# Patient Record
Sex: FEMALE | Race: WHITE | NOT HISPANIC OR LATINO | Employment: OTHER | URBAN - METROPOLITAN AREA
[De-identification: names, ages, dates, MRNs, and addresses within clinical notes are randomized per-mention and may not be internally consistent; named-entity substitution may affect disease eponyms.]

---

## 2017-01-03 ENCOUNTER — OFFICE VISIT (OUTPATIENT)
Dept: FAMILY MEDICINE | Facility: CLINIC | Age: 82
End: 2017-01-03
Payer: MEDICARE

## 2017-01-03 VITALS
DIASTOLIC BLOOD PRESSURE: 86 MMHG | BODY MASS INDEX: 25.4 KG/M2 | HEIGHT: 62 IN | HEART RATE: 72 BPM | WEIGHT: 138 LBS | OXYGEN SATURATION: 95 % | SYSTOLIC BLOOD PRESSURE: 140 MMHG | RESPIRATION RATE: 20 BRPM

## 2017-01-03 DIAGNOSIS — F32.4 MAJOR DEPRESSIVE DISORDER, SINGLE EPISODE, IN PARTIAL REMISSION: ICD-10-CM

## 2017-01-03 DIAGNOSIS — R79.89 ABNORMAL TSH: Primary | ICD-10-CM

## 2017-01-03 DIAGNOSIS — E78.5 DYSLIPIDEMIA: ICD-10-CM

## 2017-01-03 DIAGNOSIS — Z99.81 OXYGEN DEPENDENT: ICD-10-CM

## 2017-01-03 DIAGNOSIS — H02.59 EXCESSIVE INVOLUNTARY BLINKING: ICD-10-CM

## 2017-01-03 DIAGNOSIS — J41.0 SIMPLE CHRONIC BRONCHITIS: ICD-10-CM

## 2017-01-03 DIAGNOSIS — R94.6 ABNORMAL RESULTS OF THYROID FUNCTION STUDIES: ICD-10-CM

## 2017-01-03 DIAGNOSIS — J30.89 SEASONAL ALLERGIC RHINITIS DUE TO OTHER ALLERGIC TRIGGER: ICD-10-CM

## 2017-01-03 DIAGNOSIS — G25.0 ESSENTIAL TREMOR: ICD-10-CM

## 2017-01-03 LAB
ALBUMIN SERPL BCP-MCNC: 3.7 G/DL
ALP SERPL-CCNC: 63 U/L
ALT SERPL W/O P-5'-P-CCNC: 18 U/L
ANION GAP SERPL CALC-SCNC: 8 MMOL/L
AST SERPL-CCNC: 16 U/L
BILIRUB SERPL-MCNC: 0.5 MG/DL
BUN SERPL-MCNC: 12 MG/DL
CALCIUM SERPL-MCNC: 8.7 MG/DL
CHLORIDE SERPL-SCNC: 100 MMOL/L
CO2 SERPL-SCNC: 30 MMOL/L
CREAT SERPL-MCNC: 0.8 MG/DL
EST. GFR  (AFRICAN AMERICAN): >60 ML/MIN/1.73 M^2
EST. GFR  (NON AFRICAN AMERICAN): >60 ML/MIN/1.73 M^2
GLUCOSE SERPL-MCNC: 94 MG/DL
POTASSIUM SERPL-SCNC: 3.5 MMOL/L
PROT SERPL-MCNC: 6.2 G/DL
SODIUM SERPL-SCNC: 138 MMOL/L
T4 FREE SERPL-MCNC: 0.94 NG/DL
TSH SERPL DL<=0.005 MIU/L-ACNC: 0.33 UIU/ML

## 2017-01-03 PROCEDURE — G0008 ADMIN INFLUENZA VIRUS VAC: HCPCS | Mod: S$GLB,,, | Performed by: FAMILY MEDICINE

## 2017-01-03 PROCEDURE — 84439 ASSAY OF FREE THYROXINE: CPT

## 2017-01-03 PROCEDURE — 3079F DIAST BP 80-89 MM HG: CPT | Mod: S$GLB,,, | Performed by: FAMILY MEDICINE

## 2017-01-03 PROCEDURE — 99214 OFFICE O/P EST MOD 30 MIN: CPT | Mod: 25,S$GLB,, | Performed by: FAMILY MEDICINE

## 2017-01-03 PROCEDURE — 80053 COMPREHEN METABOLIC PANEL: CPT

## 2017-01-03 PROCEDURE — 99999 PR PBB SHADOW E&M-EST. PATIENT-LVL II: CPT | Mod: PBBFAC,,, | Performed by: FAMILY MEDICINE

## 2017-01-03 PROCEDURE — 1159F MED LIST DOCD IN RCRD: CPT | Mod: S$GLB,,, | Performed by: FAMILY MEDICINE

## 2017-01-03 PROCEDURE — 3077F SYST BP >= 140 MM HG: CPT | Mod: S$GLB,,, | Performed by: FAMILY MEDICINE

## 2017-01-03 PROCEDURE — 90662 IIV NO PRSV INCREASED AG IM: CPT | Mod: S$GLB,,, | Performed by: FAMILY MEDICINE

## 2017-01-03 PROCEDURE — 99499 UNLISTED E&M SERVICE: CPT | Mod: S$GLB,,, | Performed by: FAMILY MEDICINE

## 2017-01-03 PROCEDURE — 84443 ASSAY THYROID STIM HORMONE: CPT

## 2017-01-03 PROCEDURE — 1157F ADVNC CARE PLAN IN RCRD: CPT | Mod: S$GLB,,, | Performed by: FAMILY MEDICINE

## 2017-01-03 PROCEDURE — 1160F RVW MEDS BY RX/DR IN RCRD: CPT | Mod: S$GLB,,, | Performed by: FAMILY MEDICINE

## 2017-01-03 RX ORDER — MONTELUKAST SODIUM 10 MG/1
10 TABLET ORAL NIGHTLY
Qty: 90 TABLET | Refills: 5 | Status: SHIPPED | OUTPATIENT
Start: 2017-01-03 | End: 2017-02-02

## 2017-01-03 RX ORDER — PRAVASTATIN SODIUM 40 MG/1
40 TABLET ORAL DAILY
Qty: 90 TABLET | Refills: 1 | Status: SHIPPED | OUTPATIENT
Start: 2017-01-03 | End: 2017-05-25 | Stop reason: SDUPTHER

## 2017-01-03 RX ORDER — LOSARTAN POTASSIUM 50 MG/1
50 TABLET ORAL DAILY
Qty: 90 TABLET | Refills: 3 | Status: SHIPPED | OUTPATIENT
Start: 2017-01-03 | End: 2017-03-23 | Stop reason: SDUPTHER

## 2017-01-03 RX ORDER — PRIMIDONE 50 MG/1
50 TABLET ORAL DAILY
Qty: 90 TABLET | Refills: 1 | Status: ON HOLD | OUTPATIENT
Start: 2017-01-03 | End: 2017-05-17

## 2017-01-03 NOTE — MR AVS SNAPSHOT
[address] [address]  Phone: [phone]  Fax: [phone]                  Chantell Herrera   1/3/2017 10:45 AM   Office Visit    Description:  Female : 1935   Provider:  Anson Leiva MD   Department:  [facility]           Reason for Visit     Follow-up           Diagnoses this Visit        Comments    Abnormal TSH    -  Primary     Dyslipidemia         Essential tremor         Excessive involuntary blinking         Abnormal results of thyroid function studies         Major depressive disorder, single episode, in partial remission         Simple chronic bronchitis         Oxygen dependent         Seasonal allergic rhinitis due to other allergic trigger                To Do List           Future Appointments        Provider Department Dept Phone    1/3/2017 10:45 AM Anson Leiva MD [facility] [phone]    7/3/2017 9:30 AM Anson Leiva MD [facility] [phone]      Goals (5 Years of Data)     None      Follow-Up and Disposition     Return in about 6 months (around 7/3/2017).    Follow-up and Disposition History       These Medications        Disp Refills Start End    pravastatin (PRAVACHOL) 40 MG tablet 90 tablet 1 1/3/2017     Take 1 tablet (40 mg total) by mouth once daily. - Oral    Ph[facility]acy: [facility] [address] [facility] Ph #: [phone]       primidone (MYSOLINE) 50 MG Tab 90 tablet 1 1/3/2017 1/3/2018    Take 1 tablet (50 mg total) by mouth once daily. - Oral    Pharmacy: [facility] [address] [facility] Ph #: [phone]       losartan (COZAAR) 50 MG tablet 90 tablet 3 1/3/2017 1/3/2018    Take 1 tablet (50 mg total) by mouth once daily. - Oral    Pharmacy: [facility] [address] [facility] Ph #: [phone]       montelukast (SINGULAIR) 10 mg tablet 90 tablet  5 1/3/2017 2/2/2017    Take 1 tablet (10 mg total) by mouth every evening. - Oral    Pharmacy: [facility] - [place], [place] - [address] Ph #: [phone]         [facility]     [facility] On Call Nurse Care Line - 24/7 Assistance  Registered nurses in the [facility] On Call Center provide clinical advisement, health education, appointment booking, and other advisory services.  Call for this free service at [phone].             Medications           Message regarding Medications     Verify the changes and/or additions to your medication regime listed below are the same as discussed with your clinician today.  If any of these changes or additions are incorrect, please notify your healthcare provider.        START taking these NEW medications        Refills    losartan (COZAAR) 50 MG tablet 3    Sig: Take 1 tablet (50 mg total) by mouth once daily.    Class: Normal    Route: Oral    montelukast (SINGULAIR) 10 mg tablet 5    Sig: Take 1 tablet (10 mg total) by mouth every evening.    Class: Normal    Route: Oral           Verify that the below list of medications is an accurate representation of the medications you are currently taking.  If none reported, the list may be blank. If incorrect, please contact your healthcare provider. Carry this list with you in case of emergency.           Current Medications     albuterol (PROVENTIL) 2.5 mg /3 mL (0.083 %) nebulizer solution Take 3 mLs (2.5 mg total) by nebulization every 6 (six) hours as needed for Wheezing.    artificial tears,hypromellose, 0.3 % Drop continuous prn.     aspirin 81 MG Chew Take 81 mg by mouth once daily.    budesonide-formoterol 160-4.5 mcg (SYMBICORT) 160-4.5 mcg/actuation HFAA Inhale 2 puffs into the lungs every 12 (twelve) hours.    cholecalciferol, vitamin D3, (VITAMIN D3) 2,000 unit Tab Take 2,000 Units by mouth once daily.    citalopram (CELEXA) 40 MG tablet TAKE 1 TABLET ONE TIME DAILY    FOLIC  "ACID/MULTIVIT-MIN/LUTEIN (CENTRUM SILVER ORAL) Take 1 tablet by mouth once daily.     ipratropium (ATROVENT) 0.02 % nebulizer solution Take 500 mcg by nebulization 4 (four) times daily.     pravastatin (PRAVACHOL) 40 MG tablet Take 1 tablet (40 mg total) by mouth once daily.    predniSONE (DELTASONE) 10 MG tablet Take 10 mg by mouth once daily.     primidone (MYSOLINE) 50 MG Tab Take 1 tablet (50 mg total) by mouth once daily.    theophylline (THEODUR) 300 mg 24 hr capsule Take 150 mg by mouth once daily.     VITAMIN C 500 MG tablet Take 1 tablet by mouth once daily.    losartan (COZAAR) 50 MG tablet Take 1 tablet (50 mg total) by mouth once daily.    montelukast (SINGULAIR) 10 mg tablet Take 1 tablet (10 mg total) by mouth every evening.           Clinical Reference Information           Vital Signs - Last Recorded  Most recent update: 1/3/2017 10:00 AM by Alona Mohamud LPN    BP Pulse Resp Ht Wt SpO2    (!) 140/86 (BP Location: Left arm, Patient Position: Sitting, BP Method: Manual) 72 20 5' 2" (1.575 m) 62.6 kg (138 lb 0.1 oz) 95%    BMI                25.24 kg/m2          Blood Pressure          Most Recent Value    BP  (!)  140/86      Allergies as of 1/3/2017     No Known Allergies      Immunizations Administered on Date of Encounter - 1/3/2017     None      Orders Placed During Today's Visit     Future Labs/Procedures Expected by Expires    Comprehensive metabolic panel  1/3/2017 1/3/2018    TSH  1/3/2017 1/3/2018      "

## 2017-01-03 NOTE — PROGRESS NOTES
Subjective:       Patient ID: Chantell Herrera is a 81 y.o. female.    Chief Complaint: Follow-up (3 month checkup)    Pt is a 81 y.o. female who presents for evaluation and management of   Encounter Diagnoses   Name Primary?    Dyslipidemia     Essential tremor     Excessive involuntary blinking     Abnormal TSH Yes    Abnormal results of thyroid function studies      Major depressive disorder, single episode, in partial remission     Simple chronic bronchitis    .  Doing well on current meds. Denies any side effects. Prevention is up to date.  Review of Systems   Constitutional: Negative for chills and fever.   Respiratory: Negative for cough, shortness of breath and wheezing.    Cardiovascular: Negative for chest pain.   Musculoskeletal: Positive for arthralgias.   Psychiatric/Behavioral: Negative for agitation, dysphoric mood and suicidal ideas.       Objective:      Physical Exam   Constitutional: She is oriented to person, place, and time. She appears well-developed and well-nourished.   HENT:   Head: Normocephalic and atraumatic.   Right Ear: External ear normal.   Left Ear: External ear normal.   Nose: Nose normal.   Mouth/Throat: Oropharynx is clear and moist.   Eyes: EOM are normal. Pupils are equal, round, and reactive to light.   Neck: Normal range of motion. Neck supple. No JVD present. No tracheal deviation present. No thyromegaly present.   Cardiovascular: Normal rate, normal heart sounds and intact distal pulses.    No murmur heard.  Pulmonary/Chest: Effort normal and breath sounds normal. No respiratory distress. She has no wheezes. She has no rales. She exhibits no tenderness.   Abdominal: Soft. Bowel sounds are normal. She exhibits no distension and no mass. There is no tenderness. There is no rebound and no guarding.   Musculoskeletal: Normal range of motion. She exhibits no edema or tenderness.   Lymphadenopathy:     She has no cervical adenopathy.   Neurological: She is alert and  oriented to person, place, and time. She has normal reflexes. She displays normal reflexes. No cranial nerve deficit. She exhibits normal muscle tone. Coordination normal.   Skin: Skin is warm and dry. No rash noted. No erythema. No pallor.   Psychiatric: She has a normal mood and affect. Her behavior is normal. Judgment and thought content normal.       Assessment:       1. Abnormal TSH    2. Dyslipidemia    3. Essential tremor    4. Excessive involuntary blinking    5. Abnormal results of thyroid function studies     6. Major depressive disorder, single episode, in partial remission    7. Simple chronic bronchitis    8. Oxygen dependent    9. Seasonal allergic rhinitis due to other allergic trigger        Plan:   Chantell was seen today for follow-up.    Diagnoses and all orders for this visit:    Abnormal TSH  -     TSH; Future    Dyslipidemia  -     pravastatin (PRAVACHOL) 40 MG tablet; Take 1 tablet (40 mg total) by mouth once daily.  -     Comprehensive metabolic panel; Future    Essential tremor  -     primidone (MYSOLINE) 50 MG Tab; Take 1 tablet (50 mg total) by mouth once daily.  -     losartan (COZAAR) 50 MG tablet; Take 1 tablet (50 mg total) by mouth once daily.    Excessive involuntary blinking  -     primidone (MYSOLINE) 50 MG Tab; Take 1 tablet (50 mg total) by mouth once daily.    Abnormal results of thyroid function studies   -     TSH; Future    Major depressive disorder, single episode, in partial remission    Simple chronic bronchitis    Oxygen dependent    Seasonal allergic rhinitis due to other allergic trigger  -     montelukast (SINGULAIR) 10 mg tablet; Take 1 tablet (10 mg total) by mouth every evening.    adding losartan    RTC 6 months      No Follow-up on file.

## 2017-01-11 ENCOUNTER — TELEPHONE (OUTPATIENT)
Dept: FAMILY MEDICINE | Facility: CLINIC | Age: 82
End: 2017-01-11

## 2017-01-11 DIAGNOSIS — K08.9 TOOTH DISORDER: Primary | ICD-10-CM

## 2017-01-11 NOTE — TELEPHONE ENCOUNTER
----- Message from Karon Ledesma sent at 1/10/2017 12:28 PM CST -----  Contact: self  Chantell Herrera  MRN: [id number]  : 1935  PCP: Anson Leiva  Home Phone      [phone]  Work Phone      Not on file.  Angella Joy          [phone]      MESSAGE:    Would like a referral sent to [facility] in [place] stating its medically necessary     Phone:  [phone]

## 2017-03-23 DIAGNOSIS — G25.0 ESSENTIAL TREMOR: ICD-10-CM

## 2017-03-23 RX ORDER — LOSARTAN POTASSIUM 50 MG/1
50 TABLET ORAL DAILY
Qty: 90 TABLET | Refills: 3 | Status: SHIPPED | OUTPATIENT
Start: 2017-03-23 | End: 2017-09-27 | Stop reason: SDUPTHER

## 2017-03-23 NOTE — TELEPHONE ENCOUNTER
----- Message from David Kirkpatrick sent at 3/23/2017 11:22 AM CDT -----  Contact: Patient  Chantell Herrera  MRN: [id number]  : 1935  PCP: Anson Leiva  Home Phone      [phone]  Work Phone      Not on file.  Mobile          [phone]      MESSAGE: requesting to speak with nurse-- states refill requests are not being sent back to [facility] -- requesting Losartan & Montelukast    Call [phone]    PCP: Abbie

## 2017-05-17 ENCOUNTER — HOSPITAL ENCOUNTER (INPATIENT)
Facility: HOSPITAL | Age: 82
LOS: 3 days | Discharge: HOME | DRG: 191 | End: 2017-05-20
Attending: SURGERY | Admitting: FAMILY MEDICINE
Payer: MEDICARE

## 2017-05-17 DIAGNOSIS — J20.9 COPD WITH ACUTE BRONCHITIS: Primary | ICD-10-CM

## 2017-05-17 DIAGNOSIS — R06.02 SOB (SHORTNESS OF BREATH): ICD-10-CM

## 2017-05-17 DIAGNOSIS — J44.0 COPD WITH ACUTE BRONCHITIS: Primary | ICD-10-CM

## 2017-05-17 PROBLEM — R09.02 HYPOXIA: Status: ACTIVE | Noted: 2017-05-17

## 2017-05-17 PROBLEM — G25.81 RLS (RESTLESS LEGS SYNDROME): Status: ACTIVE | Noted: 2017-05-17

## 2017-05-17 LAB
ALBUMIN SERPL BCP-MCNC: 3.8 G/DL
ALP SERPL-CCNC: 54 U/L
ALT SERPL W/O P-5'-P-CCNC: 11 U/L
ANION GAP SERPL CALC-SCNC: 11 MMOL/L
APTT BLDCRRT: 21.6 SEC
AST SERPL-CCNC: 13 U/L
BASOPHILS # BLD AUTO: 0.03 K/UL
BASOPHILS NFR BLD: 0.5 %
BILIRUB SERPL-MCNC: 0.5 MG/DL
BNP SERPL-MCNC: 102 PG/ML
BUN SERPL-MCNC: 14 MG/DL
CALCIUM SERPL-MCNC: 9.1 MG/DL
CHLORIDE SERPL-SCNC: 99 MMOL/L
CK MB SERPL-MCNC: 1.4 NG/ML
CK MB SERPL-RTO: 5.2 %
CK SERPL-CCNC: 27 U/L
CK SERPL-CCNC: 27 U/L
CO2 SERPL-SCNC: 29 MMOL/L
CREAT SERPL-MCNC: 0.9 MG/DL
D DIMER PPP IA.FEU-MCNC: 0.34 MG/L FEU
DIFFERENTIAL METHOD: ABNORMAL
EOSINOPHIL # BLD AUTO: 0.3 K/UL
EOSINOPHIL NFR BLD: 4.8 %
ERYTHROCYTE [DISTWIDTH] IN BLOOD BY AUTOMATED COUNT: 13.2 %
EST. GFR  (AFRICAN AMERICAN): >60 ML/MIN/1.73 M^2
EST. GFR  (NON AFRICAN AMERICAN): >60 ML/MIN/1.73 M^2
GLUCOSE SERPL-MCNC: 125 MG/DL
HCT VFR BLD AUTO: 36.1 %
HGB BLD-MCNC: 12.1 G/DL
INR PPP: 1.1
LACTATE SERPL-SCNC: 2.6 MMOL/L
LYMPHOCYTES # BLD AUTO: 0.6 K/UL
LYMPHOCYTES NFR BLD: 8.9 %
MCH RBC QN AUTO: 34.3 PG
MCHC RBC AUTO-ENTMCNC: 33.5 %
MCV RBC AUTO: 102 FL
MONOCYTES # BLD AUTO: 0.8 K/UL
MONOCYTES NFR BLD: 12.7 %
NEUTROPHILS # BLD AUTO: 4.9 K/UL
NEUTROPHILS NFR BLD: 73.1 %
PLATELET # BLD AUTO: 204 K/UL
PMV BLD AUTO: 11.3 FL
POTASSIUM SERPL-SCNC: 4.1 MMOL/L
PROT SERPL-MCNC: 6.2 G/DL
PROTHROMBIN TIME: 10.7 SEC
RBC # BLD AUTO: 3.53 M/UL
SODIUM SERPL-SCNC: 139 MMOL/L
TROPONIN I SERPL DL<=0.01 NG/ML-MCNC: 0.01 NG/ML
TROPONIN I SERPL DL<=0.01 NG/ML-MCNC: 0.01 NG/ML
TROPONIN I SERPL DL<=0.01 NG/ML-MCNC: <0.006 NG/ML
WBC # BLD AUTO: 6.63 K/UL

## 2017-05-17 PROCEDURE — 96365 THER/PROPH/DIAG IV INF INIT: CPT

## 2017-05-17 PROCEDURE — 99900035 HC TECH TIME PER 15 MIN (STAT)

## 2017-05-17 PROCEDURE — 85379 FIBRIN DEGRADATION QUANT: CPT

## 2017-05-17 PROCEDURE — 85610 PROTHROMBIN TIME: CPT

## 2017-05-17 PROCEDURE — 27000339 *HC DAILY SUPPLY KIT

## 2017-05-17 PROCEDURE — 36415 COLL VENOUS BLD VENIPUNCTURE: CPT

## 2017-05-17 PROCEDURE — 25000242 PHARM REV CODE 250 ALT 637 W/ HCPCS: Performed by: SURGERY

## 2017-05-17 PROCEDURE — 25000003 PHARM REV CODE 250: Performed by: FAMILY MEDICINE

## 2017-05-17 PROCEDURE — 83605 ASSAY OF LACTIC ACID: CPT

## 2017-05-17 PROCEDURE — 84484 ASSAY OF TROPONIN QUANT: CPT | Mod: 91

## 2017-05-17 PROCEDURE — 99285 EMERGENCY DEPT VISIT HI MDM: CPT | Mod: 25

## 2017-05-17 PROCEDURE — 96375 TX/PRO/DX INJ NEW DRUG ADDON: CPT

## 2017-05-17 PROCEDURE — 63600175 PHARM REV CODE 636 W HCPCS: Performed by: SURGERY

## 2017-05-17 PROCEDURE — 87040 BLOOD CULTURE FOR BACTERIA: CPT

## 2017-05-17 PROCEDURE — 99223 1ST HOSP IP/OBS HIGH 75: CPT | Mod: ,,, | Performed by: FAMILY MEDICINE

## 2017-05-17 PROCEDURE — 80053 COMPREHEN METABOLIC PANEL: CPT

## 2017-05-17 PROCEDURE — 84484 ASSAY OF TROPONIN QUANT: CPT

## 2017-05-17 PROCEDURE — 94640 AIRWAY INHALATION TREATMENT: CPT

## 2017-05-17 PROCEDURE — 93005 ELECTROCARDIOGRAM TRACING: CPT

## 2017-05-17 PROCEDURE — 27000494 *HC OXYGEN SET UP (STAH ONLY)

## 2017-05-17 PROCEDURE — 99900031 HC PATIENT EDUCATION (STAT)

## 2017-05-17 PROCEDURE — 82553 CREATINE MB FRACTION: CPT

## 2017-05-17 PROCEDURE — 25000003 PHARM REV CODE 250: Performed by: SURGERY

## 2017-05-17 PROCEDURE — 83880 ASSAY OF NATRIURETIC PEPTIDE: CPT

## 2017-05-17 PROCEDURE — 99900029 HC O2 SETUP (STAT)

## 2017-05-17 PROCEDURE — 11000001 HC ACUTE MED/SURG PRIVATE ROOM

## 2017-05-17 PROCEDURE — 85730 THROMBOPLASTIN TIME PARTIAL: CPT

## 2017-05-17 PROCEDURE — 94761 N-INVAS EAR/PLS OXIMETRY MLT: CPT

## 2017-05-17 PROCEDURE — 63600175 PHARM REV CODE 636 W HCPCS: Performed by: FAMILY MEDICINE

## 2017-05-17 PROCEDURE — 96376 TX/PRO/DX INJ SAME DRUG ADON: CPT

## 2017-05-17 PROCEDURE — 27000221 HC OXYGEN, UP TO 24 HOURS

## 2017-05-17 PROCEDURE — 85025 COMPLETE CBC W/AUTO DIFF WBC: CPT

## 2017-05-17 PROCEDURE — 93010 ELECTROCARDIOGRAM REPORT: CPT | Mod: ,,, | Performed by: INTERNAL MEDICINE

## 2017-05-17 RX ORDER — LEVOFLOXACIN 5 MG/ML
500 INJECTION, SOLUTION INTRAVENOUS
Status: DISCONTINUED | OUTPATIENT
Start: 2017-05-17 | End: 2017-05-17

## 2017-05-17 RX ORDER — ACETAMINOPHEN 325 MG/1
650 TABLET ORAL EVERY 8 HOURS PRN
Status: DISCONTINUED | OUTPATIENT
Start: 2017-05-17 | End: 2017-05-20 | Stop reason: HOSPADM

## 2017-05-17 RX ORDER — ONDANSETRON 2 MG/ML
4 INJECTION INTRAMUSCULAR; INTRAVENOUS EVERY 8 HOURS PRN
Status: DISCONTINUED | OUTPATIENT
Start: 2017-05-17 | End: 2017-05-20 | Stop reason: HOSPADM

## 2017-05-17 RX ORDER — HYDROCODONE BITARTRATE AND ACETAMINOPHEN 5; 325 MG/1; MG/1
1 TABLET ORAL EVERY 4 HOURS PRN
Status: DISCONTINUED | OUTPATIENT
Start: 2017-05-17 | End: 2017-05-20 | Stop reason: HOSPADM

## 2017-05-17 RX ORDER — ROPINIROLE 0.5 MG/1
0.5 TABLET, FILM COATED ORAL NIGHTLY
Status: DISCONTINUED | OUTPATIENT
Start: 2017-05-17 | End: 2017-05-20 | Stop reason: HOSPADM

## 2017-05-17 RX ORDER — METHYLPREDNISOLONE SOD SUCC 125 MG
80 VIAL (EA) INJECTION EVERY 8 HOURS
Status: DISCONTINUED | OUTPATIENT
Start: 2017-05-17 | End: 2017-05-19

## 2017-05-17 RX ORDER — NAPROXEN SODIUM 220 MG/1
81 TABLET, FILM COATED ORAL NIGHTLY
Status: DISCONTINUED | OUTPATIENT
Start: 2017-05-17 | End: 2017-05-20 | Stop reason: HOSPADM

## 2017-05-17 RX ORDER — CITALOPRAM 10 MG/1
20 TABLET ORAL NIGHTLY
Status: DISCONTINUED | OUTPATIENT
Start: 2017-05-17 | End: 2017-05-20 | Stop reason: HOSPADM

## 2017-05-17 RX ORDER — LOSARTAN POTASSIUM 50 MG/1
50 TABLET ORAL NIGHTLY
Status: DISCONTINUED | OUTPATIENT
Start: 2017-05-17 | End: 2017-05-20 | Stop reason: HOSPADM

## 2017-05-17 RX ORDER — METHYLPREDNISOLONE SOD SUCC 125 MG
125 VIAL (EA) INJECTION
Status: DISCONTINUED | OUTPATIENT
Start: 2017-05-17 | End: 2017-05-17

## 2017-05-17 RX ORDER — CITALOPRAM 10 MG/1
20 TABLET ORAL DAILY
Status: DISCONTINUED | OUTPATIENT
Start: 2017-05-17 | End: 2017-05-17

## 2017-05-17 RX ORDER — LEVALBUTEROL 1.25 MG/.5ML
1.25 SOLUTION, CONCENTRATE RESPIRATORY (INHALATION) EVERY 6 HOURS PRN
Status: DISCONTINUED | OUTPATIENT
Start: 2017-05-17 | End: 2017-05-18

## 2017-05-17 RX ORDER — PANTOPRAZOLE SODIUM 40 MG/1
40 TABLET, DELAYED RELEASE ORAL NIGHTLY
Status: DISCONTINUED | OUTPATIENT
Start: 2017-05-17 | End: 2017-05-20 | Stop reason: HOSPADM

## 2017-05-17 RX ORDER — FLUTICASONE FUROATE AND VILANTEROL 200; 25 UG/1; UG/1
1 POWDER RESPIRATORY (INHALATION) DAILY
Status: DISCONTINUED | OUTPATIENT
Start: 2017-05-18 | End: 2017-05-20 | Stop reason: HOSPADM

## 2017-05-17 RX ORDER — PANTOPRAZOLE SODIUM 40 MG/1
40 TABLET, DELAYED RELEASE ORAL DAILY
Status: DISCONTINUED | OUTPATIENT
Start: 2017-05-17 | End: 2017-05-17

## 2017-05-17 RX ORDER — LOSARTAN POTASSIUM 50 MG/1
50 TABLET ORAL DAILY
Status: DISCONTINUED | OUTPATIENT
Start: 2017-05-17 | End: 2017-05-17

## 2017-05-17 RX ORDER — TRAZODONE HYDROCHLORIDE 50 MG/1
50 TABLET ORAL NIGHTLY
Status: DISCONTINUED | OUTPATIENT
Start: 2017-05-17 | End: 2017-05-20 | Stop reason: HOSPADM

## 2017-05-17 RX ORDER — NAPROXEN SODIUM 220 MG/1
81 TABLET, FILM COATED ORAL DAILY
Status: DISCONTINUED | OUTPATIENT
Start: 2017-05-17 | End: 2017-05-17

## 2017-05-17 RX ORDER — IPRATROPIUM BROMIDE AND ALBUTEROL SULFATE 2.5; .5 MG/3ML; MG/3ML
3 SOLUTION RESPIRATORY (INHALATION)
Status: COMPLETED | OUTPATIENT
Start: 2017-05-17 | End: 2017-05-17

## 2017-05-17 RX ORDER — METHYLPREDNISOLONE SOD SUCC 125 MG
80 VIAL (EA) INJECTION EVERY 8 HOURS
Status: DISCONTINUED | OUTPATIENT
Start: 2017-05-17 | End: 2017-05-17

## 2017-05-17 RX ADMIN — ASPIRIN 81 MG 81 MG: 81 TABLET ORAL at 09:05

## 2017-05-17 RX ADMIN — LOSARTAN POTASSIUM 50 MG: 50 TABLET, FILM COATED ORAL at 09:05

## 2017-05-17 RX ADMIN — TRAZODONE HYDROCHLORIDE 50 MG: 50 TABLET ORAL at 09:05

## 2017-05-17 RX ADMIN — LEVALBUTEROL 1.25 MG: 1.25 SOLUTION, CONCENTRATE RESPIRATORY (INHALATION) at 07:05

## 2017-05-17 RX ADMIN — ROPINIROLE HYDROCHLORIDE 0.5 MG: 0.5 TABLET, FILM COATED ORAL at 09:05

## 2017-05-17 RX ADMIN — METHYLPREDNISOLONE SODIUM SUCCINATE 125 MG: 125 INJECTION, POWDER, FOR SOLUTION INTRAMUSCULAR; INTRAVENOUS at 11:05

## 2017-05-17 RX ADMIN — LEVOFLOXACIN 500 MG: 5 INJECTION, SOLUTION INTRAVENOUS at 01:05

## 2017-05-17 RX ADMIN — CITALOPRAM HYDROBROMIDE 20 MG: 10 TABLET ORAL at 09:05

## 2017-05-17 RX ADMIN — PANTOPRAZOLE SODIUM 40 MG: 40 TABLET, DELAYED RELEASE ORAL at 09:05

## 2017-05-17 RX ADMIN — IPRATROPIUM BROMIDE AND ALBUTEROL SULFATE 3 ML: .5; 3 SOLUTION RESPIRATORY (INHALATION) at 11:05

## 2017-05-17 RX ADMIN — METHYLPREDNISOLONE SODIUM SUCCINATE 80 MG: 125 INJECTION, POWDER, FOR SOLUTION INTRAMUSCULAR; INTRAVENOUS at 07:05

## 2017-05-17 NOTE — ED PROVIDER NOTES
[facility]                                        Chief Complaint  81 y.o. female with Shortness of Breath    History of Present Illness  Chantell Herrera presents to the emergency room with wheezing and shortness of breath  Patient has a known history of COPD, has been on by mouth antibiotics for several weeks  Patient states the wheezing and shortness of breath is not any better with continued cough  Patient on arrival here has active wheezing with an oxygen saturation of 89% on triage today  Patient is on 2 L of oxygen at home but states that it is not helping for the last week or so  Patient appears to be in a COPD/emphysema exacerbation, denies any chest pain now    The history is provided by the patient    Past Medical History   -- Arthritis    -- Asthma    -- Chronic bronchitis    -- COPD (chronic obstructive pulmonary disease)    -- SANTIAGO (dyspnea on exertion)    -- Emphysema of lung    -- Fatigue    -- Hyperlipidemia    -- Hypertension    -- Trouble in sleeping       Surgical HX: Appendectomy, high, JOHNNY, tonsillectomy  No Known Allergies     Review of Systems and Physical Exam     Review of Systems  -- Constitution - no fever, denies fatigue, no weakness, no chills  -- Eyes - no tearing or redness, no visual disturbance  -- Ear, Nose - no tinnitus or earache, no nasal congestion or discharge  -- Mouth,Throat - no sore throat, no toothache, normal voice, normal swallowing  -- Respiratory - cough and congestion, shortness of breath, wheezing  -- Cardiovascular - denies chest pain, no palpitations, denies claudication  -- Gastrointestinal - denies abdominal pain, nausea, vomiting, or diarrhea  -- Musculoskeletal - denies back pain, negative for myalgias and arthralgias   -- Neurological - no headache, denies weakness or seizure; no LOC  -- Skin - denies pallor, rash, or changes in skin. no hives or welts noted    Vital Signs  -- Her blood pressure is 123/67 and her pulse is 87.   -- Her  respiration is 20 and oxygen saturation is 92%    Physical Exam  -- Nursing note and vitals reviewed  -- Head: Atraumatic. Normocephalic. No obvious abnormality  -- Eyes: Pupils are equal and reactive to light. Normal conjunctiva and lids  -- Cardiac: Normal rate, regular rhythm and normal heart sounds  -- Pulmonary: Crackles in bilateral fields with mild wheezing noted  -- Abdominal: Soft, no tenderness. Normal bowel sounds. Normal liver edge  -- Musculoskeletal: Normal range of motion, no effusions. Joints stable   -- Neurological: No focal deficits. Showed good interaction with staff  -- Vascular: Posterior tibial, dorsalis pedis and radial pulses 2+ bilaterally      Emergency Room Course     Treatment and Evaluation  -- The EKG findings today were without concerning findings from baseline   -- Chest x-ray with no acute infiltrate  -- The electrolytes drawn in the ER today were within normal limits   -- The CBC drawn in the ER today was within normal limits   -- Blood cultures have also been drawn, results are pending   -- The d-dimer drawn in the ER today were within normal limits.   -- The troponin drawn in the ER today was within normal limits   -- The PT, PTT, and INR were within normal limits.      Abnormal lab values  -- Lactate (Lactic Acid) 2.6 (*)   --  (*)     Medications Given  -- levoFLOXacin 500 mg/100 mL IVPB 500 mg (500 mg Intravenous New Bag 5/17/17 1318)   -- albuterol-ipratropium 2.5mg-0.5mg/3mL nebulizer solution 3 mL   -- methylPREDNISolone sod suc(PF) 125 mg/2 mL injection 125 mg      Diagnosis  -- The primary encounter diagnosis was COPD with acute bronchitis.   -- A diagnosis of SOB (shortness of breath) was also pertinent to this visit.    Disposition and Plan  -- Disposition: observation  -- Condition: stable  -- Telemetry monitoring  -- Morning labs  -- SCD hoses  -- Home medications  -- Nausea medication when necessary  -- Pain medication when necessary  -- Hep-Lock IV  --  Routine monitoring  -- Bed rest until otherwise stated  -- Low salt diet  -- Protonix for GERD prophylaxis  -- EKG in the morning  -- Aspirin daily  -- Serial cardiac enzymes  -- Breathing treatments  -- IV antibiotics  -- IV Steroids    This note is dictated on Dragon Natural Speaking word recognition program.  There are word recognition mistakes that are occasionally missed on review.           Festus Maurer MD  05/17/17 3782

## 2017-05-17 NOTE — PLAN OF CARE
05/17/17 1548   Medicare Message   Important Message from Medicare regarding Discharge Appeal Rights Given to patient/caregiver;Explained to patient/caregiver;Signed/date by patient/caregiver   Date IMM was signed 05/17/17   Time IMM was signed 1238   HOPKINS Message   Medicare Outpatient and Observation Notification regarding financial responsibility Given to patient/caregiver;Explained to patient/caregiver;Signed/date by patient/caregiver   Date HOPKINS was signed 05/17/17   Time HOPKINS was signed 4373

## 2017-05-17 NOTE — PLAN OF CARE
Problem: Patient Care Overview  Goal: Plan of Care Review  Outcome: Ongoing (interventions implemented as appropriate)  Pt admitted COPD with acute bronchitis. Coarse breath sounds with expiratory wheezing. On IV levaquin, solumedrol 80mg every 8 hours and prn nebs. Afebrile. WBC negative. Oxygen sats 92 on 3L per nasal cannula. Pt on O2 at home and taking oral abx and steroid but SOB worsening and unable to maintain adequate sats. Sinus rhythm on telemetry. CXR negative for infiltrate and effusion. Call bed within reach. Bed in low, locked position. Plan of care reviewed with pt and family; state agreement.

## 2017-05-17 NOTE — ED NOTES
Report called to STACIA Voss on Med/Surg.  Pt resting quietly in bed, in NAD, resp e/u on 2L O2 per [place].

## 2017-05-17 NOTE — ED TRIAGE NOTES
"SOB for "awhile".  Pt currently ambulatory to exam room on 2L per [place] home O2.  Per family when off O2, sats below 72.  On Amoxicillin for 4 days.  "

## 2017-05-17 NOTE — IP AVS SNAPSHOT
[address] [address]  Phone: [phone]           Patient Discharge Instructions   Our goal is to set you up for success. This packet includes information on your condition, medications, and your home care.  It will help you care for yourself to prevent having to return to the hospital.     Please ask your nurse if you have any questions.      There are many details to remember when preparing to leave the hospital. Here is what you will need to do:    1. Take your medicine. If you are prescribed medications, review your Medication List on the following pages. You may have new medications to  at the pharmacy and others that you'll need to stop taking. Review the instructions for how and when to take your medications. Talk with your doctor or nurses if you are unsure of what to do.     2. Go to your follow-up appointments. Specific follow-up information is listed in the following pages. Your may be contacted by a nurse or clinical provider about future appointments. Be sure we have all of the phone numbers to reach you. Please contact your provider's office if you are unable to make an appointment.     3. Watch for warning signs. Your doctor or nurse will give you detailed warning signs to watch for and when to call for assistance. These instructions may also include educational information about your condition. If you experience any of warning signs to your health, call your doctor.               ** Verify the list of medication(s) below is accurate and up to date. Carry this with you in case of emergency. If your medications have changed, please notify your healthcare provider.             Medication List      START taking these medications        Additional Info                      montelukast 10 mg tablet   Commonly known as:  SINGULAIR   Quantity:  30 tablet   Refills:  0   Dose:  10 mg    Instructions:  Take 1 tablet (10 mg total) by mouth every evening.      Begin Date    AM    Noon    PM    Bedtime       ropinirole 0.5 MG tablet   Commonly known as:  REQUIP   Quantity:  30 tablet   Refills:  0   Dose:  0.5 mg    Last time this was given:  0.5 mg on 5/19/2017  9:02 PM   Instructions:  Take 1 tablet (0.5 mg total) by mouth every evening.     Begin Date    AM    Noon    PM    Bedtime       trazodone 50 MG tablet   Commonly known as:  DESYREL   Quantity:  30 tablet   Refills:  0   Dose:  50 mg    Last time this was given:  50 mg on 5/19/2017  9:02 PM   Instructions:  Take 1 tablet (50 mg total) by mouth every evening.     Begin Date    AM    Noon    PM    Bedtime         CHANGE how you take these medications        Additional Info                      predniSONE 10 MG tablet   Commonly known as:  DELTASONE   Quantity:  23 tablet   Refills:  0   What changed:    - how much to take  - how to take this  - when to take this  - additional instructions    Instructions:  Take 20 mg BID x 3d then 20mg daily x 3d then 10mg daily x 3days then 5mg daily x 4 days     Begin Date    AM    Noon    PM    Bedtime         CONTINUE taking these medications        Additional Info                      albuterol 2.5 mg /3 mL (0.083 %) nebulizer solution   Commonly known as:  PROVENTIL   Quantity:  300 mL   Refills:  3   Dose:  2.5 mg    Instructions:  Take 3 mLs (2.5 mg total) by nebulization every 6 (six) hours as needed for Wheezing.     Begin Date    AM    Noon    PM    Bedtime       artificial tears(hypromellose) 0.3 % Drop   Refills:  0    Instructions:  continuous prn.     Begin Date    AM    Noon    PM    Bedtime       aspirin 81 MG Chew   Refills:  0   Dose:  81 mg    Last time this was given:  81 mg on 5/19/2017  9:02 PM   Instructions:  Take 81 mg by mouth once daily.     Begin Date    AM    Noon    PM    Bedtime       budesonide-formoterol 160-4.5 mcg 160-4.5 mcg/actuation Hfaa   Commonly known as:  SYMBICORT   Refills:  0   Dose:  2 puff    Instructions:  Inhale 2 puffs into the  lungs every 12 (twelve) hours.     Begin Date    AM    Noon    PM    Bedtime       CENTRUM SILVER ORAL   Refills:  0   Dose:  1 tablet    Instructions:  Take 1 tablet by mouth once daily.     Begin Date    AM    Noon    PM    Bedtime       citalopram 40 MG tablet   Commonly known as:  CELEXA   Quantity:  90 tablet   Refills:  1    Last time this was given:  20 mg on 5/19/2017  9:02 PM   Instructions:  TAKE 1 TABLET ONE TIME DAILY     Begin Date    AM    Noon    PM    Bedtime       ipratropium 0.02 % nebulizer solution   Commonly known as:  ATROVENT   Refills:  0   Dose:  500 mcg    Instructions:  Take 500 mcg by nebulization 4 (four) times daily.     Begin Date    AM    Noon    PM    Bedtime       losartan 50 MG tablet   Commonly known as:  COZAAR   Quantity:  90 tablet   Refills:  3   Dose:  50 mg    Last time this was given:  50 mg on 5/19/2017  9:02 PM   Instructions:  Take 1 tablet (50 mg total) by mouth once daily.     Begin Date    AM    Noon    PM    Bedtime       pravastatin 40 MG tablet   Commonly known as:  PRAVACHOL   Quantity:  90 tablet   Refills:  1   Dose:  40 mg    Instructions:  Take 1 tablet (40 mg total) by mouth once daily.     Begin Date    AM    Noon    PM    Bedtime       theophylline 300 mg 24 hr capsule   Commonly known as:  THEODUR   Refills:  0   Dose:  150 mg    Instructions:  Take 150 mg by mouth once daily.     Begin Date    AM    Noon    PM    Bedtime       VITAMIN C 500 MG tablet   Refills:  0   Dose:  1 tablet   Generic drug:  ascorbic acid (vitamin C)    Instructions:  Take 1 tablet by mouth once daily.     Begin Date    AM    Noon    PM    Bedtime       VITAMIN D3 2,000 unit Tab   Refills:  0   Dose:  2000 Units   Generic drug:  cholecalciferol (vitamin D3)    Instructions:  Take 2,000 Units by mouth once daily.     Begin Date    AM    Noon    PM    Bedtime            Where to Get Your Medications      These medications were sent to [facility] - [place], [place]  - [address]  [address], [facility] [address]     Phone:  [phone]     montelukast 10 mg tablet    predniSONE 10 MG tablet    ropinirole 0.5 MG tablet    trazodone 50 MG tablet                  Please bring to all follow up appointments:    1. A copy of your discharge instructions.  2. All medicines you are currently taking in their original bottles.  3. Identification and insurance card.    Please arrive 15 minutes ahead of scheduled appointment time.    Please call 24 hours in advance if you must reschedule your appointment and/or time.        Your Scheduled Appointments     May 24, 2017  3:15 PM CDT   Hospital Follow Up with Anson Leiva MD   [facility] ([facility])    [address] [place] [address]   [phone]            Jul 03, 2017  9:30 AM CDT   Established Patient Visit with MD Jorje Pearson [facility] ([facility])    [address] [place] [id number]   [phone]              Follow-up Information     Follow up with Anson Leiva MD In 4 days.    Specialty:  Family Medicine    Why:  Outpatient Services    Contact information:    [facility] [address] [facility] [place] [address]  [phone]            Discharge Instructions         Discharge Instructions: COPD  You have been diagnosed with chronic obstructive pulmonary disease (COPD). This is a name given to a group of diseases that limit the flow of air in and out of your lungs. This makes it harder to breathe. With COPD, you are also more likely to get lung infections. COPD includes chronic bronchitis and emphysema. COPD is most often caused by heavy, long-term cigarette smoking.  Home care  Quit smoking  · If you smoke, quit. It is the best thing you can do for your COPD and your overall health.  · Join a stop-smoking program. There are even telephone, text message, and Internet programs to help you quit.  · Ask your healthcare provider about medicines or other  methods to help you quit.  · Ask family members to quit smoking as well.  · Don't allow people to smoke in your home, in your car, or when they are around you.  Protect yourself from infection  · Wash your hands often. Do your best to keep your hands away from your face. Most germs are spread from your hands to your mouth.  · Get a flu shot every year. Also ask your provider about pneumonia vaccines.  · Avoid crowds. It's especially important to do this in the winter when more people have colds and flu.  · To stay healthy, get enough sleep, exercise regularly, and eat a balanced diet. You should:  ¨ Get about 8 hours of sleep every night.  ¨ Try to exercise for at least 30 minutes on most days.  ¨ Have healthy foods including fruits and vegetables, 100% whole grains, lean meats and fish, and low-fat dairy products. Try to stay away from foods high in fats and sugar.  Take your medicines  Take your medicines exactly as directed. Don't skip doses.  Manage your stress  Stress can make COPD worse. Use this stress management technique:  · Find a quiet place and sit or lie in a comfortable position.  · Close your eyes and perform breathing exercises for several minutes. Ask your provider about the best way to breathe.  Pulmonary rehabilitation  · Pulmonary rehab can help you feel better. These programs include exercise, breathing techniques, information about COPD, counseling, and help for smokers.  · Ask your provider or your local hospital about programs in your area.  When to call your healthcare provider  Call your provider immediately if you have any of the following:  · Shortness of breath, wheezing, or coughing  · Increased mucus  · Yellow, green, bloody, or smelly mucus  · Fever or chills  · Tightness in your chest that does not go away with rest or medicine  · An irregular heartbeat or a feeling that your heart is beating very fast  · Swollen ankles   Date Last Reviewed: 5/1/2016  © 2368-2750 [employer]  "[employer]. [address] [place] [address]. All rights reserved. This information is not intended as a substitute for professional medical care. Always follow your healthcare professional's instructions.            Primary Diagnosis     Your primary diagnosis was:  Acute Bronchitis With Chronic Obstructive Pulmonary Disease (Copd)      Admission Information     Date & Time Provider Department CSN    5/17/2017 11:14 AM Anson Leiva MD [facility] [id number]      Care Providers     Provider Role Specialty Primary office phone    Anson Leiva MD Attending Provider Family Medicine [phone]    Emmanuel Hickman MD Consulting Physician  Pulmonary Disease [phone]      Important Medicare Message          Most Recent Value    Important Message from Medicare Regarding Discharge Appeal Rights  Given to patient/caregiver, Explained to patient/caregiver yes 05/19/2017 1447      Your Vitals Were     BP Pulse Temp Resp Height Weight    155/89 (BP Location: Left arm, Patient Position: Sitting, BP Method: Automatic) 92 97 °F (36.1 °C) (Oral) 20 5' 2" (1.575 m) 63 kg (138 lb 14.2 oz)    SpO2 BMI             98% 25.4 kg/m2         Recent Lab Values     No lab values to display.      Pending Labs     Order Current Status    Blood culture Preliminary result    Blood culture Preliminary result      Allergies as of 5/20/2017     No Known Allergies      [facility]     [facility] Nurse Care Line - 24/7 Assistance  Unless otherwise directed by your provider, please contact [facility], our nurse care line that is available for 24/7 assistance.     Registered nurses in the [facility] provide clinical advisement, health education, appointment booking, and other advisory services.  Call for this free service at [phone].        Advance Directives     An advance directive is a document which, in the event you are no longer able to make decisions for yourself, tells " your healthcare team what kind of treatment you do or do not want to receive, or who you would like to make those decisions for you.  If you do not currently have an advance directive, [facility] encourages you to create one.  For more information call:  [phone] ([phone]), [phone] ([phone]),  or log on to [email or web address].        Smoking Cessation     If you would like to quit smoking:   You may be eligible for free services if you are a [place] resident and started smoking cigarettes before September 1, 1988.  Call the Smoking Cessation Trust (SCT) toll free at (143) 046-5383 or (030) 184-0040.   Call 5-104-QUIT-NOW if you do not meet the above criteria.   Contact us via email: [email or web address]   View our website for more information: [email or web address]        Language Assistance Services     ATTENTION: Language assistance services are available, free of charge. Please call [phone].      ATENCIÓN: Si habla español, tiene a cruz disposición servicios gratuitos de asistencia lingüística. Llame al [phone].     CHÚ Ý: N?u b?n nói Ti?ng Vi?t, có các d?ch v? h? tr? ngôn ng? mi?n phí dành cho b?n. G?i s? [phone].        Chronic Kindey Disease Education             [facility] Sign-Up     Activating your [facility] account is as easy as 1-2-3!     1) Visit [email or web address], select Sign Up Now, enter this activation code and your date of birth, then select Next.  Activation code not generated  Current Patient Portal Status: Account disabled      2) Create a username and password to use when you visit [facility] in the future and select a security question in case you lose your password and select Next.    3) Enter your e-mail address and click Sign Up!    Additional Information  If you have questions, please e-mail [email or web address] or call [phone] to talk to our [facility] staff. Remember, [facility] is NOT to be used for urgent needs. For medical  emergencies, dial 911.          [facility] complies with applicable Federal civil rights laws and does not discriminate on the basis of race, color, national origin, age, disability, or sex.

## 2017-05-17 NOTE — PLAN OF CARE
05/17/17 1551   Discharge Assessment   Assessment Type Discharge Planning Assessment   Confirmed/corrected address and phone number on facesheet? Yes   Assessment information obtained from? Patient   Expected Length of Stay (days) 2   Communicated expected length of stay with patient/caregiver yes   Type of Healthcare Directive Received (Declined)   Prior to hospitilization cognitive status: Alert/Oriented   Prior to hospitalization functional status: Assistive Equipment   Current cognitive status: Alert/Oriented   Current Functional Status: Assistive Equipment   Arrived From home or self-care   Lives With spouse   Able to Return to Prior Arrangements yes   Is patient able to care for self after discharge? Yes   How many people do you have in your home that can help with your care after discharge? 1   Who are your caregiver(s) and their phone number(s)? Spouse, Nikki Herrera [phone]   Patient's perception of discharge disposition home or selfcare   Readmission Within The Last 30 Days no previous admission in last 30 days   Patient currently being followed by outpatient case management? No   Patient currently receives home health services? No   Does the patient currently use HME? Yes   Name and contact number for E provider: [facility] [phone], fax [phone]   Patient currently receives private duty nursing? No   Patient currently receives any other outside agency services? No   Equipment Currently Used at Home oxygen;other (see comments)  (Nebulizer)   Do you have any problems affording any of your prescribed medications? No   Is the patient taking medications as prescribed? yes   Do you have any financial concerns preventing you from receiving the healthcare you need? No   Does the patient have transportation to healthcare appointments? Yes   Transportation Available family or friend will provide;car   On Dialysis? No   Does the patient receive services at the Coumadin Clinic? No   Are  there any open cases? No   Discharge Plan A Home with family   Discharge Plan B Home with family   Patient/Family In Agreement With Plan yes   Patient is a 81 year old female coming to the hospital from home where she lives with her spouse, Jordan.  She is alert and engages in conversation.  Patient states she is independent and takes herself to her appointments.  She was informed of the observation status and given a signed copy of the MOON form.  She reports good family support.  No questions or concerns for .  No anticipated discharge needs.

## 2017-05-17 NOTE — PLAN OF CARE
Problem: Patient Care Overview  Goal: Plan of Care Review  Outcome: Ongoing (interventions implemented as appropriate)  Pt doing well on 2L/min O2 by nasal cannula with SpO2 94%. MA tx scheduled PRN, pt aware. BBS diminished. No distress noted at this time.

## 2017-05-18 PROBLEM — J96.11 CHRONIC RESPIRATORY FAILURE WITH HYPOXIA: Status: ACTIVE | Noted: 2017-05-17

## 2017-05-18 LAB
ALBUMIN SERPL BCP-MCNC: 3.7 G/DL
ALP SERPL-CCNC: 51 U/L
ALT SERPL W/O P-5'-P-CCNC: 13 U/L
ANION GAP SERPL CALC-SCNC: 12 MMOL/L
AST SERPL-CCNC: 12 U/L
BASOPHILS # BLD AUTO: 0.01 K/UL
BASOPHILS NFR BLD: 0.1 %
BILIRUB SERPL-MCNC: 0.4 MG/DL
BUN SERPL-MCNC: 16 MG/DL
CALCIUM SERPL-MCNC: 8.9 MG/DL
CHLORIDE SERPL-SCNC: 97 MMOL/L
CO2 SERPL-SCNC: 27 MMOL/L
CREAT SERPL-MCNC: 0.9 MG/DL
DIFFERENTIAL METHOD: ABNORMAL
EOSINOPHIL # BLD AUTO: 0 K/UL
EOSINOPHIL NFR BLD: 0 %
ERYTHROCYTE [DISTWIDTH] IN BLOOD BY AUTOMATED COUNT: 12.7 %
EST. GFR  (AFRICAN AMERICAN): >60 ML/MIN/1.73 M^2
EST. GFR  (NON AFRICAN AMERICAN): >60 ML/MIN/1.73 M^2
GLUCOSE SERPL-MCNC: 142 MG/DL
HCT VFR BLD AUTO: 34.8 %
HGB BLD-MCNC: 11.7 G/DL
LYMPHOCYTES # BLD AUTO: 0.3 K/UL
LYMPHOCYTES NFR BLD: 3.2 %
MCH RBC QN AUTO: 33.6 PG
MCHC RBC AUTO-ENTMCNC: 33.6 %
MCV RBC AUTO: 100 FL
MONOCYTES # BLD AUTO: 0.7 K/UL
MONOCYTES NFR BLD: 6.9 %
NEUTROPHILS # BLD AUTO: 8.9 K/UL
NEUTROPHILS NFR BLD: 89.8 %
PLATELET # BLD AUTO: 227 K/UL
PMV BLD AUTO: 11.4 FL
POTASSIUM SERPL-SCNC: 4.1 MMOL/L
PROT SERPL-MCNC: 6.1 G/DL
RBC # BLD AUTO: 3.48 M/UL
SODIUM SERPL-SCNC: 136 MMOL/L
TROPONIN I SERPL DL<=0.01 NG/ML-MCNC: 0.01 NG/ML
WBC # BLD AUTO: 9.88 K/UL

## 2017-05-18 PROCEDURE — 94668 MNPJ CHEST WALL SBSQ: CPT

## 2017-05-18 PROCEDURE — 36415 COLL VENOUS BLD VENIPUNCTURE: CPT

## 2017-05-18 PROCEDURE — 63600175 PHARM REV CODE 636 W HCPCS: Performed by: FAMILY MEDICINE

## 2017-05-18 PROCEDURE — 99900035 HC TECH TIME PER 15 MIN (STAT)

## 2017-05-18 PROCEDURE — 25000242 PHARM REV CODE 250 ALT 637 W/ HCPCS: Performed by: NURSE PRACTITIONER

## 2017-05-18 PROCEDURE — 94667 MNPJ CHEST WALL 1ST: CPT

## 2017-05-18 PROCEDURE — 25000003 PHARM REV CODE 250: Performed by: FAMILY MEDICINE

## 2017-05-18 PROCEDURE — 25000003 PHARM REV CODE 250: Performed by: INTERNAL MEDICINE

## 2017-05-18 PROCEDURE — 94660 CPAP INITIATION&MGMT: CPT

## 2017-05-18 PROCEDURE — 84484 ASSAY OF TROPONIN QUANT: CPT

## 2017-05-18 PROCEDURE — 80053 COMPREHEN METABOLIC PANEL: CPT

## 2017-05-18 PROCEDURE — 25000003 PHARM REV CODE 250: Performed by: SURGERY

## 2017-05-18 PROCEDURE — 99900031 HC PATIENT EDUCATION (STAT)

## 2017-05-18 PROCEDURE — 94640 AIRWAY INHALATION TREATMENT: CPT

## 2017-05-18 PROCEDURE — 25000242 PHARM REV CODE 250 ALT 637 W/ HCPCS: Performed by: FAMILY MEDICINE

## 2017-05-18 PROCEDURE — 27000339 *HC DAILY SUPPLY KIT

## 2017-05-18 PROCEDURE — 63600175 PHARM REV CODE 636 W HCPCS: Performed by: INTERNAL MEDICINE

## 2017-05-18 PROCEDURE — 99900029 HC O2 SETUP (STAT)

## 2017-05-18 PROCEDURE — 27000190 HC CPAP FULL FACE MASK W/VALVE

## 2017-05-18 PROCEDURE — 85025 COMPLETE CBC W/AUTO DIFF WBC: CPT

## 2017-05-18 PROCEDURE — 94761 N-INVAS EAR/PLS OXIMETRY MLT: CPT

## 2017-05-18 PROCEDURE — 82785 ASSAY OF IGE: CPT

## 2017-05-18 PROCEDURE — 99232 SBSQ HOSP IP/OBS MODERATE 35: CPT | Mod: ,,, | Performed by: FAMILY MEDICINE

## 2017-05-18 PROCEDURE — 11000001 HC ACUTE MED/SURG PRIVATE ROOM

## 2017-05-18 RX ORDER — IPRATROPIUM BROMIDE AND ALBUTEROL SULFATE 2.5; .5 MG/3ML; MG/3ML
3 SOLUTION RESPIRATORY (INHALATION)
Status: DISCONTINUED | OUTPATIENT
Start: 2017-05-18 | End: 2017-05-20 | Stop reason: HOSPADM

## 2017-05-18 RX ORDER — GUAIFENESIN 600 MG/1
600 TABLET, EXTENDED RELEASE ORAL 2 TIMES DAILY
Status: DISCONTINUED | OUTPATIENT
Start: 2017-05-18 | End: 2017-05-18

## 2017-05-18 RX ADMIN — LEVALBUTEROL 1.25 MG: 1.25 SOLUTION, CONCENTRATE RESPIRATORY (INHALATION) at 07:05

## 2017-05-18 RX ADMIN — METHYLPREDNISOLONE SODIUM SUCCINATE 80 MG: 125 INJECTION, POWDER, FOR SOLUTION INTRAMUSCULAR; INTRAVENOUS at 09:05

## 2017-05-18 RX ADMIN — FLUTICASONE FUROATE AND VILANTEROL TRIFENATATE 1 PUFF: 200; 25 POWDER RESPIRATORY (INHALATION) at 07:05

## 2017-05-18 RX ADMIN — IPRATROPIUM BROMIDE AND ALBUTEROL SULFATE 3 ML: .5; 3 SOLUTION RESPIRATORY (INHALATION) at 01:05

## 2017-05-18 RX ADMIN — LOSARTAN POTASSIUM 50 MG: 50 TABLET, FILM COATED ORAL at 09:05

## 2017-05-18 RX ADMIN — CITALOPRAM HYDROBROMIDE 20 MG: 10 TABLET ORAL at 09:05

## 2017-05-18 RX ADMIN — ACETAMINOPHEN 650 MG: 325 TABLET, FILM COATED ORAL at 08:05

## 2017-05-18 RX ADMIN — ASPIRIN 81 MG 81 MG: 81 TABLET ORAL at 09:05

## 2017-05-18 RX ADMIN — PANTOPRAZOLE SODIUM 40 MG: 40 TABLET, DELAYED RELEASE ORAL at 09:05

## 2017-05-18 RX ADMIN — METHYLPREDNISOLONE SODIUM SUCCINATE 80 MG: 125 INJECTION, POWDER, FOR SOLUTION INTRAMUSCULAR; INTRAVENOUS at 01:05

## 2017-05-18 RX ADMIN — AZITHROMYCIN MONOHYDRATE 250 MG: 500 INJECTION, POWDER, LYOPHILIZED, FOR SOLUTION INTRAVENOUS at 11:05

## 2017-05-18 RX ADMIN — IPRATROPIUM BROMIDE AND ALBUTEROL SULFATE 3 ML: .5; 3 SOLUTION RESPIRATORY (INHALATION) at 07:05

## 2017-05-18 RX ADMIN — ROPINIROLE HYDROCHLORIDE 0.5 MG: 0.5 TABLET, FILM COATED ORAL at 09:05

## 2017-05-18 RX ADMIN — TRAZODONE HYDROCHLORIDE 50 MG: 50 TABLET ORAL at 09:05

## 2017-05-18 RX ADMIN — METHYLPREDNISOLONE SODIUM SUCCINATE 80 MG: 125 INJECTION, POWDER, FOR SOLUTION INTRAMUSCULAR; INTRAVENOUS at 05:05

## 2017-05-18 NOTE — PROGRESS NOTES
[facility] Hospital Medicine  Progress Note    Patient Name: Chantell Herrera  MRN: [id number]  Patient Class: IP- Inpatient   Admission Date: 5/17/2017  Length of Stay: 1 days  Attending Physician: Anson Leiva MD  Primary Care Provider: Anson Leiva MD        Subjective:     Principal Problem:COPD with acute bronchitis    HPI:  81 year old female COPD-er well known presents to ED with SOB and wheezing. She has been seeing Dr. Hickman for her COPD in the past andrecently went to him a week ago for symptoms that have been going on for 2 weeks from today. He prescribed steroids and zmax last Thursday. She is not feeling any better. She is very stoic. She does not complain much and tries to power through most of her COPD exacerbations. The last 4 days at home she has been SOB at rest even with her O2 (usually 2 liters at home). Feeling better with IV solumedrol and several nebs in ED, but still SOB and wheezing.          Hospital Course:  She was placed on floor for COPD exacerbation that failed outpt treatment with po steroids and zithromax. She was placed on medrol 80mg IV q 8hr. No abx due to no fever, no elevated WBC and CXR without consolidation.     Interval note: doing better from admission but reports that she is not at her baseline. Still has a very junky cough, brining up thick white mucus  Review of Systems   Constitutional: Positive for fatigue. Negative for chills and fever.   HENT: Positive for postnasal drip, rhinorrhea and sinus pressure. Negative for congestion, ear pain, sore throat and trouble swallowing.    Eyes: Positive for itching. Negative for redness.   Respiratory: Positive for cough, shortness of breath and wheezing.    Cardiovascular: Negative for chest pain and palpitations.   Gastrointestinal: Negative for abdominal pain, diarrhea, nausea and vomiting.   Genitourinary: Negative for dysuria and frequency.   Musculoskeletal: Positive for back pain.   Skin:  Negative for rash.   Neurological: Negative for weakness and headaches.     Objective:     Vital Signs (Most Recent):  Temp: 96.9 °F (36.1 °C) (05/18/17 0358)  Pulse: 98 (05/18/17 0714)  Resp: (!) 22 (05/18/17 0714)  BP: (!) 140/74 (05/18/17 0358)  SpO2: (!) 90 % (05/18/17 0714) Vital Signs (24h Range):  Temp:  [96.9 °F (36.1 °C)-98.5 °F (36.9 °C)] 96.9 °F (36.1 °C)  Pulse:  [] 98  Resp:  [18-22] 22  SpO2:  [90 %-95 %] 90 %  BP: (123-140)/(67-95) 140/74     Weight: 63 kg (138 lb 14.2 oz)  Body mass index is 25.4 kg/(m^2).    Physical Exam   Constitutional: She is oriented to person, place, and time. She appears well-developed and well-nourished. No distress.   On O2 comfortable    HENT:   Head: Normocephalic and atraumatic.   Mouth/Throat: Oropharynx is clear and moist.   Eyes: EOM are normal. Pupils are equal, round, and reactive to light.   Neck: Normal range of motion. Neck supple.   Cardiovascular: Normal rate and intact distal pulses.    Murmur heard.  Pulmonary/Chest: Effort normal. No respiratory distress. She has wheezes (insp wheeze on right > left). She has no rales. She exhibits no tenderness.   Increased AP diameter of chest, diffuse wheezing and very poor tidal volume    Abdominal: Soft. Bowel sounds are normal. She exhibits no distension and no mass. There is no tenderness. There is no rebound and no guarding.   Musculoskeletal: Normal range of motion. She exhibits no edema or tenderness.   Neurological: She is alert and oriented to person, place, and time.   Skin: Skin is warm and dry. No rash noted. No erythema. No pallor.   Ecchymosis UE and LE   Psychiatric: She has a normal mood and affect. Her behavior is normal. Judgment and thought content normal.        Significant Labs:   CBC:     Recent Labs  Lab 05/17/17  1137 05/18/17  0521   WBC 6.63 9.88   HGB 12.1 11.7*   HCT 36.1* 34.8*    227     CMP:     Recent Labs  Lab 05/17/17  1137      K 4.1   CL 99   CO2 29   *   BUN  14   CREATININE 0.9   CALCIUM 9.1   PROT 6.2   ALBUMIN 3.8   BILITOT 0.5   ALKPHOS 54*   AST 13   ALT 11   ANIONGAP 11   EGFRNONAA >60     Lactic acid 2.6 but no other criteria med for sepsis, not repeated    Lab Results   Component Value Date    DDIMER 0.34 05/17/2017     Lab Results   Component Value Date    INR 1.1 05/17/2017    INR 1.0 03/08/2016     BNP    Recent Labs  Lab 05/17/17  1137   *       Recent Labs  Lab 05/17/17  1137  05/17/17  2335   CPK 27  27  --   --    CPKMB 1.4  --   --    TROPONINI 0.012  < > 0.010   MB 5.2*  --   --    < > = values in this interval not displayed.    Blood cultures x 2 NGTD        Significant Imaging:     CXR The lungs are clear.  The heart is normal in size.  Calcified atheromatous disease affects the aorta. No mediastinal, hilar or pleural abnormalities are detected.  The skeletal structures are intact.    EKG Normal sinus rhythm  Left axis deviation  LVH with repolarization abnormality  Anteroseptal infarct ,age undetermined  Abnormal ECG  When compared with ECG of 08-MAR-2016 17:59,  Voltage criteria for left ventricular hypertrophy Now present  Confirmed by Demetrius Quach MD ([id number]) on 5/18/2017 5:06:18 AM    Assessment/Plan:      * COPD with acute bronchitis  This appears to be noninfectious in nature. She has an exacerbation of her AR symptoms just prior to COPD sx's worsening. I believe this is due to allergies. She feels worse outside. Cont advair and start scheduled nebs as she does duoneb 4 times a day at home.   Admit for IV steroids and nebs, continue O2 - has increase need today (2L at home and 3L here tks >90%) wanted to add mucinex as well to help break up the thick mucus but she reports that she had no help with this at home. She also report that that makes her feel groggy. Will try CPT  Based on her SEVERE COPD and the history of her previous admissions, I anticipate her being here for several days to a week.   Consult Dr Hickman    HTN (hypertension)  Bp  well controlled  Continue ARB      Cervical radiculopathy  Her IV steriods should make this feel pretty good  Has hydrocodone prn       Anxiety state  Continue SSRI       Insomnia  Continue trazodone but watch for worsening with steroids       Oxygen dependent  continue at 2liters/min (per home routine) adjust if needed    Chronic respiratory failure with hypoxia  O2 NC      RLS (restless legs syndrome)  Will try some requip. Titrate up if needed       VTE Risk Mitigation         Ordered     Place sequential compression device  Until discontinued      05/17/17 2008     Medium Risk of VTE  Once      05/17/17 5588          Kari Gaspar MD  Department of Hospital Medicine   [facility]

## 2017-05-18 NOTE — H&P
[facility] Medicine  History & Physical    Patient Name: Chantell Herrera  MRN: [id number]  Admission Date: 5/17/2017  Attending Physician: Anson Leiva MD   Primary Care Provider: Anson Leiva MD         Patient information was obtained from patient and ER records.     Subjective:     Principal Problem:COPD with acute bronchitis    Chief Complaint:   Chief Complaint   Patient presents with    Shortness of Breath        HPI: 81 year old female COPD-er well know to me presents to ED with SOB and wheezing. She has been seeing Dr. Hickman for her COPD in the past and recently went to him a week ago for symptoms that have been going on for 2 weeks from today. He prescribed steroids and zmax. PShe is not feeling any better. She is very stoic. She does not complain much and tries to power through most of her COPD exacerbations without bothering me. The last 4 days at home she has been SOB at rest even with her O2(usually 2 liters at home). Feeling better with IV solumedrol and several nebs in ED, but still SOB and wheezing         Past Medical History:   Diagnosis Date    Arthritis     Asthma     Chronic bronchitis     COPD (chronic obstructive pulmonary disease)     SANTIAGO (dyspnea on exertion)     Emphysema of lung     Fatigue     Hyperlipidemia     Hypertension     Trouble in sleeping        Past Surgical History:   Procedure Laterality Date    APPENDECTOMY      EYE SURGERY      HYSTERECTOMY      TONSILLECTOMY         Review of patient's allergies indicates:  No Known Allergies    No current facility-administered medications on file prior to encounter.      Current Outpatient Prescriptions on File Prior to Encounter   Medication Sig    albuterol (PROVENTIL) 2.5 mg /3 mL (0.083 %) nebulizer solution Take 3 mLs (2.5 mg total) by nebulization every 6 (six) hours as needed for Wheezing.    artificial tears,hypromellose, 0.3 % Drop continuous prn.     aspirin 81 MG Chew  Take 81 mg by mouth once daily.    budesonide-formoterol 160-4.5 mcg (SYMBICORT) 160-4.5 mcg/actuation HFAA Inhale 2 puffs into the lungs every 12 (twelve) hours.    cholecalciferol, vitamin D3, (VITAMIN D3) 2,000 unit Tab Take 2,000 Units by mouth once daily.    citalopram (CELEXA) 40 MG tablet TAKE 1 TABLET ONE TIME DAILY    FOLIC ACID/MULTIVIT-MIN/LUTEIN (CENTRUM SILVER ORAL) Take 1 tablet by mouth once daily.     ipratropium (ATROVENT) 0.02 % nebulizer solution Take 500 mcg by nebulization 4 (four) times daily.     losartan (COZAAR) 50 MG tablet Take 1 tablet (50 mg total) by mouth once daily.    pravastatin (PRAVACHOL) 40 MG tablet Take 1 tablet (40 mg total) by mouth once daily.    predniSONE (DELTASONE) 10 MG tablet Take 10 mg by mouth once daily.     theophylline (THEODUR) 300 mg 24 hr capsule Take 150 mg by mouth once daily.     VITAMIN C 500 MG tablet Take 1 tablet by mouth once daily.    [DISCONTINUED] primidone (MYSOLINE) 50 MG Tab Take 1 tablet (50 mg total) by mouth once daily.     Family History     Problem Relation (Age of Onset)    COPD Brother, Daughter    Cancer Sister    Diabetes Father    Heart disease Mother    Hypertension Mother, Father, Brother    Kidney disease Son    No Known Problems Daughter        Social History Main Topics    Smoking status: Former Smoker     Quit date: 8/3/2000    Smokeless tobacco: Never Used    Alcohol use No    Drug use: No    Sexual activity: No     Review of Systems   Constitutional: Negative.  Negative for activity change, appetite change, chills, diaphoresis, fatigue, fever and unexpected weight change.   HENT: Positive for postnasal drip, rhinorrhea and sinus pressure. Negative for congestion, dental problem, drooling, ear discharge, ear pain, facial swelling, hearing loss, mouth sores, nosebleeds, sneezing, sore throat, tinnitus, trouble swallowing and voice change.    Eyes: Positive for itching. Negative for photophobia, pain, discharge,  redness and visual disturbance.   Respiratory: Positive for cough, shortness of breath and wheezing. Negative for apnea, choking and chest tightness.         Cough productive of white clear sputum    Cardiovascular: Negative.  Negative for chest pain, palpitations and leg swelling.   Gastrointestinal: Negative.  Negative for abdominal distention, abdominal pain, anal bleeding, blood in stool, constipation, diarrhea, nausea, rectal pain and vomiting.   Genitourinary: Negative.  Negative for difficulty urinating, dyspareunia, dysuria, enuresis, flank pain, frequency, hematuria, menstrual problem, pelvic pain, urgency, vaginal bleeding, vaginal discharge and vaginal pain.        Overactive bladder, urge incontinence at times. Has refused meds    Musculoskeletal: Positive for back pain and neck pain. Negative for arthralgias, gait problem, joint swelling, myalgias and neck stiffness.   Skin: Negative.  Negative for color change, pallor, rash and wound.   Neurological: Negative.  Negative for dizziness, tremors, seizures, syncope, facial asymmetry, speech difficulty, weakness, light-headedness, numbness and headaches.        RLS   Hematological: Negative for adenopathy. Does not bruise/bleed easily.   Psychiatric/Behavioral: Positive for sleep disturbance. Negative for agitation, behavioral problems, confusion, decreased concentration, dysphoric mood, hallucinations, self-injury and suicidal ideas. The patient is not nervous/anxious and is not hyperactive.      Objective:     Vital Signs (Most Recent):  Temp: 98.2 °F (36.8 °C) (05/17/17 1500)  Pulse: 86 (05/17/17 1941)  Resp: 18 (05/17/17 1941)  BP: 134/67 (05/17/17 1500)  SpO2: 95 % (05/17/17 1941) Vital Signs (24h Range):  Temp:  [97.5 °F (36.4 °C)-98.5 °F (36.9 °C)] 98.2 °F (36.8 °C)  Pulse:  [] 86  Resp:  [18-22] 18  SpO2:  [92 %-95 %] 95 %  BP: (123-134)/(67-95) 134/67     Weight: 63 kg (138 lb 14.2 oz)  Body mass index is 25.4 kg/(m^2).    Physical Exam    Constitutional: She is oriented to person, place, and time. She appears well-developed and well-nourished. No distress.   On O2 comfortable    HENT:   Head: Normocephalic and atraumatic.   Right Ear: External ear normal.   Left Ear: External ear normal.   Nose: Nose normal.   Mouth/Throat: Oropharynx is clear and moist.   Eyes: EOM are normal. Pupils are equal, round, and reactive to light.   Neck: Normal range of motion. Neck supple. No JVD present. No tracheal deviation present. No thyromegaly present.   Cardiovascular: Normal rate, normal heart sounds and intact distal pulses.    No murmur heard.  Pulmonary/Chest: Effort normal. No respiratory distress. She has wheezes. She has no rales. She exhibits no tenderness.   Increased AP diameter of chest, diffuse wheezing and very poor tidal volume    Abdominal: Soft. Bowel sounds are normal. She exhibits no distension and no mass. There is no tenderness. There is no rebound and no guarding.   Musculoskeletal: Normal range of motion. She exhibits no edema or tenderness.   Lymphadenopathy:     She has no cervical adenopathy.   Neurological: She is alert and oriented to person, place, and time. She has normal reflexes. She displays normal reflexes. No cranial nerve deficit. She exhibits normal muscle tone. Coordination normal.   Skin: Skin is warm and dry. No rash noted. No erythema. No pallor.   Ecchymosis UE and LE   Psychiatric: She has a normal mood and affect. Her behavior is normal. Judgment and thought content normal.        Significant Labs:   CBC:   Recent Labs  Lab 05/17/17  1137   WBC 6.63   HGB 12.1   HCT 36.1*        CMP:   Recent Labs  Lab 05/17/17  1137      K 4.1   CL 99   CO2 29   *   BUN 14   CREATININE 0.9   CALCIUM 9.1   PROT 6.2   ALBUMIN 3.8   BILITOT 0.5   ALKPHOS 54*   AST 13   ALT 11   ANIONGAP 11   EGFRNONAA >60       Significant Imaging: I have reviewed and interpreted all pertinent imaging results/findings within the past  24 hours.    Assessment/Plan:     * COPD with acute bronchitis  This appears to be noninfectious in nature. She has an exacerbation of her AR symptoms just prior to COPD sx's worsening. I believe this is due to allergies. She feels worse outside.   Admit for IV steroids and nebs, continue O2   Based on her SEVERE COPD and the history of her previous admissions(which I participated in), I anticipate her being here for several days to a week.       HTN (hypertension)  Continue ARB      Cervical radiculopathy  Her IV steriods should make this feel pretty good  Has hydrocodone prn       Anxiety state  Continue SSRI       Insomnia  Continue trazodone but watch for worsening with steroids       Oxygen dependent  continue at 2liters/min      Hypoxia  O2 NC      RLS (restless legs syndrome)  Will try some requip. Titrate up if needed       VTE Risk Mitigation         Ordered     Medium Risk of VTE  Once      05/17/17 4825        Anson Leiva MD  Department of Hospital Medicine   [facility]

## 2017-05-18 NOTE — ASSESSMENT & PLAN NOTE
This appears to be noninfectious in nature. She has an exacerbation of her AR symptoms just prior to COPD sx's worsening. I believe this is due to allergies. She feels worse outside. Cont advair and start scheduled nebs as she does duoneb 4 times a day at home.   Admit for IV steroids and nebs, continue O2 - has increase need today (2L at home and 3L here tks >90%) wanted to add mucinex as well to help break up the thick mucus but she reports that she had no help with this at home. She also report that that makes her feel groggy. Will try CPT  Based on her SEVERE COPD and the history of her previous admissions, I anticipate her being here for several days to a week.   Consult Dr Hickman

## 2017-05-18 NOTE — PLAN OF CARE
Problem: Patient Care Overview  Goal: Plan of Care Review  Outcome: Ongoing (interventions implemented as appropriate)  Quiet shift. O2 per N/C maintained and tolerated well. Up to bathroom and tolerating well. Saline lock intact. Plan of care discussed during shift and verbalizes understanding. Request SCDs off at night. Explained the importance of wearing it at night and still declines. MORRO Mcfarlane RN

## 2017-05-18 NOTE — PLAN OF CARE
Problem: Patient Care Overview  Goal: Plan of Care Review  Outcome: Ongoing (interventions implemented as appropriate)  Pt admitted with COPD with acute bronchitis. Dr. Hickman consulted today. Pt placed on zithromax 250mg daily; remains on solumedrol 80mg every 8 hours. Oxygen sats low to mid 90's on 2-3L per nasal cannula. Bipap and CPT ordered today. Coarse breath sounds with expiratory wheezing. Occasional non productive cough. Bed in low, locked position. Call bell within reach. Plan of care reviewed with pt and , state agreement.

## 2017-05-18 NOTE — PLAN OF CARE
Discussed plan of care with Dr bryson. Patient will be placed on bipap today to maintain O2 sats above 90%. Patient is O2 use at home.

## 2017-05-18 NOTE — PROGRESS NOTES
Pt placed on BiPAP 12/5 at 25% (orders to keep SpO2 >90%) with FFM. Alarms set and fx. Pt educated on BiPAP, all questions answered by RRT. Call bell within reach. No distress noted at this time.

## 2017-05-18 NOTE — SUBJECTIVE & OBJECTIVE
Past Medical History:   Diagnosis Date    Arthritis     Asthma     Chronic bronchitis     COPD (chronic obstructive pulmonary disease)     SANTIAGO (dyspnea on exertion)     Emphysema of lung     Fatigue     Hyperlipidemia     Hypertension     Trouble in sleeping        Past Surgical History:   Procedure Laterality Date    APPENDECTOMY      EYE SURGERY      HYSTERECTOMY      TONSILLECTOMY         Review of patient's allergies indicates:  No Known Allergies    No current facility-administered medications on file prior to encounter.      Current Outpatient Prescriptions on File Prior to Encounter   Medication Sig    albuterol (PROVENTIL) 2.5 mg /3 mL (0.083 %) nebulizer solution Take 3 mLs (2.5 mg total) by nebulization every 6 (six) hours as needed for Wheezing.    artificial tears,hypromellose, 0.3 % Drop continuous prn.     aspirin 81 MG Chew Take 81 mg by mouth once daily.    budesonide-formoterol 160-4.5 mcg (SYMBICORT) 160-4.5 mcg/actuation HFAA Inhale 2 puffs into the lungs every 12 (twelve) hours.    cholecalciferol, vitamin D3, (VITAMIN D3) 2,000 unit Tab Take 2,000 Units by mouth once daily.    citalopram (CELEXA) 40 MG tablet TAKE 1 TABLET ONE TIME DAILY    FOLIC ACID/MULTIVIT-MIN/LUTEIN (CENTRUM SILVER ORAL) Take 1 tablet by mouth once daily.     ipratropium (ATROVENT) 0.02 % nebulizer solution Take 500 mcg by nebulization 4 (four) times daily.     losartan (COZAAR) 50 MG tablet Take 1 tablet (50 mg total) by mouth once daily.    pravastatin (PRAVACHOL) 40 MG tablet Take 1 tablet (40 mg total) by mouth once daily.    predniSONE (DELTASONE) 10 MG tablet Take 10 mg by mouth once daily.     theophylline (THEODUR) 300 mg 24 hr capsule Take 150 mg by mouth once daily.     VITAMIN C 500 MG tablet Take 1 tablet by mouth once daily.    [DISCONTINUED] primidone (MYSOLINE) 50 MG Tab Take 1 tablet (50 mg total) by mouth once daily.     Family History     Problem Relation (Age of Onset)    COPD  Brother, Daughter    Cancer Sister    Diabetes Father    Heart disease Mother    Hypertension Mother, Father, Brother    Kidney disease Son    No Known Problems Daughter        Social History Main Topics    Smoking status: Former Smoker     Quit date: 8/3/2000    Smokeless tobacco: Never Used    Alcohol use No    Drug use: No    Sexual activity: No     Review of Systems   Constitutional: Negative.  Negative for activity change, appetite change, chills, diaphoresis, fatigue, fever and unexpected weight change.   HENT: Positive for postnasal drip, rhinorrhea and sinus pressure. Negative for congestion, dental problem, drooling, ear discharge, ear pain, facial swelling, hearing loss, mouth sores, nosebleeds, sneezing, sore throat, tinnitus, trouble swallowing and voice change.    Eyes: Positive for itching. Negative for photophobia, pain, discharge, redness and visual disturbance.   Respiratory: Positive for cough, shortness of breath and wheezing. Negative for apnea, choking and chest tightness.         Cough productive of white clear sputum    Cardiovascular: Negative.  Negative for chest pain, palpitations and leg swelling.   Gastrointestinal: Negative.  Negative for abdominal distention, abdominal pain, anal bleeding, blood in stool, constipation, diarrhea, nausea, rectal pain and vomiting.   Genitourinary: Negative.  Negative for difficulty urinating, dyspareunia, dysuria, enuresis, flank pain, frequency, hematuria, menstrual problem, pelvic pain, urgency, vaginal bleeding, vaginal discharge and vaginal pain.        Overactive bladder, urge incontinence at times. Has refused meds    Musculoskeletal: Positive for back pain and neck pain. Negative for arthralgias, gait problem, joint swelling, myalgias and neck stiffness.   Skin: Negative.  Negative for color change, pallor, rash and wound.   Neurological: Negative.  Negative for dizziness, tremors, seizures, syncope, facial asymmetry, speech difficulty,  weakness, light-headedness, numbness and headaches.        RLS   Hematological: Negative for adenopathy. Does not bruise/bleed easily.   Psychiatric/Behavioral: Positive for sleep disturbance. Negative for agitation, behavioral problems, confusion, decreased concentration, dysphoric mood, hallucinations, self-injury and suicidal ideas. The patient is not nervous/anxious and is not hyperactive.      Objective:     Vital Signs (Most Recent):  Temp: 98.2 °F (36.8 °C) (05/17/17 1500)  Pulse: 86 (05/17/17 1941)  Resp: 18 (05/17/17 1941)  BP: 134/67 (05/17/17 1500)  SpO2: 95 % (05/17/17 1941) Vital Signs (24h Range):  Temp:  [97.5 °F (36.4 °C)-98.5 °F (36.9 °C)] 98.2 °F (36.8 °C)  Pulse:  [] 86  Resp:  [18-22] 18  SpO2:  [92 %-95 %] 95 %  BP: (123-134)/(67-95) 134/67     Weight: 63 kg (138 lb 14.2 oz)  Body mass index is 25.4 kg/(m^2).    Physical Exam   Constitutional: She is oriented to person, place, and time. She appears well-developed and well-nourished. No distress.   On O2 comfortable    HENT:   Head: Normocephalic and atraumatic.   Right Ear: External ear normal.   Left Ear: External ear normal.   Nose: Nose normal.   Mouth/Throat: Oropharynx is clear and moist.   Eyes: EOM are normal. Pupils are equal, round, and reactive to light.   Neck: Normal range of motion. Neck supple. No JVD present. No tracheal deviation present. No thyromegaly present.   Cardiovascular: Normal rate, normal heart sounds and intact distal pulses.    No murmur heard.  Pulmonary/Chest: Effort normal. No respiratory distress. She has wheezes. She has no rales. She exhibits no tenderness.   Increased AP diameter of chest, diffuse wheezing and very poor tidal volume    Abdominal: Soft. Bowel sounds are normal. She exhibits no distension and no mass. There is no tenderness. There is no rebound and no guarding.   Musculoskeletal: Normal range of motion. She exhibits no edema or tenderness.   Lymphadenopathy:     She has no cervical  adenopathy.   Neurological: She is alert and oriented to person, place, and time. She has normal reflexes. She displays normal reflexes. No cranial nerve deficit. She exhibits normal muscle tone. Coordination normal.   Skin: Skin is warm and dry. No rash noted. No erythema. No pallor.   Ecchymosis UE and LE   Psychiatric: She has a normal mood and affect. Her behavior is normal. Judgment and thought content normal.        Significant Labs:   CBC:   Recent Labs  Lab 05/17/17  1137   WBC 6.63   HGB 12.1   HCT 36.1*        CMP:   Recent Labs  Lab 05/17/17  1137      K 4.1   CL 99   CO2 29   *   BUN 14   CREATININE 0.9   CALCIUM 9.1   PROT 6.2   ALBUMIN 3.8   BILITOT 0.5   ALKPHOS 54*   AST 13   ALT 11   ANIONGAP 11   EGFRNONAA >60       Significant Imaging: I have reviewed and interpreted all pertinent imaging results/findings within the past 24 hours.

## 2017-05-18 NOTE — CONSULTS
Chantell Herrera is a 81 y.o. year old female that's presents with a chief complaint of SOB and Wheezing for 20 days.Pt with a long history of CoPD  Pt with history of severe hypersensitivity pneumonitis coupled with copd and questionable smoking she has chronic bouts of worsening clinical pneumonitities  . Consulted to evaluate Respiratory status.    Past Medical History:   Diagnosis Date    Arthritis     Asthma     Chronic bronchitis     COPD (chronic obstructive pulmonary disease)     SANTIAGO (dyspnea on exertion)     Emphysema of lung     Fatigue     Hyperlipidemia     Hypertension     Trouble in sleeping         Past Surgical History:   Procedure Laterality Date    APPENDECTOMY      EYE SURGERY      HYSTERECTOMY      TONSILLECTOMY         Prior to Admission medications    Medication Sig Start Date End Date Taking? Authorizing Provider   albuterol (PROVENTIL) 2.5 mg /3 mL (0.083 %) nebulizer solution Take 3 mLs (2.5 mg total) by nebulization every 6 (six) hours as needed for Wheezing. 6/27/16  Yes Anson Leiva MD   artificial tears,hypromellose, 0.3 % Drop continuous prn.  8/2/16  Yes Historical Provider, MD   aspirin 81 MG Chew Take 81 mg by mouth once daily.   Yes Historical Provider, MD   budesonide-formoterol 160-4.5 mcg (SYMBICORT) 160-4.5 mcg/actuation HFAA Inhale 2 puffs into the lungs every 12 (twelve) hours.   Yes Historical Provider, MD   cholecalciferol, vitamin D3, (VITAMIN D3) 2,000 unit Tab Take 2,000 Units by mouth once daily.   Yes Historical Provider, MD   citalopram (CELEXA) 40 MG tablet TAKE 1 TABLET ONE TIME DAILY 10/13/16  Yes Anson Leiva MD   FOLIC ACID/MULTIVIT-MIN/LUTEIN (CENTRUM SILVER ORAL) Take 1 tablet by mouth once daily.    Yes Historical Provider, MD   ipratropium (ATROVENT) 0.02 % nebulizer solution Take 500 mcg by nebulization 4 (four) times daily.  3/18/16  Yes Historical Provider, MD   losartan (COZAAR) 50 MG tablet Take 1 tablet (50 mg total) by mouth  once daily. 3/23/17 3/23/18 Yes Anson Leiva MD   pravastatin (PRAVACHOL) 40 MG tablet Take 1 tablet (40 mg total) by mouth once daily. 1/3/17  Yes Anson Leiva MD   predniSONE (DELTASONE) 10 MG tablet Take 10 mg by mouth once daily.    Yes Historical Provider, MD   theophylline (THEODUR) 300 mg 24 hr capsule Take 150 mg by mouth once daily.    Yes Historical Provider, MD   VITAMIN C 500 MG tablet Take 1 tablet by mouth once daily. 11/2/16  Yes Historical Provider, MD       Social History     Social History    Marital status:      Spouse name: N/A    Number of children: N/A    Years of education: N/A     Occupational History    Not on file.     Social History Main Topics    Smoking status: Former Smoker     Quit date: 8/3/2000    Smokeless tobacco: Never Used    Alcohol use No    Drug use: No    Sexual activity: No     Other Topics Concern    Not on file     Social History Narrative       Family History   Problem Relation Age of Onset    Heart disease Mother     Hypertension Mother     Hypertension Father     Diabetes Father     Cancer Sister     COPD Brother     Hypertension Brother     No Known Problems Daughter     Kidney disease Son     COPD Daughter        Review of patient's allergies indicates:  No Known Allergies Allergies have been reviewed.     ROS:  negative N/V, negative Fever, negative Syncope, negative Unilateral weakness, positive Chest pain, negative Rash, negative Sore throat, negative   Lower extremity swelling, negativeAbdominal pain, negativeDysuria, negativePolyuria, negativeEasy Bruising,  negativeWeight Loss, negativeNasal Drainage,otherwise ROS Negative    PE:   Vitals:    05/18/17 0714 05/18/17 0728 05/18/17 0743 05/18/17 0925   BP:       BP Location:       Patient Position:       BP Method:       Pulse: 98  84 94   Resp: (!) 22  20 19   Temp:       TempSrc:       SpO2: (!) 90% (!) 93% (!) 94% 96%   Weight:       Height:           Alert and orientated  X 3   HEENT: Head: Normocephalic no trauma                Ears : Normal Pinna No Drainage no Battles sign                Eyes: Vision Unchanged, No conjunctivitis,No drainage                Neck: Supple, No JVD,No Abnormal Carotid Pulsations                Throat: No Erythema, No pus,No Swelling,Mallampati score=    Chest: Wheezing bilaterally with very poor air entry as well as rhonchi   Cardiac: RRR S1+ S2 with a -S3: +M = 2/6, No R/H/G  Abdomen: Bowel Sounds are Normal.Soft Abdomen. No organomegaly of Liver,Spleen,or Kidneys   CNS: Non focal and intact. Cranial nerves 2, 346,8,9,10 and 12 are normal.Norrmal gait.Normal posture.  Extremities: No Clubbing,No Cyanosis with oxygen,Positive mild edema of lower extremities Bilateral  Skin: No Rash, No Ulcerative sores,and No cellulitis of the IV site.    Lab Results   Component Value Date    WBC 9.88 05/18/2017    HGB 11.7 (L) 05/18/2017    HCT 34.8 (L) 05/18/2017     05/18/2017    CHOL 170 02/17/2016    TRIG 85 02/17/2016    HDL 76 (H) 02/17/2016    ALT 13 05/18/2017    AST 12 05/18/2017     05/18/2017    K 4.1 05/18/2017    CL 97 05/18/2017    CREATININE 0.9 05/18/2017    BUN 16 05/18/2017    CO2 27 05/18/2017    TSH 0.332 (L) 01/03/2017    INR 1.1 05/17/2017       Assessment:  1. Respiratory Bronchiolitis   2. Acute Asthmatic bronchitis   3. COPD Exacerbation   4. Acute respiratory distress     ICD-10-CM ICD-9-CM   1. COPD with acute bronchitis J44.0 491.22   2. SOB (shortness of breath) R06.02 786.05         Plan:  1. Sputum for bacteria and fungus and atypical mycobacterium   2. Beta agonist  3. antibiotics have given two rounds as an outpatient   4. Follow cxr

## 2017-05-18 NOTE — NURSING
Report received and assessment completed. Stable and afebrile. BBS = with coarse breath sounds with slight expiratory wheezing. HOB elevated and O2 per N/C at 2l/min with sats above 94%. SCDs initiated and iInstructed to call for assistance to disconnect when up to bathroom. Call bell in reach and nonskid socks in use. Plan of care discussed and verbalizes understanding. MORRO Mcfarlane RN

## 2017-05-18 NOTE — SUBJECTIVE & OBJECTIVE
Interval note: doing better from admission but reports that she is not at her baseline. Still has a very junky cough, brining up thick white mucus  Review of Systems   Constitutional: Positive for fatigue. Negative for chills and fever.   HENT: Positive for postnasal drip, rhinorrhea and sinus pressure. Negative for congestion, ear pain, sore throat and trouble swallowing.    Eyes: Positive for itching. Negative for redness.   Respiratory: Positive for cough, shortness of breath and wheezing.    Cardiovascular: Negative for chest pain and palpitations.   Gastrointestinal: Negative for abdominal pain, diarrhea, nausea and vomiting.   Genitourinary: Negative for dysuria and frequency.   Musculoskeletal: Positive for back pain.   Skin: Negative for rash.   Neurological: Negative for weakness and headaches.     Objective:     Vital Signs (Most Recent):  Temp: 96.9 °F (36.1 °C) (05/18/17 0358)  Pulse: 98 (05/18/17 0714)  Resp: (!) 22 (05/18/17 0714)  BP: (!) 140/74 (05/18/17 0358)  SpO2: (!) 90 % (05/18/17 0714) Vital Signs (24h Range):  Temp:  [96.9 °F (36.1 °C)-98.5 °F (36.9 °C)] 96.9 °F (36.1 °C)  Pulse:  [] 98  Resp:  [18-22] 22  SpO2:  [90 %-95 %] 90 %  BP: (123-140)/(67-95) 140/74     Weight: 63 kg (138 lb 14.2 oz)  Body mass index is 25.4 kg/(m^2).    Physical Exam   Constitutional: She is oriented to person, place, and time. She appears well-developed and well-nourished. No distress.   On O2 comfortable    HENT:   Head: Normocephalic and atraumatic.   Mouth/Throat: Oropharynx is clear and moist.   Eyes: EOM are normal. Pupils are equal, round, and reactive to light.   Neck: Normal range of motion. Neck supple.   Cardiovascular: Normal rate and intact distal pulses.    Murmur heard.  Pulmonary/Chest: Effort normal. No respiratory distress. She has wheezes (insp wheeze on right > left). She has no rales. She exhibits no tenderness.   Increased AP diameter of chest, diffuse wheezing and very poor tidal volume     Abdominal: Soft. Bowel sounds are normal. She exhibits no distension and no mass. There is no tenderness. There is no rebound and no guarding.   Musculoskeletal: Normal range of motion. She exhibits no edema or tenderness.   Neurological: She is alert and oriented to person, place, and time.   Skin: Skin is warm and dry. No rash noted. No erythema. No pallor.   Ecchymosis UE and LE   Psychiatric: She has a normal mood and affect. Her behavior is normal. Judgment and thought content normal.        Significant Labs:   CBC:     Recent Labs  Lab 05/17/17  1137 05/18/17  0521   WBC 6.63 9.88   HGB 12.1 11.7*   HCT 36.1* 34.8*    227     CMP:     Recent Labs  Lab 05/17/17  1137      K 4.1   CL 99   CO2 29   *   BUN 14   CREATININE 0.9   CALCIUM 9.1   PROT 6.2   ALBUMIN 3.8   BILITOT 0.5   ALKPHOS 54*   AST 13   ALT 11   ANIONGAP 11   EGFRNONAA >60     Lactic acid 2.6 but no other criteria med for sepsis, not repeated    Lab Results   Component Value Date    DDIMER 0.34 05/17/2017     Lab Results   Component Value Date    INR 1.1 05/17/2017    INR 1.0 03/08/2016     BNP    Recent Labs  Lab 05/17/17  1137   *       Recent Labs  Lab 05/17/17  1137  05/17/17  2335   CPK 27  27  --   --    CPKMB 1.4  --   --    TROPONINI 0.012  < > 0.010   MB 5.2*  --   --    < > = values in this interval not displayed.    Blood cultures x 2 NGTD        Significant Imaging:     CXR The lungs are clear.  The heart is normal in size.  Calcified atheromatous disease affects the aorta. No mediastinal, hilar or pleural abnormalities are detected.  The skeletal structures are intact.    EKG Normal sinus rhythm  Left axis deviation  LVH with repolarization abnormality  Anteroseptal infarct ,age undetermined  Abnormal ECG  When compared with ECG of 08-MAR-2016 17:59,  Voltage criteria for left ventricular hypertrophy Now present  Confirmed by Demetrius Quach MD ([id number]) on 5/18/2017 5:06:18 AM

## 2017-05-19 LAB — IGE SERPL-ACNC: <35 IU/ML

## 2017-05-19 PROCEDURE — 25000003 PHARM REV CODE 250: Performed by: FAMILY MEDICINE

## 2017-05-19 PROCEDURE — 25000242 PHARM REV CODE 250 ALT 637 W/ HCPCS: Performed by: NURSE PRACTITIONER

## 2017-05-19 PROCEDURE — 94761 N-INVAS EAR/PLS OXIMETRY MLT: CPT

## 2017-05-19 PROCEDURE — 27000339 *HC DAILY SUPPLY KIT

## 2017-05-19 PROCEDURE — 63600175 PHARM REV CODE 636 W HCPCS: Performed by: FAMILY MEDICINE

## 2017-05-19 PROCEDURE — 27000221 HC OXYGEN, UP TO 24 HOURS

## 2017-05-19 PROCEDURE — 94668 MNPJ CHEST WALL SBSQ: CPT

## 2017-05-19 PROCEDURE — 63600175 PHARM REV CODE 636 W HCPCS: Performed by: INTERNAL MEDICINE

## 2017-05-19 PROCEDURE — 11000001 HC ACUTE MED/SURG PRIVATE ROOM

## 2017-05-19 PROCEDURE — 94640 AIRWAY INHALATION TREATMENT: CPT

## 2017-05-19 PROCEDURE — 99232 SBSQ HOSP IP/OBS MODERATE 35: CPT | Mod: ,,, | Performed by: FAMILY MEDICINE

## 2017-05-19 PROCEDURE — 25000003 PHARM REV CODE 250: Performed by: SURGERY

## 2017-05-19 PROCEDURE — 25000003 PHARM REV CODE 250: Performed by: INTERNAL MEDICINE

## 2017-05-19 PROCEDURE — 27200120 HC KIT IV START (RUSH ONLY)

## 2017-05-19 PROCEDURE — 63600175 PHARM REV CODE 636 W HCPCS: Performed by: NURSE PRACTITIONER

## 2017-05-19 PROCEDURE — 99900035 HC TECH TIME PER 15 MIN (STAT)

## 2017-05-19 RX ORDER — METHYLPREDNISOLONE SOD SUCC 125 MG
60 VIAL (EA) INJECTION EVERY 12 HOURS
Status: DISCONTINUED | OUTPATIENT
Start: 2017-05-19 | End: 2017-05-20 | Stop reason: HOSPADM

## 2017-05-19 RX ADMIN — FLUTICASONE FUROATE AND VILANTEROL TRIFENATATE 1 PUFF: 200; 25 POWDER RESPIRATORY (INHALATION) at 07:05

## 2017-05-19 RX ADMIN — METHYLPREDNISOLONE SODIUM SUCCINATE 80 MG: 125 INJECTION, POWDER, FOR SOLUTION INTRAMUSCULAR; INTRAVENOUS at 06:05

## 2017-05-19 RX ADMIN — IPRATROPIUM BROMIDE AND ALBUTEROL SULFATE 3 ML: .5; 3 SOLUTION RESPIRATORY (INHALATION) at 01:05

## 2017-05-19 RX ADMIN — CITALOPRAM HYDROBROMIDE 20 MG: 10 TABLET ORAL at 09:05

## 2017-05-19 RX ADMIN — PANTOPRAZOLE SODIUM 40 MG: 40 TABLET, DELAYED RELEASE ORAL at 09:05

## 2017-05-19 RX ADMIN — ACETAMINOPHEN 650 MG: 325 TABLET, FILM COATED ORAL at 09:05

## 2017-05-19 RX ADMIN — ASPIRIN 81 MG 81 MG: 81 TABLET ORAL at 09:05

## 2017-05-19 RX ADMIN — IPRATROPIUM BROMIDE AND ALBUTEROL SULFATE 3 ML: .5; 3 SOLUTION RESPIRATORY (INHALATION) at 07:05

## 2017-05-19 RX ADMIN — METHYLPREDNISOLONE SODIUM SUCCINATE 60 MG: 125 INJECTION, POWDER, FOR SOLUTION INTRAMUSCULAR; INTRAVENOUS at 08:05

## 2017-05-19 RX ADMIN — LOSARTAN POTASSIUM 50 MG: 50 TABLET, FILM COATED ORAL at 09:05

## 2017-05-19 RX ADMIN — TRAZODONE HYDROCHLORIDE 50 MG: 50 TABLET ORAL at 09:05

## 2017-05-19 RX ADMIN — ROPINIROLE HYDROCHLORIDE 0.5 MG: 0.5 TABLET, FILM COATED ORAL at 09:05

## 2017-05-19 RX ADMIN — AZITHROMYCIN MONOHYDRATE 250 MG: 500 INJECTION, POWDER, LYOPHILIZED, FOR SOLUTION INTRAVENOUS at 10:05

## 2017-05-19 NOTE — PLAN OF CARE
Problem: Patient Care Overview  Goal: Plan of Care Review  Outcome: Ongoing (interventions implemented as appropriate)  Quiet shift. Tolerated BIPAP for short period of time and used O2 rest of time. HOB elevated with productive white cough that is decreasing. Coarse breath sounds with intermittent slight expiratory wheeze. Up to bathroom and tolerating well. Refuses SCDs during night. Call bell in reach and use. Plan of care discussed and verbalizes understanding. MORRO Mcfarlane RN

## 2017-05-19 NOTE — NURSING
Notified Maria Ines Green NP of patient's complaint. Informed her of orthostatic vitals taken.  Will continue to monitor patient.

## 2017-05-19 NOTE — PLAN OF CARE
Problem: Patient Care Overview  Goal: Plan of Care Review  Outcome: Ongoing (interventions implemented as appropriate)  Pt states she is doing much better today. She feels the percussion therapy is greatly benefiting her. IV zithromax continued. Solumedrol decreased to 60mg Q 12 hr. Remains on O2 @ 3L/NC.

## 2017-05-19 NOTE — NURSING
C/O painful IV site to left wrist. Refuses to have site changed. Educated on the consequences of leaving an IV in and use of IV and verbalizes understanding and still insist that we use the IV. MORRO Mcfarlane RN

## 2017-05-19 NOTE — PLAN OF CARE
05/19/17 1447   Discharge Assessment   Assessment Type Discharge Planning Reassessment   Spoke with patient and she states she should be going home tomorrow.  She states she does not need home health.  She has oxygen and nebulizer at home and family support.

## 2017-05-19 NOTE — PROGRESS NOTES
"Chantell Herrera is a 81 y.o. female patient.    Active Hospital Problems    Diagnosis  POA    *COPD with acute bronchitis [J44.0]  Yes    Chronic respiratory failure with hypoxia [J96.11]  Yes    RLS (restless legs syndrome) [G25.81]  Yes    Oxygen dependent [Z99.81]  Not Applicable    Anxiety state [F41.1]  Yes    Cervical radiculopathy [M54.12]  Yes    Insomnia [G47.00]  Yes    HTN (hypertension) [I10]  Yes      Resolved Hospital Problems    Diagnosis Date Resolved POA   No resolved problems to display.     Temp: 97.1 °F (36.2 °C) (05/19/17 0359)  Pulse: 79 (05/19/17 0734)  Resp: 16 (05/19/17 0734)  BP: 132/70 (05/19/17 0359)  SpO2: 95 % (05/19/17 0734)  Weight: 63 kg (138 lb 14.2 oz) (05/17/17 1338)  Height: 5' 2" (157.5 cm) (05/17/17 1338)    Subjective:  Symptoms:  Worsening.  She reports shortness of breath, cough, chest pain, weakness and anxiety.    Diet:  Adequate intake.    Activity level: Impaired due to weakness.    Pain:  She complains of pain that is mild.      Objective:  General Appearance:  Ill-appearing.    Vital signs: (most recent): Blood pressure 132/70, pulse 79, temperature 97.1 °F (36.2 °C), temperature source Oral, resp. rate 16, height 5' 2" (1.575 m), weight 63 kg (138 lb 14.2 oz), SpO2 95 %, not currently breastfeeding.  Vital signs are normal.    Output: Producing urine and producing stool.    Lungs:  Tachypnea.  Respiratory distress: mild to moderate.  No stridor.  There are wheezes, rhonchi and decreased breath sounds.    Heart: Tachycardia.  S1 normal and S2 normal.  Positive for murmur.  No gallop or friction rub.   Chest: No chest wall tenderness.  Symmetric chest wall expansion.   Extremities: Normal range of motion.  There is dependent edema.    Neurological: Patient is alert and oriented to person, place and time.  Normal strength.    Skin:  Warm.  No rash, ecchymosis or cyanosis.   Abdomen: Abdomen is soft.  There are no signs of ascites.  Bowel sounds are normal.   "   Pupils:  Pupils are equal, round, and reactive to light.    Pulses: Distal pulses are intact.      Assessment:  (Asthmatic Bronchitis   COPD exacerbation   Chronic Hypoxic Respiratory Failure).     Plan:   Continue Support.       Emmanuel Hickman MD  5/19/2017

## 2017-05-19 NOTE — NURSING
Report received and assessment completed. Breathing easier with coarse breath sounds and intermittent slight wheeze. HOB elevated and tolerating O2 per N/C Up to bathroom and tolerating well. Stable and afebrile. Plan of care discussed and verbalizes understanding. Intermittent white productive cough that has been deceasing since admit. Call bell memo lacy, bed in low position and locked. MORRO Presley RN

## 2017-05-19 NOTE — PLAN OF CARE
Discussed plan of care with Dr Gaspar and patient. All in agreement with discharge once weaned off steroids.

## 2017-05-19 NOTE — PROGRESS NOTES
Staff Handoff  Bedside report per STACIA Logan. Pt awake and alert, completely oriented. Maintained on NC @ 3L, denies resp. Distress or discomfort. SR up X2, call bell in reach.      Resident Handoff

## 2017-05-19 NOTE — SUBJECTIVE & OBJECTIVE
Interval note: reports feeling much better today. Tolerated bipap yesterday during the day, but reports cannot sleep with it at night. Less congestion.     Review of Systems   Constitutional: Positive for fatigue. Negative for chills and fever.   HENT: Positive for postnasal drip, rhinorrhea and sinus pressure. Negative for congestion, ear pain, sore throat and trouble swallowing.    Eyes: Negative for redness and itching.   Respiratory: Positive for cough and shortness of breath. Negative for wheezing.    Cardiovascular: Negative for chest pain and palpitations.   Gastrointestinal: Negative for abdominal pain, diarrhea, nausea and vomiting.   Genitourinary: Negative for dysuria and frequency.   Musculoskeletal: Positive for back pain.   Skin: Negative for rash.   Neurological: Negative for weakness and headaches.     Objective:     Vital Signs (Most Recent):  Temp: 97.1 °F (36.2 °C) (05/19/17 0359)  Pulse: 79 (05/19/17 0734)  Resp: 16 (05/19/17 0734)  BP: 132/70 (05/19/17 0359)  SpO2: 95 % (05/19/17 0734) Vital Signs (24h Range):  Temp:  [96.2 °F (35.7 °C)-97.5 °F (36.4 °C)] 97.1 °F (36.2 °C)  Pulse:  [76-95] 79  Resp:  [16-20] 16  SpO2:  [93 %-96 %] 95 %  BP: (122-139)/(60-80) 132/70     Weight: 63 kg (138 lb 14.2 oz)  Body mass index is 25.4 kg/(m^2).    Physical Exam   Constitutional: She is oriented to person, place, and time. She appears well-developed and well-nourished. No distress.   On O2 comfortable    HENT:   Head: Normocephalic and atraumatic.   Mouth/Throat: Oropharynx is clear and moist.   Eyes: EOM are normal. Pupils are equal, round, and reactive to light.   Neck: Normal range of motion. Neck supple.   Cardiovascular: Normal rate and intact distal pulses.    Murmur heard.  Pulmonary/Chest: Effort normal. No respiratory distress. She has no wheezes (insp wheeze on right > left). She has rales. She exhibits no tenderness.   Increased AP diameter of chest, diffuse wheezing and very poor tidal volume     Abdominal: Soft. Bowel sounds are normal. She exhibits no distension and no mass. There is no tenderness. There is no rebound and no guarding.   Musculoskeletal: Normal range of motion. She exhibits no edema or tenderness.   Neurological: She is alert and oriented to person, place, and time.   Skin: Skin is warm and dry. No rash noted. No erythema. No pallor.   Ecchymosis UE and LE   Psychiatric: She has a normal mood and affect. Her behavior is normal. Judgment and thought content normal.        Significant Labs:   CBC:     Recent Labs  Lab 05/17/17 1137 05/18/17  0521   WBC 6.63 9.88   HGB 12.1 11.7*   HCT 36.1* 34.8*    227     CMP:     Recent Labs  Lab 05/17/17 1137 05/18/17  0521    136   K 4.1 4.1   CL 99 97   CO2 29 27   * 142*   BUN 14 16   CREATININE 0.9 0.9   CALCIUM 9.1 8.9   PROT 6.2 6.1   ALBUMIN 3.8 3.7   BILITOT 0.5 0.4   ALKPHOS 54* 51*   AST 13 12   ALT 11 13   ANIONGAP 11 12   EGFRNONAA >60 >60     Lactic acid 2.6 but no other criteria med for sepsis, not repeated    Lab Results   Component Value Date    DDIMER 0.34 05/17/2017     Lab Results   Component Value Date    INR 1.1 05/17/2017    INR 1.0 03/08/2016     BNP    Recent Labs  Lab 05/17/17  1137   *       Recent Labs  Lab 05/17/17 1137  05/18/17  0521   CPK 27  27  --   --    CPKMB 1.4  --   --    TROPONINI 0.012  < > 0.008   MB 5.2*  --   --    < > = values in this interval not displayed.    Blood cultures x 2 NGTD    Significant Imaging:     CXR The lungs are clear.  The heart is normal in size.  Calcified atheromatous disease affects the aorta. No mediastinal, hilar or pleural abnormalities are detected.  The skeletal structures are intact.    EKG Normal sinus rhythm  Left axis deviation  LVH with repolarization abnormality  Anteroseptal infarct ,age undetermined  Abnormal ECG  When compared with ECG of 08-MAR-2016 17:59,  Voltage criteria for left ventricular hypertrophy Now present  Confirmed by Philomena VILLAFANA,  Demetrius ([id number]) on 5/18/2017 5:06:18 AM

## 2017-05-19 NOTE — PROGRESS NOTES
[facility] Hospital Medicine  Progress Note    Patient Name: Chantell Herrera  MRN: [id number]  Patient Class: IP- Inpatient   Admission Date: 5/17/2017  Length of Stay: 2 days  Attending Physician: Anson Leiva MD  Primary Care Provider: Anson Leiva MD        Subjective:     Principal Problem:COPD with acute bronchitis    HPI:  81 year old female COPD-er well known presents to ED with SOB and wheezing. She has been seeing Dr. Hickman for her COPD in the past andrecently went to him a week ago for symptoms that have been going on for 2 weeks from today. He prescribed steroids and zmax last Thursday. She is not feeling any better. She is very stoic. She does not complain much and tries to power through most of her COPD exacerbations. The last 4 days at home she has been SOB at rest even with her O2 (usually 2 liters at home). Feeling better with IV solumedrol and several nebs in ED, but still SOB and wheezing.          Hospital Course:  She was placed on floor for COPD exacerbation that failed outpt treatment with po steroids and zithromax. She was placed on medrol 80mg IV q 8hr. No abx due to no fever, no elevated WBC and CXR without consolidation.     Dr. Hickman came yesterday, started on zithromax yesterday as well as bipap. Reports feeling much better this am. Less cough and congestion.       Interval note: reports feeling much better today. Tolerated bipap yesterday during the day, but reports cannot sleep with it at night. Less congestion.     Review of Systems   Constitutional: Positive for fatigue. Negative for chills and fever.   HENT: Positive for postnasal drip, rhinorrhea and sinus pressure. Negative for congestion, ear pain, sore throat and trouble swallowing.    Eyes: Negative for redness and itching.   Respiratory: Positive for cough and shortness of breath. Negative for wheezing.    Cardiovascular: Negative for chest pain and palpitations.   Gastrointestinal: Negative for  abdominal pain, diarrhea, nausea and vomiting.   Genitourinary: Negative for dysuria and frequency.   Musculoskeletal: Positive for back pain.   Skin: Negative for rash.   Neurological: Negative for weakness and headaches.     Objective:     Vital Signs (Most Recent):  Temp: 97.1 °F (36.2 °C) (05/19/17 0359)  Pulse: 79 (05/19/17 0734)  Resp: 16 (05/19/17 0734)  BP: 132/70 (05/19/17 0359)  SpO2: 95 % (05/19/17 0734) Vital Signs (24h Range):  Temp:  [96.2 °F (35.7 °C)-97.5 °F (36.4 °C)] 97.1 °F (36.2 °C)  Pulse:  [76-95] 79  Resp:  [16-20] 16  SpO2:  [93 %-96 %] 95 %  BP: (122-139)/(60-80) 132/70     Weight: 63 kg (138 lb 14.2 oz)  Body mass index is 25.4 kg/(m^2).    Physical Exam   Constitutional: She is oriented to person, place, and time. She appears well-developed and well-nourished. No distress.   On O2 comfortable    HENT:   Head: Normocephalic and atraumatic.   Mouth/Throat: Oropharynx is clear and moist.   Eyes: EOM are normal. Pupils are equal, round, and reactive to light.   Neck: Normal range of motion. Neck supple.   Cardiovascular: Normal rate and intact distal pulses.    Murmur heard.  Pulmonary/Chest: Effort normal. No respiratory distress. She has no wheezes (insp wheeze on right > left). She has rales. She exhibits no tenderness.   Increased AP diameter of chest, diffuse wheezing and very poor tidal volume    Abdominal: Soft. Bowel sounds are normal. She exhibits no distension and no mass. There is no tenderness. There is no rebound and no guarding.   Musculoskeletal: Normal range of motion. She exhibits no edema or tenderness.   Neurological: She is alert and oriented to person, place, and time.   Skin: Skin is warm and dry. No rash noted. No erythema. No pallor.   Ecchymosis UE and LE   Psychiatric: She has a normal mood and affect. Her behavior is normal. Judgment and thought content normal.        Significant Labs:   CBC:     Recent Labs  Lab 05/17/17  1137 05/18/17  0521   WBC 6.63 9.88   HGB  12.1 11.7*   HCT 36.1* 34.8*    227     CMP:     Recent Labs  Lab 05/17/17  1137 05/18/17  0521    136   K 4.1 4.1   CL 99 97   CO2 29 27   * 142*   BUN 14 16   CREATININE 0.9 0.9   CALCIUM 9.1 8.9   PROT 6.2 6.1   ALBUMIN 3.8 3.7   BILITOT 0.5 0.4   ALKPHOS 54* 51*   AST 13 12   ALT 11 13   ANIONGAP 11 12   EGFRNONAA >60 >60     Lactic acid 2.6 but no other criteria med for sepsis, not repeated    Lab Results   Component Value Date    DDIMER 0.34 05/17/2017     Lab Results   Component Value Date    INR 1.1 05/17/2017    INR 1.0 03/08/2016     BNP    Recent Labs  Lab 05/17/17  1137   *       Recent Labs  Lab 05/17/17  1137  05/18/17  0521   CPK 27  27  --   --    CPKMB 1.4  --   --    TROPONINI 0.012  < > 0.008   MB 5.2*  --   --    < > = values in this interval not displayed.    Blood cultures x 2 NGTD    Significant Imaging:     CXR The lungs are clear.  The heart is normal in size.  Calcified atheromatous disease affects the aorta. No mediastinal, hilar or pleural abnormalities are detected.  The skeletal structures are intact.    EKG Normal sinus rhythm  Left axis deviation  LVH with repolarization abnormality  Anteroseptal infarct ,age undetermined  Abnormal ECG  When compared with ECG of 08-MAR-2016 17:59,  Voltage criteria for left ventricular hypertrophy Now present  Confirmed by Philomena VILLAFANA, Demetrius ([id number]) on 5/18/2017 5:06:18 AM    Assessment/Plan:      * COPD with acute bronchitis  This appears to be noninfectious in nature. She has an exacerbation of her AR symptoms just prior to COPD sx's worsening. I believe this is due to allergies. She feels worse outside. Cont advair and start scheduled nebs as she does duoneb 4 times a day at home.   Admit for IV steroids and nebs, continue O2 - has increase need today (2L at home and 3L here tks >90%) wanted to add mucinex as well to help break up the thick mucus but she reports that she had no help with this at home. She also report that that  makes her feel groggy. Will try CPT  Based on her SEVERE COPD and the history of her previous admissions, I anticipate her being here for several days  Consult Dr Hickman    Doing much better, wean steroids. Should be able to go home tomorrow.     HTN (hypertension)  Bp well controlled  Continue ARB      Cervical radiculopathy  Has hydrocodone prn       Anxiety state  Continue SSRI       Insomnia  Continue trazodone but watch for worsening with steroids       Oxygen dependent  continue at 2liters/min (per home routine) adjust if needed-still requiring 3 L at this point    Chronic respiratory failure with hypoxia  O2 NC      RLS (restless legs syndrome)  Will try some requip. Titrate up if needed       VTE Risk Mitigation         Ordered     Place sequential compression device  Until discontinued      05/17/17 2008     Medium Risk of VTE  Once      05/17/17 2588          Kari Gaspar MD  Department of Hospital Medicine   [facility]

## 2017-05-19 NOTE — NURSING
Patient c/o feeling slightly dizzy when moving upward from sitting position in  bed. Patient was not attempting to get out of bed. Orthostatics taken. See V/S flowsheet.

## 2017-05-19 NOTE — ASSESSMENT & PLAN NOTE
This appears to be noninfectious in nature. She has an exacerbation of her AR symptoms just prior to COPD sx's worsening. I believe this is due to allergies. She feels worse outside. Cont advair and start scheduled nebs as she does duoneb 4 times a day at home.   Admit for IV steroids and nebs, continue O2 - has increase need today (2L at home and 3L here tks >90%) wanted to add mucinex as well to help break up the thick mucus but she reports that she had no help with this at home. She also report that that makes her feel groggy. Will try CPT  Based on her SEVERE COPD and the history of her previous admissions, I anticipate her being here for several days  Consult Dr Hickman    Doing much better, wean steroids. Should be able to go home tomorrow.

## 2017-05-20 VITALS
RESPIRATION RATE: 20 BRPM | OXYGEN SATURATION: 98 % | BODY MASS INDEX: 25.55 KG/M2 | HEART RATE: 92 BPM | SYSTOLIC BLOOD PRESSURE: 155 MMHG | TEMPERATURE: 97 F | WEIGHT: 138.88 LBS | DIASTOLIC BLOOD PRESSURE: 89 MMHG | HEIGHT: 62 IN

## 2017-05-20 PROCEDURE — 63600175 PHARM REV CODE 636 W HCPCS: Performed by: INTERNAL MEDICINE

## 2017-05-20 PROCEDURE — 25000003 PHARM REV CODE 250: Performed by: INTERNAL MEDICINE

## 2017-05-20 PROCEDURE — 99238 HOSP IP/OBS DSCHRG MGMT 30/<: CPT | Mod: ,,, | Performed by: FAMILY MEDICINE

## 2017-05-20 PROCEDURE — 94640 AIRWAY INHALATION TREATMENT: CPT

## 2017-05-20 PROCEDURE — 27000221 HC OXYGEN, UP TO 24 HOURS

## 2017-05-20 PROCEDURE — 94668 MNPJ CHEST WALL SBSQ: CPT

## 2017-05-20 PROCEDURE — 63600175 PHARM REV CODE 636 W HCPCS: Performed by: NURSE PRACTITIONER

## 2017-05-20 PROCEDURE — 94760 N-INVAS EAR/PLS OXIMETRY 1: CPT

## 2017-05-20 PROCEDURE — 25000242 PHARM REV CODE 250 ALT 637 W/ HCPCS: Performed by: NURSE PRACTITIONER

## 2017-05-20 RX ORDER — ROPINIROLE 0.5 MG/1
0.5 TABLET, FILM COATED ORAL NIGHTLY
Qty: 30 TABLET | Refills: 0 | Status: SHIPPED | OUTPATIENT
Start: 2017-05-20 | End: 2017-06-15 | Stop reason: SDUPTHER

## 2017-05-20 RX ORDER — MONTELUKAST SODIUM 10 MG/1
10 TABLET ORAL NIGHTLY
Qty: 30 TABLET | Refills: 0 | Status: SHIPPED | OUTPATIENT
Start: 2017-05-20 | End: 2017-06-19

## 2017-05-20 RX ORDER — TRAZODONE HYDROCHLORIDE 50 MG/1
50 TABLET ORAL NIGHTLY
Qty: 30 TABLET | Refills: 0 | Status: SHIPPED | OUTPATIENT
Start: 2017-05-20 | End: 2017-05-25 | Stop reason: SDUPTHER

## 2017-05-20 RX ORDER — PREDNISONE 10 MG/1
TABLET ORAL
Qty: 23 TABLET | Refills: 0 | Status: ON HOLD | OUTPATIENT
Start: 2017-05-20 | End: 2018-11-18 | Stop reason: SDUPTHER

## 2017-05-20 RX ADMIN — IPRATROPIUM BROMIDE AND ALBUTEROL SULFATE 3 ML: .5; 3 SOLUTION RESPIRATORY (INHALATION) at 07:05

## 2017-05-20 RX ADMIN — FLUTICASONE FUROATE AND VILANTEROL TRIFENATATE 1 PUFF: 200; 25 POWDER RESPIRATORY (INHALATION) at 07:05

## 2017-05-20 RX ADMIN — METHYLPREDNISOLONE SODIUM SUCCINATE 60 MG: 125 INJECTION, POWDER, FOR SOLUTION INTRAMUSCULAR; INTRAVENOUS at 08:05

## 2017-05-20 RX ADMIN — AZITHROMYCIN MONOHYDRATE 250 MG: 500 INJECTION, POWDER, LYOPHILIZED, FOR SOLUTION INTRAVENOUS at 08:05

## 2017-05-20 NOTE — NURSING
Bedside report received. Pt stable, no complaints at this time. Pt anxiously awaits for discharge this AM.

## 2017-05-20 NOTE — PLAN OF CARE
Problem: Patient Care Overview  Goal: Plan of Care Review  Outcome: Ongoing (interventions implemented as appropriate)  Vitals stable, afebrile, no signs of distress noted. Pt has diminished lung sounds today, but is clear. Pt to be discharged today. Pt received last dose of antibiotic this AM and is ready for discharge. IV D/clarence. Discharge instructions given, and pt aware of follow up appt. Pt agrees with plan of care, no questions at this time.

## 2017-05-20 NOTE — NURSING
"Discussed discharging patient after receiving morning steroids and IV Azithromycin . Dr. Gaspar states" okay to give IV antibiotic now or when you get it".   "

## 2017-05-20 NOTE — DISCHARGE SUMMARY
[facility] Medicine  Discharge Summary      Patient Name: Chantell Herrera  MRN: [id number]  Admission Date: 5/17/2017  Hospital Length of Stay: 3 days  Discharge Date and Time:  05/20/2017 6:50 AM  Attending Physician: Anson Leiva MD   Discharging Provider: Kari Gaspar MD  Primary Care Provider: Anson Leiva MD      HPI:   81 year old female COPD-er well known presents to ED with SOB and wheezing. She has been seeing Dr. Hickman for her COPD in the past andrecently went to him a week ago for symptoms that have been going on for 2 weeks from today. He prescribed steroids and zmax last Thursday. She is not feeling any better. She is very stoic. She does not complain much and tries to power through most of her COPD exacerbations. The last 4 days at home she has been SOB at rest even with her O2 (usually 2 liters at home). Feeling better with IV solumedrol and several nebs in ED, but still SOB and wheezing.          * No surgery found *      Indwelling Lines/Drains at time of discharge:   Lines/Drains/Airways          No matching active lines, drains, or airways        Hospital Course:   She was placed on floor for COPD exacerbation that failed outpt treatment with po steroids and zithromax. She was placed on medrol 80mg IV q 8hr. No abx due to no fever, no elevated WBC and CXR without consolidation.     Dr. Hickman came yesterday, started on zithromax yesterday as well as bipap. Reports feeling much better this am. Less cough and congestion.     Patient doing REMARCABLY better. She slept well. She is satting well. Cough is much improved.       Consults:   Consults         Status Ordering Provider     Inpatient consult to Pulmonology  Once     Provider:  Emmanuel Hickman MD    Completed ZEYAD GUEVARA          Significant Diagnostic Studies: Labs: BMP: No results for input(s): GLU, NA, K, CL, CO2, BUN, CREATININE, CALCIUM, MG in the last 48 hours. and CBC No results for input(s):  WBC, HGB, HCT, PLT in the last 48 hours.    Pending Diagnostic Studies:     None        Final Active Diagnoses:    Diagnosis Date Noted POA    PRINCIPAL PROBLEM:  COPD with acute bronchitis [J44.0] 05/17/2017 Yes    Chronic respiratory failure with hypoxia [J96.11] 05/17/2017 Yes    RLS (restless legs syndrome) [G25.81] 05/17/2017 Yes    Oxygen dependent [Z99.81] 01/03/2017 Not Applicable    Anxiety state [F41.1] 08/10/2015 Yes    Cervical radiculopathy [M54.12] 08/10/2015 Yes    Insomnia [G47.00] 08/10/2015 Yes    HTN (hypertension) [I10] 06/19/2014 Yes      Problems Resolved During this Admission:    Diagnosis Date Noted Date Resolved POA      * COPD with acute bronchitis  This appears to be noninfectious in nature. She has an exacerbation of her AR symptoms just prior to COPD sx's worsening. I believe this is due to allergies. She feels worse outside. Cont advair and start scheduled nebs as she does duoneb 4 times a day at home.   Admit for IV steroids and nebs, continue O2 - has increase need today (2L at home and 3L here tks >90%) wanted to add mucinex as well to help break up the thick mucus but she reports that she had no help with this at home. She also report that that makes her feel groggy. Will try CPT  Based on her SEVERE COPD and the history of her previous admissions, I anticipate her being here for several days  Consult Dr Hickman - he has ordered an eosinophil level.    Doing much better, wean steroids.     HTN (hypertension)  Bp well controlled  Continue ARB      Cervical radiculopathy  Has hydrocodone prn       Anxiety state  Continue SSRI   Trazodone started in hosptial.  continue      Insomnia  Continue trazodone but watch for worsening with steroids       Oxygen dependent  continue at 2liters/min (per home routine) adjust if needed- back to home O2 requirement    Chronic respiratory failure with hypoxia  O2 NC      RLS (restless legs syndrome)  Will try some requip. Titrate up if needed          Discharged Condition: good    Disposition: Home or Self Care    Follow Up:  Follow-up Information     Follow up with Anson Leiva MD In 4 days.    Specialty:  Family Medicine    Why:  Outpatient Services    Contact information:    [address] [place] [id number] [phone]          Patient Instructions:   No discharge procedures on file.  Medications:  Reconciled Home Medications:   Current Discharge Medication List      START taking these medications    Details   ropinirole (REQUIP) 0.5 MG tablet Take 1 tablet (0.5 mg total) by mouth every evening.  Qty: 30 tablet, Refills: 0      trazodone (DESYREL) 50 MG tablet Take 1 tablet (50 mg total) by mouth every evening.  Qty: 30 tablet, Refills: 0         CONTINUE these medications which have CHANGED    Details   predniSONE (DELTASONE) 10 MG tablet Take 20 mg BID x 3d then 20mg daily x 3d then 10mg daily x 3days then 5mg daily x 4 days  Qty: 23 tablet, Refills: 0         CONTINUE these medications which have NOT CHANGED    Details   albuterol (PROVENTIL) 2.5 mg /3 mL (0.083 %) nebulizer solution Take 3 mLs (2.5 mg total) by nebulization every 6 (six) hours as needed for Wheezing.  Qty: 300 mL, Refills: 3    Associated Diagnoses: Simple chronic bronchitis      artificial tears,hypromellose, 0.3 % Drop continuous prn.       aspirin 81 MG Chew Take 81 mg by mouth once daily.      budesonide-formoterol 160-4.5 mcg (SYMBICORT) 160-4.5 mcg/actuation HFAA Inhale 2 puffs into the lungs every 12 (twelve) hours.      cholecalciferol, vitamin D3, (VITAMIN D3) 2,000 unit Tab Take 2,000 Units by mouth once daily.      citalopram (CELEXA) 40 MG tablet TAKE 1 TABLET ONE TIME DAILY  Qty: 90 tablet, Refills: 1      FOLIC ACID/MULTIVIT-MIN/LUTEIN (CENTRUM SILVER ORAL) Take 1 tablet by mouth once daily.       ipratropium (ATROVENT) 0.02 % nebulizer solution Take 500 mcg by nebulization 4 (four) times daily.       losartan (COZAAR) 50 MG tablet Take 1 tablet (50 mg  total) by mouth once daily.  Qty: 90 tablet, Refills: 3    Associated Diagnoses: Essential tremor      pravastatin (PRAVACHOL) 40 MG tablet Take 1 tablet (40 mg total) by mouth once daily.  Qty: 90 tablet, Refills: 1    Associated Diagnoses: Dyslipidemia      theophylline (THEODUR) 300 mg 24 hr capsule Take 150 mg by mouth once daily.       VITAMIN C 500 MG tablet Take 1 tablet by mouth once daily.           Time spent on the discharge of patient: 35 minutes    Kari Gaspar MD  Department of Hospital Medicine  [facility]

## 2017-05-20 NOTE — SUBJECTIVE & OBJECTIVE
Interval note: reports feeling much better today. Much less cough and congestion    Review of Systems   Constitutional: Positive for fatigue. Negative for chills and fever.   HENT: Negative for congestion, ear pain, postnasal drip, rhinorrhea, sinus pressure, sore throat and trouble swallowing.    Eyes: Negative for redness and itching.   Respiratory: Negative for cough, shortness of breath and wheezing.    Cardiovascular: Negative for chest pain and palpitations.   Gastrointestinal: Negative for abdominal pain, diarrhea, nausea and vomiting.   Genitourinary: Negative for dysuria and frequency.   Musculoskeletal: Positive for back pain.   Skin: Negative for rash.   Neurological: Negative for weakness and headaches.     Objective:     Vital Signs (Most Recent):  Temp: 96.7 °F (35.9 °C) (05/20/17 0358)  Pulse: 80 (05/20/17 0358)  Resp: 18 (05/20/17 0358)  BP: (!) 140/77 (05/20/17 0358)  SpO2: 98 % (05/20/17 0358) Vital Signs (24h Range):  Temp:  [96.7 °F (35.9 °C)-97.8 °F (36.6 °C)] 96.7 °F (35.9 °C)  Pulse:  [79-95] 80  Resp:  [16-20] 18  SpO2:  [94 %-98 %] 98 %  BP: (116-152)/(58-77) 140/77     Weight: 63 kg (138 lb 14.2 oz)  Body mass index is 25.4 kg/(m^2).    Physical Exam   Constitutional: She is oriented to person, place, and time. She appears well-developed and well-nourished. No distress.   On O2 comfortable    HENT:   Head: Normocephalic and atraumatic.   Mouth/Throat: Oropharynx is clear and moist.   Eyes: EOM are normal. Pupils are equal, round, and reactive to light.   Neck: Normal range of motion. Neck supple.   Cardiovascular: Normal rate and intact distal pulses.    Murmur heard.  Pulmonary/Chest: Effort normal. No respiratory distress. She has no wheezes (insp wheeze on right > left). She has no rales. She exhibits no tenderness.   Increased AP diameter of chest, diffuse wheezing and very poor tidal volume    Abdominal: Soft. Bowel sounds are normal. She exhibits no distension and no mass. There is no  tenderness. There is no rebound and no guarding.   Musculoskeletal: Normal range of motion. She exhibits no edema or tenderness.   Neurological: She is alert and oriented to person, place, and time.   Skin: Skin is warm and dry. No rash noted. No erythema. No pallor.   Ecchymosis UE and LE   Psychiatric: She has a normal mood and affect. Her behavior is normal. Judgment and thought content normal.        Significant Labs:   CBC:   No results for input(s): WBC, HGB, HCT, PLT in the last 48 hours.  CMP:   No results for input(s): NA, K, CL, CO2, GLU, BUN, CREATININE, CALCIUM, PROT, ALBUMIN, BILITOT, ALKPHOS, AST, ALT, ANIONGAP, EGFRNONAA in the last 48 hours.    Invalid input(s): [place]  Lactic acid 2.6 but no other criteria med for sepsis, not repeated    Lab Results   Component Value Date    DDIMER 0.34 05/17/2017     Lab Results   Component Value Date    INR 1.1 05/17/2017    INR 1.0 03/08/2016     BNP    Recent Labs  Lab 05/17/17  1137   *       Recent Labs  Lab 05/17/17  1137  05/18/17  0521   CPK 27  27  --   --    CPKMB 1.4  --   --    TROPONINI 0.012  < > 0.008   MB 5.2*  --   --    < > = values in this interval not displayed.    Blood cultures x 2 NGTD    Significant Imaging:     CXR The lungs are clear.  The heart is normal in size.  Calcified atheromatous disease affects the aorta. No mediastinal, hilar or pleural abnormalities are detected.  The skeletal structures are intact.    EKG Normal sinus rhythm  Left axis deviation  LVH with repolarization abnormality  Anteroseptal infarct ,age undetermined  Abnormal ECG  When compared with ECG of 08-MAR-2016 17:59,  Voltage criteria for left ventricular hypertrophy Now present  Confirmed by Demetrius Quach MD ([id number]) on 5/18/2017 5:06:18 AM

## 2017-05-20 NOTE — DISCHARGE INSTRUCTIONS
Discharge Instructions: COPD  You have been diagnosed with chronic obstructive pulmonary disease (COPD). This is a name given to a group of diseases that limit the flow of air in and out of your lungs. This makes it harder to breathe. With COPD, you are also more likely to get lung infections. COPD includes chronic bronchitis and emphysema. COPD is most often caused by heavy, long-term cigarette smoking.  Home care  Quit smoking  · If you smoke, quit. It is the best thing you can do for your COPD and your overall health.  · Join a stop-smoking program. There are even telephone, text message, and Internet programs to help you quit.  · Ask your healthcare provider about medicines or other methods to help you quit.  · Ask family members to quit smoking as well.  · Don't allow people to smoke in your home, in your car, or when they are around you.  Protect yourself from infection  · Wash your hands often. Do your best to keep your hands away from your face. Most germs are spread from your hands to your mouth.  · Get a flu shot every year. Also ask your provider about pneumonia vaccines.  · Avoid crowds. It's especially important to do this in the winter when more people have colds and flu.  · To stay healthy, get enough sleep, exercise regularly, and eat a balanced diet. You should:  ¨ Get about 8 hours of sleep every night.  ¨ Try to exercise for at least 30 minutes on most days.  ¨ Have healthy foods including fruits and vegetables, 100% whole grains, lean meats and fish, and low-fat dairy products. Try to stay away from foods high in fats and sugar.  Take your medicines  Take your medicines exactly as directed. Don't skip doses.  Manage your stress  Stress can make COPD worse. Use this stress management technique:  · Find a quiet place and sit or lie in a comfortable position.  · Close your eyes and perform breathing exercises for several minutes. Ask your provider about the best way to breathe.  Pulmonary  rehabilitation  · Pulmonary rehab can help you feel better. These programs include exercise, breathing techniques, information about COPD, counseling, and help for smokers.  · Ask your provider or your local hospital about programs in your area.  When to call your healthcare provider  Call your provider immediately if you have any of the following:  · Shortness of breath, wheezing, or coughing  · Increased mucus  · Yellow, green, bloody, or smelly mucus  · Fever or chills  · Tightness in your chest that does not go away with rest or medicine  · An irregular heartbeat or a feeling that your heart is beating very fast  · Swollen ankles   Date Last Reviewed: 5/1/2016  © 0664-9242 [employer]. [address] [place] [address], [place] [address]. All rights reserved. This information is not intended as a substitute for professional medical care. Always follow your healthcare professional's instructions.

## 2017-05-20 NOTE — PROGRESS NOTES
[facility] Medicine  Progress Note    Patient Name: Chantell Herrera  MRN: [id number]  Patient Class: IP- Inpatient   Admission Date: 5/17/2017  Length of Stay: 3 days  Attending Physician: Anson Leiva MD  Primary Care Provider: Anson Leiva MD        Subjective:     Principal Problem:COPD with acute bronchitis    HPI:  81 year old female COPD-er well known presents to ED with SOB and wheezing. She has been seeing Dr. Hickman for her COPD in the past andrecently went to him a week ago for symptoms that have been going on for 2 weeks from today. He prescribed steroids and zmax last Thursday. She is not feeling any better. She is very stoic. She does not complain much and tries to power through most of her COPD exacerbations. The last 4 days at home she has been SOB at rest even with her O2 (usually 2 liters at home). Feeling better with IV solumedrol and several nebs in ED, but still SOB and wheezing.          Hospital Course:  She was placed on floor for COPD exacerbation that failed outpt treatment with po steroids and zithromax. She was placed on medrol 80mg IV q 8hr. No abx due to no fever, no elevated WBC and CXR without consolidation.     Dr. Hickman came yesterday, started on zithromax yesterday as well as bipap. Reports feeling much better this am. Less cough and congestion.     Patient doing REMARCABLY better. She slept well. She is satting well. Cough is much improved.      Interval note: reports feeling much better today. Much less cough and congestion    Review of Systems   Constitutional: Positive for fatigue. Negative for chills and fever.   HENT: Negative for congestion, ear pain, postnasal drip, rhinorrhea, sinus pressure, sore throat and trouble swallowing.    Eyes: Negative for redness and itching.   Respiratory: Negative for cough, shortness of breath and wheezing.    Cardiovascular: Negative for chest pain and palpitations.   Gastrointestinal: Negative for  abdominal pain, diarrhea, nausea and vomiting.   Genitourinary: Negative for dysuria and frequency.   Musculoskeletal: Positive for back pain.   Skin: Negative for rash.   Neurological: Negative for weakness and headaches.     Objective:     Vital Signs (Most Recent):  Temp: 96.7 °F (35.9 °C) (05/20/17 0358)  Pulse: 80 (05/20/17 0358)  Resp: 18 (05/20/17 0358)  BP: (!) 140/77 (05/20/17 0358)  SpO2: 98 % (05/20/17 0358) Vital Signs (24h Range):  Temp:  [96.7 °F (35.9 °C)-97.8 °F (36.6 °C)] 96.7 °F (35.9 °C)  Pulse:  [79-95] 80  Resp:  [16-20] 18  SpO2:  [94 %-98 %] 98 %  BP: (116-152)/(58-77) 140/77     Weight: 63 kg (138 lb 14.2 oz)  Body mass index is 25.4 kg/(m^2).    Physical Exam   Constitutional: She is oriented to person, place, and time. She appears well-developed and well-nourished. No distress.   On O2 comfortable    HENT:   Head: Normocephalic and atraumatic.   Mouth/Throat: Oropharynx is clear and moist.   Eyes: EOM are normal. Pupils are equal, round, and reactive to light.   Neck: Normal range of motion. Neck supple.   Cardiovascular: Normal rate and intact distal pulses.    Murmur heard.  Pulmonary/Chest: Effort normal. No respiratory distress. She has no wheezes (insp wheeze on right > left). She has no rales. She exhibits no tenderness.   Increased AP diameter of chest, diffuse wheezing and very poor tidal volume    Abdominal: Soft. Bowel sounds are normal. She exhibits no distension and no mass. There is no tenderness. There is no rebound and no guarding.   Musculoskeletal: Normal range of motion. She exhibits no edema or tenderness.   Neurological: She is alert and oriented to person, place, and time.   Skin: Skin is warm and dry. No rash noted. No erythema. No pallor.   Ecchymosis UE and LE   Psychiatric: She has a normal mood and affect. Her behavior is normal. Judgment and thought content normal.        Significant Labs:   CBC:   No results for input(s): WBC, HGB, HCT, PLT in the last 48  hours.  CMP:   No results for input(s): NA, K, CL, CO2, GLU, BUN, CREATININE, CALCIUM, PROT, ALBUMIN, BILITOT, ALKPHOS, AST, ALT, ANIONGAP, EGFRNONAA in the last 48 hours.    Invalid input(s): [place]  Lactic acid 2.6 but no other criteria med for sepsis, not repeated    Lab Results   Component Value Date    DDIMER 0.34 05/17/2017     Lab Results   Component Value Date    INR 1.1 05/17/2017    INR 1.0 03/08/2016     BNP    Recent Labs  Lab 05/17/17  1137   *       Recent Labs  Lab 05/17/17  1137  05/18/17  0521   CPK 27  27  --   --    CPKMB 1.4  --   --    TROPONINI 0.012  < > 0.008   MB 5.2*  --   --    < > = values in this interval not displayed.    Blood cultures x 2 NGTD    Significant Imaging:     CXR The lungs are clear.  The heart is normal in size.  Calcified atheromatous disease affects the aorta. No mediastinal, hilar or pleural abnormalities are detected.  The skeletal structures are intact.    EKG Normal sinus rhythm  Left axis deviation  LVH with repolarization abnormality  Anteroseptal infarct ,age undetermined  Abnormal ECG  When compared with ECG of 08-MAR-2016 17:59,  Voltage criteria for left ventricular hypertrophy Now present  Confirmed by Demetrius uQach MD ([id number]) on 5/18/2017 5:06:18 AM    Assessment/Plan:      * COPD with acute bronchitis  This appears to be noninfectious in nature. She has an exacerbation of her AR symptoms just prior to COPD sx's worsening. I believe this is due to allergies. She feels worse outside. Cont advair and start scheduled nebs as she does duoneb 4 times a day at home.   Admit for IV steroids and nebs, continue O2 - has increase need today (2L at home and 3L here tks >90%) wanted to add mucinex as well to help break up the thick mucus but she reports that she had no help with this at home. She also report that that makes her feel groggy. Will try CPT  Based on her SEVERE COPD and the history of her previous admissions, I anticipate her being here for several  days  Consult Dr Hickman - he has ordered an eosinophil level.    Doing much better, wean steroids.     HTN (hypertension)  Bp well controlled  Continue ARB      Cervical radiculopathy  Has hydrocodone prn       Anxiety state  Continue SSRI       Insomnia  Continue trazodone but watch for worsening with steroids       Oxygen dependent  continue at 2liters/min (per home routine) adjust if needed- back to home O2 requirement    Chronic respiratory failure with hypoxia  O2 NC      RLS (restless legs syndrome)  Will try some requip. Titrate up if needed       VTE Risk Mitigation         Ordered     Place sequential compression device  Until discontinued      05/17/17 2008     Medium Risk of VTE  Once      05/17/17 1338          Kari Gaspar MD  Department of Hospital Medicine   [facility]

## 2017-05-20 NOTE — ASSESSMENT & PLAN NOTE
This appears to be noninfectious in nature. She has an exacerbation of her AR symptoms just prior to COPD sx's worsening. I believe this is due to allergies. She feels worse outside. Cont advair and start scheduled nebs as she does duoneb 4 times a day at home.   Admit for IV steroids and nebs, continue O2 - has increase need today (2L at home and 3L here tks >90%) wanted to add mucinex as well to help break up the thick mucus but she reports that she had no help with this at home. She also report that that makes her feel groggy. Will try CPT  Based on her SEVERE COPD and the history of her previous admissions, I anticipate her being here for several days  Consult Dr Hickman - he has ordered an eosinophil level.    Doing much better, wean steroids.

## 2017-05-22 LAB
BACTERIA BLD CULT: NORMAL
BACTERIA BLD CULT: NORMAL

## 2017-05-23 ENCOUNTER — PATIENT OUTREACH (OUTPATIENT)
Dept: ADMINISTRATIVE | Facility: CLINIC | Age: 82
End: 2017-05-23
Payer: MEDICARE

## 2017-05-23 NOTE — PATIENT INSTRUCTIONS
Discharge Instructions: COPD  You have been diagnosed with chronic obstructive pulmonary disease (COPD). This is a name given to a group of diseases that limit the flow of air in and out of your lungs. This makes it harder to breathe. With COPD, you are also more likely to get lung infections. COPD includes chronic bronchitis and emphysema. COPD is most often caused by heavy, long-term cigarette smoking.  Home care  Quit smoking  · If you smoke, quit. It is the best thing you can do for your COPD and your overall health.  · Join a stop-smoking program. There are even telephone, text message, and Internet programs to help you quit.  · Ask your healthcare provider about medicines or other methods to help you quit.  · Ask family members to quit smoking as well.  · Don't allow people to smoke in your home, in your car, or when they are around you.  Protect yourself from infection  · Wash your hands often. Do your best to keep your hands away from your face. Most germs are spread from your hands to your mouth.  · Get a flu shot every year. Also ask your provider about pneumonia vaccines.  · Avoid crowds. It's especially important to do this in the winter when more people have colds and flu.  · To stay healthy, get enough sleep, exercise regularly, and eat a balanced diet. You should:  ? Get about 8 hours of sleep every night.  ? Try to exercise for at least 30 minutes on most days.  ? Have healthy foods including fruits and vegetables, 100% whole grains, lean meats and fish, and low-fat dairy products. Try to stay away from foods high in fats and sugar.  Take your medicines  Take your medicines exactly as directed. Don't skip doses.  Manage your stress  Stress can make COPD worse. Use this stress management technique:  · Find a quiet place and sit or lie in a comfortable position.  · Close your eyes and perform breathing exercises for several minutes. Ask your provider about the best way to breathe.  Pulmonary  rehabilitation  · Pulmonary rehab can help you feel better. These programs include exercise, breathing techniques, information about COPD, counseling, and help for smokers.  · Ask your provider or your local hospital about programs in your area.  When to call your healthcare provider  Call your provider immediately if you have any of the following:  · Shortness of breath, wheezing, or coughing  · Increased mucus  · Yellow, green, bloody, or smelly mucus  · Fever or chills  · Tightness in your chest that does not go away with rest or medicine  · An irregular heartbeat or a feeling that your heart is beating very fast  · Swollen ankles   Date Last Reviewed: 5/1/2016  © 8478-5924 [employer]. [address] [place] [address], [place] [address]. All rights reserved. This information is not intended as a substitute for professional medical care. Always follow your healthcare professional's instructions.

## 2017-05-23 NOTE — PROGRESS NOTES
C3 nurse attempted to contact patient. Message given to spouse.  C3 nurse attempted to contact Chantell Herrera for a TCC post hospital discharge follow up call. No answer at phone number listed and no voicemail available. The patient has a HOSFU appointment with Markus Leiva on 05/24/17 @ 1515 hrs. Message sent to Physician staff.

## 2017-05-24 ENCOUNTER — OFFICE VISIT (OUTPATIENT)
Dept: FAMILY MEDICINE | Facility: CLINIC | Age: 82
End: 2017-05-24
Payer: MEDICARE

## 2017-05-24 VITALS
DIASTOLIC BLOOD PRESSURE: 66 MMHG | RESPIRATION RATE: 18 BRPM | WEIGHT: 134.25 LBS | SYSTOLIC BLOOD PRESSURE: 116 MMHG | HEART RATE: 76 BPM | BODY MASS INDEX: 24.7 KG/M2 | HEIGHT: 62 IN

## 2017-05-24 DIAGNOSIS — J44.1 COPD EXACERBATION: ICD-10-CM

## 2017-05-24 DIAGNOSIS — R09.02 HYPOXIA: ICD-10-CM

## 2017-05-24 DIAGNOSIS — J30.1 SEASONAL ALLERGIC RHINITIS DUE TO POLLEN: ICD-10-CM

## 2017-05-24 DIAGNOSIS — Z09 HOSPITAL DISCHARGE FOLLOW-UP: Primary | ICD-10-CM

## 2017-05-24 PROCEDURE — 3078F DIAST BP <80 MM HG: CPT | Mod: S$GLB,,, | Performed by: FAMILY MEDICINE

## 2017-05-24 PROCEDURE — 1160F RVW MEDS BY RX/DR IN RCRD: CPT | Mod: S$GLB,,, | Performed by: FAMILY MEDICINE

## 2017-05-24 PROCEDURE — 3074F SYST BP LT 130 MM HG: CPT | Mod: S$GLB,,, | Performed by: FAMILY MEDICINE

## 2017-05-24 PROCEDURE — 1126F AMNT PAIN NOTED NONE PRSNT: CPT | Mod: S$GLB,,, | Performed by: FAMILY MEDICINE

## 2017-05-24 PROCEDURE — 1159F MED LIST DOCD IN RCRD: CPT | Mod: S$GLB,,, | Performed by: FAMILY MEDICINE

## 2017-05-24 PROCEDURE — 99499 UNLISTED E&M SERVICE: CPT | Mod: S$GLB,,, | Performed by: FAMILY MEDICINE

## 2017-05-24 PROCEDURE — 99999 PR PBB SHADOW E&M-EST. PATIENT-LVL II: CPT | Mod: PBBFAC,,, | Performed by: FAMILY MEDICINE

## 2017-05-24 PROCEDURE — 99214 OFFICE O/P EST MOD 30 MIN: CPT | Mod: S$GLB,,, | Performed by: FAMILY MEDICINE

## 2017-05-24 RX ORDER — FLUTICASONE PROPIONATE 50 MCG
2 SPRAY, SUSPENSION (ML) NASAL DAILY
Qty: 1 BOTTLE | Refills: 5 | Status: SHIPPED | OUTPATIENT
Start: 2017-05-24 | End: 2017-06-23

## 2017-05-24 NOTE — LETTER
May 24, 2017      Festus Maurer MD  [id number] [id number]  Phone: [phone]  Fax: [phone]          Patient: Chantell Herrera   MR Number: [id number]   YOB: 1935   Date of Visit: 5/24/2017       Dear Dr. Festus Maurer:    Thank you for referring Chantell Herrera to me for evaluation. Attached you will find relevant portions of my assessment and plan of care.    If you have questions, please do not hesitate to call me. I look forward to following Chantell Herrera along with you.    Sincerely,    Anson Leiva MD    Enclosure  CC:  No Recipients    If you would like to receive this communication electronically, please contact [email or web address] or [phone] to request more information on [employer] Link access.    For providers and/or their staff who would like to refer a patient to [facility], please contact us through our one-stop-shop provider referral line, [facility], at [phone].    If you feel you have received this communication in error or would no longer like to receive these types of communications, please e-mail [email or web address]

## 2017-05-24 NOTE — PROGRESS NOTES
Subjective:       Patient ID: Chantell Herrera is a 81 y.o. female.    Chief Complaint: Follow-up (hospital F/U- COPD with bronchitis)    Pt is a 81 y.o. female who presents for evaluation and management of   Encounter Diagnoses   Name Primary?    Hospital discharge follow-up Yes    COPD exacerbation     Hypoxia     Seasonal allergic rhinitis due to pollen    .no fever or chills.  On prednisone taper   Still with cough, productive   Some wheezing   SOB improved. Only using oxygen at night   Doing well on current meds. Denies any side effects. Prevention is up to date.  Review of Systems   Constitutional: Negative for chills and fever.   Respiratory: Positive for cough and shortness of breath.         SOB improved. Can now walk to mailbox and back before having to stop for rest        Objective:      Physical Exam   Constitutional: She is oriented to person, place, and time. She appears well-developed and well-nourished.   HENT:   Head: Normocephalic and atraumatic.   Right Ear: External ear normal.   Left Ear: External ear normal.   Nose: Nose normal.   Mouth/Throat: Oropharynx is clear and moist.   Eyes: EOM are normal. Pupils are equal, round, and reactive to light.   Neck: Normal range of motion. Neck supple. No JVD present. No tracheal deviation present. No thyromegaly present.   Cardiovascular: Normal rate, normal heart sounds and intact distal pulses.    No murmur heard.  Pulmonary/Chest: Effort normal and breath sounds normal. No respiratory distress. She has no wheezes. She has no rales. She exhibits no tenderness.   Abdominal: Soft. Bowel sounds are normal. She exhibits no distension and no mass. There is no tenderness. There is no rebound and no guarding.   Musculoskeletal: Normal range of motion. She exhibits no edema or tenderness.   Lymphadenopathy:     She has no cervical adenopathy.   Neurological: She is alert and oriented to person, place, and time. She has normal reflexes. She displays normal  reflexes. No cranial nerve deficit. She exhibits normal muscle tone. Coordination normal.   Skin: Skin is warm and dry. No rash noted. No erythema. No pallor.   Psychiatric: She has a normal mood and affect. Her behavior is normal. Judgment and thought content normal.       Assessment:       1. Hospital discharge follow-up    2. COPD exacerbation    3. Hypoxia    4. Seasonal allergic rhinitis due to pollen        Plan:   Chantell was seen today for follow-up.    Diagnoses and all orders for this visit:    Hospital discharge follow-up    COPD exacerbation    Hypoxia    Seasonal allergic rhinitis due to pollen    Other orders  -     fluticasone (FLONASE) 50 mcg/actuation nasal spray; 2 sprays by Each Nare route once daily.    continue Atrovent, Symbicort, PO2.  Add flonase, continue singulair 0  Follow with Pulm    RTC if condition acutely worsens or any other concerns, otherwise RTC as scheduled         No Follow-up on file.

## 2017-05-25 DIAGNOSIS — E78.5 DYSLIPIDEMIA: ICD-10-CM

## 2017-05-25 RX ORDER — PRAVASTATIN SODIUM 40 MG/1
TABLET ORAL
Qty: 90 TABLET | Refills: 1 | Status: SHIPPED | OUTPATIENT
Start: 2017-05-25 | End: 2017-10-06 | Stop reason: SDUPTHER

## 2017-05-25 RX ORDER — CITALOPRAM 40 MG/1
TABLET, FILM COATED ORAL
Qty: 90 TABLET | Refills: 1 | Status: SHIPPED | OUTPATIENT
Start: 2017-05-25 | End: 2017-10-06 | Stop reason: SDUPTHER

## 2017-05-25 RX ORDER — TRAZODONE HYDROCHLORIDE 50 MG/1
TABLET ORAL
Qty: 90 TABLET | Refills: 1 | Status: SHIPPED | OUTPATIENT
Start: 2017-05-25 | End: 2018-03-23 | Stop reason: SDUPTHER

## 2017-06-15 RX ORDER — ROPINIROLE 0.5 MG/1
TABLET, FILM COATED ORAL
Qty: 30 TABLET | Refills: 0 | Status: SHIPPED | OUTPATIENT
Start: 2017-06-15 | End: 2017-07-07 | Stop reason: SDUPTHER

## 2017-06-20 ENCOUNTER — PATIENT OUTREACH (OUTPATIENT)
Dept: ADMINISTRATIVE | Facility: HOSPITAL | Age: 82
End: 2017-06-20

## 2017-06-20 NOTE — LETTER
June 20, 2017    Chantell Herrera  [address] [facility] [address]             [facility]  [address]  [facility] [address]  Phone: [phone] Dear Ms. Herrera:    [facility] is committed to your overall health.  To help you get the most out of each of your visits, we will review your information to make sure you are up to date on all of your recommended tests and/or procedures.      Dr. Leiva has found that you may be due for a fasting cholesterol check, a tetanus vaccine, a pneumonia vaccine, and a shingles vaccine.     If you have had any of the above done at another facility, please bring the records or information with you so that your record at [facility] will be complete.  If you would like to schedule any of these, please contact me.    If you are currently taking medication, please bring it with you to your appointment for review.    Also, if you have any type of Advanced Directives, please bring them with you to your office visit so we may scan them into your chart.        If you have any questions or concerns, please don't hesitate to call.    Sincerely,        Nina Walker LPN Clinical Care Coordinator  [facility]  [phone]

## 2017-07-05 ENCOUNTER — OFFICE VISIT (OUTPATIENT)
Dept: FAMILY MEDICINE | Facility: CLINIC | Age: 82
End: 2017-07-05
Payer: MEDICARE

## 2017-07-05 VITALS
WEIGHT: 143.5 LBS | RESPIRATION RATE: 18 BRPM | HEART RATE: 90 BPM | SYSTOLIC BLOOD PRESSURE: 106 MMHG | BODY MASS INDEX: 26.41 KG/M2 | HEIGHT: 62 IN | DIASTOLIC BLOOD PRESSURE: 56 MMHG | OXYGEN SATURATION: 90 %

## 2017-07-05 DIAGNOSIS — G47.00 INSOMNIA, UNSPECIFIED TYPE: ICD-10-CM

## 2017-07-05 DIAGNOSIS — E78.5 DYSLIPIDEMIA: ICD-10-CM

## 2017-07-05 DIAGNOSIS — Z99.81 OXYGEN DEPENDENT: ICD-10-CM

## 2017-07-05 DIAGNOSIS — J43.1 PANLOBULAR EMPHYSEMA: ICD-10-CM

## 2017-07-05 DIAGNOSIS — G25.0 ESSENTIAL TREMOR: ICD-10-CM

## 2017-07-05 DIAGNOSIS — Z23 NEED FOR SHINGLES VACCINE: Primary | ICD-10-CM

## 2017-07-05 DIAGNOSIS — G25.81 RLS (RESTLESS LEGS SYNDROME): ICD-10-CM

## 2017-07-05 PROCEDURE — 99999 PR PBB SHADOW E&M-EST. PATIENT-LVL III: CPT | Mod: PBBFAC,,, | Performed by: FAMILY MEDICINE

## 2017-07-05 PROCEDURE — 1126F AMNT PAIN NOTED NONE PRSNT: CPT | Mod: S$GLB,,, | Performed by: FAMILY MEDICINE

## 2017-07-05 PROCEDURE — 99214 OFFICE O/P EST MOD 30 MIN: CPT | Mod: S$GLB,,, | Performed by: FAMILY MEDICINE

## 2017-07-05 PROCEDURE — 1159F MED LIST DOCD IN RCRD: CPT | Mod: S$GLB,,, | Performed by: FAMILY MEDICINE

## 2017-07-05 PROCEDURE — 99499 UNLISTED E&M SERVICE: CPT | Mod: S$GLB,,, | Performed by: FAMILY MEDICINE

## 2017-07-05 RX ORDER — THEOPHYLLINE 300 MG/1
TABLET, EXTENDED RELEASE ORAL
COMMUNITY
Start: 2017-06-23 | End: 2018-12-05

## 2017-07-05 NOTE — PROGRESS NOTES
Subjective:       Patient ID: Chantell Herrera is a 81 y.o. female.    Chief Complaint: Follow-up    Pt is a 81 y.o. female who presents for evaluation and management of   Encounter Diagnoses   Name Primary?    Need for shingles vaccine Yes    Panlobular emphysema     Oxygen dependent     Insomnia, unspecified type     Dyslipidemia     Essential tremor     RLS (restless legs syndrome)    .  Doing well on current meds. Denies any side effects. Prevention is up to date.  Review of Systems   Constitutional: Negative for chills and fever.   Respiratory: Positive for cough and shortness of breath.         SOB improved. Can now walk to mailbox and back before having to stop for rest    Cardiovascular: Negative for chest pain and palpitations.   Gastrointestinal: Negative for abdominal pain, blood in stool, constipation and nausea.   Genitourinary: Negative for difficulty urinating.   Skin:        Bruising    Psychiatric/Behavioral: Negative for dysphoric mood, sleep disturbance and suicidal ideas. The patient is not nervous/anxious.        Objective:      Physical Exam   Constitutional: She is oriented to person, place, and time. She appears well-developed and well-nourished.   HENT:   Head: Normocephalic and atraumatic.   Right Ear: External ear normal.   Left Ear: External ear normal.   Nose: Nose normal.   Mouth/Throat: Oropharynx is clear and moist.   O2 NC    Eyes: EOM are normal. Pupils are equal, round, and reactive to light.   Neck: Normal range of motion. Neck supple. No JVD present. No tracheal deviation present. No thyromegaly present.   Cardiovascular: Normal rate, normal heart sounds and intact distal pulses.    No murmur heard.  Pulmonary/Chest: Effort normal and breath sounds normal. No respiratory distress. She has no wheezes. She has no rales. She exhibits no tenderness.   Abdominal: Soft. Bowel sounds are normal. She exhibits no distension and no mass. There is no tenderness. There is no rebound  and no guarding.   Musculoskeletal: Normal range of motion. She exhibits no edema or tenderness.   Lymphadenopathy:     She has no cervical adenopathy.   Neurological: She is alert and oriented to person, place, and time. She has normal reflexes. She displays normal reflexes. No cranial nerve deficit. She exhibits normal muscle tone. Coordination normal.   Skin: Skin is warm and dry. No rash noted. No erythema. No pallor.   Psychiatric: She has a normal mood and affect. Her behavior is normal. Judgment and thought content normal.       Assessment:       1. Need for shingles vaccine    2. Panlobular emphysema    3. Oxygen dependent    4. Insomnia, unspecified type    5. Dyslipidemia    6. Essential tremor    7. RLS (restless legs syndrome)        Plan:   Chantell was seen today for follow-up.    Diagnoses and all orders for this visit:    Need for shingles vaccine  -     zoster vaccine live, PF, (ZOSTAVAX, PF,) 19,400 unit/0.65 mL injection; Inject 19,400 Units into the skin once.    Panlobular emphysema    Oxygen dependent    Insomnia, unspecified type    Dyslipidemia  -     Comprehensive metabolic panel; Future  -     Lipid panel; Future  -     TSH; Future    Essential tremor    RLS (restless legs syndrome)    continue O2. Follow with pulm   See orders, no changes     RTC 6 months   No Follow-up on file.

## 2017-07-06 ENCOUNTER — CLINICAL SUPPORT (OUTPATIENT)
Dept: FAMILY MEDICINE | Facility: CLINIC | Age: 82
End: 2017-07-06
Payer: MEDICARE

## 2017-07-06 DIAGNOSIS — E78.5 DYSLIPIDEMIA: ICD-10-CM

## 2017-07-06 LAB
ALBUMIN SERPL BCP-MCNC: 3.7 G/DL
ALP SERPL-CCNC: 52 U/L
ALT SERPL W/O P-5'-P-CCNC: 9 U/L
ANION GAP SERPL CALC-SCNC: 6 MMOL/L
AST SERPL-CCNC: 13 U/L
BILIRUB SERPL-MCNC: 0.7 MG/DL
BUN SERPL-MCNC: 16 MG/DL
CALCIUM SERPL-MCNC: 9 MG/DL
CHLORIDE SERPL-SCNC: 100 MMOL/L
CHOLEST/HDLC SERPL: 2.2 {RATIO}
CO2 SERPL-SCNC: 33 MMOL/L
CREAT SERPL-MCNC: 0.9 MG/DL
EST. GFR  (AFRICAN AMERICAN): >60 ML/MIN/1.73 M^2
EST. GFR  (NON AFRICAN AMERICAN): >60 ML/MIN/1.73 M^2
GLUCOSE SERPL-MCNC: 88 MG/DL
HDL/CHOLESTEROL RATIO: 45.2 %
HDLC SERPL-MCNC: 166 MG/DL
HDLC SERPL-MCNC: 75 MG/DL
LDLC SERPL CALC-MCNC: 79.4 MG/DL
NONHDLC SERPL-MCNC: 91 MG/DL
POTASSIUM SERPL-SCNC: 4.2 MMOL/L
PROT SERPL-MCNC: 6.2 G/DL
SODIUM SERPL-SCNC: 139 MMOL/L
TRIGL SERPL-MCNC: 58 MG/DL
TSH SERPL DL<=0.005 MIU/L-ACNC: 0.89 UIU/ML

## 2017-07-06 PROCEDURE — 80061 LIPID PANEL: CPT

## 2017-07-06 PROCEDURE — 80053 COMPREHEN METABOLIC PANEL: CPT

## 2017-07-06 PROCEDURE — 36415 COLL VENOUS BLD VENIPUNCTURE: CPT | Mod: S$GLB,,, | Performed by: FAMILY MEDICINE

## 2017-07-06 PROCEDURE — 84443 ASSAY THYROID STIM HORMONE: CPT

## 2017-07-07 RX ORDER — ROPINIROLE 0.5 MG/1
TABLET, FILM COATED ORAL
Qty: 30 TABLET | Refills: 0 | Status: SHIPPED | OUTPATIENT
Start: 2017-07-07 | End: 2017-08-23 | Stop reason: SDUPTHER

## 2017-07-18 ENCOUNTER — TELEPHONE (OUTPATIENT)
Dept: FAMILY MEDICINE | Facility: CLINIC | Age: 82
End: 2017-07-18

## 2017-07-18 NOTE — TELEPHONE ENCOUNTER
FYI:    Spoke to patient, states she was going to get Zoster Vaccine but the pharmacy would not give since she takes Prednisone daily.

## 2017-08-07 ENCOUNTER — TELEPHONE (OUTPATIENT)
Dept: FAMILY MEDICINE | Facility: CLINIC | Age: 82
End: 2017-08-07

## 2017-08-07 NOTE — TELEPHONE ENCOUNTER
----- Message from David Kirkpatrick sent at 2017 12:36 PM CDT -----  Contact: Patient  Chantell Herrera  MRN: [id number]  : 1935  PCP: Anson Leiva  Home Phone      [phone]  Work Phone      Not on file.  Mobile          [phone]      MESSAGE: received a call from [facility] stating they had Rx from Dr Leiva for Meticozone -- she refused it, not knowing what it was for -- please advise    Call [phone]    PCP: Abbie

## 2017-08-07 NOTE — TELEPHONE ENCOUNTER
Spoke to pt and she stated she received a call from [facility] pharmacy informing her they have a prescription for meticozone ready for her.  Pt stated she refused the medication, she does not know why she was prescribed this medication.    Pt would like to know if you would like her to start taking this medication? Please advise,thank you

## 2017-08-23 RX ORDER — ROPINIROLE 0.5 MG/1
0.5 TABLET, FILM COATED ORAL NIGHTLY
Qty: 90 TABLET | Refills: 1 | Status: SHIPPED | OUTPATIENT
Start: 2017-08-23 | End: 2018-01-13 | Stop reason: SDUPTHER

## 2017-08-23 NOTE — TELEPHONE ENCOUNTER
LOV: 7/08/2017    Patient requesting 90 day refill for Ropinirole (requip) 0.5mg takes one tablet every evening. States it was originally ordered during inpatient hospital stay. States she has been on medication since 5/2017. Advise    Pharmacy: [facility] mail order pharmacy.    Call patient if there are any problems: [phone]

## 2017-09-27 DIAGNOSIS — G25.0 ESSENTIAL TREMOR: ICD-10-CM

## 2017-09-28 RX ORDER — LOSARTAN POTASSIUM 50 MG/1
TABLET ORAL
Qty: 90 TABLET | Refills: 3 | Status: SHIPPED | OUTPATIENT
Start: 2017-09-28 | End: 2018-11-05 | Stop reason: SDUPTHER

## 2017-10-06 DIAGNOSIS — E78.5 DYSLIPIDEMIA: ICD-10-CM

## 2017-10-06 RX ORDER — PRAVASTATIN SODIUM 40 MG/1
TABLET ORAL
Qty: 90 TABLET | Refills: 1 | Status: SHIPPED | OUTPATIENT
Start: 2017-10-06 | End: 2018-02-27 | Stop reason: SDUPTHER

## 2017-10-06 RX ORDER — CITALOPRAM 40 MG/1
TABLET, FILM COATED ORAL
Qty: 90 TABLET | Refills: 1 | Status: SHIPPED | OUTPATIENT
Start: 2017-10-06 | End: 2018-02-27 | Stop reason: SDUPTHER

## 2017-10-18 ENCOUNTER — OFFICE VISIT (OUTPATIENT)
Dept: INTERNAL MEDICINE | Facility: CLINIC | Age: 82
End: 2017-10-18
Payer: MEDICARE

## 2017-10-18 VITALS
WEIGHT: 140.63 LBS | BODY MASS INDEX: 25.88 KG/M2 | HEIGHT: 62 IN | DIASTOLIC BLOOD PRESSURE: 64 MMHG | OXYGEN SATURATION: 90 % | RESPIRATION RATE: 22 BRPM | SYSTOLIC BLOOD PRESSURE: 134 MMHG | HEART RATE: 88 BPM

## 2017-10-18 DIAGNOSIS — I10 ESSENTIAL HYPERTENSION: ICD-10-CM

## 2017-10-18 DIAGNOSIS — Z00.00 ENCOUNTER FOR PREVENTIVE HEALTH EXAMINATION: Primary | ICD-10-CM

## 2017-10-18 DIAGNOSIS — Z99.81 OXYGEN DEPENDENT: ICD-10-CM

## 2017-10-18 DIAGNOSIS — I77.1 TORTUOUS AORTA: ICD-10-CM

## 2017-10-18 DIAGNOSIS — J41.0 SIMPLE CHRONIC BRONCHITIS: ICD-10-CM

## 2017-10-18 DIAGNOSIS — N18.30 CKD (CHRONIC KIDNEY DISEASE), STAGE III: ICD-10-CM

## 2017-10-18 DIAGNOSIS — E78.5 DYSLIPIDEMIA: ICD-10-CM

## 2017-10-18 DIAGNOSIS — E78.5 HYPERLIPIDEMIA, UNSPECIFIED HYPERLIPIDEMIA TYPE: ICD-10-CM

## 2017-10-18 DIAGNOSIS — G47.00 INSOMNIA, UNSPECIFIED TYPE: ICD-10-CM

## 2017-10-18 DIAGNOSIS — J96.11 CHRONIC RESPIRATORY FAILURE WITH HYPOXIA: ICD-10-CM

## 2017-10-18 PROCEDURE — 1126F AMNT PAIN NOTED NONE PRSNT: CPT | Mod: S$GLB,,, | Performed by: NURSE PRACTITIONER

## 2017-10-18 PROCEDURE — 94016 REVIEW PATIENT SPIROMETRY: CPT | Mod: S$GLB,,, | Performed by: NURSE PRACTITIONER

## 2017-10-18 PROCEDURE — G0439 PPPS, SUBSEQ VISIT: HCPCS | Mod: S$GLB,,, | Performed by: NURSE PRACTITIONER

## 2017-10-18 PROCEDURE — G0008 ADMIN INFLUENZA VIRUS VAC: HCPCS | Mod: S$GLB,,, | Performed by: NURSE PRACTITIONER

## 2017-10-18 PROCEDURE — 3075F SYST BP GE 130 - 139MM HG: CPT | Mod: S$GLB,,, | Performed by: NURSE PRACTITIONER

## 2017-10-18 PROCEDURE — 1160F RVW MEDS BY RX/DR IN RCRD: CPT | Mod: S$GLB,,, | Performed by: NURSE PRACTITIONER

## 2017-10-18 PROCEDURE — 4037F INFLUENZA IMM ORDER/ADMIN: CPT | Mod: S$GLB,,, | Performed by: NURSE PRACTITIONER

## 2017-10-18 PROCEDURE — 99999 PR PBB SHADOW E&M-EST. PATIENT-LVL IV: CPT | Mod: PBBFAC,,, | Performed by: NURSE PRACTITIONER

## 2017-10-18 PROCEDURE — 99499 UNLISTED E&M SERVICE: CPT | Mod: S$GLB,,, | Performed by: NURSE PRACTITIONER

## 2017-10-18 PROCEDURE — 1170F FXNL STATUS ASSESSED: CPT | Mod: S$GLB,,, | Performed by: NURSE PRACTITIONER

## 2017-10-18 PROCEDURE — 3078F DIAST BP <80 MM HG: CPT | Mod: S$GLB,,, | Performed by: NURSE PRACTITIONER

## 2017-10-18 PROCEDURE — 1159F MED LIST DOCD IN RCRD: CPT | Mod: S$GLB,,, | Performed by: NURSE PRACTITIONER

## 2017-10-18 PROCEDURE — 90662 IIV NO PRSV INCREASED AG IM: CPT | Mod: S$GLB,,, | Performed by: NURSE PRACTITIONER

## 2017-10-18 RX ORDER — MONTELUKAST SODIUM 10 MG/1
10 TABLET ORAL NIGHTLY
Status: ON HOLD | COMMUNITY
End: 2019-01-22

## 2017-10-18 NOTE — PROGRESS NOTES
"Subjective:       Patient ID: Chantell Herrera is a 81 y.o. female.    Chantell was seen today in a face to face encounter for a comprehensive Health Risk Assessment. This visit included a review of her total medical record available at the time of this visit.        Past Medical, Family, and Surgical History:      This information was reviewed and reconciled today during this encounter. Medications were reviewed and reconciled today. The active problem list has been reviewed/updated and reconciled today. These active problems are listed in the diagnosis list below.    **See completed HRA forms/Questionnaires/Flowsheets for ROS information.    Vitals:    10/18/17 1059   BP: 134/64   BP Location: Left arm   Patient Position: Sitting   Pulse: 88   Resp: (!) 22   SpO2: (!) 90%   Weight: 63.8 kg (140 lb 10.5 oz)   Height: 5' 1.61" (1.565 m)     Physical Exam   Constitutional: She is oriented to person, place, and time. Vital signs are normal. She appears well-developed and well-nourished. She is active and cooperative. No distress. Nasal cannula in place.   HENT:   Head: Normocephalic and atraumatic.   Right Ear: External ear normal.   Left Ear: External ear normal.   Nose: Nose normal.   Mouth/Throat: Oropharynx is clear and moist and mucous membranes are normal. Normal dentition.   Eyes: Conjunctivae, EOM and lids are normal. Pupils are equal, round, and reactive to light.   Neck: Trachea normal. Neck supple.   Cardiovascular: Normal rate, regular rhythm and normal heart sounds.    Pulses:       Dorsalis pedis pulses are 2+ on the right side, and 2+ on the left side.        Posterior tibial pulses are 2+ on the right side, and 2+ on the left side.   Pulmonary/Chest: Breath sounds normal. Accessory muscle usage (nonlabored) present. No respiratory distress. She has no wheezes. She has no rales.   Abdominal: Soft. Bowel sounds are normal. She exhibits no distension. There is no tenderness.   Musculoskeletal: She exhibits " no edema or tenderness.   Neurological: She is alert and oriented to person, place, and time. She has normal strength. No cranial nerve deficit. She exhibits normal muscle tone. Gait normal.   Skin: Skin is warm, dry and intact. No rash noted.   Psychiatric: She has a normal mood and affect. Her speech is normal and behavior is normal. Judgment and thought content normal. Cognition and memory are normal.   Nursing note and vitals reviewed.        Diagnoses (identified today) and assoicated plan for each diagnosis:           1. Encounter for preventive health examination    2. Simple chronic bronchitis    3. Chronic respiratory failure with hypoxia    4. Oxygen dependent    5. Essential hypertension    6. Hyperlipidemia, unspecified hyperlipidemia type    7. Tortuous aorta    8. Dyslipidemia    9. CKD (chronic kidney disease), stage III    10. Insomnia, unspecified type        1. Essential hypertension  Stable, followed by PCP    2. Other emphysema  Stable, followed by Pulmonology  - Pre/Post - Spirometry with/without bronchodilator; Future    3. COPD bronchitis  Stable, followed by Pulmonolgy  - Pre/Post - Spirometry with/without bronchodilator; Future    4. Cervical radiculopathy  Stable, followed by Neurology,     5. Acute renal insufficiency  Stable, followed by PCP    6. Hyperlipidemia  Stable followed by PCP    7. Anxiety state  Stable, followed by PCP    8. Other insomnia  Stable, on medications, followed by PCP    9. Major depressive disorder, single episode, in partial remission  Stable on medications, followed by PCP    10. Hyponatremia  Stable, followed by PCP    11. CKD (chronic kidney disease), stage III  Stable, followed by PCP    12.Tortuous Aorta  Noted on imaging 3/8/2016 Xray    13. Encounter for imaging to assess osteopenia  - DXA Bone Density Spine And Hip; Future    Pt declined Vaccines (TDAP and Zoster), wishes to discuss with PCP  Pt states she had Pneumonia vaccine at last  Hospital Admission, declined today.    Return in about 1 year (around 10/18/2018).      Anju Davis NP

## 2017-10-18 NOTE — PATIENT INSTRUCTIONS
Counseling and Referral of Other Preventative  (Italic type indicates deductible and co-insurance are waived)    Patient Name: Chantell Herrera  Today's Date: 10/18/2017      SERVICE LIMITATIONS RECOMMENDATION    Vaccines    · Pneumococcal (once after 65)    · Influenza (annually)    · Hepatitis B (if medium/high risk)    · Prevnar 13      Hepatitis B medium/high risk factors:       - End-stage renal disease       - Hemophiliacs who received Factor VII or         IX concentrates       - Clients of institutions for the mentally             retarded       - Persons who live in the same house as          a HepB carrier       - Homosexual men       - Illicit injectable drug abusers     Pneumococcal: Done, no repeat necessary     Influenza: Done, repeat in one year     Hepatitis B: Done, no repeat necessary     Prevnar 13: Done, no repeat necessary    Mammogram (biennial age 50-74)  Annually (age 40 or over)  Recommended to patient, declined    Pap (up to age 70 and after 70 if unknown history or abnormal study last 10 years)    Recommended to patient, declined     The USPSTF recommends against screening for cervical cancer in women older than age 65 years who have had adequate prior screening and are not otherwise at high risk for cervical cancer.      Colorectal cancer screening (to age 75)    · Fecal occult blood test (annual)  · Flexible sigmoidoscopy (5y)  · Screening colonoscopy (10y)  · Barium enema   Recommended to patient, declined    Diabetes self-management training (no USPSTF recommendations)  Requires referral by treating physician for patient with diabetes or renal disease. 10 hours of initial DSMT sessions of no less than 30 minutes each in a continuous 12-month period. 2 hours of follow-up DSMT in subsequent years.  N/A    Bone mass measurements (age 65 & older, biennial)  Requires diagnosis related to osteoporosis or estrogen deficiency. Biennial benefit unless patient has history of long-term  glucocorticoid  Last done 2016, recommend to repeat every 3  years    Glaucoma screening (no USPSTF recommendation)  Diabetes mellitus, family history   , age 50 or over    American, age 65 or over  Recommend follow up with eye care professional regularly    Medical nutrition therapy for diabetes or renal disease (no recommended schedule)  Requires referral by treating physician for patient with diabetes or renal disease or kidney transplant within the past 3 years.  Can be provided in same year as diabetes self-management training (DSMT), and CMS recommends medical nutrition therapy take place after DSMT. Up to 3 hours for initial year and 2 hours in subsequent years.  N/A    Cardiovascular screening blood tests (every 5 years)  · Fasting lipid panel  Order as a panel if possible  Done this year, repeat every year    Diabetes screening tests (at least every 3 years, Medicare covers annually or at 6-month intervals for prediabetic patients)  · Fasting blood sugar (FBS) or glucose tolerance test (GTT)  Patient must be diagnosed with one of the following:       - Hypertension       - Dyslipidemia       - Obesity (BMI 30kg/m2)       - Previous elevated impaired FBS or GTT       ... or any two of the following:       - Overweight (BMI 25 but <30)       - Family history of diabetes       - Age 65 or older       - History of gestational diabetes or birth of baby weighing more than 9 pounds  N/A    HIV screening (annually for increased risk patients)  · HIV-1 and HIV-2 by EIA, or DOYLE, rapid antibody test or oral mucosa transudate  Patients must be at increased risk for HIV infection per USPSTF guidelines or pregnant. Tests covered annually for patient at increased risk or as requested by the patient. Pregnant patients may receive up to 3 tests during pregnancy.  Risks discussed, screening is not recommended    Smoking cessation counseling (up to 8 sessions per year)  Patients must be asymptomatic  of tobacco-related conditions to receive as a preventative service.  Non-smoker    Subsequent annual wellness visit  At least 12 months since last AWV  Return in one year     The following information is provided to all patients.  This information is to help you find resources for any of the problems found today that may be affecting your health:                Living healthy guide: www.[facility].[place].[facility]      Understanding Diabetes: www.diabetes.org      Eating healthy: www.cdc.gov/healthyweight      CDC home safety checklist: www.cdc.gov/steadi/patient.html      Agency on Aging: www.goea.[place].[facility]      Alcoholics anonymous (AA): www.aa.org      Physical Activity: www.maru.nih.gov/nk1xkjp      Tobacco use: www.quitwithusla.org

## 2017-11-30 ENCOUNTER — HOSPITAL ENCOUNTER (EMERGENCY)
Facility: HOSPITAL | Age: 82
Discharge: HOME | End: 2017-11-30
Attending: SURGERY
Payer: MEDICARE

## 2017-11-30 VITALS
SYSTOLIC BLOOD PRESSURE: 144 MMHG | HEART RATE: 78 BPM | RESPIRATION RATE: 18 BRPM | DIASTOLIC BLOOD PRESSURE: 80 MMHG | TEMPERATURE: 98 F | WEIGHT: 140 LBS | OXYGEN SATURATION: 93 % | BODY MASS INDEX: 25.93 KG/M2

## 2017-11-30 DIAGNOSIS — J44.0 COPD WITH ACUTE BRONCHITIS: Primary | ICD-10-CM

## 2017-11-30 DIAGNOSIS — R06.02 SOB (SHORTNESS OF BREATH): ICD-10-CM

## 2017-11-30 DIAGNOSIS — J20.9 COPD WITH ACUTE BRONCHITIS: Primary | ICD-10-CM

## 2017-11-30 LAB
ALBUMIN SERPL BCP-MCNC: 4 G/DL
ALP SERPL-CCNC: 56 U/L
ALT SERPL W/O P-5'-P-CCNC: 11 U/L
ANION GAP SERPL CALC-SCNC: 8 MMOL/L
APTT BLDCRRT: 25 SEC
AST SERPL-CCNC: 12 U/L
BASOPHILS # BLD AUTO: 0.04 K/UL
BASOPHILS NFR BLD: 0.4 %
BILIRUB SERPL-MCNC: 0.7 MG/DL
BNP SERPL-MCNC: 103 PG/ML
BUN SERPL-MCNC: 14 MG/DL
CALCIUM SERPL-MCNC: 9 MG/DL
CHLORIDE SERPL-SCNC: 97 MMOL/L
CK MB SERPL-MCNC: 1.9 NG/ML
CK MB SERPL-RTO: 5.4 %
CK SERPL-CCNC: 35 U/L
CK SERPL-CCNC: 35 U/L
CO2 SERPL-SCNC: 31 MMOL/L
CREAT SERPL-MCNC: 1 MG/DL
D DIMER PPP IA.FEU-MCNC: 0.36 MG/L FEU
DIFFERENTIAL METHOD: ABNORMAL
EOSINOPHIL # BLD AUTO: 0.3 K/UL
EOSINOPHIL NFR BLD: 2.8 %
ERYTHROCYTE [DISTWIDTH] IN BLOOD BY AUTOMATED COUNT: 12.9 %
EST. GFR  (AFRICAN AMERICAN): >60 ML/MIN/1.73 M^2
EST. GFR  (NON AFRICAN AMERICAN): 53 ML/MIN/1.73 M^2
GLUCOSE SERPL-MCNC: 172 MG/DL
HCT VFR BLD AUTO: 36.5 %
HGB BLD-MCNC: 12.3 G/DL
INR PPP: 1.1
LYMPHOCYTES # BLD AUTO: 1.1 K/UL
LYMPHOCYTES NFR BLD: 10.2 %
MCH RBC QN AUTO: 35 PG
MCHC RBC AUTO-ENTMCNC: 33.7 G/DL
MCV RBC AUTO: 104 FL
MONOCYTES # BLD AUTO: 1.3 K/UL
MONOCYTES NFR BLD: 12.2 %
NEUTROPHILS # BLD AUTO: 7.9 K/UL
NEUTROPHILS NFR BLD: 74.4 %
PLATELET # BLD AUTO: 222 K/UL
PMV BLD AUTO: 11.5 FL
POTASSIUM SERPL-SCNC: 4 MMOL/L
PROT SERPL-MCNC: 6.4 G/DL
PROTHROMBIN TIME: 11.3 SEC
RBC # BLD AUTO: 3.51 M/UL
SODIUM SERPL-SCNC: 136 MMOL/L
TROPONIN I SERPL DL<=0.01 NG/ML-MCNC: 0.02 NG/ML
WBC # BLD AUTO: 10.64 K/UL

## 2017-11-30 PROCEDURE — 99285 EMERGENCY DEPT VISIT HI MDM: CPT | Mod: 25

## 2017-11-30 PROCEDURE — 25000003 PHARM REV CODE 250: Performed by: SURGERY

## 2017-11-30 PROCEDURE — 94640 AIRWAY INHALATION TREATMENT: CPT

## 2017-11-30 PROCEDURE — 82553 CREATINE MB FRACTION: CPT

## 2017-11-30 PROCEDURE — 93005 ELECTROCARDIOGRAM TRACING: CPT

## 2017-11-30 PROCEDURE — 63600175 PHARM REV CODE 636 W HCPCS: Performed by: SURGERY

## 2017-11-30 PROCEDURE — 80053 COMPREHEN METABOLIC PANEL: CPT

## 2017-11-30 PROCEDURE — 36415 COLL VENOUS BLD VENIPUNCTURE: CPT

## 2017-11-30 PROCEDURE — 96374 THER/PROPH/DIAG INJ IV PUSH: CPT

## 2017-11-30 PROCEDURE — 25000242 PHARM REV CODE 250 ALT 637 W/ HCPCS: Performed by: SURGERY

## 2017-11-30 PROCEDURE — 85610 PROTHROMBIN TIME: CPT

## 2017-11-30 PROCEDURE — 84484 ASSAY OF TROPONIN QUANT: CPT

## 2017-11-30 PROCEDURE — 85379 FIBRIN DEGRADATION QUANT: CPT

## 2017-11-30 PROCEDURE — 96376 TX/PRO/DX INJ SAME DRUG ADON: CPT

## 2017-11-30 PROCEDURE — 27000221 HC OXYGEN, UP TO 24 HOURS

## 2017-11-30 PROCEDURE — 85730 THROMBOPLASTIN TIME PARTIAL: CPT

## 2017-11-30 PROCEDURE — 83880 ASSAY OF NATRIURETIC PEPTIDE: CPT

## 2017-11-30 PROCEDURE — 93010 ELECTROCARDIOGRAM REPORT: CPT | Mod: ,,, | Performed by: INTERNAL MEDICINE

## 2017-11-30 PROCEDURE — 85025 COMPLETE CBC W/AUTO DIFF WBC: CPT

## 2017-11-30 RX ORDER — IPRATROPIUM BROMIDE AND ALBUTEROL SULFATE 2.5; .5 MG/3ML; MG/3ML
3 SOLUTION RESPIRATORY (INHALATION)
Status: COMPLETED | OUTPATIENT
Start: 2017-11-30 | End: 2017-11-30

## 2017-11-30 RX ORDER — METHYLPREDNISOLONE SOD SUCC 125 MG
125 VIAL (EA) INJECTION
Status: COMPLETED | OUTPATIENT
Start: 2017-11-30 | End: 2017-11-30

## 2017-11-30 RX ORDER — LEVOFLOXACIN 500 MG/1
500 TABLET, FILM COATED ORAL DAILY
Qty: 7 TABLET | Refills: 0 | Status: SHIPPED | OUTPATIENT
Start: 2017-11-30 | End: 2017-12-07

## 2017-11-30 RX ORDER — NAPROXEN SODIUM 220 MG/1
81 TABLET, FILM COATED ORAL
Status: COMPLETED | OUTPATIENT
Start: 2017-11-30 | End: 2017-11-30

## 2017-11-30 RX ORDER — PROMETHAZINE HYDROCHLORIDE AND DEXTROMETHORPHAN HYDROBROMIDE 6.25; 15 MG/5ML; MG/5ML
5 SYRUP ORAL EVERY 6 HOURS PRN
Qty: 118 ML | Refills: 0 | Status: SHIPPED | OUTPATIENT
Start: 2017-11-30 | End: 2017-12-10

## 2017-11-30 RX ADMIN — METHYLPREDNISOLONE SODIUM SUCCINATE 125 MG: 125 INJECTION, POWDER, FOR SOLUTION INTRAMUSCULAR; INTRAVENOUS at 11:11

## 2017-11-30 RX ADMIN — LIDOCAINE HYDROCHLORIDE 50 ML: 20 SOLUTION ORAL; TOPICAL at 12:11

## 2017-11-30 RX ADMIN — ASPIRIN 81 MG 81 MG: 81 TABLET ORAL at 11:11

## 2017-11-30 RX ADMIN — METHYLPREDNISOLONE SODIUM SUCCINATE 125 MG: 125 INJECTION, POWDER, FOR SOLUTION INTRAMUSCULAR; INTRAVENOUS at 01:11

## 2017-11-30 RX ADMIN — IPRATROPIUM BROMIDE AND ALBUTEROL SULFATE 3 ML: .5; 3 SOLUTION RESPIRATORY (INHALATION) at 11:11

## 2017-11-30 NOTE — ED PROVIDER NOTES
[facility]                                     November 30, 2017                   Chief Complaint  82 y.o. female with COPD    History of Present Illness  Chantell Herrera presents to the emergency room with shortness of breath today  Patient has a long history of COPD and is on home oxygen on a daily basis now  Patient states she typically runs 92% on oxygen, on daily oral steroids at home  Patient felt more short of breath this morning, occasional cough symptoms PTA  Patient on exam has rhonchi with active wheezing, better with steroids in the ER  No signs of distress on presentation, symptoms dissipated after IV steroids here    The history is provided by the patient     Past Medical History   -- Arthritis     -- Asthma     -- Chronic bronchitis     -- COPD (chronic obstructive pulmonary disease)     -- SANTIAGO (dyspnea on exertion)     -- Emphysema of lung     -- Fatigue     -- Hyperlipidemia     -- Hypertension     -- Trouble in sleeping        Surgical HX: Appendectomy, high, JOHNNY, tonsillectomy  No Known Allergies     Review of Systems and Physical Exam     Review of Systems  -- Constitution - no fever, denies fatigue, no weakness, no chills  -- Eyes - no tearing or redness, no visual disturbance  -- Ear, Nose - no tinnitus or earache, no nasal congestion or discharge  -- Mouth,Throat - no sore throat, no toothache, normal voice, normal swallowing  -- Respiratory - cough and congestion, no shortness of breath, no SANTIAGO  -- Cardiovascular - denies chest pain, no palpitations, denies claudication  -- Gastrointestinal - denies abdominal pain, nausea, vomiting, or diarrhea  -- Musculoskeletal - denies back pain, negative for myalgias and arthralgias   -- Neurological - no headache, denies weakness or seizure; no LOC  -- Skin - denies pallor, rash, or changes in skin. no hives or welts noted    Vital Signs  -- Her oral temperature is 98 °F (36.7 °C).   -- Her blood pressure is 144/80 and her pulse  is 78.   -- Her respiration is 18 and oxygen saturation is 93%     Physical Exam  -- Nursing note and vitals reviewed  -- Head: Atraumatic. Normocephalic. No obvious abnormality  -- Eyes: Pupils are equal and reactive to light. Normal conjunctiva and lids  -- Nose: Nose normal in appearance, nares grossly normal. No discharge  -- Throat: Mucous membranes moist, pharynx normal, normal tonsils. No lesions   -- Ears: External ears and TM normal bilaterally. Normal hearing and no drainage  -- Neck: Normal range of motion. Neck supple. No masses, trachea midline  -- Cardiac: Normal rate, regular rhythm and normal heart sounds  -- Pulmonary: faint rhonchi at the bilateral bases with active wheezing   -- Abdominal: Soft, no tenderness. Normal bowel sounds. Normal liver edge  -- Musculoskeletal: Normal range of motion, no effusions. Joints stable   -- Neurological: No focal deficits. Showed good interaction with staff  -- Vascular: Posterior tibial, dorsalis pedis and radial pulses 2+ bilaterally      Emergency Room Course     Labs  --    -- K 4.0   -- CL 97   -- CO2 31 (H)   -- BUN 14   -- CREATININE 1.0   --  (H)   -- ALKPHOS 56   -- AST 12   -- ALT 11   -- BILITOT 0.7   -- ALBUMIN 4.0   -- PROT 6.4   -- WBC 10.64   -- HGB 12.3   -- HCT 36.5 (L)   --    -- CPK 35   -- CPK 35   -- CPKMB 1.9   -- TROPONINI 0.020   -- INR 1.1   --  (H)   -- DDIMER 0.36     EKG  -- The EKG findings today were without concerning findings from baseline\    Radiology  -- Chest x-ray showed no infiltrate and showed no acute pathology    Medications Given  -- aspirin chewable tablet 81 mg (81 mg Oral Given 11/30/17 1132)   -- albuterol-ipratropium 2.5mg-0.5mg/3mL nebulizer solution 3 mL (3 mLs Nebulization Given 11/30/17 1121)   -- methylPREDNISolone sodium succinate injection 125 mg (125 mg Intravenous Given 11/30/17 1132)   -- GI cocktail (mylanta 30 mL, lidocaine 2 % viscous 10 mL, dicyclomine 10 mL) 50 mL (50 mLs  Oral Given 11/30/17 1252)   -- methylPREDNISolone sodium succinate injection 125 mg (125 mg Intravenous Given 11/30/17 1340)     Diagnosis  -- The primary encounter diagnosis was COPD with acute bronchitis.   -- A diagnosis of SOB (shortness of breath) was also pertinent to this visit.    Disposition and Plan  -- Disposition: home  -- Condition: stable  -- Follow-up: Patient to follow up with Anson Leiva MD in 1-2 days.  -- I advised the patient that we have found no life threatening condition today  -- At this time, I believe the patient is clinically stable for discharge.   -- The patient acknowledges that close follow up with a MD is required   -- Patient agrees to comply with all instruction and direction given in the ER    This note is dictated on Dragon Natural Speaking word recognition program.  There are word recognition mistakes that are occasionally missed on review.           Festus Maurer MD  11/30/17 9764

## 2017-12-04 ENCOUNTER — OFFICE VISIT (OUTPATIENT)
Dept: FAMILY MEDICINE | Facility: CLINIC | Age: 82
End: 2017-12-04
Payer: MEDICARE

## 2017-12-04 VITALS
HEART RATE: 88 BPM | SYSTOLIC BLOOD PRESSURE: 150 MMHG | BODY MASS INDEX: 26.65 KG/M2 | HEIGHT: 61 IN | WEIGHT: 141.13 LBS | DIASTOLIC BLOOD PRESSURE: 90 MMHG

## 2017-12-04 DIAGNOSIS — G25.81 RLS (RESTLESS LEGS SYNDROME): ICD-10-CM

## 2017-12-04 DIAGNOSIS — Z99.81 OXYGEN DEPENDENT: ICD-10-CM

## 2017-12-04 DIAGNOSIS — E55.9 VITAMIN D DEFICIENCY DISEASE: ICD-10-CM

## 2017-12-04 DIAGNOSIS — E78.5 HYPERLIPIDEMIA, UNSPECIFIED HYPERLIPIDEMIA TYPE: ICD-10-CM

## 2017-12-04 DIAGNOSIS — J96.11 CHRONIC RESPIRATORY FAILURE WITH HYPOXIA: ICD-10-CM

## 2017-12-04 DIAGNOSIS — G25.0 ESSENTIAL TREMOR: ICD-10-CM

## 2017-12-04 DIAGNOSIS — J44.1 COPD EXACERBATION: Primary | ICD-10-CM

## 2017-12-04 DIAGNOSIS — N18.30 CKD (CHRONIC KIDNEY DISEASE), STAGE III: ICD-10-CM

## 2017-12-04 DIAGNOSIS — R79.9 ABNORMAL FINDING OF BLOOD CHEMISTRY: ICD-10-CM

## 2017-12-04 DIAGNOSIS — Z79.52 LONG TERM (CURRENT) USE OF SYSTEMIC STEROIDS: ICD-10-CM

## 2017-12-04 DIAGNOSIS — I10 ESSENTIAL HYPERTENSION: ICD-10-CM

## 2017-12-04 LAB
25(OH)D3+25(OH)D2 SERPL-MCNC: 31 NG/ML
ESTIMATED AVG GLUCOSE: 128 MG/DL
HBA1C MFR BLD HPLC: 6.1 %

## 2017-12-04 PROCEDURE — 36415 COLL VENOUS BLD VENIPUNCTURE: CPT | Mod: S$GLB,,, | Performed by: FAMILY MEDICINE

## 2017-12-04 PROCEDURE — 99214 OFFICE O/P EST MOD 30 MIN: CPT | Mod: S$GLB,,, | Performed by: FAMILY MEDICINE

## 2017-12-04 PROCEDURE — 99999 PR PBB SHADOW E&M-EST. PATIENT-LVL II: CPT | Mod: PBBFAC,,, | Performed by: FAMILY MEDICINE

## 2017-12-04 PROCEDURE — 82306 VITAMIN D 25 HYDROXY: CPT

## 2017-12-04 PROCEDURE — 83036 HEMOGLOBIN GLYCOSYLATED A1C: CPT

## 2017-12-04 PROCEDURE — 99499 UNLISTED E&M SERVICE: CPT | Mod: S$GLB,,, | Performed by: FAMILY MEDICINE

## 2017-12-04 NOTE — PROGRESS NOTES
Subjective:       Patient ID: Chantell Herrera is a 82 y.o. female.    Chief Complaint: Follow-up (ER visit 11/30/17)    Pt is a 82 y.o. female who presents for evaluation and management of   Encounter Diagnoses   Name Primary?    COPD exacerbation Yes    Essential hypertension     Hyperlipidemia, unspecified hyperlipidemia type     CKD (chronic kidney disease), stage III     Essential tremor     RLS (restless legs syndrome)     Chronic respiratory failure with hypoxia     Oxygen dependent     Vitamin D deficiency disease     Long term (current) use of systemic steroids     Abnormal finding of blood chemistry     .seen in ED on 30th  rx prednisone 40mg daily(up form her usual 20mg) and levaquin     Doing well on current meds. Denies any side effects. Prevention is up to date.  Review of Systems   Constitutional: Negative for chills and fever.   Respiratory: Positive for cough and shortness of breath.         First day she is feeling better from ED visit.   SOB improved. Can now walk to mailbox and back before having to stop for rest    Cardiovascular: Negative for chest pain and palpitations.   Gastrointestinal: Negative for abdominal pain, blood in stool, constipation and nausea.   Genitourinary: Negative for difficulty urinating.   Skin:        Bruising    Psychiatric/Behavioral: Negative for dysphoric mood, sleep disturbance and suicidal ideas. The patient is not nervous/anxious.        Objective:      Physical Exam   Constitutional: She is oriented to person, place, and time. She appears well-developed and well-nourished.   HENT:   Head: Normocephalic and atraumatic.   Right Ear: External ear normal.   Left Ear: External ear normal.   Nose: Nose normal.   Mouth/Throat: Oropharynx is clear and moist.   Eyes: EOM are normal. Pupils are equal, round, and reactive to light.   Neck: Normal range of motion. Neck supple. No JVD present. No tracheal deviation present. No thyromegaly present.   Cardiovascular:  Normal rate, normal heart sounds and intact distal pulses.    No murmur heard.  Pulmonary/Chest: Effort normal and breath sounds normal. No respiratory distress. She has no wheezes. She has no rales. She exhibits no tenderness.   Abdominal: Soft. Bowel sounds are normal. She exhibits no distension and no mass. There is no tenderness. There is no rebound and no guarding.   Musculoskeletal: Normal range of motion. She exhibits no edema or tenderness.   Lymphadenopathy:     She has no cervical adenopathy.   Neurological: She is alert and oriented to person, place, and time. She has normal reflexes. She displays normal reflexes. No cranial nerve deficit. She exhibits normal muscle tone. Coordination normal.   Skin: Skin is warm and dry. No rash noted. No erythema. No pallor.   Psychiatric: She has a normal mood and affect. Her behavior is normal. Judgment and thought content normal.       Assessment:       1. COPD exacerbation    2. Essential hypertension    3. Hyperlipidemia, unspecified hyperlipidemia type    4. CKD (chronic kidney disease), stage III    5. Essential tremor    6. RLS (restless legs syndrome)    7. Chronic respiratory failure with hypoxia    8. Oxygen dependent    9. Vitamin D deficiency disease    10. Long term (current) use of systemic steroids    11. Abnormal finding of blood chemistry         Plan:   Chantell was seen today for follow-up.    Diagnoses and all orders for this visit:    COPD exacerbation    Essential hypertension    Hyperlipidemia, unspecified hyperlipidemia type    CKD (chronic kidney disease), stage III    Essential tremor    RLS (restless legs syndrome)    Chronic respiratory failure with hypoxia    Oxygen dependent    Vitamin D deficiency disease  -     Vitamin D; Future    Long term (current) use of systemic steroids  -     Hemoglobin A1c; Future    Abnormal finding of blood chemistry   -     Hemoglobin A1c; Future    decrease prednisone to 30mg daily for a week then  Resume 20mg  daily   Continue same otherwise, see orders   RTC 6 months     No Follow-up on file.

## 2017-12-26 ENCOUNTER — HOSPITAL ENCOUNTER (EMERGENCY)
Facility: HOSPITAL | Age: 82
Discharge: HOME | End: 2017-12-26
Attending: INTERNAL MEDICINE
Payer: MEDICARE

## 2017-12-26 VITALS
SYSTOLIC BLOOD PRESSURE: 166 MMHG | RESPIRATION RATE: 14 BRPM | DIASTOLIC BLOOD PRESSURE: 83 MMHG | OXYGEN SATURATION: 93 % | HEART RATE: 101 BPM | TEMPERATURE: 97 F

## 2017-12-26 DIAGNOSIS — R06.02 SHORTNESS OF BREATH: Primary | ICD-10-CM

## 2017-12-26 DIAGNOSIS — J44.1 COPD EXACERBATION: ICD-10-CM

## 2017-12-26 LAB
ALBUMIN SERPL BCP-MCNC: 3.9 G/DL
ALP SERPL-CCNC: 56 U/L
ALT SERPL W/O P-5'-P-CCNC: 17 U/L
ANION GAP SERPL CALC-SCNC: 12 MMOL/L
AST SERPL-CCNC: 15 U/L
BASOPHILS # BLD AUTO: 0.02 K/UL
BASOPHILS NFR BLD: 0.2 %
BILIRUB SERPL-MCNC: 0.3 MG/DL
BUN SERPL-MCNC: 21 MG/DL
CALCIUM SERPL-MCNC: 8.7 MG/DL
CHLORIDE SERPL-SCNC: 99 MMOL/L
CO2 SERPL-SCNC: 28 MMOL/L
CREAT SERPL-MCNC: 0.9 MG/DL
DIFFERENTIAL METHOD: ABNORMAL
EOSINOPHIL # BLD AUTO: 0.2 K/UL
EOSINOPHIL NFR BLD: 1.9 %
ERYTHROCYTE [DISTWIDTH] IN BLOOD BY AUTOMATED COUNT: 13.7 %
EST. GFR  (AFRICAN AMERICAN): >60 ML/MIN/1.73 M^2
EST. GFR  (NON AFRICAN AMERICAN): >60 ML/MIN/1.73 M^2
GLUCOSE SERPL-MCNC: 126 MG/DL
HCT VFR BLD AUTO: 35.9 %
HGB BLD-MCNC: 12 G/DL
LYMPHOCYTES # BLD AUTO: 1 K/UL
LYMPHOCYTES NFR BLD: 8.8 %
MCH RBC QN AUTO: 35.3 PG
MCHC RBC AUTO-ENTMCNC: 33.4 G/DL
MCV RBC AUTO: 106 FL
MONOCYTES # BLD AUTO: 1.7 K/UL
MONOCYTES NFR BLD: 15.6 %
NEUTROPHILS # BLD AUTO: 8.2 K/UL
NEUTROPHILS NFR BLD: 73.5 %
PLATELET # BLD AUTO: 190 K/UL
PMV BLD AUTO: 11.8 FL
POTASSIUM SERPL-SCNC: 3.8 MMOL/L
PROT SERPL-MCNC: 6.3 G/DL
RBC # BLD AUTO: 3.4 M/UL
SODIUM SERPL-SCNC: 139 MMOL/L
TROPONIN I SERPL DL<=0.01 NG/ML-MCNC: <0.006 NG/ML
WBC # BLD AUTO: 11.14 K/UL

## 2017-12-26 PROCEDURE — 25000003 PHARM REV CODE 250: Performed by: INTERNAL MEDICINE

## 2017-12-26 PROCEDURE — 93005 ELECTROCARDIOGRAM TRACING: CPT

## 2017-12-26 PROCEDURE — 96375 TX/PRO/DX INJ NEW DRUG ADDON: CPT

## 2017-12-26 PROCEDURE — 27000221 HC OXYGEN, UP TO 24 HOURS

## 2017-12-26 PROCEDURE — 36415 COLL VENOUS BLD VENIPUNCTURE: CPT

## 2017-12-26 PROCEDURE — 99284 EMERGENCY DEPT VISIT MOD MDM: CPT | Mod: 25

## 2017-12-26 PROCEDURE — 85025 COMPLETE CBC W/AUTO DIFF WBC: CPT

## 2017-12-26 PROCEDURE — 94640 AIRWAY INHALATION TREATMENT: CPT

## 2017-12-26 PROCEDURE — 25000242 PHARM REV CODE 250 ALT 637 W/ HCPCS

## 2017-12-26 PROCEDURE — 93010 ELECTROCARDIOGRAM REPORT: CPT | Mod: ,,, | Performed by: INTERNAL MEDICINE

## 2017-12-26 PROCEDURE — 80053 COMPREHEN METABOLIC PANEL: CPT

## 2017-12-26 PROCEDURE — 63600175 PHARM REV CODE 636 W HCPCS: Performed by: INTERNAL MEDICINE

## 2017-12-26 PROCEDURE — 84484 ASSAY OF TROPONIN QUANT: CPT

## 2017-12-26 PROCEDURE — 96365 THER/PROPH/DIAG IV INF INIT: CPT

## 2017-12-26 RX ORDER — IPRATROPIUM BROMIDE AND ALBUTEROL SULFATE 2.5; .5 MG/3ML; MG/3ML
SOLUTION RESPIRATORY (INHALATION)
Status: COMPLETED
Start: 2017-12-26 | End: 2017-12-26

## 2017-12-26 RX ORDER — DOXYCYCLINE 100 MG/1
100 CAPSULE ORAL 2 TIMES DAILY
Qty: 20 CAPSULE | Refills: 0 | Status: SHIPPED | OUTPATIENT
Start: 2017-12-26 | End: 2018-01-05

## 2017-12-26 RX ORDER — CEFTRIAXONE 500 MG/1
500 INJECTION, POWDER, FOR SOLUTION INTRAMUSCULAR; INTRAVENOUS
Status: DISCONTINUED | OUTPATIENT
Start: 2017-12-26 | End: 2017-12-26

## 2017-12-26 RX ORDER — CODEINE PHOSPHATE AND GUAIFENESIN 10; 100 MG/5ML; MG/5ML
5 SOLUTION ORAL EVERY 6 HOURS PRN
Qty: 100 ML | Refills: 0 | Status: SHIPPED | OUTPATIENT
Start: 2017-12-26 | End: 2018-05-28 | Stop reason: SDUPTHER

## 2017-12-26 RX ORDER — IPRATROPIUM BROMIDE AND ALBUTEROL SULFATE 2.5; .5 MG/3ML; MG/3ML
3 SOLUTION RESPIRATORY (INHALATION) ONCE
Status: COMPLETED | OUTPATIENT
Start: 2017-12-26 | End: 2017-12-26

## 2017-12-26 RX ORDER — METHYLPREDNISOLONE SOD SUCC 125 MG
125 VIAL (EA) INJECTION
Status: COMPLETED | OUTPATIENT
Start: 2017-12-26 | End: 2017-12-26

## 2017-12-26 RX ADMIN — CEFTRIAXONE SODIUM 1 G: 1 INJECTION, POWDER, FOR SOLUTION INTRAMUSCULAR; INTRAVENOUS at 03:12

## 2017-12-26 RX ADMIN — METHYLPREDNISOLONE SODIUM SUCCINATE 125 MG: 125 INJECTION, POWDER, FOR SOLUTION INTRAMUSCULAR; INTRAVENOUS at 02:12

## 2017-12-26 RX ADMIN — IPRATROPIUM BROMIDE AND ALBUTEROL SULFATE 3 ML: .5; 3 SOLUTION RESPIRATORY (INHALATION) at 01:12

## 2017-12-26 RX ADMIN — IPRATROPIUM BROMIDE AND ALBUTEROL SULFATE 3 ML: 2.5; .5 SOLUTION RESPIRATORY (INHALATION) at 01:12

## 2017-12-26 NOTE — ED NOTES
Patient placed on continuous cardiac monitor, automatic blood pressure cuff and continuous pulse oximeter. #2

## 2017-12-26 NOTE — ED PROVIDER NOTES
Encounter Date: 12/26/2017       History     Chief Complaint   Patient presents with    Shortness of Breath     The history is provided by the patient.   Cough   This is a recurrent problem. The current episode started several days ago. The problem occurs constantly. The cough is productive of sputum. There has been no fever. Associated symptoms include shortness of breath and wheezing.     Review of patient's allergies indicates:  No Known Allergies  Past Medical History:   Diagnosis Date    Anxiety     Arthritis     Asthma     Chronic bronchitis     COPD (chronic obstructive pulmonary disease)     Depression     SANTIAGO (dyspnea on exertion)     Emphysema of lung     Fatigue     Hyperlipidemia     Hypertension     Trouble in sleeping      Past Surgical History:   Procedure Laterality Date    APPENDECTOMY      EYE SURGERY      HYSTERECTOMY      TONSILLECTOMY       Family History   Problem Relation Age of Onset    Heart disease Mother     Hypertension Mother     Hypertension Father     Diabetes Father     Cancer Sister     COPD Brother     Hypertension Brother     No Known Problems Daughter     Kidney disease Son     COPD Daughter      Social History   Substance Use Topics    Smoking status: Former Smoker     Quit date: 8/3/2000    Smokeless tobacco: Never Used    Alcohol use No     Review of Systems   Respiratory: Positive for cough, shortness of breath and wheezing.    All other systems reviewed and are negative.      Physical Exam     Initial Vitals   BP Pulse Resp Temp SpO2   -- 12/26/17 0123 12/26/17 0123 12/26/17 0113 12/26/17 0123    (!) 130 (!) 25 96.6 °F (35.9 °C) (!) 93 %      MAP       --                Physical Exam    Nursing note and vitals reviewed.  Constitutional: She appears well-developed and well-nourished.   HENT:   Head: Normocephalic.   Nose: Nose normal.   Mouth/Throat: Oropharynx is clear and moist.   Eyes: Conjunctivae and EOM are normal. Pupils are equal, round,  and reactive to light.   Neck: Normal range of motion. Neck supple.   Cardiovascular: Normal rate, regular rhythm, normal heart sounds and intact distal pulses. Exam reveals no gallop and no friction rub.    No murmur heard.  Pulmonary/Chest: She has wheezes. She has no rhonchi. She has no rales. She exhibits no tenderness.   Abdominal: Soft. Bowel sounds are normal. She exhibits no distension.   Musculoskeletal: Normal range of motion.   Neurological: She is alert and oriented to person, place, and time.   Skin: Skin is warm and dry. Capillary refill takes less than 2 seconds.         ED Course   Procedures  Labs Reviewed   CBC W/ AUTO DIFFERENTIAL - Abnormal; Notable for the following:        Result Value    RBC 3.40 (*)     Hematocrit 35.9 (*)      (*)     MCH 35.3 (*)     Gran # 8.2 (*)     Mono # 1.7 (*)     Gran% 73.5 (*)     Lymph% 8.8 (*)     Mono% 15.6 (*)     All other components within normal limits   COMPREHENSIVE METABOLIC PANEL - Abnormal; Notable for the following:     Glucose 126 (*)     All other components within normal limits   TROPONIN I          X-Rays:   Independently Interpreted Readings:   Other Readings:  CXR- degenerative changes of the aorta and spine; increased interstitial markings noted.    Medical Decision Making:   Initial Assessment:   83 yo with wheezing and dyspnea  Differential Diagnosis:   Bronchitis  Pneumonia  Viral URI  Clinical Tests:   Lab Tests: Ordered and Reviewed  Radiological Study: Ordered and Reviewed  Medical Tests: Ordered and Reviewed  ED Management:  The patient was given steroids, nebulizer, and Rocephin in the ED. She improved greatly and was discharged in stable condition.                   ED Course      Clinical Impression:   The primary encounter diagnosis was Shortness of breath. A diagnosis of COPD exacerbation was also pertinent to this visit.    Disposition:   Disposition: Discharged  Condition: Stable                        Twila Melton  MD  01/06/18 0228

## 2018-01-14 RX ORDER — ROPINIROLE 0.5 MG/1
0.5 TABLET, FILM COATED ORAL NIGHTLY
Qty: 90 TABLET | Refills: 1 | Status: SHIPPED | OUTPATIENT
Start: 2018-01-14 | End: 2018-03-23 | Stop reason: SDUPTHER

## 2018-02-27 DIAGNOSIS — E78.5 DYSLIPIDEMIA: ICD-10-CM

## 2018-02-28 RX ORDER — PRAVASTATIN SODIUM 40 MG/1
TABLET ORAL
Qty: 90 TABLET | Refills: 1 | Status: SHIPPED | OUTPATIENT
Start: 2018-02-28 | End: 2018-10-15 | Stop reason: SDUPTHER

## 2018-02-28 RX ORDER — CITALOPRAM 40 MG/1
TABLET, FILM COATED ORAL
Qty: 90 TABLET | Refills: 1 | Status: SHIPPED | OUTPATIENT
Start: 2018-02-28 | End: 2019-03-26 | Stop reason: SDUPTHER

## 2018-03-23 ENCOUNTER — TELEPHONE (OUTPATIENT)
Dept: FAMILY MEDICINE | Facility: CLINIC | Age: 83
End: 2018-03-23

## 2018-03-23 RX ORDER — ROPINIROLE 1 MG/1
1 TABLET, FILM COATED ORAL NIGHTLY
Qty: 30 TABLET | Refills: 5 | Status: SHIPPED | OUTPATIENT
Start: 2018-03-23 | End: 2018-08-13 | Stop reason: SDUPTHER

## 2018-03-23 RX ORDER — TRAZODONE HYDROCHLORIDE 50 MG/1
TABLET ORAL
Qty: 90 TABLET | Refills: 1 | Status: CANCELLED | OUTPATIENT
Start: 2018-03-23

## 2018-03-23 RX ORDER — TRAZODONE HYDROCHLORIDE 50 MG/1
50 TABLET ORAL NIGHTLY
Qty: 90 TABLET | Refills: 1 | Status: SHIPPED | OUTPATIENT
Start: 2018-03-23 | End: 2018-09-27 | Stop reason: SDUPTHER

## 2018-03-23 NOTE — TELEPHONE ENCOUNTER
----- Message from Kari Manuel MA sent at 3/23/2018  2:17 PM CDT -----  Contact: Self  Chantell Herrera  MRN: [id number]  : 1935  PCP: Anson Leiva  Home Phone      [phone]  Work Phone      Not on file.  Mobile          [phone]      MESSAGE: Needs refill on Trazodone 50mg and she wants her Ropinirole increased to 1mg. The 0.5mg is no longer working.  Pharmacy: [facility]

## 2018-04-17 ENCOUNTER — HOSPITAL ENCOUNTER (EMERGENCY)
Facility: HOSPITAL | Age: 83
Discharge: HOME | End: 2018-04-17
Attending: EMERGENCY MEDICINE
Payer: MEDICARE

## 2018-04-17 VITALS
RESPIRATION RATE: 21 BRPM | TEMPERATURE: 97 F | OXYGEN SATURATION: 97 % | SYSTOLIC BLOOD PRESSURE: 155 MMHG | BODY MASS INDEX: 26.45 KG/M2 | WEIGHT: 140 LBS | HEART RATE: 86 BPM | DIASTOLIC BLOOD PRESSURE: 87 MMHG

## 2018-04-17 DIAGNOSIS — J44.1 ASTHMA EXACERBATION IN COPD: Primary | ICD-10-CM

## 2018-04-17 DIAGNOSIS — J45.901 ASTHMA EXACERBATION IN COPD: Primary | ICD-10-CM

## 2018-04-17 PROCEDURE — 96374 THER/PROPH/DIAG INJ IV PUSH: CPT

## 2018-04-17 PROCEDURE — 25000242 PHARM REV CODE 250 ALT 637 W/ HCPCS: Performed by: EMERGENCY MEDICINE

## 2018-04-17 PROCEDURE — 27000221 HC OXYGEN, UP TO 24 HOURS

## 2018-04-17 PROCEDURE — 25000003 PHARM REV CODE 250: Performed by: EMERGENCY MEDICINE

## 2018-04-17 PROCEDURE — 94760 N-INVAS EAR/PLS OXIMETRY 1: CPT

## 2018-04-17 PROCEDURE — 94640 AIRWAY INHALATION TREATMENT: CPT

## 2018-04-17 PROCEDURE — 63600175 PHARM REV CODE 636 W HCPCS: Performed by: EMERGENCY MEDICINE

## 2018-04-17 PROCEDURE — 94644 CONT INHLJ TX 1ST HOUR: CPT

## 2018-04-17 PROCEDURE — 99284 EMERGENCY DEPT VISIT MOD MDM: CPT | Mod: 25

## 2018-04-17 PROCEDURE — 96361 HYDRATE IV INFUSION ADD-ON: CPT

## 2018-04-17 RX ORDER — METHYLPREDNISOLONE SOD SUCC 125 MG
125 VIAL (EA) INJECTION
Status: COMPLETED | OUTPATIENT
Start: 2018-04-17 | End: 2018-04-17

## 2018-04-17 RX ORDER — ALBUTEROL SULFATE 2.5 MG/.5ML
10 SOLUTION RESPIRATORY (INHALATION)
Status: COMPLETED | OUTPATIENT
Start: 2018-04-17 | End: 2018-04-17

## 2018-04-17 RX ORDER — IPRATROPIUM BROMIDE 0.5 MG/2.5ML
0.5 SOLUTION RESPIRATORY (INHALATION)
Status: DISCONTINUED | OUTPATIENT
Start: 2018-04-17 | End: 2018-04-17 | Stop reason: CLARIF

## 2018-04-17 RX ORDER — IPRATROPIUM BROMIDE 0.5 MG/2.5ML
0.5 SOLUTION RESPIRATORY (INHALATION) ONCE
Status: DISCONTINUED | OUTPATIENT
Start: 2018-04-17 | End: 2018-04-17 | Stop reason: CLARIF

## 2018-04-17 RX ADMIN — SODIUM CHLORIDE 1000 ML: 0.9 INJECTION, SOLUTION INTRAVENOUS at 06:04

## 2018-04-17 RX ADMIN — ALBUTEROL SULFATE 10 MG: 2.5 SOLUTION RESPIRATORY (INHALATION) at 06:04

## 2018-04-17 RX ADMIN — METHYLPREDNISOLONE SODIUM SUCCINATE 125 MG: 125 INJECTION, POWDER, FOR SOLUTION INTRAMUSCULAR; INTRAVENOUS at 06:04

## 2018-04-17 NOTE — ED NOTES
Received verbal report from STACIA Berger.  Pt in ED room ED 02/ED 02A lying in stretcher, HOB 30 degrees. Stretcher is in low, locked position, side rails up x2.  Pt previously connected to continuous cardiac monitor, continuous pulse ox, and automatic BP cuff; alarms set and turned on for monitor, pulse ox and IV pump. The patient is awake, alert and cooperative with a calm affect, patient is aware of environment. Airway is open and patent, respirations are spontaneous, normal respiratory effort and rate noted, skin warm and dry, full ROM in all extremities, appearance: NAD noted, resting comfortably.Call bell within reach of pt, pt instructed on use, pt verbalizes understanding of call bell use. Hourly rounding explained and white board updated.  Plan of care: family to bedside, observe and reassure, position of comfort, respirations even and unlabored, patient offers no complaints at this time, awaiting additional orders, will continue to monitor

## 2018-04-17 NOTE — ED TRIAGE NOTES
82 y.o. female presents to ER   Chief Complaint   Patient presents with    Shortness of Breath   History of COPD with increasing SOB over the last few weeks.  No acute distress noted.

## 2018-04-18 NOTE — DISCHARGE INSTRUCTIONS
Continue medications diet and activity as before.  If symptoms recur and are worse return immediately.  Otherwise follow-up with your doctor in 2 days

## 2018-04-18 NOTE — ED PROVIDER NOTES
Encounter Date: 4/17/2018       History     Chief Complaint   Patient presents with    Shortness of Breath     This 82-year-old white female with a history of asthma/COPD was complaining of increasing shortness of breath over the last several days.  She denies any chest pain.  She is on 24 hours home O2.          Review of patient's allergies indicates:  No Known Allergies  Past Medical History:   Diagnosis Date    Anxiety     Arthritis     Asthma     Chronic bronchitis     COPD (chronic obstructive pulmonary disease)     Depression     SANTIAGO (dyspnea on exertion)     Emphysema of lung     Fatigue     Hyperlipidemia     Hypertension     Trouble in sleeping      Past Surgical History:   Procedure Laterality Date    APPENDECTOMY      EYE SURGERY      HYSTERECTOMY      TONSILLECTOMY       Family History   Problem Relation Age of Onset    Heart disease Mother     Hypertension Mother     Hypertension Father     Diabetes Father     Cancer Sister     COPD Brother     Hypertension Brother     No Known Problems Daughter     Kidney disease Son     COPD Daughter      Social History   Substance Use Topics    Smoking status: Former Smoker     Quit date: 8/3/2000    Smokeless tobacco: Never Used    Alcohol use No     Review of Systems   Respiratory: Positive for shortness of breath and wheezing.    All other systems reviewed and are negative.      Physical Exam     Initial Vitals [04/17/18 1741]   BP Pulse Resp Temp SpO2   138/87 85 (!) 24 97.1 °F (36.2 °C) (!) 90 %      MAP       104         Physical Exam    Nursing note and vitals reviewed.  Constitutional: She appears well-developed and well-nourished.   HENT:   Mouth/Throat: Oropharynx is clear and moist.   Eyes: Conjunctivae are normal. Pupils are equal, round, and reactive to light.   Neck: Normal range of motion. Neck supple.   Cardiovascular: Normal heart sounds and intact distal pulses.   Pulmonary/Chest: She is in respiratory distress. She  has wheezes.   Abdominal: Soft. Bowel sounds are normal.   Musculoskeletal: Normal range of motion.   Neurological: She is alert and oriented to person, place, and time.   Skin: Skin is warm and dry.   Psychiatric: Her behavior is normal.         ED Course   Procedures  Labs Reviewed - No data to display                               Clinical Impression:   The encounter diagnosis was Asthma exacerbation in COPD.    Disposition:   Disposition: Discharged  Condition: Stable                        Noe Snowden MD  04/17/18 2017

## 2018-04-23 ENCOUNTER — OUTPATIENT CASE MANAGEMENT (OUTPATIENT)
Dept: ADMINISTRATIVE | Facility: OTHER | Age: 83
End: 2018-04-23

## 2018-04-23 NOTE — PROGRESS NOTES
Please note the following patient has been assigned to Cathryn Aquino RN in Outpatient Case Management for screening for COPD Disease Management.    Referral Diagnosis: COPD    Please contact [facility] at Ext. [phone] with any questions.    Thank you,    Yaneth Hamlin, [facility]  Outpatient Complex Care Mgmt  Ext [phone]

## 2018-04-26 ENCOUNTER — OUTPATIENT CASE MANAGEMENT (OUTPATIENT)
Dept: ADMINISTRATIVE | Facility: OTHER | Age: 83
End: 2018-04-26

## 2018-04-30 ENCOUNTER — OUTPATIENT CASE MANAGEMENT (OUTPATIENT)
Dept: ADMINISTRATIVE | Facility: OTHER | Age: 83
End: 2018-04-30

## 2018-04-30 NOTE — LETTER
April 30, 2018    Chantell Javier  [address] [place] [address]             [facility] [address] [place] [address] Dear Geoffrey Herrera,    Mrs. Herrera you were referred to [facility]. I have made several attempts to contact you by telephone without success. I will make another attempt before closing your case.      If you have any questions or concerns please call me at [phone].      Sincerely,        Cathryn Aquino, RN

## 2018-04-30 NOTE — PROGRESS NOTES
Summary: Call placed to [phone] with message left containing contact information.  Intervention: Will send a failure to contact letter.

## 2018-05-04 ENCOUNTER — OUTPATIENT CASE MANAGEMENT (OUTPATIENT)
Dept: ADMINISTRATIVE | Facility: OTHER | Age: 83
End: 2018-05-04

## 2018-05-04 NOTE — PATIENT INSTRUCTIONS
What is COPD?  COPD stands for chronic obstructive pulmonary disease. It means the airways in your lungs are blocked (obstructed). Because of this, it is hard to breathe. You may have trouble with daily activities because of shortness of breath. Over time the shortness of breath usually worsens making it more and more difficult to take care of yourself and take part in activities. Chronic bronchitis and emphysema are two common types of COPD.  What happens in chronic bronchitis?    The cells in the airways make more mucus than normal. The mucus builds up, narrowing the airways. This means less air travels into and out of the lungs. The lining of the airways may also become inflamed (swollen) and causes the airways to narrow even more.        What happens in emphysema?    The small airways are damaged and lose their stretchiness. The airways collapse when you exhale, causing air to get trapped in the air sacs. This means that less oxygen enters the blood vessels and less oxygen is delivered to all of the cells of your body. This makes it hard to breathe.     Damage to cilia    Cilia are small hairs that line and protect the airways. Smoking damages the cilia. Damaged cilia cant sweep mucus and particles away. Some of the cilia are destroyed. This damage worsens COPD.  How did I get COPD?  Most people get COPD from smoking. Cigarette smoke damages lungs, which can develop into COPD over many years.  How COPD affects you  COPD makes you work harder to breathe. Air may get trapped in the lungs, which prevents your lungs from filling completely with fresh oxygen-filled air when you inhale (breathe in). It's harder to take deep breaths especially when you are active and start breathing faster. Over time, your lungs may become enlarged filling the lung with air that does not transfer oxygen into the blood. These problems cause you to have shortness of breath (also called dyspnea). Wheezing (hoarse, whistling breathing),  chronic cough, and fatigue (feeling tired and worn out) are also common.   Date Last Reviewed: 5/1/2016  © 0158-1252 "[employer]. [address] [place] [address], [place] [address]. All rights reserved. This information is not intended as a substitute for professional medical care. Always follow your healthcare professional's instructions.        Chronic Lung Disease: Coping Tips for Caregivers    When someone you love has chronic lung disease, such as COPD, it can change both of your lives. As a caregiver, you may have to support your loved one in new ways. This may be true whether you are caring for your spouse, your partner, a family member, or a friend. Learning some ways to cope can help you and your loved one.  Coping with your emotions  Its normal to feel a range of emotions. You may feel sad, or afraid. At times, you may be angry, or frustrated. Its important to accept these feelings. They are normal. If you find yourself feeling stressed, seek help by reaching out to family members or friends. Also, speak with a healthcare provider. He or she can refer you to a counselor and other support services.  Taking care of yourself  Take time to manage your health and to refresh your mind and spirit. This gives you the energy to do your daily routine. Here are some basics that are important to your health:  · Get enough sleep. Aim for 8 hours a day. Keep naps short so you can sleep at night. Limit alcohol and caffeine. These can affect how well you sleep.  · Eat right. What you eat affects how you feel. Dont skip meals. Eat balanced meals with whole grains, fruits and vegetables, and low-fat meat and dairy products.  · Exercise. Try for at least 30 minutes of physical activity a day. Breaking up your activity into three 10-minute sessions can make it easier.  Making time for you  Your needs are also important. Give yourself permission to maintain a life of your own. Take breaks from caregiving to relax and  have fun. Here are some suggestions:  · Share a meal with a friend.  · Think about ways to relax. Take a walk, try yoga, deep breathing, or meditation.  · Create a space in your home where you can be alone when needed.  · Try to get away, even if it's just for a short time. Go to a friend's house, or take a drive or a long walk.  Accepting help  You may need help with caregiving. Knowing you can count on others can be a relief. Keep these tips in mind:  · Make a list of things other people can help with. Ask family and friends to handle tasks such as running errands, giving rides, or preparing meals.  · Make use of adult  and respite services, and home health aide programs. These provide temporary care for your loved one, when needed. Check the Internet or the phone book for organizations.  Support groups  Look for resources in your area:  · There may be a support group for people with chronic lung disease and their caregivers. This can help you feel that youre not alone. Youll learn coping strategies and get advice from others going through the same things you are.  · Congregational and harriet-based organizations may be a source of strength and support for you also. Reach out to a leader in your harriet community to discuss your spiritual needs.  · The American Lung Association has an online support community. Their website is www.lung.org. There is a link from the Lung Disease page.  Date Last Reviewed: 5/1/2016  © 2147-2549 [employer]. [address] [place] [address], [place] [address]. All rights reserved. This information is not intended as a substitute for professional medical care. Always follow your healthcare professional's instructions.        Chronic Lung Disease: Preventing Lung Infections  Chronic lung diseases include chronic obstructive pulmonary disease (COPD), which includes chronic bronchitis and emphysema. Other chronic lung diseases include pulmonary fibrosis, sarcoidosis, and other  conditions. When you have chronic lung diseases, it's very important to protect yourself from respiratory infections, like colds, the flu, and lung infections. Infections may cause your lung condition to worsen. Although you can't completely avoid them, there are things you can do to lessen the chance of infections.    Take precautions  Taking the following precautions can help you avoid illness:  · Remember to keep your hands away from your nose and mouth. Germs on your hands get into your respiratory system this way.  · Wash your hands often. When you wash them:  ¨ Use soap and warm water.  ¨ Rub your hands together well for at least 20 seconds.  ¨ Make sure to rinse them well.  ¨ Dry your hands on clean towels or air-dry them.  · Use hand  containing alcohol, if you are unable to wash your hands. Use the  after touching doorknobs, handles, and supermarket carts, for example, since lots of people touch them. Then wash your hands as soon as you can.  · To help prevent the flu, get a flu vaccination every year. This may be given at your healthcare provider's office, a drugstore, or pharmacy, or at work. Get your flu shot as soon as the vaccines are available in your area. This is usually around September each year.  · To help prevent pneumococcal pneumonia, get pneumonia vaccinations. Talk with your healthcare provider about which pneumococcal vaccinations you need.  · Try to stay away from people with respiratory infections, such as colds or the flu. Stay away from crowded places, like shopping centers or movie theatres during cold and flu season.  · If you smoke, think about quitting. In addition to causing or worsening many lung conditions, the lung damage from smoking increases your risk of infections. Stay away from others who smoke, too. This is also harmful and increases your chance of infections.  Date Last Reviewed: 4/14/2016  © 6480-5092 [employer]. [address] [facility],  [place] [address]. All rights reserved. This information is not intended as a substitute for professional medical care. Always follow your healthcare professional's instructions.        COPD Flare    You have had a flare-up of your COPD.  COPD, or chronic obstructive pulmonary disease, is a common lung disease. It causes your airways to become irritated and narrower. This makes it harder for you to breathe. Emphysema and chronic bronchitis are both types of COPD. This is a chronic condition, which means you always have it. Sometimes it gets worse. When this happens, it is called a flare-up.  Symptoms of COPD  People with COPD may have symptoms most of the time. In a flare-up, your symptoms get worse. These symptoms may mean you are having a flare-up:  · Shortness of breath, shallow or rapid breathing, or wheezing that gets worse  · Lung infection  · Cough that gets worse  · More mucus, thicker mucus or mucus of a different color  · Tiredness, decreased energy, or trouble doing your usual activities  · Fever  · Chest tightness  · Your symptoms dont get better even when you use your usual medicines, inhalers, and nebulizer  · Trouble talking  · You feel confused  Causes of flare-ups  Unfortunately, a flare-up can happen even though you did everything right, and you followed your doctors instructions. Some causes of flare-ups are:  · Smoking or secondhand smoke  · Colds, the flu, or respiratory infections  · Air pollution  · Sudden change in the weather  · Dust, irritating chemicals, or strong fumes  · Not taking your medicines as prescribed  Home care  Here are some things you can do at home to treat a flare-up:  · Try not to panic. This makes it harder to breathe, and keeps you from doing the right things.  · Dont smoke or be around others who are smoking.  · Try to drink more fluids than usual during a flare-up, unless your doctor has told you not to because of heart and kidney problems. More fluids can help  loosen the mucus.  · Use your inhalers and nebulizer, if you have one, as you have been told to.  · If you were given antibiotics, take them until they are used up or your doctor tells you to stop. Its important to finish the antibiotics, even though you feel better. This will make sure the infection has cleared.  · If you were given prednisone or another steroid, finish it even if you feel better.  Preventing a flare-up  Even though flare-ups happen, the best way to treat one is to prevent it before it starts. Here are some pointers:  · Dont smoke or be around others who are smoking.  · Take your medicines as you have been told.  · Talk with your doctor about getting a flu shot every year. Also find out if you need a pneumonia shot.  · If there is a weather advisory warning to stay indoors, try to stay inside when possible.  · Try to eat healthy and get plenty of sleep.  · Try to avoid things that usually set you off, like dust, chemical fumes, hairsprays, or strong perfumes.  Follow-up care  Follow up with your healthcare provider, or as advised.  If a culture was done, you will be told if your treatment needs to be changed. You can call as directed for the results.  If X-rays were done, you will be notified of any new findings that may affect your care.  Call 911  Call 911 if any of these occur:  · You have trouble breathing  · You feel confused or its difficult to wake you up  · You faint or lose consciousness  · You have a rapid heart rate  · You have new pain in your chest, arm, shoulder, neck or upper back  When to seek medical advice  Call your healthcare provider right away if any of these occur:  · Wheezing or shortness of breath gets worse  · You need to use your inhalers more often than usual without relief  · Fever of 100.4°F (38ºC) or higher, or as directed by your healthcare provider  · Coughing up lots of dark-colored or bloody mucus (sputum)  · Chest pain with each breath  · You do not start to  get better within 24 hours  · Swelling of your ankles gets worse  · Dizziness or weakness  Date Last Reviewed: 9/1/2016  © 8169-6947 [employer]. [address] [place] [address], [place] [address]. All rights reserved. This information is not intended as a substitute for professional medical care. Always follow your healthcare professional's instructions.        Medicines for Chronic Obstructive Pulmonary Disease  Some medicines for COPD help control or prevent symptoms. They are called maintenance medicines. Take these medicines every day or as instructed by your healthcare provider. Some are rescue medicines. Take these only when you have symptoms like increased shortness of breath or chest tightness. Take this sheet with you to your next office visit and ask your healthcare provider to help you complete it.  Bronchodilators  What they do: Relax the muscles around airways. This allows you to breathe more easily.     Short-acting beta-2 agonists. These start working shortly after you use them. They are rescue medicines.  My medicines: __________________________________________  When to take: __________________________________________     Long-acting beta-2 agonists. These work more slowly than the fast-acting type. But the effects last longer. They are maintenance medicines.  My medicines: __________________________________________  When to take: __________________________________________     Anticholinergics. Rescue: These may be used along with a short-acting beta-2 agonist to help keep airways open.  My medicines: __________________________________________  When to take: __________________________________________     Anticholinergics. Maintenance: There are long-acting anticholinergenics.  My medicines: __________________________________________  When to take: __________________________________________     Methylxanthines. These are maintenance medicines and have long-lasting effects. They may be useful if  symptoms happen during sleep.  My medicines: __________________________________________  When to take: __________________________________________     Corticosteroids  What they do: Reduce inflammation, swelling, and mucus production. This allows you to breathe more easily.     Inhaled corticosteroids. These medicines are taken with an inhaler or nebulizer. They are maintenance medicines.  My medicines: __________________________________________  When to take: __________________________________________     Oral corticosteroids. These medicines are taken by mouth. They may be used when symptoms worsen.  My medicines: __________________________________________  When to take: __________________________________________     Phosphodiesterase type 4 (PDE4) inhibitors  What they do: Reduce the risk of flare-ups in patients with severe COPD.  My medicines: __________________________________________  When to take: __________________________________________     Combination medicines  What they do: Combine the effects of different types of medicines. For example, a combination medicine may relax the muscles around the airways and lessen the swelling or inflammation in the airway.  My medicines: __________________________________________  When to take: __________________________________________     Other medicines  Other medicines for COPD.  My medicine: __________________________________________  What it does: __________________________________________  When to take: __________________________________________  Herbal products and supplements  There are products for COPD that are available without a prescription. For example, herbs, extracts, or supplements. Be sure to talk with your healthcare provider before taking any of these products. They can interact with medicines youre taking.   Date Last Reviewed: 5/1/2016  © 8506-0136 [employer]. [address] [place] [address], [place] [address]. All rights reserved. This  information is not intended as a substitute for professional medical care. Always follow your healthcare professional's instructions.

## 2018-05-04 NOTE — PROGRESS NOTES
"Summary: Pt gives permission to speak with her  Mr. Ortega. Pt reports that she takes her medications as prescribed. Pt reports that she is on continuous oxygen therapy. Pt reports that she uses portable tanks when out of the home.Pt reports that she lives in the home with her . Pt reports that she has had no falls within the year. Pt reports that she ambulates independently. Pt reports that has a ramp to lead into the home. Pt reports that she requires no/minimal assistance with ADL's and iADL's.  Pt reports that she has a good appetite. Pt reports that she has no complaints of pain. Pt reports that she has realized that "the wind is not her friend". Pt reports that she has a hard time breathing when she goes into the wind. Pt reports that she drives and is able to go to [facility] independently. Med rec completed with pt. Pt reports that she is no longer taking the symbicort. Pt reports that she is taking 20 mg of prednisone and Theodur 300 mg 1/2 tab daily. Pt also reports that the requip was increased from 1 mg to 2 mg. Pt reports that she is not having any financial difficulties at this time. Pt agrees to services per [facility].  "

## 2018-05-04 NOTE — LETTER
May 4, 2018    Chantell Herrera  [address] [facility] [place] [address]             [facility] [address] [place] [address] Dear  Chantell Javier,    Mrs. Herrera thank you for talking with me on Friday May 4, 2018. I have enrolled you in [facility] for disease management. I have enclosed information on COPD for you to read. Hopefully this information with help you to manage you health care.        If you have any questions or concerns, please don't hesitate to call.    Sincerely,      Cathryn Aquino, RN

## 2018-05-18 ENCOUNTER — OUTPATIENT CASE MANAGEMENT (OUTPATIENT)
Dept: ADMINISTRATIVE | Facility: OTHER | Age: 83
End: 2018-05-18

## 2018-05-18 NOTE — PROGRESS NOTES
Summary: Pt reports that she is doing well. Pt reports that she noticed that she is got short winded after walking a short distance this morning.  Pt reports that when she gets SOB the usually stop to rest and take slow deep breaths when possible.Pt reports that she is not able to work outside in the garden. Pt reports that outside work causes a flare up. Pt reports that she tries not to stay in the house and isolate.Pt reports that she received the information mailed by the cm.  Interventions: Reviewed s/s of COPD  Plan: Review strategies to prevent complications of COPD

## 2018-05-28 ENCOUNTER — OFFICE VISIT (OUTPATIENT)
Dept: FAMILY MEDICINE | Facility: CLINIC | Age: 83
End: 2018-05-28
Payer: MEDICARE

## 2018-05-28 ENCOUNTER — HOSPITAL ENCOUNTER (EMERGENCY)
Facility: HOSPITAL | Age: 83
Discharge: HOME | End: 2018-05-28
Attending: SURGERY
Payer: MEDICARE

## 2018-05-28 VITALS
HEART RATE: 90 BPM | SYSTOLIC BLOOD PRESSURE: 160 MMHG | TEMPERATURE: 98 F | OXYGEN SATURATION: 98 % | DIASTOLIC BLOOD PRESSURE: 80 MMHG | RESPIRATION RATE: 18 BRPM

## 2018-05-28 VITALS
SYSTOLIC BLOOD PRESSURE: 136 MMHG | DIASTOLIC BLOOD PRESSURE: 86 MMHG | HEIGHT: 61 IN | RESPIRATION RATE: 20 BRPM | HEART RATE: 101 BPM | OXYGEN SATURATION: 87 % | WEIGHT: 149 LBS | BODY MASS INDEX: 28.13 KG/M2

## 2018-05-28 DIAGNOSIS — Z99.81 OXYGEN DEPENDENT: ICD-10-CM

## 2018-05-28 DIAGNOSIS — J96.11 CHRONIC RESPIRATORY FAILURE WITH HYPOXIA: Primary | ICD-10-CM

## 2018-05-28 DIAGNOSIS — E78.5 HYPERLIPIDEMIA, UNSPECIFIED HYPERLIPIDEMIA TYPE: ICD-10-CM

## 2018-05-28 DIAGNOSIS — M25.552 LEFT HIP PAIN: ICD-10-CM

## 2018-05-28 DIAGNOSIS — N18.30 CKD (CHRONIC KIDNEY DISEASE), STAGE III: ICD-10-CM

## 2018-05-28 DIAGNOSIS — R05.9 COUGH: ICD-10-CM

## 2018-05-28 DIAGNOSIS — R73.9 HYPERGLYCEMIA: ICD-10-CM

## 2018-05-28 DIAGNOSIS — J41.0 SIMPLE CHRONIC BRONCHITIS: ICD-10-CM

## 2018-05-28 DIAGNOSIS — F32.4 MAJOR DEPRESSIVE DISORDER, SINGLE EPISODE, IN PARTIAL REMISSION: ICD-10-CM

## 2018-05-28 DIAGNOSIS — G25.81 RLS (RESTLESS LEGS SYNDROME): ICD-10-CM

## 2018-05-28 DIAGNOSIS — Z79.52 CURRENT CHRONIC USE OF SYSTEMIC STEROIDS: ICD-10-CM
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
## 2018-05-28 DIAGNOSIS — E78.5 DYSLIPIDEMIA: ICD-10-CM

## 2018-05-28 LAB
ALBUMIN SERPL BCP-MCNC: 3.6 G/DL
ALP SERPL-CCNC: 55 U/L
ALT SERPL W/O P-5'-P-CCNC: 15 U/L
ANION GAP SERPL CALC-SCNC: 8 MMOL/L
ANISOCYTOSIS BLD QL SMEAR: SLIGHT
AST SERPL-CCNC: 13 U/L
BASOPHILS # BLD AUTO: ABNORMAL K/UL
BASOPHILS NFR BLD: 0 %
BILIRUB SERPL-MCNC: 0.5 MG/DL
BUN SERPL-MCNC: 19 MG/DL
CALCIUM SERPL-MCNC: 9.1 MG/DL
CHLORIDE SERPL-SCNC: 97 MMOL/L
CHOLEST SERPL-MCNC: 147 MG/DL
CHOLEST/HDLC SERPL: 2 {RATIO}
CO2 SERPL-SCNC: 31 MMOL/L
CREAT SERPL-MCNC: 0.9 MG/DL
DIFFERENTIAL METHOD: ABNORMAL
EOSINOPHIL # BLD AUTO: ABNORMAL K/UL
EOSINOPHIL NFR BLD: 1 %
ERYTHROCYTE [DISTWIDTH] IN BLOOD BY AUTOMATED COUNT: 14.1 %
EST. GFR  (AFRICAN AMERICAN): >60 ML/MIN/1.73 M^2
EST. GFR  (NON AFRICAN AMERICAN): >60 ML/MIN/1.73 M^2
ESTIMATED AVG GLUCOSE: 143 MG/DL
GLUCOSE SERPL-MCNC: 211 MG/DL
HBA1C MFR BLD HPLC: 6.6 %
HCT VFR BLD AUTO: 33.3 %
HDLC SERPL-MCNC: 74 MG/DL
HDLC SERPL: 50.3 %
HGB BLD-MCNC: 10.8 G/DL
LDLC SERPL CALC-MCNC: 58 MG/DL
LYMPHOCYTES # BLD AUTO: ABNORMAL K/UL
LYMPHOCYTES NFR BLD: 6 %
MCH RBC QN AUTO: 35.5 PG
MCHC RBC AUTO-ENTMCNC: 32.4 G/DL
MCV RBC AUTO: 110 FL
MONOCYTES # BLD AUTO: ABNORMAL K/UL
MONOCYTES NFR BLD: 11 %
NEUTROPHILS NFR BLD: 75 %
NEUTS BAND NFR BLD MANUAL: 7 %
NONHDLC SERPL-MCNC: 73 MG/DL
PLATELET # BLD AUTO: 212 K/UL
PLATELET BLD QL SMEAR: ABNORMAL
PMV BLD AUTO: 12.1 FL
POTASSIUM SERPL-SCNC: 3.9 MMOL/L
PROT SERPL-MCNC: 5.9 G/DL
RBC # BLD AUTO: 3.04 M/UL
SODIUM SERPL-SCNC: 136 MMOL/L
TRIGL SERPL-MCNC: 75 MG/DL
TSH SERPL DL<=0.005 MIU/L-ACNC: 0.42 UIU/ML
WBC # BLD AUTO: 10.72 K/UL

## 2018-05-28 PROCEDURE — 80053 COMPREHEN METABOLIC PANEL: CPT

## 2018-05-28 PROCEDURE — 25000003 PHARM REV CODE 250: Performed by: SURGERY

## 2018-05-28 PROCEDURE — 83036 HEMOGLOBIN GLYCOSYLATED A1C: CPT

## 2018-05-28 PROCEDURE — 99999 PR PBB SHADOW E&M-EST. PATIENT-LVL III: CPT | Mod: PBBFAC,,, | Performed by: FAMILY MEDICINE

## 2018-05-28 PROCEDURE — 99284 EMERGENCY DEPT VISIT MOD MDM: CPT | Mod: 25

## 2018-05-28 PROCEDURE — 99499 UNLISTED E&M SERVICE: CPT | Mod: S$GLB,,, | Performed by: FAMILY MEDICINE

## 2018-05-28 PROCEDURE — 3075F SYST BP GE 130 - 139MM HG: CPT | Mod: CPTII,S$GLB,, | Performed by: FAMILY MEDICINE

## 2018-05-28 PROCEDURE — 99214 OFFICE O/P EST MOD 30 MIN: CPT | Mod: S$GLB,,, | Performed by: FAMILY MEDICINE

## 2018-05-28 PROCEDURE — 3079F DIAST BP 80-89 MM HG: CPT | Mod: CPTII,S$GLB,, | Performed by: FAMILY MEDICINE

## 2018-05-28 PROCEDURE — 85027 COMPLETE CBC AUTOMATED: CPT

## 2018-05-28 PROCEDURE — 85007 BL SMEAR W/DIFF WBC COUNT: CPT

## 2018-05-28 PROCEDURE — 84443 ASSAY THYROID STIM HORMONE: CPT

## 2018-05-28 PROCEDURE — 36415 COLL VENOUS BLD VENIPUNCTURE: CPT | Mod: S$GLB,,, | Performed by: FAMILY MEDICINE

## 2018-05-28 PROCEDURE — 80061 LIPID PANEL: CPT

## 2018-05-28 RX ORDER — MUPIROCIN 20 MG/G
1 OINTMENT TOPICAL
Status: COMPLETED | OUTPATIENT
Start: 2018-05-28 | End: 2018-05-28

## 2018-05-28 RX ORDER — TRAMADOL HYDROCHLORIDE 50 MG/1
50 TABLET ORAL EVERY 6 HOURS PRN
Qty: 20 TABLET | Refills: 0 | Status: SHIPPED | OUTPATIENT
Start: 2018-05-28 | End: 2018-06-06

## 2018-05-28 RX ORDER — CODEINE PHOSPHATE AND GUAIFENESIN 10; 100 MG/5ML; MG/5ML
5 SOLUTION ORAL EVERY 6 HOURS PRN
Qty: 100 ML | Refills: 0 | Status: SHIPPED | OUTPATIENT
Start: 2018-05-28 | End: 2019-01-14

## 2018-05-28 RX ORDER — IBUPROFEN 800 MG/1
800 TABLET ORAL
Status: COMPLETED | OUTPATIENT
Start: 2018-05-28 | End: 2018-05-28

## 2018-05-28 RX ORDER — CEPHALEXIN 500 MG/1
500 CAPSULE ORAL EVERY 6 HOURS
Qty: 28 CAPSULE | Refills: 0 | Status: SHIPPED | OUTPATIENT
Start: 2018-05-28 | End: 2018-06-04

## 2018-05-28 RX ORDER — ACETAMINOPHEN 500 MG
1000 TABLET ORAL
Status: COMPLETED | OUTPATIENT
Start: 2018-05-28 | End: 2018-05-28

## 2018-05-28 RX ORDER — MUPIROCIN 20 MG/G
OINTMENT TOPICAL 3 TIMES DAILY
Qty: 15 G | Refills: 0 | Status: SHIPPED | OUTPATIENT
Start: 2018-05-28 | End: 2018-06-06 | Stop reason: SDUPTHER

## 2018-05-28 RX ADMIN — ACETAMINOPHEN 1000 MG: 500 TABLET ORAL at 05:05

## 2018-05-28 RX ADMIN — IBUPROFEN 800 MG: 800 TABLET ORAL at 05:05

## 2018-05-28 RX ADMIN — MUPIROCIN 22 G: 20 OINTMENT TOPICAL at 04:05

## 2018-05-28 NOTE — PROGRESS NOTES
Subjective:       Patient ID: Chantell Herrera is a 82 y.o. female.    Chief Complaint: Follow-up (6 month follow up)    Pt is a 82 y.o. female who presents for evaluation and management of   Encounter Diagnoses   Name Primary?    Chronic respiratory failure with hypoxia Yes    CKD (chronic kidney disease), stage III     Simple chronic bronchitis     Dyslipidemia     Hyperlipidemia, unspecified hyperlipidemia type     Oxygen dependent     Major depressive disorder, single episode, in partial remission     RLS (restless legs syndrome)     Current chronic use of systemic steroids     Hyperglycemia     Cough    .  Doing well on current meds. Denies any side effects. Prevention is up to date.    Review of Systems   Constitutional: Negative for chills and fever.   Respiratory: Positive for cough and shortness of breath.         SOB a little worse. Can now walk to mailbox and only senior living back before having to stop for rest    Cardiovascular: Negative for chest pain and palpitations.   Gastrointestinal: Negative for abdominal pain, blood in stool, constipation and nausea.   Genitourinary: Negative for difficulty urinating.   Skin:        Bruising    Psychiatric/Behavioral: Negative for dysphoric mood, sleep disturbance and suicidal ideas. The patient is not nervous/anxious.        Objective:      Physical Exam   Constitutional: She is oriented to person, place, and time. She appears well-developed and well-nourished.   HENT:   Head: Normocephalic and atraumatic.   Right Ear: External ear normal.   Left Ear: External ear normal.   Nose: Nose normal.   Mouth/Throat: Oropharynx is clear and moist.   O2 NC    Eyes: EOM are normal. Pupils are equal, round, and reactive to light.   Neck: Normal range of motion. Neck supple. No JVD present. No tracheal deviation present. No thyromegaly present.   Cardiovascular: Normal rate, normal heart sounds and intact distal pulses.    No murmur heard.  Pulmonary/Chest: Effort  normal and breath sounds normal. No respiratory distress. She has no wheezes. She has no rales. She exhibits no tenderness.   Decreased tidal volume      Abdominal: Soft. Bowel sounds are normal. She exhibits no distension and no mass. There is no tenderness. There is no rebound and no guarding.   Musculoskeletal: Normal range of motion. She exhibits no edema or tenderness.   Lymphadenopathy:     She has no cervical adenopathy.   Neurological: She is alert and oriented to person, place, and time. She has normal reflexes. She displays normal reflexes. No cranial nerve deficit. She exhibits normal muscle tone. Coordination normal.   Skin: Skin is warm and dry. No rash noted. No erythema. No pallor.   Psychiatric: She has a normal mood and affect. Her behavior is normal. Judgment and thought content normal.       Assessment:       1. Chronic respiratory failure with hypoxia    2. CKD (chronic kidney disease), stage III    3. Simple chronic bronchitis    4. Dyslipidemia    5. Hyperlipidemia, unspecified hyperlipidemia type    6. Oxygen dependent    7. Major depressive disorder, single episode, in partial remission    8. RLS (restless legs syndrome)    9. Current chronic use of systemic steroids    10. Hyperglycemia    11. Cough        Plan:   Chantell was seen today for follow-up.    Diagnoses and all orders for this visit:    Chronic respiratory failure with hypoxia    CKD (chronic kidney disease), stage III  -     CBC auto differential; Future  -     Comprehensive metabolic panel; Future    Simple chronic bronchitis    Dyslipidemia  -     Comprehensive metabolic panel; Future  -     Lipid panel; Future    Hyperlipidemia, unspecified hyperlipidemia type  -     TSH; Future    Oxygen dependent    Major depressive disorder, single episode, in partial remission  -     TSH; Future    RLS (restless legs syndrome)    Current chronic use of systemic steroids  -     CBC auto differential; Future  -     Comprehensive metabolic  panel; Future  -     TSH; Future    Hyperglycemia  -     Hemoglobin A1c; Future    Cough  -     guaifenesin-codeine 100-10 mg/5 ml (TUSSI-ORGANIDIN NR)  mg/5 mL syrup; Take 5 mLs by mouth every 6 (six) hours as needed for Cough.      No Follow-up on file.

## 2018-05-28 NOTE — ED PROVIDER NOTES
[facility]                                                 Chief Complaint  82 y.o. female with Fall and Groin Pain    History of Present Illness  Chantell Herrera presents to the emergency room with left hip pain this evening  Patient had a slip and fall earlier today, landing on left hip and elbow area at home  Patient denies any head trauma loss of consciousness, minor skin tears noted  Patient is alert and appropriate, able to ambulate without difficulty in the ER today  Pt is on home oxygen feels no shortness of breath, left hip pain is main concern    The history is provided by the patient   device was not used during this ER visit    Past Medical History   -- Anxiety    -- Arthritis    -- Asthma    -- Chronic bronchitis    -- COPD (chronic obstructive pulmonary disease)    -- Depression    -- SANTIAGO (dyspnea on exertion)    -- Emphysema of lung    -- Fatigue    -- Hyperlipidemia    -- Hypertension    -- Trouble in sleeping      Surgical HX: Appendectomy, high, JOHNNY, tonsillectomy  No Known Allergies     Review of Systems and Physical Exam      Review of Systems - provided by the patient  -- Constitution - no fever, denies fatigue, no weakness, no chills  -- Eyes - no tearing or redness, no visual disturbance  -- Ear, Nose - no tinnitus or earache, no nasal congestion or discharge  -- Mouth,Throat - no sore throat, no toothache, normal voice, normal swallowing  -- Respiratory - denies cough and congestion, no shortness of breath, no SANTIAGO  -- Cardiovascular - denies chest pain, no palpitations, denies claudication  -- Gastrointestinal - denies abdominal pain, nausea, vomiting, or diarrhea  -- Musculoskeletal - left hip and elbow pain post fall today  -- Neurological - no headache, denies weakness or seizure; no LOC  -- Skin - denies pallor, rash, or changes in skin. no hives or welts noted    BP (!) 176/84  Pulse 96   Temp 97.1 °F (36.2 °C) (Oral)   Resp (!) 22   SpO2 98%       Physical Exam  -- Nursing note and vitals reviewed  -- Constitutional: Appears well-developed and well-nourished  -- Head: Atraumatic. Normocephalic. No obvious abnormality  -- Eyes: Pupils are equal and reactive to light. Normal conjunctiva and lids  -- Cardiac: Normal rate, regular rhythm and normal heart sounds  -- Pulmonary: Normal respiratory effort, breath sounds clear to auscultation  -- Abdominal: Soft, no tenderness. Normal bowel sounds. Normal liver edge  -- Musculoskeletal: Minimal left hip pain on range of motion, no deformity   -- Neurological: No focal deficits. Showed good interaction with staff  -- Vascular: Posterior tibial, dorsalis pedis and radial pulses 2+ bilaterally    -- Lymphatics: No cervical or peripheral lymphadenopathy. No edema noted  -- Skin: Abrasions to the left forearm and left tibia/fibular area    Emergency Room Course      Treatment and Evaluation  -- Left elbow and bilateral hip x-rays are negative for fracture  --The affected area was cleaned and bactroban applied in the ER today   --The area was bandaged prior to discharge     -- PO 1 g Tylenol given in today in the ER  --  mg Motrin given in today in the ER    Diagnosis  -- Diagnoses of Fall and Left hip pain were pertinent to this visit.    Disposition and Plan  -- Disposition: home  -- Condition: stable  -- Follow-up: Patient to follow up with Anson Leiva MD in 1-2 days.  -- I advised the patient that we have found no life threatening condition today  -- At this time, I believe the patient is clinically stable for discharge.   -- The patient acknowledges that close follow up with a MD is required   -- Patient agrees to comply with all instruction and direction given in the ER    This note is dictated on Dragon Natural Speaking word recognition program.  There are word recognition mistakes that are occasionally missed on review.            Festus Maurer MD  05/28/18 1864

## 2018-05-28 NOTE — ED TRIAGE NOTES
Patient presents to the ER after falling at home.  Patient reports falling on her right side.  Patient has skin tear to left arm and left leg.

## 2018-05-28 NOTE — ED NOTES
Patient discharged home after verbalizing understanding of instructions given.  Patient will follow up with PCP this week.  Patient has no questions or concerns at this time.

## 2018-05-28 NOTE — ED NOTES
Discharged to home/self care.    - Condition at discharge: Good  - Mode of Discharge: Ambulatory with assist  - The patient left the ED accompanied by a family member.  - The discharge instructions were discussed with the patient.  - They state an understanding of the discharge instructions.  - Walked pt to the discharge station.

## 2018-05-29 ENCOUNTER — TELEPHONE (OUTPATIENT)
Dept: FAMILY MEDICINE | Facility: CLINIC | Age: 83
End: 2018-05-29

## 2018-05-29 ENCOUNTER — PES CALL (OUTPATIENT)
Dept: ADMINISTRATIVE | Facility: CLINIC | Age: 83
End: 2018-05-29

## 2018-05-29 ENCOUNTER — TELEPHONE (OUTPATIENT)
Dept: EMERGENCY MEDICINE | Facility: HOSPITAL | Age: 83
End: 2018-05-29

## 2018-05-29 DIAGNOSIS — S52.126A CLOSED NONDISPLACED FRACTURE OF HEAD OF RADIUS, UNSPECIFIED LATERALITY, INITIAL ENCOUNTER: Primary | ICD-10-CM
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
## 2018-05-29 NOTE — PROVIDER PROGRESS NOTES - EMERGENCY DEPT.
Encounter Date: 5/28/2018    ED Physician Progress Notes           Patient came to the ER this morning  Sling and swathe applied for the further occult elbow fracture  I will put in an ambulatory referral for orthopedics for the patient

## 2018-05-29 NOTE — TELEPHONE ENCOUNTER
----- Message from David Kirkpatrick sent at 2018  8:31 AM CDT -----  Contact: Patient  Chantell Herrera  MRN: [id number]  : 1935  PCP: Anson Leiva  Home Phone      [phone]  Work Phone      Not on file.  Mobile          [phone]      MESSAGE: was seen yesterday by Dr Leiva -- would like to speak with nurse    Call [phone]    PCP: Abbie

## 2018-05-29 NOTE — TELEPHONE ENCOUNTER
Left message for patient to call to give recommendations. Will fax referral to home health and Dr. Nikolas Mckenzie office.

## 2018-05-29 NOTE — TELEPHONE ENCOUNTER
FYI:    Patient seen yesterday and fell at home after appointment. Was seen in ER at approximately 3:30pm yesterday. States she does not have any fractures. Has left elbow pain and left leg pain(between knee and ankle). States she is having trouble walking. States the ER physician wanted patient to notify Dr. Leiva.

## 2018-05-29 NOTE — TELEPHONE ENCOUNTER
I reviewed her xrays  She has a small radial head fracture. She needs to see ortho   A referral is in  Also I am ordering home health for [physical therapy. Its [facility]. If she wants a different company, change it for her.  Please set up for her thanks

## 2018-05-29 NOTE — PROVIDER PROGRESS NOTES - EMERGENCY DEPT.
Encounter Date: 5/28/2018    ED Physician Progress Notes           Patient call this morning regarding the possibility of radial head fracture  Patient is still having pain in the left elbow, minor swelling this morning  Patient will come back to the emergency room today for immobilization  We will place a sling and swathe, patient getting dressed as we speak  She will come back to the emergency room this morning      Festus Maurer M.D. 9:23 AM 5/29/2018

## 2018-05-31 ENCOUNTER — OFFICE VISIT (OUTPATIENT)
Dept: FAMILY MEDICINE | Facility: CLINIC | Age: 83
End: 2018-05-31
Payer: MEDICARE

## 2018-05-31 ENCOUNTER — APPOINTMENT (OUTPATIENT)
Dept: RADIOLOGY | Facility: CLINIC | Age: 83
End: 2018-05-31
Attending: FAMILY MEDICINE
Payer: MEDICARE

## 2018-05-31 ENCOUNTER — TELEPHONE (OUTPATIENT)
Dept: FAMILY MEDICINE | Facility: CLINIC | Age: 83
End: 2018-05-31

## 2018-05-31 VITALS
SYSTOLIC BLOOD PRESSURE: 122 MMHG | HEART RATE: 112 BPM | RESPIRATION RATE: 20 BRPM | OXYGEN SATURATION: 88 % | HEIGHT: 61 IN | TEMPERATURE: 100 F | DIASTOLIC BLOOD PRESSURE: 60 MMHG
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
## 2018-05-31 DIAGNOSIS — R53.81 DEBILITY: ICD-10-CM

## 2018-05-31 DIAGNOSIS — M25.432 WRIST SWELLING, LEFT: ICD-10-CM
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
## 2018-05-31 DIAGNOSIS — R09.02 HYPOXIA: Primary | ICD-10-CM

## 2018-05-31 PROCEDURE — 3074F SYST BP LT 130 MM HG: CPT | Mod: CPTII,S$GLB,, | Performed by: FAMILY MEDICINE

## 2018-05-31 PROCEDURE — 99999 PR PBB SHADOW E&M-EST. PATIENT-LVL III: CPT | Mod: PBBFAC,,, | Performed by: FAMILY MEDICINE

## 2018-05-31 PROCEDURE — 73110 X-RAY EXAM OF WRIST: CPT | Mod: 26,LT,, | Performed by: RADIOLOGY

## 2018-05-31 PROCEDURE — 3078F DIAST BP <80 MM HG: CPT | Mod: CPTII,S$GLB,, | Performed by: FAMILY MEDICINE

## 2018-05-31 PROCEDURE — 99214 OFFICE O/P EST MOD 30 MIN: CPT | Mod: S$GLB,,, | Performed by: FAMILY MEDICINE

## 2018-05-31 PROCEDURE — 73110 X-RAY EXAM OF WRIST: CPT | Mod: TC,PO,LT

## 2018-05-31 RX ORDER — TIZANIDINE 4 MG/1
4 TABLET ORAL EVERY 6 HOURS PRN
Qty: 60 TABLET | Refills: 1 | Status: SHIPPED | OUTPATIENT
Start: 2018-05-31 | End: 2018-06-06

## 2018-05-31 RX ORDER — HYDROCODONE BITARTRATE AND ACETAMINOPHEN 5; 325 MG/1; MG/1
1 TABLET ORAL EVERY 6 HOURS PRN
Qty: 30 TABLET | Refills: 0 | Status: SHIPPED | OUTPATIENT
Start: 2018-05-31 | End: 2018-06-21

## 2018-05-31 NOTE — PROGRESS NOTES
Subjective:       Patient ID: Chantell Herrera is a 82 y.o. female.    Chief Complaint: Arm Injury (left arm/elbow); Fall (5/28/18); and Hip Pain (left hip pain)    Pt is a 82 y.o. female who presents for evaluation and management of   Encounter Diagnoses   Name Primary?    Fall, initial encounter Yes    Wrist swelling, left    .  Doing well on current meds. Denies any side effects. Prevention is up to date.    Review of Systems   Constitutional: Positive for fever.        Low grade temp 100.0   Respiratory: Positive for cough.         Usual chronic cough    Skin: Positive for color change and wound.       Objective:      Physical Exam   Constitutional: She is oriented to person, place, and time. She appears well-developed and well-nourished.   HENT:   Head: Normocephalic and atraumatic.   Right Ear: External ear normal.   Left Ear: External ear normal.   Nose: Nose normal.   Mouth/Throat: Oropharynx is clear and moist.   O2 NC    Eyes: EOM are normal. Pupils are equal, round, and reactive to light.   Neck: Normal range of motion. Neck supple. No JVD present. No tracheal deviation present. No thyromegaly present.   Cardiovascular: Normal rate, normal heart sounds and intact distal pulses.    No murmur heard.  Pulmonary/Chest: Effort normal and breath sounds normal. No respiratory distress. She has no wheezes. She has no rales. She exhibits no tenderness.   Decreased tidal volume      Abdominal: Soft. Bowel sounds are normal. She exhibits no distension and no mass. There is no tenderness. There is no rebound and no guarding.   Musculoskeletal: Normal range of motion. She exhibits no edema or tenderness.   Swelling to left wrist    Lymphadenopathy:     She has no cervical adenopathy.   Neurological: She is alert and oriented to person, place, and time. She has normal reflexes. She displays normal reflexes. No cranial nerve deficit. She exhibits normal muscle tone. Coordination normal.   Skin: Skin is warm and dry.  No rash noted. No erythema. No pallor.   Left elbow with abrasion and ecchymosis up to mid arm     LLE with large skin tear over ant mid tibia. No erythema   No discharge        Psychiatric: She has a normal mood and affect. Her behavior is normal. Judgment and thought content normal.       Assessment:       1. Fall, initial encounter    2. Wrist swelling, left        Plan:   Chantell was seen today for arm injury, fall and hip pain.    Diagnoses and all orders for this visit:    Fall, initial encounter  -     X-Ray Wrist Complete Left; Future  -     HYDROcodone-acetaminophen (NORCO) 5-325 mg per tablet; Take 1 tablet by mouth every 6 (six) hours as needed for Pain.  -     tiZANidine (ZANAFLEX) 4 MG tablet; Take 1 tablet (4 mg total) by mouth every 6 (six) hours as needed.    Wrist swelling, left  -     X-Ray Wrist Complete Left; Future    home health ordered for wound care and PT   Ortho referral for radial head fracture   She has no indication for abx today  I suspect her low grade temp is due to her hematoma of left elbow. There is no evidence of cellulitis   Finish abx as prescribed form ED, but no additional abx needed   conitnue current wound care, wounds look great     No Follow-up on file.

## 2018-05-31 NOTE — TELEPHONE ENCOUNTER
----- Message from David Kirkpatrick sent at 2018  2:49 PM CDT -----  Contact: Ashanti @ [facility]  Chantell Herrera  MRN: [id number]  : 1935  PCP: Anson Leiva  Home Phone      [phone]  Work Phone      Not on file.  Mobile          [phone]      MESSAGE: Ashanti is @ patient's home now -- would like to speak with Dr Leiva' nurse -- Re: patient's symptoms     Call Ashanti @ [phone]    PCP: Abbie

## 2018-05-31 NOTE — TELEPHONE ENCOUNTER
"Spoke to Ashanti with [facility] home [facility]. Patient with temp 100. Noted knot to left wrist, red, warm to touch. Pain scale 8.Also noted wheezing to lower lobes. Productive cough, "creamy" today, was clear yesterday. Pulse ox: 94%.     Also left elbow, had recent laceration. Noted redness around area also. Cleaning with normal saline and applying betadine and bactroban daily.     Notified Dr. Leiva, recommend office visit.     Notified Ashanti with [facility], states she will discuss with patient and call back to schedule appointment for today with Dr. Leiva or tomorrow with a different provider.  "

## 2018-06-02 ENCOUNTER — HOSPITAL ENCOUNTER (EMERGENCY)
Facility: HOSPITAL | Age: 83
Discharge: HOME | End: 2018-06-02
Attending: EMERGENCY MEDICINE
Payer: MEDICARE

## 2018-06-02 VITALS
WEIGHT: 149 LBS | OXYGEN SATURATION: 90 % | RESPIRATION RATE: 20 BRPM | TEMPERATURE: 100 F | DIASTOLIC BLOOD PRESSURE: 82 MMHG | HEIGHT: 62 IN | BODY MASS INDEX: 27.42 KG/M2 | SYSTOLIC BLOOD PRESSURE: 187 MMHG | HEART RATE: 98 BPM

## 2018-06-02 DIAGNOSIS — R11.0 NAUSEA: Primary | ICD-10-CM

## 2018-06-02 DIAGNOSIS — E87.8 ELECTROLYTE IMBALANCE: ICD-10-CM

## 2018-06-02 LAB
ALBUMIN SERPL BCP-MCNC: 3.2 G/DL
ALP SERPL-CCNC: 80 U/L
ALT SERPL W/O P-5'-P-CCNC: 15 U/L
ANION GAP SERPL CALC-SCNC: 9 MMOL/L
APTT BLDCRRT: 25.8 SEC
AST SERPL-CCNC: 18 U/L
BASOPHILS # BLD AUTO: 0.01 K/UL
BASOPHILS NFR BLD: 0.1 %
BILIRUB SERPL-MCNC: 0.9 MG/DL
BUN SERPL-MCNC: 12 MG/DL
CALCIUM SERPL-MCNC: 8.8 MG/DL
CHLORIDE SERPL-SCNC: 87 MMOL/L
CO2 SERPL-SCNC: 31 MMOL/L
CREAT SERPL-MCNC: 0.7 MG/DL
DIFFERENTIAL METHOD: ABNORMAL
EOSINOPHIL # BLD AUTO: 0.2 K/UL
EOSINOPHIL NFR BLD: 1.9 %
ERYTHROCYTE [DISTWIDTH] IN BLOOD BY AUTOMATED COUNT: 13.1 %
EST. GFR  (AFRICAN AMERICAN): >60 ML/MIN/1.73 M^2
EST. GFR  (NON AFRICAN AMERICAN): >60 ML/MIN/1.73 M^2
GLUCOSE SERPL-MCNC: 144 MG/DL
HCT VFR BLD AUTO: 28.5 %
HGB BLD-MCNC: 9.9 G/DL
INR PPP: 1.1
LYMPHOCYTES # BLD AUTO: 0.6 K/UL
LYMPHOCYTES NFR BLD: 7.3 %
MCH RBC QN AUTO: 35.7 PG
MCHC RBC AUTO-ENTMCNC: 34.7 G/DL
MCV RBC AUTO: 103 FL
MONOCYTES # BLD AUTO: 1.5 K/UL
MONOCYTES NFR BLD: 19.2 %
NEUTROPHILS # BLD AUTO: 5.5 K/UL
NEUTROPHILS NFR BLD: 71.5 %
PLATELET # BLD AUTO: 170 K/UL
PMV BLD AUTO: 11.4 FL
POTASSIUM SERPL-SCNC: 4.3 MMOL/L
PROT SERPL-MCNC: 5.9 G/DL
PROTHROMBIN TIME: 11.6 SEC
RBC # BLD AUTO: 2.77 M/UL
SODIUM SERPL-SCNC: 127 MMOL/L
WBC # BLD AUTO: 7.76 K/UL

## 2018-06-02 PROCEDURE — 93010 ELECTROCARDIOGRAM REPORT: CPT | Mod: ,,, | Performed by: INTERNAL MEDICINE

## 2018-06-02 PROCEDURE — 96374 THER/PROPH/DIAG INJ IV PUSH: CPT

## 2018-06-02 PROCEDURE — 27000221 HC OXYGEN, UP TO 24 HOURS

## 2018-06-02 PROCEDURE — 25000003 PHARM REV CODE 250: Performed by: EMERGENCY MEDICINE

## 2018-06-02 PROCEDURE — 85730 THROMBOPLASTIN TIME PARTIAL: CPT

## 2018-06-02 PROCEDURE — 93005 ELECTROCARDIOGRAM TRACING: CPT

## 2018-06-02 PROCEDURE — 85025 COMPLETE CBC W/AUTO DIFF WBC: CPT

## 2018-06-02 PROCEDURE — 63600175 PHARM REV CODE 636 W HCPCS: Performed by: EMERGENCY MEDICINE

## 2018-06-02 PROCEDURE — 99284 EMERGENCY DEPT VISIT MOD MDM: CPT | Mod: 25

## 2018-06-02 PROCEDURE — 96361 HYDRATE IV INFUSION ADD-ON: CPT

## 2018-06-02 PROCEDURE — 80053 COMPREHEN METABOLIC PANEL: CPT

## 2018-06-02 PROCEDURE — 85610 PROTHROMBIN TIME: CPT

## 2018-06-02 PROCEDURE — 36415 COLL VENOUS BLD VENIPUNCTURE: CPT

## 2018-06-02 RX ORDER — ONDANSETRON 4 MG/1
4 TABLET, FILM COATED ORAL EVERY 6 HOURS
Qty: 12 TABLET | Refills: 0 | Status: SHIPPED | OUTPATIENT
Start: 2018-06-02 | End: 2019-01-14

## 2018-06-02 RX ORDER — ONDANSETRON 2 MG/ML
4 INJECTION INTRAMUSCULAR; INTRAVENOUS
Status: COMPLETED | OUTPATIENT
Start: 2018-06-02 | End: 2018-06-02

## 2018-06-02 RX ADMIN — SODIUM CHLORIDE 1000 ML: 0.9 INJECTION, SOLUTION INTRAVENOUS at 02:06

## 2018-06-02 RX ADMIN — SODIUM CHLORIDE 1000 ML: 0.9 INJECTION, SOLUTION INTRAVENOUS at 03:06

## 2018-06-02 RX ADMIN — ONDANSETRON 4 MG: 2 SOLUTION INTRAMUSCULAR; INTRAVENOUS at 02:06

## 2018-06-02 NOTE — ED NOTES
Discharged to home/self care.    - Condition at discharge: Good  - Mode of Discharge: wheelchair  - The patient left the ED accompanied by a family member.  - The discharge instructions were discussed with the patient.  - They state an understanding of the discharge instructions.  - Walked pt to the discharge station.

## 2018-06-02 NOTE — ED PROVIDER NOTES
"[facility]                                                  Chief Complaint  82 y.o. female with nausea and loss of appetite    History of Present Illness  Chantell Herrera presents to the emergency room with complaint of nausea.  Patient had a fall last Saturday and broke her elbow and lacerated her lower leg.  She has been on pain medicine and then.  She reports she has not been eating and days.  She has not taken narcotic pain medicine in the past.      Past Medical History:   Diagnosis Date    Anxiety     Arthritis     Asthma     Chronic bronchitis     COPD (chronic obstructive pulmonary disease)     Depression     SANTIAGO (dyspnea on exertion)     Emphysema of lung     Fatigue     Hyperlipidemia     Hypertension     Trouble in sleeping      Past Surgical History:   Procedure Laterality Date    APPENDECTOMY      EYE SURGERY      HYSTERECTOMY      TONSILLECTOMY        Review of patient's allergies indicates:  No Known Allergies     Review of Systems and Physical Exam     Review of Systems  -- Constitution - no fever, denies fatigue, no weakness, no chills  -- Eyes - no tearing or redness, no visual disturbance  -- Ear, Nose - no tinnitus or earache, no nasal congestion or discharge  -- Mouth,Throat - no sore throat, no toothache, normal voice, normal swallowing  -- Respiratory - denies cough and congestion, no shortness of breath, no wheezing  -- Cardiovascular - denies chest pain, no palpitations, denies claudication  -- Gastrointestinal - denies abdominal pain, vomiting, diarrhea.  Endorses nausea,   -- Genitourinary - no dysuria, no denies flank pain, no hematuria or frequency   -- Musculoskeletal - denies back pain, negative for myalgias and arthralgias   -- Neurological - no headache, denies weakness or seizure; no LOC  -- Skin - denies pallor, rash, or changes in skin. no hives or welts noted    Vital Signs   height is 5' 2" (1.575 m) and weight is 67.6 kg (149 lb). Her oral " temperature is 97.9 °F (36.6 °C). Her blood pressure is 192/84 (abnormal) and her pulse is 98. Her respiration is 23 (abnormal) and oxygen saturation is 90% (abnormal).      Physical Exam  -- Nursing note and vitals reviewed  -- Constitutional: Appears well-developed and well-nourished  -- Head: Atraumatic. Normocephalic. No obvious abnormality  -- Eyes: Pupils are equal and reactive to light. Normal conjunctiva and lids  -- Nose: Nose normal in appearance, nares grossly normal. No discharge  -- Throat: Mucous membranes dry, pharynx normal, normal tonsils. No lesions   -- Ears: External ears and TM normal bilaterally. Normal hearing and no drainage  -- Neck: Normal range of motion. Neck supple. No masses, trachea midline  -- Cardiac: Normal rate, regular rhythm and normal heart sounds  -- Pulmonary: Normal respiratory effort, breath sounds clear to auscultation  -- Abdominal: Soft but distended, no tenderness. Normal bowel sounds. Normal liver edge  -- Musculoskeletal: Normal range of motion, no effusions. Joints stable   -- Neurological: Cranial nerves II through XII grossly intact No focal deficits. Showed good interaction with staff  -- Vascular: Posterior tibial, dorsalis pedis and radial pulses 2+ bilaterally    -- Lymphatics: No cervical or peripheral lymphadenopathy. No edema noted  -- Skin: Warm and dry. No evidence of rash or cellulitis  -- Psychiatric: Normal mood and affect. Bedside behavior is appropriate    Emergency Room Course     Treatment and Evaluation  1.  Physical exam unremarkable except for presence of shoulder sling on the left and bandage to left shin, appears mildly dehydrated  2.  PTT INR within normal limits  3.  CBC/CMP shows mild hypokalemia and hypochloremia  4.  Normal saline 2 L with 4 mg Zofran  5.  EKG normal sinus rhythm no evidence of ischemia or arrhythmia  6.  Patient feels better at 4:15 PM will DC home with follow-up PCP    Abnormal lab values  Labs Reviewed   APTT    PROTIME-INR   CBC W/ AUTO DIFFERENTIAL   COMPREHENSIVE METABOLIC PANEL       Medications Given  Medications   sodium chloride 0.9% bolus 1,000 mL (1,000 mLs Intravenous New Bag 6/2/18 1450)   ondansetron injection 4 mg (4 mg Intravenous Given 6/2/18 1450)           Diagnosis  -- Nausea  --Electrolyte imbalance    Disposition and Plan  -- Disposition: home  -- Condition: stable  -- Follow-up: Patient to follow up with Anson eLiva MD in 1-2 days.  -- I advised the patient that we have found no life threatening condition today  -- At this time, I believe the patient is clinically stable for discharge.   -- The patient acknowledges that close follow up with a MD is required   -- Patient agrees to comply with all instruction and direction given in the ER  -- Patient counseled on strict return precautions as discussed       Lovely Onofre MD  06/02/18 9822

## 2018-06-02 NOTE — ED TRIAGE NOTES
Patient presents to the ER with c/o nausea and appears SOB.  Patient's family reports that patient has not been taking her pain meds for the past 3 days.

## 2018-06-04 ENCOUNTER — TELEPHONE (OUTPATIENT)
Dept: FAMILY MEDICINE | Facility: CLINIC | Age: 83
End: 2018-06-04

## 2018-06-04 NOTE — TELEPHONE ENCOUNTER
----- Message from Kari Manuel MA sent at 2018  9:28 AM CDT -----  Contact: Self  Chantell Herrera  MRN: [id number]  : 1935  PCP: Anson Leiva  Home Phone      [phone]  Work Phone      Not on file.  Mobile          [phone]      MESSAGE: Had to go back to ER due to another fall and wants to speak to nurse  Phone: [phone]

## 2018-06-04 NOTE — TELEPHONE ENCOUNTER
Spoke with patient and she states that she went to the ER for dehydration. She just wanted to let him know that they gave her a couple of bags of fluid.

## 2018-06-05 ENCOUNTER — TELEPHONE (OUTPATIENT)
Dept: FAMILY MEDICINE | Facility: CLINIC | Age: 83
End: 2018-06-05

## 2018-06-05 NOTE — TELEPHONE ENCOUNTER
Patients  called, states patient had Tizanidine 4mg ordered. States causing severe dry mouth, dry lips, even difficulty swallowing.     Would like to have muscle relaxer changed today since it has helped but unable to deal with dryness from medication. Advise      Pharmacy: [facility] in [place]    Phone: [phone]

## 2018-06-06 ENCOUNTER — APPOINTMENT (OUTPATIENT)
Dept: RADIOLOGY | Facility: CLINIC | Age: 83
End: 2018-06-06
Attending: FAMILY MEDICINE
Payer: MEDICARE

## 2018-06-06 ENCOUNTER — OFFICE VISIT (OUTPATIENT)
Dept: FAMILY MEDICINE | Facility: CLINIC | Age: 83
End: 2018-06-06
Payer: MEDICARE

## 2018-06-06 ENCOUNTER — OUTPATIENT CASE MANAGEMENT (OUTPATIENT)
Dept: ADMINISTRATIVE | Facility: OTHER | Age: 83
End: 2018-06-06

## 2018-06-06 VITALS
WEIGHT: 149 LBS | RESPIRATION RATE: 20 BRPM | HEART RATE: 88 BPM | BODY MASS INDEX: 28.13 KG/M2 | HEIGHT: 61 IN | DIASTOLIC BLOOD PRESSURE: 72 MMHG | OXYGEN SATURATION: 91 % | SYSTOLIC BLOOD PRESSURE: 150 MMHG

## 2018-06-06 DIAGNOSIS — R09.89 RALES: ICD-10-CM

## 2018-06-06 DIAGNOSIS — R53.81 DEBILITY: ICD-10-CM

## 2018-06-06 DIAGNOSIS — J96.11 CHRONIC RESPIRATORY FAILURE WITH HYPOXIA: ICD-10-CM

## 2018-06-06 DIAGNOSIS — S41.102D WOUND OF LEFT UPPER EXTREMITY, SUBSEQUENT ENCOUNTER: ICD-10-CM

## 2018-06-06 DIAGNOSIS — M62.838 MUSCLE SPASM: Primary | ICD-10-CM

## 2018-06-06 PROCEDURE — 3078F DIAST BP <80 MM HG: CPT | Mod: CPTII,S$GLB,, | Performed by: FAMILY MEDICINE

## 2018-06-06 PROCEDURE — 71046 X-RAY EXAM CHEST 2 VIEWS: CPT | Mod: TC,PO

## 2018-06-06 PROCEDURE — 71046 X-RAY EXAM CHEST 2 VIEWS: CPT | Mod: 26,,, | Performed by: RADIOLOGY

## 2018-06-06 PROCEDURE — 99214 OFFICE O/P EST MOD 30 MIN: CPT | Mod: S$GLB,,, | Performed by: FAMILY MEDICINE

## 2018-06-06 PROCEDURE — 99499 UNLISTED E&M SERVICE: CPT | Mod: S$PBB,,, | Performed by: FAMILY MEDICINE

## 2018-06-06 PROCEDURE — 99999 PR PBB SHADOW E&M-EST. PATIENT-LVL III: CPT | Mod: PBBFAC,,, | Performed by: FAMILY MEDICINE

## 2018-06-06 PROCEDURE — 3077F SYST BP >= 140 MM HG: CPT | Mod: CPTII,S$GLB,, | Performed by: FAMILY MEDICINE

## 2018-06-06 RX ORDER — BACLOFEN 10 MG/1
10 TABLET ORAL 3 TIMES DAILY
Qty: 90 TABLET | Refills: 1 | Status: SHIPPED | OUTPATIENT
Start: 2018-06-06 | End: 2020-01-01

## 2018-06-06 RX ORDER — MUPIROCIN 20 MG/G
OINTMENT TOPICAL 2 TIMES DAILY
Qty: 1 TUBE | Refills: 1 | Status: SHIPPED | OUTPATIENT
Start: 2018-06-06 | End: 2018-06-13

## 2018-06-06 RX ORDER — MUPIROCIN 20 MG/G
OINTMENT TOPICAL
Qty: 1 TUBE | Refills: 0 | Status: SHIPPED | OUTPATIENT
Start: 2018-06-06 | End: 2018-12-05

## 2018-06-06 NOTE — PROGRESS NOTES
Subjective:       Patient ID: Chantell Herrera is a 82 y.o. female.    Chief Complaint: Consult (discuss meds) and Wheezing (SOB with cough)    Pt is a 82 y.o. female who presents for evaluation and management of   Encounter Diagnoses   Name Primary?    Muscle spasm Yes    Wound of left upper extremity, subsequent encounter     Chronic respiratory failure with hypoxia    .HH nurse worried about her breathing.  Less mobile since fall obviously and worried about pneumonia   No fever   zanaflex is drying her up too much     Doing well on current meds. Denies any side effects. Prevention is up to date.    Review of Systems   Constitutional: Negative for fever.   Respiratory: Positive for cough and shortness of breath.    Skin: Positive for wound. Negative for color change.       Objective:      Physical Exam   Constitutional: She is oriented to person, place, and time. She appears well-developed and well-nourished.   HENT:   Head: Normocephalic and atraumatic.   Right Ear: External ear normal.   Left Ear: External ear normal.   Nose: Nose normal.   Mouth/Throat: Oropharynx is clear and moist.   O2 NC    Eyes: EOM are normal. Pupils are equal, round, and reactive to light.   Neck: Normal range of motion. Neck supple. No JVD present. No tracheal deviation present. No thyromegaly present.   Cardiovascular: Normal rate, normal heart sounds and intact distal pulses.    No murmur heard.  Pulmonary/Chest: Effort normal and breath sounds normal. No respiratory distress. She has no wheezes. She has no rales. She exhibits no tenderness.   Decreased tidal volume   Rhonchi right base      Abdominal: Soft. Bowel sounds are normal. She exhibits no distension and no mass. There is no tenderness. There is no rebound and no guarding.   Musculoskeletal: Normal range of motion. She exhibits no edema or tenderness.   Swelling to left wrist    Lymphadenopathy:     She has no cervical adenopathy.   Neurological: She is alert and oriented  to person, place, and time. She has normal reflexes. She displays normal reflexes. No cranial nerve deficit. She exhibits normal muscle tone. Coordination normal.   Skin: Skin is warm and dry. No rash noted. No erythema. No pallor.   Left elbow with abrasion and ecchymosis up to mid arm     LLE with large skin tear over ant mid tibia. No erythema   No discharge        Psychiatric: She has a normal mood and affect. Her behavior is normal. Judgment and thought content normal.       Assessment:       1. Muscle spasm    2. Wound of left upper extremity, subsequent encounter    3. Chronic respiratory failure with hypoxia        Plan:   Chantell was seen today for consult and wheezing.    Diagnoses and all orders for this visit:    Muscle spasm  -     baclofen (LIORESAL) 10 MG tablet; Take 1 tablet (10 mg total) by mouth 3 (three) times daily.    Wound of left upper extremity, subsequent encounter  -     mupirocin (BACTROBAN) 2 % ointment; Apply topically 2 (two) times daily.    Chronic respiratory failure with hypoxia  -     X-Ray Chest PA And Lateral; Future  -     Incentive spirometry; Future    Rales  -     X-Ray Chest PA And Lateral; Future    Debility  -     Incentive spirometry; Future      Problem List Items Addressed This Visit     Chronic respiratory failure with hypoxia      Other Visit Diagnoses     Muscle spasm    -  Primary    Wound of left upper extremity, subsequent encounter            No Follow-up on file.

## 2018-06-06 NOTE — PROGRESS NOTES
Summary: Pt reports that she did not have a fall. Pt reports that the home health nurse visited this morning. Pt reports that she is doing well with her breathing at this time. Pt reports that she had no onset of pain or discomfort at this time.    Interventions: Reviewed COPD flare up  Plan: Review with pt s/s of COPD

## 2018-06-21 ENCOUNTER — OFFICE VISIT (OUTPATIENT)
Dept: FAMILY MEDICINE | Facility: CLINIC | Age: 83
End: 2018-06-21
Payer: MEDICARE

## 2018-06-21 ENCOUNTER — PES CALL (OUTPATIENT)
Dept: ADMINISTRATIVE | Facility: CLINIC | Age: 83
End: 2018-06-21

## 2018-06-21 VITALS
HEIGHT: 62 IN | DIASTOLIC BLOOD PRESSURE: 54 MMHG | HEART RATE: 72 BPM | WEIGHT: 146.19 LBS | SYSTOLIC BLOOD PRESSURE: 130 MMHG | BODY MASS INDEX: 26.9 KG/M2 | RESPIRATION RATE: 16 BRPM

## 2018-06-21 DIAGNOSIS — Z51.89 VISIT FOR WOUND CHECK: Primary | ICD-10-CM

## 2018-06-21 PROCEDURE — 99999 PR PBB SHADOW E&M-EST. PATIENT-LVL III: CPT | Mod: PBBFAC,,, | Performed by: FAMILY MEDICINE

## 2018-06-21 PROCEDURE — 99212 OFFICE O/P EST SF 10 MIN: CPT | Mod: S$GLB,,, | Performed by: FAMILY MEDICINE

## 2018-06-21 PROCEDURE — 3075F SYST BP GE 130 - 139MM HG: CPT | Mod: CPTII,S$GLB,, | Performed by: FAMILY MEDICINE

## 2018-06-21 PROCEDURE — 3078F DIAST BP <80 MM HG: CPT | Mod: CPTII,S$GLB,, | Performed by: FAMILY MEDICINE

## 2018-06-21 RX ORDER — LACTULOSE 10 G/15ML
SOLUTION ORAL; RECTAL
COMMUNITY
Start: 2018-06-08 | End: 2018-12-05

## 2018-06-21 NOTE — PROGRESS NOTES
Subjective:       Patient ID: Chantell Herrera is a 82 y.o. female.    Chief Complaint: Fall and SPot on L leg    Pt is a 82 y.o. female who presents for evaluation and management of   Encounter Diagnosis   Name Primary?    Visit for wound check Yes   .HH concerned about wound LLE, has some swelling   No fever or redness     Doing well on current meds. Denies any side effects. Prevention is up to date.    Review of Systems   Constitutional: Negative for fever.   Skin: Positive for wound. Negative for color change.       Objective:      Physical Exam   Constitutional: She is oriented to person, place, and time. She appears well-developed and well-nourished.   HENT:   Head: Normocephalic and atraumatic.   Right Ear: External ear normal.   Left Ear: External ear normal.   Nose: Nose normal.   Eyes: EOM are normal. Pupils are equal, round, and reactive to light.   Neck: Normal range of motion. Neck supple. No JVD present. No tracheal deviation present. No thyromegaly present.   Cardiovascular: Normal rate.    Pulmonary/Chest: Effort normal. No respiratory distress.   Abdominal: Soft.   Musculoskeletal: Normal range of motion. She exhibits no edema or tenderness.   Lymphadenopathy:     She has no cervical adenopathy.   Neurological: She is alert and oriented to person, place, and time.   Skin: Skin is warm and dry. No rash noted. No erythema. No pallor.   LLE with granulating wound, some fibrinous exudate   Anterior swelling adjacent t owound. No erythema. Feels like a hematoma    Psychiatric: She has a normal mood and affect. Her behavior is normal. Judgment and thought content normal.       Assessment:       1. Visit for wound check        Plan:   Chantell was seen today for fall and spot on l leg.    Diagnoses and all orders for this visit:    Visit for wound check      Problem List Items Addressed This Visit     None      Visit Diagnoses     Visit for wound check    -  Primary          Continue compression dressings    Need to more aggressively scrub wound bed   No concern for abscess       No Follow-up on file.

## 2018-06-28 ENCOUNTER — OUTPATIENT CASE MANAGEMENT (OUTPATIENT)
Dept: ADMINISTRATIVE | Facility: OTHER | Age: 83
End: 2018-06-28

## 2018-06-28 NOTE — PROGRESS NOTES
Summary: Pt reports that she is doing fine. Reports that she has no decline with her breathing. Reports that the nurse was concerned that she had pneumonia. Reports that she as managed not to get pneumonia. Pt reports that the wound to her leg is looking good and healing well. Reports that she does not have any pain to the (L) elbow due to the fractures. Pt reports that she is not taking any pain medications at this time.    Interventions: Reviewed with patient the risk factors of COPD and s/s pf pneumonia  Plan: Review with pt the strategies for breathing techniques and management of fatique.

## 2018-07-10 ENCOUNTER — OUTPATIENT CASE MANAGEMENT (OUTPATIENT)
Dept: ADMINISTRATIVE | Facility: OTHER | Age: 83
End: 2018-07-10

## 2018-07-10 NOTE — PROGRESS NOTES
Summary: Pt reports that she is doing very well. Pt reports that she went to the chiropractor on yesterday. Pt reports that she has a little back ache at this time. Pt reports that her elbow is doing well. Reports that she has a f/u with the MD in 6 wks. Reports that she has a good appetite. Also reports that she has had no recent falls. Informed of pending case closure in 2 weeks. No complaints of COPD flare-up at this time.  Interventions:  Reviewed breathing techniques  Plan: close case on next encounter in 2 weeks

## 2018-07-11 ENCOUNTER — TELEPHONE (OUTPATIENT)
Dept: ADMINISTRATIVE | Facility: CLINIC | Age: 83
End: 2018-07-11

## 2018-08-01 ENCOUNTER — OUTPATIENT CASE MANAGEMENT (OUTPATIENT)
Dept: ADMINISTRATIVE | Facility: OTHER | Age: 83
End: 2018-08-01

## 2018-08-01 NOTE — PROGRESS NOTES
Summary: Pt reports that she is doing well. Pt reports that she is having no problems with her elbow at this time. Pt reports that she is currently wearing a back brace. Pt reports that she took a mucinex this morning which is causing her to cough. Pt reports that she is coughing up a clear secretion. Pt reports that she has no new concerns or complaints at this time.   Intervention: Reviewed with pt breathing techniques and ways to manage her energy to decrease fatique.  Plan: Close case at this time.

## 2018-08-14 RX ORDER — ROPINIROLE 1 MG/1
TABLET, FILM COATED ORAL
Qty: 90 TABLET | Refills: 5 | Status: SHIPPED | OUTPATIENT
Start: 2018-08-14 | End: 2019-03-26 | Stop reason: SDUPTHER

## 2018-09-26 DIAGNOSIS — M85.80 OSTEOPENIA, UNSPECIFIED LOCATION: Primary | ICD-10-CM

## 2018-09-26 DIAGNOSIS — M89.9 BONE DISORDER: ICD-10-CM

## 2018-09-26 NOTE — TELEPHONE ENCOUNTER
----- Message from David Kirkpatrick sent at 2018 11:21 AM CDT -----  Contact: Jennifer @ [facility]  Chantell Herrera  MRN: [id number]  : 1935  PCP: Anson Leiva  Home Phone      [phone]  Work Phone      Not on file.  Mobile          [phone]      MESSAGE: faxed over paperwork requesting patient have Dexa Scan on 18 -- wanted status of request -- please advise    Call Jennifer @ [phone] ref# [id number]

## 2018-09-27 ENCOUNTER — TELEPHONE (OUTPATIENT)
Dept: FAMILY MEDICINE | Facility: CLINIC | Age: 83
End: 2018-09-27

## 2018-09-27 RX ORDER — TRAZODONE HYDROCHLORIDE 50 MG/1
50 TABLET ORAL NIGHTLY
Qty: 90 TABLET | Refills: 1 | Status: SHIPPED | OUTPATIENT
Start: 2018-09-27 | End: 2019-04-15 | Stop reason: SDUPTHER

## 2018-09-27 NOTE — TELEPHONE ENCOUNTER
----- Message from David Kirkpatrick sent at 2018  8:16 AM CDT -----  Contact: Patient  Chantell Herrera  MRN: [id number]  : 1935  PCP: Anson Leiva  Home Phone      [phone]  Work Phone      Not on file.  Mobile          [phone]      MESSAGE: requesting refill on Trazadone (out of medication) -- send to [facility]    Call [phone]    PCP: Abbie

## 2018-09-28 ENCOUNTER — HOSPITAL ENCOUNTER (OUTPATIENT)
Dept: RADIOLOGY | Facility: HOSPITAL | Age: 83
Discharge: HOME | End: 2018-09-28
Attending: FAMILY MEDICINE
Payer: MEDICARE

## 2018-09-28 DIAGNOSIS — M89.9 BONE DISORDER: ICD-10-CM

## 2018-09-28 DIAGNOSIS — M85.80 OSTEOPENIA, UNSPECIFIED LOCATION: ICD-10-CM

## 2018-09-28 PROCEDURE — 77080 DXA BONE DENSITY AXIAL: CPT | Mod: TC

## 2018-09-28 PROCEDURE — 77080 DXA BONE DENSITY AXIAL: CPT | Mod: 26,,, | Performed by: RADIOLOGY

## 2018-09-30 NOTE — PROGRESS NOTES
Osteopenia on DXA, but no osteoporosis yet. I want her to continue VIt D 2000IUs daily and try to get calcium naturally from her diet(no supplement). We will repeat DXA in 2 years

## 2018-10-15 DIAGNOSIS — E78.5 DYSLIPIDEMIA: ICD-10-CM

## 2018-10-15 RX ORDER — PRAVASTATIN SODIUM 40 MG/1
TABLET ORAL
Qty: 90 TABLET | Refills: 1 | Status: SHIPPED | OUTPATIENT
Start: 2018-10-15 | End: 2020-01-01

## 2018-11-05 DIAGNOSIS — G25.0 ESSENTIAL TREMOR: ICD-10-CM

## 2018-11-06 RX ORDER — LOSARTAN POTASSIUM 50 MG/1
TABLET ORAL
Qty: 90 TABLET | Refills: 3 | Status: ON HOLD | OUTPATIENT
Start: 2018-11-06 | End: 2018-11-18 | Stop reason: SDUPTHER

## 2018-11-11 ENCOUNTER — HOSPITAL ENCOUNTER (INPATIENT)
Facility: HOSPITAL | Age: 83
LOS: 7 days | Discharge: HOME | DRG: 191 | End: 2018-11-18
Attending: SURGERY | Admitting: FAMILY MEDICINE
Payer: MEDICARE

## 2018-11-11 DIAGNOSIS — I25.10 CARDIOVASCULAR DISEASE: ICD-10-CM

## 2018-11-11 DIAGNOSIS — J96.11 CHRONIC RESPIRATORY FAILURE WITH HYPOXIA: ICD-10-CM

## 2018-11-11 DIAGNOSIS — I49.9 ARRHYTHMIA: ICD-10-CM

## 2018-11-11 DIAGNOSIS — I47.10 SVT (SUPRAVENTRICULAR TACHYCARDIA): ICD-10-CM

## 2018-11-11 DIAGNOSIS — J43.1 PANLOBULAR EMPHYSEMA: ICD-10-CM

## 2018-11-11 DIAGNOSIS — R06.02 SOB (SHORTNESS OF BREATH): ICD-10-CM

## 2018-11-11 DIAGNOSIS — G25.0 ESSENTIAL TREMOR: ICD-10-CM

## 2018-11-11 DIAGNOSIS — J44.1 COPD EXACERBATION: Primary | ICD-10-CM

## 2018-11-11 LAB
ALBUMIN SERPL BCP-MCNC: 3.9 G/DL
ALLENS TEST: ABNORMAL
ALP SERPL-CCNC: 93 U/L
ALT SERPL W/O P-5'-P-CCNC: 18 U/L
ANION GAP SERPL CALC-SCNC: 11 MMOL/L
APTT BLDCRRT: 22.8 SEC
AST SERPL-CCNC: 14 U/L
BASOPHILS # BLD AUTO: 0.02 K/UL
BASOPHILS NFR BLD: 0.1 %
BILIRUB SERPL-MCNC: 0.6 MG/DL
BNP SERPL-MCNC: 129 PG/ML
BUN SERPL-MCNC: 18 MG/DL
CALCIUM SERPL-MCNC: 9.4 MG/DL
CHLORIDE SERPL-SCNC: 94 MMOL/L
CK MB SERPL-MCNC: 1.2 NG/ML
CK MB SERPL-RTO: 6.3 %
CK SERPL-CCNC: 19 U/L
CK SERPL-CCNC: 19 U/L
CO2 SERPL-SCNC: 31 MMOL/L
CREAT SERPL-MCNC: 0.9 MG/DL
D DIMER PPP IA.FEU-MCNC: 0.93 MG/L FEU
DELSYS: ABNORMAL
DIFFERENTIAL METHOD: ABNORMAL
EOSINOPHIL # BLD AUTO: 0.1 K/UL
EOSINOPHIL NFR BLD: 0.3 %
ERYTHROCYTE [DISTWIDTH] IN BLOOD BY AUTOMATED COUNT: 14.3 %
EST. GFR  (AFRICAN AMERICAN): >60 ML/MIN/1.73 M^2
EST. GFR  (NON AFRICAN AMERICAN): >60 ML/MIN/1.73 M^2
GLUCOSE SERPL-MCNC: 101 MG/DL
HCO3 UR-SCNC: 34.3 MMOL/L (ref 22–26)
HCT VFR BLD AUTO: 33.7 %
HGB BLD-MCNC: 11 G/DL
INFLUENZA A, MOLECULAR: NEGATIVE
INFLUENZA B, MOLECULAR: NEGATIVE
INR PPP: 1.1
LACTATE SERPL-SCNC: 2 MMOL/L
LYMPHOCYTES # BLD AUTO: 0.8 K/UL
LYMPHOCYTES NFR BLD: 4.9 %
MCH RBC QN AUTO: 35.3 PG
MCHC RBC AUTO-ENTMCNC: 32.6 G/DL
MCV RBC AUTO: 108 FL
MONOCYTES # BLD AUTO: 1.9 K/UL
MONOCYTES NFR BLD: 11.1 %
NEUTROPHILS # BLD AUTO: 14.4 K/UL
NEUTROPHILS NFR BLD: 83.6 %
PCO2 BLDA: 46 MMHG (ref 35–45)
PH SMN: 7.48 [PH] (ref 7.35–7.45)
PLATELET # BLD AUTO: 260 K/UL
PMV BLD AUTO: 11.3 FL
PO2 BLDA: 76 MMHG (ref 75–100)
POC BE: 9.7 MMOL/L (ref -2–2)
POC COHB: 1.7 % (ref 0–3)
POC METHB: 0.6 % (ref 0–1.5)
POC O2HB ARTERIAL: 94.5 % (ref 94–100)
POC SATURATED O2: 96.8 % (ref 90–100)
POC TCO2: 35.7 MMOL/L
POC THB: 9 G/DL (ref 12–18)
POTASSIUM SERPL-SCNC: 4.5 MMOL/L
PROT SERPL-MCNC: 6.7 G/DL
PROTHROMBIN TIME: 11.4 SEC
RBC # BLD AUTO: 3.12 M/UL
SITE: ABNORMAL
SODIUM SERPL-SCNC: 136 MMOL/L
SPECIMEN SOURCE: NORMAL
TROPONIN I SERPL DL<=0.01 NG/ML-MCNC: 0.02 NG/ML
WBC # BLD AUTO: 17.22 K/UL

## 2018-11-11 PROCEDURE — 25000003 PHARM REV CODE 250: Mod: HCWC | Performed by: SURGERY

## 2018-11-11 PROCEDURE — 85379 FIBRIN DEGRADATION QUANT: CPT | Mod: HCWC

## 2018-11-11 PROCEDURE — 93010 ELECTROCARDIOGRAM REPORT: CPT | Mod: HCWC,,, | Performed by: INTERNAL MEDICINE

## 2018-11-11 PROCEDURE — 85610 PROTHROMBIN TIME: CPT | Mod: HCWC

## 2018-11-11 PROCEDURE — 94640 AIRWAY INHALATION TREATMENT: CPT | Mod: HCWC

## 2018-11-11 PROCEDURE — 96365 THER/PROPH/DIAG IV INF INIT: CPT | Mod: HCWC

## 2018-11-11 PROCEDURE — 96375 TX/PRO/DX INJ NEW DRUG ADDON: CPT | Mod: HCWC

## 2018-11-11 PROCEDURE — 27000221 HC OXYGEN, UP TO 24 HOURS: Mod: HCWC

## 2018-11-11 PROCEDURE — 80053 COMPREHEN METABOLIC PANEL: CPT | Mod: HCWC

## 2018-11-11 PROCEDURE — 87040 BLOOD CULTURE FOR BACTERIA: CPT | Mod: HCWC

## 2018-11-11 PROCEDURE — 83880 ASSAY OF NATRIURETIC PEPTIDE: CPT | Mod: HCWC

## 2018-11-11 PROCEDURE — 96372 THER/PROPH/DIAG INJ SC/IM: CPT | Mod: 59,HCWC

## 2018-11-11 PROCEDURE — 63600175 PHARM REV CODE 636 W HCPCS: Mod: HCWC | Performed by: SURGERY

## 2018-11-11 PROCEDURE — 36415 COLL VENOUS BLD VENIPUNCTURE: CPT | Mod: HCWC

## 2018-11-11 PROCEDURE — 83605 ASSAY OF LACTIC ACID: CPT | Mod: HCWC

## 2018-11-11 PROCEDURE — 84484 ASSAY OF TROPONIN QUANT: CPT | Mod: HCWC

## 2018-11-11 PROCEDURE — 82550 ASSAY OF CK (CPK): CPT | Mod: HCWC

## 2018-11-11 PROCEDURE — 82553 CREATINE MB FRACTION: CPT | Mod: HCWC

## 2018-11-11 PROCEDURE — 11000001 HC ACUTE MED/SURG PRIVATE ROOM: Mod: HCWC

## 2018-11-11 PROCEDURE — 85730 THROMBOPLASTIN TIME PARTIAL: CPT | Mod: HCWC

## 2018-11-11 PROCEDURE — 82803 BLOOD GASES ANY COMBINATION: CPT | Mod: HCWC | Performed by: SURGERY

## 2018-11-11 PROCEDURE — 36600 WITHDRAWAL OF ARTERIAL BLOOD: CPT | Mod: HCWC

## 2018-11-11 PROCEDURE — 87502 INFLUENZA DNA AMP PROBE: CPT | Mod: HCWC

## 2018-11-11 PROCEDURE — 93005 ELECTROCARDIOGRAM TRACING: CPT | Mod: HCWC

## 2018-11-11 PROCEDURE — 99285 EMERGENCY DEPT VISIT HI MDM: CPT | Mod: 25,HCWC

## 2018-11-11 PROCEDURE — 94760 N-INVAS EAR/PLS OXIMETRY 1: CPT | Mod: HCWC

## 2018-11-11 PROCEDURE — 85025 COMPLETE CBC W/AUTO DIFF WBC: CPT | Mod: HCWC

## 2018-11-11 PROCEDURE — 25000242 PHARM REV CODE 250 ALT 637 W/ HCPCS: Mod: HCWC | Performed by: SURGERY

## 2018-11-11 RX ORDER — LEVALBUTEROL 1.25 MG/.5ML
1.25 SOLUTION, CONCENTRATE RESPIRATORY (INHALATION)
Status: COMPLETED | OUTPATIENT
Start: 2018-11-11 | End: 2018-11-11

## 2018-11-11 RX ORDER — ACETAMINOPHEN 325 MG/1
650 TABLET ORAL EVERY 8 HOURS PRN
Status: DISCONTINUED | OUTPATIENT
Start: 2018-11-11 | End: 2018-11-18 | Stop reason: HOSPADM

## 2018-11-11 RX ORDER — ENOXAPARIN SODIUM 100 MG/ML
1 INJECTION SUBCUTANEOUS
Status: COMPLETED | OUTPATIENT
Start: 2018-11-11 | End: 2018-11-11

## 2018-11-11 RX ORDER — LEVALBUTEROL 1.25 MG/.5ML
1.25 SOLUTION, CONCENTRATE RESPIRATORY (INHALATION) EVERY 6 HOURS PRN
Status: DISCONTINUED | OUTPATIENT
Start: 2018-11-11 | End: 2018-11-12

## 2018-11-11 RX ORDER — NAPROXEN SODIUM 220 MG/1
81 TABLET, FILM COATED ORAL
Status: COMPLETED | OUTPATIENT
Start: 2018-11-11 | End: 2018-11-11

## 2018-11-11 RX ORDER — ONDANSETRON 2 MG/ML
4 INJECTION INTRAMUSCULAR; INTRAVENOUS EVERY 8 HOURS PRN
Status: DISCONTINUED | OUTPATIENT
Start: 2018-11-11 | End: 2018-11-18 | Stop reason: HOSPADM

## 2018-11-11 RX ORDER — PANTOPRAZOLE SODIUM 40 MG/1
40 TABLET, DELAYED RELEASE ORAL DAILY
Status: DISCONTINUED | OUTPATIENT
Start: 2018-11-12 | End: 2018-11-18 | Stop reason: HOSPADM

## 2018-11-11 RX ORDER — METHYLPREDNISOLONE SOD SUCC 125 MG
125 VIAL (EA) INJECTION EVERY 8 HOURS
Status: DISCONTINUED | OUTPATIENT
Start: 2018-11-11 | End: 2018-11-13

## 2018-11-11 RX ADMIN — CEFTRIAXONE 1 G: 1 INJECTION, SOLUTION INTRAVENOUS at 09:11

## 2018-11-11 RX ADMIN — ENOXAPARIN SODIUM 60 MG: 100 INJECTION SUBCUTANEOUS at 09:11

## 2018-11-11 RX ADMIN — AZITHROMYCIN MONOHYDRATE 500 MG: 500 INJECTION, POWDER, LYOPHILIZED, FOR SOLUTION INTRAVENOUS at 10:11

## 2018-11-11 RX ADMIN — METHYLPREDNISOLONE SODIUM SUCCINATE 125 MG: 125 INJECTION, POWDER, FOR SOLUTION INTRAMUSCULAR; INTRAVENOUS at 09:11

## 2018-11-11 RX ADMIN — ASPIRIN 81 MG 81 MG: 81 TABLET ORAL at 08:11

## 2018-11-11 RX ADMIN — LEVALBUTEROL 1.25 MG: 1.25 SOLUTION, CONCENTRATE RESPIRATORY (INHALATION) at 08:11

## 2018-11-12 PROBLEM — R79.89 ELEVATED D-DIMER: Status: ACTIVE | Noted: 2018-11-12

## 2018-11-12 LAB
ALBUMIN SERPL BCP-MCNC: 3.4 G/DL
ALP SERPL-CCNC: 89 U/L
ALT SERPL W/O P-5'-P-CCNC: 16 U/L
ANION GAP SERPL CALC-SCNC: 8 MMOL/L
AST SERPL-CCNC: 11 U/L
BASOPHILS # BLD AUTO: 0.01 K/UL
BASOPHILS NFR BLD: 0.1 %
BILIRUB SERPL-MCNC: 0.7 MG/DL
BUN SERPL-MCNC: 17 MG/DL
CALCIUM SERPL-MCNC: 8.8 MG/DL
CHLORIDE SERPL-SCNC: 95 MMOL/L
CO2 SERPL-SCNC: 29 MMOL/L
CREAT SERPL-MCNC: 0.8 MG/DL
DIFFERENTIAL METHOD: ABNORMAL
EOSINOPHIL # BLD AUTO: 0 K/UL
EOSINOPHIL NFR BLD: 0 %
ERYTHROCYTE [DISTWIDTH] IN BLOOD BY AUTOMATED COUNT: 14.1 %
EST. GFR  (AFRICAN AMERICAN): >60 ML/MIN/1.73 M^2
EST. GFR  (NON AFRICAN AMERICAN): >60 ML/MIN/1.73 M^2
GLUCOSE SERPL-MCNC: 188 MG/DL
HCT VFR BLD AUTO: 30.3 %
HGB BLD-MCNC: 9.9 G/DL
LACTATE SERPL-SCNC: 0.7 MMOL/L
LACTATE SERPL-SCNC: 0.9 MMOL/L
LYMPHOCYTES # BLD AUTO: 0.2 K/UL
LYMPHOCYTES NFR BLD: 1.6 %
MCH RBC QN AUTO: 35.6 PG
MCHC RBC AUTO-ENTMCNC: 32.7 G/DL
MCV RBC AUTO: 109 FL
MONOCYTES # BLD AUTO: 0.7 K/UL
MONOCYTES NFR BLD: 5.2 %
NEUTROPHILS # BLD AUTO: 12.9 K/UL
NEUTROPHILS NFR BLD: 93.1 %
PLATELET # BLD AUTO: 220 K/UL
PMV BLD AUTO: 11.8 FL
POTASSIUM SERPL-SCNC: 4.7 MMOL/L
PROT SERPL-MCNC: 5.9 G/DL
RBC # BLD AUTO: 2.78 M/UL
SODIUM SERPL-SCNC: 132 MMOL/L
TROPONIN I SERPL DL<=0.01 NG/ML-MCNC: 0.01 NG/ML
TROPONIN I SERPL DL<=0.01 NG/ML-MCNC: 0.02 NG/ML
WBC # BLD AUTO: 13.81 K/UL

## 2018-11-12 PROCEDURE — 83605 ASSAY OF LACTIC ACID: CPT | Mod: 91,HCWC

## 2018-11-12 PROCEDURE — 83605 ASSAY OF LACTIC ACID: CPT | Mod: HCWC

## 2018-11-12 PROCEDURE — 84484 ASSAY OF TROPONIN QUANT: CPT | Mod: HCWC

## 2018-11-12 PROCEDURE — 85025 COMPLETE CBC W/AUTO DIFF WBC: CPT | Mod: HCWC

## 2018-11-12 PROCEDURE — 25000003 PHARM REV CODE 250: Mod: HCWC | Performed by: INTERNAL MEDICINE

## 2018-11-12 PROCEDURE — 25000242 PHARM REV CODE 250 ALT 637 W/ HCPCS: Mod: HCWC | Performed by: SURGERY

## 2018-11-12 PROCEDURE — 25000242 PHARM REV CODE 250 ALT 637 W/ HCPCS: Mod: HCWC | Performed by: INTERNAL MEDICINE

## 2018-11-12 PROCEDURE — 25000003 PHARM REV CODE 250: Mod: HCWC | Performed by: SURGERY

## 2018-11-12 PROCEDURE — 80053 COMPREHEN METABOLIC PANEL: CPT | Mod: HCWC

## 2018-11-12 PROCEDURE — 63600175 PHARM REV CODE 636 W HCPCS: Mod: HCWC | Performed by: SURGERY

## 2018-11-12 PROCEDURE — 94640 AIRWAY INHALATION TREATMENT: CPT | Mod: HCWC

## 2018-11-12 PROCEDURE — 93005 ELECTROCARDIOGRAM TRACING: CPT | Mod: HCWC

## 2018-11-12 PROCEDURE — 11000001 HC ACUTE MED/SURG PRIVATE ROOM: Mod: HCWC

## 2018-11-12 PROCEDURE — 27000221 HC OXYGEN, UP TO 24 HOURS: Mod: HCWC

## 2018-11-12 PROCEDURE — 93010 ELECTROCARDIOGRAM REPORT: CPT | Mod: HCWC,,, | Performed by: INTERNAL MEDICINE

## 2018-11-12 PROCEDURE — 94761 N-INVAS EAR/PLS OXIMETRY MLT: CPT | Mod: HCWC

## 2018-11-12 PROCEDURE — 99222 1ST HOSP IP/OBS MODERATE 55: CPT | Mod: HCWC,AI,, | Performed by: INTERNAL MEDICINE

## 2018-11-12 PROCEDURE — 36415 COLL VENOUS BLD VENIPUNCTURE: CPT | Mod: HCWC

## 2018-11-12 RX ORDER — TRAZODONE HYDROCHLORIDE 50 MG/1
50 TABLET ORAL NIGHTLY
Status: DISCONTINUED | OUTPATIENT
Start: 2018-11-12 | End: 2018-11-18 | Stop reason: HOSPADM

## 2018-11-12 RX ORDER — CITALOPRAM 10 MG/1
40 TABLET ORAL DAILY
Status: DISCONTINUED | OUTPATIENT
Start: 2018-11-12 | End: 2018-11-18 | Stop reason: HOSPADM

## 2018-11-12 RX ORDER — LOSARTAN POTASSIUM 50 MG/1
50 TABLET ORAL DAILY
Status: DISCONTINUED | OUTPATIENT
Start: 2018-11-12 | End: 2018-11-15

## 2018-11-12 RX ORDER — PRAVASTATIN SODIUM 40 MG/1
40 TABLET ORAL DAILY
Status: DISCONTINUED | OUTPATIENT
Start: 2018-11-12 | End: 2018-11-18 | Stop reason: HOSPADM

## 2018-11-12 RX ORDER — LEVALBUTEROL 1.25 MG/.5ML
1.25 SOLUTION, CONCENTRATE RESPIRATORY (INHALATION) EVERY 8 HOURS
Status: DISCONTINUED | OUTPATIENT
Start: 2018-11-12 | End: 2018-11-15

## 2018-11-12 RX ORDER — ROPINIROLE 0.5 MG/1
1 TABLET, FILM COATED ORAL NIGHTLY
Status: DISCONTINUED | OUTPATIENT
Start: 2018-11-12 | End: 2018-11-18 | Stop reason: HOSPADM

## 2018-11-12 RX ADMIN — LEVALBUTEROL 1.25 MG: 1.25 SOLUTION, CONCENTRATE RESPIRATORY (INHALATION) at 04:11

## 2018-11-12 RX ADMIN — LOSARTAN POTASSIUM 50 MG: 50 TABLET, FILM COATED ORAL at 11:11

## 2018-11-12 RX ADMIN — CEFTRIAXONE 1 G: 1 INJECTION, SOLUTION INTRAVENOUS at 09:11

## 2018-11-12 RX ADMIN — LEVALBUTEROL 1.25 MG: 1.25 SOLUTION, CONCENTRATE RESPIRATORY (INHALATION) at 07:11

## 2018-11-12 RX ADMIN — METHYLPREDNISOLONE SODIUM SUCCINATE 125 MG: 125 INJECTION, POWDER, FOR SOLUTION INTRAMUSCULAR; INTRAVENOUS at 09:11

## 2018-11-12 RX ADMIN — PANTOPRAZOLE SODIUM 40 MG: 40 TABLET, DELAYED RELEASE ORAL at 08:11

## 2018-11-12 RX ADMIN — METHYLPREDNISOLONE SODIUM SUCCINATE 125 MG: 125 INJECTION, POWDER, FOR SOLUTION INTRAMUSCULAR; INTRAVENOUS at 02:11

## 2018-11-12 RX ADMIN — TRAZODONE HYDROCHLORIDE 50 MG: 50 TABLET ORAL at 08:11

## 2018-11-12 RX ADMIN — AZITHROMYCIN MONOHYDRATE 500 MG: 500 INJECTION, POWDER, LYOPHILIZED, FOR SOLUTION INTRAVENOUS at 08:11

## 2018-11-12 RX ADMIN — PRAVASTATIN SODIUM 40 MG: 40 TABLET ORAL at 11:11

## 2018-11-12 RX ADMIN — CITALOPRAM HYDROBROMIDE 40 MG: 10 TABLET ORAL at 11:11

## 2018-11-12 RX ADMIN — ROPINIROLE HYDROCHLORIDE 1 MG: 0.5 TABLET, FILM COATED ORAL at 08:11

## 2018-11-12 RX ADMIN — METHYLPREDNISOLONE SODIUM SUCCINATE 125 MG: 125 INJECTION, POWDER, FOR SOLUTION INTRAMUSCULAR; INTRAVENOUS at 06:11

## 2018-11-12 NOTE — PLAN OF CARE
11/12/18 1229   Discharge Reassessment   Assessment Type Discharge Planning Reassessment       Patient admitted for COPD. She lives at home and will discharge home once medically cleared. She does have family that checks on her daily, and they are in the room to confirm that. She denies any needs or concerns at this time for discharge. She was educated on the signs and symptoms to look for when at home that could catch an exacerbation early and prevent an admit. She states her understanding and agreement. CM will continue to follow patient throughout the transitions of care, and assist with any discharge needs.

## 2018-11-12 NOTE — HPI
83 year old female with history of HTN, dyslipidemia, anxiety, asthma, chronic bronchitis, emphysema, COPD, depression, arthritis, presents to the emergency room with shortness of breath last night. Patient has had cough wheezing and shortness of breath last 2 days, worse tonight. Patient on exam has rhonchi in all fields with no active wheezing, on home oxygen  Patient had to turn upper home oxygen 4 liters today, 90% on ER triage this evening. Patient has crackles in all fields, states the dry hacking cough with no sputum PTA. Patient also has left-sided chest wall pain with any cough, no cardiac history noted.

## 2018-11-12 NOTE — ED PROVIDER NOTES
[facility]                                                 Chief Complaint  83 y.o. female with Shortness of Breath    History of Present Illness  Chantell Herrera presents to the emergency room with shortness of breath tonight  Patient has had cough wheezing and shortness of breath last 2 days, worse tonight  Patient on exam has rhonchi in all fields with no active wheezing, on home oxygen  Patient had to turn upper home oxygen 4 liters today, 90% on ER triage this evening  Patient has crackles in all fields, states the dry hacking cough with no sputum PTA  Patient also has left-sided chest wall pain with any cough, no cardiac history noted    The history is provided by the patient   device was not used during this ER visit    Past Medical History   -- Anxiety     -- Arthritis     -- Asthma     -- Chronic bronchitis     -- COPD (chronic obstructive pulmonary disease)     -- Depression     -- SANTIAGO (dyspnea on exertion)     -- Emphysema of lung     -- Fatigue     -- Hyperlipidemia     -- Hypertension     -- Trouble in sleeping        Surgical HX: Appendectomy, high, JOHNNY, tonsillectomy  No Known Allergies     Review of Systems and Physical Exam      Review of Systems  -- Constitution - no fever, denies fatigue, no weakness, no chills  -- Eyes - no tearing or redness, no visual disturbance  -- Ear, Nose - no tinnitus or earache, no nasal congestion or discharge  -- Mouth,Throat - no sore throat, no toothache, normal voice, normal swallowing  -- Respiratory - cough and congestion, no shortness of breath, no SANTIAGO  -- Cardiovascular - chest pain, no palpitations, denies claudication  -- Gastrointestinal - denies abdominal pain, nausea, vomiting, or diarrhea  -- Genitourinary - no dysuria, no hematuria, no flank pain, no bladder pain  -- Musculoskeletal - denies back pain, negative for myalgias and arthralgias   -- Neurological - no headache, denies weakness or seizure; no LOC  --  Skin - denies pallor, rash, or changes in skin. no hives or welts noted    Vital Signs  Her temperature is 98.5 °F (36.9 °C).   Her blood pressure is 135/65 and her pulse is 94.   Her respiration is 25 and oxygen saturation is 92%.     Physical Exam  -- Nursing note and vitals reviewed  -- Constitutional: Appears well-developed and well-nourished  -- Head: Atraumatic. Normocephalic. No obvious abnormality  -- Eyes: Pupils are equal and reactive to light. Normal conjunctiva and lids  -- Nose: Nose normal in appearance, nares grossly normal. No discharge  -- Throat: Mucous membranes moist, pharynx normal, normal tonsils. No lesions   -- Ears: External ears and TM normal bilaterally. Normal hearing and no drainage  -- Neck: Normal range of motion. Neck supple. No masses, trachea midline  -- Cardiac: Normal rate, regular rhythm and normal heart sounds  -- Pulmonary: faint rhonchi at the bilateral bases with no active wheezing   -- Abdominal: Soft, no tenderness. Normal bowel sounds. Normal liver edge  -- Musculoskeletal: Normal range of motion, no effusions. Joints stable   -- Neurological: No focal deficits. Showed good interaction with staff  -- Skin: Warm and dry. No evidence of rash or cellulitis    Emergency Room Course      Lab Results     K 4.5   CL 94 (L)   CO2 31 (H)   BUN 18   CREATININE 0.9      ALKPHOS 93   AST 14   ALT 18   BILITOT 0.6   ALBUMIN 3.9   PROT 6.7   WBC 17.22 (H)   HGB 11.0 (L)   HCT 33.7 (L)      CPK 19 (L)   CPK 19 (L)   CPKMB 1.2   TROPONINI 0.019   INR 1.1    (H)   DDIMER 0.93 (H)   LACTATE 2.0   TSH 0.421     EKG  -- The EKG findings today were without concerning findings from baseline    Radiology  -- Chest x-ray showed no infiltrate and showed no acute pathology    Medications Given  methylPREDNISolone sodium succinate injection 125 mg (not administered)   azithromycin 500 mg in dextrose 5 % 250 mL IVPB (ready to mix system) (not administered)   cefTRIAXone  (ROCEPHIN) 1 g in dextrose 5 % 50 mL IVPB (not administered)   enoxaparin injection 60 mg (not administered)   aspirin chewable tablet 81 mg (81 mg Oral Given 11/11/18 2013)   levalbuterol nebulizer solution 1.25 mg (1.25 mg Nebulization Given 11/11/18 2007)     ED Management  -- 9:33 PM:  This patient [place] [address] with hypoxia and wheezing  -- COPD exacerbation wth bronchitis on ER evaluation tonight, no fever  -- this patient is not septic, this patient has COPD exacerbation with a cough  -- patient given appropriate IV antibiotics in the ER, does not need any fluids  -- patient will be seen by Cardiology for chest wall pain with coughing  -- serial cardiac enzymes performed in the ER as a precaution  -- prophylactic Lovenox given the ER tonight, ultrasound of the legs in the morning  -- patient also be seen by pulmonology in the morning on consult    Diagnosis  -- The primary encounter diagnosis was COPD exacerbation.   -- A diagnosis of SOB (shortness of breath) was also pertinent to this visit.    Disposition and Plan  -- Disposition: observation   -- Condition: stable    This note is dictated on Dragon Natural Speaking word recognition program.  There are word recognition mistakes that are occasionally missed on review.          Festus Maurer MD  11/11/18 7636

## 2018-11-12 NOTE — ASSESSMENT & PLAN NOTE
Cont home losartan, BP elevated this AM but her home meds weren't reordered. Should see it coming down now

## 2018-11-12 NOTE — ASSESSMENT & PLAN NOTE
Due to COPD  She is on 3L NC at rest and 4L NC with exertion at home--using home requirements here right now and maintains sats.

## 2018-11-12 NOTE — ASSESSMENT & PLAN NOTE
Likely viral but was started on azithro and rocephin on admit. I see no consolidation on CXR but pulmonology would like to continue per their note. WBC was 17 K but also on chronic steroids so unsure. No fevers.  She is on prednisone 20mg daily as outpatient--per patient this is a chronic medication and not for exacerbation and she was told she can not stop steroids. She was started on solumedrol 125mg Q8H here. Per Dr. Hickman's note he wants to continue current management so I will not change today  I scheduled her nebs: xopenex  Dr. Hickman recently started theophylline as outpatient: 150mg Qday. I have not restarted here. Unsure if this is necessary in light of not using inhalers as prescribed as outpatient.    She was prescribed symbicort but SHE HAS NOT BEEN USING. I suspect this is why she has been so difficult to control as outpatient. She is not on any maintenance therapy. Discussed needs to be on maintenance inhaler as outpatient in addition to PRN nebs.   She is not having increased O2 requirements but still symptomatic with exertion

## 2018-11-12 NOTE — PLAN OF CARE
11/12/18 1258   Discharge Assessment   Assessment Type Discharge Planning Assessment   Confirmed/corrected address and phone number on facesheet? Yes   Assessment information obtained from? Patient;Caregiver;Medical Record   Expected Length of Stay (days) 3   Communicated expected length of stay with patient/caregiver yes   Prior to hospitilization cognitive status: Alert/Oriented   Prior to hospitalization functional status: Independent   Current cognitive status: Alert/Oriented   Current Functional Status: Independent   Lives With spouse   Able to Return to Prior Arrangements yes   Is patient able to care for self after discharge? Yes   Who are your caregiver(s) and their phone number(s)? Jordan palacios - spouse - [phone]   Patient's perception of discharge disposition home or selfcare   Readmission Within The Last 30 Days no previous admission in last 30 days   Patient currently being followed by outpatient case management? No   Patient currently receives any other outside agency services? No   Equipment Currently Used at Home oxygen;nebulizer   Do you have any problems affording any of your prescribed medications? No   Is the patient taking medications as prescribed? yes   Does the patient have transportation home? Yes   Transportation Available family or friend will provide   Does the patient receive services at the [facility]? No   Discharge Plan A Home   Discharge Plan B Home   Patient/Family In Agreement With Plan yes       Pt is a 83 year old female admitted for SOB.  Spouse and children in room and all questions answered.  No Social Work needs noted at this time. Will continue to follow and offer support as needed. Discharge Planning brochure given to the patient.    Kd Booth LMSW

## 2018-11-12 NOTE — DISCHARGE INSTRUCTIONS
COPD Flare    You have had a flare-up of your COPD.  COPD, or chronic obstructive pulmonary disease, is a common lung disease. It causes your airways to become irritated and narrower. This makes it harder for you to breathe. Emphysema and chronic bronchitis are both types of COPD. This is a chronic condition, which means you always have it. Sometimes it gets worse. When this happens, it is called a flare-up.  Symptoms of COPD  People with COPD may have symptoms most of the time. In a flare-up, your symptoms get worse. These symptoms may mean you are having a flare-up:  · Shortness of breath, shallow or rapid breathing, or wheezing that gets worse  · Lung infection  · Cough that gets worse  · More mucus, thicker mucus or mucus of a different color  · Tiredness, decreased energy, or trouble doing your usual activities  · Fever  · Chest tightness  · Your symptoms dont get better even when you use your usual medicines, inhalers, and nebulizer  · Trouble talking  · You feel confused  Causes of flare-ups  Unfortunately, a flare-up can happen even though you did everything right, and you followed your doctors instructions. Some causes of flare-ups are:  · Smoking or secondhand smoke  · Colds, the flu, or respiratory infections  · Air pollution  · Sudden change in the weather  · Dust, irritating chemicals, or strong fumes  · Not taking your medicines as prescribed  Home care  Here are some things you can do at home to treat a flare-up:  · Try not to panic. This makes it harder to breathe, and keeps you from doing the right things.  · Dont smoke or be around others who are smoking.  · Try to drink more fluids than usual during a flare-up, unless your doctor has told you not to because of heart and kidney problems. More fluids can help loosen the mucus.  · Use your inhalers and nebulizer, if you have one, as you have been told to.  · If you were given antibiotics, take them until they are used up or your doctor tells you  to stop. Its important to finish the antibiotics, even though you feel better. This will make sure the infection has cleared.  · If you were given prednisone or another steroid, finish it even if you feel better.  Preventing a flare-up  Even though flare-ups happen, the best way to treat one is to prevent it before it starts. Here are some pointers:  · Dont smoke or be around others who are smoking.  · Take your medicines as you have been told.  · Talk with your doctor about getting a flu shot every year. Also find out if you need a pneumonia shot.  · If there is a weather advisory warning to stay indoors, try to stay inside when possible.  · Try to eat healthy and get plenty of sleep.  · Try to avoid things that usually set you off, like dust, chemical fumes, hairsprays, or strong perfumes.  Follow-up care  Follow up with your healthcare provider, or as advised.  If a culture was done, you will be told if your treatment needs to be changed. You can call as directed for the results.  If X-rays were done, you will be notified of any new findings that may affect your care.  Call 911  Call 911 if any of these occur:  · You have trouble breathing  · You feel confused or its difficult to wake you up  · You faint or lose consciousness  · You have a rapid heart rate  · You have new pain in your chest, arm, shoulder, neck or upper back  When to seek medical advice  Call your healthcare provider right away if any of these occur:  · Wheezing or shortness of breath gets worse  · You need to use your inhalers more often than usual without relief  · Fever of 100.4°F (38ºC) or higher, or as directed by your healthcare provider  · Coughing up lots of dark-colored or bloody mucus (sputum)  · Chest pain with each breath  · You do not start to get better within 24 hours  · Swelling of your ankles gets worse  · Dizziness or weakness  Date Last Reviewed: 9/1/2016  © 0105-9178 [employer]. [address] [facility],  [place] [address]. All rights reserved. This information is not intended as a substitute for professional medical care. Always follow your healthcare professional's instructions.

## 2018-11-12 NOTE — HPI
Patient presented to ER with SOB. Sx started 2 weeks ago and progressively getting worse. She was having substernal chest pains yesterday which prompted her to come to the er. She was having to hunch over to walk because she felt so SOB. She has COPD and wears 3L NC at home at rest, 4L with exertion. She does report worsening wheezing and cough. Cough is productive--now of yellow sputum. No fevers. Denies LE edema. Denies sore throat, otalgia. + rhinorrhea. No sick contacts. She does report feeling better this AM.

## 2018-11-12 NOTE — PROGRESS NOTES
Nursing Notes  Bedside handoff completed with STACIA Lyles. Patient supine with HOB up. No resp distress. Call bell in reach. Denies needs.       Huddle Comments

## 2018-11-12 NOTE — CONSULTS
[facility]  Cardiology  Consult Note    Patient Name: Chantell Herrera  MRN: [id number]  Admission Date: 11/11/2018  Hospital Length of Stay: 1 days  Code Status: Full Code   Attending Provider: Noe Fofana MD   Consulting Provider: Kahlil Falcon NP  Primary Care Physician: Anson Leiva MD  Principal Problem:<principal problem not specified>    Patient information was obtained from patient, past medical records and ER records.     Consults  Subjective:     Chief Complaint:  CP, SOB     HPI:   83 year old female with history of HTN, dyslipidemia, anxiety, asthma, chronic bronchitis, emphysema, COPD, depression, arthritis, presents to the emergency room with shortness of breath last night. Patient has had cough wheezing and shortness of breath last 2 days, worse tonight. Patient on exam has rhonchi in all fields with no active wheezing, on home oxygen  Patient had to turn upper home oxygen 4 liters today, 90% on ER triage this evening. Patient has crackles in all fields, states the dry hacking cough with no sputum PTA. Patient also has left-sided chest wall pain with any cough, no cardiac history noted.           Past Medical History:   Diagnosis Date    Anxiety     Arthritis     Asthma     Chronic bronchitis     COPD (chronic obstructive pulmonary disease)     Depression     SANTIAGO (dyspnea on exertion)     Emphysema of lung     Fatigue     Hyperlipidemia     Hypertension     Trouble in sleeping        Past Surgical History:   Procedure Laterality Date    APPENDECTOMY      EYE SURGERY      HYSTERECTOMY      TONSILLECTOMY         Review of patient's allergies indicates:  No Known Allergies    No current facility-administered medications on file prior to encounter.      Current Outpatient Medications on File Prior to Encounter   Medication Sig    albuterol (PROVENTIL) 2.5 mg /3 mL (0.083 %) nebulizer solution Take 3 mLs (2.5 mg total) by nebulization every 6 (six) hours as  needed for Wheezing.    artificial tears,hypromellose, 0.3 % Drop continuous prn.     aspirin 81 MG Chew Take 81 mg by mouth once daily.    baclofen (LIORESAL) 10 MG tablet Take 1 tablet (10 mg total) by mouth 3 (three) times daily.    budesonide-formoterol 160-4.5 mcg (SYMBICORT) 160-4.5 mcg/actuation HFAA Inhale 2 puffs into the lungs every 12 (twelve) hours.    cholecalciferol, vitamin D3, (VITAMIN D3) 2,000 unit Tab Take 2,000 Units by mouth once daily.    citalopram (CELEXA) 40 MG tablet TAKE 1 TABLET EVERY DAY    guaifenesin-codeine 100-10 mg/5 ml (TUSSI-ORGANIDIN NR)  mg/5 mL syrup Take 5 mLs by mouth every 6 (six) hours as needed for Cough.    ipratropium (ATROVENT) 0.02 % nebulizer solution Take 500 mcg by nebulization 4 (four) times daily.     lactulose (CHRONULAC) 10 gram/15 mL solution     losartan (COZAAR) 50 MG tablet TAKE 1 TABLET EVERY DAY    montelukast (SINGULAIR) 10 mg tablet Take 10 mg by mouth every evening.    mupirocin (BACTROBAN) 2 % ointment APPLY  TOPICALLY THREE TIMES DAILY    ondansetron (ZOFRAN) 4 MG tablet Take 1 tablet (4 mg total) by mouth every 6 (six) hours.    pravastatin (PRAVACHOL) 40 MG tablet TAKE 1 TABLET EVERY DAY    predniSONE (DELTASONE) 10 MG tablet Take 20 mg BID x 3d then 20mg daily x 3d then 10mg daily x 3days then 5mg daily x 4 days (Patient taking differently: Take 40 mg by mouth once daily. Take 20 mg BID x 3d then 20mg daily x 3d then 10mg daily x 3days then 5mg daily x 4 days)    rOPINIRole (REQUIP) 1 MG tablet TAKE 1 TABLET EVERY EVENING.    theophylline (THEODUR) 300 MG 12 hr tablet     traZODone (DESYREL) 50 MG tablet Take 1 tablet (50 mg total) by mouth every evening.    walker Misc Evangelista walker     Family History     Problem Relation (Age of Onset)    COPD Brother, Daughter    Cancer Sister    Diabetes Father    Heart disease Mother    Hypertension Mother, Father, Brother    Kidney disease Son    No Known Problems Daughter         Tobacco Use    Smoking status: Former Smoker     Last attempt to quit: 8/3/2000     Years since quittin.2    Smokeless tobacco: Never Used   Substance and Sexual Activity    Alcohol use: No    Drug use: No    Sexual activity: No     Review of Systems   Constitution: Negative.   HENT: Positive for congestion. Negative for ear discharge, ear pain, hearing loss, hoarse voice, nosebleeds, odynophagia, sore throat, stridor and tinnitus.    Eyes: Negative.    Cardiovascular: Positive for chest pain and dyspnea on exertion. Negative for claudication, cyanosis, irregular heartbeat, leg swelling, near-syncope, orthopnea, palpitations, paroxysmal nocturnal dyspnea and syncope.   Respiratory: Positive for cough and shortness of breath. Negative for hemoptysis, sleep disturbances due to breathing, snoring, sputum production and wheezing.    Endocrine: Negative.  Negative for cold intolerance, heat intolerance, polydipsia, polyphagia and polyuria.   Hematologic/Lymphatic: Negative.  Negative for adenopathy and bleeding problem. Does not bruise/bleed easily.   Skin: Negative.    Musculoskeletal: Negative.    Gastrointestinal: Negative.    Genitourinary: Negative.    Neurological: Negative.    Psychiatric/Behavioral: Negative.    Allergic/Immunologic: Negative.      Objective:     Vital Signs (Most Recent):  Temp: 97.5 °F (36.4 °C) (18 0713)  Pulse: 89 (18 1027)  Resp: 17 (18)  BP: (!) 173/77 (18 1027)  SpO2: 98 % (18) Vital Signs (24h Range):  Temp:  [97.5 °F (36.4 °C)-98.5 °F (36.9 °C)] 97.5 °F (36.4 °C)  Pulse:  [] 89  Resp:  [16-25] 17  SpO2:  [91 %-98 %] 98 %  BP: (135-174)/(64-87) 173/77     Weight: 66.5 kg (146 lb 9.7 oz)  Body mass index is 26.81 kg/m².    SpO2: 98 %  O2 Device (Oxygen Therapy): nasal cannula      Intake/Output Summary (Last 24 hours) at 2018 1056  Last data filed at 2018 0619  Gross per 24 hour   Intake 600 ml   Output --   Net 600 ml        Lines/Drains/Airways     Peripheral Intravenous Line                 Peripheral IV - Single Lumen 11/11/18 1957 Right Antecubital less than 1 day                Physical Exam   Constitutional: She is oriented to person, place, and time. She appears well-developed and well-nourished. No distress.   HENT:   Head: Atraumatic.   Neck: Normal range of motion. Neck supple. No JVD present. No tracheal deviation present. No thyromegaly present.   Pulmonary/Chest: Effort normal. No stridor.   Rhonchi and diminished breath sounds bilatrally   Abdominal: Soft. Bowel sounds are normal. She exhibits no distension and no mass. There is no tenderness. There is no rebound and no guarding.   Musculoskeletal: Normal range of motion. She exhibits no edema, tenderness or deformity.   Lymphadenopathy:     She has no cervical adenopathy.   Neurological: She is alert and oriented to person, place, and time.   Skin: She is not diaphoretic.   Psychiatric: She has a normal mood and affect. Judgment and thought content normal.       Significant Labs:   ABG:   Recent Labs   Lab 11/11/18 2003   PH 7.48*   PCO2 46*   HCO3 34.30*   POCSATURATED 96.8   BE 9.70*   , Blood Culture:   Recent Labs   Lab 11/11/18 1959   LABBLOO No Growth to date   , BMP:   Recent Labs   Lab 11/11/18 1958 11/12/18  0400    188*    132*   K 4.5 4.7   CL 94* 95   CO2 31* 29   BUN 18 17   CREATININE 0.9 0.8   CALCIUM 9.4 8.8   , CMP   Recent Labs   Lab 11/11/18 1958 11/12/18  0400    132*   K 4.5 4.7   CL 94* 95   CO2 31* 29    188*   BUN 18 17   CREATININE 0.9 0.8   CALCIUM 9.4 8.8   PROT 6.7 5.9*   ALBUMIN 3.9 3.4*   BILITOT 0.6 0.7   ALKPHOS 93 89   AST 14 11   ALT 18 16   ANIONGAP 11 8   ESTGFRAFRICA >60 >60   EGFRNONAA >60 >60   , CBC   Recent Labs   Lab 11/11/18 1958 11/12/18  0400   WBC 17.22* 13.81*   HGB 11.0* 9.9*   HCT 33.7* 30.3*    220   , INR   Recent Labs   Lab 11/11/18 1958   INR 1.1   , Lipid Panel No results  for input(s): CHOL, HDL, LDLCALC, TRIG, CHOLHDL in the last 48 hours.,   Pathology Results  (Last 10 years)    None      , Troponin   Recent Labs   Lab 11/11/18 1958 11/12/18  0157 11/12/18  0753   TROPONINI 0.019 0.017 0.014   , All pertinent lab results from the last 24 hours have been reviewed. and   Recent Lab Results       11/12/18  0753   11/12/18  0400   11/12/18  0157   11/12/18  0022   11/11/18 2003        Influenza A, Molecular               Influenza B, Molecular               POC tHb         9     POC O2Hb Arterial         94.5     POC COHb         1.7     POC MetHb         0.6     Albumin   3.4           Alkaline Phosphatase   89           Allens Test         Pass     ALT   16           Anion Gap   8           aPTT               AST   11           Baso #   0.01           Basophil%   0.1           Total Bilirubin   0.7  Comment:  For infants and newborns, interpretation of results should be based  on gestational age, weight and in agreement with clinical  observations.  Premature Infant recommended reference ranges:  Up to 24 hours.............<8.0 mg/dL  Up to 48 hours............<12.0 mg/dL  3-5 days..................<15.0 mg/dL  6-29 days.................<15.0 mg/dL             Blood Culture, Routine               BNP               Site         Right Radial     BUN, Bld   17           Calcium   8.8           Chloride   95           CO2   29           CPK               CPK MB               Creatinine   0.8           D-Dimer               DelSys         Nasal Cannula     Differential Method   Automated           eGFR if    >60           eGFR if non    >60  Comment:  Calculation used to obtain the estimated glomerular filtration  rate (eGFR) is the CKD-EPI equation.              Eos #   0.0           Eosinophil%   0.0           Flu A & B Source               Glucose   188           Gran # (ANC)   12.9           Gran%   93.1           Hematocrit   30.3            Hemoglobin   9.9           Coumadin Monitoring INR               Lactate, Elia   0.9  Comment:  Falsely low lactic acid results can be found in samples   containing >=13.0 mg/dL total bilirubin and/or >=3.5 mg/dL   direct bilirubin.     0.7  Comment:  Falsely low lactic acid results can be found in samples   containing >=13.0 mg/dL total bilirubin and/or >=3.5 mg/dL   direct bilirubin.         Lymph #   0.2           Lymph%   1.6           MB%               MCH   35.6           MCHC   32.7           MCV   109           Mono #   0.7           Mono%   5.2           MPV   11.8           Platelets   220           POC BE         9.70     POC HCO3         34.30     POC PCO2         46     POC PH         7.48  Comment:  Nasal Cannula at 3lpm      POC PO2         76     POC SATURATED O2         96.8     POC TCO2         35.7     Potassium   4.7           Total Protein   5.9           Protime               RBC   2.78           RDW   14.1           Sodium   132           Troponin I 0.014  Comment:  The reference interval for Troponin I represents the 99th percentile   cutoff   for our facility and is consistent with 3rd generation assay   performance.     0.017  Comment:  The reference interval for Troponin I represents the 99th percentile   cutoff   for our facility and is consistent with 3rd generation assay   performance.           WBC   13.81                            11/11/18 1959 11/11/18 1958 11/11/18 1957        Influenza A, Molecular     Negative     Influenza B, Molecular     Negative     POC tHb           POC O2Hb Arterial           POC COHb           POC MetHb           Albumin   3.9       Alkaline Phosphatase   93       Allens Test           ALT   18       Anion Gap   11       aPTT   22.8  Comment:  aPTT therapeutic range = 39-69 seconds       AST   14       Baso #   0.02       Basophil%   0.1       Total Bilirubin   0.6  Comment:  For infants and newborns, interpretation of results should be based  on  gestational age, weight and in agreement with clinical  observations.  Premature Infant recommended reference ranges:  Up to 24 hours.............<8.0 mg/dL  Up to 48 hours............<12.0 mg/dL  3-5 days..................<15.0 mg/dL  6-29 days.................<15.0 mg/dL         Blood Culture, Routine No Growth to date[P]         BNP   129  Comment:  Values of less than 100 pg/ml are consistent with non-CHF populations.       Site           BUN, Bld   18       Calcium   9.4       Chloride   94       CO2   31       CPK   19          19       CPK MB   1.2       Creatinine   0.9       D-Dimer   0.93  Comment:  The quantitative D-dimer assay should be used as an aid in   the diagnosis of deep vein thrombosis and pulmonary embolism  in patients with the appropriate presentation and clinical  history. The upper limit of the reference interval and the clinical   cut off   point are identical. Causes of a positive (>0.50 mg/L FEU) D-Dimer   test  include, but are not limited to: DVT, PE, DIC, thrombolytic   therapy, anticoagulant therapy, recent surgery, trauma, or   pregnancy, disseminated malignancy, aortic aneurysm, cirrhosis,  and severe infection. False negative results may occur in   patients with distal DVT.         DelSys           Differential Method   Automated       eGFR if    >60       eGFR if non    >60  Comment:  Calculation used to obtain the estimated glomerular filtration  rate (eGFR) is the CKD-EPI equation.          Eos #   0.1       Eosinophil%   0.3       Flu A & B Source     Nasal swab     Glucose   101       Gran # (ANC)   14.4       Gran%   83.6       Hematocrit   33.7       Hemoglobin   11.0       Coumadin Monitoring INR   1.1  Comment:  Coumadin Therapy:  2.0 - 3.0 for INR for all indicators except mechanical heart valves  and antiphospholipid syndromes which should use 2.5 - 3.5.         Lactate, Elia 2.0  Comment:  Falsely low lactic acid results can be found in  "samples   containing >=13.0 mg/dL total bilirubin and/or >=3.5 mg/dL   direct bilirubin.           Lymph #   0.8       Lymph%   4.9       MB%   6.3  Comment:  To be positive, the MB% must be greater than 5% AND the CK-MB  greater than 6.5 ng/mL. Values not in the reference interval,   but not qualifying as positive, should be considered "trace".         MCH   35.3       MCHC   32.6       MCV   108       Mono #   1.9       Mono%   11.1       MPV   11.3       Platelets   260       POC BE           POC HCO3           POC PCO2           POC PH           POC PO2           POC SATURATED O2           POC TCO2           Potassium   4.5       Total Protein   6.7       Protime   11.4       RBC   3.12       RDW   14.3       Sodium   136       Troponin I   0.019  Comment:  The reference interval for Troponin I represents the 99th percentile   cutoff   for our facility and is consistent with 3rd generation assay   performance.         WBC   17.22             Significant Imaging: CT scan: CT ABDOMEN PELVIS WITH CONTRAST: No results found for this visit on 11/11/18. and CT ABDOMEN PELVIS WITHOUT CONTRAST: No results found for this visit on 11/11/18., Echocardiogram: 2D echo with color flow doppler: No results found for this or any previous visit. and X-Ray: CXR: X-Ray Chest 1 View (CXR):   Results for orders placed or performed during the hospital encounter of 11/11/18   X-Ray Chest 1 View    Narrative    EXAMINATION:  XR CHEST 1 VIEW    CLINICAL HISTORY:  SOB;    TECHNIQUE:  Single frontal view of the chest was performed.    COMPARISON:  06/06/2018    FINDINGS:  The lungs are hyperexpanded and show mild interstitial prominence suggesting COPD change.  There is bibasilar subsegmental atelectasis.  No consolidation or pleural effusions.  The heart is normal in size.  Calcified atheromatous disease affects the aorta.  Age-appropriate degenerative changes affect the skeleton.      Impression    As above.      Electronically signed " by: Eileen Velasquez MD  Date:    11/12/2018  Time:    08:52    and X-Ray Chest PA and Lateral (CXR): No results found for this visit on 11/11/18. and KUB: X-Ray Abdomen AP 1 View (KUB): No results found for this visit on 11/11/18.    Assessment and Plan:     No notes have been filed under this hospital service.  Service: Cardiology      VTE Risk Mitigation (From admission, onward)        Ordered     IP VTE HIGH RISK PATIENT  Once      11/11/18 2235     Place sequential compression device  Until discontinued      11/11/18 2235        Current Facility-Administered Medications   Medication    acetaminophen tablet 650 mg    azithromycin 500 mg in dextrose 5 % 250 mL IVPB (ready to mix system)    cefTRIAXone (ROCEPHIN) 1 g in dextrose 5 % 50 mL IVPB    citalopram tablet 40 mg    levalbuterol nebulizer solution 1.25 mg    losartan tablet 50 mg    methylPREDNISolone sodium succinate injection 125 mg    ondansetron injection 4 mg    pantoprazole EC tablet 40 mg    pravastatin tablet 40 mg    promethazine (PHENERGAN) 12.5 mg in dextrose 5 % 50 mL IVPB    rOPINIRole tablet 1 mg    traZODone tablet 50 mg       Echo: LVEF 60%, LV diastolic function abnormal stage 1 impaired relaxation, mild TR.    MPI 9/2011: normal perfusion study    DX: Chest pain chronic  COPD on home O2 for yrs; ex smoker/Asthma/Emphysema  HTN  Dyslipidemia  Arthritis  Depression/anxiety    Plan: 81 mg ecasa, continue PPI, losartan  May DC home if stable; will schedule repeat stress test  Thank you for your consult. I will follow-up with patient. Please contact us if you have any additional questions.      Transcribed by   Kahlil Falcon NP for Dr CANDIDA Metcalf  Cardiology   [facility]  I attest that I have personally seen and examined this patient. I have reviewed and discussed the management in detail as outlined above.

## 2018-11-12 NOTE — ED NOTES
Pt admitted to room 310 . Pt transferred via wheel chair by STACIA Lyles & evan sena, on O2 in no distress. VSS. Bedside report given to STACAI Lyles.

## 2018-11-12 NOTE — H&P
[facility] Medicine  History & Physical    Patient Name: Chantell Herrera  MRN: [id number]  Admission Date: 11/11/2018  Attending Physician: Noe Fofana MD   Primary Care Provider: Anson Leiva MD         Patient information was obtained from patient and ER records.     Subjective:     Principal Problem:COPD exacerbation    Chief Complaint:   Chief Complaint   Patient presents with    Shortness of Breath        HPI: Patient presented to ER with SOB. Sx started 2 weeks ago and progressively getting worse. She was having substernal chest pains yesterday which prompted her to come to the er. She was having to hunch over to walk because she felt so SOB. She has COPD and wears 3L NC at home at rest, 4L with exertion. She does report worsening wheezing and cough. Cough is productive--now of yellow sputum. No fevers. Denies LE edema. Denies sore throat, otalgia. + rhinorrhea. No sick contacts. She does report feeling better this AM.     Past Medical History:   Diagnosis Date    Anxiety     Arthritis     Asthma     Chronic bronchitis     COPD (chronic obstructive pulmonary disease)     Depression     SANTIAGO (dyspnea on exertion)     Emphysema of lung     Fatigue     Hyperlipidemia     Hypertension     Trouble in sleeping        Past Surgical History:   Procedure Laterality Date    APPENDECTOMY      EYE SURGERY      HYSTERECTOMY      TONSILLECTOMY         Review of patient's allergies indicates:  No Known Allergies    No current facility-administered medications on file prior to encounter.      Current Outpatient Medications on File Prior to Encounter   Medication Sig    albuterol (PROVENTIL) 2.5 mg /3 mL (0.083 %) nebulizer solution Take 3 mLs (2.5 mg total) by nebulization every 6 (six) hours as needed for Wheezing.    artificial tears,hypromellose, 0.3 % Drop continuous prn.     aspirin 81 MG Chew Take 81 mg by mouth once daily.    baclofen (LIORESAL) 10 MG  tablet Take 1 tablet (10 mg total) by mouth 3 (three) times daily.    budesonide-formoterol 160-4.5 mcg (SYMBICORT) 160-4.5 mcg/actuation HFAA Inhale 2 puffs into the lungs every 12 (twelve) hours.    cholecalciferol, vitamin D3, (VITAMIN D3) 2,000 unit Tab Take 2,000 Units by mouth once daily.    citalopram (CELEXA) 40 MG tablet TAKE 1 TABLET EVERY DAY    guaifenesin-codeine 100-10 mg/5 ml (TUSSI-ORGANIDIN NR)  mg/5 mL syrup Take 5 mLs by mouth every 6 (six) hours as needed for Cough.    ipratropium (ATROVENT) 0.02 % nebulizer solution Take 500 mcg by nebulization 4 (four) times daily.     lactulose (CHRONULAC) 10 gram/15 mL solution     losartan (COZAAR) 50 MG tablet TAKE 1 TABLET EVERY DAY    montelukast (SINGULAIR) 10 mg tablet Take 10 mg by mouth every evening.    mupirocin (BACTROBAN) 2 % ointment APPLY  TOPICALLY THREE TIMES DAILY    ondansetron (ZOFRAN) 4 MG tablet Take 1 tablet (4 mg total) by mouth every 6 (six) hours.    pravastatin (PRAVACHOL) 40 MG tablet TAKE 1 TABLET EVERY DAY    predniSONE (DELTASONE) 10 MG tablet Take 20 mg BID x 3d then 20mg daily x 3d then 10mg daily x 3days then 5mg daily x 4 days (Patient taking differently: Take 40 mg by mouth once daily. Take 20 mg BID x 3d then 20mg daily x 3d then 10mg daily x 3days then 5mg daily x 4 days)    rOPINIRole (REQUIP) 1 MG tablet TAKE 1 TABLET EVERY EVENING.    theophylline (THEODUR) 300 MG 12 hr tablet     traZODone (DESYREL) 50 MG tablet Take 1 tablet (50 mg total) by mouth every evening.    walker Misc Evangelista walker     Family History     Problem Relation (Age of Onset)    COPD Brother, Daughter    Cancer Sister    Diabetes Father    Heart disease Mother    Hypertension Mother, Father, Brother    Kidney disease Son    No Known Problems Daughter        Tobacco Use    Smoking status: Former Smoker     Last attempt to quit: 8/3/2000     Years since quittin.2    Smokeless tobacco: Never Used   Substance and Sexual  Activity    Alcohol use: No    Drug use: No    Sexual activity: No     Review of Systems   Constitutional: Negative for activity change, fatigue, fever and unexpected weight change.   HENT: Negative for congestion, ear pain, hearing loss, rhinorrhea and sore throat.    Eyes: Negative for redness and visual disturbance.   Respiratory: Positive for cough, chest tightness, shortness of breath and wheezing.    Cardiovascular: Positive for chest pain. Negative for palpitations and leg swelling.   Gastrointestinal: Negative for abdominal pain, constipation, diarrhea, nausea and vomiting.   Genitourinary: Negative for dysuria, frequency and urgency.   Musculoskeletal: Negative for back pain, joint swelling and neck pain.   Skin: Negative for color change, rash and wound.   Neurological: Negative for dizziness, tremors, weakness, light-headedness and headaches.     Objective:     Vital Signs (Most Recent):  Temp: 98.6 °F (37 °C) (11/12/18 1201)  Pulse: 95 (11/12/18 1201)  Resp: 18 (11/12/18 1201)  BP: (!) 177/84 (11/12/18 1201)  SpO2: 97 % (11/12/18 1201) Vital Signs (24h Range):  Temp:  [97.5 °F (36.4 °C)-98.6 °F (37 °C)] 98.6 °F (37 °C)  Pulse:  [] 95  Resp:  [16-25] 18  SpO2:  [91 %-98 %] 97 %  BP: (135-177)/(64-87) 177/84     Weight: 66.5 kg (146 lb 9.7 oz)  Body mass index is 26.81 kg/m².    Physical Exam   Constitutional: She is oriented to person, place, and time. She appears well-developed and well-nourished. No distress.   HENT:   Head: Normocephalic and atraumatic.   Right Ear: External ear normal.   Left Ear: External ear normal.   Eyes: Conjunctivae and EOM are normal. Pupils are equal, round, and reactive to light.   Neck: Neck supple. No tracheal deviation present.   Cardiovascular: Normal rate and regular rhythm.   No murmur heard.  Pulmonary/Chest: Effort normal. No respiratory distress. She has wheezes. She has no rales.   Decreased b/l bases   Abdominal: Soft. Bowel sounds are normal. She exhibits  no distension. There is no tenderness.   Neurological: She is alert and oriented to person, place, and time. No cranial nerve deficit.   Skin: Skin is warm and dry.   Psychiatric: She has a normal mood and affect. Her behavior is normal.   Vitals reviewed.        CRANIAL NERVES     CN III, IV, VI   Pupils are equal, round, and reactive to light.  Extraocular motions are normal.        Significant Labs:   CBC:   Recent Labs   Lab 11/11/18 1958 11/12/18  0400   WBC 17.22* 13.81*   HGB 11.0* 9.9*   HCT 33.7* 30.3*    220     CMP:   Recent Labs   Lab 11/11/18 1958 11/12/18  0400    132*   K 4.5 4.7   CL 94* 95   CO2 31* 29    188*   BUN 18 17   CREATININE 0.9 0.8   CALCIUM 9.4 8.8   PROT 6.7 5.9*   ALBUMIN 3.9 3.4*   BILITOT 0.6 0.7   ALKPHOS 93 89   AST 14 11   ALT 18 16   ANIONGAP 11 8   EGFRNONAA >60 >60     Cardiac Markers:   Recent Labs   Lab 11/11/18 1958   *     Troponin:   Recent Labs   Lab 11/11/18 1958 11/12/18  0157 11/12/18  0753   TROPONINI 0.019 0.017 0.014     All pertinent labs within the past 24 hours have been reviewed.    Significant Imaging: I have reviewed all pertinent imaging results/findings within the past 24 hours.    Assessment/Plan:     * COPD exacerbation    Likely viral but was started on azithro and rocephin on admit. I see no consolidation on CXR but pulmonology would like to continue per their note. WBC was 17 K but also on chronic steroids so unsure. No fevers.  She is on prednisone 20mg daily as outpatient--per patient this is a chronic medication and not for exacerbation and she was told she can not stop steroids. She was started on solumedrol 125mg Q8H here. Per Dr. Hickman's note he wants to continue current management so I will not change today  I scheduled her nebs: xopenex  Dr. Hickman recently started theophylline as outpatient: 150mg Qday. I have not restarted here. Unsure if this is necessary in light of not using inhalers as prescribed as  outpatient.    She was prescribed symbicort but SHE HAS NOT BEEN USING. I suspect this is why she has been so difficult to control as outpatient. She is not on any maintenance therapy. Discussed needs to be on maintenance inhaler as outpatient in addition to PRN nebs.   She is not having increased O2 requirements but still symptomatic with exertion       Elevated d-dimer    LE US ordered  Lower suspicion for PE       RLS (restless legs syndrome)    Cont home requip       Chronic respiratory failure with hypoxia    Due to COPD  She is on 3L NC at rest and 4L NC with exertion at home--using home requirements here right now and maintains sats.        Oxygen dependent    Reports using 3L NC at home and 4L with exertion  curretnly on 3L NC at rest       Dyslipidemia           Major depressive disorder with single episode, in full remission    Cont home citalopram       Insomnia    Cont home trazodone       Hyperlipidemia    Cont home pravastatin       HTN (hypertension)    Cont home losartan, BP elevated this AM but her home meds weren't reordered. Should see it coming down now         VTE Risk Mitigation (From admission, onward)        Ordered     IP VTE HIGH RISK PATIENT  Once      11/11/18 2235     Place sequential compression device  Until discontinued      11/11/18 2235             Mirela Mcintyre MD  Department of Hospital Medicine   [facility]

## 2018-11-12 NOTE — PROGRESS NOTES
Staff Handoff  Patient up to room 310 via wheelchair. Bedside report per STACIA Moss. No distress noted. 2 L nasal cannula. Tele normal sinus. SCDs refused. Patient awake, alert, oriented. Call bell in reach. Encouraged to call for assistance.     Resident Handoff

## 2018-11-12 NOTE — ED NOTES
The patient is awake, alert and cooperative with a calm affect, patient is aware of environment, family member at bedside. Airway is open and patent, respirations are spontaneous, normal respiratory effort and rate noted, skin warm and dry, full ROM in all extremities, appearance: no apparent distress noted, resting comfortably.  VSS, no change from previous assessment.  Bed in low, locked position, SR x2, HOB 30 degrees.  Pt able to change position independently. Will continue to monitor.

## 2018-11-12 NOTE — PLAN OF CARE
Problem: Patient Care Overview  Goal: Plan of Care Review  Outcome: Ongoing (interventions implemented as appropriate)  Patient afebrile. Neb tx. Monitoring B/P. IV antibiotics. Telemetry. IV steroids. Safety and comfort maintained. CIS consult. Patient scheduled for cardiac perfusion  test on Thursday, November 15, 2018 at 2:00 PM. No caffeine for 24 hours. Do not eat 3 hours prior to test. Agrees with plan of care.

## 2018-11-12 NOTE — ASSESSMENT & PLAN NOTE
Have treated pt as an outpatient 3 times with oral antibiotics over the last 2 months would improve only to worsen

## 2018-11-12 NOTE — SUBJECTIVE & OBJECTIVE
Past Medical History:   Diagnosis Date    Anxiety     Arthritis     Asthma     Chronic bronchitis     COPD (chronic obstructive pulmonary disease)     Depression     SANTIAGO (dyspnea on exertion)     Emphysema of lung     Fatigue     Hyperlipidemia     Hypertension     Trouble in sleeping        Past Surgical History:   Procedure Laterality Date    APPENDECTOMY      EYE SURGERY      HYSTERECTOMY      TONSILLECTOMY         Review of patient's allergies indicates:  No Known Allergies    No current facility-administered medications on file prior to encounter.      Current Outpatient Medications on File Prior to Encounter   Medication Sig    albuterol (PROVENTIL) 2.5 mg /3 mL (0.083 %) nebulizer solution Take 3 mLs (2.5 mg total) by nebulization every 6 (six) hours as needed for Wheezing.    artificial tears,hypromellose, 0.3 % Drop continuous prn.     aspirin 81 MG Chew Take 81 mg by mouth once daily.    baclofen (LIORESAL) 10 MG tablet Take 1 tablet (10 mg total) by mouth 3 (three) times daily.    budesonide-formoterol 160-4.5 mcg (SYMBICORT) 160-4.5 mcg/actuation HFAA Inhale 2 puffs into the lungs every 12 (twelve) hours.    cholecalciferol, vitamin D3, (VITAMIN D3) 2,000 unit Tab Take 2,000 Units by mouth once daily.    citalopram (CELEXA) 40 MG tablet TAKE 1 TABLET EVERY DAY    guaifenesin-codeine 100-10 mg/5 ml (TUSSI-ORGANIDIN NR)  mg/5 mL syrup Take 5 mLs by mouth every 6 (six) hours as needed for Cough.    ipratropium (ATROVENT) 0.02 % nebulizer solution Take 500 mcg by nebulization 4 (four) times daily.     lactulose (CHRONULAC) 10 gram/15 mL solution     losartan (COZAAR) 50 MG tablet TAKE 1 TABLET EVERY DAY    montelukast (SINGULAIR) 10 mg tablet Take 10 mg by mouth every evening.    mupirocin (BACTROBAN) 2 % ointment APPLY  TOPICALLY THREE TIMES DAILY    ondansetron (ZOFRAN) 4 MG tablet Take 1 tablet (4 mg total) by mouth every 6 (six) hours.    pravastatin (PRAVACHOL) 40 MG  tablet TAKE 1 TABLET EVERY DAY    predniSONE (DELTASONE) 10 MG tablet Take 20 mg BID x 3d then 20mg daily x 3d then 10mg daily x 3days then 5mg daily x 4 days (Patient taking differently: Take 40 mg by mouth once daily. Take 20 mg BID x 3d then 20mg daily x 3d then 10mg daily x 3days then 5mg daily x 4 days)    rOPINIRole (REQUIP) 1 MG tablet TAKE 1 TABLET EVERY EVENING.    theophylline (THEODUR) 300 MG 12 hr tablet     traZODone (DESYREL) 50 MG tablet Take 1 tablet (50 mg total) by mouth every evening.    walker Misc Evangelista walker     Family History     Problem Relation (Age of Onset)    COPD Brother, Daughter    Cancer Sister    Diabetes Father    Heart disease Mother    Hypertension Mother, Father, Brother    Kidney disease Son    No Known Problems Daughter        Tobacco Use    Smoking status: Former Smoker     Last attempt to quit: 8/3/2000     Years since quittin.2    Smokeless tobacco: Never Used   Substance and Sexual Activity    Alcohol use: No    Drug use: No    Sexual activity: No     Review of Systems   Constitutional: Negative for activity change, fatigue, fever and unexpected weight change.   HENT: Negative for congestion, ear pain, hearing loss, rhinorrhea and sore throat.    Eyes: Negative for redness and visual disturbance.   Respiratory: Positive for cough, chest tightness, shortness of breath and wheezing.    Cardiovascular: Positive for chest pain. Negative for palpitations and leg swelling.   Gastrointestinal: Negative for abdominal pain, constipation, diarrhea, nausea and vomiting.   Genitourinary: Negative for dysuria, frequency and urgency.   Musculoskeletal: Negative for back pain, joint swelling and neck pain.   Skin: Negative for color change, rash and wound.   Neurological: Negative for dizziness, tremors, weakness, light-headedness and headaches.     Objective:     Vital Signs (Most Recent):  Temp: 98.6 °F (37 °C) (18 1201)  Pulse: 95 (18 1201)  Resp: 18  (11/12/18 1201)  BP: (!) 177/84 (11/12/18 1201)  SpO2: 97 % (11/12/18 1201) Vital Signs (24h Range):  Temp:  [97.5 °F (36.4 °C)-98.6 °F (37 °C)] 98.6 °F (37 °C)  Pulse:  [] 95  Resp:  [16-25] 18  SpO2:  [91 %-98 %] 97 %  BP: (135-177)/(64-87) 177/84     Weight: 66.5 kg (146 lb 9.7 oz)  Body mass index is 26.81 kg/m².    Physical Exam   Constitutional: She is oriented to person, place, and time. She appears well-developed and well-nourished. No distress.   HENT:   Head: Normocephalic and atraumatic.   Right Ear: External ear normal.   Left Ear: External ear normal.   Eyes: Conjunctivae and EOM are normal. Pupils are equal, round, and reactive to light.   Neck: Neck supple. No tracheal deviation present.   Cardiovascular: Normal rate and regular rhythm.   No murmur heard.  Pulmonary/Chest: Effort normal. No respiratory distress. She has wheezes. She has no rales.   Decreased b/l bases   Abdominal: Soft. Bowel sounds are normal. She exhibits no distension. There is no tenderness.   Neurological: She is alert and oriented to person, place, and time. No cranial nerve deficit.   Skin: Skin is warm and dry.   Psychiatric: She has a normal mood and affect. Her behavior is normal.   Vitals reviewed.        CRANIAL NERVES     CN III, IV, VI   Pupils are equal, round, and reactive to light.  Extraocular motions are normal.        Significant Labs:   CBC:   Recent Labs   Lab 11/11/18 1958 11/12/18  0400   WBC 17.22* 13.81*   HGB 11.0* 9.9*   HCT 33.7* 30.3*    220     CMP:   Recent Labs   Lab 11/11/18 1958 11/12/18  0400    132*   K 4.5 4.7   CL 94* 95   CO2 31* 29    188*   BUN 18 17   CREATININE 0.9 0.8   CALCIUM 9.4 8.8   PROT 6.7 5.9*   ALBUMIN 3.9 3.4*   BILITOT 0.6 0.7   ALKPHOS 93 89   AST 14 11   ALT 18 16   ANIONGAP 11 8   EGFRNONAA >60 >60     Cardiac Markers:   Recent Labs   Lab 11/11/18 1958   *     Troponin:   Recent Labs   Lab 11/11/18 1958 11/12/18  0157 11/12/18  0753    TROPONINI 0.019 0.017 0.014     All pertinent labs within the past 24 hours have been reviewed.    Significant Imaging: I have reviewed all pertinent imaging results/findings within the past 24 hours.

## 2018-11-12 NOTE — SUBJECTIVE & OBJECTIVE
Past Medical History:   Diagnosis Date    Anxiety     Arthritis     Asthma     Chronic bronchitis     COPD (chronic obstructive pulmonary disease)     Depression     SANTIAGO (dyspnea on exertion)     Emphysema of lung     Fatigue     Hyperlipidemia     Hypertension     Trouble in sleeping        Past Surgical History:   Procedure Laterality Date    APPENDECTOMY      EYE SURGERY      HYSTERECTOMY      TONSILLECTOMY         Review of patient's allergies indicates:  No Known Allergies    No current facility-administered medications on file prior to encounter.      Current Outpatient Medications on File Prior to Encounter   Medication Sig    albuterol (PROVENTIL) 2.5 mg /3 mL (0.083 %) nebulizer solution Take 3 mLs (2.5 mg total) by nebulization every 6 (six) hours as needed for Wheezing.    artificial tears,hypromellose, 0.3 % Drop continuous prn.     aspirin 81 MG Chew Take 81 mg by mouth once daily.    baclofen (LIORESAL) 10 MG tablet Take 1 tablet (10 mg total) by mouth 3 (three) times daily.    budesonide-formoterol 160-4.5 mcg (SYMBICORT) 160-4.5 mcg/actuation HFAA Inhale 2 puffs into the lungs every 12 (twelve) hours.    cholecalciferol, vitamin D3, (VITAMIN D3) 2,000 unit Tab Take 2,000 Units by mouth once daily.    citalopram (CELEXA) 40 MG tablet TAKE 1 TABLET EVERY DAY    guaifenesin-codeine 100-10 mg/5 ml (TUSSI-ORGANIDIN NR)  mg/5 mL syrup Take 5 mLs by mouth every 6 (six) hours as needed for Cough.    ipratropium (ATROVENT) 0.02 % nebulizer solution Take 500 mcg by nebulization 4 (four) times daily.     lactulose (CHRONULAC) 10 gram/15 mL solution     losartan (COZAAR) 50 MG tablet TAKE 1 TABLET EVERY DAY    montelukast (SINGULAIR) 10 mg tablet Take 10 mg by mouth every evening.    mupirocin (BACTROBAN) 2 % ointment APPLY  TOPICALLY THREE TIMES DAILY    ondansetron (ZOFRAN) 4 MG tablet Take 1 tablet (4 mg total) by mouth every 6 (six) hours.    pravastatin (PRAVACHOL) 40 MG  tablet TAKE 1 TABLET EVERY DAY    predniSONE (DELTASONE) 10 MG tablet Take 20 mg BID x 3d then 20mg daily x 3d then 10mg daily x 3days then 5mg daily x 4 days (Patient taking differently: Take 40 mg by mouth once daily. Take 20 mg BID x 3d then 20mg daily x 3d then 10mg daily x 3days then 5mg daily x 4 days)    rOPINIRole (REQUIP) 1 MG tablet TAKE 1 TABLET EVERY EVENING.    theophylline (THEODUR) 300 MG 12 hr tablet     traZODone (DESYREL) 50 MG tablet Take 1 tablet (50 mg total) by mouth every evening.    walker Misc Evangelista walker     Family History     Problem Relation (Age of Onset)    COPD Brother, Daughter    Cancer Sister    Diabetes Father    Heart disease Mother    Hypertension Mother, Father, Brother    Kidney disease Son    No Known Problems Daughter        Tobacco Use    Smoking status: Former Smoker     Last attempt to quit: 8/3/2000     Years since quittin.2    Smokeless tobacco: Never Used   Substance and Sexual Activity    Alcohol use: No    Drug use: No    Sexual activity: No     Review of Systems   Constitution: Negative.   HENT: Positive for congestion. Negative for ear discharge, ear pain, hearing loss, hoarse voice, nosebleeds, odynophagia, sore throat, stridor and tinnitus.    Eyes: Negative.    Cardiovascular: Positive for chest pain and dyspnea on exertion. Negative for claudication, cyanosis, irregular heartbeat, leg swelling, near-syncope, orthopnea, palpitations, paroxysmal nocturnal dyspnea and syncope.   Respiratory: Positive for cough and shortness of breath. Negative for hemoptysis, sleep disturbances due to breathing, snoring, sputum production and wheezing.    Endocrine: Negative.  Negative for cold intolerance, heat intolerance, polydipsia, polyphagia and polyuria.   Hematologic/Lymphatic: Negative.  Negative for adenopathy and bleeding problem. Does not bruise/bleed easily.   Skin: Negative.    Musculoskeletal: Negative.    Gastrointestinal: Negative.    Genitourinary:  Negative.    Neurological: Negative.    Psychiatric/Behavioral: Negative.    Allergic/Immunologic: Negative.      Objective:     Vital Signs (Most Recent):  Temp: 97.5 °F (36.4 °C) (11/12/18 0713)  Pulse: 89 (11/12/18 1027)  Resp: 17 (11/12/18 0727)  BP: (!) 173/77 (11/12/18 1027)  SpO2: 98 % (11/12/18 0727) Vital Signs (24h Range):  Temp:  [97.5 °F (36.4 °C)-98.5 °F (36.9 °C)] 97.5 °F (36.4 °C)  Pulse:  [] 89  Resp:  [16-25] 17  SpO2:  [91 %-98 %] 98 %  BP: (135-174)/(64-87) 173/77     Weight: 66.5 kg (146 lb 9.7 oz)  Body mass index is 26.81 kg/m².    SpO2: 98 %  O2 Device (Oxygen Therapy): nasal cannula      Intake/Output Summary (Last 24 hours) at 11/12/2018 1056  Last data filed at 11/12/2018 0619  Gross per 24 hour   Intake 600 ml   Output --   Net 600 ml       Lines/Drains/Airways     Peripheral Intravenous Line                 Peripheral IV - Single Lumen 11/11/18 1957 Right Antecubital less than 1 day                Physical Exam   Constitutional: She is oriented to person, place, and time. She appears well-developed and well-nourished. No distress.   HENT:   Head: Atraumatic.   Neck: Normal range of motion. Neck supple. No JVD present. No tracheal deviation present. No thyromegaly present.   Pulmonary/Chest: Effort normal. No stridor.   Rhonchi and diminished breath sounds bilatrally   Abdominal: Soft. Bowel sounds are normal. She exhibits no distension and no mass. There is no tenderness. There is no rebound and no guarding.   Musculoskeletal: Normal range of motion. She exhibits no edema, tenderness or deformity.   Lymphadenopathy:     She has no cervical adenopathy.   Neurological: She is alert and oriented to person, place, and time.   Skin: She is not diaphoretic.   Psychiatric: She has a normal mood and affect. Judgment and thought content normal.       Significant Labs:   ABG:   Recent Labs   Lab 11/11/18 2003   PH 7.48*   PCO2 46*   HCO3 34.30*   POCSATURATED 96.8   BE 9.70*   , Blood  Culture:   Recent Labs   Lab 11/11/18 1959   LABBLOO No Growth to date   , BMP:   Recent Labs   Lab 11/11/18 1958 11/12/18  0400    188*    132*   K 4.5 4.7   CL 94* 95   CO2 31* 29   BUN 18 17   CREATININE 0.9 0.8   CALCIUM 9.4 8.8   , CMP   Recent Labs   Lab 11/11/18 1958 11/12/18  0400    132*   K 4.5 4.7   CL 94* 95   CO2 31* 29    188*   BUN 18 17   CREATININE 0.9 0.8   CALCIUM 9.4 8.8   PROT 6.7 5.9*   ALBUMIN 3.9 3.4*   BILITOT 0.6 0.7   ALKPHOS 93 89   AST 14 11   ALT 18 16   ANIONGAP 11 8   ESTGFRAFRICA >60 >60   EGFRNONAA >60 >60   , CBC   Recent Labs   Lab 11/11/18 1958 11/12/18  0400   WBC 17.22* 13.81*   HGB 11.0* 9.9*   HCT 33.7* 30.3*    220   , INR   Recent Labs   Lab 11/11/18 1958   INR 1.1   , Lipid Panel No results for input(s): CHOL, HDL, LDLCALC, TRIG, CHOLHDL in the last 48 hours.,   Pathology Results  (Last 10 years)    None      , Troponin   Recent Labs   Lab 11/11/18 1958 11/12/18 0157 11/12/18  0753   TROPONINI 0.019 0.017 0.014   , All pertinent lab results from the last 24 hours have been reviewed. and   Recent Lab Results       11/12/18  0753   11/12/18  0400   11/12/18  0157   11/12/18  0022   11/11/18 2003        Influenza A, Molecular               Influenza B, Molecular               POC tHb         9     POC O2Hb Arterial         94.5     POC COHb         1.7     POC MetHb         0.6     Albumin   3.4           Alkaline Phosphatase   89           Allens Test         Pass     ALT   16           Anion Gap   8           aPTT               AST   11           Baso #   0.01           Basophil%   0.1           Total Bilirubin   0.7  Comment:  For infants and newborns, interpretation of results should be based  on gestational age, weight and in agreement with clinical  observations.  Premature Infant recommended reference ranges:  Up to 24 hours.............<8.0 mg/dL  Up to 48 hours............<12.0 mg/dL  3-5 days..................<15.0  mg/dL  6-29 days.................<15.0 mg/dL             Blood Culture, Routine               BNP               Site         Right Radial     BUN, Bld   17           Calcium   8.8           Chloride   95           CO2   29           CPK               CPK MB               Creatinine   0.8           D-Dimer               DelSys         Nasal Cannula     Differential Method   Automated           eGFR if    >60           eGFR if non    >60  Comment:  Calculation used to obtain the estimated glomerular filtration  rate (eGFR) is the CKD-EPI equation.              Eos #   0.0           Eosinophil%   0.0           Flu A & B Source               Glucose   188           Gran # (ANC)   12.9           Gran%   93.1           Hematocrit   30.3           Hemoglobin   9.9           Coumadin Monitoring INR               Lactate, Elia   0.9  Comment:  Falsely low lactic acid results can be found in samples   containing >=13.0 mg/dL total bilirubin and/or >=3.5 mg/dL   direct bilirubin.     0.7  Comment:  Falsely low lactic acid results can be found in samples   containing >=13.0 mg/dL total bilirubin and/or >=3.5 mg/dL   direct bilirubin.         Lymph #   0.2           Lymph%   1.6           MB%               MCH   35.6           MCHC   32.7           MCV   109           Mono #   0.7           Mono%   5.2           MPV   11.8           Platelets   220           POC BE         9.70     POC HCO3         34.30     POC PCO2         46     POC PH         7.48  Comment:  Nasal Cannula at 3lpm      POC PO2         76     POC SATURATED O2         96.8     POC TCO2         35.7     Potassium   4.7           Total Protein   5.9           Protime               RBC   2.78           RDW   14.1           Sodium   132           Troponin I 0.014  Comment:  The reference interval for Troponin I represents the 99th percentile   cutoff   for our facility and is consistent with 3rd generation assay   performance.      0.017  Comment:  The reference interval for Troponin I represents the 99th percentile   cutoff   for our facility and is consistent with 3rd generation assay   performance.           WBC   13.81                            11/11/18 1959 11/11/18 1958 11/11/18 1957        Influenza A, Molecular     Negative     Influenza B, Molecular     Negative     POC tHb           POC O2Hb Arterial           POC COHb           POC MetHb           Albumin   3.9       Alkaline Phosphatase   93       Allens Test           ALT   18       Anion Gap   11       aPTT   22.8  Comment:  aPTT therapeutic range = 39-69 seconds       AST   14       Baso #   0.02       Basophil%   0.1       Total Bilirubin   0.6  Comment:  For infants and newborns, interpretation of results should be based  on gestational age, weight and in agreement with clinical  observations.  Premature Infant recommended reference ranges:  Up to 24 hours.............<8.0 mg/dL  Up to 48 hours............<12.0 mg/dL  3-5 days..................<15.0 mg/dL  6-29 days.................<15.0 mg/dL         Blood Culture, Routine No Growth to date[P]         BNP   129  Comment:  Values of less than 100 pg/ml are consistent with non-CHF populations.       Site           BUN, Bld   18       Calcium   9.4       Chloride   94       CO2   31       CPK   19          19       CPK MB   1.2       Creatinine   0.9       D-Dimer   0.93  Comment:  The quantitative D-dimer assay should be used as an aid in   the diagnosis of deep vein thrombosis and pulmonary embolism  in patients with the appropriate presentation and clinical  history. The upper limit of the reference interval and the clinical   cut off   point are identical. Causes of a positive (>0.50 mg/L FEU) D-Dimer   test  include, but are not limited to: DVT, PE, DIC, thrombolytic   therapy, anticoagulant therapy, recent surgery, trauma, or   pregnancy, disseminated malignancy, aortic aneurysm, cirrhosis,  and severe infection.  "False negative results may occur in   patients with distal DVT.         Tiffany           Differential Method   Automated       eGFR if    >60       eGFR if non    >60  Comment:  Calculation used to obtain the estimated glomerular filtration  rate (eGFR) is the CKD-EPI equation.          Eos #   0.1       Eosinophil%   0.3       Flu A & B Source     Nasal swab     Glucose   101       Gran # (ANC)   14.4       Gran%   83.6       Hematocrit   33.7       Hemoglobin   11.0       Coumadin Monitoring INR   1.1  Comment:  Coumadin Therapy:  2.0 - 3.0 for INR for all indicators except mechanical heart valves  and antiphospholipid syndromes which should use 2.5 - 3.5.         Lactate, Elia 2.0  Comment:  Falsely low lactic acid results can be found in samples   containing >=13.0 mg/dL total bilirubin and/or >=3.5 mg/dL   direct bilirubin.           Lymph #   0.8       Lymph%   4.9       MB%   6.3  Comment:  To be positive, the MB% must be greater than 5% AND the CK-MB  greater than 6.5 ng/mL. Values not in the reference interval,   but not qualifying as positive, should be considered "trace".         MCH   35.3       MCHC   32.6       MCV   108       Mono #   1.9       Mono%   11.1       MPV   11.3       Platelets   260       POC BE           POC HCO3           POC PCO2           POC PH           POC PO2           POC SATURATED O2           POC TCO2           Potassium   4.5       Total Protein   6.7       Protime   11.4       RBC   3.12       RDW   14.3       Sodium   136       Troponin I   0.019  Comment:  The reference interval for Troponin I represents the 99th percentile   cutoff   for our facility and is consistent with 3rd generation assay   performance.         WBC   17.22             Significant Imaging: CT scan: CT ABDOMEN PELVIS WITH CONTRAST: No results found for this visit on 11/11/18. and CT ABDOMEN PELVIS WITHOUT CONTRAST: No results found for this visit on 11/11/18., " Echocardiogram: 2D echo with color flow doppler: No results found for this or any previous visit. and X-Ray: CXR: X-Ray Chest 1 View (CXR):   Results for orders placed or performed during the hospital encounter of 11/11/18   X-Ray Chest 1 View    Narrative    EXAMINATION:  XR CHEST 1 VIEW    CLINICAL HISTORY:  SOB;    TECHNIQUE:  Single frontal view of the chest was performed.    COMPARISON:  06/06/2018    FINDINGS:  The lungs are hyperexpanded and show mild interstitial prominence suggesting COPD change.  There is bibasilar subsegmental atelectasis.  No consolidation or pleural effusions.  The heart is normal in size.  Calcified atheromatous disease affects the aorta.  Age-appropriate degenerative changes affect the skeleton.      Impression    As above.      Electronically signed by: Eileen Velasquez MD  Date:    11/12/2018  Time:    08:52    and X-Ray Chest PA and Lateral (CXR): No results found for this visit on 11/11/18. and KUB: X-Ray Abdomen AP 1 View (KUB): No results found for this visit on 11/11/18.

## 2018-11-12 NOTE — PLAN OF CARE
Problem: Patient Care Overview  Goal: Plan of Care Review  Outcome: Ongoing (interventions implemented as appropriate)  Patient rested well throughout shift. Tele normal sinus. 3 L nasal cannula. Glasses on. Neuro intact. IV antibiotics continued. Patient refused SCDs. Patient complains of pain when breathing. Free from falls or injury. Plan of care reviewed with patient.

## 2018-11-12 NOTE — ED TRIAGE NOTES
"83 y.o. female presents to ER   Chief Complaint   Patient presents with    Shortness of Breath    Pt c/o SOB that started "3 days ago". Pt reports hx of COPD & on 3 liters of oxygen at home. No acute distress noted.    "

## 2018-11-12 NOTE — PLAN OF CARE
Problem: Patient Care Overview  Goal: Plan of Care Review  Outcome: Ongoing (interventions implemented as appropriate)  Educate patient/family/cargiver about administration of aerosolized medications via the inhalation route to aid in bronchial hygiene & deliver medications.

## 2018-11-12 NOTE — CONSULTS
[facility]  Pulmonology  Consult Note    Patient Name: Chantell Herrera  MRN: [id number]  Admission Date: 11/11/2018  Hospital Length of Stay: 1 days  Code Status: Full Code  Attending Physician: Noe Fofana MD  Primary Care Provider: Anson Leiva MD   Principal Problem: <principal problem not specified>    Consults  Subjective:     HPI:  Chantell Herrera is a 83 y.o. year old female that's presents with a chief complaint of SOB and Wheezing for Several  Days.Patient  . Consulted to evaluate Respiratory status.    Past Medical History:   Diagnosis Date    Anxiety     Arthritis     Asthma     Chronic bronchitis     COPD (chronic obstructive pulmonary disease)     Depression     SANTIAGO (dyspnea on exertion)     Emphysema of lung     Fatigue     Hyperlipidemia     Hypertension     Trouble in sleeping         Past Surgical History:   Procedure Laterality Date    APPENDECTOMY      EYE SURGERY      HYSTERECTOMY      TONSILLECTOMY         Prior to Admission medications    Medication Sig Start Date End Date Taking? Authorizing Provider   albuterol (PROVENTIL) 2.5 mg /3 mL (0.083 %) nebulizer solution Take 3 mLs (2.5 mg total) by nebulization every 6 (six) hours as needed for Wheezing. 6/27/16   Anson Leiva MD   artificial tears,hypromellose, 0.3 % Drop continuous prn.  8/2/16   Historical Provider, MD   aspirin 81 MG Chew Take 81 mg by mouth once daily.    Historical Provider, MD   baclofen (LIORESAL) 10 MG tablet Take 1 tablet (10 mg total) by mouth 3 (three) times daily. 6/6/18 6/6/19  Anson Leiva MD   budesonide-formoterol 160-4.5 mcg (SYMBICORT) 160-4.5 mcg/actuation HFAA Inhale 2 puffs into the lungs every 12 (twelve) hours.    Historical Provider, MD   cholecalciferol, vitamin D3, (VITAMIN D3) 2,000 unit Tab Take 2,000 Units by mouth once daily.    Historical Provider, MD   citalopram (CELEXA) 40 MG tablet TAKE 1 TABLET EVERY DAY 2/28/18   Anson RIOS  MD Abbie   guaifenesin-codeine 100-10 mg/5 ml (TUSSI-ORGANIDIN NR)  mg/5 mL syrup Take 5 mLs by mouth every 6 (six) hours as needed for Cough. 5/28/18   Anson Leiva MD   ipratropium (ATROVENT) 0.02 % nebulizer solution Take 500 mcg by nebulization 4 (four) times daily.  3/18/16   Historical Provider, MD   lactulose (CHRONULAC) 10 gram/15 mL solution  6/8/18   Historical Provider, MD   losartan (COZAAR) 50 MG tablet TAKE 1 TABLET EVERY DAY 11/6/18   Anson Leiva MD   montelukast (SINGULAIR) 10 mg tablet Take 10 mg by mouth every evening.    Historical Provider, MD   mupirocin (BACTROBAN) 2 % ointment APPLY  TOPICALLY THREE TIMES DAILY 6/6/18   Festus Maurer MD   ondansetron (ZOFRAN) 4 MG tablet Take 1 tablet (4 mg total) by mouth every 6 (six) hours. 6/2/18   Lovely Onofre MD   pravastatin (PRAVACHOL) 40 MG tablet TAKE 1 TABLET EVERY DAY 10/15/18   Anson Leiva MD   predniSONE (DELTASONE) 10 MG tablet Take 20 mg BID x 3d then 20mg daily x 3d then 10mg daily x 3days then 5mg daily x 4 days  Patient taking differently: Take 40 mg by mouth once daily. Take 20 mg BID x 3d then 20mg daily x 3d then 10mg daily x 3days then 5mg daily x 4 days 5/20/17   Kari Gaspar MD   rOPINIRole (REQUIP) 1 MG tablet TAKE 1 TABLET EVERY EVENING. 8/14/18   Anson Leiva MD   theophylline (THEODUR) 300 MG 12 hr tablet  6/23/17   Historical Provider, MD   traZODone (DESYREL) 50 MG tablet Take 1 tablet (50 mg total) by mouth every evening. 9/27/18   Anson Leiva MD   walker Misc Evangelista walker 5/31/18   Anson Leiva MD       Social History     Socioeconomic History    Marital status:      Spouse name: Not on file    Number of children: Not on file    Years of education: Not on file    Highest education level: Not on file   Social Needs    Financial resource strain: Not on file    Food insecurity - worry: Not on file    Food insecurity - inability: Not on file     Transportation needs - medical: Not on file    Transportation needs - non-medical: Not on file   Occupational History    Not on file   Tobacco Use    Smoking status: Former Smoker     Last attempt to quit: 8/3/2000     Years since quittin.2    Smokeless tobacco: Never Used   Substance and Sexual Activity    Alcohol use: No    Drug use: No    Sexual activity: No   Other Topics Concern    Not on file   Social History Narrative    Not on file       Family History   Problem Relation Age of Onset    Heart disease Mother     Hypertension Mother     Hypertension Father     Diabetes Father     Cancer Sister     COPD Brother     Hypertension Brother     No Known Problems Daughter     Kidney disease Son     COPD Daughter        Review of patient's allergies indicates:  No Known Allergies Allergies have been reviewed.     ROS: Review of Systems   Constitutional: Negative for chills, fever and weight loss.   HENT: Positive for congestion. Negative for sore throat.    Eyes: Negative for double vision and photophobia.   Respiratory: Positive for cough, sputum production and shortness of breath.    Cardiovascular: Positive for leg swelling (mild) and PND (occasional). Negative for palpitations.   Gastrointestinal: Negative for abdominal pain and diarrhea.   Genitourinary: Negative for dysuria and frequency.   Musculoskeletal: Positive for myalgias (generalized). Negative for back pain and neck pain.   Skin: Negative.    Neurological: Negative for dizziness, weakness and headaches.   Endo/Heme/Allergies: Does not bruise/bleed easily.   Psychiatric/Behavioral: Negative for memory loss. The patient has insomnia (intermittant ).        PE:   Vitals:    18 0800 18 1000 18 1027 18 1201   BP:   (!) 173/77 (!) 177/84   BP Location:   Left arm Left arm   Patient Position:   Lying Lying   Pulse: 100 90 89 95   Resp:    18   Temp:    98.6 °F (37 °C)   TempSrc:    Tympanic   SpO2:    97%    Weight:       Height:        Physical Exam    Alert and orientated X 3   HEENT: Head: Normocephalic no trauma                Ears : Normal Pinna No Drainage no Battles sign                Eyes: Vision Unchanged, No conjunctivitis,No drainage                Neck: Supple, No JVD,No Abnormal Carotid Pulsations                Throat: No Erythema, No pus,No Swelling,Mallampati score= 2    Chest: Course BS poor air entry and wheezing with Rhonchi   Cardiac: RRR S1+ S2 with a -S3: +M = 2/6, No R/H/G  Abdomen: Bowel Sounds are Normal.Soft Abdomen. No organomegaly of Liver,Spleen,or Kidneys   CNS: Non focal and intact. Cranial nerves 2, 346,8,9,10 and 12 are normal.Norrmal gait.Normal posture.  Extremities: No Clubbing,No Cyanosis with oxygen,Positive mild edema of lower extremities Bilateral  Skin: No Rash, No Ulcerative sores,and No cellulitis of the IV site.    Lab Results   Component Value Date    WBC 13.81 (H) 11/12/2018    HGB 9.9 (L) 11/12/2018    HCT 30.3 (L) 11/12/2018     11/12/2018    CHOL 147 05/28/2018    TRIG 75 05/28/2018    HDL 74 05/28/2018    ALT 16 11/12/2018    AST 11 11/12/2018     (L) 11/12/2018    K 4.7 11/12/2018    CL 95 11/12/2018    CREATININE 0.8 11/12/2018    BUN 17 11/12/2018    CO2 29 11/12/2018    TSH 0.421 05/28/2018    INR 1.1 11/11/2018    HGBA1C 6.6 (H) 05/28/2018               Assessment/Plan:     COPD exacerbation    Doing better on meds continue support      Chronic respiratory failure with hypoxia    Chronic respiratory failure      COPD with acute bronchitis    Have treated pt as an outpatient 3 times with oral antibiotics over the last 2 months would improve only to worsen      Emphysema/COPD    Chronic respiratory failure plus severe copd/emphysema      Anemia (hb 9.9)    DM2 (hba1c 6.6)      Continue support follow cxr      Thank you for your consult. I will follow-up with patient. Please contact us if you have any additional questions.     Emmanuel Hickman,  MD  Pulmonology  [facility]

## 2018-11-12 NOTE — PROGRESS NOTES
Spoke with Ms. Chantell about her medications. Patient states she isnt having any side effects or any pain, only some tightness in her abdomen. We discussed antiemetics and fall precautions. Patient verbalized understanding.

## 2018-11-13 PROBLEM — R10.13 EPIGASTRIC PAIN: Status: ACTIVE | Noted: 2018-11-13

## 2018-11-13 LAB
ANION GAP SERPL CALC-SCNC: 12 MMOL/L
BASOPHILS # BLD AUTO: 0.01 K/UL
BASOPHILS NFR BLD: 0.1 %
BUN SERPL-MCNC: 25 MG/DL
CALCIUM SERPL-MCNC: 9.2 MG/DL
CHLORIDE SERPL-SCNC: 95 MMOL/L
CO2 SERPL-SCNC: 27 MMOL/L
CREAT SERPL-MCNC: 0.9 MG/DL
DIFFERENTIAL METHOD: ABNORMAL
EOSINOPHIL # BLD AUTO: 0 K/UL
EOSINOPHIL NFR BLD: 0.1 %
ERYTHROCYTE [DISTWIDTH] IN BLOOD BY AUTOMATED COUNT: 14.4 %
EST. GFR  (AFRICAN AMERICAN): >60 ML/MIN/1.73 M^2
EST. GFR  (NON AFRICAN AMERICAN): >60 ML/MIN/1.73 M^2
GLUCOSE SERPL-MCNC: 181 MG/DL
HCT VFR BLD AUTO: 30.3 %
HGB BLD-MCNC: 9.8 G/DL
LYMPHOCYTES # BLD AUTO: 0.3 K/UL
LYMPHOCYTES NFR BLD: 1.6 %
MCH RBC QN AUTO: 35.3 PG
MCHC RBC AUTO-ENTMCNC: 32.3 G/DL
MCV RBC AUTO: 109 FL
MONOCYTES # BLD AUTO: 0.9 K/UL
MONOCYTES NFR BLD: 4.6 %
NEUTROPHILS # BLD AUTO: 18.7 K/UL
NEUTROPHILS NFR BLD: 93.6 %
PLATELET # BLD AUTO: 267 K/UL
PMV BLD AUTO: 11.8 FL
POTASSIUM SERPL-SCNC: 4.6 MMOL/L
RBC # BLD AUTO: 2.78 M/UL
SODIUM SERPL-SCNC: 134 MMOL/L
WBC # BLD AUTO: 19.99 K/UL

## 2018-11-13 PROCEDURE — 63600175 PHARM REV CODE 636 W HCPCS: Mod: HCWC | Performed by: NURSE PRACTITIONER

## 2018-11-13 PROCEDURE — 36415 COLL VENOUS BLD VENIPUNCTURE: CPT | Mod: HCWC

## 2018-11-13 PROCEDURE — 85025 COMPLETE CBC W/AUTO DIFF WBC: CPT | Mod: HCWC

## 2018-11-13 PROCEDURE — 94640 AIRWAY INHALATION TREATMENT: CPT | Mod: HCWC

## 2018-11-13 PROCEDURE — 25000003 PHARM REV CODE 250: Mod: HCWC | Performed by: INTERNAL MEDICINE

## 2018-11-13 PROCEDURE — 25000242 PHARM REV CODE 250 ALT 637 W/ HCPCS: Mod: HCWC | Performed by: INTERNAL MEDICINE

## 2018-11-13 PROCEDURE — 11000001 HC ACUTE MED/SURG PRIVATE ROOM: Mod: HCWC

## 2018-11-13 PROCEDURE — 99233 SBSQ HOSP IP/OBS HIGH 50: CPT | Mod: HCWC,,, | Performed by: INTERNAL MEDICINE

## 2018-11-13 PROCEDURE — 63600175 PHARM REV CODE 636 W HCPCS: Mod: HCWC | Performed by: SURGERY

## 2018-11-13 PROCEDURE — 80048 BASIC METABOLIC PNL TOTAL CA: CPT | Mod: HCWC

## 2018-11-13 PROCEDURE — 25000003 PHARM REV CODE 250: Mod: HCWC | Performed by: SURGERY

## 2018-11-13 PROCEDURE — 27000221 HC OXYGEN, UP TO 24 HOURS: Mod: HCWC

## 2018-11-13 PROCEDURE — 94761 N-INVAS EAR/PLS OXIMETRY MLT: CPT | Mod: HCWC

## 2018-11-13 RX ORDER — METHYLPREDNISOLONE SOD SUCC 125 MG
80 VIAL (EA) INJECTION EVERY 8 HOURS
Status: DISCONTINUED | OUTPATIENT
Start: 2018-11-13 | End: 2018-11-16

## 2018-11-13 RX ORDER — ENOXAPARIN SODIUM 100 MG/ML
40 INJECTION SUBCUTANEOUS EVERY 24 HOURS
Status: DISCONTINUED | OUTPATIENT
Start: 2018-11-13 | End: 2018-11-18 | Stop reason: HOSPADM

## 2018-11-13 RX ORDER — NAPROXEN SODIUM 220 MG/1
81 TABLET, FILM COATED ORAL DAILY
Status: DISCONTINUED | OUTPATIENT
Start: 2018-11-13 | End: 2018-11-18 | Stop reason: HOSPADM

## 2018-11-13 RX ADMIN — ASPIRIN 81 MG 81 MG: 81 TABLET ORAL at 12:11

## 2018-11-13 RX ADMIN — LEVALBUTEROL 1.25 MG: 1.25 SOLUTION, CONCENTRATE RESPIRATORY (INHALATION) at 12:11

## 2018-11-13 RX ADMIN — METHYLPREDNISOLONE SODIUM SUCCINATE 80 MG: 125 INJECTION, POWDER, FOR SOLUTION INTRAMUSCULAR; INTRAVENOUS at 11:11

## 2018-11-13 RX ADMIN — CEFTRIAXONE 1 G: 1 INJECTION, SOLUTION INTRAVENOUS at 11:11

## 2018-11-13 RX ADMIN — PRAVASTATIN SODIUM 40 MG: 40 TABLET ORAL at 09:11

## 2018-11-13 RX ADMIN — METHYLPREDNISOLONE SODIUM SUCCINATE 80 MG: 125 INJECTION, POWDER, FOR SOLUTION INTRAMUSCULAR; INTRAVENOUS at 01:11

## 2018-11-13 RX ADMIN — ENOXAPARIN SODIUM 40 MG: 100 INJECTION SUBCUTANEOUS at 04:11

## 2018-11-13 RX ADMIN — PANTOPRAZOLE SODIUM 40 MG: 40 TABLET, DELAYED RELEASE ORAL at 09:11

## 2018-11-13 RX ADMIN — LOSARTAN POTASSIUM 50 MG: 50 TABLET, FILM COATED ORAL at 09:11

## 2018-11-13 RX ADMIN — CITALOPRAM HYDROBROMIDE 40 MG: 10 TABLET ORAL at 09:11

## 2018-11-13 RX ADMIN — METHYLPREDNISOLONE SODIUM SUCCINATE 125 MG: 125 INJECTION, POWDER, FOR SOLUTION INTRAMUSCULAR; INTRAVENOUS at 05:11

## 2018-11-13 RX ADMIN — LEVALBUTEROL 1.25 MG: 1.25 SOLUTION, CONCENTRATE RESPIRATORY (INHALATION) at 03:11

## 2018-11-13 RX ADMIN — LEVALBUTEROL 1.25 MG: 1.25 SOLUTION, CONCENTRATE RESPIRATORY (INHALATION) at 07:11

## 2018-11-13 RX ADMIN — AZITHROMYCIN MONOHYDRATE 500 MG: 500 INJECTION, POWDER, LYOPHILIZED, FOR SOLUTION INTRAVENOUS at 09:11

## 2018-11-13 RX ADMIN — TRAZODONE HYDROCHLORIDE 50 MG: 50 TABLET ORAL at 11:11

## 2018-11-13 RX ADMIN — ROPINIROLE HYDROCHLORIDE 1 MG: 0.5 TABLET, FILM COATED ORAL at 11:11

## 2018-11-13 NOTE — SUBJECTIVE & OBJECTIVE
Interval note: pt still SOB/SANTIAGO  Review of Systems   Constitutional: Negative for activity change, fatigue, fever and unexpected weight change.   HENT: Negative for congestion, ear pain, hearing loss, rhinorrhea and sore throat.    Eyes: Negative for redness and visual disturbance.   Respiratory: Positive for cough, chest tightness, shortness of breath and wheezing.    Cardiovascular: Negative for palpitations and leg swelling.   Gastrointestinal: Positive for abdominal pain. Negative for constipation, diarrhea, nausea and vomiting.        Epigastric pain with exertion    Genitourinary: Negative for dysuria, frequency and urgency.   Musculoskeletal: Negative for back pain, joint swelling and neck pain.   Skin: Negative for color change, rash and wound.   Neurological: Negative for dizziness, tremors, weakness, light-headedness and headaches.     Objective:     Vital Signs (Most Recent):  Temp: 96.5 °F (35.8 °C) (11/13/18 0801)  Pulse: (!) 141 (11/13/18 1029)  Resp: 18 (11/13/18 0801)  BP: (!) 144/71 (11/13/18 0801)  SpO2: 95 % (11/13/18 0801) Vital Signs (24h Range):  Temp:  [96.5 °F (35.8 °C)-98.6 °F (37 °C)] 96.5 °F (35.8 °C)  Pulse:  [] 141  Resp:  [18-22] 18  SpO2:  [94 %-98 %] 95 %  BP: (139-177)/(68-84) 144/71     Weight: 66.5 kg (146 lb 9.7 oz)  Body mass index is 26.81 kg/m².    Physical Exam   Constitutional: She is oriented to person, place, and time. She appears well-developed and well-nourished. No distress.   HENT:   Head: Normocephalic and atraumatic.   Right Ear: External ear normal.   Left Ear: External ear normal.   Eyes: Conjunctivae and EOM are normal. Pupils are equal, round, and reactive to light.   Neck: Neck supple. No tracheal deviation present.   Cardiovascular: Normal rate and regular rhythm.   No murmur heard.  Pulmonary/Chest: Effort normal. No respiratory distress. She has wheezes. She has no rales.   Decreased b/l bases   Abdominal: Soft. Bowel sounds are normal. She exhibits no  distension. There is tenderness in the right upper quadrant.   Neurological: She is alert and oriented to person, place, and time. No cranial nerve deficit.   Skin: Skin is warm and dry.   Psychiatric: She has a normal mood and affect. Her behavior is normal.   Vitals reviewed.        CRANIAL NERVES     CN III, IV, VI   Pupils are equal, round, and reactive to light.  Extraocular motions are normal.        Significant Labs:   CBC:   Recent Labs   Lab 11/11/18 1958 11/12/18 0400 11/13/18  0531   WBC 17.22* 13.81* 19.99*   HGB 11.0* 9.9* 9.8*   HCT 33.7* 30.3* 30.3*    220 267     CMP:   Recent Labs   Lab 11/11/18 1958 11/12/18 0400 11/13/18  0531    132* 134*   K 4.5 4.7 4.6   CL 94* 95 95   CO2 31* 29 27    188* 181*   BUN 18 17 25*   CREATININE 0.9 0.8 0.9   CALCIUM 9.4 8.8 9.2   PROT 6.7 5.9*  --    ALBUMIN 3.9 3.4*  --    BILITOT 0.6 0.7  --    ALKPHOS 93 89  --    AST 14 11  --    ALT 18 16  --    ANIONGAP 11 8 12   EGFRNONAA >60 >60 >60     Cardiac Markers:   Recent Labs   Lab 11/11/18 1958   *     Troponin:   Recent Labs   Lab 11/11/18 1958 11/12/18  0157 11/12/18  0753   TROPONINI 0.019 0.017 0.014     Lab Results   Component Value Date    DDIMER 0.93 (H) 11/11/2018     Lab Results   Component Value Date    INR 1.1 11/11/2018    INR 1.1 06/02/2018    INR 1.1 11/30/2017     Blood culture NGTD  Flu negative    All pertinent labs within the past 24 hours have been reviewed.    Significant Imaging:     CXR 11/11   The lungs are hyperexpanded and show mild interstitial prominence suggesting COPD change.  There is bibasilar subsegmental atelectasis.  No consolidation or pleural effusions.  The heart is normal in size.  Calcified atheromatous disease affects the aorta.  Age-appropriate degenerative changes affect the skeleton.      Us lower ext 11/12   No evidence of deep venous thrombosis bilateral lower extremities.    EKG Normal sinus rhythm  Left anterior fascicular block  Septal  infarct ,age undetermined  Abnormal ECG  When compared with ECG of 11-NOV-2018 19:58,  Nonspecific T wave abnormality now evident in Anterior leads  Confirmed by Leatha VILLAFANA, Candace ([id number]) on 11/12/2018 3:40:39 PM

## 2018-11-13 NOTE — PLAN OF CARE
11/13/18 0849   Discharge Reassessment   Assessment Type Discharge Planning Reassessment       Approval given from [facility] to pull a nebulizer for the patient.  Nebulizer delivered to the patient's room.    Kd Booth LMSW

## 2018-11-13 NOTE — HOSPITAL COURSE
11/13 Dr nino following her for acute exacerbation COPD. She is on zithromax and rocephin day 3. Medrol 125mg IV IV every 8hr. Levoalbuterol every 8hr. She has home O2 and is on her home 3L POX 95%. WBC 43203. This am afebrile. Dr nino following. She is SOB laying in bed talking. She also reports SANTIAGO    Dr Metcalf also saw her. Last echo shows 60% EF with LV diastolic dysfunction. Consider outpt stress.     11/*14 she reports that her breathing is better. She was able to get to bathroom and back this am with minimal SOB. Steroids were decreased to 80mg IV every 8hr. She feels pretty far from her baseline. Coughing a lot. Day 4 azithromycin and rocephin     U/s gallbladder with stone in neck but no thickened walls. No n/v.     11/15 She is on day 5 azithromycin and rocephin. On medrol; 80mg IV q 8hr. Remains on her home dose O2 at 3L NC. sats 96%. She reports that her SOB is better than yesterday but still not back to baseline. Also added mucinex for junky cough yesterday.     11/16/18 She is feeling much better, notes mucomyst has helped; Dr. Nino recommending continue mucomyst. Will give one dose  She is on day 6 azithromycin and rocephin. Will stop Zithromax On medrol; 80mg IV q 8hr; will change to prednisone 40mg po day   Remains on her home dose O2 at 3L NC. sats 96%.  This am noted to have brief run SVT- will add low dose BB; Dr. Metcalf following; ordered to send strips to Dr. Metcalf.  She required IV Metoprolol.  CM noting pt unable to afford combo inhalers; taking breathing tx qid and husbands albuterol inhaler.   Planning possible D/C home tomorrow  Addendum; + recurrent SVT HR 170s; will give IV lopressor 5mg now then 25mg po QID; further recs per cardilogy  11/17  Respiratory status is much better.  She could probably be discharged home except she developed atrial fibrillation with a rapid ventricular response.  I have had to increase her Lopressor dose to 200 mg daily, I added digoxin 0.25 mg last night.  Now  heart rate is .  She feels well.  She would like to go home.  She is on aspirin and once daily Lovenox and she seems to be bruising easily.  11/18  Patient's heart rate is now well controlled.  She is still in atrial fibrillation.  She is tolerating the Lopressor and the digoxin well.  Her COPD symptoms seem to be stable.  She still is wheezing.  She has home oxygen.  She takes prednisone 20 mg daily at home.  And she has her neb treatments for home use.  In she wants to go home.  She refuses Lovenox or any anticoagulation because she had does not like the bruising.

## 2018-11-13 NOTE — CARE UPDATE
Quiet hours. Pt slept well last night. O2 continues at 3L/NC. IV steroids and antibiotics continue as ordered. Pt noted SOB with exertion. Plan of care reviewed with pt, voiced understanding. SR up X3, call bell in reach. Instructed to call for needs or assistance.

## 2018-11-13 NOTE — ASSESSMENT & PLAN NOTE
Likely viral but was started on azithro and rocephin on admit. I see no consolidation on CXR but pulmonology would like to continue per their note. WBC was 17 K but also on chronic steroids so unsure. No fevers.  She is on prednisone 20mg daily as outpatient--per patient this is a chronic medication and not for exacerbation and she was told she can not stop steroids. She was started on solumedrol 125mg Q8H here. Per Dr. Hickman's note he wants to continue current management so I will not change today  I scheduled her nebs: xopenex  Dr. Hickman recently started theophylline as outpatient: 150mg Qday. I have not restarted here. Unsure if this is necessary in light of not using inhalers as prescribed as outpatient.    She was prescribed symbicort but SHE HAS NOT BEEN USING. I suspect this is why she has been so difficult to control as outpatient. She is not on any maintenance therapy. Discussed needs to be on maintenance inhaler as outpatient in addition to PRN nebs.   She is not having increased O2 requirements but still symptomatic with exertion    11/13  Wean steroids  The current medical regimen is effective;  continue present plan and medications.

## 2018-11-13 NOTE — ASSESSMENT & PLAN NOTE
Slight increase in air entry but profoundly poor air entry   Doing better on meds continue support

## 2018-11-13 NOTE — PROGRESS NOTES
[facility]  Pulmonology  Progress Note    Patient Name: Chantell Herrera  MRN: [id number]  Admission Date: 11/11/2018  Hospital Length of Stay: 2 days  Code Status: Full Code  Attending Provider: Noe Fofana MD  Primary Care Provider: Anson Leiva MD   Principal Problem: COPD exacerbation    Subjective:     Interval History: Epigastric pain may need ct of abd and/or us of GB but need to ensure its not ischemic heart disease consult dr mcneal     Objective:     Vital Signs (Most Recent):  Temp: 96.5 °F (35.8 °C) (11/13/18 0801)  Pulse: 105 (11/13/18 0820)  Resp: 18 (11/13/18 0801)  BP: (!) 144/71 (11/13/18 0801)  SpO2: 95 % (11/13/18 0801) Vital Signs (24h Range):  Temp:  [96.5 °F (35.8 °C)-98.6 °F (37 °C)] 96.5 °F (35.8 °C)  Pulse:  [] 105  Resp:  [18-22] 18  SpO2:  [94 %-98 %] 95 %  BP: (139-177)/(68-84) 144/71     Weight: 66.5 kg (146 lb 9.7 oz)  Body mass index is 26.81 kg/m².      Intake/Output Summary (Last 24 hours) at 11/13/2018 1001  Last data filed at 11/12/2018 2245  Gross per 24 hour   Intake 900 ml   Output --   Net 900 ml       Physical Exam   Constitutional: She is oriented to person, place, and time. She appears well-developed and well-nourished. She is cooperative.  Non-toxic appearance. She does not appear ill. No distress.   HENT:   Head: Normocephalic and atraumatic.   Right Ear: Hearing, tympanic membrane, external ear and ear canal normal.   Left Ear: Hearing, tympanic membrane, external ear and ear canal normal.   Nose: Nose normal. No mucosal edema, rhinorrhea or nasal deformity. No epistaxis. Right sinus exhibits no maxillary sinus tenderness and no frontal sinus tenderness. Left sinus exhibits no maxillary sinus tenderness and no frontal sinus tenderness.   Mouth/Throat: Uvula is midline, oropharynx is clear and moist and mucous membranes are normal. No trismus in the jaw. Normal dentition. No uvula swelling. No posterior oropharyngeal erythema.   Eyes:  Conjunctivae and lids are normal. No scleral icterus.   Sclera clear bilat   Neck: Trachea normal, full passive range of motion without pain and phonation normal. Neck supple.   Cardiovascular: Normal rate, regular rhythm, normal heart sounds, intact distal pulses and normal pulses.   Pulmonary/Chest: She is in respiratory distress (mild to moderate). She has decreased breath sounds in the right middle field, the right lower field, the left middle field and the left lower field. She has wheezes in the right middle field, the right lower field, the left middle field and the left lower field. She has rhonchi in the right lower field and the left lower field. She exhibits no tenderness and no swelling.       Abdominal: Soft. Normal appearance and bowel sounds are normal. She exhibits no distension. There is no tenderness.   Musculoskeletal: Normal range of motion. She exhibits no edema or deformity.   Neurological: She is alert and oriented to person, place, and time. She exhibits normal muscle tone. Coordination normal.   Skin: Skin is warm, dry and intact. She is not diaphoretic. No pallor.   Psychiatric: She has a normal mood and affect. Her speech is normal and behavior is normal. Judgment and thought content normal. Cognition and memory are normal.   Nursing note and vitals reviewed.      Vents:  Oxygen Concentration (%): 32 (11/12/18 2100)    Lines/Drains/Airways     Peripheral Intravenous Line                 Peripheral IV - Single Lumen 11/11/18 1957 Right Antecubital 1 day                Significant Labs:    CBC/Anemia Profile:  Recent Labs   Lab 11/11/18 1958 11/12/18 0400 11/13/18  0531   WBC 17.22* 13.81* 19.99*   HGB 11.0* 9.9* 9.8*   HCT 33.7* 30.3* 30.3*    220 267   * 109* 109*   RDW 14.3 14.1 14.4        Chemistries:  Recent Labs   Lab 11/11/18 1958 11/12/18  0400 11/13/18  0531    132* 134*   K 4.5 4.7 4.6   CL 94* 95 95   CO2 31* 29 27   BUN 18 17 25*   CREATININE 0.9 0.8 0.9    CALCIUM 9.4 8.8 9.2   ALBUMIN 3.9 3.4*  --    PROT 6.7 5.9*  --    BILITOT 0.6 0.7  --    ALKPHOS 93 89  --    ALT 18 16  --    AST 14 11  --        All pertinent labs within the past 24 hours have been reviewed.    Significant Imaging:  I have reviewed all pertinent imaging results/findings within the past 24 hours.    Assessment/Plan:     * COPD exacerbation    Slight increase in air entry but profoundly poor air entry   Doing better on meds continue support      Epigastric pain    Need enamorado exclude cardiac     Chronic respiratory failure with hypoxia    Uses O2 at home   Chronic respiratory failure      Emphysema/COPD    Chronic respiratory failure plus severe copd/emphysema        Continue support enamorado epigastric pain exclude an Anginal Equivalent      Emmanuel Hickman MD  Pulmonology  [facility]

## 2018-11-13 NOTE — PROGRESS NOTES
[facility]  Cardiology  Progress Note    Patient Name: Chantell Herrera  MRN: [id number]  Admission Date: 11/11/2018  Hospital Length of Stay: 2 days  Code Status: Full Code   Attending Physician: Noe Fofana MD   Primary Care Physician: Anson Leiva MD  Expected Discharge Date:   Principal Problem:COPD exacerbation    Subjective:     Hospital Course: 83 year old female with history of HTN, dyslipidemia, anxiety, asthma, chronic bronchitis, emphysema, COPD, depression, arthritis, presents to the emergency room with shortness of breath last night. Patient has had cough wheezing and shortness of breath last 2 days. Patient on exam has rhonchi in all fields with no active wheezing, on home oxygen    ROS   Constitutional : Negative  EENT : +congestion  CV : +CP  Respiratory : Cough / SOB  Gastrointestinal: Negative   Genitourinary: Negative  Musculoskeletal: Negative  Skin : Negative  Neurological : AAO x 3     Objective:     Vital Signs (Most Recent):  Temp: 96.5 °F (35.8 °C) (11/13/18 0801)  Pulse: 103 (11/13/18 0801)  Resp: 18 (11/13/18 0801)  BP: (!) 144/71 (11/13/18 0801)  SpO2: 95 % (11/13/18 0801) Vital Signs (24h Range):  Temp:  [96.5 °F (35.8 °C)-98.6 °F (37 °C)] 96.5 °F (35.8 °C)  Pulse:  [] 103  Resp:  [18-22] 18  SpO2:  [94 %-98 %] 95 %  BP: (139-177)/(68-84) 144/71     Weight: 66.5 kg (146 lb 9.7 oz)  Body mass index is 26.81 kg/m².    SpO2: 95 %  O2 Device (Oxygen Therapy): nasal cannula      Intake/Output Summary (Last 24 hours) at 11/13/2018 0857  Last data filed at 11/12/2018 2245  Gross per 24 hour   Intake 900 ml   Output --   Net 900 ml       Lines/Drains/Airways     Peripheral Intravenous Line                 Peripheral IV - Single Lumen 11/11/18 1957 Right Antecubital 1 day                Physical Exam   General appearance: alert, appears stated age and cooperative  Head: Normocephalic, without obvious abnormality, atraumatic  Eyes: conjunctivae/corneas clear.  PERRL  Neck: no carotid bruit, no JVD and supple, symmetrical, trachea midline  Lungs: Rhonchi to auscultation bilaterally, normal respiratory effort  Chest Wall: no tenderness  Heart: regular rate and rhythm, S1, S2 normal, no murmur, click, rub or gallop  Abdomen: soft, non-tender; bowel sounds normal; no masses,  no organomegaly  Extremities: Extremities normal, atraumatic, no cyanosis, clubbing, or edema  Pulses: Dorsalis Pedis R: 2+ (normal)/L: 2+ (normal)  Skin: Skin color, texture, turgor normal. No rashes or lesions  Neurologic: Normal mood and affect  Alert and oriented X 3      Significant Labs:   BMP:   Recent Labs   Lab 11/11/18 1958 11/12/18  0400 11/13/18  0531    188* 181*    132* 134*   K 4.5 4.7 4.6   CL 94* 95 95   CO2 31* 29 27   BUN 18 17 25*   CREATININE 0.9 0.8 0.9   CALCIUM 9.4 8.8 9.2   , CMP   Recent Labs   Lab 11/11/18 1958 11/12/18  0400 11/13/18  0531    132* 134*   K 4.5 4.7 4.6   CL 94* 95 95   CO2 31* 29 27    188* 181*   BUN 18 17 25*   CREATININE 0.9 0.8 0.9   CALCIUM 9.4 8.8 9.2   PROT 6.7 5.9*  --    ALBUMIN 3.9 3.4*  --    BILITOT 0.6 0.7  --    ALKPHOS 93 89  --    AST 14 11  --    ALT 18 16  --    ANIONGAP 11 8 12   ESTGFRAFRICA >60 >60 >60   EGFRNONAA >60 >60 >60   , CBC   Recent Labs   Lab 11/11/18 1958 11/12/18  0400 11/13/18  0531   WBC 17.22* 13.81* 19.99*   HGB 11.0* 9.9* 9.8*   HCT 33.7* 30.3* 30.3*    220 267    and Troponin   Recent Labs   Lab 11/11/18 1958 11/12/18  0157 11/12/18  0753   TROPONINI 0.019 0.017 0.014         Assessment and Plan:       Active Diagnoses:    Diagnosis Date Noted POA    PRINCIPAL PROBLEM:  COPD exacerbation [J44.1] 11/11/2018 Yes    Elevated d-dimer [R79.89] 11/12/2018 Yes    Chronic respiratory failure with hypoxia [J96.11] 05/17/2017 Yes    RLS (restless legs syndrome) [G25.81] 05/17/2017 Yes    Oxygen dependent [Z99.81] 01/03/2017 Not Applicable    Major depressive disorder with single episode,  in full remission [F32.5] 05/10/2016 Yes    Insomnia [G47.00] 08/10/2015 Yes    Hyperlipidemia [E78.5] 05/03/2015 Yes    HTN (hypertension) [I10] 06/19/2014 Yes      Problems Resolved During this Admission:       VTE Risk Mitigation (From admission, onward)        Ordered     IP VTE HIGH RISK PATIENT  Once      11/11/18 2235     Place sequential compression device  Until discontinued      11/11/18 2235        Current Facility-Administered Medications   Medication    acetaminophen tablet 650 mg    azithromycin 500 mg in dextrose 5 % 250 mL IVPB (ready to mix system)    cefTRIAXone (ROCEPHIN) 1 g in dextrose 5 % 50 mL IVPB    citalopram tablet 40 mg    levalbuterol nebulizer solution 1.25 mg    losartan tablet 50 mg    methylPREDNISolone sodium succinate injection 125 mg    ondansetron injection 4 mg    pantoprazole EC tablet 40 mg    pravastatin tablet 40 mg    promethazine (PHENERGAN) 12.5 mg in dextrose 5 % 50 mL IVPB    rOPINIRole tablet 1 mg    traZODone tablet 50 mg     Echo:   LVEF 60%  LV diastolic function abnormal stage 1 impaired relaxation  Mild TR.     MPI 9/2011:  normal perfusion study     DX: Wheezing more today  Chest pain chronic; worse with COPD exacerbation  COPD on home O2 for yrs; ex smoker/Asthma/Emphysema  HTN EF 60% DD  Dyslipidemia  Arthritis  Depression/anxiety  Normal stress test 9/11  try low dose asa as tolerated      Nehemiah Booth NP for Dr Metcalf  Cardiology  [facility]    PLAN:optimize COPD treatment  Consider repeat stress test when COPD stable    I attest that I have personally seen and examined this patient. I have reviewed and discussed the management in detail as outlined above.

## 2018-11-13 NOTE — PLAN OF CARE
11/11/18 0931   Medicare Message   Important Message from Medicare regarding Discharge Appeal Rights Given to patient/caregiver;Explained to patient/caregiver;Signed/date by patient/caregiver   Date IMM was signed 11/11/18   Time IMM was signed 0931

## 2018-11-13 NOTE — NURSING
Bedside report complete. Pt awake in bed. O2 at 3L/NC. SR up X3, call bell in reach. Instructed to call for needs or assistance.

## 2018-11-13 NOTE — SUBJECTIVE & OBJECTIVE
Interval History: Epigastric pain may need ct of abd and/or us of GB but need to ensure its not ischemic heart disease consult dr mcneal     Objective:     Vital Signs (Most Recent):  Temp: 96.5 °F (35.8 °C) (11/13/18 0801)  Pulse: 105 (11/13/18 0820)  Resp: 18 (11/13/18 0801)  BP: (!) 144/71 (11/13/18 0801)  SpO2: 95 % (11/13/18 0801) Vital Signs (24h Range):  Temp:  [96.5 °F (35.8 °C)-98.6 °F (37 °C)] 96.5 °F (35.8 °C)  Pulse:  [] 105  Resp:  [18-22] 18  SpO2:  [94 %-98 %] 95 %  BP: (139-177)/(68-84) 144/71     Weight: 66.5 kg (146 lb 9.7 oz)  Body mass index is 26.81 kg/m².      Intake/Output Summary (Last 24 hours) at 11/13/2018 1001  Last data filed at 11/12/2018 2245  Gross per 24 hour   Intake 900 ml   Output --   Net 900 ml       Physical Exam   Constitutional: She is oriented to person, place, and time. She appears well-developed and well-nourished. She is cooperative.  Non-toxic appearance. She does not appear ill. No distress.   HENT:   Head: Normocephalic and atraumatic.   Right Ear: Hearing, tympanic membrane, external ear and ear canal normal.   Left Ear: Hearing, tympanic membrane, external ear and ear canal normal.   Nose: Nose normal. No mucosal edema, rhinorrhea or nasal deformity. No epistaxis. Right sinus exhibits no maxillary sinus tenderness and no frontal sinus tenderness. Left sinus exhibits no maxillary sinus tenderness and no frontal sinus tenderness.   Mouth/Throat: Uvula is midline, oropharynx is clear and moist and mucous membranes are normal. No trismus in the jaw. Normal dentition. No uvula swelling. No posterior oropharyngeal erythema.   Eyes: Conjunctivae and lids are normal. No scleral icterus.   Sclera clear bilat   Neck: Trachea normal, full passive range of motion without pain and phonation normal. Neck supple.   Cardiovascular: Normal rate, regular rhythm, normal heart sounds, intact distal pulses and normal pulses.   Pulmonary/Chest: She is in respiratory distress (mild to  moderate). She has decreased breath sounds in the right middle field, the right lower field, the left middle field and the left lower field. She has wheezes in the right middle field, the right lower field, the left middle field and the left lower field. She has rhonchi in the right lower field and the left lower field. She exhibits no tenderness and no swelling.       Abdominal: Soft. Normal appearance and bowel sounds are normal. She exhibits no distension. There is no tenderness.   Musculoskeletal: Normal range of motion. She exhibits no edema or deformity.   Neurological: She is alert and oriented to person, place, and time. She exhibits normal muscle tone. Coordination normal.   Skin: Skin is warm, dry and intact. She is not diaphoretic. No pallor.   Psychiatric: She has a normal mood and affect. Her speech is normal and behavior is normal. Judgment and thought content normal. Cognition and memory are normal.   Nursing note and vitals reviewed.      Vents:  Oxygen Concentration (%): 32 (11/12/18 2100)    Lines/Drains/Airways     Peripheral Intravenous Line                 Peripheral IV - Single Lumen 11/11/18 1957 Right Antecubital 1 day                Significant Labs:    CBC/Anemia Profile:  Recent Labs   Lab 11/11/18 1958 11/12/18  0400 11/13/18  0531   WBC 17.22* 13.81* 19.99*   HGB 11.0* 9.9* 9.8*   HCT 33.7* 30.3* 30.3*    220 267   * 109* 109*   RDW 14.3 14.1 14.4        Chemistries:  Recent Labs   Lab 11/11/18 1958 11/12/18  0400 11/13/18  0531    132* 134*   K 4.5 4.7 4.6   CL 94* 95 95   CO2 31* 29 27   BUN 18 17 25*   CREATININE 0.9 0.8 0.9   CALCIUM 9.4 8.8 9.2   ALBUMIN 3.9 3.4*  --    PROT 6.7 5.9*  --    BILITOT 0.6 0.7  --    ALKPHOS 93 89  --    ALT 18 16  --    AST 14 11  --        All pertinent labs within the past 24 hours have been reviewed.    Significant Imaging:  I have reviewed all pertinent imaging results/findings within the past 24 hours.

## 2018-11-13 NOTE — PROGRESS NOTES
[facility] Hospital Medicine  Progress Note    Patient Name: Chantell Herrera  MRN: [id number]  Patient Class: IP- Inpatient   Admission Date: 11/11/2018  Length of Stay: 2 days  Attending Physician: Noe Fofana MD  Primary Care Provider: Anson Leiva MD        Subjective:     Principal Problem:COPD exacerbation    HPI:  Patient presented to ER with SOB. Sx started 2 weeks ago and progressively getting worse. She was having substernal chest pains yesterday which prompted her to come to the er. She was having to hunch over to walk because she felt so SOB. She has COPD and wears 3L NC at home at rest, 4L with exertion. She does report worsening wheezing and cough. Cough is productive--now of yellow sputum. No fevers. Denies LE edema. Denies sore throat, otalgia. + rhinorrhea. No sick contacts. She does report feeling better this AM.     Hospital Course:  11/13 Dr nino following her for acute exacerbation COPD. She is on zithromax and rocephin day 3. Medrol 125mg IV IV every 8hr. Levoalbuterol every 8hr. She has home O2 and is on her home 3L POX 95%. WBC 80377. This am afebrile. Dr nino following. She is SOB laying in bed talking. She also reports SANTIAGO    Dr Metcalf also saw her. Last echo shows 60% EF with LV diastolic dysfunction. Consider outpt stress.     Interval note: pt still SOB/SANTIAGO  Review of Systems   Constitutional: Negative for activity change, fatigue, fever and unexpected weight change.   HENT: Negative for congestion, ear pain, hearing loss, rhinorrhea and sore throat.    Eyes: Negative for redness and visual disturbance.   Respiratory: Positive for cough, chest tightness, shortness of breath and wheezing.    Cardiovascular: Negative for palpitations and leg swelling.   Gastrointestinal: Positive for abdominal pain. Negative for constipation, diarrhea, nausea and vomiting.        Epigastric pain with exertion    Genitourinary: Negative for dysuria, frequency and urgency.    Musculoskeletal: Negative for back pain, joint swelling and neck pain.   Skin: Negative for color change, rash and wound.   Neurological: Negative for dizziness, tremors, weakness, light-headedness and headaches.     Objective:     Vital Signs (Most Recent):  Temp: 96.5 °F (35.8 °C) (11/13/18 0801)  Pulse: (!) 141 (11/13/18 1029)  Resp: 18 (11/13/18 0801)  BP: (!) 144/71 (11/13/18 0801)  SpO2: 95 % (11/13/18 0801) Vital Signs (24h Range):  Temp:  [96.5 °F (35.8 °C)-98.6 °F (37 °C)] 96.5 °F (35.8 °C)  Pulse:  [] 141  Resp:  [18-22] 18  SpO2:  [94 %-98 %] 95 %  BP: (139-177)/(68-84) 144/71     Weight: 66.5 kg (146 lb 9.7 oz)  Body mass index is 26.81 kg/m².    Physical Exam   Constitutional: She is oriented to person, place, and time. She appears well-developed and well-nourished. No distress.   HENT:   Head: Normocephalic and atraumatic.   Right Ear: External ear normal.   Left Ear: External ear normal.   Eyes: Conjunctivae and EOM are normal. Pupils are equal, round, and reactive to light.   Neck: Neck supple. No tracheal deviation present.   Cardiovascular: Normal rate and regular rhythm.   No murmur heard.  Pulmonary/Chest: Effort normal. No respiratory distress. She has wheezes. She has no rales.   Decreased b/l bases   Abdominal: Soft. Bowel sounds are normal. She exhibits no distension. There is tenderness in the right upper quadrant.   Neurological: She is alert and oriented to person, place, and time. No cranial nerve deficit.   Skin: Skin is warm and dry.   Psychiatric: She has a normal mood and affect. Her behavior is normal.   Vitals reviewed.        CRANIAL NERVES     CN III, IV, VI   Pupils are equal, round, and reactive to light.  Extraocular motions are normal.        Significant Labs:   CBC:   Recent Labs   Lab 11/11/18 1958 11/12/18  0400 11/13/18  0531   WBC 17.22* 13.81* 19.99*   HGB 11.0* 9.9* 9.8*   HCT 33.7* 30.3* 30.3*    220 267     CMP:   Recent Labs   Lab 11/11/18 1958  11/12/18  0400 11/13/18  0531    132* 134*   K 4.5 4.7 4.6   CL 94* 95 95   CO2 31* 29 27    188* 181*   BUN 18 17 25*   CREATININE 0.9 0.8 0.9   CALCIUM 9.4 8.8 9.2   PROT 6.7 5.9*  --    ALBUMIN 3.9 3.4*  --    BILITOT 0.6 0.7  --    ALKPHOS 93 89  --    AST 14 11  --    ALT 18 16  --    ANIONGAP 11 8 12   EGFRNONAA >60 >60 >60     Cardiac Markers:   Recent Labs   Lab 11/11/18 1958   *     Troponin:   Recent Labs   Lab 11/11/18 1958 11/12/18  0157 11/12/18  0753   TROPONINI 0.019 0.017 0.014     Lab Results   Component Value Date    DDIMER 0.93 (H) 11/11/2018     Lab Results   Component Value Date    INR 1.1 11/11/2018    INR 1.1 06/02/2018    INR 1.1 11/30/2017     Blood culture NGTD  Flu negative    All pertinent labs within the past 24 hours have been reviewed.    Significant Imaging:     CXR 11/11   The lungs are hyperexpanded and show mild interstitial prominence suggesting COPD change.  There is bibasilar subsegmental atelectasis.  No consolidation or pleural effusions.  The heart is normal in size.  Calcified atheromatous disease affects the aorta.  Age-appropriate degenerative changes affect the skeleton.      Us lower ext 11/12   No evidence of deep venous thrombosis bilateral lower extremities.    EKG Normal sinus rhythm  Left anterior fascicular block  Septal infarct ,age undetermined  Abnormal ECG  When compared with ECG of 11-NOV-2018 19:58,  Nonspecific T wave abnormality now evident in Anterior leads  Confirmed by Candace Zhang MD ([id number]) on 11/12/2018 3:40:39 PM    Assessment/Plan:      * COPD exacerbation    Likely viral but was started on azithro and rocephin on admit. I see no consolidation on CXR but pulmonology would like to continue per their note. WBC was 17 K but also on chronic steroids so unsure. No fevers.  She is on prednisone 20mg daily as outpatient--per patient this is a chronic medication and not for exacerbation and she was told she can not stop steroids. She  was started on solumedrol 125mg Q8H here. Per Dr. Hickman's note he wants to continue current management so I will not change today  I scheduled her nebs: xopenex  Dr. Hickman recently started theophylline as outpatient: 150mg Qday. I have not restarted here. Unsure if this is necessary in light of not using inhalers as prescribed as outpatient.    She was prescribed symbicort but SHE HAS NOT BEEN USING. I suspect this is why she has been so difficult to control as outpatient. She is not on any maintenance therapy. Discussed needs to be on maintenance inhaler as outpatient in addition to PRN nebs.   She is not having increased O2 requirements but still symptomatic with exertion    11/13  Wean steroids  The current medical regimen is effective;  continue present plan and medications.        Epigastric pain    PPI  Check us abdomen ; r/o gall stones        Elevated d-dimer    LE US ordered- negative  lovenox DVT proph  Lower suspicion for PE       RLS (restless legs syndrome)    Cont home requip       Chronic respiratory failure with hypoxia    Due to COPD  She is on 3L NC at rest and 4L NC with exertion at home--using home requirements here right now and maintains sats.        Oxygen dependent    Reports using 3L NC at home and 4L with exertion  curretnly on 3L NC at rest POX 95%       Dyslipidemia           Major depressive disorder with single episode, in full remission    Cont home citalopram       Insomnia    Cont home trazodone       Hyperlipidemia    Cont home pravastatin       HTN (hypertension)    Cont home losartan, BP elevated this AM but her home meds weren't reordered. Should see it coming down now         VTE Risk Mitigation (From admission, onward)        Ordered     enoxaparin injection 40 mg  Daily      11/13/18 1046     IP VTE HIGH RISK PATIENT  Once      11/11/18 2235     Place sequential compression device  Until discontinued      11/11/18 2235              Milo Estevez MD  Department of Hospital  Medicine   [facility]

## 2018-11-13 NOTE — PLAN OF CARE
11/13/18 1351   Discharge Assessment   Assessment Type Discharge Planning Reassessment     Ms Boone remains SOB, talking in phrases with SANTIAGO. She is wearing oxygen as she has at home. CM will continue to assess daily for post acute care needs. Today she is still too acute to discuss discharge.

## 2018-11-13 NOTE — PLAN OF CARE
Problem: Patient Care Overview  Goal: Plan of Care Review  Outcome: Ongoing (interventions implemented as appropriate)  Patient stable. Denies pain. SANTIAGO noted. SpO2 96% on 3L O2 per nasal cannula. Expiratory wheezing. Receiving IV steroids & breathing treatments. Patient to be NPO after midnight tonight for abdominal US tomorrow. POC reviewed with patient. She voiced understanding. Will continue to monitor.

## 2018-11-14 LAB
ANION GAP SERPL CALC-SCNC: 9 MMOL/L
BASOPHILS # BLD AUTO: 0.01 K/UL
BASOPHILS NFR BLD: 0.1 %
BUN SERPL-MCNC: 28 MG/DL
CALCIUM SERPL-MCNC: 9.1 MG/DL
CHLORIDE SERPL-SCNC: 94 MMOL/L
CO2 SERPL-SCNC: 32 MMOL/L
CREAT SERPL-MCNC: 0.8 MG/DL
DIFFERENTIAL METHOD: ABNORMAL
EOSINOPHIL # BLD AUTO: 0 K/UL
EOSINOPHIL NFR BLD: 0.1 %
ERYTHROCYTE [DISTWIDTH] IN BLOOD BY AUTOMATED COUNT: 14.6 %
EST. GFR  (AFRICAN AMERICAN): >60 ML/MIN/1.73 M^2
EST. GFR  (NON AFRICAN AMERICAN): >60 ML/MIN/1.73 M^2
GLUCOSE SERPL-MCNC: 152 MG/DL
HCT VFR BLD AUTO: 29.1 %
HGB BLD-MCNC: 9.5 G/DL
LYMPHOCYTES # BLD AUTO: 0.3 K/UL
LYMPHOCYTES NFR BLD: 1.8 %
MCH RBC QN AUTO: 35.8 PG
MCHC RBC AUTO-ENTMCNC: 32.6 G/DL
MCV RBC AUTO: 110 FL
MONOCYTES # BLD AUTO: 1.1 K/UL
MONOCYTES NFR BLD: 6 %
NEUTROPHILS # BLD AUTO: 17.1 K/UL
NEUTROPHILS NFR BLD: 92 %
PLATELET # BLD AUTO: 265 K/UL
PMV BLD AUTO: 11.9 FL
POTASSIUM SERPL-SCNC: 4.5 MMOL/L
RBC # BLD AUTO: 2.65 M/UL
SODIUM SERPL-SCNC: 135 MMOL/L
WBC # BLD AUTO: 18.53 K/UL

## 2018-11-14 PROCEDURE — 27000221 HC OXYGEN, UP TO 24 HOURS: Mod: HCWC

## 2018-11-14 PROCEDURE — 11000001 HC ACUTE MED/SURG PRIVATE ROOM: Mod: HCWC

## 2018-11-14 PROCEDURE — 85025 COMPLETE CBC W/AUTO DIFF WBC: CPT | Mod: HCWC

## 2018-11-14 PROCEDURE — 94761 N-INVAS EAR/PLS OXIMETRY MLT: CPT | Mod: HCWC

## 2018-11-14 PROCEDURE — 63600175 PHARM REV CODE 636 W HCPCS: Mod: HCWC | Performed by: SURGERY

## 2018-11-14 PROCEDURE — 25000003 PHARM REV CODE 250: Mod: HCWC | Performed by: SURGERY

## 2018-11-14 PROCEDURE — 94640 AIRWAY INHALATION TREATMENT: CPT | Mod: HCWC

## 2018-11-14 PROCEDURE — 25000003 PHARM REV CODE 250: Mod: HCWC | Performed by: NURSE PRACTITIONER

## 2018-11-14 PROCEDURE — 25000242 PHARM REV CODE 250 ALT 637 W/ HCPCS: Mod: HCWC | Performed by: INTERNAL MEDICINE

## 2018-11-14 PROCEDURE — 99232 SBSQ HOSP IP/OBS MODERATE 35: CPT | Mod: HCWC,,, | Performed by: FAMILY MEDICINE

## 2018-11-14 PROCEDURE — 25000003 PHARM REV CODE 250: Mod: HCWC | Performed by: INTERNAL MEDICINE

## 2018-11-14 PROCEDURE — 36415 COLL VENOUS BLD VENIPUNCTURE: CPT | Mod: HCWC

## 2018-11-14 PROCEDURE — 80048 BASIC METABOLIC PNL TOTAL CA: CPT | Mod: HCWC

## 2018-11-14 PROCEDURE — 63600175 PHARM REV CODE 636 W HCPCS: Mod: HCWC | Performed by: NURSE PRACTITIONER

## 2018-11-14 RX ORDER — GUAIFENESIN 600 MG/1
600 TABLET, EXTENDED RELEASE ORAL 2 TIMES DAILY
Status: DISCONTINUED | OUTPATIENT
Start: 2018-11-14 | End: 2018-11-15

## 2018-11-14 RX ADMIN — ENOXAPARIN SODIUM 40 MG: 100 INJECTION SUBCUTANEOUS at 05:11

## 2018-11-14 RX ADMIN — METHYLPREDNISOLONE SODIUM SUCCINATE 80 MG: 125 INJECTION, POWDER, FOR SOLUTION INTRAMUSCULAR; INTRAVENOUS at 05:11

## 2018-11-14 RX ADMIN — ROPINIROLE HYDROCHLORIDE 1 MG: 0.5 TABLET, FILM COATED ORAL at 10:11

## 2018-11-14 RX ADMIN — LEVALBUTEROL 1.25 MG: 1.25 SOLUTION, CONCENTRATE RESPIRATORY (INHALATION) at 04:11

## 2018-11-14 RX ADMIN — GUAIFENESIN 600 MG: 600 TABLET, EXTENDED RELEASE ORAL at 12:11

## 2018-11-14 RX ADMIN — AZITHROMYCIN MONOHYDRATE 500 MG: 500 INJECTION, POWDER, LYOPHILIZED, FOR SOLUTION INTRAVENOUS at 09:11

## 2018-11-14 RX ADMIN — ASPIRIN 81 MG 81 MG: 81 TABLET ORAL at 12:11

## 2018-11-14 RX ADMIN — METHYLPREDNISOLONE SODIUM SUCCINATE 80 MG: 125 INJECTION, POWDER, FOR SOLUTION INTRAMUSCULAR; INTRAVENOUS at 01:11

## 2018-11-14 RX ADMIN — PRAVASTATIN SODIUM 40 MG: 40 TABLET ORAL at 12:11

## 2018-11-14 RX ADMIN — LEVALBUTEROL 1.25 MG: 1.25 SOLUTION, CONCENTRATE RESPIRATORY (INHALATION) at 07:11

## 2018-11-14 RX ADMIN — METHYLPREDNISOLONE SODIUM SUCCINATE 80 MG: 125 INJECTION, POWDER, FOR SOLUTION INTRAMUSCULAR; INTRAVENOUS at 09:11

## 2018-11-14 RX ADMIN — PANTOPRAZOLE SODIUM 40 MG: 40 TABLET, DELAYED RELEASE ORAL at 12:11

## 2018-11-14 RX ADMIN — ACETAMINOPHEN 650 MG: 325 TABLET ORAL at 11:11

## 2018-11-14 RX ADMIN — CITALOPRAM HYDROBROMIDE 40 MG: 10 TABLET ORAL at 12:11

## 2018-11-14 RX ADMIN — CEFTRIAXONE 1 G: 1 INJECTION, SOLUTION INTRAVENOUS at 11:11

## 2018-11-14 RX ADMIN — LEVALBUTEROL 1.25 MG: 1.25 SOLUTION, CONCENTRATE RESPIRATORY (INHALATION) at 11:11

## 2018-11-14 RX ADMIN — TRAZODONE HYDROCHLORIDE 50 MG: 50 TABLET ORAL at 10:11

## 2018-11-14 RX ADMIN — LOSARTAN POTASSIUM 50 MG: 50 TABLET, FILM COATED ORAL at 12:11

## 2018-11-14 NOTE — NURSING
Bedside report complete. Pt up in bed, denies C/O at this time. O2 in use at 3L/NC. SR up X3, call bell in reach. Instructed to call for needs or assistance.

## 2018-11-14 NOTE — ASSESSMENT & PLAN NOTE
Cont home losartan, BP elevated this AM but her home meds weren't reordered. Still elevated cxonsider increasing

## 2018-11-14 NOTE — PROGRESS NOTES
[facility]  Pulmonology  Progress Note    Patient Name: Chantell Herrera  MRN: [id number]  Admission Date: 11/11/2018  Hospital Length of Stay: 3 days  Code Status: Full Code  Attending Provider: Noe Fofana MD  Primary Care Provider: Anson Leiva MD   Principal Problem: COPD exacerbation    Subjective:     Interval History: feeling some improvement     Objective:     Vital Signs (Most Recent):  Temp: 98.4 °F (36.9 °C) (11/14/18 1614)  Pulse: 86 (11/14/18 1623)  Resp: (!) 24 (11/14/18 1623)  BP: (!) 162/76 (11/14/18 1614)  SpO2: (!) 89 % (11/14/18 1623) Vital Signs (24h Range):  Temp:  [96.9 °F (36.1 °C)-98.8 °F (37.1 °C)] 98.4 °F (36.9 °C)  Pulse:  [] 86  Resp:  [18-24] 24  SpO2:  [89 %-97 %] 89 %  BP: (139-176)/(66-90) 162/76     Weight: 66.5 kg (146 lb 9.7 oz)  Body mass index is 26.81 kg/m².      Intake/Output Summary (Last 24 hours) at 11/14/2018 1704  Last data filed at 11/14/2018 1230  Gross per 24 hour   Intake 540 ml   Output --   Net 540 ml       Physical Exam   Constitutional: She is oriented to person, place, and time. She appears well-developed and well-nourished. She is cooperative.  Non-toxic appearance. She does not appear ill. No distress.   HENT:   Head: Normocephalic and atraumatic.   Right Ear: Hearing, tympanic membrane, external ear and ear canal normal. Tympanic membrane is not injected.   Left Ear: Hearing, tympanic membrane, external ear and ear canal normal. Tympanic membrane is not injected.   Nose: Mucosal edema and rhinorrhea present. No nasal deformity. No epistaxis. Right sinus exhibits frontal sinus tenderness. Right sinus exhibits no maxillary sinus tenderness. Left sinus exhibits frontal sinus tenderness. Left sinus exhibits no maxillary sinus tenderness.   Mouth/Throat: Uvula is midline, oropharynx is clear and moist and mucous membranes are normal. No trismus in the jaw. Normal dentition. No uvula swelling. No posterior oropharyngeal erythema.    Eyes: Conjunctivae and lids are normal. No scleral icterus.   Sclera clear bilat   Neck: Trachea normal, full passive range of motion without pain and phonation normal. Neck supple.   Cardiovascular: Normal rate, regular rhythm, normal heart sounds, intact distal pulses and normal pulses.   Pulmonary/Chest: She is in respiratory distress (mild to moderate). She has decreased breath sounds in the right middle field, the right lower field, the left middle field and the left lower field. She has wheezes in the right middle field, the right lower field, the left middle field and the left lower field. She has rhonchi in the right middle field, the right lower field, the left middle field and the left lower field.   Abdominal: Soft. Normal appearance and bowel sounds are normal. She exhibits no distension. There is no tenderness.   Musculoskeletal: Normal range of motion. She exhibits no edema or deformity.   Neurological: She is alert and oriented to person, place, and time. She exhibits normal muscle tone. Coordination normal.   Skin: Skin is warm, dry and intact. She is not diaphoretic. No pallor.   Psychiatric: She has a normal mood and affect. Her speech is normal and behavior is normal. Judgment and thought content normal. Cognition and memory are normal.   Nursing note and vitals reviewed.      Vents:  Oxygen Concentration (%): 32 (11/12/18 2100)    Lines/Drains/Airways     Peripheral Intravenous Line                 Peripheral IV - Single Lumen 11/14/18 0515 Anterior;Right Wrist less than 1 day                Significant Labs:    CBC/Anemia Profile:  Recent Labs   Lab 11/13/18  0531 11/14/18  0538   WBC 19.99* 18.53*   HGB 9.8* 9.5*   HCT 30.3* 29.1*    265   * 110*   RDW 14.4 14.6*        Chemistries:  Recent Labs   Lab 11/13/18  0531 11/14/18  0538   * 135*   K 4.6 4.5   CL 95 94*   CO2 27 32*   BUN 25* 28*   CREATININE 0.9 0.8   CALCIUM 9.2 9.1       All pertinent labs within the past 24  hours have been reviewed.    Significant Imaging:  I have reviewed all pertinent imaging results/findings within the past 24 hours.    Assessment/Plan:     * COPD exacerbation    Slight increase in air entry but profoundly poor air entry   Doing better on meds continue support      Epigastric pain    Need enamorado exclude cardiac     Chronic respiratory failure with hypoxia    Uses O2 at home   Chronic respiratory failure      Emphysema/COPD    Chronic respiratory failure plus severe copd/emphysema             Emmanuel Hickman MD  Pulmonology  [facility]

## 2018-11-14 NOTE — PROGRESS NOTES
[facility] Hospital Medicine  Progress Note    Patient Name: Chantell Herrera  MRN: [id number]  Patient Class: IP- Inpatient   Admission Date: 11/11/2018  Length of Stay: 3 days  Attending Physician: Noe Fofana MD  Primary Care Provider: Anson Leiva MD        Subjective:     Principal Problem:COPD exacerbation    HPI:  Patient presented to ER with SOB. Sx started 2 weeks ago and progressively getting worse. She was having substernal chest pains yesterday which prompted her to come to the er. She was having to hunch over to walk because she felt so SOB. She has COPD and wears 3L NC at home at rest, 4L with exertion. She does report worsening wheezing and cough. Cough is productive--now of yellow sputum. No fevers. Denies LE edema. Denies sore throat, otalgia. + rhinorrhea. No sick contacts. She does report feeling better this AM.     Hospital Course:  11/13 Dr nino following her for acute exacerbation COPD. She is on zithromax and rocephin day 3. Medrol 125mg IV IV every 8hr. Levoalbuterol every 8hr. She has home O2 and is on her home 3L POX 95%. WBC 78217. This am afebrile. Dr nino following. She is SOB laying in bed talking. She also reports SANTIAGO    Dr Metcalf also saw her. Last echo shows 60% EF with LV diastolic dysfunction. Consider outpt stress.     11/*14 she reports that her breathing is better. She was able to get to bathroom and back this am with minimal SOB. Steroids were decreased to 80mg IV every 8hr. She feels pretty far from her baseline. Coughing a lot. Day 4 azithromycin and rocephin     U/s gallbladder with stone in neck but no thickened walls. No n/v.     Interval note: pt still SOB/SANTIAGO  Review of Systems   Constitutional: Negative for activity change, fatigue, fever and unexpected weight change.   HENT: Negative for congestion, ear pain, hearing loss, rhinorrhea and sore throat.    Eyes: Negative for redness and visual disturbance.   Respiratory: Positive for cough,  chest tightness, shortness of breath and wheezing.    Cardiovascular: Negative for palpitations and leg swelling.   Gastrointestinal: Positive for abdominal pain. Negative for constipation, diarrhea, nausea and vomiting.        Epigastric pain with exertion    Genitourinary: Negative for dysuria, frequency and urgency.   Musculoskeletal: Negative for back pain, joint swelling and neck pain.   Skin: Negative for color change, rash and wound.   Neurological: Negative for dizziness, tremors, weakness, light-headedness and headaches.     Objective:     Vital Signs (Most Recent):  Temp: 97.3 °F (36.3 °C) (11/14/18 0722)  Pulse: 89 (11/14/18 1000)  Resp: 18 (11/14/18 0733)  BP: (!) 176/74 (11/14/18 0722)  SpO2: 95 % (11/14/18 0733) Vital Signs (24h Range):  Temp:  [96.9 °F (36.1 °C)-98.5 °F (36.9 °C)] 97.3 °F (36.3 °C)  Pulse:  [] 89  Resp:  [18-22] 18  SpO2:  [93 %-97 %] 95 %  BP: (122-176)/(60-90) 176/74     Weight: 66.5 kg (146 lb 9.7 oz)  Body mass index is 26.81 kg/m².    Physical Exam   Constitutional: She is oriented to person, place, and time. She appears well-developed and well-nourished. No distress.   HENT:   Head: Normocephalic and atraumatic.   Right Ear: External ear normal.   Left Ear: External ear normal.   Eyes: Conjunctivae and EOM are normal. Pupils are equal, round, and reactive to light.   Neck: Neck supple. No tracheal deviation present.   Cardiovascular: Normal rate and regular rhythm.   No murmur heard.  Pulmonary/Chest: Effort normal. No respiratory distress. She has no wheezes. She has no rales.   Very poor tidal volume    Abdominal: Soft. Bowel sounds are normal. She exhibits no distension. There is tenderness in the right upper quadrant.   Neurological: She is alert and oriented to person, place, and time. No cranial nerve deficit.   Skin: Skin is warm and dry.   Psychiatric: She has a normal mood and affect. Her behavior is normal.   Vitals reviewed.        CRANIAL NERVES     CN III, IV,  VI   Pupils are equal, round, and reactive to light.  Extraocular motions are normal.        Significant Labs:   CBC:   Recent Labs   Lab 11/13/18  0531 11/14/18  0538   WBC 19.99* 18.53*   HGB 9.8* 9.5*   HCT 30.3* 29.1*    265     CMP:   Recent Labs   Lab 11/13/18  0531 11/14/18  0538   * 135*   K 4.6 4.5   CL 95 94*   CO2 27 32*   * 152*   BUN 25* 28*   CREATININE 0.9 0.8   CALCIUM 9.2 9.1   ANIONGAP 12 9   EGFRNONAA >60 >60     Cardiac Markers:   No results for input(s): CKMB, MYOGLOBIN, BNP, TROPISTAT in the last 48 hours.  Troponin:   No results for input(s): TROPONINI in the last 48 hours.  Lab Results   Component Value Date    DDIMER 0.93 (H) 11/11/2018     Lab Results   Component Value Date    INR 1.1 11/11/2018    INR 1.1 06/02/2018    INR 1.1 11/30/2017     Blood culture NGTD  Flu negative    All pertinent labs within the past 24 hours have been reviewed.    Significant Imaging:     CXR 11/11   The lungs are hyperexpanded and show mild interstitial prominence suggesting COPD change.  There is bibasilar subsegmental atelectasis.  No consolidation or pleural effusions.  The heart is normal in size.  Calcified atheromatous disease affects the aorta.  Age-appropriate degenerative changes affect the skeleton.      Us lower ext 11/12   No evidence of deep venous thrombosis bilateral lower extremities.    EKG Normal sinus rhythm  Left anterior fascicular block  Septal infarct ,age undetermined  Abnormal ECG  When compared with ECG of 11-NOV-2018 19:58,  Nonspecific T wave abnormality now evident in Anterior leads  Confirmed by Leatha VILLAFANA, Candace ([id number]) on 11/12/2018 3:40:39 PM    Assessment/Plan:      * COPD exacerbation    Likely viral but was started on azithro and rocephin on admit. I see no consolidation on CXR but pulmonology would like to continue per their note. WBC was 17 K but also on chronic steroids so unsure. No fevers.  She is on prednisone 20mg daily as outpatient--per patient  this is a chronic medication and not for exacerbation and she was told she can not stop steroids. She was started on solumedrol 125mg Q8H here. Per Dr. Hickman's note he wants to continue current management so I will not change today  I scheduled her nebs: xopenex  Dr. Hickman recently started theophylline as outpatient: 150mg Qday. I have not restarted here. Unsure if this is necessary in light of not using inhalers as prescribed as outpatient.    She was prescribed symbicort but SHE HAS NOT BEEN USING. I suspect this is why she has been so difficult to control as outpatient. She is not on any maintenance therapy. Discussed needs to be on maintenance inhaler as outpatient in addition to PRN nebs.   She is not having increased O2 requirements but still symptomatic with exertion    11/13  Wean steroids  The current medical regimen is effective;  continue present plan and medications.   11/14 cont same     Epigastric pain    PPI  Check us abdomen ; r/o gall stones - does have gallstone but no active inflammation/indfection will f/u outpt       Elevated d-dimer    LE US ordered- negative  lovenox DVT proph  Lower suspicion for PE       RLS (restless legs syndrome)    Cont home requip       Chronic respiratory failure with hypoxia    Due to COPD  She is on 3L NC at rest and 4L NC with exertion at home--using home requirements here right now and maintains sats.        Oxygen dependent    Reports using 3L NC at home and 4L with exertion  curretnly on 3L NC at rest POX 95%       Dyslipidemia           Major depressive disorder with single episode, in full remission    Cont home citalopram       Insomnia    Cont home trazodone       Hyperlipidemia    Cont home pravastatin       HTN (hypertension)    Cont home losartan, BP elevated this AM but her home meds weren't reordered. Still elevated cxonsider increasing         VTE Risk Mitigation (From admission, onward)        Ordered     enoxaparin injection 40 mg  Daily      11/13/18  1046     IP VTE HIGH RISK PATIENT  Once      11/11/18 2235     Place sequential compression device  Until discontinued      11/11/18 2235              Anson Leiva MD  Department of Hospital Medicine   [facility]

## 2018-11-14 NOTE — ASSESSMENT & PLAN NOTE
PPI  Check us abdomen ; r/o gall stones - does have gallstone but no active inflammation/indfection will f/u outpt

## 2018-11-14 NOTE — PROGRESS NOTES
[facility]  Cardiology  Progress Note    Patient Name: Chantell Herrera  MRN: [id number]  Admission Date: 11/11/2018  Hospital Length of Stay: 3 days  Code Status: Full Code   Attending Physician: Noe Fofana MD   Primary Care Physician: Anson Leiva MD  Expected Discharge Date:   Principal Problem:COPD exacerbation    Subjective:     Hospital Course: COPD exacerbation, atypical chest pain ruled out for MI. Elevated WBC count, ABD US pending.     Interval History: 83 year old female with history of HTN, dyslipidemia, anxiety, asthma, chronic bronchitis, emphysema, COPD, depression, arthritis, presents to the emergency room with shortness of breath last night. Patient has had cough wheezing and shortness of breath last 2 days. Patient on exam has rhonchi in all fields with no active wheezing, on home oxygen        ROS   Constitutional : Negative  EENT : +congestion  CV : +CP  Respiratory : Cough / SOB  Gastrointestinal: Negative   Genitourinary: Negative  Musculoskeletal: Negative  Skin : Negative  Neurological : AAO x 3       Objective:     Vital Signs (Most Recent):  Temp: 97.3 °F (36.3 °C) (11/14/18 0722)  Pulse: 84 (11/14/18 0733)  Resp: 18 (11/14/18 0733)  BP: (!) 176/74 (11/14/18 0722)  SpO2: 95 % (11/14/18 0733) Vital Signs (24h Range):  Temp:  [96.9 °F (36.1 °C)-98.5 °F (36.9 °C)] 97.3 °F (36.3 °C)  Pulse:  [] 84  Resp:  [18-22] 18  SpO2:  [93 %-97 %] 95 %  BP: (122-176)/(60-90) 176/74     Weight: 66.5 kg (146 lb 9.7 oz)  Body mass index is 26.81 kg/m².    SpO2: 95 %  O2 Device (Oxygen Therapy): nasal cannula      Intake/Output Summary (Last 24 hours) at 11/14/2018 0850  Last data filed at 11/14/2018 0010  Gross per 24 hour   Intake 540 ml   Output --   Net 540 ml       Lines/Drains/Airways     Peripheral Intravenous Line                 Peripheral IV - Single Lumen 11/14/18 0515 Anterior;Right Wrist less than 1 day                Physical Exam  General appearance: alert,  appears stated age and cooperative  Head: Normocephalic, without obvious abnormality, atraumatic  Eyes: conjunctivae/corneas clear. PERRL  Neck: no carotid bruit, no JVD and supple, symmetrical, trachea midline  Lungs: coarse wheezing bilaterally, normal respiratory effort  Chest Wall: no tenderness  Heart: regular rate and rhythm, S1, S2 normal, no murmur, click, rub or gallop  Abdomen: soft, non-tender; bowel sounds normal; no masses,  no organomegaly  Extremities: Extremities normal, atraumatic, no cyanosis, clubbing, or edema  Pulses: Dorsalis Pedis R: 2+ (normal)/L: 2+ (normal)  Skin: Skin color, texture, turgor normal. No rashes or lesions  Neurologic: Normal mood and affect  Alert and oriented X 3      Significant Labs:   ABG: No results for input(s): PH, PCO2, HCO3, POCSATURATED, BE in the last 48 hours., Blood Culture: No results for input(s): LABBLOO in the last 48 hours., BMP:   Recent Labs   Lab 11/13/18  0531 11/14/18  0538   * 152*   * 135*   K 4.6 4.5   CL 95 94*   CO2 27 32*   BUN 25* 28*   CREATININE 0.9 0.8   CALCIUM 9.2 9.1   , CMP   Recent Labs   Lab 11/13/18 0531 11/14/18  0538   * 135*   K 4.6 4.5   CL 95 94*   CO2 27 32*   * 152*   BUN 25* 28*   CREATININE 0.9 0.8   CALCIUM 9.2 9.1   ANIONGAP 12 9   ESTGFRAFRICA >60 >60   EGFRNONAA >60 >60   , CBC   Recent Labs   Lab 11/13/18  0531 11/14/18  0538   WBC 19.99* 18.53*   HGB 9.8* 9.5*   HCT 30.3* 29.1*    265   , INR No results for input(s): INR, PROTIME in the last 48 hours., Lipid Panel No results for input(s): CHOL, HDL, LDLCALC, TRIG, CHOLHDL in the last 48 hours.,   Pathology Results  (Last 10 years)    None       and Troponin No results for input(s): TROPONINI in the last 48 hours.    Significant Imaging: Cardiac Cath: , CT scan: CT ABDOMEN PELVIS WITH CONTRAST: No results found for this visit on 11/11/18. and CT ABDOMEN PELVIS WITHOUT CONTRAST: No results found for this visit on 11/11/18., Echocardiogram:  2D echo with color flow doppler: No results found for this or any previous visit., EKG: , Stress Test:  and X-Ray: CXR: X-Ray Chest 1 View (CXR):   Results for orders placed or performed during the hospital encounter of 11/11/18   X-Ray Chest 1 View    Narrative    EXAMINATION:  XR CHEST 1 VIEW    CLINICAL HISTORY:  SOB;    TECHNIQUE:  Single frontal view of the chest was performed.    COMPARISON:  06/06/2018    FINDINGS:  The lungs are hyperexpanded and show mild interstitial prominence suggesting COPD change.  There is bibasilar subsegmental atelectasis.  No consolidation or pleural effusions.  The heart is normal in size.  Calcified atheromatous disease affects the aorta.  Age-appropriate degenerative changes affect the skeleton.      Impression    As above.      Electronically signed by: Eileen Velasquez MD  Date:    11/12/2018  Time:    08:52    and X-Ray Chest PA and Lateral (CXR): No results found for this visit on 11/11/18. and KUB: X-Ray Abdomen AP 1 View (KUB): No results found for this visit on 11/11/18.  Assessment and Plan:       Active Diagnoses:    Diagnosis Date Noted POA    PRINCIPAL PROBLEM:  COPD exacerbation [J44.1] 11/11/2018 Yes    Epigastric pain [R10.13] 11/13/2018 Unknown    Elevated d-dimer [R79.89] 11/12/2018 Yes    Chronic respiratory failure with hypoxia [J96.11] 05/17/2017 Yes    RLS (restless legs syndrome) [G25.81] 05/17/2017 Yes    Oxygen dependent [Z99.81] 01/03/2017 Not Applicable    Major depressive disorder with single episode, in full remission [F32.5] 05/10/2016 Yes    Insomnia [G47.00] 08/10/2015 Yes    Hyperlipidemia [E78.5] 05/03/2015 Yes    HTN (hypertension) [I10] 06/19/2014 Yes      Problems Resolved During this Admission:       VTE Risk Mitigation (From admission, onward)        Ordered     enoxaparin injection 40 mg  Daily      11/13/18 1046     IP VTE HIGH RISK PATIENT  Once      11/11/18 2235     Place sequential compression device  Until discontinued       11/11/18 2169        Current Facility-Administered Medications   Medication    acetaminophen tablet 650 mg    aspirin chewable tablet 81 mg    azithromycin 500 mg in dextrose 5 % 250 mL IVPB (ready to mix system)    cefTRIAXone (ROCEPHIN) 1 g in dextrose 5 % 50 mL IVPB    citalopram tablet 40 mg    enoxaparin injection 40 mg    levalbuterol nebulizer solution 1.25 mg    losartan tablet 50 mg    methylPREDNISolone sodium succinate injection 80 mg    ondansetron injection 4 mg    pantoprazole EC tablet 40 mg    pravastatin tablet 40 mg    promethazine (PHENERGAN) 12.5 mg in dextrose 5 % 50 mL IVPB    rOPINIRole tablet 1 mg    traZODone tablet 50 mg   Echo:   LVEF 60%  LV diastolic function abnormal stage 1 impaired relaxation  Mild TR.     MPI 9/2011:  normal perfusion study     DX: Wheezing improved today  Chest pain chronic; worse with COPD exacerbation  COPD on home O2 for yrs; ex smoker/Asthma/Emphysema  HTN EF 60% DD  Dyslipidemia  Arthritis  Depression/anxiety  Normal stress test 9/11  try low dose asa as tolerated    PLAN:optimize COPD treatment  Consider repeat stress test when COPD stable  Abdominal us today      CHAYO Myers  Cardiology  [facility]  I attest that I have personally seen and examined this patient. I have reviewed and discussed the management in detail as outlined above.

## 2018-11-14 NOTE — CARE UPDATE
Quiet hours, rested well. IV antibiotics continue as ordered. IV solumedrol decreased to 80mg Q 8 hr. O2 remains at 3L/NC. Plan of care reviewed with pt, voiced understanding. SR up X3, call bell in reach. Instructed to call for needs or assistance.

## 2018-11-14 NOTE — PLAN OF CARE
Problem: Patient Care Overview  Goal: Plan of Care Review  Outcome: Ongoing (interventions implemented as appropriate)  Vitals remained stable, afebrile. no complaints of pain. Ambulating to bathroom without assist, educated pt on calling for assistance. Pt insist she does not need help. Bed alarm used. SANTIAGO. NC at 3L. Wheezing. Solumedrol given. Discussed plan of care with pt, stated understanding

## 2018-11-14 NOTE — SUBJECTIVE & OBJECTIVE
Interval note: pt still SOB/SANTIAGO  Review of Systems   Constitutional: Negative for activity change, fatigue, fever and unexpected weight change.   HENT: Negative for congestion, ear pain, hearing loss, rhinorrhea and sore throat.    Eyes: Negative for redness and visual disturbance.   Respiratory: Positive for cough, chest tightness, shortness of breath and wheezing.    Cardiovascular: Negative for palpitations and leg swelling.   Gastrointestinal: Positive for abdominal pain. Negative for constipation, diarrhea, nausea and vomiting.        Epigastric pain with exertion    Genitourinary: Negative for dysuria, frequency and urgency.   Musculoskeletal: Negative for back pain, joint swelling and neck pain.   Skin: Negative for color change, rash and wound.   Neurological: Negative for dizziness, tremors, weakness, light-headedness and headaches.     Objective:     Vital Signs (Most Recent):  Temp: 97.3 °F (36.3 °C) (11/14/18 0722)  Pulse: 89 (11/14/18 1000)  Resp: 18 (11/14/18 0733)  BP: (!) 176/74 (11/14/18 0722)  SpO2: 95 % (11/14/18 0733) Vital Signs (24h Range):  Temp:  [96.9 °F (36.1 °C)-98.5 °F (36.9 °C)] 97.3 °F (36.3 °C)  Pulse:  [] 89  Resp:  [18-22] 18  SpO2:  [93 %-97 %] 95 %  BP: (122-176)/(60-90) 176/74     Weight: 66.5 kg (146 lb 9.7 oz)  Body mass index is 26.81 kg/m².    Physical Exam   Constitutional: She is oriented to person, place, and time. She appears well-developed and well-nourished. No distress.   HENT:   Head: Normocephalic and atraumatic.   Right Ear: External ear normal.   Left Ear: External ear normal.   Eyes: Conjunctivae and EOM are normal. Pupils are equal, round, and reactive to light.   Neck: Neck supple. No tracheal deviation present.   Cardiovascular: Normal rate and regular rhythm.   No murmur heard.  Pulmonary/Chest: Effort normal. No respiratory distress. She has no wheezes. She has no rales.   Very poor tidal volume    Abdominal: Soft. Bowel sounds are normal. She exhibits  no distension. There is tenderness in the right upper quadrant.   Neurological: She is alert and oriented to person, place, and time. No cranial nerve deficit.   Skin: Skin is warm and dry.   Psychiatric: She has a normal mood and affect. Her behavior is normal.   Vitals reviewed.        CRANIAL NERVES     CN III, IV, VI   Pupils are equal, round, and reactive to light.  Extraocular motions are normal.        Significant Labs:   CBC:   Recent Labs   Lab 11/13/18  0531 11/14/18  0538   WBC 19.99* 18.53*   HGB 9.8* 9.5*   HCT 30.3* 29.1*    265     CMP:   Recent Labs   Lab 11/13/18  0531 11/14/18  0538   * 135*   K 4.6 4.5   CL 95 94*   CO2 27 32*   * 152*   BUN 25* 28*   CREATININE 0.9 0.8   CALCIUM 9.2 9.1   ANIONGAP 12 9   EGFRNONAA >60 >60     Cardiac Markers:   No results for input(s): CKMB, MYOGLOBIN, BNP, TROPISTAT in the last 48 hours.  Troponin:   No results for input(s): TROPONINI in the last 48 hours.  Lab Results   Component Value Date    DDIMER 0.93 (H) 11/11/2018     Lab Results   Component Value Date    INR 1.1 11/11/2018    INR 1.1 06/02/2018    INR 1.1 11/30/2017     Blood culture NGTD  Flu negative    All pertinent labs within the past 24 hours have been reviewed.    Significant Imaging:     CXR 11/11   The lungs are hyperexpanded and show mild interstitial prominence suggesting COPD change.  There is bibasilar subsegmental atelectasis.  No consolidation or pleural effusions.  The heart is normal in size.  Calcified atheromatous disease affects the aorta.  Age-appropriate degenerative changes affect the skeleton.      Us lower ext 11/12   No evidence of deep venous thrombosis bilateral lower extremities.    EKG Normal sinus rhythm  Left anterior fascicular block  Septal infarct ,age undetermined  Abnormal ECG  When compared with ECG of 11-NOV-2018 19:58,  Nonspecific T wave abnormality now evident in Anterior leads  Confirmed by Candace Zhang MD ([id number]) on 11/12/2018 3:40:39 PM

## 2018-11-14 NOTE — ASSESSMENT & PLAN NOTE
Likely viral but was started on azithro and rocephin on admit. I see no consolidation on CXR but pulmonology would like to continue per their note. WBC was 17 K but also on chronic steroids so unsure. No fevers.  She is on prednisone 20mg daily as outpatient--per patient this is a chronic medication and not for exacerbation and she was told she can not stop steroids. She was started on solumedrol 125mg Q8H here. Per Dr. Hickman's note he wants to continue current management so I will not change today  I scheduled her nebs: xopenex  Dr. Hickman recently started theophylline as outpatient: 150mg Qday. I have not restarted here. Unsure if this is necessary in light of not using inhalers as prescribed as outpatient.    She was prescribed symbicort but SHE HAS NOT BEEN USING. I suspect this is why she has been so difficult to control as outpatient. She is not on any maintenance therapy. Discussed needs to be on maintenance inhaler as outpatient in addition to PRN nebs.   She is not having increased O2 requirements but still symptomatic with exertion    11/13  Wean steroids  The current medical regimen is effective;  continue present plan and medications.   11/14 cont same

## 2018-11-14 NOTE — SUBJECTIVE & OBJECTIVE
Interval History: feeling some improvement     Objective:     Vital Signs (Most Recent):  Temp: 98.4 °F (36.9 °C) (11/14/18 1614)  Pulse: 86 (11/14/18 1623)  Resp: (!) 24 (11/14/18 1623)  BP: (!) 162/76 (11/14/18 1614)  SpO2: (!) 89 % (11/14/18 1623) Vital Signs (24h Range):  Temp:  [96.9 °F (36.1 °C)-98.8 °F (37.1 °C)] 98.4 °F (36.9 °C)  Pulse:  [] 86  Resp:  [18-24] 24  SpO2:  [89 %-97 %] 89 %  BP: (139-176)/(66-90) 162/76     Weight: 66.5 kg (146 lb 9.7 oz)  Body mass index is 26.81 kg/m².      Intake/Output Summary (Last 24 hours) at 11/14/2018 1704  Last data filed at 11/14/2018 1230  Gross per 24 hour   Intake 540 ml   Output --   Net 540 ml       Physical Exam   Constitutional: She is oriented to person, place, and time. She appears well-developed and well-nourished. She is cooperative.  Non-toxic appearance. She does not appear ill. No distress.   HENT:   Head: Normocephalic and atraumatic.   Right Ear: Hearing, tympanic membrane, external ear and ear canal normal. Tympanic membrane is not injected.   Left Ear: Hearing, tympanic membrane, external ear and ear canal normal. Tympanic membrane is not injected.   Nose: Mucosal edema and rhinorrhea present. No nasal deformity. No epistaxis. Right sinus exhibits frontal sinus tenderness. Right sinus exhibits no maxillary sinus tenderness. Left sinus exhibits frontal sinus tenderness. Left sinus exhibits no maxillary sinus tenderness.   Mouth/Throat: Uvula is midline, oropharynx is clear and moist and mucous membranes are normal. No trismus in the jaw. Normal dentition. No uvula swelling. No posterior oropharyngeal erythema.   Eyes: Conjunctivae and lids are normal. No scleral icterus.   Sclera clear bilat   Neck: Trachea normal, full passive range of motion without pain and phonation normal. Neck supple.   Cardiovascular: Normal rate, regular rhythm, normal heart sounds, intact distal pulses and normal pulses.   Pulmonary/Chest: She is in respiratory  distress (mild to moderate). She has decreased breath sounds in the right middle field, the right lower field, the left middle field and the left lower field. She has wheezes in the right middle field, the right lower field, the left middle field and the left lower field. She has rhonchi in the right middle field, the right lower field, the left middle field and the left lower field.   Abdominal: Soft. Normal appearance and bowel sounds are normal. She exhibits no distension. There is no tenderness.   Musculoskeletal: Normal range of motion. She exhibits no edema or deformity.   Neurological: She is alert and oriented to person, place, and time. She exhibits normal muscle tone. Coordination normal.   Skin: Skin is warm, dry and intact. She is not diaphoretic. No pallor.   Psychiatric: She has a normal mood and affect. Her speech is normal and behavior is normal. Judgment and thought content normal. Cognition and memory are normal.   Nursing note and vitals reviewed.      Vents:  Oxygen Concentration (%): 32 (11/12/18 2100)    Lines/Drains/Airways     Peripheral Intravenous Line                 Peripheral IV - Single Lumen 11/14/18 0515 Anterior;Right Wrist less than 1 day                Significant Labs:    CBC/Anemia Profile:  Recent Labs   Lab 11/13/18  0531 11/14/18  0538   WBC 19.99* 18.53*   HGB 9.8* 9.5*   HCT 30.3* 29.1*    265   * 110*   RDW 14.4 14.6*        Chemistries:  Recent Labs   Lab 11/13/18  0531 11/14/18  0538   * 135*   K 4.6 4.5   CL 95 94*   CO2 27 32*   BUN 25* 28*   CREATININE 0.9 0.8   CALCIUM 9.2 9.1       All pertinent labs within the past 24 hours have been reviewed.    Significant Imaging:  I have reviewed all pertinent imaging results/findings within the past 24 hours.

## 2018-11-14 NOTE — PLAN OF CARE
Problem: Patient Care Overview  Goal: Plan of Care Review  Pt tolerating current therapy well.  No adverse reactions or acute distress noted

## 2018-11-15 PROBLEM — Z79.52 CURRENT CHRONIC USE OF SYSTEMIC STEROIDS: Status: RESOLVED | Noted: 2018-05-28 | Resolved: 2018-11-15

## 2018-11-15 LAB
ANION GAP SERPL CALC-SCNC: 9 MMOL/L
ANISOCYTOSIS BLD QL SMEAR: SLIGHT
BASOPHILS # BLD AUTO: ABNORMAL K/UL
BASOPHILS NFR BLD: 0 %
BUN SERPL-MCNC: 30 MG/DL
CALCIUM SERPL-MCNC: 8.7 MG/DL
CHLORIDE SERPL-SCNC: 94 MMOL/L
CO2 SERPL-SCNC: 32 MMOL/L
CREAT SERPL-MCNC: 0.9 MG/DL
DIFFERENTIAL METHOD: ABNORMAL
EOSINOPHIL # BLD AUTO: ABNORMAL K/UL
EOSINOPHIL NFR BLD: 0 %
ERYTHROCYTE [DISTWIDTH] IN BLOOD BY AUTOMATED COUNT: 14.4 %
EST. GFR  (AFRICAN AMERICAN): >60 ML/MIN/1.73 M^2
EST. GFR  (NON AFRICAN AMERICAN): >60 ML/MIN/1.73 M^2
GIANT PLATELETS BLD QL SMEAR: PRESENT
GLUCOSE SERPL-MCNC: 178 MG/DL
HCT VFR BLD AUTO: 28 %
HGB BLD-MCNC: 9.1 G/DL
LYMPHOCYTES # BLD AUTO: ABNORMAL K/UL
LYMPHOCYTES NFR BLD: 2 %
MCH RBC QN AUTO: 36 PG
MCHC RBC AUTO-ENTMCNC: 32.5 G/DL
MCV RBC AUTO: 111 FL
MONOCYTES # BLD AUTO: ABNORMAL K/UL
MONOCYTES NFR BLD: 3 %
NEUTROPHILS NFR BLD: 92 %
NEUTS BAND NFR BLD MANUAL: 3 %
OVALOCYTES BLD QL SMEAR: ABNORMAL
PLATELET # BLD AUTO: 226 K/UL
PLATELET BLD QL SMEAR: ABNORMAL
PMV BLD AUTO: 11.9 FL
POTASSIUM SERPL-SCNC: 4.7 MMOL/L
RBC # BLD AUTO: 2.53 M/UL
SODIUM SERPL-SCNC: 135 MMOL/L
WBC # BLD AUTO: 17.56 K/UL

## 2018-11-15 PROCEDURE — 25000003 PHARM REV CODE 250: Mod: HCWC | Performed by: INTERNAL MEDICINE

## 2018-11-15 PROCEDURE — 63600175 PHARM REV CODE 636 W HCPCS: Mod: HCWC | Performed by: NURSE PRACTITIONER

## 2018-11-15 PROCEDURE — 36415 COLL VENOUS BLD VENIPUNCTURE: CPT | Mod: HCWC

## 2018-11-15 PROCEDURE — 94667 MNPJ CHEST WALL 1ST: CPT | Mod: HCWC

## 2018-11-15 PROCEDURE — 11000001 HC ACUTE MED/SURG PRIVATE ROOM: Mod: HCWC

## 2018-11-15 PROCEDURE — 27000221 HC OXYGEN, UP TO 24 HOURS: Mod: HCWC

## 2018-11-15 PROCEDURE — 94761 N-INVAS EAR/PLS OXIMETRY MLT: CPT | Mod: HCWC

## 2018-11-15 PROCEDURE — 94668 MNPJ CHEST WALL SBSQ: CPT | Mod: HCWC

## 2018-11-15 PROCEDURE — 25000003 PHARM REV CODE 250: Mod: HCWC | Performed by: SURGERY

## 2018-11-15 PROCEDURE — 99232 SBSQ HOSP IP/OBS MODERATE 35: CPT | Mod: HCWC,,, | Performed by: FAMILY MEDICINE

## 2018-11-15 PROCEDURE — 94640 AIRWAY INHALATION TREATMENT: CPT | Mod: HCWC

## 2018-11-15 PROCEDURE — 25000003 PHARM REV CODE 250: Mod: HCWC | Performed by: NURSE PRACTITIONER

## 2018-11-15 PROCEDURE — 85007 BL SMEAR W/DIFF WBC COUNT: CPT | Mod: HCWC

## 2018-11-15 PROCEDURE — 63600175 PHARM REV CODE 636 W HCPCS: Mod: HCWC | Performed by: SURGERY

## 2018-11-15 PROCEDURE — 85027 COMPLETE CBC AUTOMATED: CPT | Mod: HCWC

## 2018-11-15 PROCEDURE — 80048 BASIC METABOLIC PNL TOTAL CA: CPT | Mod: HCWC

## 2018-11-15 PROCEDURE — 25000003 PHARM REV CODE 250: Mod: HCWC | Performed by: FAMILY MEDICINE

## 2018-11-15 PROCEDURE — 25000242 PHARM REV CODE 250 ALT 637 W/ HCPCS: Mod: HCWC | Performed by: INTERNAL MEDICINE

## 2018-11-15 PROCEDURE — 25000242 PHARM REV CODE 250 ALT 637 W/ HCPCS: Mod: HCWC | Performed by: NURSE PRACTITIONER

## 2018-11-15 RX ORDER — AMLODIPINE BESYLATE 5 MG/1
5 TABLET ORAL DAILY
Status: DISCONTINUED | OUTPATIENT
Start: 2018-11-15 | End: 2018-11-16

## 2018-11-15 RX ORDER — ACETYLCYSTEINE 200 MG/ML
600 SOLUTION ORAL; RESPIRATORY (INHALATION) ONCE
Status: COMPLETED | OUTPATIENT
Start: 2018-11-15 | End: 2018-11-15

## 2018-11-15 RX ORDER — IPRATROPIUM BROMIDE AND ALBUTEROL SULFATE 2.5; .5 MG/3ML; MG/3ML
3 SOLUTION RESPIRATORY (INHALATION)
Status: DISCONTINUED | OUTPATIENT
Start: 2018-11-15 | End: 2018-11-18 | Stop reason: HOSPADM

## 2018-11-15 RX ORDER — LOSARTAN POTASSIUM 50 MG/1
100 TABLET ORAL DAILY
Status: DISCONTINUED | OUTPATIENT
Start: 2018-11-16 | End: 2018-11-18 | Stop reason: HOSPADM

## 2018-11-15 RX ADMIN — ACETYLCYSTEINE 600 MG: 200 INHALANT RESPIRATORY (INHALATION) at 10:11

## 2018-11-15 RX ADMIN — ASPIRIN 81 MG 81 MG: 81 TABLET ORAL at 08:11

## 2018-11-15 RX ADMIN — CITALOPRAM HYDROBROMIDE 40 MG: 10 TABLET ORAL at 08:11

## 2018-11-15 RX ADMIN — LEVALBUTEROL 1.25 MG: 1.25 SOLUTION, CONCENTRATE RESPIRATORY (INHALATION) at 07:11

## 2018-11-15 RX ADMIN — PRAVASTATIN SODIUM 40 MG: 40 TABLET ORAL at 08:11

## 2018-11-15 RX ADMIN — ACETAMINOPHEN 650 MG: 325 TABLET ORAL at 08:11

## 2018-11-15 RX ADMIN — METHYLPREDNISOLONE SODIUM SUCCINATE 80 MG: 125 INJECTION, POWDER, FOR SOLUTION INTRAMUSCULAR; INTRAVENOUS at 01:11

## 2018-11-15 RX ADMIN — IPRATROPIUM BROMIDE AND ALBUTEROL SULFATE 3 ML: .5; 3 SOLUTION RESPIRATORY (INHALATION) at 01:11

## 2018-11-15 RX ADMIN — CEFTRIAXONE 1 G: 1 INJECTION, SOLUTION INTRAVENOUS at 10:11

## 2018-11-15 RX ADMIN — PANTOPRAZOLE SODIUM 40 MG: 40 TABLET, DELAYED RELEASE ORAL at 08:11

## 2018-11-15 RX ADMIN — AZITHROMYCIN MONOHYDRATE 500 MG: 500 INJECTION, POWDER, LYOPHILIZED, FOR SOLUTION INTRAVENOUS at 09:11

## 2018-11-15 RX ADMIN — ROPINIROLE HYDROCHLORIDE 1 MG: 0.5 TABLET, FILM COATED ORAL at 08:11

## 2018-11-15 RX ADMIN — AMLODIPINE BESYLATE 5 MG: 5 TABLET ORAL at 11:11

## 2018-11-15 RX ADMIN — LOSARTAN POTASSIUM 50 MG: 50 TABLET, FILM COATED ORAL at 08:11

## 2018-11-15 RX ADMIN — METHYLPREDNISOLONE SODIUM SUCCINATE 80 MG: 125 INJECTION, POWDER, FOR SOLUTION INTRAMUSCULAR; INTRAVENOUS at 05:11

## 2018-11-15 RX ADMIN — ENOXAPARIN SODIUM 40 MG: 100 INJECTION SUBCUTANEOUS at 04:11

## 2018-11-15 RX ADMIN — METHYLPREDNISOLONE SODIUM SUCCINATE 80 MG: 125 INJECTION, POWDER, FOR SOLUTION INTRAMUSCULAR; INTRAVENOUS at 10:11

## 2018-11-15 RX ADMIN — TRAZODONE HYDROCHLORIDE 50 MG: 50 TABLET ORAL at 08:11

## 2018-11-15 RX ADMIN — IPRATROPIUM BROMIDE AND ALBUTEROL SULFATE 3 ML: .5; 3 SOLUTION RESPIRATORY (INHALATION) at 07:11

## 2018-11-15 NOTE — ASSESSMENT & PLAN NOTE
PPI  Check us abdomen ; r/o gall stones - does have gallstone but no active inflammation/infection will f/u outpt

## 2018-11-15 NOTE — SUBJECTIVE & OBJECTIVE
Interval History: improving slowly    Objective:     Vital Signs (Most Recent):  Temp: 97 °F (36.1 °C) (11/15/18 1130)  Pulse: 87 (11/15/18 1200)  Resp: (!) 21 (11/15/18 1130)  BP: (!) 168/78 (11/15/18 1130)  SpO2: 96 % (11/15/18 1130) Vital Signs (24h Range):  Temp:  [96.5 °F (35.8 °C)-98.4 °F (36.9 °C)] 97 °F (36.1 °C)  Pulse:  [] 87  Resp:  [16-24] 21  SpO2:  [89 %-97 %] 96 %  BP: (131-186)/(68-89) 168/78     Weight: 66.5 kg (146 lb 9.7 oz)  Body mass index is 26.81 kg/m².      Intake/Output Summary (Last 24 hours) at 11/15/2018 1305  Last data filed at 11/15/2018 0900  Gross per 24 hour   Intake 660 ml   Output --   Net 660 ml       Physical Exam   Constitutional: She is oriented to person, place, and time. She appears well-developed and well-nourished. She is cooperative.  Non-toxic appearance. She does not appear ill. No distress.   HENT:   Head: Normocephalic and atraumatic.   Right Ear: Hearing, tympanic membrane, external ear and ear canal normal.   Left Ear: Hearing, tympanic membrane, external ear and ear canal normal.   Nose: Nose normal. No mucosal edema, rhinorrhea or nasal deformity. No epistaxis. Right sinus exhibits no maxillary sinus tenderness and no frontal sinus tenderness. Left sinus exhibits no maxillary sinus tenderness and no frontal sinus tenderness.   Mouth/Throat: Uvula is midline, oropharynx is clear and moist and mucous membranes are normal. No trismus in the jaw. Normal dentition. No uvula swelling. No posterior oropharyngeal erythema.   Eyes: Conjunctivae and lids are normal. No scleral icterus.   Sclera clear bilat   Neck: Trachea normal, full passive range of motion without pain and phonation normal. Neck supple.   Cardiovascular: Normal rate, regular rhythm, normal heart sounds, intact distal pulses and normal pulses.   Pulmonary/Chest: No accessory muscle usage. No tachypnea and no bradypnea. She is in respiratory distress (mild to moderate). She has decreased breath sounds  in the right middle field, the right lower field, the left middle field and the left lower field. She has wheezes in the right lower field and the left lower field. She has rhonchi in the right lower field and the left lower field.   Abdominal: Soft. Normal appearance and bowel sounds are normal. She exhibits no distension. There is no tenderness.   Musculoskeletal: Normal range of motion. She exhibits no edema or deformity.   Neurological: She is alert and oriented to person, place, and time. She exhibits normal muscle tone. Coordination normal.   Skin: Skin is warm, dry and intact. She is not diaphoretic. No pallor.   Psychiatric: She has a normal mood and affect. Her speech is normal and behavior is normal. Judgment and thought content normal. Cognition and memory are normal.   Nursing note and vitals reviewed.      Vents:  Oxygen Concentration (%): 32 (11/12/18 2100)    Lines/Drains/Airways     Peripheral Intravenous Line                 Peripheral IV - Single Lumen 11/14/18 0515 Anterior;Right Wrist 1 day                Significant Labs:    CBC/Anemia Profile:  Recent Labs   Lab 11/14/18  0538 11/15/18  0547   WBC 18.53* 17.56*   HGB 9.5* 9.1*   HCT 29.1* 28.0*    226   * 111*   RDW 14.6* 14.4        Chemistries:  Recent Labs   Lab 11/14/18  0538 11/15/18  0547   * 135*   K 4.5 4.7   CL 94* 94*   CO2 32* 32*   BUN 28* 30*   CREATININE 0.8 0.9   CALCIUM 9.1 8.7       All pertinent labs within the past 24 hours have been reviewed.    Significant Imaging:  I have reviewed all pertinent imaging results/findings within the past 24 hours.

## 2018-11-15 NOTE — PLAN OF CARE
Problem: Patient Care Overview  Goal: Plan of Care Review  Outcome: Ongoing (interventions implemented as appropriate)  Patient stable. Denies pain this afternoon. Experiences SANTIAGO. Expiratory wheezing.Today is day 5 of IV antibiotics. Telemetry monitoring. Remains free from falls. POC reviewed. Patient voiced understanding. Will continue to monitor.

## 2018-11-15 NOTE — ASSESSMENT & PLAN NOTE
Likely viral but was started on azithro and rocephin on admit. I see no consolidation on CXR but pulmonology would like to continue per their note. WBC was 17 K but also on chronic steroids so unsure. No fevers.  She is on prednisone 20mg daily as outpatient--per patient this is a chronic medication and not for exacerbation and she was told she can not stop steroids. She was started on solumedrol 125mg Q8H here. Per Dr. Hickman's note he wants to continue current management so I will not change today  I scheduled her nebs: xopenex  Dr. Hickman recently started theophylline as outpatient: 150mg Qday. I have not restarted here. Unsure if this is necessary in light of not using inhalers as prescribed as outpatient.    She was prescribed symbicort but SHE HAS NOT BEEN USING. I suspect this is why she has been so difficult to control as outpatient. She is not on any maintenance therapy. Discussed needs to be on maintenance inhaler as outpatient in addition to PRN nebs.   She is not having increased O2 requirements but still symptomatic with exertion    11/13  Wean steroids  The current medical regimen is effective;  continue present plan and medications.   11/14 cont same  11/15 still wheezing, no wean today  D/c zithromax and rocephin as she completed 5 days today  Does not like the mucinex, will try mucomist cpt

## 2018-11-15 NOTE — ASSESSMENT & PLAN NOTE
Due to COPD  She is on 3L NC at rest and 4L NC with exertion at home--using home requirements here right now and maintains sats but still with significant wheezing and tightness in her chest. Cont on 80 q 8 of steroids and add pulm toileting

## 2018-11-15 NOTE — PLAN OF CARE
Problem: Patient Care Overview  Goal: Plan of Care Review  Outcome: Ongoing (interventions implemented as appropriate)  Patient rested well throughout shift. 3 L nasal cannula. Tele normal sinus/ sinus arrhythmia. Patient awake, alert, oriented. IV antibiotics continued. Patient complained of back pain with breathing. Tylenol was given. Patient also complained of some anxiety throughout the night. Neuro intact. Free from falls or injury. Plan of care reviewed with patient.

## 2018-11-15 NOTE — PROGRESS NOTES
Staff Handoff  Bedside report per STACIA Grimm. No distress noted. 3 L nasal cannula. Tele normal sinus. Awake, alert, and oriented. Call bell in reach. Encouraged to call for assistance.     Resident Handoff

## 2018-11-15 NOTE — ASSESSMENT & PLAN NOTE
Cont home losartan, BP elevated this AM but her home meds weren't reordered. Still elevated increased yesterday and bp better this am 159/72, Norvasc added 11/15

## 2018-11-15 NOTE — PROGRESS NOTES
[facility]  Pulmonology  Progress Note    Patient Name: Chantell Herrera  MRN: [id number]  Admission Date: 11/11/2018  Hospital Length of Stay: 4 days  Code Status: Full Code  Attending Provider: Noe Fofana MD  Primary Care Provider: Anson Leiva MD   Principal Problem: COPD exacerbation    Subjective:     Interval History: improving slowly    Objective:     Vital Signs (Most Recent):  Temp: 97 °F (36.1 °C) (11/15/18 1130)  Pulse: 87 (11/15/18 1200)  Resp: (!) 21 (11/15/18 1130)  BP: (!) 168/78 (11/15/18 1130)  SpO2: 96 % (11/15/18 1130) Vital Signs (24h Range):  Temp:  [96.5 °F (35.8 °C)-98.4 °F (36.9 °C)] 97 °F (36.1 °C)  Pulse:  [] 87  Resp:  [16-24] 21  SpO2:  [89 %-97 %] 96 %  BP: (131-186)/(68-89) 168/78     Weight: 66.5 kg (146 lb 9.7 oz)  Body mass index is 26.81 kg/m².      Intake/Output Summary (Last 24 hours) at 11/15/2018 1305  Last data filed at 11/15/2018 0900  Gross per 24 hour   Intake 660 ml   Output --   Net 660 ml       Physical Exam   Constitutional: She is oriented to person, place, and time. She appears well-developed and well-nourished. She is cooperative.  Non-toxic appearance. She does not appear ill. No distress.   HENT:   Head: Normocephalic and atraumatic.   Right Ear: Hearing, tympanic membrane, external ear and ear canal normal.   Left Ear: Hearing, tympanic membrane, external ear and ear canal normal.   Nose: Nose normal. No mucosal edema, rhinorrhea or nasal deformity. No epistaxis. Right sinus exhibits no maxillary sinus tenderness and no frontal sinus tenderness. Left sinus exhibits no maxillary sinus tenderness and no frontal sinus tenderness.   Mouth/Throat: Uvula is midline, oropharynx is clear and moist and mucous membranes are normal. No trismus in the jaw. Normal dentition. No uvula swelling. No posterior oropharyngeal erythema.   Eyes: Conjunctivae and lids are normal. No scleral icterus.   Sclera clear bilat   Neck: Trachea normal, full  passive range of motion without pain and phonation normal. Neck supple.   Cardiovascular: Normal rate, regular rhythm, normal heart sounds, intact distal pulses and normal pulses.   Pulmonary/Chest: No accessory muscle usage. No tachypnea and no bradypnea. She is in respiratory distress (mild to moderate). She has decreased breath sounds in the right middle field, the right lower field, the left middle field and the left lower field. She has wheezes in the right lower field and the left lower field. She has rhonchi in the right lower field and the left lower field.   Abdominal: Soft. Normal appearance and bowel sounds are normal. She exhibits no distension. There is no tenderness.   Musculoskeletal: Normal range of motion. She exhibits no edema or deformity.   Neurological: She is alert and oriented to person, place, and time. She exhibits normal muscle tone. Coordination normal.   Skin: Skin is warm, dry and intact. She is not diaphoretic. No pallor.   Psychiatric: She has a normal mood and affect. Her speech is normal and behavior is normal. Judgment and thought content normal. Cognition and memory are normal.   Nursing note and vitals reviewed.      Vents:  Oxygen Concentration (%): 32 (11/12/18 2100)    Lines/Drains/Airways     Peripheral Intravenous Line                 Peripheral IV - Single Lumen 11/14/18 0515 Anterior;Right Wrist 1 day                Significant Labs:    CBC/Anemia Profile:  Recent Labs   Lab 11/14/18  0538 11/15/18  0547   WBC 18.53* 17.56*   HGB 9.5* 9.1*   HCT 29.1* 28.0*    226   * 111*   RDW 14.6* 14.4        Chemistries:  Recent Labs   Lab 11/14/18  0538 11/15/18  0547   * 135*   K 4.5 4.7   CL 94* 94*   CO2 32* 32*   BUN 28* 30*   CREATININE 0.8 0.9   CALCIUM 9.1 8.7       All pertinent labs within the past 24 hours have been reviewed.    Significant Imaging:  I have reviewed all pertinent imaging results/findings within the past 24 hours.    Assessment/Plan:     *  COPD exacerbation    Slight increase in air entry but profoundly poor air entry   Doing better on meds continue support      Epigastric pain    Need enamorado exclude cardiac     Chronic respiratory failure with hypoxia    Uses O2 at home   Chronic respiratory failure      COPD (chronic obstructive pulmonary disease)    Severe emphysema             Emmanuel Hickman MD  Pulmonology  [facility]

## 2018-11-15 NOTE — PROGRESS NOTES
[facility] Hospital Medicine  Progress Note    Patient Name: Chantell Herrera  MRN: [id number]  Patient Class: IP- Inpatient   Admission Date: 11/11/2018  Length of Stay: 4 days  Attending Physician: Noe Fofana MD  Primary Care Provider: Anson Leiva MD        Subjective:     Principal Problem:COPD exacerbation    HPI:  Patient presented to ER with SOB. Sx started 2 weeks ago and progressively getting worse. She was having substernal chest pains yesterday which prompted her to come to the er. She was having to hunch over to walk because she felt so SOB. She has COPD and wears 3L NC at home at rest, 4L with exertion. She does report worsening wheezing and cough. Cough is productive--now of yellow sputum. No fevers. Denies LE edema. Denies sore throat, otalgia. + rhinorrhea. No sick contacts. She does report feeling better this AM.     Hospital Course:  11/13 Dr nino following her for acute exacerbation COPD. She is on zithromax and rocephin day 3. Medrol 125mg IV IV every 8hr. Levoalbuterol every 8hr. She has home O2 and is on her home 3L POX 95%. WBC 79489. This am afebrile. Dr nino following. She is SOB laying in bed talking. She also reports SANTIAGO    Dr Metcalf also saw her. Last echo shows 60% EF with LV diastolic dysfunction. Consider outpt stress.     11/*14 she reports that her breathing is better. She was able to get to bathroom and back this am with minimal SOB. Steroids were decreased to 80mg IV every 8hr. She feels pretty far from her baseline. Coughing a lot. Day 4 azithromycin and rocephin     U/s gallbladder with stone in neck but no thickened walls. No n/v.     11/15 She is on day 5 azithromycin and rocephin. On medrol; 80mg IV q 8hr. Remains on her home dose O2 at 3L NC. sats 96%. She reports that her SOB is better than yesterday but still not back to baseline. Also added mucinex for junky cough yesterday.     Interval note: pt still SOB/SANTIAGO  Review of Systems    Constitutional: Negative for activity change, fatigue, fever and unexpected weight change.   HENT: Negative for congestion, ear pain, hearing loss, rhinorrhea and sore throat.    Eyes: Negative for redness and visual disturbance.   Respiratory: Positive for cough, chest tightness, shortness of breath and wheezing.    Cardiovascular: Negative for palpitations and leg swelling.   Gastrointestinal: Negative for abdominal pain, constipation, diarrhea, nausea and vomiting.        Epigastric pain with exertion    Genitourinary: Negative for dysuria, frequency and urgency.   Musculoskeletal: Positive for back pain. Negative for joint swelling and neck pain.   Skin: Negative for color change, rash and wound.   Neurological: Negative for dizziness, tremors, weakness, light-headedness and headaches.     Objective:     Vital Signs (Most Recent):  Temp: 97.9 °F (36.6 °C) (11/15/18 0809)  Pulse: 101 (11/15/18 0929)  Resp: (!) 22 (11/15/18 0809)  BP: (!) 159/72 (11/15/18 0929)  SpO2: 96 % (11/15/18 0809) Vital Signs (24h Range):  Temp:  [96.5 °F (35.8 °C)-98.8 °F (37.1 °C)] 97.9 °F (36.6 °C)  Pulse:  [] 101  Resp:  [16-24] 22  SpO2:  [89 %-97 %] 96 %  BP: (131-186)/(68-89) 159/72     Weight: 66.5 kg (146 lb 9.7 oz)  Body mass index is 26.81 kg/m².    Physical Exam   Constitutional: She is oriented to person, place, and time. She appears well-developed and well-nourished. No distress.   Sitting up in bed carrying conversation easily   HENT:   Head: Normocephalic and atraumatic.   Right Ear: External ear normal.   Left Ear: External ear normal.   Eyes: Conjunctivae and EOM are normal. Pupils are equal, round, and reactive to light.   Neck: Neck supple. No tracheal deviation present.   Cardiovascular: Normal rate and regular rhythm.   No murmur heard.  Pulmonary/Chest: Effort normal. No respiratory distress. She has wheezes. She has rales.   Very poor tidal volume and tight   Abdominal: Soft. Bowel sounds are normal. She  exhibits no distension. There is no tenderness.   Musculoskeletal:   Pain to left mid back   Neurological: She is alert and oriented to person, place, and time. No cranial nerve deficit.   Skin: Skin is warm and dry.   Psychiatric: She has a normal mood and affect. Her behavior is normal.   Vitals reviewed.        CRANIAL NERVES     CN III, IV, VI   Pupils are equal, round, and reactive to light.  Extraocular motions are normal.        Significant Labs:   CBC:   Recent Labs   Lab 11/14/18  0538 11/15/18  0547   WBC 18.53* 17.56*   HGB 9.5* 9.1*   HCT 29.1* 28.0*    226     CMP:   Recent Labs   Lab 11/14/18  0538 11/15/18  0547   * 135*   K 4.5 4.7   CL 94* 94*   CO2 32* 32*   * 178*   BUN 28* 30*   CREATININE 0.8 0.9   CALCIUM 9.1 8.7   ANIONGAP 9 9   EGFRNONAA >60 >60     Recent Labs   Lab 11/12/18  0753   TROPONINI 0.014       Lab Results   Component Value Date    DDIMER 0.93 (H) 11/11/2018     Lab Results   Component Value Date    INR 1.1 11/11/2018    INR 1.1 06/02/2018    INR 1.1 11/30/2017   lactic acid 2.0>0.7  Blood culture NGTD  Flu negative    All pertinent labs within the past 24 hours have been reviewed.    Significant Imaging:     CXR 11/11   The lungs are hyperexpanded and show mild interstitial prominence suggesting COPD change.  There is bibasilar subsegmental atelectasis.  No consolidation or pleural effusions.  The heart is normal in size.  Calcified atheromatous disease affects the aorta.  Age-appropriate degenerative changes affect the skeleton.      Us lower ext 11/12   No evidence of deep venous thrombosis bilateral lower extremities.    US abd 11/14   Cholelithiasis with a solitary gallstone in the gallbladder neck.  No evidence for cholecystitis.    Coarsened echogenicity of the liver suggesting intrinsic hepatic disease.    EKG Normal sinus rhythm  Left anterior fascicular block  Septal infarct ,age undetermined  Abnormal ECG  When compared with ECG of 11-NOV-2018  19:58,  Nonspecific T wave abnormality now evident in Anterior leads  Confirmed by Leatha VILLAFANA, Candace ([id number]) on 11/12/2018 3:40:39 PM    Assessment/Plan:      * COPD exacerbation    Likely viral but was started on azithro and rocephin on admit. I see no consolidation on CXR but pulmonology would like to continue per their note. WBC was 17 K but also on chronic steroids so unsure. No fevers.  She is on prednisone 20mg daily as outpatient--per patient this is a chronic medication and not for exacerbation and she was told she can not stop steroids. She was started on solumedrol 125mg Q8H here. Per Dr. Hickman's note he wants to continue current management so I will not change today  I scheduled her nebs: xopenex  Dr. Hickman recently started theophylline as outpatient: 150mg Qday. I have not restarted here. Unsure if this is necessary in light of not using inhalers as prescribed as outpatient.    She was prescribed symbicort but SHE HAS NOT BEEN USING. I suspect this is why she has been so difficult to control as outpatient. She is not on any maintenance therapy. Discussed needs to be on maintenance inhaler as outpatient in addition to PRN nebs.   She is not having increased O2 requirements but still symptomatic with exertion    11/13  Wean steroids  The current medical regimen is effective;  continue present plan and medications.   11/14 cont same  11/15 still wheezing, no wean today  D/c zithromax and rocephin as she completed 5 days today  Does not like the mucinex, will try mucomist cpt     Epigastric pain    PPI  Check us abdomen ; r/o gall stones - does have gallstone but no active inflammation/infection will f/u outpt       Elevated d-dimer    LE US ordered- negative  lovenox DVT proph  Lower suspicion for PE       RLS (restless legs syndrome)    Cont home requip       Chronic respiratory failure with hypoxia    Due to COPD  She is on 3L NC at rest and 4L NC with exertion at home--using home requirements here right  now and maintains sats but still with significant wheezing and tightness in her chest. Cont on 80 q 8 of steroids and add pulm toileting       Oxygen dependent    Reports using 3L NC at home and 4L with exertion  curretnly on 3L NC at rest POX 96%       Dyslipidemia           Major depressive disorder with single episode, in full remission    Cont home citalopram       Insomnia    Cont home trazodone       Hyperlipidemia    Cont home pravastatin       HTN (hypertension)    Cont home losartan, BP elevated this AM but her home meds weren't reordered. Still elevated increased yesterday and bp better this am 159/72, Norvasc added 11/15         VTE Risk Mitigation (From admission, onward)        Ordered     enoxaparin injection 40 mg  Daily      11/13/18 1046     IP VTE HIGH RISK PATIENT  Once      11/11/18 2235     Place sequential compression device  Until discontinued      11/11/18 2235              aKri Gaspar MD  Department of Hospital Medicine   [facility]

## 2018-11-15 NOTE — PLAN OF CARE
Problem: Patient Care Overview  Goal: Plan of Care Review  Outcome: Ongoing (interventions implemented as appropriate)  Educate patient/family/cargiver about administration of aerosolized medications via the inhalation route to aid in bronchial hygiene & deliver medications.  Patient educated on use, benefits, & safety of oxygen use. Pt is on NC @ 3lpm which she uses at home. Respiratory treatments are Q8 hours. Pt. Gets SOB on exertion to the bathroom, which resolves after a few minutes on oxygen to re cooperate.

## 2018-11-15 NOTE — PROGRESS NOTES
[facility]  Cardiology  Progress Note    Patient Name: Chantell Herrera  MRN: [id number]  Admission Date: 11/11/2018  Hospital Length of Stay: 4 days  Code Status: Full Code   Attending Physician: Noe Fofana MD   Primary Care Physician: Anson Leiva MD  Expected Discharge Date:   Principal Problem:COPD exacerbation    Subjective:     Interval History: abd u/s with cholelithiasis  Chronic wheeze  BP elevated    Review of Systems   Constitution: Negative.   HENT: Negative.    Eyes: Negative.    Cardiovascular: Negative for chest pain.   Respiratory: Positive for wheezing.    Endocrine: Negative.    Skin: Negative.    Gastrointestinal: Negative.    Genitourinary: Negative.    Neurological: Negative.      Objective:     Vital Signs (Most Recent):  Temp: 97.9 °F (36.6 °C) (11/15/18 0809)  Pulse: 80 (11/15/18 0809)  Resp: (!) 22 (11/15/18 0809)  BP: (!) 186/89 (11/15/18 0809)  SpO2: 96 % (11/15/18 0809) Vital Signs (24h Range):  Temp:  [96.5 °F (35.8 °C)-98.8 °F (37.1 °C)] 97.9 °F (36.6 °C)  Pulse:  [] 80  Resp:  [16-24] 22  SpO2:  [89 %-97 %] 96 %  BP: (131-186)/(68-89) 186/89     Weight: 66.5 kg (146 lb 9.7 oz)  Body mass index is 26.81 kg/m².    SpO2: 96 %  O2 Device (Oxygen Therapy): nasal cannula      Intake/Output Summary (Last 24 hours) at 11/15/2018 0845  Last data filed at 11/15/2018 0530  Gross per 24 hour   Intake 660 ml   Output --   Net 660 ml       Lines/Drains/Airways     Peripheral Intravenous Line                 Peripheral IV - Single Lumen 11/14/18 0515 Anterior;Right Wrist 1 day                Physical Exam   Constitutional: She is oriented to person, place, and time. She appears well-developed and well-nourished.   HENT:   Head: Normocephalic.   Cardiovascular: Normal rate and regular rhythm.   Pulmonary/Chest: Effort normal. She has wheezes.   Dry crackles   Abdominal: Soft.   Musculoskeletal: Normal range of motion.   Neurological: She is alert and oriented to  person, place, and time.   Skin: Skin is warm and dry.   Psychiatric: She has a normal mood and affect.   Nursing note and vitals reviewed.      Significant Labs:   BMP:   Recent Labs   Lab 11/14/18  0538 11/15/18  0547   * 178*   * 135*   K 4.5 4.7   CL 94* 94*   CO2 32* 32*   BUN 28* 30*   CREATININE 0.8 0.9   CALCIUM 9.1 8.7   , CMP   Recent Labs   Lab 11/14/18  0538 11/15/18  0547   * 135*   K 4.5 4.7   CL 94* 94*   CO2 32* 32*   * 178*   BUN 28* 30*   CREATININE 0.8 0.9   CALCIUM 9.1 8.7   ANIONGAP 9 9   ESTGFRAFRICA >60 >60   EGFRNONAA >60 >60   , CBC   Recent Labs   Lab 11/14/18  0538 11/15/18  0547   WBC 18.53* 17.56*   HGB 9.5* 9.1*   HCT 29.1* 28.0*    226    and Troponin No results for input(s): TROPONINI in the last 48 hours.    Significant Imaging: Echocardiogram: 2D echo with color flow doppler: No results found for this or any previous visit.  Assessment and Plan:     DX: Wheezing improved today; now with GB disease  Chest pain chronic; worse with COPD exacerbation  abd pain--cholelithiasis  COPD on home O2 for yrs; ex smoker/Asthma/Emphysema  HTN EF 60% DD  Dyslipidemia  Arthritis  Depression/anxiety  Normal stress test 9/11  try low dose asa as tolerated    Plan:  uptitrate BP meds  Pt/fly encouraged to discuss end of life issues/DNR status  Will be at high risk for GB surgery      Active Diagnoses:    Diagnosis Date Noted POA    PRINCIPAL PROBLEM:  COPD exacerbation [J44.1] 11/11/2018 Yes    Epigastric pain [R10.13] 11/13/2018 Unknown    Elevated d-dimer [R79.89] 11/12/2018 Yes    Chronic respiratory failure with hypoxia [J96.11] 05/17/2017 Yes    RLS (restless legs syndrome) [G25.81] 05/17/2017 Yes    Oxygen dependent [Z99.81] 01/03/2017 Not Applicable    Major depressive disorder with single episode, in full remission [F32.5] 05/10/2016 Yes    Insomnia [G47.00] 08/10/2015 Yes    Hyperlipidemia [E78.5] 05/03/2015 Yes    HTN (hypertension) [I10] 06/19/2014  Yes      Problems Resolved During this Admission:       VTE Risk Mitigation (From admission, onward)        Ordered     enoxaparin injection 40 mg  Daily      11/13/18 1046     IP VTE HIGH RISK PATIENT  Once      11/11/18 2235     Place sequential compression device  Until discontinued      11/11/18 2235          Argelia Burnette NP  Cardiology  [facility]    I attest that I have personally seen and examined this patient. I have reviewed and discussed the management in detail as outlined above.

## 2018-11-15 NOTE — PLAN OF CARE
11/15/18 0944   Discharge Assessment   Assessment Type Discharge Planning Reassessment     Ms Herrera will remain here today on acute care for continued wheezing and SOB. Changed nebs to q6h. Offered her portable oxygen to spend some time outside her room but she declined due to SOB.

## 2018-11-15 NOTE — SUBJECTIVE & OBJECTIVE
Interval note: pt still SOB/SANTIAGO  Review of Systems   Constitutional: Negative for activity change, fatigue, fever and unexpected weight change.   HENT: Negative for congestion, ear pain, hearing loss, rhinorrhea and sore throat.    Eyes: Negative for redness and visual disturbance.   Respiratory: Positive for cough, chest tightness, shortness of breath and wheezing.    Cardiovascular: Negative for palpitations and leg swelling.   Gastrointestinal: Negative for abdominal pain, constipation, diarrhea, nausea and vomiting.        Epigastric pain with exertion    Genitourinary: Negative for dysuria, frequency and urgency.   Musculoskeletal: Positive for back pain. Negative for joint swelling and neck pain.   Skin: Negative for color change, rash and wound.   Neurological: Negative for dizziness, tremors, weakness, light-headedness and headaches.     Objective:     Vital Signs (Most Recent):  Temp: 97.9 °F (36.6 °C) (11/15/18 0809)  Pulse: 101 (11/15/18 0929)  Resp: (!) 22 (11/15/18 0809)  BP: (!) 159/72 (11/15/18 0929)  SpO2: 96 % (11/15/18 0809) Vital Signs (24h Range):  Temp:  [96.5 °F (35.8 °C)-98.8 °F (37.1 °C)] 97.9 °F (36.6 °C)  Pulse:  [] 101  Resp:  [16-24] 22  SpO2:  [89 %-97 %] 96 %  BP: (131-186)/(68-89) 159/72     Weight: 66.5 kg (146 lb 9.7 oz)  Body mass index is 26.81 kg/m².    Physical Exam   Constitutional: She is oriented to person, place, and time. She appears well-developed and well-nourished. No distress.   Sitting up in bed carrying conversation easily   HENT:   Head: Normocephalic and atraumatic.   Right Ear: External ear normal.   Left Ear: External ear normal.   Eyes: Conjunctivae and EOM are normal. Pupils are equal, round, and reactive to light.   Neck: Neck supple. No tracheal deviation present.   Cardiovascular: Normal rate and regular rhythm.   No murmur heard.  Pulmonary/Chest: Effort normal. No respiratory distress. She has wheezes. She has rales.   Very poor tidal volume and tight    Abdominal: Soft. Bowel sounds are normal. She exhibits no distension. There is no tenderness.   Musculoskeletal:   Pain to left mid back   Neurological: She is alert and oriented to person, place, and time. No cranial nerve deficit.   Skin: Skin is warm and dry.   Psychiatric: She has a normal mood and affect. Her behavior is normal.   Vitals reviewed.        CRANIAL NERVES     CN III, IV, VI   Pupils are equal, round, and reactive to light.  Extraocular motions are normal.        Significant Labs:   CBC:   Recent Labs   Lab 11/14/18  0538 11/15/18  0547   WBC 18.53* 17.56*   HGB 9.5* 9.1*   HCT 29.1* 28.0*    226     CMP:   Recent Labs   Lab 11/14/18  0538 11/15/18  0547   * 135*   K 4.5 4.7   CL 94* 94*   CO2 32* 32*   * 178*   BUN 28* 30*   CREATININE 0.8 0.9   CALCIUM 9.1 8.7   ANIONGAP 9 9   EGFRNONAA >60 >60     Recent Labs   Lab 11/12/18  0753   TROPONINI 0.014       Lab Results   Component Value Date    DDIMER 0.93 (H) 11/11/2018     Lab Results   Component Value Date    INR 1.1 11/11/2018    INR 1.1 06/02/2018    INR 1.1 11/30/2017   lactic acid 2.0>0.7  Blood culture NGTD  Flu negative    All pertinent labs within the past 24 hours have been reviewed.    Significant Imaging:     CXR 11/11   The lungs are hyperexpanded and show mild interstitial prominence suggesting COPD change.  There is bibasilar subsegmental atelectasis.  No consolidation or pleural effusions.  The heart is normal in size.  Calcified atheromatous disease affects the aorta.  Age-appropriate degenerative changes affect the skeleton.      Us lower ext 11/12   No evidence of deep venous thrombosis bilateral lower extremities.    US abd 11/14   Cholelithiasis with a solitary gallstone in the gallbladder neck.  No evidence for cholecystitis.    Coarsened echogenicity of the liver suggesting intrinsic hepatic disease.    EKG Normal sinus rhythm  Left anterior fascicular block  Septal infarct ,age undetermined  Abnormal  ECG  When compared with ECG of 11-NOV-2018 19:58,  Nonspecific T wave abnormality now evident in Anterior leads  Confirmed by Candace Zhang MD ([id number]) on 11/12/2018 3:40:39 PM

## 2018-11-16 PROBLEM — I47.10 SVT (SUPRAVENTRICULAR TACHYCARDIA): Status: ACTIVE | Noted: 2018-11-16

## 2018-11-16 LAB
ANION GAP SERPL CALC-SCNC: 6 MMOL/L
BASOPHILS # BLD AUTO: 0.01 K/UL
BASOPHILS NFR BLD: 0.1 %
BUN SERPL-MCNC: 23 MG/DL
CALCIUM SERPL-MCNC: 8.4 MG/DL
CHLORIDE SERPL-SCNC: 94 MMOL/L
CO2 SERPL-SCNC: 35 MMOL/L
CREAT SERPL-MCNC: 0.8 MG/DL
DACRYOCYTES BLD QL SMEAR: ABNORMAL
DIFFERENTIAL METHOD: ABNORMAL
EOSINOPHIL # BLD AUTO: 0 K/UL
EOSINOPHIL NFR BLD: 0.1 %
ERYTHROCYTE [DISTWIDTH] IN BLOOD BY AUTOMATED COUNT: 14.3 %
EST. GFR  (AFRICAN AMERICAN): >60 ML/MIN/1.73 M^2
EST. GFR  (NON AFRICAN AMERICAN): >60 ML/MIN/1.73 M^2
GIANT PLATELETS BLD QL SMEAR: PRESENT
GLUCOSE SERPL-MCNC: 183 MG/DL
HCT VFR BLD AUTO: 27.8 %
HGB BLD-MCNC: 9 G/DL
LYMPHOCYTES # BLD AUTO: 0.4 K/UL
LYMPHOCYTES NFR BLD: 2.4 %
MCH RBC QN AUTO: 35.9 PG
MCHC RBC AUTO-ENTMCNC: 32.4 G/DL
MCV RBC AUTO: 111 FL
MONOCYTES # BLD AUTO: 0.9 K/UL
MONOCYTES NFR BLD: 6 %
NEUTROPHILS # BLD AUTO: 13.1 K/UL
NEUTROPHILS NFR BLD: 93.3 %
OVALOCYTES BLD QL SMEAR: ABNORMAL
PLATELET # BLD AUTO: 209 K/UL
PLATELET BLD QL SMEAR: ABNORMAL
PMV BLD AUTO: 11.9 FL
POTASSIUM SERPL-SCNC: 4.8 MMOL/L
RBC # BLD AUTO: 2.51 M/UL
SODIUM SERPL-SCNC: 135 MMOL/L
WBC # BLD AUTO: 14.34 K/UL

## 2018-11-16 PROCEDURE — 94640 AIRWAY INHALATION TREATMENT: CPT | Mod: HCWC

## 2018-11-16 PROCEDURE — 27000221 HC OXYGEN, UP TO 24 HOURS: Mod: HCWC

## 2018-11-16 PROCEDURE — 63600175 PHARM REV CODE 636 W HCPCS: Mod: HCWC | Performed by: NURSE PRACTITIONER

## 2018-11-16 PROCEDURE — 94668 MNPJ CHEST WALL SBSQ: CPT | Mod: HCWC

## 2018-11-16 PROCEDURE — 25000003 PHARM REV CODE 250: Mod: HCWC | Performed by: INTERNAL MEDICINE

## 2018-11-16 PROCEDURE — 25000003 PHARM REV CODE 250: Mod: HCWC | Performed by: SURGERY

## 2018-11-16 PROCEDURE — 63600175 PHARM REV CODE 636 W HCPCS: Mod: HCWC | Performed by: SURGERY

## 2018-11-16 PROCEDURE — 25000003 PHARM REV CODE 250: Mod: HCWC | Performed by: FAMILY MEDICINE

## 2018-11-16 PROCEDURE — 25000003 PHARM REV CODE 250: Mod: HCWC | Performed by: NURSE PRACTITIONER

## 2018-11-16 PROCEDURE — 25000003 PHARM REV CODE 250: Mod: HCWC

## 2018-11-16 PROCEDURE — 80048 BASIC METABOLIC PNL TOTAL CA: CPT | Mod: HCWC

## 2018-11-16 PROCEDURE — 11000001 HC ACUTE MED/SURG PRIVATE ROOM: Mod: HCWC

## 2018-11-16 PROCEDURE — 85025 COMPLETE CBC W/AUTO DIFF WBC: CPT | Mod: HCWC

## 2018-11-16 PROCEDURE — 99233 SBSQ HOSP IP/OBS HIGH 50: CPT | Mod: HCWC,,, | Performed by: FAMILY MEDICINE

## 2018-11-16 PROCEDURE — 93005 ELECTROCARDIOGRAM TRACING: CPT | Mod: HCWC

## 2018-11-16 PROCEDURE — 25000242 PHARM REV CODE 250 ALT 637 W/ HCPCS: Mod: HCWC | Performed by: NURSE PRACTITIONER

## 2018-11-16 PROCEDURE — 36415 COLL VENOUS BLD VENIPUNCTURE: CPT | Mod: HCWC

## 2018-11-16 PROCEDURE — 94761 N-INVAS EAR/PLS OXIMETRY MLT: CPT | Mod: HCWC

## 2018-11-16 RX ORDER — AMLODIPINE BESYLATE 10 MG/1
10 TABLET ORAL DAILY
Status: DISCONTINUED | OUTPATIENT
Start: 2018-11-16 | End: 2018-11-18 | Stop reason: HOSPADM

## 2018-11-16 RX ORDER — METOPROLOL TARTRATE 1 MG/ML
5 INJECTION, SOLUTION INTRAVENOUS EVERY 6 HOURS PRN
Status: DISCONTINUED | OUTPATIENT
Start: 2018-11-16 | End: 2018-11-18 | Stop reason: HOSPADM

## 2018-11-16 RX ORDER — PREDNISONE 20 MG/1
40 TABLET ORAL DAILY
Status: DISCONTINUED | OUTPATIENT
Start: 2018-11-16 | End: 2018-11-18 | Stop reason: HOSPADM

## 2018-11-16 RX ORDER — METOPROLOL TARTRATE 1 MG/ML
INJECTION, SOLUTION INTRAVENOUS
Status: COMPLETED
Start: 2018-11-16 | End: 2018-11-16

## 2018-11-16 RX ORDER — METOPROLOL TARTRATE 25 MG/1
25 TABLET, FILM COATED ORAL 4 TIMES DAILY
Status: DISCONTINUED | OUTPATIENT
Start: 2018-11-16 | End: 2018-11-16

## 2018-11-16 RX ORDER — ACETYLCYSTEINE 200 MG/ML
600 SOLUTION ORAL; RESPIRATORY (INHALATION) ONCE
Status: COMPLETED | OUTPATIENT
Start: 2018-11-16 | End: 2018-11-16

## 2018-11-16 RX ORDER — DIGOXIN 125 MCG
0.25 TABLET ORAL ONCE
Status: COMPLETED | OUTPATIENT
Start: 2018-11-16 | End: 2018-11-16

## 2018-11-16 RX ORDER — METOPROLOL TARTRATE 50 MG/1
50 TABLET ORAL 4 TIMES DAILY
Status: DISCONTINUED | OUTPATIENT
Start: 2018-11-16 | End: 2018-11-17

## 2018-11-16 RX ORDER — METOPROLOL TARTRATE 1 MG/ML
5 INJECTION, SOLUTION INTRAVENOUS ONCE
Status: COMPLETED | OUTPATIENT
Start: 2018-11-16 | End: 2018-11-16

## 2018-11-16 RX ADMIN — PREDNISONE 40 MG: 20 TABLET ORAL at 10:11

## 2018-11-16 RX ADMIN — METOPROLOL TARTRATE 25 MG: 25 TABLET ORAL at 11:11

## 2018-11-16 RX ADMIN — DIGOXIN 0.25 MG: 125 TABLET ORAL at 06:11

## 2018-11-16 RX ADMIN — ROPINIROLE HYDROCHLORIDE 1 MG: 0.5 TABLET, FILM COATED ORAL at 09:11

## 2018-11-16 RX ADMIN — LOSARTAN POTASSIUM 100 MG: 50 TABLET, FILM COATED ORAL at 09:11

## 2018-11-16 RX ADMIN — ASPIRIN 81 MG 81 MG: 81 TABLET ORAL at 09:11

## 2018-11-16 RX ADMIN — METHYLPREDNISOLONE SODIUM SUCCINATE 80 MG: 125 INJECTION, POWDER, FOR SOLUTION INTRAMUSCULAR; INTRAVENOUS at 05:11

## 2018-11-16 RX ADMIN — ACETAMINOPHEN 650 MG: 325 TABLET ORAL at 09:11

## 2018-11-16 RX ADMIN — CEFTRIAXONE 1 G: 1 INJECTION, SOLUTION INTRAVENOUS at 09:11

## 2018-11-16 RX ADMIN — ACETYLCYSTEINE 600 MG: 200 INHALANT RESPIRATORY (INHALATION) at 10:11

## 2018-11-16 RX ADMIN — IPRATROPIUM BROMIDE AND ALBUTEROL SULFATE 3 ML: .5; 3 SOLUTION RESPIRATORY (INHALATION) at 01:11

## 2018-11-16 RX ADMIN — METOPROLOL TARTRATE 50 MG: 50 TABLET ORAL at 06:11

## 2018-11-16 RX ADMIN — ACETAMINOPHEN 650 MG: 325 TABLET ORAL at 06:11

## 2018-11-16 RX ADMIN — AMLODIPINE BESYLATE 10 MG: 10 TABLET ORAL at 09:11

## 2018-11-16 RX ADMIN — ENOXAPARIN SODIUM 40 MG: 100 INJECTION SUBCUTANEOUS at 04:11

## 2018-11-16 RX ADMIN — METOPROLOL TARTRATE 5 MG: 5 INJECTION, SOLUTION INTRAVENOUS at 12:11

## 2018-11-16 RX ADMIN — METOPROLOL TARTRATE 50 MG: 50 TABLET ORAL at 11:11

## 2018-11-16 RX ADMIN — PANTOPRAZOLE SODIUM 40 MG: 40 TABLET, DELAYED RELEASE ORAL at 09:11

## 2018-11-16 RX ADMIN — PRAVASTATIN SODIUM 40 MG: 40 TABLET ORAL at 09:11

## 2018-11-16 RX ADMIN — METOPROLOL TARTRATE 5 MG: 1 INJECTION, SOLUTION INTRAVENOUS at 10:11

## 2018-11-16 RX ADMIN — CITALOPRAM HYDROBROMIDE 40 MG: 10 TABLET ORAL at 09:11

## 2018-11-16 RX ADMIN — IPRATROPIUM BROMIDE AND ALBUTEROL SULFATE 3 ML: .5; 3 SOLUTION RESPIRATORY (INHALATION) at 07:11

## 2018-11-16 RX ADMIN — TRAZODONE HYDROCHLORIDE 50 MG: 50 TABLET ORAL at 09:11

## 2018-11-16 RX ADMIN — METOPROLOL TARTRATE 5 MG: 5 INJECTION, SOLUTION INTRAVENOUS at 10:11

## 2018-11-16 NOTE — SUBJECTIVE & OBJECTIVE
Interval note: pt still SOB/SANTIAGO  Review of Systems   Constitutional: Negative for activity change, fatigue, fever and unexpected weight change.   HENT: Negative for congestion, ear pain, hearing loss, rhinorrhea and sore throat.    Eyes: Negative for redness and visual disturbance.   Respiratory: Positive for cough, shortness of breath and wheezing. Negative for chest tightness.    Cardiovascular: Negative for palpitations and leg swelling.   Gastrointestinal: Negative for abdominal pain, constipation, diarrhea, nausea and vomiting.        Epigastric pain with exertion    Genitourinary: Negative for dysuria, frequency and urgency.   Musculoskeletal: Positive for back pain. Negative for joint swelling and neck pain.   Skin: Negative for color change, rash and wound.   Neurological: Negative for dizziness, tremors, weakness, light-headedness and headaches.     Objective:     Vital Signs (Most Recent):  Temp: 97.4 °F (36.3 °C) (11/16/18 0812)  Pulse: (!) 147 (11/16/18 1042)  Resp: 19 (11/16/18 0722)  BP: (!) 136/96 (11/16/18 1042)  SpO2: (!) 92 % (11/16/18 1042) Vital Signs (24h Range):  Temp:  [97 °F (36.1 °C)-97.5 °F (36.4 °C)] 97.4 °F (36.3 °C)  Pulse:  [] 147  Resp:  [18-21] 19  SpO2:  [92 %-97 %] 92 %  BP: (125-168)/(64-96) 136/96     Weight: 66.5 kg (146 lb 9.7 oz)  Body mass index is 26.81 kg/m².    Physical Exam   Constitutional: She is oriented to person, place, and time. She appears well-developed and well-nourished. No distress.   Sitting up in bed carrying conversation easily   HENT:   Head: Normocephalic and atraumatic.   Right Ear: External ear normal.   Left Ear: External ear normal.   Eyes: Conjunctivae and EOM are normal. Pupils are equal, round, and reactive to light.   Neck: Neck supple. No tracheal deviation present.   Cardiovascular: Regular rhythm. Tachycardia present.   No murmur heard.  Seems to be having SVT.     Pulmonary/Chest: Effort normal. No respiratory distress. She has no wheezes.  She has no rales.   Improved tidal volume.  Mild wheezing.  Much better   Abdominal: Soft. Bowel sounds are normal. She exhibits no distension. There is no tenderness.   Musculoskeletal:   Pain to left mid back   Neurological: She is alert and oriented to person, place, and time. No cranial nerve deficit.   Skin: Skin is warm and dry.   Psychiatric: She has a normal mood and affect. Her behavior is normal.   Vitals reviewed.        CRANIAL NERVES     CN III, IV, VI   Pupils are equal, round, and reactive to light.  Extraocular motions are normal.        Significant Labs:   CBC:   Recent Labs   Lab 11/15/18  0547 11/16/18  0522   WBC 17.56* 14.34*   HGB 9.1* 9.0*   HCT 28.0* 27.8*    209     CMP:   Recent Labs   Lab 11/15/18  0547 11/16/18  0522   * 135*   K 4.7 4.8   CL 94* 94*   CO2 32* 35*   * 183*   BUN 30* 23   CREATININE 0.9 0.8   CALCIUM 8.7 8.4*   ANIONGAP 9 6*   EGFRNONAA >60 >60     Recent Labs   Lab 11/12/18  0753   TROPONINI 0.014       Lab Results   Component Value Date    DDIMER 0.93 (H) 11/11/2018     Lab Results   Component Value Date    INR 1.1 11/11/2018    INR 1.1 06/02/2018    INR 1.1 11/30/2017   lactic acid 2.0>0.7  Blood culture NGTD  Flu negative    All pertinent labs within the past 24 hours have been reviewed.    Significant Imaging:     CXR 11/11   The lungs are hyperexpanded and show mild interstitial prominence suggesting COPD change.  There is bibasilar subsegmental atelectasis.  No consolidation or pleural effusions.  The heart is normal in size.  Calcified atheromatous disease affects the aorta.  Age-appropriate degenerative changes affect the skeleton.      Us lower ext 11/12   No evidence of deep venous thrombosis bilateral lower extremities.    US abd 11/14   Cholelithiasis with a solitary gallstone in the gallbladder neck.  No evidence for cholecystitis.    Coarsened echogenicity of the liver suggesting intrinsic hepatic disease.    EKG Normal sinus rhythm  Left  anterior fascicular block  Septal infarct ,age undetermined  Abnormal ECG  When compared with ECG of 11-NOV-2018 19:58,  Nonspecific T wave abnormality now evident in Anterior leads  Confirmed by Leatha VILLAFANA, Candace ([id number]) on 11/12/2018 3:40:39 PM

## 2018-11-16 NOTE — PROGRESS NOTES
[facility] Hospital Medicine  Progress Note    Patient Name: Chantell Herrera  MRN: [id number]  Patient Class: IP- Inpatient   Admission Date: 11/11/2018  Length of Stay: 5 days  Attending Physician: Chicho Fisher MD  Primary Care Provider: Anson Leiva MD        Subjective:     Principal Problem:COPD exacerbation    HPI:  Patient presented to ER with SOB. Sx started 2 weeks ago and progressively getting worse. She was having substernal chest pains yesterday which prompted her to come to the er. She was having to hunch over to walk because she felt so SOB. She has COPD and wears 3L NC at home at rest, 4L with exertion. She does report worsening wheezing and cough. Cough is productive--now of yellow sputum. No fevers. Denies LE edema. Denies sore throat, otalgia. + rhinorrhea. No sick contacts. She does report feeling better this AM.     Hospital Course:  11/13 Dr nino following her for acute exacerbation COPD. She is on zithromax and rocephin day 3. Medrol 125mg IV IV every 8hr. Levoalbuterol every 8hr. She has home O2 and is on her home 3L POX 95%. WBC 48964. This am afebrile. Dr nino following. She is SOB laying in bed talking. She also reports SANTIAGO    Dr Metcalf also saw her. Last echo shows 60% EF with LV diastolic dysfunction. Consider outpt stress.     11/*14 she reports that her breathing is better. She was able to get to bathroom and back this am with minimal SOB. Steroids were decreased to 80mg IV every 8hr. She feels pretty far from her baseline. Coughing a lot. Day 4 azithromycin and rocephin     U/s gallbladder with stone in neck but no thickened walls. No n/v.     11/15 She is on day 5 azithromycin and rocephin. On medrol; 80mg IV q 8hr. Remains on her home dose O2 at 3L NC. sats 96%. She reports that her SOB is better than yesterday but still not back to baseline. Also added mucinex for junky cough yesterday.     11/16/18 She is feeling much better, notes mucomyst has helped;  Dr. Hickman recommending continue mucomyst. Will give one dose  She is on day 6 azithromycin and rocephin. Will stop Zithromax On medrol; 80mg IV q 8hr; will change to prednisone 40mg po day   Remains on her home dose O2 at 3L NC. sats 96%.  This am noted to have brief run SVT- will add low dose BB; Dr. Metcalf following; ordered to send strips to Dr. Metcalf.  She required IV Metoprolol.  CM noting pt unable to afford combo inhalers; taking breathing tx qid and husbands albuterol inhaler.   Planning possible D/C home tomorrow  Addendum; + recurrent SVT HR 170s; will give IV lopressor 5mg now then 25mg po QID; further recs per cardilogy    Interval note: pt still SOB/SANTIAGO  Review of Systems   Constitutional: Negative for activity change, fatigue, fever and unexpected weight change.   HENT: Negative for congestion, ear pain, hearing loss, rhinorrhea and sore throat.    Eyes: Negative for redness and visual disturbance.   Respiratory: Positive for cough, shortness of breath and wheezing. Negative for chest tightness.    Cardiovascular: Negative for palpitations and leg swelling.   Gastrointestinal: Negative for abdominal pain, constipation, diarrhea, nausea and vomiting.        Epigastric pain with exertion    Genitourinary: Negative for dysuria, frequency and urgency.   Musculoskeletal: Positive for back pain. Negative for joint swelling and neck pain.   Skin: Negative for color change, rash and wound.   Neurological: Negative for dizziness, tremors, weakness, light-headedness and headaches.     Objective:     Vital Signs (Most Recent):  Temp: 97.4 °F (36.3 °C) (11/16/18 0812)  Pulse: (!) 147 (11/16/18 1042)  Resp: 19 (11/16/18 0722)  BP: (!) 136/96 (11/16/18 1042)  SpO2: (!) 92 % (11/16/18 1042) Vital Signs (24h Range):  Temp:  [97 °F (36.1 °C)-97.5 °F (36.4 °C)] 97.4 °F (36.3 °C)  Pulse:  [] 147  Resp:  [18-21] 19  SpO2:  [92 %-97 %] 92 %  BP: (125-168)/(64-96) 136/96     Weight: 66.5 kg (146 lb 9.7 oz)  Body mass  index is 26.81 kg/m².    Physical Exam   Constitutional: She is oriented to person, place, and time. She appears well-developed and well-nourished. No distress.   Sitting up in bed carrying conversation easily   HENT:   Head: Normocephalic and atraumatic.   Right Ear: External ear normal.   Left Ear: External ear normal.   Eyes: Conjunctivae and EOM are normal. Pupils are equal, round, and reactive to light.   Neck: Neck supple. No tracheal deviation present.   Cardiovascular: Regular rhythm. Tachycardia present.   No murmur heard.  Seems to be having SVT.     Pulmonary/Chest: Effort normal. No respiratory distress. She has no wheezes. She has no rales.   Improved tidal volume.  Mild wheezing.  Much better   Abdominal: Soft. Bowel sounds are normal. She exhibits no distension. There is no tenderness.   Musculoskeletal:   Pain to left mid back   Neurological: She is alert and oriented to person, place, and time. No cranial nerve deficit.   Skin: Skin is warm and dry.   Psychiatric: She has a normal mood and affect. Her behavior is normal.   Vitals reviewed.        CRANIAL NERVES     CN III, IV, VI   Pupils are equal, round, and reactive to light.  Extraocular motions are normal.        Significant Labs:   CBC:   Recent Labs   Lab 11/15/18  0547 11/16/18  0522   WBC 17.56* 14.34*   HGB 9.1* 9.0*   HCT 28.0* 27.8*    209     CMP:   Recent Labs   Lab 11/15/18  0547 11/16/18  0522   * 135*   K 4.7 4.8   CL 94* 94*   CO2 32* 35*   * 183*   BUN 30* 23   CREATININE 0.9 0.8   CALCIUM 8.7 8.4*   ANIONGAP 9 6*   EGFRNONAA >60 >60     Recent Labs   Lab 11/12/18  0753   TROPONINI 0.014       Lab Results   Component Value Date    DDIMER 0.93 (H) 11/11/2018     Lab Results   Component Value Date    INR 1.1 11/11/2018    INR 1.1 06/02/2018    INR 1.1 11/30/2017   lactic acid 2.0>0.7  Blood culture NGTD  Flu negative    All pertinent labs within the past 24 hours have been reviewed.    Significant Imaging:      CXR 11/11   The lungs are hyperexpanded and show mild interstitial prominence suggesting COPD change.  There is bibasilar subsegmental atelectasis.  No consolidation or pleural effusions.  The heart is normal in size.  Calcified atheromatous disease affects the aorta.  Age-appropriate degenerative changes affect the skeleton.      Us lower ext 11/12   No evidence of deep venous thrombosis bilateral lower extremities.    US abd 11/14   Cholelithiasis with a solitary gallstone in the gallbladder neck.  No evidence for cholecystitis.    Coarsened echogenicity of the liver suggesting intrinsic hepatic disease.    EKG Normal sinus rhythm  Left anterior fascicular block  Septal infarct ,age undetermined  Abnormal ECG  When compared with ECG of 11-NOV-2018 19:58,  Nonspecific T wave abnormality now evident in Anterior leads  Confirmed by Leatha VILLAFANA, Candace ([id number]) on 11/12/2018 3:40:39 PM    Assessment/Plan:      * COPD exacerbation    Likely viral but was started on azithro and rocephin on admit. I see no consolidation on CXR but pulmonology would like to continue per their note. WBC was 17 K but also on chronic steroids so unsure. No fevers.  She is on prednisone 20mg daily as outpatient--per patient this is a chronic medication and not for exacerbation and she was told she can not stop steroids. She was started on solumedrol 125mg Q8H here. Per Dr. Hickman's note he wants to continue current management so I will not change today  I scheduled her nebs: xopenex  Dr. Hickman recently started theophylline as outpatient: 150mg Qday. I have not restarted here. Unsure if this is necessary in light of not using inhalers as prescribed as outpatient.    She was prescribed symbicort but SHE HAS NOT BEEN USING. I suspect this is why she has been so difficult to control as outpatient. She is not on any maintenance therapy. Discussed needs to be on maintenance inhaler as outpatient in addition to PRN nebs.   She is not having increased  O2 requirements but still symptomatic with exertion    11/13  Wean steroids  The current medical regimen is effective;  continue present plan and medications.   11/14 cont same  11/15 still wheezing, no wean today  D/c zithromax and rocephin as she completed 5 days today  Does not like the mucinex, will try mucomist cpt  11/16-mucomyst did wonders.  Much better     SVT (supraventricular tachycardia)    Obtain EKG  IV Lopressor 5 mg now  Lopressor 25 mg p.o. Q.i.d.  Consider transfer to ICU  Cardiology consulted.       Epigastric pain    PPI  Check us abdomen ; r/o gall stones - does have gallstone but no active inflammation/infection will f/u outpt       Elevated d-dimer    LE US ordered- negative  lovenox DVT proph  Lower suspicion for PE       RLS (restless legs syndrome)    Cont home requip       Chronic respiratory failure with hypoxia    Due to COPD  She is on 3L NC at rest and 4L NC with exertion at home--using home requirements here right now and maintains sats but still with significant wheezing and tightness in her chest. Discontinue solumedrol 80 q 8 of steroids  add pulm toileting  Prednisone 40 mg po daily.  She takes prednisone 20 mg daily at home.  Which we will start weaning to that dose.     Oxygen dependent    Reports using 3L NC at home and 4L with exertion  curretnly on 3L NC at rest POX 96%       Dyslipidemia           Major depressive disorder with single episode, in full remission    Cont home citalopram       Insomnia    Cont home trazodone       Hyperlipidemia    Cont home pravastatin       COPD (chronic obstructive pulmonary disease)           HTN (hypertension)    Cont home losartan, BP elevated this AM but her home meds weren't reordered. Still elevated increased yesterday and bp better this am 159/72, Norvasc added 11/15  11/16-patient now having supraventricular tachycardia on the monitor.  The heart rate goes up to 180.  Currently it is 130.  Awaiting EKG.  Will start 5 mg of IV  Lopressor followed by 25 mg p.o. q.i.d..  Consider transfer to ICU if this is not controlled.         VTE Risk Mitigation (From admission, onward)        Ordered     enoxaparin injection 40 mg  Daily      11/13/18 1046     IP VTE HIGH RISK PATIENT  Once      11/11/18 2235     Place sequential compression device  Until discontinued      11/11/18 2235              Chicho Fisher MD  Department of Hospital Medicine   [facility]

## 2018-11-16 NOTE — PROGRESS NOTES
[facility]  Pulmonology  Progress Note    Patient Name: Chantell Herrera  MRN: [id number]  Admission Date: 11/11/2018  Hospital Length of Stay: 5 days  Code Status: Full Code  Attending Provider: Chicho Fisher MD  Primary Care Provider: Anson Leiva MD   Principal Problem: COPD exacerbation    Subjective:     Interval History: moving much more air and feeling much better     Objective:     Vital Signs (Most Recent):  Temp: 98.7 °F (37.1 °C) (11/16/18 1130)  Pulse: (!) 130 (11/16/18 1421)  Resp: (!) 26 (11/16/18 1314)  BP: 128/76 (11/16/18 1250)  SpO2: 96 % (11/16/18 1314) Vital Signs (24h Range):  Temp:  [97 °F (36.1 °C)-98.7 °F (37.1 °C)] 98.7 °F (37.1 °C)  Pulse:  [] 130  Resp:  [18-26] 26  SpO2:  [92 %-97 %] 96 %  BP: (125-160)/(64-99) 128/76     Weight: 66.5 kg (146 lb 9.7 oz)  Body mass index is 26.81 kg/m².      Intake/Output Summary (Last 24 hours) at 11/16/2018 1540  Last data filed at 11/16/2018 0500  Gross per 24 hour   Intake 240 ml   Output --   Net 240 ml       Physical Exam   Constitutional: She is oriented to person, place, and time. She appears well-developed and well-nourished. She is cooperative.  Non-toxic appearance. She does not appear ill. No distress.   HENT:   Head: Normocephalic and atraumatic.   Right Ear: Hearing, tympanic membrane, external ear and ear canal normal.   Left Ear: Hearing, tympanic membrane, external ear and ear canal normal.   Nose: Nose normal. No mucosal edema, rhinorrhea or nasal deformity. No epistaxis. Right sinus exhibits no maxillary sinus tenderness and no frontal sinus tenderness. Left sinus exhibits no maxillary sinus tenderness and no frontal sinus tenderness.   Mouth/Throat: Uvula is midline, oropharynx is clear and moist and mucous membranes are normal. No trismus in the jaw. Normal dentition. No uvula swelling. No posterior oropharyngeal erythema.   Eyes: Conjunctivae and lids are normal. No scleral icterus.   Sclera clear  bilat   Neck: Trachea normal, full passive range of motion without pain and phonation normal. Neck supple.   Cardiovascular: Normal rate, regular rhythm, normal heart sounds, intact distal pulses and normal pulses.   Pulmonary/Chest: She is in respiratory distress (mild to moderate). She has decreased breath sounds in the right lower field and the left lower field. She has wheezes in the right lower field and the left lower field. She has rhonchi in the right lower field and the left lower field.   Abdominal: Soft. Normal appearance and bowel sounds are normal. She exhibits no distension. There is no tenderness.   Musculoskeletal: Normal range of motion. She exhibits no edema or deformity.   Neurological: She is alert and oriented to person, place, and time. She exhibits normal muscle tone. Coordination normal.   Skin: Skin is warm, dry and intact. She is not diaphoretic. No pallor.   Psychiatric: She has a normal mood and affect. Her speech is normal and behavior is normal. Judgment and thought content normal. Cognition and memory are normal.   Nursing note and vitals reviewed.      Vents:  Oxygen Concentration (%): 32 (11/15/18 1905)    Lines/Drains/Airways     Peripheral Intravenous Line                 Peripheral IV - Single Lumen 11/14/18 0515 Anterior;Right Wrist 2 days                Significant Labs:    CBC/Anemia Profile:  Recent Labs   Lab 11/15/18  0547 11/16/18  0522   WBC 17.56* 14.34*   HGB 9.1* 9.0*   HCT 28.0* 27.8*    209   * 111*   RDW 14.4 14.3        Chemistries:  Recent Labs   Lab 11/15/18  0547 11/16/18  0522   * 135*   K 4.7 4.8   CL 94* 94*   CO2 32* 35*   BUN 30* 23   CREATININE 0.9 0.8   CALCIUM 8.7 8.4*       All pertinent labs within the past 24 hours have been reviewed.    Significant Imaging:  I have reviewed all pertinent imaging results/findings within the past 24 hours.    Assessment/Plan:     * COPD exacerbation    Slight increase in air entry but profoundly poor  air entry   Doing better on meds continue support      Epigastric pain    need enamorado exclude cardiac     Chronic respiratory failure with hypoxia    Uses O2 at home   Chronic respiratory failure      COPD (chronic obstructive pulmonary disease)    Feeling better   Severe emphysema             Emmanuel Hickman MD  Pulmonology  [facility]

## 2018-11-16 NOTE — ASSESSMENT & PLAN NOTE
Likely viral but was started on azithro and rocephin on admit. I see no consolidation on CXR but pulmonology would like to continue per their note. WBC was 17 K but also on chronic steroids so unsure. No fevers.  She is on prednisone 20mg daily as outpatient--per patient this is a chronic medication and not for exacerbation and she was told she can not stop steroids. She was started on solumedrol 125mg Q8H here. Per Dr. Hickman's note he wants to continue current management so I will not change today  I scheduled her nebs: xopenex  Dr. Hickman recently started theophylline as outpatient: 150mg Qday. I have not restarted here. Unsure if this is necessary in light of not using inhalers as prescribed as outpatient.    She was prescribed symbicort but SHE HAS NOT BEEN USING. I suspect this is why she has been so difficult to control as outpatient. She is not on any maintenance therapy. Discussed needs to be on maintenance inhaler as outpatient in addition to PRN nebs.   She is not having increased O2 requirements but still symptomatic with exertion    11/13  Wean steroids  The current medical regimen is effective;  continue present plan and medications.   11/14 cont same  11/15 still wheezing, no wean today  D/c zithromax and rocephin as she completed 5 days today  Does not like the mucinex, will try mucomist cpt  11/16-mucomyst did wonders.  Much better

## 2018-11-16 NOTE — PROGRESS NOTES
Staff Handoff    Bedside report recieved from Jensen.  Assessment completed per flow sheet. Bed locked, lower and call bell in reach.  Educated to call if needed. No c/o pain  Resident Handoff

## 2018-11-16 NOTE — ASSESSMENT & PLAN NOTE
Cont home losartan, BP elevated this AM but her home meds weren't reordered. Still elevated increased yesterday and bp better this am 159/72, Norvasc added 11/15  11/16-patient now having supraventricular tachycardia on the monitor.  The heart rate goes up to 180.  Currently it is 130.  Awaiting EKG.  Will start 5 mg of IV Lopressor followed by 25 mg p.o. q.i.d..  Consider transfer to ICU if this is not controlled.

## 2018-11-16 NOTE — PLAN OF CARE
Problem: Patient Care Overview  Goal: Plan of Care Review  Outcome: Ongoing (interventions implemented as appropriate)  Educate patient/family/cargiver about administration of aerosolized medications via the inhalation route to aid in bronchial hygiene & deliver medications.  Patient educated on CPT used to       Minimize pulmonary secretion retention   Maximize oxygenation   Re-expand atelectatic lung segments

## 2018-11-16 NOTE — NURSING
"Notified DEREK Stout at [facility] of patient HR in 130-150's. Discussed telemetry strips. Argelia states" will discuss with .  "

## 2018-11-16 NOTE — SUBJECTIVE & OBJECTIVE
Interval History: moving much more air and feeling much better     Objective:     Vital Signs (Most Recent):  Temp: 98.7 °F (37.1 °C) (11/16/18 1130)  Pulse: (!) 130 (11/16/18 1421)  Resp: (!) 26 (11/16/18 1314)  BP: 128/76 (11/16/18 1250)  SpO2: 96 % (11/16/18 1314) Vital Signs (24h Range):  Temp:  [97 °F (36.1 °C)-98.7 °F (37.1 °C)] 98.7 °F (37.1 °C)  Pulse:  [] 130  Resp:  [18-26] 26  SpO2:  [92 %-97 %] 96 %  BP: (125-160)/(64-99) 128/76     Weight: 66.5 kg (146 lb 9.7 oz)  Body mass index is 26.81 kg/m².      Intake/Output Summary (Last 24 hours) at 11/16/2018 1540  Last data filed at 11/16/2018 0500  Gross per 24 hour   Intake 240 ml   Output --   Net 240 ml       Physical Exam   Constitutional: She is oriented to person, place, and time. She appears well-developed and well-nourished. She is cooperative.  Non-toxic appearance. She does not appear ill. No distress.   HENT:   Head: Normocephalic and atraumatic.   Right Ear: Hearing, tympanic membrane, external ear and ear canal normal.   Left Ear: Hearing, tympanic membrane, external ear and ear canal normal.   Nose: Nose normal. No mucosal edema, rhinorrhea or nasal deformity. No epistaxis. Right sinus exhibits no maxillary sinus tenderness and no frontal sinus tenderness. Left sinus exhibits no maxillary sinus tenderness and no frontal sinus tenderness.   Mouth/Throat: Uvula is midline, oropharynx is clear and moist and mucous membranes are normal. No trismus in the jaw. Normal dentition. No uvula swelling. No posterior oropharyngeal erythema.   Eyes: Conjunctivae and lids are normal. No scleral icterus.   Sclera clear bilat   Neck: Trachea normal, full passive range of motion without pain and phonation normal. Neck supple.   Cardiovascular: Normal rate, regular rhythm, normal heart sounds, intact distal pulses and normal pulses.   Pulmonary/Chest: She is in respiratory distress (mild to moderate). She has decreased breath sounds in the right lower field  and the left lower field. She has wheezes in the right lower field and the left lower field. She has rhonchi in the right lower field and the left lower field.   Abdominal: Soft. Normal appearance and bowel sounds are normal. She exhibits no distension. There is no tenderness.   Musculoskeletal: Normal range of motion. She exhibits no edema or deformity.   Neurological: She is alert and oriented to person, place, and time. She exhibits normal muscle tone. Coordination normal.   Skin: Skin is warm, dry and intact. She is not diaphoretic. No pallor.   Psychiatric: She has a normal mood and affect. Her speech is normal and behavior is normal. Judgment and thought content normal. Cognition and memory are normal.   Nursing note and vitals reviewed.      Vents:  Oxygen Concentration (%): 32 (11/15/18 1905)    Lines/Drains/Airways     Peripheral Intravenous Line                 Peripheral IV - Single Lumen 11/14/18 0515 Anterior;Right Wrist 2 days                Significant Labs:    CBC/Anemia Profile:  Recent Labs   Lab 11/15/18  0547 11/16/18  0522   WBC 17.56* 14.34*   HGB 9.1* 9.0*   HCT 28.0* 27.8*    209   * 111*   RDW 14.4 14.3        Chemistries:  Recent Labs   Lab 11/15/18  0547 11/16/18  0522   * 135*   K 4.7 4.8   CL 94* 94*   CO2 32* 35*   BUN 30* 23   CREATININE 0.9 0.8   CALCIUM 8.7 8.4*       All pertinent labs within the past 24 hours have been reviewed.    Significant Imaging:  I have reviewed all pertinent imaging results/findings within the past 24 hours.

## 2018-11-16 NOTE — ASSESSMENT & PLAN NOTE
Due to COPD  She is on 3L NC at rest and 4L NC with exertion at home--using home requirements here right now and maintains sats but still with significant wheezing and tightness in her chest. Discontinue solumedrol 80 q 8 of steroids  add pulm toileting  Prednisone 40 mg po daily.  She takes prednisone 20 mg daily at home.  Which we will start weaning to that dose.

## 2018-11-16 NOTE — PLAN OF CARE
Problem: Patient Care Overview  Goal: Plan of Care Review  Pt educated on disease process, Verbalized understanding. Clear but decreased, with expiratory wheeze. Pt states she feels much better and is ready to go home. Will cont to monitor

## 2018-11-16 NOTE — NURSING
Discussed telemetry strip /no P waves present for small interval with 's with Dr. Fisher and Dennise Virgen NP.

## 2018-11-16 NOTE — PROGRESS NOTES
[facility]  Cardiology  Progress Note    Patient Name: Chantell Herrera  MRN: [id number]  Admission Date: 11/11/2018  Hospital Length of Stay: 5 days  Code Status: Full Code   Attending Physician: Chicho Fisher MD   Primary Care Physician: Anson Leiva MD  Expected Discharge Date:   Principal Problem:COPD exacerbation    Subjective:     Interval History: no change overnight  Full code    Review of Systems   Respiratory: Positive for wheezing.    Gastrointestinal: Positive for abdominal pain.   All other systems reviewed and are negative.    Objective:     Vital Signs (Most Recent):  Temp: 97.4 °F (36.3 °C) (11/16/18 0311)  Pulse: 100 (11/16/18 0813)  Resp: 19 (11/16/18 0722)  BP: (!) 160/74 (11/16/18 0813)  SpO2: (!) 94 % (11/16/18 0813) Vital Signs (24h Range):  Temp:  [97 °F (36.1 °C)-97.5 °F (36.4 °C)] 97.4 °F (36.3 °C)  Pulse:  [] 100  Resp:  [18-21] 19  SpO2:  [92 %-97 %] 94 %  BP: (125-168)/(64-78) 160/74     Weight: 66.5 kg (146 lb 9.7 oz)  Body mass index is 26.81 kg/m².    SpO2: (!) 94 %  O2 Device (Oxygen Therapy): nasal cannula      Intake/Output Summary (Last 24 hours) at 11/16/2018 0829  Last data filed at 11/16/2018 0500  Gross per 24 hour   Intake 720 ml   Output --   Net 720 ml       Lines/Drains/Airways     Peripheral Intravenous Line                 Peripheral IV - Single Lumen 11/14/18 0515 Anterior;Right Wrist 2 days                Physical Exam   Constitutional: She is oriented to person, place, and time. She appears well-developed and well-nourished.   Cardiovascular: Normal rate, regular rhythm and normal heart sounds.   Pulmonary/Chest: Effort normal. She has wheezes.   Musculoskeletal: Normal range of motion.   Neurological: She is alert and oriented to person, place, and time.   Skin: Skin is warm and dry.   Psychiatric: She has a normal mood and affect. Her behavior is normal. Judgment and thought content normal.   Nursing note and vitals  reviewed.      Significant Labs:   BMP:   Recent Labs   Lab 11/15/18  0547 11/16/18  0522   * 183*   * 135*   K 4.7 4.8   CL 94* 94*   CO2 32* 35*   BUN 30* 23   CREATININE 0.9 0.8   CALCIUM 8.7 8.4*   , CMP   Recent Labs   Lab 11/15/18  0547 11/16/18  0522   * 135*   K 4.7 4.8   CL 94* 94*   CO2 32* 35*   * 183*   BUN 30* 23   CREATININE 0.9 0.8   CALCIUM 8.7 8.4*   ANIONGAP 9 6*   ESTGFRAFRICA >60 >60   EGFRNONAA >60 >60   , CBC   Recent Labs   Lab 11/15/18  0547 11/16/18  0522   WBC 17.56* 14.34*   HGB 9.1* 9.0*   HCT 28.0* 27.8*    209    and Troponin No results for input(s): TROPONINI in the last 48 hours.    Significant Imaging: Echocardiogram: 2D echo with color flow doppler: No results found for this or any previous visit.  Assessment and Plan:     DX: Wheezing--chronic; now near baseline; with GB disease  Chest pain chronic; worse with COPD exacerbation  abd pain--cholelithiasis  COPD on home O2 for yrs; ex smoker/Asthma/Emphysema  HTN EF 60% DD--still elevated  Dyslipidemia  Arthritis  Depression/anxiety  Normal stress test 9/11    Plan:  Increase amlodipine and try low dose metoprolol as tolerated  tx of COPD ongoing  Not much to add at this time  Consider repeat stress test when COPD stable  Full code  Current Facility-Administered Medications   Medication    acetaminophen tablet 650 mg    albuterol-ipratropium 2.5 mg-0.5 mg/3 mL nebulizer solution 3 mL    amLODIPine tablet 10 mg    aspirin chewable tablet 81 mg    azithromycin 500 mg in dextrose 5 % 250 mL IVPB (ready to mix system)    cefTRIAXone (ROCEPHIN) 1 g in dextrose 5 % 50 mL IVPB    citalopram tablet 40 mg    enoxaparin injection 40 mg    losartan tablet 100 mg    methylPREDNISolone sodium succinate injection 80 mg    ondansetron injection 4 mg    pantoprazole EC tablet 40 mg    pravastatin tablet 40 mg    promethazine (PHENERGAN) 12.5 mg in dextrose 5 % 50 mL IVPB    rOPINIRole tablet 1 mg     traZODone tablet 50 mg           Active Diagnoses:    Diagnosis Date Noted POA    PRINCIPAL PROBLEM:  COPD exacerbation [J44.1] 11/11/2018 Yes    Epigastric pain [R10.13] 11/13/2018 Yes    Elevated d-dimer [R79.89] 11/12/2018 Yes    Chronic respiratory failure with hypoxia [J96.11] 05/17/2017 Yes    RLS (restless legs syndrome) [G25.81] 05/17/2017 Yes    Oxygen dependent [Z99.81] 01/03/2017 Not Applicable    Major depressive disorder with single episode, in full remission [F32.5] 05/10/2016 Yes    Insomnia [G47.00] 08/10/2015 Yes    Hyperlipidemia [E78.5] 05/03/2015 Yes    COPD (chronic obstructive pulmonary disease) [J44.9] 05/03/2015 Yes    HTN (hypertension) [I10] 06/19/2014 Yes      Problems Resolved During this Admission:       VTE Risk Mitigation (From admission, onward)        Ordered     enoxaparin injection 40 mg  Daily      11/13/18 1046     IP VTE HIGH RISK PATIENT  Once      11/11/18 2235     Place sequential compression device  Until discontinued      11/11/18 2235          Argelia Burnette NP  Cardiology  [facility]    I attest that I have personally seen and examined this patient. I have reviewed and discussed the management in detail as outlined above.    **ADDENDUM:  Pt with new onset AF.  Will pursue rate control strategy with beta blockers as ordered.  Will not fully anticoagulate yet as her H/H is falling. If remains stable over the weekend on lovenox may consider full anticoagulation.

## 2018-11-16 NOTE — ASSESSMENT & PLAN NOTE
Obtain EKG  IV Lopressor 5 mg now  Lopressor 25 mg p.o. Q.i.d.  Consider transfer to ICU  Cardiology consulted.

## 2018-11-16 NOTE — PLAN OF CARE
11/16/18 1151   Discharge Assessment   Assessment Type Discharge Planning Reassessment     Discussed discharge plan with Ms Herrera. She will go home with her . She is agreeable to home health if warranted at time of discharge but does not feel it is needed at this time.

## 2018-11-16 NOTE — NURSING
"Discussed EKG with DR. Fisher. Patient in A-fib. Reported that IV lopressor was given. Discussed oral lopressor. Dr. fisher states" start oral dose now".  "

## 2018-11-17 PROBLEM — I48.91 ATRIAL FIBRILLATION WITH RAPID VENTRICULAR RESPONSE: Status: ACTIVE | Noted: 2018-11-16

## 2018-11-17 LAB
ANION GAP SERPL CALC-SCNC: 7 MMOL/L
ANISOCYTOSIS BLD QL SMEAR: SLIGHT
BACTERIA BLD CULT: NORMAL
BASOPHILS # BLD AUTO: ABNORMAL K/UL
BASOPHILS NFR BLD: 0 %
BUN SERPL-MCNC: 25 MG/DL
CALCIUM SERPL-MCNC: 8.4 MG/DL
CHLORIDE SERPL-SCNC: 92 MMOL/L
CO2 SERPL-SCNC: 34 MMOL/L
CREAT SERPL-MCNC: 0.8 MG/DL
DACRYOCYTES BLD QL SMEAR: ABNORMAL
DIFFERENTIAL METHOD: ABNORMAL
EOSINOPHIL # BLD AUTO: ABNORMAL K/UL
EOSINOPHIL NFR BLD: 1 %
ERYTHROCYTE [DISTWIDTH] IN BLOOD BY AUTOMATED COUNT: 14.4 %
EST. GFR  (AFRICAN AMERICAN): >60 ML/MIN/1.73 M^2
EST. GFR  (NON AFRICAN AMERICAN): >60 ML/MIN/1.73 M^2
GLUCOSE SERPL-MCNC: 140 MG/DL
HCT VFR BLD AUTO: 29 %
HGB BLD-MCNC: 9.3 G/DL
HYPOCHROMIA BLD QL SMEAR: ABNORMAL
LYMPHOCYTES # BLD AUTO: ABNORMAL K/UL
LYMPHOCYTES NFR BLD: 6 %
MCH RBC QN AUTO: 36 PG
MCHC RBC AUTO-ENTMCNC: 32.1 G/DL
MCV RBC AUTO: 112 FL
MONOCYTES # BLD AUTO: ABNORMAL K/UL
MONOCYTES NFR BLD: 18 %
NEUTROPHILS NFR BLD: 73 %
NEUTS BAND NFR BLD MANUAL: 2 %
OVALOCYTES BLD QL SMEAR: ABNORMAL
PLATELET # BLD AUTO: 257 K/UL
PLATELET BLD QL SMEAR: ABNORMAL
PMV BLD AUTO: 12 FL
POIKILOCYTOSIS BLD QL SMEAR: SLIGHT
POTASSIUM SERPL-SCNC: 4.4 MMOL/L
RBC # BLD AUTO: 2.58 M/UL
SODIUM SERPL-SCNC: 133 MMOL/L
WBC # BLD AUTO: 15 K/UL

## 2018-11-17 PROCEDURE — 85007 BL SMEAR W/DIFF WBC COUNT: CPT | Mod: HCWC

## 2018-11-17 PROCEDURE — 63600175 PHARM REV CODE 636 W HCPCS: Mod: HCWC | Performed by: SURGERY

## 2018-11-17 PROCEDURE — 25000242 PHARM REV CODE 250 ALT 637 W/ HCPCS: Mod: HCWC | Performed by: NURSE PRACTITIONER

## 2018-11-17 PROCEDURE — 63600175 PHARM REV CODE 636 W HCPCS: Mod: HCWC | Performed by: NURSE PRACTITIONER

## 2018-11-17 PROCEDURE — 27000221 HC OXYGEN, UP TO 24 HOURS: Mod: HCWC

## 2018-11-17 PROCEDURE — 25000003 PHARM REV CODE 250: Mod: HCWC | Performed by: INTERNAL MEDICINE

## 2018-11-17 PROCEDURE — 25000003 PHARM REV CODE 250: Mod: HCWC | Performed by: SURGERY

## 2018-11-17 PROCEDURE — 36415 COLL VENOUS BLD VENIPUNCTURE: CPT | Mod: HCWC

## 2018-11-17 PROCEDURE — 25000003 PHARM REV CODE 250: Mod: HCWC | Performed by: FAMILY MEDICINE

## 2018-11-17 PROCEDURE — 94761 N-INVAS EAR/PLS OXIMETRY MLT: CPT | Mod: HCWC

## 2018-11-17 PROCEDURE — 85027 COMPLETE CBC AUTOMATED: CPT | Mod: HCWC

## 2018-11-17 PROCEDURE — 11000001 HC ACUTE MED/SURG PRIVATE ROOM: Mod: HCWC

## 2018-11-17 PROCEDURE — 99233 SBSQ HOSP IP/OBS HIGH 50: CPT | Mod: HCWC,,, | Performed by: FAMILY MEDICINE

## 2018-11-17 PROCEDURE — 94640 AIRWAY INHALATION TREATMENT: CPT | Mod: HCWC

## 2018-11-17 PROCEDURE — 80048 BASIC METABOLIC PNL TOTAL CA: CPT | Mod: HCWC

## 2018-11-17 PROCEDURE — 25000003 PHARM REV CODE 250: Mod: HCWC | Performed by: NURSE PRACTITIONER

## 2018-11-17 PROCEDURE — 94668 MNPJ CHEST WALL SBSQ: CPT | Mod: HCWC

## 2018-11-17 RX ORDER — DIGOXIN 125 MCG
0.12 TABLET ORAL DAILY
Status: DISCONTINUED | OUTPATIENT
Start: 2018-11-17 | End: 2018-11-18 | Stop reason: HOSPADM

## 2018-11-17 RX ORDER — METOPROLOL TARTRATE 100 MG/1
100 TABLET ORAL 2 TIMES DAILY
Status: DISCONTINUED | OUTPATIENT
Start: 2018-11-17 | End: 2018-11-18 | Stop reason: HOSPADM

## 2018-11-17 RX ORDER — DOCUSATE SODIUM 100 MG/1
100 CAPSULE, LIQUID FILLED ORAL 2 TIMES DAILY PRN
Status: DISCONTINUED | OUTPATIENT
Start: 2018-11-17 | End: 2018-11-18 | Stop reason: HOSPADM

## 2018-11-17 RX ADMIN — IPRATROPIUM BROMIDE AND ALBUTEROL SULFATE 3 ML: .5; 3 SOLUTION RESPIRATORY (INHALATION) at 07:11

## 2018-11-17 RX ADMIN — TRAZODONE HYDROCHLORIDE 50 MG: 50 TABLET ORAL at 09:11

## 2018-11-17 RX ADMIN — LOSARTAN POTASSIUM 100 MG: 50 TABLET, FILM COATED ORAL at 09:11

## 2018-11-17 RX ADMIN — IPRATROPIUM BROMIDE AND ALBUTEROL SULFATE 3 ML: .5; 3 SOLUTION RESPIRATORY (INHALATION) at 01:11

## 2018-11-17 RX ADMIN — ASPIRIN 81 MG 81 MG: 81 TABLET ORAL at 09:11

## 2018-11-17 RX ADMIN — METOPROLOL TARTRATE 100 MG: 100 TABLET ORAL at 09:11

## 2018-11-17 RX ADMIN — CITALOPRAM HYDROBROMIDE 40 MG: 10 TABLET ORAL at 09:11

## 2018-11-17 RX ADMIN — ACETAMINOPHEN 650 MG: 325 TABLET ORAL at 05:11

## 2018-11-17 RX ADMIN — PANTOPRAZOLE SODIUM 40 MG: 40 TABLET, DELAYED RELEASE ORAL at 09:11

## 2018-11-17 RX ADMIN — CEFTRIAXONE 1 G: 1 INJECTION, SOLUTION INTRAVENOUS at 09:11

## 2018-11-17 RX ADMIN — ACETAMINOPHEN 650 MG: 325 TABLET ORAL at 09:11

## 2018-11-17 RX ADMIN — PREDNISONE 40 MG: 20 TABLET ORAL at 09:11

## 2018-11-17 RX ADMIN — ROPINIROLE HYDROCHLORIDE 1 MG: 0.5 TABLET, FILM COATED ORAL at 09:11

## 2018-11-17 RX ADMIN — AMLODIPINE BESYLATE 10 MG: 10 TABLET ORAL at 09:11

## 2018-11-17 RX ADMIN — DOCUSATE SODIUM 100 MG: 100 CAPSULE, LIQUID FILLED ORAL at 12:11

## 2018-11-17 RX ADMIN — DIGOXIN 0.12 MG: 125 TABLET ORAL at 09:11

## 2018-11-17 RX ADMIN — PRAVASTATIN SODIUM 40 MG: 40 TABLET ORAL at 09:11

## 2018-11-17 RX ADMIN — METOPROLOL TARTRATE 50 MG: 50 TABLET ORAL at 05:11

## 2018-11-17 NOTE — NURSING
Spoke with [facility] pharmacist  about medication digoxin. Discussed loading dose ordered and that Dr. falk is aware of renal function of 49.4. Plan is to change to lower dose of digoxin tomorrow.

## 2018-11-17 NOTE — PLAN OF CARE
Problem: Patient Care Overview  Goal: Plan of Care Review  Outcome: Ongoing (interventions implemented as appropriate)  Patient feeling much better. Patient with Afib with RVR; asymptomatic; 's-150's.Telemetry continued. Oxygen therapy. No resp distress. Safety and comfort maintained. Agree with plan of care. Call bell in reach.

## 2018-11-17 NOTE — ASSESSMENT & PLAN NOTE
Likely viral but was started on azithro and rocephin on admit. I see no consolidation on CXR but pulmonology would like to continue per their note. WBC was 17 K but also on chronic steroids so unsure. No fevers.  She is on prednisone 20mg daily as outpatient--per patient this is a chronic medication and not for exacerbation and she was told she can not stop steroids. She was started on solumedrol 125mg Q8H here. Per Dr. Hickman's note he wants to continue current management so I will not change today  I scheduled her nebs: xopenex  Dr. Hickman recently started theophylline as outpatient: 150mg Qday. I have not restarted here. Unsure if this is necessary in light of not using inhalers as prescribed as outpatient.    She was prescribed symbicort but SHE HAS NOT BEEN USING. I suspect this is why she has been so difficult to control as outpatient. She is not on any maintenance therapy. Discussed needs to be on maintenance inhaler as outpatient in addition to PRN nebs.   She is not having increased O2 requirements but still symptomatic with exertion    11/13  Wean steroids  The current medical regimen is effective;  continue present plan and medications.   11/14 cont same  11/15 still wheezing, no wean today  D/c zithromax and rocephin as she completed 5 days today  Does not like the mucinex, will try mucomist cpt  11/16-mucomyst did wonders.  Much better  11/17  doing much better.  Continue current pulmonary toilet

## 2018-11-17 NOTE — PROGRESS NOTES
Nursing Notes  Bedside handoff completed with STACIA Schroeder  Patient supine with HOB up. No resp distress. Call bell in reach. Denies needs.       Huddle Comments

## 2018-11-17 NOTE — SUBJECTIVE & OBJECTIVE
Interval note:  Patient's respiratory status much better.  She developed atrial fibrillation with a rapid ventricular response.  This is now controlled.  Review of Systems   Constitutional: Negative for activity change, fatigue, fever and unexpected weight change.   HENT: Negative for congestion, ear pain, hearing loss, rhinorrhea and sore throat.    Eyes: Negative for redness and visual disturbance.   Respiratory: Positive for cough, shortness of breath and wheezing. Negative for chest tightness.    Cardiovascular: Negative for palpitations and leg swelling.   Gastrointestinal: Negative for abdominal pain, constipation, diarrhea, nausea and vomiting.        Epigastric pain with exertion    Genitourinary: Negative for dysuria, frequency and urgency.   Musculoskeletal: Positive for back pain. Negative for joint swelling and neck pain.   Skin: Negative for color change, rash and wound.   Neurological: Negative for dizziness, tremors, weakness, light-headedness and headaches.     Objective:     Vital Signs (Most Recent):  Temp: 98.3 °F (36.8 °C) (11/17/18 0721)  Pulse: (!) 113 (11/17/18 0804)  Resp: 20 (11/17/18 0721)  BP: 138/88 (11/17/18 0721)  SpO2: 98 % (11/17/18 0721) Vital Signs (24h Range):  Temp:  [97.1 °F (36.2 °C)-99.1 °F (37.3 °C)] 98.3 °F (36.8 °C)  Pulse:  [] 113  Resp:  [16-26] 20  SpO2:  [92 %-99 %] 98 %  BP: (115-158)/(70-99) 138/88     Weight: 66.5 kg (146 lb 9.7 oz)  Body mass index is 26.81 kg/m².    Physical Exam   Constitutional: She is oriented to person, place, and time. She appears well-developed and well-nourished. No distress.   Sitting up in bed carrying conversation easily   HENT:   Head: Normocephalic and atraumatic.   Right Ear: External ear normal.   Left Ear: External ear normal.   Eyes: Conjunctivae and EOM are normal. Pupils are equal, round, and reactive to light.   Neck: Neck supple. No tracheal deviation present.   Cardiovascular: Normal rate, S1 normal, S2 normal and normal  heart sounds. An irregularly irregular rhythm present.   No murmur heard.  Seems to be having SVT.     Pulmonary/Chest: Effort normal. No respiratory distress. She has no wheezes. She has no rales.   Improved tidal volume.  Mild wheezing.  Much better   Abdominal: Soft. Bowel sounds are normal. She exhibits no distension. There is no tenderness.   Musculoskeletal:   Pain to left mid back   Neurological: She is alert and oriented to person, place, and time. No cranial nerve deficit.   Skin: Skin is warm and dry.   Psychiatric: She has a normal mood and affect. Her behavior is normal.   Vitals reviewed.        CRANIAL NERVES     CN III, IV, VI   Pupils are equal, round, and reactive to light.  Extraocular motions are normal.        Significant Labs:   CBC:   Recent Labs   Lab 11/16/18 0522 11/17/18  0628   WBC 14.34* 15.00*   HGB 9.0* 9.3*   HCT 27.8* 29.0*    257     CMP:   Recent Labs   Lab 11/16/18 0522 11/17/18  0628   * 133*   K 4.8 4.4   CL 94* 92*   CO2 35* 34*   * 140*   BUN 23 25*   CREATININE 0.8 0.8   CALCIUM 8.4* 8.4*   ANIONGAP 6* 7*   EGFRNONAA >60 >60     Recent Labs   Lab 11/12/18  0753   TROPONINI 0.014       Lab Results   Component Value Date    DDIMER 0.93 (H) 11/11/2018     Lab Results   Component Value Date    INR 1.1 11/11/2018    INR 1.1 06/02/2018    INR 1.1 11/30/2017   lactic acid 2.0>0.7  Blood culture NGTD  Flu negative    All pertinent labs within the past 24 hours have been reviewed.    Significant Imaging:     CXR 11/11   The lungs are hyperexpanded and show mild interstitial prominence suggesting COPD change.  There is bibasilar subsegmental atelectasis.  No consolidation or pleural effusions.  The heart is normal in size.  Calcified atheromatous disease affects the aorta.  Age-appropriate degenerative changes affect the skeleton.      Us lower ext 11/12   No evidence of deep venous thrombosis bilateral lower extremities.    US abd 11/14   Cholelithiasis with a  solitary gallstone in the gallbladder neck.  No evidence for cholecystitis.    Coarsened echogenicity of the liver suggesting intrinsic hepatic disease.    EKG Normal sinus rhythm  Left anterior fascicular block  Septal infarct ,age undetermined  Abnormal ECG  When compared with ECG of 11-NOV-2018 19:58,  Nonspecific T wave abnormality now evident in Anterior leads  Confirmed by Candace Zhang MD ([id number]) on 11/12/2018 3:40:39 PM

## 2018-11-17 NOTE — NURSING
Spoke with Argelia Burnette NP with [facility], regarding findings of atrial fib on EKG. Heart rate remains up to 120's. States she will look at history and decide whether or not to order anticoagulants.

## 2018-11-17 NOTE — PLAN OF CARE
Problem: Patient Care Overview  Goal: Plan of Care Review  Outcome: Ongoing (interventions implemented as appropriate)  Patient SANTIAGO. Oxygen 95% 3 L NC. [id number]. Dr. Hickman seen patient today. Afebrile. Neb tx. Safety and comfort maintained. Patient refused lovenox today due to increased bruising.  Will check INR in AM. Will continue to monitor. Agrees with plan of care.

## 2018-11-17 NOTE — PROGRESS NOTES
[facility] Medicine  Progress Note    Patient Name: Chantell Herrera  MRN: [id number]  Patient Class: IP- Inpatient   Admission Date: 11/11/2018  Length of Stay: 6 days  Attending Physician: Chicho Fisher MD  Primary Care Provider: Anson Leiva MD        Subjective:     Principal Problem:COPD exacerbation    HPI:  Patient presented to ER with SOB. Sx started 2 weeks ago and progressively getting worse. She was having substernal chest pains yesterday which prompted her to come to the er. She was having to hunch over to walk because she felt so SOB. She has COPD and wears 3L NC at home at rest, 4L with exertion. She does report worsening wheezing and cough. Cough is productive--now of yellow sputum. No fevers. Denies LE edema. Denies sore throat, otalgia. + rhinorrhea. No sick contacts. She does report feeling better this AM.     Hospital Course:  11/13 Dr nino following her for acute exacerbation COPD. She is on zithromax and rocephin day 3. Medrol 125mg IV IV every 8hr. Levoalbuterol every 8hr. She has home O2 and is on her home 3L POX 95%. WBC 73546. This am afebrile. Dr nino following. She is SOB laying in bed talking. She also reports SANTIAGO    Dr Metcalf also saw her. Last echo shows 60% EF with LV diastolic dysfunction. Consider outpt stress.     11/*14 she reports that her breathing is better. She was able to get to bathroom and back this am with minimal SOB. Steroids were decreased to 80mg IV every 8hr. She feels pretty far from her baseline. Coughing a lot. Day 4 azithromycin and rocephin     U/s gallbladder with stone in neck but no thickened walls. No n/v.     11/15 She is on day 5 azithromycin and rocephin. On medrol; 80mg IV q 8hr. Remains on her home dose O2 at 3L NC. sats 96%. She reports that her SOB is better than yesterday but still not back to baseline. Also added mucinex for junky cough yesterday.     11/16/18 She is feeling much better, notes mucomyst has helped;  Dr. Hickman recommending continue mucomyst. Will give one dose  She is on day 6 azithromycin and rocephin. Will stop Zithromax On medrol; 80mg IV q 8hr; will change to prednisone 40mg po day   Remains on her home dose O2 at 3L NC. sats 96%.  This am noted to have brief run SVT- will add low dose BB; Dr. Metcalf following; ordered to send strips to Dr. Metcalf.  She required IV Metoprolol.  CM noting pt unable to afford combo inhalers; taking breathing tx qid and husbands albuterol inhaler.   Planning possible D/C home tomorrow  Addendum; + recurrent SVT HR 170s; will give IV lopressor 5mg now then 25mg po QID; further recs per cardilogy  11/17  Respiratory status is much better.  She could probably be discharged home except she developed atrial fibrillation with a rapid ventricular response.  I have had to increase her Lopressor dose to 200 mg daily, I added digoxin 0.25 mg last night.  Now heart rate is .  She feels well.  She would like to go home.  She is on aspirin and once daily Lovenox and she seems to be bruising easily.    Interval note:  Patient's respiratory status much better.  She developed atrial fibrillation with a rapid ventricular response.  This is now controlled.  Review of Systems   Constitutional: Negative for activity change, fatigue, fever and unexpected weight change.   HENT: Negative for congestion, ear pain, hearing loss, rhinorrhea and sore throat.    Eyes: Negative for redness and visual disturbance.   Respiratory: Positive for cough, shortness of breath and wheezing. Negative for chest tightness.    Cardiovascular: Negative for palpitations and leg swelling.   Gastrointestinal: Negative for abdominal pain, constipation, diarrhea, nausea and vomiting.        Epigastric pain with exertion    Genitourinary: Negative for dysuria, frequency and urgency.   Musculoskeletal: Positive for back pain. Negative for joint swelling and neck pain.   Skin: Negative for color change, rash and wound.    Neurological: Negative for dizziness, tremors, weakness, light-headedness and headaches.     Objective:     Vital Signs (Most Recent):  Temp: 98.3 °F (36.8 °C) (11/17/18 0721)  Pulse: (!) 113 (11/17/18 0804)  Resp: 20 (11/17/18 0721)  BP: 138/88 (11/17/18 0721)  SpO2: 98 % (11/17/18 0721) Vital Signs (24h Range):  Temp:  [97.1 °F (36.2 °C)-99.1 °F (37.3 °C)] 98.3 °F (36.8 °C)  Pulse:  [] 113  Resp:  [16-26] 20  SpO2:  [92 %-99 %] 98 %  BP: (115-158)/(70-99) 138/88     Weight: 66.5 kg (146 lb 9.7 oz)  Body mass index is 26.81 kg/m².    Physical Exam   Constitutional: She is oriented to person, place, and time. She appears well-developed and well-nourished. No distress.   Sitting up in bed carrying conversation easily   HENT:   Head: Normocephalic and atraumatic.   Right Ear: External ear normal.   Left Ear: External ear normal.   Eyes: Conjunctivae and EOM are normal. Pupils are equal, round, and reactive to light.   Neck: Neck supple. No tracheal deviation present.   Cardiovascular: Normal rate, S1 normal, S2 normal and normal heart sounds. An irregularly irregular rhythm present.   No murmur heard.  Seems to be having SVT.     Pulmonary/Chest: Effort normal. No respiratory distress. She has no wheezes. She has no rales.   Improved tidal volume.  Mild wheezing.  Much better   Abdominal: Soft. Bowel sounds are normal. She exhibits no distension. There is no tenderness.   Musculoskeletal:   Pain to left mid back   Neurological: She is alert and oriented to person, place, and time. No cranial nerve deficit.   Skin: Skin is warm and dry.   Psychiatric: She has a normal mood and affect. Her behavior is normal.   Vitals reviewed.        CRANIAL NERVES     CN III, IV, VI   Pupils are equal, round, and reactive to light.  Extraocular motions are normal.        Significant Labs:   CBC:   Recent Labs   Lab 11/16/18 0522 11/17/18 0628   WBC 14.34* 15.00*   HGB 9.0* 9.3*   HCT 27.8* 29.0*    257     CMP:    Recent Labs   Lab 11/16/18  0522 11/17/18  0628   * 133*   K 4.8 4.4   CL 94* 92*   CO2 35* 34*   * 140*   BUN 23 25*   CREATININE 0.8 0.8   CALCIUM 8.4* 8.4*   ANIONGAP 6* 7*   EGFRNONAA >60 >60     Recent Labs   Lab 11/12/18  0753   TROPONINI 0.014       Lab Results   Component Value Date    DDIMER 0.93 (H) 11/11/2018     Lab Results   Component Value Date    INR 1.1 11/11/2018    INR 1.1 06/02/2018    INR 1.1 11/30/2017   lactic acid 2.0>0.7  Blood culture NGTD  Flu negative    All pertinent labs within the past 24 hours have been reviewed.    Significant Imaging:     CXR 11/11   The lungs are hyperexpanded and show mild interstitial prominence suggesting COPD change.  There is bibasilar subsegmental atelectasis.  No consolidation or pleural effusions.  The heart is normal in size.  Calcified atheromatous disease affects the aorta.  Age-appropriate degenerative changes affect the skeleton.      Us lower ext 11/12   No evidence of deep venous thrombosis bilateral lower extremities.    US abd 11/14   Cholelithiasis with a solitary gallstone in the gallbladder neck.  No evidence for cholecystitis.    Coarsened echogenicity of the liver suggesting intrinsic hepatic disease.    EKG Normal sinus rhythm  Left anterior fascicular block  Septal infarct ,age undetermined  Abnormal ECG  When compared with ECG of 11-NOV-2018 19:58,  Nonspecific T wave abnormality now evident in Anterior leads  Confirmed by Leatha VILLAFANA, Candace ([id number]) on 11/12/2018 3:40:39 PM    Assessment/Plan:      * COPD exacerbation    Likely viral but was started on azithro and rocephin on admit. I see no consolidation on CXR but pulmonology would like to continue per their note. WBC was 17 K but also on chronic steroids so unsure. No fevers.  She is on prednisone 20mg daily as outpatient--per patient this is a chronic medication and not for exacerbation and she was told she can not stop steroids. She was started on solumedrol 125mg Q8H  here. Per Dr. Hickman's note he wants to continue current management so I will not change today  I scheduled her nebs: xopenex  Dr. Hickman recently started theophylline as outpatient: 150mg Qday. I have not restarted here. Unsure if this is necessary in light of not using inhalers as prescribed as outpatient.    She was prescribed symbicort but SHE HAS NOT BEEN USING. I suspect this is why she has been so difficult to control as outpatient. She is not on any maintenance therapy. Discussed needs to be on maintenance inhaler as outpatient in addition to PRN nebs.   She is not having increased O2 requirements but still symptomatic with exertion    11/13  Wean steroids  The current medical regimen is effective;  continue present plan and medications.   11/14 cont same  11/15 still wheezing, no wean today  D/c zithromax and rocephin as she completed 5 days today  Does not like the mucinex, will try mucomist cpt  11/16-mucomyst did wonders.  Much better  11/17  doing much better.  Continue current pulmonary toilet     Atrial fibrillation with rapid ventricular response    Patient had atrial fibrillation with rapid ventricular response.  Is currently controlled with Lopressor 100 mg p.o. b.i.d..  Digoxin has been added.  I will continue Lovenox and aspirin  Cardiology consulted.       Epigastric pain    PPI  Check us abdomen ; r/o gall stones - does have gallstone but no active inflammation/infection will f/u outpt       Elevated d-dimer    LE US ordered- negative  lovenox DVT proph  Lower suspicion for PE       RLS (restless legs syndrome)    Cont home requip       Chronic respiratory failure with hypoxia    Due to COPD  She is on 3L NC at rest and 4L NC with exertion at home--using home requirements here right now and maintains sats but still with significant wheezing and tightness in her chest. Discontinue solumedrol 80 q 8 of steroids  add pulm toileting  Prednisone 40 mg po daily.  She takes prednisone 20 mg daily at home.   Which we will start weaning to that dose.     Oxygen dependent    Reports using 3L NC at home and 4L with exertion  curretnly on 3L NC at rest POX 96%       Dyslipidemia           Major depressive disorder with single episode, in full remission    Cont home citalopram       Insomnia    Cont home trazodone       Hyperlipidemia    Cont home pravastatin       COPD (chronic obstructive pulmonary disease)           HTN (hypertension)    Cont home losartan, BP elevated this AM but her home meds weren't reordered. Still elevated increased yesterday and bp better this am 159/72, Norvasc added 11/15  11/16-patient now having supraventricular tachycardia on the monitor.  The heart rate goes up to 180.  Currently it is 130.  Awaiting EKG.  Will start 5 mg of IV Lopressor followed by 25 mg p.o. q.i.d..    11/17  Seems stable on current medications which are Lopressor, losartan, amlodipine         VTE Risk Mitigation (From admission, onward)        Ordered     enoxaparin injection 40 mg  Daily      11/13/18 1046     IP VTE HIGH RISK PATIENT  Once      11/11/18 2235     Place sequential compression device  Until discontinued      11/11/18 2235              Chicho Fisher MD  Department of Hospital Medicine   [facility]

## 2018-11-17 NOTE — ASSESSMENT & PLAN NOTE
Cont home losartan, BP elevated this AM but her home meds weren't reordered. Still elevated increased yesterday and bp better this am 159/72, Norvasc added 11/15  11/16-patient now having supraventricular tachycardia on the monitor.  The heart rate goes up to 180.  Currently it is 130.  Awaiting EKG.  Will start 5 mg of IV Lopressor followed by 25 mg p.o. q.i.d..    11/17  Seems stable on current medications which are Lopressor, losartan, amlodipine

## 2018-11-17 NOTE — ASSESSMENT & PLAN NOTE
Patient had atrial fibrillation with rapid ventricular response.  Is currently controlled with Lopressor 100 mg p.o. b.i.d..  Digoxin has been added.  I will continue Lovenox and aspirin  Cardiology consulted.

## 2018-11-18 VITALS
HEIGHT: 62 IN | HEART RATE: 95 BPM | RESPIRATION RATE: 17 BRPM | OXYGEN SATURATION: 94 % | WEIGHT: 146.63 LBS | DIASTOLIC BLOOD PRESSURE: 74 MMHG | TEMPERATURE: 98 F | SYSTOLIC BLOOD PRESSURE: 135 MMHG | BODY MASS INDEX: 26.98 KG/M2

## 2018-11-18 LAB
ANION GAP SERPL CALC-SCNC: 6 MMOL/L
BASOPHILS # BLD AUTO: ABNORMAL K/UL
BASOPHILS NFR BLD: 0 %
BUN SERPL-MCNC: 21 MG/DL
CALCIUM SERPL-MCNC: 8.4 MG/DL
CHLORIDE SERPL-SCNC: 91 MMOL/L
CO2 SERPL-SCNC: 36 MMOL/L
CREAT SERPL-MCNC: 0.8 MG/DL
DIFFERENTIAL METHOD: ABNORMAL
EOSINOPHIL # BLD AUTO: ABNORMAL K/UL
EOSINOPHIL NFR BLD: 3 %
ERYTHROCYTE [DISTWIDTH] IN BLOOD BY AUTOMATED COUNT: 14 %
EST. GFR  (AFRICAN AMERICAN): >60 ML/MIN/1.73 M^2
EST. GFR  (NON AFRICAN AMERICAN): >60 ML/MIN/1.73 M^2
GLUCOSE SERPL-MCNC: 112 MG/DL
HCT VFR BLD AUTO: 28.9 %
HGB BLD-MCNC: 9.4 G/DL
INR PPP: 1.1
LYMPHOCYTES # BLD AUTO: ABNORMAL K/UL
LYMPHOCYTES NFR BLD: 14 %
MCH RBC QN AUTO: 35.9 PG
MCHC RBC AUTO-ENTMCNC: 32.5 G/DL
MCV RBC AUTO: 110 FL
METAMYELOCYTES NFR BLD MANUAL: 1 %
MONOCYTES # BLD AUTO: ABNORMAL K/UL
MONOCYTES NFR BLD: 16 %
NEUTROPHILS NFR BLD: 56 %
NEUTS BAND NFR BLD MANUAL: 10 %
OVALOCYTES BLD QL SMEAR: ABNORMAL
PLATELET # BLD AUTO: 236 K/UL
PLATELET BLD QL SMEAR: ABNORMAL
PMV BLD AUTO: 11.8 FL
POIKILOCYTOSIS BLD QL SMEAR: SLIGHT
POTASSIUM SERPL-SCNC: 4.6 MMOL/L
PROTHROMBIN TIME: 11.6 SEC
RBC # BLD AUTO: 2.62 M/UL
SODIUM SERPL-SCNC: 133 MMOL/L
WBC # BLD AUTO: 11.59 K/UL

## 2018-11-18 PROCEDURE — 25000003 PHARM REV CODE 250: Mod: HCWC | Performed by: INTERNAL MEDICINE

## 2018-11-18 PROCEDURE — 94668 MNPJ CHEST WALL SBSQ: CPT | Mod: HCWC

## 2018-11-18 PROCEDURE — 25000003 PHARM REV CODE 250: Mod: HCWC | Performed by: SURGERY

## 2018-11-18 PROCEDURE — 80048 BASIC METABOLIC PNL TOTAL CA: CPT | Mod: HCWC

## 2018-11-18 PROCEDURE — 94760 N-INVAS EAR/PLS OXIMETRY 1: CPT | Mod: HCWC

## 2018-11-18 PROCEDURE — 27000221 HC OXYGEN, UP TO 24 HOURS: Mod: HCWC

## 2018-11-18 PROCEDURE — 25000003 PHARM REV CODE 250: Mod: HCWC | Performed by: NURSE PRACTITIONER

## 2018-11-18 PROCEDURE — 85610 PROTHROMBIN TIME: CPT | Mod: HCWC

## 2018-11-18 PROCEDURE — 85027 COMPLETE CBC AUTOMATED: CPT | Mod: HCWC

## 2018-11-18 PROCEDURE — 85007 BL SMEAR W/DIFF WBC COUNT: CPT | Mod: HCWC

## 2018-11-18 PROCEDURE — 94640 AIRWAY INHALATION TREATMENT: CPT | Mod: HCWC

## 2018-11-18 PROCEDURE — 63600175 PHARM REV CODE 636 W HCPCS: Mod: HCWC | Performed by: NURSE PRACTITIONER

## 2018-11-18 PROCEDURE — 99238 HOSP IP/OBS DSCHRG MGMT 30/<: CPT | Mod: HCWC,,, | Performed by: FAMILY MEDICINE

## 2018-11-18 PROCEDURE — 25000003 PHARM REV CODE 250: Mod: HCWC | Performed by: FAMILY MEDICINE

## 2018-11-18 PROCEDURE — 36415 COLL VENOUS BLD VENIPUNCTURE: CPT | Mod: HCWC

## 2018-11-18 PROCEDURE — 25000242 PHARM REV CODE 250 ALT 637 W/ HCPCS: Mod: HCWC | Performed by: NURSE PRACTITIONER

## 2018-11-18 RX ORDER — PREDNISONE 10 MG/1
TABLET ORAL
Qty: 60 TABLET | Refills: 1 | Status: ON HOLD | OUTPATIENT
Start: 2018-11-18 | End: 2019-01-26 | Stop reason: SDUPTHER

## 2018-11-18 RX ORDER — LOSARTAN POTASSIUM 100 MG/1
100 TABLET ORAL DAILY
Qty: 30 TABLET | Refills: 0 | Status: ON HOLD | OUTPATIENT
Start: 2018-11-18 | End: 2019-01-26 | Stop reason: SDUPTHER

## 2018-11-18 RX ORDER — DIGOXIN 125 MCG
0.12 TABLET ORAL DAILY
Qty: 30 TABLET | Refills: 11 | Status: ON HOLD | OUTPATIENT
Start: 2018-11-19 | End: 2019-01-26 | Stop reason: HOSPADM

## 2018-11-18 RX ORDER — METOPROLOL TARTRATE 100 MG/1
100 TABLET ORAL 2 TIMES DAILY
Qty: 60 TABLET | Refills: 11 | Status: SHIPPED | OUTPATIENT
Start: 2018-11-18 | End: 2019-01-14

## 2018-11-18 RX ORDER — AMLODIPINE BESYLATE 10 MG/1
10 TABLET ORAL DAILY
Qty: 30 TABLET | Refills: 11 | Status: ON HOLD | OUTPATIENT
Start: 2018-11-19 | End: 2019-01-26 | Stop reason: HOSPADM

## 2018-11-18 RX ADMIN — IPRATROPIUM BROMIDE AND ALBUTEROL SULFATE 3 ML: .5; 3 SOLUTION RESPIRATORY (INHALATION) at 07:11

## 2018-11-18 RX ADMIN — PANTOPRAZOLE SODIUM 40 MG: 40 TABLET, DELAYED RELEASE ORAL at 08:11

## 2018-11-18 RX ADMIN — CITALOPRAM HYDROBROMIDE 40 MG: 10 TABLET ORAL at 08:11

## 2018-11-18 RX ADMIN — PRAVASTATIN SODIUM 40 MG: 40 TABLET ORAL at 08:11

## 2018-11-18 RX ADMIN — ASPIRIN 81 MG 81 MG: 81 TABLET ORAL at 08:11

## 2018-11-18 RX ADMIN — METOPROLOL TARTRATE 100 MG: 100 TABLET ORAL at 08:11

## 2018-11-18 RX ADMIN — ACETAMINOPHEN 650 MG: 325 TABLET ORAL at 08:11

## 2018-11-18 RX ADMIN — PREDNISONE 40 MG: 20 TABLET ORAL at 08:11

## 2018-11-18 RX ADMIN — AMLODIPINE BESYLATE 10 MG: 10 TABLET ORAL at 08:11

## 2018-11-18 RX ADMIN — DIGOXIN 0.12 MG: 125 TABLET ORAL at 08:11

## 2018-11-18 RX ADMIN — LOSARTAN POTASSIUM 100 MG: 50 TABLET, FILM COATED ORAL at 08:11

## 2018-11-18 NOTE — SUBJECTIVE & OBJECTIVE
Interval History: susi today air entry is 1000 X better     Objective:     Vital Signs (Most Recent):  Temp: 97.6 °F (36.4 °C) (11/18/18 0706)  Pulse: 95 (11/18/18 1000)  Resp: 17 (11/18/18 0716)  BP: (!) 144/86 (11/18/18 0707)  SpO2: 96 % (11/18/18 0716) Vital Signs (24h Range):  Temp:  [96.5 °F (35.8 °C)-98 °F (36.7 °C)] 97.6 °F (36.4 °C)  Pulse:  [] 95  Resp:  [17-23] 17  SpO2:  [95 %-100 %] 96 %  BP: (119-193)/(68-88) 144/86     Weight: 66.5 kg (146 lb 9.7 oz)  Body mass index is 26.81 kg/m².      Intake/Output Summary (Last 24 hours) at 11/18/2018 1131  Last data filed at 11/17/2018 2115  Gross per 24 hour   Intake 50 ml   Output --   Net 50 ml       Physical Exam   Pulmonary/Chest: No accessory muscle usage. No respiratory distress. She has decreased breath sounds in the right lower field and the left lower field. She has no wheezes. She has no rhonchi. She has no rales.       Vents:  Oxygen Concentration (%): 3 (11/18/18 0327)    Lines/Drains/Airways     Peripheral Intravenous Line                 Peripheral IV - Single Lumen 11/14/18 0515 Anterior;Right Wrist 4 days                Significant Labs:  Interval note:  Patient's respiratory status much better.  She developed atrial fibrillation with a rapid ventricular response.  This is now controlled.  Review of Systems   Constitutional: Negative for activity change, fatigue, fever and unexpected weight change.   HENT: Negative for congestion, ear pain, hearing loss, rhinorrhea and sore throat.    Eyes: Negative for redness and visual disturbance.   Respiratory: Positive for cough, shortness of breath and wheezing. Negative for chest tightness.    Cardiovascular: Negative for palpitations and leg swelling.   Gastrointestinal: Negative for abdominal pain, constipation, diarrhea, nausea and vomiting.        Epigastric pain with exertion    Genitourinary: Negative for dysuria, frequency and urgency.   Musculoskeletal: Positive for back pain. Negative for  joint swelling and neck pain.   Skin: Negative for color change, rash and wound.   Neurological: Negative for dizziness, tremors, weakness, light-headedness and headaches.     Objective:     Vital Signs (Most Recent):  Temp: 97.6 °F (36.4 °C) (11/18/18 0706)  Pulse: 95 (11/18/18 1000)  Resp: 17 (11/18/18 0716)  BP: (!) 144/86 (11/18/18 0707)  SpO2: 96 % (11/18/18 0716) Vital Signs (24h Range):  Temp:  [96.5 °F (35.8 °C)-98 °F (36.7 °C)] 97.6 °F (36.4 °C)  Pulse:  [] 95  Resp:  [17-23] 17  SpO2:  [95 %-100 %] 96 %  BP: (119-193)/(68-88) 144/86     Weight: 66.5 kg (146 lb 9.7 oz)  Body mass index is 26.81 kg/m².    Physical Exam   Constitutional: She is oriented to person, place, and time. She appears well-developed and well-nourished. No distress.   Sitting up in bed carrying conversation easily   HENT:   Head: Normocephalic and atraumatic.   Right Ear: External ear normal.   Left Ear: External ear normal.   Eyes: Conjunctivae and EOM are normal. Pupils are equal, round, and reactive to light.   Neck: Neck supple. No tracheal deviation present.   Cardiovascular: Normal rate, S1 normal, S2 normal and normal heart sounds. An irregularly irregular rhythm present.   No murmur heard.  Seems to be having SVT.     Pulmonary/Chest: Effort normal. No respiratory distress. She has no wheezes. She has no rales.   Improved tidal volume.  Mild wheezing.  Much better   Abdominal: Soft. Bowel sounds are normal. She exhibits no distension. There is no tenderness.   Musculoskeletal:   Pain to left mid back   Neurological: She is alert and oriented to person, place, and time. No cranial nerve deficit.   Skin: Skin is warm and dry.   Psychiatric: She has a normal mood and affect. Her behavior is normal.   Vitals reviewed.        CRANIAL NERVES     CN III, IV, VI   Pupils are equal, round, and reactive to light.  Extraocular motions are normal.        Significant Labs:   CBC:   Recent Labs   Lab 11/17/18  0628 11/18/18  0613   WBC  15.00* 11.59   HGB 9.3* 9.4*   HCT 29.0* 28.9*    236     CMP:   Recent Labs   Lab 11/17/18  0628 11/18/18  0613   * 133*   K 4.4 4.6   CL 92* 91*   CO2 34* 36*   * 112*   BUN 25* 21   CREATININE 0.8 0.8   CALCIUM 8.4* 8.4*   ANIONGAP 7* 6*   EGFRNONAA >60 >60     Recent Labs   Lab 11/12/18  0753   TROPONINI 0.014       Lab Results   Component Value Date    DDIMER 0.93 (H) 11/11/2018     Lab Results   Component Value Date    INR 1.1 11/18/2018    INR 1.1 11/11/2018    INR 1.1 06/02/2018   lactic acid 2.0>0.7  Blood culture NGTD  Flu negative    All pertinent labs within the past 24 hours have been reviewed.    Significant Imaging:     CXR 11/11   The lungs are hyperexpanded and show mild interstitial prominence suggesting COPD change.  There is bibasilar subsegmental atelectasis.  No consolidation or pleural effusions.  The heart is normal in size.  Calcified atheromatous disease affects the aorta.  Age-appropriate degenerative changes affect the skeleton.      Us lower ext 11/12   No evidence of deep venous thrombosis bilateral lower extremities.    US abd 11/14   Cholelithiasis with a solitary gallstone in the gallbladder neck.  No evidence for cholecystitis.    Coarsened echogenicity of the liver suggesting intrinsic hepatic disease.    EKG Normal sinus rhythm  Left anterior fascicular block  Septal infarct ,age undetermined  Abnormal ECG  When compared with ECG of 11-NOV-2018 19:58,  Nonspecific T wave abnormality now evident in Anterior leads  Confirmed by Candace Zhang MD ([id number]) on 11/12/2018 3:40:39 PM  CBC/Anemia Profile:  Recent Labs   Lab 11/17/18 0628 11/18/18 0613   WBC 15.00* 11.59   HGB 9.3* 9.4*   HCT 29.0* 28.9*    236   * 110*   RDW 14.4 14.0        Chemistries:  Recent Labs   Lab 11/17/18 0628 11/18/18  0613   * 133*   K 4.4 4.6   CL 92* 91*   CO2 34* 36*   BUN 25* 21   CREATININE 0.8 0.8   CALCIUM 8.4* 8.4*       All pertinent labs within the past 24  hours have been reviewed.    Significant Imaging:  I have reviewed all pertinent imaging results/findings within the past 24 hours.    Review of Systems   Constitutional: Negative for activity change, chills, fatigue, fever and unexpected weight change.   HENT: Negative for sore throat and trouble swallowing.    Respiratory: Positive for cough and wheezing. Negative for chest tightness and shortness of breath.    Cardiovascular: Positive for palpitations. Negative for chest pain and leg swelling.   Gastrointestinal: Negative for abdominal pain.   Endocrine: Negative for cold intolerance and heat intolerance.   Genitourinary: Negative for difficulty urinating.   Musculoskeletal: Negative for back pain and joint swelling.   Skin: Negative for rash.   Neurological: Negative for numbness.   Hematological: Negative for adenopathy.   Psychiatric/Behavioral: Negative for decreased concentration.

## 2018-11-18 NOTE — ASSESSMENT & PLAN NOTE
Patient had atrial fibrillation with rapid ventricular response.  Is currently controlled with Lopressor 100 mg p.o. B.i.d and digoxin 0.125 mg daily I will continue aspirin  Follow-up with Cardiology this week

## 2018-11-18 NOTE — PROGRESS NOTES
[facility] Hospital Medicine  Progress Note    Patient Name: Chantell Herrera  MRN: [id number]  Patient Class: IP- Inpatient   Admission Date: 11/11/2018  Length of Stay: 7 days  Attending Physician: Chicho Fisher MD  Primary Care Provider: Anson Leiva MD        Subjective:     Principal Problem:COPD exacerbation    HPI:  Patient presented to ER with SOB. Sx started 2 weeks ago and progressively getting worse. She was having substernal chest pains yesterday which prompted her to come to the er. She was having to hunch over to walk because she felt so SOB. She has COPD and wears 3L NC at home at rest, 4L with exertion. She does report worsening wheezing and cough. Cough is productive--now of yellow sputum. No fevers. Denies LE edema. Denies sore throat, otalgia. + rhinorrhea. No sick contacts. She does report feeling better this AM.     Hospital Course:  11/13 Dr nino following her for acute exacerbation COPD. She is on zithromax and rocephin day 3. Medrol 125mg IV IV every 8hr. Levoalbuterol every 8hr. She has home O2 and is on her home 3L POX 95%. WBC 80951. This am afebrile. Dr nino following. She is SOB laying in bed talking. She also reports SANTIAGO    Dr Metcalf also saw her. Last echo shows 60% EF with LV diastolic dysfunction. Consider outpt stress.     11/*14 she reports that her breathing is better. She was able to get to bathroom and back this am with minimal SOB. Steroids were decreased to 80mg IV every 8hr. She feels pretty far from her baseline. Coughing a lot. Day 4 azithromycin and rocephin     U/s gallbladder with stone in neck but no thickened walls. No n/v.     11/15 She is on day 5 azithromycin and rocephin. On medrol; 80mg IV q 8hr. Remains on her home dose O2 at 3L NC. sats 96%. She reports that her SOB is better than yesterday but still not back to baseline. Also added mucinex for junky cough yesterday.     11/16/18 She is feeling much better, notes mucomyst has helped;  Dr. Hickman recommending continue mucomyst. Will give one dose  She is on day 6 azithromycin and rocephin. Will stop Zithromax On medrol; 80mg IV q 8hr; will change to prednisone 40mg po day   Remains on her home dose O2 at 3L NC. sats 96%.  This am noted to have brief run SVT- will add low dose BB; Dr. Metcalf following; ordered to send strips to Dr. Metcalf.  She required IV Metoprolol.  CM noting pt unable to afford combo inhalers; taking breathing tx qid and husbands albuterol inhaler.   Planning possible D/C home tomorrow  Addendum; + recurrent SVT HR 170s; will give IV lopressor 5mg now then 25mg po QID; further recs per cardilogy  11/17  Respiratory status is much better.  She could probably be discharged home except she developed atrial fibrillation with a rapid ventricular response.  I have had to increase her Lopressor dose to 200 mg daily, I added digoxin 0.25 mg last night.  Now heart rate is .  She feels well.  She would like to go home.  She is on aspirin and once daily Lovenox and she seems to be bruising easily.  11/18  Patient's heart rate is now well controlled.  She is still in atrial fibrillation.  She is tolerating the Lopressor and the digoxin well.  Her COPD symptoms seem to be stable.  She still is wheezing.  She has home oxygen.  She takes prednisone 20 mg daily at home.  And she has her neb treatments for home use.  In she wants to go home.  She refuses Lovenox or any anticoagulation because she had does not like the bruising.    Interval History: susi today air entry is 1000 X better     Objective:     Vital Signs (Most Recent):  Temp: 97.6 °F (36.4 °C) (11/18/18 0706)  Pulse: 95 (11/18/18 1000)  Resp: 17 (11/18/18 0716)  BP: (!) 144/86 (11/18/18 0707)  SpO2: 96 % (11/18/18 0716) Vital Signs (24h Range):  Temp:  [96.5 °F (35.8 °C)-98 °F (36.7 °C)] 97.6 °F (36.4 °C)  Pulse:  [] 95  Resp:  [17-23] 17  SpO2:  [95 %-100 %] 96 %  BP: (119-193)/(68-88) 144/86     Weight: 66.5 kg (146 lb 9.7  oz)  Body mass index is 26.81 kg/m².      Intake/Output Summary (Last 24 hours) at 11/18/2018 1131  Last data filed at 11/17/2018 2115  Gross per 24 hour   Intake 50 ml   Output --   Net 50 ml       Physical Exam   Pulmonary/Chest: No accessory muscle usage. No respiratory distress. She has decreased breath sounds in the right lower field and the left lower field. She has no wheezes. She has no rhonchi. She has no rales.       Vents:  Oxygen Concentration (%): 3 (11/18/18 0327)    Lines/Drains/Airways     Peripheral Intravenous Line                 Peripheral IV - Single Lumen 11/14/18 0515 Anterior;Right Wrist 4 days                Significant Labs:  Interval note:  Patient's respiratory status much better.  She developed atrial fibrillation with a rapid ventricular response.  This is now controlled.  Review of Systems   Constitutional: Negative for activity change, fatigue, fever and unexpected weight change.   HENT: Negative for congestion, ear pain, hearing loss, rhinorrhea and sore throat.    Eyes: Negative for redness and visual disturbance.   Respiratory: Positive for cough, shortness of breath and wheezing. Negative for chest tightness.    Cardiovascular: Negative for palpitations and leg swelling.   Gastrointestinal: Negative for abdominal pain, constipation, diarrhea, nausea and vomiting.        Epigastric pain with exertion    Genitourinary: Negative for dysuria, frequency and urgency.   Musculoskeletal: Positive for back pain. Negative for joint swelling and neck pain.   Skin: Negative for color change, rash and wound.   Neurological: Negative for dizziness, tremors, weakness, light-headedness and headaches.     Objective:     Vital Signs (Most Recent):  Temp: 97.6 °F (36.4 °C) (11/18/18 0706)  Pulse: 95 (11/18/18 1000)  Resp: 17 (11/18/18 0716)  BP: (!) 144/86 (11/18/18 0707)  SpO2: 96 % (11/18/18 0716) Vital Signs (24h Range):  Temp:  [96.5 °F (35.8 °C)-98 °F (36.7 °C)] 97.6 °F (36.4 °C)  Pulse:   [] 95  Resp:  [17-23] 17  SpO2:  [95 %-100 %] 96 %  BP: (119-193)/(68-88) 144/86     Weight: 66.5 kg (146 lb 9.7 oz)  Body mass index is 26.81 kg/m².    Physical Exam   Constitutional: She is oriented to person, place, and time. She appears well-developed and well-nourished. No distress.   Sitting up in bed carrying conversation easily   HENT:   Head: Normocephalic and atraumatic.   Right Ear: External ear normal.   Left Ear: External ear normal.   Eyes: Conjunctivae and EOM are normal. Pupils are equal, round, and reactive to light.   Neck: Neck supple. No tracheal deviation present.   Cardiovascular: Normal rate, S1 normal, S2 normal and normal heart sounds. An irregularly irregular rhythm present.   No murmur heard.  Seems to be having SVT.     Pulmonary/Chest: Effort normal. No respiratory distress. She has no wheezes. She has no rales.   Improved tidal volume.  Mild wheezing.  Much better   Abdominal: Soft. Bowel sounds are normal. She exhibits no distension. There is no tenderness.   Musculoskeletal:   Pain to left mid back   Neurological: She is alert and oriented to person, place, and time. No cranial nerve deficit.   Skin: Skin is warm and dry.   Psychiatric: She has a normal mood and affect. Her behavior is normal.   Vitals reviewed.        CRANIAL NERVES     CN III, IV, VI   Pupils are equal, round, and reactive to light.  Extraocular motions are normal.        Significant Labs:   CBC:   Recent Labs   Lab 11/17/18 0628 11/18/18  0613   WBC 15.00* 11.59   HGB 9.3* 9.4*   HCT 29.0* 28.9*    236     CMP:   Recent Labs   Lab 11/17/18  0628 11/18/18  0613   * 133*   K 4.4 4.6   CL 92* 91*   CO2 34* 36*   * 112*   BUN 25* 21   CREATININE 0.8 0.8   CALCIUM 8.4* 8.4*   ANIONGAP 7* 6*   EGFRNONAA >60 >60     Recent Labs   Lab 11/12/18  0753   TROPONINI 0.014       Lab Results   Component Value Date    DDIMER 0.93 (H) 11/11/2018     Lab Results   Component Value Date    INR 1.1 11/18/2018     INR 1.1 11/11/2018    INR 1.1 06/02/2018   lactic acid 2.0>0.7  Blood culture NGTD  Flu negative    All pertinent labs within the past 24 hours have been reviewed.    Significant Imaging:     CXR 11/11   The lungs are hyperexpanded and show mild interstitial prominence suggesting COPD change.  There is bibasilar subsegmental atelectasis.  No consolidation or pleural effusions.  The heart is normal in size.  Calcified atheromatous disease affects the aorta.  Age-appropriate degenerative changes affect the skeleton.      Us lower ext 11/12   No evidence of deep venous thrombosis bilateral lower extremities.    US abd 11/14   Cholelithiasis with a solitary gallstone in the gallbladder neck.  No evidence for cholecystitis.    Coarsened echogenicity of the liver suggesting intrinsic hepatic disease.    EKG Normal sinus rhythm  Left anterior fascicular block  Septal infarct ,age undetermined  Abnormal ECG  When compared with ECG of 11-NOV-2018 19:58,  Nonspecific T wave abnormality now evident in Anterior leads  Confirmed by Candace Zhang MD ([id number]) on 11/12/2018 3:40:39 PM  CBC/Anemia Profile:  Recent Labs   Lab 11/17/18  0628 11/18/18  0613   WBC 15.00* 11.59   HGB 9.3* 9.4*   HCT 29.0* 28.9*    236   * 110*   RDW 14.4 14.0        Chemistries:  Recent Labs   Lab 11/17/18  0628 11/18/18  0613   * 133*   K 4.4 4.6   CL 92* 91*   CO2 34* 36*   BUN 25* 21   CREATININE 0.8 0.8   CALCIUM 8.4* 8.4*       All pertinent labs within the past 24 hours have been reviewed.    Significant Imaging:  I have reviewed all pertinent imaging results/findings within the past 24 hours.    Review of Systems   Constitutional: Negative for activity change, chills, fatigue, fever and unexpected weight change.   HENT: Negative for sore throat and trouble swallowing.    Respiratory: Positive for cough and wheezing. Negative for chest tightness and shortness of breath.    Cardiovascular: Positive for palpitations. Negative  for chest pain and leg swelling.   Gastrointestinal: Negative for abdominal pain.   Endocrine: Negative for cold intolerance and heat intolerance.   Genitourinary: Negative for difficulty urinating.   Musculoskeletal: Negative for back pain and joint swelling.   Skin: Negative for rash.   Neurological: Negative for numbness.   Hematological: Negative for adenopathy.   Psychiatric/Behavioral: Negative for decreased concentration.         Assessment/Plan:      * COPD exacerbation    Likely viral but was started on azithro and rocephin on admit. I see no consolidation on CXR but pulmonology would like to continue per their note. WBC was 17 K but also on chronic steroids so unsure. No fevers.  She is on prednisone 20mg daily as outpatient--per patient this is a chronic medication and not for exacerbation and she was told she can not stop steroids. She was started on solumedrol 125mg Q8H here. Per Dr. Hickman's note he wants to continue current management so I will not change today  I scheduled her nebs: xopenex  Dr. Hickman recently started theophylline as outpatient: 150mg Qday. I have not restarted here. Unsure if this is necessary in light of not using inhalers as prescribed as outpatient.    She was prescribed symbicort but SHE HAS NOT BEEN USING. I suspect this is why she has been so difficult to control as outpatient. She is not on any maintenance therapy. Discussed needs to be on maintenance inhaler as outpatient in addition to PRN nebs.   She is not having increased O2 requirements but still symptomatic with exertion    11/13  Wean steroids  The current medical regimen is effective;  continue present plan and medications.   11/14 cont same  11/15 still wheezing, no wean today  D/c zithromax and rocephin as she completed 5 days today  Does not like the mucinex, will try mucomist cpt  11/16-mucomyst did wonders.  Much better  11/17  doing much better.  Continue current pulmonary toilet  11/18 her ready for discharge.      Atrial fibrillation with rapid ventricular response    Patient had atrial fibrillation with rapid ventricular response.  Is currently controlled with Lopressor 100 mg p.o. B.i.d and digoxin 0.125 mg daily I will continue aspirin  Follow-up with Cardiology this week       Epigastric pain    PPI  Check us abdomen ; r/o gall stones - does have gallstone but no active inflammation/infection will f/u outpt       Elevated d-dimer    LE US ordered- negative  lovenox DVT proph  Lower suspicion for PE       RLS (restless legs syndrome)    Cont home requip       Chronic respiratory failure with hypoxia    Due to COPD  She is on 3L NC at rest and 4L NC with exertion at home--using home requirements here right now and maintains sats but still with significant wheezing and tightness in her chest. Discontinue solumedrol 80 q 8 of steroids  add pulm toileting  Prednisone 40 mg po daily.  She takes prednisone 20 mg daily at home.  Which we will start weaning to that dose.     Oxygen dependent    Reports using 3L NC at home and 4L with exertion  curretnly on 3L NC at rest POX 96%       Dyslipidemia           Major depressive disorder with single episode, in full remission    Cont home citalopram       Insomnia    Cont home trazodone       Hyperlipidemia    Cont home pravastatin       COPD (chronic obstructive pulmonary disease)    Discharge home on regular home medications       HTN (hypertension)    Cont home losartan, BP elevated this AM but her home meds weren't reordered. Still elevated increased yesterday and bp better this am 159/72, Norvasc added 11/15  11/16-patient now having supraventricular tachycardia on the monitor.  The heart rate goes up to 180.  Currently it is 130.  Awaiting EKG.  Will start 5 mg of IV Lopressor followed by 25 mg p.o. q.i.d..    11/17  Seems stable on current medications which are Lopressor, losartan, amlodipine         VTE Risk Mitigation (From admission, onward)        Ordered     enoxaparin  injection 40 mg  Daily      11/13/18 1046     IP VTE HIGH RISK PATIENT  Once      11/11/18 2235     Place sequential compression device  Until discontinued      11/11/18 2235              Chicho Fisher MD  Department of Hospital Medicine   [facility]

## 2018-11-18 NOTE — PROGRESS NOTES
[facility]  Pulmonology  Progress Note    Patient Name: Chantell Herrera  MRN: [id number]  Admission Date: 11/11/2018  Hospital Length of Stay: 7 days  Code Status: Full Code  Attending Provider: Chicho Fisher MD  Primary Care Provider: Anson Leiva MD   Principal Problem: COPD exacerbation    Subjective:     Interval History: susi today air entry is 1000 X better     Objective:     Vital Signs (Most Recent):  Temp: 97.6 °F (36.4 °C) (11/18/18 0706)  Pulse: 72 (11/18/18 0716)  Resp: 17 (11/18/18 0716)  BP: (!) 144/86 (11/18/18 0707)  SpO2: 96 % (11/18/18 0716) Vital Signs (24h Range):  Temp:  [96.5 °F (35.8 °C)-98 °F (36.7 °C)] 97.6 °F (36.4 °C)  Pulse:  [] 72  Resp:  [17-23] 17  SpO2:  [95 %-100 %] 96 %  BP: (119-193)/(68-88) 144/86     Weight: 66.5 kg (146 lb 9.7 oz)  Body mass index is 26.81 kg/m².      Intake/Output Summary (Last 24 hours) at 11/18/2018 0832  Last data filed at 11/17/2018 2115  Gross per 24 hour   Intake 50 ml   Output --   Net 50 ml       Physical Exam   Pulmonary/Chest: No accessory muscle usage. No respiratory distress. She has decreased breath sounds in the right lower field and the left lower field. She has no wheezes. She has no rhonchi. She has no rales.       Vents:  Oxygen Concentration (%): 3 (11/18/18 0327)    Lines/Drains/Airways     Peripheral Intravenous Line                 Peripheral IV - Single Lumen 11/14/18 0515 Anterior;Right Wrist 4 days                Significant Labs:    CBC/Anemia Profile:  Recent Labs   Lab 11/17/18 0628 11/18/18  0613   WBC 15.00* 11.59   HGB 9.3* 9.4*   HCT 29.0* 28.9*    236   * 110*   RDW 14.4 14.0        Chemistries:  Recent Labs   Lab 11/17/18 0628 11/18/18  0613   * 133*   K 4.4 4.6   CL 92* 91*   CO2 34* 36*   BUN 25* 21   CREATININE 0.8 0.8   CALCIUM 8.4* 8.4*       All pertinent labs within the past 24 hours have been reviewed.    Significant Imaging:  I have reviewed all pertinent  imaging results/findings within the past 24 hours.    Assessment/Plan:     * COPD exacerbation    Slight increase in air entry but profoundly poor air entry   Doing better on meds continue support      Epigastric pain    need enamorado exclude cardiac     Chronic respiratory failure with hypoxia    Uses O2 at home   Chronic respiratory failure      COPD (chronic obstructive pulmonary disease)    Feeling better   Severe emphysema             Emmanuel Hickman MD  Pulmonology  [facility]

## 2018-11-18 NOTE — PLAN OF CARE
Problem: Patient Care Overview  Goal: Plan of Care Review  Outcome: Ongoing (interventions implemented as appropriate)  Patient ready to go home.denies needs. No resp distress.oxygen 3 L per NC for home transport. Home nebulizer already with patient. Safety and comfort maintained.call bell in reach. Agrees with plan of care.

## 2018-11-18 NOTE — SUBJECTIVE & OBJECTIVE
Interval History: susi today air entry is 1000 X better     Objective:     Vital Signs (Most Recent):  Temp: 97.6 °F (36.4 °C) (11/18/18 0706)  Pulse: 72 (11/18/18 0716)  Resp: 17 (11/18/18 0716)  BP: (!) 144/86 (11/18/18 0707)  SpO2: 96 % (11/18/18 0716) Vital Signs (24h Range):  Temp:  [96.5 °F (35.8 °C)-98 °F (36.7 °C)] 97.6 °F (36.4 °C)  Pulse:  [] 72  Resp:  [17-23] 17  SpO2:  [95 %-100 %] 96 %  BP: (119-193)/(68-88) 144/86     Weight: 66.5 kg (146 lb 9.7 oz)  Body mass index is 26.81 kg/m².      Intake/Output Summary (Last 24 hours) at 11/18/2018 0832  Last data filed at 11/17/2018 2115  Gross per 24 hour   Intake 50 ml   Output --   Net 50 ml       Physical Exam   Pulmonary/Chest: No accessory muscle usage. No respiratory distress. She has decreased breath sounds in the right lower field and the left lower field. She has no wheezes. She has no rhonchi. She has no rales.       Vents:  Oxygen Concentration (%): 3 (11/18/18 0327)    Lines/Drains/Airways     Peripheral Intravenous Line                 Peripheral IV - Single Lumen 11/14/18 0515 Anterior;Right Wrist 4 days                Significant Labs:    CBC/Anemia Profile:  Recent Labs   Lab 11/17/18 0628 11/18/18 0613   WBC 15.00* 11.59   HGB 9.3* 9.4*   HCT 29.0* 28.9*    236   * 110*   RDW 14.4 14.0        Chemistries:  Recent Labs   Lab 11/17/18 0628 11/18/18 0613   * 133*   K 4.4 4.6   CL 92* 91*   CO2 34* 36*   BUN 25* 21   CREATININE 0.8 0.8   CALCIUM 8.4* 8.4*       All pertinent labs within the past 24 hours have been reviewed.    Significant Imaging:  I have reviewed all pertinent imaging results/findings within the past 24 hours.

## 2018-11-18 NOTE — PLAN OF CARE
Problem: Patient Care Overview  Goal: Plan of Care Review  Outcome: Ongoing (interventions implemented as appropriate)  Pt agrees with plan of care.  No complaints of pain at this time. VS stable. Call bell in reach. IV antibiotics given as ordered. Continue respiratory treatments as ordered.  Will continue to monitor.

## 2018-11-18 NOTE — ASSESSMENT & PLAN NOTE
Likely viral but was started on azithro and rocephin on admit. I see no consolidation on CXR but pulmonology would like to continue per their note. WBC was 17 K but also on chronic steroids so unsure. No fevers.  She is on prednisone 20mg daily as outpatient--per patient this is a chronic medication and not for exacerbation and she was told she can not stop steroids. She was started on solumedrol 125mg Q8H here. Per Dr. Hickman's note he wants to continue current management so I will not change today  I scheduled her nebs: xopenex  Dr. Hickman recently started theophylline as outpatient: 150mg Qday. I have not restarted here. Unsure if this is necessary in light of not using inhalers as prescribed as outpatient.    She was prescribed symbicort but SHE HAS NOT BEEN USING. I suspect this is why she has been so difficult to control as outpatient. She is not on any maintenance therapy. Discussed needs to be on maintenance inhaler as outpatient in addition to PRN nebs.   She is not having increased O2 requirements but still symptomatic with exertion    11/13  Wean steroids  The current medical regimen is effective;  continue present plan and medications.   11/14 cont same  11/15 still wheezing, no wean today  D/c zithromax and rocephin as she completed 5 days today  Does not like the mucinex, will try mucomist cpt  11/16-mucomyst did wonders.  Much better  11/17  doing much better.  Continue current pulmonary toilet  11/18 her ready for discharge.

## 2018-11-18 NOTE — NURSING
Bedside report received from Samira.  VS stable. No complaints of pain at this time.  Call bell in reach.  Will continue to monitor.

## 2018-11-18 NOTE — DISCHARGE SUMMARY
[facility] Hospital Medicine  Discharge Summary      Patient Name: Chantell Herrera  MRN: [id number]  Admission Date: 11/11/2018  Hospital Length of Stay: 7 days  Discharge Date and Time:  11/18/2018 11:45 AM  Attending Physician: Chicho Fihser MD   Discharging Provider: Chicho Fisher MD  Primary Care Provider: Anson Leiva MD      HPI:   Patient presented to ER with SOB. Sx started 2 weeks ago and progressively getting worse. She was having substernal chest pains yesterday which prompted her to come to the er. She was having to hunch over to walk because she felt so SOB. She has COPD and wears 3L NC at home at rest, 4L with exertion. She does report worsening wheezing and cough. Cough is productive--now of yellow sputum. No fevers. Denies LE edema. Denies sore throat, otalgia. + rhinorrhea. No sick contacts. She does report feeling better this AM.     * No surgery found *      Hospital Course:   11/13 Dr nino following her for acute exacerbation COPD. She is on zithromax and rocephin day 3. Medrol 125mg IV IV every 8hr. Levoalbuterol every 8hr. She has home O2 and is on her home 3L POX 95%. WBC 97849. This am afebrile. Dr nino following. She is SOB laying in bed talking. She also reports SANTIAGO    Dr Metcalf also saw her. Last echo shows 60% EF with LV diastolic dysfunction. Consider outpt stress.     11/*14 she reports that her breathing is better. She was able to get to bathroom and back this am with minimal SOB. Steroids were decreased to 80mg IV every 8hr. She feels pretty far from her baseline. Coughing a lot. Day 4 azithromycin and rocephin     U/s gallbladder with stone in neck but no thickened walls. No n/v.     11/15 She is on day 5 azithromycin and rocephin. On medrol; 80mg IV q 8hr. Remains on her home dose O2 at 3L NC. sats 96%. She reports that her SOB is better than yesterday but still not back to baseline. Also added mucinex for junky cough yesterday.     11/16/18 She  is feeling much better, notes mucomyst has helped; Dr. Hickman recommending continue mucomyst. Will give one dose  She is on day 6 azithromycin and rocephin. Will stop Zithromax On medrol; 80mg IV q 8hr; will change to prednisone 40mg po day   Remains on her home dose O2 at 3L NC. sats 96%.  This am noted to have brief run SVT- will add low dose BB; Dr. Metcalf following; ordered to send strips to Dr. Metcalf.  She required IV Metoprolol.  CM noting pt unable to afford combo inhalers; taking breathing tx qid and husbands albuterol inhaler.   Planning possible D/C home tomorrow  Addendum; + recurrent SVT HR 170s; will give IV lopressor 5mg now then 25mg po QID; further recs per cardilogy  11/17  Respiratory status is much better.  She could probably be discharged home except she developed atrial fibrillation with a rapid ventricular response.  I have had to increase her Lopressor dose to 200 mg daily, I added digoxin 0.25 mg last night.  Now heart rate is .  She feels well.  She would like to go home.  She is on aspirin and once daily Lovenox and she seems to be bruising easily.  11/18  Patient's heart rate is now well controlled.  She is still in atrial fibrillation.  She is tolerating the Lopressor and the digoxin well.  Her COPD symptoms seem to be stable.  She still is wheezing.  She has home oxygen.  She takes prednisone 20 mg daily at home.  And she has her neb treatments for home use.  In she wants to go home.  She refuses Lovenox or any anticoagulation because she had does not like the bruising.     Consults:   Consults (From admission, onward)        Status Ordering Provider     Inpatient consult to Cardiology-[facility]  Once     Provider:  Mao Metcalf MD    Acknowledged LAN COYLE     Inpatient consult to Pulmonology  Once     Provider:  Emmanuel Hickman MD    Acknowledged LAN COYLE.          No new Assessment & Plan notes have been filed under this hospital service since the last note was  "generated.  Service: Hospital Medicine    Final Active Diagnoses:    Diagnosis Date Noted POA    PRINCIPAL PROBLEM:  COPD exacerbation [J44.1] 11/11/2018 Yes    Atrial fibrillation with rapid ventricular response [I48.91] 11/16/2018 No    Epigastric pain [R10.13] 11/13/2018 Yes    Elevated d-dimer [R79.89] 11/12/2018 Yes    Chronic respiratory failure with hypoxia [J96.11] 05/17/2017 Yes    RLS (restless legs syndrome) [G25.81] 05/17/2017 Yes    Oxygen dependent [Z99.81] 01/03/2017 Not Applicable    Major depressive disorder with single episode, in full remission [F32.5] 05/10/2016 Yes    Insomnia [G47.00] 08/10/2015 Yes    Hyperlipidemia [E78.5] 05/03/2015 Yes    COPD (chronic obstructive pulmonary disease) [J44.9] 05/03/2015 Yes    HTN (hypertension) [I10] 06/19/2014 Yes      Problems Resolved During this Admission:       Discharged Condition: fair    Disposition:     Follow Up:  Follow-up Information     Anson Leiva MD In 3 days.    Specialty:  Family Medicine  Why:  Outpatient Services  Contact information:  [address]  [facility] [address]  [phone]             Mao Metcalf MD In 2 days.    Specialty:  Cardiology  Contact information:  [address]  [place] [address]  [phone]                 Patient Instructions:      NEBULIZER FOR HOME USE     Order Specific Question Answer Comments   Height: 5' 2" (1.575 m)    Weight: 66.5 kg (146 lb 9.7 oz)    Does patient have medical equipment at home? oxygen    Does patient have medical equipment at home? nebulizer    Length of need (1-99 months): 99        Significant Diagnostic Studies: labs and radiology reports reviewed    Pending Diagnostic Studies:     None         Medications:  Reconciled Home Medications:      Medication List      START taking these medications    amLODIPine 10 MG tablet  Commonly known as:  NORVASC  Take 1 tablet (10 mg total) by mouth once daily.  Start taking on:  " 11/19/2018     digoxin 125 mcg tablet  Commonly known as:  LANOXIN  Take 1 tablet (0.125 mg total) by mouth once daily.  Start taking on:  11/19/2018     metoprolol tartrate 100 MG tablet  Commonly known as:  LOPRESSOR  Take 1 tablet (100 mg total) by mouth 2 (two) times daily.        CHANGE how you take these medications    losartan 100 MG tablet  Commonly known as:  COZAAR  Take 1 tablet (100 mg total) by mouth once daily.  What changed:    · medication strength  · how much to take     predniSONE 10 MG tablet  Commonly known as:  DELTASONE  Take 40 mg for one day, the 30 mg for 3 days then resume 20 mg po daily  What changed:  additional instructions        CONTINUE taking these medications    albuterol 2.5 mg /3 mL (0.083 %) nebulizer solution  Commonly known as:  PROVENTIL  Take 3 mLs (2.5 mg total) by nebulization every 6 (six) hours as needed for Wheezing.     artificial tears(hypromellose) 0.3 % Drop  continuous prn.     aspirin 81 MG Chew  Take 81 mg by mouth once daily.     baclofen 10 MG tablet  Commonly known as:  LIORESAL  Take 1 tablet (10 mg total) by mouth 3 (three) times daily.     budesonide-formoterol 160-4.5 mcg 160-4.5 mcg/actuation Hfaa  Commonly known as:  SYMBICORT  Inhale 2 puffs into the lungs every 12 (twelve) hours.     citalopram 40 MG tablet  Commonly known as:  CELEXA  TAKE 1 TABLET EVERY DAY     guaifenesin-codeine 100-10 mg/5 ml  mg/5 mL syrup  Commonly known as:  TUSSI-ORGANIDIN NR  Take 5 mLs by mouth every 6 (six) hours as needed for Cough.     ipratropium 0.02 % nebulizer solution  Commonly known as:  ATROVENT  Take 500 mcg by nebulization 4 (four) times daily.     lactulose 10 gram/15 mL solution  Commonly known as:  CHRONULAC     montelukast 10 mg tablet  Commonly known as:  SINGULAIR  Take 10 mg by mouth every evening.     mupirocin 2 % ointment  Commonly known as:  BACTROBAN  APPLY  TOPICALLY THREE TIMES DAILY     ondansetron 4 MG tablet  Commonly known as:  ZOFRAN  Take  1 tablet (4 mg total) by mouth every 6 (six) hours.     pravastatin 40 MG tablet  Commonly known as:  PRAVACHOL  TAKE 1 TABLET EVERY DAY     rOPINIRole 1 MG tablet  Commonly known as:  REQUIP  TAKE 1 TABLET EVERY EVENING.     theophylline 300 MG 12 hr tablet  Commonly known as:  THEODUR     traZODone 50 MG tablet  Commonly known as:  DESYREL  Take 1 tablet (50 mg total) by mouth every evening.     VITAMIN D3 2,000 unit Tab  Generic drug:  cholecalciferol (vitamin D3)  Take 2,000 Units by mouth once daily.     walker Misc  Evangelista walker            Indwelling Lines/Drains at time of discharge:   Lines/Drains/Airways          None          Time spent on the discharge of patient: 30 minutes  Patient was seen and examined on the date of discharge and determined to be suitable for discharge.         Chicho Fisher MD  Department of Hospital Medicine  [facility]

## 2018-11-19 NOTE — PLAN OF CARE
11/19/18 0748   Final Note   Assessment Type Final Discharge Note   Anticipated Discharge Disposition Home   What phone number can be called within the next 1-3 days to see how you are doing after discharge? [phone]   Hospital Follow Up  Appt(s) scheduled? Yes   Discharge plans and expectations educations in teach back method with documentation complete? Yes   Right Care Referral Info   Post Acute Recommendation No Care

## 2018-11-19 NOTE — PLAN OF CARE
11/18/18 0930   Medicare Message   Important Message from Medicare regarding Discharge Appeal Rights Given to patient/caregiver;Explained to patient/caregiver;Signed/date by patient/caregiver   Date IMM was signed 11/18/18   Time IMM was signed 0930

## 2018-11-20 ENCOUNTER — PATIENT OUTREACH (OUTPATIENT)
Dept: ADMINISTRATIVE | Facility: CLINIC | Age: 83
End: 2018-11-20

## 2018-11-20 ENCOUNTER — OUTPATIENT CASE MANAGEMENT (OUTPATIENT)
Dept: ADMINISTRATIVE | Facility: OTHER | Age: 83
End: 2018-11-20

## 2018-11-20 DIAGNOSIS — J44.1 COPD EXACERBATION: Primary | ICD-10-CM

## 2018-11-20 NOTE — PATIENT INSTRUCTIONS
Bacteremia, Suspected (Adult)  Your fever today is probably due to a viral illness. However, sometimes fever can be an early sign of a more serious bacterial infection. Bacteremia is a bacterial infection that has spread to the bloodstream. This is serious because it can spread to other organs, including the kidneys, brain, and lungs.  Your healthcare provider will perform tests (cultures) to check for bacteremia. Until the test results are known you should watch for the signs listed below.  Causes  Bacteremia usually starts with a typical infection, but it then spreads to the blood. Almost any type of infection can cause bacteremia, including a urinary tract infection, skin infection, gastrointestinal problem, surgical complication, or pneumonia.  Symptoms  At first symptoms may seem like any typical infection or illness, but then they worsen. Symptoms of bacteremia can include:  · Fever and chills  · Loss of appetite  · Nausea or vomiting  · Trouble breathing or fast breathing  · Fast heart rate  · Feeling lightheaded or faint  · Skin rashes or blotches  Home care  Follow these guidelines when caring for yourself at home.  · Rest at home for the first 2 to 3 days. When resuming activity, don't let yourself become overly tired.  · You can take acetaminophen or ibuprofen for pain, unless you were given a different pain medicine to use. (Note: If you have chronic liver or kidney disease or have ever had a stomach ulcer or gastrointestinal bleeding, talk with your healthcare provider before using these medicines. Also talk to your provider if you are taking medicine to prevent blood clots.) Aspirin should never be given to anyone younger than 18 years of age who is ill with a viral infection or fever. It may cause severe liver or brain damage.  · If you were given antibiotics, take them until they are used up, or your healthcare provider tells you to stop. It is important to finish the antibiotics even though you feel  better. This is to make sure the infection has cleared.  · Your appetite may be poor, so a light diet is fine. Avoid dehydration by drinking 6 to 8 glasses of fluid per day (such as water, soft drinks, sports drinks, juices, tea, or soup).  Follow-up care  Follow up with your healthcare provider, or as advised.  · If a culture was done, you will be notified if your treatment needs to be changed. You can call as directed for the results.  · If X-rays, a CT, or an ultrasound were done, a specialist will review them. You will be notified of any findings that may affect your care.  Call 911  Contact emergency services right away if any of these occur:  · Trouble breathing or swallowing, or wheezing  · Chest pain  · Confusion or sudden change in behavior  · Extreme drowsiness or trouble awakening  · Fainting or loss of consciousness  · Rapid heart rate  · Low blood pressure  · Vomiting blood, or large amounts of blood in stool  · Seizure  When to seek medical advice  Call your healthcare provider right away if any of these occur:  · Cough with lots of colored sputum (mucus), or blood in your sputum  · Severe headache  · Severe face, neck, throat, or ear pain  · Pain in the right lower abdomen  · Weakness, dizziness, repeated vomiting, or diarrhea  · Joint pain or a new rash  · Burning when urinating  · Fever of 100.4°F (38°C) or higher  Date Last Reviewed: 7/30/2015  © 9424-8385 [employer]. [address] [place] [address], [place] [address]. All rights reserved. This information is not intended as a substitute for professional medical care. Always follow your healthcare professional's instructions.

## 2018-11-20 NOTE — PROGRESS NOTES
Thank you for the referral. The following patient has been assigned to Cathryn Aquino, RN with Outpatient Complex Care Management for high risk screening.    Reason for referral: COPD exacerbation    Please contact [facility] at [phone] with any questions.    Thank you,    Yaneth Hamlin, [facility]  Outpatient Care Mgmt.  [phone]

## 2018-11-22 ENCOUNTER — NURSE TRIAGE (OUTPATIENT)
Dept: ADMINISTRATIVE | Facility: CLINIC | Age: 83
End: 2018-11-22

## 2018-11-23 ENCOUNTER — TELEPHONE (OUTPATIENT)
Dept: FAMILY MEDICINE | Facility: CLINIC | Age: 83
End: 2018-11-23

## 2018-11-23 NOTE — TELEPHONE ENCOUNTER
----- Message from David Kirkpatrick sent at 2018  8:13 AM CST -----  Contact:  - Emmanuel Herrera  MRN: [id number]  : 1935  PCP: Anson Leiva  Home Phone      [phone]  Work Phone      Not on file.  Mobile          [phone]      MESSAGE: Via voicemail yesterday @ 3:06 pm :  Requesting to speak with Dr Leiva - states he will be able to answer his question     Call Emmanuel @ [phone]    PCP: Abbie

## 2018-11-23 NOTE — TELEPHONE ENCOUNTER
Spoke with Emmanuel and he was wanting to know if he could give her one of her pain pills from a while back for her back.  He states that he spoke with the nurse on call and they told him that it should be ok.  He did give it to her and she felt better.  Patient has an appt scheduled for next week and will discuss refills at that time.

## 2018-11-23 NOTE — TELEPHONE ENCOUNTER
Spoke with patient state her eyes are blurring and tongue is burning and numb patient state her symptoms come and go. Patient does not know if new medication one was for Afib and prednisone is the cause state  was he rdoctor in the hospital.Patient was informed to go ER or Urgent care if symptoms continue and keep hospital F/U appointment.

## 2018-11-26 ENCOUNTER — HOSPITAL ENCOUNTER (EMERGENCY)
Facility: HOSPITAL | Age: 83
Discharge: HOME | End: 2018-11-26
Attending: SURGERY
Payer: MEDICARE

## 2018-11-26 VITALS
RESPIRATION RATE: 16 BRPM | SYSTOLIC BLOOD PRESSURE: 118 MMHG | BODY MASS INDEX: 26.7 KG/M2 | DIASTOLIC BLOOD PRESSURE: 74 MMHG | OXYGEN SATURATION: 98 % | HEART RATE: 50 BPM | TEMPERATURE: 98 F | WEIGHT: 146 LBS

## 2018-11-26 DIAGNOSIS — R06.02 SOB (SHORTNESS OF BREATH): ICD-10-CM

## 2018-11-26 DIAGNOSIS — J44.9 CHRONIC OBSTRUCTIVE PULMONARY DISEASE, UNSPECIFIED COPD TYPE: Primary | ICD-10-CM

## 2018-11-26 LAB
ALBUMIN SERPL BCP-MCNC: 3.5 G/DL
ALP SERPL-CCNC: 68 U/L
ALT SERPL W/O P-5'-P-CCNC: 36 U/L
ANION GAP SERPL CALC-SCNC: 9 MMOL/L
APTT BLDCRRT: 22.7 SEC
AST SERPL-CCNC: 13 U/L
BASO STIPL BLD QL SMEAR: ABNORMAL
BASOPHILS NFR BLD: 0 %
BILIRUB SERPL-MCNC: 0.8 MG/DL
BNP SERPL-MCNC: 558 PG/ML
BUN SERPL-MCNC: 16 MG/DL
CALCIUM SERPL-MCNC: 8.1 MG/DL
CHLORIDE SERPL-SCNC: 85 MMOL/L
CK MB SERPL-MCNC: 0.7 NG/ML
CK MB SERPL-RTO: 5 %
CK SERPL-CCNC: 14 U/L
CK SERPL-CCNC: 14 U/L
CO2 SERPL-SCNC: 33 MMOL/L
CREAT SERPL-MCNC: 0.9 MG/DL
DACRYOCYTES BLD QL SMEAR: ABNORMAL
DIFFERENTIAL METHOD: ABNORMAL
EOSINOPHIL NFR BLD: 4 %
ERYTHROCYTE [DISTWIDTH] IN BLOOD BY AUTOMATED COUNT: 13.8 %
EST. GFR  (AFRICAN AMERICAN): >60 ML/MIN/1.73 M^2
EST. GFR  (NON AFRICAN AMERICAN): >60 ML/MIN/1.73 M^2
GIANT PLATELETS BLD QL SMEAR: PRESENT
GLUCOSE SERPL-MCNC: 129 MG/DL
HCT VFR BLD AUTO: 27 %
HGB BLD-MCNC: 9 G/DL
INFLUENZA A, MOLECULAR: NEGATIVE
INFLUENZA B, MOLECULAR: NEGATIVE
INR PPP: 1.2
LYMPHOCYTES NFR BLD: 6 %
MCH RBC QN AUTO: 35.4 PG
MCHC RBC AUTO-ENTMCNC: 33.3 G/DL
MCV RBC AUTO: 106 FL
MONOCYTES NFR BLD: 20 %
NEUTROPHILS NFR BLD: 62 %
NEUTS BAND NFR BLD MANUAL: 8 %
OVALOCYTES BLD QL SMEAR: ABNORMAL
PLATELET # BLD AUTO: 252 K/UL
PLATELET BLD QL SMEAR: ABNORMAL
PMV BLD AUTO: 11.8 FL
POLYCHROMASIA BLD QL SMEAR: ABNORMAL
POTASSIUM SERPL-SCNC: 4.4 MMOL/L
PROT SERPL-MCNC: 5.6 G/DL
PROTHROMBIN TIME: 12.1 SEC
RBC # BLD AUTO: 2.54 M/UL
SCHISTOCYTES BLD QL SMEAR: ABNORMAL
SCHISTOCYTES BLD QL SMEAR: PRESENT
SODIUM SERPL-SCNC: 127 MMOL/L
SPECIMEN SOURCE: NORMAL
TROPONIN I SERPL DL<=0.01 NG/ML-MCNC: 0.02 NG/ML
TROPONIN I SERPL DL<=0.01 NG/ML-MCNC: 0.02 NG/ML
WBC # BLD AUTO: 10.96 K/UL

## 2018-11-26 PROCEDURE — 82553 CREATINE MB FRACTION: CPT | Mod: HCWC

## 2018-11-26 PROCEDURE — 85007 BL SMEAR W/DIFF WBC COUNT: CPT | Mod: HCWC

## 2018-11-26 PROCEDURE — 83880 ASSAY OF NATRIURETIC PEPTIDE: CPT | Mod: HCWC

## 2018-11-26 PROCEDURE — 94640 AIRWAY INHALATION TREATMENT: CPT | Mod: HCWC

## 2018-11-26 PROCEDURE — 94644 CONT INHLJ TX 1ST HOUR: CPT | Mod: HCWC

## 2018-11-26 PROCEDURE — 80053 COMPREHEN METABOLIC PANEL: CPT | Mod: HCWC

## 2018-11-26 PROCEDURE — 25000242 PHARM REV CODE 250 ALT 637 W/ HCPCS: Mod: HCWC

## 2018-11-26 PROCEDURE — 25000242 PHARM REV CODE 250 ALT 637 W/ HCPCS: Mod: HCWC | Performed by: SURGERY

## 2018-11-26 PROCEDURE — 63600175 PHARM REV CODE 636 W HCPCS: Mod: HCWC | Performed by: SURGERY

## 2018-11-26 PROCEDURE — 82550 ASSAY OF CK (CPK): CPT | Mod: HCWC

## 2018-11-26 PROCEDURE — 93005 ELECTROCARDIOGRAM TRACING: CPT | Mod: HCWC

## 2018-11-26 PROCEDURE — 85610 PROTHROMBIN TIME: CPT | Mod: HCWC

## 2018-11-26 PROCEDURE — 36415 COLL VENOUS BLD VENIPUNCTURE: CPT | Mod: HCWC

## 2018-11-26 PROCEDURE — 87502 INFLUENZA DNA AMP PROBE: CPT | Mod: HCWC

## 2018-11-26 PROCEDURE — 94761 N-INVAS EAR/PLS OXIMETRY MLT: CPT | Mod: HCWC

## 2018-11-26 PROCEDURE — 85027 COMPLETE CBC AUTOMATED: CPT | Mod: HCWC

## 2018-11-26 PROCEDURE — 99284 EMERGENCY DEPT VISIT MOD MDM: CPT | Mod: 25,HCWC

## 2018-11-26 PROCEDURE — 96372 THER/PROPH/DIAG INJ SC/IM: CPT | Mod: HCWC

## 2018-11-26 PROCEDURE — 27000221 HC OXYGEN, UP TO 24 HOURS: Mod: HCWC

## 2018-11-26 PROCEDURE — 84484 ASSAY OF TROPONIN QUANT: CPT | Mod: 91,HCWC

## 2018-11-26 PROCEDURE — 85730 THROMBOPLASTIN TIME PARTIAL: CPT | Mod: HCWC

## 2018-11-26 PROCEDURE — 93010 ELECTROCARDIOGRAM REPORT: CPT | Mod: ,,, | Performed by: INTERNAL MEDICINE

## 2018-11-26 RX ORDER — METHYLPREDNISOLONE SOD SUCC 125 MG
125 VIAL (EA) INJECTION
Status: COMPLETED | OUTPATIENT
Start: 2018-11-26 | End: 2018-11-26

## 2018-11-26 RX ORDER — IPRATROPIUM BROMIDE AND ALBUTEROL SULFATE 2.5; .5 MG/3ML; MG/3ML
SOLUTION RESPIRATORY (INHALATION)
Status: COMPLETED
Start: 2018-11-26 | End: 2018-11-26

## 2018-11-26 RX ORDER — METHYLPREDNISOLONE 4 MG/1
TABLET ORAL
Qty: 1 PACKAGE | Refills: 0 | Status: SHIPPED | OUTPATIENT
Start: 2018-11-26 | End: 2018-12-05

## 2018-11-26 RX ORDER — PROMETHAZINE HYDROCHLORIDE AND DEXTROMETHORPHAN HYDROBROMIDE 6.25; 15 MG/5ML; MG/5ML
5 SYRUP ORAL EVERY 6 HOURS PRN
Qty: 118 ML | Refills: 0 | Status: SHIPPED | OUTPATIENT
Start: 2018-11-26 | End: 2018-12-06

## 2018-11-26 RX ORDER — ALBUTEROL SULFATE 2.5 MG/.5ML
10 SOLUTION RESPIRATORY (INHALATION)
Status: COMPLETED | OUTPATIENT
Start: 2018-11-26 | End: 2018-11-26

## 2018-11-26 RX ORDER — IPRATROPIUM BROMIDE AND ALBUTEROL SULFATE 2.5; .5 MG/3ML; MG/3ML
3 SOLUTION RESPIRATORY (INHALATION)
Status: COMPLETED | OUTPATIENT
Start: 2018-11-26 | End: 2018-11-26

## 2018-11-26 RX ORDER — LEVOFLOXACIN 500 MG/1
500 TABLET, FILM COATED ORAL DAILY
Qty: 7 TABLET | Refills: 0 | Status: SHIPPED | OUTPATIENT
Start: 2018-11-26 | End: 2018-12-03

## 2018-11-26 RX ADMIN — ALBUTEROL SULFATE 10 MG: 2.5 SOLUTION RESPIRATORY (INHALATION) at 12:11

## 2018-11-26 RX ADMIN — METHYLPREDNISOLONE SODIUM SUCCINATE 125 MG: 125 INJECTION, POWDER, FOR SOLUTION INTRAMUSCULAR; INTRAVENOUS at 12:11

## 2018-11-26 RX ADMIN — IPRATROPIUM BROMIDE AND ALBUTEROL SULFATE 3 ML: 2.5; .5 SOLUTION RESPIRATORY (INHALATION) at 12:11

## 2018-11-26 RX ADMIN — IPRATROPIUM BROMIDE AND ALBUTEROL SULFATE 3 ML: .5; 3 SOLUTION RESPIRATORY (INHALATION) at 12:11

## 2018-11-26 NOTE — ED PROVIDER NOTES
[facility]                                                 Chief Complaint  83 y.o. female with Shortness of Breath    History of Present Illness  Chantell Herrera presents to the emergency room with shortness of breath tonight  Patient was doing some activity with shortness of breath, history of COPD noted  Patient recently was hospitalized for COPD hypoxia and bronchitis/infection issue  Patient on exam has mild rhonchi with mild wheezing on ER evaluation tonight  Steroids given in the ER completely dissipated any wheezing, 90% on re-evaluation  Patient is on home oxygen, recently quit smoking, afebrile VSS on ER triage tonight    The history is provided by the patient   device was not used during this ER visit     Past Medical History   -- Anxiety     -- Arthritis     -- Asthma     -- Chronic bronchitis     -- COPD (chronic obstructive pulmonary disease)     -- Depression     -- SANTIAGO (dyspnea on exertion)     -- Emphysema of lung     -- Fatigue     -- Hyperlipidemia     -- Hypertension     -- Trouble in sleeping        Surgical HX: Appendectomy, high, JOHNNY, tonsillectomy  No Known Allergies     Review of Systems and Physical Exam      Review of Systems  -- Constitution - no fever, denies fatigue, no weakness, no chills  -- Eyes - no tearing or redness, no visual disturbance  -- Ear, Nose - no tinnitus or earache, no nasal congestion or discharge  -- Mouth,Throat - no sore throat, no toothache, normal voice, normal swallowing  -- Respiratory - cough and congestion, shortness of breath, no SANTIAGO  -- Cardiovascular - denies chest pain, no palpitations, denies claudication  -- Gastrointestinal - denies abdominal pain, nausea, vomiting, or diarrhea  -- Genitourinary - no dysuria, no hematuria, no flank pain, no bladder pain  -- Musculoskeletal - denies back pain, negative for myalgias and arthralgias   -- Neurological - no headache, denies weakness or seizure; no LOC  -- Skin -  denies pallor, rash, or changes in skin. no hives or welts noted    Vital Signs  Her oral temperature is 97.9 °F (36.6 °C).   Her blood pressure is 116/74 and her pulse is 53   Her respiration is 16 and oxygen saturation is 98%.     Physical Exam  -- Nursing note and vitals reviewed  -- Constitutional: Appears well-developed and well-nourished  -- Head: Atraumatic. Normocephalic. No obvious abnormality  -- Eyes: Pupils are equal and reactive to light. Normal conjunctiva and lids  -- Nose: Nose normal in appearance, nares grossly normal. No discharge  -- Throat: Mucous membranes moist, pharynx normal, normal tonsils. No lesions   -- Ears: External ears and TM normal bilaterally. Normal hearing and no drainage  -- Neck: Normal range of motion. Neck supple. No masses, trachea midline  -- Cardiac: Normal rate, regular rhythm and normal heart sounds  -- Pulmonary: faint rhonchi at the bilateral bases with no active wheezing   -- Abdominal: Soft, no tenderness. Normal bowel sounds. Normal liver edge  -- Musculoskeletal: Normal range of motion, no effusions. Joints stable   -- Neurological: No focal deficits. Showed good interaction with staff  -- Skin: Warm and dry. No evidence of rash or cellulitis    Emergency Room Course      Lab Results   (L)   K 4.4   CL 85 (L)   CO2 33 (H)   BUN 16   CREATININE 0.9    (H)   ALKPHOS 68   AST 13   ALT 36   BILITOT 0.8   ALBUMIN 3.5   PROT 5.6 (L)   WBC 10.96   HGB 9.0 (L)   HCT 27.0 (L)      CPK 14 (L)   CPK 14 (L)   CPKMB 0.7   TROPONINI 0.019   INR 1.2    (H)   DDIMER 0.93 (H)   LACTATE 0.9   TSH 0.421     EKG  -- The EKG findings today were without concerning findings from baseline  -- The troponin drawn in the ER today was within normal limits  -- The 2nd troponin drawn in the ER today was within normal limits      Radiology  -- Chest x-ray showed COPD type changes    Medications Given  albuterol-ipratropium 2.5 mg-0.5 mg/3 mL nebulizer solution 3 mL (3  mLs Nebulization Given 11/26/18 0040)   methylPREDNISolone sodium succinate injection 125 mg (125 mg Intramuscular Given 11/26/18 0046)   albuterol sulfate nebulizer solution 10 mg (10 mg Nebulization Given 11/26/18 0046)     ED Management  -- 6:29 AM: Patient presented with cough congestion and COPD exacerbation  -- patient has no active wheezing after steroids given in the ER today  -- patient has had a cough since leaving the hospital, will place on antibiotics  -- patient is also asking for a different type of oxygen with a home concentrator  -- no wheezing with a 98% oxygen on home oxygen prior to discharge today    Diagnosis  -- The primary encounter diagnosis was Chronic obstructive pulmonary disease, unspecified COPD type.   -- A diagnosis of SOB (shortness of breath) was also pertinent to this visit.    Disposition and Plan  -- Disposition: home  -- Condition: stable  -- Follow-up: Patient to follow up with Anson Leiva MD in 1-2 days.  -- I advised the patient that we have found no life threatening condition today  -- At this time, I believe the patient is clinically stable for discharge.   -- The patient acknowledges that close follow up with a MD is required   -- Patient agrees to comply with all instruction and direction given in the ER    This note is dictated on M*Modal word recognition program.  There are word recognition mistakes that are occasionally missed on review.          Festus Maurer MD  11/26/18 3816

## 2018-11-27 ENCOUNTER — HOSPITAL ENCOUNTER (EMERGENCY)
Facility: HOSPITAL | Age: 83
Discharge: ANOTHER HOSPITAL | End: 2018-11-27
Attending: SURGERY
Payer: MEDICARE

## 2018-11-27 VITALS
OXYGEN SATURATION: 100 % | TEMPERATURE: 98 F | BODY MASS INDEX: 27.86 KG/M2 | DIASTOLIC BLOOD PRESSURE: 45 MMHG | SYSTOLIC BLOOD PRESSURE: 105 MMHG | RESPIRATION RATE: 13 BRPM | WEIGHT: 152.31 LBS | HEART RATE: 44 BPM

## 2018-11-27 DIAGNOSIS — I95.9 HYPOTENSION, UNSPECIFIED HYPOTENSION TYPE: ICD-10-CM

## 2018-11-27 DIAGNOSIS — R06.02 SOB (SHORTNESS OF BREATH): Primary | ICD-10-CM

## 2018-11-27 DIAGNOSIS — I50.9 ACUTE ON CHRONIC CONGESTIVE HEART FAILURE, UNSPECIFIED HEART FAILURE TYPE: ICD-10-CM

## 2018-11-27 DIAGNOSIS — R00.1 JUNCTIONAL BRADYCARDIA: ICD-10-CM

## 2018-11-27 LAB
ALBUMIN SERPL BCP-MCNC: 3.3 G/DL
ALLENS TEST: ABNORMAL
ALLENS TEST: ABNORMAL
ALP SERPL-CCNC: 80 U/L
ALT SERPL W/O P-5'-P-CCNC: 101 U/L
ANION GAP SERPL CALC-SCNC: 18 MMOL/L
ANISOCYTOSIS BLD QL SMEAR: SLIGHT
AST SERPL-CCNC: 108 U/L
BASOPHILS # BLD AUTO: ABNORMAL K/UL
BASOPHILS NFR BLD: 0 %
BILIRUB SERPL-MCNC: 0.6 MG/DL
BNP SERPL-MCNC: 712 PG/ML
BUN SERPL-MCNC: 24 MG/DL
CALCIUM SERPL-MCNC: 8.3 MG/DL
CHLORIDE SERPL-SCNC: 85 MMOL/L
CK MB SERPL-MCNC: 1 NG/ML
CK MB SERPL-RTO: 7.1 %
CK SERPL-CCNC: 14 U/L
CK SERPL-CCNC: 14 U/L
CO2 SERPL-SCNC: 20 MMOL/L
CREAT SERPL-MCNC: 1.2 MG/DL
DACRYOCYTES BLD QL SMEAR: ABNORMAL
DELSYS: ABNORMAL
DELSYS: ABNORMAL
DIFFERENTIAL METHOD: ABNORMAL
EOSINOPHIL # BLD AUTO: ABNORMAL K/UL
EOSINOPHIL NFR BLD: 4 %
ERYTHROCYTE [DISTWIDTH] IN BLOOD BY AUTOMATED COUNT: 14.5 %
EST. GFR  (AFRICAN AMERICAN): 49 ML/MIN/1.73 M^2
EST. GFR  (NON AFRICAN AMERICAN): 42 ML/MIN/1.73 M^2
GLUCOSE SERPL-MCNC: 275 MG/DL
HCO3 UR-SCNC: 23.1 MMOL/L (ref 22–26)
HCO3 UR-SCNC: 31.3 MMOL/L (ref 22–26)
HCT VFR BLD AUTO: 26.8 %
HGB BLD-MCNC: 8.8 G/DL
HYPOCHROMIA BLD QL SMEAR: ABNORMAL
LYMPHOCYTES # BLD AUTO: ABNORMAL K/UL
LYMPHOCYTES NFR BLD: 8 %
MCH RBC QN AUTO: 36.1 PG
MCHC RBC AUTO-ENTMCNC: 32.8 G/DL
MCV RBC AUTO: 110 FL
MONOCYTES # BLD AUTO: ABNORMAL K/UL
MONOCYTES NFR BLD: 13 %
NEUTROPHILS NFR BLD: 63 %
NEUTS BAND NFR BLD MANUAL: 12 %
OVALOCYTES BLD QL SMEAR: ABNORMAL
PCO2 BLDA: 58 MMHG (ref 35–45)
PCO2 BLDA: 71 MMHG (ref 35–45)
PH SMN: 7.12 [PH] (ref 7.35–7.45)
PH SMN: 7.34 [PH] (ref 7.35–7.45)
PLATELET # BLD AUTO: 279 K/UL
PMV BLD AUTO: 12.3 FL
PO2 BLDA: 123 MMHG (ref 75–100)
PO2 BLDA: 161 MMHG (ref 75–100)
POC BE: -6.5 MMOL/L (ref -2–2)
POC BE: 4.6 MMOL/L (ref -2–2)
POC COHB: 1.5 % (ref 0–3)
POC COHB: 1.8 % (ref 0–3)
POC METHB: 0.4 % (ref 0–1.5)
POC METHB: 0.8 % (ref 0–1.5)
POC O2HB ARTERIAL: 97.1 % (ref 94–100)
POC O2HB ARTERIAL: 97.5 % (ref 94–100)
POC SATURATED O2: 99.4 % (ref 90–100)
POC SATURATED O2: 99.6 % (ref 90–100)
POC TCO2: 25.3 MMOL/L
POC TCO2: 33.1 MMOL/L
POC THB: 9 G/DL (ref 12–18)
POC THB: 9.1 G/DL (ref 12–18)
POIKILOCYTOSIS BLD QL SMEAR: SLIGHT
POLYCHROMASIA BLD QL SMEAR: ABNORMAL
POTASSIUM SERPL-SCNC: 5.3 MMOL/L
PROT SERPL-MCNC: 5.2 G/DL
RBC # BLD AUTO: 2.44 M/UL
SCHISTOCYTES BLD QL SMEAR: PRESENT
SITE: ABNORMAL
SITE: ABNORMAL
SODIUM SERPL-SCNC: 123 MMOL/L
TROPONIN I SERPL DL<=0.01 NG/ML-MCNC: 0.02 NG/ML
WBC # BLD AUTO: 21.57 K/UL

## 2018-11-27 PROCEDURE — 25000242 PHARM REV CODE 250 ALT 637 W/ HCPCS: Mod: HCWC | Performed by: SURGERY

## 2018-11-27 PROCEDURE — 83880 ASSAY OF NATRIURETIC PEPTIDE: CPT | Mod: HCWC

## 2018-11-27 PROCEDURE — 36415 COLL VENOUS BLD VENIPUNCTURE: CPT | Mod: HCWC

## 2018-11-27 PROCEDURE — 82550 ASSAY OF CK (CPK): CPT | Mod: HCWC

## 2018-11-27 PROCEDURE — 96361 HYDRATE IV INFUSION ADD-ON: CPT | Mod: HCWC

## 2018-11-27 PROCEDURE — 27000221 HC OXYGEN, UP TO 24 HOURS: Mod: HCWC

## 2018-11-27 PROCEDURE — 99900035 HC TECH TIME PER 15 MIN (STAT): Mod: HCWC

## 2018-11-27 PROCEDURE — 94760 N-INVAS EAR/PLS OXIMETRY 1: CPT | Mod: HCWC

## 2018-11-27 PROCEDURE — 80053 COMPREHEN METABOLIC PANEL: CPT | Mod: HCWC

## 2018-11-27 PROCEDURE — 94660 CPAP INITIATION&MGMT: CPT | Mod: HCWC

## 2018-11-27 PROCEDURE — 36600 WITHDRAWAL OF ARTERIAL BLOOD: CPT | Mod: HCWC

## 2018-11-27 PROCEDURE — 94640 AIRWAY INHALATION TREATMENT: CPT | Mod: HCWC

## 2018-11-27 PROCEDURE — 99291 CRITICAL CARE FIRST HOUR: CPT | Mod: 25,HCWC

## 2018-11-27 PROCEDURE — 82803 BLOOD GASES ANY COMBINATION: CPT | Mod: HCWC | Performed by: SURGERY

## 2018-11-27 PROCEDURE — 63600175 PHARM REV CODE 636 W HCPCS: Mod: HCWC | Performed by: SURGERY

## 2018-11-27 PROCEDURE — 93005 ELECTROCARDIOGRAM TRACING: CPT | Mod: HCWC

## 2018-11-27 PROCEDURE — 25000003 PHARM REV CODE 250: Mod: HCWC | Performed by: SURGERY

## 2018-11-27 PROCEDURE — 96374 THER/PROPH/DIAG INJ IV PUSH: CPT | Mod: HCWC

## 2018-11-27 PROCEDURE — 85007 BL SMEAR W/DIFF WBC COUNT: CPT | Mod: HCWC

## 2018-11-27 PROCEDURE — 82553 CREATINE MB FRACTION: CPT | Mod: HCWC

## 2018-11-27 PROCEDURE — 25000242 PHARM REV CODE 250 ALT 637 W/ HCPCS: Mod: HCWC

## 2018-11-27 PROCEDURE — 27000190 HC CPAP FULL FACE MASK W/VALVE: Mod: HCWC

## 2018-11-27 PROCEDURE — 85027 COMPLETE CBC AUTOMATED: CPT | Mod: HCWC

## 2018-11-27 PROCEDURE — 93010 ELECTROCARDIOGRAM REPORT: CPT | Mod: ,,, | Performed by: INTERNAL MEDICINE

## 2018-11-27 PROCEDURE — 84484 ASSAY OF TROPONIN QUANT: CPT | Mod: HCWC

## 2018-11-27 RX ORDER — IPRATROPIUM BROMIDE AND ALBUTEROL SULFATE 2.5; .5 MG/3ML; MG/3ML
SOLUTION RESPIRATORY (INHALATION)
Status: COMPLETED
Start: 2018-11-27 | End: 2018-11-27

## 2018-11-27 RX ORDER — IPRATROPIUM BROMIDE AND ALBUTEROL SULFATE 2.5; .5 MG/3ML; MG/3ML
3 SOLUTION RESPIRATORY (INHALATION) ONCE
Status: COMPLETED | OUTPATIENT
Start: 2018-11-27 | End: 2018-11-27

## 2018-11-27 RX ORDER — LEVALBUTEROL 1.25 MG/.5ML
1.25 SOLUTION, CONCENTRATE RESPIRATORY (INHALATION)
Status: COMPLETED | OUTPATIENT
Start: 2018-11-27 | End: 2018-11-27

## 2018-11-27 RX ORDER — SODIUM CHLORIDE 9 MG/ML
500 INJECTION, SOLUTION INTRAVENOUS ONCE
Status: COMPLETED | OUTPATIENT
Start: 2018-11-27 | End: 2018-11-27

## 2018-11-27 RX ORDER — IPRATROPIUM BROMIDE AND ALBUTEROL SULFATE 2.5; .5 MG/3ML; MG/3ML
3 SOLUTION RESPIRATORY (INHALATION) ONCE
Status: DISCONTINUED | OUTPATIENT
Start: 2018-11-27 | End: 2018-11-27

## 2018-11-27 RX ORDER — DOPAMINE HYDROCHLORIDE 160 MG/100ML
2 INJECTION, SOLUTION INTRAVENOUS CONTINUOUS
Status: DISCONTINUED | OUTPATIENT
Start: 2018-11-27 | End: 2018-11-28 | Stop reason: HOSPADM

## 2018-11-27 RX ADMIN — DOPAMINE HYDROCHLORIDE 2 MCG/KG/MIN: 160 INJECTION, SOLUTION INTRAVENOUS at 07:11

## 2018-11-27 RX ADMIN — SODIUM CHLORIDE 500 ML: 0.9 INJECTION, SOLUTION INTRAVENOUS at 06:11

## 2018-11-27 RX ADMIN — IPRATROPIUM BROMIDE AND ALBUTEROL SULFATE 3 ML: 2.5; .5 SOLUTION RESPIRATORY (INHALATION) at 05:11

## 2018-11-27 RX ADMIN — LEVALBUTEROL 1.25 MG: 1.25 SOLUTION, CONCENTRATE RESPIRATORY (INHALATION) at 08:11

## 2018-11-27 RX ADMIN — IPRATROPIUM BROMIDE AND ALBUTEROL SULFATE 3 ML: .5; 3 SOLUTION RESPIRATORY (INHALATION) at 05:11

## 2018-11-28 NOTE — ED PROVIDER NOTES
Encounter Date: 2018       History     Chief Complaint   Patient presents with    Shortness of Breath     Patient is an 82yo W female who returns today with continued shortness of breath.  Her  says that she started shallow breathing with gasping for breath at times after a change of her home CPAP system this afternoon.  She denies chest pain.  When she presented to the ED she was acutely short of breath and cyanotic in appearance, had initial ABG showing pH 7.1 with P CO2 of 71.  She made some improvement with placement of a BiPAP ventilation mask.          Review of patient's allergies indicates:  No Known Allergies  Past Medical History:   Diagnosis Date    Anxiety     Arthritis     Asthma     Atrial fibrillation with rapid ventricular response     Chronic bronchitis     COPD (chronic obstructive pulmonary disease)     Depression     SANTIAGO (dyspnea on exertion)     Emphysema of lung     Fatigue     Hyperlipidemia     Hypertension     Trouble in sleeping      Past Surgical History:   Procedure Laterality Date    APPENDECTOMY      EYE SURGERY      HYSTERECTOMY      TONSILLECTOMY       Family History   Problem Relation Age of Onset    Heart disease Mother     Hypertension Mother     Hypertension Father     Diabetes Father     Cancer Sister     COPD Brother     Hypertension Brother     No Known Problems Daughter     Kidney disease Son     COPD Daughter      Social History     Tobacco Use    Smoking status: Former Smoker     Last attempt to quit: 8/3/2000     Years since quittin.3    Smokeless tobacco: Never Used   Substance Use Topics    Alcohol use: No    Drug use: No     Review of Systems   Unable to perform ROS: Mental status change       Physical Exam     Initial Vitals   BP Pulse Resp Temp SpO2   18 1729 18 1700 18 1700 18 2027 18 1700   (!) 176/146 78 20 97.6 °F (36.4 °C) 100 %      MAP       --                Physical Exam    Nursing  note and vitals reviewed.  HENT:   Head: Normocephalic and atraumatic.   Eyes: EOM are normal. Pupils are equal, round, and reactive to light.   Neck: Normal range of motion. Neck supple.   Cardiovascular: Intact distal pulses.   Pulmonary/Chest: She is in respiratory distress. She exhibits no tenderness.   Abdominal: Soft. She exhibits no distension.   Musculoskeletal: She exhibits no edema or tenderness.   Neurological: She is alert and oriented to person, place, and time.   Skin: Skin is warm.         ED Course   Procedures  Labs Reviewed   CBC W/ AUTO DIFFERENTIAL - Abnormal; Notable for the following components:       Result Value    WBC 21.57 (*)     RBC 2.44 (*)     Hemoglobin 8.8 (*)     Hematocrit 26.8 (*)      (*)     MCH 36.1 (*)     Lymph% 8.0 (*)     All other components within normal limits   COMPREHENSIVE METABOLIC PANEL - Abnormal; Notable for the following components:    Sodium 123 (*)     Potassium 5.3 (*)     Chloride 85 (*)     CO2 20 (*)     Glucose 275 (*)     BUN, Bld 24 (*)     Calcium 8.3 (*)     Total Protein 5.2 (*)     Albumin 3.3 (*)      (*)      (*)     Anion Gap 18 (*)     eGFR if  49 (*)     eGFR if non  42 (*)     All other components within normal limits   CK - Abnormal; Notable for the following components:    CPK 14 (*)     All other components within normal limits   CK-MB - Abnormal; Notable for the following components:    CPK 14 (*)     MB% 7.1 (*)     All other components within normal limits   B-TYPE NATRIURETIC PEPTIDE - Abnormal; Notable for the following components:     (*)     All other components within normal limits   TROPONIN I          Imaging Results          X-Ray Chest 1 View (Final result)  Result time 11/27/18 18:24:38    Final result by Nikolas Vizcaino III, MD (11/27/18 18:24:38)                 Impression:      Normal heart size.  Chronic changes.  No acute abnormality  suggested.      Electronically signed by: Nikolas Vizcaino MD  Date:    11/27/2018  Time:    18:24             Narrative:    EXAMINATION:  XR CHEST 1 VIEW    CLINICAL HISTORY:  Shortness of breath    COMPARISON:  November 26    FINDINGS:  No significant change.  Heart size is normal with mild tortuosity of the thoracic aorta and calcification noted along the arch.  Lungs are grossly clear with minimal chronic change to digested.  No consolidation or effusion.                                                      Clinical Impression:   The primary encounter diagnosis was SOB (shortness of breath). Diagnoses of Hypotension, unspecified hypotension type, Acute on chronic congestive heart failure, unspecified heart failure type, and Junctional bradycardia were also pertinent to this visit.            I took over this patient from the ER provider on 6:00 p.m. shift change  This patient has a long history of COPD, her oxygen concentrator was changed today  Immediately after the oxygen concentrator was change the patient became short of breath  Family states that the oxygen concentrator could not provide enough oxygen to her  Patient came into the ER obviously hypoxic, addressed with a BiPAP machine adequately  Patient was hypotensive in dopamine was started to augment her blood pressure  Patient has congestive heart failure and COPD with a new junctional bradycardia  Patient was accepted at a higher level of care [facility] for cardiology an ICU evaluation    Critical Care ED Physician Time (minutes):  -- Performed by: Festus Maurer M.D.  -- Date/Time: 9:47 PM 11/27/2018   -- Direct Patient Care (Face Time): 10  -- Additional History from Records or Taking Additional History: 10  -- Ordering, Reviewing, and Interpreting Diagnostic Studies: 10  -- Total Time in Documentation: 10  -- Consultation with Other Physicians: 10  -- Consultation with Family Related to Condition: 10  -- Total Critical Care Time: 60                   Festus Maurer MD  11/27/18 9054

## 2018-11-28 NOTE — ED NOTES
Patient arrived via [facility] with shortness of breath, using accessory muscles to breathe. Patient drowsy, eyes mainly closed. Bradycardic on monitor. Unable to obtain pulse oximetry. Patient placed on cardiac monitor and continuous pulse ox.

## 2018-12-04 ENCOUNTER — OUTPATIENT CASE MANAGEMENT (OUTPATIENT)
Dept: ADMINISTRATIVE | Facility: OTHER | Age: 83
End: 2018-12-04

## 2018-12-04 ENCOUNTER — TELEPHONE (OUTPATIENT)
Dept: FAMILY MEDICINE | Facility: CLINIC | Age: 83
End: 2018-12-04

## 2018-12-04 NOTE — TELEPHONE ENCOUNTER
----- Message from Cathryn Aquino RN sent at 12/4/2018  3:26 PM CST -----  Contact: STACIA Gonzales this is Cathryn with [facility]. I attempted to re-enroll pt in OPCM today. Pt declined our services at this time. Pt reports that she currently has home health services and is satisfied with having only HH at this time.    Thank you

## 2018-12-04 NOTE — PROGRESS NOTES
Summary: Pt reports that she has home health at this time. Pt reports that the nurse visits and calls. Pt declines OPCM at this time.      Interventions: Reviewed with pt OPCM services. Informed pt can have along with home health.      Plan:na

## 2018-12-05 ENCOUNTER — OFFICE VISIT (OUTPATIENT)
Dept: FAMILY MEDICINE | Facility: CLINIC | Age: 83
End: 2018-12-05
Payer: MEDICARE

## 2018-12-05 VITALS
RESPIRATION RATE: 20 BRPM | DIASTOLIC BLOOD PRESSURE: 62 MMHG | TEMPERATURE: 98 F | SYSTOLIC BLOOD PRESSURE: 110 MMHG | BODY MASS INDEX: 25.76 KG/M2 | WEIGHT: 140 LBS | HEART RATE: 60 BPM | HEIGHT: 62 IN

## 2018-12-05 DIAGNOSIS — J44.9 END STAGE COPD: ICD-10-CM

## 2018-12-05 DIAGNOSIS — Z51.5 ENCOUNTER FOR END OF LIFE CARE: ICD-10-CM

## 2018-12-05 DIAGNOSIS — Z09 HOSPITAL DISCHARGE FOLLOW-UP: Primary | ICD-10-CM

## 2018-12-05 PROCEDURE — 1101F PT FALLS ASSESS-DOCD LE1/YR: CPT | Mod: CPTII,HCWC,S$GLB, | Performed by: FAMILY MEDICINE

## 2018-12-05 PROCEDURE — 99999 PR PBB SHADOW E&M-EST. PATIENT-LVL V: CPT | Mod: PBBFAC,HCWC,, | Performed by: FAMILY MEDICINE

## 2018-12-05 PROCEDURE — 99214 OFFICE O/P EST MOD 30 MIN: CPT | Mod: HCWC,S$GLB,, | Performed by: FAMILY MEDICINE

## 2018-12-05 PROCEDURE — 3074F SYST BP LT 130 MM HG: CPT | Mod: CPTII,HCWC,S$GLB, | Performed by: FAMILY MEDICINE

## 2018-12-05 PROCEDURE — 3078F DIAST BP <80 MM HG: CPT | Mod: CPTII,HCWC,S$GLB, | Performed by: FAMILY MEDICINE

## 2018-12-05 RX ORDER — LEVOFLOXACIN 500 MG/1
500 TABLET, FILM COATED ORAL DAILY
COMMUNITY
End: 2019-01-10 | Stop reason: ALTCHOICE

## 2018-12-05 RX ORDER — FUROSEMIDE 20 MG/1
20 TABLET ORAL DAILY
Qty: 5 TABLET | Refills: 0 | Status: ON HOLD | OUTPATIENT
Start: 2018-12-05 | End: 2019-01-26 | Stop reason: SDUPTHER

## 2018-12-05 NOTE — PROGRESS NOTES
Subjective:       Patient ID: Chantell Herrera is a 83 y.o. female.    Chief Complaint: Hospital Follow Up    Pt is a 83 y.o. female who presents for evaluation and management of   Encounter Diagnoses   Name Primary?    Hospital discharge follow-up Yes    End stage COPD     Encounter for end of life care    .  She is here for hospital follow up.   Admitted to [facility] for resp failure. She was nearly intubated. Used Bipap  Sent home on prednisone 40mg for a week   She has O2 at home. She has appt with her pulmonologist to talk about trilogy?  She has no living will     Doing well on current meds. Denies any side effects. Prevention is up to date.  Review of Systems   Constitutional: Negative for fever.   Respiratory: Positive for shortness of breath. Negative for wheezing.    Cardiovascular: Positive for leg swelling. Negative for chest pain.        Edema since on steroids        Objective:      Physical Exam   Constitutional: She is oriented to person, place, and time. She appears well-developed and well-nourished.   HENT:   Head: Normocephalic and atraumatic.   Right Ear: External ear normal.   Left Ear: External ear normal.   Nose: Nose normal.   Mouth/Throat: Oropharynx is clear and moist.   O2 NC    Eyes: EOM are normal. Pupils are equal, round, and reactive to light.   Neck: Normal range of motion. Neck supple. No JVD present. No tracheal deviation present. No thyromegaly present.   Cardiovascular: Normal rate, normal heart sounds and intact distal pulses.   No murmur heard.  Pulmonary/Chest: Effort normal and breath sounds normal. No respiratory distress. She has no wheezes. She has no rales. She exhibits no tenderness.   Decreased tidal volume      Abdominal: Soft. Bowel sounds are normal. She exhibits no distension and no mass. There is no tenderness. There is no rebound and no guarding.   Musculoskeletal: Normal range of motion. She exhibits edema. She exhibits no tenderness.   2 plus edema     Lymphadenopathy:     She has no cervical adenopathy.   Neurological: She is alert and oriented to person, place, and time. She has normal reflexes. She displays normal reflexes. No cranial nerve deficit. She exhibits normal muscle tone. Coordination normal.   Skin: Skin is warm and dry. No rash noted. No erythema. No pallor.        Psychiatric: She has a normal mood and affect. Her behavior is normal. Judgment and thought content normal.       Assessment:       1. Hospital discharge follow-up    2. End stage COPD    3. Encounter for end of life care        Plan:   Chantell was seen today for hospital follow up.    Diagnoses and all orders for this visit:    Hospital discharge follow-up    End stage COPD    Encounter for end of life care    Other orders  -     furosemide (LASIX) 20 MG tablet; Take 1 tablet (20 mg total) by mouth once daily.      Problem List Items Addressed This Visit     None      Visit Diagnoses     Hospital discharge follow-up    -  Primary    End stage COPD        Encounter for end of life care            Spent time with patient filling out a [place] living will. It was signed by both her and I and is scanned into the chart. She was encouraged to keep a copy with her.     conitnue prednisone as prescribed     F/U with pulmonology. She will call for appt. Encouraged her to discuss getting a trilogy with Dr. Hickman.       No Follow-up on file.

## 2018-12-21 ENCOUNTER — TELEPHONE (OUTPATIENT)
Dept: FAMILY MEDICINE | Facility: CLINIC | Age: 83
End: 2018-12-21

## 2018-12-21 NOTE — TELEPHONE ENCOUNTER
This is for her blood pressure. What is her blood pressure running at home? Is she on this medication currently??

## 2018-12-21 NOTE — TELEPHONE ENCOUNTER
----- Message from David Kirkpatrick sent at 2018  8:33 AM CST -----  Contact: Patient  Chantell Herrera  MRN: [id number]  : 1935  PCP: Anson Leiva  Home Phone      [phone]  Work Phone      Not on file.  Mobile          [phone]      MESSAGE: was given Rx for Amlodipine 10 mg while in the hospital -- doesn't know what it is for or if she is still to take it -- please advise    Call [phone]    PCP: Natalia flynn

## 2018-12-21 NOTE — TELEPHONE ENCOUNTER
FYI:  Patient notified that Amlodipine is for blood pressure. States her blood pressure readings have been good and she has been taking medication.  Advised to continue medication.

## 2019-01-02 ENCOUNTER — TELEPHONE (OUTPATIENT)
Dept: ADMINISTRATIVE | Facility: CLINIC | Age: 84
End: 2019-01-02

## 2019-01-03 ENCOUNTER — TELEPHONE (OUTPATIENT)
Dept: FAMILY MEDICINE | Facility: CLINIC | Age: 84
End: 2019-01-03

## 2019-01-04 NOTE — TELEPHONE ENCOUNTER
----- Message from Kenzie Lugo sent at 1/3/2019  4:33 PM CST -----  Contact: , Emmanuel Herrera  MRN: [id number]  : 1935  PCP: Anson Leiva  Home Phone      [phone]  Work Phone      Not on file.  Mobile          [phone]      MESSAGE:   Patient's  would like Dr. Leiva to call regarding a life insurance policy. He is also wanting to discuss medication changes. He says it will only take a couple minutes. He said that Dr. Leiva told him to ask for him to discuss any changes regarding medications. He doesn't want a nurse to call him, asking for Dr. Leiva to call him.     Emmanuel   [phone]

## 2019-01-04 NOTE — TELEPHONE ENCOUNTER
Spoke to Emmanuel:  Herminio CHAPMAN is going to contact us in regards to a life insurance policy   Seeing dr Nix for her A fib and is feeling much better since medication adjustment.

## 2019-01-07 ENCOUNTER — TELEPHONE (OUTPATIENT)
Dept: FAMILY MEDICINE | Facility: CLINIC | Age: 84
End: 2019-01-07

## 2019-01-07 NOTE — TELEPHONE ENCOUNTER
Spoke with patient and she states that she started Cardizem 120 mg and she wants to know if this can cause shaking.  Please advise.

## 2019-01-07 NOTE — TELEPHONE ENCOUNTER
----- Message from David Kirkpatrick sent at 2019 10:41 AM CST -----  Contact: Patient  Chantell Herrera  MRN: [id number]  : 1935  PCP: Anson Leiva  Home Phone      [phone]  Work Phone      Not on file.  Mobile          [phone]      MESSAGE: requesting to speak with Dr Leiva' nurse Re: infection    Call [phone]    PCP: Abbie

## 2019-01-08 NOTE — TELEPHONE ENCOUNTER
Notified pt, she verbally understood.  Informed pt that she has an appt tomorrow with dr castellanos and we will address the shaking tomorrow

## 2019-01-10 ENCOUNTER — OFFICE VISIT (OUTPATIENT)
Dept: FAMILY MEDICINE | Facility: CLINIC | Age: 84
End: 2019-01-10
Payer: MEDICARE

## 2019-01-10 VITALS
TEMPERATURE: 97 F | HEIGHT: 62 IN | HEART RATE: 78 BPM | SYSTOLIC BLOOD PRESSURE: 100 MMHG | DIASTOLIC BLOOD PRESSURE: 48 MMHG | OXYGEN SATURATION: 96 % | BODY MASS INDEX: 25.84 KG/M2 | WEIGHT: 140.44 LBS | RESPIRATION RATE: 28 BRPM

## 2019-01-10 DIAGNOSIS — J43.1 PANLOBULAR EMPHYSEMA: Primary | ICD-10-CM

## 2019-01-10 DIAGNOSIS — J44.1 COPD EXACERBATION: ICD-10-CM

## 2019-01-10 PROCEDURE — 3074F PR MOST RECENT SYSTOLIC BLOOD PRESSURE < 130 MM HG: ICD-10-PCS | Mod: CPTII,S$GLB,, | Performed by: FAMILY MEDICINE

## 2019-01-10 PROCEDURE — 99213 OFFICE O/P EST LOW 20 MIN: CPT | Mod: S$GLB,,, | Performed by: FAMILY MEDICINE

## 2019-01-10 PROCEDURE — 99999 PR PBB SHADOW E&M-EST. PATIENT-LVL III: CPT | Mod: PBBFAC,,, | Performed by: FAMILY MEDICINE

## 2019-01-10 PROCEDURE — 3078F DIAST BP <80 MM HG: CPT | Mod: CPTII,S$GLB,, | Performed by: FAMILY MEDICINE

## 2019-01-10 PROCEDURE — 3078F PR MOST RECENT DIASTOLIC BLOOD PRESSURE < 80 MM HG: ICD-10-PCS | Mod: CPTII,S$GLB,, | Performed by: FAMILY MEDICINE

## 2019-01-10 PROCEDURE — 3288F PR FALLS RISK ASSESSMENT DOCUMENTED: ICD-10-PCS | Mod: CPTII,S$GLB,, | Performed by: FAMILY MEDICINE

## 2019-01-10 PROCEDURE — 3074F SYST BP LT 130 MM HG: CPT | Mod: CPTII,S$GLB,, | Performed by: FAMILY MEDICINE

## 2019-01-10 PROCEDURE — 1100F PTFALLS ASSESS-DOCD GE2>/YR: CPT | Mod: CPTII,S$GLB,, | Performed by: FAMILY MEDICINE

## 2019-01-10 PROCEDURE — 3288F FALL RISK ASSESSMENT DOCD: CPT | Mod: CPTII,S$GLB,, | Performed by: FAMILY MEDICINE

## 2019-01-10 PROCEDURE — 99213 PR OFFICE/OUTPT VISIT, EST, LEVL III, 20-29 MIN: ICD-10-PCS | Mod: S$GLB,,, | Performed by: FAMILY MEDICINE

## 2019-01-10 PROCEDURE — 99999 PR PBB SHADOW E&M-EST. PATIENT-LVL III: ICD-10-PCS | Mod: PBBFAC,,, | Performed by: FAMILY MEDICINE

## 2019-01-10 PROCEDURE — 1100F PR PT FALLS ASSESS DOC 2+ FALLS/FALL W/INJURY/YR: ICD-10-PCS | Mod: CPTII,S$GLB,, | Performed by: FAMILY MEDICINE

## 2019-01-10 RX ORDER — DILTIAZEM HYDROCHLORIDE 120 MG/1
1 TABLET, FILM COATED ORAL DAILY
Status: ON HOLD | COMMUNITY
Start: 2018-12-31 | End: 2019-01-26 | Stop reason: HOSPADM

## 2019-01-10 RX ORDER — AMIODARONE HYDROCHLORIDE 200 MG/1
1 TABLET ORAL DAILY
COMMUNITY
Start: 2018-12-17 | End: 2019-01-14

## 2019-01-10 RX ORDER — AZITHROMYCIN 500 MG/1
500 TABLET, FILM COATED ORAL DAILY
Qty: 3 TABLET | Refills: 0 | Status: SHIPPED | OUTPATIENT
Start: 2019-01-10 | End: 2019-01-14

## 2019-01-10 RX ORDER — APIXABAN 5 MG/1
1 TABLET, FILM COATED ORAL 2 TIMES DAILY
Status: ON HOLD | COMMUNITY
Start: 2018-12-17 | End: 2019-09-04 | Stop reason: HOSPADM

## 2019-01-10 RX ORDER — PREDNISONE 10 MG/1
TABLET ORAL
Qty: 27 TABLET | Refills: 0 | Status: SHIPPED | OUTPATIENT
Start: 2019-01-10 | End: 2019-01-19

## 2019-01-10 NOTE — PROGRESS NOTES
Subjective:       Patient ID: Chantell Herrera is a 83 y.o. female.    Chief Complaint: Cough    Pt is a 83 y.o. female who presents for evaluation and management of   Encounter Diagnoses   Name Primary?    Panlobular emphysema Yes    COPD exacerbation    .productive cough for 2 days   Yellow sputum     Doing well on current meds. Denies any side effects. Prevention is up to date.    Review of Systems   Constitutional: Negative for fever.   Respiratory: Positive for cough, shortness of breath and wheezing.        Objective:      Physical Exam   Constitutional: She is oriented to person, place, and time. She appears well-developed and well-nourished.   HENT:   Head: Normocephalic and atraumatic.   Right Ear: External ear normal.   Left Ear: External ear normal.   Nose: Nose normal.   Mouth/Throat: Oropharynx is clear and moist.   O2 NC    Eyes: EOM are normal. Pupils are equal, round, and reactive to light.   Neck: Normal range of motion. Neck supple. No JVD present. No tracheal deviation present. No thyromegaly present.   Cardiovascular: Normal rate, normal heart sounds and intact distal pulses.   No murmur heard.  Pulmonary/Chest: Effort normal and breath sounds normal. No respiratory distress. She has no wheezes. She has no rales. She exhibits no tenderness.   Decreased tidal volume      Abdominal: Soft. Bowel sounds are normal. She exhibits no distension and no mass. There is no tenderness. There is no rebound and no guarding.   Musculoskeletal: Normal range of motion. She exhibits edema. She exhibits no tenderness.   2 plus edema    Lymphadenopathy:     She has no cervical adenopathy.   Neurological: She is alert and oriented to person, place, and time. She has normal reflexes. She displays normal reflexes. No cranial nerve deficit. She exhibits normal muscle tone. Coordination normal.   Skin: Skin is warm and dry. No rash noted. No erythema. No pallor.        Psychiatric: She has a normal mood and affect. Her  behavior is normal. Judgment and thought content normal.       Assessment:       1. Panlobular emphysema    2. COPD exacerbation        Plan:   Chantell was seen today for cough.    Diagnoses and all orders for this visit:    Panlobular emphysema    COPD exacerbation  -     predniSONE (DELTASONE) 10 MG tablet; Take 4 tablets (40 mg total) by mouth once daily for 3 days, THEN 3 tablets (30 mg total) once daily for 3 days, THEN 2 tablets (20 mg total) once daily for 3 days.  -     azithromycin (ZITHROMAX) 500 MG tablet; Take 1 tablet (500 mg total) by mouth once daily. for 3 days      Problem List Items Addressed This Visit     COPD exacerbation    Relevant Medications    predniSONE (DELTASONE) 10 MG tablet    azithromycin (ZITHROMAX) 500 MG tablet    Emphysema/COPD - Primary        Steroid taper an zmax  Continue home O2, 3L/Min NC   Use albuterol/atrovent nebs q6 prn  She can't afford her symbicort   Will give her breo samples     RTC Monday         No Follow-up on file.

## 2019-01-14 ENCOUNTER — OFFICE VISIT (OUTPATIENT)
Dept: FAMILY MEDICINE | Facility: CLINIC | Age: 84
End: 2019-01-14
Payer: MEDICARE

## 2019-01-14 VITALS
BODY MASS INDEX: 25.69 KG/M2 | DIASTOLIC BLOOD PRESSURE: 58 MMHG | HEIGHT: 62 IN | HEART RATE: 76 BPM | RESPIRATION RATE: 20 BRPM | SYSTOLIC BLOOD PRESSURE: 118 MMHG

## 2019-01-14 DIAGNOSIS — J43.1 PANLOBULAR EMPHYSEMA: ICD-10-CM

## 2019-01-14 DIAGNOSIS — R63.0 NO APPETITE: Primary | ICD-10-CM

## 2019-01-14 PROCEDURE — 99999 PR PBB SHADOW E&M-EST. PATIENT-LVL III: CPT | Mod: PBBFAC,,, | Performed by: FAMILY MEDICINE

## 2019-01-14 PROCEDURE — 3078F PR MOST RECENT DIASTOLIC BLOOD PRESSURE < 80 MM HG: ICD-10-PCS | Mod: CPTII,S$GLB,, | Performed by: FAMILY MEDICINE

## 2019-01-14 PROCEDURE — 3288F PR FALLS RISK ASSESSMENT DOCUMENTED: ICD-10-PCS | Mod: CPTII,S$GLB,, | Performed by: FAMILY MEDICINE

## 2019-01-14 PROCEDURE — 3288F FALL RISK ASSESSMENT DOCD: CPT | Mod: CPTII,S$GLB,, | Performed by: FAMILY MEDICINE

## 2019-01-14 PROCEDURE — 99999 PR PBB SHADOW E&M-EST. PATIENT-LVL III: ICD-10-PCS | Mod: PBBFAC,,, | Performed by: FAMILY MEDICINE

## 2019-01-14 PROCEDURE — 3078F DIAST BP <80 MM HG: CPT | Mod: CPTII,S$GLB,, | Performed by: FAMILY MEDICINE

## 2019-01-14 PROCEDURE — 3074F PR MOST RECENT SYSTOLIC BLOOD PRESSURE < 130 MM HG: ICD-10-PCS | Mod: CPTII,S$GLB,, | Performed by: FAMILY MEDICINE

## 2019-01-14 PROCEDURE — 1100F PTFALLS ASSESS-DOCD GE2>/YR: CPT | Mod: CPTII,S$GLB,, | Performed by: FAMILY MEDICINE

## 2019-01-14 PROCEDURE — 99213 OFFICE O/P EST LOW 20 MIN: CPT | Mod: S$GLB,,, | Performed by: FAMILY MEDICINE

## 2019-01-14 PROCEDURE — 1100F PR PT FALLS ASSESS DOC 2+ FALLS/FALL W/INJURY/YR: ICD-10-PCS | Mod: CPTII,S$GLB,, | Performed by: FAMILY MEDICINE

## 2019-01-14 PROCEDURE — 3074F SYST BP LT 130 MM HG: CPT | Mod: CPTII,S$GLB,, | Performed by: FAMILY MEDICINE

## 2019-01-14 PROCEDURE — 99213 PR OFFICE/OUTPT VISIT, EST, LEVL III, 20-29 MIN: ICD-10-PCS | Mod: S$GLB,,, | Performed by: FAMILY MEDICINE

## 2019-01-15 NOTE — PROGRESS NOTES
Subjective:       Patient ID: Chantell Herrera is a 83 y.o. female.    Chief Complaint: Follow-up (recheck)    Pt is a 83 y.o. female who presents for evaluation and management of   Encounter Diagnoses   Name Primary?    No appetite Yes    Panlobular emphysema      She feels better since last visit   Sputum is now clear   She is getting new A/C tomoro as she just found out she has mold in her unit   95% in office on 3 liters     Doing well on current meds. Denies any side effects. Prevention is up to date.  Review of Systems   Constitutional: Negative for chills and fever.   Respiratory: Positive for cough and wheezing.        Objective:      Physical Exam   Constitutional: She is oriented to person, place, and time. She appears well-developed and well-nourished.   HENT:   Head: Normocephalic and atraumatic.   Right Ear: External ear normal.   Left Ear: External ear normal.   Nose: Nose normal.   Mouth/Throat: Oropharynx is clear and moist.   Eyes: EOM are normal. Pupils are equal, round, and reactive to light.   Neck: Normal range of motion. Neck supple. No JVD present. No tracheal deviation present. No thyromegaly present.   Cardiovascular: Normal rate, normal heart sounds and intact distal pulses.   No murmur heard.  Pulmonary/Chest: Effort normal and breath sounds normal. No respiratory distress. She has no wheezes. She has no rales. She exhibits no tenderness.   Abdominal: Soft. Bowel sounds are normal. She exhibits no distension and no mass. There is no tenderness. There is no rebound and no guarding.   Musculoskeletal: Normal range of motion. She exhibits no edema or tenderness.   Lymphadenopathy:     She has no cervical adenopathy.   Neurological: She is alert and oriented to person, place, and time. She has normal reflexes. She displays normal reflexes. No cranial nerve deficit. She exhibits normal muscle tone. Coordination normal.   Skin: Skin is warm and dry. No rash noted. No erythema. No pallor.    Psychiatric: She has a normal mood and affect. Her behavior is normal. Judgment and thought content normal.       Assessment:       1. No appetite    2. Panlobular emphysema        Plan:   Chantell was seen today for follow-up.    Diagnoses and all orders for this visit:    No appetite    Panlobular emphysema      Problem List Items Addressed This Visit     Emphysema/COPD      Other Visit Diagnoses     No appetite    -  Primary        Contineu steroid taper then resume 10mg daily     Consider appetite stimulant if BMI is unhealthy. Currently she is at goal         No Follow-up on file.

## 2019-01-22 ENCOUNTER — HOSPITAL ENCOUNTER (INPATIENT)
Facility: HOSPITAL | Age: 84
LOS: 4 days | Discharge: HOME HEALTH | DRG: 193 | End: 2019-01-26
Attending: FAMILY MEDICINE | Admitting: INTERNAL MEDICINE
Payer: MEDICARE

## 2019-01-22 DIAGNOSIS — J43.1 PANLOBULAR EMPHYSEMA: ICD-10-CM

## 2019-01-22 DIAGNOSIS — J44.1 COPD WITH EXACERBATION: ICD-10-CM

## 2019-01-22 DIAGNOSIS — I10 ESSENTIAL HYPERTENSION: ICD-10-CM

## 2019-01-22 DIAGNOSIS — J96.21 ACUTE ON CHRONIC RESPIRATORY FAILURE WITH HYPOXIA: ICD-10-CM

## 2019-01-22 DIAGNOSIS — G25.0 ESSENTIAL TREMOR: ICD-10-CM

## 2019-01-22 DIAGNOSIS — R07.9 CHEST PAIN: ICD-10-CM

## 2019-01-22 DIAGNOSIS — J96.11 CHRONIC RESPIRATORY FAILURE WITH HYPOXIA: ICD-10-CM

## 2019-01-22 DIAGNOSIS — I48.0 PAF (PAROXYSMAL ATRIAL FIBRILLATION): ICD-10-CM

## 2019-01-22 DIAGNOSIS — Z99.81 OXYGEN DEPENDENT: ICD-10-CM

## 2019-01-22 DIAGNOSIS — N18.30 CKD (CHRONIC KIDNEY DISEASE), STAGE III: ICD-10-CM

## 2019-01-22 DIAGNOSIS — J44.1 COPD EXACERBATION: ICD-10-CM

## 2019-01-22 DIAGNOSIS — I48.91 ATRIAL FIBRILLATION WITH RAPID VENTRICULAR RESPONSE: ICD-10-CM

## 2019-01-22 DIAGNOSIS — J41.0 SIMPLE CHRONIC BRONCHITIS: ICD-10-CM

## 2019-01-22 DIAGNOSIS — J18.9 PNEUMONIA: ICD-10-CM

## 2019-01-22 DIAGNOSIS — D64.9 SYMPTOMATIC ANEMIA: Primary | ICD-10-CM

## 2019-01-22 DIAGNOSIS — R53.81 DEBILITY: ICD-10-CM

## 2019-01-22 LAB
ABO + RH BLD: NORMAL
ALBUMIN SERPL BCP-MCNC: 3.5 G/DL
ALP SERPL-CCNC: 54 U/L
ALT SERPL W/O P-5'-P-CCNC: 15 U/L
ANION GAP SERPL CALC-SCNC: 8 MMOL/L
APTT BLDCRRT: 27.3 SEC
AST SERPL-CCNC: 9 U/L
BASOPHILS # BLD AUTO: ABNORMAL K/UL
BASOPHILS NFR BLD: 3 %
BILIRUB SERPL-MCNC: 0.8 MG/DL
BLD GP AB SCN CELLS X3 SERPL QL: NORMAL
BNP SERPL-MCNC: 188 PG/ML
BUN SERPL-MCNC: 12 MG/DL
CALCIUM SERPL-MCNC: 8.8 MG/DL
CHLORIDE SERPL-SCNC: 93 MMOL/L
CO2 SERPL-SCNC: 31 MMOL/L
CREAT SERPL-MCNC: 1 MG/DL
DIFFERENTIAL METHOD: ABNORMAL
EOSINOPHIL # BLD AUTO: ABNORMAL K/UL
EOSINOPHIL NFR BLD: 1 %
ERYTHROCYTE [DISTWIDTH] IN BLOOD BY AUTOMATED COUNT: 19 %
EST. GFR  (AFRICAN AMERICAN): >60 ML/MIN/1.73 M^2
EST. GFR  (NON AFRICAN AMERICAN): 53 ML/MIN/1.73 M^2
GLUCOSE SERPL-MCNC: 211 MG/DL
HCT VFR BLD AUTO: 21.7 %
HGB BLD-MCNC: 6.9 G/DL
INR PPP: 1.2
LYMPHOCYTES # BLD AUTO: ABNORMAL K/UL
LYMPHOCYTES NFR BLD: 14 %
MCH RBC QN AUTO: 37.7 PG
MCHC RBC AUTO-ENTMCNC: 31.8 G/DL
MCV RBC AUTO: 119 FL
MONOCYTES # BLD AUTO: ABNORMAL K/UL
MONOCYTES NFR BLD: 7 %
NEUTROPHILS NFR BLD: 72 %
NEUTS BAND NFR BLD MANUAL: 3 %
OB PNL STL: NEGATIVE
PLATELET # BLD AUTO: 248 K/UL
PMV BLD AUTO: 11 FL
POTASSIUM SERPL-SCNC: 4.3 MMOL/L
PROT SERPL-MCNC: 5.8 G/DL
PROTHROMBIN TIME: 12.7 SEC
RBC # BLD AUTO: 1.83 M/UL
SODIUM SERPL-SCNC: 132 MMOL/L
TROPONIN I SERPL DL<=0.01 NG/ML-MCNC: 0.01 NG/ML
WBC # BLD AUTO: 11.39 K/UL

## 2019-01-22 PROCEDURE — 80053 COMPREHEN METABOLIC PANEL: CPT

## 2019-01-22 PROCEDURE — 36415 COLL VENOUS BLD VENIPUNCTURE: CPT

## 2019-01-22 PROCEDURE — 82607 VITAMIN B-12: CPT

## 2019-01-22 PROCEDURE — 93010 EKG 12-LEAD: ICD-10-PCS | Mod: ,,, | Performed by: INTERNAL MEDICINE

## 2019-01-22 PROCEDURE — 94761 N-INVAS EAR/PLS OXIMETRY MLT: CPT

## 2019-01-22 PROCEDURE — 96374 THER/PROPH/DIAG INJ IV PUSH: CPT

## 2019-01-22 PROCEDURE — 85730 THROMBOPLASTIN TIME PARTIAL: CPT

## 2019-01-22 PROCEDURE — 25000003 PHARM REV CODE 250: Performed by: FAMILY MEDICINE

## 2019-01-22 PROCEDURE — 85027 COMPLETE CBC AUTOMATED: CPT

## 2019-01-22 PROCEDURE — 93010 ELECTROCARDIOGRAM REPORT: CPT | Mod: ,,, | Performed by: INTERNAL MEDICINE

## 2019-01-22 PROCEDURE — 94640 AIRWAY INHALATION TREATMENT: CPT

## 2019-01-22 PROCEDURE — 99285 EMERGENCY DEPT VISIT HI MDM: CPT | Mod: 25

## 2019-01-22 PROCEDURE — 86920 COMPATIBILITY TEST SPIN: CPT

## 2019-01-22 PROCEDURE — 83880 ASSAY OF NATRIURETIC PEPTIDE: CPT

## 2019-01-22 PROCEDURE — 83540 ASSAY OF IRON: CPT

## 2019-01-22 PROCEDURE — 84484 ASSAY OF TROPONIN QUANT: CPT

## 2019-01-22 PROCEDURE — 85610 PROTHROMBIN TIME: CPT

## 2019-01-22 PROCEDURE — 82728 ASSAY OF FERRITIN: CPT

## 2019-01-22 PROCEDURE — 83010 ASSAY OF HAPTOGLOBIN QUANT: CPT

## 2019-01-22 PROCEDURE — 86850 RBC ANTIBODY SCREEN: CPT

## 2019-01-22 PROCEDURE — 85007 BL SMEAR W/DIFF WBC COUNT: CPT

## 2019-01-22 PROCEDURE — 93005 ELECTROCARDIOGRAM TRACING: CPT

## 2019-01-22 PROCEDURE — 82272 OCCULT BLD FECES 1-3 TESTS: CPT

## 2019-01-22 PROCEDURE — 27000221 HC OXYGEN, UP TO 24 HOURS

## 2019-01-22 PROCEDURE — 81000 URINALYSIS NONAUTO W/SCOPE: CPT

## 2019-01-22 PROCEDURE — 63600175 PHARM REV CODE 636 W HCPCS: Performed by: FAMILY MEDICINE

## 2019-01-22 PROCEDURE — 11000001 HC ACUTE MED/SURG PRIVATE ROOM

## 2019-01-22 PROCEDURE — 25000242 PHARM REV CODE 250 ALT 637 W/ HCPCS: Performed by: FAMILY MEDICINE

## 2019-01-22 RX ORDER — METHYLPREDNISOLONE SOD SUCC 125 MG
125 VIAL (EA) INJECTION
Status: COMPLETED | OUTPATIENT
Start: 2019-01-22 | End: 2019-01-22

## 2019-01-22 RX ORDER — SODIUM CHLORIDE 9 MG/ML
1000 INJECTION, SOLUTION INTRAVENOUS
Status: ACTIVE | OUTPATIENT
Start: 2019-01-22 | End: 2019-01-23

## 2019-01-22 RX ORDER — HYDROCODONE BITARTRATE AND ACETAMINOPHEN 500; 5 MG/1; MG/1
TABLET ORAL
Status: DISCONTINUED | OUTPATIENT
Start: 2019-01-22 | End: 2019-01-26 | Stop reason: HOSPADM

## 2019-01-22 RX ORDER — LEVOFLOXACIN 5 MG/ML
750 INJECTION, SOLUTION INTRAVENOUS
Status: DISCONTINUED | OUTPATIENT
Start: 2019-01-23 | End: 2019-01-25

## 2019-01-22 RX ORDER — ALBUTEROL SULFATE 2.5 MG/.5ML
2.5 SOLUTION RESPIRATORY (INHALATION) EVERY 4 HOURS PRN
Status: DISCONTINUED | OUTPATIENT
Start: 2019-01-22 | End: 2019-01-26 | Stop reason: HOSPADM

## 2019-01-22 RX ORDER — IPRATROPIUM BROMIDE AND ALBUTEROL SULFATE 2.5; .5 MG/3ML; MG/3ML
3 SOLUTION RESPIRATORY (INHALATION) EVERY 4 HOURS
Status: DISCONTINUED | OUTPATIENT
Start: 2019-01-23 | End: 2019-01-26 | Stop reason: HOSPADM

## 2019-01-22 RX ORDER — SODIUM CHLORIDE 0.9 % (FLUSH) 0.9 %
5 SYRINGE (ML) INJECTION
Status: DISCONTINUED | OUTPATIENT
Start: 2019-01-22 | End: 2019-01-26 | Stop reason: HOSPADM

## 2019-01-22 RX ORDER — LEVOFLOXACIN 5 MG/ML
750 INJECTION, SOLUTION INTRAVENOUS
Status: DISCONTINUED | OUTPATIENT
Start: 2019-01-23 | End: 2019-01-22

## 2019-01-22 RX ORDER — IPRATROPIUM BROMIDE AND ALBUTEROL SULFATE 2.5; .5 MG/3ML; MG/3ML
3 SOLUTION RESPIRATORY (INHALATION)
Status: COMPLETED | OUTPATIENT
Start: 2019-01-22 | End: 2019-01-22

## 2019-01-22 RX ORDER — DOXYCYCLINE HYCLATE 100 MG
100 TABLET ORAL
Status: COMPLETED | OUTPATIENT
Start: 2019-01-22 | End: 2019-01-22

## 2019-01-22 RX ORDER — IPRATROPIUM BROMIDE AND ALBUTEROL SULFATE 2.5; .5 MG/3ML; MG/3ML
3 SOLUTION RESPIRATORY (INHALATION)
Status: DISCONTINUED | OUTPATIENT
Start: 2019-01-23 | End: 2019-01-22

## 2019-01-22 RX ADMIN — DOXYCYCLINE HYCLATE 100 MG: 100 TABLET, COATED ORAL at 07:01

## 2019-01-22 RX ADMIN — IPRATROPIUM BROMIDE AND ALBUTEROL SULFATE 3 ML: .5; 3 SOLUTION RESPIRATORY (INHALATION) at 06:01

## 2019-01-22 RX ADMIN — METHYLPREDNISOLONE SODIUM SUCCINATE 125 MG: 125 INJECTION, POWDER, FOR SOLUTION INTRAMUSCULAR; INTRAVENOUS at 07:01

## 2019-01-22 RX ADMIN — CEFTRIAXONE 1 G: 1 INJECTION, SOLUTION INTRAVENOUS at 11:01

## 2019-01-22 RX ADMIN — IPRATROPIUM BROMIDE AND ALBUTEROL SULFATE 3 ML: .5; 3 SOLUTION RESPIRATORY (INHALATION) at 07:01

## 2019-01-23 LAB
ALBUMIN SERPL BCP-MCNC: 3.6 G/DL
ALP SERPL-CCNC: 53 U/L
ALT SERPL W/O P-5'-P-CCNC: 16 U/L
ANION GAP SERPL CALC-SCNC: 10 MMOL/L
ANISOCYTOSIS BLD QL SMEAR: ABNORMAL
AST SERPL-CCNC: 11 U/L
BACTERIA #/AREA URNS HPF: ABNORMAL /HPF
BASOPHILS # BLD AUTO: ABNORMAL K/UL
BASOPHILS NFR BLD: 0 %
BILIRUB SERPL-MCNC: 2.9 MG/DL
BILIRUB UR QL STRIP: NEGATIVE
BLD PROD TYP BPU: NORMAL
BLD PROD TYP BPU: NORMAL
BLOOD UNIT EXPIRATION DATE: NORMAL
BLOOD UNIT EXPIRATION DATE: NORMAL
BLOOD UNIT TYPE CODE: 6200
BLOOD UNIT TYPE CODE: 6200
BLOOD UNIT TYPE: NORMAL
BLOOD UNIT TYPE: NORMAL
BUN SERPL-MCNC: 18 MG/DL
CALCIUM SERPL-MCNC: 9.4 MG/DL
CHLORIDE SERPL-SCNC: 92 MMOL/L
CLARITY UR: CLEAR
CO2 SERPL-SCNC: 31 MMOL/L
CODING SYSTEM: NORMAL
CODING SYSTEM: NORMAL
COLOR UR: YELLOW
CREAT SERPL-MCNC: 0.9 MG/DL
DIFFERENTIAL METHOD: ABNORMAL
DISPENSE STATUS: NORMAL
DISPENSE STATUS: NORMAL
EOSINOPHIL # BLD AUTO: ABNORMAL K/UL
EOSINOPHIL NFR BLD: 0 %
ERYTHROCYTE [DISTWIDTH] IN BLOOD BY AUTOMATED COUNT: 21.4 %
EST. GFR  (AFRICAN AMERICAN): >60 ML/MIN/1.73 M^2
EST. GFR  (NON AFRICAN AMERICAN): >60 ML/MIN/1.73 M^2
FERRITIN SERPL-MCNC: 242 NG/ML
FOLATE SERPL-MCNC: 13.5 NG/ML
GLUCOSE SERPL-MCNC: 176 MG/DL
GLUCOSE UR QL STRIP: NEGATIVE
HAPTOGLOB SERPL-MCNC: 128 MG/DL
HCT VFR BLD AUTO: 29.6 %
HGB BLD-MCNC: 9.6 G/DL
HGB UR QL STRIP: NEGATIVE
HYPOCHROMIA BLD QL SMEAR: ABNORMAL
IRON SERPL-MCNC: 49 UG/DL
KETONES UR QL STRIP: NEGATIVE
LEUKOCYTE ESTERASE UR QL STRIP: ABNORMAL
LYMPHOCYTES # BLD AUTO: ABNORMAL K/UL
LYMPHOCYTES NFR BLD: 0 %
MCH RBC QN AUTO: 34.5 PG
MCHC RBC AUTO-ENTMCNC: 32.4 G/DL
MCV RBC AUTO: 107 FL
MICROSCOPIC COMMENT: ABNORMAL
MONOCYTES # BLD AUTO: ABNORMAL K/UL
MONOCYTES NFR BLD: 10 %
NEUTROPHILS NFR BLD: 75 %
NEUTS BAND NFR BLD MANUAL: 15 %
NITRITE UR QL STRIP: NEGATIVE
OVALOCYTES BLD QL SMEAR: ABNORMAL
PH UR STRIP: 6 [PH] (ref 5–8)
PLATELET # BLD AUTO: 237 K/UL
PLATELET BLD QL SMEAR: ABNORMAL
PMV BLD AUTO: 11.6 FL
POLYCHROMASIA BLD QL SMEAR: ABNORMAL
POTASSIUM SERPL-SCNC: 4.2 MMOL/L
PROT SERPL-MCNC: 6.1 G/DL
PROT UR QL STRIP: NEGATIVE
RBC # BLD AUTO: 2.78 M/UL
RETICS/RBC NFR AUTO: 2 %
SATURATED IRON: 18 %
SODIUM SERPL-SCNC: 133 MMOL/L
SP GR UR STRIP: 1.01 (ref 1–1.03)
SQUAMOUS #/AREA URNS HPF: 8 /HPF
TOTAL IRON BINDING CAPACITY: 269 UG/DL
TRANS ERYTHROCYTES VOL PATIENT: NORMAL ML
TRANS ERYTHROCYTES VOL PATIENT: NORMAL ML
TRANSFERRIN SERPL-MCNC: 182 MG/DL
URN SPEC COLLECT METH UR: ABNORMAL
UROBILINOGEN UR STRIP-ACNC: NEGATIVE EU/DL
VIT B12 SERPL-MCNC: 413 PG/ML
WBC # BLD AUTO: 24.44 K/UL
WBC #/AREA URNS HPF: 8 /HPF (ref 0–5)

## 2019-01-23 PROCEDURE — 94660 CPAP INITIATION&MGMT: CPT

## 2019-01-23 PROCEDURE — 85027 COMPLETE CBC AUTOMATED: CPT

## 2019-01-23 PROCEDURE — 27000190 HC CPAP FULL FACE MASK W/VALVE

## 2019-01-23 PROCEDURE — 94640 AIRWAY INHALATION TREATMENT: CPT

## 2019-01-23 PROCEDURE — 36415 COLL VENOUS BLD VENIPUNCTURE: CPT

## 2019-01-23 PROCEDURE — 27000221 HC OXYGEN, UP TO 24 HOURS

## 2019-01-23 PROCEDURE — 94761 N-INVAS EAR/PLS OXIMETRY MLT: CPT

## 2019-01-23 PROCEDURE — 25000242 PHARM REV CODE 250 ALT 637 W/ HCPCS: Performed by: INTERNAL MEDICINE

## 2019-01-23 PROCEDURE — 63600175 PHARM REV CODE 636 W HCPCS: Performed by: INTERNAL MEDICINE

## 2019-01-23 PROCEDURE — 87040 BLOOD CULTURE FOR BACTERIA: CPT | Mod: 59

## 2019-01-23 PROCEDURE — 80053 COMPREHEN METABOLIC PANEL: CPT

## 2019-01-23 PROCEDURE — 82746 ASSAY OF FOLIC ACID SERUM: CPT

## 2019-01-23 PROCEDURE — 11000001 HC ACUTE MED/SURG PRIVATE ROOM

## 2019-01-23 PROCEDURE — 99232 PR SUBSEQUENT HOSPITAL CARE,LEVL II: ICD-10-PCS | Mod: ,,, | Performed by: FAMILY MEDICINE

## 2019-01-23 PROCEDURE — 85045 AUTOMATED RETICULOCYTE COUNT: CPT

## 2019-01-23 PROCEDURE — 85007 BL SMEAR W/DIFF WBC COUNT: CPT

## 2019-01-23 PROCEDURE — 99900035 HC TECH TIME PER 15 MIN (STAT)

## 2019-01-23 PROCEDURE — 94664 DEMO&/EVAL PT USE INHALER: CPT

## 2019-01-23 PROCEDURE — 36430 TRANSFUSION BLD/BLD COMPNT: CPT

## 2019-01-23 PROCEDURE — 25000003 PHARM REV CODE 250: Performed by: NURSE PRACTITIONER

## 2019-01-23 PROCEDURE — 25000003 PHARM REV CODE 250: Performed by: FAMILY MEDICINE

## 2019-01-23 PROCEDURE — 99232 SBSQ HOSP IP/OBS MODERATE 35: CPT | Mod: ,,, | Performed by: FAMILY MEDICINE

## 2019-01-23 PROCEDURE — 27000646 HC AEROBIKA DEVICE

## 2019-01-23 PROCEDURE — P9021 RED BLOOD CELLS UNIT: HCPCS

## 2019-01-23 RX ORDER — TRAZODONE HYDROCHLORIDE 50 MG/1
50 TABLET ORAL NIGHTLY
Status: DISCONTINUED | OUTPATIENT
Start: 2019-01-23 | End: 2019-01-26 | Stop reason: HOSPADM

## 2019-01-23 RX ORDER — PRAVASTATIN SODIUM 40 MG/1
40 TABLET ORAL DAILY
Status: DISCONTINUED | OUTPATIENT
Start: 2019-01-23 | End: 2019-01-26 | Stop reason: HOSPADM

## 2019-01-23 RX ORDER — DILTIAZEM HYDROCHLORIDE 60 MG/1
120 TABLET, FILM COATED ORAL DAILY
Status: DISCONTINUED | OUTPATIENT
Start: 2019-01-23 | End: 2019-01-25

## 2019-01-23 RX ORDER — ROPINIROLE 0.5 MG/1
1 TABLET, FILM COATED ORAL NIGHTLY
Status: DISCONTINUED | OUTPATIENT
Start: 2019-01-23 | End: 2019-01-26 | Stop reason: HOSPADM

## 2019-01-23 RX ORDER — DIGOXIN 125 MCG
0.12 TABLET ORAL DAILY
Status: DISCONTINUED | OUTPATIENT
Start: 2019-01-23 | End: 2019-01-24

## 2019-01-23 RX ADMIN — ROPINIROLE HYDROCHLORIDE 1 MG: 0.5 TABLET, FILM COATED ORAL at 08:01

## 2019-01-23 RX ADMIN — PRAVASTATIN SODIUM 40 MG: 40 TABLET ORAL at 10:01

## 2019-01-23 RX ADMIN — IPRATROPIUM BROMIDE AND ALBUTEROL SULFATE 3 ML: .5; 3 SOLUTION RESPIRATORY (INHALATION) at 03:01

## 2019-01-23 RX ADMIN — METHYLPREDNISOLONE SODIUM SUCCINATE 40 MG: 40 INJECTION, POWDER, FOR SOLUTION INTRAMUSCULAR; INTRAVENOUS at 01:01

## 2019-01-23 RX ADMIN — CEFTRIAXONE 1 G: 1 INJECTION, SOLUTION INTRAVENOUS at 12:01

## 2019-01-23 RX ADMIN — IPRATROPIUM BROMIDE AND ALBUTEROL SULFATE 3 ML: .5; 3 SOLUTION RESPIRATORY (INHALATION) at 08:01

## 2019-01-23 RX ADMIN — SODIUM CHLORIDE: 0.9 INJECTION, SOLUTION INTRAVENOUS at 12:01

## 2019-01-23 RX ADMIN — METHYLPREDNISOLONE SODIUM SUCCINATE 40 MG: 40 INJECTION, POWDER, FOR SOLUTION INTRAMUSCULAR; INTRAVENOUS at 08:01

## 2019-01-23 RX ADMIN — LEVOFLOXACIN 750 MG: 5 INJECTION, SOLUTION INTRAVENOUS at 04:01

## 2019-01-23 RX ADMIN — IPRATROPIUM BROMIDE AND ALBUTEROL SULFATE 3 ML: .5; 3 SOLUTION RESPIRATORY (INHALATION) at 11:01

## 2019-01-23 RX ADMIN — TRAZODONE HYDROCHLORIDE 50 MG: 50 TABLET ORAL at 08:01

## 2019-01-23 RX ADMIN — DIGOXIN 0.12 MG: 125 TABLET ORAL at 10:01

## 2019-01-23 RX ADMIN — DILTIAZEM HYDROCHLORIDE 120 MG: 60 TABLET, FILM COATED ORAL at 10:01

## 2019-01-23 RX ADMIN — METHYLPREDNISOLONE SODIUM SUCCINATE 40 MG: 40 INJECTION, POWDER, FOR SOLUTION INTRAMUSCULAR; INTRAVENOUS at 05:01

## 2019-01-23 RX ADMIN — IPRATROPIUM BROMIDE AND ALBUTEROL SULFATE 3 ML: .5; 3 SOLUTION RESPIRATORY (INHALATION) at 12:01

## 2019-01-23 RX ADMIN — IPRATROPIUM BROMIDE AND ALBUTEROL SULFATE 3 ML: .5; 3 SOLUTION RESPIRATORY (INHALATION) at 07:01

## 2019-01-23 NOTE — ED NOTES
Patient on continuous oxygen at home at 3LPM.  Spouse reports that she spoke w Dr. Hickman's nurse and was told to come to ER.

## 2019-01-23 NOTE — ASSESSMENT & PLAN NOTE
Using bipap at night  Using 6L NC TKS>90%  She needs to be a DNR as this is basically end stage COPD/resp failure. I had made her a DNR in my office several months ago. Living will was filled out and signed by me. Scanned into chart, but I can't find it in Epic. Where is it?

## 2019-01-23 NOTE — PLAN OF CARE
01/23/19 0857   Advance Directives (For Healthcare)   Advance Directive  (If Adv Dir status is received, view document under Code in header or Chart Review Media tab) Patient has advance directive, copy not received.         Patient states that she has a living will. She states that she would like to be a full code in the event of cardiac/respiratory arrest. Code status in Epic is correct.

## 2019-01-23 NOTE — H&P
[facility] Medicine  History & Physical    Patient Name: Chantell Herrera  MRN: [id number]  Admission Date: 1/22/2019  Attending Physician: Milo Estevez MD   Primary Care Provider: Anson Leiva MD         Patient information was obtained from patient and ER records.     Subjective:     Principal Problem:Pneumonia    Chief Complaint:   Chief Complaint   Patient presents with    Shortness of Breath        HPI: 84yo female patient with PMHX of anxiety, arthritis, COPD, Afib, CHF, depression, HLD, HTN, and anemia. She presented to ER last night with c/o SOB 4 days ago. She reports that she was also having SANTIAGO. She was having trouble getting 5-10 ft without SOB, usually can get 50 ft or so. Denies wheezing at home.Was coughing, occasionally productive (green). She was recently treated 1/10 with azithromycin and prednisone. CXR with pneumonia. Admitted with Dr King knowles. ON levaquin and rocephin,nebs and medrol.     Has hx of anemia in younger years.She has never received blood before. She is now on her 2nd unit. She reports that she has no appetite.     Past Medical History:   Diagnosis Date    Anxiety     Arthritis     Asthma     Atrial fibrillation with rapid ventricular response     CHF (congestive heart failure) 11/29/2018    Chronic bronchitis     COPD (chronic obstructive pulmonary disease)     Depression     SANTIAGO (dyspnea on exertion)     Emphysema of lung     Fatigue     Hyperlipidemia     Hypertension     Symptomatic anemia 1/22/2019    Trouble in sleeping        Past Surgical History:   Procedure Laterality Date    APPENDECTOMY      EYE SURGERY      HYSTERECTOMY      TONSILLECTOMY         Review of patient's allergies indicates:  No Known Allergies    No current facility-administered medications on file prior to encounter.      Current Outpatient Medications on File Prior to Encounter   Medication Sig    albuterol (PROVENTIL) 2.5 mg /3 mL (0.083 %)  nebulizer solution Take 3 mLs (2.5 mg total) by nebulization every 6 (six) hours as needed for Wheezing.    amLODIPine (NORVASC) 10 MG tablet Take 1 tablet (10 mg total) by mouth once daily.    baclofen (LIORESAL) 10 MG tablet Take 1 tablet (10 mg total) by mouth 3 (three) times daily.    cholecalciferol, vitamin D3, (VITAMIN D3) 2,000 unit Tab Take 2,000 Units by mouth once daily.    citalopram (CELEXA) 40 MG tablet TAKE 1 TABLET EVERY DAY    digoxin (LANOXIN) 125 mcg tablet Take 1 tablet (0.125 mg total) by mouth once daily.    diltiaZEM (CARDIZEM) 120 MG tablet Take 1 tablet by mouth once daily.    ELIQUIS 5 mg Tab Take 1 tablet by mouth 2 (two) times daily.    furosemide (LASIX) 20 MG tablet Take 1 tablet (20 mg total) by mouth once daily.    ipratropium (ATROVENT) 0.02 % nebulizer solution Take 500 mcg by nebulization 4 (four) times daily.     losartan (COZAAR) 100 MG tablet Take 1 tablet (100 mg total) by mouth once daily.    pravastatin (PRAVACHOL) 40 MG tablet TAKE 1 TABLET EVERY DAY    predniSONE (DELTASONE) 10 MG tablet Take 40 mg for one day, the 30 mg for 3 days then resume 20 mg po daily    rOPINIRole (REQUIP) 1 MG tablet TAKE 1 TABLET EVERY EVENING.    traZODone (DESYREL) 50 MG tablet Take 1 tablet (50 mg total) by mouth every evening.    artificial tears,hypromellose, 0.3 % Drop continuous prn.      Family History     Problem Relation (Age of Onset)    COPD Brother, Daughter    Cancer Sister    Diabetes Father    Heart disease Mother    Hypertension Mother, Father, Brother    Kidney disease Son    No Known Problems Daughter        Tobacco Use    Smoking status: Former Smoker     Last attempt to quit: 8/3/2000     Years since quittin.4    Smokeless tobacco: Never Used   Substance and Sexual Activity    Alcohol use: No    Drug use: No    Sexual activity: No     Review of Systems   Constitutional: Positive for activity change (due to SANTIAGO). Negative for chills, fatigue and fever.    HENT: Positive for congestion. Negative for sore throat.    Respiratory: Positive for cough and shortness of breath. Negative for wheezing.    Cardiovascular: Negative for chest pain and palpitations.   Gastrointestinal: Negative for blood in stool, constipation, diarrhea, nausea and vomiting.        Denies black or dark stools   Genitourinary: Negative for dysuria, frequency, hematuria and urgency.   Musculoskeletal: Positive for back pain (lower back chronic, worse due to laying in bed).   Neurological: Positive for dizziness (with coughing), light-headedness (with coughing) and numbness (occassionally the tips of fingers feel numb). Negative for weakness and headaches.     Objective:     Vital Signs (Most Recent):  Temp: 97.7 °F (36.5 °C) (01/23/19 0729)  Pulse: 85 (01/23/19 0731)  Resp: 20 (01/23/19 0731)  BP: 125/60 (01/23/19 0729)  SpO2: (!) 90 % (01/23/19 0731) Vital Signs (24h Range):  Temp:  [96.7 °F (35.9 °C)-99.7 °F (37.6 °C)] 97.7 °F (36.5 °C)  Pulse:  [65-86] 85  Resp:  [17-38] 20  SpO2:  [77 %-93 %] 90 %  BP: (106-127)/(50-72) 125/60     Weight: 61.2 kg (134 lb 14.7 oz)  Body mass index is 25.49 kg/m².    Physical Exam        Significant Labs:   CBC:   Recent Labs   Lab 01/22/19 1837   WBC 11.39   HGB 6.9*   HCT 21.7*      iron 49, transferrin 182, TIBC 269, sat iron 18  Ferritin 242  b12 413  Haptoglobin 128  Occult stool negative    CMP:   Recent Labs   Lab 01/22/19 1837   *   K 4.3   CL 93*   CO2 31*   *   BUN 12   CREATININE 1.0   CALCIUM 8.8   PROT 5.8*   ALBUMIN 3.5   BILITOT 0.8   ALKPHOS 54*   AST 9*   ALT 15   ANIONGAP 8   EGFRNONAA 53*     Cardiac Markers:   Recent Labs   Lab 01/22/19 1837   *     Coagulation:   Recent Labs   Lab 01/22/19 1837   INR 1.2   APTT 27.3     Troponin:   Recent Labs   Lab 01/22/19  1837   TROPONINI 0.014     Urine Studies:   Recent Labs   Lab 01/22/19  2345   COLORU Yellow   APPEARANCEUA Clear   PHUR 6.0   SPECGRAV 1.010   PROTEINUA  Negative   GLUCUA Negative   KETONESU Negative   BILIRUBINUA Negative   OCCULTUA Negative   NITRITE Negative   UROBILINOGEN Negative   LEUKOCYTESUR Trace*   WBCUA 8*   BACTERIA Few*   SQUAMEPITHEL 8       Significant Imagin/22 CXR EKG leads overlie the chest obscuring detail.  Subsegmental atelectasis or scarring at the left lung base is noted.  There is new hazy opacity in the right lower lung which may relate to infection in the appropriate clinical setting..  Follow-up exam can be performed as indicated.  Aortic atherosclerosis and tortuosity.  Cardiomediastinal silhouette remains enlarged.  Degenerative changes of the shoulder   girdles are present     EKG NSR    Assessment/Plan:     * Pneumonia    On levaquin and rocephin  duonebs   Medrol 40mg IV q 8hr  Dr nino following         Acute on chronic respiratory failure with hypoxia    Using bipap at night  Using 6L NC TKS>90%  She needs to be a DNR as this is basically end stage COPD/resp failure. I had made her a DNR in my office several months ago. Living will was filled out and signed by me. Scanned into chart, but I can't find it in Epic. Where is it?        Symptomatic anemia    Not fe deficient, b12 normal, occult negative. H/H , given 2 units PRBC, repeat CBC pending  eliquis held      Check retic and folate     Atrial fibrillation with rapid ventricular response    Holding elequis due to significant anemia  Cont dig        COPD exacerbation    Medrol 40mg IV every 8hr  bipap ordered at night  Using 6L TKS >90%       RLS (restless legs syndrome)    Cont ropinirole       Oxygen dependent    On home O2 3L at home. Using 6L this am TKS >90%. She is receiving her 2nd unot of blood hopefully this helps       Dyslipidemia    Cont statin       CKD (chronic kidney disease), stage III    ths is well BUN/creat 12/1.0       Hyponatremia    Hold SSRI  Hold lasix  Getting blood with NS   Repeat cmp       Major depressive disorder with single episode, in  full remission    celexa on hold for now, she was on 40mg po daily   It interacts with levaquin       Insomnia    trazadone reordered       Cervical radiculopathy    Hold baclofen for now       HTN (hypertension)    Holding lasix  Holding ARB  bp stable without for now  Cont CCB         VTE Risk Mitigation (From admission, onward)        Ordered     Place sequential compression device  Until discontinued      01/23/19 0935     IP VTE HIGH RISK PATIENT  Once      01/22/19 9227             Anson Leiva MD  Department of Hospital Medicine   [facility]

## 2019-01-23 NOTE — PLAN OF CARE
01/23/19 1038   Medicare Message   Important Message from Medicare regarding Discharge Appeal Rights Given to patient/caregiver;Explained to patient/caregiver;Signed/date by patient/caregiver   Date IMM was signed 01/23/19   Time IMM was signed 1037

## 2019-01-23 NOTE — ED PROVIDER NOTES
Encounter Date: 2019       History     Chief Complaint   Patient presents with    Shortness of Breath     Patient is an 83-year-old female who presents to the emergency department with complaints of progressive shortness of breath onset yesterday evening.  Patient has a history of COPD.  She is oxygen dependent normal in head tube 3 liters.  Some cough nonproductive started overnight.  Patient has not had any fever.  Progressive shortness of breath with low oxygen saturations inches the 80s she consulted her primary care provider in the referred her to the emergency department for further evaluation and treatment.  She describes some chest tightness.  She had 1 duodenum breathing treatment approximately 1 hour before arrival.  Physical exam she appears in mild distress with accessory muscle use for respiration.          Review of patient's allergies indicates:  No Known Allergies  Past Medical History:   Diagnosis Date    Anxiety     Arthritis     Asthma     Atrial fibrillation with rapid ventricular response     CHF (congestive heart failure) 2018    Chronic bronchitis     COPD (chronic obstructive pulmonary disease)     Depression     SANTIAGO (dyspnea on exertion)     Emphysema of lung     Fatigue     Hyperlipidemia     Hypertension     Trouble in sleeping      Past Surgical History:   Procedure Laterality Date    APPENDECTOMY      EYE SURGERY      HYSTERECTOMY      TONSILLECTOMY       Family History   Problem Relation Age of Onset    Heart disease Mother     Hypertension Mother     Hypertension Father     Diabetes Father     Cancer Sister     COPD Brother     Hypertension Brother     No Known Problems Daughter     Kidney disease Son     COPD Daughter      Social History     Tobacco Use    Smoking status: Former Smoker     Last attempt to quit: 8/3/2000     Years since quittin.4    Smokeless tobacco: Never Used   Substance Use Topics    Alcohol use: No    Drug use: No      Review of Systems   Constitutional: Negative for fever.   HENT: Negative for sore throat.    Respiratory: Positive for cough, chest tightness and shortness of breath.    Cardiovascular: Negative for chest pain, palpitations and leg swelling.   Gastrointestinal: Negative for nausea.   Genitourinary: Negative for dysuria.   Musculoskeletal: Negative for back pain.   Skin: Negative for rash.   Neurological: Negative for weakness.   Hematological: Does not bruise/bleed easily.   All other systems reviewed and are negative.      Physical Exam     Initial Vitals [01/22/19 1800]   BP Pulse Resp Temp SpO2   (!) 106/51 74 (!) 22 99.7 °F (37.6 °C) (!) 82 %      MAP       --         Physical Exam    Nursing note and vitals reviewed.  Constitutional: She appears well-developed. She is active and cooperative. She appears ill. She appears distressed (mild).   HENT:   Head: Normocephalic and atraumatic.   Mouth/Throat: Uvula is midline and oropharynx is clear and moist.   Eyes: Conjunctivae and EOM are normal. Pupils are equal, round, and reactive to light.   Neck: Normal range of motion. Neck supple.   Cardiovascular: Normal rate, regular rhythm, normal heart sounds and intact distal pulses. Exam reveals no friction rub.    No murmur heard.  Pulmonary/Chest: Accessory muscle usage present. Tachypnea noted. No apnea. She is in respiratory distress. She has decreased breath sounds in the right lower field and the left lower field. She has wheezes in the right middle field and the left middle field. She has no rhonchi. She has no rales.   Abdominal: Soft. Bowel sounds are normal. She exhibits no distension. There is no tenderness. There is no rebound and no guarding.   Neurological: She is alert and oriented to person, place, and time.   Skin: Skin is warm and dry. Capillary refill takes 2 to 3 seconds.   Psychiatric: She has a normal mood and affect.         ED Course   Procedures  Labs Reviewed   CBC W/ AUTO DIFFERENTIAL    COMPREHENSIVE METABOLIC PANEL   TROPONIN I   B-TYPE NATRIURETIC PEPTIDE   APTT   PROTIME-INR     EKG Readings: (Independently Interpreted)   Initial Reading: No STEMI. Rhythm: Normal Sinus Rhythm. Ectopy: No Ectopy. T Waves Flipped: AVL. Axis: Left Axis Deviation.       Imaging Results    None                               Clinical Impression:   The primary encounter diagnosis was Symptomatic anemia. Diagnoses of Chest pain and COPD with exacerbation were also pertinent to this visit.                             Veto Giraldo MD  01/23/19 0144

## 2019-01-23 NOTE — PROGRESS NOTES
[facility]  Pulmonology  Progress Note    Patient Name: Chantell Herrera  MRN: [id number]  Admission Date: 1/22/2019  Hospital Length of Stay: 1 days  Code Status: Full Code  Attending Provider: Milo Estevez MD  Primary Care Provider: Anson Leiva MD   Principal Problem: Pneumonia    Subjective:   On bipap O2 sat is in the upper 80's to low 90's . Pt seems to be tolerating . Home Ventilator will tv should improve things even more  will order that for home use   Interval History: better wore bipap 1 to 2 hours     Objective:     Vital Signs (Most Recent):  Temp: 98.1 °F (36.7 °C) (01/23/19 1105)  Pulse: 86 (01/23/19 1129)  Resp: 18 (01/23/19 1129)  BP: (!) 111/53 (01/23/19 1105)  SpO2: (!) 87 % (01/23/19 1129) Vital Signs (24h Range):  Temp:  [96.7 °F (35.9 °C)-99.7 °F (37.6 °C)] 98.1 °F (36.7 °C)  Pulse:  [65-92] 86  Resp:  [17-38] 18  SpO2:  [77 %-93 %] 87 %  BP: (106-127)/(50-72) 111/53     Weight: 61.2 kg (134 lb 14.7 oz)  Body mass index is 25.49 kg/m².      Intake/Output Summary (Last 24 hours) at 1/23/2019 1140  Last data filed at 1/23/2019 1000  Gross per 24 hour   Intake 1325.33 ml   Output 650 ml   Net 675.33 ml       Physical Exam   Constitutional: She is oriented to person, place, and time. She appears well-developed and well-nourished. She is cooperative.  Non-toxic appearance. She does not appear ill. No distress.   HENT:   Head: Normocephalic and atraumatic.   Right Ear: Hearing, external ear and ear canal normal. Tympanic membrane is not injected.   Left Ear: Hearing, tympanic membrane, external ear and ear canal normal. Tympanic membrane is not injected.   Nose: Nose normal. No mucosal edema, rhinorrhea or nasal deformity. No epistaxis. Right sinus exhibits no maxillary sinus tenderness and no frontal sinus tenderness. Left sinus exhibits no maxillary sinus tenderness and no frontal sinus tenderness.   Mouth/Throat: Uvula is midline, oropharynx is clear and moist and  mucous membranes are normal. No trismus in the jaw. Normal dentition. No uvula swelling. No posterior oropharyngeal erythema.   Eyes: Conjunctivae and lids are normal. No scleral icterus.   Sclera clear bilat   Neck: Trachea normal, full passive range of motion without pain and phonation normal. Neck supple.   Cardiovascular: Normal rate, regular rhythm, normal heart sounds, intact distal pulses and normal pulses.   Pulmonary/Chest: She is in respiratory distress (mild to moderate). She has decreased breath sounds in the right upper field, the right middle field, the right lower field, the left upper field, the left middle field and the left lower field. She has wheezes in the right lower field and the left lower field. She has rhonchi in the right lower field and the left lower field.           Abdominal: Soft. Normal appearance and bowel sounds are normal. She exhibits no distension. There is no tenderness.   Musculoskeletal: Normal range of motion. She exhibits no edema or deformity.   Neurological: She is alert and oriented to person, place, and time. She exhibits normal muscle tone. Coordination normal.   Skin: Skin is warm, dry and intact. She is not diaphoretic. No pallor.   Psychiatric: She has a normal mood and affect. Her speech is normal and behavior is normal. Judgment and thought content normal. Cognition and memory are normal.   Nursing note and vitals reviewed.      Vents:  Oxygen Concentration (%): 30 (01/23/19 0005)    Lines/Drains/Airways     Peripheral Intravenous Line                 Peripheral IV - Single Lumen 01/22/19 1931 Right Antecubital less than 1 day                Significant Labs:    CBC/Anemia Profile:  Recent Labs   Lab 01/22/19  1837 01/22/19  2134 01/23/19  1035   WBC 11.39  --  24.44*   HGB 6.9*  --  9.6*   HCT 21.7*  --  29.6*     --  237   *  --  107*   RDW 19.0*  --  21.4*   IRON 49  --   --    FERRITIN 242  --   --    RETIC  --   --  2.0   [id number] 413  --   --     OCCULTBLOOD  --  Negative  --         Chemistries:  Recent Labs   Lab 01/22/19  1837 01/23/19  1035   * 133*   K 4.3 4.2   CL 93* 92*   CO2 31* 31*   BUN 12 18   CREATININE 1.0 0.9   CALCIUM 8.8 9.4   ALBUMIN 3.5 3.6   PROT 5.8* 6.1   BILITOT 0.8 2.9*   ALKPHOS 54* 53*   ALT 15 16   AST 9* 11       All pertinent labs within the past 24 hours have been reviewed.    Significant Imaging:  I have reviewed all pertinent imaging results/findings within the past 24 hours.    Assessment/Plan:     * Pneumonia    RLL pneumonia      Acute on chronic respiratory failure with hypoxia    On o2 3 liters sat 85%     Oxygen dependent    Severe respiratory failure      Emphysema/COPD    End stage disease will attempt to oxygenate with niv or home ventilator to oxygenate     * Chronic respiratory Failure   For years has reached the point that home ventilation is necessary  Needs to have Non Invasive Ventilation with a Respiratory Rate .  Patient has Chronic Respiratory Failure and COPD.Patient severely desaturates their PO2 during Sleep or while relaxing. Patient needs Non Invasive Ventilation day and night in order to decrease Work of Breathing and improve Oxygenation by recruitment and distension of the alveoli and terminal Bronchioles. Patient has failed Bipap due to severity of patients condition.Paient could die at home without Non Invasive Ventilation or have frequent/increased admissions to hospital.   * Anemia   ?etiology     Emmanuel Hickman MD  Pulmonology  [facility]

## 2019-01-23 NOTE — ASSESSMENT & PLAN NOTE
On home O2 3L at home. Using 6L this am TKS >90%. She is receiving her 2nd unot of blood hopefully this helps

## 2019-01-23 NOTE — SUBJECTIVE & OBJECTIVE
Interval History: better wore bipap 1 to 2 hours     Objective:     Vital Signs (Most Recent):  Temp: 98.1 °F (36.7 °C) (01/23/19 1105)  Pulse: 86 (01/23/19 1129)  Resp: 18 (01/23/19 1129)  BP: (!) 111/53 (01/23/19 1105)  SpO2: (!) 87 % (01/23/19 1129) Vital Signs (24h Range):  Temp:  [96.7 °F (35.9 °C)-99.7 °F (37.6 °C)] 98.1 °F (36.7 °C)  Pulse:  [65-92] 86  Resp:  [17-38] 18  SpO2:  [77 %-93 %] 87 %  BP: (106-127)/(50-72) 111/53     Weight: 61.2 kg (134 lb 14.7 oz)  Body mass index is 25.49 kg/m².      Intake/Output Summary (Last 24 hours) at 1/23/2019 1140  Last data filed at 1/23/2019 1000  Gross per 24 hour   Intake 1325.33 ml   Output 650 ml   Net 675.33 ml       Physical Exam   Constitutional: She is oriented to person, place, and time. She appears well-developed and well-nourished. She is cooperative.  Non-toxic appearance. She does not appear ill. No distress.   HENT:   Head: Normocephalic and atraumatic.   Right Ear: Hearing, external ear and ear canal normal. Tympanic membrane is not injected.   Left Ear: Hearing, tympanic membrane, external ear and ear canal normal. Tympanic membrane is not injected.   Nose: Nose normal. No mucosal edema, rhinorrhea or nasal deformity. No epistaxis. Right sinus exhibits no maxillary sinus tenderness and no frontal sinus tenderness. Left sinus exhibits no maxillary sinus tenderness and no frontal sinus tenderness.   Mouth/Throat: Uvula is midline, oropharynx is clear and moist and mucous membranes are normal. No trismus in the jaw. Normal dentition. No uvula swelling. No posterior oropharyngeal erythema.   Eyes: Conjunctivae and lids are normal. No scleral icterus.   Sclera clear bilat   Neck: Trachea normal, full passive range of motion without pain and phonation normal. Neck supple.   Cardiovascular: Normal rate, regular rhythm, normal heart sounds, intact distal pulses and normal pulses.   Pulmonary/Chest: She is in respiratory distress (mild to moderate). She has  decreased breath sounds in the right upper field, the right middle field, the right lower field, the left upper field, the left middle field and the left lower field. She has wheezes in the right lower field and the left lower field. She has rhonchi in the right lower field and the left lower field.           Abdominal: Soft. Normal appearance and bowel sounds are normal. She exhibits no distension. There is no tenderness.   Musculoskeletal: Normal range of motion. She exhibits no edema or deformity.   Neurological: She is alert and oriented to person, place, and time. She exhibits normal muscle tone. Coordination normal.   Skin: Skin is warm, dry and intact. She is not diaphoretic. No pallor.   Psychiatric: She has a normal mood and affect. Her speech is normal and behavior is normal. Judgment and thought content normal. Cognition and memory are normal.   Nursing note and vitals reviewed.      Vents:  Oxygen Concentration (%): 30 (01/23/19 0005)    Lines/Drains/Airways     Peripheral Intravenous Line                 Peripheral IV - Single Lumen 01/22/19 1931 Right Antecubital less than 1 day                Significant Labs:    CBC/Anemia Profile:  Recent Labs   Lab 01/22/19 1837 01/22/19 2134 01/23/19  1035   WBC 11.39  --  24.44*   HGB 6.9*  --  9.6*   HCT 21.7*  --  29.6*     --  237   *  --  107*   RDW 19.0*  --  21.4*   IRON 49  --   --    FERRITIN 242  --   --    RETIC  --   --  2.0   [id number] 413  --   --    OCCULTBLOOD  --  Negative  --         Chemistries:  Recent Labs   Lab 01/22/19 1837 01/23/19  1035   * 133*   K 4.3 4.2   CL 93* 92*   CO2 31* 31*   BUN 12 18   CREATININE 1.0 0.9   CALCIUM 8.8 9.4   ALBUMIN 3.5 3.6   PROT 5.8* 6.1   BILITOT 0.8 2.9*   ALKPHOS 54* 53*   ALT 15 16   AST 9* 11       All pertinent labs within the past 24 hours have been reviewed.    Significant Imaging:  I have reviewed all pertinent imaging results/findings within the past 24 hours.

## 2019-01-23 NOTE — PLAN OF CARE
01/23/19 0858   Discharge Assessment   Assessment Type Discharge Planning Assessment   Confirmed/corrected address and phone number on facesheet? Yes   Assessment information obtained from? Patient   Expected Length of Stay (days) 3   Communicated expected length of stay with patient/caregiver yes   Prior to hospitilization cognitive status: Alert/Oriented   Prior to hospitalization functional status: Independent   Current cognitive status: Alert/Oriented   Current Functional Status: Needs Assistance   Lives With spouse   Able to Return to Prior Arrangements yes   Is patient able to care for self after discharge? Yes   Who are your caregiver(s) and their phone number(s)? Spouse- Jordan [phone]   Patient's perception of discharge disposition home health   Readmission Within the Last 30 Days no previous admission in last 30 days   Patient currently being followed by outpatient case management? Yes   If yes, name of outpatient case management following: [facility] outpatient case management   Equipment Currently Used at Home oxygen;shower chair   Do you have any problems affording any of your prescribed medications? No   Is the patient taking medications as prescribed? yes   Does the patient have transportation home? Yes   Transportation Anticipated family or friend will provide   Dialysis Name and Scheduled days n/a   Does the patient receive services at the [facility]? No   Discharge Plan A Home Health   Discharge Plan B Home Health   DME Needed Upon Discharge  rollator   Patient/Family in Agreement with Plan yes         Patient admitted for symptomatic anemia, COPD, and questionable pneumonia. She states she lives at home with spouse who takes care of her. She is currently being followed my outpatient case management, and does have [facility] home health. This should resume upon discharge. She states she would like a rollator upon discharge. Discharge planning brochure and educational handout of what  signs and symptoms to look for after discharge given to patient and family. Patient and family are encouraged to call with any questions or help the would need. Contact information given. CM will continue to follow patient throughout the transitions of care, and assist with any discharge needs.

## 2019-01-23 NOTE — PLAN OF CARE
Problem: Adult Inpatient Plan of Care  Goal: Plan of Care Review  Outcome: Ongoing (interventions implemented as appropriate)  Pt agrees with plan of care.    VS stable.  Pt tolerated blood infusion well.  IV antibiotics given as ordered.  No complaints of pain noted.  Call bell in reach.  Will continue to monitor.

## 2019-01-23 NOTE — ASSESSMENT & PLAN NOTE
Not fe deficient, b12 normal, occult negative. H/H 6/21, given 2 units PRBC, repeat CBC pending  eliquis held      Check retic and folate

## 2019-01-23 NOTE — HPI
84yo female patient with PMHX of anxiety, arthritis, COPD, Afib, CHF, depression, HLD, HTN, and anemia. She presented to ER last night with c/o SOB 4 days ago. She reports that she was also having SANTIAGO. She was having trouble getting 5-10 ft without SOB, usually can get 50 ft or so. Denies wheezing at home.Was coughing, occasionally productive (green). She was recently treated 1/10 with azithromycin and prednisone. CXR with pneumonia. Admitted with Dr Hickman consult. ON levaquin and rocephin,nebs and medrol.     Has hx of anemia in younger years.She has never received blood before. She is now on her 2nd unit. She reports that she has no appetite.

## 2019-01-23 NOTE — NURSING
Bedside report received from darryl.  VS completed.  Oxygen sat 77%  On 5liters.  Dr. Estevez notified.  Pt oxygen sat improved to 85% on 5l.  No new orders noted.  Will continue to monitor.  Will type and match pt for blood transfusion.  Call bell in reach.  Family at bedside.

## 2019-01-23 NOTE — PLAN OF CARE
01/23/19 1417   Post-Acute Status   Post-Acute Authorization E   E Status Authorization Obtained         Rollator ordered. Spoke with Annie with [employer] who gave permission to pull from the [employer] for the rollator. Representative states that its a 20% co-pay, which is about $20. Patient states she is ok with this amount. Pulled rollator from [employer]. Education packet along with [employer] contact information given to patient. Delivery ticket signed. Order, face sheet, and signed delivery ticket brought back to designated place in [employer].

## 2019-01-23 NOTE — CONSULTS
[facility]  Pulmonology  Consult Note    Patient Name: Chantell Herrera  MRN: [id number]  Admission Date: 1/22/2019  Hospital Length of Stay: 0 days  Code Status: Full Code  Attending Physician: Milo Estevez MD  Primary Care Provider: Anson Leiva MD   Principal Problem: <principal problem not specified>    Consults  Subjective:     HPI:  Chantell Herrera is a 83 y.o. year old female that's presents with a chief complaint of progressive SOB and Wheezing for 14 days.She was in hospital last month for worsening sob and cough that has worsened more fatigue now and sob . Found to have Hb 6.9 had been started on eloquise for afib recently .Stool was negative for blood will transefuse and follow clinically  Consulted to evaluate Respiratory status.    Past Medical History:   Diagnosis Date    Anxiety     Arthritis     Asthma     Atrial fibrillation with rapid ventricular response     CHF (congestive heart failure) 11/29/2018    Chronic bronchitis     COPD (chronic obstructive pulmonary disease)     Depression     SANTIAGO (dyspnea on exertion)     Emphysema of lung     Fatigue     Hyperlipidemia     Hypertension     Symptomatic anemia 1/22/2019    Trouble in sleeping         Past Surgical History:   Procedure Laterality Date    APPENDECTOMY      EYE SURGERY      HYSTERECTOMY      TONSILLECTOMY         Prior to Admission medications    Medication Sig Start Date End Date Taking? Authorizing Provider   albuterol (PROVENTIL) 2.5 mg /3 mL (0.083 %) nebulizer solution Take 3 mLs (2.5 mg total) by nebulization every 6 (six) hours as needed for Wheezing. 6/27/16  Yes Anson Leiva MD   amLODIPine (NORVASC) 10 MG tablet Take 1 tablet (10 mg total) by mouth once daily. 11/19/18 11/19/19 Yes Chicho Fisher MD   baclofen (LIORESAL) 10 MG tablet Take 1 tablet (10 mg total) by mouth 3 (three) times daily. 6/6/18 6/6/19 Yes Anson Leiva MD   cholecalciferol, vitamin D3,  (VITAMIN D3) 2,000 unit Tab Take 2,000 Units by mouth once daily.   Yes Historical Provider, MD   citalopram (CELEXA) 40 MG tablet TAKE 1 TABLET EVERY DAY 2/28/18  Yes Anson Leiva MD   digoxin (LANOXIN) 125 mcg tablet Take 1 tablet (0.125 mg total) by mouth once daily. 11/19/18 11/19/19 Yes Chicho Fisher MD   diltiaZEM (CARDIZEM) 120 MG tablet Take 1 tablet by mouth once daily. 12/31/18  Yes Historical Provider, MD   ELIQUIS 5 mg Tab Take 1 tablet by mouth 2 (two) times daily. 12/17/18  Yes Historical Provider, MD   furosemide (LASIX) 20 MG tablet Take 1 tablet (20 mg total) by mouth once daily. 12/5/18 12/5/19 Yes Anson Leiva MD   ipratropium (ATROVENT) 0.02 % nebulizer solution Take 500 mcg by nebulization 4 (four) times daily.  3/18/16  Yes Historical Provider, MD   losartan (COZAAR) 100 MG tablet Take 1 tablet (100 mg total) by mouth once daily. 11/18/18  Yes Chicho Fisher MD   pravastatin (PRAVACHOL) 40 MG tablet TAKE 1 TABLET EVERY DAY 10/15/18  Yes Anson Leiva MD   predniSONE (DELTASONE) 10 MG tablet Take 40 mg for one day, the 30 mg for 3 days then resume 20 mg po daily 11/18/18  Yes Chicho Fisher MD   rOPINIRole (REQUIP) 1 MG tablet TAKE 1 TABLET EVERY EVENING. 8/14/18  Yes Anson Leiva MD   traZODone (DESYREL) 50 MG tablet Take 1 tablet (50 mg total) by mouth every evening. 9/27/18  Yes Anson Leiva MD   artificial tears,hypromellose, 0.3 % Drop continuous prn.  8/2/16   Historical Provider, MD   montelukast (SINGULAIR) 10 mg tablet Take 10 mg by mouth every evening.  1/22/19  Historical Provider, MD   walker Misc Evangelista walker 5/31/18 1/22/19  Anson Leiva MD       Social History     Socioeconomic History    Marital status:      Spouse name: Not on file    Number of children: Not on file    Years of education: Not on file    Highest education level: Not on file   Social Needs    Financial resource strain: Not on file    Food  insecurity - worry: Not on file    Food insecurity - inability: Not on file    Transportation needs - medical: Not on file    Transportation needs - non-medical: Not on file   Occupational History    Not on file   Tobacco Use    Smoking status: Former Smoker     Last attempt to quit: 8/3/2000     Years since quittin.4    Smokeless tobacco: Never Used   Substance and Sexual Activity    Alcohol use: No    Drug use: No    Sexual activity: No   Other Topics Concern    Not on file   Social History Narrative    Not on file       Family History   Problem Relation Age of Onset    Heart disease Mother     Hypertension Mother     Hypertension Father     Diabetes Father     Cancer Sister     COPD Brother     Hypertension Brother     No Known Problems Daughter     Kidney disease Son     COPD Daughter        Review of patient's allergies indicates:  No Known Allergies Allergies have been reviewed.     ROS: Review of Systems   Constitutional: Negative for chills, fever and weight loss.   HENT: Negative for sore throat.    Eyes: Negative for double vision and photophobia.   Respiratory: Positive for cough, sputum production and shortness of breath. Negative for hemoptysis.    Cardiovascular: Positive for leg swelling (mild) and PND (occasional). Negative for palpitations.   Gastrointestinal: Negative for abdominal pain, blood in stool, diarrhea, melena and vomiting.   Genitourinary: Negative for dysuria and frequency.   Musculoskeletal: Positive for myalgias (generalized). Negative for back pain and neck pain.   Skin: Negative.    Neurological: Negative for dizziness, weakness and headaches.   Endo/Heme/Allergies: Does not bruise/bleed easily.   Psychiatric/Behavioral: Negative for memory loss. The patient has insomnia (intermittant ).        PE:   Vitals:    19 2234 19 2303 19 2307 19 2309   BP:   (!) 121/59    BP Location:   Left arm    Patient Position:       Pulse: 67  75   "  Resp: (!) 23  (!) 24 (!) 22   Temp:   97.4 °F (36.3 °C)    TempSrc:   Oral    SpO2: (!) 90%  (!) 77% (!) 85%   Weight:  61.2 kg (134 lb 14.7 oz)     Height:  5' 1" (1.549 m)      Physical Exam    Alert and orientated X 3   HEENT: Head: Normocephalic no trauma                Ears : Normal Pinna No Drainage no Battles sign                Eyes: Vision Unchanged, No conjunctivitis,No drainage                Neck: Supple, No JVD,No Abnormal Carotid Pulsations                Throat: No Erythema, No pus,No Swelling,Mallampati score= 2    Chest: Rhonchi bilaterally R>>L with wheezing as well air entry poor   Cardiac: RRR S1+ S2 with a -S3: +M = 2/6, No R/H/G  Abdomen: Bowel Sounds are Normal.Soft Abdomen. No organomegaly of Liver,Spleen,or Kidneys   CNS: Non focal and intact. Cranial nerves 2, 346,8,9,10 and 12 are normal.Norrmal gait.Normal posture.  Extremities: No Clubbing,No Cyanosis with oxygen,Positive mild edema of lower extremities Bilateral  Skin: No Rash, No Ulcerative sores,and No cellulitis of the IV site.    Lab Results   Component Value Date    WBC 11.39 01/22/2019    HGB 6.9 (L) 01/22/2019    HCT 21.7 (L) 01/22/2019     01/22/2019    CHOL 147 05/28/2018    TRIG 75 05/28/2018    HDL 74 05/28/2018    ALT 15 01/22/2019    AST 9 (L) 01/22/2019     (L) 01/22/2019    K 4.3 01/22/2019    CL 93 (L) 01/22/2019    CREATININE 1.0 01/22/2019    BUN 12 01/22/2019    CO2 31 (H) 01/22/2019    TSH 0.421 05/28/2018    INR 1.2 01/22/2019    HGBA1C 6.6 (H) 05/28/2018               Assessment/Plan:     Acute on chronic respiratory failure with hypoxia    On o2 3 liters sat 85%     Pneumonia    RLL pneumonia      Symptomatic anemia    8.8 last admit one month ago 6.9 now stool negative for blood  on eloquise     COPD exacerbation    Sob worsening cough     will use  bipap tonight to improve oxygenation oxygen delivery will improve with transfusion     Thank you for your consult. I will follow-up with patient. " Please contact us if you have any additional questions.     Emmanuel Hickman MD  Pulmonology  [facility]

## 2019-01-23 NOTE — PLAN OF CARE
Problem: Adult Inpatient Plan of Care  Goal: Plan of Care Review  Outcome: Ongoing (interventions implemented as appropriate)  Patient on BiPAP most of the day today. When off of BiPAP, O2 per NC at 4-5L. SpO2 upper 80's-low 90's. SANTIAGO noted when patient gets up to bedside commode. Patient receiving breathing treatments Q4H. Receiving IV antibiotics. Patient received 2 units of blood this AM. Tolerated well. H&H is now 9.6 & 29.6. Telemetry monitoring. Patient remains free from falls. POC reviewed. Patient voiced understanding. Will continue to monitor.

## 2019-01-23 NOTE — SUBJECTIVE & OBJECTIVE
Past Medical History:   Diagnosis Date    Anxiety     Arthritis     Asthma     Atrial fibrillation with rapid ventricular response     CHF (congestive heart failure) 11/29/2018    Chronic bronchitis     COPD (chronic obstructive pulmonary disease)     Depression     SANTIAGO (dyspnea on exertion)     Emphysema of lung     Fatigue     Hyperlipidemia     Hypertension     Symptomatic anemia 1/22/2019    Trouble in sleeping        Past Surgical History:   Procedure Laterality Date    APPENDECTOMY      EYE SURGERY      HYSTERECTOMY      TONSILLECTOMY         Review of patient's allergies indicates:  No Known Allergies    No current facility-administered medications on file prior to encounter.      Current Outpatient Medications on File Prior to Encounter   Medication Sig    albuterol (PROVENTIL) 2.5 mg /3 mL (0.083 %) nebulizer solution Take 3 mLs (2.5 mg total) by nebulization every 6 (six) hours as needed for Wheezing.    amLODIPine (NORVASC) 10 MG tablet Take 1 tablet (10 mg total) by mouth once daily.    baclofen (LIORESAL) 10 MG tablet Take 1 tablet (10 mg total) by mouth 3 (three) times daily.    cholecalciferol, vitamin D3, (VITAMIN D3) 2,000 unit Tab Take 2,000 Units by mouth once daily.    citalopram (CELEXA) 40 MG tablet TAKE 1 TABLET EVERY DAY    digoxin (LANOXIN) 125 mcg tablet Take 1 tablet (0.125 mg total) by mouth once daily.    diltiaZEM (CARDIZEM) 120 MG tablet Take 1 tablet by mouth once daily.    ELIQUIS 5 mg Tab Take 1 tablet by mouth 2 (two) times daily.    furosemide (LASIX) 20 MG tablet Take 1 tablet (20 mg total) by mouth once daily.    ipratropium (ATROVENT) 0.02 % nebulizer solution Take 500 mcg by nebulization 4 (four) times daily.     losartan (COZAAR) 100 MG tablet Take 1 tablet (100 mg total) by mouth once daily.    pravastatin (PRAVACHOL) 40 MG tablet TAKE 1 TABLET EVERY DAY    predniSONE (DELTASONE) 10 MG tablet Take 40 mg for one day, the 30 mg for 3 days then  resume 20 mg po daily    rOPINIRole (REQUIP) 1 MG tablet TAKE 1 TABLET EVERY EVENING.    traZODone (DESYREL) 50 MG tablet Take 1 tablet (50 mg total) by mouth every evening.    artificial tears,hypromellose, 0.3 % Drop continuous prn.      Family History     Problem Relation (Age of Onset)    COPD Brother, Daughter    Cancer Sister    Diabetes Father    Heart disease Mother    Hypertension Mother, Father, Brother    Kidney disease Son    No Known Problems Daughter        Tobacco Use    Smoking status: Former Smoker     Last attempt to quit: 8/3/2000     Years since quittin.4    Smokeless tobacco: Never Used   Substance and Sexual Activity    Alcohol use: No    Drug use: No    Sexual activity: No     Review of Systems   Constitutional: Positive for activity change (due to SANTIAGO). Negative for chills, fatigue and fever.   HENT: Positive for congestion. Negative for sore throat.    Respiratory: Positive for cough and shortness of breath. Negative for wheezing.    Cardiovascular: Negative for chest pain and palpitations.   Gastrointestinal: Negative for blood in stool, constipation, diarrhea, nausea and vomiting.        Denies black or dark stools   Genitourinary: Negative for dysuria, frequency, hematuria and urgency.   Musculoskeletal: Positive for back pain (lower back chronic, worse due to laying in bed).   Neurological: Positive for dizziness (with coughing), light-headedness (with coughing) and numbness (occassionally the tips of fingers feel numb). Negative for weakness and headaches.     Objective:     Vital Signs (Most Recent):  Temp: 97.7 °F (36.5 °C) (19)  Pulse: 85 (19)  Resp: 20 (19)  BP: 125/60 (19)  SpO2: (!) 90 % (19) Vital Signs (24h Range):  Temp:  [96.7 °F (35.9 °C)-99.7 °F (37.6 °C)] 97.7 °F (36.5 °C)  Pulse:  [65-86] 85  Resp:  [17-38] 20  SpO2:  [77 %-93 %] 90 %  BP: (106-127)/(50-72) 125/60     Weight: 61.2 kg (134 lb 14.7 oz)  Body  mass index is 25.49 kg/m².    Physical Exam        Significant Labs:   CBC:   Recent Labs   Lab 19   WBC 11.39   HGB 6.9*   HCT 21.7*      iron 49, transferrin 182, TIBC 269, sat iron 18  Ferritin 242  b12 413  Haptoglobin 128  Occult stool negative    CMP:   Recent Labs   Lab 19   *   K 4.3   CL 93*   CO2 31*   *   BUN 12   CREATININE 1.0   CALCIUM 8.8   PROT 5.8*   ALBUMIN 3.5   BILITOT 0.8   ALKPHOS 54*   AST 9*   ALT 15   ANIONGAP 8   EGFRNONAA 53*     Cardiac Markers:   Recent Labs   Lab 19   *     Coagulation:   Recent Labs   Lab 19   INR 1.2   APTT 27.3     Troponin:   Recent Labs   Lab 19   TROPONINI 0.014     Urine Studies:   Recent Labs   Lab 19  2345   COLORU Yellow   APPEARANCEUA Clear   PHUR 6.0   SPECGRAV 1.010   PROTEINUA Negative   GLUCUA Negative   KETONESU Negative   BILIRUBINUA Negative   OCCULTUA Negative   NITRITE Negative   UROBILINOGEN Negative   LEUKOCYTESUR Trace*   WBCUA 8*   BACTERIA Few*   SQUAMEPITHEL 8       Significant Imagin/22 CXR EKG leads overlie the chest obscuring detail.  Subsegmental atelectasis or scarring at the left lung base is noted.  There is new hazy opacity in the right lower lung which may relate to infection in the appropriate clinical setting..  Follow-up exam can be performed as indicated.  Aortic atherosclerosis and tortuosity.  Cardiomediastinal silhouette remains enlarged.  Degenerative changes of the shoulder   girdles are present     EKG NSR

## 2019-01-24 LAB
ALBUMIN SERPL BCP-MCNC: 3.7 G/DL
ALLENS TEST: ABNORMAL
ALP SERPL-CCNC: 55 U/L
ALT SERPL W/O P-5'-P-CCNC: 27 U/L
ANION GAP SERPL CALC-SCNC: 10 MMOL/L
ANION GAP SERPL CALC-SCNC: 12 MMOL/L
AST SERPL-CCNC: 17 U/L
BASOPHILS # BLD AUTO: ABNORMAL K/UL
BASOPHILS NFR BLD: 0 %
BILIRUB SERPL-MCNC: 0.9 MG/DL
BUN SERPL-MCNC: 22 MG/DL
BUN SERPL-MCNC: 27 MG/DL
CALCIUM SERPL-MCNC: 9 MG/DL
CALCIUM SERPL-MCNC: 9.4 MG/DL
CHLORIDE SERPL-SCNC: 93 MMOL/L
CHLORIDE SERPL-SCNC: 93 MMOL/L
CO2 SERPL-SCNC: 29 MMOL/L
CO2 SERPL-SCNC: 31 MMOL/L
CREAT SERPL-MCNC: 0.9 MG/DL
CREAT SERPL-MCNC: 1.2 MG/DL
D DIMER PPP IA.FEU-MCNC: 2.86 MG/L FEU
DELSYS: ABNORMAL
DIFFERENTIAL METHOD: ABNORMAL
DIGOXIN SERPL-MCNC: <0.1 NG/ML
EOSINOPHIL # BLD AUTO: ABNORMAL K/UL
EOSINOPHIL NFR BLD: 0 %
ERYTHROCYTE [DISTWIDTH] IN BLOOD BY AUTOMATED COUNT: 22.4 %
EST. GFR  (AFRICAN AMERICAN): 49 ML/MIN/1.73 M^2
EST. GFR  (AFRICAN AMERICAN): >60 ML/MIN/1.73 M^2
EST. GFR  (NON AFRICAN AMERICAN): 42 ML/MIN/1.73 M^2
EST. GFR  (NON AFRICAN AMERICAN): >60 ML/MIN/1.73 M^2
GLUCOSE SERPL-MCNC: 217 MG/DL
GLUCOSE SERPL-MCNC: 265 MG/DL
HCO3 UR-SCNC: 33.5 MMOL/L (ref 22–26)
HCT VFR BLD AUTO: 30.1 %
HGB BLD-MCNC: 9.9 G/DL
LYMPHOCYTES # BLD AUTO: ABNORMAL K/UL
LYMPHOCYTES NFR BLD: 3 %
MCH RBC QN AUTO: 35 PG
MCHC RBC AUTO-ENTMCNC: 32.9 G/DL
MCV RBC AUTO: 106 FL
MONOCYTES # BLD AUTO: ABNORMAL K/UL
MONOCYTES NFR BLD: 1 %
NEUTROPHILS NFR BLD: 93 %
NEUTS BAND NFR BLD MANUAL: 3 %
PCO2 BLDA: 45 MMHG (ref 35–45)
PH SMN: 7.48 [PH] (ref 7.35–7.45)
PLATELET # BLD AUTO: 200 K/UL
PMV BLD AUTO: 11.4 FL
PO2 BLDA: 51 MMHG (ref 75–100)
POC BE: 9 MMOL/L (ref -2–2)
POC COHB: 1.5 % (ref 0–3)
POC METHB: 1.1 % (ref 0–1.5)
POC O2HB ARTERIAL: 88.6 % (ref 94–100)
POC SATURATED O2: 90.9 % (ref 90–100)
POC TCO2: 34.9 MMOL/L
POC THB: 9.7 G/DL (ref 12–18)
POTASSIUM SERPL-SCNC: 4.3 MMOL/L
POTASSIUM SERPL-SCNC: 4.3 MMOL/L
PROT SERPL-MCNC: 6.2 G/DL
RBC # BLD AUTO: 2.83 M/UL
SITE: ABNORMAL
SODIUM SERPL-SCNC: 134 MMOL/L
SODIUM SERPL-SCNC: 134 MMOL/L
WBC # BLD AUTO: 15.79 K/UL

## 2019-01-24 PROCEDURE — 80048 BASIC METABOLIC PNL TOTAL CA: CPT | Mod: HCNC

## 2019-01-24 PROCEDURE — 82803 BLOOD GASES ANY COMBINATION: CPT | Performed by: INTERNAL MEDICINE

## 2019-01-24 PROCEDURE — 11000001 HC ACUTE MED/SURG PRIVATE ROOM

## 2019-01-24 PROCEDURE — 63600175 PHARM REV CODE 636 W HCPCS: Performed by: INTERNAL MEDICINE

## 2019-01-24 PROCEDURE — 25000003 PHARM REV CODE 250: Performed by: NURSE PRACTITIONER

## 2019-01-24 PROCEDURE — 25000242 PHARM REV CODE 250 ALT 637 W/ HCPCS: Performed by: INTERNAL MEDICINE

## 2019-01-24 PROCEDURE — 85379 FIBRIN DEGRADATION QUANT: CPT

## 2019-01-24 PROCEDURE — 80162 ASSAY OF DIGOXIN TOTAL: CPT

## 2019-01-24 PROCEDURE — 94664 DEMO&/EVAL PT USE INHALER: CPT

## 2019-01-24 PROCEDURE — 94640 AIRWAY INHALATION TREATMENT: CPT

## 2019-01-24 PROCEDURE — 27000221 HC OXYGEN, UP TO 24 HOURS

## 2019-01-24 PROCEDURE — 36600 WITHDRAWAL OF ARTERIAL BLOOD: CPT

## 2019-01-24 PROCEDURE — 25500020 PHARM REV CODE 255: Performed by: INTERNAL MEDICINE

## 2019-01-24 PROCEDURE — 63600175 PHARM REV CODE 636 W HCPCS: Performed by: NURSE PRACTITIONER

## 2019-01-24 PROCEDURE — 99900035 HC TECH TIME PER 15 MIN (STAT)

## 2019-01-24 PROCEDURE — 94660 CPAP INITIATION&MGMT: CPT

## 2019-01-24 PROCEDURE — 85007 BL SMEAR W/DIFF WBC COUNT: CPT

## 2019-01-24 PROCEDURE — 80053 COMPREHEN METABOLIC PANEL: CPT

## 2019-01-24 PROCEDURE — 85027 COMPLETE CBC AUTOMATED: CPT

## 2019-01-24 PROCEDURE — 99900031 HC PATIENT EDUCATION (STAT)

## 2019-01-24 PROCEDURE — 83921 ORGANIC ACID SINGLE QUANT: CPT

## 2019-01-24 PROCEDURE — 36415 COLL VENOUS BLD VENIPUNCTURE: CPT | Mod: HCNC

## 2019-01-24 PROCEDURE — 94761 N-INVAS EAR/PLS OXIMETRY MLT: CPT

## 2019-01-24 PROCEDURE — 99233 SBSQ HOSP IP/OBS HIGH 50: CPT | Mod: ,,, | Performed by: FAMILY MEDICINE

## 2019-01-24 PROCEDURE — 25000003 PHARM REV CODE 250: Performed by: FAMILY MEDICINE

## 2019-01-24 PROCEDURE — 99233 PR SUBSEQUENT HOSPITAL CARE,LEVL III: ICD-10-PCS | Mod: ,,, | Performed by: FAMILY MEDICINE

## 2019-01-24 RX ORDER — DIGOXIN 125 MCG
0.25 TABLET ORAL DAILY
Status: DISCONTINUED | OUTPATIENT
Start: 2019-01-25 | End: 2019-01-26

## 2019-01-24 RX ORDER — FUROSEMIDE 10 MG/ML
20 INJECTION INTRAMUSCULAR; INTRAVENOUS ONCE
Status: COMPLETED | OUTPATIENT
Start: 2019-01-24 | End: 2019-01-24

## 2019-01-24 RX ORDER — FUROSEMIDE 10 MG/ML
20 INJECTION INTRAMUSCULAR; INTRAVENOUS ONCE
Status: COMPLETED | OUTPATIENT
Start: 2019-01-24 | End: 2019-01-25

## 2019-01-24 RX ADMIN — IPRATROPIUM BROMIDE AND ALBUTEROL SULFATE 3 ML: .5; 3 SOLUTION RESPIRATORY (INHALATION) at 12:01

## 2019-01-24 RX ADMIN — IPRATROPIUM BROMIDE AND ALBUTEROL SULFATE 3 ML: .5; 3 SOLUTION RESPIRATORY (INHALATION) at 04:01

## 2019-01-24 RX ADMIN — TRAZODONE HYDROCHLORIDE 50 MG: 50 TABLET ORAL at 09:01

## 2019-01-24 RX ADMIN — METHYLPREDNISOLONE SODIUM SUCCINATE 40 MG: 40 INJECTION, POWDER, FOR SOLUTION INTRAMUSCULAR; INTRAVENOUS at 06:01

## 2019-01-24 RX ADMIN — CEFTRIAXONE 1 G: 1 INJECTION, SOLUTION INTRAVENOUS at 11:01

## 2019-01-24 RX ADMIN — APIXABAN 5 MG: 5 TABLET, FILM COATED ORAL at 09:01

## 2019-01-24 RX ADMIN — LEVOFLOXACIN 750 MG: 5 INJECTION, SOLUTION INTRAVENOUS at 06:01

## 2019-01-24 RX ADMIN — DILTIAZEM HYDROCHLORIDE 120 MG: 60 TABLET, FILM COATED ORAL at 08:01

## 2019-01-24 RX ADMIN — IPRATROPIUM BROMIDE AND ALBUTEROL SULFATE 3 ML: .5; 3 SOLUTION RESPIRATORY (INHALATION) at 08:01

## 2019-01-24 RX ADMIN — METHYLPREDNISOLONE SODIUM SUCCINATE 40 MG: 40 INJECTION, POWDER, FOR SOLUTION INTRAMUSCULAR; INTRAVENOUS at 01:01

## 2019-01-24 RX ADMIN — IOHEXOL 75 ML: 350 INJECTION, SOLUTION INTRAVENOUS at 01:01

## 2019-01-24 RX ADMIN — IPRATROPIUM BROMIDE AND ALBUTEROL SULFATE 3 ML: .5; 3 SOLUTION RESPIRATORY (INHALATION) at 07:01

## 2019-01-24 RX ADMIN — ROPINIROLE HYDROCHLORIDE 1 MG: 0.5 TABLET, FILM COATED ORAL at 09:01

## 2019-01-24 RX ADMIN — DIGOXIN 0.12 MG: 125 TABLET ORAL at 08:01

## 2019-01-24 RX ADMIN — FUROSEMIDE 20 MG: 10 INJECTION, SOLUTION INTRAMUSCULAR; INTRAVENOUS at 12:01

## 2019-01-24 RX ADMIN — PRAVASTATIN SODIUM 40 MG: 40 TABLET ORAL at 08:01

## 2019-01-24 RX ADMIN — IPRATROPIUM BROMIDE AND ALBUTEROL SULFATE 3 ML: .5; 3 SOLUTION RESPIRATORY (INHALATION) at 03:01

## 2019-01-24 RX ADMIN — METHYLPREDNISOLONE SODIUM SUCCINATE 40 MG: 40 INJECTION, POWDER, FOR SOLUTION INTRAMUSCULAR; INTRAVENOUS at 09:01

## 2019-01-24 RX ADMIN — CEFTRIAXONE 1 G: 1 INJECTION, SOLUTION INTRAVENOUS at 01:01

## 2019-01-24 NOTE — PLAN OF CARE
Problem: Adult Inpatient Plan of Care  Goal: Plan of Care Review  Outcome: Ongoing (interventions implemented as appropriate)  Patient SpO2 better today than yesterday. SpO2 lower 90's on 2L O2 per cannula. Patient has worn BiPAP for several hours today as well. Patient states she is feeling better today. CTA negative for PE. Receiving IV antibiotics. Telemetry monitoring. Remains free from falls. Spouse at bedside. POC reviewed. Patient voiced understanding. Will continue to monitor.

## 2019-01-24 NOTE — ASSESSMENT & PLAN NOTE
Will need home ventilator   Needs to have Non Invasive Ventilation with a Respiratory Rate .  Patient has Chronic Respiratory Failure and COPD.Patient severely desaturates their PO2 during Sleep or while relaxing. Patient needs Non Invasive Ventilation day and night in order to decrease Work of Breathing and improve Oxygenation by recruitment and distension of the alveoli and terminal Bronchioles. Patient has failed Bipap due to severity of patients condition.Paient could die at home without Non Invasive Ventilation or have frequent/increased admissions to hospital.

## 2019-01-24 NOTE — ASSESSMENT & PLAN NOTE
On home O2 3L at home. Using 6L this am TKS >90%. She is receiving her 2nd unot of blood hopefully this helps.

## 2019-01-24 NOTE — HOSPITAL COURSE
She is still is still SOB and has significanrt SANTIAGO. POX 5L NC and using bipap over night. POX 94% this am. This is much more than her usual maint dose. WBC coming down. 57509>43508. On medrol 40mg IV q 8 and day 3 levaquin and rocephin. Dr Hickman following, attempting to get her home Bipap    Anemia stable after her 2 units PRBC. Anemia studies normal including retic 2.0 and folate added yesterday.   Reports that she has energy better, breathing better as well  1/25/19 rested well overnight; BiPAP  Maintained over nightt, tolerated well.  Swelling to BLE has improved but pt notes right still feels swollen.  Notes feeling much  better Using bedside commode. Urinating a lot. CT with Patchy ground-glass opacities in the superior segment of the right lower lobe concerning for a pneumonia in the appropriate clinical setting. Day 4/7  IV Rocephin, Levofloxacin,   Now on 3LNC, o2 sat 95%, did not qualify for trilogy; still wheezing but doing better  /63, Afebrile WBC 16.00.   PAF overnight; Afib this am, Eliquis resumed , monitor H&H,   ^ activity; EKG now; ^ diltiazem for AF _RVR; HR 120s; extra 120mg this am diltiazem and ^ to 240mg daily  Cards following      1/26  Patient's heart rate has improved overnight with add'l cardizem. Breathing is better. Swelling improved. This morning states hse is feeling better and has ambulated and is ready to go home.

## 2019-01-24 NOTE — SUBJECTIVE & OBJECTIVE
Interval note: breathing better  Review of Systems   Constitutional: Positive for activity change (due to SANTIAGO). Negative for chills, fatigue and fever.   HENT: Positive for congestion. Negative for sore throat.    Respiratory: Positive for cough and shortness of breath. Negative for wheezing.    Cardiovascular: Negative for chest pain and palpitations.   Gastrointestinal: Negative for blood in stool, constipation, diarrhea, nausea and vomiting.        Denies black or dark stools   Genitourinary: Negative for dysuria, frequency, hematuria and urgency.   Musculoskeletal: Positive for back pain (lower back chronic, worse due to laying in bed).   Neurological: Negative for dizziness (with coughing), weakness, light-headedness (with coughing), numbness (occassionally the tips of fingers feel numb) and headaches.     Objective:     Vital Signs (Most Recent):  Temp: 98.5 °F (36.9 °C) (01/24/19 0715)  Pulse: (!) 119 (01/24/19 0728)  Resp: 20 (01/24/19 0728)  BP: 111/66 (01/24/19 0715)  SpO2: (!) 94 % (01/24/19 0728) Vital Signs (24h Range):  Temp:  [97.2 °F (36.2 °C)-98.7 °F (37.1 °C)] 98.5 °F (36.9 °C)  Pulse:  [] 119  Resp:  [17-23] 20  SpO2:  [80 %-99 %] 94 %  BP: (108-127)/(53-71) 111/66     Weight: 61.2 kg (134 lb 14.7 oz)  Body mass index is 25.49 kg/m².    Physical Exam   Constitutional: She is oriented to person, place, and time. She appears well-developed. No distress.   Sitting up in bed   HENT:   Head: Normocephalic and atraumatic.   Eyes: Conjunctivae are normal. Pupils are equal, round, and reactive to light.   Neck: Normal range of motion. Neck supple. No thyromegaly present.   Cardiovascular: Normal rate, regular rhythm, normal heart sounds and intact distal pulses.   Pulmonary/Chest: Effort normal. No respiratory distress. She has no wheezes. She has rales.   Abdominal: Soft. Bowel sounds are normal. There is no tenderness.   Musculoskeletal: Normal range of motion. She exhibits edema.    Lymphadenopathy:     She has no cervical adenopathy.   Neurological: She is alert and oriented to person, place, and time.   Skin: Skin is warm and dry. No rash noted.   Psychiatric: She has a normal mood and affect. Her behavior is normal.   Nursing note and vitals reviewed.        CRANIAL NERVES     CN III, IV, VI   Pupils are equal, round, and reactive to light.       Significant Labs:   CBC:   Recent Labs   Lab 197 19  1035 19  0431   WBC 11.39 24.44* 15.79*   HGB 6.9* 9.6* 9.9*   HCT 21.7* 29.6* 30.1*    237 200   iron 49, transferrin 182, TIBC 269, sat iron 18  Ferritin 242  b12 413  Haptoglobin 128  Occult stool negative  retic 2.0  Folate WNL  MMA ordered    CMP:   Recent Labs   Lab 19  1035 19  0431   * 133* 134*   K 4.3 4.2 4.3   CL 93* 92* 93*   CO2 31* 31* 31*   * 176* 217*   BUN 12 18 22   CREATININE 1.0 0.9 0.9   CALCIUM 8.8 9.4 9.4   PROT 5.8* 6.1 6.2   ALBUMIN 3.5 3.6 3.7   BILITOT 0.8 2.9* 0.9   ALKPHOS 54* 53* 55   AST 9* 11 17   ALT 15 16 27   ANIONGAP 8 10 10   EGFRNONAA 53* >60 >60     Cardiac Markers:   Recent Labs   Lab 19  183   *     Coagulation:   Recent Labs   Lab 19  183   INR 1.2   APTT 27.3     Troponin:   Recent Labs   Lab 19  1837   TROPONINI 0.014     Urine Studies:   Recent Labs   Lab 19  2345   COLORU Yellow   APPEARANCEUA Clear   PHUR 6.0   SPECGRAV 1.010   PROTEINUA Negative   GLUCUA Negative   KETONESU Negative   BILIRUBINUA Negative   OCCULTUA Negative   NITRITE Negative   UROBILINOGEN Negative   LEUKOCYTESUR Trace*   WBCUA 8*   BACTERIA Few*   SQUAMEPITHEL 8     D dimer +    Significant Imagin/22 CXR EKG leads overlie the chest obscuring detail.  Subsegmental atelectasis or scarring at the left lung base is noted.  There is new hazy opacity in the right lower lung which may relate to infection in the appropriate clinical setting..  Follow-up exam can be performed as  indicated.  Aortic atherosclerosis and tortuosity.  Cardiomediastinal silhouette remains enlarged.  Degenerative changes of the shoulder   girdles are present    1/22 EKG NSR

## 2019-01-24 NOTE — PROGRESS NOTES
[facility] Hospital Medicine  Progress Note    Patient Name: Chantell Herrera  MRN: [id number]  Patient Class: IP- Inpatient   Admission Date: 1/22/2019  Length of Stay: 2 days  Attending Physician: Milo Estevez MD  Primary Care Provider: Anson Leiva MD        Subjective:     Principal Problem:Chronic respiratory failure with hypoxia    HPI:  84yo female patient with PMHX of anxiety, arthritis, COPD, Afib, CHF, depression, HLD, HTN, and anemia. She presented to ER last night with c/o SOB 4 days ago. She reports that she was also having SANTIAGO. She was having trouble getting 5-10 ft without SOB, usually can get 50 ft or so. Denies wheezing at home.Was coughing, occasionally productive (green). She was recently treated 1/10 with azithromycin and prednisone. CXR with pneumonia. Admitted with Dr Hickman consult. ON levaquin and rocephin,nebs and medrol.     Has hx of anemia in younger years.She has never received blood before. She is now on her 2nd unit. She reports that she has no appetite.     Hospital Course:  She is still is still SOB and has significanrt SANTIAGO. POX 5L NC and using bipap over night. POX 94% this am. This is much more than her usual maint dose. WBC coming down. [id number]>[id number]. On medrol 40mg IV q 8 and day 3 levaquin and rocephin. Dr Hickman following, attempting to get her home Bipap    Anemia stable after her 2 units PRBC. Anemia studies normal including retic 2.0 and folate added yesterday.     Reports that she has energy better, breathing better as well    Interval note: breathing better  Review of Systems   Constitutional: Positive for activity change (due to SANTIAGO). Negative for chills, fatigue and fever.   HENT: Positive for congestion. Negative for sore throat.    Respiratory: Positive for cough and shortness of breath. Negative for wheezing.    Cardiovascular: Negative for chest pain and palpitations.   Gastrointestinal: Negative for blood in stool, constipation, diarrhea,  nausea and vomiting.        Denies black or dark stools   Genitourinary: Negative for dysuria, frequency, hematuria and urgency.   Musculoskeletal: Positive for back pain (lower back chronic, worse due to laying in bed).   Neurological: Negative for dizziness (with coughing), weakness, light-headedness (with coughing), numbness (occassionally the tips of fingers feel numb) and headaches.     Objective:     Vital Signs (Most Recent):  Temp: 98.5 °F (36.9 °C) (01/24/19 0715)  Pulse: (!) 119 (01/24/19 0728)  Resp: 20 (01/24/19 0728)  BP: 111/66 (01/24/19 0715)  SpO2: (!) 94 % (01/24/19 0728) Vital Signs (24h Range):  Temp:  [97.2 °F (36.2 °C)-98.7 °F (37.1 °C)] 98.5 °F (36.9 °C)  Pulse:  [] 119  Resp:  [17-23] 20  SpO2:  [80 %-99 %] 94 %  BP: (108-127)/(53-71) 111/66     Weight: 61.2 kg (134 lb 14.7 oz)  Body mass index is 25.49 kg/m².    Physical Exam   Constitutional: She is oriented to person, place, and time. She appears well-developed. No distress.   Sitting up in bed   HENT:   Head: Normocephalic and atraumatic.   Eyes: Conjunctivae are normal. Pupils are equal, round, and reactive to light.   Neck: Normal range of motion. Neck supple. No thyromegaly present.   Cardiovascular: Normal rate, regular rhythm, normal heart sounds and intact distal pulses.   Pulmonary/Chest: Effort normal. No respiratory distress. She has no wheezes. She has rales.   Abdominal: Soft. Bowel sounds are normal. There is no tenderness.   Musculoskeletal: Normal range of motion. She exhibits edema.   Lymphadenopathy:     She has no cervical adenopathy.   Neurological: She is alert and oriented to person, place, and time.   Skin: Skin is warm and dry. No rash noted.   Psychiatric: She has a normal mood and affect. Her behavior is normal.   Nursing note and vitals reviewed.        CRANIAL NERVES     CN III, IV, VI   Pupils are equal, round, and reactive to light.       Significant Labs:   CBC:   Recent Labs   Lab 01/22/19  8299  19  1035 19  0431   WBC 11.39 24.44* 15.79*   HGB 6.9* 9.6* 9.9*   HCT 21.7* 29.6* 30.1*    237 200   iron 49, transferrin 182, TIBC 269, sat iron 18  Ferritin 242  b12 413  Haptoglobin 128  Occult stool negative  retic 2.0  Folate WNL  MMA ordered    CMP:   Recent Labs   Lab 19  1837 19  1035 19  0431   * 133* 134*   K 4.3 4.2 4.3   CL 93* 92* 93*   CO2 31* 31* 31*   * 176* 217*   BUN 22   CREATININE 1.0 0.9 0.9   CALCIUM 8.8 9.4 9.4   PROT 5.8* 6.1 6.2   ALBUMIN 3.5 3.6 3.7   BILITOT 0.8 2.9* 0.9   ALKPHOS 54* 53* 55   AST 9* 11 17   ALT 15 16 27   ANIONGAP 8 10 10   EGFRNONAA 53* >60 >60     Cardiac Markers:   Recent Labs   Lab 19  1837   *     Coagulation:   Recent Labs   Lab 19  1837   INR 1.2   APTT 27.3     Troponin:   Recent Labs   Lab 19  1837   TROPONINI 0.014     Urine Studies:   Recent Labs   Lab 19  2345   COLORU Yellow   APPEARANCEUA Clear   PHUR 6.0   SPECGRAV 1.010   PROTEINUA Negative   GLUCUA Negative   KETONESU Negative   BILIRUBINUA Negative   OCCULTUA Negative   NITRITE Negative   UROBILINOGEN Negative   LEUKOCYTESUR Trace*   WBCUA 8*   BACTERIA Few*   SQUAMEPITHEL 8     D dimer +    Significant Imagin/22 CXR EKG leads overlie the chest obscuring detail.  Subsegmental atelectasis or scarring at the left lung base is noted.  There is new hazy opacity in the right lower lung which may relate to infection in the appropriate clinical setting..  Follow-up exam can be performed as indicated.  Aortic atherosclerosis and tortuosity.  Cardiomediastinal silhouette remains enlarged.  Degenerative changes of the shoulder   girdles are present     EKG NSR    Assessment/Plan:      * Chronic respiratory failure with hypoxia    Trilogy ordered for home use per Dr Hickman  O2 at home  This is markedly worse than typical  She is being treated for pna, but is quite winded.  While she has been on eliquis, would like to  confirm no PE. Check d-dimer     Acute on chronic respiratory failure with hypoxia    Using bipap at night  Using 6L NC TKS>90%  She needs to be a DNR as this is basically end stage COPD/resp failure. I had made her a DNR in my office several months ago. Living will was filled out and signed by me. Scanned into chart, but I can't find it in Epic. Where is it?     She is still hypoxic more than her usual. Was on eliquis 5mg PO BID at home and denies missing doses. C/o coughing pink sputum today. Check D Dimer     Pneumonia    On levaquin and rocephin  duonebs   Medrol 40mg IV q 8hr  Dr nino following         Symptomatic anemia    Not fe deficient, b12 normal, occult negative. H/H 6/21, given 2 units PRBC, repeat CBC pending  eliquis held      Check retic and folate- normal  MMA, will need to see hematology for potential bm - she has a very high MCV with normal b12.    Counts holding.  Resume eliquis pending CTA     Atrial fibrillation with rapid ventricular response    Holding elequis due to significant anemia  Cont dig - check dig level  Consider BB for rate control       COPD exacerbation    Medrol 40mg IV every 8hr  bipap ordered at night  Using 6L TKS >90%       RLS (restless legs syndrome)    Cont ropinirole       Oxygen dependent    On home O2 3L at home. Using 6L this am TKS >90%. She is receiving her 2nd unot of blood hopefully this helps.        Dyslipidemia    Cont statin       CKD (chronic kidney disease), stage III    ths is well BUN/creat 12/1.0  Stable CMP daily     Hyponatremia    Hold SSRI  Hold lasix  Getting blood with NS   Repeat cmp  Na improving 134 today     Major depressive disorder with single episode, in full remission    celexa on hold for now, she was on 40mg po daily   It interacts with levaquin  Will need to restart at home  Watch for increasing depression     Insomnia    trazadone reordered       Cervical radiculopathy    Hold baclofen for now       COPD (chronic obstructive pulmonary  disease)    Cont medrol 40 mg IV every 8hr  O2 TKS >90%   trilogy on order for home use  Dr Hickman following     HTN (hypertension)    Holding lasix  Holding ARB  bp stable without for now  Cont CCB  Not on BB at home  this am with hx a fib  Check dig level       VTE Risk Mitigation (From admission, onward)        Ordered     Place sequential compression device  Until discontinued      01/23/19 0935     IP VTE HIGH RISK PATIENT  Once      01/22/19 7907              Kari Gaspar MD  Department of Hospital Medicine   [facility]

## 2019-01-24 NOTE — ASSESSMENT & PLAN NOTE
Holding elequis due to significant anemia  Cont dig - check dig level  Consider BB for rate control

## 2019-01-24 NOTE — ASSESSMENT & PLAN NOTE
celexa on hold for now, she was on 40mg po daily   It interacts with levaquin  Will need to restart at home  Watch for increasing depression

## 2019-01-24 NOTE — ASSESSMENT & PLAN NOTE
Not fe deficient, b12 normal, occult negative. H/H 6/21, given 2 units PRBC, repeat CBC pending  eliquis held      Check retic and folate- normal  MMA, will need to see hematology for potential bm - she has a very high MCV with normal b12.    Counts holding.  Resume eliquis pending CTA

## 2019-01-24 NOTE — PLAN OF CARE
01/24/19 1002   Discharge Reassessment   Assessment Type Discharge Planning Reassessment         Patient will remain for continued treatment today. Patient still requiring more oxygen than baseline. MD ordered new test. Patient states understanding of POC. Informed patient that I sent in request for home trilogy, and I am just awaiting response. No needs or concerns voiced at this time. CM will continue to follow and assist as needed.

## 2019-01-24 NOTE — ASSESSMENT & PLAN NOTE
Much better oxygenation on bipap   End stage disease will attempt to oxygenate with niv or home ventilator to oxygenate

## 2019-01-24 NOTE — SUBJECTIVE & OBJECTIVE
Interval History: Looks great mask on 99% on bipap     Objective:     Vital Signs (Most Recent):  Temp: 97.2 °F (36.2 °C) (01/24/19 0425)  Pulse: (!) 119 (01/24/19 0558)  Resp: 19 (01/24/19 0425)  BP: 125/71 (01/24/19 0425)  SpO2: 99 % (01/24/19 0425) Vital Signs (24h Range):  Temp:  [96.7 °F (35.9 °C)-98.7 °F (37.1 °C)] 97.2 °F (36.2 °C)  Pulse:  [] 119  Resp:  [17-23] 19  SpO2:  [80 %-99 %] 99 %  BP: (108-127)/(53-71) 125/71     Weight: 61.2 kg (134 lb 14.7 oz)  Body mass index is 25.49 kg/m².      Intake/Output Summary (Last 24 hours) at 1/24/2019 0655  Last data filed at 1/23/2019 2002  Gross per 24 hour   Intake 1383.33 ml   Output 550 ml   Net 833.33 ml       Physical Exam   Constitutional: She is oriented to person, place, and time. She appears well-developed and well-nourished. She is cooperative.  Non-toxic appearance. She does not appear ill. No distress.   HENT:   Head: Normocephalic and atraumatic.   Right Ear: Hearing, tympanic membrane, external ear and ear canal normal.   Left Ear: Hearing, tympanic membrane, external ear and ear canal normal.   Nose: Nose normal. No mucosal edema, rhinorrhea or nasal deformity. No epistaxis. Right sinus exhibits no maxillary sinus tenderness and no frontal sinus tenderness. Left sinus exhibits no maxillary sinus tenderness and no frontal sinus tenderness.   Mouth/Throat: Uvula is midline, oropharynx is clear and moist and mucous membranes are normal. No trismus in the jaw. Normal dentition. No uvula swelling. No posterior oropharyngeal erythema.   Eyes: Conjunctivae and lids are normal. No scleral icterus.   Sclera clear bilat   Neck: Trachea normal, full passive range of motion without pain and phonation normal. Neck supple.   Cardiovascular: Normal rate, regular rhythm, normal heart sounds, intact distal pulses and normal pulses.   Pulmonary/Chest: She is in respiratory distress (mild to moderate). She has decreased breath sounds in the right upper field, the  right middle field, the right lower field, the left upper field, the left middle field and the left lower field. She has no wheezes. She has no rhonchi.   Abdominal: Soft. Normal appearance and bowel sounds are normal. She exhibits no distension. There is no tenderness.   Musculoskeletal: Normal range of motion. She exhibits no edema or deformity.   Neurological: She is alert and oriented to person, place, and time. She exhibits normal muscle tone. Coordination normal.   Skin: Skin is warm, dry and intact. She is not diaphoretic. No pallor.   Psychiatric: She has a normal mood and affect. Her speech is normal and behavior is normal. Judgment and thought content normal. Cognition and memory are normal.   Nursing note and vitals reviewed.      Vents:  Oxygen Concentration (%): 40 (01/24/19 0425)    Lines/Drains/Airways     Peripheral Intravenous Line                 Peripheral IV - Single Lumen 01/22/19 1931 Right Antecubital 1 day                Significant Labs:    CBC/Anemia Profile:  Recent Labs   Lab 01/22/19 1837 01/22/19  2134 01/23/19  1035 01/24/19  0431   WBC 11.39  --  24.44* 15.79*   HGB 6.9*  --  9.6* 9.9*   HCT 21.7*  --  29.6* 30.1*     --  237 200   *  --  107* 106*   RDW 19.0*  --  21.4* 22.4*   IRON 49  --   --   --    FERRITIN 242  --   --   --    RETIC  --   --  2.0  --    FOLATE  --   --  13.5  --    [id number]  --   --   --    OCCULTBLOOD  --  Negative  --   --         Chemistries:  Recent Labs   Lab 01/22/19 1837 01/23/19  1035 01/24/19  0431   * 133* 134*   K 4.3 4.2 4.3   CL 93* 92* 93*   CO2 31* 31* 31*   BUN 12 18 22   CREATININE 1.0 0.9 0.9   CALCIUM 8.8 9.4 9.4   ALBUMIN 3.5 3.6 3.7   PROT 5.8* 6.1 6.2   BILITOT 0.8 2.9* 0.9   ALKPHOS 54* 53* 55   ALT 15 16 27   AST 9* 11 17       All pertinent labs within the past 24 hours have been reviewed.    Significant Imaging:  I have reviewed all pertinent imaging results/findings within the past 24 hours.

## 2019-01-24 NOTE — PROGRESS NOTES
[facility]  Pulmonology  Progress Note    Patient Name: Chantell Herrera  MRN: [id number]  Admission Date: 1/22/2019  Hospital Length of Stay: 2 days  Code Status: Full Code  Attending Provider: Milo Estevez MD  Primary Care Provider: Anson Leiva MD   Principal Problem: Chronic respiratory failure with hypoxia    Subjective:     Interval History: Looks great mask on 99% on bipap     Objective:     Vital Signs (Most Recent):  Temp: 97.2 °F (36.2 °C) (01/24/19 0425)  Pulse: (!) 119 (01/24/19 0558)  Resp: 19 (01/24/19 0425)  BP: 125/71 (01/24/19 0425)  SpO2: 99 % (01/24/19 0425) Vital Signs (24h Range):  Temp:  [96.7 °F (35.9 °C)-98.7 °F (37.1 °C)] 97.2 °F (36.2 °C)  Pulse:  [] 119  Resp:  [17-23] 19  SpO2:  [80 %-99 %] 99 %  BP: (108-127)/(53-71) 125/71     Weight: 61.2 kg (134 lb 14.7 oz)  Body mass index is 25.49 kg/m².      Intake/Output Summary (Last 24 hours) at 1/24/2019 0655  Last data filed at 1/23/2019 2002  Gross per 24 hour   Intake 1383.33 ml   Output 550 ml   Net 833.33 ml       Physical Exam   Constitutional: She is oriented to person, place, and time. She appears well-developed and well-nourished. She is cooperative.  Non-toxic appearance. She does not appear ill. No distress.   HENT:   Head: Normocephalic and atraumatic.   Right Ear: Hearing, tympanic membrane, external ear and ear canal normal.   Left Ear: Hearing, tympanic membrane, external ear and ear canal normal.   Nose: Nose normal. No mucosal edema, rhinorrhea or nasal deformity. No epistaxis. Right sinus exhibits no maxillary sinus tenderness and no frontal sinus tenderness. Left sinus exhibits no maxillary sinus tenderness and no frontal sinus tenderness.   Mouth/Throat: Uvula is midline, oropharynx is clear and moist and mucous membranes are normal. No trismus in the jaw. Normal dentition. No uvula swelling. No posterior oropharyngeal erythema.   Eyes: Conjunctivae and lids are normal. No scleral icterus.    Sclera clear bilat   Neck: Trachea normal, full passive range of motion without pain and phonation normal. Neck supple.   Cardiovascular: Normal rate, regular rhythm, normal heart sounds, intact distal pulses and normal pulses.   Pulmonary/Chest: She is in respiratory distress (mild to moderate). She has decreased breath sounds in the right upper field, the right middle field, the right lower field, the left upper field, the left middle field and the left lower field. She has no wheezes. She has no rhonchi.   Abdominal: Soft. Normal appearance and bowel sounds are normal. She exhibits no distension. There is no tenderness.   Musculoskeletal: Normal range of motion. She exhibits no edema or deformity.   Neurological: She is alert and oriented to person, place, and time. She exhibits normal muscle tone. Coordination normal.   Skin: Skin is warm, dry and intact. She is not diaphoretic. No pallor.   Psychiatric: She has a normal mood and affect. Her speech is normal and behavior is normal. Judgment and thought content normal. Cognition and memory are normal.   Nursing note and vitals reviewed.      Vents:  Oxygen Concentration (%): 40 (01/24/19 0425)    Lines/Drains/Airways     Peripheral Intravenous Line                 Peripheral IV - Single Lumen 01/22/19 1931 Right Antecubital 1 day                Significant Labs:    CBC/Anemia Profile:  Recent Labs   Lab 01/22/19 1837 01/22/19  2134 01/23/19  1035 01/24/19  0431   WBC 11.39  --  24.44* 15.79*   HGB 6.9*  --  9.6* 9.9*   HCT 21.7*  --  29.6* 30.1*     --  237 200   *  --  107* 106*   RDW 19.0*  --  21.4* 22.4*   IRON 49  --   --   --    FERRITIN 242  --   --   --    RETIC  --   --  2.0  --    FOLATE  --   --  13.5  --    [id number]  --   --   --    OCCULTBLOOD  --  Negative  --   --         Chemistries:  Recent Labs   Lab 01/22/19 1837 01/23/19  1035 01/24/19  0431   * 133* 134*   K 4.3 4.2 4.3   CL 93* 92* 93*   CO2 31* 31* 31*   BUN 12  18 22   CREATININE 1.0 0.9 0.9   CALCIUM 8.8 9.4 9.4   ALBUMIN 3.5 3.6 3.7   PROT 5.8* 6.1 6.2   BILITOT 0.8 2.9* 0.9   ALKPHOS 54* 53* 55   ALT 15 16 27   AST 9* 11 17       All pertinent labs within the past 24 hours have been reviewed.    Significant Imaging:  I have reviewed all pertinent imaging results/findings within the past 24 hours.    Assessment/Plan:     * Chronic respiratory failure with hypoxia    Will need home ventilator   Needs to have Non Invasive Ventilation with a Respiratory Rate .  Patient has Chronic Respiratory Failure and COPD.Patient severely desaturates their PO2 during Sleep or while relaxing. Patient needs Non Invasive Ventilation day and night in order to decrease Work of Breathing and improve Oxygenation by recruitment and distension of the alveoli and terminal Bronchioles. Patient has failed Bipap due to severity of patients condition.Paient could die at home without Non Invasive Ventilation or have frequent/increased admissions to hospital.     Acute on chronic respiratory failure with hypoxia    bipap sat is in the mid 90's  On o2 3 liters sat 85%     Pneumonia    RLL pneumonia      Symptomatic anemia    Improved 9.6 now HB  8.8 last admit one month ago 6.9 now stool negative for blood  on eloquise     COPD exacerbation    Sob worsening cough     Oxygen dependent    Severe respiratory failure      COPD (chronic obstructive pulmonary disease)    Severe      Emphysema/COPD    Much better oxygenation on bipap   End stage disease will attempt to oxygenate with niv or home ventilator to oxygenate       Needs to have Non Invasive Ventilation with a Respiratory Rate .  Patient has Chronic Respiratory Failure and COPD.Patient severely desaturates their PO2 during Sleep or while relaxing. Patient needs Non Invasive Ventilation day and night in order to decrease Work of Breathing and improve Oxygenation by recruitment and distension of the alveoli and terminal Bronchioles. Patient has failed  Bipap due to severity of patients condition.Paient could die at home without Non Invasive Ventilation or have frequent/increased admissions to hospital.     Emmanuel Hickman MD  Pulmonology  [facility]

## 2019-01-24 NOTE — PLAN OF CARE
01/24/19 0746   Post-Acute Status   Post-Acute Authorization [facility] Status Referrals Sent         Referral sent to [facility] for trilogy. Awaiting response.

## 2019-01-24 NOTE — ASSESSMENT & PLAN NOTE
Trilogy ordered for home use per Dr Hickman  O2 at home  This is markedly worse than typical  She is being treated for pna, but is quite winded.  While she has been on eliquis, would like to confirm no PE. Check d-dimer

## 2019-01-24 NOTE — ASSESSMENT & PLAN NOTE
Using bipap at night  Using 6L NC TKS>90%  She needs to be a DNR as this is basically end stage COPD/resp failure. I had made her a DNR in my office several months ago. Living will was filled out and signed by me. Scanned into chart, but I can't find it in Epic. Where is it?     She is still hypoxic more than her usual. Was on eliquis 5mg PO BID at home and denies missing doses. C/o coughing pink sputum today. Check D Dimer

## 2019-01-24 NOTE — PHYSICIAN QUERY
PT Name: Chantell Herrera  MR #: [id number]    Physician Query Form - Atrial Fibrillation Specificity     CDS/: Lisa Kim RN CDI           Contact information: [email or web address]     This form is a permanent document in the medical record.     Query Date: January 24, 2019    By submitting this query, we are merely seeking further clarification of documentation. Please utilize your independent clinical judgment when addressing the question(s) below.    The medical record contains the following:   Indicators     Supporting Clinical Findings Location in Medical Record   X Atrial Fibrillation Atrial fibrillation with rapid ventricular response    Holding elequis due to significant anemia  Cont dig - check dig level  Consider BB for rate control   [facility] medicine note  1/24 1200pm   X EKG results Normal sinus rhythm EKG 1/22   X Medication Digoxin 0.25 mg po daily 1/25 0900  Diltiazem 120 mg po daily 1/23 0915 Medication sheets    Treatment      Other         Provider, please further specify the Atrial Fibrillation diagnosis.    [  ] Chronic   [x] Paroxysmal   [  ] Permanent   [  ] Persistent   [  ] Other (please specify):   [  ] Clinically Undetermined       Please document in your progress notes daily for the duration of treatment until resolved, and include in your discharge summary.

## 2019-01-24 NOTE — ASSESSMENT & PLAN NOTE
Holding lasix  Holding ARB  bp stable without for now  Cont CCB  Not on BB at home  this am with hx a fib  Check dig level

## 2019-01-24 NOTE — PLAN OF CARE
Problem: Adult Inpatient Plan of Care  Goal: Plan of Care Review  Outcome: Ongoing (interventions implemented as appropriate)  Patient with no complaints of pain anxiety or SOB. Lung sounds tight, rhonchi on left side. A-fib on tele. SAT 94% on 5 Liters NC at shift change. Placed on BIPAP at 30% and sating 97-94%. Dr. Hickman called and wanted patient placed on 45% patient came up to 99%, Dr. Hickman notified and ordered patient to 40%, remained in upper 90's. Kept BIPAP on throughout the night. Dr. Hickman hopeful to send patient home with home trilogy unit. Up to [facility] with no issues. VS stable. A&O. Some complaints of dried drainage at IV site but patient refused new IV placement. No acute changes noted. Plan of care reviewed and agreed upon with patient.

## 2019-01-25 LAB
ACANTHOCYTES BLD QL SMEAR: PRESENT
ALBUMIN SERPL BCP-MCNC: 3.6 G/DL
ALP SERPL-CCNC: 48 U/L
ALT SERPL W/O P-5'-P-CCNC: 35 U/L
ANION GAP SERPL CALC-SCNC: 12 MMOL/L
ANISOCYTOSIS BLD QL SMEAR: ABNORMAL
AST SERPL-CCNC: 17 U/L
BASOPHILS # BLD AUTO: ABNORMAL K/UL
BASOPHILS NFR BLD: 0 %
BILIRUB SERPL-MCNC: 0.7 MG/DL
BUN SERPL-MCNC: 23 MG/DL
CALCIUM SERPL-MCNC: 9 MG/DL
CHLORIDE SERPL-SCNC: 92 MMOL/L
CO2 SERPL-SCNC: 31 MMOL/L
CREAT SERPL-MCNC: 0.9 MG/DL
DACRYOCYTES BLD QL SMEAR: ABNORMAL
DIFFERENTIAL METHOD: ABNORMAL
EOSINOPHIL # BLD AUTO: ABNORMAL K/UL
EOSINOPHIL NFR BLD: 0 %
ERYTHROCYTE [DISTWIDTH] IN BLOOD BY AUTOMATED COUNT: 22.2 %
EST. GFR  (AFRICAN AMERICAN): >60 ML/MIN/1.73 M^2
EST. GFR  (NON AFRICAN AMERICAN): >60 ML/MIN/1.73 M^2
GLUCOSE SERPL-MCNC: 204 MG/DL
HCT VFR BLD AUTO: 29.8 %
HGB BLD-MCNC: 9.7 G/DL
HYPOCHROMIA BLD QL SMEAR: ABNORMAL
LYMPHOCYTES # BLD AUTO: ABNORMAL K/UL
LYMPHOCYTES NFR BLD: 1 %
MCH RBC QN AUTO: 35.3 PG
MCHC RBC AUTO-ENTMCNC: 32.6 G/DL
MCV RBC AUTO: 108 FL
MONOCYTES # BLD AUTO: ABNORMAL K/UL
MONOCYTES NFR BLD: 3 %
NEUTROPHILS NFR BLD: 87 %
NEUTS BAND NFR BLD MANUAL: 9 %
OVALOCYTES BLD QL SMEAR: ABNORMAL
PLATELET # BLD AUTO: 224 K/UL
PLATELET BLD QL SMEAR: ABNORMAL
PMV BLD AUTO: 11.5 FL
POIKILOCYTOSIS BLD QL SMEAR: SLIGHT
POLYCHROMASIA BLD QL SMEAR: ABNORMAL
POTASSIUM SERPL-SCNC: 3.8 MMOL/L
PROT SERPL-MCNC: 6 G/DL
RBC # BLD AUTO: 2.75 M/UL
SODIUM SERPL-SCNC: 135 MMOL/L
WBC # BLD AUTO: 16 K/UL

## 2019-01-25 PROCEDURE — 99900035 HC TECH TIME PER 15 MIN (STAT): Mod: HCNC

## 2019-01-25 PROCEDURE — 85027 COMPLETE CBC AUTOMATED: CPT | Mod: HCNC

## 2019-01-25 PROCEDURE — 94660 CPAP INITIATION&MGMT: CPT | Mod: HCNC

## 2019-01-25 PROCEDURE — 36415 COLL VENOUS BLD VENIPUNCTURE: CPT | Mod: HCNC

## 2019-01-25 PROCEDURE — 80053 COMPREHEN METABOLIC PANEL: CPT | Mod: HCNC

## 2019-01-25 PROCEDURE — 93010 ELECTROCARDIOGRAM REPORT: CPT | Mod: ,,, | Performed by: INTERNAL MEDICINE

## 2019-01-25 PROCEDURE — 93005 ELECTROCARDIOGRAM TRACING: CPT

## 2019-01-25 PROCEDURE — 85007 BL SMEAR W/DIFF WBC COUNT: CPT | Mod: HCNC

## 2019-01-25 PROCEDURE — 99232 SBSQ HOSP IP/OBS MODERATE 35: CPT | Mod: ,,, | Performed by: FAMILY MEDICINE

## 2019-01-25 PROCEDURE — 93010 EKG 12-LEAD: ICD-10-PCS | Mod: ,,, | Performed by: INTERNAL MEDICINE

## 2019-01-25 PROCEDURE — 27000221 HC OXYGEN, UP TO 24 HOURS

## 2019-01-25 PROCEDURE — 63600175 PHARM REV CODE 636 W HCPCS: Performed by: FAMILY MEDICINE

## 2019-01-25 PROCEDURE — 63600175 PHARM REV CODE 636 W HCPCS: Performed by: INTERNAL MEDICINE

## 2019-01-25 PROCEDURE — 25000003 PHARM REV CODE 250: Performed by: NURSE PRACTITIONER

## 2019-01-25 PROCEDURE — 25000242 PHARM REV CODE 250 ALT 637 W/ HCPCS: Mod: HCNC | Performed by: INTERNAL MEDICINE

## 2019-01-25 PROCEDURE — 94640 AIRWAY INHALATION TREATMENT: CPT | Mod: HCNC

## 2019-01-25 PROCEDURE — 94761 N-INVAS EAR/PLS OXIMETRY MLT: CPT | Mod: HCNC

## 2019-01-25 PROCEDURE — 25000003 PHARM REV CODE 250: Performed by: FAMILY MEDICINE

## 2019-01-25 PROCEDURE — 11000001 HC ACUTE MED/SURG PRIVATE ROOM: Mod: HCNC

## 2019-01-25 PROCEDURE — 99232 PR SUBSEQUENT HOSPITAL CARE,LEVL II: ICD-10-PCS | Mod: ,,, | Performed by: FAMILY MEDICINE

## 2019-01-25 PROCEDURE — 63600175 PHARM REV CODE 636 W HCPCS: Mod: HCNC | Performed by: INTERNAL MEDICINE

## 2019-01-25 PROCEDURE — 99900031 HC PATIENT EDUCATION (STAT): Mod: HCNC

## 2019-01-25 PROCEDURE — 94664 DEMO&/EVAL PT USE INHALER: CPT

## 2019-01-25 RX ORDER — DILTIAZEM HYDROCHLORIDE 60 MG/1
120 TABLET, FILM COATED ORAL ONCE
Status: COMPLETED | OUTPATIENT
Start: 2019-01-25 | End: 2019-01-25

## 2019-01-25 RX ORDER — LEVOFLOXACIN 5 MG/ML
750 INJECTION, SOLUTION INTRAVENOUS
Status: DISCONTINUED | OUTPATIENT
Start: 2019-01-26 | End: 2019-01-26

## 2019-01-25 RX ORDER — DILTIAZEM HYDROCHLORIDE 60 MG/1
120 TABLET, FILM COATED ORAL EVERY 12 HOURS
Status: DISCONTINUED | OUTPATIENT
Start: 2019-01-25 | End: 2019-01-25

## 2019-01-25 RX ORDER — METOPROLOL TARTRATE 1 MG/ML
5 INJECTION, SOLUTION INTRAVENOUS ONCE
Status: COMPLETED | OUTPATIENT
Start: 2019-01-25 | End: 2019-01-25

## 2019-01-25 RX ORDER — DILTIAZEM HYDROCHLORIDE 120 MG/1
240 CAPSULE, COATED, EXTENDED RELEASE ORAL DAILY
Status: DISCONTINUED | OUTPATIENT
Start: 2019-01-26 | End: 2019-01-26 | Stop reason: HOSPADM

## 2019-01-25 RX ADMIN — DIGOXIN 0.25 MG: 125 TABLET ORAL at 08:01

## 2019-01-25 RX ADMIN — IPRATROPIUM BROMIDE AND ALBUTEROL SULFATE 3 ML: .5; 3 SOLUTION RESPIRATORY (INHALATION) at 03:01

## 2019-01-25 RX ADMIN — IPRATROPIUM BROMIDE AND ALBUTEROL SULFATE 3 ML: .5; 3 SOLUTION RESPIRATORY (INHALATION) at 07:01

## 2019-01-25 RX ADMIN — CEFTRIAXONE 1 G: 1 INJECTION, SOLUTION INTRAVENOUS at 12:01

## 2019-01-25 RX ADMIN — TRAZODONE HYDROCHLORIDE 50 MG: 50 TABLET ORAL at 09:01

## 2019-01-25 RX ADMIN — METHYLPREDNISOLONE SODIUM SUCCINATE 40 MG: 40 INJECTION, POWDER, FOR SOLUTION INTRAMUSCULAR; INTRAVENOUS at 09:01

## 2019-01-25 RX ADMIN — DILTIAZEM HYDROCHLORIDE 120 MG: 60 TABLET, FILM COATED ORAL at 08:01

## 2019-01-25 RX ADMIN — APIXABAN 5 MG: 5 TABLET, FILM COATED ORAL at 08:01

## 2019-01-25 RX ADMIN — ROPINIROLE HYDROCHLORIDE 1 MG: 0.5 TABLET, FILM COATED ORAL at 09:01

## 2019-01-25 RX ADMIN — METHYLPREDNISOLONE SODIUM SUCCINATE 40 MG: 40 INJECTION, POWDER, FOR SOLUTION INTRAMUSCULAR; INTRAVENOUS at 01:01

## 2019-01-25 RX ADMIN — FUROSEMIDE 20 MG: 10 INJECTION, SOLUTION INTRAMUSCULAR; INTRAVENOUS at 12:01

## 2019-01-25 RX ADMIN — LEVOFLOXACIN 750 MG: 5 INJECTION, SOLUTION INTRAVENOUS at 05:01

## 2019-01-25 RX ADMIN — METOPROLOL TARTRATE 5 MG: 5 INJECTION, SOLUTION INTRAVENOUS at 09:01

## 2019-01-25 RX ADMIN — IPRATROPIUM BROMIDE AND ALBUTEROL SULFATE 3 ML: .5; 3 SOLUTION RESPIRATORY (INHALATION) at 11:01

## 2019-01-25 RX ADMIN — APIXABAN 5 MG: 5 TABLET, FILM COATED ORAL at 09:01

## 2019-01-25 RX ADMIN — METHYLPREDNISOLONE SODIUM SUCCINATE 40 MG: 40 INJECTION, POWDER, FOR SOLUTION INTRAMUSCULAR; INTRAVENOUS at 05:01

## 2019-01-25 RX ADMIN — PRAVASTATIN SODIUM 40 MG: 40 TABLET ORAL at 08:01

## 2019-01-25 RX ADMIN — IPRATROPIUM BROMIDE AND ALBUTEROL SULFATE 3 ML: .5; 3 SOLUTION RESPIRATORY (INHALATION) at 12:01

## 2019-01-25 RX ADMIN — DILTIAZEM HYDROCHLORIDE 120 MG: 60 TABLET, FILM COATED ORAL at 09:01

## 2019-01-25 NOTE — CARE UPDATE
Quiet hours, rested well. Maintained on bipap during the night, tolerated well. IV antibiotics administered as ordered. Swelling to BLE has improved during the night. Using bedside commode with standby assistance. Plan of care reviewed with pt, voiced understanding. SR up X3, call bell in reach. Instructed to call for needs or assistance.

## 2019-01-25 NOTE — ASSESSMENT & PLAN NOTE
On levaquin and rocephin  duonebs   Medrol 40mg IV q 8hr  Dr nino following  CT of chest 1/24 negative for PE, dissection;  Severe centrilobular emphysematous disease.  Small bilateral pleural effusions with adjacent passive atelectasis.  Patchy ground-glass opacities in the superior segment of the right lower lobe concerning for a pneumonia in the appropriate clinical setting.  1/25/19 day 4 IV rocephin/levofloxacin, IV Solumedrol  40 IV q 8

## 2019-01-25 NOTE — ASSESSMENT & PLAN NOTE
Holding ARB  bp stable without for now  Cont CCB  Increase diltiazem dose; IV labetolol as she is in RVR

## 2019-01-25 NOTE — NURSING
Bedside report complete. Pt awaiting visit from Dr. Gaspar. No distress noted. O2 maintained at 3L/NC. SR up X3, call bell in reach. Instructed to call for needs or assistance.

## 2019-01-25 NOTE — PLAN OF CARE
Problem: Adult Inpatient Plan of Care  Goal: Plan of Care Review  Outcome: Ongoing (interventions implemented as appropriate)  Patient is compliant with fluid and medication management.  Patient is compliant with with all lab testing and procedures.  Patient remains free from all falls and injury.  VSS. Plan of care reviewed and agreed upon with patient.  Will continue to monitor.

## 2019-01-25 NOTE — ASSESSMENT & PLAN NOTE
Trilogy ordered for home use per Dr Hickman  O2 at home  This is markedly worse than typical  She is being treated for pna, but is quite winded.  While she has been on eliquis, would like to confirm no PE. Check d-dimer  Negative for PE- eliquis resumed  Does not meet criteria for trilogy ; continue home oxygen- still wheezing    Treat for pna for 7 days. She is improving.

## 2019-01-25 NOTE — PROGRESS NOTES
[facility] Medicine  Progress Note    Patient Name: Chantell Herrera  MRN: [id number]  Patient Class: IP- Inpatient   Admission Date: 1/22/2019  Length of Stay: 3 days  Attending Physician: Milo Estevez MD  Primary Care Provider: Anson Leiva MD        Subjective:     Principal Problem:Chronic respiratory failure with hypoxia    HPI:  84yo female patient with PMHX of anxiety, arthritis, COPD, Afib, CHF, depression, HLD, HTN, and anemia. She presented to ER last night with c/o SOB 4 days ago. She reports that she was also having SANTIAGO. She was having trouble getting 5-10 ft without SOB, usually can get 50 ft or so. Denies wheezing at home.Was coughing, occasionally productive (green). She was recently treated 1/10 with azithromycin and prednisone. CXR with pneumonia. Admitted with Dr Hickman consult. ON levaquin and rocephin,nebs and medrol.     Has hx of anemia in younger years.She has never received blood before. She is now on her 2nd unit. She reports that she has no appetite.     Hospital Course:  She is still is still SOB and has significanrt SANTIAGO. POX 5L NC and using bipap over night. POX 94% this am. This is much more than her usual maint dose. WBC coming down. [id number]. On medrol 40mg IV q 8 and day 3 levaquin and rocephin. Dr Hickman following, attempting to get her home Bipap    Anemia stable after her 2 units PRBC. Anemia studies normal including retic 2.0 and folate added yesterday.   Reports that she has energy better, breathing better as well  1/25/19 rested well overnight; BiPAP  Maintained over nightt, tolerated well.  Swelling to BLE has improved but pt notes right still feels swollen.  Notes feeling much  better Using bedside commode. Urinating a lot. CT with Patchy ground-glass opacities in the superior segment of the right lower lobe concerning for a pneumonia in the appropriate clinical setting. Day 4/7  IV Rocephin, Levofloxacin,   Now on 3LNC, o2 sat 95%, did  not qualify for trilogy; still wheezing but doing better  /63, Afebrile WBC 16.00.   PAF overnight; Afib this am, Eliquis resumed , monitor H&H,   ^ activity; EKG now; ^ diltiazem for AF _RVR; HR 120s; extra 120mg this am diltiazem and ^ to 240mg daily  Cards following          Interval note: breathing better  Review of Systems   Constitutional: Negative for activity change (due to SANTIAGO), chills, fatigue and fever.   HENT: Negative for congestion and sore throat.    Respiratory: Positive for cough and shortness of breath. Negative for wheezing.    Cardiovascular: Positive for leg swelling. Negative for chest pain and palpitations.   Gastrointestinal: Negative for blood in stool, constipation, diarrhea, nausea and vomiting.        Denies black or dark stools   Genitourinary: Negative for dysuria, frequency, hematuria and urgency.   Musculoskeletal: Positive for back pain (lower back chronic, worse due to laying in bed).   Neurological: Negative for dizziness (with coughing), weakness, light-headedness (with coughing), numbness (occassionally the tips of fingers feel numb) and headaches.     Objective:     Vital Signs (Most Recent):  Temp: 97 °F (36.1 °C) (01/25/19 0728)  Pulse: (!) 137 (01/25/19 0728)  Resp: 20 (01/25/19 0728)  BP: (!) 115/57 (01/25/19 0728)  SpO2: 95 % (01/25/19 0728) Vital Signs (24h Range):  Temp:  [96.2 °F (35.7 °C)-98.4 °F (36.9 °C)] 97 °F (36.1 °C)  Pulse:  [] 137  Resp:  [16-23] 20  SpO2:  [90 %-100 %] 95 %  BP: (110-138)/(53-63) 115/57     Weight: 61.2 kg (134 lb 14.7 oz)  Body mass index is 25.49 kg/m².    Physical Exam   Constitutional: She is oriented to person, place, and time. She appears well-developed. No distress.   Sitting up in bed   HENT:   Head: Normocephalic and atraumatic.   Eyes: Conjunctivae are normal. Pupils are equal, round, and reactive to light.   Neck: Normal range of motion. Neck supple. No thyromegaly present.   Cardiovascular: Normal heart sounds and intact  distal pulses.   tachy   Pulmonary/Chest: No respiratory distress. She has wheezes. She has rales.   Scattered wheezing and rhonchi   Abdominal: Soft. Bowel sounds are normal. There is no tenderness.   Musculoskeletal: Normal range of motion. She exhibits edema.   Lymphadenopathy:     She has no cervical adenopathy.   Neurological: She is alert and oriented to person, place, and time.   Skin: Skin is warm and dry. No rash noted.   Psychiatric: She has a normal mood and affect. Her behavior is normal.   Nursing note and vitals reviewed.        CRANIAL NERVES     CN III, IV, VI   Pupils are equal, round, and reactive to light.       Significant Labs:   CBC:   Recent Labs   Lab 01/23/19  1035 01/24/19  0431 01/25/19  0440   WBC 24.44* 15.79* 16.00*   HGB 9.6* 9.9* 9.7*   HCT 29.6* 30.1* 29.8*    200 224   iron 49, transferrin 182, TIBC 269, sat iron 18  Ferritin 242  b12 413  Haptoglobin 128  Occult stool negative  retic 2.0  Folate WNL  MMA ordered    CMP:   Recent Labs   Lab 01/23/19  1035 01/24/19  0431 01/24/19  1943 01/25/19  0439   * 134* 134* 135*   K 4.2 4.3 4.3 3.8   CL 92* 93* 93* 92*   CO2 31* 31* 29 31*   * 217* 265* 204*   BUN 18 22 27* 23   CREATININE 0.9 0.9 1.2 0.9   CALCIUM 9.4 9.4 9.0 9.0   PROT 6.1 6.2  --  6.0   ALBUMIN 3.6 3.7  --  3.6   BILITOT 2.9* 0.9  --  0.7   ALKPHOS 53* 55  --  48*   AST 11 17  --  17   ALT 16 27  --  35   ANIONGAP 10 10 12 12   EGFRNONAA >60 >60 42* >60     Cardiac Markers:   No results for input(s): CKMB, MYOGLOBIN, BNP, TROPISTAT in the last 48 hours.  Coagulation:   No results for input(s): PT, INR, APTT in the last 48 hours.  Troponin:   No results for input(s): TROPONINI in the last 48 hours.  Urine Studies:   No results for input(s): COLORU, APPEARANCEUA, PHUR, SPECGRAV, PROTEINUA, GLUCUA, KETONESU, BILIRUBINUA, OCCULTUA, NITRITE, UROBILINOGEN, LEUKOCYTESUR, RBCUA, WBCUA, BACTERIA, SQUAMEPITHEL, HYALINECASTS in the last 48 hours.    Invalid  input(s): WRIGHTSUR  D dimer +    Significant Imagin/22 CXR EKG leads overlie the chest obscuring detail.  Subsegmental atelectasis or scarring at the left lung base is noted.  There is new hazy opacity in the right lower lung which may relate to infection in the appropriate clinical setting..  Follow-up exam can be performed as indicated.  Aortic atherosclerosis and tortuosity.  Cardiomediastinal silhouette remains enlarged.  Degenerative changes of the shoulder   girdles are present     EKG NSR  19 CT Chest-   No evidence for aortic dissection or pulmonary embolus.    Severe centrilobular emphysematous disease.  Small bilateral pleural effusions with adjacent passive atelectasis.  Patchy ground-glass opacities in the superior segment of the right lower lobe concerning for a pneumonia in the appropriate clinical setting.    Assessment/Plan:      * Chronic respiratory failure with hypoxia    Trilogy ordered for home use per Dr Hickman  O2 at home  This is markedly worse than typical  She is being treated for pna, but is quite winded.  While she has been on eliquis, would like to confirm no PE. Check d-dimer  Negative for PE- eliquis resumed  Does not meet criteria for trilogy ; continue home oxygen- still wheezing    Treat for pna for 7 days. She is improving.     Acute on chronic respiratory failure with hypoxia    Using bipap at night  Using 6L NC TKS>90%  She needs to be a DNR as this is basically end stage COPD/resp failure. I had made her a DNR in my office several months ago. Living will was filled out and signed by me. Scanned into chart, but I can't find it in Epic. Where is it?        Pneumonia    On levaquin and rocephin  duonebs   Medrol 40mg IV q 8hr  Dr hickman following  CT of chest  negative for PE, dissection;  Severe centrilobular emphysematous disease.  Small bilateral pleural effusions with adjacent passive atelectasis.  Patchy ground-glass opacities in the superior segment of the  right lower lobe concerning for a pneumonia in the appropriate clinical setting.  1/25/19 day 4 IV rocephin/levofloxacin, IV Solumedrol  40 IV q 8         Symptomatic anemia    Not fe deficient, b12 normal, occult negative. H/H 6/21, given 2 units PRBC, repeat CBC pending  eliquis held      Check retic and folate- normal  MMA, will need to see hematology for potential bm - she has a very high MCV with normal b12.    Counts holding.  Resume eliquis pending CTA  CT of chest negative for PE     Atrial fibrillation with rapid ventricular response    Holding elequis due to significant anemia  Cont dig - check dig level  Consider BB for rate control  + PAF HX; PMHX of Afib on Eliquis; Review of EKGs; SR but has PAF on eliquis  Did have PAF overnight- currently Afib-131  Will increase cardizem, d/w cards and give single dose of metoprolol       COPD exacerbation    Medrol 40mg IV every 8hr  bipap ordered at night  Start to wean tomorrow.       RLS (restless legs syndrome)    Cont ropinirole       Oxygen dependent    Does qualify for O2       Dyslipidemia    Cont statin       CKD (chronic kidney disease), stage III    ths is well BUN/creat 12/1.0  Stable CMP daily     Hyponatremia    Hold SSRI  Hold lasix  Getting blood with NS   Repeat cmp  Na improving 134 today    Resolved     Major depressive disorder with single episode, in full remission    celexa on hold for now, she was on 40mg po daily   It interacts with levaquin  Will need to restart at home  Watch for increasing depression     Insomnia    trazadone reordered       Cervical radiculopathy    Hold baclofen for now       COPD (chronic obstructive pulmonary disease)    Cont medrol 40 mg IV every 8hr  O2 TKS >90%   1/25 still wheezing but better     HTN (hypertension)    Holding ARB  bp stable without for now  Cont CCB  Increase diltiazem dose; IV labetolol as she is in RVR       VTE Risk Mitigation (From admission, onward)        Ordered     apixaban tablet 5 mg  2  times daily      01/24/19 1924     Place sequential compression device  Until discontinued      01/23/19 0935     IP VTE HIGH RISK PATIENT  Once      01/22/19 1072              Kari Gaspar MD  Department of Hospital Medicine   [facility]

## 2019-01-25 NOTE — ASSESSMENT & PLAN NOTE
Holding elequis due to significant anemia  Cont dig - check dig level  Consider BB for rate control  + PAF HX; PMHX of Afib on Eliquis; Review of EKGs; SR but has PAF on eliquis  Did have PAF overnight- currently Afib-131  Will increase cardizem, d/w cards and give single dose of metoprolol

## 2019-01-25 NOTE — PLAN OF CARE
01/25/19 1101   Medicare Message   Important Message from Medicare regarding Discharge Appeal Rights Given to patient/caregiver;Signed/date by patient/caregiver;Explained to patient/caregiver   Date IMM was signed 01/25/19   Time IMM was signed 1052     Signed for discharge.

## 2019-01-25 NOTE — ASSESSMENT & PLAN NOTE
Not fe deficient, b12 normal, occult negative. H/H 6/21, given 2 units PRBC, repeat CBC pending  eliquis held      Check retic and folate- normal  MMA, will need to see hematology for potential bm - she has a very high MCV with normal b12.    Counts holding.  Resume eliquis pending CTA  CT of chest negative for PE

## 2019-01-25 NOTE — PLAN OF CARE
01/25/19 1133   Post-Acute Status   Post-Acute Authorization Placement   HME Status Authorization Obtained         Dr. Hickman spoke with someone from [facility] who states patient actually is approved for Trilogy. I clarified this with [facility], and they state they go the authorization from [facility]. They will come set patient up with machine this afternoon. Patient aware and is happy.

## 2019-01-25 NOTE — PLAN OF CARE
01/25/19 1023   Discharge Reassessment   Assessment Type Discharge Planning Reassessment         Patient will remain for continued treatment today. Patient states understanding as to why she does not qualify for Trilogy machine. No needs or concerns voiced at this time. CM will continue to follow and assist as needed.

## 2019-01-25 NOTE — SUBJECTIVE & OBJECTIVE
Interval note: breathing better  Review of Systems   Constitutional: Negative for activity change (due to SANTIAGO), chills, fatigue and fever.   HENT: Negative for congestion and sore throat.    Respiratory: Positive for cough and shortness of breath. Negative for wheezing.    Cardiovascular: Positive for leg swelling. Negative for chest pain and palpitations.   Gastrointestinal: Negative for blood in stool, constipation, diarrhea, nausea and vomiting.        Denies black or dark stools   Genitourinary: Negative for dysuria, frequency, hematuria and urgency.   Musculoskeletal: Positive for back pain (lower back chronic, worse due to laying in bed).   Neurological: Negative for dizziness (with coughing), weakness, light-headedness (with coughing), numbness (occassionally the tips of fingers feel numb) and headaches.     Objective:     Vital Signs (Most Recent):  Temp: 97 °F (36.1 °C) (01/25/19 0728)  Pulse: (!) 137 (01/25/19 0728)  Resp: 20 (01/25/19 0728)  BP: (!) 115/57 (01/25/19 0728)  SpO2: 95 % (01/25/19 0728) Vital Signs (24h Range):  Temp:  [96.2 °F (35.7 °C)-98.4 °F (36.9 °C)] 97 °F (36.1 °C)  Pulse:  [] 137  Resp:  [16-23] 20  SpO2:  [90 %-100 %] 95 %  BP: (110-138)/(53-63) 115/57     Weight: 61.2 kg (134 lb 14.7 oz)  Body mass index is 25.49 kg/m².    Physical Exam   Constitutional: She is oriented to person, place, and time. She appears well-developed. No distress.   Sitting up in bed   HENT:   Head: Normocephalic and atraumatic.   Eyes: Conjunctivae are normal. Pupils are equal, round, and reactive to light.   Neck: Normal range of motion. Neck supple. No thyromegaly present.   Cardiovascular: Normal heart sounds and intact distal pulses.   tachy   Pulmonary/Chest: No respiratory distress. She has wheezes. She has rales.   Scattered wheezing and rhonchi   Abdominal: Soft. Bowel sounds are normal. There is no tenderness.   Musculoskeletal: Normal range of motion. She exhibits edema.   Lymphadenopathy:      She has no cervical adenopathy.   Neurological: She is alert and oriented to person, place, and time.   Skin: Skin is warm and dry. No rash noted.   Psychiatric: She has a normal mood and affect. Her behavior is normal.   Nursing note and vitals reviewed.        CRANIAL NERVES     CN III, IV, VI   Pupils are equal, round, and reactive to light.       Significant Labs:   CBC:   Recent Labs   Lab 19  1035 19  0431 19  0440   WBC 24.44* 15.79* 16.00*   HGB 9.6* 9.9* 9.7*   HCT 29.6* 30.1* 29.8*    200 224   iron 49, transferrin 182, TIBC 269, sat iron 18  Ferritin 242  b12 413  Haptoglobin 128  Occult stool negative  retic 2.0  Folate WNL  MMA ordered    CMP:   Recent Labs   Lab 19  1035 19  0431 19  1943 19  0439   * 134* 134* 135*   K 4.2 4.3 4.3 3.8   CL 92* 93* 93* 92*   CO2 31* 31* 29 31*   * 217* 265* 204*   BUN 18 22 27* 23   CREATININE 0.9 0.9 1.2 0.9   CALCIUM 9.4 9.4 9.0 9.0   PROT 6.1 6.2  --  6.0   ALBUMIN 3.6 3.7  --  3.6   BILITOT 2.9* 0.9  --  0.7   ALKPHOS 53* 55  --  48*   AST 11 17  --  17   ALT 16 27  --  35   ANIONGAP 10 10 12 12   EGFRNONAA >60 >60 42* >60     Cardiac Markers:   No results for input(s): CKMB, MYOGLOBIN, BNP, TROPISTAT in the last 48 hours.  Coagulation:   No results for input(s): PT, INR, APTT in the last 48 hours.  Troponin:   No results for input(s): TROPONINI in the last 48 hours.  Urine Studies:   No results for input(s): COLORU, APPEARANCEUA, PHUR, SPECGRAV, PROTEINUA, GLUCUA, KETONESU, BILIRUBINUA, OCCULTUA, NITRITE, UROBILINOGEN, LEUKOCYTESUR, RBCUA, WBCUA, BACTERIA, SQUAMEPITHEL, HYALINECASTS in the last 48 hours.    Invalid input(s): WRIGHTSUR  D dimer +    Significant Imagin/22 CXR EKG leads overlie the chest obscuring detail.  Subsegmental atelectasis or scarring at the left lung base is noted.  There is new hazy opacity in the right lower lung which may relate to infection in the appropriate clinical  setting..  Follow-up exam can be performed as indicated.  Aortic atherosclerosis and tortuosity.  Cardiomediastinal silhouette remains enlarged.  Degenerative changes of the shoulder   girdles are present    1/22 EKG NSR  1/24/19 CT Chest-   No evidence for aortic dissection or pulmonary embolus.    Severe centrilobular emphysematous disease.  Small bilateral pleural effusions with adjacent passive atelectasis.  Patchy ground-glass opacities in the superior segment of the right lower lobe concerning for a pneumonia in the appropriate clinical setting.

## 2019-01-26 VITALS
HEIGHT: 61 IN | RESPIRATION RATE: 18 BRPM | WEIGHT: 134.94 LBS | SYSTOLIC BLOOD PRESSURE: 130 MMHG | TEMPERATURE: 98 F | OXYGEN SATURATION: 97 % | DIASTOLIC BLOOD PRESSURE: 62 MMHG | BODY MASS INDEX: 25.48 KG/M2 | HEART RATE: 100 BPM

## 2019-01-26 LAB
ALBUMIN SERPL BCP-MCNC: 3.5 G/DL
ALP SERPL-CCNC: 48 U/L
ALT SERPL W/O P-5'-P-CCNC: 96 U/L
ANION GAP SERPL CALC-SCNC: 9 MMOL/L
ANISOCYTOSIS BLD QL SMEAR: ABNORMAL
AST SERPL-CCNC: 37 U/L
BASOPHILS # BLD AUTO: 0 K/UL
BASOPHILS NFR BLD: 0 %
BILIRUB SERPL-MCNC: 0.7 MG/DL
BUN SERPL-MCNC: 24 MG/DL
CALCIUM SERPL-MCNC: 8.8 MG/DL
CHLORIDE SERPL-SCNC: 92 MMOL/L
CO2 SERPL-SCNC: 33 MMOL/L
CREAT SERPL-MCNC: 1 MG/DL
DIFFERENTIAL METHOD: ABNORMAL
EOSINOPHIL # BLD AUTO: 0 K/UL
EOSINOPHIL NFR BLD: 0.1 %
ERYTHROCYTE [DISTWIDTH] IN BLOOD BY AUTOMATED COUNT: 21.5 %
EST. GFR  (AFRICAN AMERICAN): >60 ML/MIN/1.73 M^2
EST. GFR  (NON AFRICAN AMERICAN): 53 ML/MIN/1.73 M^2
ESTIMATED AVG GLUCOSE: 134 MG/DL
GLUCOSE SERPL-MCNC: 242 MG/DL
HBA1C MFR BLD HPLC: 6.3 %
HCT VFR BLD AUTO: 30.1 %
HGB BLD-MCNC: 9.4 G/DL
HYPOCHROMIA BLD QL SMEAR: ABNORMAL
LYMPHOCYTES # BLD AUTO: 0.4 K/UL
LYMPHOCYTES NFR BLD: 3.2 %
MCH RBC QN AUTO: 34.1 PG
MCHC RBC AUTO-ENTMCNC: 31.2 G/DL
MCV RBC AUTO: 109 FL
MONOCYTES # BLD AUTO: 0.9 K/UL
MONOCYTES NFR BLD: 7.1 %
NEUTROPHILS # BLD AUTO: 10.9 K/UL
NEUTROPHILS NFR BLD: 90.9 %
OVALOCYTES BLD QL SMEAR: ABNORMAL
PLATELET # BLD AUTO: 201 K/UL
PLATELET BLD QL SMEAR: ABNORMAL
PMV BLD AUTO: 11.8 FL
POCT GLUCOSE: 234 MG/DL (ref 70–110)
POIKILOCYTOSIS BLD QL SMEAR: SLIGHT
POLYCHROMASIA BLD QL SMEAR: ABNORMAL
POTASSIUM SERPL-SCNC: 4 MMOL/L
PROT SERPL-MCNC: 5.6 G/DL
RBC # BLD AUTO: 2.76 M/UL
SODIUM SERPL-SCNC: 134 MMOL/L
WBC # BLD AUTO: 12.19 K/UL

## 2019-01-26 PROCEDURE — 25000003 PHARM REV CODE 250: Mod: HCNC | Performed by: NURSE PRACTITIONER

## 2019-01-26 PROCEDURE — 99232 PR SUBSEQUENT HOSPITAL CARE,LEVL II: ICD-10-PCS | Mod: HCNC,,, | Performed by: FAMILY MEDICINE

## 2019-01-26 PROCEDURE — 36415 COLL VENOUS BLD VENIPUNCTURE: CPT | Mod: HCNC

## 2019-01-26 PROCEDURE — 27000221 HC OXYGEN, UP TO 24 HOURS: Mod: HCNC

## 2019-01-26 PROCEDURE — 94664 DEMO&/EVAL PT USE INHALER: CPT | Mod: HCNC

## 2019-01-26 PROCEDURE — 25000242 PHARM REV CODE 250 ALT 637 W/ HCPCS: Mod: HCNC | Performed by: INTERNAL MEDICINE

## 2019-01-26 PROCEDURE — 99900035 HC TECH TIME PER 15 MIN (STAT): Mod: HCNC

## 2019-01-26 PROCEDURE — 83036 HEMOGLOBIN GLYCOSYLATED A1C: CPT | Mod: HCNC

## 2019-01-26 PROCEDURE — 94761 N-INVAS EAR/PLS OXIMETRY MLT: CPT | Mod: HCNC

## 2019-01-26 PROCEDURE — 97116 GAIT TRAINING THERAPY: CPT | Mod: HCNC

## 2019-01-26 PROCEDURE — 63600175 PHARM REV CODE 636 W HCPCS: Mod: HCNC | Performed by: INTERNAL MEDICINE

## 2019-01-26 PROCEDURE — 97161 PT EVAL LOW COMPLEX 20 MIN: CPT | Mod: HCNC

## 2019-01-26 PROCEDURE — 99232 SBSQ HOSP IP/OBS MODERATE 35: CPT | Mod: HCNC,,, | Performed by: FAMILY MEDICINE

## 2019-01-26 PROCEDURE — 94640 AIRWAY INHALATION TREATMENT: CPT | Mod: HCNC

## 2019-01-26 PROCEDURE — 63600175 PHARM REV CODE 636 W HCPCS: Mod: HCNC | Performed by: NURSE PRACTITIONER

## 2019-01-26 PROCEDURE — 25000003 PHARM REV CODE 250: Mod: HCNC | Performed by: FAMILY MEDICINE

## 2019-01-26 PROCEDURE — 80053 COMPREHEN METABOLIC PANEL: CPT | Mod: HCNC

## 2019-01-26 PROCEDURE — 85025 COMPLETE CBC W/AUTO DIFF WBC: CPT | Mod: HCNC

## 2019-01-26 PROCEDURE — 63600175 PHARM REV CODE 636 W HCPCS: Mod: HCNC | Performed by: FAMILY MEDICINE

## 2019-01-26 RX ORDER — LEVOFLOXACIN 500 MG/1
500 TABLET, FILM COATED ORAL DAILY
Status: DISCONTINUED | OUTPATIENT
Start: 2019-01-27 | End: 2019-01-26 | Stop reason: HOSPADM

## 2019-01-26 RX ORDER — PREDNISONE 10 MG/1
TABLET ORAL
Qty: 60 TABLET | Refills: 0 | Status: ON HOLD | OUTPATIENT
Start: 2019-01-26 | End: 2019-09-04 | Stop reason: SDUPTHER

## 2019-01-26 RX ORDER — LOSARTAN POTASSIUM 100 MG/1
50 TABLET ORAL DAILY
Qty: 30 TABLET | Refills: 0 | Status: SHIPPED | OUTPATIENT
Start: 2019-01-26 | End: 2019-07-08 | Stop reason: SDUPTHER

## 2019-01-26 RX ORDER — GLUCAGON 1 MG
1 KIT INJECTION
Status: DISCONTINUED | OUTPATIENT
Start: 2019-01-26 | End: 2019-01-26 | Stop reason: HOSPADM

## 2019-01-26 RX ORDER — LEVOFLOXACIN 500 MG/1
500 TABLET, FILM COATED ORAL DAILY
Qty: 2 TABLET | Refills: 0 | Status: SHIPPED | OUTPATIENT
Start: 2019-01-26 | End: 2019-02-01

## 2019-01-26 RX ORDER — IBUPROFEN 200 MG
24 TABLET ORAL
Status: DISCONTINUED | OUTPATIENT
Start: 2019-01-26 | End: 2019-01-26 | Stop reason: HOSPADM

## 2019-01-26 RX ORDER — FUROSEMIDE 20 MG/1
20 TABLET ORAL DAILY
Qty: 10 TABLET | Refills: 0 | Status: SHIPPED | OUTPATIENT
Start: 2019-01-26 | End: 2019-03-26 | Stop reason: SDUPTHER

## 2019-01-26 RX ORDER — FUROSEMIDE 10 MG/ML
20 INJECTION INTRAMUSCULAR; INTRAVENOUS ONCE
Status: COMPLETED | OUTPATIENT
Start: 2019-01-26 | End: 2019-01-26

## 2019-01-26 RX ORDER — IBUPROFEN 200 MG
16 TABLET ORAL
Status: DISCONTINUED | OUTPATIENT
Start: 2019-01-26 | End: 2019-01-26 | Stop reason: HOSPADM

## 2019-01-26 RX ORDER — DILTIAZEM HYDROCHLORIDE 240 MG/1
240 CAPSULE, COATED, EXTENDED RELEASE ORAL DAILY
Qty: 30 CAPSULE | Refills: 11 | Status: SHIPPED | OUTPATIENT
Start: 2019-01-27 | End: 2019-01-27 | Stop reason: SDUPTHER

## 2019-01-26 RX ADMIN — CEFTRIAXONE 1 G: 1 INJECTION, SOLUTION INTRAVENOUS at 12:01

## 2019-01-26 RX ADMIN — IPRATROPIUM BROMIDE AND ALBUTEROL SULFATE 3 ML: .5; 3 SOLUTION RESPIRATORY (INHALATION) at 11:01

## 2019-01-26 RX ADMIN — LEVOFLOXACIN 750 MG: 5 INJECTION, SOLUTION INTRAVENOUS at 05:01

## 2019-01-26 RX ADMIN — PRAVASTATIN SODIUM 40 MG: 40 TABLET ORAL at 08:01

## 2019-01-26 RX ADMIN — IPRATROPIUM BROMIDE AND ALBUTEROL SULFATE 3 ML: .5; 3 SOLUTION RESPIRATORY (INHALATION) at 03:01

## 2019-01-26 RX ADMIN — APIXABAN 5 MG: 5 TABLET, FILM COATED ORAL at 08:01

## 2019-01-26 RX ADMIN — IPRATROPIUM BROMIDE AND ALBUTEROL SULFATE 3 ML: .5; 3 SOLUTION RESPIRATORY (INHALATION) at 07:01

## 2019-01-26 RX ADMIN — METHYLPREDNISOLONE SODIUM SUCCINATE 40 MG: 40 INJECTION, POWDER, FOR SOLUTION INTRAMUSCULAR; INTRAVENOUS at 05:01

## 2019-01-26 RX ADMIN — DILTIAZEM HYDROCHLORIDE 240 MG: 120 CAPSULE, COATED, EXTENDED RELEASE ORAL at 08:01

## 2019-01-26 RX ADMIN — IPRATROPIUM BROMIDE AND ALBUTEROL SULFATE 3 ML: .5; 3 SOLUTION RESPIRATORY (INHALATION) at 12:01

## 2019-01-26 RX ADMIN — FUROSEMIDE 20 MG: 10 INJECTION, SOLUTION INTRAMUSCULAR; INTRAVENOUS at 01:01

## 2019-01-26 NOTE — ASSESSMENT & PLAN NOTE
Not fe deficient, b12 normal, occult negative. H/H 6/21, given 2 units PRBC, repeat CBC pending  eliquis held    Check retic and folate- normal  MMA, will need to see hematology for potential bm - she has a very high MCV with normal b12.    Counts holding.  CT of chest negative for PE

## 2019-01-26 NOTE — PROGRESS NOTES
[facility] Medicine  Progress Note    Patient Name: Chantell Herrera  MRN: [id number]  Patient Class: IP- Inpatient   Admission Date: 1/22/2019  Length of Stay: 4 days  Attending Physician: Milo Estevez MD  Primary Care Provider: Anson Leiva MD        Subjective:     Principal Problem:Chronic respiratory failure with hypoxia    HPI:  82yo female patient with PMHX of anxiety, arthritis, COPD, Afib, CHF, depression, HLD, HTN, and anemia. She presented to ER last night with c/o SOB 4 days ago. She reports that she was also having SANTIAGO. She was having trouble getting 5-10 ft without SOB, usually can get 50 ft or so. Denies wheezing at home.Was coughing, occasionally productive (green). She was recently treated 1/10 with azithromycin and prednisone. CXR with pneumonia. Admitted with Dr Hickman consult. ON levaquin and rocephin,nebs and medrol.     Has hx of anemia in younger years.She has never received blood before. She is now on her 2nd unit. She reports that she has no appetite.     Hospital Course:  She is still is still SOB and has significanrt SANTIAGO. POX 5L NC and using bipap over night. POX 94% this am. This is much more than her usual maint dose. WBC coming down. 08698>05051. On medrol 40mg IV q 8 and day 3 levaquin and rocephin. Dr Hickman following, attempting to get her home Bipap    Anemia stable after her 2 units PRBC. Anemia studies normal including retic 2.0 and folate added yesterday.   Reports that she has energy better, breathing better as well  1/25/19 rested well overnight; BiPAP  Maintained over nightt, tolerated well.  Swelling to BLE has improved but pt notes right still feels swollen.  Notes feeling much  better Using bedside commode. Urinating a lot. CT with Patchy ground-glass opacities in the superior segment of the right lower lobe concerning for a pneumonia in the appropriate clinical setting. Day 4/7  IV Rocephin, Levofloxacin,   Now on 3LNC, o2 sat 95%, did  not qualify for trilogy; still wheezing but doing better  /63, Afebrile WBC 16.00.   PAF overnight; Afib this am, Eliquis resumed , monitor H&H,   ^ activity; EKG now; ^ diltiazem for AF _RVR; HR 120s; extra 120mg this am diltiazem and ^ to 240mg daily  Cards following      1/26  Patient's heart rate has improved overnight with add'l cardizem. Breathing is better. Swelling improved. This morning states hse is feeling better.    Interval note: breathing better  Review of Systems   Constitutional: Negative for activity change (due to SANTIAGO), chills, fatigue and fever.   HENT: Negative for congestion and sore throat.    Respiratory: Positive for cough and shortness of breath. Negative for wheezing.    Cardiovascular: Negative for chest pain, palpitations and leg swelling.   Gastrointestinal: Negative for blood in stool, constipation, diarrhea, nausea and vomiting.        Denies black or dark stools   Genitourinary: Negative for dysuria, frequency, hematuria and urgency.   Musculoskeletal: Positive for back pain (lower back chronic, worse due to laying in bed).   Neurological: Negative for dizziness (with coughing), weakness, light-headedness (with coughing), numbness (occassionally the tips of fingers feel numb) and headaches.     Objective:     Vital Signs (Most Recent):  Temp: 97.1 °F (36.2 °C) (01/26/19 0359)  Pulse: 88 (01/26/19 0600)  Resp: (!) 22 (01/26/19 0421)  BP: 129/65 (01/26/19 0359)  SpO2: 97 % (01/26/19 0421) Vital Signs (24h Range):  Temp:  [97 °F (36.1 °C)-98.4 °F (36.9 °C)] 97.1 °F (36.2 °C)  Pulse:  [] 88  Resp:  [18-24] 22  SpO2:  [80 %-97 %] 97 %  BP: ()/(50-70) 129/65     Weight: 61.2 kg (134 lb 14.7 oz)  Body mass index is 25.49 kg/m².    Physical Exam   Constitutional: She is oriented to person, place, and time. She appears well-developed. No distress.   Sitting up in bed   HENT:   Head: Normocephalic and atraumatic.   Eyes: Conjunctivae are normal. Pupils are equal, round, and  reactive to light.   Neck: Normal range of motion. Neck supple. No thyromegaly present.   Cardiovascular: Normal heart sounds and intact distal pulses.   tachy   Pulmonary/Chest: No respiratory distress. She has wheezes. She has no rales.   Scattered wheezing and rhonchi   Abdominal: Soft. Bowel sounds are normal. There is no tenderness.   Musculoskeletal: Normal range of motion. She exhibits edema.   Lymphadenopathy:     She has no cervical adenopathy.   Neurological: She is alert and oriented to person, place, and time.   Skin: Skin is warm and dry. No rash noted.   Psychiatric: She has a normal mood and affect. Her behavior is normal.   Nursing note and vitals reviewed.        CRANIAL NERVES     CN III, IV, VI   Pupils are equal, round, and reactive to light.       Significant Labs:   CBC:   Recent Labs   Lab 01/25/19  0440 01/26/19  0547   WBC 16.00* 12.19   HGB 9.7* 9.4*   HCT 29.8* 30.1*    201   iron 49, transferrin 182, TIBC 269, sat iron 18  Ferritin 242  b12 413  Haptoglobin 128  Occult stool negative  retic 2.0  Folate WNL  MMA ordered    CMP:   Recent Labs   Lab 01/24/19  1943 01/25/19  0439   * 135*   K 4.3 3.8   CL 93* 92*   CO2 29 31*   * 204*   BUN 27* 23   CREATININE 1.2 0.9   CALCIUM 9.0 9.0   PROT  --  6.0   ALBUMIN  --  3.6   BILITOT  --  0.7   ALKPHOS  --  48*   AST  --  17   ALT  --  35   ANIONGAP 12 12   EGFRNONAA 42* >60     Cardiac Markers:   No results for input(s): CKMB, MYOGLOBIN, BNP, TROPISTAT in the last 48 hours.  Coagulation:   No results for input(s): PT, INR, APTT in the last 48 hours.  Troponin:   No results for input(s): TROPONINI in the last 48 hours.  Urine Studies:   No results for input(s): COLORU, APPEARANCEUA, PHUR, SPECGRAV, PROTEINUA, GLUCUA, KETONESU, BILIRUBINUA, OCCULTUA, NITRITE, UROBILINOGEN, LEUKOCYTESUR, RBCUA, WBCUA, BACTERIA, SQUAMEPITHEL, HYALINECASTS in the last 48 hours.    Invalid input(s): WRIGHTSUR  D dimer +    Significant Imaging:      1/22 CXR EKG leads overlie the chest obscuring detail.  Subsegmental atelectasis or scarring at the left lung base is noted.  There is new hazy opacity in the right lower lung which may relate to infection in the appropriate clinical setting..  Follow-up exam can be performed as indicated.  Aortic atherosclerosis and tortuosity.  Cardiomediastinal silhouette remains enlarged.  Degenerative changes of the shoulder   girdles are present    1/22 EKG NSR  1/24/19 CT Chest-   No evidence for aortic dissection or pulmonary embolus.    Severe centrilobular emphysematous disease.  Small bilateral pleural effusions with adjacent passive atelectasis.  Patchy ground-glass opacities in the superior segment of the right lower lobe concerning for a pneumonia in the appropriate clinical setting.    Assessment/Plan:      * Chronic respiratory failure with hypoxia    Trilogy ordered for home use per Dr Hickman  O2 at home  This is markedly worse than typical  She is being treated for pna, but is quite winded.  While she has been on eliquis, would like to confirm no PE. Check d-dimer  Negative for PE- eliquis resumed  Does not meet criteria for trilogy ; continue home oxygen- still wheezing    Treat for pna for 7 days. She is improving.     Acute on chronic respiratory failure with hypoxia    Using bipap at night  Using 6L NC TKS>90%  She needs to be a DNR as this is basically end stage COPD/resp failure. I had made her a DNR in my office several months ago. Living will was filled out and signed by me. Scanned into chart, but I can't find it in Epic. Where is it?        Pneumonia    qOn levaquin and rocephin  duonebs   Medrol 40mg IV q 8hr  Dr hickman following  CT of chest 1/24 negative for PE, dissection;  Severe centrilobular emphysematous disease.  Small bilateral pleural effusions with adjacent passive atelectasis.  Patchy ground-glass opacities in the superior segment of the right lower lobe concerning for a pneumonia in the  appropriate clinical setting.  1/25/19 day 4 IV rocephin/levofloxacin, IV Solumedrol  40 IV q 8  1/26  Day 5 IV rocephin/levaquin. 40 q 8 steroids         Symptomatic anemia    Not fe deficient, b12 normal, occult negative. H/H 6/21, given 2 units PRBC, repeat CBC pending  eliquis held    Check retic and folate- normal  MMA, will need to see hematology for potential bm - she has a very high MCV with normal b12.    Counts holding.  CT of chest negative for PE     Atrial fibrillation with rapid ventricular response    Holding elequis due to significant anemia  Cont dig - check dig level  Consider BB for rate control  + PAF HX; PMHX of Afib on Eliquis; Review of EKGs; SR but has PAF on eliquis  Did have PAF overnight- currently Afib-131  Will increase cardizem and go home on higher dose.  Now rate controlled       COPD exacerbation    Medrol 40mg IV every 8hr  bipap ordered at night  Start to wean tomorrow.       RLS (restless legs syndrome)    Cont ropinirole       Oxygen dependent    Does qualify for O2       Dyslipidemia    Cont statin       CKD (chronic kidney disease), stage III    ths is well BUN/creat 12/1.0  Stable CMP daily      NORMAL GFR today.     Hyponatremia    Hold SSRI  Hold lasix  Getting blood with NS   Repeat cmp  Na improving 134 today    Resolved     Major depressive disorder with single episode, in full remission    celexa on hold for now, she was on 40mg po daily   It interacts with levaquin  Will need to restart at home  Watch for increasing depression     Insomnia    trazadone reordered       Cervical radiculopathy    Hold baclofen for now       COPD (chronic obstructive pulmonary disease)    Cont medrol 40 mg IV every 8hr  O2 TKS >90%   1/25 still wheezing but better    Start to taper steroids     HTN (hypertension)    Holding ARB  bp stable without for now  Cont CCB  Increase diltiazem dose    BP doing well       VTE Risk Mitigation (From admission, onward)        Ordered     apixaban tablet  5 mg  2 times daily      01/24/19 1924     Place sequential compression device  Until discontinued      01/23/19 0935     IP VTE HIGH RISK PATIENT  Once      01/22/19 8155              Kari Gaspar MD  Department of Hospital Medicine   [facility]

## 2019-01-26 NOTE — ASSESSMENT & PLAN NOTE
Holding elequis due to significant anemia  Cont dig - check dig level (patient doesn't know if she is actually taking this) and level was undetectable  Consider BB for rate control  + PAF HX; PMHX of Afib on Eliquis; Review of EKGs; SR but has PAF on eliquis  Did have PAF while here- now rate cotnrolled  Will increase cardizem and go home on this higher dose.  Now rate controlled

## 2019-01-26 NOTE — PROGRESS NOTES
Staff Handoff  Kari in resp aware of sats 82% and 02 not connected. Reconnected, will recheck sats     Resident Handoff

## 2019-01-26 NOTE — PT/OT/SLP EVAL
Physical Therapy Evaluation and Discharge Note    Patient Name:  Chantell Herrera   MRN:  [id number]    Recommendations:     Discharge Recommendations:  other (see comments)(home)   Discharge Equipment Recommendations: none   Barriers to discharge: None    Assessment:     Chantell Herrera is a 83 y.o. female admitted with a medical diagnosis of Chronic respiratory failure with hypoxia. .  At this time, patient is functioning at their prior level of function and does not require further acute PT services.     Recent Surgery: * No surgery found *      Plan:     During this hospitalization, patient does not require further acute PT services.  Please re-consult if situation changes.      Subjective     Chief Complaint: Has not walked and was waiting for PT  Patient/Family Comments/goals: To go home  Pain/Comfort:  · Pain Rating 1: 0/10    Patients cultural, spiritual, Restoration conflicts given the current situation:      Living Environment:  Pt lives at home with  who is caregiver and has a ramp to enter home  Prior to admission, patients level of function was Mod I.  Equipment used at home: oxygen, shower chair, rollator.  DME owned (not currently used): none.  Upon discharge, patient will have assistance from .    Objective:     Communicated with Magaly prior to session.  Patient found seated EOB with family present upon PT entry to room found with: oxygen, peripheral IV     General Precautions: Standard, fall   Orthopedic Precautions:N/A   Braces: N/A     Exams:  · Gross Motor Coordination:  WFL  · Postural Exam:  Patient presented with the following abnormalities:    · -       Rounded shoulders  · -       Forward head  · Skin Integrity/Edema:      · -       Edema: Mild BLE  · RUE ROM: WFL  · RUE Strength: WFL  · LUE ROM: WFL  · LUE Strength: WFL  · RLE ROM: WFL  · RLE Strength: WFL  · LLE ROM: WFL  · LLE Strength: WFL    Functional Mobility:  · Bed Mobility:     · Supine to Sit: modified  independence  · Sit to Supine: modified independence  · Transfers:     · Sit to Stand:  modified independence with 4 wheeled walker  · Bed to Chair: modified independence with  rolling walker and 4 wheeled walker  using  Step Transfer  · Gait: >500' with rollator and 3L O2. 2 rest breaks required and pt independent in assessing when a break is needed.    AM-PAC 6 CLICK MOBILITY  Total Score:23       Therapeutic Activities and Exercises:   See above functional mobility section    AM-PAC 6 CLICK MOBILITY  Total Score:23     Patient left seated EOB with all lines intact, call button in reach and  present.    GOALS:   Multidisciplinary Problems     Physical Therapy Goals     Not on file                History:     Past Medical History:   Diagnosis Date    Anxiety     Arthritis     Asthma     Atrial fibrillation with rapid ventricular response     CHF (congestive heart failure) 11/29/2018    Chronic bronchitis     COPD (chronic obstructive pulmonary disease)     Depression     SANTIAGO (dyspnea on exertion)     Emphysema of lung     Fatigue     Hyperlipidemia     Hypertension     Symptomatic anemia 1/22/2019    Trouble in sleeping        Past Surgical History:   Procedure Laterality Date    APPENDECTOMY      EYE SURGERY      HYSTERECTOMY      TONSILLECTOMY         Clinical Decision Making:     History  Co-morbidities and personal factors that may impact the plan of care Examination  Body Structures and Functions, activity limitations and participation restrictions that may impact the plan of care Clinical Presentation   Decision Making/ Complexity Score   Co-morbidities:   [] Time since onset of injury / illness / exacerbation  [] Status of current condition  []Patient's cognitive status and safety concerns    [x] Multiple Medical Problems (see med hx)  Personal Factors:   [] Patient's age  [] Prior Level of function   [] Patient's home situation (environment and family support)  [] Patient's level  of motivation  [] Expected progression of patient      HISTORY:(criteria)    [] 29807 - no personal factors/history    [x] 21764 - has 1-2 personal factor/comorbidity     [] 99269 - has >3 personal factor/comorbidity     Body Regions:  [x] Objective examination findings  [] Head     []  Neck  [] Trunk   [] Upper Extremity  [] Lower Extremity    Body Systems:  [] For communication ability, affect, cognition, language, and learning style: the assessment of the ability to make needs known, consciousness, orientation (person, place, and time), expected emotional /behavioral responses, and learning preferences (eg, learning barriers, education  needs)  [] For the neuromuscular system: a general assessment of gross coordinated movement (eg, balance, gait, locomotion, transfers, and transitions) and motor function  (motor control and motor learning)  [] For the musculoskeletal system: the assessment of gross symmetry, gross range of motion, gross strength, height, and weight  [] For the integumentary system: the assessment of pliability(texture), presence of scar formation, skin color, and skin integrity  [x] For cardiovascular/pulmonary system: the assessment of heart rate, respiratory rate, blood pressure, and edema     Activity limitations:    [] Patient's cognitive status and saf ety concerns          [] Status of current condition      [] Weight bearing restriction  [x] Cardiopulmunary Restriction    Participation Restrictions:   [] Goals and goal agreement with the patient     [] Rehab potential (prognosis) and probable outcome      Examination of Body System: (criteria)    [] 33530 - addressing 1-2 elements    [x] 31947 - addressing a total of 3 or more elements     [] 69368 -  Addressing a total of 4 or more elements         Clinical Presentation: (criteria)  Stable - 87468     On examination of body system using standardized tests and measures patient presents with 3 or more elements from any of the following:  body structures and functions, activity limitations, and/or participation restrictions.  Leading to a clinical presentation that is considered stable and/or uncomplicated                              Clinical Decision Making  (Eval Complexity):  Low- 47341     Time Tracking:     PT Received On: 01/26/19  PT Start Time: 1319     PT Stop Time: 1340  PT Total Time (min): 21 min     Billable Minutes: Evaluation 5 minutes and Gait Training 16 minutes      Jesica Faria, PT  01/26/2019

## 2019-01-26 NOTE — ASSESSMENT & PLAN NOTE
Holding elequis due to significant anemia  Cont dig - check dig level  Consider BB for rate control  + PAF HX; PMHX of Afib on Eliquis; Review of EKGs; SR but has PAF on eliquis  Did have PAF overnight- currently Afib-131  Will increase cardizem and go home on higher dose.  Now rate controlled

## 2019-01-26 NOTE — ASSESSMENT & PLAN NOTE
qOn levaquin and rocephin  duonebs   Medrol 40mg IV q 8hr  Dr nino following  CT of chest 1/24 negative for PE, dissection;  Severe centrilobular emphysematous disease.  Small bilateral pleural effusions with adjacent passive atelectasis.  Patchy ground-glass opacities in the superior segment of the right lower lobe concerning for a pneumonia in the appropriate clinical setting.  1/25/19 day 4 IV rocephin/levofloxacin, IV Solumedrol  40 IV q 8  1/26  Day 5 IV rocephin/levaquin. 40 q 8 steroids

## 2019-01-26 NOTE — NURSING
Patient discharged.  Patient walked the hallways with assistance of physical therapy without difficulties.  NADN.  IV d/c'd.  Catheter tip intact.  Pressure dressing applied.  Discharge instructions, follow up appointments, and instructions to  prescriptions at pharmacy as well as some will be mailed.  Patient and  verbalized understanding. Shelby with [facility] notified of patient returning home to continue with home care.   Patient transported to [facility] via wheelchair with 3L nasal cannula escorted by transport staff.

## 2019-01-26 NOTE — PLAN OF CARE
Problem: Adult Inpatient Plan of Care  Goal: Plan of Care Review  Outcome: Ongoing (interventions implemented as appropriate)  Patient had a good night. Slept well with Trilogy. 02 was applied for short time just now for patient to go to bathroom. Now Trilogy back on. 02 sats did drop last night when 02 was noted to be disconnected. No changes or complaints otherwise. Afib on telemetry. POC reviewed and patient instructed to call for needs/asst.

## 2019-01-26 NOTE — SUBJECTIVE & OBJECTIVE
Interval note: breathing better  Review of Systems   Constitutional: Negative for activity change (due to SANTIAGO), chills, fatigue and fever.   HENT: Negative for congestion and sore throat.    Respiratory: Positive for cough and shortness of breath. Negative for wheezing.    Cardiovascular: Negative for chest pain, palpitations and leg swelling.   Gastrointestinal: Negative for blood in stool, constipation, diarrhea, nausea and vomiting.        Denies black or dark stools   Genitourinary: Negative for dysuria, frequency, hematuria and urgency.   Musculoskeletal: Positive for back pain (lower back chronic, worse due to laying in bed).   Neurological: Negative for dizziness (with coughing), weakness, light-headedness (with coughing), numbness (occassionally the tips of fingers feel numb) and headaches.     Objective:     Vital Signs (Most Recent):  Temp: 97.1 °F (36.2 °C) (01/26/19 0359)  Pulse: 88 (01/26/19 0600)  Resp: (!) 22 (01/26/19 0421)  BP: 129/65 (01/26/19 0359)  SpO2: 97 % (01/26/19 0421) Vital Signs (24h Range):  Temp:  [97 °F (36.1 °C)-98.4 °F (36.9 °C)] 97.1 °F (36.2 °C)  Pulse:  [] 88  Resp:  [18-24] 22  SpO2:  [80 %-97 %] 97 %  BP: ()/(50-70) 129/65     Weight: 61.2 kg (134 lb 14.7 oz)  Body mass index is 25.49 kg/m².    Physical Exam   Constitutional: She is oriented to person, place, and time. She appears well-developed. No distress.   Sitting up in bed   HENT:   Head: Normocephalic and atraumatic.   Eyes: Conjunctivae are normal. Pupils are equal, round, and reactive to light.   Neck: Normal range of motion. Neck supple. No thyromegaly present.   Cardiovascular: Normal heart sounds and intact distal pulses.   tachy   Pulmonary/Chest: No respiratory distress. She has wheezes. She has no rales.   Scattered wheezing and rhonchi   Abdominal: Soft. Bowel sounds are normal. There is no tenderness.   Musculoskeletal: Normal range of motion. She exhibits edema.   Lymphadenopathy:     She has no  cervical adenopathy.   Neurological: She is alert and oriented to person, place, and time.   Skin: Skin is warm and dry. No rash noted.   Psychiatric: She has a normal mood and affect. Her behavior is normal.   Nursing note and vitals reviewed.        CRANIAL NERVES     CN III, IV, VI   Pupils are equal, round, and reactive to light.       Significant Labs:   CBC:   Recent Labs   Lab 19  0440 19  0547   WBC 16.00* 12.19   HGB 9.7* 9.4*   HCT 29.8* 30.1*    201   iron 49, transferrin 182, TIBC 269, sat iron 18  Ferritin 242  b12 413  Haptoglobin 128  Occult stool negative  retic 2.0  Folate WNL  MMA ordered    CMP:   Recent Labs   Lab 19  1943 01/25/19  0439   * 135*   K 4.3 3.8   CL 93* 92*   CO2 29 31*   * 204*   BUN 27* 23   CREATININE 1.2 0.9   CALCIUM 9.0 9.0   PROT  --  6.0   ALBUMIN  --  3.6   BILITOT  --  0.7   ALKPHOS  --  48*   AST  --  17   ALT  --  35   ANIONGAP 12 12   EGFRNONAA 42* >60     Cardiac Markers:   No results for input(s): CKMB, MYOGLOBIN, BNP, TROPISTAT in the last 48 hours.  Coagulation:   No results for input(s): PT, INR, APTT in the last 48 hours.  Troponin:   No results for input(s): TROPONINI in the last 48 hours.  Urine Studies:   No results for input(s): COLORU, APPEARANCEUA, PHUR, SPECGRAV, PROTEINUA, GLUCUA, KETONESU, BILIRUBINUA, OCCULTUA, NITRITE, UROBILINOGEN, LEUKOCYTESUR, RBCUA, WBCUA, BACTERIA, SQUAMEPITHEL, HYALINECASTS in the last 48 hours.    Invalid input(s): WRIGHTSUR  D dimer +    Significant Imagin/22 CXR EKG leads overlie the chest obscuring detail.  Subsegmental atelectasis or scarring at the left lung base is noted.  There is new hazy opacity in the right lower lung which may relate to infection in the appropriate clinical setting..  Follow-up exam can be performed as indicated.  Aortic atherosclerosis and tortuosity.  Cardiomediastinal silhouette remains enlarged.  Degenerative changes of the shoulder   girdles are  present    1/22 EKG NSR  1/24/19 CT Chest-   No evidence for aortic dissection or pulmonary embolus.    Severe centrilobular emphysematous disease.  Small bilateral pleural effusions with adjacent passive atelectasis.  Patchy ground-glass opacities in the superior segment of the right lower lobe concerning for a pneumonia in the appropriate clinical setting.

## 2019-01-26 NOTE — PROGRESS NOTES
Staff Handoff    Report received from Magaly PALOMO and patient assessed per flowsheet. 3L N/C in use. Noted to be breathing fast and labored. Reports out of breathe from doing things like cleaning self up. Loud whz, coarse lungs with dim bases, reports prod green cough. Afib on telemetry. Generalized edema, hillary right foot and leg, noted in thighs also and left arm. Hand tremors noted. Generalized weakness. POC reviewed and patient instructed to call for needs/asst.   Resident Handoff

## 2019-01-26 NOTE — ASSESSMENT & PLAN NOTE
qOn levaquin and rocephin  duonebs   Medrol 40mg IV q 8hr  Dr nino following  CT of chest 1/24 negative for PE, dissection;  Severe centrilobular emphysematous disease.  Small bilateral pleural effusions with adjacent passive atelectasis.  Patchy ground-glass opacities in the superior segment of the right lower lobe concerning for a pneumonia in the appropriate clinical setting.  1/25/19 day 4 IV rocephin/levofloxacin, IV Solumedrol  40 IV q 8  1/26  Day 5 IV rocephin/levaquin. 40 q 12 hours.    Will send home with 2 more days of levaquin

## 2019-01-26 NOTE — ASSESSMENT & PLAN NOTE
Cont medrol 40 mg IV every 8hr  O2 TKS >90%   1/25 still wheezing but better    Start to taper steroids

## 2019-01-26 NOTE — DISCHARGE SUMMARY
[facility] Medicine  Discharge Summary      Patient Name: Chantell Herrera  MRN: [id number]  Admission Date: 1/22/2019  Hospital Length of Stay: 4 days  Discharge Date and Time:  01/26/2019 2:42 PM  Attending Physician: Milo Estevez MD   Discharging Provider: Kari Gaspar MD  Primary Care Provider: Anson Leiva MD      HPI:   82yo female patient with PMHX of anxiety, arthritis, COPD, Afib, CHF, depression, HLD, HTN, and anemia. She presented to ER last night with c/o SOB 4 days ago. She reports that she was also having SANTIAGO. She was having trouble getting 5-10 ft without SOB, usually can get 50 ft or so. Denies wheezing at home.Was coughing, occasionally productive (green). She was recently treated 1/10 with azithromycin and prednisone. CXR with pneumonia. Admitted with Dr Hickman consult. ON levaquin and rocephin,nebs and medrol.     Has hx of anemia in younger years.She has never received blood before. She is now on her 2nd unit. She reports that she has no appetite.     * No surgery found *      Hospital Course:   She is still is still SOB and has significanrt SANTIAGO. POX 5L NC and using bipap over night. POX 94% this am. This is much more than her usual maint dose. WBC coming down. [id number]. On medrol 40mg IV q 8 and day 3 levaquin and rocephin. Dr Hickman following, attempting to get her home Bipap    Anemia stable after her 2 units PRBC. Anemia studies normal including retic 2.0 and folate added yesterday.   Reports that she has energy better, breathing better as well  1/25/19 rested well overnight; BiPAP  Maintained over nightt, tolerated well.  Swelling to BLE has improved but pt notes right still feels swollen.  Notes feeling much  better Using bedside commode. Urinating a lot. CT with Patchy ground-glass opacities in the superior segment of the right lower lobe concerning for a pneumonia in the appropriate clinical setting. Day 4/7  IV Rocephin, Levofloxacin,   Now on  3LNC, o2 sat 95%, did not qualify for trilogy; still wheezing but doing better  /63, Afebrile WBC 16.00.   PAF overnight; Afib this am, Eliquis resumed , monitor H&H,   ^ activity; EKG now; ^ diltiazem for AF _RVR; HR 120s; extra 120mg this am diltiazem and ^ to 240mg daily  Cards following      1/26  Patient's heart rate has improved overnight with add'l cardizem. Breathing is better. Swelling improved. This morning states hse is feeling better and has ambulated and is ready to go home.     Consults:   Consults (From admission, onward)        Status Ordering Provider     Inpatient consult to Pulmonology  Once     Provider:  Sami Kennedy MD    Acknowledged ANDRAE ROCHA     Inpatient consult to Social Work  Once     Provider:  (Not yet assigned)    Acknowledged SAMI KENNEDY.          * Chronic respiratory failure with hypoxia    Trilogy ordered for home use per Dr Kennedy  O2 at home  This is markedly worse than typical  She is being treated for pna, but is quite winded.  While she has been on eliquis, would like to confirm no PE. Check d-dimer  Negative for PE- eliquis resumed  Does not meet criteria for trilogy ; continue home oxygen- still wheezing    Treat for pna for 7 days. She is improving.     Acute on chronic respiratory failure with hypoxia    Using bipap at night  Using 6L NC TKS>90%  She needs to be a DNR as this is basically end stage COPD/resp failure. I had made her a DNR in my office several months ago. Living will was filled out and signed by me. Scanned into chart, but I can't find it in Epic. Where is it?        Pneumonia    qOn levaquin and rocephin  duonebs   Medrol 40mg IV q 8hr  Dr kennedy following  CT of chest 1/24 negative for PE, dissection;  Severe centrilobular emphysematous disease.  Small bilateral pleural effusions with adjacent passive atelectasis.  Patchy ground-glass opacities in the superior segment of the right lower lobe concerning for a pneumonia in the appropriate clinical  setting.  1/25/19 day 4 IV rocephin/levofloxacin, IV Solumedrol  40 IV q 8  1/26  Day 5 IV rocephin/levaquin. 40 q 12 hours.    Will send home with 2 more days of levaquin         Symptomatic anemia    Not fe deficient, b12 normal, occult negative. H/H 6/21, given 2 units PRBC, repeat CBC pending  eliquis held    Check retic and folate- normal  MMA, will need to see hematology for potential bm - she has a very high MCV with normal b12.    Counts holding.  CT of chest negative for PE     Atrial fibrillation with rapid ventricular response    Holding elequis due to significant anemia  Cont dig - check dig level (patient doesn't know if she is actually taking this) and level was undetectable  Consider BB for rate control  + PAF HX; PMHX of Afib on Eliquis; Review of EKGs; SR but has PAF on eliquis  Did have PAF while here- now rate cotnrolled  Will increase cardizem and go home on this higher dose.  Now rate controlled       COPD exacerbation    Medrol 40mg IV every 8hr  bipap ordered at night  Start to wean tomorrow.       RLS (restless legs syndrome)    Cont ropinirole       Oxygen dependent    Does qualify for O2       Dyslipidemia    Cont statin       CKD (chronic kidney disease), stage III    ths is well BUN/creat 12/1.0  Stable CMP daily      NORMAL GFR today.     Hyponatremia    Hold SSRI  Hold lasix  Getting blood with NS   Repeat cmp  Na improving 134 today    Resolved     Major depressive disorder with single episode, in full remission    celexa on hold for now, she was on 40mg po daily   It interacts with levaquin  Will need to restart at home  Watch for increasing depression     Insomnia    trazadone reordered       Cervical radiculopathy    Hold baclofen for now       COPD (chronic obstructive pulmonary disease)    Cont medrol 40 mg IV every 8hr  O2 TKS >90%   1/25 still wheezing but better    Start to taper steroids     HTN (hypertension)    Holding ARB  bp stable without for now  Cont CCB  Increase  "diltiazem dose    BP doing well       Final Active Diagnoses:    Diagnosis Date Noted POA    PRINCIPAL PROBLEM:  Chronic respiratory failure with hypoxia [J96.11] 05/17/2017 Yes    Symptomatic anemia [D64.9] 01/22/2019 Yes    Pneumonia [J18.9] 01/22/2019 Yes    Acute on chronic respiratory failure with hypoxia [J96.21] 01/22/2019 Yes    Atrial fibrillation with rapid ventricular response [I48.91] 11/16/2018 Yes    COPD exacerbation [J44.1] 11/11/2018 Yes    RLS (restless legs syndrome) [G25.81] 05/17/2017 Yes    Dyslipidemia [E78.5] 01/03/2017 Yes    Oxygen dependent [Z99.81] 01/03/2017 Not Applicable    Major depressive disorder with single episode, in full remission [F32.5] 05/10/2016 Yes    CKD (chronic kidney disease), stage III [N18.3] 05/10/2016 Yes    Hyponatremia [E87.1] 05/10/2016 Yes    Cervical radiculopathy [M54.12] 08/10/2015 Yes    Insomnia [G47.00] 08/10/2015 Yes    COPD (chronic obstructive pulmonary disease) [J44.9] 05/03/2015 Yes    HTN (hypertension) [I10] 06/19/2014 Yes      Problems Resolved During this Admission:       Discharged Condition: fair    Disposition: Home health    Follow Up:  Follow-up Information     Kari Gaspar MD In 4 days.    Specialty:  Family Medicine  Contact information:  [address]  [place] [address]  [phone]             Emmanuel Hickman MD. Call in 2 days.    Specialties:  Pulmonary Disease, Internal Medicine  Why:  Call @ 9:00am to see if you can go into the office in the morning  Contact information:  [address]  [place] [address]  [phone]                 Patient Instructions:      WALKER FOR HOME USE     Order Specific Question Answer Comments   Type of Walker: Rollator    With wheels? Yes    Height: 5' 1" (1.549 m)    Weight: 61.2 kg (134 lb 14.7 oz)    Length of need (1-99 months): 99    Does patient have medical equipment at home? oxygen    Does patient have medical equipment at home? shower chair    Please check all that " "apply: Patient's condition impairs ambulation.    Please check all that apply: Patient needs help to get in and out of chair.    Please check all that apply: Patient is unable to safely ambulate without equipment.      VENTILATOR FOR HOME USE   Order Comments: Breath rate auto    Titrate to comfort     Order Specific Question Answer Comments   Height: 5' 1" (1.549 m)    Weight: 61.2 kg (134 lb 14.7 oz)    Does patient have medical equipment at home? oxygen    Does patient have medical equipment at home? shower chair    Length of need (1-99 months): 99    Interface needed: Full face mask    Mode: AVAPS-AE    Rate:  auto   PEEP - EPAP OTHER 5-10   Pressure support - IPAP  5-10 max pressure 25   Humidifier needed? No    DME Agency:  [facility]     Ambulatory referral to Outpatient Case Management   Referral Priority: Routine Referral Type: Consultation   Referral Reason: Specialty Services Required   Number of Visits Requested: 1     Ambulatory referral to Home Health   Referral Priority: Routine Referral Type: Home Health Care   Referral Reason: Specialty Services Required   Requested Specialty: Home Health Services   Number of Visits Requested: 1     SUBSEQUENT HOME HEALTH ORDERS   Order Comments: Subsequent Home Health Orders    Current Medications:  Current Facility-Administered Medications:  0.9%  NaCl infusion (for blood administration), , Intravenous, Q24H PRN, Veto Giraldo MD, Stopped at 01/23/19 0017  albuterol sulfate nebulizer solution 2.5 mg, 2.5 mg, Nebulization, Q4H PRN, Veto Giraldo MD  albuterol-ipratropium 2.5 mg-0.5 mg/3 mL nebulizer solution 3 mL, 3 mL, Nebulization, Q4H, Emmanuel Hickman MD, 3 mL at 01/26/19 1121  apixaban tablet 5 mg, 5 mg, Oral, BID, Kari Gaspar MD, 5 mg at 01/26/19 0827  cefTRIAXone (ROCEPHIN) 1 g in dextrose 5 % 50 mL IVPB, 1 g, Intravenous, Q12H, Emmanuel Hickman MD, Last Rate: 0 mL/hr at 01/25/19 0116, 1 g at 01/26/19 1223  dextrose 50% injection 12.5 g, 12.5 g, " Intravenous, PRN, Kari Gaspar MD  dextrose 50% injection 25 g, 25 g, Intravenous, PRN, Kari Gaspar MD  diltiaZEM 24 hr capsule 240 mg, 240 mg, Oral, Daily, Dennise Virgen NP, 240 mg at 01/26/19 0827  glucagon (human recombinant) injection 1 mg, 1 mg, Intramuscular, PRN, Kari Gaspar MD  glucose chewable tablet 16 g, 16 g, Oral, PRN, Kari Gaspar MD  glucose chewable tablet 24 g, 24 g, Oral, PRN, Kari Gaspar MD  insulin NPH injection 5 Units, 5 Units, Subcutaneous, BID AC, Kari Gaspar MD  levoFLOXacin 750 mg/150 mL IVPB 750 mg, 750 mg, Intravenous, Q24H, Dennise Virgen NP, Last Rate: 100 mL/hr at 01/26/19 0534, 750 mg at 01/26/19 0534  levoFLOXacin tablet 500 mg, 500 mg, Oral, Daily, Kari Gaspar MD  methylPREDNISolone sodium succinate injection 40 mg, 40 mg, Intravenous, Q12H, Kari Gaspar MD  pravastatin tablet 40 mg, 40 mg, Oral, Daily, Carrol Funes NP, 40 mg at 01/26/19 0827  rOPINIRole tablet 1 mg, 1 mg, Oral, QHS, Carrol Funes NP, 1 mg at 01/25/19 2121  sodium chloride 0.9% flush 5 mL, 5 mL, Intravenous, PRN, Veto Giraldo MD  traZODone tablet 50 mg, 50 mg, Oral, QHS, Carrol Funes NP, 50 mg at 01/25/19 2121        Nursing:   Home Oxygen:  Oxygen at 3 L/min nasal canula to be used:  Continuously.    Diet:   cardiac diet    Activities:   activity as tolerated    Labs:  Report Lab results to PCP.    Referrals/ Consults  Physical Therapy to evaluate and treat. Evaluate for home safety and equipment needs; Establish/upgrade home exercise program. Perform / instruct on therapeutic exercises, gait training, transfer training, and Range of Motion.  Occupational Therapy to evaluate and treat. Evaluate home environment for safety and equipment needs. Perform/Instruct on transfers, ADL training, ROM, and therapeutic exercises.   to evaluate for community resources/long-range planning.    Home Health Aide:  Nursing Weekly to check labs,  vitals, help with meds     Order Specific Question Answer Comments   What Home Health Agency is the patient currently using? [facility]        Significant Diagnostic Studies: Labs:   BMP:   Recent Labs   Lab 01/24/19  1943 01/25/19  0439 01/26/19  0546   * 204* 242*   * 135* 134*   K 4.3 3.8 4.0   CL 93* 92* 92*   CO2 29 31* 33*   BUN 27* 23 24*   CREATININE 1.2 0.9 1.0   CALCIUM 9.0 9.0 8.8    and CBC   Recent Labs   Lab 01/25/19  0440 01/26/19  0547   WBC 16.00* 12.19   HGB 9.7* 9.4*   HCT 29.8* 30.1*    201       Pending Diagnostic Studies:     Procedure Component Value Units Date/Time    Methylmalonic acid, serum [[id number]] Collected:  01/24/19 0921    Order Status:  Sent Lab Status:  In process Updated:  01/24/19 1518    Specimen:  Blood          Medications:  Reconciled Home Medications:      Medication List      START taking these medications    diltiaZEM 240 MG 24 hr capsule  Commonly known as:  CARDIZEM CD  Take 1 capsule (240 mg total) by mouth once daily.  Start taking on:  1/27/2019  Replaces:  diltiaZEM 120 MG tablet     levoFLOXacin 500 MG tablet  Commonly known as:  LEVAQUIN  Take 1 tablet (500 mg total) by mouth once daily.        CHANGE how you take these medications    losartan 100 MG tablet  Commonly known as:  COZAAR  Take 0.5 tablets (50 mg total) by mouth once daily.  What changed:  how much to take        CONTINUE taking these medications    albuterol 2.5 mg /3 mL (0.083 %) nebulizer solution  Commonly known as:  PROVENTIL  Take 3 mLs (2.5 mg total) by nebulization every 6 (six) hours as needed for Wheezing.     artificial tears(hypromellose) 0.3 % Drop  continuous prn.     baclofen 10 MG tablet  Commonly known as:  LIORESAL  Take 1 tablet (10 mg total) by mouth 3 (three) times daily.     citalopram 40 MG tablet  Commonly known as:  CELEXA  TAKE 1 TABLET EVERY DAY     ELIQUIS 5 mg Tab  Generic drug:  apixaban  Take 1 tablet by mouth 2 (two) times daily.      furosemide 20 MG tablet  Commonly known as:  LASIX  Take 1 tablet (20 mg total) by mouth once daily.     ipratropium 0.02 % nebulizer solution  Commonly known as:  ATROVENT  Take 500 mcg by nebulization 4 (four) times daily.     pravastatin 40 MG tablet  Commonly known as:  PRAVACHOL  TAKE 1 TABLET EVERY DAY     predniSONE 10 MG tablet  Commonly known as:  DELTASONE  Take 40 mg for one day, the 30 mg for 3 days then resume 20 mg po daily     rOPINIRole 1 MG tablet  Commonly known as:  REQUIP  TAKE 1 TABLET EVERY EVENING.     traZODone 50 MG tablet  Commonly known as:  DESYREL  Take 1 tablet (50 mg total) by mouth every evening.     VITAMIN D3 2,000 unit Tab  Generic drug:  cholecalciferol (vitamin D3)  Take 2,000 Units by mouth once daily.        STOP taking these medications    amLODIPine 10 MG tablet  Commonly known as:  NORVASC     digoxin 125 mcg tablet  Commonly known as:  LANOXIN     diltiaZEM 120 MG tablet  Commonly known as:  CARDIZEM  Replaced by:  diltiaZEM 240 MG 24 hr capsule     montelukast 10 mg tablet  Commonly known as:  SINGULAIR     walker Misc            Indwelling Lines/Drains at time of discharge:   Lines/Drains/Airways          None          Time spent on the discharge of patient: 45 minutes  Patient was seen and examined on the date of discharge and determined to be suitable for discharge.         Kari Gaspar MD  Department of Hospital Medicine  [facility]

## 2019-01-26 NOTE — CARE UPDATE
Patient call to be placed on home trilogy at 10:35pm, she states that since it was delivered today that  would like her to wear. She states that she thinks she will need help learning how to put mask on and review of how to work machine again before she leaves. Patient shown an overview of how to place the mask on and  the the power button on the machine to turn on/off. She seemed to tolerate well and said she was comfortable, connections checked. Patient notified to call for assistance before going to the restroom because her nasal cannula was not connected as her oxygen was now connected through the trilogy, she understood.  RN called to state that patients O2 sats were low and that the O2 tubing appeared to be disconnected. She reconnected and patients sats improved. I went up to give patient midnight treatment and to recheck connections. All is connected correctlty and patient is now resting comfortably. Will continue to monitor.

## 2019-01-27 ENCOUNTER — TELEPHONE (OUTPATIENT)
Dept: FAMILY MEDICINE | Facility: CLINIC | Age: 84
End: 2019-01-27

## 2019-01-27 RX ORDER — DILTIAZEM HYDROCHLORIDE 240 MG/1
240 CAPSULE, COATED, EXTENDED RELEASE ORAL DAILY
Qty: 30 CAPSULE | Refills: 0 | Status: SHIPPED | OUTPATIENT
Start: 2019-01-27 | End: 2019-05-24 | Stop reason: SDUPTHER

## 2019-01-28 ENCOUNTER — OUTPATIENT CASE MANAGEMENT (OUTPATIENT)
Dept: ADMINISTRATIVE | Facility: OTHER | Age: 84
End: 2019-01-28

## 2019-01-28 LAB
BACTERIA BLD CULT: NORMAL
BACTERIA BLD CULT: NORMAL

## 2019-01-28 NOTE — PLAN OF CARE
01/27/19 1930   Post-Acute Status   Post-Acute Authorization Home Health/Hospice   Home Health/Hospice Status Referrals Sent         Referral sent to [facility].

## 2019-01-28 NOTE — PROGRESS NOTES
01/28/19-Attempted to screen patient for outpatient case management. Patient states that she has home health. Explained OPCM program to patient, who declines at this time. Will close case.

## 2019-01-28 NOTE — PROGRESS NOTES
Thank you for the referral. The following patient has been assigned to Karley Ledesma RN with Outpatient Complex Care Management for high risk screening.    Reason for referral: Symptomatic anemia  Pneumonia  Panlobular emphysema  Essential hypertension  CKD (chronic kidney disease), stage III  Oxygen dependent  COPD exacerbation  Atrial fibrillation with rapid ventricular response  Acute on chronic respiratory failure with hypoxia    Please contact \[facility]\"" at [phone] with any questions.    Thank you,    Yaneth Hamlin, [facility]  Outpatient Care Mgmt.  [phone]

## 2019-01-28 NOTE — PLAN OF CARE
01/28/19 1308   Post-Acute Status   Post-Acute Authorization Home Health/Hospice   Home Health/Hospice Status Authorization Obtained         [facility] has accepted patient.

## 2019-01-28 NOTE — PLAN OF CARE
01/27/19 1928   Final Note   Assessment Type Final Discharge Note   Anticipated Discharge Disposition Home-Health   What phone number can be called within the next 1-3 days to see how you are doing after discharge? [phone]   Hospital Follow Up  Appt(s) scheduled? Yes   Discharge plans and expectations educations in teach back method with documentation complete? Yes   Right Care Referral Info   Post Acute Recommendation Home-care   Referral Type Home Health   Facility Name [facility] [place], [place], [place]

## 2019-01-29 ENCOUNTER — PATIENT OUTREACH (OUTPATIENT)
Dept: ADMINISTRATIVE | Facility: CLINIC | Age: 84
End: 2019-01-29

## 2019-01-29 LAB — METHYLMALONATE SERPL-SCNC: 0.48 UMOL/L

## 2019-01-29 NOTE — PROGRESS NOTES
[phone] Declined  C3 nurse attempted to contact patient for a TCC post hospital discharge follow-up call. The patient declined call at this time.   Reports has reviewed medications multiple times.

## 2019-02-01 ENCOUNTER — OFFICE VISIT (OUTPATIENT)
Dept: FAMILY MEDICINE | Facility: CLINIC | Age: 84
End: 2019-02-01
Payer: MEDICARE

## 2019-02-01 ENCOUNTER — TELEPHONE (OUTPATIENT)
Dept: FAMILY MEDICINE | Facility: CLINIC | Age: 84
End: 2019-02-01

## 2019-02-01 VITALS
DIASTOLIC BLOOD PRESSURE: 58 MMHG | RESPIRATION RATE: 24 BRPM | SYSTOLIC BLOOD PRESSURE: 126 MMHG | WEIGHT: 138.69 LBS | HEART RATE: 64 BPM | HEIGHT: 61 IN | BODY MASS INDEX: 26.19 KG/M2

## 2019-02-01 DIAGNOSIS — F32.5 MAJOR DEPRESSIVE DISORDER WITH SINGLE EPISODE, IN FULL REMISSION: Primary | ICD-10-CM

## 2019-02-01 DIAGNOSIS — D64.9 ANEMIA, UNSPECIFIED TYPE: ICD-10-CM

## 2019-02-01 DIAGNOSIS — J96.11 CHRONIC RESPIRATORY FAILURE WITH HYPOXIA: ICD-10-CM

## 2019-02-01 DIAGNOSIS — J41.0 SIMPLE CHRONIC BRONCHITIS: ICD-10-CM

## 2019-02-01 DIAGNOSIS — I48.91 ATRIAL FIBRILLATION WITH RAPID VENTRICULAR RESPONSE: ICD-10-CM

## 2019-02-01 DIAGNOSIS — I77.1 TORTUOUS AORTA: ICD-10-CM

## 2019-02-01 DIAGNOSIS — I50.9 ACUTE ON CHRONIC CONGESTIVE HEART FAILURE, UNSPECIFIED HEART FAILURE TYPE: ICD-10-CM

## 2019-02-01 DIAGNOSIS — J13 PNEUMONIA OF RIGHT LOWER LOBE DUE TO STREPTOCOCCUS PNEUMONIAE: ICD-10-CM

## 2019-02-01 DIAGNOSIS — D64.9 SYMPTOMATIC ANEMIA: ICD-10-CM

## 2019-02-01 LAB
ANISOCYTOSIS BLD QL SMEAR: SLIGHT
BASOPHILS # BLD AUTO: ABNORMAL K/UL
BASOPHILS NFR BLD: 0 %
DACRYOCYTES BLD QL SMEAR: ABNORMAL
DIFFERENTIAL METHOD: ABNORMAL
EOSINOPHIL # BLD AUTO: ABNORMAL K/UL
EOSINOPHIL NFR BLD: 5 %
ERYTHROCYTE [DISTWIDTH] IN BLOOD BY AUTOMATED COUNT: 20.4 %
GIANT PLATELETS BLD QL SMEAR: PRESENT
HCT VFR BLD AUTO: 32.2 %
HGB BLD-MCNC: 10.1 G/DL
HYPOCHROMIA BLD QL SMEAR: ABNORMAL
LYMPHOCYTES # BLD AUTO: ABNORMAL K/UL
LYMPHOCYTES NFR BLD: 3 %
MCH RBC QN AUTO: 34.7 PG
MCHC RBC AUTO-ENTMCNC: 31.4 G/DL
MCV RBC AUTO: 111 FL
MONOCYTES # BLD AUTO: ABNORMAL K/UL
MONOCYTES NFR BLD: 10 %
NEUTROPHILS NFR BLD: 72 %
NEUTS BAND NFR BLD MANUAL: 10 %
OVALOCYTES BLD QL SMEAR: ABNORMAL
PLATELET # BLD AUTO: 213 K/UL
PMV BLD AUTO: 11.4 FL
POIKILOCYTOSIS BLD QL SMEAR: SLIGHT
RBC # BLD AUTO: 2.91 M/UL
WBC # BLD AUTO: 13.61 K/UL

## 2019-02-01 PROCEDURE — 36415 PR COLLECTION VENOUS BLOOD,VENIPUNCTURE: ICD-10-PCS | Mod: HCNC,S$GLB,, | Performed by: FAMILY MEDICINE

## 2019-02-01 PROCEDURE — 85007 BL SMEAR W/DIFF WBC COUNT: CPT | Mod: HCNC

## 2019-02-01 PROCEDURE — 99999 PR PBB SHADOW E&M-EST. PATIENT-LVL III: ICD-10-PCS | Mod: PBBFAC,HCNC,, | Performed by: FAMILY MEDICINE

## 2019-02-01 PROCEDURE — 99496 TRANSJ CARE MGMT HIGH F2F 7D: CPT | Mod: HCNC,S$GLB,, | Performed by: FAMILY MEDICINE

## 2019-02-01 PROCEDURE — 36415 COLL VENOUS BLD VENIPUNCTURE: CPT | Mod: HCNC,S$GLB,, | Performed by: FAMILY MEDICINE

## 2019-02-01 PROCEDURE — 99496 TRANSITIONAL CARE MANAGE SERVICE 7 DAY DISCHARGE: ICD-10-PCS | Mod: HCNC,S$GLB,, | Performed by: FAMILY MEDICINE

## 2019-02-01 PROCEDURE — 99499 UNLISTED E&M SERVICE: CPT | Mod: HCNC,S$GLB,, | Performed by: FAMILY MEDICINE

## 2019-02-01 PROCEDURE — 99999 PR PBB SHADOW E&M-EST. PATIENT-LVL III: CPT | Mod: PBBFAC,HCNC,, | Performed by: FAMILY MEDICINE

## 2019-02-01 PROCEDURE — 85027 COMPLETE CBC AUTOMATED: CPT | Mod: HCNC

## 2019-02-01 PROCEDURE — 99499 RISK ADDL DX/OHS AUDIT: ICD-10-PCS | Mod: HCNC,S$GLB,, | Performed by: FAMILY MEDICINE

## 2019-02-01 NOTE — PROGRESS NOTES
Transitional Care Note  Subjective:       Patient ID: Chantell Herrera is a 83 y.o. female.  Chief Complaint: Follow-up (hospital)    Family and/or Caretaker present at visit?  Yes.  Diagnostic tests reviewed/disposition: I have reviewed all completed as well as pending diagnostic tests at the time of discharge.  Disease/illness education: COPD exacerbation, anemia  Home health/community services discussion/referrals: Patient has home health established at [facility].   Establishment or re-establishment of referral orders for community resources: No other necessary community resources.   Discussion with other health care providers: No discussion with other health care providers necessary.   83 year old female with chronic resp failure, o2 dependent with recent hospital stay for anemia and acute on chronic resp failure comes in for f/u. She has had resolution of her swelling to her right side. Her energy is coming back and her breathing is better. She is on 3 L currently and is breathing comfortably. Sh eha sbeen able to walk more in the house. SHe also notes that she is down to 20mg of steroids daily.      Review of Systems   Constitutional: Positive for fatigue. Negative for chills and fever.   HENT: Negative for congestion, ear pain, postnasal drip, rhinorrhea, sore throat and trouble swallowing.    Eyes: Negative for redness and itching.   Respiratory: Positive for cough, shortness of breath and wheezing.    Cardiovascular: Negative for chest pain and palpitations.   Gastrointestinal: Negative for abdominal pain, diarrhea, nausea and vomiting.   Genitourinary: Negative for dysuria and frequency.   Skin: Negative for rash.   Neurological: Negative for weakness and headaches.       Objective:      Physical Exam   Constitutional: She is oriented to person, place, and time. She appears well-developed. No distress.   HENT:   Head: Normocephalic and atraumatic.   O2 in place   Eyes: Conjunctivae are normal. Pupils are  equal, round, and reactive to light.   Neck: Normal range of motion. Neck supple. No thyromegaly present.   Cardiovascular: Normal rate, regular rhythm and intact distal pulses.   Murmur heard.  Pulmonary/Chest: Effort normal and breath sounds normal. No respiratory distress. She has no wheezes.   Abdominal: Soft. Bowel sounds are normal. There is no tenderness.   Musculoskeletal: Normal range of motion. She exhibits no edema.   Lymphadenopathy:     She has no cervical adenopathy.   Neurological: She is alert and oriented to person, place, and time.   Skin: Skin is warm and dry. No rash noted.   Psychiatric: She has a normal mood and affect. Her behavior is normal.   Nursing note and vitals reviewed.      Assessment:       1. Major depressive disorder with single episode, in full remission    2. Acute on chronic congestive heart failure, unspecified heart failure type    3. Tortuous aorta    4. Anemia, unspecified type    5. Simple chronic bronchitis    6. Chronic respiratory failure with hypoxia    7. Pneumonia of right lower lobe due to Streptococcus pneumoniae    8. Atrial fibrillation with rapid ventricular response    9. Symptomatic anemia        Plan:       Chantell was seen today for follow-up.    Diagnoses and all orders for this visit:    Major depressive disorder with single episode, in full remission    Acute on chronic congestive heart failure, unspecified heart failure type    Tortuous aorta    Anemia, unspecified type  -     CBC auto differential; Future  -     Ambulatory referral to Hematology / Oncology  -     CBC auto differential    Simple chronic bronchitis    Chronic respiratory failure with hypoxia  -     fluticasone-umeclidin-vilanter (TRELEGY ELLIPTA) 100-62.5-25 mcg DsDv; Inhale 1 puff into the lungs once daily.    Pneumonia of right lower lobe due to Streptococcus pneumoniae    Atrial fibrillation with rapid ventricular response    Symptomatic anemia    Will need to see hematology. It is  highly likely that her anemia is multifactorial, but I am concerned that this is either aocd vs a dysplasia situation. FOBT neg in hospital. No further bleeidng (based on stablitiy of counts) since resuming her NOAC.    Will need to wean prednisone, but still wheezing signficantly today. She is not taking any inhalers secondary to cose. However, she has signifcant COPD. Will attempt trelegy.     Reviewed meds again today. Doing well on cardizem. Cont to hold daily lasix unless she becomes short of breath acutely or begins to swell.    Will have appt with cards and pulm.    RTC if condition acutely worsens or any other concerns, otherwise RTC as scheduled in 2 weeks. In the meantime will have home health monitor BP, HR, O2.

## 2019-02-01 NOTE — TELEPHONE ENCOUNTER
Blood counts are better!  Wbc ct is elevated - likely because of steroids.  Monitor for fevers.  mh

## 2019-02-04 ENCOUNTER — OFFICE VISIT (OUTPATIENT)
Dept: FAMILY MEDICINE | Facility: CLINIC | Age: 84
End: 2019-02-04
Payer: MEDICARE

## 2019-02-04 ENCOUNTER — TELEPHONE (OUTPATIENT)
Dept: FAMILY MEDICINE | Facility: CLINIC | Age: 84
End: 2019-02-04

## 2019-02-04 VITALS
SYSTOLIC BLOOD PRESSURE: 122 MMHG | HEART RATE: 88 BPM | OXYGEN SATURATION: 93 % | RESPIRATION RATE: 20 BRPM | TEMPERATURE: 99 F | WEIGHT: 137.81 LBS | HEIGHT: 61 IN | DIASTOLIC BLOOD PRESSURE: 70 MMHG | BODY MASS INDEX: 26.02 KG/M2

## 2019-02-04 DIAGNOSIS — J44.9 CHRONIC OBSTRUCTIVE PULMONARY DISEASE, UNSPECIFIED COPD TYPE: ICD-10-CM

## 2019-02-04 DIAGNOSIS — R50.9 FEVER, UNSPECIFIED FEVER CAUSE: Primary | ICD-10-CM

## 2019-02-04 LAB
CTP QC/QA: YES
POC MOLECULAR INFLUENZA A AGN: NEGATIVE
POC MOLECULAR INFLUENZA B AGN: NEGATIVE

## 2019-02-04 PROCEDURE — 3074F SYST BP LT 130 MM HG: CPT | Mod: HCNC,CPTII,S$GLB, | Performed by: NURSE PRACTITIONER

## 2019-02-04 PROCEDURE — 99213 PR OFFICE/OUTPT VISIT, EST, LEVL III, 20-29 MIN: ICD-10-PCS | Mod: HCNC,25,S$GLB, | Performed by: NURSE PRACTITIONER

## 2019-02-04 PROCEDURE — 99213 OFFICE O/P EST LOW 20 MIN: CPT | Mod: HCNC,25,S$GLB, | Performed by: NURSE PRACTITIONER

## 2019-02-04 PROCEDURE — 87502 INFLUENZA DNA AMP PROBE: CPT | Mod: QW,HCNC,S$GLB, | Performed by: NURSE PRACTITIONER

## 2019-02-04 PROCEDURE — 96372 PR INJECTION,THERAP/PROPH/DIAG2ST, IM OR SUBCUT: ICD-10-PCS | Mod: HCNC,S$GLB,, | Performed by: NURSE PRACTITIONER

## 2019-02-04 PROCEDURE — 99999 PR PBB SHADOW E&M-EST. PATIENT-LVL V: ICD-10-PCS | Mod: PBBFAC,HCNC,, | Performed by: NURSE PRACTITIONER

## 2019-02-04 PROCEDURE — 1101F PT FALLS ASSESS-DOCD LE1/YR: CPT | Mod: HCNC,CPTII,S$GLB, | Performed by: NURSE PRACTITIONER

## 2019-02-04 PROCEDURE — 3078F PR MOST RECENT DIASTOLIC BLOOD PRESSURE < 80 MM HG: ICD-10-PCS | Mod: HCNC,CPTII,S$GLB, | Performed by: NURSE PRACTITIONER

## 2019-02-04 PROCEDURE — 3078F DIAST BP <80 MM HG: CPT | Mod: HCNC,CPTII,S$GLB, | Performed by: NURSE PRACTITIONER

## 2019-02-04 PROCEDURE — 96372 THER/PROPH/DIAG INJ SC/IM: CPT | Mod: HCNC,S$GLB,, | Performed by: NURSE PRACTITIONER

## 2019-02-04 PROCEDURE — 99999 PR PBB SHADOW E&M-EST. PATIENT-LVL V: CPT | Mod: PBBFAC,HCNC,, | Performed by: NURSE PRACTITIONER

## 2019-02-04 PROCEDURE — 3074F PR MOST RECENT SYSTOLIC BLOOD PRESSURE < 130 MM HG: ICD-10-PCS | Mod: HCNC,CPTII,S$GLB, | Performed by: NURSE PRACTITIONER

## 2019-02-04 PROCEDURE — 1101F PR PT FALLS ASSESS DOC 0-1 FALLS W/OUT INJ PAST YR: ICD-10-PCS | Mod: HCNC,CPTII,S$GLB, | Performed by: NURSE PRACTITIONER

## 2019-02-04 PROCEDURE — 87502 POCT INFLUENZA A/B MOLECULAR: ICD-10-PCS | Mod: QW,HCNC,S$GLB, | Performed by: NURSE PRACTITIONER

## 2019-02-04 RX ORDER — DOXYCYCLINE 100 MG/1
100 CAPSULE ORAL EVERY 12 HOURS
Qty: 20 CAPSULE | Refills: 0 | Status: SHIPPED | OUTPATIENT
Start: 2019-02-04 | End: 2019-02-27

## 2019-02-04 RX ORDER — METHYLPREDNISOLONE ACETATE 40 MG/ML
40 INJECTION, SUSPENSION INTRA-ARTICULAR; INTRALESIONAL; INTRAMUSCULAR; SOFT TISSUE
Status: COMPLETED | OUTPATIENT
Start: 2019-02-04 | End: 2019-02-04

## 2019-02-04 RX ADMIN — METHYLPREDNISOLONE ACETATE 40 MG: 40 INJECTION, SUSPENSION INTRA-ARTICULAR; INTRALESIONAL; INTRAMUSCULAR; SOFT TISSUE at 05:02

## 2019-02-04 NOTE — TELEPHONE ENCOUNTER
----- Message from David Kirkpatrick sent at 2019  7:59 AM CST -----  Contact: patient  Chantell Herrera  MRN: [id number]  : 1935  PCP: Kari Gaspar  Home Phone      [phone]  Work Phone      Not on file.  Mobile          [phone]      MESSAGE: asked to inform Dr Gaspar of any changes in her condition -- she is now coughing up brown -- please advise    Call [phone]    PCP: Chasity

## 2019-02-04 NOTE — TELEPHONE ENCOUNTER
Doxycycline sent to [facility]     RTC if condition acutely worsens or any other concerns, otherwise RTC as scheduled      On another note, I filled out a living will with patient about a month ago. I instructed for it to be scanned in the chart. Its not anywhere in media that I can see. Where is it?

## 2019-02-04 NOTE — PROGRESS NOTES
Subjective:       Patient ID: Chantell Herrera is a 83 y.o. female.    Chief Complaint: Cough; Nasal Congestion; and Chest Pain    Cough   This is a chronic problem. The problem has been rapidly worsening. The cough is productive of brown sputum. Associated symptoms include a fever, nasal congestion, rhinorrhea, shortness of breath and wheezing. Pertinent negatives include no ear pain, headaches, myalgias or sore throat. She has tried a beta-agonist inhaler and oral steroids for the symptoms. Her past medical history is significant for COPD and emphysema.     Review of Systems   Constitutional: Positive for fatigue and fever. Negative for appetite change.   HENT: Positive for congestion and rhinorrhea. Negative for ear pain and sore throat.    Eyes: Negative.  Negative for visual disturbance.   Respiratory: Positive for cough, shortness of breath and wheezing.    Cardiovascular: Negative.    Gastrointestinal: Negative.  Negative for abdominal pain, diarrhea, nausea and vomiting.   Endocrine: Negative.    Genitourinary: Negative.  Negative for difficulty urinating and urgency.   Musculoskeletal: Negative.  Negative for arthralgias and myalgias.   Skin: Negative.  Negative for color change.   Allergic/Immunologic: Negative.    Neurological: Negative.  Negative for dizziness and headaches.   Hematological: Negative.    Psychiatric/Behavioral: Negative.  Negative for sleep disturbance.   All other systems reviewed and are negative.      Objective:      Physical Exam   Constitutional: She is oriented to person, place, and time. She appears well-developed and well-nourished.   HENT:   Head: Normocephalic and atraumatic.   Right Ear: Tympanic membrane and external ear normal.   Left Ear: Tympanic membrane and external ear normal.   Nose: Mucosal edema and rhinorrhea present.   Mouth/Throat: Oropharynx is clear and moist.   Eyes: Conjunctivae and EOM are normal. Pupils are equal, round, and reactive to light.   Neck: Normal  range of motion. Neck supple. No thyromegaly present.   Cardiovascular: Normal rate, regular rhythm and normal heart sounds.   No murmur heard.  Pulmonary/Chest: Effort normal. She has wheezes.   Rhonchi throughout   Abdominal: Soft.   Musculoskeletal: Normal range of motion.   Lymphadenopathy:     She has no cervical adenopathy.   Neurological: She is alert and oriented to person, place, and time. She has normal reflexes.   Skin: Skin is warm and dry. No rash noted.   Psychiatric: She has a normal mood and affect. Her behavior is normal. Judgment and thought content normal.   Nursing note and vitals reviewed.      Assessment:       1. Fever, unspecified fever cause    2. Chronic obstructive pulmonary disease, unspecified COPD type        Plan:   Chantell was seen today for cough, nasal congestion and chest pain.    Diagnoses and all orders for this visit:    Fever, unspecified fever cause  -     POCT Influenza A/B Molecular - negative    Chronic obstructive pulmonary disease, unspecified COPD type  -     methylPREDNISolone acetate injection 40 mg    Get antibiotics and begin tonight    RTC PRN

## 2019-02-04 NOTE — TELEPHONE ENCOUNTER
Called,spoke with patient. She states that she has not had fever. She verbally states understanding of lab results.

## 2019-02-04 NOTE — TELEPHONE ENCOUNTER
Patient was seen 2/1/19 and she  states she doesn't have fever, but her cough is a productive brown. Please advise.

## 2019-02-06 ENCOUNTER — TELEPHONE (OUTPATIENT)
Dept: FAMILY MEDICINE | Facility: CLINIC | Age: 84
End: 2019-02-06

## 2019-02-06 NOTE — TELEPHONE ENCOUNTER
----- Message from David Kirkpatrick sent at 2019  3:29 PM CST -----  Contact: Patient  Chantell Herrera  MRN: [id number]  : 1935  PCP: Kari Gaspar  Home Phone      [phone]  Work Phone      Not on file.  Mobile          [phone]      MESSAGE: being referred to Dr Ortiz Falcon in [place] -- called there today Re: appt -- was told that they had not received records from us yet & appt could not be made until received - please forward records    Call patient @ [phone]    PCP: Chasity

## 2019-02-27 ENCOUNTER — OFFICE VISIT (OUTPATIENT)
Dept: FAMILY MEDICINE | Facility: CLINIC | Age: 84
End: 2019-02-27
Payer: MEDICARE

## 2019-02-27 ENCOUNTER — TELEPHONE (OUTPATIENT)
Dept: ADMINISTRATIVE | Facility: CLINIC | Age: 84
End: 2019-02-27

## 2019-02-27 VITALS
SYSTOLIC BLOOD PRESSURE: 108 MMHG | RESPIRATION RATE: 24 BRPM | TEMPERATURE: 99 F | OXYGEN SATURATION: 97 % | BODY MASS INDEX: 24.14 KG/M2 | WEIGHT: 131.19 LBS | HEIGHT: 62 IN | HEART RATE: 104 BPM | DIASTOLIC BLOOD PRESSURE: 60 MMHG

## 2019-02-27 DIAGNOSIS — J41.0 SIMPLE CHRONIC BRONCHITIS: ICD-10-CM

## 2019-02-27 DIAGNOSIS — J96.11 CHRONIC RESPIRATORY FAILURE WITH HYPOXIA: Primary | ICD-10-CM

## 2019-02-27 DIAGNOSIS — I48.91 ATRIAL FIBRILLATION WITH RAPID VENTRICULAR RESPONSE: ICD-10-CM

## 2019-02-27 PROBLEM — J44.1 COPD EXACERBATION: Status: RESOLVED | Noted: 2018-11-11 | Resolved: 2019-02-27

## 2019-02-27 PROBLEM — J18.9 PNEUMONIA: Status: RESOLVED | Noted: 2019-01-22 | Resolved: 2019-02-27

## 2019-02-27 PROCEDURE — 1101F PR PT FALLS ASSESS DOC 0-1 FALLS W/OUT INJ PAST YR: ICD-10-PCS | Mod: HCNC,CPTII,S$GLB, | Performed by: FAMILY MEDICINE

## 2019-02-27 PROCEDURE — 1101F PT FALLS ASSESS-DOCD LE1/YR: CPT | Mod: HCNC,CPTII,S$GLB, | Performed by: FAMILY MEDICINE

## 2019-02-27 PROCEDURE — 99999 PR PBB SHADOW E&M-EST. PATIENT-LVL III: ICD-10-PCS | Mod: PBBFAC,HCNC,, | Performed by: FAMILY MEDICINE

## 2019-02-27 PROCEDURE — 99213 PR OFFICE/OUTPT VISIT, EST, LEVL III, 20-29 MIN: ICD-10-PCS | Mod: HCNC,S$GLB,, | Performed by: FAMILY MEDICINE

## 2019-02-27 PROCEDURE — 3078F PR MOST RECENT DIASTOLIC BLOOD PRESSURE < 80 MM HG: ICD-10-PCS | Mod: HCNC,CPTII,S$GLB, | Performed by: FAMILY MEDICINE

## 2019-02-27 PROCEDURE — 99213 OFFICE O/P EST LOW 20 MIN: CPT | Mod: HCNC,S$GLB,, | Performed by: FAMILY MEDICINE

## 2019-02-27 PROCEDURE — 3078F DIAST BP <80 MM HG: CPT | Mod: HCNC,CPTII,S$GLB, | Performed by: FAMILY MEDICINE

## 2019-02-27 PROCEDURE — 99999 PR PBB SHADOW E&M-EST. PATIENT-LVL III: CPT | Mod: PBBFAC,HCNC,, | Performed by: FAMILY MEDICINE

## 2019-02-27 PROCEDURE — 3074F PR MOST RECENT SYSTOLIC BLOOD PRESSURE < 130 MM HG: ICD-10-PCS | Mod: HCNC,CPTII,S$GLB, | Performed by: FAMILY MEDICINE

## 2019-02-27 PROCEDURE — 3074F SYST BP LT 130 MM HG: CPT | Mod: HCNC,CPTII,S$GLB, | Performed by: FAMILY MEDICINE

## 2019-02-27 NOTE — PROGRESS NOTES
Subjective:       Patient ID: Chantell Herrera is a 83 y.o. female.    Chief Complaint: Follow-up (mobilty test)    HPI  83 year old female comes in today to discuss inability to ambulate with her current at home walker. She says that she is limited secondary to her shortness of breath. She has debility and with increased exertion to propel a wheelchair, she has increased strain on her heart and has gone into A fib RVR in the past.    She is capable of completing her ADLs and with a scooter she would be able to remain independent.    PMH, PSH, ALLERGIES, SH, FH reviewed in nurse's notes above  Medications reconciled in the nurse's notes    Review of Systems   Constitutional: Negative for chills and fever.   HENT: Negative for congestion, ear pain, postnasal drip, rhinorrhea, sore throat and trouble swallowing.    Eyes: Negative for redness and itching.   Respiratory: Positive for cough. Negative for shortness of breath and wheezing.    Cardiovascular: Negative for chest pain and palpitations.   Gastrointestinal: Negative for abdominal pain, diarrhea, nausea and vomiting.   Genitourinary: Negative for dysuria and frequency.   Skin: Negative for rash.   Neurological: Negative for weakness and headaches.       Objective:      Physical Exam   Constitutional: She is oriented to person, place, and time. She appears well-developed. No distress.   HENT:   Head: Normocephalic and atraumatic.   Eyes: Conjunctivae are normal. Pupils are equal, round, and reactive to light.   Left eye with injection   Neck: Normal range of motion. Neck supple. No thyromegaly present.   Cardiovascular: Normal rate, regular rhythm, normal heart sounds and intact distal pulses.   Pulmonary/Chest: Effort normal. No respiratory distress. She has wheezes.   Abdominal: Soft. Bowel sounds are normal. There is no tenderness.   Musculoskeletal: Normal range of motion. She exhibits no edema.   Lymphadenopathy:     She has no cervical adenopathy.    Neurological: She is alert and oriented to person, place, and time.   Skin: Skin is warm and dry. No rash noted.   Psychiatric: She has a normal mood and affect. Her behavior is normal.   Nursing note and vitals reviewed.       Assessment/Plan:       Problem List Items Addressed This Visit        Pulmonary    COPD (chronic obstructive pulmonary disease)    Relevant Orders    MOTORIZED SCOOTER FOR HOME USE    Chronic respiratory failure with hypoxia - Primary    Relevant Medications    fluticasone-umeclidin-vilanter (TRELEGY ELLIPTA) 100-62.5-25 mcg DsDv    Other Relevant Orders    MOTORIZED SCOOTER FOR HOME USE       Cardiac/Vascular    Atrial fibrillation with rapid ventricular response    Relevant Orders    MOTORIZED SCOOTER FOR HOME USE      Pt is able to perform bathing, toileting, grooming, and eating without the use of a power chair.  Diagnoses that are related to patients inability to ambulate, propel and pressure relief are: chronic respiratory failure, debility and a fib  Pt requires specialty seating. She cannot use a walker because she has a h/o falls, she currently has a walker. She cannot use a manual wheelchair due to increased stress on the heart with exertion.  A scooter will meet my patient's needs. She currently has the physical and mental capacity to operate this device in her home.    RTC if condition acutely worsens or any other concerns, otherwise RTC as scheduled    Doing well with trelegy.  Continue to wean systemic steroids slowly.  Cut back to 10mg qod 5mg qod x 2 weeks.  Then will go to 5mg daily x 2 weeks.    She was seen by hematology who recommends dropping to 2.5mg bid of eliquis.    Monitoring blood counts. No note from hematology here today.

## 2019-03-04 ENCOUNTER — TELEPHONE (OUTPATIENT)
Dept: FAMILY MEDICINE | Facility: CLINIC | Age: 84
End: 2019-03-04

## 2019-03-04 DIAGNOSIS — Z74.1 ASSISTANCE NEEDED FOR MOBILITY: Primary | ICD-10-CM

## 2019-03-04 NOTE — TELEPHONE ENCOUNTER
----- Message from David Kirkpatrick sent at 3/4/2019 10:31 AM CST -----  Contact:  - Emmanuel Herrera  MRN: [id number]  : 1935  PCP: Kari Gaspar  Home Phone      [phone]  Work Phone      Not on file.  Mobile          [phone]      MESSAGE: requesting to speak with Dr Gaspar's nurse Re: Rx to [facility] for therapist evaluation -- order must be signed & returned ASAP    Call [phone]    PCP: Chasity

## 2019-03-22 ENCOUNTER — HOSPITAL ENCOUNTER (EMERGENCY)
Facility: HOSPITAL | Age: 84
Discharge: ANOTHER HOSPITAL | End: 2019-03-23
Attending: SURGERY
Payer: MEDICARE

## 2019-03-22 DIAGNOSIS — Z86.79 HISTORY OF ATRIAL FIBRILLATION: ICD-10-CM

## 2019-03-22 DIAGNOSIS — R53.1 WEAKNESS: ICD-10-CM

## 2019-03-22 DIAGNOSIS — J44.9 CHRONIC OBSTRUCTIVE PULMONARY DISEASE, UNSPECIFIED COPD TYPE: ICD-10-CM

## 2019-03-22 DIAGNOSIS — T80.910A: ICD-10-CM

## 2019-03-22 DIAGNOSIS — D64.9 ACUTE ON CHRONIC ANEMIA: Primary | ICD-10-CM

## 2019-03-22 DIAGNOSIS — R06.02 SOB (SHORTNESS OF BREATH): ICD-10-CM

## 2019-03-22 DIAGNOSIS — R00.0 TACHYCARDIA: ICD-10-CM

## 2019-03-22 LAB
ABO + RH BLD: NORMAL
ALBUMIN SERPL BCP-MCNC: 3.8 G/DL
ALP SERPL-CCNC: 65 U/L
ALT SERPL W/O P-5'-P-CCNC: 14 U/L
ANION GAP SERPL CALC-SCNC: 9 MMOL/L
ANISOCYTOSIS BLD QL SMEAR: ABNORMAL
AST SERPL-CCNC: 12 U/L
BASOPHILS # BLD AUTO: ABNORMAL K/UL
BASOPHILS NFR BLD: 1 %
BILIRUB SERPL-MCNC: 0.7 MG/DL
BLD GP AB SCN CELLS X3 SERPL QL: NORMAL
BLD PROD TYP BPU: NORMAL
BLD PROD TYP BPU: NORMAL
BLOOD UNIT EXPIRATION DATE: NORMAL
BLOOD UNIT EXPIRATION DATE: NORMAL
BLOOD UNIT TYPE CODE: 6200
BLOOD UNIT TYPE CODE: 6200
BLOOD UNIT TYPE: NORMAL
BLOOD UNIT TYPE: NORMAL
BNP SERPL-MCNC: 222 PG/ML
BUN SERPL-MCNC: 13 MG/DL
CALCIUM SERPL-MCNC: 9.3 MG/DL
CHLORIDE SERPL-SCNC: 98 MMOL/L
CK MB SERPL-MCNC: 1.5 NG/ML
CK MB SERPL-RTO: 10 %
CK SERPL-CCNC: 15 U/L
CK SERPL-CCNC: 15 U/L
CO2 SERPL-SCNC: 29 MMOL/L
CODING SYSTEM: NORMAL
CODING SYSTEM: NORMAL
CREAT SERPL-MCNC: 0.7 MG/DL
DACRYOCYTES BLD QL SMEAR: ABNORMAL
DIFFERENTIAL METHOD: ABNORMAL
DISPENSE STATUS: NORMAL
DISPENSE STATUS: NORMAL
EOSINOPHIL # BLD AUTO: ABNORMAL K/UL
EOSINOPHIL NFR BLD: 2 %
ERYTHROCYTE [DISTWIDTH] IN BLOOD BY AUTOMATED COUNT: 21.9 %
EST. GFR  (AFRICAN AMERICAN): >60 ML/MIN/1.73 M^2
EST. GFR  (NON AFRICAN AMERICAN): >60 ML/MIN/1.73 M^2
FERRITIN SERPL-MCNC: 341 NG/ML
GLUCOSE SERPL-MCNC: 134 MG/DL
HCT VFR BLD AUTO: 20.9 %
HGB BLD-MCNC: 6.6 G/DL
IRON SERPL-MCNC: 90 UG/DL
LYMPHOCYTES # BLD AUTO: ABNORMAL K/UL
LYMPHOCYTES NFR BLD: 8 %
MCH RBC QN AUTO: 37.1 PG
MCHC RBC AUTO-ENTMCNC: 31.6 G/DL
MCV RBC AUTO: 117 FL
MONOCYTES # BLD AUTO: ABNORMAL K/UL
MONOCYTES NFR BLD: 8 %
NEUTROPHILS NFR BLD: 75 %
NEUTS BAND NFR BLD MANUAL: 6 %
PLATELET # BLD AUTO: 276 K/UL
PMV BLD AUTO: 11.1 FL
POIKILOCYTOSIS BLD QL SMEAR: ABNORMAL
POTASSIUM SERPL-SCNC: 4.5 MMOL/L
PROT SERPL-MCNC: 6 G/DL
RBC # BLD AUTO: 1.78 M/UL
RETICS/RBC NFR AUTO: 5.8 %
SATURATED IRON: 34 %
SODIUM SERPL-SCNC: 136 MMOL/L
T4 FREE SERPL-MCNC: 1.1 NG/DL
TOTAL IRON BINDING CAPACITY: 268 UG/DL
TRANS ERYTHROCYTES VOL PATIENT: NORMAL ML
TRANS ERYTHROCYTES VOL PATIENT: NORMAL ML
TRANSFERRIN SERPL-MCNC: 181 MG/DL
TROPONIN I SERPL DL<=0.01 NG/ML-MCNC: 0.01 NG/ML
TSH SERPL DL<=0.005 MIU/L-ACNC: 0.36 UIU/ML
WBC # BLD AUTO: 7.8 K/UL

## 2019-03-22 PROCEDURE — P9021 RED BLOOD CELLS UNIT: HCPCS | Mod: HCNC

## 2019-03-22 PROCEDURE — 93010 ELECTROCARDIOGRAM REPORT: CPT | Mod: HCNC,,, | Performed by: INTERNAL MEDICINE

## 2019-03-22 PROCEDURE — 96376 TX/PRO/DX INJ SAME DRUG ADON: CPT | Mod: HCNC

## 2019-03-22 PROCEDURE — 36430 TRANSFUSION BLD/BLD COMPNT: CPT | Mod: HCNC

## 2019-03-22 PROCEDURE — 25000003 PHARM REV CODE 250: Mod: HCNC | Performed by: FAMILY MEDICINE

## 2019-03-22 PROCEDURE — 27000221 HC OXYGEN, UP TO 24 HOURS: Mod: HCNC

## 2019-03-22 PROCEDURE — 82607 VITAMIN B-12: CPT | Mod: HCNC

## 2019-03-22 PROCEDURE — 86920 COMPATIBILITY TEST SPIN: CPT | Mod: HCNC,59

## 2019-03-22 PROCEDURE — 93005 ELECTROCARDIOGRAM TRACING: CPT | Mod: HCNC

## 2019-03-22 PROCEDURE — 85027 COMPLETE CBC AUTOMATED: CPT | Mod: HCNC

## 2019-03-22 PROCEDURE — 96372 THER/PROPH/DIAG INJ SC/IM: CPT | Mod: HCNC

## 2019-03-22 PROCEDURE — 83540 ASSAY OF IRON: CPT | Mod: HCNC

## 2019-03-22 PROCEDURE — 82728 ASSAY OF FERRITIN: CPT | Mod: HCNC

## 2019-03-22 PROCEDURE — 83880 ASSAY OF NATRIURETIC PEPTIDE: CPT | Mod: HCNC

## 2019-03-22 PROCEDURE — 94760 N-INVAS EAR/PLS OXIMETRY 1: CPT | Mod: HCNC

## 2019-03-22 PROCEDURE — 25000242 PHARM REV CODE 250 ALT 637 W/ HCPCS: Mod: HCNC | Performed by: FAMILY MEDICINE

## 2019-03-22 PROCEDURE — 80053 COMPREHEN METABOLIC PANEL: CPT | Mod: HCNC

## 2019-03-22 PROCEDURE — 94761 N-INVAS EAR/PLS OXIMETRY MLT: CPT | Mod: HCNC

## 2019-03-22 PROCEDURE — 84439 ASSAY OF FREE THYROXINE: CPT | Mod: HCNC

## 2019-03-22 PROCEDURE — 82550 ASSAY OF CK (CPK): CPT | Mod: HCNC

## 2019-03-22 PROCEDURE — 94640 AIRWAY INHALATION TREATMENT: CPT | Mod: HCNC

## 2019-03-22 PROCEDURE — 84484 ASSAY OF TROPONIN QUANT: CPT | Mod: HCNC

## 2019-03-22 PROCEDURE — 84443 ASSAY THYROID STIM HORMONE: CPT | Mod: HCNC

## 2019-03-22 PROCEDURE — 82746 ASSAY OF FOLIC ACID SERUM: CPT | Mod: HCNC

## 2019-03-22 PROCEDURE — 86850 RBC ANTIBODY SCREEN: CPT | Mod: HCNC

## 2019-03-22 PROCEDURE — 85045 AUTOMATED RETICULOCYTE COUNT: CPT | Mod: HCNC

## 2019-03-22 PROCEDURE — 82553 CREATINE MB FRACTION: CPT | Mod: HCNC

## 2019-03-22 PROCEDURE — 96374 THER/PROPH/DIAG INJ IV PUSH: CPT | Mod: HCNC

## 2019-03-22 PROCEDURE — 99285 EMERGENCY DEPT VISIT HI MDM: CPT | Mod: 25,HCNC

## 2019-03-22 PROCEDURE — 85007 BL SMEAR W/DIFF WBC COUNT: CPT | Mod: HCNC

## 2019-03-22 PROCEDURE — 63600175 PHARM REV CODE 636 W HCPCS: Mod: HCNC | Performed by: FAMILY MEDICINE

## 2019-03-22 PROCEDURE — 93010 EKG 12-LEAD: ICD-10-PCS | Mod: HCNC,,, | Performed by: INTERNAL MEDICINE

## 2019-03-22 RX ORDER — ACETAMINOPHEN 325 MG/1
650 TABLET ORAL
Status: COMPLETED | OUTPATIENT
Start: 2019-03-22 | End: 2019-03-22

## 2019-03-22 RX ORDER — HYDROCODONE BITARTRATE AND ACETAMINOPHEN 500; 5 MG/1; MG/1
TABLET ORAL
Status: DISCONTINUED | OUTPATIENT
Start: 2019-03-22 | End: 2019-03-23 | Stop reason: HOSPADM

## 2019-03-22 RX ORDER — LORAZEPAM 1 MG/1
2 TABLET ORAL
Status: COMPLETED | OUTPATIENT
Start: 2019-03-22 | End: 2019-03-22

## 2019-03-22 RX ORDER — ALBUTEROL SULFATE 2.5 MG/.5ML
1.25 SOLUTION RESPIRATORY (INHALATION)
Status: DISCONTINUED | OUTPATIENT
Start: 2019-03-22 | End: 2019-03-22

## 2019-03-22 RX ORDER — IPRATROPIUM BROMIDE AND ALBUTEROL SULFATE 2.5; .5 MG/3ML; MG/3ML
3 SOLUTION RESPIRATORY (INHALATION)
Status: COMPLETED | OUTPATIENT
Start: 2019-03-22 | End: 2019-03-22

## 2019-03-22 RX ORDER — ALBUTEROL SULFATE 2.5 MG/.5ML
2.5 SOLUTION RESPIRATORY (INHALATION) EVERY 4 HOURS PRN
Status: DISCONTINUED | OUTPATIENT
Start: 2019-03-22 | End: 2019-03-23 | Stop reason: HOSPADM

## 2019-03-22 RX ADMIN — ALBUTEROL SULFATE 2.5 MG: 2.5 SOLUTION RESPIRATORY (INHALATION) at 10:03

## 2019-03-22 RX ADMIN — IPRATROPIUM BROMIDE AND ALBUTEROL SULFATE 3 ML: .5; 3 SOLUTION RESPIRATORY (INHALATION) at 08:03

## 2019-03-22 RX ADMIN — LORAZEPAM 2 MG: 1 TABLET ORAL at 11:03

## 2019-03-22 RX ADMIN — METHYLPREDNISOLONE SODIUM SUCCINATE 80 MG: 40 INJECTION, POWDER, FOR SOLUTION INTRAMUSCULAR; INTRAVENOUS at 11:03

## 2019-03-22 RX ADMIN — ACETAMINOPHEN 650 MG: 325 TABLET ORAL at 11:03

## 2019-03-22 NOTE — ED NOTES
Pt resting comfortably on ED stretcher. NADN. AAOx3. Respirations even and unlabored. Bed locked in lowest position. Call bell within reach. Awaiting blood.

## 2019-03-22 NOTE — ED PROVIDER NOTES
Encounter Date: 3/22/2019       History     Chief Complaint   -- Abnormal Lab     Chantell Herrera is a 83 y.o. female presents with chronic anemia issues today  Patient is seen by Hematology at the local hospital in [place] with chronic anemia  Patient went to routine lab evaluation and clinic visit today, seen by Hematology  Routine lab work showed a hemoglobin of 6, told to go to the ER for transfusion  Patient currently states she is asymptomatic, afebrile with good stable vital signs          The history is provided by the patient  No Known Allergies  Medical history: CHF, anxiety, arthritis, asthma, AFib, COPD, SANTIAGO, emphysema, HLD, HTN, anemia  Surgeries: Appendectomy, eye surgery, hysterectomy, tonsillectomy    Review of Systems and Physical Exam      Review of Systems   Constitutional: Negative.    HENT: Negative.    Eyes: Negative.    Respiratory: Negative.    Cardiovascular: Negative.    Gastrointestinal: Negative.    Genitourinary: Negative.    Musculoskeletal: Negative.    Skin: Negative.    Neurological: Negative.    Psychiatric/Behavioral: Negative.      BP (!) 142/62   Pulse 92   Temp 98.1 °F (36.7 °C) (Oral)   Resp 18   SpO2 (!) 94%      Physical Exam    Constitutional: She appears well-developed.   HENT:   Head: Normocephalic.   Right Ear: External ear normal.   Left Ear: External ear normal.   Nose: Nose normal.   Mouth/Throat: Oropharynx is clear and moist.   Eyes: EOM are normal. Pupils are equal, round, and reactive to light.   Neck: Normal range of motion.   Cardiovascular: Normal rate, regular rhythm and normal heart sounds.   Pulmonary/Chest: Breath sounds normal.   Abdominal: Soft.   Neurological: She is alert and oriented to person, place, and time.   Skin: Skin is warm.       ED Course   Procedures  Labs Reviewed   COMPREHENSIVE METABOLIC PANEL - Abnormal; Notable for the following components:       Result Value    Glucose 134 (*)     All other components within normal limits   CBC  W/ AUTO DIFFERENTIAL - Abnormal; Notable for the following components:    RBC 1.78 (*)     Hemoglobin 6.6 (*)     Hematocrit 20.9 (*)      (*)     MCH 37.1 (*)     MCHC 31.6 (*)     RDW 21.9 (*)     Gran% 75.0 (*)     Lymph% 8.0 (*)     All other components within normal limits   CK - Abnormal; Notable for the following components:    CPK 15 (*)     All other components within normal limits   CK-MB - Abnormal; Notable for the following components:    CPK 15 (*)     MB% 10.0 (*)     All other components within normal limits   B-TYPE NATRIURETIC PEPTIDE - Abnormal; Notable for the following components:     (*)     All other components within normal limits   IRON AND TIBC - Abnormal; Notable for the following components:    Transferrin 181 (*)     All other components within normal limits   FERRITIN - Abnormal; Notable for the following components:    Ferritin 341 (*)     All other components within normal limits   TSH - Abnormal; Notable for the following components:    TSH 0.361 (*)     All other components within normal limits   RETICULOCYTES - Abnormal; Notable for the following components:    Retic 5.8 (*)     All other components within normal limits   B-TYPE NATRIURETIC PEPTIDE - Abnormal; Notable for the following components:     (*)     All other components within normal limits   TROPONIN I   VITAMIN B12   FOLATE   T4, FREE   TYPE & SCREEN   TRANSFUSION REACTION WORKUP   PREPARE RBC SOFT   PREPARE RBC SOFT          Imaging Results          X-Ray Chest AP Portable (In process)                  Medical Decision Making:   ED Management:  2010: pt c/o SOB, Hx COPD, has not had her Tx today, having SOB increase work of breathing. No rales, diminished breath sounds and rhonchi. DuoNeb ordered.   2044: recheck pt, subjectively much improved. Some basilar wheezing noted bilaterally.   0010: called to room pt SOB, nausea, diaphoretic, tachycardic and tachypnea. Pt has course rhonchi through out,  with accessory muscle of respiration use. Will d/c second unit of blood transfusion. Get x-ray of chest, neb treatment, start on bipap.   0020: Contacted Dr. Mcintyre, discussed pt situation and acute changes. Discussed need for possible ICU placement. Dr. Mcintyre requested Chest X-ray to r/o fluid overload.   0100: Consulted again with Dr. Mcintyre who believes that pt needs to be transferred to another facility with appropriate specialty back up for further evaluation and management. Will need cardiology consultation as well may need hematology.                       Clinical Impression:       ICD-10-CM ICD-9-CM   1. Acute on chronic anemia D64.9 285.9   2. Weakness R53.1 780.79   3. Chronic obstructive pulmonary disease, unspecified COPD type J44.9 496   4. SOB (shortness of breath) R06.02 786.05   5. Tachycardia R00.0 785.0   6. Acute hemolytic transfusion reaction, initial encounter T80.910A 999.84   7. History of atrial fibrillation Z86.79 V12.59         Disposition:   Disposition: Transferred  Condition: Serious  Patient be transfused to [facility] for further evaluation management secondary to availability of cardiology.  Is likely that the patient was experiencing acute reaction to blood transfusion the triggered cascade of events including tachycardia, tachypnea and hyperthermia.                         Veto Giraldo MD  03/23/19 0231       Veto Giraldo MD  03/23/19 0234

## 2019-03-22 NOTE — ED TRIAGE NOTES
"Pt reports sent here from Dr Falcon for a blood transfusion. Pt on home O2 3LNC. States feeling a little more "short winded" today  "

## 2019-03-23 VITALS
OXYGEN SATURATION: 98 % | TEMPERATURE: 102 F | DIASTOLIC BLOOD PRESSURE: 60 MMHG | HEART RATE: 93 BPM | SYSTOLIC BLOOD PRESSURE: 128 MMHG | RESPIRATION RATE: 28 BRPM

## 2019-03-23 LAB
BNP SERPL-MCNC: 455 PG/ML
FOLATE SERPL-MCNC: 13.9 NG/ML
VIT B12 SERPL-MCNC: 731 PG/ML

## 2019-03-23 PROCEDURE — 93010 ELECTROCARDIOGRAM REPORT: CPT | Mod: HCNC,,, | Performed by: INTERNAL MEDICINE

## 2019-03-23 PROCEDURE — 99900035 HC TECH TIME PER 15 MIN (STAT): Mod: HCNC

## 2019-03-23 PROCEDURE — 27000190 HC CPAP FULL FACE MASK W/VALVE: Mod: HCNC

## 2019-03-23 PROCEDURE — 93010 EKG 12-LEAD: ICD-10-PCS | Mod: HCNC,76,, | Performed by: INTERNAL MEDICINE

## 2019-03-23 PROCEDURE — 94660 CPAP INITIATION&MGMT: CPT | Mod: HCNC

## 2019-03-23 PROCEDURE — 96376 TX/PRO/DX INJ SAME DRUG ADON: CPT | Mod: HCNC

## 2019-03-23 PROCEDURE — 96374 THER/PROPH/DIAG INJ IV PUSH: CPT | Mod: HCNC

## 2019-03-23 PROCEDURE — 83880 ASSAY OF NATRIURETIC PEPTIDE: CPT | Mod: HCNC

## 2019-03-23 PROCEDURE — 93005 ELECTROCARDIOGRAM TRACING: CPT | Mod: HCNC

## 2019-03-23 PROCEDURE — 94640 AIRWAY INHALATION TREATMENT: CPT | Mod: HCNC

## 2019-03-23 PROCEDURE — 25000242 PHARM REV CODE 250 ALT 637 W/ HCPCS: Mod: HCNC

## 2019-03-23 PROCEDURE — 25000003 PHARM REV CODE 250: Mod: HCNC | Performed by: FAMILY MEDICINE

## 2019-03-23 PROCEDURE — 87205 SMEAR GRAM STAIN: CPT | Mod: HCNC

## 2019-03-23 PROCEDURE — 36415 COLL VENOUS BLD VENIPUNCTURE: CPT | Mod: HCNC

## 2019-03-23 RX ORDER — DILTIAZEM HYDROCHLORIDE 5 MG/ML
10 INJECTION INTRAVENOUS
Status: COMPLETED | OUTPATIENT
Start: 2019-03-23 | End: 2019-03-23

## 2019-03-23 RX ORDER — IBUPROFEN 200 MG
400 TABLET ORAL
Status: COMPLETED | OUTPATIENT
Start: 2019-03-23 | End: 2019-03-23

## 2019-03-23 RX ORDER — IPRATROPIUM BROMIDE 0.5 MG/2.5ML
SOLUTION RESPIRATORY (INHALATION)
Status: DISCONTINUED
Start: 2019-03-23 | End: 2019-03-23 | Stop reason: HOSPADM

## 2019-03-23 RX ORDER — LEVALBUTEROL 1.25 MG/.5ML
SOLUTION, CONCENTRATE RESPIRATORY (INHALATION)
Status: COMPLETED
Start: 2019-03-23 | End: 2019-03-23

## 2019-03-23 RX ORDER — LEVALBUTEROL 1.25 MG/.5ML
1.25 SOLUTION, CONCENTRATE RESPIRATORY (INHALATION) ONCE
Status: COMPLETED | OUTPATIENT
Start: 2019-03-23 | End: 2019-03-23

## 2019-03-23 RX ORDER — IPRATROPIUM BROMIDE 0.5 MG/2.5ML
0.5 SOLUTION RESPIRATORY (INHALATION) ONCE
Status: DISCONTINUED | OUTPATIENT
Start: 2019-03-23 | End: 2019-03-23 | Stop reason: HOSPADM

## 2019-03-23 RX ORDER — DILTIAZEM HCL 1 MG/ML
5 INJECTION, SOLUTION INTRAVENOUS
Status: COMPLETED | OUTPATIENT
Start: 2019-03-23 | End: 2019-03-23

## 2019-03-23 RX ORDER — IPRATROPIUM BROMIDE AND ALBUTEROL SULFATE 2.5; .5 MG/3ML; MG/3ML
3 SOLUTION RESPIRATORY (INHALATION)
Status: DISCONTINUED | OUTPATIENT
Start: 2019-03-23 | End: 2019-03-23

## 2019-03-23 RX ADMIN — DILTIAZEM HYDROCHLORIDE 5 MG/HR: 5 INJECTION INTRAVENOUS at 01:03

## 2019-03-23 RX ADMIN — LEVALBUTEROL 1.25 MG: 1.25 SOLUTION, CONCENTRATE RESPIRATORY (INHALATION) at 12:03

## 2019-03-23 RX ADMIN — DILTIAZEM HYDROCHLORIDE 10 MG: 5 INJECTION INTRAVENOUS at 01:03

## 2019-03-23 RX ADMIN — IBUPROFEN 400 MG: 200 TABLET, FILM COATED ORAL at 01:03

## 2019-03-23 NOTE — ED NOTES
Pt pulse 140-145. Denies chest pain and/or shortness of breath. md aware of pt assessment change. No new orders given. Will continue to monitor.

## 2019-03-25 ENCOUNTER — PES CALL (OUTPATIENT)
Dept: ADMINISTRATIVE | Facility: CLINIC | Age: 84
End: 2019-03-25

## 2019-03-26 ENCOUNTER — OFFICE VISIT (OUTPATIENT)
Dept: FAMILY MEDICINE | Facility: CLINIC | Age: 84
End: 2019-03-26
Payer: MEDICARE

## 2019-03-26 VITALS
SYSTOLIC BLOOD PRESSURE: 110 MMHG | WEIGHT: 140 LBS | RESPIRATION RATE: 18 BRPM | HEIGHT: 62 IN | HEART RATE: 84 BPM | DIASTOLIC BLOOD PRESSURE: 62 MMHG | BODY MASS INDEX: 25.76 KG/M2

## 2019-03-26 DIAGNOSIS — I48.91 ATRIAL FIBRILLATION WITH RAPID VENTRICULAR RESPONSE: ICD-10-CM

## 2019-03-26 DIAGNOSIS — T80.92XD BLOOD TRANSFUSION REACTION, SUBSEQUENT ENCOUNTER: Primary | ICD-10-CM

## 2019-03-26 DIAGNOSIS — N18.30 CKD (CHRONIC KIDNEY DISEASE), STAGE III: ICD-10-CM

## 2019-03-26 DIAGNOSIS — D64.9 SYMPTOMATIC ANEMIA: ICD-10-CM

## 2019-03-26 DIAGNOSIS — F32.5 MAJOR DEPRESSIVE DISORDER WITH SINGLE EPISODE, IN FULL REMISSION: ICD-10-CM

## 2019-03-26 DIAGNOSIS — J96.11 CHRONIC RESPIRATORY FAILURE WITH HYPOXIA: ICD-10-CM

## 2019-03-26 DIAGNOSIS — I10 ESSENTIAL HYPERTENSION: ICD-10-CM

## 2019-03-26 DIAGNOSIS — J41.0 SIMPLE CHRONIC BRONCHITIS: ICD-10-CM

## 2019-03-26 DIAGNOSIS — G25.81 RLS (RESTLESS LEGS SYNDROME): ICD-10-CM

## 2019-03-26 LAB
ANION GAP SERPL CALC-SCNC: 11 MMOL/L (ref 8–16)
BACTERIA SPEC CULT: NORMAL
BASOPHILS # BLD AUTO: 0.02 K/UL (ref 0–0.2)
BASOPHILS NFR BLD: 0.2 % (ref 0–1.9)
BILIRUB SERPL-MCNC: NORMAL MG/DL
BLD PROD TYP BPU: NORMAL
BLOOD UNIT EXPIRATION DATE: NORMAL
BLOOD UNIT TYPE CODE: 6200
BLOOD UNIT TYPE: NORMAL
BLOOD URINE, POC: NORMAL
BUN SERPL-MCNC: 17 MG/DL (ref 8–23)
CALCIUM SERPL-MCNC: 9.5 MG/DL (ref 8.7–10.5)
CASTS: NORMAL
CHLORIDE SERPL-SCNC: 96 MMOL/L (ref 95–110)
CO2 SERPL-SCNC: 31 MMOL/L (ref 23–29)
CODING SYSTEM: NORMAL
COLOR, POC UA: NORMAL
CREAT SERPL-MCNC: 0.8 MG/DL (ref 0.5–1.4)
CRYSTALS: NORMAL
DIFFERENTIAL METHOD: ABNORMAL
DISPENSE STATUS: NORMAL
EOSINOPHIL # BLD AUTO: 0.2 K/UL (ref 0–0.5)
EOSINOPHIL NFR BLD: 2.5 % (ref 0–8)
ERYTHROCYTE [DISTWIDTH] IN BLOOD BY AUTOMATED COUNT: 21.3 % (ref 11.5–14.5)
EST. GFR  (AFRICAN AMERICAN): >60 ML/MIN/1.73 M^2
EST. GFR  (NON AFRICAN AMERICAN): >60 ML/MIN/1.73 M^2
GLUCOSE SERPL-MCNC: 118 MG/DL (ref 70–110)
GLUCOSE UR QL STRIP: NORMAL
HCT VFR BLD AUTO: 32.7 % (ref 37–48.5)
HGB BLD-MCNC: 10.2 G/DL (ref 12–16)
KETONES UR QL STRIP: NORMAL
LEUKOCYTE ESTERASE URINE, POC: NORMAL
LYMPHOCYTES # BLD AUTO: 1.5 K/UL (ref 1–4.8)
LYMPHOCYTES NFR BLD: 16.6 % (ref 18–48)
MCH RBC QN AUTO: 35.3 PG (ref 27–31)
MCHC RBC AUTO-ENTMCNC: 31.2 G/DL (ref 32–36)
MCV RBC AUTO: 113 FL (ref 82–98)
MONOCYTES # BLD AUTO: 1.6 K/UL (ref 0.3–1)
MONOCYTES NFR BLD: 18 % (ref 4–15)
NEUTROPHILS # BLD AUTO: 5.6 K/UL (ref 1.8–7.7)
NEUTROPHILS NFR BLD: 62.7 % (ref 38–73)
NITRITE, POC UA: NORMAL
PH, POC UA: 6
PLATELET # BLD AUTO: 258 K/UL (ref 150–350)
PMV BLD AUTO: 12.1 FL (ref 9.2–12.9)
POTASSIUM SERPL-SCNC: 4.1 MMOL/L (ref 3.5–5.1)
PROTEIN, POC: NORMAL
RBC # BLD AUTO: 2.89 M/UL (ref 4–5.4)
RBC CELLS COUNTED: NORMAL
SODIUM SERPL-SCNC: 138 MMOL/L (ref 136–145)
SPECIFIC GRAVITY, POC UA: 1.02
TRANS ERYTHROCYTES VOL PATIENT: NORMAL ML
UROBILINOGEN, POC UA: NORMAL
WBC # BLD AUTO: 8.92 K/UL (ref 3.9–12.7)
WHITE BLOOD CELLS: NORMAL

## 2019-03-26 PROCEDURE — 99214 PR OFFICE/OUTPT VISIT, EST, LEVL IV, 30-39 MIN: ICD-10-PCS | Mod: HCNC,25,S$GLB, | Performed by: FAMILY MEDICINE

## 2019-03-26 PROCEDURE — 99999 PR PBB SHADOW E&M-EST. PATIENT-LVL III: ICD-10-PCS | Mod: PBBFAC,HCNC,, | Performed by: FAMILY MEDICINE

## 2019-03-26 PROCEDURE — 81000 POCT URINALYSIS, DIPSTICK OR TABLET REAGENT, AUTOMATED, WITH MICROSCOP: ICD-10-PCS | Mod: HCNC,S$GLB,, | Performed by: FAMILY MEDICINE

## 2019-03-26 PROCEDURE — 99499 RISK ADDL DX/OHS AUDIT: ICD-10-PCS | Mod: HCNC,S$GLB,, | Performed by: FAMILY MEDICINE

## 2019-03-26 PROCEDURE — 99214 OFFICE O/P EST MOD 30 MIN: CPT | Mod: HCNC,25,S$GLB, | Performed by: FAMILY MEDICINE

## 2019-03-26 PROCEDURE — 36415 COLL VENOUS BLD VENIPUNCTURE: CPT | Mod: HCNC,S$GLB,, | Performed by: FAMILY MEDICINE

## 2019-03-26 PROCEDURE — 99999 PR PBB SHADOW E&M-EST. PATIENT-LVL III: CPT | Mod: PBBFAC,HCNC,, | Performed by: FAMILY MEDICINE

## 2019-03-26 PROCEDURE — 85025 COMPLETE CBC W/AUTO DIFF WBC: CPT | Mod: HCNC

## 2019-03-26 PROCEDURE — 36415 PR COLLECTION VENOUS BLOOD,VENIPUNCTURE: ICD-10-PCS | Mod: HCNC,S$GLB,, | Performed by: FAMILY MEDICINE

## 2019-03-26 PROCEDURE — 99499 UNLISTED E&M SERVICE: CPT | Mod: HCNC,S$GLB,, | Performed by: FAMILY MEDICINE

## 2019-03-26 PROCEDURE — 81000 URINALYSIS NONAUTO W/SCOPE: CPT | Mod: HCNC,S$GLB,, | Performed by: FAMILY MEDICINE

## 2019-03-26 PROCEDURE — 80048 BASIC METABOLIC PNL TOTAL CA: CPT | Mod: HCNC

## 2019-03-26 RX ORDER — FUROSEMIDE 20 MG/1
20 TABLET ORAL DAILY
Qty: 90 TABLET | Refills: 1 | Status: ON HOLD | OUTPATIENT
Start: 2019-03-26 | End: 2019-09-04 | Stop reason: SDUPTHER

## 2019-03-26 RX ORDER — ROPINIROLE 1 MG/1
1 TABLET, FILM COATED ORAL NIGHTLY
Qty: 90 TABLET | Refills: 5 | Status: SHIPPED | OUTPATIENT
Start: 2019-03-26 | End: 2020-01-01

## 2019-03-26 RX ORDER — CITALOPRAM 40 MG/1
TABLET, FILM COATED ORAL
Qty: 90 TABLET | Refills: 1 | Status: SHIPPED | OUTPATIENT
Start: 2019-03-26 | End: 2019-09-10 | Stop reason: SDUPTHER

## 2019-03-26 NOTE — PROGRESS NOTES
Transitional Care Note  Subjective:       Patient ID: Chantell Herrera is a 83 y.o. female.  Chief Complaint: Follow-up (hospital d/c and med refills)    Family and/or Caretaker present at visit?  Yes.  Diagnostic tests reviewed/disposition: No diagnosic tests pending after this hospitalization.  Disease/illness education: Recent GI bleed  Home health/community services discussion/referrals: Patient does not have home health established from hospital visit.  They do not need home health.  If needed, we will set up home health for the patient.   Establishment or re-establishment of referral orders for community resources: No other necessary community resources.   Discussion with other health care providers: No discussion with other health care providers necessary.  Will attempt to get records from hospitals  83 year old female comes in for f/u of recent hospital stay. She was feeling weak and got a phone call from her hematologist after routine blood work saying that she was severely anemic. She was to go to get a transfusion and during that time, she had a transfusion reaction. She was transferred and admitted to hospitals in ICU. There she was found to have a neg occult and therefore did not get an EGD. She did have some cardiopulm compromise and was given meds to slow her RVR.     Review of Systems   Constitutional: Negative for chills and fever.   HENT: Negative for congestion, ear pain, postnasal drip, rhinorrhea, sore throat and trouble swallowing.    Eyes: Negative for redness and itching.   Respiratory: Negative for cough, shortness of breath and wheezing.    Cardiovascular: Negative for chest pain and palpitations.   Gastrointestinal: Negative for abdominal pain, diarrhea, nausea and vomiting.   Genitourinary: Negative for dysuria and frequency.   Skin: Negative for rash.   Neurological: Positive for weakness. Negative for headaches.       Objective:      Physical Exam   Constitutional: She is oriented to person,  place, and time. She appears well-developed. No distress.   HENT:   Head: Normocephalic and atraumatic.   O2 in place   Eyes: Pupils are equal, round, and reactive to light. Conjunctivae are normal.   Neck: Normal range of motion. Neck supple. No thyromegaly present.   Cardiovascular: Normal rate, regular rhythm, normal heart sounds and intact distal pulses.   Pulmonary/Chest: Effort normal and breath sounds normal. No respiratory distress. She has no wheezes.   Abdominal: Soft. Bowel sounds are normal. There is no tenderness.   Musculoskeletal: Normal range of motion. She exhibits no edema.   Lymphadenopathy:     She has no cervical adenopathy.   Neurological: She is alert and oriented to person, place, and time.   Skin: Skin is warm and dry. No rash noted.   Multiple bruises   Psychiatric: She has a normal mood and affect. Her behavior is normal.   Nursing note and vitals reviewed.      Assessment:       1. Blood transfusion reaction, subsequent encounter    2. Major depressive disorder with single episode, in full remission    3. Chronic respiratory failure with hypoxia    4. Simple chronic bronchitis    5. Atrial fibrillation with rapid ventricular response    6. Essential hypertension    7. CKD (chronic kidney disease), stage III    8. Symptomatic anemia    9. RLS (restless legs syndrome)        Plan:       Chantell was seen today for follow-up.    Diagnoses and all orders for this visit:    Blood transfusion reaction, subsequent encounter    Major depressive disorder with single episode, in full remission  -     citalopram (CELEXA) 40 MG tablet; TAKE 1 TABLET EVERY DAY    Chronic respiratory failure with hypoxia  -     furosemide (LASIX) 20 MG tablet; Take 1 tablet (20 mg total) by mouth once daily.    Simple chronic bronchitis    Atrial fibrillation with rapid ventricular response    Essential hypertension    CKD (chronic kidney disease), stage III  -     Basic metabolic panel; Future    Symptomatic anemia  -      CBC auto differential; Future  -     Basic metabolic panel; Future    RLS (restless legs syndrome)  -     rOPINIRole (REQUIP) 1 MG tablet; Take 1 tablet (1 mg total) by mouth every evening.    RTC if condition acutely worsens or any other concerns, otherwise RTC as scheduled      Discussed neg fobt. No signs of bleeding. This was discussed with patient and her . Is seeing hematology but her doc is going to be out of the country x 3 months. Considering no signs of active bleeding will cont on eliquis. If no signs of bleeding would need to consdier renal causes vs aplastic causes. Will send back to hematology to cont work up.

## 2019-03-28 ENCOUNTER — TELEPHONE (OUTPATIENT)
Dept: FAMILY MEDICINE | Facility: CLINIC | Age: 84
End: 2019-03-28

## 2019-03-28 NOTE — TELEPHONE ENCOUNTER
----- Message from David Kirkpatrick sent at 3/27/2019 12:59 PM CDT -----  Contact: Rosa Elena @ [facility]  Chantell Herrera  MRN: [id number]  : 1935  PCP: Kari Gaspar  Home Phone      [phone]  Work Phone      Not on file.  Mobile          [phone]      MESSAGE: received call requesting appt for patient -- she has appt already scheduled to see Dr Falcon on 19 -- is appt needed sooner for some reason -- please Advise    Call Rosa Elena @ [phone]    PCP: Chasity

## 2019-03-29 ENCOUNTER — TELEPHONE (OUTPATIENT)
Dept: FAMILY MEDICINE | Facility: CLINIC | Age: 84
End: 2019-03-29

## 2019-03-29 NOTE — TELEPHONE ENCOUNTER
----- Message from Rupal Contreras sent at 3/29/2019  2:52 PM CDT -----  Contact: Self  Chantell Herrera  MRN: [id number]  : 1935  PCP: Kari Gaspar  Home Phone      [phone]  Work Phone      Not on file.  Mobile          [phone]      MESSAGE:   Patient states she was supposed to have a bone marrow biopsy and would like to know more information about this.    Patient would also like to know the status of her getting the power chair from [employer].     Phone:  [phone]

## 2019-04-04 NOTE — TELEPHONE ENCOUNTER
Spoke with pt--paperwork from [facility] has not been sent to Dr Gaspar yet. And she also spoke with Dr Falcon's office about the bone marrow bx and got the information that she needed.

## 2019-04-09 LAB — SYMPTOMS P TRANSF RX PATIENT-IMP: NORMAL

## 2019-04-11 ENCOUNTER — OFFICE VISIT (OUTPATIENT)
Dept: FAMILY MEDICINE | Facility: CLINIC | Age: 84
End: 2019-04-11
Payer: MEDICARE

## 2019-04-11 ENCOUNTER — HOSPITAL ENCOUNTER (OUTPATIENT)
Dept: RADIOLOGY | Facility: HOSPITAL | Age: 84
Discharge: HOME | End: 2019-04-11
Attending: FAMILY MEDICINE
Payer: MEDICARE

## 2019-04-11 ENCOUNTER — TELEPHONE (OUTPATIENT)
Dept: FAMILY MEDICINE | Facility: CLINIC | Age: 84
End: 2019-04-11

## 2019-04-11 VITALS
SYSTOLIC BLOOD PRESSURE: 108 MMHG | RESPIRATION RATE: 18 BRPM | HEIGHT: 62 IN | HEART RATE: 70 BPM | DIASTOLIC BLOOD PRESSURE: 62 MMHG | WEIGHT: 133.38 LBS | BODY MASS INDEX: 24.54 KG/M2

## 2019-04-11 DIAGNOSIS — D64.9 SYMPTOMATIC ANEMIA: ICD-10-CM

## 2019-04-11 DIAGNOSIS — Z99.81 OXYGEN DEPENDENT: ICD-10-CM

## 2019-04-11 DIAGNOSIS — I48.91 ATRIAL FIBRILLATION WITH RAPID VENTRICULAR RESPONSE: ICD-10-CM

## 2019-04-11 DIAGNOSIS — M79.89 PAIN AND SWELLING OF LEFT LOWER LEG: ICD-10-CM

## 2019-04-11 DIAGNOSIS — M79.662 PAIN AND SWELLING OF LEFT LOWER LEG: ICD-10-CM

## 2019-04-11 DIAGNOSIS — F32.5 MAJOR DEPRESSIVE DISORDER WITH SINGLE EPISODE, IN FULL REMISSION: ICD-10-CM

## 2019-04-11 DIAGNOSIS — M79.89 PAIN AND SWELLING OF LEFT LOWER LEG: Primary | ICD-10-CM

## 2019-04-11 DIAGNOSIS — R60.0 LEG EDEMA, LEFT: Primary | ICD-10-CM

## 2019-04-11 DIAGNOSIS — M79.662 PAIN AND SWELLING OF LEFT LOWER LEG: Primary | ICD-10-CM

## 2019-04-11 DIAGNOSIS — T14.8XXA BRUISING: ICD-10-CM

## 2019-04-11 DIAGNOSIS — J96.11 CHRONIC RESPIRATORY FAILURE WITH HYPOXIA: ICD-10-CM

## 2019-04-11 PROCEDURE — 99214 OFFICE O/P EST MOD 30 MIN: CPT | Mod: S$GLB,,, | Performed by: FAMILY MEDICINE

## 2019-04-11 PROCEDURE — 3074F PR MOST RECENT SYSTOLIC BLOOD PRESSURE < 130 MM HG: ICD-10-PCS | Mod: CPTII,S$GLB,, | Performed by: FAMILY MEDICINE

## 2019-04-11 PROCEDURE — 1101F PT FALLS ASSESS-DOCD LE1/YR: CPT | Mod: CPTII,S$GLB,, | Performed by: FAMILY MEDICINE

## 2019-04-11 PROCEDURE — 3078F PR MOST RECENT DIASTOLIC BLOOD PRESSURE < 80 MM HG: ICD-10-PCS | Mod: CPTII,S$GLB,, | Performed by: FAMILY MEDICINE

## 2019-04-11 PROCEDURE — 99499 RISK ADDL DX/OHS AUDIT: ICD-10-PCS | Mod: HCNC,S$GLB,, | Performed by: FAMILY MEDICINE

## 2019-04-11 PROCEDURE — 1101F PR PT FALLS ASSESS DOC 0-1 FALLS W/OUT INJ PAST YR: ICD-10-PCS | Mod: CPTII,S$GLB,, | Performed by: FAMILY MEDICINE

## 2019-04-11 PROCEDURE — 99214 PR OFFICE/OUTPT VISIT, EST, LEVL IV, 30-39 MIN: ICD-10-PCS | Mod: S$GLB,,, | Performed by: FAMILY MEDICINE

## 2019-04-11 PROCEDURE — 3078F DIAST BP <80 MM HG: CPT | Mod: CPTII,S$GLB,, | Performed by: FAMILY MEDICINE

## 2019-04-11 PROCEDURE — 93971 EXTREMITY STUDY: CPT | Mod: TC,HCNC,LT

## 2019-04-11 PROCEDURE — 3074F SYST BP LT 130 MM HG: CPT | Mod: CPTII,S$GLB,, | Performed by: FAMILY MEDICINE

## 2019-04-11 PROCEDURE — 99999 PR PBB SHADOW E&M-EST. PATIENT-LVL III: ICD-10-PCS | Mod: PBBFAC,,, | Performed by: FAMILY MEDICINE

## 2019-04-11 PROCEDURE — 99999 PR PBB SHADOW E&M-EST. PATIENT-LVL III: CPT | Mod: PBBFAC,,, | Performed by: FAMILY MEDICINE

## 2019-04-11 PROCEDURE — 99499 UNLISTED E&M SERVICE: CPT | Mod: HCNC,S$GLB,, | Performed by: FAMILY MEDICINE

## 2019-04-11 NOTE — TELEPHONE ENCOUNTER
----- Message from Rupal Contreras sent at 2019 11:54 AM CDT -----  Contact: [facility] rebecca Herrera  MRN: [id number]  : 1935  PCP: Kari Gaspar  Home Phone      [phone]  Work Phone      Not on file.  Mobile          [phone]      MESSAGE:   [email or web address] the patient has bruising on left calf, that is painful. Art thinks it could blood clot and would like to be sure is not.    Patient would like Dr. Gaspar to take care of this, not .    Phone:  [phone]

## 2019-04-11 NOTE — TELEPHONE ENCOUNTER
----- Message from Rupal Contreras sent at 2019 10:47 AM CDT -----  Contact: [facility] Britt[facility] Herrera  MRN: [id number]  : 1935  PCP: Kari Gaspar  Home Phone      [phone]  Work Phone      Not on file.  Mobile          [phone]      MESSAGE:    [facility] is Calling to check the status of paper work for the patients scooter that was faxed on 2019.      Phone:  [phone]

## 2019-04-11 NOTE — PROGRESS NOTES
Subjective:       Patient ID: Chantell Herrera is a 83 y.o. female.    Chief Complaint: Follow-up (Large bruise on left calf wit no injury. had venous u/s done today)    HPI  83 year old female comes in with c/o sore calf for the last few days with noteable bruise to the left leg. She doesn't remember any truama. She says she had some pain in the leg days ago. No actual swelling. She is on a blood thinner.    Today, I am also filling out her forms again for a motorized scooter.     PMH, PSH, ALLERGIES, SH, FH reviewed in nurse's notes above  Medications reconciled in the nurse's notes    Review of Systems   Constitutional: Negative for chills and fever.   HENT: Negative for congestion, ear pain, postnasal drip, rhinorrhea, sore throat and trouble swallowing.    Eyes: Negative for redness and itching.   Respiratory: Negative for cough, shortness of breath and wheezing.    Cardiovascular: Negative for chest pain and palpitations.   Gastrointestinal: Negative for abdominal pain, diarrhea, nausea and vomiting.   Genitourinary: Negative for dysuria and frequency.   Musculoskeletal: Positive for gait problem.   Skin: Negative for rash.   Neurological: Negative for weakness and headaches.   Hematological: Bruises/bleeds easily.       Objective:      Physical Exam   Constitutional: She is oriented to person, place, and time. She appears well-developed. No distress.   HENT:   Head: Normocephalic and atraumatic.   Eyes: Pupils are equal, round, and reactive to light. Conjunctivae are normal.   Neck: Normal range of motion. Neck supple. No thyromegaly present.   Cardiovascular: Normal rate, regular rhythm, normal heart sounds and intact distal pulses.   Pulmonary/Chest: Effort normal and breath sounds normal. No respiratory distress. She has no wheezes.   Abdominal: Soft. Bowel sounds are normal. There is no tenderness.   Musculoskeletal: Normal range of motion. She exhibits no edema.   Left leg swelling   Lymphadenopathy:      She has no cervical adenopathy.   Neurological: She is alert and oriented to person, place, and time.   Skin: Skin is warm and dry. No rash noted.   Ecchymosis to left calf, scattered bruising to arms and legs   Psychiatric: She has a normal mood and affect. Her behavior is normal.   Nursing note and vitals reviewed.       Assessment/Plan:       Problem List Items Addressed This Visit        Psychiatric    Major depressive disorder with single episode, in full remission       Pulmonary    Oxygen dependent    Chronic respiratory failure with hypoxia       Cardiac/Vascular    Atrial fibrillation with rapid ventricular response       Oncology    Symptomatic anemia      Other Visit Diagnoses     Leg edema, left    -  Primary    Bruising            Earlier today, she had a cbc and an u/s of her lower extremities ordered by me. Those results have been reviewed and are normal.    At this point, will cont on lower dose eliquis and monitor.    Paperwork discussed for previous inpatient admit at another facility and information for case management given.    Paperwork for power scooter filled today and is appropriate as she has limited mobility and has had falls and injury with use of a walker.    o2 in place today, breathing well, relatively speaking.    RTC if condition acutely worsens or any other concerns, otherwise RTC as scheduled

## 2019-04-15 NOTE — TELEPHONE ENCOUNTER
----- Message from Rupal Contreras sent at 4/15/2019 11:22 AM CDT -----  Contact: Self  Chantell Herrera  MRN: [id number]  : 1935  PCP: Kari Gaspar  Home Phone      [phone]  Work Phone      Not on file.  Mobile          [phone]      MESSAGE:   Pt requesting refill or new Rx.   Is this a refill or new RX:  refill  RX name and strength:    traZODone (DESYREL) 50 MG tablet  Last office visit: 2019  Is this a 30-day or 90-day RX:  30-DAy  Pharmacy name and location:  [email or web address]  Comments:      Phone:  [phone]

## 2019-04-16 RX ORDER — TRAZODONE HYDROCHLORIDE 50 MG/1
50 TABLET ORAL NIGHTLY
Qty: 90 TABLET | Refills: 1 | Status: SHIPPED | OUTPATIENT
Start: 2019-04-16 | End: 2019-09-10 | Stop reason: SDUPTHER

## 2019-04-16 NOTE — TELEPHONE ENCOUNTER
----- Message from David Kirkpatrick sent at 2019 10:51 AM CDT -----  Contact: Salome @ [facility]  Chantell Herrera  MRN: [id number]  : 1935  PCP: Kari Gaspar  Home Phone      [phone]  Work Phone      Not on file.  Mobile          [phone]      MESSAGE: would like status of power wheel chair order -- asking to speak with Dr Leiva' nurse    Call Salome @ [phone] ext [phone]    PCP: Chasity

## 2019-05-09 ENCOUNTER — TELEPHONE (OUTPATIENT)
Dept: FAMILY MEDICINE | Facility: CLINIC | Age: 84
End: 2019-05-09

## 2019-05-09 NOTE — TELEPHONE ENCOUNTER
----- Message from Kari Castro sent at 2019 10:12 AM CDT -----  Contact: lee - [facility]  Chantell Herrera  MRN: [id number]  : 1935  PCP: Kari Gaspar  Home Phone      [phone]  Work Phone      Not on file.  Mobile          [phone]      MESSAGE:    [facility] needs a copy of her living will. Pt told them she does not have one in her home, she told Art that she made on while here for an appt one time.   Would like it faxed to home health [phone] Art   Note Text (......Xxx Chief Complaint.): This diagnosis correlates with the Other (Free Text): Dr Caldwell reassured the patient the wound is healing well. Pics taken today Detail Level: Simple

## 2019-05-24 RX ORDER — DILTIAZEM HYDROCHLORIDE 240 MG/1
240 CAPSULE, COATED, EXTENDED RELEASE ORAL DAILY
Qty: 30 CAPSULE | Refills: 5 | Status: SHIPPED | OUTPATIENT
Start: 2019-05-24 | End: 2020-01-02

## 2019-05-24 NOTE — TELEPHONE ENCOUNTER
----- Message from Ning Mohamud MA sent at 2019  2:51 PM CDT -----  Contact: Jose lou/[facility]  Chantell Herrera  MRN: [id number]  : 1935  PCP: Kari Gaspar  Home Phone      [phone]  Work Phone      Not on file.  Mobile          [phone]      MESSAGE:   Needs refill on Diltiazem 240 mg.   Phone:  [phone]  Pharmacy:  [facility]

## 2019-05-28 ENCOUNTER — HOSPITAL ENCOUNTER (EMERGENCY)
Facility: HOSPITAL | Age: 84
Discharge: HOME | End: 2019-05-28
Attending: SURGERY
Payer: MEDICARE

## 2019-05-28 ENCOUNTER — TELEPHONE (OUTPATIENT)
Dept: FAMILY MEDICINE | Facility: CLINIC | Age: 84
End: 2019-05-28

## 2019-05-28 VITALS
SYSTOLIC BLOOD PRESSURE: 122 MMHG | WEIGHT: 140 LBS | DIASTOLIC BLOOD PRESSURE: 69 MMHG | RESPIRATION RATE: 16 BRPM | HEART RATE: 84 BPM | BODY MASS INDEX: 25.61 KG/M2 | OXYGEN SATURATION: 98 % | TEMPERATURE: 97 F

## 2019-05-28 DIAGNOSIS — M25.569 KNEE PAIN: ICD-10-CM

## 2019-05-28 DIAGNOSIS — T14.8XXA SKIN ABRASION: ICD-10-CM

## 2019-05-28 DIAGNOSIS — M79.673 FOOT PAIN: Primary | ICD-10-CM
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
## 2019-05-28 PROCEDURE — 25000003 PHARM REV CODE 250: Mod: HCNC | Performed by: NURSE PRACTITIONER

## 2019-05-28 PROCEDURE — 99283 EMERGENCY DEPT VISIT LOW MDM: CPT | Mod: 25,HCNC

## 2019-05-28 RX ORDER — MUPIROCIN 20 MG/G
OINTMENT TOPICAL ONCE
Status: COMPLETED | OUTPATIENT
Start: 2019-05-28 | End: 2019-05-28

## 2019-05-28 RX ORDER — MUPIROCIN 20 MG/G
OINTMENT TOPICAL 3 TIMES DAILY
Qty: 15 G | Refills: 0 | Status: ON HOLD | OUTPATIENT
Start: 2019-05-28 | End: 2020-01-20 | Stop reason: HOSPADM

## 2019-05-28 RX ADMIN — MUPIROCIN: 20 OINTMENT TOPICAL at 04:05

## 2019-05-28 NOTE — DISCHARGE INSTRUCTIONS
**Follow up with PCP in 24-48 hours. Return to ER with worsening of symptoms. Wash area twice a day with antibacterial soap and water. Apply antibiotic ointment three times a day. Keep area clean. Monitor for signs of infection such as redness, swelling, purulent discharge, or fever.     **Over the counter tylenol or motrin as needed for pain and/or fever as directed on package insert. Drink plenty fluids. Get plenty rest. Wash hands frequently.     **Our goal in the emergency department is to always give you outstanding care and exceptional service. You may receive a survey by mail or e-mail in the next week regarding your experience in our ED. We would greatly appreciate your completing and returning the survey. Your feedback provides us with a way to recognize our staff who give very good care and it helps us learn how to improve when your experience was below our aspiration of excellence.

## 2019-05-28 NOTE — ED PROVIDER NOTES
Encounter Date: 2019       History     Chief Complaint   Patient presents with    Fall     fell yesterday and hurt rt foot/knee.       The history is provided by the patient.   Fall   The accident occurred yesterday. The fall occurred while walking (trip and fall). Distance fallen: ground level. She landed on a hard floor. The volume of blood lost was minimal. The point of impact was the right foot and right knee. Pertinent negatives include no neck pain, no back pain, no fever, no abdominal pain, no nausea, no vomiting and no headaches.     Review of patient's allergies indicates:  No Known Allergies  Past Medical History:   Diagnosis Date    Acute on chronic congestive heart failure 2019    Anxiety     Arthritis     Asthma     Atrial fibrillation with rapid ventricular response     CHF (congestive heart failure) 2018    Chronic bronchitis     COPD (chronic obstructive pulmonary disease)     Depression     SANTIAGO (dyspnea on exertion)     Emphysema of lung     Fatigue     Hyperlipidemia     Hypertension     Symptomatic anemia 2019    Trouble in sleeping      Past Surgical History:   Procedure Laterality Date    APPENDECTOMY      EYE SURGERY      HYSTERECTOMY      TONSILLECTOMY       Family History   Problem Relation Age of Onset    Heart disease Mother     Hypertension Mother     Hypertension Father     Diabetes Father     Cancer Sister     COPD Brother     Hypertension Brother     No Known Problems Daughter     Kidney disease Son     COPD Daughter      Social History     Tobacco Use    Smoking status: Former Smoker     Last attempt to quit: 8/3/2000     Years since quittin.8    Smokeless tobacco: Never Used   Substance Use Topics    Alcohol use: No    Drug use: No     Review of Systems   Constitutional: Negative for fever.   HENT: Negative for congestion, ear pain, rhinorrhea, sore throat and trouble swallowing.    Eyes: Negative for pain, discharge, redness  and visual disturbance.   Respiratory: Negative for cough, shortness of breath and wheezing.    Cardiovascular: Negative for chest pain and leg swelling.   Gastrointestinal: Negative for abdominal pain, constipation, diarrhea, nausea and vomiting.   Genitourinary: Negative for difficulty urinating, dysuria, flank pain, frequency and urgency.   Musculoskeletal: Positive for arthralgias ( right foot, right knee). Negative for back pain, myalgias and neck pain.   Skin: Positive for wound (Abrasion to the right knee). Negative for rash.   Neurological: Negative for seizures, weakness and headaches.   Psychiatric/Behavioral: Negative.        Physical Exam     Initial Vitals [05/28/19 1437]   BP Pulse Resp Temp SpO2   (!) 124/58 80 16 97.8 °F (36.6 °C) 98 %      MAP       --         Physical Exam    Nursing note and vitals reviewed.  Constitutional: No distress.   HENT:   Head: Normocephalic and atraumatic.   Right Ear: External ear normal.   Left Ear: External ear normal.   Nose: Nose normal.   Mouth/Throat: Oropharynx is clear and moist.   Eyes: Conjunctivae, EOM and lids are normal. Pupils are equal, round, and reactive to light.   Neck: Neck supple.   Cardiovascular: Normal rate, regular rhythm, S1 normal, S2 normal, normal heart sounds and intact distal pulses.   Pulmonary/Chest: Effort normal and breath sounds normal. No respiratory distress.   Abdominal: Soft. Bowel sounds are normal. There is no tenderness.   Musculoskeletal: Normal range of motion.        Right knee: She exhibits normal range of motion, no swelling, no deformity and normal alignment. No tenderness found.        Right ankle: Normal.        Legs:       Right foot: There is tenderness. There is normal range of motion, no swelling, normal capillary refill and no deformity.   Neurological: She is alert and oriented to person, place, and time. She has normal strength. GCS eye subscore is 4. GCS verbal subscore is 5. GCS motor subscore is 6.   Skin:  Skin is warm and dry. Capillary refill takes less than 2 seconds. Abrasion (Right knee) noted. No rash noted.   Psychiatric: She has a normal mood and affect. Her speech is normal and behavior is normal.         ED Course   Procedures  Labs Reviewed - No data to display       Imaging Results          X-Ray Knee 1 or 2 View Right (Final result)  Result time 05/28/19 16:40:11    Final result by Nikolas Vizcaino III, MD (05/28/19 16:40:11)                 Impression:      As above.  Minimal to mild degenerative change, greatest medially.  No acute bony abnormality suggested.      Electronically signed by: Nikolas Vizcaino  Date:    05/28/2019  Time:    16:40             Narrative:    EXAMINATION:  XR KNEE 1 OR 2 VIEW RIGHT    CLINICAL HISTORY:  Pain in unspecified knee    TECHNIQUE:  AP and lateral views of the right knee were performed.    COMPARISON:  Two thousand fifteen    FINDINGS:  Chondrocalcinosis again noted.  There is slight joint space narrowing medially.  Minimal marginal spurring medially.  No acute fracture.  No dislocation.  No definite joint effusion.                               X-Ray Foot Complete Right (Final result)  Result time 05/28/19 16:41:06    Final result by Nikolas Vizcaino III, MD (05/28/19 16:41:06)                 Impression:      Minimal to mild degenerative changes.  No acute bony abnormality suggested.      Electronically signed by: Nikolas Vizcaino  Date:    05/28/2019  Time:    16:41             Narrative:    EXAMINATION:  XR FOOT COMPLETE 3 VIEW RIGHT    CLINICAL HISTORY:  . Pain in unspecified foot    TECHNIQUE:  AP, lateral, and oblique views of the right foot were performed.    COMPARISON:  None    FINDINGS:  No acute fracture.  No dislocation.  Mild degenerative change 1st metatarsophalangeal joint.  No lytic or blastic change.                                        Medications   mupirocin 2 % ointment (has no administration in time range)                      Clinical  Impression:       ICD-10-CM ICD-9-CM   1. Foot pain M79.673 729.5   2. Knee pain M25.569 719.46   3. Skin abrasion T14.8XXA 919.0   4. Fall, initial encounter W19.XXXA E888.9     New Prescriptions    MUPIROCIN (BACTROBAN) 2 % OINTMENT    Apply topically 3 (three) times daily.       Disposition:   Disposition: Discharged  Condition: Stable    The patient acknowledges that close follow up with medical provider is required. Instructed to follow up with PCP within 2 days. Patient was given specific return precautions. The patient agrees to comply with all instruction and directions given in the ER.                         Radha Guillermo NP  05/28/19 6428

## 2019-05-28 NOTE — TELEPHONE ENCOUNTER
----- Message from Kari Castro sent at 2019 10:30 AM CDT -----  Contact: pt  Chantell Herrera  MRN: [id number]  : 1935  PCP: Kari Gaspar  Home Phone      [phone]  Work Phone      Not on file.  Mobile          [phone]      MESSAGE:   Patient requesting orders  Name of test (labs, mammo, x-ray, etc.):  Xray  Where is test to be performed: [facility]  Date of test (if scheduled): asap  Reason (be specific): Pt fell & hurt her foot  Current Insurance: [employer]  Comments:  Pt can barley walk on it, did not ask for appt, just an xray    Phone: [phone]

## 2019-05-28 NOTE — TELEPHONE ENCOUNTER
Pt calling with c/o fall last night--having problems with foot. Just wanting an xray ordered for here at clinic. Advised her that she would need to see the doctor and a xray ordered if necessary or she can go to the ER for an eval. Voiced understanding.

## 2019-05-29 PROCEDURE — G0179 MD RECERTIFICATION HHA PT: HCPCS | Mod: ,,, | Performed by: FAMILY MEDICINE

## 2019-05-29 PROCEDURE — G0179 PR HOME HEALTH MD RECERTIFICATION: ICD-10-PCS | Mod: ,,, | Performed by: FAMILY MEDICINE

## 2019-05-30 ENCOUNTER — TELEPHONE (OUTPATIENT)
Dept: FAMILY MEDICINE | Facility: CLINIC | Age: 84
End: 2019-05-30

## 2019-05-30 NOTE — TELEPHONE ENCOUNTER
Spoke with Keyonna and she states that she had fall and has 2 skin tears on her knees.  States that supervisor recommended for her to clean with NS, pat dry, apply bactroban (given in ER), apply telfa and curlex and secure with tape.  Is there anything else you want her to do?  Please advise.

## 2019-05-31 NOTE — TELEPHONE ENCOUNTER
Sounds appropriate.  With any signs of slowed healing, purulent discharge or continued bleeding, she should come in.  mh

## 2019-05-31 NOTE — TELEPHONE ENCOUNTER
Spoke to Keyonna and she stated that the pt went to the ER and her results came back negative. But she also understood what Dr. Gaspar wanted to know.

## 2019-06-18 ENCOUNTER — OFFICE VISIT (OUTPATIENT)
Dept: FAMILY MEDICINE | Facility: CLINIC | Age: 84
End: 2019-06-18
Payer: MEDICARE

## 2019-06-18 VITALS
HEIGHT: 62 IN | DIASTOLIC BLOOD PRESSURE: 60 MMHG | RESPIRATION RATE: 20 BRPM | SYSTOLIC BLOOD PRESSURE: 122 MMHG | BODY MASS INDEX: 25.21 KG/M2 | WEIGHT: 137 LBS | HEART RATE: 76 BPM

## 2019-06-18 DIAGNOSIS — R05.9 COUGH: Primary | ICD-10-CM

## 2019-06-18 DIAGNOSIS — D64.9 ANEMIA, UNSPECIFIED TYPE: ICD-10-CM

## 2019-06-18 LAB
BASOPHILS # BLD AUTO: 0.04 K/UL (ref 0–0.2)
BASOPHILS NFR BLD: 0.5 % (ref 0–1.9)
DIFFERENTIAL METHOD: ABNORMAL
EOSINOPHIL # BLD AUTO: 0.2 K/UL (ref 0–0.5)
EOSINOPHIL NFR BLD: 3 % (ref 0–8)
ERYTHROCYTE [DISTWIDTH] IN BLOOD BY AUTOMATED COUNT: 16.2 % (ref 11.5–14.5)
HCT VFR BLD AUTO: 29.8 % (ref 37–48.5)
HGB BLD-MCNC: 9.1 G/DL (ref 12–16)
LYMPHOCYTES # BLD AUTO: 0.7 K/UL (ref 1–4.8)
LYMPHOCYTES NFR BLD: 8.1 % (ref 18–48)
MCH RBC QN AUTO: 36.7 PG (ref 27–31)
MCHC RBC AUTO-ENTMCNC: 30.5 G/DL (ref 32–36)
MCV RBC AUTO: 120 FL (ref 82–98)
MONOCYTES # BLD AUTO: 0.9 K/UL (ref 0.3–1)
MONOCYTES NFR BLD: 10.9 % (ref 4–15)
NEUTROPHILS # BLD AUTO: 6.3 K/UL (ref 1.8–7.7)
NEUTROPHILS NFR BLD: 77.5 % (ref 38–73)
PLATELET # BLD AUTO: 232 K/UL (ref 150–350)
PMV BLD AUTO: 12.3 FL (ref 9.2–12.9)
RBC # BLD AUTO: 2.48 M/UL (ref 4–5.4)
WBC # BLD AUTO: 8.1 K/UL (ref 3.9–12.7)

## 2019-06-18 PROCEDURE — 99999 PR PBB SHADOW E&M-EST. PATIENT-LVL III: ICD-10-PCS | Mod: PBBFAC,HCNC,, | Performed by: FAMILY MEDICINE

## 2019-06-18 PROCEDURE — 3078F DIAST BP <80 MM HG: CPT | Mod: HCNC,CPTII,S$GLB, | Performed by: FAMILY MEDICINE

## 2019-06-18 PROCEDURE — 99999 PR PBB SHADOW E&M-EST. PATIENT-LVL III: CPT | Mod: PBBFAC,HCNC,, | Performed by: FAMILY MEDICINE

## 2019-06-18 PROCEDURE — 1100F PTFALLS ASSESS-DOCD GE2>/YR: CPT | Mod: HCNC,CPTII,S$GLB, | Performed by: FAMILY MEDICINE

## 2019-06-18 PROCEDURE — 85025 COMPLETE CBC W/AUTO DIFF WBC: CPT | Mod: HCNC

## 2019-06-18 PROCEDURE — 3288F FALL RISK ASSESSMENT DOCD: CPT | Mod: HCNC,CPTII,S$GLB, | Performed by: FAMILY MEDICINE

## 2019-06-18 PROCEDURE — 1100F PR PT FALLS ASSESS DOC 2+ FALLS/FALL W/INJURY/YR: ICD-10-PCS | Mod: HCNC,CPTII,S$GLB, | Performed by: FAMILY MEDICINE

## 2019-06-18 PROCEDURE — 99214 OFFICE O/P EST MOD 30 MIN: CPT | Mod: HCNC,S$GLB,, | Performed by: FAMILY MEDICINE

## 2019-06-18 PROCEDURE — 3074F SYST BP LT 130 MM HG: CPT | Mod: HCNC,CPTII,S$GLB, | Performed by: FAMILY MEDICINE

## 2019-06-18 PROCEDURE — 3288F PR FALLS RISK ASSESSMENT DOCUMENTED: ICD-10-PCS | Mod: HCNC,CPTII,S$GLB, | Performed by: FAMILY MEDICINE

## 2019-06-18 PROCEDURE — 3078F PR MOST RECENT DIASTOLIC BLOOD PRESSURE < 80 MM HG: ICD-10-PCS | Mod: HCNC,CPTII,S$GLB, | Performed by: FAMILY MEDICINE

## 2019-06-18 PROCEDURE — 99214 PR OFFICE/OUTPT VISIT, EST, LEVL IV, 30-39 MIN: ICD-10-PCS | Mod: HCNC,S$GLB,, | Performed by: FAMILY MEDICINE

## 2019-06-18 PROCEDURE — 36415 PR COLLECTION VENOUS BLOOD,VENIPUNCTURE: ICD-10-PCS | Mod: HCNC,S$GLB,, | Performed by: FAMILY MEDICINE

## 2019-06-18 PROCEDURE — 36415 COLL VENOUS BLD VENIPUNCTURE: CPT | Mod: HCNC,S$GLB,, | Performed by: FAMILY MEDICINE

## 2019-06-18 PROCEDURE — 3074F PR MOST RECENT SYSTOLIC BLOOD PRESSURE < 130 MM HG: ICD-10-PCS | Mod: HCNC,CPTII,S$GLB, | Performed by: FAMILY MEDICINE

## 2019-06-18 RX ORDER — PROMETHAZINE HYDROCHLORIDE AND DEXTROMETHORPHAN HYDROBROMIDE 6.25; 15 MG/5ML; MG/5ML
5 SYRUP ORAL EVERY 12 HOURS PRN
Qty: 120 ML | Refills: 3 | Status: SHIPPED | OUTPATIENT
Start: 2019-06-18 | End: 2019-10-02

## 2019-06-18 RX ORDER — BENZONATATE 100 MG/1
100 CAPSULE ORAL 3 TIMES DAILY PRN
Qty: 30 CAPSULE | Refills: 0 | Status: SHIPPED | OUTPATIENT
Start: 2019-06-18 | End: 2019-06-28

## 2019-06-18 RX ORDER — PANTOPRAZOLE SODIUM 40 MG/1
40 TABLET, DELAYED RELEASE ORAL EVERY MORNING
Refills: 11 | COMMUNITY
Start: 2019-05-28 | End: 2019-09-10

## 2019-06-18 NOTE — PROGRESS NOTES
Subjective:       Patient ID: Chantell Herrera is a 83 y.o. female.    Chief Complaint: Follow-up (Check up)    HPI  83 year old female comes in for f/u. She was recently seen by dr. Baumann. She was told that she should probably have an EGD but needs clearance prior. He started on her protonix in the meantime. She has really never had + stools and says she had no belly aches either. Her only complaint today is of light headedness with coughing - which is more than normal. No fevers. She is concerned about her blood counts.    PMH, PSH, ALLERGIES, SH, FH reviewed in nurse's notes above  Medications reconciled in the nurse's notes    Review of Systems   Constitutional: Negative for chills and fever.   HENT: Negative for congestion, ear pain, postnasal drip, rhinorrhea, sore throat and trouble swallowing.    Eyes: Negative for redness and itching.   Respiratory: Positive for cough and shortness of breath. Negative for wheezing.    Cardiovascular: Negative for chest pain and palpitations.   Gastrointestinal: Negative for abdominal pain, diarrhea, nausea and vomiting.   Genitourinary: Negative for dysuria and frequency.   Skin: Negative for rash.   Neurological: Positive for light-headedness. Negative for weakness and headaches.       Objective:      Physical Exam   Constitutional: She is oriented to person, place, and time. She appears well-developed. No distress.   HENT:   Head: Normocephalic and atraumatic.   O2 in place   Eyes: Pupils are equal, round, and reactive to light. Conjunctivae are normal.   Neck: Normal range of motion. Neck supple. No thyromegaly present.   Cardiovascular: Normal rate and intact distal pulses.   Murmur heard.  irreg   Pulmonary/Chest: Effort normal and breath sounds normal. No respiratory distress. She has no wheezes.   Abdominal: Soft. Bowel sounds are normal. There is no tenderness.   Musculoskeletal: Normal range of motion. She exhibits no edema.   Lymphadenopathy:     She has no  cervical adenopathy.   Neurological: She is alert and oriented to person, place, and time.   Skin: Skin is warm and dry. No rash noted.   Bruising to right leg   Psychiatric: She has a normal mood and affect. Her behavior is normal.   Nursing note and vitals reviewed.       Assessment/Plan:       Problem List Items Addressed This Visit     None      Visit Diagnoses     Cough    -  Primary    Relevant Medications    benzonatate (TESSALON) 100 MG capsule    promethazine-dextromethorphan (PROMETHAZINE-DM) 6.25-15 mg/5 mL Syrp    Anemia, unspecified type        Relevant Orders    CBC auto differential        RTC if condition acutely worsens or any other concerns, otherwise RTC as scheduled    Obtain records and review from GI and hematology.    Lower steroid to 5mg from 10 for now. If not tolerated, will stay on 10mg. Discussed risks and benefits of long term oral steroids.

## 2019-06-19 ENCOUNTER — TELEPHONE (OUTPATIENT)
Dept: FAMILY MEDICINE | Facility: CLINIC | Age: 84
End: 2019-06-19

## 2019-06-19 NOTE — TELEPHONE ENCOUNTER
----- Message from Kari Gaspar MD sent at 6/19/2019  6:51 AM CDT -----  Blood counts are stable - 9/30.   This is good news.  If she can get the blood doctor's name that saw her instead of dr. Falcon, I can forward results and I would like to consult with her.  mh

## 2019-06-19 NOTE — TELEPHONE ENCOUNTER
Spoke to patient and she verbalized understanding.    Patient also states that one her medications she was recently prescribe have her coughing a lot.    And also she doesn't think she can find out the dr's name.

## 2019-06-21 ENCOUNTER — PES CALL (OUTPATIENT)
Dept: ADMINISTRATIVE | Facility: CLINIC | Age: 84
End: 2019-06-21

## 2019-07-01 ENCOUNTER — OFFICE VISIT (OUTPATIENT)
Dept: FAMILY MEDICINE | Facility: CLINIC | Age: 84
End: 2019-07-01
Payer: MEDICARE

## 2019-07-01 VITALS
SYSTOLIC BLOOD PRESSURE: 136 MMHG | WEIGHT: 137 LBS | HEIGHT: 62 IN | RESPIRATION RATE: 20 BRPM | DIASTOLIC BLOOD PRESSURE: 70 MMHG | HEART RATE: 78 BPM | BODY MASS INDEX: 25.21 KG/M2

## 2019-07-01 DIAGNOSIS — J43.1 PANLOBULAR EMPHYSEMA: Primary | ICD-10-CM

## 2019-07-01 DIAGNOSIS — J44.1 COPD WITH ACUTE EXACERBATION: ICD-10-CM

## 2019-07-01 DIAGNOSIS — G50.0 TRIGEMINAL NEURALGIA OF LEFT SIDE OF FACE: ICD-10-CM

## 2019-07-01 PROCEDURE — 3078F DIAST BP <80 MM HG: CPT | Mod: HCNC,CPTII,S$GLB, | Performed by: FAMILY MEDICINE

## 2019-07-01 PROCEDURE — 3075F PR MOST RECENT SYSTOLIC BLOOD PRESS GE 130-139MM HG: ICD-10-PCS | Mod: HCNC,CPTII,S$GLB, | Performed by: FAMILY MEDICINE

## 2019-07-01 PROCEDURE — 99999 PR PBB SHADOW E&M-EST. PATIENT-LVL III: CPT | Mod: PBBFAC,HCNC,, | Performed by: FAMILY MEDICINE

## 2019-07-01 PROCEDURE — 1101F PT FALLS ASSESS-DOCD LE1/YR: CPT | Mod: HCNC,CPTII,S$GLB, | Performed by: FAMILY MEDICINE

## 2019-07-01 PROCEDURE — 1101F PR PT FALLS ASSESS DOC 0-1 FALLS W/OUT INJ PAST YR: ICD-10-PCS | Mod: HCNC,CPTII,S$GLB, | Performed by: FAMILY MEDICINE

## 2019-07-01 PROCEDURE — 3078F PR MOST RECENT DIASTOLIC BLOOD PRESSURE < 80 MM HG: ICD-10-PCS | Mod: HCNC,CPTII,S$GLB, | Performed by: FAMILY MEDICINE

## 2019-07-01 PROCEDURE — 99214 OFFICE O/P EST MOD 30 MIN: CPT | Mod: HCNC,S$GLB,, | Performed by: FAMILY MEDICINE

## 2019-07-01 PROCEDURE — 3075F SYST BP GE 130 - 139MM HG: CPT | Mod: HCNC,CPTII,S$GLB, | Performed by: FAMILY MEDICINE

## 2019-07-01 PROCEDURE — 99214 PR OFFICE/OUTPT VISIT, EST, LEVL IV, 30-39 MIN: ICD-10-PCS | Mod: HCNC,S$GLB,, | Performed by: FAMILY MEDICINE

## 2019-07-01 PROCEDURE — 99999 PR PBB SHADOW E&M-EST. PATIENT-LVL III: ICD-10-PCS | Mod: PBBFAC,HCNC,, | Performed by: FAMILY MEDICINE

## 2019-07-01 RX ORDER — ALBUTEROL SULFATE 90 UG/1
2 AEROSOL, METERED RESPIRATORY (INHALATION) EVERY 6 HOURS PRN
Qty: 1 EACH | Refills: 5 | Status: ON HOLD | OUTPATIENT
Start: 2019-07-01 | End: 2019-11-30 | Stop reason: CLARIF

## 2019-07-01 RX ORDER — LEVOFLOXACIN 500 MG/1
500 TABLET, FILM COATED ORAL DAILY
Qty: 7 TABLET | Refills: 0 | Status: SHIPPED | OUTPATIENT
Start: 2019-07-01 | End: 2019-07-11

## 2019-07-01 RX ORDER — METHYLPREDNISOLONE 4 MG/1
TABLET ORAL
Qty: 1 PACKAGE | Refills: 0 | Status: ON HOLD | OUTPATIENT
Start: 2019-07-01 | End: 2019-09-04 | Stop reason: HOSPADM

## 2019-07-01 RX ORDER — GABAPENTIN 100 MG/1
100 CAPSULE ORAL 3 TIMES DAILY
Qty: 90 CAPSULE | Refills: 5 | Status: SHIPPED | OUTPATIENT
Start: 2019-07-01 | End: 2019-09-10

## 2019-07-01 NOTE — PROGRESS NOTES
Subjective:       Patient ID: Chantell Herrera is a 83 y.o. female.    Chief Complaint: Otalgia (Left sided ear pain x 2 days) and Cough (Productive cough x 1 day)    Pt is a 83 y.o. female who presents for evaluation and management of   Encounter Diagnoses   Name Primary?    Panlobular emphysema Yes    Trigeminal neuralgia of left side of face     COPD with acute exacerbation    .  Doing well on current meds. Denies any side effects. Prevention is up to date.    Review of Systems   Constitutional: Negative for fever.   HENT: Positive for ear pain.    Respiratory: Positive for cough, shortness of breath and wheezing.        Objective:      Physical Exam   Constitutional: She is oriented to person, place, and time. She appears well-developed and well-nourished.   HENT:   Head: Normocephalic and atraumatic.   Right Ear: External ear normal.   Left Ear: External ear normal.   Nose: Nose normal.   Mouth/Throat: Oropharynx is clear and moist.   O2 NC    Eyes: Pupils are equal, round, and reactive to light. EOM are normal.   Neck: Normal range of motion. Neck supple. No JVD present. No tracheal deviation present. No thyromegaly present.   Cardiovascular: Normal rate, normal heart sounds and intact distal pulses.   No murmur heard.  Pulmonary/Chest: Effort normal. No respiratory distress. She has no wheezes. She has no rales. She exhibits no tenderness.   Exp wheeze and rhonchi      Abdominal: Soft. Bowel sounds are normal. She exhibits no distension and no mass. There is no tenderness. There is no rebound and no guarding.   Musculoskeletal: Normal range of motion. She exhibits no edema or tenderness.   Lymphadenopathy:     She has no cervical adenopathy.   Neurological: She is alert and oriented to person, place, and time. She has normal reflexes. She displays normal reflexes. No cranial nerve deficit. She exhibits normal muscle tone. Coordination normal.   Skin: Skin is warm and dry. No rash noted. No erythema. No  pallor.   Psychiatric: She has a normal mood and affect. Her behavior is normal. Judgment and thought content normal.       Assessment:       1. Panlobular emphysema    2. Trigeminal neuralgia of left side of face    3. COPD with acute exacerbation        Plan:   Chantell was seen today for otalgia and cough.    Diagnoses and all orders for this visit:    Panlobular emphysema  -     albuterol (VENTOLIN HFA) 90 mcg/actuation inhaler; Inhale 2 puffs into the lungs every 6 (six) hours as needed for Wheezing. Rescue    Trigeminal neuralgia of left side of face  -     methylPREDNISolone (MEDROL DOSEPACK) 4 mg tablet; use as directed  -     gabapentin (NEURONTIN) 100 MG capsule; Take 1 capsule (100 mg total) by mouth 3 (three) times daily.    COPD with acute exacerbation  -     levoFLOXacin (LEVAQUIN) 500 MG tablet; Take 1 tablet (500 mg total) by mouth once daily. for 10 days      Problem List Items Addressed This Visit     COPD (chronic obstructive pulmonary disease) - Primary    Relevant Medications    albuterol (VENTOLIN HFA) 90 mcg/actuation inhaler      Other Visit Diagnoses     Trigeminal neuralgia of left side of face        Relevant Medications    methylPREDNISolone (MEDROL DOSEPACK) 4 mg tablet    gabapentin (NEURONTIN) 100 MG capsule    COPD with acute exacerbation        Relevant Medications    levoFLOXacin (LEVAQUIN) 500 MG tablet      her ear pain is TG neuralgia   Trying above   RTC if condition acutely worsens or any other concerns, otherwise RTC as scheduled       No follow-ups on file.

## 2019-07-08 DIAGNOSIS — G25.0 ESSENTIAL TREMOR: ICD-10-CM

## 2019-07-09 RX ORDER — LOSARTAN POTASSIUM 100 MG/1
TABLET ORAL
Qty: 30 TABLET | Refills: 0 | Status: SHIPPED | OUTPATIENT
Start: 2019-07-09 | End: 2020-01-02

## 2019-08-07 ENCOUNTER — TELEPHONE (OUTPATIENT)
Dept: FAMILY MEDICINE | Facility: CLINIC | Age: 84
End: 2019-08-07

## 2019-08-07 NOTE — TELEPHONE ENCOUNTER
----- Message from David Kirkpatrick sent at 2019  1:03 PM CDT -----  Contact: Patient  Chantell Herrera  MRN: [id number]  : 1935  PCP: Kari Gaspar  Home Phone      [phone]  Work Phone      Not on file.  Mobile          [phone]      MESSAGE: requesting to speak with Dr Gaspar or her nurse Re: medication    Call [phone]    PCP: Chasity

## 2019-08-07 NOTE — TELEPHONE ENCOUNTER
Pharmacy requesting alternative on pantoprazole 40mg tab  because it is on back order. Her doctor who prescribes this is on vacation. Please advise.

## 2019-08-08 RX ORDER — OMEPRAZOLE 40 MG/1
40 CAPSULE, DELAYED RELEASE ORAL DAILY
Qty: 30 CAPSULE | Refills: 5 | Status: SHIPPED | OUTPATIENT
Start: 2019-08-08 | End: 2019-10-02

## 2019-08-26 ENCOUNTER — TELEPHONE (OUTPATIENT)
Dept: INTERNAL MEDICINE | Facility: CLINIC | Age: 84
End: 2019-08-26

## 2019-08-26 RX ORDER — BENZONATATE 200 MG/1
200 CAPSULE ORAL 3 TIMES DAILY PRN
Qty: 30 CAPSULE | Refills: 1 | Status: SHIPPED | OUTPATIENT
Start: 2019-08-26 | End: 2019-09-05

## 2019-08-26 NOTE — TELEPHONE ENCOUNTER
----- Message from Cathy Burnett sent at 2019  9:17 AM CDT -----  Contact: self  Chantell Herrera  MRN: [id number]  : 1935  PCP: Kari Gaspar  Home Phone      [phone]  Work Phone      Not on file.  Mobile          [phone]      MESSAGE:   Patient would like to get Khadijah Nunes called in for her cough.     [phone]

## 2019-08-26 NOTE — TELEPHONE ENCOUNTER
LOV 6/18/2019. Dr. Gaspar pt. Would like tessalon pearls called into pharmacy today.    Pharmacy: [facility]

## 2019-09-01 ENCOUNTER — HOSPITAL ENCOUNTER (INPATIENT)
Facility: HOSPITAL | Age: 84
LOS: 3 days | Discharge: HOME | DRG: 191 | End: 2019-09-04
Attending: SURGERY | Admitting: INTERNAL MEDICINE
Payer: MEDICARE

## 2019-09-01 DIAGNOSIS — I48.91 ATRIAL FIBRILLATION WITH RAPID VENTRICULAR RESPONSE: ICD-10-CM

## 2019-09-01 DIAGNOSIS — D64.9 ANEMIA, UNSPECIFIED TYPE: ICD-10-CM

## 2019-09-01 DIAGNOSIS — F41.1 ANXIETY STATE: ICD-10-CM

## 2019-09-01 DIAGNOSIS — N18.30 CKD (CHRONIC KIDNEY DISEASE), STAGE III: ICD-10-CM

## 2019-09-01 DIAGNOSIS — E78.5 DYSLIPIDEMIA: ICD-10-CM

## 2019-09-01 DIAGNOSIS — J96.11 CHRONIC RESPIRATORY FAILURE WITH HYPOXIA: ICD-10-CM

## 2019-09-01 DIAGNOSIS — I10 ESSENTIAL HYPERTENSION: ICD-10-CM

## 2019-09-01 DIAGNOSIS — R06.02 SOB (SHORTNESS OF BREATH): ICD-10-CM

## 2019-09-01 DIAGNOSIS — I50.9 CHRONIC CONGESTIVE HEART FAILURE, UNSPECIFIED HEART FAILURE TYPE: ICD-10-CM

## 2019-09-01 DIAGNOSIS — J44.1 COPD EXACERBATION: Primary | ICD-10-CM

## 2019-09-01 LAB
ALBUMIN SERPL BCP-MCNC: 3.8 G/DL (ref 3.5–5.2)
ALBUMIN SERPL BCP-MCNC: 4.1 G/DL (ref 3.5–5.2)
ALLENS TEST: ABNORMAL
ALP SERPL-CCNC: 71 U/L (ref 55–135)
ALP SERPL-CCNC: 72 U/L (ref 55–135)
ALT SERPL W/O P-5'-P-CCNC: 14 U/L (ref 10–44)
ALT SERPL W/O P-5'-P-CCNC: 14 U/L (ref 10–44)
ANION GAP SERPL CALC-SCNC: 10 MMOL/L (ref 8–16)
ANION GAP SERPL CALC-SCNC: 11 MMOL/L (ref 8–16)
ANISOCYTOSIS BLD QL SMEAR: SLIGHT
APTT BLDCRRT: 26.6 SEC (ref 21–32)
AST SERPL-CCNC: 13 U/L (ref 10–40)
AST SERPL-CCNC: 13 U/L (ref 10–40)
BACTERIA #/AREA URNS HPF: ABNORMAL /HPF
BASOPHILS # BLD AUTO: 0.02 K/UL (ref 0–0.2)
BASOPHILS NFR BLD: 0.2 % (ref 0–1.9)
BASOPHILS NFR BLD: 1 % (ref 0–1.9)
BILIRUB SERPL-MCNC: 0.6 MG/DL (ref 0.1–1)
BILIRUB SERPL-MCNC: 0.6 MG/DL (ref 0.1–1)
BILIRUB UR QL STRIP: NEGATIVE
BNP SERPL-MCNC: 249 PG/ML (ref 0–99)
BUN SERPL-MCNC: 14 MG/DL (ref 8–23)
BUN SERPL-MCNC: 14 MG/DL (ref 8–23)
CALCIUM SERPL-MCNC: 8.5 MG/DL (ref 8.7–10.5)
CALCIUM SERPL-MCNC: 8.7 MG/DL (ref 8.7–10.5)
CHLORIDE SERPL-SCNC: 96 MMOL/L (ref 95–110)
CHLORIDE SERPL-SCNC: 98 MMOL/L (ref 95–110)
CK MB SERPL-MCNC: 1.5 NG/ML (ref 0.1–6.5)
CK MB SERPL-RTO: 5.6 % (ref 0–5)
CK SERPL-CCNC: 27 U/L (ref 20–180)
CK SERPL-CCNC: 27 U/L (ref 20–180)
CLARITY UR: CLEAR
CO2 SERPL-SCNC: 29 MMOL/L (ref 23–29)
CO2 SERPL-SCNC: 32 MMOL/L (ref 23–29)
COLOR UR: YELLOW
CREAT SERPL-MCNC: 0.9 MG/DL (ref 0.5–1.4)
CREAT SERPL-MCNC: 1 MG/DL (ref 0.5–1.4)
DACRYOCYTES BLD QL SMEAR: ABNORMAL
DELSYS: ABNORMAL
DIFFERENTIAL METHOD: ABNORMAL
DIFFERENTIAL METHOD: ABNORMAL
EOSINOPHIL # BLD AUTO: 0.1 K/UL (ref 0–0.5)
EOSINOPHIL NFR BLD: 0.6 % (ref 0–8)
EOSINOPHIL NFR BLD: 4 % (ref 0–8)
ERYTHROCYTE [DISTWIDTH] IN BLOOD BY AUTOMATED COUNT: 15.2 % (ref 11.5–14.5)
ERYTHROCYTE [DISTWIDTH] IN BLOOD BY AUTOMATED COUNT: 15.3 % (ref 11.5–14.5)
EST. GFR  (AFRICAN AMERICAN): >60 ML/MIN/1.73 M^2
EST. GFR  (AFRICAN AMERICAN): >60 ML/MIN/1.73 M^2
EST. GFR  (NON AFRICAN AMERICAN): 53 ML/MIN/1.73 M^2
EST. GFR  (NON AFRICAN AMERICAN): >60 ML/MIN/1.73 M^2
GIANT PLATELETS BLD QL SMEAR: PRESENT
GLUCOSE SERPL-MCNC: 147 MG/DL (ref 70–110)
GLUCOSE SERPL-MCNC: 285 MG/DL (ref 70–110)
GLUCOSE UR QL STRIP: NEGATIVE
HCO3 UR-SCNC: 38 MMOL/L (ref 22–26)
HCT VFR BLD AUTO: 27.7 % (ref 37–48.5)
HCT VFR BLD AUTO: 29.2 % (ref 37–48.5)
HGB BLD-MCNC: 8.8 G/DL (ref 12–16)
HGB BLD-MCNC: 9.1 G/DL (ref 12–16)
HGB UR QL STRIP: NEGATIVE
HYPOCHROMIA BLD QL SMEAR: ABNORMAL
IMM GRANULOCYTES # BLD AUTO: 0.16 K/UL (ref 0–0.04)
IMM GRANULOCYTES # BLD AUTO: ABNORMAL K/UL (ref 0–0.04)
IMM GRANULOCYTES NFR BLD AUTO: 1.9 % (ref 0–0.5)
IMM GRANULOCYTES NFR BLD AUTO: ABNORMAL % (ref 0–0.5)
INR PPP: 1.1 (ref 0.8–1.2)
KETONES UR QL STRIP: NEGATIVE
LACTATE SERPL-SCNC: 0.9 MMOL/L (ref 0.5–2.2)
LACTATE SERPL-SCNC: 1.3 MMOL/L (ref 0.5–2.2)
LACTATE SERPL-SCNC: 2.4 MMOL/L (ref 0.5–2.2)
LACTATE SERPL-SCNC: 4.1 MMOL/L (ref 0.5–2.2)
LEUKOCYTE ESTERASE UR QL STRIP: ABNORMAL
LYMPHOCYTES # BLD AUTO: 0.3 K/UL (ref 1–4.8)
LYMPHOCYTES NFR BLD: 10 % (ref 18–48)
LYMPHOCYTES NFR BLD: 3.1 % (ref 18–48)
MCH RBC QN AUTO: 36.8 PG (ref 27–31)
MCH RBC QN AUTO: 38.1 PG (ref 27–31)
MCHC RBC AUTO-ENTMCNC: 31.2 G/DL (ref 32–36)
MCHC RBC AUTO-ENTMCNC: 31.8 G/DL (ref 32–36)
MCV RBC AUTO: 118 FL (ref 82–98)
MCV RBC AUTO: 120 FL (ref 82–98)
MICROSCOPIC COMMENT: ABNORMAL
MONOCYTES # BLD AUTO: 0.7 K/UL (ref 0.3–1)
MONOCYTES NFR BLD: 8.4 % (ref 4–15)
MONOCYTES NFR BLD: 9 % (ref 4–15)
NEUTROPHILS # BLD AUTO: 7.3 K/UL (ref 1.8–7.7)
NEUTROPHILS NFR BLD: 69 % (ref 38–73)
NEUTROPHILS NFR BLD: 85.8 % (ref 38–73)
NEUTS BAND NFR BLD MANUAL: 7 %
NITRITE UR QL STRIP: NEGATIVE
NON-SQ EPI CELLS #/AREA URNS HPF: 1 /HPF
NRBC BLD-RTO: 0 /100 WBC
NRBC BLD-RTO: 0 /100 WBC
OVALOCYTES BLD QL SMEAR: ABNORMAL
PCO2 BLDA: 60 MMHG (ref 35–45)
PH SMN: 7.41 [PH] (ref 7.35–7.45)
PH UR STRIP: 6 [PH] (ref 5–8)
PLATELET # BLD AUTO: 196 K/UL (ref 150–350)
PLATELET # BLD AUTO: 210 K/UL (ref 150–350)
PLATELET BLD QL SMEAR: ABNORMAL
PMV BLD AUTO: 12.4 FL (ref 9.2–12.9)
PMV BLD AUTO: 12.5 FL (ref 9.2–12.9)
PO2 BLDA: 59 MMHG (ref 75–100)
POC BE: 11.6 MMOL/L (ref -2–2)
POC COHB: 2.2 % (ref 0–3)
POC METHB: 1.1 % (ref 0–1.5)
POC O2HB ARTERIAL: 88.7 % (ref 94–100)
POC SATURATED O2: 91.7 % (ref 90–100)
POC TCO2: 39.8 MMOL/L
POC THB: 9.6 G/DL (ref 12–18)
POLYCHROMASIA BLD QL SMEAR: ABNORMAL
POTASSIUM SERPL-SCNC: 3.9 MMOL/L (ref 3.5–5.1)
POTASSIUM SERPL-SCNC: 4.1 MMOL/L (ref 3.5–5.1)
PROT SERPL-MCNC: 6.1 G/DL (ref 6–8.4)
PROT SERPL-MCNC: 6.4 G/DL (ref 6–8.4)
PROT UR QL STRIP: NEGATIVE
PROTHROMBIN TIME: 11.6 SEC (ref 9–12.5)
RBC # BLD AUTO: 2.31 M/UL (ref 4–5.4)
RBC # BLD AUTO: 2.47 M/UL (ref 4–5.4)
RBC #/AREA URNS HPF: 0 /HPF (ref 0–4)
SCHISTOCYTES BLD QL SMEAR: ABNORMAL
SITE: ABNORMAL
SODIUM SERPL-SCNC: 136 MMOL/L (ref 136–145)
SODIUM SERPL-SCNC: 140 MMOL/L (ref 136–145)
SP GR UR STRIP: 1.02 (ref 1–1.03)
SPHEROCYTES BLD QL SMEAR: ABNORMAL
SQUAMOUS #/AREA URNS HPF: 3 /HPF
STOMATOCYTES BLD QL SMEAR: PRESENT
TROPONIN I SERPL DL<=0.01 NG/ML-MCNC: 0.03 NG/ML (ref 0–0.03)
TROPONIN I SERPL DL<=0.01 NG/ML-MCNC: 0.03 NG/ML (ref 0–0.03)
TROPONIN I SERPL DL<=0.01 NG/ML-MCNC: 0.04 NG/ML (ref 0–0.03)
URN SPEC COLLECT METH UR: ABNORMAL
UROBILINOGEN UR STRIP-ACNC: NEGATIVE EU/DL
WBC # BLD AUTO: 11.45 K/UL (ref 3.9–12.7)
WBC # BLD AUTO: 8.47 K/UL (ref 3.9–12.7)
WBC #/AREA URNS HPF: 4 /HPF (ref 0–5)

## 2019-09-01 PROCEDURE — 99900035 HC TECH TIME PER 15 MIN (STAT): Mod: HCNC

## 2019-09-01 PROCEDURE — 11000001 HC ACUTE MED/SURG PRIVATE ROOM: Mod: HCNC

## 2019-09-01 PROCEDURE — 85025 COMPLETE CBC W/AUTO DIFF WBC: CPT | Mod: HCNC

## 2019-09-01 PROCEDURE — 93010 ELECTROCARDIOGRAM REPORT: CPT | Mod: HCNC,,, | Performed by: INTERNAL MEDICINE

## 2019-09-01 PROCEDURE — 85007 BL SMEAR W/DIFF WBC COUNT: CPT | Mod: HCNC

## 2019-09-01 PROCEDURE — 83880 ASSAY OF NATRIURETIC PEPTIDE: CPT | Mod: HCNC

## 2019-09-01 PROCEDURE — 99285 EMERGENCY DEPT VISIT HI MDM: CPT | Mod: 25,HCNC

## 2019-09-01 PROCEDURE — 63600175 PHARM REV CODE 636 W HCPCS: Mod: HCNC | Performed by: SURGERY

## 2019-09-01 PROCEDURE — 36415 COLL VENOUS BLD VENIPUNCTURE: CPT | Mod: HCNC

## 2019-09-01 PROCEDURE — 93010 EKG 12-LEAD: ICD-10-PCS | Mod: HCNC,,, | Performed by: INTERNAL MEDICINE

## 2019-09-01 PROCEDURE — 85730 THROMBOPLASTIN TIME PARTIAL: CPT | Mod: HCNC

## 2019-09-01 PROCEDURE — 25000003 PHARM REV CODE 250: Mod: HCNC | Performed by: SURGERY

## 2019-09-01 PROCEDURE — 25000242 PHARM REV CODE 250 ALT 637 W/ HCPCS: Mod: HCNC | Performed by: INTERNAL MEDICINE

## 2019-09-01 PROCEDURE — 84484 ASSAY OF TROPONIN QUANT: CPT | Mod: 91,HCNC

## 2019-09-01 PROCEDURE — 82553 CREATINE MB FRACTION: CPT | Mod: HCNC

## 2019-09-01 PROCEDURE — 93005 ELECTROCARDIOGRAM TRACING: CPT | Mod: HCNC

## 2019-09-01 PROCEDURE — 81000 URINALYSIS NONAUTO W/SCOPE: CPT | Mod: HCNC

## 2019-09-01 PROCEDURE — 82550 ASSAY OF CK (CPK): CPT | Mod: HCNC

## 2019-09-01 PROCEDURE — 99223 1ST HOSP IP/OBS HIGH 75: CPT | Mod: AI,HCNC,, | Performed by: INTERNAL MEDICINE

## 2019-09-01 PROCEDURE — 94761 N-INVAS EAR/PLS OXIMETRY MLT: CPT | Mod: HCNC

## 2019-09-01 PROCEDURE — 27000221 HC OXYGEN, UP TO 24 HOURS: Mod: HCNC

## 2019-09-01 PROCEDURE — 96374 THER/PROPH/DIAG INJ IV PUSH: CPT | Mod: HCNC

## 2019-09-01 PROCEDURE — 85027 COMPLETE CBC AUTOMATED: CPT | Mod: HCNC

## 2019-09-01 PROCEDURE — 25000242 PHARM REV CODE 250 ALT 637 W/ HCPCS: Mod: HCNC | Performed by: SURGERY

## 2019-09-01 PROCEDURE — 83605 ASSAY OF LACTIC ACID: CPT | Mod: 91,HCNC

## 2019-09-01 PROCEDURE — 80053 COMPREHEN METABOLIC PANEL: CPT | Mod: HCNC

## 2019-09-01 PROCEDURE — 94640 AIRWAY INHALATION TREATMENT: CPT | Mod: HCNC

## 2019-09-01 PROCEDURE — 99223 PR INITIAL HOSPITAL CARE,LEVL III: ICD-10-PCS | Mod: AI,HCNC,, | Performed by: INTERNAL MEDICINE

## 2019-09-01 PROCEDURE — 36600 WITHDRAWAL OF ARTERIAL BLOOD: CPT | Mod: HCNC

## 2019-09-01 PROCEDURE — 85610 PROTHROMBIN TIME: CPT | Mod: HCNC

## 2019-09-01 PROCEDURE — 87040 BLOOD CULTURE FOR BACTERIA: CPT | Mod: 59,HCNC

## 2019-09-01 PROCEDURE — 82803 BLOOD GASES ANY COMBINATION: CPT | Mod: HCNC | Performed by: SURGERY

## 2019-09-01 PROCEDURE — 25000003 PHARM REV CODE 250: Mod: HCNC | Performed by: INTERNAL MEDICINE

## 2019-09-01 RX ORDER — LEVALBUTEROL 1.25 MG/.5ML
1.25 SOLUTION, CONCENTRATE RESPIRATORY (INHALATION)
Status: COMPLETED | OUTPATIENT
Start: 2019-09-01 | End: 2019-09-01

## 2019-09-01 RX ORDER — DILTIAZEM HYDROCHLORIDE 120 MG/1
240 CAPSULE, COATED, EXTENDED RELEASE ORAL DAILY
Status: DISCONTINUED | OUTPATIENT
Start: 2019-09-01 | End: 2019-09-04 | Stop reason: HOSPADM

## 2019-09-01 RX ORDER — PANTOPRAZOLE SODIUM 40 MG/1
40 TABLET, DELAYED RELEASE ORAL DAILY
Status: DISCONTINUED | OUTPATIENT
Start: 2019-09-01 | End: 2019-09-04 | Stop reason: HOSPADM

## 2019-09-01 RX ORDER — LEVALBUTEROL 1.25 MG/.5ML
1.25 SOLUTION, CONCENTRATE RESPIRATORY (INHALATION) EVERY 6 HOURS PRN
Status: DISCONTINUED | OUTPATIENT
Start: 2019-09-01 | End: 2019-09-01

## 2019-09-01 RX ORDER — PRAVASTATIN SODIUM 40 MG/1
40 TABLET ORAL DAILY
Status: DISCONTINUED | OUTPATIENT
Start: 2019-09-01 | End: 2019-09-04 | Stop reason: HOSPADM

## 2019-09-01 RX ORDER — LEVALBUTEROL 1.25 MG/.5ML
1.25 SOLUTION, CONCENTRATE RESPIRATORY (INHALATION) EVERY 6 HOURS
Status: DISCONTINUED | OUTPATIENT
Start: 2019-09-01 | End: 2019-09-01

## 2019-09-01 RX ORDER — BENZONATATE 100 MG/1
100 CAPSULE ORAL 3 TIMES DAILY PRN
Status: DISCONTINUED | OUTPATIENT
Start: 2019-09-01 | End: 2019-09-04 | Stop reason: HOSPADM

## 2019-09-01 RX ORDER — ACETAMINOPHEN 325 MG/1
650 TABLET ORAL EVERY 8 HOURS PRN
Status: DISCONTINUED | OUTPATIENT
Start: 2019-09-01 | End: 2019-09-04 | Stop reason: HOSPADM

## 2019-09-01 RX ORDER — BACLOFEN 10 MG/1
10 TABLET ORAL 3 TIMES DAILY PRN
Status: DISCONTINUED | OUTPATIENT
Start: 2019-09-01 | End: 2019-09-04 | Stop reason: HOSPADM

## 2019-09-01 RX ORDER — FUROSEMIDE 20 MG/1
20 TABLET ORAL DAILY
Status: DISCONTINUED | OUTPATIENT
Start: 2019-09-01 | End: 2019-09-03

## 2019-09-01 RX ORDER — BACLOFEN 10 MG/1
10 TABLET ORAL 3 TIMES DAILY
Status: DISCONTINUED | OUTPATIENT
Start: 2019-09-01 | End: 2019-09-01

## 2019-09-01 RX ORDER — METHYLPREDNISOLONE SOD SUCC 125 MG
125 VIAL (EA) INJECTION EVERY 8 HOURS
Status: DISCONTINUED | OUTPATIENT
Start: 2019-09-01 | End: 2019-09-03

## 2019-09-01 RX ORDER — IPRATROPIUM BROMIDE AND ALBUTEROL SULFATE 2.5; .5 MG/3ML; MG/3ML
3 SOLUTION RESPIRATORY (INHALATION) EVERY 6 HOURS
Status: DISCONTINUED | OUTPATIENT
Start: 2019-09-02 | End: 2019-09-04 | Stop reason: HOSPADM

## 2019-09-01 RX ORDER — LOSARTAN POTASSIUM 50 MG/1
50 TABLET ORAL DAILY
Status: DISCONTINUED | OUTPATIENT
Start: 2019-09-01 | End: 2019-09-04 | Stop reason: HOSPADM

## 2019-09-01 RX ORDER — TRAZODONE HYDROCHLORIDE 50 MG/1
50 TABLET ORAL NIGHTLY
Status: DISCONTINUED | OUTPATIENT
Start: 2019-09-01 | End: 2019-09-04 | Stop reason: HOSPADM

## 2019-09-01 RX ORDER — SODIUM CHLORIDE 0.9 % (FLUSH) 0.9 %
10 SYRINGE (ML) INJECTION
Status: DISCONTINUED | OUTPATIENT
Start: 2019-09-01 | End: 2019-09-04 | Stop reason: HOSPADM

## 2019-09-01 RX ORDER — CITALOPRAM 10 MG/1
20 TABLET ORAL DAILY
Status: DISCONTINUED | OUTPATIENT
Start: 2019-09-01 | End: 2019-09-04 | Stop reason: HOSPADM

## 2019-09-01 RX ORDER — ONDANSETRON 2 MG/ML
4 INJECTION INTRAMUSCULAR; INTRAVENOUS EVERY 8 HOURS PRN
Status: DISCONTINUED | OUTPATIENT
Start: 2019-09-01 | End: 2019-09-04 | Stop reason: HOSPADM

## 2019-09-01 RX ORDER — GABAPENTIN 100 MG/1
100 CAPSULE ORAL 3 TIMES DAILY
Status: DISCONTINUED | OUTPATIENT
Start: 2019-09-01 | End: 2019-09-04 | Stop reason: HOSPADM

## 2019-09-01 RX ORDER — ROPINIROLE 0.5 MG/1
1 TABLET, FILM COATED ORAL NIGHTLY
Status: DISCONTINUED | OUTPATIENT
Start: 2019-09-01 | End: 2019-09-04 | Stop reason: HOSPADM

## 2019-09-01 RX ADMIN — CEFTRIAXONE 1 G: 1 INJECTION, SOLUTION INTRAVENOUS at 03:09

## 2019-09-01 RX ADMIN — DILTIAZEM HYDROCHLORIDE 240 MG: 120 CAPSULE, COATED, EXTENDED RELEASE ORAL at 10:09

## 2019-09-01 RX ADMIN — METHYLPREDNISOLONE SODIUM SUCCINATE 125 MG: 125 INJECTION, POWDER, FOR SOLUTION INTRAMUSCULAR; INTRAVENOUS at 01:09

## 2019-09-01 RX ADMIN — PANTOPRAZOLE SODIUM 40 MG: 40 TABLET, DELAYED RELEASE ORAL at 09:09

## 2019-09-01 RX ADMIN — LOSARTAN POTASSIUM 50 MG: 50 TABLET, FILM COATED ORAL at 10:09

## 2019-09-01 RX ADMIN — LEVALBUTEROL 1.25 MG: 1.25 SOLUTION, CONCENTRATE RESPIRATORY (INHALATION) at 12:09

## 2019-09-01 RX ADMIN — ROPINIROLE HYDROCHLORIDE 1 MG: 0.5 TABLET, FILM COATED ORAL at 10:09

## 2019-09-01 RX ADMIN — TRAZODONE HYDROCHLORIDE 50 MG: 50 TABLET ORAL at 09:09

## 2019-09-01 RX ADMIN — AZITHROMYCIN MONOHYDRATE 500 MG: 500 INJECTION, POWDER, LYOPHILIZED, FOR SOLUTION INTRAVENOUS at 03:09

## 2019-09-01 RX ADMIN — APIXABAN 5 MG: 5 TABLET, FILM COATED ORAL at 10:09

## 2019-09-01 RX ADMIN — GABAPENTIN 100 MG: 100 CAPSULE ORAL at 09:09

## 2019-09-01 RX ADMIN — METHYLPREDNISOLONE SODIUM SUCCINATE 125 MG: 125 INJECTION, POWDER, FOR SOLUTION INTRAMUSCULAR; INTRAVENOUS at 06:09

## 2019-09-01 RX ADMIN — LEVALBUTEROL 1.25 MG: 1.25 SOLUTION, CONCENTRATE RESPIRATORY (INHALATION) at 08:09

## 2019-09-01 RX ADMIN — APIXABAN 5 MG: 5 TABLET, FILM COATED ORAL at 09:09

## 2019-09-01 RX ADMIN — CITALOPRAM HYDROBROMIDE 20 MG: 10 TABLET ORAL at 10:09

## 2019-09-01 RX ADMIN — FUROSEMIDE 20 MG: 20 TABLET ORAL at 10:09

## 2019-09-01 RX ADMIN — PRAVASTATIN SODIUM 40 MG: 40 TABLET ORAL at 10:09

## 2019-09-01 RX ADMIN — METHYLPREDNISOLONE SODIUM SUCCINATE 125 MG: 125 INJECTION, POWDER, FOR SOLUTION INTRAMUSCULAR; INTRAVENOUS at 09:09

## 2019-09-01 RX ADMIN — LEVALBUTEROL 1.25 MG: 1.25 SOLUTION, CONCENTRATE RESPIRATORY (INHALATION) at 01:09

## 2019-09-01 RX ADMIN — LEVALBUTEROL 1.25 MG: 1.25 SOLUTION, CONCENTRATE RESPIRATORY (INHALATION) at 06:09

## 2019-09-01 RX ADMIN — GABAPENTIN 100 MG: 100 CAPSULE ORAL at 04:09

## 2019-09-01 NOTE — NURSING
Patient ambulated with 2 person assistance via walker to bathroom. Patient was then bladder scanned once in bed. The scan volume read 45ml

## 2019-09-01 NOTE — PLAN OF CARE
Problem: Adult Inpatient Plan of Care  Goal: Plan of Care Review  Outcome: Ongoing (interventions implemented as appropriate)  Patient receives rocephin and azithromycin. Solumedrol q8h. Patient is on 4L NC, sating at 90%. U/A is needed, BSC at bedside. No complaints of pain. Some complaints of cough. POC discussed and reviewed. Will continue to monitor.

## 2019-09-01 NOTE — HPI
Chantell Herrera is a 83 y.o. female with PMH of COPD ; on nebs; [place]  Presented with COPD exacerbation .  Dyspnea: Patient complains of shortness of breath at rest.  Symptoms include constant cough, difficulty breathing, frequent throat clearing, morning cough, productive cough, sputum production, tightness in chest and wheezing. Symptoms began 1 week ago, gradually worsening since that time.  Patient denies hemoptysis hempotysis. Associated symptoms include chills, fever and productive cough with sputum described as yellow. Patient has had recent travel.  Weight has been stable.  Appetite has been decreased. Symptoms are exacerbated by any exercise. Symptoms are alleviated by rest, oxygen and medication(s) (albuterol).       Seen in ER ;did not respond to breathing treatments .  She is admitted for IV antibiotics, steroids and nebs

## 2019-09-01 NOTE — ED TRIAGE NOTES
"83 y.o. female presents to ER ED 05/ED 05   Chief Complaint   Patient presents with    Shortness of Breath     x3 days, "cold sweats today"   . No acute distress noted.  "

## 2019-09-01 NOTE — ASSESSMENT & PLAN NOTE
Supplemental O2 via nasal canula; titrate O2 saturation to >92%.   Start Solumedrol  Pulmonary consultation.   Continue beta 2 agonist bronchodilator treatments.   Continue IV antibiotics.  Continue routine medications as before.

## 2019-09-01 NOTE — ED PROVIDER NOTES
"[facility]                                                 Chief Complaint  83 y.o. female with Shortness of Breath (x3 days, "cold sweats today")    History of Present Illness  Chantell Herrera presents to the emergency room with shortness of breath today  Patient is on oxygen at home, long history of COPD chronic bronchitis issues  Patient has a low-grade temperature 99.2° F with an oxygenation of 88% noted  Patient has been hospitalized several times in the past with COPD exacerbation  Patient has not been feeling well all week, common any with a cold sweat tonight    The history is provided by the patient   device was not used during this ER visit     Past Medical History   -- Anxiety     -- Arthritis     -- Asthma     -- Chronic bronchitis     -- COPD (chronic obstructive pulmonary disease)     -- Depression     -- SANTIAGO (dyspnea on exertion)     -- Emphysema of lung     -- Fatigue     -- Hyperlipidemia     -- Hypertension     -- Trouble in sleeping        Surgical HX: Appendectomy  No known allergies    I have reviewed all of this patient's past medical, surgical, family, and social   histories as well as active allergies and medications documented in the  electronic medical record    Review of Systems and Physical Exam      Review of Systems  -- Constitution - no fever, denies fatigue, no weakness, no chills  -- Eyes - no tearing or redness, no visual disturbance  -- Ear, Nose - no tinnitus or earache, no nasal congestion or discharge  -- Mouth,Throat - no sore throat, no toothache, normal voice, normal swallowing  -- Respiratory - cough and congestion, shortness of breath, SANTIAGO  -- Cardiovascular - denies chest pain, no palpitations, denies claudication  -- Gastrointestinal - denies abdominal pain, nausea, vomiting, or diarrhea  -- Genitourinary - no dysuria, denies flank pain, no hematuria, no STD risk  -- Musculoskeletal - denies back pain, negative for trauma or " injury  -- Neurological - no headache, denies weakness or seizure; no LOC  -- Skin - denies pallor, rash, or changes in skin. no hives or welts noted  -- Psychiatric - Denies SI or HI, no psychosis or fractured thought noted     Vital Signs  Her tympanic temperature is 99.2 °F (37.3 °C).   Her blood pressure is 153/71 and her pulse is 99.   Her respiration is 38 and oxygen saturation is 93%     Physical Exam  -- Nursing note and vitals reviewed  -- Constitutional: Appears well-developed and well-nourished  -- Head: Atraumatic. Normocephalic. No obvious abnormality  -- Eyes: Pupils are equal and reactive to light. Normal conjunctiva and lids  -- Cardiac: Normal rate, regular rhythm and normal heart sounds  -- Pulmonary: Coarse breath sounds at the bilateral bases with active wheezing  -- Abdominal: Soft, no tenderness. Normal bowel sounds. Normal liver edge  -- Musculoskeletal: Normal range of motion, no effusions. Joints stable   -- Neurological: No focal deficits. Showed good interaction with staff  -- Vascular: Posterior tibial, dorsalis pedis and radial pulses 2+ bilaterally      Emergency Room Course      Lab Results     K 3.9   CL 98   CO2 32 (H)   BUN 14   CREATININE 1.0    (H)   ALKPHOS 72   AST 13   ALT 14   BILITOT 0.6   ALBUMIN 4.1   PROT 6.4   WBC 11.45   HGB 9.1 (L)   HCT 29.2 (L)      CPK 27   CPK 27   CPKMB 1.5   TROPONINI 0.035 (H)   INR 1.1    (H)   LACTATE 1.3   TSH 0.361 (L)     EKG  -- EKG shows sinus tachycardia with no ST change    Radiology  Chest x-ray shows severe COPD without infiltrate    Additional Work up  -- Blood cultures have also been drawn, results are pending     Medications Given  methylPREDNISolone sodium succinate injection 125 mg (125 mg Intravenous Given 9/1/19 0123)   levalbuterol nebulizer solution 1.25 mg (1.25 mg Nebulization Given 9/1/19 0050)     Medical Decision Making     Initial Assessment  -- patient with hypoxia and wheezing with a long  history of COPD  -- breathing treatments and steroids brought the patient up to 93%    Differential Diagnosis  -- COPD versus bronchitis versus pneumonia versus congestive heart failure    Testing  -- Independently Interpreted: I have ordered and independently interpreted x-rays & EKG  -- Clinical Tests: Lab tests/medical tests/radiology were ordered and reviewed  -- this patient was discussed with Dr Estevez    ED Management  -- patient with stable lab work with a mildly elevated troponin of 0.035  -- breathing treatment and steroids helped, patient has likely COPD exacerbation  -- improved with IV steroids and IV antibiotics, serial troponin for minimal elevation  -- pulmonology consult in the morning for COPD evaluation    Assessment, Disposition, & Plan       Diagnosis  -- The primary encounter diagnosis was COPD exacerbation.   -- A diagnosis of SOB (shortness of breath) was also pertinent to this visit.    Disposition and Plan  -- Disposition: admit  -- Condition: stable    This note is dictated on M*Modal word recognition program.  There are word recognition mistakes that are occasionally missed on review.         Festus Maurer MD  09/01/19 0150

## 2019-09-01 NOTE — ASSESSMENT & PLAN NOTE
BMP  Lab Results   Component Value Date     09/01/2019    K 4.1 09/01/2019    CL 96 09/01/2019    CO2 29 09/01/2019    BUN 14 09/01/2019    CREATININE 0.9 09/01/2019    CALCIUM 8.5 (L) 09/01/2019    ANIONGAP 11 09/01/2019    ESTGFRAFRICA >60 09/01/2019    EGFRNONAA >60 09/01/2019       Looks better .

## 2019-09-01 NOTE — NURSING
Notified Dr. Estevez for critical lactic acid. Vitals stable; pt already on abx therapy. No new orders noted.

## 2019-09-01 NOTE — PLAN OF CARE
Problem: Adult Inpatient Plan of Care  Goal: Plan of Care Review  Outcome: Ongoing (interventions implemented as appropriate)  Pt admitted with COPD exacerbation. Uses home oxygen and trilogy. Oxygen sats 88-90 on 5L via nasal cannula today. Shortness of breath and dyspnea on exertion noted. Breath sounds diminished and coarse with some expiratory wheezing. Remains on rocephin, zithromax and solumedrol 125mg every 8 hours. Respiratory treatments every 6 hours. Complains of infrequent productive cough. Pulmonology consulted. Sinus rhythm on telemetry. Vitals stable. Up with assist to bedside commode. Call bell within reach. Bed in low, locked position.  to bring trilogy from home tonight. Reviewed plan of care with pt and spouse; states agreement.

## 2019-09-01 NOTE — PROGRESS NOTES
Staff Handoff  Patient transferred to floor from E.D. via stretcher with portable oxygen. Assumed care from Ruthie Frias. No complaints of pain. SOB on exertion. POC discussed. Will continue to monitor.       Resident Handoff

## 2019-09-01 NOTE — H&P
"[facility]  History & Physical    Patient Name: Chantell Herrera  MRN: [id number]  Admission Date: 9/1/2019  Attending Physician: Milo Estevez MD   Primary Care Provider: Kari Gaspar MD         Patient information was obtained from patient, past medical records and ER records.     Subjective:     Principal Problem:  Shortness of breath     Chief Complaint:   Chief Complaint   Patient presents with    Shortness of Breath     x3 days, "cold sweats today"        HPI: Chantell Herrera is a 83 y.o. female with PMH of COPD ; [facility]  Presented with COPD exacerbation .  Dyspnea: Patient complains of shortness of breath at rest.  Symptoms include constant cough, difficulty breathing, frequent throat clearing, morning cough, productive cough, sputum production, tightness in chest and wheezing. Symptoms began 1 week ago, gradually worsening since that time.  Patient denies hemoptysis hempotysis. Associated symptoms include chills, fever and productive cough with sputum described as yellow. Patient has had recent travel.  Weight has been stable.  Appetite has been decreased. Symptoms are exacerbated by any exercise. Symptoms are alleviated by rest, oxygen and medication(s) (albuterol).       Seen in ER ;did not respond to breathing treatments .  She is admitted for IV antibiotics, steroids and nebs       Past Medical History:   Diagnosis Date    Acute on chronic congestive heart failure 2/1/2019    Anxiety     Arthritis     Asthma     Atrial fibrillation with rapid ventricular response     CHF (congestive heart failure) 11/29/2018    Chronic bronchitis     COPD (chronic obstructive pulmonary disease)     Depression     SANTIAGO (dyspnea on exertion)     Emphysema of lung     Fatigue     Hyperlipidemia     Hypertension     Symptomatic anemia 1/22/2019    Trouble in sleeping        Past Surgical History:   Procedure Laterality Date    APPENDECTOMY      EYE " SURGERY      HYSTERECTOMY      TONSILLECTOMY         Review of patient's allergies indicates:  No Known Allergies    No current facility-administered medications on file prior to encounter.      Current Outpatient Medications on File Prior to Encounter   Medication Sig    benzonatate (TESSALON) 200 MG capsule Take 1 capsule (200 mg total) by mouth 3 (three) times daily as needed.    cholecalciferol, vitamin D3, (VITAMIN D3) 2,000 unit Tab Take 2,000 Units by mouth once daily.    citalopram (CELEXA) 40 MG tablet TAKE 1 TABLET EVERY DAY    diltiaZEM (CARDIZEM CD) 240 MG 24 hr capsule Take 1 capsule (240 mg total) by mouth once daily.    ELIQUIS 5 mg Tab Take 1 tablet by mouth 2 (two) times daily.    fluticasone-umeclidin-vilanter (TRELEGY ELLIPTA) 100-62.5-25 mcg DsDv Inhale 1 puff into the lungs once daily.    furosemide (LASIX) 20 MG tablet Take 1 tablet (20 mg total) by mouth once daily.    gabapentin (NEURONTIN) 100 MG capsule Take 1 capsule (100 mg total) by mouth 3 (three) times daily.    losartan (COZAAR) 100 MG tablet TAKE 1/2 TABLET EVERY DAY    methylPREDNISolone (MEDROL DOSEPACK) 4 mg tablet use as directed    pravastatin (PRAVACHOL) 40 MG tablet TAKE 1 TABLET EVERY DAY    predniSONE (DELTASONE) 10 MG tablet Take 40 mg for one day, the 30 mg for 3 days then resume 20 mg po daily    rOPINIRole (REQUIP) 1 MG tablet Take 1 tablet (1 mg total) by mouth every evening.    traZODone (DESYREL) 50 MG tablet Take 1 tablet (50 mg total) by mouth every evening.    albuterol (PROVENTIL) 2.5 mg /3 mL (0.083 %) nebulizer solution Take 3 mLs (2.5 mg total) by nebulization every 6 (six) hours as needed for Wheezing.    albuterol (VENTOLIN HFA) 90 mcg/actuation inhaler Inhale 2 puffs into the lungs every 6 (six) hours as needed for Wheezing. Rescue    baclofen (LIORESAL) 10 MG tablet Take 1 tablet (10 mg total) by mouth 3 (three) times daily.    ipratropium (ATROVENT) 0.02 % nebulizer solution Take 500  mcg by nebulization 4 (four) times daily.     mupirocin (BACTROBAN) 2 % ointment Apply topically 3 (three) times daily.    omeprazole (PRILOSEC) 40 MG capsule Take 1 capsule (40 mg total) by mouth once daily.    pantoprazole (PROTONIX) 40 MG tablet Take 40 mg by mouth every morning.    promethazine-dextromethorphan (PROMETHAZINE-DM) 6.25-15 mg/5 mL Syrp Take 5 mLs by mouth every 12 (twelve) hours as needed.    [DISCONTINUED] artificial tears,hypromellose, 0.3 % Drop continuous prn.      Family History     Problem Relation (Age of Onset)    COPD Brother, Daughter    Cancer Sister    Diabetes Father    Heart disease Mother    Hypertension Mother, Father, Brother    Kidney disease Son    No Known Problems Daughter        Tobacco Use    Smoking status: Former Smoker     Last attempt to quit: 8/3/2000     Years since quittin.0    Smokeless tobacco: Never Used   Substance and Sexual Activity    Alcohol use: No    Drug use: No    Sexual activity: Never     Review of Systems   Constitutional: Positive for fatigue. Negative for appetite change, chills and fever.   HENT: Negative for congestion, ear discharge, ear pain, rhinorrhea, sinus pressure and sore throat.    Respiratory: Positive for cough, chest tightness, shortness of breath and wheezing. Negative for choking.         With coughing and increased activity   Cardiovascular: Negative for chest pain and palpitations.   Gastrointestinal: Negative for constipation, diarrhea, nausea and vomiting.   Musculoskeletal: Negative for joint swelling and myalgias.   Skin: Negative for rash and wound.   Neurological: Negative for dizziness, syncope, weakness, light-headedness and numbness.     Objective:     Vital Signs (Most Recent):  Temp: 97 °F (36.1 °C) (19)  Pulse: 68 (19)  Resp: (!) 22 (19)  BP: (!) 112/58 (19)  SpO2: (!) 88 % (19) Vital Signs (24h Range):  Temp:  [97 °F (36.1 °C)-99.2 °F (37.3 °C)] 97 °F  (36.1 °C)  Pulse:  [] 68  Resp:  [18-38] 22  SpO2:  [88 %-95 %] 88 %  BP: (112-153)/(58-83) 112/58     Weight: 69.5 kg (153 lb 3.5 oz)  Body mass index is 28.02 kg/m².    Physical Exam   Constitutional: She is oriented to person, place, and time. She appears well-developed and well-nourished.   HENT:   Head: Normocephalic and atraumatic.   Right Ear: External ear normal.   Left Ear: External ear normal.   Nose: Nose normal.   Mouth/Throat: Oropharynx is clear and moist.   Eyes: Pupils are equal, round, and reactive to light. Conjunctivae and EOM are normal.   Neck: Normal range of motion. Neck supple. No thyromegaly present.   Cardiovascular: Normal rate, regular rhythm, normal heart sounds and intact distal pulses.   Pulmonary/Chest: Effort normal. She has wheezes. She has rales.   Abdominal: Soft. Bowel sounds are normal.   Musculoskeletal: Normal range of motion.   Neurological: She is alert and oriented to person, place, and time. She has normal reflexes.   Skin: Skin is warm and dry.   Psychiatric: She has a normal mood and affect. Her behavior is normal. Judgment and thought content normal.   Nursing note and vitals reviewed.        CRANIAL NERVES     CN III, IV, VI   Pupils are equal, round, and reactive to light.  Extraocular motions are normal.        Significant Labs:   CBC:   Recent Labs   Lab 09/01/19 0051 09/01/19 0448   WBC 11.45 8.47   HGB 9.1* 8.8*   HCT 29.2* 27.7*    196     CMP:   Recent Labs   Lab 09/01/19 0051 09/01/19 0448    136   K 3.9 4.1   CL 98 96   CO2 32* 29   * 285*   BUN 14 14   CREATININE 1.0 0.9   CALCIUM 8.7 8.5*   PROT 6.4 6.1   ALBUMIN 4.1 3.8   BILITOT 0.6 0.6   ALKPHOS 72 71   AST 13 13   ALT 14 14   ANIONGAP 10 11   EGFRNONAA 53* >60     Lactic Acid:   Recent Labs   Lab 09/01/19 0051 09/01/19 0448 09/01/19  0802   LACTATE 1.3 0.9 2.4*     Troponin:   Recent Labs   Lab 09/01/19  0051 09/01/19  0802   TROPONINI 0.035* 0.029*     Urine Studies:    Recent Labs   Lab 09/01/19  0610   COLORU Yellow   APPEARANCEUA Clear   PHUR 6.0   SPECGRAV 1.020   PROTEINUA Negative   GLUCUA Negative   KETONESU Negative   BILIRUBINUA Negative   OCCULTUA Negative   NITRITE Negative   UROBILINOGEN Negative   LEUKOCYTESUR 2+*   RBCUA 0   WBCUA 4   BACTERIA Moderate*   SQUAMEPITHEL 3       Significant Imaging: CXR: I have reviewed all pertinent results/findings within the past 24 hours and my personal findings are:  The heart size is normal and there is calcification of the aorta.  The lungs are well expanded without consolidation or pleural effusion.       EKG:  Sinus tachycardia  Left axis deviation  Septal infarct (cited on or before 23-MAR-2019)  Abnormal ECG  When compared with ECG of 23-MAR-2019 01:08,  Premature ventricular complexes are no longer Present  ST now depressed in Inferior leads  ST now depressed in Lateral leads    Assessment/Plan:     Chronic CHF    Continue lasix    Anemia  Seems chronic   Will follow CBC   Keep her on protonix        COPD exacerbation  Supplemental O2 via nasal canula; titrate O2 saturation to >92%.   Start Solumedrol  Pulmonary consultation.   Continue beta 2 agonist bronchodilator treatments.   Continue IV antibiotics.  Continue routine medications as before.           Atrial fibrillation with rapid ventricular response  Resume eliquis and cardizem       Chronic respiratory failure with hypoxia  Continue O2.  She uses trellegy       Dyslipidemia  Resume her statin       CKD (chronic kidney disease), stage III  BMP  Lab Results   Component Value Date     09/01/2019    K 4.1 09/01/2019    CL 96 09/01/2019    CO2 29 09/01/2019    BUN 14 09/01/2019    CREATININE 0.9 09/01/2019    CALCIUM 8.5 (L) 09/01/2019    ANIONGAP 11 09/01/2019    ESTGFRAFRICA >60 09/01/2019    EGFRNONAA >60 09/01/2019       Looks better .    Anxiety state  Resume her citalopram      HTN (hypertension)  Resume her home anti hypertensives        VTE Risk Mitigation  (From admission, onward)        Ordered     apixaban tablet 5 mg  2 times daily      09/01/19 0907     IP VTE HIGH RISK PATIENT  Once      09/01/19 0217     Place sequential compression device  Until discontinued      09/01/19 0217             Milo Estevez MD  Department of Hospital Medicine   [facility]

## 2019-09-01 NOTE — SUBJECTIVE & OBJECTIVE
Past Medical History:   Diagnosis Date    Acute on chronic congestive heart failure 2/1/2019    Anxiety     Arthritis     Asthma     Atrial fibrillation with rapid ventricular response     CHF (congestive heart failure) 11/29/2018    Chronic bronchitis     COPD (chronic obstructive pulmonary disease)     Depression     SANTIAGO (dyspnea on exertion)     Emphysema of lung     Fatigue     Hyperlipidemia     Hypertension     Symptomatic anemia 1/22/2019    Trouble in sleeping        Past Surgical History:   Procedure Laterality Date    APPENDECTOMY      EYE SURGERY      HYSTERECTOMY      TONSILLECTOMY         Review of patient's allergies indicates:  No Known Allergies    No current facility-administered medications on file prior to encounter.      Current Outpatient Medications on File Prior to Encounter   Medication Sig    benzonatate (TESSALON) 200 MG capsule Take 1 capsule (200 mg total) by mouth 3 (three) times daily as needed.    cholecalciferol, vitamin D3, (VITAMIN D3) 2,000 unit Tab Take 2,000 Units by mouth once daily.    citalopram (CELEXA) 40 MG tablet TAKE 1 TABLET EVERY DAY    diltiaZEM (CARDIZEM CD) 240 MG 24 hr capsule Take 1 capsule (240 mg total) by mouth once daily.    ELIQUIS 5 mg Tab Take 1 tablet by mouth 2 (two) times daily.    fluticasone-umeclidin-vilanter (TRELEGY ELLIPTA) 100-62.5-25 mcg DsDv Inhale 1 puff into the lungs once daily.    furosemide (LASIX) 20 MG tablet Take 1 tablet (20 mg total) by mouth once daily.    gabapentin (NEURONTIN) 100 MG capsule Take 1 capsule (100 mg total) by mouth 3 (three) times daily.    losartan (COZAAR) 100 MG tablet TAKE 1/2 TABLET EVERY DAY    methylPREDNISolone (MEDROL DOSEPACK) 4 mg tablet use as directed    pravastatin (PRAVACHOL) 40 MG tablet TAKE 1 TABLET EVERY DAY    predniSONE (DELTASONE) 10 MG tablet Take 40 mg for one day, the 30 mg for 3 days then resume 20 mg po daily    rOPINIRole (REQUIP) 1 MG tablet Take 1 tablet (1  mg total) by mouth every evening.    traZODone (DESYREL) 50 MG tablet Take 1 tablet (50 mg total) by mouth every evening.    albuterol (PROVENTIL) 2.5 mg /3 mL (0.083 %) nebulizer solution Take 3 mLs (2.5 mg total) by nebulization every 6 (six) hours as needed for Wheezing.    albuterol (VENTOLIN HFA) 90 mcg/actuation inhaler Inhale 2 puffs into the lungs every 6 (six) hours as needed for Wheezing. Rescue    baclofen (LIORESAL) 10 MG tablet Take 1 tablet (10 mg total) by mouth 3 (three) times daily.    ipratropium (ATROVENT) 0.02 % nebulizer solution Take 500 mcg by nebulization 4 (four) times daily.     mupirocin (BACTROBAN) 2 % ointment Apply topically 3 (three) times daily.    omeprazole (PRILOSEC) 40 MG capsule Take 1 capsule (40 mg total) by mouth once daily.    pantoprazole (PROTONIX) 40 MG tablet Take 40 mg by mouth every morning.    promethazine-dextromethorphan (PROMETHAZINE-DM) 6.25-15 mg/5 mL Syrp Take 5 mLs by mouth every 12 (twelve) hours as needed.    [DISCONTINUED] artificial tears,hypromellose, 0.3 % Drop continuous prn.      Family History     Problem Relation (Age of Onset)    COPD Brother, Daughter    Cancer Sister    Diabetes Father    Heart disease Mother    Hypertension Mother, Father, Brother    Kidney disease Son    No Known Problems Daughter        Tobacco Use    Smoking status: Former Smoker     Last attempt to quit: 8/3/2000     Years since quittin.0    Smokeless tobacco: Never Used   Substance and Sexual Activity    Alcohol use: No    Drug use: No    Sexual activity: Never     Review of Systems   Constitutional: Positive for fatigue. Negative for appetite change, chills and fever.   HENT: Negative for congestion, ear discharge, ear pain, rhinorrhea, sinus pressure and sore throat.    Respiratory: Positive for cough, chest tightness, shortness of breath and wheezing. Negative for choking.         With coughing and increased activity   Cardiovascular: Negative for chest  pain and palpitations.   Gastrointestinal: Negative for constipation, diarrhea, nausea and vomiting.   Musculoskeletal: Negative for joint swelling and myalgias.   Skin: Negative for rash and wound.   Neurological: Negative for dizziness, syncope, weakness, light-headedness and numbness.     Objective:     Vital Signs (Most Recent):  Temp: 97 °F (36.1 °C) (09/01/19 0702)  Pulse: 68 (09/01/19 0815)  Resp: (!) 22 (09/01/19 0815)  BP: (!) 112/58 (09/01/19 0702)  SpO2: (!) 88 % (09/01/19 0815) Vital Signs (24h Range):  Temp:  [97 °F (36.1 °C)-99.2 °F (37.3 °C)] 97 °F (36.1 °C)  Pulse:  [] 68  Resp:  [18-38] 22  SpO2:  [88 %-95 %] 88 %  BP: (112-153)/(58-83) 112/58     Weight: 69.5 kg (153 lb 3.5 oz)  Body mass index is 28.02 kg/m².    Physical Exam   Constitutional: She is oriented to person, place, and time. She appears well-developed and well-nourished.   HENT:   Head: Normocephalic and atraumatic.   Right Ear: External ear normal.   Left Ear: External ear normal.   Nose: Nose normal.   Mouth/Throat: Oropharynx is clear and moist.   Eyes: Pupils are equal, round, and reactive to light. Conjunctivae and EOM are normal.   Neck: Normal range of motion. Neck supple. No thyromegaly present.   Cardiovascular: Normal rate, regular rhythm, normal heart sounds and intact distal pulses.   Pulmonary/Chest: Effort normal. She has wheezes. She has rales.   Abdominal: Soft. Bowel sounds are normal.   Musculoskeletal: Normal range of motion.   Neurological: She is alert and oriented to person, place, and time. She has normal reflexes.   Skin: Skin is warm and dry.   Psychiatric: She has a normal mood and affect. Her behavior is normal. Judgment and thought content normal.   Nursing note and vitals reviewed.        CRANIAL NERVES     CN III, IV, VI   Pupils are equal, round, and reactive to light.  Extraocular motions are normal.        Significant Labs:   CBC:   Recent Labs   Lab 09/01/19  0051 09/01/19  0448   WBC 11.45 8.47    HGB 9.1* 8.8*   HCT 29.2* 27.7*    196     CMP:   Recent Labs   Lab 09/01/19  0051 09/01/19  0448    136   K 3.9 4.1   CL 98 96   CO2 32* 29   * 285*   BUN 14 14   CREATININE 1.0 0.9   CALCIUM 8.7 8.5*   PROT 6.4 6.1   ALBUMIN 4.1 3.8   BILITOT 0.6 0.6   ALKPHOS 72 71   AST 13 13   ALT 14 14   ANIONGAP 10 11   EGFRNONAA 53* >60     Lactic Acid:   Recent Labs   Lab 09/01/19 0051 09/01/19  0448 09/01/19  0802   LACTATE 1.3 0.9 2.4*     Troponin:   Recent Labs   Lab 09/01/19 0051 09/01/19  0802   TROPONINI 0.035* 0.029*     Urine Studies:   Recent Labs   Lab 09/01/19  0610   COLORU Yellow   APPEARANCEUA Clear   PHUR 6.0   SPECGRAV 1.020   PROTEINUA Negative   GLUCUA Negative   KETONESU Negative   BILIRUBINUA Negative   OCCULTUA Negative   NITRITE Negative   UROBILINOGEN Negative   LEUKOCYTESUR 2+*   RBCUA 0   WBCUA 4   BACTERIA Moderate*   SQUAMEPITHEL 3       Significant Imaging: CXR: I have reviewed all pertinent results/findings within the past 24 hours and my personal findings are:  The heart size is normal and there is calcification of the aorta.  The lungs are well expanded without consolidation or pleural effusion.       EKG:  Sinus tachycardia  Left axis deviation  Septal infarct (cited on or before 23-MAR-2019)  Abnormal ECG  When compared with ECG of 23-MAR-2019 01:08,  Premature ventricular complexes are no longer Present  ST now depressed in Inferior leads  ST now depressed in Lateral leads

## 2019-09-02 PROBLEM — H11.31 SUBCONJUNCTIVAL HEMORRHAGE OF RIGHT EYE: Status: ACTIVE | Noted: 2019-09-02

## 2019-09-02 LAB
ACANTHOCYTES BLD QL SMEAR: PRESENT
ANION GAP SERPL CALC-SCNC: 13 MMOL/L (ref 8–16)
ANISOCYTOSIS BLD QL SMEAR: ABNORMAL
BASOPHILS # BLD AUTO: ABNORMAL K/UL (ref 0–0.2)
BASOPHILS NFR BLD: 0 % (ref 0–1.9)
BUN SERPL-MCNC: 24 MG/DL (ref 8–23)
CALCIUM SERPL-MCNC: 9.1 MG/DL (ref 8.7–10.5)
CHLORIDE SERPL-SCNC: 95 MMOL/L (ref 95–110)
CO2 SERPL-SCNC: 28 MMOL/L (ref 23–29)
CREAT SERPL-MCNC: 1 MG/DL (ref 0.5–1.4)
DACRYOCYTES BLD QL SMEAR: ABNORMAL
DIFFERENTIAL METHOD: ABNORMAL
EOSINOPHIL # BLD AUTO: ABNORMAL K/UL (ref 0–0.5)
EOSINOPHIL NFR BLD: 0 % (ref 0–8)
ERYTHROCYTE [DISTWIDTH] IN BLOOD BY AUTOMATED COUNT: 15.1 % (ref 11.5–14.5)
EST. GFR  (AFRICAN AMERICAN): >60 ML/MIN/1.73 M^2
EST. GFR  (NON AFRICAN AMERICAN): 53 ML/MIN/1.73 M^2
GLUCOSE SERPL-MCNC: 250 MG/DL (ref 70–110)
HCT VFR BLD AUTO: 27.1 % (ref 37–48.5)
HGB BLD-MCNC: 8.7 G/DL (ref 12–16)
IMM GRANULOCYTES # BLD AUTO: ABNORMAL K/UL (ref 0–0.04)
IMM GRANULOCYTES NFR BLD AUTO: ABNORMAL % (ref 0–0.5)
LYMPHOCYTES # BLD AUTO: ABNORMAL K/UL (ref 1–4.8)
LYMPHOCYTES NFR BLD: 4 % (ref 18–48)
MCH RBC QN AUTO: 37.8 PG (ref 27–31)
MCHC RBC AUTO-ENTMCNC: 32.1 G/DL (ref 32–36)
MCV RBC AUTO: 118 FL (ref 82–98)
MONOCYTES # BLD AUTO: ABNORMAL K/UL (ref 0.3–1)
MONOCYTES NFR BLD: 5 % (ref 4–15)
NEUTROPHILS NFR BLD: 84 % (ref 38–73)
NEUTS BAND NFR BLD MANUAL: 7 %
NRBC BLD-RTO: 0 /100 WBC
OVALOCYTES BLD QL SMEAR: ABNORMAL
PLATELET # BLD AUTO: 219 K/UL (ref 150–350)
PLATELET BLD QL SMEAR: ABNORMAL
PMV BLD AUTO: 12.6 FL (ref 9.2–12.9)
POIKILOCYTOSIS BLD QL SMEAR: SLIGHT
POLYCHROMASIA BLD QL SMEAR: ABNORMAL
POTASSIUM SERPL-SCNC: 4.4 MMOL/L (ref 3.5–5.1)
RBC # BLD AUTO: 2.3 M/UL (ref 4–5.4)
SCHISTOCYTES BLD QL SMEAR: PRESENT
SODIUM SERPL-SCNC: 136 MMOL/L (ref 136–145)
STOMATOCYTES BLD QL SMEAR: PRESENT
WBC # BLD AUTO: 16.25 K/UL (ref 3.9–12.7)

## 2019-09-02 PROCEDURE — 25000003 PHARM REV CODE 250: Mod: HCNC | Performed by: INTERNAL MEDICINE

## 2019-09-02 PROCEDURE — 63600175 PHARM REV CODE 636 W HCPCS: Mod: HCNC | Performed by: SURGERY

## 2019-09-02 PROCEDURE — 94664 DEMO&/EVAL PT USE INHALER: CPT | Mod: HCNC

## 2019-09-02 PROCEDURE — 27000221 HC OXYGEN, UP TO 24 HOURS: Mod: HCNC

## 2019-09-02 PROCEDURE — 36415 COLL VENOUS BLD VENIPUNCTURE: CPT | Mod: HCNC

## 2019-09-02 PROCEDURE — 25000003 PHARM REV CODE 250: Mod: HCNC | Performed by: SURGERY

## 2019-09-02 PROCEDURE — 11000001 HC ACUTE MED/SURG PRIVATE ROOM: Mod: HCNC

## 2019-09-02 PROCEDURE — 94640 AIRWAY INHALATION TREATMENT: CPT | Mod: HCNC

## 2019-09-02 PROCEDURE — 85027 COMPLETE CBC AUTOMATED: CPT | Mod: HCNC

## 2019-09-02 PROCEDURE — 85007 BL SMEAR W/DIFF WBC COUNT: CPT | Mod: HCNC

## 2019-09-02 PROCEDURE — 25000242 PHARM REV CODE 250 ALT 637 W/ HCPCS: Mod: HCNC | Performed by: INTERNAL MEDICINE

## 2019-09-02 PROCEDURE — 99900035 HC TECH TIME PER 15 MIN (STAT): Mod: HCNC

## 2019-09-02 PROCEDURE — 80048 BASIC METABOLIC PNL TOTAL CA: CPT | Mod: HCNC

## 2019-09-02 PROCEDURE — 99233 SBSQ HOSP IP/OBS HIGH 50: CPT | Mod: HCNC,,, | Performed by: INTERNAL MEDICINE

## 2019-09-02 PROCEDURE — 99233 PR SUBSEQUENT HOSPITAL CARE,LEVL III: ICD-10-PCS | Mod: HCNC,,, | Performed by: INTERNAL MEDICINE

## 2019-09-02 PROCEDURE — 27000646 HC AEROBIKA DEVICE: Mod: HCNC

## 2019-09-02 PROCEDURE — 94761 N-INVAS EAR/PLS OXIMETRY MLT: CPT | Mod: HCNC

## 2019-09-02 RX ADMIN — IPRATROPIUM BROMIDE AND ALBUTEROL SULFATE 3 ML: .5; 3 SOLUTION RESPIRATORY (INHALATION) at 07:09

## 2019-09-02 RX ADMIN — GABAPENTIN 100 MG: 100 CAPSULE ORAL at 08:09

## 2019-09-02 RX ADMIN — CITALOPRAM HYDROBROMIDE 20 MG: 10 TABLET ORAL at 08:09

## 2019-09-02 RX ADMIN — CEFTRIAXONE 1 G: 1 INJECTION, SOLUTION INTRAVENOUS at 07:09

## 2019-09-02 RX ADMIN — METHYLPREDNISOLONE SODIUM SUCCINATE 125 MG: 125 INJECTION, POWDER, FOR SOLUTION INTRAMUSCULAR; INTRAVENOUS at 09:09

## 2019-09-02 RX ADMIN — FUROSEMIDE 20 MG: 20 TABLET ORAL at 08:09

## 2019-09-02 RX ADMIN — GABAPENTIN 100 MG: 100 CAPSULE ORAL at 09:09

## 2019-09-02 RX ADMIN — PRAVASTATIN SODIUM 40 MG: 40 TABLET ORAL at 08:09

## 2019-09-02 RX ADMIN — DILTIAZEM HYDROCHLORIDE 240 MG: 120 CAPSULE, COATED, EXTENDED RELEASE ORAL at 08:09

## 2019-09-02 RX ADMIN — METHYLPREDNISOLONE SODIUM SUCCINATE 125 MG: 125 INJECTION, POWDER, FOR SOLUTION INTRAMUSCULAR; INTRAVENOUS at 05:09

## 2019-09-02 RX ADMIN — APIXABAN 5 MG: 5 TABLET, FILM COATED ORAL at 08:09

## 2019-09-02 RX ADMIN — GABAPENTIN 100 MG: 100 CAPSULE ORAL at 02:09

## 2019-09-02 RX ADMIN — PANTOPRAZOLE SODIUM 40 MG: 40 TABLET, DELAYED RELEASE ORAL at 08:09

## 2019-09-02 RX ADMIN — TRAZODONE HYDROCHLORIDE 50 MG: 50 TABLET ORAL at 09:09

## 2019-09-02 RX ADMIN — METHYLPREDNISOLONE SODIUM SUCCINATE 125 MG: 125 INJECTION, POWDER, FOR SOLUTION INTRAMUSCULAR; INTRAVENOUS at 02:09

## 2019-09-02 RX ADMIN — IPRATROPIUM BROMIDE AND ALBUTEROL SULFATE 3 ML: .5; 3 SOLUTION RESPIRATORY (INHALATION) at 12:09

## 2019-09-02 RX ADMIN — LOSARTAN POTASSIUM 50 MG: 50 TABLET, FILM COATED ORAL at 08:09

## 2019-09-02 RX ADMIN — AZITHROMYCIN MONOHYDRATE 500 MG: 500 INJECTION, POWDER, LYOPHILIZED, FOR SOLUTION INTRAVENOUS at 08:09

## 2019-09-02 RX ADMIN — APIXABAN 2.5 MG: 2.5 TABLET, FILM COATED ORAL at 09:09

## 2019-09-02 RX ADMIN — ROPINIROLE HYDROCHLORIDE 1 MG: 0.5 TABLET, FILM COATED ORAL at 09:09

## 2019-09-02 RX ADMIN — IPRATROPIUM BROMIDE AND ALBUTEROL SULFATE 3 ML: .5; 3 SOLUTION RESPIRATORY (INHALATION) at 06:09

## 2019-09-02 NOTE — SUBJECTIVE & OBJECTIVE
Review of Systems   Constitutional: Positive for fatigue. Negative for appetite change, chills and fever.   HENT: Negative for congestion, ear discharge, ear pain, rhinorrhea, sinus pressure and sore throat.    Eyes: Positive for redness.   Respiratory: Positive for cough, chest tightness, shortness of breath and wheezing. Negative for choking.         With coughing and increased activity   Cardiovascular: Negative for chest pain and palpitations.   Gastrointestinal: Negative for constipation, diarrhea, nausea and vomiting.   Musculoskeletal: Negative for joint swelling and myalgias.   Skin: Negative for rash and wound.   Neurological: Negative for dizziness, syncope, weakness, light-headedness and numbness.     Objective:     Vital Signs (Most Recent):  Temp: 96.4 °F (35.8 °C) (09/02/19 0432)  Pulse: 87 (09/02/19 0700)  Resp: (!) 21 (09/02/19 0700)  BP: 127/64 (09/02/19 0432)  SpO2: (!) 90 % (09/02/19 0700) Vital Signs (24h Range):  Temp:  [96.4 °F (35.8 °C)-98.3 °F (36.8 °C)] 96.4 °F (35.8 °C)  Pulse:  [66-91] 87  Resp:  [16-22] 21  SpO2:  [88 %-92 %] 90 %  BP: (118-136)/(56-64) 127/64     Weight: 69.5 kg (153 lb 3.5 oz)  Body mass index is 28.02 kg/m².    Intake/Output Summary (Last 24 hours) at 9/2/2019 0796  Last data filed at 9/2/2019 0437  Gross per 24 hour   Intake 480 ml   Output 925 ml   Net -445 ml      Physical Exam   Constitutional: She is oriented to person, place, and time. She appears well-developed and well-nourished.   HENT:   Head: Normocephalic and atraumatic.   Right Ear: External ear normal.   Left Ear: External ear normal.   Nose: Nose normal.   Mouth/Throat: Oropharynx is clear and moist.   Eyes: Pupils are equal, round, and reactive to light. EOM are normal. Right conjunctiva has a hemorrhage.       R eye subconjunctival hemorrhage    Neck: Normal range of motion. Neck supple. No thyromegaly present.   Cardiovascular: Normal rate, regular rhythm, normal heart sounds and intact distal  pulses.   Pulmonary/Chest: Effort normal. She has wheezes. She has rales.   Abdominal: Soft. Bowel sounds are normal.   Musculoskeletal: Normal range of motion.   Neurological: She is alert and oriented to person, place, and time. She has normal reflexes.   Skin: Skin is warm and dry.   Psychiatric: She has a normal mood and affect. Her behavior is normal. Judgment and thought content normal.   Nursing note and vitals reviewed.      Significant Labs:   CBC:   Recent Labs   Lab 09/01/19 0051 09/01/19 0448 09/02/19  0557   WBC 11.45 8.47 16.25*   HGB 9.1* 8.8* 8.7*   HCT 29.2* 27.7* 27.1*    196 219     CMP:   Recent Labs   Lab 09/01/19 0051 09/01/19 0448 09/02/19  0557    136 136   K 3.9 4.1 4.4   CL 98 96 95   CO2 32* 29 28   * 285* 250*   BUN 14 14 24*   CREATININE 1.0 0.9 1.0   CALCIUM 8.7 8.5* 9.1   PROT 6.4 6.1  --    ALBUMIN 4.1 3.8  --    BILITOT 0.6 0.6  --    ALKPHOS 72 71  --    AST 13 13  --    ALT 14 14  --    ANIONGAP 10 11 13   EGFRNONAA 53* >60 53*

## 2019-09-02 NOTE — PLAN OF CARE
0551 Upon entrance into Ms. Herrera' room, she stated she rubbed her eye and it now feels funny. Upon assessment, patient's right eye is blood shot. States she had an eyelid lift, and it feels like a suture irritating her right eye. Will notify MD.    0608 Dr. Estevez notified of pt's bloodshot right eye. Order given to place cold pack to right eye and monitor patient's eye. Will report to ongoing nurse.    4012 Patient notified; cold pack placed and will monitor.

## 2019-09-02 NOTE — PLAN OF CARE
Problem: Adult Inpatient Plan of Care  Goal: Plan of Care Review  Outcome: Ongoing (interventions implemented as appropriate)  Patient remains on Q6 nebulizer treatments and O2. Patient tolerating well and also wearing home Trilogy unit at night.

## 2019-09-02 NOTE — HOSPITAL COURSE
9/2/19  Admitted for COPD exacerbation.  She rubbed her eyes hard ;h/o eye surgery .  She feels irritated and is on eliquis .  She is using cold packs   Her breathing is still labored at times   Severe shortness of breath with minimal exertion     9/3/19  She reports that she is breathing better today. She is on her home dose of 3L NC O2 with POX 88%. WBC climbing but also on steroids medrol 125mg IV every 8hr.  [id number]. Blood cultures NGTD x 2. CXR without acute process    She is also getting a little dry. Creat climbed 0.9>1.2, GFR was greater than 60 and now 42.     9/4/19  She remains on her home dose O2 at 3L NC with POX 86-90% as well as home trilogy at night. No fever. WBC continues to climb. Winded after getting to restroom.  [id number]. Was on medrol 125mg IV q 8hr and decreased yesterday to 40mg IV q 8hr. Day 4 rocephin and azithromycin.     Blood sugars elevated as well 223-315 (A1C 7.0) using SSI as needed -- not on any meds at home for DM    Creat still increasing 0.8>1.4. Lasix held today

## 2019-09-02 NOTE — PLAN OF CARE
Problem: Adult Inpatient Plan of Care  Goal: Plan of Care Review  Outcome: Ongoing (interventions implemented as appropriate)  Patient receiving Rocephin and azithromycin q24h, next dose at 0900. Receives Prednisone q6h. HomeTrilogy at night. 4L of oxygen when not on trilogy. O2 sat 90% on NC. Expiratory wheezes CORNELL upper lobes. SOB on exertion. Free from falls and injury. POC reviewed and discussed. Will continue to monitor.

## 2019-09-02 NOTE — ASSESSMENT & PLAN NOTE
BMP  Lab Results   Component Value Date     09/02/2019    K 4.4 09/02/2019    CL 95 09/02/2019    CO2 28 09/02/2019    BUN 24 (H) 09/02/2019    CREATININE 1.0 09/02/2019    CALCIUM 9.1 09/02/2019    ANIONGAP 13 09/02/2019    ESTGFRAFRICA >60 09/02/2019    EGFRNONAA 53 (A) 09/02/2019       Looks better .

## 2019-09-02 NOTE — PLAN OF CARE
Problem: Adult Inpatient Plan of Care  Goal: Plan of Care Review  Outcome: Ongoing (interventions implemented as appropriate)  Assessment complete per flow sheet, AAOX4, resting in bed, RT eye sclera red (blood shot), states she rubbed it and feels like something is in it, MD aware, RR even, unlabored, O2 sat = 89 % on 4L NC, SOB upon exertion, PIV C/D/I, flushed without difficulty, medications administered per MAR, tolerated well,  Patient refused to take 5 mg of apixaban, states her PCP told her to only take half of pill in the morning and evening. MD notified,  safety precautions maintained, bed alarm on, call bell in reach, instructed to call for needs, voiced understanding, will monitor.  MD rounding, new orders noted, eliquis 5mg changed to 2.5mg.   Resting quietly in bed, NAD

## 2019-09-02 NOTE — ASSESSMENT & PLAN NOTE
Lab Results   Component Value Date    HGBA1C 6.3 (H) 01/26/2019       Worse with steroids   Monitor for now

## 2019-09-02 NOTE — PROGRESS NOTES
[facility]  Pulmonology  Progress Note    Patient Name: Chantell Herrera  MRN: [id number]  Admission Date: 9/1/2019  Hospital Length of Stay: 1 days  Code Status: Full Code  Attending Provider: Milo Estevez MD  Primary Care Provider: Kari Gaspar MD   Principal Problem: COPD exacerbation    Subjective:     Interval History: feels better used trilogy with increased TV    Objective:     Vital Signs (Most Recent):  Temp: 96.8 °F (36 °C) (09/02/19 1132)  Pulse: 72 (09/02/19 1132)  Resp: 18 (09/02/19 1132)  BP: (!) 118/59 (09/02/19 1132)  SpO2: (!) 89 % (09/02/19 1132) Vital Signs (24h Range):  Temp:  [96.4 °F (35.8 °C)-98.3 °F (36.8 °C)] 96.8 °F (36 °C)  Pulse:  [66-91] 72  Resp:  [16-22] 18  SpO2:  [89 %-92 %] 89 %  BP: (118-131)/(56-64) 118/59     Weight: 69.5 kg (153 lb 3.5 oz)  Body mass index is 28.02 kg/m².      Intake/Output Summary (Last 24 hours) at 9/2/2019 1215  Last data filed at 9/2/2019 0900  Gross per 24 hour   Intake 480 ml   Output 925 ml   Net -445 ml       Physical Exam   Constitutional: She is oriented to person, place, and time. She appears well-developed and well-nourished. She is cooperative.  Non-toxic appearance. She does not appear ill. No distress.   HENT:   Head: Normocephalic and atraumatic.   Right Ear: Hearing, tympanic membrane, external ear and ear canal normal.   Left Ear: Hearing, tympanic membrane, external ear and ear canal normal.   Nose: Nose normal. No mucosal edema, rhinorrhea or nasal deformity. No epistaxis. Right sinus exhibits no maxillary sinus tenderness and no frontal sinus tenderness. Left sinus exhibits no maxillary sinus tenderness and no frontal sinus tenderness.   Mouth/Throat: Uvula is midline, oropharynx is clear and moist and mucous membranes are normal. No trismus in the jaw. Normal dentition. No uvula swelling. No posterior oropharyngeal erythema.   Eyes: Conjunctivae and lids are normal. No scleral icterus.   Sclera clear bilat   Neck:  Trachea normal, full passive range of motion without pain and phonation normal. Neck supple.   Cardiovascular: Normal rate, regular rhythm, normal heart sounds, intact distal pulses and normal pulses.   Pulmonary/Chest: She is in respiratory distress (mild to moderate). She has decreased breath sounds in the right upper field, the right middle field, the right lower field, the left upper field, the left middle field and the left lower field. She has wheezes in the right middle field, the right lower field, the left middle field and the left lower field. She has rhonchi in the right middle field, the right lower field, the left middle field and the left lower field.   Abdominal: Soft. Normal appearance and bowel sounds are normal. She exhibits no distension. There is no tenderness.   Musculoskeletal: Normal range of motion. She exhibits no edema or deformity.   Neurological: She is alert and oriented to person, place, and time. She exhibits normal muscle tone. Coordination normal.   Skin: Skin is warm, dry and intact. She is not diaphoretic. No pallor.   Psychiatric: She has a normal mood and affect. Her speech is normal and behavior is normal. Judgment and thought content normal. Cognition and memory are normal.   Nursing note and vitals reviewed.      Vents:  Oxygen Concentration (%): 40 (09/01/19 0050)    Lines/Drains/Airways     Peripheral Intravenous Line                 Peripheral IV - Single Lumen 09/01/19 2005 22 G Left;Posterior Hand less than 1 day                Significant Labs:    CBC/Anemia Profile:  Recent Labs   Lab 09/01/19 0051 09/01/19 0448 09/02/19  0557   WBC 11.45 8.47 16.25*   HGB 9.1* 8.8* 8.7*   HCT 29.2* 27.7* 27.1*    196 219   * 120* 118*   RDW 15.2* 15.3* 15.1*        Chemistries:  Recent Labs   Lab 09/01/19 0051 09/01/19 0448 09/02/19  0557    136 136   K 3.9 4.1 4.4   CL 98 96 95   CO2 32* 29 28   BUN 14 14 24*   CREATININE 1.0 0.9 1.0   CALCIUM 8.7 8.5* 9.1    ALBUMIN 4.1 3.8  --    PROT 6.4 6.1  --    BILITOT 0.6 0.6  --    ALKPHOS 72 71  --    ALT 14 14  --    AST 13 13  --        All pertinent labs within the past 24 hours have been reviewed.    Significant Imaging:  I have reviewed all pertinent imaging results/findings within the past 24 hours.    Assessment/Plan:     * COPD exacerbation  Severe copd and pulmonary heart disease ?? Compliance     Chronic respiratory failure with hypoxia  On trilogy 600 Tv from 500 tolerated well     Anemia  8.8 hb     Acute on chronic congestive heart failure  Low o2 will follow clinically    Atrial fibrillation with rapid ventricular response  Stable            Emmanuel Hickman MD  Pulmonology  [facility]

## 2019-09-02 NOTE — ASSESSMENT & PLAN NOTE
Seems chronic   Will follow CBC   Keep her on protonix.  Lab Results   Component Value Date    WBC 16.25 (H) 09/02/2019    HGB 8.7 (L) 09/02/2019    HCT 27.1 (L) 09/02/2019     (H) 09/02/2019     09/02/2019

## 2019-09-02 NOTE — PROGRESS NOTES
[facility] Medicine  Progress Note    Patient Name: Chantell Herrera  MRN: [id number]  Patient Class: IP- Inpatient   Admission Date: 9/1/2019  Length of Stay: 1 days  Attending Physician: Milo Estevez MD  Primary Care Provider: Kari Gaspar MD        Subjective:     Principal Problem:COPD exacerbation        HPI:  Chantell Herrera is a 83 y.o. female with PMH of COPD ; on nebs; [facility]  Presented with COPD exacerbation .  Dyspnea: Patient complains of shortness of breath at rest.  Symptoms include constant cough, difficulty breathing, frequent throat clearing, morning cough, productive cough, sputum production, tightness in chest and wheezing. Symptoms began 1 week ago, gradually worsening since that time.  Patient denies hemoptysis hempotysis. Associated symptoms include chills, fever and productive cough with sputum described as yellow. Patient has had recent travel.  Weight has been stable.  Appetite has been decreased. Symptoms are exacerbated by any exercise. Symptoms are alleviated by rest, oxygen and medication(s) (albuterol).       Seen in ER ;did not respond to breathing treatments .  She is admitted for IV antibiotics, steroids and nebs       Overview/Hospital Course:  9/2/19  Admitted for COPD exacerbation.  She rubbed her eyes hard ;h/o eye surgery .  She feels irritated and is on eliquis .  She is using cold packs   Her breathing is still labored at times   Severe shortness of breath with minimal exertion           Review of Systems   Constitutional: Positive for fatigue. Negative for appetite change, chills and fever.   HENT: Negative for congestion, ear discharge, ear pain, rhinorrhea, sinus pressure and sore throat.    Eyes: Positive for redness.   Respiratory: Positive for cough, chest tightness, shortness of breath and wheezing. Negative for choking.         With coughing and increased activity   Cardiovascular: Negative for chest pain and palpitations.    Gastrointestinal: Negative for constipation, diarrhea, nausea and vomiting.   Musculoskeletal: Negative for joint swelling and myalgias.   Skin: Negative for rash and wound.   Neurological: Negative for dizziness, syncope, weakness, light-headedness and numbness.     Objective:     Vital Signs (Most Recent):  Temp: 96.4 °F (35.8 °C) (09/02/19 0432)  Pulse: 87 (09/02/19 0700)  Resp: (!) 21 (09/02/19 0700)  BP: 127/64 (09/02/19 0432)  SpO2: (!) 90 % (09/02/19 0700) Vital Signs (24h Range):  Temp:  [96.4 °F (35.8 °C)-98.3 °F (36.8 °C)] 96.4 °F (35.8 °C)  Pulse:  [66-91] 87  Resp:  [16-22] 21  SpO2:  [88 %-92 %] 90 %  BP: (118-136)/(56-64) 127/64     Weight: 69.5 kg (153 lb 3.5 oz)  Body mass index is 28.02 kg/m².    Intake/Output Summary (Last 24 hours) at 9/2/2019 0741  Last data filed at 9/2/2019 0437  Gross per 24 hour   Intake 480 ml   Output 925 ml   Net -445 ml      Physical Exam   Constitutional: She is oriented to person, place, and time. She appears well-developed and well-nourished.   HENT:   Head: Normocephalic and atraumatic.   Right Ear: External ear normal.   Left Ear: External ear normal.   Nose: Nose normal.   Mouth/Throat: Oropharynx is clear and moist.   Eyes: Pupils are equal, round, and reactive to light. EOM are normal. Right conjunctiva has a hemorrhage.       R eye subconjunctival hemorrhage    Neck: Normal range of motion. Neck supple. No thyromegaly present.   Cardiovascular: Normal rate, regular rhythm, normal heart sounds and intact distal pulses.   Pulmonary/Chest: Effort normal. She has wheezes. She has rales.   Abdominal: Soft. Bowel sounds are normal.   Musculoskeletal: Normal range of motion.   Neurological: She is alert and oriented to person, place, and time. She has normal reflexes.   Skin: Skin is warm and dry.   Psychiatric: She has a normal mood and affect. Her behavior is normal. Judgment and thought content normal.   Nursing note and vitals reviewed.      Significant Labs:    CBC:   Recent Labs   Lab 09/01/19 0051 09/01/19 0448 09/02/19  0557   WBC 11.45 8.47 16.25*   HGB 9.1* 8.8* 8.7*   HCT 29.2* 27.7* 27.1*    196 219     CMP:   Recent Labs   Lab 09/01/19 0051 09/01/19 0448 09/02/19  0557    136 136   K 3.9 4.1 4.4   CL 98 96 95   CO2 32* 29 28   * 285* 250*   BUN 14 14 24*   CREATININE 1.0 0.9 1.0   CALCIUM 8.7 8.5* 9.1   PROT 6.4 6.1  --    ALBUMIN 4.1 3.8  --    BILITOT 0.6 0.6  --    ALKPHOS 72 71  --    AST 13 13  --    ALT 14 14  --    ANIONGAP 10 11 13   EGFRNONAA 53* >60 53*           Assessment/Plan:      * COPD exacerbation  Supplemental O2 via nasal canula; titrate O2 saturation to >92%.   Start Solumedrol  Pulmonary consultation.   Continue beta 2 agonist bronchodilator treatments.   Continue IV antibiotics.  Continue routine medications as before.     Still requirng 5 L Nasal cannula ; she is at home on 3 L          Subconjunctival hemorrhage of right eye  She is on eliquis   Cold packs  Avoid scratching       Diabetes mellitus type 2, uncontrolled  Lab Results   Component Value Date    HGBA1C 6.3 (H) 01/26/2019       Worse with steroids   Monitor for now    Chronic CHF    Continue lasix.    Anemia  Seems chronic   Will follow CBC   Keep her on protonix.  Lab Results   Component Value Date    WBC 16.25 (H) 09/02/2019    HGB 8.7 (L) 09/02/2019    HCT 27.1 (L) 09/02/2019     (H) 09/02/2019     09/02/2019               Acute on chronic congestive heart failure  Resume lasix .  BNP  Recent Labs   Lab 09/01/19 0051   *           Atrial fibrillation with rapid ventricular response  Resume eliquis and cardizem .      Chronic respiratory failure with hypoxia  Continue O2.  She uses trellegy .      Dyslipidemia  Resume her statin .      CKD (chronic kidney disease), stage III  BMP  Lab Results   Component Value Date     09/02/2019    K 4.4 09/02/2019    CL 95 09/02/2019    CO2 28 09/02/2019    BUN 24 (H) 09/02/2019     CREATININE 1.0 09/02/2019    CALCIUM 9.1 09/02/2019    ANIONGAP 13 09/02/2019    ESTGFRAFRICA >60 09/02/2019    EGFRNONAA 53 (A) 09/02/2019       Looks better .    Anxiety state  Resume her citalopram.      HTN (hypertension)  Resume her home anti hypertensives.        VTE Risk Mitigation (From admission, onward)        Ordered     apixaban tablet 5 mg  2 times daily      09/01/19 0907     IP VTE HIGH RISK PATIENT  Once      09/01/19 0217     Place sequential compression device  Until discontinued      09/01/19 0217                Milo Estevez MD  Department of Hospital Medicine   [facility]

## 2019-09-02 NOTE — PROGRESS NOTES
Staff Handoff  Bedside report received from Ruthie Voss. Patient up in bed. No complaints of SOB at rest. On 5L Nc.O2 sat at 91%. SOB on exertion. No complaints of pain POC discussed. Call bell within reach, asked to call for assistance.       Resident Handoff

## 2019-09-02 NOTE — CONSULTS
[facility]  Pulmonology  Consult Note    Patient Name: Chantell Herrera  MRN: [id number]  Admission Date: 9/1/2019  Hospital Length of Stay: 0 days  Code Status: Full Code  Attending Physician: Milo Estevez MD  Primary Care Provider: Kari Gaspar MD   Principal Problem: <principal problem not specified>    Consults  Subjective:     HPI:  Chantell Herrera is a 83 y.o. year old female that's presents with a chief complaint of SOB and Wheezing for 2 to 3 weeks. She had run out of nebulizer medications. Consulted to evaluate Respiratory status.    Past Medical History:   Diagnosis Date    Acute on chronic congestive heart failure 2/1/2019    Anxiety     Arthritis     Asthma     Atrial fibrillation with rapid ventricular response     CHF (congestive heart failure) 11/29/2018    Chronic bronchitis     COPD (chronic obstructive pulmonary disease)     Depression     SANTIAGO (dyspnea on exertion)     Emphysema of lung     Fatigue     Hyperlipidemia     Hypertension     Symptomatic anemia 1/22/2019    Trouble in sleeping         Past Surgical History:   Procedure Laterality Date    APPENDECTOMY      EYE SURGERY      HYSTERECTOMY      TONSILLECTOMY         Prior to Admission medications    Medication Sig Start Date End Date Taking? Authorizing Provider   benzonatate (TESSALON) 200 MG capsule Take 1 capsule (200 mg total) by mouth 3 (three) times daily as needed. 8/26/19 9/5/19 Yes Anson Leiva MD   cholecalciferol, vitamin D3, (VITAMIN D3) 2,000 unit Tab Take 2,000 Units by mouth once daily.   Yes Historical Provider, MD   citalopram (CELEXA) 40 MG tablet TAKE 1 TABLET EVERY DAY 3/26/19  Yes Kari Gaspar MD   diltiaZEM (CARDIZEM CD) 240 MG 24 hr capsule Take 1 capsule (240 mg total) by mouth once daily. 5/24/19 5/23/20 Yes Kari Gaspar MD   ELIQUIS 5 mg Tab Take 1 tablet by mouth 2 (two) times daily. 12/17/18  Yes Historical Provider, MD    fluticasone-umeclidin-vilanter (TRELEGY ELLIPTA) 100-62.5-25 mcg DsDv Inhale 1 puff into the lungs once daily. 2/27/19  Yes Kari Gaspar MD   furosemide (LASIX) 20 MG tablet Take 1 tablet (20 mg total) by mouth once daily. 3/26/19 3/25/20 Yes Kari Gaspar MD   gabapentin (NEURONTIN) 100 MG capsule Take 1 capsule (100 mg total) by mouth 3 (three) times daily. 7/1/19 6/30/20 Yes Anson Leiva MD   losartan (COZAAR) 100 MG tablet TAKE 1/2 TABLET EVERY DAY 7/9/19  Yes Kari Gaspar MD   methylPREDNISolone (MEDROL DOSEPACK) 4 mg tablet use as directed 7/1/19  Yes Anson Leiva MD   pravastatin (PRAVACHOL) 40 MG tablet TAKE 1 TABLET EVERY DAY 10/15/18  Yes Anson Leiva MD   predniSONE (DELTASONE) 10 MG tablet Take 40 mg for one day, the 30 mg for 3 days then resume 20 mg po daily 1/26/19  Yes Kari Gaspar MD   rOPINIRole (REQUIP) 1 MG tablet Take 1 tablet (1 mg total) by mouth every evening. 3/26/19  Yes Kari Gaspar MD   traZODone (DESYREL) 50 MG tablet Take 1 tablet (50 mg total) by mouth every evening. 4/16/19  Yes Kari Gaspar MD   albuterol (PROVENTIL) 2.5 mg /3 mL (0.083 %) nebulizer solution Take 3 mLs (2.5 mg total) by nebulization every 6 (six) hours as needed for Wheezing. 6/27/16   Anson Leiva MD   albuterol (VENTOLIN HFA) 90 mcg/actuation inhaler Inhale 2 puffs into the lungs every 6 (six) hours as needed for Wheezing. Rescue 7/1/19   Anson Leiva MD   baclofen (LIORESAL) 10 MG tablet Take 1 tablet (10 mg total) by mouth 3 (three) times daily. 6/6/18 7/1/19  Anson Leiva MD   ipratropium (ATROVENT) 0.02 % nebulizer solution Take 500 mcg by nebulization 4 (four) times daily.  3/18/16   Historical Provider, MD   mupirocin (BACTROBAN) 2 % ointment Apply topically 3 (three) times daily. 5/28/19   Radha Guillermo NP   omeprazole (PRILOSEC) 40 MG capsule Take 1 capsule (40 mg total) by mouth once daily. 8/8/19 9/7/19  Kari Gaspar MD    pantoprazole (PROTONIX) 40 MG tablet Take 40 mg by mouth every morning. 19   Historical Provider, MD   promethazine-dextromethorphan (PROMETHAZINE-DM) 6.25-15 mg/5 mL Syrp Take 5 mLs by mouth every 12 (twelve) hours as needed. 19   Kari Gaspar MD   artificial tears,hypromellose, 0.3 % Drop continuous prn.  16  Historical Provider, MD       Social History     Socioeconomic History    Marital status:      Spouse name: Not on file    Number of children: Not on file    Years of education: Not on file    Highest education level: Not on file   Occupational History    Not on file   Social Needs    Financial resource strain: Not on file    Food insecurity:     Worry: Not on file     Inability: Not on file    Transportation needs:     Medical: Not on file     Non-medical: Not on file   Tobacco Use    Smoking status: Former Smoker     Last attempt to quit: 8/3/2000     Years since quittin.0    Smokeless tobacco: Never Used   Substance and Sexual Activity    Alcohol use: No    Drug use: No    Sexual activity: Never   Lifestyle    Physical activity:     Days per week: Not on file     Minutes per session: Not on file    Stress: Not on file   Relationships    Social connections:     Talks on phone: Not on file     Gets together: Not on file     Attends [employer] service: Not on file     Active member of club or organization: Not on file     Attends meetings of clubs or organizations: Not on file     Relationship status: Not on file   Other Topics Concern    Not on file   Social History Narrative    Not on file       Family History   Problem Relation Age of Onset    Heart disease Mother     Hypertension Mother     Hypertension Father     Diabetes Father     Cancer Sister     COPD Brother     Hypertension Brother     No Known Problems Daughter     Kidney disease Son     COPD Daughter        Review of patient's allergies indicates:  No Known Allergies Allergies  have been reviewed.     ROS: Review of Systems   Constitutional: Negative for chills, fever and weight loss.   HENT: Negative for sore throat.    Eyes: Negative for double vision and photophobia.   Respiratory: Positive for cough, sputum production and shortness of breath. Negative for hemoptysis.    Cardiovascular: Positive for leg swelling (mild) and PND (occasional). Negative for palpitations.   Gastrointestinal: Negative for abdominal pain and diarrhea.   Genitourinary: Negative for dysuria and frequency.   Musculoskeletal: Positive for myalgias (generalized). Negative for back pain and neck pain.   Skin: Negative.    Neurological: Negative for dizziness, weakness and headaches.   Endo/Heme/Allergies: Does not bruise/bleed easily.   Psychiatric/Behavioral: Negative for memory loss. The patient has insomnia (intermittant ).        PE:   Vitals:    09/01/19 1800 09/01/19 1858 09/01/19 1948 09/01/19 1952   BP:   118/62    BP Location:   Left arm    Patient Position:   Sitting    Pulse: 75 88 82 82   Resp:  16 20    Temp:   98.3 °F (36.8 °C)    TempSrc:   Tympanic    SpO2:  (!) 90% (!) 90%    Weight:       Height:        Physical Exam    Alert and orientated X 3   HEENT: Head: Normocephalic no trauma                Ears : Normal Pinna No Drainage no Battles sign                Eyes: Vision Unchanged, No conjunctivitis,No drainage                Neck: Supple, No JVD,No Abnormal Carotid Pulsations                Throat: No Erythema, No pus,No Swelling,Mallampati score= 2to 3     Chest: faIR TO POOR AIR ENTRY mild wheezes and rhonchi  Cardiac: RRR S1+ S2 with a -S3: +M = 2/6, No R/H/G  Abdomen: Bowel Sounds are Normal.Soft Abdomen. No organomegaly of Liver,Spleen,or Kidneys   CNS: Non focal and intact. Cranial nerves 2, 346,8,9,10 and 12 are normal.Norrmal gait.Normal posture.  Extremities: No Clubbing,No Cyanosis with oxygen,Positive mild edema of lower extremities Bilateral  Skin: No Rash, No Ulcerative sores,and No  cellulitis of the IV site.    Lab Results   Component Value Date    WBC 8.47 09/01/2019    HGB 8.8 (L) 09/01/2019    HCT 27.7 (L) 09/01/2019     09/01/2019    CHOL 147 05/28/2018    TRIG 75 05/28/2018    HDL 74 05/28/2018    ALT 14 09/01/2019    AST 13 09/01/2019     09/01/2019    K 4.1 09/01/2019    CL 96 09/01/2019    CREATININE 0.9 09/01/2019    BUN 14 09/01/2019    CO2 29 09/01/2019    TSH 0.361 (L) 03/22/2019    INR 1.1 09/01/2019    HGBA1C 6.3 (H) 01/26/2019               Assessment/Plan:     Anemia  8.8 hb     COPD exacerbation  Severe copd and pulmonary heart disease ?? Compliance     Acute on chronic congestive heart failure  Low o2 will follow clinically    Atrial fibrillation with rapid ventricular response  Stable     order bipap    Thank you for your consult. I will follow-up with patient. Please contact us if you have any additional questions.     Emmanuel Hickman MD  Pulmonology  [facility]

## 2019-09-02 NOTE — SUBJECTIVE & OBJECTIVE
Interval History: feels better used trilogy with increased TV    Objective:     Vital Signs (Most Recent):  Temp: 96.8 °F (36 °C) (09/02/19 1132)  Pulse: 72 (09/02/19 1132)  Resp: 18 (09/02/19 1132)  BP: (!) 118/59 (09/02/19 1132)  SpO2: (!) 89 % (09/02/19 1132) Vital Signs (24h Range):  Temp:  [96.4 °F (35.8 °C)-98.3 °F (36.8 °C)] 96.8 °F (36 °C)  Pulse:  [66-91] 72  Resp:  [16-22] 18  SpO2:  [89 %-92 %] 89 %  BP: (118-131)/(56-64) 118/59     Weight: 69.5 kg (153 lb 3.5 oz)  Body mass index is 28.02 kg/m².      Intake/Output Summary (Last 24 hours) at 9/2/2019 1215  Last data filed at 9/2/2019 0900  Gross per 24 hour   Intake 480 ml   Output 925 ml   Net -445 ml       Physical Exam   Constitutional: She is oriented to person, place, and time. She appears well-developed and well-nourished. She is cooperative.  Non-toxic appearance. She does not appear ill. No distress.   HENT:   Head: Normocephalic and atraumatic.   Right Ear: Hearing, tympanic membrane, external ear and ear canal normal.   Left Ear: Hearing, tympanic membrane, external ear and ear canal normal.   Nose: Nose normal. No mucosal edema, rhinorrhea or nasal deformity. No epistaxis. Right sinus exhibits no maxillary sinus tenderness and no frontal sinus tenderness. Left sinus exhibits no maxillary sinus tenderness and no frontal sinus tenderness.   Mouth/Throat: Uvula is midline, oropharynx is clear and moist and mucous membranes are normal. No trismus in the jaw. Normal dentition. No uvula swelling. No posterior oropharyngeal erythema.   Eyes: Conjunctivae and lids are normal. No scleral icterus.   Sclera clear bilat   Neck: Trachea normal, full passive range of motion without pain and phonation normal. Neck supple.   Cardiovascular: Normal rate, regular rhythm, normal heart sounds, intact distal pulses and normal pulses.   Pulmonary/Chest: She is in respiratory distress (mild to moderate). She has decreased breath sounds in the right upper field, the  right middle field, the right lower field, the left upper field, the left middle field and the left lower field. She has wheezes in the right middle field, the right lower field, the left middle field and the left lower field. She has rhonchi in the right middle field, the right lower field, the left middle field and the left lower field.   Abdominal: Soft. Normal appearance and bowel sounds are normal. She exhibits no distension. There is no tenderness.   Musculoskeletal: Normal range of motion. She exhibits no edema or deformity.   Neurological: She is alert and oriented to person, place, and time. She exhibits normal muscle tone. Coordination normal.   Skin: Skin is warm, dry and intact. She is not diaphoretic. No pallor.   Psychiatric: She has a normal mood and affect. Her speech is normal and behavior is normal. Judgment and thought content normal. Cognition and memory are normal.   Nursing note and vitals reviewed.      Vents:  Oxygen Concentration (%): 40 (09/01/19 0050)    Lines/Drains/Airways     Peripheral Intravenous Line                 Peripheral IV - Single Lumen 09/01/19 2005 22 G Left;Posterior Hand less than 1 day                Significant Labs:    CBC/Anemia Profile:  Recent Labs   Lab 09/01/19 0051 09/01/19 0448 09/02/19  0557   WBC 11.45 8.47 16.25*   HGB 9.1* 8.8* 8.7*   HCT 29.2* 27.7* 27.1*    196 219   * 120* 118*   RDW 15.2* 15.3* 15.1*        Chemistries:  Recent Labs   Lab 09/01/19 0051 09/01/19 0448 09/02/19  0557    136 136   K 3.9 4.1 4.4   CL 98 96 95   CO2 32* 29 28   BUN 14 14 24*   CREATININE 1.0 0.9 1.0   CALCIUM 8.7 8.5* 9.1   ALBUMIN 4.1 3.8  --    PROT 6.4 6.1  --    BILITOT 0.6 0.6  --    ALKPHOS 72 71  --    ALT 14 14  --    AST 13 13  --        All pertinent labs within the past 24 hours have been reviewed.    Significant Imaging:  I have reviewed all pertinent imaging results/findings within the past 24 hours.

## 2019-09-02 NOTE — ASSESSMENT & PLAN NOTE
Supplemental O2 via nasal canula; titrate O2 saturation to >92%.   Start Solumedrol  Pulmonary consultation.   Continue beta 2 agonist bronchodilator treatments.   Continue IV antibiotics.  Continue routine medications as before.     Still requirng 5 L Nasal cannula ; she is at home on 3 L

## 2019-09-03 ENCOUNTER — PATIENT OUTREACH (OUTPATIENT)
Dept: ADMINISTRATIVE | Facility: HOSPITAL | Age: 84
End: 2019-09-03

## 2019-09-03 LAB
ANION GAP SERPL CALC-SCNC: 13 MMOL/L (ref 8–16)
ANISOCYTOSIS BLD QL SMEAR: ABNORMAL
BASOPHILS # BLD AUTO: ABNORMAL K/UL (ref 0–0.2)
BASOPHILS NFR BLD: 1 % (ref 0–1.9)
BUN SERPL-MCNC: 36 MG/DL (ref 8–23)
CALCIUM SERPL-MCNC: 9.3 MG/DL (ref 8.7–10.5)
CHLORIDE SERPL-SCNC: 95 MMOL/L (ref 95–110)
CO2 SERPL-SCNC: 28 MMOL/L (ref 23–29)
CREAT SERPL-MCNC: 1.2 MG/DL (ref 0.5–1.4)
DIFFERENTIAL METHOD: ABNORMAL
EOSINOPHIL # BLD AUTO: ABNORMAL K/UL (ref 0–0.5)
EOSINOPHIL NFR BLD: 0 % (ref 0–8)
ERYTHROCYTE [DISTWIDTH] IN BLOOD BY AUTOMATED COUNT: 15.4 % (ref 11.5–14.5)
EST. GFR  (AFRICAN AMERICAN): 49 ML/MIN/1.73 M^2
EST. GFR  (NON AFRICAN AMERICAN): 42 ML/MIN/1.73 M^2
ESTIMATED AVG GLUCOSE: 154 MG/DL (ref 68–131)
GLUCOSE SERPL-MCNC: 223 MG/DL (ref 70–110)
HBA1C MFR BLD HPLC: 7 % (ref 4–5.6)
HCT VFR BLD AUTO: 27.7 % (ref 37–48.5)
HGB BLD-MCNC: 9 G/DL (ref 12–16)
IMM GRANULOCYTES # BLD AUTO: ABNORMAL K/UL (ref 0–0.04)
IMM GRANULOCYTES NFR BLD AUTO: ABNORMAL % (ref 0–0.5)
LYMPHOCYTES # BLD AUTO: ABNORMAL K/UL (ref 1–4.8)
LYMPHOCYTES NFR BLD: 4 % (ref 18–48)
MCH RBC QN AUTO: 37.8 PG (ref 27–31)
MCHC RBC AUTO-ENTMCNC: 32.5 G/DL (ref 32–36)
MCV RBC AUTO: 116 FL (ref 82–98)
METAMYELOCYTES NFR BLD MANUAL: 1 %
MONOCYTES # BLD AUTO: ABNORMAL K/UL (ref 0.3–1)
MONOCYTES NFR BLD: 3 % (ref 4–15)
MYELOCYTES NFR BLD MANUAL: 2 %
NEUTROPHILS NFR BLD: 82 % (ref 38–73)
NEUTS BAND NFR BLD MANUAL: 7 %
NRBC BLD-RTO: 0 /100 WBC
OVALOCYTES BLD QL SMEAR: ABNORMAL
PLATELET # BLD AUTO: 229 K/UL (ref 150–350)
PLATELET BLD QL SMEAR: ABNORMAL
PMV BLD AUTO: 12.8 FL (ref 9.2–12.9)
POCT GLUCOSE: 315 MG/DL (ref 70–110)
POCT GLUCOSE: 386 MG/DL (ref 70–110)
POLYCHROMASIA BLD QL SMEAR: ABNORMAL
POTASSIUM SERPL-SCNC: 4.3 MMOL/L (ref 3.5–5.1)
RBC # BLD AUTO: 2.38 M/UL (ref 4–5.4)
SODIUM SERPL-SCNC: 136 MMOL/L (ref 136–145)
SPHEROCYTES BLD QL SMEAR: ABNORMAL
TOXIC GRANULES BLD QL SMEAR: PRESENT
WBC # BLD AUTO: 22.38 K/UL (ref 3.9–12.7)

## 2019-09-03 PROCEDURE — 25000003 PHARM REV CODE 250: Mod: HCNC | Performed by: SURGERY

## 2019-09-03 PROCEDURE — 97161 PT EVAL LOW COMPLEX 20 MIN: CPT | Mod: HCNC

## 2019-09-03 PROCEDURE — 36415 COLL VENOUS BLD VENIPUNCTURE: CPT | Mod: HCNC

## 2019-09-03 PROCEDURE — 80048 BASIC METABOLIC PNL TOTAL CA: CPT | Mod: HCNC

## 2019-09-03 PROCEDURE — 85027 COMPLETE CBC AUTOMATED: CPT | Mod: HCNC

## 2019-09-03 PROCEDURE — 25000242 PHARM REV CODE 250 ALT 637 W/ HCPCS: Mod: HCNC | Performed by: INTERNAL MEDICINE

## 2019-09-03 PROCEDURE — 63600175 PHARM REV CODE 636 W HCPCS: Mod: HCNC | Performed by: NURSE PRACTITIONER

## 2019-09-03 PROCEDURE — 27000221 HC OXYGEN, UP TO 24 HOURS: Mod: HCNC

## 2019-09-03 PROCEDURE — 94761 N-INVAS EAR/PLS OXIMETRY MLT: CPT | Mod: HCNC

## 2019-09-03 PROCEDURE — 63600175 PHARM REV CODE 636 W HCPCS: Mod: HCNC | Performed by: SURGERY

## 2019-09-03 PROCEDURE — 94640 AIRWAY INHALATION TREATMENT: CPT | Mod: HCNC

## 2019-09-03 PROCEDURE — 85007 BL SMEAR W/DIFF WBC COUNT: CPT | Mod: HCNC

## 2019-09-03 PROCEDURE — 25000003 PHARM REV CODE 250: Mod: HCNC | Performed by: INTERNAL MEDICINE

## 2019-09-03 PROCEDURE — 83036 HEMOGLOBIN GLYCOSYLATED A1C: CPT | Mod: HCNC

## 2019-09-03 PROCEDURE — 99233 SBSQ HOSP IP/OBS HIGH 50: CPT | Mod: HCNC,,, | Performed by: INTERNAL MEDICINE

## 2019-09-03 PROCEDURE — 94664 DEMO&/EVAL PT USE INHALER: CPT | Mod: HCNC

## 2019-09-03 PROCEDURE — 11000001 HC ACUTE MED/SURG PRIVATE ROOM: Mod: HCNC

## 2019-09-03 PROCEDURE — 99233 PR SUBSEQUENT HOSPITAL CARE,LEVL III: ICD-10-PCS | Mod: HCNC,,, | Performed by: INTERNAL MEDICINE

## 2019-09-03 RX ORDER — IBUPROFEN 200 MG
24 TABLET ORAL
Status: DISCONTINUED | OUTPATIENT
Start: 2019-09-03 | End: 2019-09-04 | Stop reason: HOSPADM

## 2019-09-03 RX ORDER — METHYLPREDNISOLONE SOD SUCC 125 MG
40 VIAL (EA) INJECTION EVERY 8 HOURS
Status: DISCONTINUED | OUTPATIENT
Start: 2019-09-03 | End: 2019-09-04 | Stop reason: HOSPADM

## 2019-09-03 RX ORDER — INSULIN ASPART 100 [IU]/ML
0-5 INJECTION, SOLUTION INTRAVENOUS; SUBCUTANEOUS
Status: DISCONTINUED | OUTPATIENT
Start: 2019-09-03 | End: 2019-09-04 | Stop reason: HOSPADM

## 2019-09-03 RX ORDER — DILTIAZEM HYDROCHLORIDE 120 MG/1
240 CAPSULE, COATED, EXTENDED RELEASE ORAL ONCE
Status: COMPLETED | OUTPATIENT
Start: 2019-09-03 | End: 2019-09-03

## 2019-09-03 RX ORDER — GLUCAGON 1 MG
1 KIT INJECTION
Status: DISCONTINUED | OUTPATIENT
Start: 2019-09-03 | End: 2019-09-04 | Stop reason: HOSPADM

## 2019-09-03 RX ORDER — IBUPROFEN 200 MG
16 TABLET ORAL
Status: DISCONTINUED | OUTPATIENT
Start: 2019-09-03 | End: 2019-09-04 | Stop reason: HOSPADM

## 2019-09-03 RX ADMIN — ROPINIROLE HYDROCHLORIDE 1 MG: 0.5 TABLET, FILM COATED ORAL at 08:09

## 2019-09-03 RX ADMIN — APIXABAN 2.5 MG: 2.5 TABLET, FILM COATED ORAL at 08:09

## 2019-09-03 RX ADMIN — IPRATROPIUM BROMIDE AND ALBUTEROL SULFATE 3 ML: .5; 3 SOLUTION RESPIRATORY (INHALATION) at 01:09

## 2019-09-03 RX ADMIN — DILTIAZEM HYDROCHLORIDE 240 MG: 120 CAPSULE, COATED, EXTENDED RELEASE ORAL at 08:09

## 2019-09-03 RX ADMIN — INSULIN ASPART 4 UNITS: 100 INJECTION, SOLUTION INTRAVENOUS; SUBCUTANEOUS at 05:09

## 2019-09-03 RX ADMIN — GABAPENTIN 100 MG: 100 CAPSULE ORAL at 01:09

## 2019-09-03 RX ADMIN — METHYLPREDNISOLONE SODIUM SUCCINATE 125 MG: 125 INJECTION, POWDER, FOR SOLUTION INTRAMUSCULAR; INTRAVENOUS at 05:09

## 2019-09-03 RX ADMIN — IPRATROPIUM BROMIDE AND ALBUTEROL SULFATE 3 ML: .5; 3 SOLUTION RESPIRATORY (INHALATION) at 06:09

## 2019-09-03 RX ADMIN — BENZONATATE 100 MG: 100 CAPSULE ORAL at 01:09

## 2019-09-03 RX ADMIN — IPRATROPIUM BROMIDE AND ALBUTEROL SULFATE 3 ML: .5; 3 SOLUTION RESPIRATORY (INHALATION) at 12:09

## 2019-09-03 RX ADMIN — LOSARTAN POTASSIUM 50 MG: 50 TABLET, FILM COATED ORAL at 08:09

## 2019-09-03 RX ADMIN — AZITHROMYCIN MONOHYDRATE 500 MG: 500 INJECTION, POWDER, LYOPHILIZED, FOR SOLUTION INTRAVENOUS at 08:09

## 2019-09-03 RX ADMIN — IPRATROPIUM BROMIDE AND ALBUTEROL SULFATE 3 ML: .5; 3 SOLUTION RESPIRATORY (INHALATION) at 07:09

## 2019-09-03 RX ADMIN — DILTIAZEM HYDROCHLORIDE 240 MG: 120 CAPSULE, COATED, EXTENDED RELEASE ORAL at 10:09

## 2019-09-03 RX ADMIN — BENZONATATE 100 MG: 100 CAPSULE ORAL at 09:09

## 2019-09-03 RX ADMIN — CITALOPRAM HYDROBROMIDE 20 MG: 10 TABLET ORAL at 08:09

## 2019-09-03 RX ADMIN — CEFTRIAXONE 1 G: 1 INJECTION, SOLUTION INTRAVENOUS at 07:09

## 2019-09-03 RX ADMIN — GABAPENTIN 100 MG: 100 CAPSULE ORAL at 08:09

## 2019-09-03 RX ADMIN — PANTOPRAZOLE SODIUM 40 MG: 40 TABLET, DELAYED RELEASE ORAL at 08:09

## 2019-09-03 RX ADMIN — TRAZODONE HYDROCHLORIDE 50 MG: 50 TABLET ORAL at 09:09

## 2019-09-03 RX ADMIN — FUROSEMIDE 20 MG: 20 TABLET ORAL at 08:09

## 2019-09-03 RX ADMIN — PRAVASTATIN SODIUM 40 MG: 40 TABLET ORAL at 08:09

## 2019-09-03 RX ADMIN — METHYLPREDNISOLONE SODIUM SUCCINATE 40 MG: 125 INJECTION, POWDER, FOR SOLUTION INTRAMUSCULAR; INTRAVENOUS at 09:09

## 2019-09-03 RX ADMIN — METHYLPREDNISOLONE SODIUM SUCCINATE 40 MG: 125 INJECTION, POWDER, FOR SOLUTION INTRAMUSCULAR; INTRAVENOUS at 01:09

## 2019-09-03 RX ADMIN — INSULIN ASPART 5 UNITS: 100 INJECTION, SOLUTION INTRAVENOUS; SUBCUTANEOUS at 11:09

## 2019-09-03 RX ADMIN — INSULIN ASPART 2 UNITS: 100 INJECTION, SOLUTION INTRAVENOUS; SUBCUTANEOUS at 08:09

## 2019-09-03 NOTE — PLAN OF CARE
Problem: Adult Inpatient Plan of Care  Goal: Plan of Care Review  Outcome: Ongoing (interventions implemented as appropriate)  Pt. Using trilogy to sleep tonight. Respiratory treatment given inline. Pt. Is on NC @ 3 lpm during the day with 3 lpm oxygen inline with trilogy machine at night to sleep. Pt. Also does aerobika flutter after treatments to break up mucous. Pt. Appears to be doing better today. BBS have improved.

## 2019-09-03 NOTE — ASSESSMENT & PLAN NOTE
Supplemental O2 via nasal canula; titrate O2 saturation to >92%.   Start Solumedrol>> wean today as WBC is elevated   Pulmonary consultation.   Continue beta 2 agonist bronchodilator treatments.   Continue IV antibiotics.  Continue routine medications as before.     Now down to 3 L Nasal cannula POX 86-90% ; she is at home on 3 L.

## 2019-09-03 NOTE — PLAN OF CARE
Problem: Adult Inpatient Plan of Care  Goal: Plan of Care Review  A/OX3, Fall precautions in place, up to restroom without difficulty, SOB continues with diminished coarse bs 02 at 2l/min n/c RT atc, DM well controlled.

## 2019-09-03 NOTE — ASSESSMENT & PLAN NOTE
Cont ARB, diltiazem, and hold lasix for now- already got today dose so will hold tomorrows and ask to push po fluids today.

## 2019-09-03 NOTE — PLAN OF CARE
09/03/19 1046   Discharge Assessment   Assessment Type Discharge Planning Assessment   Confirmed/corrected address and phone number on facesheet? Yes   Assessment information obtained from? Patient;Medical Record   Expected Length of Stay (days) 3   Communicated expected length of stay with patient/caregiver yes   Prior to hospitilization cognitive status: Alert/Oriented   Prior to hospitalization functional status: Assistive Equipment   Current cognitive status: Alert/Oriented   Current Functional Status: Assistive Equipment   Facility Arrived From: home   Lives With spouse   Able to Return to Prior Arrangements yes   Is patient able to care for self after discharge? Yes   Who are your caregiver(s) and their phone number(s)? [phone]   Patient's perception of discharge disposition home or selfcare   Readmission Within the Last 30 Days no previous admission in last 30 days   Patient currently being followed by outpatient case management? Yes   If yes, name of outpatient case management following: [facility] outpatient case management  (Reffered 9/3/2019)   Patient currently receives any other outside agency services? No   Equipment Currently Used at Home rollator;other (see comments);shower chair;power chair  (Trilogy)   Do you have any problems affording any of your prescribed medications? No   Is the patient taking medications as prescribed? yes   Does the patient have transportation home? No   Dialysis Name and Scheduled days N/A   Does the patient receive services at the [facility]? No   Discharge Plan A Home   Discharge Plan B Home with family   DME Needed Upon Discharge  none   Patient/Family in Agreement with Plan yes         Patient admitted for COPD. She lives at home with spouse. She is independent in daily living, with the use of DME. She denies any needs for discharge at this time. Patient educated on diagnosis for this admission, and patient verbalizes understanding. Discharge planning  brochure and educational handout of what signs and symptoms to look for after discharge, and how to manage health at home, given to patient and family. Patient and family are encouraged to call with any questions or help the would need. Contact information given. CM will continue to follow patient throughout the transitions of care, and assist with any discharge needs.

## 2019-09-03 NOTE — PLAN OF CARE
Problem: Adult Inpatient Plan of Care  Goal: Plan of Care Review  Outcome: Ongoing (interventions implemented as appropriate)  Assessment complete per flow sheet, AAOX4, resting in bed, NAD noted, states she has been having diarrhea this morning, medications administered per MAR, tolerated well, denies pain or needs, safety precautions maintained, call bell in reach, instructed to call for needs, voiced understanding, will monitor.

## 2019-09-03 NOTE — PROGRESS NOTES
[facility]  Pulmonology  Progress Note    Patient Name: Chantell Herrera  MRN: [id number]  Admission Date: 9/1/2019  Hospital Length of Stay: 2 days  Code Status: Full Code  Attending Provider: Milo Estevez MD  Primary Care Provider: Kari Gaspar MD   Principal Problem: COPD exacerbation    Subjective:     Interval History: still wheezing but she wants to go home     Objective:     Vital Signs (Most Recent):  Temp: 96.4 °F (35.8 °C) (09/03/19 0444)  Pulse: 74 (09/03/19 0656)  Resp: (!) 21 (09/03/19 0656)  BP: (!) 144/69 (09/03/19 0444)  SpO2: (!) 89 % (09/03/19 0656) Vital Signs (24h Range):  Temp:  [96.4 °F (35.8 °C)-97.3 °F (36.3 °C)] 96.4 °F (35.8 °C)  Pulse:  [66-95] 74  Resp:  [18-21] 21  SpO2:  [86 %-91 %] 89 %  BP: (118-146)/(55-69) 144/69     Weight: 69.5 kg (153 lb 3.5 oz)  Body mass index is 28.02 kg/m².      Intake/Output Summary (Last 24 hours) at 9/3/2019 0721  Last data filed at 9/3/2019 0600  Gross per 24 hour   Intake 760 ml   Output --   Net 760 ml       Physical Exam   Pulmonary/Chest: She has decreased breath sounds in the right lower field and the left lower field. She has wheezes in the right upper field, the right middle field, the right lower field, the left upper field, the left middle field and the left lower field. She has rhonchi in the right lower field and the left lower field.       Vents:  Oxygen Concentration (%): 32 (09/03/19 0020)    Lines/Drains/Airways     Peripheral Intravenous Line                 Peripheral IV - Single Lumen 09/01/19 2005 22 G Left;Posterior Hand 1 day                Significant Labs:    CBC/Anemia Profile:  Recent Labs   Lab 09/02/19  0557   WBC 16.25*   HGB 8.7*   HCT 27.1*      *   RDW 15.1*        Chemistries:  Recent Labs   Lab 09/02/19  0557 09/03/19  0623    136   K 4.4 4.3   CL 95 95   CO2 28 28   BUN 24* 36*   CREATININE 1.0 1.2   CALCIUM 9.1 9.3       All pertinent labs within the past 24 hours have been  reviewed.    Significant Imaging:  I have reviewed all pertinent imaging results/findings within the past 24 hours.    Assessment/Plan:     * COPD exacerbation  Severe copd and pulmonary heart disease ?? Compliance     Chronic respiratory failure with hypoxia  On trilogy 600 Tv from 500 tolerated well     Anemia  8.8 hb     Acute on chronic congestive heart failure  Low o2 will follow clinically    Atrial fibrillation with rapid ventricular response  Stable            Emmanuel Hickman MD  Pulmonology  [facility]

## 2019-09-03 NOTE — SUBJECTIVE & OBJECTIVE
Review of Systems   Constitutional: Positive for fatigue. Negative for appetite change, chills and fever.   HENT: Negative for congestion, ear discharge, ear pain, rhinorrhea, sinus pressure and sore throat.    Eyes: Positive for redness.        Subconjunctival hemorrhage    Respiratory: Positive for cough and wheezing. Negative for choking.         With coughing and increased activity   Cardiovascular: Negative for chest pain and palpitations.   Gastrointestinal: Negative for constipation, diarrhea, nausea and vomiting.   Musculoskeletal: Negative for joint swelling and myalgias.   Skin: Negative for rash and wound.   Neurological: Negative for dizziness, syncope, weakness, light-headedness and numbness.     Objective:     Vital Signs (Most Recent):  Temp: 97 °F (36.1 °C) (09/03/19 0736)  Pulse: 79 (09/03/19 0736)  Resp: 18 (09/03/19 0736)  BP: 126/60 (09/03/19 0736)  SpO2: (!) 88 % (09/03/19 0736) Vital Signs (24h Range):  Temp:  [96.4 °F (35.8 °C)-97.3 °F (36.3 °C)] 97 °F (36.1 °C)  Pulse:  [66-95] 79  Resp:  [18-21] 18  SpO2:  [86 %-91 %] 88 %  BP: (118-146)/(55-69) 126/60     Weight: 69.5 kg (153 lb 3.5 oz)  Body mass index is 28.02 kg/m².    Intake/Output Summary (Last 24 hours) at 9/3/2019 0744  Last data filed at 9/3/2019 0600  Gross per 24 hour   Intake 760 ml   Output --   Net 760 ml      Physical Exam   Constitutional: She is oriented to person, place, and time. She appears well-developed and well-nourished.   HENT:   Head: Normocephalic and atraumatic.   Right Ear: External ear normal.   Left Ear: External ear normal.   Nose: Nose normal.   Mouth/Throat: Oropharynx is clear and moist.   Eyes: Pupils are equal, round, and reactive to light. EOM are normal. Right conjunctiva has a hemorrhage.       R eye subconjunctival hemorrhage    Neck: Normal range of motion. Neck supple. No thyromegaly present.   Cardiovascular: Normal rate, regular rhythm, normal heart sounds and intact distal pulses.    Pulmonary/Chest: Effort normal. She has wheezes.   Abdominal: Soft. Bowel sounds are normal.   Musculoskeletal: Normal range of motion.   Neurological: She is alert and oriented to person, place, and time. She has normal reflexes.   Skin: Skin is warm and dry.   Psychiatric: She has a normal mood and affect. Her behavior is normal. Judgment and thought content normal.   Nursing note and vitals reviewed.      Significant Labs:   CBC:   Recent Labs   Lab 09/02/19  0557 09/03/19  0623   WBC 16.25* 22.38*   HGB 8.7* 9.0*   HCT 27.1* 27.7*    229     CMP:   Recent Labs   Lab 09/02/19  0557 09/03/19  0623    136   K 4.4 4.3   CL 95 95   CO2 28 28   * 223*   BUN 24* 36*   CREATININE 1.0 1.2   CALCIUM 9.1 9.3   ANIONGAP 13 13   EGFRNONAA 53* 42*     Recent Labs   Lab 09/01/19  0051  09/01/19  1404   CPK 27  27  --   --    CPKMB 1.5  --   --    TROPONINI 0.035*   < > 0.026   MB 5.6*  --   --     < > = values in this interval not displayed.     Lactic acid 1.3>0.9>2.4>4.1  Lab Results   Component Value Date    INR 1.1 09/01/2019    INR 1.2 01/22/2019    INR 1.2 11/26/2018     BNP  Recent Labs   Lab 09/01/19  0051   *         U/a 2+ leuko (4 WBC) mod bacteria    Blood cultures NGTD x 2    CXR No acute process.    EKG Sinus tachycardia  Left axis deviation  Septal infarct (cited on or before 23-MAR-2019)  Abnormal ECG  When compared with ECG of 23-MAR-2019 01:08,  Premature ventricular complexes are no longer Present  ST now depressed in Inferior leads  ST now depressed in Lateral leads

## 2019-09-03 NOTE — PT/OT/SLP EVAL
"Physical Therapy Evaluation and Discharge Note    Patient Name:  Chantell Herrera   MRN:  [id number]    Recommendations:     Discharge Recommendations:  home with home health   Discharge Equipment Recommendations: none   Barriers to discharge: None    Assessment:     Chantell Herrera is a 83 y.o. female admitted with a medical diagnosis of COPD exacerbation. .  At this time, patient is functioning at their prior level of function and does not require further acute PT services.     Recent Surgery: * No surgery found *      Plan:     During this hospitalization, patient does not require further acute PT services.  Please re-consult if situation changes.      Subjective     Chief Complaint: SOB upon exertion  Patient/Family Comments/goals: "To breath better"  Pain/Comfort:  Pain Rating 1: 0/10  Pain Rating Post-Intervention 1: 0/10    Patients cultural, spiritual, Zoroastrian conflicts given the current situation: no    Living Environment:  Patient lives with  in [facility] with ramp access to enter.  Prior to admission, patients level of function was dependent with oxygen at 3 l/m , was modified independent with gait functions sometimes uses Rollator to sit down for long distances gait functions but most of the time no  Assistive device inside the house except the oxygen line. Patient used to perform independent in most mobility and self care tasks.  Equipment used at home: rollator, bedside commode, shower chair, other (see comments), oxygen(scooter).  DME owned (not currently used): none.  Upon discharge, patient will have assistance from .    Objective:     Communicated with patient prior to session.  Patient found HOB elevated with oxygen(uses O2 at 3 l/m.) upon PT entry to room.    General Precautions: Standard,     Orthopedic Precautions:N/A   Braces: N/A     Exams:  · Cognitive Exam:  Patient is oriented to Person, Place, Time and Situation  · Gross Motor Coordination:  WFL  · Sensation:    · -       " Intact  · Skin Integrity/Edema:      · -       Skin integrity: Visible skin intact  · RUE ROM: WFL  · RUE Strength: WFL  · LUE ROM: WFL  · LUE Strength: WFL  · RLE ROM: WFL  · RLE Strength: WFL  · LLE ROM: WFL  · LLE Strength: WFL    Functional Mobility:  · Bed Mobility:     · Rolling Left:  independence  · Rolling Right: independence  · Scooting: independence  · Supine to Sit: independence  · Sit to Supine: independence  · Transfers:     · Sit to Stand:  independence with no AD  · Toilet Transfer: independence with  no AD  using  Step Transfer  · Gait: Ambulated with no Assistive Device Independently at Room distances and for toilet activity with/without Oxygen.No sign of gait deviations.  · Balance: Stand static and dynamic with normal grade    AM-PAC 6 CLICK MOBILITY  Total Score:23       Therapeutic Activities and Exercises:   Completed PT evaluation. Educated patient with proper diaphragmatic deep breathing during rest periods for energy conservation during functional tasks. Patient verbalized good understanding.     AM-PAC 6 CLICK MOBILITY  Total Score:23     Patient left HOB elevated with all lines intact, call button in reach and nurse Carlos notified.    GOALS:   Multidisciplinary Problems     Physical Therapy Goals     Not on file                History:     Past Medical History:   Diagnosis Date    Acute on chronic congestive heart failure 2/1/2019    Anxiety     Arthritis     Asthma     Atrial fibrillation with rapid ventricular response     CHF (congestive heart failure) 11/29/2018    Chronic bronchitis     COPD (chronic obstructive pulmonary disease)     Depression     SANTIAGO (dyspnea on exertion)     Emphysema of lung     Fatigue     Hyperlipidemia     Hypertension     Symptomatic anemia 1/22/2019    Trouble in sleeping        Past Surgical History:   Procedure Laterality Date    APPENDECTOMY      EYE SURGERY      HYSTERECTOMY      TONSILLECTOMY         Time Tracking:     PT  Received On: 09/03/19  PT Start Time: 1334     PT Stop Time: 1350  PT Total Time (min): 16 min     Billable Minutes: Evaluation 15      Theodore Sellers, PT  09/03/2019

## 2019-09-03 NOTE — ASSESSMENT & PLAN NOTE
BMP  Lab Results   Component Value Date     09/03/2019    K 4.3 09/03/2019    CL 95 09/03/2019    CO2 28 09/03/2019    BUN 36 (H) 09/03/2019    CREATININE 1.2 09/03/2019    CALCIUM 9.3 09/03/2019    ANIONGAP 13 09/03/2019    ESTGFRAFRICA 49 (A) 09/03/2019    EGFRNONAA 42 (A) 09/03/2019     Looks like she is getting a little dry. Hold lasix

## 2019-09-03 NOTE — ASSESSMENT & PLAN NOTE
Seems chronic   Will follow CBC   Keep her on protonix.  Lab Results   Component Value Date    WBC 22.38 (H) 09/03/2019    HGB 9.0 (L) 09/03/2019    HCT 27.7 (L) 09/03/2019     (H) 09/03/2019     09/03/2019

## 2019-09-03 NOTE — ASSESSMENT & PLAN NOTE
Lab Results   Component Value Date    HGBA1C 6.3 (H) 01/26/2019       Worse with steroids .  -250  Weaning steroids so should improve  Accu check AC and hs with low dose correction scale  Monitor for now

## 2019-09-03 NOTE — PLAN OF CARE
09/03/19 1046   Medicare Message   Important Message from Medicare regarding Discharge Appeal Rights Given to patient/caregiver;Explained to patient/caregiver;Signed/date by patient/caregiver   Date IMM was signed 09/01/19   Time IMM was signed 0207

## 2019-09-03 NOTE — ASSESSMENT & PLAN NOTE
Resumed lasix upon admission .  BNP  Recent Labs   Lab 09/01/19  0051   *       Creat 0.9>1.2 with GFR 60>42- will hold lasix today.

## 2019-09-03 NOTE — PROGRESS NOTES
[facility] Hospital Medicine  Progress Note    Patient Name: Chantell Herrera  MRN: [id number]  Patient Class: IP- Inpatient   Admission Date: 9/1/2019  Length of Stay: 2 days  Attending Physician: Milo Estevez MD  Primary Care Provider: Kari Gaspar MD        Subjective:     Principal Problem:COPD exacerbation        HPI:  Chantell Herrera is a 83 y.o. female with PMH of COPD ; on nebs; [facility]  Presented with COPD exacerbation .  Dyspnea: Patient complains of shortness of breath at rest.  Symptoms include constant cough, difficulty breathing, frequent throat clearing, morning cough, productive cough, sputum production, tightness in chest and wheezing. Symptoms began 1 week ago, gradually worsening since that time.  Patient denies hemoptysis hempotysis. Associated symptoms include chills, fever and productive cough with sputum described as yellow. Patient has had recent travel.  Weight has been stable.  Appetite has been decreased. Symptoms are exacerbated by any exercise. Symptoms are alleviated by rest, oxygen and medication(s) (albuterol).       Seen in ER ;did not respond to breathing treatments .  She is admitted for IV antibiotics, steroids and nebs       Overview/Hospital Course:  9/2/19  Admitted for COPD exacerbation.  She rubbed her eyes hard ;h/o eye surgery .  She feels irritated and is on eliquis .  She is using cold packs   Her breathing is still labored at times   Severe shortness of breath with minimal exertion     9/3/19  She reports that she is breathing better today. She is on her home dose of 3L NC O2 with POX 88%. WBC climbing but also on steroids medrol 125mg IV every 8hr.  [id number]. Blood cultures NGTD x 2. CXR without acute process    She is also getting a little dry. Creat climbed 0.9>1.2, GFR was greater than 60 and now 42.         Review of Systems   Constitutional: Positive for fatigue. Negative for appetite change, chills and fever.   HENT: Negative  for congestion, ear discharge, ear pain, rhinorrhea, sinus pressure and sore throat.    Eyes: Positive for redness.        Subconjunctival hemorrhage    Respiratory: Positive for cough and wheezing. Negative for choking.         With coughing and increased activity   Cardiovascular: Negative for chest pain and palpitations.   Gastrointestinal: Negative for constipation, diarrhea, nausea and vomiting.   Musculoskeletal: Negative for joint swelling and myalgias.   Skin: Negative for rash and wound.   Neurological: Negative for dizziness, syncope, weakness, light-headedness and numbness.     Objective:     Vital Signs (Most Recent):  Temp: 97 °F (36.1 °C) (09/03/19 0736)  Pulse: 79 (09/03/19 0736)  Resp: 18 (09/03/19 0736)  BP: 126/60 (09/03/19 0736)  SpO2: (!) 88 % (09/03/19 0736) Vital Signs (24h Range):  Temp:  [96.4 °F (35.8 °C)-97.3 °F (36.3 °C)] 97 °F (36.1 °C)  Pulse:  [66-95] 79  Resp:  [18-21] 18  SpO2:  [86 %-91 %] 88 %  BP: (118-146)/(55-69) 126/60     Weight: 69.5 kg (153 lb 3.5 oz)  Body mass index is 28.02 kg/m².    Intake/Output Summary (Last 24 hours) at 9/3/2019 0744  Last data filed at 9/3/2019 0600  Gross per 24 hour   Intake 760 ml   Output --   Net 760 ml      Physical Exam   Constitutional: She is oriented to person, place, and time. She appears well-developed and well-nourished.   HENT:   Head: Normocephalic and atraumatic.   Right Ear: External ear normal.   Left Ear: External ear normal.   Nose: Nose normal.   Mouth/Throat: Oropharynx is clear and moist.   Eyes: Pupils are equal, round, and reactive to light. EOM are normal. Right conjunctiva has a hemorrhage.       R eye subconjunctival hemorrhage    Neck: Normal range of motion. Neck supple. No thyromegaly present.   Cardiovascular: Normal rate, regular rhythm, normal heart sounds and intact distal pulses.   Pulmonary/Chest: Effort normal. She has wheezes.   Abdominal: Soft. Bowel sounds are normal.   Musculoskeletal: Normal range of motion.    Neurological: She is alert and oriented to person, place, and time. She has normal reflexes.   Skin: Skin is warm and dry.   Psychiatric: She has a normal mood and affect. Her behavior is normal. Judgment and thought content normal.   Nursing note and vitals reviewed.      Significant Labs:   CBC:   Recent Labs   Lab 09/02/19 0557 09/03/19 0623   WBC 16.25* 22.38*   HGB 8.7* 9.0*   HCT 27.1* 27.7*    229     CMP:   Recent Labs   Lab 09/02/19 0557 09/03/19  0623    136   K 4.4 4.3   CL 95 95   CO2 28 28   * 223*   BUN 24* 36*   CREATININE 1.0 1.2   CALCIUM 9.1 9.3   ANIONGAP 13 13   EGFRNONAA 53* 42*     Recent Labs   Lab 09/01/19  0051  09/01/19  1404   CPK 27  27  --   --    CPKMB 1.5  --   --    TROPONINI 0.035*   < > 0.026   MB 5.6*  --   --     < > = values in this interval not displayed.     Lactic acid 1.3>0.9>2.4>4.1  Lab Results   Component Value Date    INR 1.1 09/01/2019    INR 1.2 01/22/2019    INR 1.2 11/26/2018     BNP  Recent Labs   Lab 09/01/19  0051   *         U/a 2+ leuko (4 WBC) mod bacteria    Blood cultures NGTD x 2    CXR No acute process.    EKG Sinus tachycardia  Left axis deviation  Septal infarct (cited on or before 23-MAR-2019)  Abnormal ECG  When compared with ECG of 23-MAR-2019 01:08,  Premature ventricular complexes are no longer Present  ST now depressed in Inferior leads  ST now depressed in Lateral leads      Assessment/Plan:      * COPD exacerbation  Supplemental O2 via nasal canula; titrate O2 saturation to >92%.   Start Solumedrol>> wean today as WBC is elevated   Pulmonary consultation.   Continue beta 2 agonist bronchodilator treatments.   Continue IV antibiotics.  Continue routine medications as before.     Now down to 3 L Nasal cannula POX 86-90% ; she is at home on 3 L.          Subconjunctival hemorrhage of right eye  She is on eliquis   Cold packs  Avoid scratching .      Diabetes mellitus type 2, uncontrolled  Lab Results   Component Value  Date    HGBA1C 6.3 (H) 01/26/2019       Worse with steroids .  -250  Weaning steroids so should improve  Accu check AC and hs with low dose correction scale  Monitor for now    Chronic CHF  Cont ARB, diltiazem, and hold lasix for now- already got today dose so will hold tomorrows and ask to push po fluids today.    Anemia  Seems chronic   Will follow CBC   Keep her on protonix.  Lab Results   Component Value Date    WBC 22.38 (H) 09/03/2019    HGB 9.0 (L) 09/03/2019    HCT 27.7 (L) 09/03/2019     (H) 09/03/2019     09/03/2019               Acute on chronic congestive heart failure  Resumed lasix upon admission .  BNP  Recent Labs   Lab 09/01/19  0051   *       Creat 0.9>1.2 with GFR 60>42- will hold lasix today.    Atrial fibrillation with rapid ventricular response  Resume eliquis and cardizem .      Chronic respiratory failure with hypoxia  Continue O2- uses 3L at home.  She uses trellegy .      Dyslipidemia  Resume her statin .      CKD (chronic kidney disease), stage III  BMP  Lab Results   Component Value Date     09/03/2019    K 4.3 09/03/2019    CL 95 09/03/2019    CO2 28 09/03/2019    BUN 36 (H) 09/03/2019    CREATININE 1.2 09/03/2019    CALCIUM 9.3 09/03/2019    ANIONGAP 13 09/03/2019    ESTGFRAFRICA 49 (A) 09/03/2019    EGFRNONAA 42 (A) 09/03/2019     Looks like she is getting a little dry. Hold lasix    Anxiety state  Resume her citalopram.      HTN (hypertension)  Resume her home anti hypertensives.  //69.          VTE Risk Mitigation (From admission, onward)        Ordered     apixaban tablet 2.5 mg  2 times daily      09/02/19 0849     IP VTE HIGH RISK PATIENT  Once      09/01/19 0217     Place sequential compression device  Until discontinued      09/01/19 0217                Milo Estevez MD  Department of Hospital Medicine   [facility]

## 2019-09-03 NOTE — SUBJECTIVE & OBJECTIVE
Interval History: still wheezing but she wants to go home     Objective:     Vital Signs (Most Recent):  Temp: 96.4 °F (35.8 °C) (09/03/19 0444)  Pulse: 74 (09/03/19 0656)  Resp: (!) 21 (09/03/19 0656)  BP: (!) 144/69 (09/03/19 0444)  SpO2: (!) 89 % (09/03/19 0656) Vital Signs (24h Range):  Temp:  [96.4 °F (35.8 °C)-97.3 °F (36.3 °C)] 96.4 °F (35.8 °C)  Pulse:  [66-95] 74  Resp:  [18-21] 21  SpO2:  [86 %-91 %] 89 %  BP: (118-146)/(55-69) 144/69     Weight: 69.5 kg (153 lb 3.5 oz)  Body mass index is 28.02 kg/m².      Intake/Output Summary (Last 24 hours) at 9/3/2019 0721  Last data filed at 9/3/2019 0600  Gross per 24 hour   Intake 760 ml   Output --   Net 760 ml       Physical Exam   Pulmonary/Chest: She has decreased breath sounds in the right lower field and the left lower field. She has wheezes in the right upper field, the right middle field, the right lower field, the left upper field, the left middle field and the left lower field. She has rhonchi in the right lower field and the left lower field.       Vents:  Oxygen Concentration (%): 32 (09/03/19 0020)    Lines/Drains/Airways     Peripheral Intravenous Line                 Peripheral IV - Single Lumen 09/01/19 2005 22 G Left;Posterior Hand 1 day                Significant Labs:    CBC/Anemia Profile:  Recent Labs   Lab 09/02/19 0557   WBC 16.25*   HGB 8.7*   HCT 27.1*      *   RDW 15.1*        Chemistries:  Recent Labs   Lab 09/02/19 0557 09/03/19 0623    136   K 4.4 4.3   CL 95 95   CO2 28 28   BUN 24* 36*   CREATININE 1.0 1.2   CALCIUM 9.1 9.3       All pertinent labs within the past 24 hours have been reviewed.    Significant Imaging:  I have reviewed all pertinent imaging results/findings within the past 24 hours.

## 2019-09-03 NOTE — PLAN OF CARE
09/03/19 1045   Advance Directives (For Healthcare)   Advance Directive  (If Adv Dir status is received, view document under Adv Dir in header or Chart Review Media tab) Patient has advance directive, copy not received.         Full code confirmed.

## 2019-09-04 VITALS
WEIGHT: 153.25 LBS | HEART RATE: 81 BPM | TEMPERATURE: 98 F | OXYGEN SATURATION: 90 % | SYSTOLIC BLOOD PRESSURE: 102 MMHG | DIASTOLIC BLOOD PRESSURE: 55 MMHG | HEIGHT: 62 IN | BODY MASS INDEX: 28.2 KG/M2 | RESPIRATION RATE: 18 BRPM

## 2019-09-04 LAB
ALBUMIN SERPL BCP-MCNC: 3.9 G/DL (ref 3.5–5.2)
ALP SERPL-CCNC: 61 U/L (ref 55–135)
ALT SERPL W/O P-5'-P-CCNC: 29 U/L (ref 10–44)
ANION GAP SERPL CALC-SCNC: 14 MMOL/L (ref 8–16)
ANISOCYTOSIS BLD QL SMEAR: ABNORMAL
AST SERPL-CCNC: 12 U/L (ref 10–40)
BASOPHILS # BLD AUTO: ABNORMAL K/UL (ref 0–0.2)
BASOPHILS NFR BLD: 0 % (ref 0–1.9)
BILIRUB SERPL-MCNC: 0.3 MG/DL (ref 0.1–1)
BUN SERPL-MCNC: 39 MG/DL (ref 8–23)
CALCIUM SERPL-MCNC: 8.8 MG/DL (ref 8.7–10.5)
CHLORIDE SERPL-SCNC: 94 MMOL/L (ref 95–110)
CO2 SERPL-SCNC: 27 MMOL/L (ref 23–29)
CREAT SERPL-MCNC: 1.4 MG/DL (ref 0.5–1.4)
DACRYOCYTES BLD QL SMEAR: ABNORMAL
DIFFERENTIAL METHOD: ABNORMAL
EOSINOPHIL # BLD AUTO: ABNORMAL K/UL (ref 0–0.5)
EOSINOPHIL NFR BLD: 0 % (ref 0–8)
ERYTHROCYTE [DISTWIDTH] IN BLOOD BY AUTOMATED COUNT: 15.8 % (ref 11.5–14.5)
EST. GFR  (AFRICAN AMERICAN): 41 ML/MIN/1.73 M^2
EST. GFR  (NON AFRICAN AMERICAN): 35 ML/MIN/1.73 M^2
GIANT PLATELETS BLD QL SMEAR: PRESENT
GLUCOSE SERPL-MCNC: 299 MG/DL (ref 70–110)
HCT VFR BLD AUTO: 28.9 % (ref 37–48.5)
HGB BLD-MCNC: 9.1 G/DL (ref 12–16)
IMM GRANULOCYTES # BLD AUTO: ABNORMAL K/UL (ref 0–0.04)
IMM GRANULOCYTES NFR BLD AUTO: ABNORMAL % (ref 0–0.5)
LYMPHOCYTES # BLD AUTO: ABNORMAL K/UL (ref 1–4.8)
LYMPHOCYTES NFR BLD: 3 % (ref 18–48)
MCH RBC QN AUTO: 37.4 PG (ref 27–31)
MCHC RBC AUTO-ENTMCNC: 31.5 G/DL (ref 32–36)
MCV RBC AUTO: 119 FL (ref 82–98)
METAMYELOCYTES NFR BLD MANUAL: 1 %
MONOCYTES # BLD AUTO: ABNORMAL K/UL (ref 0.3–1)
MONOCYTES NFR BLD: 3 % (ref 4–15)
NEUTROPHILS NFR BLD: 92 % (ref 38–73)
NEUTS BAND NFR BLD MANUAL: 1 %
NRBC BLD-RTO: 0 /100 WBC
OVALOCYTES BLD QL SMEAR: ABNORMAL
PLATELET # BLD AUTO: 283 K/UL (ref 150–350)
PLATELET BLD QL SMEAR: ABNORMAL
PMV BLD AUTO: 12.9 FL (ref 9.2–12.9)
POCT GLUCOSE: 295 MG/DL (ref 70–110)
POCT GLUCOSE: 301 MG/DL (ref 70–110)
POCT GLUCOSE: 311 MG/DL (ref 70–110)
POIKILOCYTOSIS BLD QL SMEAR: SLIGHT
POTASSIUM SERPL-SCNC: 4.2 MMOL/L (ref 3.5–5.1)
PROT SERPL-MCNC: 6.2 G/DL (ref 6–8.4)
RBC # BLD AUTO: 2.43 M/UL (ref 4–5.4)
SODIUM SERPL-SCNC: 135 MMOL/L (ref 136–145)
SPHEROCYTES BLD QL SMEAR: ABNORMAL
TOXIC GRANULES BLD QL SMEAR: PRESENT
WBC # BLD AUTO: 25.68 K/UL (ref 3.9–12.7)

## 2019-09-04 PROCEDURE — 80053 COMPREHEN METABOLIC PANEL: CPT | Mod: HCNC

## 2019-09-04 PROCEDURE — 25000003 PHARM REV CODE 250: Mod: HCNC | Performed by: INTERNAL MEDICINE

## 2019-09-04 PROCEDURE — 85007 BL SMEAR W/DIFF WBC COUNT: CPT | Mod: HCNC

## 2019-09-04 PROCEDURE — 25000242 PHARM REV CODE 250 ALT 637 W/ HCPCS: Mod: HCNC | Performed by: INTERNAL MEDICINE

## 2019-09-04 PROCEDURE — 99238 HOSP IP/OBS DSCHRG MGMT 30/<: CPT | Mod: HCNC,,, | Performed by: FAMILY MEDICINE

## 2019-09-04 PROCEDURE — 25000003 PHARM REV CODE 250: Mod: HCNC | Performed by: SURGERY

## 2019-09-04 PROCEDURE — 94640 AIRWAY INHALATION TREATMENT: CPT | Mod: HCNC

## 2019-09-04 PROCEDURE — 27000221 HC OXYGEN, UP TO 24 HOURS: Mod: HCNC

## 2019-09-04 PROCEDURE — 94664 DEMO&/EVAL PT USE INHALER: CPT | Mod: HCNC

## 2019-09-04 PROCEDURE — 63600175 PHARM REV CODE 636 W HCPCS: Mod: HCNC | Performed by: SURGERY

## 2019-09-04 PROCEDURE — 99238 PR HOSPITAL DISCHARGE DAY,<30 MIN: ICD-10-PCS | Mod: HCNC,,, | Performed by: FAMILY MEDICINE

## 2019-09-04 PROCEDURE — 85027 COMPLETE CBC AUTOMATED: CPT | Mod: HCNC

## 2019-09-04 PROCEDURE — 94760 N-INVAS EAR/PLS OXIMETRY 1: CPT | Mod: HCNC

## 2019-09-04 PROCEDURE — 36415 COLL VENOUS BLD VENIPUNCTURE: CPT | Mod: HCNC

## 2019-09-04 PROCEDURE — 63600175 PHARM REV CODE 636 W HCPCS: Mod: HCNC | Performed by: NURSE PRACTITIONER

## 2019-09-04 RX ORDER — LANCETS 33 GAUGE
EACH MISCELLANEOUS 2 TIMES DAILY
Qty: 100 EACH | Refills: 0 | Status: SHIPPED | OUTPATIENT
Start: 2019-09-04 | End: 2019-10-17 | Stop reason: SDUPTHER

## 2019-09-04 RX ORDER — FUROSEMIDE 20 MG/1
20 TABLET ORAL DAILY
Qty: 90 TABLET | Refills: 1 | Status: SHIPPED | OUTPATIENT
Start: 2019-09-04 | End: 2019-10-02

## 2019-09-04 RX ORDER — PREDNISONE 10 MG/1
TABLET ORAL
Qty: 30 TABLET | Refills: 0 | Status: ON HOLD | OUTPATIENT
Start: 2019-09-04 | End: 2019-12-01 | Stop reason: SDUPTHER

## 2019-09-04 RX ORDER — DEXTROSE 4 G
TABLET,CHEWABLE ORAL
Qty: 1 EACH | Refills: 0 | Status: ON HOLD | OUTPATIENT
Start: 2019-09-04 | End: 2020-01-20 | Stop reason: HOSPADM

## 2019-09-04 RX ORDER — PEN NEEDLE, DIABETIC 30 GX3/16"
1 NEEDLE, DISPOSABLE MISCELLANEOUS DAILY
Qty: 100 EACH | Refills: 0 | Status: SHIPPED | OUTPATIENT
Start: 2019-09-04 | End: 2019-12-05 | Stop reason: SDUPTHER

## 2019-09-04 RX ADMIN — PANTOPRAZOLE SODIUM 40 MG: 40 TABLET, DELAYED RELEASE ORAL at 08:09

## 2019-09-04 RX ADMIN — IPRATROPIUM BROMIDE AND ALBUTEROL SULFATE 3 ML: .5; 3 SOLUTION RESPIRATORY (INHALATION) at 12:09

## 2019-09-04 RX ADMIN — PRAVASTATIN SODIUM 40 MG: 40 TABLET ORAL at 08:09

## 2019-09-04 RX ADMIN — CEFTRIAXONE 1 G: 1 INJECTION, SOLUTION INTRAVENOUS at 08:09

## 2019-09-04 RX ADMIN — METHYLPREDNISOLONE SODIUM SUCCINATE 40 MG: 125 INJECTION, POWDER, FOR SOLUTION INTRAMUSCULAR; INTRAVENOUS at 05:09

## 2019-09-04 RX ADMIN — LOSARTAN POTASSIUM 50 MG: 50 TABLET, FILM COATED ORAL at 08:09

## 2019-09-04 RX ADMIN — IPRATROPIUM BROMIDE AND ALBUTEROL SULFATE 3 ML: .5; 3 SOLUTION RESPIRATORY (INHALATION) at 06:09

## 2019-09-04 RX ADMIN — GABAPENTIN 100 MG: 100 CAPSULE ORAL at 08:09

## 2019-09-04 RX ADMIN — INSULIN ASPART 3 UNITS: 100 INJECTION, SOLUTION INTRAVENOUS; SUBCUTANEOUS at 11:09

## 2019-09-04 RX ADMIN — CITALOPRAM HYDROBROMIDE 20 MG: 10 TABLET ORAL at 08:09

## 2019-09-04 RX ADMIN — INSULIN ASPART 4 UNITS: 100 INJECTION, SOLUTION INTRAVENOUS; SUBCUTANEOUS at 07:09

## 2019-09-04 RX ADMIN — APIXABAN 2.5 MG: 2.5 TABLET, FILM COATED ORAL at 08:09

## 2019-09-04 RX ADMIN — DILTIAZEM HYDROCHLORIDE 240 MG: 120 CAPSULE, COATED, EXTENDED RELEASE ORAL at 08:09

## 2019-09-04 NOTE — SUBJECTIVE & OBJECTIVE
Interval History: copd better breathing     Objective:     Vital Signs (Most Recent):  Temp: 98.3 °F (36.8 °C) (09/04/19 0702)  Pulse: 73 (09/04/19 1000)  Resp: 18 (09/04/19 0702)  BP: (!) 102/55 (09/04/19 0702)  SpO2: (!) 90 % (09/04/19 0702) Vital Signs (24h Range):  Temp:  [96.3 °F (35.7 °C)-98.3 °F (36.8 °C)] 98.3 °F (36.8 °C)  Pulse:  [] 73  Resp:  [16-22] 18  SpO2:  [86 %-93 %] 90 %  BP: (102-132)/(55-66) 102/55     Weight: 69.5 kg (153 lb 3.5 oz)  Body mass index is 28.02 kg/m².      Intake/Output Summary (Last 24 hours) at 9/4/2019 1100  Last data filed at 9/4/2019 0900  Gross per 24 hour   Intake 980 ml   Output --   Net 980 ml       Physical Exam   HENT:   Right Ear: Tympanic membrane is not injected.   Left Ear: Tympanic membrane is not injected.   Cardiovascular: S1 normal, S2 normal and normal heart sounds.   No murmur heard.  Pulmonary/Chest: No accessory muscle usage. No respiratory distress. She has decreased breath sounds in the right upper field, the right middle field, the right lower field, the left upper field, the left middle field and the left lower field. She has no wheezes. She has no rhonchi. She has no rales.   Abdominal: There is no rigidity, no CVA tenderness and negative Londono's sign.           Vents:  Oxygen Concentration (%): 32 (09/04/19 0002)    Lines/Drains/Airways     Peripheral Intravenous Line                 Peripheral IV - Single Lumen 09/01/19 2005 22 G Left;Posterior Hand 2 days                Significant Labs:    CBC/Anemia Profile:  Recent Labs   Lab 09/03/19 0623 09/04/19 0621   WBC 22.38* 25.68*   HGB 9.0* 9.1*   HCT 27.7* 28.9*    283   * 119*   RDW 15.4* 15.8*        Chemistries:  Recent Labs   Lab 09/03/19 0623 09/04/19  0621    135*   K 4.3 4.2   CL 95 94*   CO2 28 27   BUN 36* 39*   CREATININE 1.2 1.4   CALCIUM 9.3 8.8   ALBUMIN  --  3.9   PROT  --  6.2   BILITOT  --  0.3   ALKPHOS  --  61   ALT  --  29   AST  --  12       All pertinent  labs within the past 24 hours have been reviewed.    Significant Imaging:  I have reviewed all pertinent imaging results/findings within the past 24 hours.

## 2019-09-04 NOTE — PLAN OF CARE
09/04/19 1140   Discharge Reassessment   Assessment Type Final Discharge Note   Provided patient/caregiver education on the expected discharge date and the discharge plan Yes   Do you have any problems affording any of your prescribed medications? No   Discharge Plan A Home   Discharge Plan B Home with family   DME Needed Upon Discharge  none   Patient choice form signed by patient/caregiver N/A   Anticipated Discharge Disposition Home   Can the patient answer the patient profile reliably? Yes, cognitively intact   How does the patient rate their overall health at the present time? Fair   Describe the patient's ability to walk at the present time. Walks with the help of equipment   How often would a person be available to care for the patient? Whenever needed   Number of comorbid conditions (as recorded on the chart) Three   During the past month, has the patient often been bothered by feeling down, depressed or hopeless? No   During the past month, has the patient often been bothered by little interest or pleasure in doing things? No   Post-Acute Status   Post-Acute Authorization Other  (No post acute needs )         No post acute needs identified for discharge. Patient will discharge home today. Patient reminded about what signs and symptoms to look for when discharged, and educated that if any symptoms return, make a same day appointment with PCP or come back to the ED. Patient states understanding and agreement. Patient denies any other needs or concerns for discharge.

## 2019-09-04 NOTE — DISCHARGE SUMMARY
[facility] Medicine  Discharge Summary      Patient Name: Chantell Herrera  MRN: [id number]  Admission Date: 9/1/2019  Hospital Length of Stay: 3 days  Discharge Date and Time:  09/04/2019 11:36 AM  Attending Physician: Miol Estevez MD   Discharging Provider: Carrol Funes NP  Primary Care Provider: Kari Gaspar MD      HPI:   Chantell Herrera is a 83 y.o. female with PMH of COPD ; on nebs; [facility]  Presented with COPD exacerbation .  Dyspnea: Patient complains of shortness of breath at rest.  Symptoms include constant cough, difficulty breathing, frequent throat clearing, morning cough, productive cough, sputum production, tightness in chest and wheezing. Symptoms began 1 week ago, gradually worsening since that time.  Patient denies hemoptysis hempotysis. Associated symptoms include chills, fever and productive cough with sputum described as yellow. Patient has had recent travel.  Weight has been stable.  Appetite has been decreased. Symptoms are exacerbated by any exercise. Symptoms are alleviated by rest, oxygen and medication(s) (albuterol).       Seen in ER ;did not respond to breathing treatments .  She is admitted for IV antibiotics, steroids and nebs       * No surgery found *      Hospital Course:   9/2/19  Admitted for COPD exacerbation.  She rubbed her eyes hard ;h/o eye surgery .  She feels irritated and is on eliquis .  She is using cold packs   Her breathing is still labored at times   Severe shortness of breath with minimal exertion     9/3/19  She reports that she is breathing better today. She is on her home dose of 3L NC O2 with POX 88%. WBC climbing but also on steroids medrol 125mg IV every 8hr.  [id number]. Blood cultures NGTD x 2. CXR without acute process    She is also getting a little dry. Creat climbed 0.9>1.2, GFR was greater than 60 and now 42.     9/4/19  She remains on her home dose O2 at 3L NC with POX 86-90% as well as home trilogy at  night. No fever. WBC continues to climb. Winded after getting to restroom.  [id number]. Was on medrol 125mg IV q 8hr and decreased yesterday to 40mg IV q 8hr. Day 4 rocephin and azithromycin.     Blood sugars elevated as well 223-315 (A1C 7.0) using SSI as needed -- not on any meds at home for DM    Creat still increasing 0.8>1.4. Lasix held today     Consults:   Consults (From admission, onward)        Status Ordering Provider     Inpatient consult to Pulmonology  Once     Provider:  Emmanuel Hickman MD    Acknowledged OWEN SCHAFFER.          * COPD exacerbation  Supplemental O2 via nasal canula; titrate O2 saturation to >92%.   Start Solumedrol>> wean today as WBC is elevated   Pulmonary consultation.   Continue beta 2 agonist bronchodilator treatments.   Continue IV antibiotics.  Continue routine medications as before.     Now down to 3 L Nasal cannula POX 86-90% ; she is at home on 3 L/trilogy   Day 4 rocephin and azithromycin  Home today on steroid taper             Subconjunctival hemorrhage of right eye  She is on eliquis   Cold packs  Avoid scratching .      Diabetes mellitus type 2, uncontrolled  Lab Results   Component Value Date    HGBA1C 7.0 (H) 09/03/2019       Worse with steroids .  -315  Weaning steroids so should improve  Accu check AC and hs with low dose correction scale  Monitor for now  No meds at home for DM- home on levemir 10 units daily while on steroids.     Chronic CHF  Cont ARB, diltiazem, and hold lasix for now- hold lasix till Sat repeat bmp Monday    Anemia  Seems chronic - stable  Will follow CBC repeat Monday  Keep her on protonix.  Lab Results   Component Value Date    WBC 25.68 (H) 09/04/2019    HGB 9.1 (L) 09/04/2019    HCT 28.9 (L) 09/04/2019     (H) 09/04/2019     09/04/2019               Acute on chronic congestive heart failure  Resumed lasix upon admission .  BNP  Recent Labs   Lab 09/01/19  0051   *       Creat 0.9>1.2 with GFR 60>42-  will hold lasix till Sat. Repeat bmp monday    Atrial fibrillation with rapid ventricular response  Resume eliquis - lower dose due to age and renal function and cardizem .  HR     Chronic respiratory failure with hypoxia  Continue O2- uses 3L at home.  She uses home trilegy/trelegy  .      Dyslipidemia  Resume her statin .      CKD (chronic kidney disease), stage III  BMP  Lab Results   Component Value Date     (L) 09/04/2019    K 4.2 09/04/2019    CL 94 (L) 09/04/2019    CO2 27 09/04/2019    BUN 39 (H) 09/04/2019    CREATININE 1.4 09/04/2019    CALCIUM 8.8 09/04/2019    ANIONGAP 14 09/04/2019    ESTGFRAFRICA 41 (A) 09/04/2019    EGFRNONAA 35 (A) 09/04/2019     Looks like she is getting a little dry. Hold lasix today and resume Sat.  already given on 9/3-   Repeat BMP Monday f/u PCP Tuesday    Anxiety state  Resume her citalopram.      HTN (hypertension)  Resume her home anti hypertensives.  //65.          Final Active Diagnoses:    Diagnosis Date Noted POA    PRINCIPAL PROBLEM:  COPD exacerbation [J44.1] 09/01/2019 Yes    Diabetes mellitus type 2, uncontrolled [E11.65] 09/02/2019 Yes    Subconjunctival hemorrhage of right eye [H11.31] 09/02/2019 No    Anemia [D64.9] 09/01/2019 Yes    Chronic CHF [I50.9] 09/01/2019 Yes    Acute on chronic congestive heart failure [I50.9] 02/01/2019 Yes    Atrial fibrillation with rapid ventricular response [I48.91] 11/16/2018 Yes    Chronic respiratory failure with hypoxia [J96.11] 05/17/2017 Yes    Dyslipidemia [E78.5] 01/03/2017 Yes    CKD (chronic kidney disease), stage III [N18.3] 05/10/2016 Yes    Anxiety state [F41.1] 08/10/2015 Yes    HTN (hypertension) [I10] 06/19/2014 Yes      Problems Resolved During this Admission:       Discharged Condition: good    Disposition: Home or Self Care    Follow Up:  Follow-up Information     Kari Gaspar MD On 9/10/2019.    Specialty:  Family Medicine  Why:  check cbc and bmp on Monday prior to  visit  Contact information:  [address]  [place] [address]  [phone]                 Patient Instructions:      CBC auto differential   Standing Status: Future Standing Exp. Date: 09/03/20     BASIC METABOLIC PANEL   Standing Status: Future Standing Exp. Date: 11/02/20     Ambulatory referral to Outpatient Case Management   Referral Priority: Routine Referral Type: Consultation   Referral Reason: Specialty Services Required   Number of Visits Requested: 1       Significant Diagnostic Studies:   Significant Labs:   CBC:        Recent Labs   Lab 09/03/19 0623 09/04/19 0621   WBC 22.38* 25.68*   HGB 9.0* 9.1*   HCT 27.7* 28.9*    283      CMP:        Recent Labs   Lab 09/03/19 0623 09/04/19 0621    135*   K 4.3 4.2   CL 95 94*   CO2 28 27   * 299*   BUN 36* 39*   CREATININE 1.2 1.4   CALCIUM 9.3 8.8   PROT  --  6.2   ALBUMIN  --  3.9   BILITOT  --  0.3   ALKPHOS  --  61   AST  --  12   ALT  --  29   ANIONGAP 13 14   EGFRNONAA 42* 35*      Recent Labs   Lab 09/01/19  0051 09/01/19  1404   CPK 27  27  --   --    CPKMB 1.5  --   --    TROPONINI 0.035*   < > 0.026   MB 5.6*  --   --     < > = values in this interval not displayed.      Lactic acid 1.3>0.9>2.4>4.1  Lab Results   Component Value Date     INR 1.1 09/01/2019     INR 1.2 01/22/2019     INR 1.2 11/26/2018      BNP      Recent Labs   Lab 09/01/19  0051   *            U/a 2+ leuko (4 WBC) mod bacteria     Blood cultures NGTD x 2     CXR No acute process.     EKG Sinus tachycardia  Left axis deviation  Septal infarct (cited on or before 23-MAR-2019)  Abnormal ECG  When compared with ECG of 23-MAR-2019 01:08,  Premature ventricular complexes are no longer Present  ST now depressed in Inferior leads  ST now depressed in Lateral leads              Pending Diagnostic Studies:     None         Medications:  Reconciled Home Medications:      Medication List      START taking these medications    blood sugar diagnostic, disc  "Strp  1 strip by Misc.(Non-Drug; Combo Route) route 2 (two) times daily.     blood-glucose meter kit  Use as instructed     insulin detemir U-100 100 unit/mL (3 mL) Inpn pen  Commonly known as:  LEVEMIR FLEXTOUCH U-100 INSULN  Inject 10 Units into the skin every evening.     lancing device Misc  Commonly known as:  BD LANCET DEVICE  1 Units by Misc.(Non-Drug; Combo Route) route 2 (two) times daily.     pen needle, diabetic 30 gauge x 5/16" Ndle  Commonly known as:  PEN NEEDLE  1 Units by Misc.(Non-Drug; Combo Route) route once daily.        CHANGE how you take these medications    apixaban 2.5 mg Tab  Commonly known as:  ELIQUIS  Take 1 tablet (2.5 mg total) by mouth 2 (two) times daily.  What changed:    · medication strength  · how much to take     furosemide 20 MG tablet  Commonly known as:  LASIX  Take 1 tablet (20 mg total) by mouth once daily. Hold till Saturday  What changed:  additional instructions     predniSONE 10 MG tablet  Commonly known as:  DELTASONE  Take 40 mg for 4 days, the 30 mg for 3 days then resume 20 mg po daily till f/u with PCP  What changed:  additional instructions        CONTINUE taking these medications    * albuterol 2.5 mg /3 mL (0.083 %) nebulizer solution  Commonly known as:  PROVENTIL  Take 3 mLs (2.5 mg total) by nebulization every 6 (six) hours as needed for Wheezing.     * albuterol 90 mcg/actuation inhaler  Commonly known as:  VENTOLIN HFA  Inhale 2 puffs into the lungs every 6 (six) hours as needed for Wheezing. Rescue     baclofen 10 MG tablet  Commonly known as:  LIORESAL  Take 1 tablet (10 mg total) by mouth 3 (three) times daily.     benzonatate 200 MG capsule  Commonly known as:  TESSALON  Take 1 capsule (200 mg total) by mouth 3 (three) times daily as needed.     citalopram 40 MG tablet  Commonly known as:  CELEXA  TAKE 1 TABLET EVERY DAY     diltiaZEM 240 MG 24 hr capsule  Commonly known as:  CARDIZEM CD  Take 1 capsule (240 mg total) by mouth once daily.   "   fluticasone-umeclidin-vilanter 100-62.5-25 mcg Dsdv  Commonly known as:  TRELEGY ELLIPTA  Inhale 1 puff into the lungs once daily.     gabapentin 100 MG capsule  Commonly known as:  NEURONTIN  Take 1 capsule (100 mg total) by mouth 3 (three) times daily.     ipratropium 0.02 % nebulizer solution  Commonly known as:  ATROVENT  Take 500 mcg by nebulization 4 (four) times daily.     losartan 100 MG tablet  Commonly known as:  COZAAR  TAKE 1/2 TABLET EVERY DAY     mupirocin 2 % ointment  Commonly known as:  BACTROBAN  Apply topically 3 (three) times daily.     omeprazole 40 MG capsule  Commonly known as:  PRILOSEC  Take 1 capsule (40 mg total) by mouth once daily.     pantoprazole 40 MG tablet  Commonly known as:  PROTONIX  Take 40 mg by mouth every morning.     pravastatin 40 MG tablet  Commonly known as:  PRAVACHOL  TAKE 1 TABLET EVERY DAY     promethazine-dextromethorphan 6.25-15 mg/5 mL Syrp  Commonly known as:  PROMETHAZINE-DM  Take 5 mLs by mouth every 12 (twelve) hours as needed.     rOPINIRole 1 MG tablet  Commonly known as:  REQUIP  Take 1 tablet (1 mg total) by mouth every evening.     traZODone 50 MG tablet  Commonly known as:  DESYREL  Take 1 tablet (50 mg total) by mouth every evening.     VITAMIN D3 2,000 unit Tab  Generic drug:  cholecalciferol (vitamin D3)  Take 2,000 Units by mouth once daily.         * This list has 2 medication(s) that are the same as other medications prescribed for you. Read the directions carefully, and ask your doctor or other care provider to review them with you.            STOP taking these medications    methylPREDNISolone 4 mg tablet  Commonly known as:  MEDROL DOSEPACK            Indwelling Lines/Drains at time of discharge:   Lines/Drains/Airways          None          Time spent on the discharge of patient: 25 minutes  Patient was seen and examined on the date of discharge and determined to be suitable for discharge.         Carrol Funes NP  Department of Hospital  Medicine  [facility]

## 2019-09-04 NOTE — SUBJECTIVE & OBJECTIVE
Review of Systems   Constitutional: Positive for fatigue. Negative for appetite change, chills and fever.   HENT: Negative for congestion, ear discharge, ear pain, rhinorrhea, sinus pressure and sore throat.    Eyes: Positive for redness.        Subconjunctival hemorrhage    Respiratory: Negative for cough, choking and wheezing.         With coughing and increased activity   Cardiovascular: Negative for chest pain and palpitations.   Gastrointestinal: Negative for constipation, diarrhea, nausea and vomiting.   Musculoskeletal: Negative for joint swelling and myalgias.   Skin: Negative for rash and wound.   Neurological: Negative for dizziness, syncope, weakness, light-headedness and numbness.     Objective:     Vital Signs (Most Recent):  Temp: 98.3 °F (36.8 °C) (09/04/19 0702)  Pulse: 101 (09/04/19 0702)  Resp: 18 (09/04/19 0702)  BP: (!) 102/55 (09/04/19 0702)  SpO2: (!) 90 % (09/04/19 0702) Vital Signs (24h Range):  Temp:  [96.3 °F (35.7 °C)-98.3 °F (36.8 °C)] 98.3 °F (36.8 °C)  Pulse:  [] 101  Resp:  [16-22] 18  SpO2:  [86 %-93 %] 90 %  BP: (102-132)/(55-66) 102/55     Weight: 69.5 kg (153 lb 3.5 oz)  Body mass index is 28.02 kg/m².    Intake/Output Summary (Last 24 hours) at 9/4/2019 0832  Last data filed at 9/4/2019 0520  Gross per 24 hour   Intake 740 ml   Output --   Net 740 ml      Physical Exam   Constitutional: She is oriented to person, place, and time. She appears well-developed and well-nourished.   HENT:   Head: Normocephalic and atraumatic.   Right Ear: External ear normal.   Left Ear: External ear normal.   Nose: Nose normal.   Mouth/Throat: Oropharynx is clear and moist.   Eyes: Pupils are equal, round, and reactive to light. EOM are normal. Right conjunctiva has a hemorrhage.       R eye subconjunctival hemorrhage    Neck: Normal range of motion. Neck supple. No thyromegaly present.   Cardiovascular: Normal rate, regular rhythm, normal heart sounds and intact distal pulses.    Pulmonary/Chest: Effort normal. She has no wheezes.   Abdominal: Soft. Bowel sounds are normal.   Musculoskeletal: Normal range of motion.   Neurological: She is alert and oriented to person, place, and time. She has normal reflexes.   Skin: Skin is warm and dry.   Psychiatric: She has a normal mood and affect. Her behavior is normal. Judgment and thought content normal.   Nursing note and vitals reviewed.      Significant Labs:   CBC:   Recent Labs   Lab 09/03/19 0623 09/04/19 0621   WBC 22.38* 25.68*   HGB 9.0* 9.1*   HCT 27.7* 28.9*    283     CMP:   Recent Labs   Lab 09/03/19 0623 09/04/19 0621    135*   K 4.3 4.2   CL 95 94*   CO2 28 27   * 299*   BUN 36* 39*   CREATININE 1.2 1.4   CALCIUM 9.3 8.8   PROT  --  6.2   ALBUMIN  --  3.9   BILITOT  --  0.3   ALKPHOS  --  61   AST  --  12   ALT  --  29   ANIONGAP 13 14   EGFRNONAA 42* 35*     Recent Labs   Lab 09/01/19  0051 09/01/19  1404   CPK 27  27  --   --    CPKMB 1.5  --   --    TROPONINI 0.035*   < > 0.026   MB 5.6*  --   --     < > = values in this interval not displayed.     Lactic acid 1.3>0.9>2.4>4.1  Lab Results   Component Value Date    INR 1.1 09/01/2019    INR 1.2 01/22/2019    INR 1.2 11/26/2018     BNP  Recent Labs   Lab 09/01/19 0051   *         U/a 2+ leuko (4 WBC) mod bacteria    Blood cultures NGTD x 2    CXR No acute process.    EKG Sinus tachycardia  Left axis deviation  Septal infarct (cited on or before 23-MAR-2019)  Abnormal ECG  When compared with ECG of 23-MAR-2019 01:08,  Premature ventricular complexes are no longer Present  ST now depressed in Inferior leads  ST now depressed in Lateral leads

## 2019-09-04 NOTE — PROGRESS NOTES
Ms. Herrera has been well informed of all meds being given, what they are for, how to take, potential side effects and fall precautions. We thoroughly discussed her anticoagulation med. She reports pain at a 5/10 but refuses pain meds at this time. She is concerned about the price of insulin upon discharge.      Neena Shah, PharmD  [phone]

## 2019-09-04 NOTE — ASSESSMENT & PLAN NOTE
Lab Results   Component Value Date    HGBA1C 7.0 (H) 09/03/2019       Worse with steroids .  -315  Weaning steroids so should improve  Accu check AC and hs with low dose correction scale  Monitor for now  No meds at home for DM- home on levemir 10 units daily while on steroids.

## 2019-09-04 NOTE — PHYSICIAN QUERY
"PT Name: Chantell Herrera  MR #: [id number]    Physician Query Form - Hematology Clarification      CDS: Purvi Sanders RN, CCDS         Contact information :ext [phone])  [email or web address]       This form is a permanent document in the medical record.      Query Date: September 4, 2019    By submitting this query, we are merely seeking further clarification of documentation. Please utilize your independent clinical judgment when addressing the question(s) below.    The Medical record contains the following:   Indicators  Supporting Clinical Findings Location in Medical Record   x "Anemia" documented "anemia  seems chronic" H&P 9/1/19     x H & H = H/H 9.1/29.2  H/H 9.1/28.9 Lab 9/1/19  Lab 9/4/19    BP =                     HR=      "GI bleeding" documented      Acute bleeding (Non GI site)      Transfusion(s)     x Treatment: Will follow CBC   Keep her on protonix H&P 9/1/19   x Other:  "PMH HTN, symptomatic anemia,emphysema"  "Subconjunctival hemorrhage of right eye  She is on eliquis   Cold packs  Avoid scratching" H&P 9/1/19    Hosp Med PN 9/2/19     Provider, please specify diagnosis or diagnoses associated with above clinical findings.      [ x ] Anemia of chronic disease ( Specify chronic disease) , ____________     [  ]  Chronic blood loss anemia       [  ] Other Hematological Diagnosis (please specify):     [  ] Clinically Undetermined       Please document in your progress notes daily for the duration of treatment, until resolved, and include in your discharge summary.                                                                                                      "

## 2019-09-04 NOTE — ASSESSMENT & PLAN NOTE
BMP  Lab Results   Component Value Date     (L) 09/04/2019    K 4.2 09/04/2019    CL 94 (L) 09/04/2019    CO2 27 09/04/2019    BUN 39 (H) 09/04/2019    CREATININE 1.4 09/04/2019    CALCIUM 8.8 09/04/2019    ANIONGAP 14 09/04/2019    ESTGFRAFRICA 41 (A) 09/04/2019    EGFRNONAA 35 (A) 09/04/2019     Looks like she is getting a little dry. Hold lasix today and resume Sat.  already given on 9/3-   Repeat BMP Monday f/u PCP Tuesday

## 2019-09-04 NOTE — PLAN OF CARE
09/04/19 1200   Medicare Message   Important Message from Medicare regarding Discharge Appeal Rights Given to patient/caregiver;Explained to patient/caregiver;Signed/date by patient/caregiver   Date IMM was signed 09/04/19   Time IMM was signed 0840

## 2019-09-04 NOTE — PHYSICIAN QUERY
"PT Name: Chantell Herrera  MR #: [id number]    Physician Query Form - Heart  Condition Clarification     CDS: Purvi Sanders RN, CCDS         Contact information :ext [phone])  [email or web address]     This form is a permanent document in the medical record.     Query Date: September 4, 2019    By submitting this query, we are merely seeking further clarification of documentation. Please utilize your independent clinical judgment when addressing the question(s) below.    The medical record contains the following   Indicators     Supporting Clinical Findings Location in Medical Record   x BNP  Lab 9/1/19    EF     x Radiology findings "The heart size is normal and there is calcification of the aorta.  The lungs are well expanded without consolidation or pleural effusion." CXR 9/1/19    Echo Results      "Ascites" documented     x "SOB" or "SANTIAGO" documented "Patient complains of shortness of breath at rest.  Symptoms include constant cough, difficulty breathing, frequent throat clearing, morning cough, productive cough, sputum production, tightness in chest and wheezing. Symptoms began 1 week ago, gradually worsening since that time."  "She has rales." H&P 9/4/19    "Hypoxia" documented     x Heart Failure documented "PMH CHF"  "Chronic CHF" H&P 9/1/19    "Edema" documented     x Diuretics/Meds furosemide (LASIX) 20 MG tablet  losartan (COZAAR) 100 MG tablet Home meds per H&P 9/1/19   x Treatment: Continue lasix H&P 9/1/19    Other:      Heart failure (HF) can be acute, chronic or both. It is generally further specificed as systolic, diastolic, or combined. Lastly, it is important to identify an underlying etiology if known or suspected.   Common clues to acute exacerbation:  Rapidly progressive symptoms (w/in 2 weeks of presentation), using IV diuretics to treat, using supplemental O2, pulmonary edema on Xray, MI w/in 4 weeks, and/or BNP >500  Systolic Heart Failure: is defined as chart documentation of a " left ventricular ejection fraction (LVEF) less than 40%   Diastolic Heart Failure: is defined as a left ventricular ejection fraction (LVEF) greater than 40%   +      Evidence of diastolic dysfunction on echocardiography OR    Right heart catheterization wedge pressure above 12 mm Hg OR    Left heart catheterization left ventricular end diastolic pressure 18 mm Hg or above.    References: *American Heart Association    The clinical guidelines noted below are only system guidelines, and do not replace the providers clinical judgment.       Doctor, please specify the diagnosis associated with above clinical findings  Please specify type of CHF.    [   ]  Chronic Diastolic Heart Failure - Pre-existing diastolic HF diagnosis.  EF > 40%  without  acute HF symptoms documented   [   ] Chronic Systolic Heart Failure - Pre-existing systolic HF diagnosis.  EF < 40%  without  acute HF symptoms documented   [   ] Chronic Combined Systolic and Diastolic Heart Failure   [   ] Other Type of Heart Failure (please specify type):   [   ] Other (please specify):   [ x ] Clinically Undetermined                           Please document in your progress notes daily for the duration of treatment until resolved and include in your discharge summary.

## 2019-09-04 NOTE — NURSING
Pt in stable condition. Discharged home. Reviewed discharge instructions, follow up appts, medications and reportable signs and symptoms with pt and ; state understanding.

## 2019-09-04 NOTE — PHYSICIAN QUERY
"PT Name: Chantell Herrera  MR #: [id number]    Physician Query Form - Respiratory Condition Clarification      CDS: Purvi Sanders RN, CCDS         Contact information :ext [phone])  [email or web address]       This form is a permanent document in the medical record.    Query Date: September 4, 2019    By submitting this query, we are merely seeking further clarification of documentation. Please utilize your independent clinical judgment when addressing the question(s) below.    The Medical record contains the following   Indicators   Supporting Clinical Findings Location in Medical Record   x   SOB, ASNTIAGO, Wheezing, Productive Cough, Use of Accessory Muscles, etc. "Dyspnea: Patient complains of shortness of breath at rest.  Symptoms include constant cough, difficulty breathing, frequent throat clearing, morning cough, productive cough, sputum production, tightness in chest and wheezing. Symptoms began 1 week ago, gradually worsening since that time" H&P 9/1/19   x   Acute/Chronic Illness "COPD exacerbation" H&P 9/1/19      Radiology Findings     x   Respiratory Distress or Failure "Chronic respiratory failure with hypoxia" H&P 9/1/19      Hypoxia or Hypercapnia     x   RR         ABGs         O2 sat RR 19-32  O2 Sat 88% VS record 9/1/19      BiPAP/Intubation     x   Supplemental O2 O2 @ 4L/nc VS record 9/1/19   x   Home O2, Oxygen Dependence Uses home oxygen and trilogy.  RN note 9/1/19   x   Treatment Supplemental O2 via nasal canula; titrate O2 saturation to >92%.   Start Solumedrol  Pulmonary consultation.   Continue beta 2 agonist bronchodilator treatments.   Continue IV antibiotics.  Continue routine medications as before.     x   Other "Pt admitted with COPD exacerbation. Uses home oxygen and trilogy. Oxygen sats 88-90 on 5L via nasal cannula today."  RN note 9/1/19     Respiratory failure can be acute, chronic or both. It is generally further specified as hypoxic, hypercapnic or both. Lastly, it is " important to identify an etiology, if known or suspected.   References::  https://www.acphospitalist.org/archives/2013/10/coding.htm http://[employer]/acute-respiratory-failure-know     The clinical guidelines noted below are only system guidelines, and do not replace the providers clinical judgment.    Provider, please specify diagnosis or diagnoses associated with above clinical findings.     [   ] Acute and (on) Chronic Respiratory Failure with Hypoxia - pO2 >10 mmHg below baseline OR SpO2 < 91% on usual home O2 OR O2 > 2L/min over baseline home O2    [x   ] Chronic Respiratory Failure with Hypoxia -Continuous home oxygen use   [   ] Other Respiratory Diagnosis (please specify): _________________________________   [   ]  Clinically Undetermined       Please document in your progress notes daily for the duration of treatment until resolved and include in your discharge summary.

## 2019-09-04 NOTE — PLAN OF CARE
09/04/19 1202   Final Note   Assessment Type Final Discharge Note   Anticipated Discharge Disposition Home   What phone number can be called within the next 1-3 days to see how you are doing after discharge? [phone]   Hospital Follow Up  Appt(s) scheduled? Yes   Discharge plans and expectations educations in teach back method with documentation complete? Yes       No post-acute care needs identified during this hospital stay.     Daniela Castro LMSW

## 2019-09-04 NOTE — ASSESSMENT & PLAN NOTE
Seems chronic - stable  Will follow CBC repeat Monday  Keep her on protonix.  Lab Results   Component Value Date    WBC 25.68 (H) 09/04/2019    HGB 9.1 (L) 09/04/2019    HCT 28.9 (L) 09/04/2019     (H) 09/04/2019     09/04/2019

## 2019-09-04 NOTE — PHYSICIAN QUERY
"PT Name: Chantell Herrera  MR #: [id number]     Physician Query Form - Diabetic Condition Clarification     CDS: Purvi Sanders RN, CCDS         Contact information :ext [phone])  [email or web address]     This form is a permanent document in the medical record.     Query Date: September 4, 2019    By submitting this query, we are merely seeking further clarification of documentation to reflect the severity of illness of your patient. Please utilize your independent clinical judgment when addressing the question(s) below.    The Medical record reflects the following:     Indicators   Supporting Clinical Findings Location in Medical Record   x Diabetes uncontrolled documented "Diabetes mellitus type 2, uncontrolled" Hosp Med PN 9/2/19   x Lab Value(s), POCT glucose value(s) HgbA1c 7.0  Glucose 299-223 Lab 9/3/19  Lab 9/1-9/3/19    Treatment                                 Medication      Other       Provider, please specify the meaning of the term uncontrolled:    [  x ] Diabetes mellitus Type 2 with hyperglycemia   [   ] Other diabetes complication (please specify): ____________   [   ]  Clinically Undetermined       Please document in your progress notes daily for the duration of treatment until resolved, and include in your discharge summary.  "

## 2019-09-04 NOTE — PLAN OF CARE
09/04/19 1203   Post-Acute Status   Post-Acute Authorization Other   Other Status No Post-Acute Service Needs

## 2019-09-04 NOTE — DISCHARGE INSTRUCTIONS
Discharge Instructions: COPD  You have been diagnosed with chronic obstructive pulmonary disease (COPD). This is a name given to a group of diseases that limit the flow of air in and out of your lungs. This makes it harder to breathe. With COPD, you are also more likely to get lung infections. COPD includes chronic bronchitis and emphysema. COPD is most often caused by heavy, long-term cigarette smoking.  Home care  Quit smoking  · If you smoke, quit. It is the best thing you can do for your COPD and your overall health.  · Join a stop-smoking program. There are even telephone, text message, and Internet programs to help you quit.  · Ask your healthcare provider about medicines or other methods to help you quit.  · Ask family members to quit smoking as well.  · Don't allow people to smoke in your home, in your car, or when they are around you.  Protect yourself from infection  · Wash your hands often. Do your best to keep your hands away from your face. Most germs are spread from your hands to your mouth.  · Get a flu shot every year. Also ask your provider about pneumonia vaccines.  · Avoid crowds. It's especially important to do this in the winter when more people have colds and flu.  · To stay healthy, get enough sleep, exercise regularly, and eat a balanced diet. You should:  ¨ Get about 8 hours of sleep every night.  ¨ Try to exercise for at least 30 minutes on most days.  ¨ Have healthy foods including fruits and vegetables, 100% whole grains, lean meats and fish, and low-fat dairy products. Try to stay away from foods high in fats and sugar.  Take your medicines  Take your medicines exactly as directed. Don't skip doses.  Manage your stress  Stress can make COPD worse. Use this stress management technique:  · Find a quiet place and sit or lie in a comfortable position.  · Close your eyes and perform breathing exercises for several minutes. Ask your provider about the best way to breathe.  Pulmonary  rehabilitation  · Pulmonary rehab can help you feel better. These programs include exercise, breathing techniques, information about COPD, counseling, and help for smokers.  · Ask your provider or your local hospital about programs in your area.  When to call your healthcare provider  Call your provider immediately if you have any of the following:  · Shortness of breath, wheezing, or coughing  · Increased mucus  · Yellow, green, bloody, or smelly mucus  · Fever or chills  · Tightness in your chest that does not go away with rest or medicine  · An irregular heartbeat or a feeling that your heart is beating very fast  · Swollen ankles   Date Last Reviewed: 5/1/2016  © 1725-0008 [employer]. [address] [place] [address], [place] [address]. All rights reserved. This information is not intended as a substitute for professional medical care. Always follow your healthcare professional's instructions.

## 2019-09-04 NOTE — PROGRESS NOTES
[facility] Hospital Medicine  Progress Note    Patient Name: Chantell Herrera  MRN: [id number]  Patient Class: IP- Inpatient   Admission Date: 9/1/2019  Length of Stay: 3 days  Attending Physician: Milo Estevez MD  Primary Care Provider: Kari Gaspar MD        Subjective:     Principal Problem:COPD exacerbation        HPI:  Chantell Herrera is a 83 y.o. female with PMH of COPD ; on nebs; [facility]  Presented with COPD exacerbation .  Dyspnea: Patient complains of shortness of breath at rest.  Symptoms include constant cough, difficulty breathing, frequent throat clearing, morning cough, productive cough, sputum production, tightness in chest and wheezing. Symptoms began 1 week ago, gradually worsening since that time.  Patient denies hemoptysis hempotysis. Associated symptoms include chills, fever and productive cough with sputum described as yellow. Patient has had recent travel.  Weight has been stable.  Appetite has been decreased. Symptoms are exacerbated by any exercise. Symptoms are alleviated by rest, oxygen and medication(s) (albuterol).       Seen in ER ;did not respond to breathing treatments .  She is admitted for IV antibiotics, steroids and nebs       Overview/Hospital Course:  9/2/19  Admitted for COPD exacerbation.  She rubbed her eyes hard ;h/o eye surgery .  She feels irritated and is on eliquis .  She is using cold packs   Her breathing is still labored at times   Severe shortness of breath with minimal exertion     9/3/19  She reports that she is breathing better today. She is on her home dose of 3L NC O2 with POX 88%. WBC climbing but also on steroids medrol 125mg IV every 8hr.  [id number]. Blood cultures NGTD x 2. CXR without acute process    She is also getting a little dry. Creat climbed 0.9>1.2, GFR was greater than 60 and now 42.     9/4/19  She remains on her home dose O2 at 3L NC with POX 86-90% as well as home trilogy at night. No fever. WBC continues to  climb. Winded after getting to restroom.  [id number]. Was on medrol 125mg IV q 8hr and decreased yesterday to 40mg IV q 8hr. Day 4 rocephin and azithromycin.     Blood sugars elevated as well 223-315 (A1C 7.0) using SSI as needed -- not on any meds at home for DM    Creat still increasing 0.8>1.4. Lasix held today        Review of Systems   Constitutional: Positive for fatigue. Negative for appetite change, chills and fever.   HENT: Negative for congestion, ear discharge, ear pain, rhinorrhea, sinus pressure and sore throat.    Eyes: Positive for redness.        Subconjunctival hemorrhage    Respiratory: Negative for cough, choking and wheezing.         With coughing and increased activity   Cardiovascular: Negative for chest pain and palpitations.   Gastrointestinal: Negative for constipation, diarrhea, nausea and vomiting.   Musculoskeletal: Negative for joint swelling and myalgias.   Skin: Negative for rash and wound.   Neurological: Negative for dizziness, syncope, weakness, light-headedness and numbness.     Objective:     Vital Signs (Most Recent):  Temp: 98.3 °F (36.8 °C) (09/04/19 0702)  Pulse: 101 (09/04/19 0702)  Resp: 18 (09/04/19 0702)  BP: (!) 102/55 (09/04/19 0702)  SpO2: (!) 90 % (09/04/19 0702) Vital Signs (24h Range):  Temp:  [96.3 °F (35.7 °C)-98.3 °F (36.8 °C)] 98.3 °F (36.8 °C)  Pulse:  [] 101  Resp:  [16-22] 18  SpO2:  [86 %-93 %] 90 %  BP: (102-132)/(55-66) 102/55     Weight: 69.5 kg (153 lb 3.5 oz)  Body mass index is 28.02 kg/m².    Intake/Output Summary (Last 24 hours) at 9/4/2019 0832  Last data filed at 9/4/2019 0520  Gross per 24 hour   Intake 740 ml   Output --   Net 740 ml      Physical Exam   Constitutional: She is oriented to person, place, and time. She appears well-developed and well-nourished.   HENT:   Head: Normocephalic and atraumatic.   Right Ear: External ear normal.   Left Ear: External ear normal.   Nose: Nose normal.   Mouth/Throat: Oropharynx is clear and  moist.   Eyes: Pupils are equal, round, and reactive to light. EOM are normal. Right conjunctiva has a hemorrhage.       R eye subconjunctival hemorrhage    Neck: Normal range of motion. Neck supple. No thyromegaly present.   Cardiovascular: Normal rate, regular rhythm, normal heart sounds and intact distal pulses.   Pulmonary/Chest: Effort normal. She has no wheezes.   Abdominal: Soft. Bowel sounds are normal.   Musculoskeletal: Normal range of motion.   Neurological: She is alert and oriented to person, place, and time. She has normal reflexes.   Skin: Skin is warm and dry.   Psychiatric: She has a normal mood and affect. Her behavior is normal. Judgment and thought content normal.   Nursing note and vitals reviewed.      Significant Labs:   CBC:   Recent Labs   Lab 09/03/19 0623 09/04/19 0621   WBC 22.38* 25.68*   HGB 9.0* 9.1*   HCT 27.7* 28.9*    283     CMP:   Recent Labs   Lab 09/03/19 0623 09/04/19 0621    135*   K 4.3 4.2   CL 95 94*   CO2 28 27   * 299*   BUN 36* 39*   CREATININE 1.2 1.4   CALCIUM 9.3 8.8   PROT  --  6.2   ALBUMIN  --  3.9   BILITOT  --  0.3   ALKPHOS  --  61   AST  --  12   ALT  --  29   ANIONGAP 13 14   EGFRNONAA 42* 35*     Recent Labs   Lab 09/01/19 0051 09/01/19  1404   CPK 27  27  --   --    CPKMB 1.5  --   --    TROPONINI 0.035*   < > 0.026   MB 5.6*  --   --     < > = values in this interval not displayed.     Lactic acid 1.3>0.9>2.4>4.1  Lab Results   Component Value Date    INR 1.1 09/01/2019    INR 1.2 01/22/2019    INR 1.2 11/26/2018     BNP  Recent Labs   Lab 09/01/19  0051   *         U/a 2+ leuko (4 WBC) mod bacteria    Blood cultures NGTD x 2    CXR No acute process.    EKG Sinus tachycardia  Left axis deviation  Septal infarct (cited on or before 23-MAR-2019)  Abnormal ECG  When compared with ECG of 23-MAR-2019 01:08,  Premature ventricular complexes are no longer Present  ST now depressed in Inferior leads  ST now depressed in Lateral  leads      Assessment/Plan:      * COPD exacerbation  Supplemental O2 via nasal canula; titrate O2 saturation to >92%.   Start Solumedrol>> wean today as WBC is elevated   Pulmonary consultation.   Continue beta 2 agonist bronchodilator treatments.   Continue IV antibiotics.  Continue routine medications as before.     Now down to 3 L Nasal cannula POX 86-90% ; she is at home on 3 L/trilogy   Day 4 rocephin and azithromycin  Home today on steroid taper             Subconjunctival hemorrhage of right eye  She is on eliquis   Cold packs  Avoid scratching .      Diabetes mellitus type 2, uncontrolled  Lab Results   Component Value Date    HGBA1C 7.0 (H) 09/03/2019       Worse with steroids .  -315  Weaning steroids so should improve  Accu check AC and hs with low dose correction scale  Monitor for now  No meds at home for DM- home on levemir 10 units daily while on steroids.     Chronic CHF  Cont ARB, diltiazem, and hold lasix for now- hold lasix till Sat repeat bmp Monday    Anemia  Seems chronic - stable  Will follow CBC repeat Monday  Keep her on protonix.  Lab Results   Component Value Date    WBC 25.68 (H) 09/04/2019    HGB 9.1 (L) 09/04/2019    HCT 28.9 (L) 09/04/2019     (H) 09/04/2019     09/04/2019               Acute on chronic congestive heart failure  Resumed lasix upon admission .  BNP  Recent Labs   Lab 09/01/19  0051   *       Creat 0.9>1.2 with GFR 60>42- will hold lasix till Sat. Repeat bmp monday    Atrial fibrillation with rapid ventricular response  Resume eliquis and cardizem .  HR     Chronic respiratory failure with hypoxia  Continue O2- uses 3L at home.  She uses home trilegy .      Dyslipidemia  Resume her statin .      CKD (chronic kidney disease), stage III  BMP  Lab Results   Component Value Date     (L) 09/04/2019    K 4.2 09/04/2019    CL 94 (L) 09/04/2019    CO2 27 09/04/2019    BUN 39 (H) 09/04/2019    CREATININE 1.4 09/04/2019    CALCIUM 8.8  09/04/2019    ANIONGAP 14 09/04/2019    ESTGFRAFRICA 41 (A) 09/04/2019    EGFRNONAA 35 (A) 09/04/2019     Looks like she is getting a little dry. Hold lasix today and resume Sat.  already given on 9/3-   Repeat BMP Monday f/u PCP Tuesday    Anxiety state  Resume her citalopram.      HTN (hypertension)  Resume her home anti hypertensives.  //65.          VTE Risk Mitigation (From admission, onward)        Ordered     apixaban tablet 2.5 mg  2 times daily      09/02/19 0849     IP VTE HIGH RISK PATIENT  Once      09/01/19 0217     Place sequential compression device  Until discontinued      09/01/19 0217                Anson Leiva MD  Department of Hospital Medicine   [facility]

## 2019-09-04 NOTE — PROGRESS NOTES
[facility]  Pulmonology  Progress Note    Patient Name: Chantell Herrera  MRN: [id number]  Admission Date: 9/1/2019  Hospital Length of Stay: 3 days  Code Status: Full Code  Attending Provider: Milo Estevez MD  Primary Care Provider: Kari Gaspar MD   Principal Problem: COPD exacerbation    Subjective:     Interval History: copd better breathing     Objective:     Vital Signs (Most Recent):  Temp: 98.3 °F (36.8 °C) (09/04/19 0702)  Pulse: 73 (09/04/19 1000)  Resp: 18 (09/04/19 0702)  BP: (!) 102/55 (09/04/19 0702)  SpO2: (!) 90 % (09/04/19 0702) Vital Signs (24h Range):  Temp:  [96.3 °F (35.7 °C)-98.3 °F (36.8 °C)] 98.3 °F (36.8 °C)  Pulse:  [] 73  Resp:  [16-22] 18  SpO2:  [86 %-93 %] 90 %  BP: (102-132)/(55-66) 102/55     Weight: 69.5 kg (153 lb 3.5 oz)  Body mass index is 28.02 kg/m².      Intake/Output Summary (Last 24 hours) at 9/4/2019 1100  Last data filed at 9/4/2019 0900  Gross per 24 hour   Intake 980 ml   Output --   Net 980 ml       Physical Exam   HENT:   Right Ear: Tympanic membrane is not injected.   Left Ear: Tympanic membrane is not injected.   Cardiovascular: S1 normal, S2 normal and normal heart sounds.   No murmur heard.  Pulmonary/Chest: No accessory muscle usage. No respiratory distress. She has decreased breath sounds in the right upper field, the right middle field, the right lower field, the left upper field, the left middle field and the left lower field. She has no wheezes. She has no rhonchi. She has no rales.   Abdominal: There is no rigidity, no CVA tenderness and negative Londono's sign.           Vents:  Oxygen Concentration (%): 32 (09/04/19 0002)    Lines/Drains/Airways     Peripheral Intravenous Line                 Peripheral IV - Single Lumen 09/01/19 2005 22 G Left;Posterior Hand 2 days                Significant Labs:    CBC/Anemia Profile:  Recent Labs   Lab 09/03/19  0623 09/04/19  0621   WBC 22.38* 25.68*   HGB 9.0* 9.1*   HCT 27.7* 28.9*     283   * 119*   RDW 15.4* 15.8*        Chemistries:  Recent Labs   Lab 09/03/19  0623 09/04/19  0621    135*   K 4.3 4.2   CL 95 94*   CO2 28 27   BUN 36* 39*   CREATININE 1.2 1.4   CALCIUM 9.3 8.8   ALBUMIN  --  3.9   PROT  --  6.2   BILITOT  --  0.3   ALKPHOS  --  61   ALT  --  29   AST  --  12       All pertinent labs within the past 24 hours have been reviewed.    Significant Imaging:  I have reviewed all pertinent imaging results/findings within the past 24 hours.    Assessment/Plan:     * COPD exacerbation  Severe copd and pulmonary heart disease ?? Compliance     Chronic respiratory failure with hypoxia  On trilogy 600 Tv from 500 tolerated well     Anemia  8.8 hb     Acute on chronic congestive heart failure  Low o2 will follow clinically    Atrial fibrillation with rapid ventricular response  Stable            Emmanuel Hickman MD  Pulmonology  [facility]

## 2019-09-04 NOTE — ASSESSMENT & PLAN NOTE
Resumed lasix upon admission .  BNP  Recent Labs   Lab 09/01/19  0051   *       Creat 0.9>1.2 with GFR 60>42- will hold lasix till Sat. Repeat bmp monday

## 2019-09-04 NOTE — PLAN OF CARE
Problem: Adult Inpatient Plan of Care  Goal: Plan of Care Review  A/OX3, Remains sob coarse bs with rhonchi wheezing dry cough 02 3l/min via n/c alternating with trilogy servely sob with activity, Fall precautions in place bsc, DM education provided.

## 2019-09-04 NOTE — ASSESSMENT & PLAN NOTE
Supplemental O2 via nasal canula; titrate O2 saturation to >92%.   Start Solumedrol>> wean today as WBC is elevated   Pulmonary consultation.   Continue beta 2 agonist bronchodilator treatments.   Continue IV antibiotics.  Continue routine medications as before.     Now down to 3 L Nasal cannula POX 86-90% ; she is at home on 3 L/trilogy   Day 4 rocephin and azithromycin  Home today on steroid taper

## 2019-09-04 NOTE — NURSING
Patient tachycardic irregular apicaal asymptomatic, notified Dr. Estevez of findings, ordered diltiazem 240mg x1 dose.

## 2019-09-05 ENCOUNTER — PATIENT OUTREACH (OUTPATIENT)
Dept: ADMINISTRATIVE | Facility: CLINIC | Age: 84
End: 2019-09-05

## 2019-09-05 ENCOUNTER — OUTPATIENT CASE MANAGEMENT (OUTPATIENT)
Dept: ADMINISTRATIVE | Facility: OTHER | Age: 84
End: 2019-09-05

## 2019-09-05 NOTE — PROGRESS NOTES
Summary:Pt reports that she is doing fine. Pt reports that she has some swelling in her lower extremities. Pt reports that she uses the oxygen 24/7. Pt reports that she has SOB on exertion. PHQ-2 results of 0 at this time. Pt reports that her blood sugar this morning was 177. Pt reports that her BS before lunch was 209. Pt reports that she is does not have diabetes, but her blood sugar is elevated due to the steriods. Pt reports that she received steriods while in the hospital due to having a respiratory infection.Reviewed med req with pt. Pt reports that she takes her medications as prescribed.Pt verbalized how to take the prednisone post hospitalization. Pt reports that she has all of her medications. Pt reports that she did not have pneumonia. Problem list review Revied with pt: COPD, oxygen dependent, excessive involuntary blinking, anxiety state, RLS, HTN, hyperlipidemia, chronic CHF, CKD stage III, DM, elevated d-dimer, and cholelithiases. Pt reports that she has a cough. Pt reports that she is bringing up white secretions at times. Pt denies having a fever. Pt reports that her temperatures were not elevated while in the hospital. Pt reports that she has had no falls within 12 months. Pt reports that she requires some assistance with ADL's and iADL's at this time. Pt reports that her daughter prepared a sandwich for her lunch today. Pt reports that she has her lowe extremities elevated due to the edema. Pt reports that she is not receiving home health at this time. Pt reports that she is not in need of any additional services or equipment at this time. Pt agrees to OPCM services at this time. Pt reports that she has a diaster plan.  Next follow-up scheduled with pt: 1 week  Pt agreed: yes    Interventions: Reviewed with pt the OPCM services.                         Reviewed with pt some elements of diabetic diet.                          PHQ-2 completed                         Med rec reviewed with  pt.    Plan: Review with pt s/s of infection.            Review ways to decrease infections.    Todays OPCM Self-Management Care Plan was developed with the patients/caregivers input and was based on identified barriers from todays assessment.  Goals were written today with the patient/caregiver and the patient has agreed to work towards these goals to improve his/her overall well-being. Patient verbalized understanding of the care plan, goals, and all of today's instructions. Encouraged patient/caregiver to communicate with his/her physician and health care team about health conditions and the treatment plan.  Provided my contact information today and encouraged patient/caregiver to call me with any questions as needed.

## 2019-09-05 NOTE — PATIENT INSTRUCTIONS
Chronic Lung Disease: Preventing Lung Infections  Chronic lung diseases include chronic obstructive pulmonary disease (COPD), which includes chronic bronchitis and emphysema. Other chronic lung diseases include pulmonary fibrosis, sarcoidosis, and other conditions. When you have chronic lung diseases, it's very important to protect yourself from respiratory infections, like colds, the flu, and lung infections. Infections may cause your lung condition to worsen. Although you can't completely avoid them, there are things you can do to lessen the chance of infections.    Take precautions  Taking the following precautions can help you avoid illness:  · Remember to keep your hands away from your nose and mouth. Germs on your hands get into your respiratory system this way.  · Wash your hands often. When you wash them:  ¨ Use soap and warm water.  ¨ Rub your hands together well for at least 20 seconds.  ¨ Make sure to rinse them well.  ¨ Dry your hands on clean towels or air-dry them.  · Use hand  containing alcohol, if you are unable to wash your hands. Use the  after touching doorknobs, handles, and supermarket carts, for example, since lots of people touch them. Then wash your hands as soon as you can.  · To help prevent the flu, get a flu vaccination every year. This may be given at your healthcare provider's office, a drugstore, or pharmacy, or at work. Get your flu shot as soon as the vaccines are available in your area. This is usually around September each year.  · To help prevent pneumococcal pneumonia, get pneumonia vaccinations. Talk with your healthcare provider about which pneumococcal vaccinations you need.  · Try to stay away from people with respiratory infections, such as colds or the flu. Stay away from crowded places, like shopping centers or movie theatres during cold and flu season.  · If you smoke, think about quitting. In addition to causing or worsening many lung conditions, the  lung damage from smoking increases your risk of infections. Stay away from others who smoke, too. This is also harmful and increases your chance of infections.  Date Last Reviewed: 4/14/2016  © 4922-5300 [employer]. [address] [place] [address], [place] [address]. All rights reserved. This information is not intended as a substitute for professional medical care. Always follow your healthcare professional's instructions.        COPD Flare    You have had a flare-up of your COPD.  COPD, or chronic obstructive pulmonary disease, is a common lung disease. It causes your airways to become irritated and narrower. This makes it harder for you to breathe. Emphysema and chronic bronchitis are both types of COPD. This is a chronic condition, which means you always have it. Sometimes it gets worse. When this happens, it is called a flare-up.  Symptoms of COPD  People with COPD may have symptoms most of the time. In a flare-up, your symptoms get worse. These symptoms may mean you are having a flare-up:  · Shortness of breath, shallow or rapid breathing, or wheezing that gets worse  · Lung infection  · Cough that gets worse  · More mucus, thicker mucus or mucus of a different color  · Tiredness, decreased energy, or trouble doing your usual activities  · Fever  · Chest tightness  · Your symptoms dont get better even when you use your usual medicines, inhalers, and nebulizer  · Trouble talking  · You feel confused  Causes of flare-ups  Unfortunately, a flare-up can happen even though you did everything right, and you followed your doctors instructions. Some causes of flare-ups are:  · Smoking or secondhand smoke  · Colds, the flu, or respiratory infections  · Air pollution  · Sudden change in the weather  · Dust, irritating chemicals, or strong fumes  · Not taking your medicines as prescribed  Home care  Here are some things you can do at home to treat a flare-up:  · Try not to panic. This makes it harder to breathe,  and keeps you from doing the right things.  · Dont smoke or be around others who are smoking.  · Try to drink more fluids than usual during a flare-up, unless your doctor has told you not to because of heart and kidney problems. More fluids can help loosen the mucus.  · Use your inhalers and nebulizer, if you have one, as you have been told to.  · If you were given antibiotics, take them until they are used up or your doctor tells you to stop. Its important to finish the antibiotics, even though you feel better. This will make sure the infection has cleared.  · If you were given prednisone or another steroid, finish it even if you feel better.  Preventing a flare-up  Even though flare-ups happen, the best way to treat one is to prevent it before it starts. Here are some pointers:  · Dont smoke or be around others who are smoking.  · Take your medicines as you have been told.  · Talk with your doctor about getting a flu shot every year. Also find out if you need a pneumonia shot.  · If there is a weather advisory warning to stay indoors, try to stay inside when possible.  · Try to eat healthy and get plenty of sleep.  · Try to avoid things that usually set you off, like dust, chemical fumes, hairsprays, or strong perfumes.  Follow-up care  Follow up with your healthcare provider, or as advised.  If a culture was done, you will be told if your treatment needs to be changed. You can call as directed for the results.  If X-rays were done, you will be notified of any new findings that may affect your care.  Call 911  Call 911 if any of these occur:  · You have trouble breathing  · You feel confused or its difficult to wake you up  · You faint or lose consciousness  · You have a rapid heart rate  · You have new pain in your chest, arm, shoulder, neck or upper back  When to seek medical advice  Call your healthcare provider right away if any of these occur:  · Wheezing or shortness of breath gets worse  · You need to  use your inhalers more often than usual without relief  · Fever of 100.4°F (38ºC) or higher, or as directed by your healthcare provider  · Coughing up lots of dark-colored or bloody mucus (sputum)  · Chest pain with each breath  · You do not start to get better within 24 hours  · Swelling of your ankles gets worse  · Dizziness or weakness  Date Last Reviewed: 9/1/2016  © 3441-5584 [employer]. [address] [place] [address], [place] [address]. All rights reserved. This information is not intended as a substitute for professional medical care. Always follow your healthcare professional's instructions.        Chronic Lung Disease: Coping Tips for Caregivers    When someone you love has chronic lung disease, such as COPD, it can change both of your lives. As a caregiver, you may have to support your loved one in new ways. This may be true whether you are caring for your spouse, your partner, a family member, or a friend. Learning some ways to cope can help you and your loved one.  Coping with your emotions  Its normal to feel a range of emotions. You may feel sad, or afraid. At times, you may be angry, or frustrated. Its important to accept these feelings. They are normal. If you find yourself feeling stressed, seek help by reaching out to family members or friends. Also, speak with a healthcare provider. He or she can refer you to a counselor and other support services.  Taking care of yourself  Take time to manage your health and to refresh your mind and spirit. This gives you the energy to do your daily routine. Here are some basics that are important to your health:  · Get enough sleep. Aim for 8 hours a day. Keep naps short so you can sleep at night. Limit alcohol and caffeine. These can affect how well you sleep.  · Eat right. What you eat affects how you feel. Dont skip meals. Eat balanced meals with whole grains, fruits and vegetables, and low-fat meat and dairy products.  · Exercise. Try for at  least 30 minutes of physical activity a day. Breaking up your activity into three 10-minute sessions can make it easier.  Making time for you  Your needs are also important. Give yourself permission to maintain a life of your own. Take breaks from caregiving to relax and have fun. Here are some suggestions:  · Share a meal with a friend.  · Think about ways to relax. Take a walk, try yoga, deep breathing, or meditation.  · Create a space in your home where you can be alone when needed.  · Try to get away, even if it's just for a short time. Go to a friend's house, or take a drive or a long walk.  Accepting help  You may need help with caregiving. Knowing you can count on others can be a relief. Keep these tips in mind:  · Make a list of things other people can help with. Ask family and friends to handle tasks such as running errands, giving rides, or preparing meals.  · Make use of adult  and respite services, and home health aide programs. These provide temporary care for your loved one, when needed. Check the Internet or the phone book for organizations.  Support groups  Look for resources in your area:  · There may be a support group for people with chronic lung disease and their caregivers. This can help you feel that youre not alone. Youll learn coping strategies and get advice from others going through the same things you are.  · Religious and harriet-based organizations may be a source of strength and support for you also. Reach out to a leader in your harriet community to discuss your spiritual needs.  · The American Lung Association has an online support community. Their website is www.lung.org. There is a link from the Lung Disease page.  Date Last Reviewed: 5/1/2016  © 6219-5983 [employer] [address] [place] [address], [place] [address]. All rights reserved. This information is not intended as a substitute for professional medical care. Always follow your healthcare professional's  instructions.        Chronic Lung Disease, Day-to-Day Care: for Caregivers     A waterproof stool and grab bar make the shower safe.   Chronic lung disease may include COPD, which is chronic bronchitis and emphysema, pulmonary fibrosis, or sarcoidosis. With chronic lung disease, even getting in and out of bed can be difficult. Coping with the physical limitations of these conditions can be a challenge for both patient and caregiver. But you can each take steps to simplify your daily routine.  Living healthier  Try to stay in good health. For example:  · Encourage each other to stay active. Think of ways to be active together. You might go for a walk or do some gentle stretching exercises. Or, try some chair exercises. For example, you can lift your legs up and down while sitting.  ¨ Physical therapy. Your loved one may benefit from physical therapy or PT. Talk with your healthcare provider about it.  ¨ Pulmonary rehabilitation or rehab. Look into programs in your area and encourage your loved one to participate. These programs help those with chronic lung conditions to better deal with their conditions and stay as active as possible.  · Help each other plan and prepare meals.  ¨ Try to choose healthy foods. There are a lot of very unhealthy choices, but there are also healthier versions of most foods. There are low-fat, low-salt frozen dinners for example.  ¨ Although your loved one may be limited, think of ways he or she can help. For example, sitting at the table to cut vegetables.  · Try to manage stress. Talk with your healthcare providers if you are having a difficult time managing stress, or are feeling depressed or anxious. You might consider yoga, deep-breathing, or meditation. Learning to relax and to breathe more slowly can help both of you.  · Get enough sleep. Both of you should try to get enough sleep each night. A short nap may be OK, but too much napping makes it harder to sleep at night. If symptoms  are making it hard for your loved one to sleep, talk with his or her healthcare provider.  Making living spaces safe  Even a few changes can make your home safer. Try these tips:  · Getting help. Therapists can help you with home safety issues. Talk with your healthcare provider about home physical and occupational therapy.  · In the bathroom, make changes to modify the shower or tub for safety. Get a waterproof stool and handheld water nozzle. Install grab bars to make getting in and out easier. Use only non-slip bath mats or rugs.  · In the kitchen, make sure items that you use often, such as dishes and silverware, are within reach. Keep these items as close to counter-height as possible. Bending or reaching for items can cause injuries to your loved one.  · Through the house. Make sure floors are safe. And, remove throw rugs to prevent tripping. Make sure all areas are well lit. Install grab bars and hand rails.  If you or your loved one smokes  Smoking worsens symptoms and harms health. If either of you smokes, its time to stop. Speak with a healthcare provider or call the American Lung Association to learn about smoking cessation programs. Also, try the following:  · Make a list of reasons to quit. Keep the list handy and read it often.  · List the places, feelings, and things that make you want to smoke. Think of ways to avoid them.  · Think about what you can do instead of smoking. Again, make a list, and then use one of the items on the list, if you want to smoke. For example, you might go for a walk, call a friend, or chew a piece of sugarless gum.  · Ask your healthcare provider or pharmacist about medications to help you stop smoking. Some are available over-the-counter and some only by prescription.  Date Last Reviewed: 5/1/2016  © 3287-9648 [employer]. [address] [place] [address], [place] [address]. All rights reserved. This information is not intended as a substitute for professional medical  care. Always follow your healthcare professional's instructions.        Controlling Other Triggers  Many things can trigger symptoms in people with respiratory conditions like asthma or COPD. They may be allergens such as mold, pollens, or dust. Or they may be irritants such as smoke or strong odors. You may find there are things that trigger symptoms that arent allergens or irritants. These include weather changes, illness, stress, and exercise. The tips below can help to ease your symptoms.  Weather  Certain weather conditions can trigger symptoms. Or they can make other triggers worse.  · Keep track of weather conditions that affect you. Very high or low temperatures, or high humidity, can make symptoms worse. For some people major changes in weather can be a trigger.  · Limit outdoor activity during the type of weather that affects you.  · Wear a scarf over your mouth and nose in cold weather.  Colds, flu, and sinus infections    Illnesses that affect the nose, throat, and sinuses can irritate your lungs. These illnesses are called upper respiratory infections. They can cause asthma flare-ups.  · Wash your hands often with soap and warm water. Or use an alcohol-based hand .  · Get a yearly flu shot. And talk with your healthcare provider about whether you should get the pneumonia vaccine.  · Take care of your general health. Get plenty of sleep. And eat a healthy, balanced diet with lots of fruit and vegetables.  Food additives  Food additives can trigger asthma flare-ups in some people.  · Check food labels for sulfites, metabisulfites, and sulfur dioxide. These are often found in foods such as wine, beer, and dried fruits.  · Do not eat foods that contain these additives if they trigger your asthma.  Medicines  Some medicines may cause symptoms in some people with asthma. These include aspirin, nonsteroidal anti-inflammatory drugs (NSAIDs) such as ibuprofen and naproxen, and some beta-blockers.  · Tell  your healthcare provider if you think certain medicines trigger symptoms.  · Make sure to read the labels on over-the-counter medicines. They may have ingredients that cause symptoms for you.  Emotions  Laughing, crying, or feeling excited are triggers for some people.  · Try this breathing exercise to stay calm: Start by breathing in slowly through your nose for a count of 2 seconds. Then close your lips and breathe out for a count of 4 seconds.  · Try to focus on a soothing image in your mind. This will help relax you and calm your breathing.  · Remember to take your daily controller medicines. When you are upset or under stress, its easy to forget.  Exercise  For some people, exercise can trigger symptoms. Dont let this stop you from being active. As you know, exercise is good for both your overall health and your lung health. It also strengthens the heart and blood vessels. It also may lower your sensitivity to triggers. These tips and your healthcare provider's advice can help:  · If you have not been exercising regularly, start slow and do more gradually.  · Take all of your medicines as prescribed.  · If you use quick-relief medicine, make sure you have it with you when you exercise.   · Ask your healthcare provider if you might benefit from taking your quick-relief medicine before you exercise.  · Stop if you have any symptoms. Make sure you talk with your healthcare provider about these symptoms.  Date Last Reviewed: 12/1/2016  © 3595-5547 [employer]. [address] [place] [address], [place] [address]. All rights reserved. This information is not intended as a substitute for professional medical care. Always follow your healthcare professional's instructions.

## 2019-09-05 NOTE — PROGRESS NOTES
C3 nurse attempted to contact patient. No answer. The following message was left for the patient to return the call:  Good morning  I am a nurse calling on behalf of [facility] from the [facility].  This is a Transitional Care Call for Chantell. When you have a moment please contact us at [phone] or [phone] Monday through Friday, between the hours of 8 am to 4 pm. We look forward to speaking with you. On behalf of [facility] have a nice day.    The patient has a scheduled HOSFU appointment with Kari Gaspar MD on 09/10/19 @ 9960. Message sent to Physician staff.

## 2019-09-05 NOTE — PATIENT INSTRUCTIONS
Discharge Instructions: COPD  You have been diagnosed with chronic obstructive pulmonary disease (COPD). This is a name given to a group of diseases that limit the flow of air in and out of your lungs. This makes it harder to breathe. With COPD, you are also more likely to get lung infections. COPD includes chronic bronchitis and emphysema. COPD is most often caused by heavy, long-term cigarette smoking.  Home care  Quit smoking  · If you smoke, quit. It is the best thing you can do for your COPD and your overall health.  · Join a stop-smoking program. There are even telephone, text message, and Internet programs to help you quit.  · Ask your healthcare provider about medicines or other methods to help you quit.  · Ask family members to quit smoking as well.  · Don't allow people to smoke in your home, in your car, or when they are around you.  Protect yourself from infection  · Wash your hands often. Do your best to keep your hands away from your face. Most germs are spread from your hands to your mouth.  · Get a flu shot every year. Also ask your provider about pneumonia vaccines.  · Avoid crowds. It's especially important to do this in the winter when more people have colds and flu.  · To stay healthy, get enough sleep, exercise regularly, and eat a balanced diet. You should:  ¨ Get about 8 hours of sleep every night.  ¨ Try to exercise for at least 30 minutes on most days.  ¨ Have healthy foods including fruits and vegetables, 100% whole grains, lean meats and fish, and low-fat dairy products. Try to stay away from foods high in fats and sugar.  Take your medicines  Take your medicines exactly as directed. Don't skip doses.  Manage your stress  Stress can make COPD worse. Use this stress management technique:  · Find a quiet place and sit or lie in a comfortable position.  · Close your eyes and perform breathing exercises for several minutes. Ask your provider about the best way to breathe.  Pulmonary  rehabilitation  · Pulmonary rehab can help you feel better. These programs include exercise, breathing techniques, information about COPD, counseling, and help for smokers.  · Ask your provider or your local hospital about programs in your area.  When to call your healthcare provider  Call your provider immediately if you have any of the following:  · Shortness of breath, wheezing, or coughing  · Increased mucus  · Yellow, green, bloody, or smelly mucus  · Fever or chills  · Tightness in your chest that does not go away with rest or medicine  · An irregular heartbeat or a feeling that your heart is beating very fast  · Swollen ankles   Date Last Reviewed: 5/1/2016 © 2000-2017 [employer]. [address] [place] [address], [place] [address]. All rights reserved. This information is not intended as a substitute for professional medical care. Always follow your healthcare professional's instructions.        Discharge Instructions: COPD  You have been diagnosed with chronic obstructive pulmonary disease (COPD). This is a name given to a group of diseases that limit the flow of air in and out of your lungs. This makes it harder to breathe. With COPD, you are also more likely to get lung infections. COPD includes chronic bronchitis and emphysema. COPD is most often caused by heavy, long-term cigarette smoking.  Home care  Quit smoking  · If you smoke, quit. It is the best thing you can do for your COPD and your overall health.  · Join a stop-smoking program. There are even telephone, text message, and Internet programs to help you quit.  · Ask your healthcare provider about medicines or other methods to help you quit.  · Ask family members to quit smoking as well.  · Don't allow people to smoke in your home, in your car, or when they are around you.  Protect yourself from infection  · Wash your hands often. Do your best to keep your hands away from your face. Most germs are spread from your hands to your  mouth.  · Get a flu shot every year. Also ask your provider about pneumonia vaccines.  · Avoid crowds. It's especially important to do this in the winter when more people have colds and flu.  · To stay healthy, get enough sleep, exercise regularly, and eat a balanced diet. You should:  ¨ Get about 8 hours of sleep every night.  ¨ Try to exercise for at least 30 minutes on most days.  ¨ Have healthy foods including fruits and vegetables, 100% whole grains, lean meats and fish, and low-fat dairy products. Try to stay away from foods high in fats and sugar.  Take your medicines  Take your medicines exactly as directed. Don't skip doses.  Manage your stress  Stress can make COPD worse. Use this stress management technique:  · Find a quiet place and sit or lie in a comfortable position.  · Close your eyes and perform breathing exercises for several minutes. Ask your provider about the best way to breathe.  Pulmonary rehabilitation  · Pulmonary rehab can help you feel better. These programs include exercise, breathing techniques, information about COPD, counseling, and help for smokers.  · Ask your provider or your local hospital about programs in your area.  When to call your healthcare provider  Call your provider immediately if you have any of the following:  · Shortness of breath, wheezing, or coughing  · Increased mucus  · Yellow, green, bloody, or smelly mucus  · Fever or chills  · Tightness in your chest that does not go away with rest or medicine  · An irregular heartbeat or a feeling that your heart is beating very fast  · Swollen ankles   Date Last Reviewed: 5/1/2016  © 9220-9532 [employer]. [address] [place] [address], [place] [address]. All rights reserved. This information is not intended as a substitute for professional medical care. Always follow your healthcare professional's instructions.        Discharge Instructions: COPD  You have been diagnosed with chronic obstructive pulmonary disease  (COPD). This is a name given to a group of diseases that limit the flow of air in and out of your lungs. This makes it harder to breathe. With COPD, you are also more likely to get lung infections. COPD includes chronic bronchitis and emphysema. COPD is most often caused by heavy, long-term cigarette smoking.  Home care  Quit smoking  · If you smoke, quit. It is the best thing you can do for your COPD and your overall health.  · Join a stop-smoking program. There are even telephone, text message, and Internet programs to help you quit.  · Ask your healthcare provider about medicines or other methods to help you quit.  · Ask family members to quit smoking as well.  · Don't allow people to smoke in your home, in your car, or when they are around you.  Protect yourself from infection  · Wash your hands often. Do your best to keep your hands away from your face. Most germs are spread from your hands to your mouth.  · Get a flu shot every year. Also ask your provider about pneumonia vaccines.  · Avoid crowds. It's especially important to do this in the winter when more people have colds and flu.  · To stay healthy, get enough sleep, exercise regularly, and eat a balanced diet. You should:  ¨ Get about 8 hours of sleep every night.  ¨ Try to exercise for at least 30 minutes on most days.  ¨ Have healthy foods including fruits and vegetables, 100% whole grains, lean meats and fish, and low-fat dairy products. Try to stay away from foods high in fats and sugar.  Take your medicines  Take your medicines exactly as directed. Don't skip doses.  Manage your stress  Stress can make COPD worse. Use this stress management technique:  · Find a quiet place and sit or lie in a comfortable position.  · Close your eyes and perform breathing exercises for several minutes. Ask your provider about the best way to breathe.  Pulmonary rehabilitation  · Pulmonary rehab can help you feel better. These programs include exercise, breathing  techniques, information about COPD, counseling, and help for smokers.  · Ask your provider or your local hospital about programs in your area.  When to call your healthcare provider  Call your provider immediately if you have any of the following:  · Shortness of breath, wheezing, or coughing  · Increased mucus  · Yellow, green, bloody, or smelly mucus  · Fever or chills  · Tightness in your chest that does not go away with rest or medicine  · An irregular heartbeat or a feeling that your heart is beating very fast  · Swollen ankles   Date Last Reviewed: 5/1/2016  © 9273-4063 [employer]. [address] [place] [address], [place] [address]. All rights reserved. This information is not intended as a substitute for professional medical care. Always follow your healthcare professional's instructions.

## 2019-09-06 LAB
BACTERIA BLD CULT: NORMAL
BACTERIA BLD CULT: NORMAL

## 2019-09-10 ENCOUNTER — LAB VISIT (OUTPATIENT)
Dept: LAB | Facility: HOSPITAL | Age: 84
End: 2019-09-10
Attending: FAMILY MEDICINE
Payer: MEDICARE

## 2019-09-10 ENCOUNTER — OFFICE VISIT (OUTPATIENT)
Dept: FAMILY MEDICINE | Facility: CLINIC | Age: 84
End: 2019-09-10
Payer: MEDICARE

## 2019-09-10 VITALS
HEIGHT: 62 IN | DIASTOLIC BLOOD PRESSURE: 70 MMHG | SYSTOLIC BLOOD PRESSURE: 128 MMHG | RESPIRATION RATE: 20 BRPM | HEART RATE: 76 BPM | BODY MASS INDEX: 25.4 KG/M2 | WEIGHT: 138 LBS

## 2019-09-10 DIAGNOSIS — D72.829 LEUKOCYTOSIS, UNSPECIFIED TYPE: ICD-10-CM

## 2019-09-10 DIAGNOSIS — N28.9 RENAL INSUFFICIENCY: Primary | ICD-10-CM

## 2019-09-10 DIAGNOSIS — F32.5 MAJOR DEPRESSIVE DISORDER WITH SINGLE EPISODE, IN FULL REMISSION: ICD-10-CM

## 2019-09-10 DIAGNOSIS — N28.9 RENAL INSUFFICIENCY: ICD-10-CM

## 2019-09-10 LAB
ALBUMIN SERPL BCP-MCNC: 3.9 G/DL (ref 3.5–5.2)
ALP SERPL-CCNC: 58 U/L (ref 55–135)
ALT SERPL W/O P-5'-P-CCNC: 23 U/L (ref 10–44)
ANION GAP SERPL CALC-SCNC: 10 MMOL/L (ref 8–16)
ANISOCYTOSIS BLD QL SMEAR: SLIGHT
AST SERPL-CCNC: 10 U/L (ref 10–40)
BASOPHILS # BLD AUTO: ABNORMAL K/UL (ref 0–0.2)
BASOPHILS NFR BLD: 0 % (ref 0–1.9)
BILIRUB SERPL-MCNC: 0.7 MG/DL (ref 0.1–1)
BUN SERPL-MCNC: 25 MG/DL (ref 8–23)
CALCIUM SERPL-MCNC: 8.8 MG/DL (ref 8.7–10.5)
CHLORIDE SERPL-SCNC: 93 MMOL/L (ref 95–110)
CO2 SERPL-SCNC: 31 MMOL/L (ref 23–29)
CREAT SERPL-MCNC: 1 MG/DL (ref 0.5–1.4)
DACRYOCYTES BLD QL SMEAR: ABNORMAL
DIFFERENTIAL METHOD: ABNORMAL
EOSINOPHIL # BLD AUTO: ABNORMAL K/UL (ref 0–0.5)
EOSINOPHIL NFR BLD: 0 % (ref 0–8)
ERYTHROCYTE [DISTWIDTH] IN BLOOD BY AUTOMATED COUNT: 14.9 % (ref 11.5–14.5)
EST. GFR  (AFRICAN AMERICAN): >60 ML/MIN/1.73 M^2
EST. GFR  (NON AFRICAN AMERICAN): 53 ML/MIN/1.73 M^2
GIANT PLATELETS BLD QL SMEAR: PRESENT
GLUCOSE SERPL-MCNC: 198 MG/DL (ref 70–110)
HCT VFR BLD AUTO: 30.1 % (ref 37–48.5)
HGB BLD-MCNC: 9.8 G/DL (ref 12–16)
IMM GRANULOCYTES # BLD AUTO: ABNORMAL K/UL (ref 0–0.04)
IMM GRANULOCYTES NFR BLD AUTO: ABNORMAL % (ref 0–0.5)
LYMPHOCYTES # BLD AUTO: ABNORMAL K/UL (ref 1–4.8)
LYMPHOCYTES NFR BLD: 6 % (ref 18–48)
MCH RBC QN AUTO: 38 PG (ref 27–31)
MCHC RBC AUTO-ENTMCNC: 32.6 G/DL (ref 32–36)
MCV RBC AUTO: 117 FL (ref 82–98)
MONOCYTES # BLD AUTO: ABNORMAL K/UL (ref 0.3–1)
MONOCYTES NFR BLD: 8 % (ref 4–15)
NEUTROPHILS NFR BLD: 82 % (ref 38–73)
NEUTS BAND NFR BLD MANUAL: 4 %
NRBC BLD-RTO: 0 /100 WBC
OVALOCYTES BLD QL SMEAR: ABNORMAL
PLATELET # BLD AUTO: 294 K/UL (ref 150–350)
PLATELET BLD QL SMEAR: ABNORMAL
PMV BLD AUTO: 12.2 FL (ref 9.2–12.9)
POIKILOCYTOSIS BLD QL SMEAR: SLIGHT
POTASSIUM SERPL-SCNC: 4.8 MMOL/L (ref 3.5–5.1)
PROT SERPL-MCNC: 6.1 G/DL (ref 6–8.4)
RBC # BLD AUTO: 2.58 M/UL (ref 4–5.4)
SODIUM SERPL-SCNC: 134 MMOL/L (ref 136–145)
WBC # BLD AUTO: 11.72 K/UL (ref 3.9–12.7)

## 2019-09-10 PROCEDURE — 99495 TCM SERVICES (MODERATE COMPLEXITY): ICD-10-PCS | Mod: HCNC,S$GLB,, | Performed by: FAMILY MEDICINE

## 2019-09-10 PROCEDURE — 90662 IIV NO PRSV INCREASED AG IM: CPT | Mod: HCNC,S$GLB,, | Performed by: FAMILY MEDICINE

## 2019-09-10 PROCEDURE — G0008 ADMIN INFLUENZA VIRUS VAC: HCPCS | Mod: HCNC,S$GLB,, | Performed by: FAMILY MEDICINE

## 2019-09-10 PROCEDURE — 85027 COMPLETE CBC AUTOMATED: CPT | Mod: HCNC

## 2019-09-10 PROCEDURE — 80053 COMPREHEN METABOLIC PANEL: CPT | Mod: HCNC

## 2019-09-10 PROCEDURE — 99495 TRANSJ CARE MGMT MOD F2F 14D: CPT | Mod: HCNC,S$GLB,, | Performed by: FAMILY MEDICINE

## 2019-09-10 PROCEDURE — 36415 COLL VENOUS BLD VENIPUNCTURE: CPT | Mod: HCNC

## 2019-09-10 PROCEDURE — 85007 BL SMEAR W/DIFF WBC COUNT: CPT | Mod: HCNC

## 2019-09-10 PROCEDURE — G0008 FLU VACCINE - HIGH DOSE (65+) PRESERVATIVE FREE IM: ICD-10-PCS | Mod: HCNC,S$GLB,, | Performed by: FAMILY MEDICINE

## 2019-09-10 PROCEDURE — 90662 FLU VACCINE - HIGH DOSE (65+) PRESERVATIVE FREE IM: ICD-10-PCS | Mod: HCNC,S$GLB,, | Performed by: FAMILY MEDICINE

## 2019-09-10 PROCEDURE — 99999 PR PBB SHADOW E&M-EST. PATIENT-LVL IV: ICD-10-PCS | Mod: PBBFAC,HCNC,, | Performed by: FAMILY MEDICINE

## 2019-09-10 PROCEDURE — 99999 PR PBB SHADOW E&M-EST. PATIENT-LVL IV: CPT | Mod: PBBFAC,HCNC,, | Performed by: FAMILY MEDICINE

## 2019-09-10 RX ORDER — TRAZODONE HYDROCHLORIDE 50 MG/1
TABLET ORAL
Qty: 90 TABLET | Refills: 1 | Status: ON HOLD | OUTPATIENT
Start: 2019-09-10 | End: 2020-01-20 | Stop reason: HOSPADM

## 2019-09-10 RX ORDER — CITALOPRAM 40 MG/1
TABLET, FILM COATED ORAL
Qty: 90 TABLET | Refills: 1 | Status: ON HOLD | OUTPATIENT
Start: 2019-09-10 | End: 2019-12-01 | Stop reason: HOSPADM

## 2019-09-10 RX ORDER — METOPROLOL TARTRATE 25 MG/1
TABLET, FILM COATED ORAL
COMMUNITY
Start: 2019-08-26 | End: 2019-09-10

## 2019-09-10 NOTE — PROGRESS NOTES
Transitional Care Note  Subjective:       Patient ID: Chantell Herrera is a 83 y.o. female.  Chief Complaint: Follow-up (Hospital follow up)    Family and/or Caretaker present at visit?  Yes.  Diagnostic tests reviewed/disposition: No diagnosic tests pending after this hospitalization.  Disease/illness education: recent copd exacerbation  Home health/community services discussion/referrals: Patient does not have home health established from hospital visit.  They do not need home health.  If needed, we will set up home health for the patient.   Establishment or re-establishment of referral orders for community resources: No other necessary community resources.   Discussion with other health care providers: No discussion with other health care providers necessary.   83 year old female with chornic resp failure on o2 at home, known copd, anemia and heart failure comes in for f/u of recent hospital stay for acute on chronic copd.    Review of Systems   Constitutional: Negative for chills and fever.   HENT: Negative for congestion, ear pain, postnasal drip, rhinorrhea, sore throat and trouble swallowing.    Eyes: Negative for redness and itching.   Respiratory: Positive for cough. Negative for shortness of breath and wheezing.    Cardiovascular: Negative for chest pain and palpitations.   Gastrointestinal: Negative for abdominal pain, diarrhea, nausea and vomiting.   Genitourinary: Negative for dysuria and frequency.   Skin: Negative for rash.   Neurological: Negative for weakness and headaches.       Objective:      Physical Exam   Constitutional: She is oriented to person, place, and time. She appears well-developed. No distress.   HENT:   Head: Normocephalic and atraumatic.   o2 in place   Eyes: Pupils are equal, round, and reactive to light. Conjunctivae are normal.   Neck: Normal range of motion. Neck supple. No thyromegaly present.   Cardiovascular: Normal rate, regular rhythm, normal heart sounds and intact distal  pulses.   Pulmonary/Chest: Effort normal and breath sounds normal. No respiratory distress. She has no wheezes.   Decreased tidal volume   Abdominal: Soft. Bowel sounds are normal. There is no tenderness.   Musculoskeletal: Normal range of motion. She exhibits no edema.   Lymphadenopathy:     She has no cervical adenopathy.   Neurological: She is alert and oriented to person, place, and time.   Skin: Skin is warm and dry. No rash noted.   Psychiatric: She has a normal mood and affect. Her behavior is normal.   Nursing note and vitals reviewed.      Assessment:       1. Renal insufficiency    2. Leukocytosis, unspecified type        Plan:       Chantell was seen today for follow-up.    Diagnoses and all orders for this visit:    Renal insufficiency  -     Comprehensive metabolic panel; Future    Leukocytosis, unspecified type  -     CBC auto differential; Future    Other orders  -     Influenza - High Dose (65+) (PF) (IM)    RTC if condition acutely worsens or any other concerns, otherwise RTC as scheduled

## 2019-09-12 ENCOUNTER — TELEPHONE (OUTPATIENT)
Dept: FAMILY MEDICINE | Facility: CLINIC | Age: 84
End: 2019-09-12

## 2019-09-12 NOTE — TELEPHONE ENCOUNTER
----- Message from Kari Castro sent at 2019 10:27 AM CDT -----  Contact: pt  Chantell Herrera  MRN: [id number]  : 1935  PCP: Kari Gaspar  Home Phone      [phone]  Work Phone      Not on file.  [employer]          [phone]      MESSAGE:     Pt asked to speak to the nurse about her health.       [phone]

## 2019-09-12 NOTE — TELEPHONE ENCOUNTER
attempted to contact pt to see what is going on with her health, LVM to call clinic, if pt calls back can you ask her?  Thanks

## 2019-09-12 NOTE — TELEPHONE ENCOUNTER
Spoke to pt just wanted to let Dr. Gaspar know she was coughing last night, she had to get on her machine that breaths for her, it was really bad, doing much better today, barely coughing at all.

## 2019-09-16 ENCOUNTER — EXTERNAL HOME HEALTH (OUTPATIENT)
Dept: HOME HEALTH SERVICES | Facility: HOSPITAL | Age: 84
End: 2019-09-16
Payer: MEDICARE

## 2019-09-18 ENCOUNTER — TELEPHONE (OUTPATIENT)
Dept: FAMILY MEDICINE | Facility: CLINIC | Age: 84
End: 2019-09-18

## 2019-09-18 NOTE — TELEPHONE ENCOUNTER
Currently her kidney numbers are okay to be on metformin. But they are very fluctuant.  If she starts, we just have to recheck in 2 weeks.  mh

## 2019-09-18 NOTE — TELEPHONE ENCOUNTER
----- Message from Cathy Burnett sent at 2019  8:32 AM CDT -----  Contact: self  Chantell Herrera  MRN: [id number]  : 1935  PCP: Kari Gaspar  Home Phone      [phone]  Work Phone      Not on file.  Mobile          [phone]      MESSAGE:   Patient would like to get a call back from nurse,  wants to put her on METFORMIN but she doesn't want to start taking anything without talking to  first.       [phone]

## 2019-09-18 NOTE — TELEPHONE ENCOUNTER
Spoke with pt and gave recommendations. Pt stated she will not start the metformin. She feels if any adjustments need to by made Dr. Gaspar should be the doctor doing it. Pt stated she will discuss her concerns at her next appt

## 2019-09-19 ENCOUNTER — OUTPATIENT CASE MANAGEMENT (OUTPATIENT)
Dept: ADMINISTRATIVE | Facility: OTHER | Age: 84
End: 2019-09-19

## 2019-09-19 NOTE — PROGRESS NOTES
Summary: Pt reports that she checks her blood sugars three times a day. Pt reports that her blood sugar this morning was 136. Pt reports that she tries to eat a diabetic diet. Pt reports that her  prepares the meals. Pt reports that she documents the blood sugars and bring the log to medical appts. Pt reports that she prefers for Dr. Gaspar, the PCP, to manage her medications or approval. Pt reports that she received the package from the . Pt reports that she read some of the materials. Pt reports that her  stops visitors at the door if he suspects that they are ill. Pt reports that she has no increase SOB or cough at this time.  Next follow-up scheduled with pt: 2 weeks  Pt agreed: yes      Interventions: Educated pt on ways to prevent URI. Pt able to recall 2 ways that helps to prevent URI.                         Educated pt on the risk factors of URI. Pt able to verbalize 2 actions and conditions that are risk factors for URI.                         Change to episodic     Plan:Educate pt on the s/s of URI.    Todays OPCM Self-Management Care Plan was developed with the patients/caregivers input and was based on identified barriers from todays assessment.  Goals were written today with the patient/caregiver and the patient has agreed to work towards these goals to improve his/her overall well-being. Patient verbalized understanding of the care plan, goals, and all of today's instructions. Encouraged patient/caregiver to communicate with his/her physician and health care team about health conditions and the treatment plan.  Provided my contact information today and encouraged patient/caregiver to call me with any questions as needed.

## 2019-09-19 NOTE — PATIENT INSTRUCTIONS
Preventing Common Respiratory Infections  Respiratory infections such as colds and influenza (the flu) are common in winter. These infections are often caused by viruses. They may share some symptoms, but not all respiratory infections are the same. Some make you more sick than others. You can take steps to prevent common respiratory infections. And if you get sick, you can take care of yourself to keep the infection from getting worse.    What is a cold?  · Symptoms include runny nose, coughing and sneezing, and sore throat. Cold symptoms tend to be milder than flu symptoms.  · Symptoms tend to come on slowly. They last for a few days to about a week.  · With a cold, you can still do most of the things you usually do.  What is the flu?  · Symptoms include fever, headache, fatigue, cough, sore throat, runny nose, and muscle aches. Children may have upset stomach and vomiting, but adults usually dont.  · Symptoms tend to come on quickly. Some, such as fatigue and cough, can last a few weeks.  · With the flu, you may feel worn out and not able to do normal activities.  · Its most likely NOT the flu if an adult has vomiting or diarrhea for a day or two. This so-called stomach flu is probably a GI (gastrointestinal) infection.  When the infection gets worse  Without proper care, a respiratory infection can get worse. It can lead to serious complications and death. If you arent getting better, call your healthcare provider. Complications can include:  · Bronchitis (infection of the airways that leads to shortness of breath and coughing up thick yellow or green mucus)  · Pneumonia (infection of the lungs in which fluid and mucus settle in the lungs, making breathing difficult)  · Worsening of chronic conditions such as heart failure, chronic lung disease, asthma, or diabetes  · Severe dehydration (loss of fluids)  · Sinus problems  · Ear infections   Get a flu vaccine  A flu vaccine protects you from influenza  (but not other colds or infections). Get a vaccine each fall, before flu season starts. This can be done at a clinic, healthcare providers office, drugstore, senior center, or through your workplace.  Get pneumococcal vaccines  Pneumonia can be a complication of influenza. There are 2 pneumococcal pneumonia vaccines that protect against many types of pneumonia. Talk with your healthcare provider about these important vaccines.   Keep germs from spreading  No one likes getting sick. To protect yourself and others from cold and flu germs:  · Wash your hands often. Use alcohol-based hand  when you dont have access to soap and water.  · Dont touch your eyes, nose, and mouth. This may help you keep germs out of your body.  · Try to avoid people with respiratory infections. You may want to stay out of crowds during flu season (winter).  · Ask your healthcare provider if you should get a pneumonia vaccination.  How to wash your hands  · Use warm water and plenty of soap. Work up a good lather.  · Clean your whole hand, under your nails, between your fingers, and up your wrists. Wash for at least 15 to 20 seconds. Dont just wipe--rub well.  · Rinse. Let the water run down your fingertips, not up your wrists.  · In a public restroom, use a paper towel to turn off the faucet and open the door.   Date Last Reviewed: 12/1/2016  © 2100-2768 [employer]. [address] [place] [address], [place] [address]. All rights reserved. This information is not intended as a substitute for professional medical care. Always follow your healthcare professional's instructions.

## 2019-10-02 ENCOUNTER — OFFICE VISIT (OUTPATIENT)
Dept: FAMILY MEDICINE | Facility: CLINIC | Age: 84
End: 2019-10-02
Payer: MEDICARE

## 2019-10-02 VITALS
HEIGHT: 62 IN | SYSTOLIC BLOOD PRESSURE: 118 MMHG | DIASTOLIC BLOOD PRESSURE: 62 MMHG | BODY MASS INDEX: 25.8 KG/M2 | HEART RATE: 112 BPM | WEIGHT: 140.19 LBS | RESPIRATION RATE: 16 BRPM

## 2019-10-02 DIAGNOSIS — J96.21 ACUTE ON CHRONIC RESPIRATORY FAILURE WITH HYPOXIA: ICD-10-CM

## 2019-10-02 DIAGNOSIS — J96.11 CHRONIC RESPIRATORY FAILURE WITH HYPOXIA: ICD-10-CM

## 2019-10-02 DIAGNOSIS — D64.9 SYMPTOMATIC ANEMIA: ICD-10-CM

## 2019-10-02 DIAGNOSIS — I48.91 ATRIAL FIBRILLATION WITH RAPID VENTRICULAR RESPONSE: ICD-10-CM

## 2019-10-02 DIAGNOSIS — E11.65 UNCONTROLLED TYPE 2 DIABETES MELLITUS WITH HYPERGLYCEMIA: ICD-10-CM

## 2019-10-02 DIAGNOSIS — D64.9 ANEMIA, UNSPECIFIED TYPE: ICD-10-CM

## 2019-10-02 DIAGNOSIS — Z99.81 OXYGEN DEPENDENT: ICD-10-CM

## 2019-10-02 DIAGNOSIS — Z23 NEED FOR VACCINATION: Primary | ICD-10-CM

## 2019-10-02 LAB
ALBUMIN SERPL BCP-MCNC: 4.1 G/DL (ref 3.5–5.2)
ALP SERPL-CCNC: 49 U/L (ref 55–135)
ALT SERPL W/O P-5'-P-CCNC: 17 U/L (ref 10–44)
ANION GAP SERPL CALC-SCNC: 10 MMOL/L (ref 8–16)
AST SERPL-CCNC: 12 U/L (ref 10–40)
BASOPHILS # BLD AUTO: 0.05 K/UL (ref 0–0.2)
BASOPHILS NFR BLD: 0.5 % (ref 0–1.9)
BILIRUB SERPL-MCNC: 0.7 MG/DL (ref 0.1–1)
BUN SERPL-MCNC: 17 MG/DL (ref 8–23)
CALCIUM SERPL-MCNC: 9.3 MG/DL (ref 8.7–10.5)
CHLORIDE SERPL-SCNC: 100 MMOL/L (ref 95–110)
CO2 SERPL-SCNC: 30 MMOL/L (ref 23–29)
CREAT SERPL-MCNC: 0.8 MG/DL (ref 0.5–1.4)
DIFFERENTIAL METHOD: ABNORMAL
EOSINOPHIL # BLD AUTO: 0.3 K/UL (ref 0–0.5)
EOSINOPHIL NFR BLD: 2.6 % (ref 0–8)
ERYTHROCYTE [DISTWIDTH] IN BLOOD BY AUTOMATED COUNT: 16.9 % (ref 11.5–14.5)
EST. GFR  (AFRICAN AMERICAN): >60 ML/MIN/1.73 M^2
EST. GFR  (NON AFRICAN AMERICAN): >60 ML/MIN/1.73 M^2
GLUCOSE SERPL-MCNC: 120 MG/DL (ref 70–110)
HCT VFR BLD AUTO: 27.8 % (ref 37–48.5)
HGB BLD-MCNC: 8.8 G/DL (ref 12–16)
IMM GRANULOCYTES # BLD AUTO: 0.16 K/UL (ref 0–0.04)
IMM GRANULOCYTES NFR BLD AUTO: 1.6 % (ref 0–0.5)
LYMPHOCYTES # BLD AUTO: 1.1 K/UL (ref 1–4.8)
LYMPHOCYTES NFR BLD: 10.9 % (ref 18–48)
MAGNESIUM SERPL-MCNC: 1.7 MG/DL (ref 1.6–2.6)
MCH RBC QN AUTO: 38.8 PG (ref 27–31)
MCHC RBC AUTO-ENTMCNC: 31.7 G/DL (ref 32–36)
MCV RBC AUTO: 123 FL (ref 82–98)
MONOCYTES # BLD AUTO: 1.6 K/UL (ref 0.3–1)
MONOCYTES NFR BLD: 16.2 % (ref 4–15)
NEUTROPHILS # BLD AUTO: 6.9 K/UL (ref 1.8–7.7)
NEUTROPHILS NFR BLD: 68.2 % (ref 38–73)
NRBC BLD-RTO: 0 /100 WBC
PLATELET # BLD AUTO: 245 K/UL (ref 150–350)
PMV BLD AUTO: 13 FL (ref 9.2–12.9)
POTASSIUM SERPL-SCNC: 4.1 MMOL/L (ref 3.5–5.1)
PROT SERPL-MCNC: 6.1 G/DL (ref 6–8.4)
RBC # BLD AUTO: 2.27 M/UL (ref 4–5.4)
SODIUM SERPL-SCNC: 140 MMOL/L (ref 136–145)
WBC # BLD AUTO: 10.08 K/UL (ref 3.9–12.7)

## 2019-10-02 PROCEDURE — 1101F PT FALLS ASSESS-DOCD LE1/YR: CPT | Mod: HCNC,CPTII,S$GLB, | Performed by: FAMILY MEDICINE

## 2019-10-02 PROCEDURE — 80053 COMPREHEN METABOLIC PANEL: CPT | Mod: HCNC

## 2019-10-02 PROCEDURE — 3078F PR MOST RECENT DIASTOLIC BLOOD PRESSURE < 80 MM HG: ICD-10-PCS | Mod: HCNC,CPTII,S$GLB, | Performed by: FAMILY MEDICINE

## 2019-10-02 PROCEDURE — 99999 PR PBB SHADOW E&M-EST. PATIENT-LVL IV: ICD-10-PCS | Mod: PBBFAC,HCNC,, | Performed by: FAMILY MEDICINE

## 2019-10-02 PROCEDURE — 36415 COLL VENOUS BLD VENIPUNCTURE: CPT | Mod: HCNC,S$GLB,, | Performed by: FAMILY MEDICINE

## 2019-10-02 PROCEDURE — 99999 PR PBB SHADOW E&M-EST. PATIENT-LVL IV: CPT | Mod: PBBFAC,HCNC,, | Performed by: FAMILY MEDICINE

## 2019-10-02 PROCEDURE — 83735 ASSAY OF MAGNESIUM: CPT | Mod: HCNC

## 2019-10-02 PROCEDURE — 85025 COMPLETE CBC W/AUTO DIFF WBC: CPT | Mod: HCNC

## 2019-10-02 PROCEDURE — 99214 PR OFFICE/OUTPT VISIT, EST, LEVL IV, 30-39 MIN: ICD-10-PCS | Mod: 25,HCNC,S$GLB, | Performed by: FAMILY MEDICINE

## 2019-10-02 PROCEDURE — G0009 ADMIN PNEUMOCOCCAL VACCINE: HCPCS | Mod: 59,HCNC,S$GLB, | Performed by: FAMILY MEDICINE

## 2019-10-02 PROCEDURE — 36415 PR COLLECTION VENOUS BLOOD,VENIPUNCTURE: ICD-10-PCS | Mod: HCNC,S$GLB,, | Performed by: FAMILY MEDICINE

## 2019-10-02 PROCEDURE — 3074F SYST BP LT 130 MM HG: CPT | Mod: HCNC,CPTII,S$GLB, | Performed by: FAMILY MEDICINE

## 2019-10-02 PROCEDURE — 99214 OFFICE O/P EST MOD 30 MIN: CPT | Mod: 25,HCNC,S$GLB, | Performed by: FAMILY MEDICINE

## 2019-10-02 PROCEDURE — 99499 RISK ADDL DX/OHS AUDIT: ICD-10-PCS | Mod: HCNC,S$GLB,, | Performed by: FAMILY MEDICINE

## 2019-10-02 PROCEDURE — 99499 UNLISTED E&M SERVICE: CPT | Mod: HCNC,S$GLB,, | Performed by: FAMILY MEDICINE

## 2019-10-02 PROCEDURE — 90732 PNEUMOCOCCAL POLYSACCHARIDE VACCINE 23-VALENT =>2YO SQ IM: ICD-10-PCS | Mod: HCNC,S$GLB,, | Performed by: FAMILY MEDICINE

## 2019-10-02 PROCEDURE — 3074F PR MOST RECENT SYSTOLIC BLOOD PRESSURE < 130 MM HG: ICD-10-PCS | Mod: HCNC,CPTII,S$GLB, | Performed by: FAMILY MEDICINE

## 2019-10-02 PROCEDURE — 90732 PPSV23 VACC 2 YRS+ SUBQ/IM: CPT | Mod: HCNC,S$GLB,, | Performed by: FAMILY MEDICINE

## 2019-10-02 PROCEDURE — 1101F PR PT FALLS ASSESS DOC 0-1 FALLS W/OUT INJ PAST YR: ICD-10-PCS | Mod: HCNC,CPTII,S$GLB, | Performed by: FAMILY MEDICINE

## 2019-10-02 PROCEDURE — 3078F DIAST BP <80 MM HG: CPT | Mod: HCNC,CPTII,S$GLB, | Performed by: FAMILY MEDICINE

## 2019-10-02 PROCEDURE — G0009 PNEUMOCOCCAL POLYSACCHARIDE VACCINE 23-VALENT =>2YO SQ IM: ICD-10-PCS | Mod: 59,HCNC,S$GLB, | Performed by: FAMILY MEDICINE

## 2019-10-02 NOTE — PROGRESS NOTES
Subjective:       Patient ID: Chantell Herrera is a 83 y.o. female.    Chief Complaint: Follow-up (3 month)    HPI  83 year old female comes in for f/u of her sugars. She says she is down to 7 U of insulin. She says that she has been having good sugars since dropping her prednisone to 20mg. She says that she has been getting really short of breath even with her O2. She was seen by dr. Hickman and he suggested metformin for her sugars.     PMH, PSH, ALLERGIES, SH, FH reviewed in nurse's notes above  Medications reconciled in the nurse's notes    Review of Systems   Constitutional: Negative for chills and fever.   HENT: Negative for congestion, ear pain, postnasal drip, rhinorrhea, sore throat and trouble swallowing.    Eyes: Negative for redness and itching.   Respiratory: Positive for shortness of breath. Negative for cough and wheezing.    Cardiovascular: Positive for palpitations. Negative for chest pain.   Gastrointestinal: Negative for abdominal pain, diarrhea, nausea and vomiting.   Genitourinary: Negative for dysuria and frequency.   Skin: Negative for rash.   Neurological: Negative for weakness and headaches.       Objective:      Physical Exam   Constitutional: She is oriented to person, place, and time. She appears well-developed. No distress.   HENT:   Head: Normocephalic and atraumatic.   Eyes: Pupils are equal, round, and reactive to light. Conjunctivae are normal.   Neck: Normal range of motion. Neck supple. No thyromegaly present.   Cardiovascular: Normal rate, regular rhythm, normal heart sounds and intact distal pulses.   Pulses:       Dorsalis pedis pulses are 2+ on the right side, and 2+ on the left side.        Posterior tibial pulses are 2+ on the right side, and 2+ on the left side.   Pulmonary/Chest: Effort normal and breath sounds normal. No respiratory distress. She has no wheezes.   Abdominal: Soft. Bowel sounds are normal. There is no tenderness.   Musculoskeletal: Normal range of motion. She  exhibits no edema.   Feet:   Right Foot:   Protective Sensation: 5 sites tested. 5 sites sensed.   Skin Integrity: Negative for callus.   Left Foot:   Protective Sensation: 5 sites tested. 5 sites sensed.   Skin Integrity: Negative for callus.   Lymphadenopathy:     She has no cervical adenopathy.   Neurological: She is alert and oriented to person, place, and time.   Skin: Skin is warm and dry. No rash noted.   Psychiatric: She has a normal mood and affect. Her behavior is normal.   Nursing note and vitals reviewed.         Results for orders placed or performed during the hospital encounter of 09/01/19   Hemoglobin A1c if not done in past 3 months   Result Value Ref Range    Hemoglobin A1C 7.0 (H) 4.0 - 5.6 %    Estimated Avg Glucose 154 (H) 68 - 131 mg/dL   Results for orders placed or performed during the hospital encounter of 01/22/19   Hemoglobin A1c   Result Value Ref Range    Hemoglobin A1C 6.3 (H) 4.0 - 5.6 %    Estimated Avg Glucose 134 (H) 68 - 131 mg/dL     No results found for this or any previous visit.  Results for orders placed or performed in visit on 05/28/18   Lipid panel   Result Value Ref Range    Cholesterol 147 120 - 199 mg/dL    Triglycerides 75 30 - 150 mg/dL    HDL 74 40 - 75 mg/dL    LDL Cholesterol 58.0 (L) 63.0 - 159.0 mg/dL    Hdl/Cholesterol Ratio 50.3 (H) 20.0 - 50.0 %    Total Cholesterol/HDL Ratio 2.0 2.0 - 5.0    Non-HDL Cholesterol 73 mg/dL       Health Maintenance   Topic Date Due    Eye Exam  08/20/2019    Lipid Panel  12/12/2019    Hemoglobin A1c  03/03/2020    DEXA SCAN  09/28/2020    Foot Exam  10/02/2020    TETANUS VACCINE  10/01/2025    Pneumococcal Vaccine (65+ High/Highest Risk)  Completed       Assessment/Plan:       Chantell was seen today for follow-up.    Diagnoses and all orders for this visit:    Need for vaccination  -     (In Office Administered) Pneumococcal Polysaccharide Vaccine (23 Valent) (SQ/IM)    Atrial fibrillation with rapid ventricular response  -      Comprehensive metabolic panel; Future  -     Magnesium; Future  -     Magnesium  -     Comprehensive metabolic panel    Anemia, unspecified type  -     CBC auto differential; Future  -     CBC auto differential    Symptomatic anemia  -     CBC auto differential; Future  -     CBC auto differential    Uncontrolled type 2 diabetes mellitus with hyperglycemia    Acute on chronic respiratory failure with hypoxia    Chronic respiratory failure with hypoxia    Oxygen dependent    Other orders  -     insulin detemir U-100 (LEVEMIR FLEXTOUCH U-100 INSULN) 100 unit/mL (3 mL) SubQ InPn pen; Inject 10 Units into the skin every evening.    hyperglycemia is very much glucocorticoid mediated. Discussed with patient weaning insulin if blood sugars < 80 in am. Will resume if she requires another steroid burst in future.    Needs to see dr. Metcalf. At rest HR in 90s, but talking or exertion increasing to 120s. O2 says at good 93-94% on 2 L NC. She desats without o2.    RTC if condition acutely worsens or any other concerns, otherwise RTC as scheduled

## 2019-10-03 ENCOUNTER — TELEPHONE (OUTPATIENT)
Dept: FAMILY MEDICINE | Facility: CLINIC | Age: 84
End: 2019-10-03

## 2019-10-03 DIAGNOSIS — D64.9 ANEMIA, UNSPECIFIED TYPE: Primary | ICD-10-CM

## 2019-10-03 PROBLEM — J44.0 COPD WITH ACUTE BRONCHITIS: Status: RESOLVED | Noted: 2017-05-17 | Resolved: 2019-10-03

## 2019-10-03 PROBLEM — J20.9 COPD WITH ACUTE BRONCHITIS: Status: RESOLVED | Noted: 2017-05-17 | Resolved: 2019-10-03

## 2019-10-03 PROBLEM — J44.1 COPD EXACERBATION: Status: RESOLVED | Noted: 2019-09-01 | Resolved: 2019-10-03

## 2019-10-07 ENCOUNTER — OUTPATIENT CASE MANAGEMENT (OUTPATIENT)
Dept: ADMINISTRATIVE | Facility: OTHER | Age: 84
End: 2019-10-07

## 2019-10-07 ENCOUNTER — TELEPHONE (OUTPATIENT)
Dept: FAMILY MEDICINE | Facility: CLINIC | Age: 84
End: 2019-10-07

## 2019-10-07 NOTE — TELEPHONE ENCOUNTER
Contacted and informed pt lab apt made pt verbalized understanding. Pt states the copay with Dr. Falcon is too high, would like to know if she can just get everything dealt with here? Please advise, thank you.

## 2019-10-07 NOTE — PROGRESS NOTES
Summary: Pt reports that she is doing fine. Pt reports that she just took her neb treatment and is coughing right now. Pt denies having fever at this time. Pt reports that her blood sugar this am was 124. Pt reports that she is compliant with her medications. Pt does not report any complaints of pain.    Interventions: Educated pt on ways to prevent lung.infection.Pt able to verbalize two ways to prevent resp infection.  Plan:Will educate pt on the s/s of COPD.    Todays OPCM Self-Management Care Plan was developed with the patients/caregivers input and was based on identified barriers from todays assessment.  Goals were written today with the patient/caregiver and the patient has agreed to work towards these goals to improve his/her overall well-being. Patient verbalized understanding of the care plan, goals, and all of today's instructions. Encouraged patient/caregiver to communicate with his/her physician and health care team about health conditions and the treatment plan.  Provided my contact information today and encouraged patient/caregiver to call me with any questions as needed.

## 2019-10-09 ENCOUNTER — TELEPHONE (OUTPATIENT)
Dept: FAMILY MEDICINE | Facility: CLINIC | Age: 84
End: 2019-10-09

## 2019-10-09 NOTE — TELEPHONE ENCOUNTER
----- Message from Kari Gaspar MD sent at 10/3/2019 11:07 PM CDT -----  Blood counts are low again. No need for transfusion at this point.  Please make sure she is taking a multivitamin with folate and b12 and that she is taking iron with each meal (otc iron tablets are good).  She does not need blood, but we should recheck her numbers in about 2weeks to make sure it isn't going down and that the supplements are helping her to bring up her counts.  This should be sent ot dr. virk (oncology) for a f/u.  mh

## 2019-10-11 NOTE — TELEPHONE ENCOUNTER
Patient notified. States she is still taking all the supplements (iron, B12, etc).  Labs forwarded to Dr. Falcon

## 2019-10-15 NOTE — TELEPHONE ENCOUNTER
I still would like her to be considered for a biopsy in the future if we need one.  I would like to her cont to see him at least once per  Year.  donald

## 2019-10-17 RX ORDER — LANCETS 33 GAUGE
EACH MISCELLANEOUS
Qty: 100 EACH | Refills: 5 | Status: ON HOLD | OUTPATIENT
Start: 2019-10-17 | End: 2020-01-20 | Stop reason: HOSPADM

## 2019-10-17 RX ORDER — INSULIN PUMP SYRINGE, 3 ML
EACH MISCELLANEOUS
Qty: 1 EACH | Refills: 1 | Status: ON HOLD | OUTPATIENT
Start: 2019-10-17 | End: 2020-01-20 | Stop reason: HOSPADM

## 2019-10-21 ENCOUNTER — TELEPHONE (OUTPATIENT)
Dept: FAMILY MEDICINE | Facility: CLINIC | Age: 84
End: 2019-10-21

## 2019-10-21 ENCOUNTER — CLINICAL SUPPORT (OUTPATIENT)
Dept: FAMILY MEDICINE | Facility: CLINIC | Age: 84
End: 2019-10-21
Payer: MEDICARE

## 2019-10-21 DIAGNOSIS — D64.9 ANEMIA, UNSPECIFIED TYPE: ICD-10-CM

## 2019-10-21 LAB
BASOPHILS # BLD AUTO: 0.07 K/UL (ref 0–0.2)
BASOPHILS NFR BLD: 0.9 % (ref 0–1.9)
DIFFERENTIAL METHOD: ABNORMAL
EOSINOPHIL # BLD AUTO: 0.4 K/UL (ref 0–0.5)
EOSINOPHIL NFR BLD: 4.6 % (ref 0–8)
ERYTHROCYTE [DISTWIDTH] IN BLOOD BY AUTOMATED COUNT: 16.9 % (ref 11.5–14.5)
HCT VFR BLD AUTO: 25.4 % (ref 37–48.5)
HGB BLD-MCNC: 8.4 G/DL (ref 12–16)
IMM GRANULOCYTES # BLD AUTO: 0.14 K/UL (ref 0–0.04)
IMM GRANULOCYTES NFR BLD AUTO: 1.8 % (ref 0–0.5)
LYMPHOCYTES # BLD AUTO: 1.7 K/UL (ref 1–4.8)
LYMPHOCYTES NFR BLD: 21 % (ref 18–48)
MCH RBC QN AUTO: 40.6 PG (ref 27–31)
MCHC RBC AUTO-ENTMCNC: 33.1 G/DL (ref 32–36)
MCV RBC AUTO: 123 FL (ref 82–98)
MONOCYTES # BLD AUTO: 1.6 K/UL (ref 0.3–1)
MONOCYTES NFR BLD: 20.6 % (ref 4–15)
NEUTROPHILS # BLD AUTO: 4.1 K/UL (ref 1.8–7.7)
NEUTROPHILS NFR BLD: 51.1 % (ref 38–73)
NRBC BLD-RTO: 0 /100 WBC
PLATELET # BLD AUTO: 261 K/UL (ref 150–350)
PMV BLD AUTO: 12.6 FL (ref 9.2–12.9)
RBC # BLD AUTO: 2.07 M/UL (ref 4–5.4)
WBC # BLD AUTO: 7.97 K/UL (ref 3.9–12.7)

## 2019-10-21 PROCEDURE — 36415 PR COLLECTION VENOUS BLOOD,VENIPUNCTURE: ICD-10-PCS | Mod: HCNC,S$GLB,, | Performed by: FAMILY MEDICINE

## 2019-10-21 PROCEDURE — 85025 COMPLETE CBC W/AUTO DIFF WBC: CPT | Mod: HCNC

## 2019-10-21 PROCEDURE — 36415 COLL VENOUS BLD VENIPUNCTURE: CPT | Mod: HCNC,S$GLB,, | Performed by: FAMILY MEDICINE

## 2019-10-21 NOTE — TELEPHONE ENCOUNTER
Patient called, states her current blood glucose is 349. Patient states she is not having any symptoms. Patient currently taking Prednisone 20 mg daily per .    Patient states she is currently on Levemir Flextouch insulin pen, injects 8 units SQ every evening.     Recommend to increase Levemir to 15 units SQ every evening per A. MD Abbie/SIXTO    Patient notified, states she will only take the Levemir 15 units dose for tonight only.     Phone:  [phone]

## 2019-10-22 ENCOUNTER — TELEPHONE (OUTPATIENT)
Dept: FAMILY MEDICINE | Facility: CLINIC | Age: 84
End: 2019-10-22

## 2019-10-22 NOTE — TELEPHONE ENCOUNTER
----- Message from Kari Gaspar MD sent at 10/21/2019  8:25 PM CDT -----  Blood counts are doing okay.  Would like to have an mma done and I really would like for her to consider seeing hematology again.  mh

## 2019-11-01 NOTE — TELEPHONE ENCOUNTER
Spoke with patient and informed her of recommendations.  States that she doesn't remember who you recommended as far as a hematologist. Please advise.

## 2019-11-01 NOTE — TELEPHONE ENCOUNTER
Pt states that her glucose hasn't been that high while on the prednisone since the time she called in and she's only taking her regular dose.    Told pt that if she is stable doing a normal dose, then continue. She states that when she increased to 15 that night, it made her extremely nauseous and feel very ill.    Notified pt that I would send a message to Dr. Gaspar to see what her thoughts are.    Pt verbalized understanding.

## 2019-11-05 ENCOUNTER — OUTPATIENT CASE MANAGEMENT (OUTPATIENT)
Dept: ADMINISTRATIVE | Facility: OTHER | Age: 84
End: 2019-11-05

## 2019-11-05 NOTE — TELEPHONE ENCOUNTER
Left message. Doesn't need to be on chronically high doses of insulin. Only needs to go up on dosing if she is taking a steroid pulse. Once steroid is done, needs to drop back to lower dosing.  mh

## 2019-11-05 NOTE — PROGRESS NOTES
Outpatient Care Management  Plan of Care Follow Up Visit    Patient: Chantell Herrera  MRN: [id number]  Date of Service: 11/5/2019  Completed by: Cathryn Aquino RN  Referral Date: 09/03/2019  Program: Case Management (High Risk)    Reason for Visit   Patient presents with    Update Plan Of Care       Patient Summary     Involvement of Care:  Do I have permission to speak with other family members about your care?       Problem List     Patient Active Problem List   Diagnosis    HTN (hypertension)    ACE-inhibitor cough    Cholelithiases    COPD (chronic obstructive pulmonary disease)    Hyperlipidemia    Cervical radiculopathy    Anxiety state    Insomnia    Major depressive disorder with single episode, in full remission    Hyponatremia    CKD (chronic kidney disease), stage III    History of intraocular lens implant    Tortuous aorta    Essential tremor    Dyslipidemia    Excessive involuntary blinking    Oxygen dependent    Chronic respiratory failure with hypoxia    RLS (restless legs syndrome)    Elevated d-dimer    Epigastric pain    Atrial fibrillation with rapid ventricular response    Symptomatic anemia    Acute on chronic respiratory failure with hypoxia    Acute on chronic congestive heart failure    Anemia    Chronic CHF    Diabetes mellitus type 2, uncontrolled    Subconjunctival hemorrhage of right eye       Reviewed Active Problem List with patient and/or Caregiver.    Patient Reported Labs & Vitals:  1.  Any Patient Reported Labs & Vitals?     2.  Patient Reported Blood Pressure:     3.  Patient Reported Pulse:     4.  Patient Reported Weight (Kg):     5.  Patient Reported Blood Glucose (mg/dl):       Medical History:  Reviewed medical history with patient and/or caregiver    Social History:  Reviewed social history with patient and/or caregiver    Clinical Assessment     Reviewed and provided basic information on available community resources for mental health,  transportation, wellness resources, and palliative care programs with patient and/or caregiver.    Complex Care Plan     Care plan was discussed and completed today with input from patient and/or caregiver.    Goals      Patient/caregiver will accept life style changes to manage and improve Respiratory Infection prior to discharge from OPCM. - Priority: High      Overall Time to Completion  2 months from 09/05/2019    OPC Identified Patient Barriers:  Health Literacy: Care plan created      RN Identified Patient Barriers:        Short Term Goals  Patient/caregiver will verbalize 2 risk factors of Upper Respiratory Infection within 2 weeks.  Interventions   · Assess patient's ability to perform ADLs.  · Collaborate with Physician as appropriate to meet patient needs.  · Encourage Medication Compliance.  · Recognize and provide educational material (KRAMES).     Status  · Met 9/19/19      Patient/caregiver will verbalize 2 signs and symptoms of Upper Respiratory Infection within 2 weeks.   Interventions   · Collaborate with Physician as appropriate to meet patient needs.  · Recognize and provide educational material (KRAMES).     Status  Met    Patient/caregiver will verbalize 2 ways of preventing complications due to disease process within 3 weeks.  Interventions   · Collaborate with Physician as appropriate to meet patient needs.  · Encourage Medication Compliance.  · Recognize and provide educational material (KRAMES).     Status  · Met 9/19/19             Patient/caregiver will have action plan in place to manage and prevent complications of COPD prior to discharge from OPCM. - Priority: High      Overall Time to Completion  2 months from 09/05/2019     OPC Identified Patient Barriers:  Health Literacy: Care plan created      RN Identified Patient Barriers:      Short Term Goals      Patient/caregiver will verbalize 2 risk factors of COPD within 2 weeks.  Interventions   · Collaborate with Physician as  appropriate to meet patient needs.  · Encourage Medication Compliance.  · Facilitate referral to Pulmonary Rehab.  · Recognize and provide educational material (KRAMES).  ·      Status  · Partially met      Patient/caregiver will verbalize 2 signs and symptoms of COPD within 3 weeks.   Interventions   · Collaborate with Physician as appropriate to meet patient needs.  · Empower patient/caregiver to discuss treatment plan with Physician/care team.  · Encourage compliance with Physician follow-ups.  · Recognize and provide educational material (KRAMES).     Status              Met 11/5/19  Patient/caregiver will verbalize 2 strategies to DME usage, improve airway clearance , limit/reduce the risk of infections, proper breathing techniques and the use of rescue unhalers  within 3 weeks.  Interventions   · Assess for availability of working Oxygen Concentrator, Portable Oxygen, Nebulizer, CPAP, BIPAP in home setting.  · Collaborate with Physician as appropriate to meet patient needs.  · Empower patient/caregiver to discuss treatment plan with Physician/care team.  · Encourage compliance with Physician follow-ups.  · Facilitate referral to Pulmonary Rehab.  · Recognize and provide educational material (KRAMES).  ·      Status  · Partially met      Patient/caregiver will verbalize 2 ways of preventing complications due to disease process within 3 weeks.  Interventions   · Collaborate with Oxygen DME agency for needed DME supplies.  · Collaborate with Physician as appropriate to meet patient needs.  · Empower patient/caregiver to discuss treatment plan with Physician/care team.  · Encourage compliance with Physician follow-ups.  · Facilitate referral to Pulmonary Rehab.  · Recognize and provide educational material (KRAMES).  ·      Status  · Partially met      Patient/caregiver will verbalize safety measures for Oxygen usage within 3 weeks.  Interventions   · Assess for availability of working Oxygen Concentrator,  Portable Oxygen, Nebulizer, CPAP, BIPAP in home setting.  · Collaborate with Oxygen DME agency for needed DME supplies.  · Recognize and provide educational material (MONICA).   Status  · Partially met                    Patient Instructions     Instructions were provided via the [facility] patient resources and are available for the patient to view on the patient portal.    No follow-ups on file.   s- Pt reports that she is using the Trilogy during the day. Pt reports that this is the 2nd day with using the trilogy during the day. Pt reports that she has an appt with Dr. Hickman on the 18th. Pt reports that she is ok with the date of the appt as is. Pt reports that she is coughing green secretions which is normal. Pt reports that her blood sugar this morning was 105. Pt reports that she is taking 10 units of insulin. Pt reports that her blood sugar was too low with taking 15 units of insulin as per PCP. Pt reports that she is currently taking 20 mg of prednisone. Pt reports that she knows when to contact the doctor with any problems or changes.  I- Educated pt on s/s of COPD.  P- Will educated pt on prevention of complications of COPD.       Todays OPCM Self-Management Care Plan was developed with the patients/caregivers input and was based on identified barriers from todays assessment.  Goals were written today with the patient/caregiver and the patient has agreed to work towards these goals to improve his/her overall well-being. Patient verbalized understanding of the care plan, goals, and all of today's instructions. Encouraged patient/caregiver to communicate with his/her physician and health care team about health conditions and the treatment plan.  Provided my contact information today and encouraged patient/caregiver to call me with any questions as needed.

## 2019-11-06 NOTE — TELEPHONE ENCOUNTER
Pt states that Dr. Emmanuel Hickman has her on 20mg of prednisone indefinitely for the treatment of her COPD.     Pt states that she is occasionally having to do 10 units of insulin, but usually she is doing just 8. States again that 15 units made her sick and she doesn't want to do that.     Pt states that she has been very short of breath lately. She wants to know if she needs to come in for an appointment with you - Just turned 84 on Monday.

## 2019-11-08 ENCOUNTER — TELEPHONE (OUTPATIENT)
Dept: FAMILY MEDICINE | Facility: CLINIC | Age: 84
End: 2019-11-08

## 2019-11-08 NOTE — TELEPHONE ENCOUNTER
Patient stated in AM BS was 84, instructed patient to contact office If BS should shoot up again. Suggested for her to drink plenty of water for cough and sugar levels, voiced understanding.

## 2019-11-08 NOTE — TELEPHONE ENCOUNTER
If she is short of breath she should come to see me or emanuel or dr. Hickman.  She may need to go to er?  Happy birthday!  mh

## 2019-11-20 ENCOUNTER — APPOINTMENT (OUTPATIENT)
Dept: RADIOLOGY | Facility: CLINIC | Age: 84
End: 2019-11-20
Attending: FAMILY MEDICINE
Payer: MEDICARE

## 2019-11-20 ENCOUNTER — TELEPHONE (OUTPATIENT)
Dept: FAMILY MEDICINE | Facility: CLINIC | Age: 84
End: 2019-11-20

## 2019-11-20 ENCOUNTER — OFFICE VISIT (OUTPATIENT)
Dept: FAMILY MEDICINE | Facility: CLINIC | Age: 84
End: 2019-11-20
Payer: MEDICARE

## 2019-11-20 VITALS
BODY MASS INDEX: 25.97 KG/M2 | SYSTOLIC BLOOD PRESSURE: 124 MMHG | DIASTOLIC BLOOD PRESSURE: 72 MMHG | HEIGHT: 62 IN | RESPIRATION RATE: 20 BRPM | HEART RATE: 68 BPM | WEIGHT: 141.13 LBS

## 2019-11-20 DIAGNOSIS — M25.432 WRIST SWELLING, LEFT: Primary | ICD-10-CM

## 2019-11-20 DIAGNOSIS — M25.332 SCAPHOLUNATE DISSOCIATION OF LEFT WRIST: Primary | ICD-10-CM

## 2019-11-20 DIAGNOSIS — M10.9 GOUT OF LEFT WRIST, UNSPECIFIED CAUSE, UNSPECIFIED CHRONICITY: ICD-10-CM

## 2019-11-20 DIAGNOSIS — M25.432 WRIST SWELLING, LEFT: ICD-10-CM

## 2019-11-20 LAB
ANION GAP SERPL CALC-SCNC: 9 MMOL/L (ref 8–16)
BUN SERPL-MCNC: 22 MG/DL (ref 8–23)
CALCIUM SERPL-MCNC: 8.7 MG/DL (ref 8.7–10.5)
CHLORIDE SERPL-SCNC: 96 MMOL/L (ref 95–110)
CO2 SERPL-SCNC: 31 MMOL/L (ref 23–29)
CREAT SERPL-MCNC: 1.2 MG/DL (ref 0.5–1.4)
CRP SERPL-MCNC: 3 MG/L (ref 0–8.2)
ERYTHROCYTE [SEDIMENTATION RATE] IN BLOOD BY WESTERGREN METHOD: 9 MM/HR (ref 0–20)
EST. GFR  (AFRICAN AMERICAN): 49 ML/MIN/1.73 M^2
EST. GFR  (NON AFRICAN AMERICAN): 42 ML/MIN/1.73 M^2
GLUCOSE SERPL-MCNC: 306 MG/DL (ref 70–110)
POTASSIUM SERPL-SCNC: 4.2 MMOL/L (ref 3.5–5.1)
SODIUM SERPL-SCNC: 136 MMOL/L (ref 136–145)
URATE SERPL-MCNC: 5 MG/DL (ref 2.4–5.7)

## 2019-11-20 PROCEDURE — 36415 PR COLLECTION VENOUS BLOOD,VENIPUNCTURE: ICD-10-PCS | Mod: HCNC,S$GLB,, | Performed by: FAMILY MEDICINE

## 2019-11-20 PROCEDURE — 84550 ASSAY OF BLOOD/URIC ACID: CPT | Mod: HCNC

## 2019-11-20 PROCEDURE — 99214 PR OFFICE/OUTPT VISIT, EST, LEVL IV, 30-39 MIN: ICD-10-PCS | Mod: HCNC,25,S$GLB, | Performed by: FAMILY MEDICINE

## 2019-11-20 PROCEDURE — 3078F DIAST BP <80 MM HG: CPT | Mod: HCNC,CPTII,S$GLB, | Performed by: FAMILY MEDICINE

## 2019-11-20 PROCEDURE — 73110 X-RAY EXAM OF WRIST: CPT | Mod: TC,HCNC,PO,LT

## 2019-11-20 PROCEDURE — 85651 RBC SED RATE NONAUTOMATED: CPT | Mod: HCNC

## 2019-11-20 PROCEDURE — 3074F SYST BP LT 130 MM HG: CPT | Mod: HCNC,CPTII,S$GLB, | Performed by: FAMILY MEDICINE

## 2019-11-20 PROCEDURE — 96372 THER/PROPH/DIAG INJ SC/IM: CPT | Mod: HCNC,S$GLB,, | Performed by: FAMILY MEDICINE

## 2019-11-20 PROCEDURE — 99499 RISK ADDL DX/OHS AUDIT: ICD-10-PCS | Mod: HCNC,S$GLB,, | Performed by: FAMILY MEDICINE

## 2019-11-20 PROCEDURE — 99499 UNLISTED E&M SERVICE: CPT | Mod: HCNC,S$GLB,, | Performed by: FAMILY MEDICINE

## 2019-11-20 PROCEDURE — 99999 PR PBB SHADOW E&M-EST. PATIENT-LVL III: CPT | Mod: PBBFAC,HCNC,, | Performed by: FAMILY MEDICINE

## 2019-11-20 PROCEDURE — 1101F PT FALLS ASSESS-DOCD LE1/YR: CPT | Mod: HCNC,CPTII,S$GLB, | Performed by: FAMILY MEDICINE

## 2019-11-20 PROCEDURE — 73110 X-RAY EXAM OF WRIST: CPT | Mod: 26,HCNC,LT, | Performed by: RADIOLOGY

## 2019-11-20 PROCEDURE — 1159F MED LIST DOCD IN RCRD: CPT | Mod: HCNC,S$GLB,, | Performed by: FAMILY MEDICINE

## 2019-11-20 PROCEDURE — 99214 OFFICE O/P EST MOD 30 MIN: CPT | Mod: HCNC,25,S$GLB, | Performed by: FAMILY MEDICINE

## 2019-11-20 PROCEDURE — 1125F PR PAIN SEVERITY QUANTIFIED, PAIN PRESENT: ICD-10-PCS | Mod: HCNC,S$GLB,, | Performed by: FAMILY MEDICINE

## 2019-11-20 PROCEDURE — 3078F PR MOST RECENT DIASTOLIC BLOOD PRESSURE < 80 MM HG: ICD-10-PCS | Mod: HCNC,CPTII,S$GLB, | Performed by: FAMILY MEDICINE

## 2019-11-20 PROCEDURE — 1159F PR MEDICATION LIST DOCUMENTED IN MEDICAL RECORD: ICD-10-PCS | Mod: HCNC,S$GLB,, | Performed by: FAMILY MEDICINE

## 2019-11-20 PROCEDURE — 80048 BASIC METABOLIC PNL TOTAL CA: CPT | Mod: HCNC

## 2019-11-20 PROCEDURE — 86140 C-REACTIVE PROTEIN: CPT | Mod: HCNC

## 2019-11-20 PROCEDURE — 1125F AMNT PAIN NOTED PAIN PRSNT: CPT | Mod: HCNC,S$GLB,, | Performed by: FAMILY MEDICINE

## 2019-11-20 PROCEDURE — 36415 COLL VENOUS BLD VENIPUNCTURE: CPT | Mod: HCNC,S$GLB,, | Performed by: FAMILY MEDICINE

## 2019-11-20 PROCEDURE — 3074F PR MOST RECENT SYSTOLIC BLOOD PRESSURE < 130 MM HG: ICD-10-PCS | Mod: HCNC,CPTII,S$GLB, | Performed by: FAMILY MEDICINE

## 2019-11-20 PROCEDURE — 73110 XR WRIST COMPLETE 3 VIEWS LEFT: ICD-10-PCS | Mod: 26,HCNC,LT, | Performed by: RADIOLOGY

## 2019-11-20 PROCEDURE — 99999 PR PBB SHADOW E&M-EST. PATIENT-LVL III: ICD-10-PCS | Mod: PBBFAC,HCNC,, | Performed by: FAMILY MEDICINE

## 2019-11-20 PROCEDURE — 96372 PR INJECTION,THERAP/PROPH/DIAG2ST, IM OR SUBCUT: ICD-10-PCS | Mod: HCNC,S$GLB,, | Performed by: FAMILY MEDICINE

## 2019-11-20 PROCEDURE — 1101F PR PT FALLS ASSESS DOC 0-1 FALLS W/OUT INJ PAST YR: ICD-10-PCS | Mod: HCNC,CPTII,S$GLB, | Performed by: FAMILY MEDICINE

## 2019-11-20 RX ORDER — BENZONATATE 200 MG/1
CAPSULE ORAL
COMMUNITY
Start: 2019-11-15 | End: 2019-11-22 | Stop reason: SDUPTHER

## 2019-11-20 RX ORDER — LANOLIN ALCOHOL/MO/W.PET/CERES
1 CREAM (GRAM) TOPICAL 2 TIMES DAILY
Refills: 6 | Status: ON HOLD | COMMUNITY
Start: 2019-11-04 | End: 2020-01-20 | Stop reason: HOSPADM

## 2019-11-20 RX ORDER — AZITHROMYCIN 250 MG/1
TABLET, FILM COATED ORAL
Refills: 0 | Status: ON HOLD | COMMUNITY
Start: 2019-11-18 | End: 2019-11-28

## 2019-11-20 RX ORDER — KETOROLAC TROMETHAMINE 30 MG/ML
30 INJECTION, SOLUTION INTRAMUSCULAR; INTRAVENOUS
Status: COMPLETED | OUTPATIENT
Start: 2019-11-20 | End: 2019-11-20

## 2019-11-20 RX ORDER — ALLOPURINOL 100 MG/1
100 TABLET ORAL DAILY
Qty: 30 TABLET | Refills: 2 | Status: ON HOLD | OUTPATIENT
Start: 2019-11-20 | End: 2019-11-30 | Stop reason: CLARIF

## 2019-11-20 RX ORDER — COLCHICINE 0.6 MG/1
0.6 TABLET ORAL 2 TIMES DAILY
Qty: 20 TABLET | Refills: 0 | Status: SHIPPED | OUTPATIENT
Start: 2019-11-20 | End: 2020-01-02

## 2019-11-20 RX ORDER — AMOXICILLIN AND CLAVULANATE POTASSIUM 875; 125 MG/1; MG/1
TABLET, FILM COATED ORAL
Status: ON HOLD | COMMUNITY
Start: 2019-11-18 | End: 2019-11-30 | Stop reason: CLARIF

## 2019-11-20 RX ORDER — METOPROLOL TARTRATE 25 MG/1
TABLET, FILM COATED ORAL
Status: ON HOLD | COMMUNITY
Start: 2019-11-19 | End: 2019-12-01 | Stop reason: HOSPADM

## 2019-11-20 RX ORDER — AMLODIPINE BESYLATE 5 MG/1
TABLET ORAL
Refills: 6 | COMMUNITY
Start: 2019-10-31 | End: 2020-01-01

## 2019-11-20 RX ADMIN — KETOROLAC TROMETHAMINE 30 MG: 30 INJECTION, SOLUTION INTRAMUSCULAR; INTRAVENOUS at 02:11

## 2019-11-20 NOTE — PROGRESS NOTES
Subjective:       Patient ID: Chantell Herrera is a 84 y.o. female.    Chief Complaint: Wrist Pain    HPI  84 year old female with chronic resp failure (on 2-3L NC at home), COPD - chronic steroids (40mg daily at current) and KOBY with recurrent iron infusions and transfusions come sin with c/o left wrist pain that started yesterday. She did not hurt the wrist that she can recall. It is swollen, red, warm. No open lesion. No recent infections. No h/o gout. Very limited movement and pain.    For her lungs dr. Hickman has her on azithromycin, augmentin and 40mg of prednisone daily.    PMH, PSH, ALLERGIES, SH, FH reviewed in nurse's notes above  Medications reconciled in the nurse's notes    Review of Systems   Constitutional: Negative for chills and fever.   HENT: Negative for congestion, ear pain, postnasal drip, rhinorrhea, sore throat and trouble swallowing.    Eyes: Negative for redness and itching.   Respiratory: Negative for cough, shortness of breath and wheezing.    Cardiovascular: Negative for chest pain and palpitations.   Gastrointestinal: Negative for abdominal pain, diarrhea, nausea and vomiting.   Genitourinary: Negative for dysuria and frequency.   Musculoskeletal: Positive for joint swelling.   Skin: Negative for rash.   Neurological: Negative for weakness and headaches.       Objective:      Physical Exam   Constitutional: She is oriented to person, place, and time. She appears well-developed. No distress.   HENT:   Head: Normocephalic and atraumatic.   Eyes: Pupils are equal, round, and reactive to light. Conjunctivae are normal.   Neck: Normal range of motion. Neck supple. No thyromegaly present.   Cardiovascular: Normal rate, regular rhythm, normal heart sounds and intact distal pulses.   Pulmonary/Chest: Effort normal. No respiratory distress. She has wheezes. She has rales.   Abdominal: Soft. Bowel sounds are normal. There is no tenderness.   Musculoskeletal: Normal range of motion. She exhibits  edema, tenderness and deformity.   Left wrist with swellin to the ulnar side, + redness, warmth, no skin breakdown. Limited flexion/extension of wrist   Lymphadenopathy:     She has no cervical adenopathy.   Neurological: She is alert and oriented to person, place, and time.   Skin: Skin is warm and dry. No rash noted.   Psychiatric: She has a normal mood and affect. Her behavior is normal.   Nursing note and vitals reviewed.       Assessment/Plan:       Problem List Items Addressed This Visit     None      Visit Diagnoses     Wrist swelling, left    -  Primary    Relevant Orders    X-Ray Wrist Complete Left (Completed)    Uric acid (Completed)    Sedimentation rate (Completed)    C-reactive protein (Completed)    Gout of left wrist, unspecified cause, unspecified chronicity        Relevant Medications    ketorolac injection 30 mg (Completed)    colchicine (COLCRYS) 0.6 mg tablet    allopurinol (ZYLOPRIM) 100 MG tablet    Other Relevant Orders    Basic metabolic panel (Completed)      RTC if condition acutely worsens or any other concerns, otherwise RTC as scheduled    Reviewed xr - possible scapholunate diss. Will send to ortho for further work up. Splinted prior to leaving.

## 2019-11-20 NOTE — TELEPHONE ENCOUNTER
----- Message from David Kirkpatrick sent at 2019 11:24 AM CST -----  Contact: Patient  Chantell Herrera  MRN: [id number]  : 1935  PCP: Kari Gaspar  Home Phone      [phone]  Work Phone      Not on file.  [place]          [phone]      MESSAGE: can barely move her hand -- thinks she needs x-ray     Call [phone]    PCP: Chasity

## 2019-11-21 ENCOUNTER — PATIENT OUTREACH (OUTPATIENT)
Dept: ADMINISTRATIVE | Facility: OTHER | Age: 84
End: 2019-11-21

## 2019-11-21 ENCOUNTER — OFFICE VISIT (OUTPATIENT)
Dept: ORTHOPEDICS | Facility: CLINIC | Age: 84
End: 2019-11-21
Payer: MEDICARE

## 2019-11-21 ENCOUNTER — TELEPHONE (OUTPATIENT)
Dept: FAMILY MEDICINE | Facility: CLINIC | Age: 84
End: 2019-11-21

## 2019-11-21 VITALS
BODY MASS INDEX: 25.87 KG/M2 | HEART RATE: 70 BPM | RESPIRATION RATE: 24 BRPM | WEIGHT: 140.56 LBS | SYSTOLIC BLOOD PRESSURE: 134 MMHG | HEIGHT: 62 IN | DIASTOLIC BLOOD PRESSURE: 60 MMHG

## 2019-11-21 DIAGNOSIS — M25.432 PAIN AND SWELLING OF LEFT WRIST: Primary | ICD-10-CM

## 2019-11-21 DIAGNOSIS — M25.532 PAIN AND SWELLING OF LEFT WRIST: Primary | ICD-10-CM

## 2019-11-21 DIAGNOSIS — R20.0 NUMBNESS OF FINGERS OF BOTH HANDS: ICD-10-CM

## 2019-11-21 PROCEDURE — 99999 PR PBB SHADOW E&M-EST. PATIENT-LVL V: ICD-10-PCS | Mod: PBBFAC,HCNC,, | Performed by: PHYSICIAN ASSISTANT

## 2019-11-21 PROCEDURE — 3078F DIAST BP <80 MM HG: CPT | Mod: HCNC,CPTII,S$GLB, | Performed by: PHYSICIAN ASSISTANT

## 2019-11-21 PROCEDURE — 97760 ORTHOTIC MGMT&TRAING 1ST ENC: CPT | Mod: HCNC,GP,S$GLB, | Performed by: PHYSICIAN ASSISTANT

## 2019-11-21 PROCEDURE — 99999 PR PBB SHADOW E&M-EST. PATIENT-LVL V: CPT | Mod: PBBFAC,HCNC,, | Performed by: PHYSICIAN ASSISTANT

## 2019-11-21 PROCEDURE — 3075F PR MOST RECENT SYSTOLIC BLOOD PRESS GE 130-139MM HG: ICD-10-PCS | Mod: HCNC,CPTII,S$GLB, | Performed by: PHYSICIAN ASSISTANT

## 2019-11-21 PROCEDURE — 99203 OFFICE O/P NEW LOW 30 MIN: CPT | Mod: HCNC,25,S$GLB, | Performed by: PHYSICIAN ASSISTANT

## 2019-11-21 PROCEDURE — 1125F AMNT PAIN NOTED PAIN PRSNT: CPT | Mod: HCNC,S$GLB,, | Performed by: PHYSICIAN ASSISTANT

## 2019-11-21 PROCEDURE — 1101F PT FALLS ASSESS-DOCD LE1/YR: CPT | Mod: HCNC,CPTII,S$GLB, | Performed by: PHYSICIAN ASSISTANT

## 2019-11-21 PROCEDURE — 1159F PR MEDICATION LIST DOCUMENTED IN MEDICAL RECORD: ICD-10-PCS | Mod: HCNC,S$GLB,, | Performed by: PHYSICIAN ASSISTANT

## 2019-11-21 PROCEDURE — 1159F MED LIST DOCD IN RCRD: CPT | Mod: HCNC,S$GLB,, | Performed by: PHYSICIAN ASSISTANT

## 2019-11-21 PROCEDURE — 3078F PR MOST RECENT DIASTOLIC BLOOD PRESSURE < 80 MM HG: ICD-10-PCS | Mod: HCNC,CPTII,S$GLB, | Performed by: PHYSICIAN ASSISTANT

## 2019-11-21 PROCEDURE — 97760 PR ORTHOTIC MGMT&TRAINJ INITIAL ENC EA 15 MINS: ICD-10-PCS | Mod: HCNC,GP,S$GLB, | Performed by: PHYSICIAN ASSISTANT

## 2019-11-21 PROCEDURE — 1125F PR PAIN SEVERITY QUANTIFIED, PAIN PRESENT: ICD-10-PCS | Mod: HCNC,S$GLB,, | Performed by: PHYSICIAN ASSISTANT

## 2019-11-21 PROCEDURE — 99203 PR OFFICE/OUTPT VISIT, NEW, LEVL III, 30-44 MIN: ICD-10-PCS | Mod: HCNC,25,S$GLB, | Performed by: PHYSICIAN ASSISTANT

## 2019-11-21 PROCEDURE — 1101F PR PT FALLS ASSESS DOC 0-1 FALLS W/OUT INJ PAST YR: ICD-10-PCS | Mod: HCNC,CPTII,S$GLB, | Performed by: PHYSICIAN ASSISTANT

## 2019-11-21 PROCEDURE — 3075F SYST BP GE 130 - 139MM HG: CPT | Mod: HCNC,CPTII,S$GLB, | Performed by: PHYSICIAN ASSISTANT

## 2019-11-21 RX ORDER — FUROSEMIDE 20 MG/1
20 TABLET ORAL EVERY OTHER DAY
COMMUNITY
End: 2020-01-01

## 2019-11-21 RX ORDER — DICLOFENAC SODIUM 10 MG/G
2 GEL TOPICAL DAILY
Status: CANCELLED | OUTPATIENT
Start: 2019-11-21

## 2019-11-21 NOTE — PROGRESS NOTES
Subjective:      Patient ID: Chantell Herrera is a 84 y.o. female.    Chief Complaint: Wrist Pain (Left wrist pain and swelling x 3 days; denies falling and injuring herself. ) and Joint Swelling (Left ankle x 1 day)    85 yo F presents to clinic with c/o left wrist pain that started 2 days ago. No known injury or trauma. Noticed it when she woke up. She saw PCP yesterday and had xray and labs. Xray showed subtle widening of scapholunate interval as well as soft tissue swelling at dorsum of wrist, no fracture seen. Uric acid, CRP, ESR all WNL. She was given toradol injection and prescribed colchicine and allopurinol but was told not to take medications after labs were resulted. She was also given wrist brace, but she finds it to be uncomfortable.  Today she states that wrist pain and swelling are much improved since yesterday. Denies fever, chills. Takes oral prednisone daily as prescribed by pulmonologist.    Also c/o intermittent numbness to bilateral ring and small fingers. States that this has been going on for a while and seems to be worse at night and upon waking up in the morning. Denies any pain, injury/trauma to elbows. Says that she was previously diagnosed with bilateral carpal tunnel years ago but did not have any treatment for it.    She is also asking about swelling to both legs and if she should restart her fluid pills that were stopped by PCP. Denies calf pain or pain in lower extremities.      Review of Systems   Constitution: Negative for chills, diaphoresis and fever.   HENT: Negative for congestion, ear discharge and ear pain.    Eyes: Negative for blurred vision, discharge, double vision and pain.   Cardiovascular: Positive for leg swelling. Negative for chest pain, claudication and cyanosis.   Respiratory: Negative for cough and hemoptysis.    Endocrine: Negative for cold intolerance and heat intolerance.   Skin: Positive for color change (multiple bruises to arms and legs, on blood thinner).  Negative for dry skin, itching and rash.   Musculoskeletal: Positive for joint pain and joint swelling. Negative for arthritis, back pain, falls, gout, muscle weakness and neck pain.   Gastrointestinal: Negative for abdominal pain and change in bowel habit.   Neurological: Negative for brief paralysis, disturbances in coordination and dizziness.   Psychiatric/Behavioral: Negative for altered mental status and depression.         Objective:              General    Constitutional: She is oriented to person, place, and time. No distress.   Eyes: EOM are normal. Right eye exhibits no discharge. Left eye exhibits no discharge.   Cardiovascular: Normal rate.    Pulmonary/Chest: No respiratory distress.   Neurological: She is alert and oriented to person, place, and time.   Psychiatric: She has a normal mood and affect. Her behavior is normal.         Right Ankle/Foot Exam     Comments:  Pitting edema to bilateral lower legs/feet, distal pulses intact, multiple bruises and abrasions to bilateral lower extremities (on eliquis), no warmth or erythema    Left Ankle/Foot Exam     Comments:  Pitting edema to bilateral lower legs/feet, distal pulses intact, multiple bruises and abrasions to bilateral lower extremities (on eliquis), no warmth or erythema      Right Hand/Wrist Exam     Inspection   Scars: Wrist - absent   Effusion: Wrist - absent   Bruising: Wrist - absent   Deformity: Wrist - deformity     Range of Motion     Wrist   Extension: normal   Flexion: normal   Abduction: normal  Adduction: normal    Tests   Tinel's sign (median nerve): negative  Finkelstein's test: negative  Carpal Tunnel Compression Test: negative  Cubital Tunnel Compression Test: positive      Other     Neuorologic Exam    Median Distribution: normal  Ulnar Distribution: abnormal  Radial Distribution: normal      Left Hand/Wrist Exam     Inspection   Scars: Wrist - absent   Effusion: Wrist - absent   Bruising: Wrist - absent   Deformity: Wrist -  absent     Swelling   Wrist - The patient is swollen on the scapholunate/lunate ECU and TFCC.    Tenderness   The patient is tender to palpation of the dorsal area.     Range of Motion     Wrist   Extension:  20 abnormal   Flexion:  20 abnormal   Abduction: abnormal  Adduction: abnormal    Tests   Tinel's sign (median nerve): negative  Finkelstein's test: negative  Carpal Tunnel Compression Test: negative  Cubital Tunnel Compression Test: positive      Other     Sensory Exam  Median Distribution: normal  Ulnar Distribution: abnormal  Radial Distribution: normal      Right Elbow Exam     Inspection   Scars: absent  Effusion: absent  Bruising: present (no injury/trauma, on eliquis)  Deformity: absent  Atrophy: absent    Range of Motion   Extension: normal   Flexion: normal   Pronation: normal   Supination: normal     Tests   Varus: negative  Valgus: negative  Tinel's sign (cubital tunnel): positive    Other   Sensation: normal    Comments:  Decreased sensation to ring and small fingers (pt states that this is not new)      Left Elbow Exam     Inspection   Scars: absent  Effusion: absent  Bruising: present (no injury/trauma, on eliquis)  Deformity: absent  Atrophy: absent    Range of Motion   Extension: normal   Pronation: normal   Supination: normal     Tests   Varus: negative  Valgus: negative  Tinel's sign (cubital tunnel): positive    Other   Sensation: normal    Comments:  No erythema or warmth to wrist noted.    Decreased sensation to ring and small fingers (pt states that this is not new)     Able to make full fist        Muscle Strength   Right Upper Extremity   Wrist extension: 5/5/5   Wrist flexion: 5/5/5   : 5/5/5   Elbow Pronation:  5/5   Elbow Supination:  5/5   Elbow Extension: 5/5  Elbow Flexion: 5/5  Left Upper Extremity  Wrist extension: 4/5/5   Wrist flexion: 4/5/5   :  4/5/5   Elbow Pronation:  5/5   Elbow Supination:  5/5   Elbow Extension: 5/5  Elbow Flexion: 5/5      Xray left wrist  11/20/19:  Subtle widening of the scapholunate interval which could suggest scapholunate ligament disruption.  There are calcifications of the triangular fibrocartilage.  No fracture is seen. There is soft tissue swelling at the dorsum of the wrist.        Assessment:       Encounter Diagnoses   Name Primary?    Pain and swelling of left wrist Yes    Numbness of fingers of both hands           Plan:         I made the decision to obtain old records of the patient including previous notes and imaging. I independently reviewed prior imaging.    1. Pt reports that symptoms are improving since onset 2 days ago.  2. Ice compress to the affected area 2-3x a day for 15-20 minutes as needed for pain management. Rest and elevate wrist.  3. Wrist brace applied [id number] - I performed a custom orthotic / brace adjustment, fitting and training with the patient. The patient demonstrated understanding and proper care. This was performed for 10 minutes.  4. Discussed nerve glide exercises for possible cubital tunnel as well as elbow bracing while sleeping. Will consider formal therapy and/or EMG in the future if symptoms continue.  5. Patient will contact cardiology or PCP today as directed to ask about leg swelling and if she should be taking lasix.   6. RTC in 4 weeks for follow-up, sooner if symptoms worsen or fail to improve. Will consider further imaging of left wrist at that time if sx not improved.    Patient and  voice understanding of and agreement with treatment plan. All of the patient's questions were answered and the patient will contact us if she has any questions or concerns in the interim.

## 2019-11-21 NOTE — TELEPHONE ENCOUNTER
Spoke to cristian.  Uric acid norm.  Please try to get in with ortho here tomorrow? Scapholunate dissociation.  mh

## 2019-11-21 NOTE — TELEPHONE ENCOUNTER
----- Message from Kenzie Lugo sent at 2019  8:37 AM CST -----  Contact: Self  Chantell Herrera  MRN: [id number]  : 1935  PCP: Kari Gaspar  Home Phone      [phone]  Work Phone      Not on file.  [place]          [phone]      MESSAGE:   Patient is still having a swollen wrist. She would like to know if she should take a fluid pill or use an ice pack.    Chantell  [phone]

## 2019-11-21 NOTE — TELEPHONE ENCOUNTER
Pt stated both hands and wrist are swollen and she rated her pain as a 8. She wanted some advise on if she should take a fluid pill or use ice packs.   Pt  Was scheduled with Dr. Caraballo for her pain.  Pt verbally understood

## 2019-11-24 RX ORDER — BENZONATATE 200 MG/1
CAPSULE ORAL
Qty: 30 CAPSULE | Refills: 0 | Status: SHIPPED | OUTPATIENT
Start: 2019-11-24 | End: 2019-12-10 | Stop reason: SDUPTHER

## 2019-11-25 ENCOUNTER — TELEPHONE (OUTPATIENT)
Dept: FAMILY MEDICINE | Facility: CLINIC | Age: 84
End: 2019-11-25

## 2019-11-25 ENCOUNTER — OUTPATIENT CASE MANAGEMENT (OUTPATIENT)
Dept: ADMINISTRATIVE | Facility: OTHER | Age: 84
End: 2019-11-25

## 2019-11-25 NOTE — TELEPHONE ENCOUNTER
Per Cathryn Aquino: She did not have any wheezing when I spoke with her on the phone. She had taken a dose of the mucinex before the call..

## 2019-11-25 NOTE — TELEPHONE ENCOUNTER
Is she SOB? Is she wheezing? What is her pulse ox running? Does she feel like she needs to go into the hospital?  rec Mucinex DM extra-strength BID.

## 2019-11-25 NOTE — PROGRESS NOTES
Outpatient Care Management  Plan of Care Follow Up Visit    Patient: Chantell Herrera  MRN: [id number]  Date of Service: 11/25/2019  Completed by: Cathryn Aquino RN  Referral Date: 09/03/2019  Program: Case Management (High Risk)    Reason for Visit   Patient presents with    Update Plan Of Care       Brief Summary:   Pt reports that she is doing okay. Pt reports that the swelling went down in her legs. Pt reports that she is wearing the brace to her wrist. Pt reports that she takes it off periodically. Pt reports the she has ibuprofen for pain. Pt reports that she is not taking the ibuprofen. Pt reports that she has a bad choking cough. Pt reports that she is bringing up white mucous. Pt denies having a fever. Pt reports that her BS was 86 on yesterday. Pt reports a BS range between  during the last 5 days. Pt reports that she is currently on 20 mg of Prednisone. Pt reports that there are days when she does not have a good appetite. Pt reports that on an average she eats two meals a day. Pt reports that she can not afford supplemental drinks.    Patient Summary     Involvement of Care:  Do I have permission to speak with other family members about your care?       Problem List     Patient Active Problem List   Diagnosis    HTN (hypertension)    ACE-inhibitor cough    Cholelithiases    COPD (chronic obstructive pulmonary disease)    Hyperlipidemia    Cervical radiculopathy    Anxiety state    Insomnia    Major depressive disorder with single episode, in full remission    Hyponatremia    CKD (chronic kidney disease), stage III    History of intraocular lens implant    Tortuous aorta    Essential tremor    Dyslipidemia    Excessive involuntary blinking    Oxygen dependent    Chronic respiratory failure with hypoxia    RLS (restless legs syndrome)    Elevated d-dimer    Epigastric pain    Atrial fibrillation with rapid ventricular response    Symptomatic anemia    Acute on chronic  respiratory failure with hypoxia    Acute on chronic congestive heart failure    Anemia    Chronic CHF    Diabetes mellitus type 2, uncontrolled    Subconjunctival hemorrhage of right eye       Reviewed Active Problem List with patient and/or Caregiver.    Patient Reported Labs & Vitals:  1.  Any Patient Reported Labs & Vitals?     2.  Patient Reported Blood Pressure:     3.  Patient Reported Pulse:     4.  Patient Reported Weight (Kg):     5.  Patient Reported Blood Glucose (mg/dl):       Medical History:  Reviewed medical history with patient and/or caregiver    Social History:  Reviewed social history with patient and/or caregiver    Clinical Assessment     Reviewed and provided basic information on available community resources for mental health, transportation, wellness resources, and palliative care programs with patient and/or caregiver.    Complex Care Plan     Care plan was discussed and completed today with input from patient and/or caregiver.    Goals      Patient/caregiver will accept life style changes to manage and improve Respiratory Infection prior to discharge from OPCM. - Priority: High      Overall Time to Completion  2 months from 09/05/2019    OPCM Identified Patient Barriers:  Health Literacy: Care plan created      RN Identified Patient Barriers:        Short Term Goals  Patient/caregiver will verbalize 2 risk factors of Upper Respiratory Infection within 2 weeks.  Interventions   · Assess patient's ability to perform ADLs.  · Collaborate with Physician as appropriate to meet patient needs.  · Encourage Medication Compliance.  · Recognize and provide educational material (KRAMES).     Status  · Met 9/19/19      Patient/caregiver will verbalize 2 signs and symptoms of Upper Respiratory Infection within 2 weeks.   Interventions   · Collaborate with Physician as appropriate to meet patient needs.  · Recognize and provide educational material (KRAMES).     Status  Met    Patient/caregiver  will verbalize 2 ways of preventing complications due to disease process within 3 weeks.  Interventions   · Collaborate with Physician as appropriate to meet patient needs.  · Encourage Medication Compliance.  · Recognize and provide educational material (MONICA).     Status  · Met 9/19/19             Patient/caregiver will have action plan in place to manage and prevent complications of COPD prior to discharge from OPC. - Priority: High      Overall Time to Completion  2 months from 09/05/2019     OPCM Identified Patient Barriers:  Health Literacy: Care plan created      RN Identified Patient Barriers:      Short Term Goals      Patient/caregiver will verbalize 2 risk factors of COPD within 2 weeks.  Interventions   · Collaborate with Physician as appropriate to meet patient needs.  · Encourage Medication Compliance.  · Facilitate referral to Pulmonary Rehab.  · Recognize and provide educational material (MONICA).  ·      Status  · Partially met      Patient/caregiver will verbalize 2 signs and symptoms of COPD within 3 weeks.   Interventions   · Collaborate with Physician as appropriate to meet patient needs.  · Empower patient/caregiver to discuss treatment plan with Physician/care team.  · Encourage compliance with Physician follow-ups.  · Recognize and provide educational material (MONICA).     Status              Met 11/5/19  Patient/caregiver will verbalize 2 strategies to DME usage, improve airway clearance , limit/reduce the risk of infections, proper breathing techniques and the use of rescue unhalers  within 3 weeks.  Interventions   · Assess for availability of working Oxygen Concentrator, Portable Oxygen, Nebulizer, CPAP, BIPAP in home setting.  · Collaborate with Physician as appropriate to meet patient needs.  · Empower patient/caregiver to discuss treatment plan with Physician/care team.  · Encourage compliance with Physician follow-ups.  · Facilitate referral to Pulmonary Rehab.  · Recognize and  provide educational material (MONICA).  ·      Status  Met 11/25/19    Patient/caregiver will verbalize 2 ways of preventing complications due to disease process within 3 weeks.  Interventions   · Collaborate with Oxygen DME agency for needed DME supplies.  · Collaborate with Physician as appropriate to meet patient needs.  · Empower patient/caregiver to discuss treatment plan with Physician/care team.  · Encourage compliance with Physician follow-ups.  · Facilitate referral to Pulmonary Rehab.  · Recognize and provide educational material (MONICA).  ·      Status  Met 11/25/19    Patient/caregiver will verbalize safety measures for Oxygen usage within 3 weeks.  Interventions   · Assess for availability of working Oxygen Concentrator, Portable Oxygen, Nebulizer, CPAP, BIPAP in home setting.  · Collaborate with Oxygen DME agency for needed DME supplies.  · Recognize and provide educational material (MONICA).    Patient/Caregiver agrees to read information mailed by  2 weeks  Patient/Caregiver agrees to OPCM follow up within 2 weeks to assess progress to goal.      Status  · Partially met                    Patient Instructions     Instructions were provided via the [facility] patient resources and are available for the patient to view on the patient portal.    Next Steps: Message sent to PCP's staff to notify of pt's c/o cough.                      Educated pt on the prevention on complications of COPD.                      Educated pt on ways to prevent infections.                        Will educate pt of safety measures with the use of home oxygen therapy.                       Mail coupons for glucerna when available/contact company for coupons.                     No follow-ups on file.      Todays OPCM Self-Management Care Plan was developed with the patients/caregivers input and was based on identified barriers from todays assessment.  Goals were written today with the patient/caregiver and the patient has  agreed to work towards these goals to improve his/her overall well-being. Patient verbalized understanding of the care plan, goals, and all of today's instructions. Encouraged patient/caregiver to communicate with his/her physician and health care team about health conditions and the treatment plan.  Provided my contact information today and encouraged patient/caregiver to call me with any questions as needed.

## 2019-11-25 NOTE — TELEPHONE ENCOUNTER
----- Message from Cathryn Aquino RN sent at 11/25/2019 10:51 AM CST -----  Contact: STACIA Gonzales this is Cathryn with [facility]. Pt reports that she has a choking cough. Pt reports that she is bringing up white mucous. Pt reports that she does not have a fever.    Please advice  Thank you

## 2019-11-26 NOTE — TELEPHONE ENCOUNTER
Pt stated she took mucinex once yesterday, and today she feels a little better. She does still have a cough, but not SOB. Pt advised that she can take Mucinex BID    If symptoms worsen to contact office

## 2019-11-28 ENCOUNTER — HOSPITAL ENCOUNTER (INPATIENT)
Facility: HOSPITAL | Age: 84
LOS: 3 days | Discharge: HOME | DRG: 191 | End: 2019-12-01
Attending: SURGERY | Admitting: FAMILY MEDICINE
Payer: MEDICARE

## 2019-11-28 DIAGNOSIS — J41.0 SIMPLE CHRONIC BRONCHITIS: ICD-10-CM

## 2019-11-28 DIAGNOSIS — R06.02 SOB (SHORTNESS OF BREATH): ICD-10-CM

## 2019-11-28 DIAGNOSIS — J44.1 COPD WITH ACUTE EXACERBATION: Primary | ICD-10-CM

## 2019-11-28 LAB
ABO + RH BLD: NORMAL
ALBUMIN SERPL BCP-MCNC: 3.9 G/DL (ref 3.5–5.2)
ALLENS TEST: ABNORMAL
ALP SERPL-CCNC: 64 U/L (ref 55–135)
ALT SERPL W/O P-5'-P-CCNC: 25 U/L (ref 10–44)
ANION GAP SERPL CALC-SCNC: 8 MMOL/L (ref 8–16)
ANISOCYTOSIS BLD QL SMEAR: ABNORMAL
AST SERPL-CCNC: 14 U/L (ref 10–40)
BASOPHILS # BLD AUTO: 0.04 K/UL (ref 0–0.2)
BASOPHILS NFR BLD: 0.4 % (ref 0–1.9)
BILIRUB SERPL-MCNC: 0.7 MG/DL (ref 0.1–1)
BLD GP AB SCN CELLS X3 SERPL QL: NORMAL
BNP SERPL-MCNC: 244 PG/ML (ref 0–99)
BUN SERPL-MCNC: 21 MG/DL (ref 8–23)
CALCIUM SERPL-MCNC: 8.6 MG/DL (ref 8.7–10.5)
CHLORIDE SERPL-SCNC: 101 MMOL/L (ref 95–110)
CK MB SERPL-MCNC: 1.1 NG/ML (ref 0.1–6.5)
CK MB SERPL-RTO: 6.5 % (ref 0–5)
CK SERPL-CCNC: 17 U/L (ref 20–180)
CK SERPL-CCNC: 17 U/L (ref 20–180)
CO2 SERPL-SCNC: 31 MMOL/L (ref 23–29)
CREAT SERPL-MCNC: 1 MG/DL (ref 0.5–1.4)
DELSYS: ABNORMAL
DIFFERENTIAL METHOD: ABNORMAL
EOSINOPHIL # BLD AUTO: 0.4 K/UL (ref 0–0.5)
EOSINOPHIL NFR BLD: 3.8 % (ref 0–8)
ERYTHROCYTE [DISTWIDTH] IN BLOOD BY AUTOMATED COUNT: 17.5 % (ref 11.5–14.5)
EST. GFR  (AFRICAN AMERICAN): >60 ML/MIN/1.73 M^2
EST. GFR  (NON AFRICAN AMERICAN): 53 ML/MIN/1.73 M^2
FERRITIN SERPL-MCNC: 299 NG/ML (ref 20–300)
FOLATE SERPL-MCNC: 13.8 NG/ML (ref 4–24)
GLUCOSE SERPL-MCNC: 163 MG/DL (ref 70–110)
HCO3 UR-SCNC: 35.6 MMOL/L (ref 22–26)
HCT VFR BLD AUTO: 23.4 % (ref 37–48.5)
HGB BLD-MCNC: 7.3 G/DL (ref 12–16)
HYPOCHROMIA BLD QL SMEAR: ABNORMAL
IMM GRANULOCYTES # BLD AUTO: 0.1 K/UL (ref 0–0.04)
IMM GRANULOCYTES NFR BLD AUTO: 1 % (ref 0–0.5)
IRON SERPL-MCNC: 105 UG/DL (ref 30–160)
LACTATE SERPL-SCNC: 2.1 MMOL/L (ref 0.5–2.2)
LACTATE SERPL-SCNC: 2.3 MMOL/L (ref 0.5–2.2)
LYMPHOCYTES # BLD AUTO: 1.7 K/UL (ref 1–4.8)
LYMPHOCYTES NFR BLD: 17.6 % (ref 18–48)
MCH RBC QN AUTO: 39.5 PG (ref 27–31)
MCHC RBC AUTO-ENTMCNC: 31.2 G/DL (ref 32–36)
MCV RBC AUTO: 127 FL (ref 82–98)
MONOCYTES # BLD AUTO: 1.6 K/UL (ref 0.3–1)
MONOCYTES NFR BLD: 16.6 % (ref 4–15)
NEUTROPHILS # BLD AUTO: 5.8 K/UL (ref 1.8–7.7)
NEUTROPHILS NFR BLD: 60.6 % (ref 38–73)
NRBC BLD-RTO: 0 /100 WBC
PCO2 BLDA: 50 MMHG (ref 35–45)
PH SMN: 7.46 [PH] (ref 7.35–7.45)
PLATELET # BLD AUTO: 233 K/UL (ref 150–350)
PMV BLD AUTO: 13.2 FL (ref 9.2–12.9)
PO2 BLDA: 53 MMHG (ref 75–100)
POC BE: 10.6 MMOL/L (ref -2–2)
POC COHB: 3.5 % (ref 0–3)
POC METHB: 1.4 % (ref 0–1.5)
POC O2HB ARTERIAL: 87.6 % (ref 94–100)
POC SATURATED O2: 92 % (ref 90–100)
POC TCO2: 37.1 MMOL/L
POC THB: 7.9 G/DL (ref 12–18)
POCT GLUCOSE: 198 MG/DL (ref 70–110)
POCT GLUCOSE: 266 MG/DL (ref 70–110)
POCT GLUCOSE: 290 MG/DL (ref 70–110)
POIKILOCYTOSIS BLD QL SMEAR: ABNORMAL
POTASSIUM SERPL-SCNC: 3.8 MMOL/L (ref 3.5–5.1)
PROT SERPL-MCNC: 6.1 G/DL (ref 6–8.4)
RBC # BLD AUTO: 1.85 M/UL (ref 4–5.4)
RETICS/RBC NFR AUTO: 2.4 % (ref 0.5–2.5)
SATURATED IRON: 40 % (ref 20–50)
SITE: ABNORMAL
SODIUM SERPL-SCNC: 140 MMOL/L (ref 136–145)
TOTAL IRON BINDING CAPACITY: 260 UG/DL (ref 250–450)
TRANSFERRIN SERPL-MCNC: 176 MG/DL (ref 200–375)
TROPONIN I SERPL DL<=0.01 NG/ML-MCNC: 0.01 NG/ML (ref 0–0.03)
TROPONIN I SERPL DL<=0.01 NG/ML-MCNC: 0.02 NG/ML (ref 0–0.03)
VIT B12 SERPL-MCNC: 891 PG/ML (ref 210–950)
WBC # BLD AUTO: 9.58 K/UL (ref 3.9–12.7)

## 2019-11-28 PROCEDURE — 99900035 HC TECH TIME PER 15 MIN (STAT): Mod: HCNC

## 2019-11-28 PROCEDURE — 85045 AUTOMATED RETICULOCYTE COUNT: CPT | Mod: HCNC

## 2019-11-28 PROCEDURE — 86920 COMPATIBILITY TEST SPIN: CPT | Mod: HCNC

## 2019-11-28 PROCEDURE — C9399 UNCLASSIFIED DRUGS OR BIOLOG: HCPCS | Mod: HCNC | Performed by: SURGERY

## 2019-11-28 PROCEDURE — 99285 EMERGENCY DEPT VISIT HI MDM: CPT | Mod: 25,HCNC

## 2019-11-28 PROCEDURE — 82746 ASSAY OF FOLIC ACID SERUM: CPT | Mod: HCNC

## 2019-11-28 PROCEDURE — 84484 ASSAY OF TROPONIN QUANT: CPT | Mod: HCNC

## 2019-11-28 PROCEDURE — 86901 BLOOD TYPING SEROLOGIC RH(D): CPT | Mod: HCNC

## 2019-11-28 PROCEDURE — 36600 WITHDRAWAL OF ARTERIAL BLOOD: CPT | Mod: HCNC

## 2019-11-28 PROCEDURE — 96374 THER/PROPH/DIAG INJ IV PUSH: CPT | Mod: HCNC

## 2019-11-28 PROCEDURE — 82728 ASSAY OF FERRITIN: CPT | Mod: HCNC

## 2019-11-28 PROCEDURE — 83880 ASSAY OF NATRIURETIC PEPTIDE: CPT | Mod: HCNC

## 2019-11-28 PROCEDURE — 99222 PR INITIAL HOSPITAL CARE,LEVL II: ICD-10-PCS | Mod: AI,HCNC,, | Performed by: FAMILY MEDICINE

## 2019-11-28 PROCEDURE — 80053 COMPREHEN METABOLIC PANEL: CPT | Mod: HCNC

## 2019-11-28 PROCEDURE — 93010 EKG 12-LEAD: ICD-10-PCS | Mod: HCNC,,, | Performed by: INTERNAL MEDICINE

## 2019-11-28 PROCEDURE — 83605 ASSAY OF LACTIC ACID: CPT | Mod: HCNC

## 2019-11-28 PROCEDURE — 25000242 PHARM REV CODE 250 ALT 637 W/ HCPCS: Mod: HCNC | Performed by: SURGERY

## 2019-11-28 PROCEDURE — 25000003 PHARM REV CODE 250: Mod: HCNC | Performed by: FAMILY MEDICINE

## 2019-11-28 PROCEDURE — 83540 ASSAY OF IRON: CPT | Mod: HCNC

## 2019-11-28 PROCEDURE — 11000001 HC ACUTE MED/SURG PRIVATE ROOM: Mod: HCNC

## 2019-11-28 PROCEDURE — 85025 COMPLETE CBC W/AUTO DIFF WBC: CPT | Mod: HCNC

## 2019-11-28 PROCEDURE — 63600175 PHARM REV CODE 636 W HCPCS: Mod: HCNC | Performed by: SURGERY

## 2019-11-28 PROCEDURE — P9021 RED BLOOD CELLS UNIT: HCPCS | Mod: HCNC

## 2019-11-28 PROCEDURE — 93005 ELECTROCARDIOGRAM TRACING: CPT | Mod: HCNC

## 2019-11-28 PROCEDURE — 82553 CREATINE MB FRACTION: CPT | Mod: HCNC

## 2019-11-28 PROCEDURE — 82607 VITAMIN B-12: CPT | Mod: HCNC

## 2019-11-28 PROCEDURE — 36415 COLL VENOUS BLD VENIPUNCTURE: CPT | Mod: HCNC

## 2019-11-28 PROCEDURE — 93010 ELECTROCARDIOGRAM REPORT: CPT | Mod: HCNC,,, | Performed by: INTERNAL MEDICINE

## 2019-11-28 PROCEDURE — 82803 BLOOD GASES ANY COMBINATION: CPT | Mod: HCNC | Performed by: SURGERY

## 2019-11-28 PROCEDURE — 99222 1ST HOSP IP/OBS MODERATE 55: CPT | Mod: AI,HCNC,, | Performed by: FAMILY MEDICINE

## 2019-11-28 PROCEDURE — 94761 N-INVAS EAR/PLS OXIMETRY MLT: CPT | Mod: HCNC

## 2019-11-28 PROCEDURE — 27000221 HC OXYGEN, UP TO 24 HOURS: Mod: HCNC

## 2019-11-28 PROCEDURE — 36430 TRANSFUSION BLD/BLD COMPNT: CPT

## 2019-11-28 PROCEDURE — 82550 ASSAY OF CK (CPK): CPT | Mod: HCNC

## 2019-11-28 PROCEDURE — 87040 BLOOD CULTURE FOR BACTERIA: CPT | Mod: HCNC

## 2019-11-28 PROCEDURE — 94640 AIRWAY INHALATION TREATMENT: CPT | Mod: HCNC

## 2019-11-28 RX ORDER — IBUPROFEN 200 MG
16 TABLET ORAL
Status: DISCONTINUED | OUTPATIENT
Start: 2019-11-28 | End: 2019-12-01 | Stop reason: HOSPADM

## 2019-11-28 RX ORDER — ACETAMINOPHEN 325 MG/1
650 TABLET ORAL EVERY 8 HOURS PRN
Status: DISCONTINUED | OUTPATIENT
Start: 2019-11-28 | End: 2019-12-01 | Stop reason: HOSPADM

## 2019-11-28 RX ORDER — SODIUM CHLORIDE 0.9 % (FLUSH) 0.9 %
10 SYRINGE (ML) INJECTION
Status: DISCONTINUED | OUTPATIENT
Start: 2019-11-28 | End: 2019-12-01 | Stop reason: HOSPADM

## 2019-11-28 RX ORDER — INSULIN ASPART 100 [IU]/ML
0-5 INJECTION, SOLUTION INTRAVENOUS; SUBCUTANEOUS
Status: DISCONTINUED | OUTPATIENT
Start: 2019-11-28 | End: 2019-12-01 | Stop reason: HOSPADM

## 2019-11-28 RX ORDER — IPRATROPIUM BROMIDE AND ALBUTEROL SULFATE 2.5; .5 MG/3ML; MG/3ML
3 SOLUTION RESPIRATORY (INHALATION) EVERY 4 HOURS
Status: DISCONTINUED | OUTPATIENT
Start: 2019-11-28 | End: 2019-12-01 | Stop reason: HOSPADM

## 2019-11-28 RX ORDER — DILTIAZEM HYDROCHLORIDE 180 MG/1
180 CAPSULE, COATED, EXTENDED RELEASE ORAL DAILY
Status: DISCONTINUED | OUTPATIENT
Start: 2019-11-28 | End: 2019-11-28

## 2019-11-28 RX ORDER — LOSARTAN POTASSIUM 50 MG/1
50 TABLET ORAL DAILY
Status: DISCONTINUED | OUTPATIENT
Start: 2019-11-29 | End: 2019-12-01 | Stop reason: HOSPADM

## 2019-11-28 RX ORDER — METHYLPREDNISOLONE SOD SUCC 125 MG
125 VIAL (EA) INJECTION EVERY 8 HOURS
Status: DISCONTINUED | OUTPATIENT
Start: 2019-11-28 | End: 2019-11-30

## 2019-11-28 RX ORDER — INSULIN ASPART 100 [IU]/ML
3 INJECTION, SOLUTION INTRAVENOUS; SUBCUTANEOUS
Status: DISCONTINUED | OUTPATIENT
Start: 2019-11-28 | End: 2019-12-01 | Stop reason: HOSPADM

## 2019-11-28 RX ORDER — ONDANSETRON 2 MG/ML
4 INJECTION INTRAMUSCULAR; INTRAVENOUS EVERY 8 HOURS PRN
Status: DISCONTINUED | OUTPATIENT
Start: 2019-11-28 | End: 2019-12-01 | Stop reason: HOSPADM

## 2019-11-28 RX ORDER — IBUPROFEN 200 MG
24 TABLET ORAL
Status: DISCONTINUED | OUTPATIENT
Start: 2019-11-28 | End: 2019-12-01 | Stop reason: HOSPADM

## 2019-11-28 RX ORDER — PANTOPRAZOLE SODIUM 40 MG/1
40 TABLET, DELAYED RELEASE ORAL DAILY
Status: DISCONTINUED | OUTPATIENT
Start: 2019-11-29 | End: 2019-12-01 | Stop reason: HOSPADM

## 2019-11-28 RX ORDER — ALLOPURINOL 100 MG/1
100 TABLET ORAL DAILY
Status: DISCONTINUED | OUTPATIENT
Start: 2019-11-29 | End: 2019-11-30

## 2019-11-28 RX ORDER — ALBUTEROL SULFATE 2.5 MG/.5ML
10 SOLUTION RESPIRATORY (INHALATION)
Status: COMPLETED | OUTPATIENT
Start: 2019-11-28 | End: 2019-11-28

## 2019-11-28 RX ORDER — CITALOPRAM 10 MG/1
40 TABLET ORAL DAILY
Status: DISCONTINUED | OUTPATIENT
Start: 2019-11-29 | End: 2019-11-30

## 2019-11-28 RX ORDER — PRAVASTATIN SODIUM 40 MG/1
40 TABLET ORAL DAILY
Status: DISCONTINUED | OUTPATIENT
Start: 2019-11-29 | End: 2019-12-01 | Stop reason: HOSPADM

## 2019-11-28 RX ORDER — AMLODIPINE BESYLATE 5 MG/1
5 TABLET ORAL DAILY
Status: DISCONTINUED | OUTPATIENT
Start: 2019-11-29 | End: 2019-11-28

## 2019-11-28 RX ORDER — DILTIAZEM HYDROCHLORIDE 180 MG/1
180 CAPSULE, COATED, EXTENDED RELEASE ORAL DAILY
Status: DISCONTINUED | OUTPATIENT
Start: 2019-11-28 | End: 2019-12-01 | Stop reason: HOSPADM

## 2019-11-28 RX ORDER — GLUCAGON 1 MG
1 KIT INJECTION
Status: DISCONTINUED | OUTPATIENT
Start: 2019-11-28 | End: 2019-12-01 | Stop reason: HOSPADM

## 2019-11-28 RX ORDER — TRAZODONE HYDROCHLORIDE 50 MG/1
50 TABLET ORAL NIGHTLY
Status: DISCONTINUED | OUTPATIENT
Start: 2019-11-28 | End: 2019-12-01 | Stop reason: HOSPADM

## 2019-11-28 RX ORDER — HYDROCODONE BITARTRATE AND ACETAMINOPHEN 500; 5 MG/1; MG/1
TABLET ORAL
Status: DISCONTINUED | OUTPATIENT
Start: 2019-11-28 | End: 2019-12-01 | Stop reason: HOSPADM

## 2019-11-28 RX ORDER — METOPROLOL TARTRATE 25 MG/1
25 TABLET, FILM COATED ORAL 2 TIMES DAILY
Status: DISCONTINUED | OUTPATIENT
Start: 2019-11-28 | End: 2019-12-01 | Stop reason: HOSPADM

## 2019-11-28 RX ORDER — ROPINIROLE 0.5 MG/1
1 TABLET, FILM COATED ORAL NIGHTLY
Status: DISCONTINUED | OUTPATIENT
Start: 2019-11-28 | End: 2019-12-01 | Stop reason: HOSPADM

## 2019-11-28 RX ADMIN — METHYLPREDNISOLONE SODIUM SUCCINATE 125 MG: 125 INJECTION, POWDER, FOR SOLUTION INTRAMUSCULAR; INTRAVENOUS at 02:11

## 2019-11-28 RX ADMIN — ALBUTEROL SULFATE 10 MG: 2.5 SOLUTION RESPIRATORY (INHALATION) at 02:11

## 2019-11-28 RX ADMIN — IPRATROPIUM BROMIDE AND ALBUTEROL SULFATE 3 ML: .5; 3 SOLUTION RESPIRATORY (INHALATION) at 07:11

## 2019-11-28 RX ADMIN — ROPINIROLE HYDROCHLORIDE 1 MG: 0.5 TABLET, FILM COATED ORAL at 09:11

## 2019-11-28 RX ADMIN — METHYLPREDNISOLONE SODIUM SUCCINATE 125 MG: 125 INJECTION, POWDER, FOR SOLUTION INTRAMUSCULAR; INTRAVENOUS at 10:11

## 2019-11-28 RX ADMIN — METOPROLOL TARTRATE 25 MG: 25 TABLET ORAL at 09:11

## 2019-11-28 RX ADMIN — TRAZODONE HYDROCHLORIDE 50 MG: 50 TABLET ORAL at 09:11

## 2019-11-28 RX ADMIN — INSULIN DETEMIR 10 UNITS: 100 INJECTION, SOLUTION SUBCUTANEOUS at 10:11

## 2019-11-28 RX ADMIN — INSULIN ASPART 3 UNITS: 100 INJECTION, SOLUTION INTRAVENOUS; SUBCUTANEOUS at 05:11

## 2019-11-28 RX ADMIN — DILTIAZEM HYDROCHLORIDE 180 MG: 180 CAPSULE, COATED, EXTENDED RELEASE ORAL at 09:11

## 2019-11-28 NOTE — ED TRIAGE NOTES
"Arrives per self transport from home. Presents with complaints of shortness of breath. Reports that she started "feeling bad" 2 days ago but did not want to come to the hospital. Dr Hickman ordered antibiotics and steroids for "chest congestion". Completed Zpack, still taking Augmentin.    "

## 2019-11-28 NOTE — ED PROVIDER NOTES
[facility]                                                 Chief Complaint  84 y.o. female with Shortness of Breath    History of Present Illness  Chantell Herrera presents to the emergency room with shortness of breath  Patient with cough and shortness of breath, patient with wheezing this week   states that she has been getting progressively worse at home PTA  Pt is currently on 2 liters of oxygen at home, now using triology machine 24/7  Pt is 84% oxygenation on arrival with active wheezing on ER presentation    The history is provided by the patient   device was not used during this ER visit    Past Medical History   -- Anxiety     -- Arthritis     -- Asthma     -- Chronic bronchitis     -- COPD (chronic obstructive pulmonary disease)     -- Depression     -- SANTIAGO (dyspnea on exertion)     -- Emphysema of lung     -- Fatigue     -- Hyperlipidemia     -- Hypertension     -- Trouble in sleeping        Surgical HX: Appendectomy  No known allergies    I have reviewed all of this patient's past medical, surgical, family, and social   histories as well as active allergies and medications documented in the  electronic medical record    Review of Systems and Physical Exam      Review of Systems  -- Constitution - no fever, denies fatigue, no weakness, no chills  -- Eyes - no tearing or redness, no visual disturbance  -- Ear, Nose - no tinnitus or earache, no nasal congestion or discharge  -- Mouth,Throat - no sore throat, no toothache, normal voice, normal swallowing  -- Respiratory - cough and congestion, shortness of breath, wheezing  -- Cardiovascular - denies chest pain, no palpitations, denies claudication  -- Gastrointestinal - denies abdominal pain, nausea, vomiting, or diarrhea  -- Genitourinary - no dysuria, no hematuria, no flank pain, no bladder pain  -- Musculoskeletal - denies back pain, negative for trauma or injury  -- Neurological - no headache, denies  weakness or seizure; no LOC  -- Skin - denies pallor, rash, or changes in skin. no hives or welts noted    Vital Signs  Her oral temperature is 97.2 °F (36.2 °C).   Her blood pressure is 108/61 and her pulse is 97.   Her respiration is 20 and oxygen saturation is 92% (abnormal).     Physical Exam  -- Nursing note and vitals reviewed  -- Constitutional: Appears well-developed and well-nourished  -- Head: Atraumatic. Normocephalic. No obvious abnormality  -- Eyes: Pupils are equal and reactive to light. Normal conjunctiva and lids  -- Nose: Nose normal in appearance, nares grossly normal. No discharge  -- Throat: Mucous membranes moist, pharynx normal, normal tonsils. No lesions   -- Ears: External ears and TM normal bilaterally. Normal hearing and no drainage  -- Neck: Normal range of motion. Neck supple. No masses, trachea midline  -- Cardiac: Normal rate, regular rhythm and normal heart sounds  -- Pulmonary: faint rhonchi at the bilateral bases with active wheezing   -- Abdominal: Soft, no tenderness. Normal bowel sounds. Normal liver edge  -- Musculoskeletal: Normal range of motion, no effusions. Joints stable   -- Neurological: No focal deficits. Showed good interaction with staff  -- Skin: Warm and dry. No evidence of rash or cellulitis    Emergency Room Course      Lab Results     K 3.8      CO2 31 (H)   BUN 21   CREATININE 1.0    (H)   ALKPHOS 64   AST 14   ALT 25   BILITOT 0.7   ALBUMIN 3.9   PROT 6.1   WBC 9.58   HGB 7.3 (L)   HCT 23.4 (L)      CPK 17 (L)   CPK 17 (L)   CPKMB 1.1   TROPONINI 0.016   INR 1.1    (H)   LACTATE 2.1   MG 1.7     EKG  -- atrial fibrillation at 97 beats per minute    Radiology  -- Chest x-ray showed no infiltrate and showed no acute pathology     Medications Given  methylPREDNISolone sodium succinate injection 125 mg (125 mg Intravenous Given 11/28/19 1433)   albuterol sulfate nebulizer solution 10 mg (10 mg Nebulization Given 11/28/19 1423)      Diagnosis  -- The primary encounter diagnosis was COPD with acute exacerbation.   -- A diagnosis of SOB (shortness of breath) was also pertinent to this visit.    Disposition and Plan  -- Disposition: admit  -- Condition: stable    This note is dictated on M*Modal word recognition program.  There are word recognition mistakes that are occasionally missed on review.               Festus Maurer MD  12/01/19 0614

## 2019-11-29 LAB
ALBUMIN SERPL BCP-MCNC: 3.8 G/DL (ref 3.5–5.2)
ALP SERPL-CCNC: 65 U/L (ref 55–135)
ALT SERPL W/O P-5'-P-CCNC: 34 U/L (ref 10–44)
ANION GAP SERPL CALC-SCNC: 7 MMOL/L (ref 8–16)
ANISOCYTOSIS BLD QL SMEAR: ABNORMAL
AST SERPL-CCNC: 19 U/L (ref 10–40)
BACTERIA #/AREA URNS HPF: ABNORMAL /HPF
BASOPHILS # BLD AUTO: 0.03 K/UL (ref 0–0.2)
BASOPHILS NFR BLD: 0.3 % (ref 0–1.9)
BILIRUB SERPL-MCNC: 2.6 MG/DL (ref 0.1–1)
BILIRUB UR QL STRIP: NEGATIVE
BLD PROD TYP BPU: NORMAL
BLD PROD TYP BPU: NORMAL
BLOOD UNIT EXPIRATION DATE: NORMAL
BLOOD UNIT EXPIRATION DATE: NORMAL
BLOOD UNIT TYPE CODE: 5100
BLOOD UNIT TYPE CODE: 6200
BLOOD UNIT TYPE: NORMAL
BLOOD UNIT TYPE: NORMAL
BUN SERPL-MCNC: 23 MG/DL (ref 8–23)
CALCIUM SERPL-MCNC: 8.5 MG/DL (ref 8.7–10.5)
CHLORIDE SERPL-SCNC: 101 MMOL/L (ref 95–110)
CLARITY UR: CLEAR
CO2 SERPL-SCNC: 31 MMOL/L (ref 23–29)
CODING SYSTEM: NORMAL
CODING SYSTEM: NORMAL
COLOR UR: YELLOW
CREAT SERPL-MCNC: 1 MG/DL (ref 0.5–1.4)
DIFFERENTIAL METHOD: ABNORMAL
DISPENSE STATUS: NORMAL
DISPENSE STATUS: NORMAL
EOSINOPHIL # BLD AUTO: 0 K/UL (ref 0–0.5)
EOSINOPHIL NFR BLD: 0 % (ref 0–8)
ERYTHROCYTE [DISTWIDTH] IN BLOOD BY AUTOMATED COUNT: 22.2 % (ref 11.5–14.5)
EST. GFR  (AFRICAN AMERICAN): >60 ML/MIN/1.73 M^2
EST. GFR  (NON AFRICAN AMERICAN): 53 ML/MIN/1.73 M^2
GLUCOSE SERPL-MCNC: 236 MG/DL (ref 70–110)
GLUCOSE UR QL STRIP: ABNORMAL
HCT VFR BLD AUTO: 24.6 % (ref 37–48.5)
HGB BLD-MCNC: 7.9 G/DL (ref 12–16)
HGB UR QL STRIP: NEGATIVE
IMM GRANULOCYTES # BLD AUTO: 0.11 K/UL (ref 0–0.04)
IMM GRANULOCYTES NFR BLD AUTO: 1.1 % (ref 0–0.5)
KETONES UR QL STRIP: ABNORMAL
LEUKOCYTE ESTERASE UR QL STRIP: NEGATIVE
LYMPHOCYTES # BLD AUTO: 0.2 K/UL (ref 1–4.8)
LYMPHOCYTES NFR BLD: 2.1 % (ref 18–48)
MCH RBC QN AUTO: 37.4 PG (ref 27–31)
MCHC RBC AUTO-ENTMCNC: 32.1 G/DL (ref 32–36)
MCV RBC AUTO: 117 FL (ref 82–98)
MICROSCOPIC COMMENT: ABNORMAL
MONOCYTES # BLD AUTO: 1 K/UL (ref 0.3–1)
MONOCYTES NFR BLD: 10.1 % (ref 4–15)
NEUTROPHILS # BLD AUTO: 8.8 K/UL (ref 1.8–7.7)
NEUTROPHILS NFR BLD: 86.4 % (ref 38–73)
NITRITE UR QL STRIP: NEGATIVE
NRBC BLD-RTO: 0 /100 WBC
PH UR STRIP: 6 [PH] (ref 5–8)
PLATELET # BLD AUTO: 209 K/UL (ref 150–350)
PMV BLD AUTO: ABNORMAL FL (ref 9.2–12.9)
POCT GLUCOSE: 144 MG/DL (ref 70–110)
POCT GLUCOSE: 175 MG/DL (ref 70–110)
POCT GLUCOSE: 231 MG/DL (ref 70–110)
POCT GLUCOSE: 276 MG/DL (ref 70–110)
POIKILOCYTOSIS BLD QL SMEAR: ABNORMAL
POTASSIUM SERPL-SCNC: 4.8 MMOL/L (ref 3.5–5.1)
PROT SERPL-MCNC: 6 G/DL (ref 6–8.4)
PROT UR QL STRIP: NEGATIVE
RBC # BLD AUTO: 2.11 M/UL (ref 4–5.4)
SODIUM SERPL-SCNC: 139 MMOL/L (ref 136–145)
SP GR UR STRIP: 1.01 (ref 1–1.03)
SQUAMOUS #/AREA URNS HPF: 15 /HPF
TRANS ERYTHROCYTES VOL PATIENT: NORMAL ML
TRANS ERYTHROCYTES VOL PATIENT: NORMAL ML
TROPONIN I SERPL DL<=0.01 NG/ML-MCNC: 0.01 NG/ML (ref 0–0.03)
URN SPEC COLLECT METH UR: ABNORMAL
UROBILINOGEN UR STRIP-ACNC: NEGATIVE EU/DL
WBC # BLD AUTO: 10.2 K/UL (ref 3.9–12.7)
WBC #/AREA URNS HPF: 8 /HPF (ref 0–5)
YEAST URNS QL MICRO: ABNORMAL

## 2019-11-29 PROCEDURE — 63600175 PHARM REV CODE 636 W HCPCS: Mod: HCNC | Performed by: NURSE PRACTITIONER

## 2019-11-29 PROCEDURE — 27000221 HC OXYGEN, UP TO 24 HOURS: Mod: HCNC

## 2019-11-29 PROCEDURE — 11000001 HC ACUTE MED/SURG PRIVATE ROOM: Mod: HCNC

## 2019-11-29 PROCEDURE — 36415 COLL VENOUS BLD VENIPUNCTURE: CPT | Mod: HCNC

## 2019-11-29 PROCEDURE — 63600175 PHARM REV CODE 636 W HCPCS: Mod: HCNC

## 2019-11-29 PROCEDURE — 84484 ASSAY OF TROPONIN QUANT: CPT | Mod: HCNC

## 2019-11-29 PROCEDURE — 99232 PR SUBSEQUENT HOSPITAL CARE,LEVL II: ICD-10-PCS | Mod: HCNC,,, | Performed by: FAMILY MEDICINE

## 2019-11-29 PROCEDURE — 99900035 HC TECH TIME PER 15 MIN (STAT): Mod: HCNC

## 2019-11-29 PROCEDURE — P9021 RED BLOOD CELLS UNIT: HCPCS | Mod: HCNC

## 2019-11-29 PROCEDURE — 25000003 PHARM REV CODE 250: Mod: HCNC | Performed by: NURSE PRACTITIONER

## 2019-11-29 PROCEDURE — 94761 N-INVAS EAR/PLS OXIMETRY MLT: CPT | Mod: HCNC

## 2019-11-29 PROCEDURE — 27000646 HC AEROBIKA DEVICE: Mod: HCNC

## 2019-11-29 PROCEDURE — 25000242 PHARM REV CODE 250 ALT 637 W/ HCPCS: Mod: HCNC | Performed by: SURGERY

## 2019-11-29 PROCEDURE — 80053 COMPREHEN METABOLIC PANEL: CPT | Mod: HCNC

## 2019-11-29 PROCEDURE — 81000 URINALYSIS NONAUTO W/SCOPE: CPT | Mod: HCNC

## 2019-11-29 PROCEDURE — 25000003 PHARM REV CODE 250: Mod: HCNC | Performed by: FAMILY MEDICINE

## 2019-11-29 PROCEDURE — 63600175 PHARM REV CODE 636 W HCPCS: Mod: HCNC | Performed by: SURGERY

## 2019-11-29 PROCEDURE — 25000003 PHARM REV CODE 250: Mod: HCNC | Performed by: SURGERY

## 2019-11-29 PROCEDURE — 94664 DEMO&/EVAL PT USE INHALER: CPT | Mod: HCNC

## 2019-11-29 PROCEDURE — 99232 SBSQ HOSP IP/OBS MODERATE 35: CPT | Mod: HCNC,,, | Performed by: FAMILY MEDICINE

## 2019-11-29 PROCEDURE — 85025 COMPLETE CBC W/AUTO DIFF WBC: CPT | Mod: HCNC

## 2019-11-29 PROCEDURE — 94640 AIRWAY INHALATION TREATMENT: CPT | Mod: HCNC

## 2019-11-29 RX ORDER — GUAIFENESIN 600 MG/1
600 TABLET, EXTENDED RELEASE ORAL 2 TIMES DAILY
Status: DISCONTINUED | OUTPATIENT
Start: 2019-11-29 | End: 2019-12-01 | Stop reason: HOSPADM

## 2019-11-29 RX ORDER — FUROSEMIDE 10 MG/ML
INJECTION INTRAMUSCULAR; INTRAVENOUS
Status: COMPLETED
Start: 2019-11-29 | End: 2019-11-29

## 2019-11-29 RX ORDER — FUROSEMIDE 10 MG/ML
20 INJECTION INTRAMUSCULAR; INTRAVENOUS ONCE
Status: COMPLETED | OUTPATIENT
Start: 2019-11-29 | End: 2019-11-29

## 2019-11-29 RX ORDER — AZITHROMYCIN 200 MG/5ML
500 POWDER, FOR SUSPENSION ORAL DAILY
Status: DISCONTINUED | OUTPATIENT
Start: 2019-11-29 | End: 2019-11-29

## 2019-11-29 RX ORDER — FUROSEMIDE 10 MG/ML
40 INJECTION INTRAMUSCULAR; INTRAVENOUS ONCE
Status: DISCONTINUED | OUTPATIENT
Start: 2019-11-29 | End: 2019-11-29

## 2019-11-29 RX ORDER — HYDROCODONE BITARTRATE AND ACETAMINOPHEN 500; 5 MG/1; MG/1
TABLET ORAL
Status: DISCONTINUED | OUTPATIENT
Start: 2019-11-29 | End: 2019-12-01 | Stop reason: HOSPADM

## 2019-11-29 RX ADMIN — INSULIN ASPART 3 UNITS: 100 INJECTION, SOLUTION INTRAVENOUS; SUBCUTANEOUS at 07:11

## 2019-11-29 RX ADMIN — FUROSEMIDE 20 MG: 10 INJECTION INTRAMUSCULAR; INTRAVENOUS at 04:11

## 2019-11-29 RX ADMIN — IPRATROPIUM BROMIDE AND ALBUTEROL SULFATE 3 ML: .5; 3 SOLUTION RESPIRATORY (INHALATION) at 08:11

## 2019-11-29 RX ADMIN — ACETAMINOPHEN 650 MG: 325 TABLET ORAL at 02:11

## 2019-11-29 RX ADMIN — INSULIN ASPART 3 UNITS: 100 INJECTION, SOLUTION INTRAVENOUS; SUBCUTANEOUS at 12:11

## 2019-11-29 RX ADMIN — IPRATROPIUM BROMIDE AND ALBUTEROL SULFATE 3 ML: .5; 3 SOLUTION RESPIRATORY (INHALATION) at 03:11

## 2019-11-29 RX ADMIN — IPRATROPIUM BROMIDE AND ALBUTEROL SULFATE 3 ML: .5; 3 SOLUTION RESPIRATORY (INHALATION) at 12:11

## 2019-11-29 RX ADMIN — DILTIAZEM HYDROCHLORIDE 180 MG: 180 CAPSULE, COATED, EXTENDED RELEASE ORAL at 09:11

## 2019-11-29 RX ADMIN — METOPROLOL TARTRATE 25 MG: 25 TABLET ORAL at 09:11

## 2019-11-29 RX ADMIN — ROPINIROLE HYDROCHLORIDE 1 MG: 0.5 TABLET, FILM COATED ORAL at 09:11

## 2019-11-29 RX ADMIN — INSULIN ASPART 2 UNITS: 100 INJECTION, SOLUTION INTRAVENOUS; SUBCUTANEOUS at 12:11

## 2019-11-29 RX ADMIN — IPRATROPIUM BROMIDE AND ALBUTEROL SULFATE 3 ML: .5; 3 SOLUTION RESPIRATORY (INHALATION) at 11:11

## 2019-11-29 RX ADMIN — FUROSEMIDE 20 MG: 10 INJECTION, SOLUTION INTRAMUSCULAR; INTRAVENOUS at 04:11

## 2019-11-29 RX ADMIN — METHYLPREDNISOLONE SODIUM SUCCINATE 125 MG: 125 INJECTION, POWDER, FOR SOLUTION INTRAMUSCULAR; INTRAVENOUS at 09:11

## 2019-11-29 RX ADMIN — INSULIN ASPART 3 UNITS: 100 INJECTION, SOLUTION INTRAVENOUS; SUBCUTANEOUS at 05:11

## 2019-11-29 RX ADMIN — METHYLPREDNISOLONE SODIUM SUCCINATE 125 MG: 125 INJECTION, POWDER, FOR SOLUTION INTRAMUSCULAR; INTRAVENOUS at 05:11

## 2019-11-29 RX ADMIN — IPRATROPIUM BROMIDE AND ALBUTEROL SULFATE 3 ML: .5; 3 SOLUTION RESPIRATORY (INHALATION) at 04:11

## 2019-11-29 RX ADMIN — ALLOPURINOL 100 MG: 100 TABLET ORAL at 09:11

## 2019-11-29 RX ADMIN — PRAVASTATIN SODIUM 40 MG: 40 TABLET ORAL at 09:11

## 2019-11-29 RX ADMIN — METHYLPREDNISOLONE SODIUM SUCCINATE 125 MG: 125 INJECTION, POWDER, FOR SOLUTION INTRAMUSCULAR; INTRAVENOUS at 02:11

## 2019-11-29 RX ADMIN — LOSARTAN POTASSIUM 50 MG: 50 TABLET, FILM COATED ORAL at 09:11

## 2019-11-29 RX ADMIN — ACETAMINOPHEN 650 MG: 325 TABLET ORAL at 06:11

## 2019-11-29 RX ADMIN — GUAIFENESIN 600 MG: 600 TABLET, EXTENDED RELEASE ORAL at 10:11

## 2019-11-29 RX ADMIN — CITALOPRAM HYDROBROMIDE 40 MG: 10 TABLET ORAL at 09:11

## 2019-11-29 RX ADMIN — PANTOPRAZOLE SODIUM 40 MG: 40 TABLET, DELAYED RELEASE ORAL at 09:11

## 2019-11-29 RX ADMIN — GUAIFENESIN 600 MG: 600 TABLET, EXTENDED RELEASE ORAL at 09:11

## 2019-11-29 RX ADMIN — TRAZODONE HYDROCHLORIDE 50 MG: 50 TABLET ORAL at 09:11

## 2019-11-29 RX ADMIN — INSULIN ASPART 3 UNITS: 100 INJECTION, SOLUTION INTRAVENOUS; SUBCUTANEOUS at 08:11

## 2019-11-29 RX ADMIN — AZITHROMYCIN MONOHYDRATE 500 MG: 500 INJECTION, POWDER, LYOPHILIZED, FOR SOLUTION INTRAVENOUS at 10:11

## 2019-11-29 RX ADMIN — IPRATROPIUM BROMIDE AND ALBUTEROL SULFATE 3 ML: .5; 3 SOLUTION RESPIRATORY (INHALATION) at 07:11

## 2019-11-29 NOTE — PLAN OF CARE
A/Ox3. Fall precautions in place generalized weakness. BS are coarse shallow sob with activity, trilogy intermittent with 02 n/c, nebs q4hr. Bradycardic at rest, asymptomatic.

## 2019-11-29 NOTE — ASSESSMENT & PLAN NOTE
Continue amlodipine 5 mg daily  Lower diltiazem extended release 180 mg daily  Continue losartan 50 mg daily  Continue metoprolol 25 mg p.o. b.i.d.  Hold Lasix for now.

## 2019-11-29 NOTE — SUBJECTIVE & OBJECTIVE
Past Medical History:   Diagnosis Date    Acute on chronic congestive heart failure 2/1/2019    Anxiety     Arthritis     Asthma     Atrial fibrillation with rapid ventricular response     CHF (congestive heart failure) 11/29/2018    Chronic bronchitis     COPD (chronic obstructive pulmonary disease)     Depression     Diabetes mellitus, type 2     SANTIAGO (dyspnea on exertion)     Emphysema of lung     Fatigue     Hyperlipidemia     Hypertension     Symptomatic anemia 1/22/2019    Trouble in sleeping        Past Surgical History:   Procedure Laterality Date    APPENDECTOMY      EYE SURGERY      HYSTERECTOMY      TONSILLECTOMY         Review of patient's allergies indicates:  No Known Allergies    No current facility-administered medications on file prior to encounter.      Current Outpatient Medications on File Prior to Encounter   Medication Sig    albuterol (PROVENTIL) 2.5 mg /3 mL (0.083 %) nebulizer solution Take 3 mLs (2.5 mg total) by nebulization every 6 (six) hours as needed for Wheezing.    albuterol (VENTOLIN HFA) 90 mcg/actuation inhaler Inhale 2 puffs into the lungs every 6 (six) hours as needed for Wheezing. Rescue    allopurinol (ZYLOPRIM) 100 MG tablet Take 1 tablet (100 mg total) by mouth once daily.    amLODIPine (NORVASC) 5 MG tablet TAKE 1 TABLET BY MOUTH ONCE DAILY ( DOSE DECREASE )    amoxicillin-clavulanate 875-125mg (AUGMENTIN) 875-125 mg per tablet     apixaban (ELIQUIS) 2.5 mg Tab Take 1 tablet (2.5 mg total) by mouth 2 (two) times daily.    benzonatate (TESSALON) 200 MG capsule TAKE 1 CAPSULE (200 MG TOTAL) BY MOUTH 3 (THREE) TIMES DAILY AS NEEDED.    cholecalciferol, vitamin D3, (VITAMIN D3) 2,000 unit Tab Take 2,000 Units by mouth once daily.    citalopram (CELEXA) 40 MG tablet TAKE 1 TABLET EVERY DAY (Patient taking differently: 40 mg. TAKE 1 TABLET EVERY DAY)    diltiaZEM (CARDIZEM CD) 240 MG 24 hr capsule Take 1 capsule (240 mg total) by mouth once daily.  "   losartan (COZAAR) 100 MG tablet TAKE 1/2 TABLET EVERY DAY    magnesium oxide (MAG-OX) 400 mg (241.3 mg magnesium) tablet Take 1 tablet by mouth 2 (two) times daily.    metoprolol tartrate (LOPRESSOR) 25 MG tablet     mupirocin (BACTROBAN) 2 % ointment Apply topically 3 (three) times daily.    pravastatin (PRAVACHOL) 40 MG tablet TAKE 1 TABLET EVERY DAY    predniSONE (DELTASONE) 10 MG tablet Take 4 tablets (40 mg) by mouth for 4 days, then 3 tablets (30 mg) by mouth for 3 days then resume 2 tablets (20 mg) by mouth daily until follow up appointment with primary care doctor.    rOPINIRole (REQUIP) 1 MG tablet Take 1 tablet (1 mg total) by mouth every evening.    traZODone (DESYREL) 50 MG tablet TAKE 1 TABLET EVERY EVENING    baclofen (LIORESAL) 10 MG tablet Take 1 tablet (10 mg total) by mouth 3 (three) times daily.    blood sugar diagnostic Strp Test BG twice a day Accu-Chek Celia plus  DM 2  E 11.9  Dr Gaspar    blood-glucose meter kit Test BG twice a day Accu-Chek Celia plus  DM 2  E 11.9  Dr Gaspar    blood-glucose meter Misc Use as directed    colchicine (COLCRYS) 0.6 mg tablet Take 1 tablet (0.6 mg total) by mouth 2 (two) times daily. for 7 days    fluticasone-umeclidin-vilanter (TRELEGY ELLIPTA) 100-62.5-25 mcg DsDv Inhale 1 puff into the lungs once daily.    furosemide (LASIX) 20 MG tablet Take 20 mg by mouth 2 (two) times daily.    insulin detemir U-100 (LEVEMIR FLEXTOUCH U-100 INSULN) 100 unit/mL (3 mL) SubQ InPn pen Inject 10 Units into the skin every evening.    ipratropium (ATROVENT) 0.02 % nebulizer solution Take 500 mcg by nebulization 4 (four) times daily.     lancets 33 gauge Misc Test BG twice a day Accu-Chek Celia plus  DM 2  E 11.9  Dr Gaspar    pen needle, diabetic 31 gauge x 5/16" Ndle Use to inject once daily with Levemir     Family History     Problem Relation (Age of Onset)    COPD Brother, Daughter    Cancer Sister    Diabetes Father    Heart disease Mother    " Hypertension Mother, Father, Brother    Kidney disease Son    No Known Problems Daughter        Tobacco Use    Smoking status: Former Smoker     Last attempt to quit: 8/3/2000     Years since quittin.3    Smokeless tobacco: Never Used   Substance and Sexual Activity    Alcohol use: No    Drug use: No    Sexual activity: Never     Review of Systems   Constitutional: Negative for activity change, chills, fatigue, fever and unexpected weight change.   HENT: Negative for congestion, postnasal drip, rhinorrhea, sore throat and trouble swallowing.    Respiratory: Positive for cough, shortness of breath and wheezing. Negative for chest tightness.    Cardiovascular: Negative for chest pain and leg swelling.   Gastrointestinal: Negative for abdominal pain.   Endocrine: Negative for cold intolerance and heat intolerance.   Genitourinary: Negative for difficulty urinating.   Musculoskeletal: Negative for back pain and joint swelling.   Skin: Negative for rash.   Neurological: Negative for numbness.   Hematological: Negative for adenopathy.   Psychiatric/Behavioral: Negative for decreased concentration.     Objective:     Vital Signs (Most Recent):  Temp: 96.7 °F (35.9 °C) (19)  Pulse: 67 (19 0600)  Resp: 17 (19 042)  BP: (!) 116/56 (19)  SpO2: (!) 89 % (19) Vital Signs (24h Range):  Temp:  [96 °F (35.6 °C)-99 °F (37.2 °C)] 96.7 °F (35.9 °C)  Pulse:  [] 67  Resp:  [17-27] 17  SpO2:  [84 %-99 %] 89 %  BP: (108-145)/(53-70) 116/56     Weight: 60.8 kg (134 lb)  Body mass index is 24.51 kg/m².    Physical Exam   Constitutional: She is oriented to person, place, and time.   mildly tachypneic   HENT:   Head: Normocephalic.   Nose: Nose normal.   Mouth/Throat: Oropharynx is clear and moist.   Eyes: Pupils are equal, round, and reactive to light. EOM are normal. Right eye exhibits no discharge.   Neck: Normal range of motion. No JVD present. No thyromegaly present.    Cardiovascular: Normal rate, regular rhythm, normal heart sounds and intact distal pulses. Exam reveals no gallop and no friction rub.   No murmur heard.  Pulmonary/Chest: Effort normal and breath sounds normal. She has no wheezes. She has no rales.   Abdominal: Soft. There is no tenderness.   Musculoskeletal: Normal range of motion. She exhibits no edema or tenderness.   Lymphadenopathy:     She has no cervical adenopathy.   Neurological: She is alert and oriented to person, place, and time. No cranial nerve deficit.   Skin: Skin is dry.   Psychiatric: She has a normal mood and affect. Her behavior is normal. Judgment and thought content normal.   Vitals reviewed.        CRANIAL NERVES     CN III, IV, VI   Pupils are equal, round, and reactive to light.  Extraocular motions are normal.        Significant Labs:   BMP:   Recent Labs   Lab 11/29/19  0351   *      K 4.8      CO2 31*   BUN 23   CREATININE 1.0   CALCIUM 8.5*     CBC:   Recent Labs   Lab 11/28/19  1410 11/29/19  0351   WBC 9.58 10.20   HGB 7.3* 7.9*   HCT 23.4* 24.6*    209     Urine Studies: No results for input(s): COLORU, APPEARANCEUA, PHUR, SPECGRAV, PROTEINUA, GLUCUA, KETONESU, BILIRUBINUA, OCCULTUA, NITRITE, UROBILINOGEN, LEUKOCYTESUR, RBCUA, WBCUA, BACTERIA, SQUAMEPITHEL, HYALINECASTS in the last 48 hours.    Invalid input(s): WRIGHTSUR    Significant Imaging: CXR: I have reviewed all pertinent results/findings within the past 24 hours and my personal findings are:  COPD changes, no infiltrates  I have reviewed all pertinent imaging results/findings within the past 24 hours.  I have reviewed and interpreted all pertinent imaging results/findings within the past 24 hours.

## 2019-11-29 NOTE — SUBJECTIVE & OBJECTIVE
Past Medical History:   Diagnosis Date    Acute on chronic congestive heart failure 2/1/2019    Anxiety     Arthritis     Asthma     Atrial fibrillation with rapid ventricular response     CHF (congestive heart failure) 11/29/2018    Chronic bronchitis     COPD (chronic obstructive pulmonary disease)     Depression     Diabetes mellitus, type 2     SANTIAGO (dyspnea on exertion)     Emphysema of lung     Fatigue     Hyperlipidemia     Hypertension     Symptomatic anemia 1/22/2019    Trouble in sleeping        Past Surgical History:   Procedure Laterality Date    APPENDECTOMY      EYE SURGERY      HYSTERECTOMY      TONSILLECTOMY         Review of patient's allergies indicates:  No Known Allergies    No current facility-administered medications on file prior to encounter.      Current Outpatient Medications on File Prior to Encounter   Medication Sig    albuterol (PROVENTIL) 2.5 mg /3 mL (0.083 %) nebulizer solution Take 3 mLs (2.5 mg total) by nebulization every 6 (six) hours as needed for Wheezing.    albuterol (VENTOLIN HFA) 90 mcg/actuation inhaler Inhale 2 puffs into the lungs every 6 (six) hours as needed for Wheezing. Rescue    allopurinol (ZYLOPRIM) 100 MG tablet Take 1 tablet (100 mg total) by mouth once daily.    amLODIPine (NORVASC) 5 MG tablet TAKE 1 TABLET BY MOUTH ONCE DAILY ( DOSE DECREASE )    amoxicillin-clavulanate 875-125mg (AUGMENTIN) 875-125 mg per tablet     apixaban (ELIQUIS) 2.5 mg Tab Take 1 tablet (2.5 mg total) by mouth 2 (two) times daily.    benzonatate (TESSALON) 200 MG capsule TAKE 1 CAPSULE (200 MG TOTAL) BY MOUTH 3 (THREE) TIMES DAILY AS NEEDED.    cholecalciferol, vitamin D3, (VITAMIN D3) 2,000 unit Tab Take 2,000 Units by mouth once daily.    citalopram (CELEXA) 40 MG tablet TAKE 1 TABLET EVERY DAY (Patient taking differently: 40 mg. TAKE 1 TABLET EVERY DAY)    diltiaZEM (CARDIZEM CD) 240 MG 24 hr capsule Take 1 capsule (240 mg total) by mouth once daily.  "   losartan (COZAAR) 100 MG tablet TAKE 1/2 TABLET EVERY DAY    magnesium oxide (MAG-OX) 400 mg (241.3 mg magnesium) tablet Take 1 tablet by mouth 2 (two) times daily.    metoprolol tartrate (LOPRESSOR) 25 MG tablet     mupirocin (BACTROBAN) 2 % ointment Apply topically 3 (three) times daily.    pravastatin (PRAVACHOL) 40 MG tablet TAKE 1 TABLET EVERY DAY    predniSONE (DELTASONE) 10 MG tablet Take 4 tablets (40 mg) by mouth for 4 days, then 3 tablets (30 mg) by mouth for 3 days then resume 2 tablets (20 mg) by mouth daily until follow up appointment with primary care doctor.    rOPINIRole (REQUIP) 1 MG tablet Take 1 tablet (1 mg total) by mouth every evening.    traZODone (DESYREL) 50 MG tablet TAKE 1 TABLET EVERY EVENING    baclofen (LIORESAL) 10 MG tablet Take 1 tablet (10 mg total) by mouth 3 (three) times daily.    blood sugar diagnostic Strp Test BG twice a day Accu-Chek Celia plus  DM 2  E 11.9  Dr Gaspar    blood-glucose meter kit Test BG twice a day Accu-Chek Celia plus  DM 2  E 11.9  Dr Gaspar    blood-glucose meter Misc Use as directed    colchicine (COLCRYS) 0.6 mg tablet Take 1 tablet (0.6 mg total) by mouth 2 (two) times daily. for 7 days    fluticasone-umeclidin-vilanter (TRELEGY ELLIPTA) 100-62.5-25 mcg DsDv Inhale 1 puff into the lungs once daily.    furosemide (LASIX) 20 MG tablet Take 20 mg by mouth 2 (two) times daily.    insulin detemir U-100 (LEVEMIR FLEXTOUCH U-100 INSULN) 100 unit/mL (3 mL) SubQ InPn pen Inject 10 Units into the skin every evening.    ipratropium (ATROVENT) 0.02 % nebulizer solution Take 500 mcg by nebulization 4 (four) times daily.     lancets 33 gauge Misc Test BG twice a day Accu-Chek Celia plus  DM 2  E 11.9  Dr Gaspar    pen needle, diabetic 31 gauge x 5/16" Ndle Use to inject once daily with Levemir    [DISCONTINUED] azithromycin (Z-HELADIO) 250 MG tablet TAKE 2 TABLETS BY MOUTH ON DAY 1 AND THEN TAKE 1 TABLET BY MOUTH ONCE A DAY ON DAY 2 THROUGH DAY " 5     Family History     Problem Relation (Age of Onset)    COPD Brother, Daughter    Cancer Sister    Diabetes Father    Heart disease Mother    Hypertension Mother, Father, Brother    Kidney disease Son    No Known Problems Daughter        Tobacco Use    Smoking status: Former Smoker     Last attempt to quit: 8/3/2000     Years since quittin.3    Smokeless tobacco: Never Used   Substance and Sexual Activity    Alcohol use: No    Drug use: No    Sexual activity: Never     Review of Systems   Constitutional: Negative for activity change, chills, fatigue, fever and unexpected weight change.   HENT: Negative for congestion, postnasal drip, rhinorrhea, sore throat and trouble swallowing.    Respiratory: Positive for cough, shortness of breath and wheezing. Negative for chest tightness.    Cardiovascular: Negative for chest pain and leg swelling.   Gastrointestinal: Negative for abdominal pain.   Endocrine: Negative for cold intolerance and heat intolerance.   Genitourinary: Negative for difficulty urinating.   Musculoskeletal: Negative for back pain and joint swelling.   Skin: Negative for rash.   Neurological: Negative for numbness.   Hematological: Negative for adenopathy.   Psychiatric/Behavioral: Negative for decreased concentration.     Objective:     Vital Signs (Most Recent):  Temp: 96.2 °F (35.7 °C) (19 170)  Pulse: 82 (19 1800)  Resp: (!) 22 (19 170)  BP: (!) 145/65 (19 170)  SpO2: (!) 90 % (19) Vital Signs (24h Range):  Temp:  [96 °F (35.6 °C)-97.2 °F (36.2 °C)] 96.2 °F (35.7 °C)  Pulse:  [] 82  Resp:  [20-27] 22  SpO2:  [84 %-93 %] 90 %  BP: (108-145)/(53-70) 145/65     Weight: 60.8 kg (134 lb)  Body mass index is 24.51 kg/m².    Physical Exam   Constitutional: She is oriented to person, place, and time.   mildly tachypneic   HENT:   Head: Normocephalic.   Nose: Nose normal.   Mouth/Throat: Oropharynx is clear and moist.   Eyes: Pupils are equal, round,  and reactive to light. EOM are normal. Right eye exhibits no discharge.   Neck: Normal range of motion. No JVD present. No thyromegaly present.   Cardiovascular: Normal rate, regular rhythm, normal heart sounds and intact distal pulses. Exam reveals no gallop and no friction rub.   No murmur heard.  tachycardic   Pulmonary/Chest: Effort normal and breath sounds normal. She has no wheezes. She has no rales.   Thin chest.  Poor airflow.  Ins/Exp wheezes and ronchi bilaterally.   Abdominal: Soft. There is no tenderness.   Musculoskeletal: Normal range of motion. She exhibits no edema or tenderness.   Lymphadenopathy:     She has no cervical adenopathy.   Neurological: She is alert and oriented to person, place, and time. No cranial nerve deficit.   Skin: Skin is dry.   Psychiatric: She has a normal mood and affect. Her behavior is normal. Judgment and thought content normal.   Vitals reviewed.        CRANIAL NERVES     CN III, IV, VI   Pupils are equal, round, and reactive to light.  Extraocular motions are normal.        Significant Labs:   BMP:   Recent Labs   Lab 11/28/19  1410   *      K 3.8      CO2 31*   BUN 21   CREATININE 1.0   CALCIUM 8.6*     CBC:   Recent Labs   Lab 11/28/19  1410   WBC 9.58   HGB 7.3*   HCT 23.4*        Urine Studies: No results for input(s): COLORU, APPEARANCEUA, PHUR, SPECGRAV, PROTEINUA, GLUCUA, KETONESU, BILIRUBINUA, OCCULTUA, NITRITE, UROBILINOGEN, LEUKOCYTESUR, RBCUA, WBCUA, BACTERIA, SQUAMEPITHEL, HYALINECASTS in the last 48 hours.    Invalid input(s): WRIGHTSUR    Significant Imaging: CXR: I have reviewed all pertinent results/findings within the past 24 hours and my personal findings are:  COPD changes, no infiltrates  I have reviewed all pertinent imaging results/findings within the past 24 hours.  I have reviewed and interpreted all pertinent imaging results/findings within the past 24 hours.

## 2019-11-29 NOTE — ASSESSMENT & PLAN NOTE
Placed on Levemir 10 units last nightly.  Will increase to 15 units tonight.   Continue insulin siding scale before each meals.  Adjust dose according to glucose levels.  Monitor glucose q.a.c. and   11/29 glucose, 163- 290 ; getting solumedrol 125mg IV q 8

## 2019-11-29 NOTE — ASSESSMENT & PLAN NOTE
Check iron studies  Transfuse 1 unit packed red blood cells to improve oxygen transport.  Hold Xarelto.  Consult Cardiology to see if Xarelto should be continued or not.

## 2019-11-29 NOTE — ASSESSMENT & PLAN NOTE
Continue amlodipine 5 mg daily  Continue diltiazem extended release 240 mg daily  Continue losartan 50 mg daily  Continue metoprolol 25 mg p.o. b.i.d.  Hold Lasix for now.

## 2019-11-29 NOTE — PLAN OF CARE
Dr. Valentino in with patient. Bruises noted to bilateral arms. Additional orders given. Will carry out.

## 2019-11-29 NOTE — ASSESSMENT & PLAN NOTE
Continue Cardizem and metoprolol.  She converted to NSR  Consult Cardiology to see if Xarelto should be continued in lieu of significant anemia.

## 2019-11-29 NOTE — PLAN OF CARE
SOB - Dr. Valentino in to see Ms. Herrera. Notified of patient's c/o SOB. Lasix ordered and to be administered. Also informed of hypotension with a b/p 102/53. Lasix dose adjusted and will be administered. Breathing treatment also administered via trilogy. Trilogy remains on patient 99% of the day. Only off during eating or up to bedside commode. Will monitor.

## 2019-11-29 NOTE — PLAN OF CARE
11/29/19 1139   Advance Directives (For Healthcare)   Advance Directive  (If Adv Dir status is received, view document under Adv Dir in header or Chart Review Media tab) Patient has advance directive, copy not received.       Patient states that he has an advanced directive to include a Living Will. SW cannot locate the document in Epic. There is documentation from Dr. Leiva and Dr. Gaspar from earlier this year stating that they cannot locate the document in Epic either. SW asked patient for a copy. Patient states that she does not have a copy. SW to continue to search for the document.  Patient wishes to remain a Full Code.     Daniela Castro, FRANCHESKA

## 2019-11-29 NOTE — H&P
[facility] Medicine  History & Physical    Patient Name: Chantell Herrera  MRN: [id number]  Admission Date: 11/28/2019  Attending Physician: Chicho Fisher MD   Primary Care Provider: Kari Gaspar MD         Patient information was obtained from patient, past medical records and ER records.     Subjective:     Principal Problem:COPD with acute exacerbation    Chief Complaint:   Chief Complaint   Patient presents with    Shortness of Breath        HPI: 84-year-old white female with a history of COPD, chronic hypoxemia requiring home oxygen and trilogy presents with increasing shortness of breath, coughing, wheezing.   states that patient has been getting progressively worse prior to arrival over the past week.  She usually requires 2 L oxygen per nasal cannula.  She present to the emergency room with 84% pulse ox.  Numerous neb treatments given.  Patient's oxygen level improved.  Patient also has type 2 diabetes requiring insulin, hypertension, chronic atrial fibrillation.  She is on Xarelto for the atrial fibrillation and she is now anemic.  She has required blood transfusions in the past.    Past Medical History:   Diagnosis Date    Acute on chronic congestive heart failure 2/1/2019    Anxiety     Arthritis     Asthma     Atrial fibrillation with rapid ventricular response     CHF (congestive heart failure) 11/29/2018    Chronic bronchitis     COPD (chronic obstructive pulmonary disease)     Depression     Diabetes mellitus, type 2     SANTIAGO (dyspnea on exertion)     Emphysema of lung     Fatigue     Hyperlipidemia     Hypertension     Symptomatic anemia 1/22/2019    Trouble in sleeping        Past Surgical History:   Procedure Laterality Date    APPENDECTOMY      EYE SURGERY      HYSTERECTOMY      TONSILLECTOMY         Review of patient's allergies indicates:  No Known Allergies    No current facility-administered medications on file prior to  encounter.      Current Outpatient Medications on File Prior to Encounter   Medication Sig    albuterol (PROVENTIL) 2.5 mg /3 mL (0.083 %) nebulizer solution Take 3 mLs (2.5 mg total) by nebulization every 6 (six) hours as needed for Wheezing.    albuterol (VENTOLIN HFA) 90 mcg/actuation inhaler Inhale 2 puffs into the lungs every 6 (six) hours as needed for Wheezing. Rescue    allopurinol (ZYLOPRIM) 100 MG tablet Take 1 tablet (100 mg total) by mouth once daily.    amLODIPine (NORVASC) 5 MG tablet TAKE 1 TABLET BY MOUTH ONCE DAILY ( DOSE DECREASE )    amoxicillin-clavulanate 875-125mg (AUGMENTIN) 875-125 mg per tablet     apixaban (ELIQUIS) 2.5 mg Tab Take 1 tablet (2.5 mg total) by mouth 2 (two) times daily.    benzonatate (TESSALON) 200 MG capsule TAKE 1 CAPSULE (200 MG TOTAL) BY MOUTH 3 (THREE) TIMES DAILY AS NEEDED.    cholecalciferol, vitamin D3, (VITAMIN D3) 2,000 unit Tab Take 2,000 Units by mouth once daily.    citalopram (CELEXA) 40 MG tablet TAKE 1 TABLET EVERY DAY (Patient taking differently: 40 mg. TAKE 1 TABLET EVERY DAY)    diltiaZEM (CARDIZEM CD) 240 MG 24 hr capsule Take 1 capsule (240 mg total) by mouth once daily.    losartan (COZAAR) 100 MG tablet TAKE 1/2 TABLET EVERY DAY    magnesium oxide (MAG-OX) 400 mg (241.3 mg magnesium) tablet Take 1 tablet by mouth 2 (two) times daily.    metoprolol tartrate (LOPRESSOR) 25 MG tablet     mupirocin (BACTROBAN) 2 % ointment Apply topically 3 (three) times daily.    pravastatin (PRAVACHOL) 40 MG tablet TAKE 1 TABLET EVERY DAY    predniSONE (DELTASONE) 10 MG tablet Take 4 tablets (40 mg) by mouth for 4 days, then 3 tablets (30 mg) by mouth for 3 days then resume 2 tablets (20 mg) by mouth daily until follow up appointment with primary care doctor.    rOPINIRole (REQUIP) 1 MG tablet Take 1 tablet (1 mg total) by mouth every evening.    traZODone (DESYREL) 50 MG tablet TAKE 1 TABLET EVERY EVENING    baclofen (LIORESAL) 10 MG tablet Take 1  "tablet (10 mg total) by mouth 3 (three) times daily.    blood sugar diagnostic Strp Test BG twice a day Accu-Chek Celia plus  DM 2  E 11.9  Dr Gaspar    blood-glucose meter kit Test BG twice a day Accu-Chek Celia plus  DM 2  E 11.9  Dr Gaspar    blood-glucose meter Misc Use as directed    colchicine (COLCRYS) 0.6 mg tablet Take 1 tablet (0.6 mg total) by mouth 2 (two) times daily. for 7 days    fluticasone-umeclidin-vilanter (TRELEGY ELLIPTA) 100-62.5-25 mcg DsDv Inhale 1 puff into the lungs once daily.    furosemide (LASIX) 20 MG tablet Take 20 mg by mouth 2 (two) times daily.    insulin detemir U-100 (LEVEMIR FLEXTOUCH U-100 INSULN) 100 unit/mL (3 mL) SubQ InPn pen Inject 10 Units into the skin every evening.    ipratropium (ATROVENT) 0.02 % nebulizer solution Take 500 mcg by nebulization 4 (four) times daily.     lancets 33 gauge Misc Test BG twice a day Accu-Chek Celia plus  DM 2  E 11.9  Dr Gaspar    pen needle, diabetic 31 gauge x 5/16" Ndle Use to inject once daily with Levemir    [DISCONTINUED] azithromycin (Z-HELADIO) 250 MG tablet TAKE 2 TABLETS BY MOUTH ON DAY 1 AND THEN TAKE 1 TABLET BY MOUTH ONCE A DAY ON DAY 2 THROUGH DAY 5     Family History     Problem Relation (Age of Onset)    COPD Brother, Daughter    Cancer Sister    Diabetes Father    Heart disease Mother    Hypertension Mother, Father, Brother    Kidney disease Son    No Known Problems Daughter        Tobacco Use    Smoking status: Former Smoker     Last attempt to quit: 8/3/2000     Years since quittin.3    Smokeless tobacco: Never Used   Substance and Sexual Activity    Alcohol use: No    Drug use: No    Sexual activity: Never     Review of Systems   Constitutional: Negative for activity change, chills, fatigue, fever and unexpected weight change.   HENT: Negative for congestion, postnasal drip, rhinorrhea, sore throat and trouble swallowing.    Respiratory: Positive for cough, shortness of breath and wheezing. Negative " for chest tightness.    Cardiovascular: Negative for chest pain and leg swelling.   Gastrointestinal: Negative for abdominal pain.   Endocrine: Negative for cold intolerance and heat intolerance.   Genitourinary: Negative for difficulty urinating.   Musculoskeletal: Negative for back pain and joint swelling.   Skin: Negative for rash.   Neurological: Negative for numbness.   Hematological: Negative for adenopathy.   Psychiatric/Behavioral: Negative for decreased concentration.     Objective:     Vital Signs (Most Recent):  Temp: 96.2 °F (35.7 °C) (11/28/19 1704)  Pulse: 82 (11/28/19 1800)  Resp: (!) 22 (11/28/19 1704)  BP: (!) 145/65 (11/28/19 1704)  SpO2: (!) 90 % (11/28/19 1704) Vital Signs (24h Range):  Temp:  [96 °F (35.6 °C)-97.2 °F (36.2 °C)] 96.2 °F (35.7 °C)  Pulse:  [] 82  Resp:  [20-27] 22  SpO2:  [84 %-93 %] 90 %  BP: (108-145)/(53-70) 145/65     Weight: 60.8 kg (134 lb)  Body mass index is 24.51 kg/m².    Physical Exam   Constitutional: She is oriented to person, place, and time.   mildly tachypneic   HENT:   Head: Normocephalic.   Nose: Nose normal.   Mouth/Throat: Oropharynx is clear and moist.   Eyes: Pupils are equal, round, and reactive to light. EOM are normal. Right eye exhibits no discharge.   Neck: Normal range of motion. No JVD present. No thyromegaly present.   Cardiovascular: Normal rate, regular rhythm, normal heart sounds and intact distal pulses. Exam reveals no gallop and no friction rub.   No murmur heard.  tachycardic   Pulmonary/Chest: Effort normal and breath sounds normal. She has no wheezes. She has no rales.   Thin chest.  Poor airflow.  Ins/Exp wheezes and ronchi bilaterally.   Abdominal: Soft. There is no tenderness.   Musculoskeletal: Normal range of motion. She exhibits no edema or tenderness.   Lymphadenopathy:     She has no cervical adenopathy.   Neurological: She is alert and oriented to person, place, and time. No cranial nerve deficit.   Skin: Skin is dry.    Psychiatric: She has a normal mood and affect. Her behavior is normal. Judgment and thought content normal.   Vitals reviewed.        CRANIAL NERVES     CN III, IV, VI   Pupils are equal, round, and reactive to light.  Extraocular motions are normal.        Significant Labs:   BMP:   Recent Labs   Lab 11/28/19  1410   *      K 3.8      CO2 31*   BUN 21   CREATININE 1.0   CALCIUM 8.6*     CBC:   Recent Labs   Lab 11/28/19  1410   WBC 9.58   HGB 7.3*   HCT 23.4*        Urine Studies: No results for input(s): COLORU, APPEARANCEUA, PHUR, SPECGRAV, PROTEINUA, GLUCUA, KETONESU, BILIRUBINUA, OCCULTUA, NITRITE, UROBILINOGEN, LEUKOCYTESUR, RBCUA, WBCUA, BACTERIA, SQUAMEPITHEL, HYALINECASTS in the last 48 hours.    Invalid input(s): WRIGHTSUR    Significant Imaging: CXR: I have reviewed all pertinent results/findings within the past 24 hours and my personal findings are:  COPD changes, no infiltrates  I have reviewed all pertinent imaging results/findings within the past 24 hours.  I have reviewed and interpreted all pertinent imaging results/findings within the past 24 hours.    Assessment/Plan:     * COPD with acute exacerbation  Supplemental O2 via nasal canula; titrate O2 saturation to >92%. Use Trilogy as much as possible  Start Solumedrol 125 mg IV q.6 hours.  Continue beta 2 agonist bronchodilator treatments.  Use home inhalers  No need for antibiotics at this stage  Continue routine medications as before.   Gastric ulcer prophylaxis with gastric acid suppressant.   Deep venous thrombosis prophylaxis.   See Orders.      Diabetes mellitus type 2, uncontrolled  Placed on Levemir 10 units nightly and continue insulin siding scale before each meals.  Adjust dose according to glucose levels.  Monitor glucose q.a.c. and HS      Anemia  Check iron studies  Transfuse 1 unit packed red blood cells to improve oxygen transport.  Hold Xarelto.  Consult Cardiology to see if Xarelto should be continued or  not.      Atrial fibrillation with rapid ventricular response  Continue Cardizem and metoprolol  Consult Cardiology to see if Xarelto should be continued in lieu of significant anemia.      Chronic respiratory failure with hypoxia  Continue trilogy as much as possible      Dyslipidemia  Continue pravastatin      CKD (chronic kidney disease), stage III  Monitor renal function daily      HTN (hypertension)  Continue amlodipine 5 mg daily  Continue diltiazem extended release 240 mg daily  Continue losartan 50 mg daily  Continue metoprolol 25 mg p.o. b.i.d.  Hold Lasix for now.        VTE Risk Mitigation (From admission, onward)         Ordered     IP VTE HIGH RISK PATIENT  Once      11/28/19 1606     Place sequential compression device  Until discontinued      11/28/19 1606                   Chicho Fisher MD  Department of Hospital Medicine   [facility]

## 2019-11-29 NOTE — HPI
84-year-old white female with a history of COPD, chronic hypoxemia requiring home oxygen and trilogy presents with increasing shortness of breath, coughing, wheezing.   states that patient has been getting progressively worse prior to arrival over the past week.  She usually requires 2 L oxygen per nasal cannula.  She present to the emergency room with 84% pulse ox.  Numerous neb treatments given.  Patient's oxygen level improved.  Patient also has type 2 diabetes requiring insulin, hypertension, chronic atrial fibrillation.  She is on Xarelto for the atrial fibrillation and she is now anemic.  She has required blood transfusions in the past.

## 2019-11-29 NOTE — PLAN OF CARE
11/29/19 0741   Medicare Message   Important Message from Medicare regarding Discharge Appeal Rights Given to patient/caregiver;Explained to patient/caregiver;Signed/date by patient/caregiver   Date IMM was signed 11/28/19   Time IMM was signed [id number]

## 2019-11-29 NOTE — HOSPITAL COURSE
11/29/19 admitted yesterday with COPD exacerbation, anemia s/p 1 UPRBCs, Xarelto on hold; await cardiology consult will not see until Monday   H&H 7.9/ 24.6,  BP stable , HR down to 48 over night - converted to NSR - sinus litzy   CXR negative for acute findings, O2 sat 89-94% on 4LNC; dropped to 89% during significant coughing episode  uses 2L NC at home  Getting solumedrol 125mg IV  q 8 , glucose 163, 266, 290, getting Detemir 10units, aspart 3 units tid; will increase Detemir to 15 units   Pt c/o difficulty getting mucous up and severe coughing , using trilogy currently - very compliant     11/30/2019 patient received a 2nd unit of blood yesterday.  She developed significant shortness of breath.  IV Lasix was given which resolved the problem quickly.  This morning she is feeling much better.  Her oxygen saturations are stable.  She is using her trilogy and nasal cannula.  She started to eat a little better.  She still has significant dyspnea on exertion.  She is almost back to baseline.  Her Xarelto is still being held.  She is still on steroids, IV antibiotics, DuoNeb treatments.  Her blood sugars and blood pressure is stable.    12/1/2019  Patient feeling much better.  Still has cough and wheeze but much better.  Wants to go home.   none

## 2019-11-29 NOTE — PROGRESS NOTES
[facility] Medicine  Progress Note    Patient Name: Chantell Herrera  MRN: [id number]  Patient Class: IP- Inpatient   Admission Date: 11/28/2019  Length of Stay: 1 days  Attending Physician: Chicho Fisher MD  Primary Care Provider: Kari Gaspar MD        Subjective:     Principal Problem:COPD with acute exacerbation    HPI:  84-year-old white female with a history of COPD, chronic hypoxemia requiring home oxygen and trilogy presents with increasing shortness of breath, coughing, wheezing.   states that patient has been getting progressively worse prior to arrival over the past week.  She usually requires 2 L oxygen per nasal cannula.  She present to the emergency room with 84% pulse ox.  Numerous neb treatments given.  Patient's oxygen level improved.  Patient also has type 2 diabetes requiring insulin, hypertension, chronic atrial fibrillation.  She is on Xarelto for the atrial fibrillation and she is now anemic.  She has required blood transfusions in the past.    Overview/Hospital Course:  11/29/19 admitted yesterday with COPD exacerbation, anemia s/p 1 UPRBCs, Xarelto on hold; await cardiology consult will not see until Monday   H&H 7.9/ 24.6,  BP stable , HR down to 48 over night - converted to NSR - sinus litzy   CXR negative for acute findings, O2 sat 89-94% on 4LNC; dropped to 89% during significant coughing episode  uses 2L NC at home  Getting solumedrol 125mg IV  q 8 , glucose 163, 266, 290, getting Detemir 10units, aspart 3 units tid; will increase Detemir to 15 units   Pt c/o difficulty getting mucous up and severe coughing , using trilogy currently - very compliant   U/A pending     Past Medical History:   Diagnosis Date    Acute on chronic congestive heart failure 2/1/2019    Anxiety     Arthritis     Asthma     Atrial fibrillation with rapid ventricular response     CHF (congestive heart failure) 11/29/2018    Chronic bronchitis     COPD (chronic  obstructive pulmonary disease)     Depression     Diabetes mellitus, type 2     SANTIAGO (dyspnea on exertion)     Emphysema of lung     Fatigue     Hyperlipidemia     Hypertension     Symptomatic anemia 1/22/2019    Trouble in sleeping        Past Surgical History:   Procedure Laterality Date    APPENDECTOMY      EYE SURGERY      HYSTERECTOMY      TONSILLECTOMY         Review of patient's allergies indicates:  No Known Allergies    No current facility-administered medications on file prior to encounter.      Current Outpatient Medications on File Prior to Encounter   Medication Sig    albuterol (PROVENTIL) 2.5 mg /3 mL (0.083 %) nebulizer solution Take 3 mLs (2.5 mg total) by nebulization every 6 (six) hours as needed for Wheezing.    albuterol (VENTOLIN HFA) 90 mcg/actuation inhaler Inhale 2 puffs into the lungs every 6 (six) hours as needed for Wheezing. Rescue    allopurinol (ZYLOPRIM) 100 MG tablet Take 1 tablet (100 mg total) by mouth once daily.    amLODIPine (NORVASC) 5 MG tablet TAKE 1 TABLET BY MOUTH ONCE DAILY ( DOSE DECREASE )    amoxicillin-clavulanate 875-125mg (AUGMENTIN) 875-125 mg per tablet     apixaban (ELIQUIS) 2.5 mg Tab Take 1 tablet (2.5 mg total) by mouth 2 (two) times daily.    benzonatate (TESSALON) 200 MG capsule TAKE 1 CAPSULE (200 MG TOTAL) BY MOUTH 3 (THREE) TIMES DAILY AS NEEDED.    cholecalciferol, vitamin D3, (VITAMIN D3) 2,000 unit Tab Take 2,000 Units by mouth once daily.    citalopram (CELEXA) 40 MG tablet TAKE 1 TABLET EVERY DAY (Patient taking differently: 40 mg. TAKE 1 TABLET EVERY DAY)    diltiaZEM (CARDIZEM CD) 240 MG 24 hr capsule Take 1 capsule (240 mg total) by mouth once daily.    losartan (COZAAR) 100 MG tablet TAKE 1/2 TABLET EVERY DAY    magnesium oxide (MAG-OX) 400 mg (241.3 mg magnesium) tablet Take 1 tablet by mouth 2 (two) times daily.    metoprolol tartrate (LOPRESSOR) 25 MG tablet     mupirocin (BACTROBAN) 2 % ointment Apply topically 3  "(three) times daily.    pravastatin (PRAVACHOL) 40 MG tablet TAKE 1 TABLET EVERY DAY    predniSONE (DELTASONE) 10 MG tablet Take 4 tablets (40 mg) by mouth for 4 days, then 3 tablets (30 mg) by mouth for 3 days then resume 2 tablets (20 mg) by mouth daily until follow up appointment with primary care doctor.    rOPINIRole (REQUIP) 1 MG tablet Take 1 tablet (1 mg total) by mouth every evening.    traZODone (DESYREL) 50 MG tablet TAKE 1 TABLET EVERY EVENING    baclofen (LIORESAL) 10 MG tablet Take 1 tablet (10 mg total) by mouth 3 (three) times daily.    blood sugar diagnostic Strp Test BG twice a day Accu-Chek Celia plus  DM 2  E 11.9  Dr Gapsar    blood-glucose meter kit Test BG twice a day Accu-Chek Celia plus  DM 2  E 11.9  Dr Gaspar    blood-glucose meter Misc Use as directed    colchicine (COLCRYS) 0.6 mg tablet Take 1 tablet (0.6 mg total) by mouth 2 (two) times daily. for 7 days    fluticasone-umeclidin-vilanter (TRELEGY ELLIPTA) 100-62.5-25 mcg DsDv Inhale 1 puff into the lungs once daily.    furosemide (LASIX) 20 MG tablet Take 20 mg by mouth 2 (two) times daily.    insulin detemir U-100 (LEVEMIR FLEXTOUCH U-100 INSULN) 100 unit/mL (3 mL) SubQ InPn pen Inject 10 Units into the skin every evening.    ipratropium (ATROVENT) 0.02 % nebulizer solution Take 500 mcg by nebulization 4 (four) times daily.     lancets 33 gauge Misc Test BG twice a day Accu-Chek Celia plus  DM 2  E 11.9  Dr Gaspar    pen needle, diabetic 31 gauge x 516" Ndle Use to inject once daily with Levemir     Family History     Problem Relation (Age of Onset)    COPD Brother, Daughter    Cancer Sister    Diabetes Father    Heart disease Mother    Hypertension Mother, Father, Brother    Kidney disease Son    No Known Problems Daughter        Tobacco Use    Smoking status: Former Smoker     Last attempt to quit: 8/3/2000     Years since quittin.3    Smokeless tobacco: Never Used   Substance and Sexual Activity    " Alcohol use: No    Drug use: No    Sexual activity: Never     Review of Systems   Constitutional: Negative for activity change, chills, fatigue, fever and unexpected weight change.   HENT: Negative for congestion, postnasal drip, rhinorrhea, sore throat and trouble swallowing.    Respiratory: Positive for cough, shortness of breath and wheezing. Negative for chest tightness.    Cardiovascular: Negative for chest pain and leg swelling.   Gastrointestinal: Negative for abdominal pain.   Endocrine: Negative for cold intolerance and heat intolerance.   Genitourinary: Negative for difficulty urinating.   Musculoskeletal: Negative for back pain and joint swelling.   Skin: Negative for rash.   Neurological: Negative for numbness.   Hematological: Negative for adenopathy.   Psychiatric/Behavioral: Negative for decreased concentration.     Objective:     Vital Signs (Most Recent):  Temp: 96.7 °F (35.9 °C) (11/29/19 0425)  Pulse: 67 (11/29/19 0600)  Resp: 17 (11/29/19 0425)  BP: (!) 116/56 (11/29/19 0425)  SpO2: (!) 89 % (11/29/19 0425) Vital Signs (24h Range):  Temp:  [96 °F (35.6 °C)-99 °F (37.2 °C)] 96.7 °F (35.9 °C)  Pulse:  [] 67  Resp:  [17-27] 17  SpO2:  [84 %-99 %] 89 %  BP: (108-145)/(53-70) 116/56     Weight: 60.8 kg (134 lb)  Body mass index is 24.51 kg/m².    Physical Exam   Constitutional: She is oriented to person, place, and time.   mildly tachypneic   HENT:   Head: Normocephalic.   Nose: Nose normal.   Mouth/Throat: Oropharynx is clear and moist.   Eyes: Pupils are equal, round, and reactive to light. EOM are normal. Right eye exhibits no discharge.   Neck: Normal range of motion. No JVD present. No thyromegaly present.   Cardiovascular: Normal rate, regular rhythm, normal heart sounds and intact distal pulses. Exam reveals no gallop and no friction rub.   No murmur heard.  Pulmonary/Chest: Effort normal and breath sounds normal. She has no wheezes. She has no rales.   Abdominal: Soft. There is no  tenderness.   Musculoskeletal: Normal range of motion. She exhibits no edema or tenderness.   Lymphadenopathy:     She has no cervical adenopathy.   Neurological: She is alert and oriented to person, place, and time. No cranial nerve deficit.   Skin: Skin is dry.   Psychiatric: She has a normal mood and affect. Her behavior is normal. Judgment and thought content normal.   Vitals reviewed.        CRANIAL NERVES     CN III, IV, VI   Pupils are equal, round, and reactive to light.  Extraocular motions are normal.        Significant Labs:   BMP:   Recent Labs   Lab 11/29/19  0351   *      K 4.8      CO2 31*   BUN 23   CREATININE 1.0   CALCIUM 8.5*     CBC:   Recent Labs   Lab 11/28/19  1410 11/29/19  0351   WBC 9.58 10.20   HGB 7.3* 7.9*   HCT 23.4* 24.6*    209     Urine Studies: No results for input(s): COLORU, APPEARANCEUA, PHUR, SPECGRAV, PROTEINUA, GLUCUA, KETONESU, BILIRUBINUA, OCCULTUA, NITRITE, UROBILINOGEN, LEUKOCYTESUR, RBCUA, WBCUA, BACTERIA, SQUAMEPITHEL, HYALINECASTS in the last 48 hours.    Invalid input(s): WRIGHTSUR    Significant Imaging: CXR: I have reviewed all pertinent results/findings within the past 24 hours and my personal findings are:  COPD changes, no infiltrates  I have reviewed all pertinent imaging results/findings within the past 24 hours.  I have reviewed and interpreted all pertinent imaging results/findings within the past 24 hours.      Assessment/Plan:      * COPD with acute exacerbation  Supplemental O2 via nasal canula; titrate O2 saturation to >92%. Use Trilogy as much as possible  Start Solumedrol 125 mg IV q.6 hours.  Continue beta 2 agonist bronchodilator treatments.  Use home inhalers  No need for antibiotics at this stage  Continue routine medications as before.   Gastric ulcer prophylaxis with gastric acid suppressant.   Deep venous thrombosis prophylaxis.   See Orders.  11/29 c/o severe cough and difficulty bringing up mucous; add acapella,  mucinex  Start zithromax for COPD exac     Diabetes mellitus type 2, uncontrolled  Placed on Levemir 10 units last nightly.  Will increase to 15 units tonight.   Continue insulin siding scale before each meals.  Adjust dose according to glucose levels.  Monitor glucose q.a.c. and HS  11/29 glucose, 163- 290 ; getting solumedrol 125mg IV q 8      Anemia  Check iron studies  Received one unit last night  Transfuse 1 more unit packed red blood cells today to improve oxygen transport.  Hold Xarelto.  Consult Cardiology to see if Xarelto should be continued or not.  Still with significant SOB, notes she usually does better after transfusion -   Will give 1 more unit PRBC      Atrial fibrillation with rapid ventricular response  Continue Cardizem and metoprolol.  She converted to NSR  Consult Cardiology to see if Xarelto should be continued in lieu of significant anemia.      Chronic respiratory failure with hypoxia  Continue trilogy as much as possible  Still requiring increased oxygen - 4LNC       Dyslipidemia  Continue pravastatin      CKD (chronic kidney disease), stage III  Monitor renal function daily  BMP shows GFR of 56        HTN (hypertension)  Continue amlodipine 5 mg daily  Lower diltiazem extended release 180 mg daily  Continue losartan 50 mg daily  Continue metoprolol 25 mg p.o. b.i.d.  Hold Lasix for now.        VTE Risk Mitigation (From admission, onward)         Ordered     IP VTE HIGH RISK PATIENT  Once      11/28/19 1606     Place sequential compression device  Until discontinued      11/28/19 1606                      Chicho Fisher MD  Department of Hospital Medicine   [facility]

## 2019-11-29 NOTE — PLAN OF CARE
"Cardiology consult - called into [facility] spoke with Lilly. Return phone call from Kahlil Falcon NP. States, "We will see patient on Monday morning." Patient currently in normal sinus rhythm with a pulse = 81. Will monitor.  "

## 2019-11-29 NOTE — PLAN OF CARE
11/29/19 1127   Discharge Assessment   Assessment Type Discharge Planning Assessment   Confirmed/corrected address and phone number on facesheet? Yes   Assessment information obtained from? Patient   Prior to hospitilization cognitive status: Alert/Oriented   Prior to hospitalization functional status: Assistive Equipment   Current cognitive status: Alert/Oriented   Current Functional Status: Assistive Equipment   Facility Arrived From: Home   Lives With spouse   Able to Return to Prior Arrangements yes   Is patient able to care for self after discharge? Yes   Who are your caregiver(s) and their phone number(s)? Jordan Herrera (Spouse) [phone]   Patient's perception of discharge disposition home or selfcare   Readmission Within the Last 30 Days no previous admission in last 30 days   Patient currently being followed by outpatient case management? No   Patient currently receives any other outside agency services? No   Equipment Currently Used at Home rollator;oxygen;glucometer;BIPAP;nebulizer;shower chair;power chair   Do you have any problems affording any of your prescribed medications? No   Does the patient have transportation home? Yes   Transportation Anticipated family or friend will provide   Discharge Plan A Home   Discharge Plan B Home with family   DME Needed Upon Discharge  none   Patient/Family in Agreement with Plan yes       No post-acute care needs identified at this time. SW to continue to monitor needs throughout hospital stay.     Daniela Castro LMSW

## 2019-11-29 NOTE — ASSESSMENT & PLAN NOTE
Continue Cardizem and metoprolol  Consult Cardiology to see if Xarelto should be continued in lieu of significant anemia.

## 2019-11-29 NOTE — ASSESSMENT & PLAN NOTE
Supplemental O2 via nasal canula; titrate O2 saturation to >92%. Use Trilogy as much as possible  Start Solumedrol 125 mg IV q.6 hours.  Continue beta 2 agonist bronchodilator treatments.  Use home inhalers  No need for antibiotics at this stage  Continue routine medications as before.   Gastric ulcer prophylaxis with gastric acid suppressant.   Deep venous thrombosis prophylaxis.   See Orders.

## 2019-11-29 NOTE — ASSESSMENT & PLAN NOTE
Supplemental O2 via nasal canula; titrate O2 saturation to >92%. Use Trilogy as much as possible  Start Solumedrol 125 mg IV q.6 hours.  Continue beta 2 agonist bronchodilator treatments.  Use home inhalers  No need for antibiotics at this stage  Continue routine medications as before.   Gastric ulcer prophylaxis with gastric acid suppressant.   Deep venous thrombosis prophylaxis.   See Orders.  11/29 c/o severe cough and difficulty bringing up mucous; add acapella, mucinex  Start zithromax for COPD exac

## 2019-11-29 NOTE — ASSESSMENT & PLAN NOTE
Placed on Levemir 10 units nightly and continue insulin siding scale before each meals.  Adjust dose according to glucose levels.  Monitor glucose q.a.c. and HS

## 2019-11-29 NOTE — ASSESSMENT & PLAN NOTE
Check iron studies  Received one unit last night  Transfuse 1 more unit packed red blood cells today to improve oxygen transport.  Hold Xarelto.  Consult Cardiology to see if Xarelto should be continued or not.  Still with significant SOB, notes she usually does better after transfusion -   Will give 1 more unit PRBC

## 2019-11-29 NOTE — PLAN OF CARE
Ms Herrera is here with an acute exacerbation of her COPD. She feels as though she is very short of breath and cannot exert herself at all. She has her home trilogy here with her and uses it frequently. CM contact information and discharge brochure checklist reviewed with patient and all questions answered. Discharge  signs and symptoms indicating return to ED or call to PCP reviewed. Compliance and understanding voiced.

## 2019-11-29 NOTE — PROGRESS NOTES
Staff Handoff  Bedside handoff report received from ALICIA Gregory RN. POC discussed. Care assumed. Will monitor.      Resident Handoff

## 2019-11-30 LAB
ANION GAP SERPL CALC-SCNC: 9 MMOL/L (ref 8–16)
ANISOCYTOSIS BLD QL SMEAR: ABNORMAL
BASOPHILS # BLD AUTO: ABNORMAL K/UL (ref 0–0.2)
BASOPHILS NFR BLD: 0 % (ref 0–1.9)
BUN SERPL-MCNC: 26 MG/DL (ref 8–23)
CALCIUM SERPL-MCNC: 8.4 MG/DL (ref 8.7–10.5)
CHLORIDE SERPL-SCNC: 99 MMOL/L (ref 95–110)
CO2 SERPL-SCNC: 29 MMOL/L (ref 23–29)
CREAT SERPL-MCNC: 1 MG/DL (ref 0.5–1.4)
DACRYOCYTES BLD QL SMEAR: ABNORMAL
DIFFERENTIAL METHOD: ABNORMAL
EOSINOPHIL # BLD AUTO: ABNORMAL K/UL (ref 0–0.5)
EOSINOPHIL NFR BLD: 0 % (ref 0–8)
ERYTHROCYTE [DISTWIDTH] IN BLOOD BY AUTOMATED COUNT: ABNORMAL % (ref 11.5–14.5)
EST. GFR  (AFRICAN AMERICAN): >60 ML/MIN/1.73 M^2
EST. GFR  (NON AFRICAN AMERICAN): 53 ML/MIN/1.73 M^2
GLUCOSE SERPL-MCNC: 164 MG/DL (ref 70–110)
HCT VFR BLD AUTO: 28.2 % (ref 37–48.5)
HGB BLD-MCNC: 9 G/DL (ref 12–16)
HYPOCHROMIA BLD QL SMEAR: ABNORMAL
IMM GRANULOCYTES # BLD AUTO: ABNORMAL K/UL (ref 0–0.04)
IMM GRANULOCYTES NFR BLD AUTO: ABNORMAL % (ref 0–0.5)
LYMPHOCYTES # BLD AUTO: ABNORMAL K/UL (ref 1–4.8)
LYMPHOCYTES NFR BLD: 0 % (ref 18–48)
MCH RBC QN AUTO: 35.3 PG (ref 27–31)
MCHC RBC AUTO-ENTMCNC: 31.9 G/DL (ref 32–36)
MCV RBC AUTO: 111 FL (ref 82–98)
MONOCYTES # BLD AUTO: ABNORMAL K/UL (ref 0.3–1)
MONOCYTES NFR BLD: 4 % (ref 4–15)
NEUTROPHILS NFR BLD: 91 % (ref 38–73)
NEUTS BAND NFR BLD MANUAL: 5 %
NRBC BLD-RTO: 0 /100 WBC
OVALOCYTES BLD QL SMEAR: ABNORMAL
PLATELET # BLD AUTO: 172 K/UL (ref 150–350)
PLATELET BLD QL SMEAR: ABNORMAL
PMV BLD AUTO: 13.7 FL (ref 9.2–12.9)
POCT GLUCOSE: 189 MG/DL (ref 70–110)
POCT GLUCOSE: 207 MG/DL (ref 70–110)
POCT GLUCOSE: 209 MG/DL (ref 70–110)
POCT GLUCOSE: 219 MG/DL (ref 70–110)
POIKILOCYTOSIS BLD QL SMEAR: SLIGHT
POTASSIUM SERPL-SCNC: 3.8 MMOL/L (ref 3.5–5.1)
RBC # BLD AUTO: 2.55 M/UL (ref 4–5.4)
SODIUM SERPL-SCNC: 137 MMOL/L (ref 136–145)
WBC # BLD AUTO: 19.07 K/UL (ref 3.9–12.7)

## 2019-11-30 PROCEDURE — 25000003 PHARM REV CODE 250: Mod: HCNC | Performed by: FAMILY MEDICINE

## 2019-11-30 PROCEDURE — 85027 COMPLETE CBC AUTOMATED: CPT | Mod: HCNC

## 2019-11-30 PROCEDURE — 99233 SBSQ HOSP IP/OBS HIGH 50: CPT | Mod: HCNC,,, | Performed by: FAMILY MEDICINE

## 2019-11-30 PROCEDURE — 25000003 PHARM REV CODE 250: Mod: HCNC | Performed by: NURSE PRACTITIONER

## 2019-11-30 PROCEDURE — 99900035 HC TECH TIME PER 15 MIN (STAT): Mod: HCNC

## 2019-11-30 PROCEDURE — 27000221 HC OXYGEN, UP TO 24 HOURS: Mod: HCNC

## 2019-11-30 PROCEDURE — 36415 COLL VENOUS BLD VENIPUNCTURE: CPT | Mod: HCNC

## 2019-11-30 PROCEDURE — 94761 N-INVAS EAR/PLS OXIMETRY MLT: CPT | Mod: HCNC

## 2019-11-30 PROCEDURE — 85007 BL SMEAR W/DIFF WBC COUNT: CPT | Mod: HCNC

## 2019-11-30 PROCEDURE — 25000242 PHARM REV CODE 250 ALT 637 W/ HCPCS: Mod: HCNC | Performed by: SURGERY

## 2019-11-30 PROCEDURE — 99233 PR SUBSEQUENT HOSPITAL CARE,LEVL III: ICD-10-PCS | Mod: HCNC,,, | Performed by: FAMILY MEDICINE

## 2019-11-30 PROCEDURE — 63600175 PHARM REV CODE 636 W HCPCS: Mod: HCNC | Performed by: NURSE PRACTITIONER

## 2019-11-30 PROCEDURE — 11000001 HC ACUTE MED/SURG PRIVATE ROOM: Mod: HCNC

## 2019-11-30 PROCEDURE — 63600175 PHARM REV CODE 636 W HCPCS: Mod: HCNC | Performed by: SURGERY

## 2019-11-30 PROCEDURE — 94640 AIRWAY INHALATION TREATMENT: CPT | Mod: HCNC

## 2019-11-30 PROCEDURE — 25000003 PHARM REV CODE 250: Mod: HCNC | Performed by: SURGERY

## 2019-11-30 PROCEDURE — 94664 DEMO&/EVAL PT USE INHALER: CPT | Mod: HCNC

## 2019-11-30 PROCEDURE — 63600175 PHARM REV CODE 636 W HCPCS: Mod: HCNC | Performed by: FAMILY MEDICINE

## 2019-11-30 PROCEDURE — 80048 BASIC METABOLIC PNL TOTAL CA: CPT | Mod: HCNC

## 2019-11-30 RX ORDER — METHYLPREDNISOLONE SOD SUCC 125 MG
80 VIAL (EA) INJECTION EVERY 8 HOURS
Status: DISCONTINUED | OUTPATIENT
Start: 2019-11-30 | End: 2019-12-01 | Stop reason: HOSPADM

## 2019-11-30 RX ORDER — CITALOPRAM 10 MG/1
20 TABLET ORAL DAILY
Status: DISCONTINUED | OUTPATIENT
Start: 2019-12-01 | End: 2019-12-01 | Stop reason: HOSPADM

## 2019-11-30 RX ORDER — POLYETHYLENE GLYCOL 3350 17 G/17G
17 POWDER, FOR SOLUTION ORAL DAILY
Status: DISCONTINUED | OUTPATIENT
Start: 2019-11-30 | End: 2019-12-01 | Stop reason: HOSPADM

## 2019-11-30 RX ADMIN — IPRATROPIUM BROMIDE AND ALBUTEROL SULFATE 3 ML: .5; 3 SOLUTION RESPIRATORY (INHALATION) at 07:11

## 2019-11-30 RX ADMIN — LOSARTAN POTASSIUM 50 MG: 50 TABLET, FILM COATED ORAL at 08:11

## 2019-11-30 RX ADMIN — METHYLPREDNISOLONE SODIUM SUCCINATE 80 MG: 125 INJECTION, POWDER, FOR SOLUTION INTRAMUSCULAR; INTRAVENOUS at 09:11

## 2019-11-30 RX ADMIN — TRAZODONE HYDROCHLORIDE 50 MG: 50 TABLET ORAL at 08:11

## 2019-11-30 RX ADMIN — METOPROLOL TARTRATE 25 MG: 25 TABLET ORAL at 08:11

## 2019-11-30 RX ADMIN — AZITHROMYCIN MONOHYDRATE 500 MG: 500 INJECTION, POWDER, LYOPHILIZED, FOR SOLUTION INTRAVENOUS at 10:11

## 2019-11-30 RX ADMIN — POLYETHYLENE GLYCOL 3350 17 G: 17 POWDER, FOR SOLUTION ORAL at 10:11

## 2019-11-30 RX ADMIN — INSULIN ASPART 2 UNITS: 100 INJECTION, SOLUTION INTRAVENOUS; SUBCUTANEOUS at 11:11

## 2019-11-30 RX ADMIN — IPRATROPIUM BROMIDE AND ALBUTEROL SULFATE 3 ML: .5; 3 SOLUTION RESPIRATORY (INHALATION) at 04:11

## 2019-11-30 RX ADMIN — ACETAMINOPHEN 650 MG: 325 TABLET ORAL at 08:11

## 2019-11-30 RX ADMIN — INSULIN ASPART 3 UNITS: 100 INJECTION, SOLUTION INTRAVENOUS; SUBCUTANEOUS at 08:11

## 2019-11-30 RX ADMIN — IPRATROPIUM BROMIDE AND ALBUTEROL SULFATE 3 ML: .5; 3 SOLUTION RESPIRATORY (INHALATION) at 03:11

## 2019-11-30 RX ADMIN — INSULIN ASPART 1 UNITS: 100 INJECTION, SOLUTION INTRAVENOUS; SUBCUTANEOUS at 08:11

## 2019-11-30 RX ADMIN — PRAVASTATIN SODIUM 40 MG: 40 TABLET ORAL at 08:11

## 2019-11-30 RX ADMIN — METHYLPREDNISOLONE SODIUM SUCCINATE 80 MG: 125 INJECTION, POWDER, FOR SOLUTION INTRAMUSCULAR; INTRAVENOUS at 02:11

## 2019-11-30 RX ADMIN — DILTIAZEM HYDROCHLORIDE 180 MG: 180 CAPSULE, COATED, EXTENDED RELEASE ORAL at 08:11

## 2019-11-30 RX ADMIN — INSULIN ASPART 3 UNITS: 100 INJECTION, SOLUTION INTRAVENOUS; SUBCUTANEOUS at 04:11

## 2019-11-30 RX ADMIN — METHYLPREDNISOLONE SODIUM SUCCINATE 125 MG: 125 INJECTION, POWDER, FOR SOLUTION INTRAMUSCULAR; INTRAVENOUS at 06:11

## 2019-11-30 RX ADMIN — PANTOPRAZOLE SODIUM 40 MG: 40 TABLET, DELAYED RELEASE ORAL at 08:11

## 2019-11-30 RX ADMIN — GUAIFENESIN 600 MG: 600 TABLET, EXTENDED RELEASE ORAL at 08:11

## 2019-11-30 RX ADMIN — ROPINIROLE HYDROCHLORIDE 1 MG: 0.5 TABLET, FILM COATED ORAL at 08:11

## 2019-11-30 RX ADMIN — INSULIN ASPART 2 UNITS: 100 INJECTION, SOLUTION INTRAVENOUS; SUBCUTANEOUS at 04:11

## 2019-11-30 RX ADMIN — INSULIN ASPART 3 UNITS: 100 INJECTION, SOLUTION INTRAVENOUS; SUBCUTANEOUS at 11:11

## 2019-11-30 RX ADMIN — ALLOPURINOL 100 MG: 100 TABLET ORAL at 08:11

## 2019-11-30 RX ADMIN — IPRATROPIUM BROMIDE AND ALBUTEROL SULFATE 3 ML: .5; 3 SOLUTION RESPIRATORY (INHALATION) at 08:11

## 2019-11-30 RX ADMIN — CITALOPRAM HYDROBROMIDE 40 MG: 10 TABLET ORAL at 08:11

## 2019-11-30 RX ADMIN — IPRATROPIUM BROMIDE AND ALBUTEROL SULFATE 3 ML: .5; 3 SOLUTION RESPIRATORY (INHALATION) at 11:11

## 2019-11-30 NOTE — ASSESSMENT & PLAN NOTE
Continue Cardizem and metoprolol.  She goes in and out of AFib in normal sinus rhythm  Consult Cardiology to see if Xarelto should be continued in lieu of significant anemia.

## 2019-11-30 NOTE — PROGRESS NOTES
[facility] Medicine  Progress Note    Patient Name: Chantell Herrera  MRN: [id number]  Patient Class: IP- Inpatient   Admission Date: 11/28/2019  Length of Stay: 2 days  Attending Physician: Chicho Fisher MD  Primary Care Provider: Kari Gaspar MD        Subjective:     Principal Problem:COPD with acute exacerbation        HPI:  84-year-old white female with a history of COPD, chronic hypoxemia requiring home oxygen and trilogy presents with increasing shortness of breath, coughing, wheezing.   states that patient has been getting progressively worse prior to arrival over the past week.  She usually requires 2 L oxygen per nasal cannula.  She present to the emergency room with 84% pulse ox.  Numerous neb treatments given.  Patient's oxygen level improved.  Patient also has type 2 diabetes requiring insulin, hypertension, chronic atrial fibrillation.  She is on Xarelto for the atrial fibrillation and she is now anemic.  She has required blood transfusions in the past.    Overview/Hospital Course:  11/29/19 admitted yesterday with COPD exacerbation, anemia s/p 1 UPRBCs, Xarelto on hold; await cardiology consult will not see until Monday   H&H 7.9/ 24.6,  BP stable , HR down to 48 over night - converted to NSR - sinus litzy   CXR negative for acute findings, O2 sat 89-94% on 4LNC; dropped to 89% during significant coughing episode  uses 2L NC at home  Getting solumedrol 125mg IV  q 8 , glucose 163, 266, 290, getting Detemir 10units, aspart 3 units tid; will increase Detemir to 15 units   Pt c/o difficulty getting mucous up and severe coughing , using trilogy currently - very compliant     11/30/2019 patient received a 2nd unit of blood yesterday.  She developed significant shortness of breath.  IV Lasix was given which resolved the problem quickly.  This morning she is feeling much better.  Her oxygen saturations are stable.  She is using her trilogy and nasal cannula.  She  started to eat a little better.  She still has significant dyspnea on exertion.  She is almost back to baseline.  Her Xarelto is still being held.  She is still on steroids, IV antibiotics, DuoNeb treatments.  Her blood sugars and blood pressure is stable.    Past Medical History:   Diagnosis Date    Acute on chronic congestive heart failure 2/1/2019    Anxiety     Arthritis     Asthma     Atrial fibrillation with rapid ventricular response     CHF (congestive heart failure) 11/29/2018    Chronic bronchitis     COPD (chronic obstructive pulmonary disease)     Depression     Diabetes mellitus, type 2     SANTIAGO (dyspnea on exertion)     Emphysema of lung     Fatigue     Hyperlipidemia     Hypertension     Symptomatic anemia 1/22/2019    Trouble in sleeping        Past Surgical History:   Procedure Laterality Date    APPENDECTOMY      EYE SURGERY      HYSTERECTOMY      TONSILLECTOMY         Review of patient's allergies indicates:  No Known Allergies    No current facility-administered medications on file prior to encounter.      Current Outpatient Medications on File Prior to Encounter   Medication Sig    albuterol (PROVENTIL) 2.5 mg /3 mL (0.083 %) nebulizer solution Take 3 mLs (2.5 mg total) by nebulization every 6 (six) hours as needed for Wheezing.    amLODIPine (NORVASC) 5 MG tablet TAKE 1 TABLET BY MOUTH ONCE DAILY ( DOSE DECREASE )    apixaban (ELIQUIS) 2.5 mg Tab Take 1 tablet (2.5 mg total) by mouth 2 (two) times daily.    baclofen (LIORESAL) 10 MG tablet Take 1 tablet (10 mg total) by mouth 3 (three) times daily. (Patient taking differently: Take 10 mg by mouth 3 (three) times daily as needed. )    benzonatate (TESSALON) 200 MG capsule TAKE 1 CAPSULE (200 MG TOTAL) BY MOUTH 3 (THREE) TIMES DAILY AS NEEDED.    cholecalciferol, vitamin D3, (VITAMIN D3) 2,000 unit Tab Take 2,000 Units by mouth once daily.    citalopram (CELEXA) 40 MG tablet TAKE 1 TABLET EVERY DAY (Patient taking  differently: 20 mg. TAKE 1 TABLET EVERY DAY)    diltiaZEM (CARDIZEM CD) 240 MG 24 hr capsule Take 1 capsule (240 mg total) by mouth once daily.    losartan (COZAAR) 100 MG tablet TAKE 1/2 TABLET EVERY DAY    magnesium oxide (MAG-OX) 400 mg (241.3 mg magnesium) tablet Take 1 tablet by mouth 2 (two) times daily.    metoprolol tartrate (LOPRESSOR) 25 MG tablet     mupirocin (BACTROBAN) 2 % ointment Apply topically 3 (three) times daily.    pravastatin (PRAVACHOL) 40 MG tablet TAKE 1 TABLET EVERY DAY    predniSONE (DELTASONE) 10 MG tablet Take 4 tablets (40 mg) by mouth for 4 days, then 3 tablets (30 mg) by mouth for 3 days then resume 2 tablets (20 mg) by mouth daily until follow up appointment with primary care doctor.    rOPINIRole (REQUIP) 1 MG tablet Take 1 tablet (1 mg total) by mouth every evening.    traZODone (DESYREL) 50 MG tablet TAKE 1 TABLET EVERY EVENING    [DISCONTINUED] albuterol (VENTOLIN HFA) 90 mcg/actuation inhaler Inhale 2 puffs into the lungs every 6 (six) hours as needed for Wheezing. Rescue    [DISCONTINUED] allopurinol (ZYLOPRIM) 100 MG tablet Take 1 tablet (100 mg total) by mouth once daily.    [DISCONTINUED] amoxicillin-clavulanate 875-125mg (AUGMENTIN) 875-125 mg per tablet     blood sugar diagnostic Strp Test BG twice a day Accu-Chek Celia plus  DM 2  E 11.9  Dr Gaspar    blood-glucose meter kit Test BG twice a day Accu-Chek Celia plus  DM 2  E 11.9  Dr Gaspar    blood-glucose meter Misc Use as directed    colchicine (COLCRYS) 0.6 mg tablet Take 1 tablet (0.6 mg total) by mouth 2 (two) times daily. for 7 days    fluticasone-umeclidin-vilanter (TRELEGY ELLIPTA) 100-62.5-25 mcg DsDv Inhale 1 puff into the lungs once daily.    furosemide (LASIX) 20 MG tablet Take 20 mg by mouth every other day.     insulin detemir U-100 (LEVEMIR FLEXTOUCH U-100 INSULN) 100 unit/mL (3 mL) SubQ InPn pen Inject 10 Units into the skin every evening.    ipratropium (ATROVENT) 0.02 % nebulizer  "solution Take 500 mcg by nebulization 4 (four) times daily.     lancets 33 gauge Misc Test BG twice a day Accu-Chek Celia plus  DM 2  E 11.9  Dr Gaspar    pen needle, diabetic 31 gauge x " Ndle Use to inject once daily with Levemir     Family History     Problem Relation (Age of Onset)    COPD Brother, Daughter    Cancer Sister    Diabetes Father    Heart disease Mother    Hypertension Mother, Father, Brother    Kidney disease Son    No Known Problems Daughter        Tobacco Use    Smoking status: Former Smoker     Last attempt to quit: 8/3/2000     Years since quittin.3    Smokeless tobacco: Never Used   Substance and Sexual Activity    Alcohol use: No    Drug use: No    Sexual activity: Never     Review of Systems   Constitutional: Negative for activity change, chills, fatigue, fever and unexpected weight change.   HENT: Negative for congestion, postnasal drip, rhinorrhea, sore throat and trouble swallowing.    Respiratory: Positive for cough, shortness of breath and wheezing. Negative for chest tightness.    Cardiovascular: Negative for chest pain and leg swelling.   Gastrointestinal: Negative for abdominal pain.   Endocrine: Negative for cold intolerance and heat intolerance.   Genitourinary: Negative for difficulty urinating.   Musculoskeletal: Negative for back pain and joint swelling.   Skin: Negative for rash.   Neurological: Negative for numbness.   Hematological: Negative for adenopathy.   Psychiatric/Behavioral: Negative for decreased concentration.     Objective:     Vital Signs (Most Recent):  Temp: 97.6 °F (36.4 °C) (19 1111)  Pulse: 65 (19 1143)  Resp: (!) 22 (19 1143)  BP: (!) 124/58 (19 1111)  SpO2: (!) 90 % (19 1143) Vital Signs (24h Range):  Temp:  [96.6 °F (35.9 °C)-98.5 °F (36.9 °C)] 97.6 °F (36.4 °C)  Pulse:  [60-81] 65  Resp:  [18-22] 22  SpO2:  [88 %-99 %] 90 %  BP: ()/(53-65) 124/58     Weight: 60.8 kg (134 lb)  Body mass index is 24.51 " kg/m².    Physical Exam   Constitutional: She is oriented to person, place, and time.   mildly tachypneic   HENT:   Head: Normocephalic.   Nose: Nose normal.   Mouth/Throat: Oropharynx is clear and moist.   Eyes: Pupils are equal, round, and reactive to light. EOM are normal. Right eye exhibits no discharge.   Neck: Normal range of motion. No JVD present. No thyromegaly present.   Cardiovascular: Normal rate, regular rhythm, normal heart sounds and intact distal pulses. Exam reveals no gallop and no friction rub.   No murmur heard.  Pulmonary/Chest: Effort normal and breath sounds normal. She has no wheezes. She has no rales.   Abdominal: Soft. There is no tenderness.   Musculoskeletal: Normal range of motion. She exhibits no edema or tenderness.   Lymphadenopathy:     She has no cervical adenopathy.   Neurological: She is alert and oriented to person, place, and time. No cranial nerve deficit.   Skin: Skin is dry.   Psychiatric: She has a normal mood and affect. Her behavior is normal. Judgment and thought content normal.   Vitals reviewed.        CRANIAL NERVES     CN III, IV, VI   Pupils are equal, round, and reactive to light.  Extraocular motions are normal.        Significant Labs:   BMP:   Recent Labs   Lab 11/30/19  0602   *      K 3.8   CL 99   CO2 29   BUN 26*   CREATININE 1.0   CALCIUM 8.4*     CBC:   Recent Labs   Lab 11/28/19  1410 11/29/19  0351 11/30/19  0602   WBC 9.58 10.20 19.07*   HGB 7.3* 7.9* 9.0*   HCT 23.4* 24.6* 28.2*    209 172     Urine Studies:   Recent Labs   Lab 11/29/19  0936   COLORU Yellow   APPEARANCEUA Clear   PHUR 6.0   SPECGRAV 1.015   PROTEINUA Negative   GLUCUA 3+*   KETONESU Trace*   BILIRUBINUA Negative   OCCULTUA Negative   NITRITE Negative   UROBILINOGEN Negative   LEUKOCYTESUR Negative   WBCUA 8*   BACTERIA Few*   SQUAMEPITHEL 15       Significant Imaging: CXR: I have reviewed all pertinent results/findings within the past 24 hours and my personal  findings are:  COPD changes, no infiltrates  I have reviewed all pertinent imaging results/findings within the past 24 hours.  I have reviewed and interpreted all pertinent imaging results/findings within the past 24 hours.      Assessment/Plan:      * COPD with acute exacerbation  Supplemental O2 via nasal canula; titrate O2 saturation to >92%. Use Trilogy as much as possible  Decreased the dose of Solu-Medrol to 80 mg q.8 hours  Continue beta 2 agonist bronchodilator treatments.  Use home inhalers  Continues with room ice in  Continue routine medications as before.   Gastric ulcer prophylaxis with gastric acid suppressant.   Deep venous thrombosis prophylaxis.   Continue acapella, mucinex    Diabetes mellitus type 2, uncontrolled  Continue Levemir 15 units at night.   Continue NovoLog 3 units before each meal.  Continue insulin siding scale before each meals.  Adjust dose according to glucose levels.  Monitor glucose q.a.c. and HS      Anemia  Patient received her 2nd unit of packed red blood cells last night.  She did develop significant shortness of breath requiring IV Lasix 20 mg which worked nicely.  This morning she is feeling much better.  Her hemoglobin is 9.0      Atrial fibrillation with rapid ventricular response  Continue Cardizem and metoprolol.  She goes in and out of AFib in normal sinus rhythm  Consult Cardiology to see if Xarelto should be continued in lieu of significant anemia.      Chronic respiratory failure with hypoxia  Continue trilogy as much as possible  Still requiring increased oxygen - 4LNC       Dyslipidemia  Continue pravastatin      CKD (chronic kidney disease), stage III  Monitor renal function daily  BMP shows GFR of 56.  stable            HTN (hypertension)  Continue amlodipine 5 mg daily  Lower diltiazem extended release 180 mg daily  Continue losartan 50 mg daily  Continue metoprolol 25 mg p.o. b.i.d.  Hold Lasix for now.        VTE Risk Mitigation (From admission, onward)          Ordered     IP VTE HIGH RISK PATIENT  Once      11/28/19 1606     Place sequential compression device  Until discontinued      11/28/19 1606                      Chicho Fisher MD  Department of Hospital Medicine   [facility]

## 2019-11-30 NOTE — PLAN OF CARE
Problem: Fall Injury Risk  Goal: Absence of Fall and Fall-Related Injury  Outcome: Ongoing, Progressing     Problem: Adult Inpatient Plan of Care  Goal: Plan of Care Review  Outcome: Ongoing, Progressing  Goal: Patient-Specific Goal (Individualization)  Outcome: Ongoing, Progressing  Goal: Absence of Hospital-Acquired Illness or Injury  Outcome: Ongoing, Progressing  Goal: Optimal Comfort and Wellbeing  Outcome: Ongoing, Progressing  Goal: Readiness for Transition of Care  Outcome: Ongoing, Progressing  Goal: Rounds/Family Conference  Outcome: Ongoing, Progressing     Problem: Diabetes Comorbidity  Goal: Blood Glucose Level Within Desired Range  Outcome: Ongoing, Progressing     Problem: Skin Injury Risk Increased  Goal: Skin Health and Integrity  Outcome: Ongoing, Progressing     Problem: Adjustment to Illness COPD (Chronic Obstructive Pulmonary Disease)  Goal: Optimal Chronic Illness Coping  Outcome: Ongoing, Progressing     Problem: Functional Ability Impaired COPD (Chronic Obstructive Pulmonary Disease)  Goal: Optimal Level of Functional [place]  Outcome: Ongoing, Progressing     Problem: Oral Intake Inadequate COPD (Chronic Obstructive Pulmonary Disease)  Goal: Improved Nutrition Intake  Outcome: Ongoing, Progressing     Problem: Infection COPD (Chronic Obstructive Pulmonary Disease)  Goal: Absence of Infection Signs/Symptoms  Outcome: Ongoing, Progressing     Problem: Respiratory Compromise COPD (Chronic Obstructive Pulmonary Disease)  Goal: Effective Oxygenation and Ventilation  Outcome: Ongoing, Progressing

## 2019-11-30 NOTE — PLAN OF CARE
Patient admitted with COPD exacerbation.  Continuing IV Azithromycin.  IV steroids decreased from 125mg to 80mg every 8 hours.  Neb treatments continued every 4 hours.  Miralax started to address complaints of constipation.  H&H stable after recent blood transfusion.  Continuing to hold Eliquis until Cardiology consult on Monday. Glucometer checks with meals; insulin given as ordered.  Heart monitor in place, NSR.  VSS, afebrile.  On 3 liters nasal cannula alternating with trilogy machine intermittently.    Free of falls.  Verbalizes understanding of care plan.

## 2019-11-30 NOTE — ASSESSMENT & PLAN NOTE
Continue Levemir 15 units at night.   Continue NovoLog 3 units before each meal.  Continue insulin siding scale before each meals.  Adjust dose according to glucose levels.  Monitor glucose q.a.c. and HS

## 2019-11-30 NOTE — SUBJECTIVE & OBJECTIVE
Past Medical History:   Diagnosis Date    Acute on chronic congestive heart failure 2/1/2019    Anxiety     Arthritis     Asthma     Atrial fibrillation with rapid ventricular response     CHF (congestive heart failure) 11/29/2018    Chronic bronchitis     COPD (chronic obstructive pulmonary disease)     Depression     Diabetes mellitus, type 2     SANTIAGO (dyspnea on exertion)     Emphysema of lung     Fatigue     Hyperlipidemia     Hypertension     Symptomatic anemia 1/22/2019    Trouble in sleeping        Past Surgical History:   Procedure Laterality Date    APPENDECTOMY      EYE SURGERY      HYSTERECTOMY      TONSILLECTOMY         Review of patient's allergies indicates:  No Known Allergies    No current facility-administered medications on file prior to encounter.      Current Outpatient Medications on File Prior to Encounter   Medication Sig    albuterol (PROVENTIL) 2.5 mg /3 mL (0.083 %) nebulizer solution Take 3 mLs (2.5 mg total) by nebulization every 6 (six) hours as needed for Wheezing.    amLODIPine (NORVASC) 5 MG tablet TAKE 1 TABLET BY MOUTH ONCE DAILY ( DOSE DECREASE )    apixaban (ELIQUIS) 2.5 mg Tab Take 1 tablet (2.5 mg total) by mouth 2 (two) times daily.    baclofen (LIORESAL) 10 MG tablet Take 1 tablet (10 mg total) by mouth 3 (three) times daily. (Patient taking differently: Take 10 mg by mouth 3 (three) times daily as needed. )    benzonatate (TESSALON) 200 MG capsule TAKE 1 CAPSULE (200 MG TOTAL) BY MOUTH 3 (THREE) TIMES DAILY AS NEEDED.    cholecalciferol, vitamin D3, (VITAMIN D3) 2,000 unit Tab Take 2,000 Units by mouth once daily.    citalopram (CELEXA) 40 MG tablet TAKE 1 TABLET EVERY DAY (Patient taking differently: 20 mg. TAKE 1 TABLET EVERY DAY)    diltiaZEM (CARDIZEM CD) 240 MG 24 hr capsule Take 1 capsule (240 mg total) by mouth once daily.    losartan (COZAAR) 100 MG tablet TAKE 1/2 TABLET EVERY DAY    magnesium oxide (MAG-OX) 400 mg (241.3 mg magnesium)  "tablet Take 1 tablet by mouth 2 (two) times daily.    metoprolol tartrate (LOPRESSOR) 25 MG tablet     mupirocin (BACTROBAN) 2 % ointment Apply topically 3 (three) times daily.    pravastatin (PRAVACHOL) 40 MG tablet TAKE 1 TABLET EVERY DAY    predniSONE (DELTASONE) 10 MG tablet Take 4 tablets (40 mg) by mouth for 4 days, then 3 tablets (30 mg) by mouth for 3 days then resume 2 tablets (20 mg) by mouth daily until follow up appointment with primary care doctor.    rOPINIRole (REQUIP) 1 MG tablet Take 1 tablet (1 mg total) by mouth every evening.    traZODone (DESYREL) 50 MG tablet TAKE 1 TABLET EVERY EVENING    [DISCONTINUED] albuterol (VENTOLIN HFA) 90 mcg/actuation inhaler Inhale 2 puffs into the lungs every 6 (six) hours as needed for Wheezing. Rescue    [DISCONTINUED] allopurinol (ZYLOPRIM) 100 MG tablet Take 1 tablet (100 mg total) by mouth once daily.    [DISCONTINUED] amoxicillin-clavulanate 875-125mg (AUGMENTIN) 875-125 mg per tablet     blood sugar diagnostic Strp Test BG twice a day Accu-Chek Celia plus  DM 2  E 11.9  Dr Gaspar    blood-glucose meter kit Test BG twice a day Accu-Chek Celia plus  DM 2  E 11.9  Dr Gaspar    blood-glucose meter Misc Use as directed    colchicine (COLCRYS) 0.6 mg tablet Take 1 tablet (0.6 mg total) by mouth 2 (two) times daily. for 7 days    fluticasone-umeclidin-vilanter (TRELEGY ELLIPTA) 100-62.5-25 mcg DsDv Inhale 1 puff into the lungs once daily.    furosemide (LASIX) 20 MG tablet Take 20 mg by mouth every other day.     insulin detemir U-100 (LEVEMIR FLEXTOUCH U-100 INSULN) 100 unit/mL (3 mL) SubQ InPn pen Inject 10 Units into the skin every evening.    ipratropium (ATROVENT) 0.02 % nebulizer solution Take 500 mcg by nebulization 4 (four) times daily.     lancets 33 gauge Misc Test BG twice a day Accu-Chek Cleia plus  DM 2  E 11.9  Dr Gaspar    pen needle, diabetic 31 gauge x 5/16" Ndle Use to inject once daily with Levemir     Family History     " Problem Relation (Age of Onset)    COPD Brother, Daughter    Cancer Sister    Diabetes Father    Heart disease Mother    Hypertension Mother, Father, Brother    Kidney disease Son    No Known Problems Daughter        Tobacco Use    Smoking status: Former Smoker     Last attempt to quit: 8/3/2000     Years since quittin.3    Smokeless tobacco: Never Used   Substance and Sexual Activity    Alcohol use: No    Drug use: No    Sexual activity: Never     Review of Systems   Constitutional: Negative for activity change, chills, fatigue, fever and unexpected weight change.   HENT: Negative for congestion, postnasal drip, rhinorrhea, sore throat and trouble swallowing.    Respiratory: Positive for cough, shortness of breath and wheezing. Negative for chest tightness.    Cardiovascular: Negative for chest pain and leg swelling.   Gastrointestinal: Negative for abdominal pain.   Endocrine: Negative for cold intolerance and heat intolerance.   Genitourinary: Negative for difficulty urinating.   Musculoskeletal: Negative for back pain and joint swelling.   Skin: Negative for rash.   Neurological: Negative for numbness.   Hematological: Negative for adenopathy.   Psychiatric/Behavioral: Negative for decreased concentration.     Objective:     Vital Signs (Most Recent):  Temp: 97.6 °F (36.4 °C) (19 1111)  Pulse: 65 (19 1143)  Resp: (!) 22 (19 1143)  BP: (!) 124/58 (19 1111)  SpO2: (!) 90 % (19 1143) Vital Signs (24h Range):  Temp:  [96.6 °F (35.9 °C)-98.5 °F (36.9 °C)] 97.6 °F (36.4 °C)  Pulse:  [60-81] 65  Resp:  [18-22] 22  SpO2:  [88 %-99 %] 90 %  BP: ()/(53-65) 124/58     Weight: 60.8 kg (134 lb)  Body mass index is 24.51 kg/m².    Physical Exam   Constitutional: She is oriented to person, place, and time.   mildly tachypneic   HENT:   Head: Normocephalic.   Nose: Nose normal.   Mouth/Throat: Oropharynx is clear and moist.   Eyes: Pupils are equal, round, and reactive to light. EOM  are normal. Right eye exhibits no discharge.   Neck: Normal range of motion. No JVD present. No thyromegaly present.   Cardiovascular: Normal rate, regular rhythm, normal heart sounds and intact distal pulses. Exam reveals no gallop and no friction rub.   No murmur heard.  Pulmonary/Chest: Effort normal and breath sounds normal. She has no wheezes. She has no rales.   Abdominal: Soft. There is no tenderness.   Musculoskeletal: Normal range of motion. She exhibits no edema or tenderness.   Lymphadenopathy:     She has no cervical adenopathy.   Neurological: She is alert and oriented to person, place, and time. No cranial nerve deficit.   Skin: Skin is dry.   Psychiatric: She has a normal mood and affect. Her behavior is normal. Judgment and thought content normal.   Vitals reviewed.        CRANIAL NERVES     CN III, IV, VI   Pupils are equal, round, and reactive to light.  Extraocular motions are normal.        Significant Labs:   BMP:   Recent Labs   Lab 11/30/19  0602   *      K 3.8   CL 99   CO2 29   BUN 26*   CREATININE 1.0   CALCIUM 8.4*     CBC:   Recent Labs   Lab 11/28/19  1410 11/29/19  0351 11/30/19  0602   WBC 9.58 10.20 19.07*   HGB 7.3* 7.9* 9.0*   HCT 23.4* 24.6* 28.2*    209 172     Urine Studies:   Recent Labs   Lab 11/29/19  0936   COLORU Yellow   APPEARANCEUA Clear   PHUR 6.0   SPECGRAV 1.015   PROTEINUA Negative   GLUCUA 3+*   KETONESU Trace*   BILIRUBINUA Negative   OCCULTUA Negative   NITRITE Negative   UROBILINOGEN Negative   LEUKOCYTESUR Negative   WBCUA 8*   BACTERIA Few*   SQUAMEPITHEL 15       Significant Imaging: CXR: I have reviewed all pertinent results/findings within the past 24 hours and my personal findings are:  COPD changes, no infiltrates  I have reviewed all pertinent imaging results/findings within the past 24 hours.  I have reviewed and interpreted all pertinent imaging results/findings within the past 24 hours.

## 2019-11-30 NOTE — ASSESSMENT & PLAN NOTE
Supplemental O2 via nasal canula; titrate O2 saturation to >92%. Use Trilogy as much as possible  Decreased the dose of Solu-Medrol to 80 mg q.8 hours  Continue beta 2 agonist bronchodilator treatments.  Use home inhalers  Continues with room ice in  Continue routine medications as before.   Gastric ulcer prophylaxis with gastric acid suppressant.   Deep venous thrombosis prophylaxis.   Continue acapella, mucinex

## 2019-11-30 NOTE — PLAN OF CARE
Patient admitted with COPD exacerbation. VSS. Afebrile. Patient on 3L Nasal Cannula oxygen.Patient refused SCDs for VTE prophylaxis. Patient also wears CPAP from home. Patient Sinus Rhythm on telemetry. Patient also uses trilogy. Breathing treatments Q4H. Cardiology was already consulted and will see her Monday if she is still here. Patient ambulates. Call light in reach. Bed locked and low. Plan of care reviewed with patient. Patient verbalized understanding.

## 2019-11-30 NOTE — ASSESSMENT & PLAN NOTE
Patient received her 2nd unit of packed red blood cells last night.  She did develop significant shortness of breath requiring IV Lasix 20 mg which worked nicely.  This morning she is feeling much better.  Her hemoglobin is 9.0

## 2019-12-01 VITALS
HEART RATE: 65 BPM | OXYGEN SATURATION: 93 % | RESPIRATION RATE: 20 BRPM | DIASTOLIC BLOOD PRESSURE: 63 MMHG | HEIGHT: 62 IN | BODY MASS INDEX: 24.66 KG/M2 | WEIGHT: 134 LBS | TEMPERATURE: 96 F | SYSTOLIC BLOOD PRESSURE: 115 MMHG

## 2019-12-01 LAB
ANION GAP SERPL CALC-SCNC: 8 MMOL/L (ref 8–16)
ANISOCYTOSIS BLD QL SMEAR: ABNORMAL
BASOPHILS # BLD AUTO: ABNORMAL K/UL (ref 0–0.2)
BASOPHILS NFR BLD: 0 % (ref 0–1.9)
BUN SERPL-MCNC: 26 MG/DL (ref 8–23)
CALCIUM SERPL-MCNC: 8.3 MG/DL (ref 8.7–10.5)
CHLORIDE SERPL-SCNC: 99 MMOL/L (ref 95–110)
CO2 SERPL-SCNC: 30 MMOL/L (ref 23–29)
CREAT SERPL-MCNC: 1 MG/DL (ref 0.5–1.4)
DACRYOCYTES BLD QL SMEAR: ABNORMAL
DIFFERENTIAL METHOD: ABNORMAL
EOSINOPHIL # BLD AUTO: ABNORMAL K/UL (ref 0–0.5)
EOSINOPHIL NFR BLD: 0 % (ref 0–8)
ERYTHROCYTE [DISTWIDTH] IN BLOOD BY AUTOMATED COUNT: ABNORMAL % (ref 11.5–14.5)
EST. GFR  (AFRICAN AMERICAN): >60 ML/MIN/1.73 M^2
EST. GFR  (NON AFRICAN AMERICAN): 53 ML/MIN/1.73 M^2
GIANT PLATELETS BLD QL SMEAR: PRESENT
GLUCOSE SERPL-MCNC: 183 MG/DL (ref 70–110)
HCT VFR BLD AUTO: 27.5 % (ref 37–48.5)
HGB BLD-MCNC: 8.8 G/DL (ref 12–16)
IMM GRANULOCYTES # BLD AUTO: ABNORMAL K/UL (ref 0–0.04)
IMM GRANULOCYTES NFR BLD AUTO: ABNORMAL % (ref 0–0.5)
LYMPHOCYTES # BLD AUTO: ABNORMAL K/UL (ref 1–4.8)
LYMPHOCYTES NFR BLD: 0 % (ref 18–48)
MCH RBC QN AUTO: 36.1 PG (ref 27–31)
MCHC RBC AUTO-ENTMCNC: 32 G/DL (ref 32–36)
MCV RBC AUTO: 113 FL (ref 82–98)
MONOCYTES # BLD AUTO: ABNORMAL K/UL (ref 0.3–1)
MONOCYTES NFR BLD: 6 % (ref 4–15)
NEUTROPHILS NFR BLD: 85 % (ref 38–73)
NEUTS BAND NFR BLD MANUAL: 9 %
NRBC BLD-RTO: 0 /100 WBC
PLATELET # BLD AUTO: 171 K/UL (ref 150–350)
PLATELET BLD QL SMEAR: ABNORMAL
PMV BLD AUTO: 14.2 FL (ref 9.2–12.9)
POCT GLUCOSE: 184 MG/DL (ref 70–110)
POCT GLUCOSE: 199 MG/DL (ref 70–110)
POTASSIUM SERPL-SCNC: 4 MMOL/L (ref 3.5–5.1)
RBC # BLD AUTO: 2.44 M/UL (ref 4–5.4)
SODIUM SERPL-SCNC: 137 MMOL/L (ref 136–145)
WBC # BLD AUTO: 17.33 K/UL (ref 3.9–12.7)

## 2019-12-01 PROCEDURE — 94761 N-INVAS EAR/PLS OXIMETRY MLT: CPT | Mod: HCNC

## 2019-12-01 PROCEDURE — 27000221 HC OXYGEN, UP TO 24 HOURS: Mod: HCNC

## 2019-12-01 PROCEDURE — 63600175 PHARM REV CODE 636 W HCPCS: Mod: HCNC | Performed by: FAMILY MEDICINE

## 2019-12-01 PROCEDURE — 80048 BASIC METABOLIC PNL TOTAL CA: CPT | Mod: HCNC

## 2019-12-01 PROCEDURE — 25000003 PHARM REV CODE 250: Mod: HCNC | Performed by: SURGERY

## 2019-12-01 PROCEDURE — 25000003 PHARM REV CODE 250: Mod: HCNC | Performed by: FAMILY MEDICINE

## 2019-12-01 PROCEDURE — 99900035 HC TECH TIME PER 15 MIN (STAT): Mod: HCNC

## 2019-12-01 PROCEDURE — 99900031 HC PATIENT EDUCATION (STAT): Mod: HCNC

## 2019-12-01 PROCEDURE — 85007 BL SMEAR W/DIFF WBC COUNT: CPT | Mod: HCNC

## 2019-12-01 PROCEDURE — 94664 DEMO&/EVAL PT USE INHALER: CPT | Mod: HCNC

## 2019-12-01 PROCEDURE — 25000242 PHARM REV CODE 250 ALT 637 W/ HCPCS: Mod: HCNC | Performed by: SURGERY

## 2019-12-01 PROCEDURE — 99238 PR HOSPITAL DISCHARGE DAY,<30 MIN: ICD-10-PCS | Mod: HCNC,,, | Performed by: FAMILY MEDICINE

## 2019-12-01 PROCEDURE — 94640 AIRWAY INHALATION TREATMENT: CPT | Mod: HCNC

## 2019-12-01 PROCEDURE — 85027 COMPLETE CBC AUTOMATED: CPT | Mod: HCNC

## 2019-12-01 PROCEDURE — 63600175 PHARM REV CODE 636 W HCPCS: Mod: HCNC | Performed by: NURSE PRACTITIONER

## 2019-12-01 PROCEDURE — 25000003 PHARM REV CODE 250: Mod: HCNC | Performed by: NURSE PRACTITIONER

## 2019-12-01 PROCEDURE — 99238 HOSP IP/OBS DSCHRG MGMT 30/<: CPT | Mod: HCNC,,, | Performed by: FAMILY MEDICINE

## 2019-12-01 PROCEDURE — 36415 COLL VENOUS BLD VENIPUNCTURE: CPT | Mod: HCNC

## 2019-12-01 RX ORDER — CITALOPRAM 20 MG/1
20 TABLET, FILM COATED ORAL DAILY
Qty: 30 TABLET | Refills: 11 | Status: SHIPPED | OUTPATIENT
Start: 2019-12-02 | End: 2020-01-01

## 2019-12-01 RX ORDER — PREDNISONE 10 MG/1
TABLET ORAL
Qty: 25 TABLET | Refills: 0 | Status: ON HOLD | OUTPATIENT
Start: 2019-12-01 | End: 2020-01-20 | Stop reason: HOSPADM

## 2019-12-01 RX ORDER — ALBUTEROL SULFATE 0.83 MG/ML
2.5 SOLUTION RESPIRATORY (INHALATION) EVERY 6 HOURS PRN
Qty: 300 ML | Refills: 3 | Status: SHIPPED | OUTPATIENT
Start: 2019-12-01

## 2019-12-01 RX ORDER — AZITHROMYCIN 250 MG/1
250 TABLET, FILM COATED ORAL DAILY
Qty: 6 TABLET | Refills: 0 | Status: SHIPPED | OUTPATIENT
Start: 2019-12-01 | End: 2020-01-02

## 2019-12-01 RX ORDER — METOPROLOL TARTRATE 25 MG/1
25 TABLET, FILM COATED ORAL 2 TIMES DAILY
Qty: 60 TABLET | Refills: 11 | Status: SHIPPED | OUTPATIENT
Start: 2019-12-01 | End: 2020-01-02

## 2019-12-01 RX ADMIN — IPRATROPIUM BROMIDE AND ALBUTEROL SULFATE 3 ML: .5; 3 SOLUTION RESPIRATORY (INHALATION) at 12:12

## 2019-12-01 RX ADMIN — CITALOPRAM HYDROBROMIDE 20 MG: 10 TABLET ORAL at 09:12

## 2019-12-01 RX ADMIN — LOSARTAN POTASSIUM 50 MG: 50 TABLET, FILM COATED ORAL at 09:12

## 2019-12-01 RX ADMIN — PRAVASTATIN SODIUM 40 MG: 40 TABLET ORAL at 09:12

## 2019-12-01 RX ADMIN — AZITHROMYCIN MONOHYDRATE 500 MG: 500 INJECTION, POWDER, LYOPHILIZED, FOR SOLUTION INTRAVENOUS at 11:12

## 2019-12-01 RX ADMIN — INSULIN ASPART 3 UNITS: 100 INJECTION, SOLUTION INTRAVENOUS; SUBCUTANEOUS at 11:12

## 2019-12-01 RX ADMIN — METOPROLOL TARTRATE 25 MG: 25 TABLET ORAL at 09:12

## 2019-12-01 RX ADMIN — METHYLPREDNISOLONE SODIUM SUCCINATE 80 MG: 125 INJECTION, POWDER, FOR SOLUTION INTRAMUSCULAR; INTRAVENOUS at 05:12

## 2019-12-01 RX ADMIN — IPRATROPIUM BROMIDE AND ALBUTEROL SULFATE 3 ML: .5; 3 SOLUTION RESPIRATORY (INHALATION) at 08:12

## 2019-12-01 RX ADMIN — IPRATROPIUM BROMIDE AND ALBUTEROL SULFATE 3 ML: .5; 3 SOLUTION RESPIRATORY (INHALATION) at 04:12

## 2019-12-01 RX ADMIN — GUAIFENESIN 600 MG: 600 TABLET, EXTENDED RELEASE ORAL at 09:12

## 2019-12-01 RX ADMIN — INSULIN ASPART 3 UNITS: 100 INJECTION, SOLUTION INTRAVENOUS; SUBCUTANEOUS at 07:12

## 2019-12-01 RX ADMIN — PANTOPRAZOLE SODIUM 40 MG: 40 TABLET, DELAYED RELEASE ORAL at 09:12

## 2019-12-01 RX ADMIN — DILTIAZEM HYDROCHLORIDE 180 MG: 180 CAPSULE, COATED, EXTENDED RELEASE ORAL at 09:12

## 2019-12-01 NOTE — ASSESSMENT & PLAN NOTE
Discharge home today  Continue DuoNeb treatments 4 times daily  Continue using trilogy  Continue Trelegy Ingaler daily  Continue home oxygen  Prednisone 40 mg daily for 4 days, 30 mg daily for 3 days, 20 mg daily after that  Continue acapella, mucinex

## 2019-12-01 NOTE — ASSESSMENT & PLAN NOTE
Continue Cardizem and metoprolol.  She goes in and out of AFib in normal sinus rhythm  Hold Xarelto this week.  Follow up with Cardiology to see if it should be continued.

## 2019-12-01 NOTE — ASSESSMENT & PLAN NOTE
Continue Cardizem and metoprolol.  She goes in and out of AFib in normal sinus rhythm  Restart Eliquis 2.5 mg.  That only take it once daily.  Follow up with Cardiology to see if it should be increased

## 2019-12-01 NOTE — SUBJECTIVE & OBJECTIVE
Past Medical History:   Diagnosis Date    Acute on chronic congestive heart failure 2/1/2019    Anxiety     Arthritis     Asthma     Atrial fibrillation with rapid ventricular response     CHF (congestive heart failure) 11/29/2018    Chronic bronchitis     COPD (chronic obstructive pulmonary disease)     Depression     Diabetes mellitus, type 2     SANTIAGO (dyspnea on exertion)     Emphysema of lung     Fatigue     Hyperlipidemia     Hypertension     Symptomatic anemia 1/22/2019    Trouble in sleeping        Past Surgical History:   Procedure Laterality Date    APPENDECTOMY      EYE SURGERY      HYSTERECTOMY      TONSILLECTOMY         Review of patient's allergies indicates:  No Known Allergies    No current facility-administered medications on file prior to encounter.      Current Outpatient Medications on File Prior to Encounter   Medication Sig    albuterol (PROVENTIL) 2.5 mg /3 mL (0.083 %) nebulizer solution Take 3 mLs (2.5 mg total) by nebulization every 6 (six) hours as needed for Wheezing.    amLODIPine (NORVASC) 5 MG tablet TAKE 1 TABLET BY MOUTH ONCE DAILY ( DOSE DECREASE )    apixaban (ELIQUIS) 2.5 mg Tab Take 1 tablet (2.5 mg total) by mouth 2 (two) times daily.    baclofen (LIORESAL) 10 MG tablet Take 1 tablet (10 mg total) by mouth 3 (three) times daily. (Patient taking differently: Take 10 mg by mouth 3 (three) times daily as needed. )    benzonatate (TESSALON) 200 MG capsule TAKE 1 CAPSULE (200 MG TOTAL) BY MOUTH 3 (THREE) TIMES DAILY AS NEEDED.    cholecalciferol, vitamin D3, (VITAMIN D3) 2,000 unit Tab Take 2,000 Units by mouth once daily.    citalopram (CELEXA) 40 MG tablet TAKE 1 TABLET EVERY DAY (Patient taking differently: 20 mg. TAKE 1 TABLET EVERY DAY)    diltiaZEM (CARDIZEM CD) 240 MG 24 hr capsule Take 1 capsule (240 mg total) by mouth once daily.    losartan (COZAAR) 100 MG tablet TAKE 1/2 TABLET EVERY DAY    magnesium oxide (MAG-OX) 400 mg (241.3 mg magnesium)  "tablet Take 1 tablet by mouth 2 (two) times daily.    metoprolol tartrate (LOPRESSOR) 25 MG tablet     mupirocin (BACTROBAN) 2 % ointment Apply topically 3 (three) times daily.    pravastatin (PRAVACHOL) 40 MG tablet TAKE 1 TABLET EVERY DAY    predniSONE (DELTASONE) 10 MG tablet Take 4 tablets (40 mg) by mouth for 4 days, then 3 tablets (30 mg) by mouth for 3 days then resume 2 tablets (20 mg) by mouth daily until follow up appointment with primary care doctor.    rOPINIRole (REQUIP) 1 MG tablet Take 1 tablet (1 mg total) by mouth every evening.    traZODone (DESYREL) 50 MG tablet TAKE 1 TABLET EVERY EVENING    blood sugar diagnostic Strp Test BG twice a day Accu-Chek Celia plus  DM 2  E 11.9  Dr Gaspar    blood-glucose meter kit Test BG twice a day Accu-Chek Celia plus  DM 2  E 11.9  Dr Gaspar    blood-glucose meter Misc Use as directed    colchicine (COLCRYS) 0.6 mg tablet Take 1 tablet (0.6 mg total) by mouth 2 (two) times daily. for 7 days    fluticasone-umeclidin-vilanter (TRELEGY ELLIPTA) 100-62.5-25 mcg DsDv Inhale 1 puff into the lungs once daily.    furosemide (LASIX) 20 MG tablet Take 20 mg by mouth every other day.     insulin detemir U-100 (LEVEMIR FLEXTOUCH U-100 INSULN) 100 unit/mL (3 mL) SubQ InPn pen Inject 10 Units into the skin every evening.    ipratropium (ATROVENT) 0.02 % nebulizer solution Take 500 mcg by nebulization 4 (four) times daily.     lancets 33 gauge Misc Test BG twice a day Accu-Chek Celia plus  DM 2  E 11.9  Dr Gaspar    pen needle, diabetic 31 gauge x 5/16" Ndle Use to inject once daily with Levemir     Family History     Problem Relation (Age of Onset)    COPD Brother, Daughter    Cancer Sister    Diabetes Father    Heart disease Mother    Hypertension Mother, Father, Brother    Kidney disease Son    No Known Problems Daughter        Tobacco Use    Smoking status: Former Smoker     Last attempt to quit: 8/3/2000     Years since quittin.3    Smokeless " tobacco: Never Used   Substance and Sexual Activity    Alcohol use: No    Drug use: No    Sexual activity: Never     Review of Systems   Constitutional: Negative for activity change, chills, fatigue, fever and unexpected weight change.   HENT: Negative for congestion, postnasal drip, rhinorrhea, sore throat and trouble swallowing.    Respiratory: Positive for cough, shortness of breath and wheezing. Negative for chest tightness.         Improved   Cardiovascular: Negative for chest pain and leg swelling.   Gastrointestinal: Negative for abdominal pain.   Endocrine: Negative for cold intolerance and heat intolerance.   Genitourinary: Negative for difficulty urinating.   Musculoskeletal: Negative for back pain and joint swelling.   Skin: Negative for rash.   Neurological: Negative for numbness.   Hematological: Negative for adenopathy.   Psychiatric/Behavioral: Negative for decreased concentration.     Objective:     Vital Signs (Most Recent):  Temp: 98.9 °F (37.2 °C) (12/01/19 0718)  Pulse: 75 (12/01/19 1007)  Resp: (!) 22 (12/01/19 0834)  BP: 139/63 (12/01/19 0718)  SpO2: (!) 91 % (12/01/19 0834) Vital Signs (24h Range):  Temp:  [96.7 °F (35.9 °C)-98.9 °F (37.2 °C)] 98.9 °F (37.2 °C)  Pulse:  [61-93] 75  Resp:  [17-22] 22  SpO2:  [88 %-93 %] 91 %  BP: (108-141)/(54-68) 139/63     Weight: 60.8 kg (134 lb)  Body mass index is 24.51 kg/m².    Physical Exam   Constitutional: She is oriented to person, place, and time.   mildly tachypneic   HENT:   Head: Normocephalic.   Nose: Nose normal.   Mouth/Throat: Oropharynx is clear and moist.   Eyes: Pupils are equal, round, and reactive to light. EOM are normal. Right eye exhibits no discharge.   Neck: Normal range of motion. No JVD present. No thyromegaly present.   Cardiovascular: Normal rate, regular rhythm, normal heart sounds and intact distal pulses. Exam reveals no gallop and no friction rub.   No murmur heard.  Pulmonary/Chest: Effort normal and breath sounds  normal. She has no wheezes. She has no rales.   Expiratory ronchi   Abdominal: Soft. There is no tenderness.   Musculoskeletal: Normal range of motion. She exhibits no edema or tenderness.   Lymphadenopathy:     She has no cervical adenopathy.   Neurological: She is alert and oriented to person, place, and time. No cranial nerve deficit.   Skin: Skin is dry.   Psychiatric: She has a normal mood and affect. Her behavior is normal. Judgment and thought content normal.   Vitals reviewed.        CRANIAL NERVES     CN III, IV, VI   Pupils are equal, round, and reactive to light.  Extraocular motions are normal.        Significant Labs:   BMP:   Recent Labs   Lab 12/01/19  0602   *      K 4.0   CL 99   CO2 30*   BUN 26*   CREATININE 1.0   CALCIUM 8.3*     CBC:   Recent Labs   Lab 11/30/19  0602   WBC 19.07*   HGB 9.0*   HCT 28.2*        Urine Studies:   No results for input(s): COLORU, APPEARANCEUA, PHUR, SPECGRAV, PROTEINUA, GLUCUA, KETONESU, BILIRUBINUA, OCCULTUA, NITRITE, UROBILINOGEN, LEUKOCYTESUR, RBCUA, WBCUA, BACTERIA, SQUAMEPITHEL, HYALINECASTS in the last 48 hours.    Invalid input(s): WRIGHTSUR    Significant Imaging: CXR: I have reviewed all pertinent results/findings within the past 24 hours and my personal findings are:  COPD changes, no infiltrates  I have reviewed all pertinent imaging results/findings within the past 24 hours.  I have reviewed and interpreted all pertinent imaging results/findings within the past 24 hours.

## 2019-12-01 NOTE — NURSING
Patient discharged.  NADN.  IV d/c'd.  Catheter tip intact.  Pressure dressing applied.  Verbal and written discharge instructions and instructed to make follow up appointments.  Prescriptions to be picked up at pharmacy.  Patient verbalized understanding.  Patient transported to [facility] via wheelchair with 3L O2 via NC escorted by transport staff.

## 2019-12-01 NOTE — ASSESSMENT & PLAN NOTE
Continue amlodipine 5 mg daily  Continue diltiazem extended release 240 mg daily  Continue losartan 50 mg daily  Continue metoprolol 25 mg p.o. b.i.d.  Continue Lasix every other day as needed for swelling

## 2019-12-01 NOTE — DISCHARGE SUMMARY
[facility] Hospital Medicine  Discharge Summary      Patient Name: Chantell Herrera  MRN: [id number]  Admission Date: 11/28/2019  Hospital Length of Stay: 3 days  Discharge Date and Time:  12/01/2019 11:43 AM  Attending Physician: Chicho Fisher MD   Discharging Provider: Chicho Fisher MD  Primary Care Provider: Kari Gaspar MD      HPI:   84-year-old white female with a history of COPD, chronic hypoxemia requiring home oxygen and trilogy presents with increasing shortness of breath, coughing, wheezing.   states that patient has been getting progressively worse prior to arrival over the past week.  She usually requires 2 L oxygen per nasal cannula.  She present to the emergency room with 84% pulse ox.  Numerous neb treatments given.  Patient's oxygen level improved.  Patient also has type 2 diabetes requiring insulin, hypertension, chronic atrial fibrillation.  She is on Xarelto for the atrial fibrillation and she is now anemic.  She has required blood transfusions in the past.    * No surgery found *      Hospital Course:   11/29/19 admitted yesterday with COPD exacerbation, anemia s/p 1 UPRBCs, Xarelto on hold; await cardiology consult will not see until Monday   H&H 7.9/ 24.6,  BP stable , HR down to 48 over night - converted to NSR - sinus litzy   CXR negative for acute findings, O2 sat 89-94% on 4LNC; dropped to 89% during significant coughing episode  uses 2L NC at home  Getting solumedrol 125mg IV  q 8 , glucose 163, 266, 290, getting Detemir 10units, aspart 3 units tid; will increase Detemir to 15 units   Pt c/o difficulty getting mucous up and severe coughing , using trilogy currently - very compliant     11/30/2019 patient received a 2nd unit of blood yesterday.  She developed significant shortness of breath.  IV Lasix was given which resolved the problem quickly.  This morning she is feeling much better.  Her oxygen saturations are stable.  She is using her trilogy  and nasal cannula.  She started to eat a little better.  She still has significant dyspnea on exertion.  She is almost back to baseline.  Her Xarelto is still being held.  She is still on steroids, IV antibiotics, DuoNeb treatments.  Her blood sugars and blood pressure is stable.    12/1/2019  Patient feeling much better.  Still has cough and wheeze but much better.  Wants to go home.     Consults:   Consults (From admission, onward)        Status Ordering Provider     Inpatient consult to [facility]iology[facility]  Once     Provider:  Mao Metcalf MD    Acknowledged AMBROSE HAMILTON          * COPD with acute exacerbation  Discharge home today  Continue DuoNeb treatments 4 times daily  Continue using trilogy  Continue Trelegy Ingaler daily  Continue home oxygen  Prednisone 40 mg daily for 4 days, 30 mg daily for 3 days, 20 mg daily after that  Continue acapella, mucinex    Diabetes mellitus type 2, uncontrolled  Continue Levemir 15 units at night.    Monitor blood sugars twice daily      Anemia  Patient received 2 units of packed red blood cells.  Her hemoglobin is 9.0.  Repeat CBC prior to discharge.      Atrial fibrillation with rapid ventricular response  Continue Cardizem and metoprolol.  She goes in and out of AFib in normal sinus rhythm  Restart Eliquis 2.5 mg.  That only take it once daily.  Follow up with Cardiology to see if it should be increased      Chronic respiratory failure with hypoxia  Continue trilogy as much as possible      Dyslipidemia  Continue pravastatin      CKD (chronic kidney disease), stage III  Monitor renal function daily  BMP shows GFR of 56.  stable            Major depressive disorder with single episode, in full remission  Continue trazodone and Celexa      HTN (hypertension)  Continue amlodipine 5 mg daily  Continue diltiazem extended release 240 mg daily  Continue losartan 50 mg daily  Continue metoprolol 25 mg p.o. b.i.d.  Continue Lasix every other day as needed for  swelling        Final Active Diagnoses:    Diagnosis Date Noted POA    PRINCIPAL PROBLEM:  COPD with acute exacerbation [J44.1] 11/28/2019 Yes    Diabetes mellitus type 2, uncontrolled [E11.65] 09/02/2019 Yes    Anemia [D64.9] 09/01/2019 Yes    Atrial fibrillation with rapid ventricular response [I48.91] 11/16/2018 Yes    Chronic respiratory failure with hypoxia [J96.11] 05/17/2017 Yes    Dyslipidemia [E78.5] 01/03/2017 Yes    CKD (chronic kidney disease), stage III [N18.3] 05/10/2016 Yes    Major depressive disorder with single episode, in full remission [F32.5] 05/10/2016 Yes    HTN (hypertension) [I10] 06/19/2014 Yes      Problems Resolved During this Admission:       Discharged Condition: good    Disposition: Home or Self Care    Follow Up:    Patient Instructions:   No discharge procedures on file.    Significant Diagnostic Studies: Labs:   CMP   Recent Labs   Lab 11/30/19  0602 12/01/19  0602    137   K 3.8 4.0   CL 99 99   CO2 29 30*   * 183*   BUN 26* 26*   CREATININE 1.0 1.0   CALCIUM 8.4* 8.3*   ANIONGAP 9 8   ESTGFRAFRICA >60 >60   EGFRNONAA 53* 53*   , CBC   Recent Labs   Lab 11/30/19  0602 12/01/19  0602   WBC 19.07* 17.33*   HGB 9.0* 8.8*   HCT 28.2* 27.5*    171    and A1C:   Recent Labs   Lab 09/03/19  0622   HGBA1C 7.0*       Pending Diagnostic Studies:     None         Medications:  Reconciled Home Medications:      Medication List      START taking these medications    azithromycin 250 MG tablet  Commonly known as:  Z-HELADIO  Take 1 tablet (250 mg total) by mouth once daily.        CHANGE how you take these medications    apixaban 2.5 mg Tab  Commonly known as:  ELIQUIS  Take 1 tablet (2.5 mg total) by mouth once daily.  What changed:  when to take this     baclofen 10 MG tablet  Commonly known as:  LIORESAL  Take 1 tablet (10 mg total) by mouth 3 (three) times daily.  What changed:    · when to take this  · reasons to take this     citalopram 20 MG tablet  Commonly known  "as:  CELEXA  Take 1 tablet (20 mg total) by mouth once daily.  Start taking on:  December 2, 2019  What changed:    · medication strength  · how much to take     insulin detemir U-100 100 unit/mL (3 mL) Inpn pen  Commonly known as:  LEVEMIR FLEXTOUCH  Inject 15 Units into the skin every evening.  What changed:  how much to take     metoprolol tartrate 25 MG tablet  Commonly known as:  LOPRESSOR  Take 1 tablet (25 mg total) by mouth 2 (two) times daily.  What changed:    · how much to take  · how to take this  · when to take this     predniSONE 10 MG tablet  Commonly known as:  DELTASONE  Take 4 po q day x 4 days, then 3 po q day x 3 days, then 20 mg  What changed:  additional instructions        CONTINUE taking these medications    albuterol 2.5 mg /3 mL (0.083 %) nebulizer solution  Commonly known as:  PROVENTIL  Take 3 mLs (2.5 mg total) by nebulization every 6 (six) hours as needed for Wheezing.     amLODIPine 5 MG tablet  Commonly known as:  NORVASC  TAKE 1 TABLET BY MOUTH ONCE DAILY ( DOSE DECREASE )     BD Ultra-Fine Short Pen Needle 31 gauge x 5/16" Ndle  Generic drug:  pen needle, diabetic  Use to inject once daily with Levemir     benzonatate 200 MG capsule  Commonly known as:  TESSALON  TAKE 1 CAPSULE (200 MG TOTAL) BY MOUTH 3 (THREE) TIMES DAILY AS NEEDED.     blood sugar diagnostic Strp  Test BG twice a day Accu-Chek Celia plus  DM 2  E 11.9  Dr Gaspar     colchicine 0.6 mg tablet  Commonly known as:  COLCRYS  Take 1 tablet (0.6 mg total) by mouth 2 (two) times daily. for 7 days     diltiaZEM 240 MG 24 hr capsule  Commonly known as:  CARDIZEM CD  Take 1 capsule (240 mg total) by mouth once daily.     fluticasone-umeclidin-vilanter 100-62.5-25 mcg Dsdv  Commonly known as:  Trelegy Ellipta  Inhale 1 puff into the lungs once daily.     furosemide 20 MG tablet  Commonly known as:  LASIX  Take 20 mg by mouth every other day.     ipratropium 0.02 % nebulizer solution  Commonly known as:  ATROVENT  Take 500 " mcg by nebulization 4 (four) times daily.     lancets 33 gauge Misc  Test BG twice a day Accu-Chek Celia plus  DM 2  E 11.9  Dr Gaspar     losartan 100 MG tablet  Commonly known as:  COZAAR  TAKE 1/2 TABLET EVERY DAY     magnesium oxide 400 mg (241.3 mg magnesium) tablet  Commonly known as:  MAG-OX  Take 1 tablet by mouth 2 (two) times daily.     mupirocin 2 % ointment  Commonly known as:  BACTROBAN  Apply topically 3 (three) times daily.     pravastatin 40 MG tablet  Commonly known as:  PRAVACHOL  TAKE 1 TABLET EVERY DAY     rOPINIRole 1 MG tablet  Commonly known as:  REQUIP  Take 1 tablet (1 mg total) by mouth every evening.     traZODone 50 MG tablet  Commonly known as:  DESYREL  TAKE 1 TABLET EVERY EVENING     * True Metrix Glucose Meter Misc  Generic drug:  blood-glucose meter  Use as directed     * blood-glucose meter kit  Test BG twice a day Accu-Chek Celia plus  DM 2  E 11.9  Dr Gaspar     Vitamin D3 2,000 unit Tab  Generic drug:  cholecalciferol (vitamin D3)  Take 2,000 Units by mouth once daily.         * This list has 2 medication(s) that are the same as other medications prescribed for you. Read the directions carefully, and ask your doctor or other care provider to review them with you.                Indwelling Lines/Drains at time of discharge:   Lines/Drains/Airways     None                 Time spent on the discharge of patient: 30 minutes  Patient was seen and examined on the date of discharge and determined to be suitable for discharge.         Chicho Fisher MD  Department of Hospital Medicine  [facility]

## 2019-12-01 NOTE — PROGRESS NOTES
[facility] Medicine  Progress Note    Patient Name: Chantell Herrera  MRN: [id number]  Patient Class: IP- Inpatient   Admission Date: 11/28/2019  Length of Stay: 3 days  Attending Physician: Chicho Fisher MD  Primary Care Provider: Kari Gaspar MD        Subjective:     Principal Problem:COPD with acute exacerbation        HPI:  84-year-old white female with a history of COPD, chronic hypoxemia requiring home oxygen and trilogy presents with increasing shortness of breath, coughing, wheezing.   states that patient has been getting progressively worse prior to arrival over the past week.  She usually requires 2 L oxygen per nasal cannula.  She present to the emergency room with 84% pulse ox.  Numerous neb treatments given.  Patient's oxygen level improved.  Patient also has type 2 diabetes requiring insulin, hypertension, chronic atrial fibrillation.  She is on Xarelto for the atrial fibrillation and she is now anemic.  She has required blood transfusions in the past.    Overview/Hospital Course:  11/29/19 admitted yesterday with COPD exacerbation, anemia s/p 1 UPRBCs, Xarelto on hold; await cardiology consult will not see until Monday   H&H 7.9/ 24.6,  BP stable , HR down to 48 over night - converted to NSR - sinus litzy   CXR negative for acute findings, O2 sat 89-94% on 4LNC; dropped to 89% during significant coughing episode  uses 2L NC at home  Getting solumedrol 125mg IV  q 8 , glucose 163, 266, 290, getting Detemir 10units, aspart 3 units tid; will increase Detemir to 15 units   Pt c/o difficulty getting mucous up and severe coughing , using trilogy currently - very compliant     11/30/2019 patient received a 2nd unit of blood yesterday.  She developed significant shortness of breath.  IV Lasix was given which resolved the problem quickly.  This morning she is feeling much better.  Her oxygen saturations are stable.  She is using her trilogy and nasal cannula.  She  started to eat a little better.  She still has significant dyspnea on exertion.  She is almost back to baseline.  Her Xarelto is still being held.  She is still on steroids, IV antibiotics, DuoNeb treatments.  Her blood sugars and blood pressure is stable.    12/1/2019  Patient feeling much better.  Still has cough and wheeze but much better.  Wants to go home.    Past Medical History:   Diagnosis Date    Acute on chronic congestive heart failure 2/1/2019    Anxiety     Arthritis     Asthma     Atrial fibrillation with rapid ventricular response     CHF (congestive heart failure) 11/29/2018    Chronic bronchitis     COPD (chronic obstructive pulmonary disease)     Depression     Diabetes mellitus, type 2     SANTIAGO (dyspnea on exertion)     Emphysema of lung     Fatigue     Hyperlipidemia     Hypertension     Symptomatic anemia 1/22/2019    Trouble in sleeping        Past Surgical History:   Procedure Laterality Date    APPENDECTOMY      EYE SURGERY      HYSTERECTOMY      TONSILLECTOMY         Review of patient's allergies indicates:  No Known Allergies    No current facility-administered medications on file prior to encounter.      Current Outpatient Medications on File Prior to Encounter   Medication Sig    albuterol (PROVENTIL) 2.5 mg /3 mL (0.083 %) nebulizer solution Take 3 mLs (2.5 mg total) by nebulization every 6 (six) hours as needed for Wheezing.    amLODIPine (NORVASC) 5 MG tablet TAKE 1 TABLET BY MOUTH ONCE DAILY ( DOSE DECREASE )    apixaban (ELIQUIS) 2.5 mg Tab Take 1 tablet (2.5 mg total) by mouth 2 (two) times daily.    baclofen (LIORESAL) 10 MG tablet Take 1 tablet (10 mg total) by mouth 3 (three) times daily. (Patient taking differently: Take 10 mg by mouth 3 (three) times daily as needed. )    benzonatate (TESSALON) 200 MG capsule TAKE 1 CAPSULE (200 MG TOTAL) BY MOUTH 3 (THREE) TIMES DAILY AS NEEDED.    cholecalciferol, vitamin D3, (VITAMIN D3) 2,000 unit Tab Take 2,000  "Units by mouth once daily.    citalopram (CELEXA) 40 MG tablet TAKE 1 TABLET EVERY DAY (Patient taking differently: 20 mg. TAKE 1 TABLET EVERY DAY)    diltiaZEM (CARDIZEM CD) 240 MG 24 hr capsule Take 1 capsule (240 mg total) by mouth once daily.    losartan (COZAAR) 100 MG tablet TAKE 1/2 TABLET EVERY DAY    magnesium oxide (MAG-OX) 400 mg (241.3 mg magnesium) tablet Take 1 tablet by mouth 2 (two) times daily.    metoprolol tartrate (LOPRESSOR) 25 MG tablet     mupirocin (BACTROBAN) 2 % ointment Apply topically 3 (three) times daily.    pravastatin (PRAVACHOL) 40 MG tablet TAKE 1 TABLET EVERY DAY    predniSONE (DELTASONE) 10 MG tablet Take 4 tablets (40 mg) by mouth for 4 days, then 3 tablets (30 mg) by mouth for 3 days then resume 2 tablets (20 mg) by mouth daily until follow up appointment with primary care doctor.    rOPINIRole (REQUIP) 1 MG tablet Take 1 tablet (1 mg total) by mouth every evening.    traZODone (DESYREL) 50 MG tablet TAKE 1 TABLET EVERY EVENING    blood sugar diagnostic Strp Test BG twice a day Accu-Chek Celia plus  DM 2  E 11.9  Dr Gaspar    blood-glucose meter kit Test BG twice a day Accu-Chek Celia plus  DM 2  E 11.9  Dr Gaspar    blood-glucose meter Misc Use as directed    colchicine (COLCRYS) 0.6 mg tablet Take 1 tablet (0.6 mg total) by mouth 2 (two) times daily. for 7 days    fluticasone-umeclidin-vilanter (TRELEGY ELLIPTA) 100-62.5-25 mcg DsDv Inhale 1 puff into the lungs once daily.    furosemide (LASIX) 20 MG tablet Take 20 mg by mouth every other day.     insulin detemir U-100 (LEVEMIR FLEXTOUCH U-100 INSULN) 100 unit/mL (3 mL) SubQ InPn pen Inject 10 Units into the skin every evening.    ipratropium (ATROVENT) 0.02 % nebulizer solution Take 500 mcg by nebulization 4 (four) times daily.     lancets 33 gauge Misc Test BG twice a day Accu-Chek Celia plus  DM 2  E 11.9  Dr Gaspar    pen needle, diabetic 31 gauge x 5/16" Ndle Use to inject once daily with Levemir "     Family History     Problem Relation (Age of Onset)    COPD Brother, Daughter    Cancer Sister    Diabetes Father    Heart disease Mother    Hypertension Mother, Father, Brother    Kidney disease Son    No Known Problems Daughter        Tobacco Use    Smoking status: Former Smoker     Last attempt to quit: 8/3/2000     Years since quittin.3    Smokeless tobacco: Never Used   Substance and Sexual Activity    Alcohol use: No    Drug use: No    Sexual activity: Never     Review of Systems   Constitutional: Negative for activity change, chills, fatigue, fever and unexpected weight change.   HENT: Negative for congestion, postnasal drip, rhinorrhea, sore throat and trouble swallowing.    Respiratory: Positive for cough, shortness of breath and wheezing. Negative for chest tightness.         Improved   Cardiovascular: Negative for chest pain and leg swelling.   Gastrointestinal: Negative for abdominal pain.   Endocrine: Negative for cold intolerance and heat intolerance.   Genitourinary: Negative for difficulty urinating.   Musculoskeletal: Negative for back pain and joint swelling.   Skin: Negative for rash.   Neurological: Negative for numbness.   Hematological: Negative for adenopathy.   Psychiatric/Behavioral: Negative for decreased concentration.     Objective:     Vital Signs (Most Recent):  Temp: 98.9 °F (37.2 °C) (19 0718)  Pulse: 75 (19 1007)  Resp: (!) 22 (19 0834)  BP: 139/63 (19 0718)  SpO2: (!) 91 % (19 0834) Vital Signs (24h Range):  Temp:  [96.7 °F (35.9 °C)-98.9 °F (37.2 °C)] 98.9 °F (37.2 °C)  Pulse:  [61-93] 75  Resp:  [17-22] 22  SpO2:  [88 %-93 %] 91 %  BP: (108-141)/(54-68) 139/63     Weight: 60.8 kg (134 lb)  Body mass index is 24.51 kg/m².    Physical Exam   Constitutional: She is oriented to person, place, and time.   mildly tachypneic   HENT:   Head: Normocephalic.   Nose: Nose normal.   Mouth/Throat: Oropharynx is clear and moist.   Eyes: Pupils are  equal, round, and reactive to light. EOM are normal. Right eye exhibits no discharge.   Neck: Normal range of motion. No JVD present. No thyromegaly present.   Cardiovascular: Normal rate, regular rhythm, normal heart sounds and intact distal pulses. Exam reveals no gallop and no friction rub.   No murmur heard.  Pulmonary/Chest: Effort normal and breath sounds normal. She has no wheezes. She has no rales.   Expiratory ronchi   Abdominal: Soft. There is no tenderness.   Musculoskeletal: Normal range of motion. She exhibits no edema or tenderness.   Lymphadenopathy:     She has no cervical adenopathy.   Neurological: She is alert and oriented to person, place, and time. No cranial nerve deficit.   Skin: Skin is dry.   Psychiatric: She has a normal mood and affect. Her behavior is normal. Judgment and thought content normal.   Vitals reviewed.        CRANIAL NERVES     CN III, IV, VI   Pupils are equal, round, and reactive to light.  Extraocular motions are normal.        Significant Labs:   BMP:   Recent Labs   Lab 12/01/19  0602   *      K 4.0   CL 99   CO2 30*   BUN 26*   CREATININE 1.0   CALCIUM 8.3*     CBC:   Recent Labs   Lab 11/30/19  0602   WBC 19.07*   HGB 9.0*   HCT 28.2*        Urine Studies:   No results for input(s): COLORU, APPEARANCEUA, PHUR, SPECGRAV, PROTEINUA, GLUCUA, KETONESU, BILIRUBINUA, OCCULTUA, NITRITE, UROBILINOGEN, LEUKOCYTESUR, RBCUA, WBCUA, BACTERIA, SQUAMEPITHEL, HYALINECASTS in the last 48 hours.    Invalid input(s): VIDAL    Significant Imaging: CXR: I have reviewed all pertinent results/findings within the past 24 hours and my personal findings are:  COPD changes, no infiltrates  I have reviewed all pertinent imaging results/findings within the past 24 hours.  I have reviewed and interpreted all pertinent imaging results/findings within the past 24 hours.      Assessment/Plan:      * COPD with acute exacerbation  Discharge home today  Continue DuoNeb treatments  4 times daily  Continue using trilogy  Continue Trelegy Ingaler daily  Continue home oxygen  Prednisone 40 mg daily for 4 days, 30 mg daily for 3 days, 20 mg daily after that  Continue acapella, mucinex    Diabetes mellitus type 2, uncontrolled  Continue Levemir at night.        Anemia  Patient received 2 units of packed red blood cells.  Her hemoglobin is 9.0.  Repeat CBC prior to discharge.      Atrial fibrillation with rapid ventricular response  Continue Cardizem and metoprolol.  She goes in and out of AFib in normal sinus rhythm  Hold Xarelto this week.  Follow up with Cardiology to see if it should be continued.      Chronic respiratory failure with hypoxia  Continue trilogy as much as possible      Dyslipidemia  Continue pravastatin      CKD (chronic kidney disease), stage III  Monitor renal function daily  BMP shows GFR of 56.  stable            HTN (hypertension)  Continue amlodipine 5 mg daily  Continue diltiazem extended release 240 mg daily  Continue losartan 50 mg daily  Continue metoprolol 25 mg p.o. b.i.d.  Continue Lasix every other day as needed for swelling        VTE Risk Mitigation (From admission, onward)         Ordered     IP VTE HIGH RISK PATIENT  Once      11/28/19 1606     Place sequential compression device  Until discontinued      11/28/19 1606                      Chicho Fisher MD  Department of Hospital Medicine   [facility]

## 2019-12-01 NOTE — ASSESSMENT & PLAN NOTE
Patient received 2 units of packed red blood cells.  Her hemoglobin is 9.0.  Repeat CBC prior to discharge.

## 2019-12-01 NOTE — PLAN OF CARE
Patient admitted with COPD exacerbation. VSS. Afebrile. Patient on 3L Nasal Cannula oxygen.Patient refused SCDs for VTE prophylaxis. Patient also wears CPAP from home. Patient Sinus Rhythm on telemetry. Patient also uses trilogy. Breathing treatments Q4H. IV solumedrol Q8H. Cardiology was already consulted and will see her Monday if she is still here. Patient ambulates. Call light in reach. Bed locked and low. Plan of care reviewed with patient. Patient verbalized understanding

## 2019-12-02 ENCOUNTER — PATIENT OUTREACH (OUTPATIENT)
Dept: ADMINISTRATIVE | Facility: CLINIC | Age: 84
End: 2019-12-02

## 2019-12-02 NOTE — PLAN OF CARE
12/02/19 0726   Final Note   Assessment Type Final Discharge Note   Anticipated Discharge Disposition Home   What phone number can be called within the next 1-3 days to see how you are doing after discharge? [phone]   Hospital Follow Up  Appt(s) scheduled? Yes   Discharge plans and expectations educations in teach back method with documentation complete? Yes       No post-acute care needs identified during this hospital stay.     Daniela Castro LMSW

## 2019-12-02 NOTE — PLAN OF CARE
12/02/19 0725   Medicare Message   Important Message from Medicare regarding Discharge Appeal Rights Given to patient/caregiver;Explained to patient/caregiver;Signed/date by patient/caregiver   Date IMM was signed 11/30/19   Time IMM was signed 0835       Signed for discharge.     Daniela Castro LMSW

## 2019-12-02 NOTE — PLAN OF CARE
12/02/19 0725   Post-Acute Status   Post-Acute Authorization Other   Other Status No Post-Acute Service Needs   Discharge Delays None known at this time

## 2019-12-03 ENCOUNTER — HOSPITAL ENCOUNTER (OUTPATIENT)
Facility: HOSPITAL | Age: 84
Discharge: HOME | End: 2019-12-03
Attending: INTERNAL MEDICINE | Admitting: INTERNAL MEDICINE
Payer: MEDICARE

## 2019-12-03 VITALS
OXYGEN SATURATION: 85 % | DIASTOLIC BLOOD PRESSURE: 89 MMHG | SYSTOLIC BLOOD PRESSURE: 147 MMHG | RESPIRATION RATE: 20 BRPM | HEART RATE: 157 BPM | TEMPERATURE: 97 F

## 2019-12-03 DIAGNOSIS — R04.2 COUGH WITH HEMOPTYSIS: ICD-10-CM

## 2019-12-03 DIAGNOSIS — Z99.81 OXYGEN DEPENDENT: ICD-10-CM

## 2019-12-03 DIAGNOSIS — J41.0 SIMPLE CHRONIC BRONCHITIS: Primary | ICD-10-CM

## 2019-12-03 LAB
ACID FAST MOD KINY STN SPEC: NORMAL
BACTERIA BLD CULT: NORMAL

## 2019-12-03 PROCEDURE — 87102 FUNGUS ISOLATION CULTURE: CPT | Mod: HCNC

## 2019-12-03 PROCEDURE — 87206 SMEAR FLUORESCENT/ACID STAI: CPT | Mod: HCNC

## 2019-12-03 PROCEDURE — 87206 SMEAR FLUORESCENT/ACID STAI: CPT | Mod: 91,HCNC

## 2019-12-03 PROCEDURE — 25000003 PHARM REV CODE 250: Mod: HCNC | Performed by: INTERNAL MEDICINE

## 2019-12-03 PROCEDURE — 87116 MYCOBACTERIA CULTURE: CPT | Mod: HCNC

## 2019-12-03 PROCEDURE — 87070 CULTURE OTHR SPECIMN AEROBIC: CPT | Mod: HCNC

## 2019-12-03 PROCEDURE — 31622 DX BRONCHOSCOPE/WASH: CPT | Mod: HCNC | Performed by: INTERNAL MEDICINE

## 2019-12-03 PROCEDURE — 27000404 HC TUBE ASPIRATING LUKEN 3-1/4IN: Mod: HCNC | Performed by: INTERNAL MEDICINE

## 2019-12-03 PROCEDURE — 88112 CYTOPATH CELL ENHANCE TECH: CPT | Mod: 26,HCNC,, | Performed by: PATHOLOGY

## 2019-12-03 PROCEDURE — 88305 TISSUE EXAM BY PATHOLOGIST: CPT | Mod: 26,HCNC,, | Performed by: PATHOLOGY

## 2019-12-03 PROCEDURE — 88112 CYTOPATH CELL ENHANCE TECH: CPT | Mod: HCNC | Performed by: PATHOLOGY

## 2019-12-03 PROCEDURE — 87205 SMEAR GRAM STAIN: CPT | Mod: HCNC

## 2019-12-03 PROCEDURE — 87106 FUNGI IDENTIFICATION YEAST: CPT | Mod: HCNC

## 2019-12-03 PROCEDURE — 87210 SMEAR WET MOUNT SALINE/INK: CPT | Mod: HCNC

## 2019-12-03 PROCEDURE — 63600175 PHARM REV CODE 636 W HCPCS: Mod: HCNC | Performed by: INTERNAL MEDICINE

## 2019-12-03 PROCEDURE — 87015 SPECIMEN INFECT AGNT CONCNTJ: CPT | Mod: HCNC

## 2019-12-03 PROCEDURE — 94640 AIRWAY INHALATION TREATMENT: CPT | Mod: HCNC

## 2019-12-03 PROCEDURE — 88112 PR  CYTOPATH, CELL ENHANCE TECH: ICD-10-PCS | Mod: 26,HCNC,, | Performed by: PATHOLOGY

## 2019-12-03 PROCEDURE — 88305 TISSUE EXAM BY PATHOLOGIST: ICD-10-PCS | Mod: 26,HCNC,, | Performed by: PATHOLOGY

## 2019-12-03 PROCEDURE — 88305 TISSUE EXAM BY PATHOLOGIST: CPT | Mod: HCNC | Performed by: PATHOLOGY

## 2019-12-03 RX ORDER — MIDAZOLAM HYDROCHLORIDE 1 MG/ML
2 INJECTION INTRAMUSCULAR; INTRAVENOUS
Status: DISCONTINUED | OUTPATIENT
Start: 2019-12-03 | End: 2019-12-03 | Stop reason: HOSPADM

## 2019-12-03 RX ORDER — LIDOCAINE HYDROCHLORIDE 20 MG/ML
JELLY TOPICAL
Status: DISCONTINUED | OUTPATIENT
Start: 2019-12-03 | End: 2019-12-03 | Stop reason: HOSPADM

## 2019-12-03 RX ORDER — SODIUM CHLORIDE 9 MG/ML
INJECTION, SOLUTION INTRAVENOUS CONTINUOUS
Status: DISCONTINUED | OUTPATIENT
Start: 2019-12-03 | End: 2019-12-03 | Stop reason: HOSPADM

## 2019-12-03 RX ORDER — FENTANYL CITRATE 50 UG/ML
INJECTION, SOLUTION INTRAMUSCULAR; INTRAVENOUS
Status: DISCONTINUED | OUTPATIENT
Start: 2019-12-03 | End: 2019-12-03 | Stop reason: HOSPADM

## 2019-12-03 RX ORDER — LIDOCAINE HYDROCHLORIDE 20 MG/ML
2 JELLY TOPICAL ONCE
Status: DISCONTINUED | OUTPATIENT
Start: 2019-12-03 | End: 2019-12-03 | Stop reason: HOSPADM

## 2019-12-03 RX ORDER — LIDOCAINE HYDROCHLORIDE 40 MG/ML
4 SOLUTION TOPICAL ONCE
Status: DISCONTINUED | OUTPATIENT
Start: 2019-12-03 | End: 2019-12-03 | Stop reason: HOSPADM

## 2019-12-03 RX ORDER — LIDOCAINE HYDROCHLORIDE 40 MG/ML
4 INJECTION, SOLUTION RETROBULBAR ONCE
Status: COMPLETED | OUTPATIENT
Start: 2019-12-03 | End: 2019-12-03

## 2019-12-03 RX ORDER — LIDOCAINE HYDROCHLORIDE 10 MG/ML
INJECTION, SOLUTION EPIDURAL; INFILTRATION; INTRACAUDAL; PERINEURAL
Status: DISCONTINUED | OUTPATIENT
Start: 2019-12-03 | End: 2019-12-03 | Stop reason: HOSPADM

## 2019-12-03 RX ORDER — MIDAZOLAM HYDROCHLORIDE 1 MG/ML
INJECTION, SOLUTION INTRAMUSCULAR; INTRAVENOUS
Status: DISCONTINUED | OUTPATIENT
Start: 2019-12-03 | End: 2019-12-03 | Stop reason: HOSPADM

## 2019-12-03 RX ORDER — LIDOCAINE HYDROCHLORIDE 20 MG/ML
2 JELLY TOPICAL
Status: DISCONTINUED | OUTPATIENT
Start: 2019-12-03 | End: 2019-12-03 | Stop reason: HOSPADM

## 2019-12-03 RX ORDER — FENTANYL CITRATE 50 UG/ML
25 INJECTION, SOLUTION INTRAMUSCULAR; INTRAVENOUS
Status: DISCONTINUED | OUTPATIENT
Start: 2019-12-03 | End: 2019-12-03 | Stop reason: HOSPADM

## 2019-12-03 RX ORDER — SODIUM CHLORIDE, SODIUM LACTATE, POTASSIUM CHLORIDE, CALCIUM CHLORIDE 600; 310; 30; 20 MG/100ML; MG/100ML; MG/100ML; MG/100ML
INJECTION, SOLUTION INTRAVENOUS CONTINUOUS
Status: DISCONTINUED | OUTPATIENT
Start: 2019-12-03 | End: 2019-12-03 | Stop reason: HOSPADM

## 2019-12-03 RX ADMIN — LIDOCAINE HYDROCHLORIDE 4 ML: 40 INJECTION, SOLUTION RETROBULBAR; TOPICAL at 07:12

## 2019-12-03 NOTE — DISCHARGE INSTRUCTIONS
Discharge Instructions for Bronchoscopy    Chest X-ray completed and MD notified.    ACTIVITY LEVEL:  If you received sedation or an anesthetic, you may feel sleepy for several hours. Do not drive, operate machinery, make critical decisions, or perform activities that require coordination or balance until tomorrow morning. Please have a responsible person stay with you for at least two (2) hours after you leave the hospital.     DIET:  Do not eat or drink anything until 0930. Once you can drink clear liquids without coughing, you can resume your regular diet.     WHAT you may expect over the next 24 hours:  · You may experience a low grade fever.  · You may cough up streaks of blood.  · Take Tylenol as directed for comfort/fever.    COME TO THE EMERGENCY DEPARTMENT IF:  · You cough up more than one (1) tablespoon of blood.  · You have fever over 101F (38.4C) for more than one evening.  · You experience shortness of breath that is of new onset, or that is increased from your usual baseline.  · You experience chest pain.  · You have chills.

## 2019-12-03 NOTE — H&P
Chantell Herrera is a 84 y.o. year old female that's presents with a chief complaint of SOB and Wheezing and coughing up blood  for several  Days.She states that she has had a progressive cough productive of blood . History of the same . Consulted to evaluate Respiratory status.    Past Medical History:   Diagnosis Date    Acute on chronic congestive heart failure 2/1/2019    Anxiety     Arthritis     Asthma     Atrial fibrillation with rapid ventricular response     CHF (congestive heart failure) 11/29/2018    Chronic bronchitis     COPD (chronic obstructive pulmonary disease)     Depression     Diabetes mellitus, type 2     SANTIAGO (dyspnea on exertion)     Emphysema of lung     Fatigue     Hyperlipidemia     Hypertension     Symptomatic anemia 1/22/2019    Trouble in sleeping         Past Surgical History:   Procedure Laterality Date    APPENDECTOMY      EYE SURGERY      HYSTERECTOMY      TONSILLECTOMY         Prior to Admission medications    Medication Sig Start Date End Date Taking? Authorizing Provider   albuterol (PROVENTIL) 2.5 mg /3 mL (0.083 %) nebulizer solution Take 3 mLs (2.5 mg total) by nebulization every 6 (six) hours as needed for Wheezing. 12/1/19  Yes Chicho Fisher MD   amLODIPine (NORVASC) 5 MG tablet TAKE 1 TABLET BY MOUTH ONCE DAILY ( DOSE DECREASE ) 10/31/19  Yes Historical Provider, MD   apixaban (ELIQUIS) 2.5 mg Tab Take 1 tablet (2.5 mg total) by mouth once daily. 12/1/19  Yes Chicho Fisher MD   benzonatate (TESSALON) 200 MG capsule TAKE 1 CAPSULE (200 MG TOTAL) BY MOUTH 3 (THREE) TIMES DAILY AS NEEDED. 11/24/19  Yes Anson Leiva MD   blood sugar diagnostic Strp Test BG twice a day Accu-Chek Celia plus  DM 2  E 11.9  Dr Gaspar 10/17/19  Yes Kari Gaspar MD   blood-glucose meter kit Test BG twice a day Accu-Chek Celia plus  DM 2  E 11.9  Dr Gaspar 10/17/19 10/16/20 Yes Kari Gaspar MD   blood-glucose meter Misc Use as directed 9/4/19  Yes  "Carrol Funes, DEREK   cholecalciferol, vitamin D3, (VITAMIN D3) 2,000 unit Tab Take 2,000 Units by mouth once daily.   Yes Historical Provider, MD   citalopram (CELEXA) 20 MG tablet Take 1 tablet (20 mg total) by mouth once daily. 12/2/19 12/1/20 Yes Chicho Fisher MD   diltiaZEM (CARDIZEM CD) 240 MG 24 hr capsule Take 1 capsule (240 mg total) by mouth once daily. 5/24/19 5/23/20 Yes Kari Gaspar MD   fluticasone-umeclidin-vilanter (TRELEGY ELLIPTA) 100-62.5-25 mcg DsDv Inhale 1 puff into the lungs once daily. 2/27/19  Yes Kari Gaspar MD   furosemide (LASIX) 20 MG tablet Take 20 mg by mouth every other day.    Yes Historical Provider, MD   insulin detemir U-100 (LEVEMIR FLEXTOUCH) 100 unit/mL (3 mL) SubQ InPn pen Inject 15 Units into the skin every evening. 12/1/19 11/30/20 Yes Chicho Fisher MD   ipratropium (ATROVENT) 0.02 % nebulizer solution Take 500 mcg by nebulization 4 (four) times daily.  3/18/16  Yes Historical Provider, MD   lancets 33 gauge Misc Test BG twice a day Accu-Chek Celia plus  DM 2  E 11.9  Dr Gaspar 10/17/19  Yes Kari Gaspar MD   losartan (COZAAR) 100 MG tablet TAKE 1/2 TABLET EVERY DAY 7/9/19  Yes Kari Gaspar MD   magnesium oxide (MAG-OX) 400 mg (241.3 mg magnesium) tablet Take 1 tablet by mouth 2 (two) times daily. 11/4/19  Yes Historical Provider, MD   metoprolol tartrate (LOPRESSOR) 25 MG tablet Take 1 tablet (25 mg total) by mouth 2 (two) times daily. 12/1/19 11/30/20 Yes Chicho Fisher MD   mupirocin (BACTROBAN) 2 % ointment Apply topically 3 (three) times daily. 5/28/19  Yes Radha I. Guillermo, NP   pen needle, diabetic 31 gauge x 5/16" Ndle Use to inject once daily with Levemir 9/4/19  Yes Carrol Funes NP   pravastatin (PRAVACHOL) 40 MG tablet TAKE 1 TABLET EVERY DAY 10/15/18  Yes Anson Leiva MD   predniSONE (DELTASONE) 10 MG tablet Take 4 po q day x 4 days, then 3 po q day x 3 days, then 20 mg 12/1/19  Yes Chicho Fisher MD "   rOPINIRole (REQUIP) 1 MG tablet Take 1 tablet (1 mg total) by mouth every evening. 3/26/19  Yes Kari Gaspar MD   traZODone (DESYREL) 50 MG tablet TAKE 1 TABLET EVERY EVENING 9/10/19  Yes Kari Gaspar MD   azithromycin (Z-HELADIO) 250 MG tablet Take 1 tablet (250 mg total) by mouth once daily. 19   Chicho Fisher MD   baclofen (LIORESAL) 10 MG tablet Take 1 tablet (10 mg total) by mouth 3 (three) times daily.  Patient taking differently: Take 10 mg by mouth 3 (three) times daily as needed.  18  Anson Leiva MD   colchicine (COLCRYS) 0.6 mg tablet Take 1 tablet (0.6 mg total) by mouth 2 (two) times daily. for 7 days 19  Kari Gaspar MD       Social History     Socioeconomic History    Marital status:      Spouse name: Not on file    Number of children: Not on file    Years of education: Not on file    Highest education level: Not on file   Occupational History    Not on file   Social Needs    Financial resource strain: Not on file    Food insecurity:     Worry: Not on file     Inability: Not on file    Transportation needs:     Medical: Not on file     Non-medical: Not on file   Tobacco Use    Smoking status: Former Smoker     Last attempt to quit: 8/3/2000     Years since quittin.3    Smokeless tobacco: Never Used   Substance and Sexual Activity    Alcohol use: No    Drug use: No    Sexual activity: Never   Lifestyle    Physical activity:     Days per week: Not on file     Minutes per session: Not on file    Stress: Not on file   Relationships    Social connections:     Talks on phone: Not on file     Gets together: Not on file     Attends Sikh service: Not on file     Active member of club or organization: Not on file     Attends meetings of clubs or organizations: Not on file     Relationship status: Not on file   Other Topics Concern    Not on file   Social History Narrative    Not on file       Family History   Problem  Relation Age of Onset    Heart disease Mother     Hypertension Mother     Hypertension Father     Diabetes Father     Cancer Sister     COPD Brother     Hypertension Brother     No Known Problems Daughter     Kidney disease Son     COPD Daughter        Review of patient's allergies indicates:  No Known Allergies Allergies have been reviewed.     ROS: Review of Systems   Constitutional: Negative for chills, fever and weight loss.   HENT: Negative for sore throat.    Eyes: Negative for double vision and photophobia.   Respiratory: Positive for cough, hemoptysis, sputum production and shortness of breath.    Cardiovascular: Positive for leg swelling (mild) and PND (occasional). Negative for palpitations.   Gastrointestinal: Negative for abdominal pain and diarrhea.   Genitourinary: Negative for dysuria and frequency.   Musculoskeletal: Positive for myalgias (generalized). Negative for back pain and neck pain.   Skin: Negative.    Neurological: Negative for dizziness, weakness and headaches.   Endo/Heme/Allergies: Does not bruise/bleed easily.   Psychiatric/Behavioral: Negative for memory loss. The patient has insomnia (intermittant ).        PE:   Vitals:    12/03/19 0647 12/03/19 0706   BP: 127/70    Pulse:  85   Resp:  18   SpO2:  98%    Physical Exam    Alert and orientated X 3   HEENT: Head: Normocephalic no trauma                Ears : Normal Pinna No Drainage no Battles sign                Eyes: Vision Unchanged, No conjunctivitis,No drainage                Neck: Supple, No JVD,No Abnormal Carotid Pulsations                Throat: No Erythema, No pus,No Swelling,Mallampati score= 3    Chest: course bs with wheezing and rhonchi on antibiotics Augmentin and Zithromax as well as steroids  Cardiac: RRR S1+ S2 with a -S3: +M = 2/6, No R/H/G  Abdomen: Bowel Sounds are Normal.Soft Abdomen. No organomegaly of Liver,Spleen,or Kidneys   CNS: Non focal and intact. Cranial nerves 2, 346,8,9,10 and 12 are  normal.Norrmal gait.Normal posture.  Extremities: No Clubbing,No Cyanosis with oxygen,Positive mild edema of lower extremities Bilateral  Skin: No Rash, No Ulcerative sores,and No cellulitis of the IV site.    Lab Results   Component Value Date    WBC 17.33 (H) 12/01/2019    HGB 8.8 (L) 12/01/2019    HCT 27.5 (L) 12/01/2019     12/01/2019    CHOL 147 05/28/2018    TRIG 75 05/28/2018    HDL 74 05/28/2018    ALT 34 11/29/2019    AST 19 11/29/2019     12/01/2019    K 4.0 12/01/2019    CL 99 12/01/2019    CREATININE 1.0 12/01/2019    BUN 26 (H) 12/01/2019    CO2 30 (H) 12/01/2019    TSH 0.361 (L) 03/22/2019    INR 1.1 09/01/2019    HGBA1C 7.0 (H) 09/03/2019     1) Hemoptysis  2) COPD  3)Chronic Respiratory Failure  4)HX/O Hemoptysis   5)Hx/o Smoking       1. Bronchoscopy today  2. Collect specimen    Review of patient's allergies indicates:  No Known Allergies Allergies have been reviewed.     Patient cleared for anesthesia (IV Conscious Sedation)ASA 4

## 2019-12-03 NOTE — OP NOTE
Brief Outpatient Discharge Note    Admit Date: 12/3/2019    Attending Physician: Emmanuel Hickman MD     Discharge Physician: Emmanuel Hickman MD    Allergies:  No Known Allergies       Final Diagnosis:  1) Hemoptysis  2) COPD  3)Chronic Respiratory Failure  4)HX/O Hemoptysis   5)Hx/o Smoking   6) No Endobronchial Lesion   7) Severe Bronchitis     ICD-10-CM ICD-9-CM   1. Cough with hemoptysis R04.2 786.39       Disposition: Home or Self Care    Patient Instructions:   Current Discharge Medication List      CONTINUE these medications which have NOT CHANGED    Details   albuterol (PROVENTIL) 2.5 mg /3 mL (0.083 %) nebulizer solution Take 3 mLs (2.5 mg total) by nebulization every 6 (six) hours as needed for Wheezing.  Qty: 300 mL, Refills: 3    Associated Diagnoses: Simple chronic bronchitis      amLODIPine (NORVASC) 5 MG tablet TAKE 1 TABLET BY MOUTH ONCE DAILY ( DOSE DECREASE )  Refills: 6      apixaban (ELIQUIS) 2.5 mg Tab Take 1 tablet (2.5 mg total) by mouth once daily.  Qty: 30 tablet, Refills: 0      benzonatate (TESSALON) 200 MG capsule TAKE 1 CAPSULE (200 MG TOTAL) BY MOUTH 3 (THREE) TIMES DAILY AS NEEDED.  Qty: 30 capsule, Refills: 0      blood sugar diagnostic Strp Test BG twice a day Accu-Chek Celia plus  DM 2  E 11.9  Dr Gaspar  Qty: 100 each, Refills: 5      blood-glucose meter kit Test BG twice a day Accu-Chek Celia plus  DM 2  E 11.9  Dr Gaspar  Qty: 1 each, Refills: 1      blood-glucose meter Misc Use as directed  Qty: 1 each, Refills: 0      cholecalciferol, vitamin D3, (VITAMIN D3) 2,000 unit Tab Take 2,000 Units by mouth once daily.      citalopram (CELEXA) 20 MG tablet Take 1 tablet (20 mg total) by mouth once daily.  Qty: 30 tablet, Refills: 11      diltiaZEM (CARDIZEM CD) 240 MG 24 hr capsule Take 1 capsule (240 mg total) by mouth once daily.  Qty: 30 capsule, Refills: 5      fluticasone-umeclidin-vilanter (TRELEGY ELLIPTA) 100-62.5-25 mcg DsDv Inhale 1 puff into the lungs once daily.  Qty: 30 each,  "Refills: 5    Associated Diagnoses: Chronic respiratory failure with hypoxia      furosemide (LASIX) 20 MG tablet Take 20 mg by mouth every other day.       insulin detemir U-100 (LEVEMIR FLEXTOUCH) 100 unit/mL (3 mL) SubQ InPn pen Inject 15 Units into the skin every evening.  Qty: 1 Box, Refills: 0      ipratropium (ATROVENT) 0.02 % nebulizer solution Take 500 mcg by nebulization 4 (four) times daily.       lancets 33 gauge Misc Test BG twice a day Accu-Chek Celia plus  DM 2  E 11.9  Dr Gaspar  Qty: 100 each, Refills: 5      losartan (COZAAR) 100 MG tablet TAKE 1/2 TABLET EVERY DAY  Qty: 30 tablet, Refills: 0    Associated Diagnoses: Essential tremor      magnesium oxide (MAG-OX) 400 mg (241.3 mg magnesium) tablet Take 1 tablet by mouth 2 (two) times daily.  Refills: 6      metoprolol tartrate (LOPRESSOR) 25 MG tablet Take 1 tablet (25 mg total) by mouth 2 (two) times daily.  Qty: 60 tablet, Refills: 11      mupirocin (BACTROBAN) 2 % ointment Apply topically 3 (three) times daily.  Qty: 15 g, Refills: 0      pen needle, diabetic 31 gauge x 5/16" Ndle Use to inject once daily with Levemir  Qty: 100 each, Refills: 0      pravastatin (PRAVACHOL) 40 MG tablet TAKE 1 TABLET EVERY DAY  Qty: 90 tablet, Refills: 1    Associated Diagnoses: Dyslipidemia      predniSONE (DELTASONE) 10 MG tablet Take 4 po q day x 4 days, then 3 po q day x 3 days, then 20 mg  Qty: 25 tablet, Refills: 0      rOPINIRole (REQUIP) 1 MG tablet Take 1 tablet (1 mg total) by mouth every evening.  Qty: 90 tablet, Refills: 5    Associated Diagnoses: RLS (restless legs syndrome)      traZODone (DESYREL) 50 MG tablet TAKE 1 TABLET EVERY EVENING  Qty: 90 tablet, Refills: 1      azithromycin (Z-HELADIO) 250 MG tablet Take 1 tablet (250 mg total) by mouth once daily.  Qty: 6 tablet, Refills: 0      baclofen (LIORESAL) 10 MG tablet Take 1 tablet (10 mg total) by mouth 3 (three) times daily.  Qty: 90 tablet, Refills: 1    Associated Diagnoses: Muscle spasm    "   colchicine (COLCRYS) 0.6 mg tablet Take 1 tablet (0.6 mg total) by mouth 2 (two) times daily. for 7 days  Qty: 20 tablet, Refills: 0    Associated Diagnoses: Gout of left wrist, unspecified cause, unspecified chronicity             Follow Up:  1 week    Discharge Condition:  Stable    Discharge Procedure Orders (must include Diet, Follow-up, Activity)  No discharge procedures on file.     Discharge Procedure Orders (must include Diet, Follow-up, Activity)  No discharge procedures on file.     Discharge Date: No discharge date for patient encounter.

## 2019-12-03 NOTE — OP NOTE
Operative Note       Surgery Date: 12/3/2019     Surgeon(s) and Role:     * Emmanuel Hickman MD - Primary    Pre-op Diagnosis:  Hemoptysis [R04.2]    Post-op Diagnosis:  No Endobronchial lesion     Procedure(s) (LRB):  BRONCHOSCOPY (N/A)    Anesthesia: RN IV Sedation    Procedure in Detail/Findings:  Chantell Herrera is a 84 y.o. female presents with hemoptysis.  With fluoroscopy guidance. ASA = 4. Via Right Nares  placed scope to the level of the vocal cords. Patient is an out- patient(in pt vs out pt).Patient airways: Mouth opening good/Head/neck /Extension good  Vocal move normally  Subglottic area is clear  Trachea moderate Secretions  Right Lung no endobronchial lesion.  Left Lung no endobronchial lesion.  There is severe-moderate Bronchial secretions  There is no mass in any lobe/segment.   none Blood loss.  No Transbronchial Biopsy .  Wash X's LLL of the LLL Lobe/Segment.  Patient tolerated the procedure well no complications.  Most likely coughing up blood is coming from the sinuses or Upper Respiratory area and  eliquise is causing bleeding from the inflamed areas and Bronchitis   Impression:  1) No Endobronchial Lesion   2) Hemoptysis  3) COPD  4)Chronic Respiratory Failure  5)HX/O Hemoptysis   6)Hx/o Smoking       ICD-10-CM ICD-9-CM   1. Cough with hemoptysis R04.2 786.39     Plan:  1) Await Pathology.  2) Continue Bronchodilators.  3) Await cultures.  4) Return to clinic next Monday at 10 am call [phone]           Specimens (From admission, onward)     Start     Ordered    12/03/19 0730  Cytology, Fluid/Wash/Brush  Once     Question Answer Comment   Source: Bronch wash    Clinical Information: abnormal ct chest    Specific Site: endobronchial biopsy    Other Requests: pathology        12/03/19 0729    12/03/19 0730  Cytology, Fluid/Wash/Brush  Once     Question Answer Comment   Source: Bronchial Brush    Clinical Information: abnormal ct chest    Specific Site: endobronchial lesion    Other Requests:  pathology        12/03/19 0729 12/03/19 0730  Specimen to Pathology, Surgery Pulmonary and Thoracic  Once     Question Answer Comment   Procedure Type: Pulmonary and Thoracic    Specimen Class: Known or suspected malignancy        12/03/19 0729              Implants: * No implants in log *           Disposition: PACU - hemodynamically stable.    Attestation:  I performed the procedure.

## 2019-12-04 LAB — KOH PREP SPEC: NORMAL

## 2019-12-05 DIAGNOSIS — E11.65 UNCONTROLLED TYPE 2 DIABETES MELLITUS WITH HYPERGLYCEMIA: Primary | ICD-10-CM

## 2019-12-05 RX ORDER — PEN NEEDLE, DIABETIC 30 GX3/16"
1 NEEDLE, DISPOSABLE MISCELLANEOUS DAILY
Qty: 100 EACH | Refills: 0 | Status: ON HOLD | OUTPATIENT
Start: 2019-12-05 | End: 2020-01-20 | Stop reason: HOSPADM

## 2019-12-05 NOTE — TELEPHONE ENCOUNTER
"----- Message from Rupal Contreras sent at 2019  1:42 PM CST -----  Contact: Self  Chantell Herrera  MRN: [id number]  : 1935  PCP: Kari Gaspar  Home Phone      [phone]  Work Phone      Not on file.  Mobile          [phone]      MESSAGE:   Pt requesting refill or new Rx.   Is this a refill or new RX: refill  RX name and strength:     insulin detemir U-100 (LEVEMIR FLEXTOUCH) 100 unit/mL (3 mL) SubQ InPn pen  pen needle, diabetic 31 gauge x " Ndle    Last office visit:   Is this a 30-day or 90-day RX:  30-Day  Pharmacy name and location:  [facility]   Comments:      Phone:  [phone]    "

## 2019-12-06 LAB
BACTERIA SPEC AEROBE CULT: NORMAL
FINAL PATHOLOGIC DIAGNOSIS: NORMAL
GRAM STN SPEC: NORMAL

## 2019-12-09 ENCOUNTER — OUTPATIENT CASE MANAGEMENT (OUTPATIENT)
Dept: ADMINISTRATIVE | Facility: OTHER | Age: 84
End: 2019-12-09

## 2019-12-09 NOTE — PROGRESS NOTES
Outpatient Care Management  Plan of Care Follow Up Visit    Patient: Chantell Herrera  MRN: [id number]  Date of Service: 12/9/2019  Completed by: Cathryn Aquino RN  Referral Date: 09/03/2019  Program: Case Management (High Risk)    Reason for Visit   Patient presents with    Update Plan Of Care       Brief Summary: Pt reports that she had an appt with Dr. Hickman this am. Reports that the test showed no cancer. Reports that she was sent home from the hospital with antibiotics and steroids. Reports that she feels better. No complaints of SOB at this time. Reports that she is compliant with her medications. Reports that she is feeling good at this time. Reports that her appetite is good. Educated pt on the risk factors of COPD.       Patient Summary     Involvement of Care:  Do I have permission to speak with other family members about your care?       Problem List     Patient Active Problem List   Diagnosis    HTN (hypertension)    ACE-inhibitor cough    Cholelithiases    COPD (chronic obstructive pulmonary disease)    Hyperlipidemia    Cervical radiculopathy    Anxiety state    Insomnia    Major depressive disorder with single episode, in full remission    Hyponatremia    CKD (chronic kidney disease), stage III    History of intraocular lens implant    Tortuous aorta    Essential tremor    Dyslipidemia    Excessive involuntary blinking    Oxygen dependent    Chronic respiratory failure with hypoxia    RLS (restless legs syndrome)    Elevated d-dimer    Epigastric pain    Atrial fibrillation with rapid ventricular response    Symptomatic anemia    Acute on chronic respiratory failure with hypoxia    Acute on chronic congestive heart failure    Anemia    Chronic CHF    Diabetes mellitus type 2, uncontrolled    Subconjunctival hemorrhage of right eye    COPD with acute exacerbation    Cough with hemoptysis       Reviewed Active Problem List with patient and/or Caregiver.    Patient  Reported Labs & Vitals:  1.  Any Patient Reported Labs & Vitals?     2.  Patient Reported Blood Pressure:     3.  Patient Reported Pulse:     4.  Patient Reported Weight (Kg):     5.  Patient Reported Blood Glucose (mg/dl):       Medical History:  Reviewed medical history with patient and/or caregiver    Social History:  Reviewed social history with patient and/or caregiver    Clinical Assessment     Reviewed and provided basic information on available community resources for mental health, transportation, wellness resources, and palliative care programs with patient and/or caregiver.    Complex Care Plan     Care plan was discussed and completed today with input from patient and/or caregiver.    Goals      Patient/caregiver will accept life style changes to manage and improve Respiratory Infection prior to discharge from OPCM. - Priority: High      Overall Time to Completion  2 months from 09/05/2019    OPCM Identified Patient Barriers:  Health Literacy: Care plan created      RN Identified Patient Barriers:        Short Term Goals  Patient/caregiver will verbalize 2 risk factors of Upper Respiratory Infection within 2 weeks.  Interventions   · Assess patient's ability to perform ADLs.  · Collaborate with Physician as appropriate to meet patient needs.  · Encourage Medication Compliance.  · Recognize and provide educational material (MONICA).     Status  · Met 9/19/19      Patient/caregiver will verbalize 2 signs and symptoms of Upper Respiratory Infection within 2 weeks.   Interventions   · Collaborate with Physician as appropriate to meet patient needs.  · Recognize and provide educational material (MONICA).     Status  Met    Patient/caregiver will verbalize 2 ways of preventing complications due to disease process within 3 weeks.  Interventions   · Collaborate with Physician as appropriate to meet patient needs.  · Encourage Medication Compliance.  · Recognize and provide educational material (MONICA).      Status  · Met 9/19/19             Patient/caregiver will have action plan in place to manage and prevent complications of COPD prior to discharge from OPC. - Priority: High      Overall Time to Completion  2 months from 09/05/2019     [facility] Identified Patient Barriers:  Health Literacy: Care plan created      RN Identified Patient Barriers:      Short Term Goals      Patient/caregiver will verbalize 2 risk factors of COPD within 2 weeks.  Interventions   · Collaborate with Physician as appropriate to meet patient needs.  · Encourage Medication Compliance.  · Facilitate referral to Pulmonary Rehab.  · Recognize and provide educational material (KRAMES).  ·      Status  · Met 12/19/19      Patient/caregiver will verbalize 2 signs and symptoms of COPD within 3 weeks.   Interventions   · Collaborate with Physician as appropriate to meet patient needs.  · Empower patient/caregiver to discuss treatment plan with Physician/care team.  · Encourage compliance with Physician follow-ups.  · Recognize and provide educational material (KRAMES).     Status              Met 11/5/19  Patient/caregiver will verbalize 2 strategies to DME usage, improve airway clearance , limit/reduce the risk of infections, proper breathing techniques and the use of rescue unhalers  within 3 weeks.  Interventions   · Assess for availability of working Oxygen Concentrator, Portable Oxygen, Nebulizer, CPAP, BIPAP in home setting.  · Collaborate with Physician as appropriate to meet patient needs.  · Empower patient/caregiver to discuss treatment plan with Physician/care team.  · Encourage compliance with Physician follow-ups.  · Facilitate referral to Pulmonary Rehab.  · Recognize and provide educational material (KRAMES).  ·      Status  Met 11/25/19    Patient/caregiver will verbalize 2 ways of preventing complications due to disease process within 3 weeks.  Interventions   · Collaborate with Oxygen DME agency for needed DME supplies.  · Collaborate  with Physician as appropriate to meet patient needs.  · Empower patient/caregiver to discuss treatment plan with Physician/care team.  · Encourage compliance with Physician follow-ups.  · Facilitate referral to Pulmonary Rehab.  · Recognize and provide educational material (MONICA).  ·      Status  Met 11/25/19    Patient/caregiver will verbalize safety measures for Oxygen usage within 3 weeks.  Interventions   · Assess for availability of working Oxygen Concentrator, Portable Oxygen, Nebulizer, CPAP, BIPAP in home setting.  · Collaborate with Oxygen DME agency for needed DME supplies.  · Recognize and provide educational material (MONICA).   Status  · Partially met                    Patient Instructions     Instructions were provided via the [employer] patient resources and are available for the patient to view on the patient portal.    Next Steps: Educate pt on the prevention of complications of COPD and case closure.     No follow-ups on file.      Todays OPCM Self-Management Care Plan was developed with the patients/caregivers input and was based on identified barriers from todays assessment.  Goals were written today with the patient/caregiver and the patient has agreed to work towards these goals to improve his/her overall well-being. Patient verbalized understanding of the care plan, goals, and all of today's instructions. Encouraged patient/caregiver to communicate with his/her physician and health care team about health conditions and the treatment plan.  Provided my contact information today and encouraged patient/caregiver to call me with any questions as needed.

## 2019-12-11 RX ORDER — BENZONATATE 200 MG/1
CAPSULE ORAL
Qty: 30 CAPSULE | Refills: 0 | Status: SHIPPED | OUTPATIENT
Start: 2019-12-11 | End: 2020-01-01

## 2019-12-15 ENCOUNTER — HOSPITAL ENCOUNTER (EMERGENCY)
Facility: HOSPITAL | Age: 84
Discharge: ANOTHER HOSPITAL | End: 2019-12-15
Attending: EMERGENCY MEDICINE
Payer: MEDICARE

## 2019-12-15 VITALS
RESPIRATION RATE: 24 BRPM | HEART RATE: 124 BPM | TEMPERATURE: 99 F | OXYGEN SATURATION: 95 % | SYSTOLIC BLOOD PRESSURE: 110 MMHG | DIASTOLIC BLOOD PRESSURE: 76 MMHG

## 2019-12-15 DIAGNOSIS — R79.89 ELEVATED TROPONIN: Primary | ICD-10-CM

## 2019-12-15 DIAGNOSIS — R06.02 SOB (SHORTNESS OF BREATH): ICD-10-CM

## 2019-12-15 DIAGNOSIS — R00.2 PALPITATIONS: ICD-10-CM

## 2019-12-15 LAB
ALBUMIN SERPL BCP-MCNC: 3.6 G/DL (ref 3.5–5.2)
ALLENS TEST: ABNORMAL
ALP SERPL-CCNC: 84 U/L (ref 55–135)
ALT SERPL W/O P-5'-P-CCNC: 15 U/L (ref 10–44)
ANION GAP SERPL CALC-SCNC: 14 MMOL/L (ref 8–16)
ANISOCYTOSIS BLD QL SMEAR: ABNORMAL
APTT BLDCRRT: 27.1 SEC (ref 21–32)
AST SERPL-CCNC: 11 U/L (ref 10–40)
BASOPHILS # BLD AUTO: ABNORMAL K/UL (ref 0–0.2)
BASOPHILS NFR BLD: 0 % (ref 0–1.9)
BILIRUB SERPL-MCNC: 0.8 MG/DL (ref 0.1–1)
BNP SERPL-MCNC: 183 PG/ML (ref 0–99)
BUN SERPL-MCNC: 18 MG/DL (ref 8–23)
CALCIUM SERPL-MCNC: 8.7 MG/DL (ref 8.7–10.5)
CHLORIDE SERPL-SCNC: 98 MMOL/L (ref 95–110)
CK MB SERPL-MCNC: 0.9 NG/ML (ref 0.1–6.5)
CK MB SERPL-RTO: 9 % (ref 0–5)
CK SERPL-CCNC: 10 U/L (ref 20–180)
CK SERPL-CCNC: 10 U/L (ref 20–180)
CO2 SERPL-SCNC: 27 MMOL/L (ref 23–29)
CREAT SERPL-MCNC: 0.8 MG/DL (ref 0.5–1.4)
D DIMER PPP IA.FEU-MCNC: 0.77 MG/L FEU
DELSYS: ABNORMAL
DIFFERENTIAL METHOD: ABNORMAL
EOSINOPHIL # BLD AUTO: ABNORMAL K/UL (ref 0–0.5)
EOSINOPHIL NFR BLD: 0 % (ref 0–8)
ERYTHROCYTE [DISTWIDTH] IN BLOOD BY AUTOMATED COUNT: ABNORMAL % (ref 11.5–14.5)
EST. GFR  (AFRICAN AMERICAN): >60 ML/MIN/1.73 M^2
EST. GFR  (NON AFRICAN AMERICAN): >60 ML/MIN/1.73 M^2
GLUCOSE SERPL-MCNC: 162 MG/DL (ref 70–110)
HCO3 UR-SCNC: 32 MMOL/L (ref 22–26)
HCT VFR BLD AUTO: 28.8 % (ref 37–48.5)
HGB BLD-MCNC: 9.3 G/DL (ref 12–16)
IMM GRANULOCYTES # BLD AUTO: ABNORMAL K/UL (ref 0–0.04)
IMM GRANULOCYTES NFR BLD AUTO: ABNORMAL % (ref 0–0.5)
INFLUENZA A, MOLECULAR: NEGATIVE
INFLUENZA B, MOLECULAR: NEGATIVE
INR PPP: 1.2 (ref 0.8–1.2)
LACTATE SERPL-SCNC: 1.9 MMOL/L (ref 0.5–2.2)
LYMPHOCYTES # BLD AUTO: ABNORMAL K/UL (ref 1–4.8)
LYMPHOCYTES NFR BLD: 8 % (ref 18–48)
MAGNESIUM SERPL-MCNC: 1.7 MG/DL (ref 1.6–2.6)
MCH RBC QN AUTO: 35.6 PG (ref 27–31)
MCHC RBC AUTO-ENTMCNC: 32.3 G/DL (ref 32–36)
MCV RBC AUTO: 110 FL (ref 82–98)
MONOCYTES # BLD AUTO: ABNORMAL K/UL (ref 0.3–1)
MONOCYTES NFR BLD: 1 % (ref 4–15)
MYELOCYTES NFR BLD MANUAL: 1 %
NEUTROPHILS # BLD AUTO: ABNORMAL K/UL (ref 1.8–7.7)
NEUTROPHILS NFR BLD: 85 % (ref 38–73)
NEUTS BAND NFR BLD MANUAL: 5 %
NRBC BLD-RTO: 0 /100 WBC
OVALOCYTES BLD QL SMEAR: ABNORMAL
PCO2 BLDA: 43 MMHG (ref 35–45)
PH SMN: 7.48 [PH] (ref 7.35–7.45)
PHOSPHATE SERPL-MCNC: 3.3 MG/DL (ref 2.7–4.5)
PLATELET # BLD AUTO: 169 K/UL (ref 150–350)
PLATELET BLD QL SMEAR: ABNORMAL
PMV BLD AUTO: 12.9 FL (ref 9.2–12.9)
PO2 BLDA: 74 MMHG (ref 75–100)
POC BE: 7.7 MMOL/L (ref -2–2)
POC COHB: 2.4 % (ref 0–3)
POC METHB: 1 % (ref 0–1.5)
POC O2HB ARTERIAL: 94.2 % (ref 94–100)
POC SATURATED O2: 97.5 % (ref 90–100)
POC TCO2: 33.3 MMOL/L
POC THB: 9.4 G/DL (ref 12–18)
POTASSIUM SERPL-SCNC: 4 MMOL/L (ref 3.5–5.1)
PROT SERPL-MCNC: 6.2 G/DL (ref 6–8.4)
PROTHROMBIN TIME: 12.6 SEC (ref 9–12.5)
RBC # BLD AUTO: 2.61 M/UL (ref 4–5.4)
SITE: ABNORMAL
SODIUM SERPL-SCNC: 139 MMOL/L (ref 136–145)
SPECIMEN SOURCE: NORMAL
TROPONIN I SERPL DL<=0.01 NG/ML-MCNC: 0.03 NG/ML (ref 0–0.03)
TSH SERPL DL<=0.005 MIU/L-ACNC: 0.56 UIU/ML (ref 0.4–4)
WBC # BLD AUTO: 11.57 K/UL (ref 3.9–12.7)

## 2019-12-15 PROCEDURE — 82553 CREATINE MB FRACTION: CPT | Mod: HCNC

## 2019-12-15 PROCEDURE — 93010 ELECTROCARDIOGRAM REPORT: CPT | Mod: HCNC,76,, | Performed by: INTERNAL MEDICINE

## 2019-12-15 PROCEDURE — 84100 ASSAY OF PHOSPHORUS: CPT | Mod: HCNC

## 2019-12-15 PROCEDURE — 85730 THROMBOPLASTIN TIME PARTIAL: CPT | Mod: HCNC

## 2019-12-15 PROCEDURE — 63600175 PHARM REV CODE 636 W HCPCS: Mod: HCNC | Performed by: SURGERY

## 2019-12-15 PROCEDURE — 82803 BLOOD GASES ANY COMBINATION: CPT | Mod: HCNC | Performed by: SURGERY

## 2019-12-15 PROCEDURE — 85379 FIBRIN DEGRADATION QUANT: CPT | Mod: HCNC

## 2019-12-15 PROCEDURE — 85007 BL SMEAR W/DIFF WBC COUNT: CPT | Mod: HCNC

## 2019-12-15 PROCEDURE — 99291 CRITICAL CARE FIRST HOUR: CPT | Mod: 25,HCNC

## 2019-12-15 PROCEDURE — 27000221 HC OXYGEN, UP TO 24 HOURS: Mod: HCNC

## 2019-12-15 PROCEDURE — 25000003 PHARM REV CODE 250: Mod: HCNC | Performed by: SURGERY

## 2019-12-15 PROCEDURE — 83605 ASSAY OF LACTIC ACID: CPT | Mod: HCNC

## 2019-12-15 PROCEDURE — 83735 ASSAY OF MAGNESIUM: CPT | Mod: HCNC

## 2019-12-15 PROCEDURE — 84443 ASSAY THYROID STIM HORMONE: CPT | Mod: HCNC

## 2019-12-15 PROCEDURE — 83880 ASSAY OF NATRIURETIC PEPTIDE: CPT | Mod: HCNC

## 2019-12-15 PROCEDURE — 87040 BLOOD CULTURE FOR BACTERIA: CPT | Mod: HCNC

## 2019-12-15 PROCEDURE — 85027 COMPLETE CBC AUTOMATED: CPT | Mod: HCNC

## 2019-12-15 PROCEDURE — 85610 PROTHROMBIN TIME: CPT | Mod: HCNC

## 2019-12-15 PROCEDURE — 82550 ASSAY OF CK (CPK): CPT | Mod: HCNC

## 2019-12-15 PROCEDURE — 80053 COMPREHEN METABOLIC PANEL: CPT | Mod: HCNC

## 2019-12-15 PROCEDURE — 96375 TX/PRO/DX INJ NEW DRUG ADDON: CPT | Mod: HCNC

## 2019-12-15 PROCEDURE — 87502 INFLUENZA DNA AMP PROBE: CPT | Mod: HCNC

## 2019-12-15 PROCEDURE — 93005 ELECTROCARDIOGRAM TRACING: CPT | Mod: HCNC

## 2019-12-15 PROCEDURE — 84484 ASSAY OF TROPONIN QUANT: CPT | Mod: HCNC

## 2019-12-15 PROCEDURE — 36600 WITHDRAWAL OF ARTERIAL BLOOD: CPT | Mod: HCNC

## 2019-12-15 PROCEDURE — 93010 EKG 12-LEAD: ICD-10-PCS | Mod: HCNC,76,, | Performed by: INTERNAL MEDICINE

## 2019-12-15 PROCEDURE — 96365 THER/PROPH/DIAG IV INF INIT: CPT | Mod: HCNC

## 2019-12-15 RX ORDER — ADENOSINE 3 MG/ML
6 INJECTION, SOLUTION INTRAVENOUS
Status: COMPLETED | OUTPATIENT
Start: 2019-12-15 | End: 2019-12-15

## 2019-12-15 RX ORDER — DILTIAZEM HCL 1 MG/ML
5 INJECTION, SOLUTION INTRAVENOUS
Status: COMPLETED | OUTPATIENT
Start: 2019-12-15 | End: 2019-12-15

## 2019-12-15 RX ORDER — DILTIAZEM HYDROCHLORIDE 5 MG/ML
10 INJECTION INTRAVENOUS
Status: COMPLETED | OUTPATIENT
Start: 2019-12-15 | End: 2019-12-15

## 2019-12-15 RX ORDER — LEVOFLOXACIN 5 MG/ML
750 INJECTION, SOLUTION INTRAVENOUS
Status: COMPLETED | OUTPATIENT
Start: 2019-12-15 | End: 2019-12-15

## 2019-12-15 RX ORDER — METHYLPREDNISOLONE SOD SUCC 125 MG
125 VIAL (EA) INJECTION
Status: COMPLETED | OUTPATIENT
Start: 2019-12-15 | End: 2019-12-15

## 2019-12-15 RX ORDER — PANTOPRAZOLE SODIUM 40 MG/1
40 TABLET, DELAYED RELEASE ORAL
Status: COMPLETED | OUTPATIENT
Start: 2019-12-15 | End: 2019-12-15

## 2019-12-15 RX ORDER — ASPIRIN 325 MG
325 TABLET ORAL
Status: COMPLETED | OUTPATIENT
Start: 2019-12-15 | End: 2019-12-15

## 2019-12-15 RX ORDER — ATORVASTATIN CALCIUM 40 MG/1
40 TABLET, FILM COATED ORAL
Status: COMPLETED | OUTPATIENT
Start: 2019-12-15 | End: 2019-12-15

## 2019-12-15 RX ORDER — ADENOSINE 3 MG/ML
12 INJECTION, SOLUTION INTRAVENOUS
Status: DISCONTINUED | OUTPATIENT
Start: 2019-12-15 | End: 2019-12-15

## 2019-12-15 RX ADMIN — METHYLPREDNISOLONE SODIUM SUCCINATE 125 MG: 125 INJECTION, POWDER, FOR SOLUTION INTRAMUSCULAR; INTRAVENOUS at 02:12

## 2019-12-15 RX ADMIN — ASPIRIN 325 MG ORAL TABLET 325 MG: 325 PILL ORAL at 12:12

## 2019-12-15 RX ADMIN — LEVOFLOXACIN 750 MG: 750 INJECTION, SOLUTION INTRAVENOUS at 02:12

## 2019-12-15 RX ADMIN — PANTOPRAZOLE SODIUM 40 MG: 40 TABLET, DELAYED RELEASE ORAL at 02:12

## 2019-12-15 RX ADMIN — NITROGLYCERIN 0.5 INCH: 20 OINTMENT TOPICAL at 02:12

## 2019-12-15 RX ADMIN — DILTIAZEM HYDROCHLORIDE 5 MG/HR: 5 INJECTION INTRAVENOUS at 12:12

## 2019-12-15 RX ADMIN — DILTIAZEM HYDROCHLORIDE 10 MG: 5 INJECTION INTRAVENOUS at 01:12

## 2019-12-15 RX ADMIN — ATORVASTATIN CALCIUM 40 MG: 40 TABLET, FILM COATED ORAL at 02:12

## 2019-12-15 RX ADMIN — ADENOSINE 6 MG: 3 INJECTION, SOLUTION INTRAVENOUS at 12:12

## 2019-12-15 NOTE — ED NOTES
Pt accepted to [facility] ED by Dr. Coates. ED to ED. Number for report [phone]. Transfer center reported they will set up transfer.

## 2019-12-15 NOTE — ED PROVIDER NOTES
[facility]                                                 Chief Complaint  84 y.o. female with Shortness of Breath    History of Present Illness  Chantell Herrera presents to the emergency room with shortness of breath today  Patient presents with shortness of breath, recent COPD admission to hospital  Patient has no obvious distress, low-grade temperature, mild wheezing noted now  Patient however has a heart rate inches the 150s with SVT on EKG this afternoon  Patient does have a history of cardiac disease including atrial fibrillation reported  Patient has been compliant with all medication including blood thinner Eliquis PTA  She denies any chest pain but feels tight, mild elevation in troponin noted today    The history is provided by the patient   device was not used during this ER visit    Past Medical History   -- Anxiety     -- Arthritis     -- Asthma     -- Chronic bronchitis     -- COPD (chronic obstructive pulmonary disease)     -- Depression     -- SANTIAGO (dyspnea on exertion)     -- Emphysema of lung     -- Fatigue     -- Hyperlipidemia     -- Hypertension     -- Trouble in sleeping        Surgical HX: Appendectomy  No known allergies    I have reviewed all of this patient's past medical, surgical, family, and social   histories as well as active allergies and medications documented in the  electronic medical record    Review of Systems and Physical Exam      Review of Systems  -- Constitution - no fever, denies fatigue, no weakness, no chills  -- Eyes - no tearing or redness, no visual disturbance  -- Ear, Nose - no tinnitus or earache, no nasal congestion or discharge  -- Mouth,Throat - no sore throat, no toothache, normal voice, normal swallowing  -- Respiratory - cough and congestion, shortness of breath, no SANTIAGO  -- Cardiovascular - tachycardia and palpitations, denies claudication  -- Gastrointestinal - denies abdominal pain, nausea, vomiting, or diarrhea  --  Genitourinary - no dysuria, denies flank pain, no hematuria, no STD risk  -- Musculoskeletal - denies back pain, negative for trauma or injury  -- Neurological - no headache, denies weakness or seizure; no LOC  -- Skin - denies pallor, rash, or changes in skin. no hives or welts noted  -- Psychiatric - Denies SI or HI, no psychosis or fractured thought noted     Vital Signs  Temperature is 100.8 °F (38.2 °C)   Her blood pressure is 124/62 and her pulse is 150   Her respiration is 25 and oxygen saturation is 95%.     Physical Exam  -- Nursing note and vitals reviewed  -- Constitutional: Appears well-developed and well-nourished  -- Head: Atraumatic. Normocephalic. No obvious abnormality  -- Eyes: Pupils are equal and reactive to light. Normal conjunctiva and lids  -- Cardiac:  Tachycardia inches the 150s with no obvious arrhythmia  -- Pulmonary: faint rhonchi at the bilateral bases with active wheezing   -- Abdominal: Soft, no tenderness. Normal bowel sounds. Normal liver edge  -- Musculoskeletal: Normal range of motion, no effusions. Joints stable   -- Neurological: No focal deficits. Showed good interaction with staff  -- Vascular: Posterior tibial, dorsalis pedis and radial pulses 2+ bilaterally      Emergency Room Course      Lab Results     K 4.0   CL 98   CO2 27   BUN 18   CREATININE 0.8    (H)   ALKPHOS 84   AST 11   ALT 15   BILITOT 0.8   ALBUMIN 3.6   PROT 6.2   WBC 11.57   HGB 9.3 (L)   HCT 28.8 (L)      CPK 10 (L)   CPK 10 (L)   CPKMB 0.9   TROPONINI 0.027 (H)   INR 1.2    (H)   DDIMER 0.77 (H)   LACTATE 1.9   MG 1.7   TSH 0.559     EKG  -- supraventricular tachycardia in the 150  -- second EKG shows sinus tachycardia in the 150    Chest x-ray  1. COPD.   2. Small amount of fluid within the right oblique fissure with adjacent discoid atelectasis.   3. Cardiomegaly.   4. Bone demineralization.     Additional Work up  -- the patient tested negative for influenza  -- Blood cultures  have also been drawn, results are pending     Medications Given  levoFLOXacin 750 mg/150 mL IVPB 750 mg (750 mg Intravenous New Bag 12/15/19 1414)   nitroGLYCERIN 2% TD oint ointment 0.5 inch (has no administration in time range)   pantoprazole EC tablet 40 mg (has no administration in time range)   atorvastatin tablet 40 mg (has no administration in time range)   aspirin tablet 325 mg (325 mg Oral Given 12/15/19 1239)   adenosine injection 6 mg (6 mg Intravenous Given 12/15/19 1239)   diltiaZEM 125 mg in D5W 125 mL infusion (15 mg/hr Intravenous Rate/Dose Change 12/15/19 1355)   methylPREDNISolone sodium succinate injection 125 mg (125 mg Intravenous Given 12/15/19 1415)   diltiaZEM injection 10 mg (10 mg Intravenous Given 12/15/19 1315)     ED Physician Management  -- Diagnosis management comments: 84 y.o. female with shortness of breath today  -- patient's heart rate is in the 150, clear chest x-ray, supraventricular tachycardia  -- adenosine did not dissipated the SVT, went into a nonspecific arrhythmia momentarily  -- patient started on Cardizem drip, still going between SVT and sinus tachycardia/150s  -- patient has normal CBC, mild elevation of troponin, reported history of CHF noted  -- no obvious hypoxia, patient will be transferred for cardiology evaluation/tachycardia  -- patient is going in and out of SVT, does have a history of atrial fib, mild wheezing now  -- 2:22 PM: we are currently at the maximum dose of Cardizem drip, I initiated transfer now    Critical Care ED Physician Time (minutes):  -- Performed by: Festus Maurer M.D.  -- Date/Time: 2:24 PM 12/15/2019   -- Direct Patient Care (Face Time): 5  -- Additional History from Records or Taking Additional History: 5  -- Ordering, Reviewing, and Interpreting Diagnostic Studies: 5  -- Total Time in Documentation: 5  -- Consultation with Other Physicians: 5  -- Consultation with Family Related to Condition: 5  -- Total Critical Care Time: 30      Diagnosis  -- Elevated troponin  -- SOB (shortness of breath)   -- Palpitations    Disposition and Plan  -- Disposition: transfer  -- Condition: stable    This note is dictated on M*Modal word recognition program.  There are word recognition mistakes that are occasionally missed on review.              Festus Maurer MD  12/15/19 5709

## 2019-12-20 LAB
BACTERIA BLD CULT: NORMAL
BACTERIA BLD CULT: NORMAL

## 2019-12-23 ENCOUNTER — TELEPHONE (OUTPATIENT)
Dept: FAMILY MEDICINE | Facility: CLINIC | Age: 84
End: 2019-12-23

## 2019-12-23 NOTE — TELEPHONE ENCOUNTER
Desirae with [facility] called stated upon arrival at pts home pt C/O SOB, dose of Trelegy was given with no relief, and heart rate dropped. Per Desirae [facility] was on scene to transport pt to hospital

## 2019-12-30 ENCOUNTER — TELEPHONE (OUTPATIENT)
Dept: FAMILY MEDICINE | Facility: CLINIC | Age: 84
End: 2019-12-30

## 2019-12-30 NOTE — TELEPHONE ENCOUNTER
----- Message from Marisa Mohamud sent at 2019  9:19 AM CST -----  Contact: self  Chantell Herrera  MRN: [id number]  : 1935  PCP: Kari Gaspar  Home Phone      [phone]  Work Phone      Not on file.  Mobile          [phone]      MESSAGE:   Pt requests a sooner appointment than the  can schedule.  Does patient feel like they need to be seen today:  no  What is the nature of the appointment:  Hospital f/u  What visit type:  Ep   Did you check other providers/department schedules for availability:   no  Comments: follow up within 1 week    Phone:  [phone]

## 2020-01-01 ENCOUNTER — TELEPHONE (OUTPATIENT)
Dept: HOME HEALTH SERVICES | Facility: HOSPITAL | Age: 85
End: 2020-01-01

## 2020-01-01 ENCOUNTER — TELEPHONE (OUTPATIENT)
Dept: FAMILY MEDICINE | Facility: CLINIC | Age: 85
End: 2020-01-01

## 2020-01-01 ENCOUNTER — INFUSION (OUTPATIENT)
Dept: INFUSION THERAPY | Facility: HOSPITAL | Age: 85
End: 2020-01-01
Attending: FAMILY MEDICINE
Payer: MEDICARE

## 2020-01-01 ENCOUNTER — EXTERNAL HOME HEALTH (OUTPATIENT)
Dept: HOME HEALTH SERVICES | Facility: HOSPITAL | Age: 85
End: 2020-01-01
Payer: MEDICARE

## 2020-01-01 ENCOUNTER — OFFICE VISIT (OUTPATIENT)
Dept: FAMILY MEDICINE | Facility: CLINIC | Age: 85
End: 2020-01-01
Payer: MEDICARE

## 2020-01-01 ENCOUNTER — HOSPITAL ENCOUNTER (EMERGENCY)
Facility: HOSPITAL | Age: 85
End: 2020-07-03
Attending: INTERNAL MEDICINE
Payer: MEDICARE

## 2020-01-01 ENCOUNTER — APPOINTMENT (OUTPATIENT)
Dept: RADIOLOGY | Facility: CLINIC | Age: 85
End: 2020-01-01
Attending: FAMILY MEDICINE
Payer: MEDICARE

## 2020-01-01 ENCOUNTER — DOCUMENT SCAN (OUTPATIENT)
Dept: HOME HEALTH SERVICES | Facility: HOSPITAL | Age: 85
End: 2020-01-01
Payer: MEDICARE

## 2020-01-01 ENCOUNTER — HOSPITAL ENCOUNTER (INPATIENT)
Facility: HOSPITAL | Age: 85
LOS: 5 days | Discharge: HOME HEALTH | DRG: 291 | End: 2020-08-27
Attending: HOSPITALIST | Admitting: HOSPITALIST
Payer: MEDICARE

## 2020-01-01 ENCOUNTER — TELEPHONE (OUTPATIENT)
Dept: PULMONOLOGY | Facility: CLINIC | Age: 85
End: 2020-01-01

## 2020-01-01 ENCOUNTER — PES CALL (OUTPATIENT)
Dept: ADMINISTRATIVE | Facility: CLINIC | Age: 85
End: 2020-01-01

## 2020-01-01 ENCOUNTER — HOSPITAL ENCOUNTER (OUTPATIENT)
Facility: HOSPITAL | Age: 85
Discharge: HOME | End: 2020-12-24
Attending: SURGERY | Admitting: STUDENT IN AN ORGANIZED HEALTH CARE EDUCATION/TRAINING PROGRAM
Payer: MEDICARE

## 2020-01-01 ENCOUNTER — TELEPHONE (OUTPATIENT)
Dept: HEMATOLOGY/ONCOLOGY | Facility: CLINIC | Age: 85
End: 2020-01-01

## 2020-01-01 ENCOUNTER — OUTPATIENT CASE MANAGEMENT (OUTPATIENT)
Dept: ADMINISTRATIVE | Facility: OTHER | Age: 85
End: 2020-01-01

## 2020-01-01 ENCOUNTER — LAB VISIT (OUTPATIENT)
Dept: LAB | Facility: HOSPITAL | Age: 85
End: 2020-01-01
Attending: FAMILY MEDICINE
Payer: MEDICARE

## 2020-01-01 ENCOUNTER — PATIENT OUTREACH (OUTPATIENT)
Dept: ADMINISTRATIVE | Facility: CLINIC | Age: 85
End: 2020-01-01

## 2020-01-01 ENCOUNTER — TELEPHONE (OUTPATIENT)
Dept: INFUSION THERAPY | Facility: HOSPITAL | Age: 85
End: 2020-01-01

## 2020-01-01 ENCOUNTER — LAB VISIT (OUTPATIENT)
Dept: LAB | Facility: HOSPITAL | Age: 85
End: 2020-01-01
Attending: INTERNAL MEDICINE
Payer: MEDICARE

## 2020-01-01 ENCOUNTER — HOSPITAL ENCOUNTER (OUTPATIENT)
Dept: RADIOLOGY | Facility: HOSPITAL | Age: 85
Discharge: HOME | End: 2020-07-28
Attending: FAMILY MEDICINE
Payer: MEDICARE

## 2020-01-01 ENCOUNTER — HOSPITAL ENCOUNTER (INPATIENT)
Facility: HOSPITAL | Age: 85
LOS: 4 days | Discharge: HOME HEALTH | DRG: 190 | End: 2020-04-17
Attending: PSYCHIATRY & NEUROLOGY | Admitting: PSYCHIATRY & NEUROLOGY
Payer: MEDICARE

## 2020-01-01 ENCOUNTER — HOSPITAL ENCOUNTER (EMERGENCY)
Facility: HOSPITAL | Age: 85
Discharge: HOME | End: 2020-04-13
Attending: SURGERY
Payer: MEDICARE

## 2020-01-01 ENCOUNTER — HOSPITAL ENCOUNTER (OUTPATIENT)
Dept: RADIOLOGY | Facility: HOSPITAL | Age: 85
Discharge: HOME | End: 2020-05-14
Attending: FAMILY MEDICINE
Payer: MEDICARE

## 2020-01-01 ENCOUNTER — HOSPITAL ENCOUNTER (INPATIENT)
Facility: HOSPITAL | Age: 85
LOS: 2 days | Discharge: HOME | DRG: 811 | End: 2020-11-08
Attending: SURGERY | Admitting: INTERNAL MEDICINE
Payer: MEDICARE

## 2020-01-01 ENCOUNTER — HOSPITAL ENCOUNTER (INPATIENT)
Facility: HOSPITAL | Age: 85
LOS: 2 days | Discharge: HOME HEALTH | DRG: 808 | End: 2020-07-05
Attending: HOSPITALIST | Admitting: HOSPITALIST
Payer: MEDICARE

## 2020-01-01 ENCOUNTER — TELEPHONE (OUTPATIENT)
Dept: RADIOLOGY | Facility: HOSPITAL | Age: 85
End: 2020-01-01

## 2020-01-01 ENCOUNTER — CLINICAL SUPPORT (OUTPATIENT)
Dept: REHABILITATION | Facility: HOSPITAL | Age: 85
End: 2020-01-01
Attending: FAMILY MEDICINE
Payer: MEDICARE

## 2020-01-01 ENCOUNTER — HOSPITAL ENCOUNTER (EMERGENCY)
Facility: HOSPITAL | Age: 85
Discharge: ANOTHER HOSPITAL | End: 2020-08-22
Attending: SURGERY
Payer: MEDICARE

## 2020-01-01 ENCOUNTER — HOSPITAL ENCOUNTER (EMERGENCY)
Facility: HOSPITAL | Age: 85
Discharge: ANOTHER HOSPITAL | End: 2020-02-16
Attending: SURGERY
Payer: MEDICARE

## 2020-01-01 ENCOUNTER — HOSPITAL ENCOUNTER (INPATIENT)
Facility: HOSPITAL | Age: 85
LOS: 1 days | Discharge: HOME | DRG: 291 | End: 2020-12-03
Attending: SURGERY | Admitting: FAMILY MEDICINE
Payer: MEDICARE

## 2020-01-01 ENCOUNTER — CLINICAL SUPPORT (OUTPATIENT)
Dept: FAMILY MEDICINE | Facility: CLINIC | Age: 85
End: 2020-01-01
Payer: MEDICARE

## 2020-01-01 ENCOUNTER — TELEPHONE (OUTPATIENT)
Dept: PHARMACY | Facility: CLINIC | Age: 85
End: 2020-01-01

## 2020-01-01 ENCOUNTER — HOSPITAL ENCOUNTER (OUTPATIENT)
Dept: RADIOLOGY | Facility: HOSPITAL | Age: 85
Discharge: HOME | End: 2020-11-17
Attending: FAMILY MEDICINE
Payer: MEDICARE

## 2020-01-01 ENCOUNTER — HOSPITAL ENCOUNTER (OUTPATIENT)
Facility: HOSPITAL | Age: 85
Discharge: HOME | End: 2020-10-06
Attending: SURGERY | Admitting: HOSPITALIST
Payer: MEDICARE

## 2020-01-01 VITALS
TEMPERATURE: 98 F | WEIGHT: 138.69 LBS | RESPIRATION RATE: 26 BRPM | OXYGEN SATURATION: 97 % | HEIGHT: 62 IN | SYSTOLIC BLOOD PRESSURE: 148 MMHG | HEART RATE: 115 BPM | BODY MASS INDEX: 25.52 KG/M2 | DIASTOLIC BLOOD PRESSURE: 80 MMHG

## 2020-01-01 VITALS
HEIGHT: 62 IN | SYSTOLIC BLOOD PRESSURE: 128 MMHG | TEMPERATURE: 97 F | WEIGHT: 120.38 LBS | RESPIRATION RATE: 18 BRPM | DIASTOLIC BLOOD PRESSURE: 64 MMHG | OXYGEN SATURATION: 96 % | BODY MASS INDEX: 22.15 KG/M2 | HEART RATE: 67 BPM

## 2020-01-01 VITALS
BODY MASS INDEX: 21.17 KG/M2 | HEART RATE: 67 BPM | HEIGHT: 63 IN | SYSTOLIC BLOOD PRESSURE: 132 MMHG | RESPIRATION RATE: 20 BRPM | OXYGEN SATURATION: 95 % | DIASTOLIC BLOOD PRESSURE: 60 MMHG | WEIGHT: 119.5 LBS | TEMPERATURE: 98 F

## 2020-01-01 VITALS
HEART RATE: 64 BPM | SYSTOLIC BLOOD PRESSURE: 136 MMHG | OXYGEN SATURATION: 91 % | BODY MASS INDEX: 22.91 KG/M2 | TEMPERATURE: 99 F | RESPIRATION RATE: 23 BRPM | WEIGHT: 124.5 LBS | HEIGHT: 62 IN | DIASTOLIC BLOOD PRESSURE: 51 MMHG

## 2020-01-01 VITALS
SYSTOLIC BLOOD PRESSURE: 97 MMHG | DIASTOLIC BLOOD PRESSURE: 57 MMHG | TEMPERATURE: 97 F | HEART RATE: 84 BPM | WEIGHT: 127.88 LBS | HEIGHT: 62 IN | BODY MASS INDEX: 23.53 KG/M2 | OXYGEN SATURATION: 93 % | RESPIRATION RATE: 18 BRPM

## 2020-01-01 VITALS
RESPIRATION RATE: 22 BRPM | SYSTOLIC BLOOD PRESSURE: 157 MMHG | BODY MASS INDEX: 22.26 KG/M2 | OXYGEN SATURATION: 83 % | WEIGHT: 121 LBS | HEART RATE: 72 BPM | TEMPERATURE: 98 F | HEIGHT: 62 IN | DIASTOLIC BLOOD PRESSURE: 75 MMHG

## 2020-01-01 VITALS
DIASTOLIC BLOOD PRESSURE: 58 MMHG | WEIGHT: 122.56 LBS | HEIGHT: 63 IN | SYSTOLIC BLOOD PRESSURE: 92 MMHG | BODY MASS INDEX: 21.71 KG/M2 | TEMPERATURE: 98 F | HEART RATE: 60 BPM | RESPIRATION RATE: 16 BRPM

## 2020-01-01 VITALS
SYSTOLIC BLOOD PRESSURE: 125 MMHG | BODY MASS INDEX: 22.44 KG/M2 | TEMPERATURE: 98 F | OXYGEN SATURATION: 96 % | DIASTOLIC BLOOD PRESSURE: 64 MMHG | HEART RATE: 64 BPM | WEIGHT: 122.69 LBS | RESPIRATION RATE: 18 BRPM

## 2020-01-01 VITALS
DIASTOLIC BLOOD PRESSURE: 70 MMHG | HEART RATE: 78 BPM | HEIGHT: 62 IN | SYSTOLIC BLOOD PRESSURE: 106 MMHG | BODY MASS INDEX: 24.35 KG/M2 | RESPIRATION RATE: 16 BRPM

## 2020-01-01 VITALS
WEIGHT: 125.25 LBS | HEART RATE: 68 BPM | HEIGHT: 62 IN | BODY MASS INDEX: 23.05 KG/M2 | RESPIRATION RATE: 14 BRPM | TEMPERATURE: 97 F | OXYGEN SATURATION: 99 % | DIASTOLIC BLOOD PRESSURE: 72 MMHG | SYSTOLIC BLOOD PRESSURE: 142 MMHG

## 2020-01-01 VITALS
HEIGHT: 62 IN | SYSTOLIC BLOOD PRESSURE: 128 MMHG | HEART RATE: 56 BPM | WEIGHT: 122.81 LBS | BODY MASS INDEX: 22.6 KG/M2 | DIASTOLIC BLOOD PRESSURE: 68 MMHG | RESPIRATION RATE: 18 BRPM

## 2020-01-01 VITALS
BODY MASS INDEX: 23.49 KG/M2 | OXYGEN SATURATION: 100 % | DIASTOLIC BLOOD PRESSURE: 72 MMHG | WEIGHT: 127.63 LBS | SYSTOLIC BLOOD PRESSURE: 150 MMHG | RESPIRATION RATE: 12 BRPM | HEART RATE: 56 BPM | TEMPERATURE: 97 F | HEIGHT: 62 IN

## 2020-01-01 VITALS
BODY MASS INDEX: 23.57 KG/M2 | RESPIRATION RATE: 18 BRPM | SYSTOLIC BLOOD PRESSURE: 128 MMHG | TEMPERATURE: 99 F | DIASTOLIC BLOOD PRESSURE: 61 MMHG | OXYGEN SATURATION: 92 % | WEIGHT: 128.06 LBS | HEART RATE: 63 BPM | HEIGHT: 62 IN

## 2020-01-01 VITALS
TEMPERATURE: 99 F | RESPIRATION RATE: 30 BRPM | SYSTOLIC BLOOD PRESSURE: 135 MMHG | DIASTOLIC BLOOD PRESSURE: 65 MMHG | OXYGEN SATURATION: 94 % | HEART RATE: 77 BPM

## 2020-01-01 VITALS
TEMPERATURE: 98 F | HEIGHT: 62 IN | RESPIRATION RATE: 25 BRPM | WEIGHT: 133 LBS | SYSTOLIC BLOOD PRESSURE: 150 MMHG | BODY MASS INDEX: 24.48 KG/M2 | OXYGEN SATURATION: 92 % | DIASTOLIC BLOOD PRESSURE: 75 MMHG | HEART RATE: 89 BPM

## 2020-01-01 VITALS
WEIGHT: 132.25 LBS | HEIGHT: 62 IN | RESPIRATION RATE: 20 BRPM | DIASTOLIC BLOOD PRESSURE: 69 MMHG | OXYGEN SATURATION: 96 % | TEMPERATURE: 98 F | BODY MASS INDEX: 24.34 KG/M2 | HEART RATE: 106 BPM | SYSTOLIC BLOOD PRESSURE: 120 MMHG

## 2020-01-01 VITALS
DIASTOLIC BLOOD PRESSURE: 74 MMHG | HEART RATE: 57 BPM | WEIGHT: 134.38 LBS | SYSTOLIC BLOOD PRESSURE: 148 MMHG | TEMPERATURE: 95 F | OXYGEN SATURATION: 92 % | BODY MASS INDEX: 24.58 KG/M2

## 2020-01-01 VITALS — BODY MASS INDEX: 24.66 KG/M2 | HEIGHT: 62 IN | WEIGHT: 134 LBS

## 2020-01-01 VITALS
HEART RATE: 54 BPM | DIASTOLIC BLOOD PRESSURE: 70 MMHG | SYSTOLIC BLOOD PRESSURE: 152 MMHG | RESPIRATION RATE: 14 BRPM | TEMPERATURE: 97 F | BODY MASS INDEX: 23.98 KG/M2 | HEIGHT: 62 IN | WEIGHT: 130.31 LBS

## 2020-01-01 VITALS
DIASTOLIC BLOOD PRESSURE: 71 MMHG | HEART RATE: 84 BPM | SYSTOLIC BLOOD PRESSURE: 147 MMHG | RESPIRATION RATE: 22 BRPM | OXYGEN SATURATION: 97 % | TEMPERATURE: 99 F

## 2020-01-01 DIAGNOSIS — D64.9 SYMPTOMATIC ANEMIA: Primary | ICD-10-CM

## 2020-01-01 DIAGNOSIS — N63.0 BREAST SWELLING: ICD-10-CM

## 2020-01-01 DIAGNOSIS — Z99.81 OXYGEN DEPENDENT: ICD-10-CM

## 2020-01-01 DIAGNOSIS — J44.1 COPD WITH ACUTE EXACERBATION: ICD-10-CM

## 2020-01-01 DIAGNOSIS — R42 DIZZINESS AND GIDDINESS: ICD-10-CM

## 2020-01-01 DIAGNOSIS — N64.9 DISORDER OF BREAST, UNSPECIFIED: ICD-10-CM

## 2020-01-01 DIAGNOSIS — J40 BRONCHITIS: ICD-10-CM

## 2020-01-01 DIAGNOSIS — I50.9 CONGESTIVE HEART FAILURE, UNSPECIFIED HF CHRONICITY, UNSPECIFIED HEART FAILURE TYPE: ICD-10-CM

## 2020-01-01 DIAGNOSIS — I48.91 ATRIAL FIBRILLATION WITH RAPID VENTRICULAR RESPONSE: ICD-10-CM

## 2020-01-01 DIAGNOSIS — F41.1 ANXIETY STATE: ICD-10-CM

## 2020-01-01 DIAGNOSIS — I50.9 CHRONIC CONGESTIVE HEART FAILURE, UNSPECIFIED HEART FAILURE TYPE: ICD-10-CM

## 2020-01-01 DIAGNOSIS — N18.32 ACUTE RENAL FAILURE SUPERIMPOSED ON STAGE 3B CHRONIC KIDNEY DISEASE, UNSPECIFIED ACUTE RENAL FAILURE TYPE: ICD-10-CM
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
## 2020-01-01 DIAGNOSIS — J96.11 CHRONIC RESPIRATORY FAILURE WITH HYPOXIA: ICD-10-CM

## 2020-01-01 DIAGNOSIS — I25.10 CARDIOVASCULAR DISEASE: ICD-10-CM

## 2020-01-01 DIAGNOSIS — D64.9 ANEMIA, UNSPECIFIED TYPE: Primary | ICD-10-CM

## 2020-01-01 DIAGNOSIS — J96.22 ACUTE ON CHRONIC RESPIRATORY FAILURE WITH HYPOXIA AND HYPERCAPNIA: Primary | ICD-10-CM

## 2020-01-01 DIAGNOSIS — A41.9 SEPSIS, DUE TO UNSPECIFIED ORGANISM, UNSPECIFIED WHETHER ACUTE ORGAN DYSFUNCTION PRESENT: ICD-10-CM

## 2020-01-01 DIAGNOSIS — D61.818 PANCYTOPENIA: ICD-10-CM

## 2020-01-01 DIAGNOSIS — E83.42 HYPOMAGNESEMIA: ICD-10-CM

## 2020-01-01 DIAGNOSIS — G25.81 RLS (RESTLESS LEGS SYNDROME): Primary | ICD-10-CM

## 2020-01-01 DIAGNOSIS — R92.8 ABNORMAL MAMMOGRAM: Primary | ICD-10-CM

## 2020-01-01 DIAGNOSIS — R07.9 CHEST PAIN, RULE OUT ACUTE MYOCARDIAL INFARCTION: ICD-10-CM

## 2020-01-01 DIAGNOSIS — J96.21 ACUTE AND CHRONIC RESPIRATORY FAILURE WITH HYPOXIA: Primary | ICD-10-CM

## 2020-01-01 DIAGNOSIS — E11.65 UNCONTROLLED TYPE 2 DIABETES MELLITUS WITH HYPERGLYCEMIA: ICD-10-CM

## 2020-01-01 DIAGNOSIS — N19 RENAL FAILURE, UNSPECIFIED CHRONICITY: ICD-10-CM

## 2020-01-01 DIAGNOSIS — J96.21 ACUTE ON CHRONIC RESPIRATORY FAILURE WITH HYPOXIA: ICD-10-CM

## 2020-01-01 DIAGNOSIS — I09.81 RHEUMATIC HEART FAILURE (CONGESTIVE): Primary | ICD-10-CM

## 2020-01-01 DIAGNOSIS — D64.9 SYMPTOMATIC ANEMIA: ICD-10-CM

## 2020-01-01 DIAGNOSIS — D53.9 MACROCYTIC ANEMIA: ICD-10-CM

## 2020-01-01 DIAGNOSIS — J69.0 PNEUMONITIS DUE TO INHALATION OF FOOD AND VOMIT: ICD-10-CM

## 2020-01-01 DIAGNOSIS — I48.20 CHRONIC ATRIAL FIBRILLATION: ICD-10-CM

## 2020-01-01 DIAGNOSIS — K92.2 GASTROINTESTINAL HEMORRHAGE, UNSPECIFIED GASTROINTESTINAL HEMORRHAGE TYPE: ICD-10-CM

## 2020-01-01 DIAGNOSIS — R13.10 DYSPHAGIA, UNSPECIFIED TYPE: ICD-10-CM

## 2020-01-01 DIAGNOSIS — D64.9 ANEMIA, UNSPECIFIED TYPE: ICD-10-CM

## 2020-01-01 DIAGNOSIS — T17.320D CHOKING DUE TO FOOD IN LARYNX, SUBSEQUENT ENCOUNTER: ICD-10-CM

## 2020-01-01 DIAGNOSIS — D69.6 THROMBOCYTOPENIA: ICD-10-CM

## 2020-01-01 DIAGNOSIS — N17.9 ACUTE RENAL FAILURE SUPERIMPOSED ON STAGE 3B CHRONIC KIDNEY DISEASE, UNSPECIFIED ACUTE RENAL FAILURE TYPE: Primary | ICD-10-CM

## 2020-01-01 DIAGNOSIS — K92.2 GIB (GASTROINTESTINAL BLEEDING): ICD-10-CM

## 2020-01-01 DIAGNOSIS — N17.9 ACUTE RENAL FAILURE SUPERIMPOSED ON STAGE 3B CHRONIC KIDNEY DISEASE, UNSPECIFIED ACUTE RENAL FAILURE TYPE: ICD-10-CM

## 2020-01-01 DIAGNOSIS — L89.322 PRESSURE INJURY OF LEFT BUTTOCK, STAGE 2: ICD-10-CM

## 2020-01-01 DIAGNOSIS — R06.00 DYSPNEA: ICD-10-CM

## 2020-01-01 DIAGNOSIS — I21.4 NSTEMI (NON-ST ELEVATED MYOCARDIAL INFARCTION): ICD-10-CM

## 2020-01-01 DIAGNOSIS — E87.29 CARBON DIOXIDE RETENTION: ICD-10-CM

## 2020-01-01 DIAGNOSIS — J96.90 RESPIRATORY FAILURE: ICD-10-CM

## 2020-01-01 DIAGNOSIS — I50.9 HEART FAILURE: Primary | ICD-10-CM

## 2020-01-01 DIAGNOSIS — J44.1 OBSTRUCTIVE CHRONIC BRONCHITIS WITH EXACERBATION: ICD-10-CM

## 2020-01-01 DIAGNOSIS — R92.8 ABNORMAL MAMMOGRAM OF LEFT BREAST: ICD-10-CM

## 2020-01-01 DIAGNOSIS — I35.0 AORTIC VALVE STENOSIS, ETIOLOGY OF CARDIAC VALVE DISEASE UNSPECIFIED: ICD-10-CM

## 2020-01-01 DIAGNOSIS — J96.20 ACUTE ON CHRONIC RESPIRATORY FAILURE, UNSPECIFIED WHETHER WITH HYPOXIA OR HYPERCAPNIA: ICD-10-CM

## 2020-01-01 DIAGNOSIS — R06.2 WHEEZING: ICD-10-CM

## 2020-01-01 DIAGNOSIS — R06.02 SHORTNESS OF BREATH: ICD-10-CM

## 2020-01-01 DIAGNOSIS — M81.0 OSTEOPOROSIS, UNSPECIFIED OSTEOPOROSIS TYPE, UNSPECIFIED PATHOLOGICAL FRACTURE PRESENCE: Primary | ICD-10-CM

## 2020-01-01 DIAGNOSIS — I50.43 ACUTE ON CHRONIC COMBINED SYSTOLIC AND DIASTOLIC CHF, NYHA CLASS 3: ICD-10-CM

## 2020-01-01 DIAGNOSIS — N18.30 CHRONIC KIDNEY DISEASE, STAGE III (MODERATE): ICD-10-CM

## 2020-01-01 DIAGNOSIS — E78.5 HYPERLIPIDEMIA: ICD-10-CM

## 2020-01-01 DIAGNOSIS — N18.32 ACUTE RENAL FAILURE SUPERIMPOSED ON STAGE 3B CHRONIC KIDNEY DISEASE, UNSPECIFIED ACUTE RENAL FAILURE TYPE: Primary | ICD-10-CM

## 2020-01-01 DIAGNOSIS — J42 CHRONIC BRONCHITIS, UNSPECIFIED CHRONIC BRONCHITIS TYPE: ICD-10-CM

## 2020-01-01 DIAGNOSIS — E83.42 HYPOMAGNESEMIA: Primary | ICD-10-CM

## 2020-01-01 DIAGNOSIS — J96.22 ACUTE ON CHRONIC RESPIRATORY FAILURE WITH HYPOXIA AND HYPERCAPNIA: ICD-10-CM

## 2020-01-01 DIAGNOSIS — N18.30 CKD (CHRONIC KIDNEY DISEASE), STAGE III: ICD-10-CM

## 2020-01-01 DIAGNOSIS — R47.02 DYSPHASIA: ICD-10-CM

## 2020-01-01 DIAGNOSIS — J41.0 SIMPLE CHRONIC BRONCHITIS: ICD-10-CM

## 2020-01-01 DIAGNOSIS — J42 CHRONIC BRONCHITIS, UNSPECIFIED CHRONIC BRONCHITIS TYPE: Primary | ICD-10-CM

## 2020-01-01 DIAGNOSIS — R06.02 SOB (SHORTNESS OF BREATH): ICD-10-CM

## 2020-01-01 DIAGNOSIS — M54.9 DORSALGIA, UNSPECIFIED: ICD-10-CM

## 2020-01-01 DIAGNOSIS — I10 HTN (HYPERTENSION): Primary | ICD-10-CM

## 2020-01-01 DIAGNOSIS — J44.1 COPD EXACERBATION: ICD-10-CM

## 2020-01-01 DIAGNOSIS — I50.43 ACUTE ON CHRONIC COMBINED SYSTOLIC AND DIASTOLIC CONGESTIVE HEART FAILURE: Primary | ICD-10-CM

## 2020-01-01 DIAGNOSIS — R42 DIZZINESS AND GIDDINESS: Primary | ICD-10-CM

## 2020-01-01 DIAGNOSIS — E78.5 HYPERLIPIDEMIA, UNSPECIFIED HYPERLIPIDEMIA TYPE: ICD-10-CM

## 2020-01-01 DIAGNOSIS — N63.0 BREAST SWELLING: Primary | ICD-10-CM

## 2020-01-01 DIAGNOSIS — J44.1 COPD EXACERBATION: Primary | ICD-10-CM

## 2020-01-01 DIAGNOSIS — F32.5 MAJOR DEPRESSIVE DISORDER WITH SINGLE EPISODE, IN FULL REMISSION: ICD-10-CM

## 2020-01-01 DIAGNOSIS — I48.92 ATRIAL FLUTTER, UNSPECIFIED TYPE: Primary | ICD-10-CM

## 2020-01-01 DIAGNOSIS — J96.21 ACUTE ON CHRONIC RESPIRATORY FAILURE WITH HYPOXIA AND HYPERCAPNIA: ICD-10-CM

## 2020-01-01 DIAGNOSIS — J96.90 RESPIRATORY FAILURE: Primary | ICD-10-CM

## 2020-01-01 DIAGNOSIS — I50.43 ACUTE ON CHRONIC COMBINED SYSTOLIC AND DIASTOLIC CONGESTIVE HEART FAILURE: ICD-10-CM

## 2020-01-01 DIAGNOSIS — J43.9 EMPHYSEMA LUNG: ICD-10-CM

## 2020-01-01 DIAGNOSIS — Z79.52 LONG TERM CURRENT USE OF SYSTEMIC STEROIDS: Chronic | ICD-10-CM

## 2020-01-01 DIAGNOSIS — I13.0 HYPERTENSIVE HEART AND RENAL DISEASE WITH CONGESTIVE HEART FAILURE: Primary | ICD-10-CM

## 2020-01-01 DIAGNOSIS — I10 ESSENTIAL HYPERTENSION: ICD-10-CM

## 2020-01-01 DIAGNOSIS — I50.9 HEART FAILURE: ICD-10-CM

## 2020-01-01 DIAGNOSIS — N18.32 STAGE 3B CHRONIC KIDNEY DISEASE: ICD-10-CM

## 2020-01-01 DIAGNOSIS — R07.9 CHEST PAIN: ICD-10-CM

## 2020-01-01 DIAGNOSIS — J96.01 ACUTE HYPOXEMIC RESPIRATORY FAILURE: ICD-10-CM

## 2020-01-01 DIAGNOSIS — R05.9 COUGH: ICD-10-CM

## 2020-01-01 DIAGNOSIS — I49.9 ARRHYTHMIA: ICD-10-CM

## 2020-01-01 DIAGNOSIS — I95.9 HYPOTENSION: Primary | ICD-10-CM

## 2020-01-01 DIAGNOSIS — I50.9 HEART FAILURE, UNSPECIFIED: ICD-10-CM

## 2020-01-01 DIAGNOSIS — R04.2 COUGH WITH HEMOPTYSIS: Primary | ICD-10-CM

## 2020-01-01 DIAGNOSIS — D64.9 ANEMIA: ICD-10-CM

## 2020-01-01 DIAGNOSIS — R13.19 ESOPHAGEAL DYSPHAGIA: ICD-10-CM

## 2020-01-01 DIAGNOSIS — R47.02 DYSPHASIA: Primary | ICD-10-CM

## 2020-01-01 DIAGNOSIS — G25.81 RLS (RESTLESS LEGS SYNDROME): ICD-10-CM

## 2020-01-01 DIAGNOSIS — J96.21 ACUTE ON CHRONIC RESPIRATORY FAILURE WITH HYPOXIA AND HYPERCAPNIA: Primary | ICD-10-CM

## 2020-01-01 LAB
ABO + RH BLD: NORMAL
ABO GROUP BLD: NORMAL
ABO GROUP BLD: NORMAL
ACID FAST MOD KINY STN SPEC: NORMAL
ALBUMIN SERPL BCP-MCNC: 3 G/DL (ref 3.5–5.2)
ALBUMIN SERPL BCP-MCNC: 3.1 G/DL (ref 3.5–5.2)
ALBUMIN SERPL BCP-MCNC: 3.1 G/DL (ref 3.5–5.2)
ALBUMIN SERPL BCP-MCNC: 3.2 G/DL (ref 3.5–5.2)
ALBUMIN SERPL BCP-MCNC: 3.3 G/DL (ref 3.5–5.2)
ALBUMIN SERPL BCP-MCNC: 3.4 G/DL (ref 3.5–5.2)
ALBUMIN SERPL BCP-MCNC: 3.5 G/DL (ref 3.5–5.2)
ALBUMIN SERPL BCP-MCNC: 3.5 G/DL (ref 3.5–5.2)
ALBUMIN SERPL BCP-MCNC: 3.6 G/DL (ref 3.5–5.2)
ALBUMIN SERPL BCP-MCNC: 3.7 G/DL (ref 3.5–5.2)
ALBUMIN SERPL BCP-MCNC: 3.8 G/DL (ref 3.5–5.2)
ALBUMIN SERPL BCP-MCNC: 3.8 G/DL (ref 3.5–5.2)
ALBUMIN SERPL BCP-MCNC: 3.9 G/DL (ref 3.5–5.2)
ALBUMIN SERPL BCP-MCNC: 4 G/DL (ref 3.5–5.2)
ALBUMIN SERPL BCP-MCNC: 4 G/DL (ref 3.5–5.2)
ALBUMIN SERPL BCP-MCNC: 4.2 G/DL (ref 3.5–5.2)
ALBUMIN SERPL BCP-MCNC: 4.4 G/DL (ref 3.5–5.2)
ALBUMIN SERPL ELPH-MCNC: 4.11 G/DL (ref 3.35–5.55)
ALLENS TEST: ABNORMAL
ALP SERPL-CCNC: 101 U/L (ref 55–135)
ALP SERPL-CCNC: 102 U/L (ref 55–135)
ALP SERPL-CCNC: 104 U/L (ref 55–135)
ALP SERPL-CCNC: 112 U/L (ref 55–135)
ALP SERPL-CCNC: 112 U/L (ref 55–135)
ALP SERPL-CCNC: 118 U/L (ref 55–135)
ALP SERPL-CCNC: 118 U/L (ref 55–135)
ALP SERPL-CCNC: 121 U/L (ref 55–135)
ALP SERPL-CCNC: 135 U/L (ref 55–135)
ALP SERPL-CCNC: 58 U/L (ref 55–135)
ALP SERPL-CCNC: 61 U/L (ref 55–135)
ALP SERPL-CCNC: 63 U/L (ref 55–135)
ALP SERPL-CCNC: 65 U/L (ref 55–135)
ALP SERPL-CCNC: 68 U/L (ref 55–135)
ALP SERPL-CCNC: 72 U/L (ref 55–135)
ALP SERPL-CCNC: 73 U/L (ref 55–135)
ALP SERPL-CCNC: 77 U/L (ref 55–135)
ALP SERPL-CCNC: 77 U/L (ref 55–135)
ALP SERPL-CCNC: 78 U/L (ref 55–135)
ALP SERPL-CCNC: 78 U/L (ref 55–135)
ALP SERPL-CCNC: 80 U/L (ref 55–135)
ALP SERPL-CCNC: 83 U/L (ref 55–135)
ALP SERPL-CCNC: 83 U/L (ref 55–135)
ALP SERPL-CCNC: 84 U/L (ref 55–135)
ALP SERPL-CCNC: 84 U/L (ref 55–135)
ALP SERPL-CCNC: 87 U/L (ref 55–135)
ALP SERPL-CCNC: 89 U/L (ref 55–135)
ALP SERPL-CCNC: 91 U/L (ref 55–135)
ALP SERPL-CCNC: 93 U/L (ref 55–135)
ALP SERPL-CCNC: 96 U/L (ref 55–135)
ALP SERPL-CCNC: 97 U/L (ref 55–135)
ALP SERPL-CCNC: 98 U/L (ref 55–135)
ALPHA1 GLOB SERPL ELPH-MCNC: 0.31 G/DL (ref 0.17–0.41)
ALPHA2 GLOB SERPL ELPH-MCNC: 0.57 G/DL (ref 0.43–0.99)
ALT SERPL W/O P-5'-P-CCNC: 12 U/L (ref 10–44)
ALT SERPL W/O P-5'-P-CCNC: 13 U/L (ref 10–44)
ALT SERPL W/O P-5'-P-CCNC: 15 U/L (ref 10–44)
ALT SERPL W/O P-5'-P-CCNC: 15 U/L (ref 10–44)
ALT SERPL W/O P-5'-P-CCNC: 16 U/L (ref 10–44)
ALT SERPL W/O P-5'-P-CCNC: 18 U/L (ref 10–44)
ALT SERPL W/O P-5'-P-CCNC: 19 U/L (ref 10–44)
ALT SERPL W/O P-5'-P-CCNC: 20 U/L (ref 10–44)
ALT SERPL W/O P-5'-P-CCNC: 20 U/L (ref 10–44)
ALT SERPL W/O P-5'-P-CCNC: 22 U/L (ref 10–44)
ALT SERPL W/O P-5'-P-CCNC: 23 U/L (ref 10–44)
ALT SERPL W/O P-5'-P-CCNC: 24 U/L (ref 10–44)
ALT SERPL W/O P-5'-P-CCNC: 25 U/L (ref 10–44)
ALT SERPL W/O P-5'-P-CCNC: 26 U/L (ref 10–44)
ALT SERPL W/O P-5'-P-CCNC: 26 U/L (ref 10–44)
ALT SERPL W/O P-5'-P-CCNC: 27 U/L (ref 10–44)
ALT SERPL W/O P-5'-P-CCNC: 27 U/L (ref 10–44)
ALT SERPL W/O P-5'-P-CCNC: 28 U/L (ref 10–44)
ALT SERPL W/O P-5'-P-CCNC: 29 U/L (ref 10–44)
ALT SERPL W/O P-5'-P-CCNC: 29 U/L (ref 10–44)
ALT SERPL W/O P-5'-P-CCNC: 32 U/L (ref 10–44)
ALT SERPL W/O P-5'-P-CCNC: 35 U/L (ref 10–44)
ALT SERPL W/O P-5'-P-CCNC: 36 U/L (ref 10–44)
ALT SERPL W/O P-5'-P-CCNC: 38 U/L (ref 10–44)
ALT SERPL W/O P-5'-P-CCNC: 38 U/L (ref 10–44)
ALT SERPL W/O P-5'-P-CCNC: 46 U/L (ref 10–44)
ANION GAP SERPL CALC-SCNC: 10 MMOL/L (ref 8–16)
ANION GAP SERPL CALC-SCNC: 11 MMOL/L (ref 8–16)
ANION GAP SERPL CALC-SCNC: 12 MMOL/L (ref 8–16)
ANION GAP SERPL CALC-SCNC: 13 MMOL/L (ref 8–16)
ANION GAP SERPL CALC-SCNC: 15 MMOL/L (ref 8–16)
ANION GAP SERPL CALC-SCNC: 15 MMOL/L (ref 8–16)
ANION GAP SERPL CALC-SCNC: 17 MMOL/L (ref 8–16)
ANION GAP SERPL CALC-SCNC: 19 MMOL/L (ref 8–16)
ANION GAP SERPL CALC-SCNC: 6 MMOL/L (ref 8–16)
ANION GAP SERPL CALC-SCNC: 7 MMOL/L (ref 8–16)
ANION GAP SERPL CALC-SCNC: 8 MMOL/L (ref 8–16)
ANION GAP SERPL CALC-SCNC: 9 MMOL/L (ref 8–16)
ANISOCYTOSIS BLD QL SMEAR: ABNORMAL
ANISOCYTOSIS BLD QL SMEAR: SLIGHT
APTT BLDCRRT: 21.9 SEC (ref 21–32)
APTT BLDCRRT: 23.5 SEC (ref 21–32)
APTT BLDCRRT: 25.6 SEC (ref 21–32)
APTT BLDCRRT: 26.6 SEC (ref 21–32)
APTT BLDCRRT: <21 SEC (ref 21–32)
APTT BLDCRRT: <21 SEC (ref 21–32)
ASCENDING AORTA: 2.92 CM
AST SERPL-CCNC: 10 U/L (ref 10–40)
AST SERPL-CCNC: 11 U/L (ref 10–40)
AST SERPL-CCNC: 11 U/L (ref 10–40)
AST SERPL-CCNC: 12 U/L (ref 10–40)
AST SERPL-CCNC: 13 U/L (ref 10–40)
AST SERPL-CCNC: 13 U/L (ref 10–40)
AST SERPL-CCNC: 14 U/L (ref 10–40)
AST SERPL-CCNC: 14 U/L (ref 10–40)
AST SERPL-CCNC: 15 U/L (ref 10–40)
AST SERPL-CCNC: 15 U/L (ref 10–40)
AST SERPL-CCNC: 16 U/L (ref 10–40)
AST SERPL-CCNC: 17 U/L (ref 10–40)
AST SERPL-CCNC: 19 U/L (ref 10–40)
AST SERPL-CCNC: 21 U/L (ref 10–40)
AST SERPL-CCNC: 21 U/L (ref 10–40)
AST SERPL-CCNC: 25 U/L (ref 10–40)
AST SERPL-CCNC: 26 U/L (ref 10–40)
AST SERPL-CCNC: 34 U/L (ref 10–40)
AST SERPL-CCNC: 34 U/L (ref 10–40)
AST SERPL-CCNC: 7 U/L (ref 10–40)
AST SERPL-CCNC: 7 U/L (ref 10–40)
AST SERPL-CCNC: 8 U/L (ref 10–40)
AST SERPL-CCNC: 9 U/L (ref 10–40)
AST SERPL-CCNC: 9 U/L (ref 10–40)
AV INDEX (PROSTH): 0.92
AV MEAN GRADIENT: 12 MMHG
AV PEAK GRADIENT: 25 MMHG
AV VALVE AREA: 2.6 CM2
AV VELOCITY RATIO: 0.75
B-GLOBULIN SERPL ELPH-MCNC: 0.59 G/DL (ref 0.5–1.1)
BACTERIA #/AREA URNS AUTO: NORMAL /HPF
BACTERIA #/AREA URNS HPF: ABNORMAL /HPF
BACTERIA BLD CULT: NORMAL
BASOPHILS # BLD AUTO: 0.02 K/UL (ref 0–0.2)
BASOPHILS # BLD AUTO: 0.02 K/UL (ref 0–0.2)
BASOPHILS # BLD AUTO: 0.03 K/UL (ref 0–0.2)
BASOPHILS # BLD AUTO: 0.04 K/UL (ref 0–0.2)
BASOPHILS # BLD AUTO: 0.05 K/UL (ref 0–0.2)
BASOPHILS # BLD AUTO: 0.06 K/UL (ref 0–0.2)
BASOPHILS # BLD AUTO: 0.06 K/UL (ref 0–0.2)
BASOPHILS # BLD AUTO: 0.07 K/UL (ref 0–0.2)
BASOPHILS # BLD AUTO: 0.07 K/UL (ref 0–0.2)
BASOPHILS # BLD AUTO: 0.09 K/UL (ref 0–0.2)
BASOPHILS # BLD AUTO: 0.12 K/UL (ref 0–0.2)
BASOPHILS # BLD AUTO: ABNORMAL K/UL (ref 0–0.2)
BASOPHILS NFR BLD: 0 % (ref 0–1.9)
BASOPHILS NFR BLD: 0.2 % (ref 0–1.9)
BASOPHILS NFR BLD: 0.3 % (ref 0–1.9)
BASOPHILS NFR BLD: 0.4 % (ref 0–1.9)
BASOPHILS NFR BLD: 0.5 % (ref 0–1.9)
BASOPHILS NFR BLD: 0.5 % (ref 0–1.9)
BASOPHILS NFR BLD: 0.6 % (ref 0–1.9)
BASOPHILS NFR BLD: 0.6 % (ref 0–1.9)
BASOPHILS NFR BLD: 0.7 % (ref 0–1.9)
BASOPHILS NFR BLD: 0.8 % (ref 0–1.9)
BASOPHILS NFR BLD: 0.9 % (ref 0–1.9)
BASOPHILS NFR BLD: 1 % (ref 0–1.9)
BASOPHILS NFR BLD: 2 % (ref 0–1.9)
BASOPHILS NFR BLD: 2 % (ref 0–1.9)
BILIRUB DIRECT SERPL-MCNC: 0.6 MG/DL (ref 0.1–0.3)
BILIRUB SERPL-MCNC: 0.5 MG/DL (ref 0.1–1)
BILIRUB SERPL-MCNC: 0.6 MG/DL (ref 0.1–1)
BILIRUB SERPL-MCNC: 0.7 MG/DL (ref 0.1–1)
BILIRUB SERPL-MCNC: 0.8 MG/DL (ref 0.1–1)
BILIRUB SERPL-MCNC: 0.8 MG/DL (ref 0.1–1)
BILIRUB SERPL-MCNC: 0.9 MG/DL (ref 0.1–1)
BILIRUB SERPL-MCNC: 1 MG/DL (ref 0.1–1)
BILIRUB SERPL-MCNC: 1 MG/DL (ref 0.1–1)
BILIRUB SERPL-MCNC: 1.1 MG/DL (ref 0.1–1)
BILIRUB SERPL-MCNC: 1.2 MG/DL (ref 0.1–1)
BILIRUB SERPL-MCNC: 1.3 MG/DL (ref 0.1–1)
BILIRUB SERPL-MCNC: 1.3 MG/DL (ref 0.1–1)
BILIRUB SERPL-MCNC: 1.5 MG/DL (ref 0.1–1)
BILIRUB SERPL-MCNC: 1.6 MG/DL (ref 0.1–1)
BILIRUB SERPL-MCNC: 1.8 MG/DL (ref 0.1–1)
BILIRUB SERPL-MCNC: 2.4 MG/DL (ref 0.1–1)
BILIRUB SERPL-MCNC: 2.7 MG/DL (ref 0.1–1)
BILIRUB SERPL-MCNC: 3.9 MG/DL (ref 0.1–1)
BILIRUB SERPL-MCNC: 5.4 MG/DL (ref 0.1–1)
BILIRUB SERPL-MCNC: 5.4 MG/DL (ref 0.1–1)
BILIRUB UR QL STRIP: NEGATIVE
BLD GP AB SCN CELLS X3 SERPL QL: NORMAL
BLD PROD TYP BPU: NORMAL
BLOOD UNIT EXPIRATION DATE: NORMAL
BLOOD UNIT TYPE CODE: 6200
BLOOD UNIT TYPE: NORMAL
BNP SERPL-MCNC: 1018 PG/ML (ref 0–99)
BNP SERPL-MCNC: 1034 PG/ML (ref 0–99)
BNP SERPL-MCNC: 1172 PG/ML (ref 0–99)
BNP SERPL-MCNC: 1190 PG/ML (ref 0–99)
BNP SERPL-MCNC: 1213 PG/ML (ref 0–99)
BNP SERPL-MCNC: 1277 PG/ML (ref 0–99)
BNP SERPL-MCNC: 1330 PG/ML (ref 0–99)
BNP SERPL-MCNC: 1340 PG/ML (ref 0–99)
BNP SERPL-MCNC: 1380 PG/ML (ref 0–99)
BNP SERPL-MCNC: 1423 PG/ML (ref 0–99)
BNP SERPL-MCNC: 3150 PG/ML (ref 0–99)
BNP SERPL-MCNC: 709 PG/ML (ref 0–99)
BNP SERPL-MCNC: 926 PG/ML (ref 0–99)
BNP SERPL-MCNC: 938 PG/ML (ref 0–99)
BSA FOR ECHO PROCEDURE: 1.58 M2
BUN SERPL-MCNC: 17 MG/DL (ref 8–23)
BUN SERPL-MCNC: 19 MG/DL (ref 8–23)
BUN SERPL-MCNC: 19 MG/DL (ref 8–23)
BUN SERPL-MCNC: 20 MG/DL (ref 8–23)
BUN SERPL-MCNC: 20 MG/DL (ref 8–23)
BUN SERPL-MCNC: 22 MG/DL (ref 8–23)
BUN SERPL-MCNC: 23 MG/DL (ref 8–23)
BUN SERPL-MCNC: 24 MG/DL (ref 8–23)
BUN SERPL-MCNC: 26 MG/DL (ref 8–23)
BUN SERPL-MCNC: 27 MG/DL (ref 8–23)
BUN SERPL-MCNC: 27 MG/DL (ref 8–23)
BUN SERPL-MCNC: 28 MG/DL (ref 8–23)
BUN SERPL-MCNC: 28 MG/DL (ref 8–23)
BUN SERPL-MCNC: 29 MG/DL (ref 8–23)
BUN SERPL-MCNC: 31 MG/DL (ref 8–23)
BUN SERPL-MCNC: 31 MG/DL (ref 8–23)
BUN SERPL-MCNC: 32 MG/DL (ref 8–23)
BUN SERPL-MCNC: 32 MG/DL (ref 8–23)
BUN SERPL-MCNC: 33 MG/DL (ref 8–23)
BUN SERPL-MCNC: 34 MG/DL (ref 8–23)
BUN SERPL-MCNC: 35 MG/DL (ref 8–23)
BUN SERPL-MCNC: 36 MG/DL (ref 8–23)
BUN SERPL-MCNC: 36 MG/DL (ref 8–23)
BUN SERPL-MCNC: 38 MG/DL (ref 8–23)
BUN SERPL-MCNC: 40 MG/DL (ref 8–23)
BUN SERPL-MCNC: 41 MG/DL (ref 8–23)
BUN SERPL-MCNC: 44 MG/DL (ref 8–23)
BUN SERPL-MCNC: 44 MG/DL (ref 8–23)
BUN SERPL-MCNC: 48 MG/DL (ref 8–23)
BUN SERPL-MCNC: 55 MG/DL (ref 8–23)
CALCIUM SERPL-MCNC: 7.7 MG/DL (ref 8.7–10.5)
CALCIUM SERPL-MCNC: 8.1 MG/DL (ref 8.7–10.5)
CALCIUM SERPL-MCNC: 8.2 MG/DL (ref 8.7–10.5)
CALCIUM SERPL-MCNC: 8.3 MG/DL (ref 8.7–10.5)
CALCIUM SERPL-MCNC: 8.4 MG/DL (ref 8.7–10.5)
CALCIUM SERPL-MCNC: 8.5 MG/DL (ref 8.7–10.5)
CALCIUM SERPL-MCNC: 8.6 MG/DL (ref 8.7–10.5)
CALCIUM SERPL-MCNC: 8.7 MG/DL (ref 8.7–10.5)
CALCIUM SERPL-MCNC: 8.8 MG/DL (ref 8.7–10.5)
CALCIUM SERPL-MCNC: 8.9 MG/DL (ref 8.7–10.5)
CALCIUM SERPL-MCNC: 9.1 MG/DL (ref 8.7–10.5)
CALCIUM SERPL-MCNC: 9.4 MG/DL (ref 8.7–10.5)
CHLORIDE SERPL-SCNC: 100 MMOL/L (ref 95–110)
CHLORIDE SERPL-SCNC: 101 MMOL/L (ref 95–110)
CHLORIDE SERPL-SCNC: 102 MMOL/L (ref 95–110)
CHLORIDE SERPL-SCNC: 102 MMOL/L (ref 95–110)
CHLORIDE SERPL-SCNC: 73 MMOL/L (ref 95–110)
CHLORIDE SERPL-SCNC: 89 MMOL/L (ref 95–110)
CHLORIDE SERPL-SCNC: 89 MMOL/L (ref 95–110)
CHLORIDE SERPL-SCNC: 91 MMOL/L (ref 95–110)
CHLORIDE SERPL-SCNC: 92 MMOL/L (ref 95–110)
CHLORIDE SERPL-SCNC: 93 MMOL/L (ref 95–110)
CHLORIDE SERPL-SCNC: 94 MMOL/L (ref 95–110)
CHLORIDE SERPL-SCNC: 95 MMOL/L (ref 95–110)
CHLORIDE SERPL-SCNC: 96 MMOL/L (ref 95–110)
CHLORIDE SERPL-SCNC: 97 MMOL/L (ref 95–110)
CHLORIDE SERPL-SCNC: 98 MMOL/L (ref 95–110)
CHLORIDE SERPL-SCNC: 99 MMOL/L (ref 95–110)
CHOLEST SERPL-MCNC: 129 MG/DL (ref 120–199)
CHOLEST SERPL-MCNC: 134 MG/DL (ref 120–199)
CHOLEST/HDLC SERPL: 2.2 {RATIO} (ref 2–5)
CHOLEST/HDLC SERPL: 2.2 {RATIO} (ref 2–5)
CK MB SERPL-MCNC: 0.6 NG/ML (ref 0.1–6.5)
CK MB SERPL-MCNC: 0.7 NG/ML (ref 0.1–6.5)
CK MB SERPL-MCNC: 0.7 NG/ML (ref 0.1–6.5)
CK MB SERPL-MCNC: 0.9 NG/ML (ref 0.1–6.5)
CK MB SERPL-MCNC: 0.9 NG/ML (ref 0.1–6.5)
CK MB SERPL-MCNC: 1.1 NG/ML (ref 0.1–6.5)
CK MB SERPL-MCNC: 1.2 NG/ML (ref 0.1–6.5)
CK MB SERPL-RTO: 10 % (ref 0–5)
CK MB SERPL-RTO: 12.2 % (ref 0–5)
CK MB SERPL-RTO: 4.3 % (ref 0–5)
CK MB SERPL-RTO: 4.4 % (ref 0–5)
CK MB SERPL-RTO: 6 % (ref 0–5)
CK MB SERPL-RTO: 6 % (ref 0–5)
CK MB SERPL-RTO: 9 % (ref 0–5)
CK SERPL-CCNC: 10 U/L (ref 20–180)
CK SERPL-CCNC: 10 U/L (ref 20–180)
CK SERPL-CCNC: 14 U/L (ref 20–180)
CK SERPL-CCNC: 14 U/L (ref 20–180)
CK SERPL-CCNC: 15 U/L (ref 20–180)
CK SERPL-CCNC: 15 U/L (ref 20–180)
CK SERPL-CCNC: 16 U/L (ref 20–180)
CK SERPL-CCNC: 16 U/L (ref 20–180)
CK SERPL-CCNC: 20 U/L (ref 20–180)
CK SERPL-CCNC: 7 U/L (ref 20–180)
CK SERPL-CCNC: 7 U/L (ref 20–180)
CK SERPL-CCNC: 9 U/L (ref 20–180)
CK SERPL-CCNC: 9 U/L (ref 20–180)
CLARITY UR REFRACT.AUTO: CLEAR
CLARITY UR: CLEAR
CLARITY UR: CLEAR
CO2 SERPL-SCNC: 28 MMOL/L (ref 23–29)
CO2 SERPL-SCNC: 29 MMOL/L (ref 23–29)
CO2 SERPL-SCNC: 29 MMOL/L (ref 23–29)
CO2 SERPL-SCNC: 30 MMOL/L (ref 23–29)
CO2 SERPL-SCNC: 31 MMOL/L (ref 23–29)
CO2 SERPL-SCNC: 32 MMOL/L (ref 23–29)
CO2 SERPL-SCNC: 33 MMOL/L (ref 23–29)
CO2 SERPL-SCNC: 34 MMOL/L (ref 23–29)
CO2 SERPL-SCNC: 35 MMOL/L (ref 23–29)
CO2 SERPL-SCNC: 36 MMOL/L (ref 23–29)
CO2 SERPL-SCNC: 37 MMOL/L (ref 23–29)
CO2 SERPL-SCNC: 38 MMOL/L (ref 23–29)
CO2 SERPL-SCNC: 39 MMOL/L (ref 23–29)
CO2 SERPL-SCNC: 41 MMOL/L (ref 23–29)
CO2 SERPL-SCNC: 43 MMOL/L (ref 23–29)
CO2 SERPL-SCNC: 43 MMOL/L (ref 23–29)
CO2 SERPL-SCNC: 50 MMOL/L (ref 23–29)
CODING SYSTEM: NORMAL
COLOR UR AUTO: YELLOW
COLOR UR: YELLOW
COLOR UR: YELLOW
COPPER SERPL-MCNC: 1127 UG/L (ref 810–1990)
CREAT SERPL-MCNC: 0.8 MG/DL (ref 0.5–1.4)
CREAT SERPL-MCNC: 0.9 MG/DL (ref 0.5–1.4)
CREAT SERPL-MCNC: 1 MG/DL (ref 0.5–1.4)
CREAT SERPL-MCNC: 1.1 MG/DL (ref 0.5–1.4)
CREAT SERPL-MCNC: 1.2 MG/DL (ref 0.5–1.4)
CREAT SERPL-MCNC: 1.3 MG/DL (ref 0.5–1.4)
CREAT SERPL-MCNC: 1.5 MG/DL (ref 0.5–1.4)
CREAT SERPL-MCNC: 1.5 MG/DL (ref 0.5–1.4)
CREAT SERPL-MCNC: 1.6 MG/DL (ref 0.5–1.4)
CREAT SERPL-MCNC: 1.6 MG/DL (ref 0.5–1.4)
CREAT SERPL-MCNC: 1.8 MG/DL (ref 0.5–1.4)
CRP SERPL-MCNC: 21.7 MG/L (ref 0–8.2)
CV ECHO LV RWT: 0.61 CM
D DIMER PPP IA.FEU-MCNC: 1.12 MG/L FEU
D DIMER PPP IA.FEU-MCNC: 1.22 MG/L FEU
D DIMER PPP IA.FEU-MCNC: 1.76 MG/L FEU
DACRYOCYTES BLD QL SMEAR: ABNORMAL
DELSYS: ABNORMAL
DIFFERENTIAL METHOD: ABNORMAL
DISPENSE STATUS: NORMAL
DOP CALC AO PEAK VEL: 2.5 M/S
DOP CALC AO VTI: 44.23 CM
DOP CALC LVOT AREA: 2.8 CM2
DOP CALC LVOT DIAMETER: 1.9 CM
DOP CALC LVOT PEAK VEL: 1.88 M/S
DOP CALC LVOT STROKE VOLUME: 114.86 CM3
DOP CALCLVOT PEAK VEL VTI: 40.53 CM
E WAVE DECELERATION TIME: 271.61 MSEC
E/A RATIO: 0.74
E/E' RATIO: 10.53 M/S
ECHO LV POSTERIOR WALL: 1.07 CM (ref 0.6–1.1)
EOSINOPHIL # BLD AUTO: 0 K/UL (ref 0–0.5)
EOSINOPHIL # BLD AUTO: 0.1 K/UL (ref 0–0.5)
EOSINOPHIL # BLD AUTO: 0.2 K/UL (ref 0–0.5)
EOSINOPHIL # BLD AUTO: 0.3 K/UL (ref 0–0.5)
EOSINOPHIL # BLD AUTO: 0.4 K/UL (ref 0–0.5)
EOSINOPHIL # BLD AUTO: 0.4 K/UL (ref 0–0.5)
EOSINOPHIL # BLD AUTO: 0.7 K/UL (ref 0–0.5)
EOSINOPHIL # BLD AUTO: ABNORMAL K/UL (ref 0–0.5)
EOSINOPHIL NFR BLD: 0 % (ref 0–8)
EOSINOPHIL NFR BLD: 0.1 % (ref 0–8)
EOSINOPHIL NFR BLD: 0.3 % (ref 0–8)
EOSINOPHIL NFR BLD: 0.3 % (ref 0–8)
EOSINOPHIL NFR BLD: 0.4 % (ref 0–8)
EOSINOPHIL NFR BLD: 0.8 % (ref 0–8)
EOSINOPHIL NFR BLD: 0.9 % (ref 0–8)
EOSINOPHIL NFR BLD: 1 % (ref 0–8)
EOSINOPHIL NFR BLD: 1.1 % (ref 0–8)
EOSINOPHIL NFR BLD: 1.2 % (ref 0–8)
EOSINOPHIL NFR BLD: 1.4 % (ref 0–8)
EOSINOPHIL NFR BLD: 1.5 % (ref 0–8)
EOSINOPHIL NFR BLD: 2 % (ref 0–8)
EOSINOPHIL NFR BLD: 2.3 % (ref 0–8)
EOSINOPHIL NFR BLD: 2.5 % (ref 0–8)
EOSINOPHIL NFR BLD: 2.6 % (ref 0–8)
EOSINOPHIL NFR BLD: 2.8 % (ref 0–8)
EOSINOPHIL NFR BLD: 3 % (ref 0–8)
EOSINOPHIL NFR BLD: 3.1 % (ref 0–8)
EOSINOPHIL NFR BLD: 3.4 % (ref 0–8)
EOSINOPHIL NFR BLD: 3.6 % (ref 0–8)
EOSINOPHIL NFR BLD: 4 % (ref 0–8)
EOSINOPHIL NFR BLD: 4.3 % (ref 0–8)
EOSINOPHIL NFR BLD: 6 % (ref 0–8)
EOSINOPHIL NFR BLD: 6 % (ref 0–8)
EOSINOPHIL NFR BLD: 7 % (ref 0–8)
EP: 5
ERYTHROCYTE [DISTWIDTH] IN BLOOD BY AUTOMATED COUNT: 21.8 % (ref 11.5–14.5)
ERYTHROCYTE [DISTWIDTH] IN BLOOD BY AUTOMATED COUNT: 23.7 % (ref 11.5–14.5)
ERYTHROCYTE [DISTWIDTH] IN BLOOD BY AUTOMATED COUNT: 23.8 % (ref 11.5–14.5)
ERYTHROCYTE [DISTWIDTH] IN BLOOD BY AUTOMATED COUNT: 23.8 % (ref 11.5–14.5)
ERYTHROCYTE [DISTWIDTH] IN BLOOD BY AUTOMATED COUNT: 24 % (ref 11.5–14.5)
ERYTHROCYTE [DISTWIDTH] IN BLOOD BY AUTOMATED COUNT: 24.8 % (ref 11.5–14.5)
ERYTHROCYTE [DISTWIDTH] IN BLOOD BY AUTOMATED COUNT: 25 % (ref 11.5–14.5)
ERYTHROCYTE [DISTWIDTH] IN BLOOD BY AUTOMATED COUNT: 25 % (ref 11.5–14.5)
ERYTHROCYTE [DISTWIDTH] IN BLOOD BY AUTOMATED COUNT: 25.1 % (ref 11.5–14.5)
ERYTHROCYTE [DISTWIDTH] IN BLOOD BY AUTOMATED COUNT: 25.2 % (ref 11.5–14.5)
ERYTHROCYTE [DISTWIDTH] IN BLOOD BY AUTOMATED COUNT: 25.5 % (ref 11.5–14.5)
ERYTHROCYTE [DISTWIDTH] IN BLOOD BY AUTOMATED COUNT: 27.3 % (ref 11.5–14.5)
ERYTHROCYTE [DISTWIDTH] IN BLOOD BY AUTOMATED COUNT: 27.4 % (ref 11.5–14.5)
ERYTHROCYTE [DISTWIDTH] IN BLOOD BY AUTOMATED COUNT: 27.9 % (ref 11.5–14.5)
ERYTHROCYTE [DISTWIDTH] IN BLOOD BY AUTOMATED COUNT: 27.9 % (ref 11.5–14.5)
ERYTHROCYTE [DISTWIDTH] IN BLOOD BY AUTOMATED COUNT: 28.8 % (ref 11.5–14.5)
ERYTHROCYTE [DISTWIDTH] IN BLOOD BY AUTOMATED COUNT: 28.8 % (ref 11.5–14.5)
ERYTHROCYTE [DISTWIDTH] IN BLOOD BY AUTOMATED COUNT: 29.1 % (ref 11.5–14.5)
ERYTHROCYTE [DISTWIDTH] IN BLOOD BY AUTOMATED COUNT: ABNORMAL % (ref 11.5–14.5)
ERYTHROCYTE [SEDIMENTATION RATE] IN BLOOD BY WESTERGREN METHOD: 36 MM/HR (ref 0–20)
EST. GFR  (AFRICAN AMERICAN): 30 ML/MIN/1.73 M^2
EST. GFR  (AFRICAN AMERICAN): 34 ML/MIN/1.73 M^2
EST. GFR  (AFRICAN AMERICAN): 34.3 ML/MIN/1.73 M^2
EST. GFR  (AFRICAN AMERICAN): 37 ML/MIN/1.73 M^2
EST. GFR  (AFRICAN AMERICAN): 37.1 ML/MIN/1.73 M^2
EST. GFR  (AFRICAN AMERICAN): 44 ML/MIN/1.73 M^2
EST. GFR  (AFRICAN AMERICAN): 44.1 ML/MIN/1.73 M^2
EST. GFR  (AFRICAN AMERICAN): 48 ML/MIN/1.73 M^2
EST. GFR  (AFRICAN AMERICAN): 48.6 ML/MIN/1.73 M^2
EST. GFR  (AFRICAN AMERICAN): 49 ML/MIN/1.73 M^2
EST. GFR  (AFRICAN AMERICAN): 54 ML/MIN/1.73 M^2
EST. GFR  (AFRICAN AMERICAN): >60 ML/MIN/1.73 M^2
EST. GFR  (NON AFRICAN AMERICAN): 26 ML/MIN/1.73 M^2
EST. GFR  (NON AFRICAN AMERICAN): 29.8 ML/MIN/1.73 M^2
EST. GFR  (NON AFRICAN AMERICAN): 30 ML/MIN/1.73 M^2
EST. GFR  (NON AFRICAN AMERICAN): 32 ML/MIN/1.73 M^2
EST. GFR  (NON AFRICAN AMERICAN): 32.2 ML/MIN/1.73 M^2
EST. GFR  (NON AFRICAN AMERICAN): 38 ML/MIN/1.73 M^2
EST. GFR  (NON AFRICAN AMERICAN): 38.3 ML/MIN/1.73 M^2
EST. GFR  (NON AFRICAN AMERICAN): 42 ML/MIN/1.73 M^2
EST. GFR  (NON AFRICAN AMERICAN): 42.2 ML/MIN/1.73 M^2
EST. GFR  (NON AFRICAN AMERICAN): 46.9 ML/MIN/1.73 M^2
EST. GFR  (NON AFRICAN AMERICAN): 47 ML/MIN/1.73 M^2
EST. GFR  (NON AFRICAN AMERICAN): 52.6 ML/MIN/1.73 M^2
EST. GFR  (NON AFRICAN AMERICAN): 53 ML/MIN/1.73 M^2
EST. GFR  (NON AFRICAN AMERICAN): 59.7 ML/MIN/1.73 M^2
EST. GFR  (NON AFRICAN AMERICAN): 60 ML/MIN/1.73 M^2
EST. GFR  (NON AFRICAN AMERICAN): >60 ML/MIN/1.73 M^2
ESTIMATED AVG GLUCOSE: 108 MG/DL (ref 68–131)
ESTIMATED AVG GLUCOSE: 140 MG/DL (ref 68–131)
ESTIMATED AVG GLUCOSE: 143 MG/DL (ref 68–131)
ESTIMATED AVG GLUCOSE: 171 MG/DL (ref 68–131)
FERRITIN SERPL-MCNC: 1167 NG/ML (ref 20–300)
FERRITIN SERPL-MCNC: 1189 NG/ML (ref 20–300)
FERRITIN SERPL-MCNC: 1297 NG/ML (ref 20–300)
FERRITIN SERPL-MCNC: 1448 NG/ML (ref 20–300)
FIO2: 32
FIO2: 40
FLOW: 3
FOLATE SERPL-MCNC: 14 NG/ML (ref 4–24)
FOLATE SERPL-MCNC: 9.6 NG/ML (ref 4–24)
FRACTIONAL SHORTENING: 32 % (ref 28–44)
GAMMA GLOB SERPL ELPH-MCNC: 0.82 G/DL (ref 0.67–1.58)
GIANT PLATELETS BLD QL SMEAR: PRESENT
GLUCOSE SERPL-MCNC: 103 MG/DL (ref 70–110)
GLUCOSE SERPL-MCNC: 110 MG/DL (ref 70–110)
GLUCOSE SERPL-MCNC: 111 MG/DL (ref 70–110)
GLUCOSE SERPL-MCNC: 114 MG/DL (ref 70–110)
GLUCOSE SERPL-MCNC: 118 MG/DL (ref 70–110)
GLUCOSE SERPL-MCNC: 128 MG/DL (ref 70–110)
GLUCOSE SERPL-MCNC: 132 MG/DL (ref 70–110)
GLUCOSE SERPL-MCNC: 136 MG/DL (ref 70–110)
GLUCOSE SERPL-MCNC: 140 MG/DL (ref 70–110)
GLUCOSE SERPL-MCNC: 145 MG/DL (ref 70–110)
GLUCOSE SERPL-MCNC: 148 MG/DL (ref 70–110)
GLUCOSE SERPL-MCNC: 150 MG/DL (ref 70–110)
GLUCOSE SERPL-MCNC: 152 MG/DL (ref 70–110)
GLUCOSE SERPL-MCNC: 153 MG/DL (ref 70–110)
GLUCOSE SERPL-MCNC: 167 MG/DL (ref 70–110)
GLUCOSE SERPL-MCNC: 172 MG/DL (ref 70–110)
GLUCOSE SERPL-MCNC: 173 MG/DL (ref 70–110)
GLUCOSE SERPL-MCNC: 179 MG/DL (ref 70–110)
GLUCOSE SERPL-MCNC: 194 MG/DL (ref 70–110)
GLUCOSE SERPL-MCNC: 197 MG/DL (ref 70–110)
GLUCOSE SERPL-MCNC: 201 MG/DL (ref 70–110)
GLUCOSE SERPL-MCNC: 207 MG/DL (ref 70–110)
GLUCOSE SERPL-MCNC: 208 MG/DL (ref 70–110)
GLUCOSE SERPL-MCNC: 221 MG/DL (ref 70–110)
GLUCOSE SERPL-MCNC: 231 MG/DL (ref 70–110)
GLUCOSE SERPL-MCNC: 232 MG/DL (ref 70–110)
GLUCOSE SERPL-MCNC: 237 MG/DL (ref 70–110)
GLUCOSE SERPL-MCNC: 241 MG/DL (ref 70–110)
GLUCOSE SERPL-MCNC: 243 MG/DL (ref 70–110)
GLUCOSE SERPL-MCNC: 245 MG/DL (ref 70–110)
GLUCOSE SERPL-MCNC: 249 MG/DL (ref 70–110)
GLUCOSE SERPL-MCNC: 253 MG/DL (ref 70–110)
GLUCOSE SERPL-MCNC: 300 MG/DL (ref 70–110)
GLUCOSE SERPL-MCNC: 307 MG/DL (ref 70–110)
GLUCOSE SERPL-MCNC: 362 MG/DL (ref 70–110)
GLUCOSE SERPL-MCNC: 62 MG/DL (ref 70–110)
GLUCOSE SERPL-MCNC: 74 MG/DL (ref 70–110)
GLUCOSE SERPL-MCNC: 81 MG/DL (ref 70–110)
GLUCOSE SERPL-MCNC: 85 MG/DL (ref 70–110)
GLUCOSE SERPL-MCNC: 95 MG/DL (ref 70–110)
GLUCOSE SERPL-MCNC: 95 MG/DL (ref 70–110)
GLUCOSE UR QL STRIP: NEGATIVE
GROUP A STREP, MOLECULAR: NEGATIVE
GROUP A STREP, MOLECULAR: NEGATIVE
HAPTOGLOB SERPL-MCNC: 118 MG/DL (ref 30–250)
HAPTOGLOB SERPL-MCNC: 95 MG/DL (ref 30–250)
HBA1C MFR BLD HPLC: 5.4 % (ref 4–5.6)
HBA1C MFR BLD HPLC: 6.5 % (ref 4–5.6)
HBA1C MFR BLD HPLC: 6.6 % (ref 4–5.6)
HBA1C MFR BLD HPLC: 7.6 % (ref 4–5.6)
HBV CORE AB SERPL QL IA: NEGATIVE
HBV SURFACE AG SERPL QL IA: NEGATIVE
HCO3 UR-SCNC: 31.9 MMOL/L (ref 24–28)
HCO3 UR-SCNC: 33.7 MMOL/L (ref 24–28)
HCO3 UR-SCNC: 41.3 MMOL/L (ref 24–28)
HCO3 UR-SCNC: 41.7 MMOL/L (ref 22–26)
HCO3 UR-SCNC: 43.9 MMOL/L (ref 22–26)
HCT VFR BLD AUTO: 18.5 % (ref 37–48.5)
HCT VFR BLD AUTO: 19.4 % (ref 37–48.5)
HCT VFR BLD AUTO: 20.9 % (ref 37–48.5)
HCT VFR BLD AUTO: 22.4 % (ref 37–48.5)
HCT VFR BLD AUTO: 23.2 % (ref 37–48.5)
HCT VFR BLD AUTO: 23.7 % (ref 37–48.5)
HCT VFR BLD AUTO: 23.9 % (ref 37–48.5)
HCT VFR BLD AUTO: 24.3 % (ref 37–48.5)
HCT VFR BLD AUTO: 24.4 % (ref 37–48.5)
HCT VFR BLD AUTO: 24.6 % (ref 37–48.5)
HCT VFR BLD AUTO: 25.3 % (ref 37–48.5)
HCT VFR BLD AUTO: 25.5 % (ref 37–48.5)
HCT VFR BLD AUTO: 25.6 % (ref 37–48.5)
HCT VFR BLD AUTO: 25.7 % (ref 37–48.5)
HCT VFR BLD AUTO: 25.9 % (ref 37–48.5)
HCT VFR BLD AUTO: 26.2 % (ref 37–48.5)
HCT VFR BLD AUTO: 26.8 % (ref 37–48.5)
HCT VFR BLD AUTO: 26.9 % (ref 37–48.5)
HCT VFR BLD AUTO: 27 % (ref 37–48.5)
HCT VFR BLD AUTO: 27.6 % (ref 37–48.5)
HCT VFR BLD AUTO: 27.7 % (ref 37–48.5)
HCT VFR BLD AUTO: 28 % (ref 37–48.5)
HCT VFR BLD AUTO: 28.3 % (ref 37–48.5)
HCT VFR BLD AUTO: 28.5 % (ref 37–48.5)
HCT VFR BLD AUTO: 28.6 % (ref 37–48.5)
HCT VFR BLD AUTO: 29 % (ref 37–48.5)
HCT VFR BLD AUTO: 29.5 % (ref 37–48.5)
HCT VFR BLD AUTO: 29.5 % (ref 37–48.5)
HCT VFR BLD AUTO: 29.8 % (ref 37–48.5)
HCT VFR BLD AUTO: 30.3 % (ref 37–48.5)
HCT VFR BLD AUTO: 30.7 % (ref 37–48.5)
HCT VFR BLD AUTO: 31.2 % (ref 37–48.5)
HCT VFR BLD AUTO: 31.4 % (ref 37–48.5)
HCT VFR BLD AUTO: 31.5 % (ref 37–48.5)
HCT VFR BLD AUTO: 32.6 % (ref 37–48.5)
HCT VFR BLD AUTO: 33 % (ref 37–48.5)
HCT VFR BLD AUTO: 33 % (ref 37–48.5)
HCT VFR BLD AUTO: 35.2 % (ref 37–48.5)
HCT VFR BLD AUTO: 38 % (ref 37–48.5)
HCV AB SERPL QL IA: NEGATIVE
HCYS SERPL-SCNC: 14.3 UMOL/L (ref 4–15.5)
HDLC SERPL-MCNC: 58 MG/DL (ref 40–75)
HDLC SERPL-MCNC: 61 MG/DL (ref 40–75)
HDLC SERPL: 45 % (ref 20–50)
HDLC SERPL: 45.5 % (ref 20–50)
HGB BLD-MCNC: 10.3 G/DL (ref 12–16)
HGB BLD-MCNC: 10.3 G/DL (ref 12–16)
HGB BLD-MCNC: 10.5 G/DL (ref 12–16)
HGB BLD-MCNC: 11.3 G/DL (ref 12–16)
HGB BLD-MCNC: 12.4 G/DL (ref 12–16)
HGB BLD-MCNC: 5.7 G/DL (ref 12–16)
HGB BLD-MCNC: 6 G/DL (ref 12–16)
HGB BLD-MCNC: 6.9 G/DL (ref 12–16)
HGB BLD-MCNC: 7 G/DL (ref 12–16)
HGB BLD-MCNC: 7.2 G/DL (ref 12–16)
HGB BLD-MCNC: 7.2 G/DL (ref 12–16)
HGB BLD-MCNC: 7.5 G/DL (ref 12–16)
HGB BLD-MCNC: 7.6 G/DL (ref 12–16)
HGB BLD-MCNC: 7.8 G/DL (ref 12–16)
HGB BLD-MCNC: 7.9 G/DL (ref 12–16)
HGB BLD-MCNC: 7.9 G/DL (ref 12–16)
HGB BLD-MCNC: 8.1 G/DL (ref 12–16)
HGB BLD-MCNC: 8.1 G/DL (ref 12–16)
HGB BLD-MCNC: 8.3 G/DL (ref 12–16)
HGB BLD-MCNC: 8.3 G/DL (ref 12–16)
HGB BLD-MCNC: 8.4 G/DL (ref 12–16)
HGB BLD-MCNC: 8.5 G/DL (ref 12–16)
HGB BLD-MCNC: 8.6 G/DL (ref 12–16)
HGB BLD-MCNC: 8.6 G/DL (ref 12–16)
HGB BLD-MCNC: 9 G/DL (ref 12–16)
HGB BLD-MCNC: 9.1 G/DL (ref 12–16)
HGB BLD-MCNC: 9.2 G/DL (ref 12–16)
HGB BLD-MCNC: 9.5 G/DL (ref 12–16)
HGB BLD-MCNC: 9.5 G/DL (ref 12–16)
HGB BLD-MCNC: 9.6 G/DL (ref 12–16)
HGB BLD-MCNC: 9.7 G/DL (ref 12–16)
HGB BLD-MCNC: 9.8 G/DL (ref 12–16)
HGB UR QL STRIP: ABNORMAL
HGB UR QL STRIP: NEGATIVE
HGB UR QL STRIP: NEGATIVE
HIV 1+2 AB+HIV1 P24 AG SERPL QL IA: NEGATIVE
HR TR ECHO: 75 BPM
HYALINE CASTS #/AREA URNS LPF: 30 /LPF
HYALINE CASTS UR QL AUTO: 1 /LPF
HYPOCHROMIA BLD QL SMEAR: ABNORMAL
IMM GRANULOCYTES # BLD AUTO: 0.11 K/UL (ref 0–0.04)
IMM GRANULOCYTES # BLD AUTO: 0.12 K/UL (ref 0–0.04)
IMM GRANULOCYTES # BLD AUTO: 0.13 K/UL (ref 0–0.04)
IMM GRANULOCYTES # BLD AUTO: 0.14 K/UL (ref 0–0.04)
IMM GRANULOCYTES # BLD AUTO: 0.15 K/UL (ref 0–0.04)
IMM GRANULOCYTES # BLD AUTO: 0.16 K/UL (ref 0–0.04)
IMM GRANULOCYTES # BLD AUTO: 0.16 K/UL (ref 0–0.04)
IMM GRANULOCYTES # BLD AUTO: 0.17 K/UL (ref 0–0.04)
IMM GRANULOCYTES # BLD AUTO: 0.19 K/UL (ref 0–0.04)
IMM GRANULOCYTES # BLD AUTO: 0.21 K/UL (ref 0–0.04)
IMM GRANULOCYTES # BLD AUTO: 0.25 K/UL (ref 0–0.04)
IMM GRANULOCYTES # BLD AUTO: 0.26 K/UL (ref 0–0.04)
IMM GRANULOCYTES # BLD AUTO: 0.28 K/UL (ref 0–0.04)
IMM GRANULOCYTES # BLD AUTO: 0.29 K/UL (ref 0–0.04)
IMM GRANULOCYTES # BLD AUTO: 0.29 K/UL (ref 0–0.04)
IMM GRANULOCYTES # BLD AUTO: 0.43 K/UL (ref 0–0.04)
IMM GRANULOCYTES # BLD AUTO: 0.52 K/UL (ref 0–0.04)
IMM GRANULOCYTES # BLD AUTO: 0.52 K/UL (ref 0–0.04)
IMM GRANULOCYTES # BLD AUTO: 0.73 K/UL (ref 0–0.04)
IMM GRANULOCYTES # BLD AUTO: ABNORMAL K/UL (ref 0–0.04)
IMM GRANULOCYTES NFR BLD AUTO: 1.2 % (ref 0–0.5)
IMM GRANULOCYTES NFR BLD AUTO: 1.3 % (ref 0–0.5)
IMM GRANULOCYTES NFR BLD AUTO: 1.3 % (ref 0–0.5)
IMM GRANULOCYTES NFR BLD AUTO: 1.4 % (ref 0–0.5)
IMM GRANULOCYTES NFR BLD AUTO: 1.5 % (ref 0–0.5)
IMM GRANULOCYTES NFR BLD AUTO: 1.6 % (ref 0–0.5)
IMM GRANULOCYTES NFR BLD AUTO: 1.7 % (ref 0–0.5)
IMM GRANULOCYTES NFR BLD AUTO: 1.8 % (ref 0–0.5)
IMM GRANULOCYTES NFR BLD AUTO: 1.9 % (ref 0–0.5)
IMM GRANULOCYTES NFR BLD AUTO: 2 % (ref 0–0.5)
IMM GRANULOCYTES NFR BLD AUTO: 2 % (ref 0–0.5)
IMM GRANULOCYTES NFR BLD AUTO: 2.1 % (ref 0–0.5)
IMM GRANULOCYTES NFR BLD AUTO: 2.3 % (ref 0–0.5)
IMM GRANULOCYTES NFR BLD AUTO: 2.3 % (ref 0–0.5)
IMM GRANULOCYTES NFR BLD AUTO: 2.4 % (ref 0–0.5)
IMM GRANULOCYTES NFR BLD AUTO: 2.5 % (ref 0–0.5)
IMM GRANULOCYTES NFR BLD AUTO: 2.5 % (ref 0–0.5)
IMM GRANULOCYTES NFR BLD AUTO: ABNORMAL % (ref 0–0.5)
INFLUENZA A, MOLECULAR: NEGATIVE
INFLUENZA A, MOLECULAR: NEGATIVE
INFLUENZA B, MOLECULAR: NEGATIVE
INFLUENZA B, MOLECULAR: NEGATIVE
INR PPP: 1.1 (ref 0.8–1.2)
INR PPP: 1.1 (ref 0.8–1.2)
INR PPP: 1.2 (ref 0.8–1.2)
INR PPP: 1.3 (ref 0.8–1.2)
INTERPRETATION SERPL IFE-IMP: NORMAL
INTERVENTRICULAR SEPTUM: 1.1 CM (ref 0.6–1.1)
IP: 10
IRON SERPL-MCNC: 100 UG/DL (ref 30–160)
IRON SERPL-MCNC: 194 UG/DL (ref 30–160)
IRON SERPL-MCNC: 214 UG/DL (ref 30–160)
IVRT: 102.76 MSEC
KETONES UR QL STRIP: NEGATIVE
LA MAJOR: 5.88 CM
LA MINOR: 5.9 CM
LA WIDTH: 3.71 CM
LACTATE SERPL-SCNC: 1.8 MMOL/L (ref 0.5–2.2)
LACTATE SERPL-SCNC: 2.2 MMOL/L (ref 0.5–2.2)
LACTATE SERPL-SCNC: 2.4 MMOL/L (ref 0.5–2.2)
LACTATE SERPL-SCNC: 6.4 MMOL/L (ref 0.5–2.2)
LDH SERPL L TO P-CCNC: 192 U/L (ref 110–260)
LDH SERPL L TO P-CCNC: 273 U/L (ref 110–260)
LDLC SERPL CALC-MCNC: 53.6 MG/DL (ref 63–159)
LDLC SERPL CALC-MCNC: 57.8 MG/DL (ref 63–159)
LEFT ATRIUM SIZE: 3.1 CM
LEFT ATRIUM VOLUME INDEX: 36.5 ML/M2
LEFT ATRIUM VOLUME: 57.58 CM3
LEFT INTERNAL DIMENSION IN SYSTOLE: 2.37 CM (ref 2.1–4)
LEFT VENTRICLE DIASTOLIC VOLUME INDEX: 31.78 ML/M2
LEFT VENTRICLE DIASTOLIC VOLUME: 50.16 ML
LEFT VENTRICLE MASS INDEX: 73 G/M2
LEFT VENTRICLE SYSTOLIC VOLUME INDEX: 12.3 ML/M2
LEFT VENTRICLE SYSTOLIC VOLUME: 19.45 ML
LEFT VENTRICULAR INTERNAL DIMENSION IN DIASTOLE: 3.48 CM (ref 3.5–6)
LEFT VENTRICULAR MASS: 115.6 G
LEUKOCYTE ESTERASE UR QL STRIP: ABNORMAL
LEUKOCYTE ESTERASE UR QL STRIP: NEGATIVE
LEUKOCYTE ESTERASE UR QL STRIP: NEGATIVE
LV LATERAL E/E' RATIO: 8.78 M/S
LV SEPTAL E/E' RATIO: 13.17 M/S
LYMPHOCYTES # BLD AUTO: 0.1 K/UL (ref 1–4.8)
LYMPHOCYTES # BLD AUTO: 0.2 K/UL (ref 1–4.8)
LYMPHOCYTES # BLD AUTO: 0.3 K/UL (ref 1–4.8)
LYMPHOCYTES # BLD AUTO: 0.4 K/UL (ref 1–4.8)
LYMPHOCYTES # BLD AUTO: 0.4 K/UL (ref 1–4.8)
LYMPHOCYTES # BLD AUTO: 0.5 K/UL (ref 1–4.8)
LYMPHOCYTES # BLD AUTO: 0.6 K/UL (ref 1–4.8)
LYMPHOCYTES # BLD AUTO: 0.7 K/UL (ref 1–4.8)
LYMPHOCYTES # BLD AUTO: 0.7 K/UL (ref 1–4.8)
LYMPHOCYTES # BLD AUTO: 0.8 K/UL (ref 1–4.8)
LYMPHOCYTES # BLD AUTO: 0.9 K/UL (ref 1–4.8)
LYMPHOCYTES # BLD AUTO: 1.3 K/UL (ref 1–4.8)
LYMPHOCYTES # BLD AUTO: 1.8 K/UL (ref 1–4.8)
LYMPHOCYTES # BLD AUTO: 2 K/UL (ref 1–4.8)
LYMPHOCYTES # BLD AUTO: ABNORMAL K/UL (ref 1–4.8)
LYMPHOCYTES NFR BLD: 0 % (ref 18–48)
LYMPHOCYTES NFR BLD: 0.9 % (ref 18–48)
LYMPHOCYTES NFR BLD: 1 % (ref 18–48)
LYMPHOCYTES NFR BLD: 1.3 % (ref 18–48)
LYMPHOCYTES NFR BLD: 1.5 % (ref 18–48)
LYMPHOCYTES NFR BLD: 1.6 % (ref 18–48)
LYMPHOCYTES NFR BLD: 1.7 % (ref 18–48)
LYMPHOCYTES NFR BLD: 11 % (ref 18–48)
LYMPHOCYTES NFR BLD: 15.9 % (ref 18–48)
LYMPHOCYTES NFR BLD: 2 % (ref 18–48)
LYMPHOCYTES NFR BLD: 2.1 % (ref 18–48)
LYMPHOCYTES NFR BLD: 2.1 % (ref 18–48)
LYMPHOCYTES NFR BLD: 2.4 % (ref 18–48)
LYMPHOCYTES NFR BLD: 2.6 % (ref 18–48)
LYMPHOCYTES NFR BLD: 2.9 % (ref 18–48)
LYMPHOCYTES NFR BLD: 2.9 % (ref 18–48)
LYMPHOCYTES NFR BLD: 20 % (ref 18–48)
LYMPHOCYTES NFR BLD: 21 % (ref 18–48)
LYMPHOCYTES NFR BLD: 22 % (ref 18–48)
LYMPHOCYTES NFR BLD: 24 % (ref 18–48)
LYMPHOCYTES NFR BLD: 3 % (ref 18–48)
LYMPHOCYTES NFR BLD: 3 % (ref 18–48)
LYMPHOCYTES NFR BLD: 3.4 % (ref 18–48)
LYMPHOCYTES NFR BLD: 3.4 % (ref 18–48)
LYMPHOCYTES NFR BLD: 4 % (ref 18–48)
LYMPHOCYTES NFR BLD: 4 % (ref 18–48)
LYMPHOCYTES NFR BLD: 4.6 % (ref 18–48)
LYMPHOCYTES NFR BLD: 5.3 % (ref 18–48)
LYMPHOCYTES NFR BLD: 5.5 % (ref 18–48)
LYMPHOCYTES NFR BLD: 6 % (ref 18–48)
LYMPHOCYTES NFR BLD: 6.9 % (ref 18–48)
LYMPHOCYTES NFR BLD: 7 % (ref 18–48)
LYMPHOCYTES NFR BLD: 7.5 % (ref 18–48)
LYMPHOCYTES NFR BLD: 8 % (ref 18–48)
LYMPHOCYTES NFR BLD: 8.5 % (ref 18–48)
MAGNESIUM SERPL-MCNC: 1.3 MG/DL (ref 1.6–2.6)
MAGNESIUM SERPL-MCNC: 1.4 MG/DL (ref 1.6–2.6)
MAGNESIUM SERPL-MCNC: 1.4 MG/DL (ref 1.6–2.6)
MAGNESIUM SERPL-MCNC: 1.6 MG/DL (ref 1.6–2.6)
MAGNESIUM SERPL-MCNC: 1.6 MG/DL (ref 1.6–2.6)
MAGNESIUM SERPL-MCNC: 1.7 MG/DL (ref 1.6–2.6)
MAGNESIUM SERPL-MCNC: 1.8 MG/DL (ref 1.6–2.6)
MAGNESIUM SERPL-MCNC: 1.8 MG/DL (ref 1.6–2.6)
MAGNESIUM SERPL-MCNC: 1.9 MG/DL (ref 1.6–2.6)
MAGNESIUM SERPL-MCNC: 2 MG/DL (ref 1.6–2.6)
MAGNESIUM SERPL-MCNC: 2 MG/DL (ref 1.6–2.6)
MAGNESIUM SERPL-MCNC: 2.1 MG/DL (ref 1.6–2.6)
MAGNESIUM SERPL-MCNC: 2.2 MG/DL (ref 1.6–2.6)
MCH RBC QN AUTO: 33.5 PG (ref 27–31)
MCH RBC QN AUTO: 33.7 PG (ref 27–31)
MCH RBC QN AUTO: 34.1 PG (ref 27–31)
MCH RBC QN AUTO: 34.3 PG (ref 27–31)
MCH RBC QN AUTO: 34.4 PG (ref 27–31)
MCH RBC QN AUTO: 34.4 PG (ref 27–31)
MCH RBC QN AUTO: 34.6 PG (ref 27–31)
MCH RBC QN AUTO: 34.6 PG (ref 27–31)
MCH RBC QN AUTO: 34.8 PG (ref 27–31)
MCH RBC QN AUTO: 34.9 PG (ref 27–31)
MCH RBC QN AUTO: 34.9 PG (ref 27–31)
MCH RBC QN AUTO: 35 PG (ref 27–31)
MCH RBC QN AUTO: 35.1 PG (ref 27–31)
MCH RBC QN AUTO: 35.2 PG (ref 27–31)
MCH RBC QN AUTO: 35.3 PG (ref 27–31)
MCH RBC QN AUTO: 35.3 PG (ref 27–31)
MCH RBC QN AUTO: 35.4 PG (ref 27–31)
MCH RBC QN AUTO: 35.5 PG (ref 27–31)
MCH RBC QN AUTO: 35.7 PG (ref 27–31)
MCH RBC QN AUTO: 35.8 PG (ref 27–31)
MCH RBC QN AUTO: 35.9 PG (ref 27–31)
MCH RBC QN AUTO: 36.1 PG (ref 27–31)
MCH RBC QN AUTO: 36.1 PG (ref 27–31)
MCH RBC QN AUTO: 36.2 PG (ref 27–31)
MCH RBC QN AUTO: 36.6 PG (ref 27–31)
MCH RBC QN AUTO: 37 PG (ref 27–31)
MCH RBC QN AUTO: 37.4 PG (ref 27–31)
MCH RBC QN AUTO: 37.5 PG (ref 27–31)
MCH RBC QN AUTO: 37.6 PG (ref 27–31)
MCH RBC QN AUTO: 38.4 PG (ref 27–31)
MCH RBC QN AUTO: 38.5 PG (ref 27–31)
MCH RBC QN AUTO: 39 PG (ref 27–31)
MCH RBC QN AUTO: 40.8 PG (ref 27–31)
MCHC RBC AUTO-ENTMCNC: 29.7 G/DL (ref 32–36)
MCHC RBC AUTO-ENTMCNC: 30.4 G/DL (ref 32–36)
MCHC RBC AUTO-ENTMCNC: 30.6 G/DL (ref 32–36)
MCHC RBC AUTO-ENTMCNC: 30.8 G/DL (ref 32–36)
MCHC RBC AUTO-ENTMCNC: 30.9 G/DL (ref 32–36)
MCHC RBC AUTO-ENTMCNC: 31 G/DL (ref 32–36)
MCHC RBC AUTO-ENTMCNC: 31.1 G/DL (ref 32–36)
MCHC RBC AUTO-ENTMCNC: 31.1 G/DL (ref 32–36)
MCHC RBC AUTO-ENTMCNC: 31.2 G/DL (ref 32–36)
MCHC RBC AUTO-ENTMCNC: 31.3 G/DL (ref 32–36)
MCHC RBC AUTO-ENTMCNC: 31.3 G/DL (ref 32–36)
MCHC RBC AUTO-ENTMCNC: 31.4 G/DL (ref 32–36)
MCHC RBC AUTO-ENTMCNC: 31.5 G/DL (ref 32–36)
MCHC RBC AUTO-ENTMCNC: 31.6 G/DL (ref 32–36)
MCHC RBC AUTO-ENTMCNC: 31.7 G/DL (ref 32–36)
MCHC RBC AUTO-ENTMCNC: 31.8 G/DL (ref 32–36)
MCHC RBC AUTO-ENTMCNC: 32.1 G/DL (ref 32–36)
MCHC RBC AUTO-ENTMCNC: 32.2 G/DL (ref 32–36)
MCHC RBC AUTO-ENTMCNC: 32.3 G/DL (ref 32–36)
MCHC RBC AUTO-ENTMCNC: 32.4 G/DL (ref 32–36)
MCHC RBC AUTO-ENTMCNC: 32.6 G/DL (ref 32–36)
MCHC RBC AUTO-ENTMCNC: 32.6 G/DL (ref 32–36)
MCHC RBC AUTO-ENTMCNC: 32.7 G/DL (ref 32–36)
MCHC RBC AUTO-ENTMCNC: 32.9 G/DL (ref 32–36)
MCHC RBC AUTO-ENTMCNC: 33 G/DL (ref 32–36)
MCV RBC AUTO: 105 FL (ref 82–98)
MCV RBC AUTO: 106 FL (ref 82–98)
MCV RBC AUTO: 106 FL (ref 82–98)
MCV RBC AUTO: 107 FL (ref 82–98)
MCV RBC AUTO: 108 FL (ref 82–98)
MCV RBC AUTO: 109 FL (ref 82–98)
MCV RBC AUTO: 110 FL (ref 82–98)
MCV RBC AUTO: 111 FL (ref 82–98)
MCV RBC AUTO: 112 FL (ref 82–98)
MCV RBC AUTO: 113 FL (ref 82–98)
MCV RBC AUTO: 114 FL (ref 82–98)
MCV RBC AUTO: 115 FL (ref 82–98)
MCV RBC AUTO: 116 FL (ref 82–98)
MCV RBC AUTO: 121 FL (ref 82–98)
MCV RBC AUTO: 122 FL (ref 82–98)
MCV RBC AUTO: 123 FL (ref 82–98)
MCV RBC AUTO: 127 FL (ref 82–98)
MCV RBC AUTO: 132 FL (ref 82–98)
METAMYELOCYTES NFR BLD MANUAL: 1 %
METAMYELOCYTES NFR BLD MANUAL: 1 %
METAMYELOCYTES NFR BLD MANUAL: 2 %
METAMYELOCYTES NFR BLD MANUAL: 2 %
METHYLMALONATE SERPL-SCNC: 1.99 UMOL/L
MICROSCOPIC COMMENT: ABNORMAL
MICROSCOPIC COMMENT: NORMAL
MODE: ABNORMAL
MODE: ABNORMAL
MONOCYTES # BLD AUTO: 0.6 K/UL (ref 0.3–1)
MONOCYTES # BLD AUTO: 0.7 K/UL (ref 0.3–1)
MONOCYTES # BLD AUTO: 0.8 K/UL (ref 0.3–1)
MONOCYTES # BLD AUTO: 1.1 K/UL (ref 0.3–1)
MONOCYTES # BLD AUTO: 1.2 K/UL (ref 0.3–1)
MONOCYTES # BLD AUTO: 1.3 K/UL (ref 0.3–1)
MONOCYTES # BLD AUTO: 1.4 K/UL (ref 0.3–1)
MONOCYTES # BLD AUTO: 1.5 K/UL (ref 0.3–1)
MONOCYTES # BLD AUTO: 1.6 K/UL (ref 0.3–1)
MONOCYTES # BLD AUTO: 1.6 K/UL (ref 0.3–1)
MONOCYTES # BLD AUTO: 1.9 K/UL (ref 0.3–1)
MONOCYTES # BLD AUTO: 2 K/UL (ref 0.3–1)
MONOCYTES # BLD AUTO: 2.1 K/UL (ref 0.3–1)
MONOCYTES # BLD AUTO: 3.3 K/UL (ref 0.3–1)
MONOCYTES # BLD AUTO: 5.3 K/UL (ref 0.3–1)
MONOCYTES # BLD AUTO: ABNORMAL K/UL (ref 0.3–1)
MONOCYTES NFR BLD: 10 % (ref 4–15)
MONOCYTES NFR BLD: 10.5 % (ref 4–15)
MONOCYTES NFR BLD: 10.8 % (ref 4–15)
MONOCYTES NFR BLD: 11 % (ref 4–15)
MONOCYTES NFR BLD: 11 % (ref 4–15)
MONOCYTES NFR BLD: 12.3 % (ref 4–15)
MONOCYTES NFR BLD: 12.5 % (ref 4–15)
MONOCYTES NFR BLD: 12.7 % (ref 4–15)
MONOCYTES NFR BLD: 13.2 % (ref 4–15)
MONOCYTES NFR BLD: 13.3 % (ref 4–15)
MONOCYTES NFR BLD: 13.4 % (ref 4–15)
MONOCYTES NFR BLD: 13.6 % (ref 4–15)
MONOCYTES NFR BLD: 13.7 % (ref 4–15)
MONOCYTES NFR BLD: 14 % (ref 4–15)
MONOCYTES NFR BLD: 14.4 % (ref 4–15)
MONOCYTES NFR BLD: 16 % (ref 4–15)
MONOCYTES NFR BLD: 16 % (ref 4–15)
MONOCYTES NFR BLD: 17.3 % (ref 4–15)
MONOCYTES NFR BLD: 17.7 % (ref 4–15)
MONOCYTES NFR BLD: 2 % (ref 4–15)
MONOCYTES NFR BLD: 21 % (ref 4–15)
MONOCYTES NFR BLD: 21.1 % (ref 4–15)
MONOCYTES NFR BLD: 21.7 % (ref 4–15)
MONOCYTES NFR BLD: 25.4 % (ref 4–15)
MONOCYTES NFR BLD: 3 % (ref 4–15)
MONOCYTES NFR BLD: 3 % (ref 4–15)
MONOCYTES NFR BLD: 3.7 % (ref 4–15)
MONOCYTES NFR BLD: 5.1 % (ref 4–15)
MONOCYTES NFR BLD: 5.7 % (ref 4–15)
MONOCYTES NFR BLD: 6.4 % (ref 4–15)
MONOCYTES NFR BLD: 7 % (ref 4–15)
MONOCYTES NFR BLD: 8 % (ref 4–15)
MONOCYTES NFR BLD: 8.2 % (ref 4–15)
MONOCYTES NFR BLD: 8.5 % (ref 4–15)
MONOCYTES NFR BLD: 9 % (ref 4–15)
MV MEAN GRADIENT: 2 MMHG
MV PEAK A VEL: 1.07 M/S
MV PEAK E VEL: 0.79 M/S
MV STENOSIS PRESSURE HALF TIME: 78.77 MS
MV VALVE AREA P 1/2 METHOD: 2.79 CM2
MYCOBACTERIUM SPEC QL CULT: NORMAL
NEUTROPHILS # BLD AUTO: 11.3 K/UL (ref 1.8–7.7)
NEUTROPHILS # BLD AUTO: 12.1 K/UL (ref 1.8–7.7)
NEUTROPHILS # BLD AUTO: 12.8 K/UL (ref 1.8–7.7)
NEUTROPHILS # BLD AUTO: 13.9 K/UL (ref 1.8–7.7)
NEUTROPHILS # BLD AUTO: 16.3 K/UL (ref 1.8–7.7)
NEUTROPHILS # BLD AUTO: 19 K/UL (ref 1.8–7.7)
NEUTROPHILS # BLD AUTO: 21.3 K/UL (ref 1.8–7.7)
NEUTROPHILS # BLD AUTO: 25.7 K/UL (ref 1.8–7.7)
NEUTROPHILS # BLD AUTO: 4.6 K/UL (ref 1.8–7.7)
NEUTROPHILS # BLD AUTO: 4.8 K/UL (ref 1.8–7.7)
NEUTROPHILS # BLD AUTO: 5 K/UL (ref 1.8–7.7)
NEUTROPHILS # BLD AUTO: 5 K/UL (ref 1.8–7.7)
NEUTROPHILS # BLD AUTO: 5.9 K/UL (ref 1.8–7.7)
NEUTROPHILS # BLD AUTO: 6.4 K/UL (ref 1.8–7.7)
NEUTROPHILS # BLD AUTO: 6.9 K/UL (ref 1.8–7.7)
NEUTROPHILS # BLD AUTO: 7.7 K/UL (ref 1.8–7.7)
NEUTROPHILS # BLD AUTO: 7.8 K/UL (ref 1.8–7.7)
NEUTROPHILS # BLD AUTO: 7.8 K/UL (ref 1.8–7.7)
NEUTROPHILS # BLD AUTO: 8.1 K/UL (ref 1.8–7.7)
NEUTROPHILS # BLD AUTO: 8.5 K/UL (ref 1.8–7.7)
NEUTROPHILS # BLD AUTO: 9.2 K/UL (ref 1.8–7.7)
NEUTROPHILS # BLD AUTO: 9.6 K/UL (ref 1.8–7.7)
NEUTROPHILS # BLD AUTO: 9.8 K/UL (ref 1.8–7.7)
NEUTROPHILS # BLD AUTO: 9.8 K/UL (ref 1.8–7.7)
NEUTROPHILS # BLD AUTO: 9.9 K/UL (ref 1.8–7.7)
NEUTROPHILS NFR BLD: 44 % (ref 38–73)
NEUTROPHILS NFR BLD: 50.9 % (ref 38–73)
NEUTROPHILS NFR BLD: 57 % (ref 38–73)
NEUTROPHILS NFR BLD: 58.7 % (ref 38–73)
NEUTROPHILS NFR BLD: 58.9 % (ref 38–73)
NEUTROPHILS NFR BLD: 61 % (ref 38–73)
NEUTROPHILS NFR BLD: 63 % (ref 38–73)
NEUTROPHILS NFR BLD: 64 % (ref 38–73)
NEUTROPHILS NFR BLD: 66.8 % (ref 38–73)
NEUTROPHILS NFR BLD: 67.2 % (ref 38–73)
NEUTROPHILS NFR BLD: 70.4 % (ref 38–73)
NEUTROPHILS NFR BLD: 71 % (ref 38–73)
NEUTROPHILS NFR BLD: 72 % (ref 38–73)
NEUTROPHILS NFR BLD: 73.3 % (ref 38–73)
NEUTROPHILS NFR BLD: 74 % (ref 38–73)
NEUTROPHILS NFR BLD: 74 % (ref 38–73)
NEUTROPHILS NFR BLD: 75 % (ref 38–73)
NEUTROPHILS NFR BLD: 75.6 % (ref 38–73)
NEUTROPHILS NFR BLD: 75.8 % (ref 38–73)
NEUTROPHILS NFR BLD: 76.3 % (ref 38–73)
NEUTROPHILS NFR BLD: 77.3 % (ref 38–73)
NEUTROPHILS NFR BLD: 78 % (ref 38–73)
NEUTROPHILS NFR BLD: 79.6 % (ref 38–73)
NEUTROPHILS NFR BLD: 79.9 % (ref 38–73)
NEUTROPHILS NFR BLD: 81 % (ref 38–73)
NEUTROPHILS NFR BLD: 81 % (ref 38–73)
NEUTROPHILS NFR BLD: 82.1 % (ref 38–73)
NEUTROPHILS NFR BLD: 82.6 % (ref 38–73)
NEUTROPHILS NFR BLD: 83 % (ref 38–73)
NEUTROPHILS NFR BLD: 85.5 % (ref 38–73)
NEUTROPHILS NFR BLD: 88.2 % (ref 38–73)
NEUTROPHILS NFR BLD: 88.2 % (ref 38–73)
NEUTROPHILS NFR BLD: 88.3 % (ref 38–73)
NEUTROPHILS NFR BLD: 88.5 % (ref 38–73)
NEUTROPHILS NFR BLD: 89.2 % (ref 38–73)
NEUTROPHILS NFR BLD: 89.8 % (ref 38–73)
NEUTROPHILS NFR BLD: 91.7 % (ref 38–73)
NEUTS BAND NFR BLD MANUAL: 10 %
NEUTS BAND NFR BLD MANUAL: 10 %
NEUTS BAND NFR BLD MANUAL: 12 %
NEUTS BAND NFR BLD MANUAL: 12 %
NEUTS BAND NFR BLD MANUAL: 15 %
NEUTS BAND NFR BLD MANUAL: 17 %
NEUTS BAND NFR BLD MANUAL: 18 %
NEUTS BAND NFR BLD MANUAL: 25 %
NEUTS BAND NFR BLD MANUAL: 25 %
NEUTS BAND NFR BLD MANUAL: 3 %
NEUTS BAND NFR BLD MANUAL: 4 %
NEUTS BAND NFR BLD MANUAL: 7 %
NEUTS BAND NFR BLD MANUAL: 8 %
NITRITE UR QL STRIP: NEGATIVE
NONHDLC SERPL-MCNC: 71 MG/DL
NONHDLC SERPL-MCNC: 73 MG/DL
NRBC BLD-RTO: 0 /100 WBC
NUM UNITS TRANS PACKED RBC: NORMAL
OB PNL STL: NEGATIVE
OB PNL STL: NEGATIVE
OVALOCYTES BLD QL SMEAR: ABNORMAL
PATH REV BLD -IMP: NORMAL
PATH REV BLD -IMP: NORMAL
PATHOLOGIST INTERPRETATION IFE: NORMAL
PATHOLOGIST INTERPRETATION SPE: NORMAL
PCO2 BLDA: 48.7 MMHG (ref 35–45)
PCO2 BLDA: 52.2 MMHG (ref 35–45)
PCO2 BLDA: 54.4 MMHG (ref 35–45)
PCO2 BLDA: 56 MMHG (ref 35–45)
PCO2 BLDA: 59 MMHG (ref 35–45)
PH SMN: 7.38 [PH] (ref 7.35–7.45)
PH SMN: 7.45 [PH] (ref 7.35–7.45)
PH SMN: 7.48 [PH] (ref 7.35–7.45)
PH SMN: 7.48 [PH] (ref 7.35–7.45)
PH SMN: 7.51 [PH] (ref 7.35–7.45)
PH UR STRIP: 6 [PH] (ref 5–8)
PHOSPHATE SERPL-MCNC: 3 MG/DL (ref 2.7–4.5)
PHOSPHATE SERPL-MCNC: 3.3 MG/DL (ref 2.7–4.5)
PHOSPHATE SERPL-MCNC: 3.5 MG/DL (ref 2.7–4.5)
PHOSPHATE SERPL-MCNC: 3.7 MG/DL (ref 2.7–4.5)
PHOSPHATE SERPL-MCNC: 3.7 MG/DL (ref 2.7–4.5)
PHOSPHATE SERPL-MCNC: 3.9 MG/DL (ref 2.7–4.5)
PHOSPHATE SERPL-MCNC: 4.3 MG/DL (ref 2.7–4.5)
PISA TR MAX VEL: 4.14 M/S
PLATELET # BLD AUTO: 108 K/UL (ref 150–350)
PLATELET # BLD AUTO: 111 K/UL (ref 150–350)
PLATELET # BLD AUTO: 112 K/UL (ref 150–350)
PLATELET # BLD AUTO: 115 K/UL (ref 150–350)
PLATELET # BLD AUTO: 121 K/UL (ref 150–350)
PLATELET # BLD AUTO: 124 K/UL (ref 150–350)
PLATELET # BLD AUTO: 127 K/UL (ref 150–350)
PLATELET # BLD AUTO: 135 K/UL (ref 150–350)
PLATELET # BLD AUTO: 137 K/UL (ref 150–350)
PLATELET # BLD AUTO: 140 K/UL (ref 150–350)
PLATELET # BLD AUTO: 143 K/UL (ref 150–350)
PLATELET # BLD AUTO: 147 K/UL (ref 150–350)
PLATELET # BLD AUTO: 147 K/UL (ref 150–350)
PLATELET # BLD AUTO: 148 K/UL (ref 150–350)
PLATELET # BLD AUTO: 149 K/UL (ref 150–350)
PLATELET # BLD AUTO: 154 K/UL (ref 150–350)
PLATELET # BLD AUTO: 155 K/UL (ref 150–350)
PLATELET # BLD AUTO: 156 K/UL (ref 150–350)
PLATELET # BLD AUTO: 157 K/UL (ref 150–350)
PLATELET # BLD AUTO: 161 K/UL (ref 150–350)
PLATELET # BLD AUTO: 163 K/UL (ref 150–350)
PLATELET # BLD AUTO: 165 K/UL (ref 150–350)
PLATELET # BLD AUTO: 165 K/UL (ref 150–350)
PLATELET # BLD AUTO: 166 K/UL (ref 150–350)
PLATELET # BLD AUTO: 166 K/UL (ref 150–350)
PLATELET # BLD AUTO: 167 K/UL (ref 150–350)
PLATELET # BLD AUTO: 170 K/UL (ref 150–350)
PLATELET # BLD AUTO: 177 K/UL (ref 150–350)
PLATELET # BLD AUTO: 178 K/UL (ref 150–350)
PLATELET # BLD AUTO: 181 K/UL (ref 150–350)
PLATELET # BLD AUTO: 181 K/UL (ref 150–350)
PLATELET # BLD AUTO: 186 K/UL (ref 150–350)
PLATELET # BLD AUTO: 191 K/UL (ref 150–350)
PLATELET # BLD AUTO: 192 K/UL (ref 150–350)
PLATELET # BLD AUTO: 205 K/UL (ref 150–350)
PLATELET # BLD AUTO: 224 K/UL (ref 150–350)
PLATELET # BLD AUTO: 224 K/UL (ref 150–350)
PLATELET # BLD AUTO: 244 K/UL (ref 150–350)
PLATELET # BLD AUTO: 252 K/UL (ref 150–350)
PLATELET BLD QL SMEAR: ABNORMAL
PMV BLD AUTO: 12.5 FL (ref 9.2–12.9)
PMV BLD AUTO: 12.6 FL (ref 9.2–12.9)
PMV BLD AUTO: 12.7 FL (ref 9.2–12.9)
PMV BLD AUTO: 12.7 FL (ref 9.2–12.9)
PMV BLD AUTO: 12.8 FL (ref 9.2–12.9)
PMV BLD AUTO: 12.9 FL (ref 9.2–12.9)
PMV BLD AUTO: 13 FL (ref 9.2–12.9)
PMV BLD AUTO: 13 FL (ref 9.2–12.9)
PMV BLD AUTO: 13.1 FL (ref 9.2–12.9)
PMV BLD AUTO: 13.2 FL (ref 9.2–12.9)
PMV BLD AUTO: 13.3 FL (ref 9.2–12.9)
PMV BLD AUTO: 13.3 FL (ref 9.2–12.9)
PMV BLD AUTO: 13.4 FL (ref 9.2–12.9)
PMV BLD AUTO: 13.5 FL (ref 9.2–12.9)
PMV BLD AUTO: 13.6 FL (ref 9.2–12.9)
PMV BLD AUTO: 13.7 FL (ref 9.2–12.9)
PMV BLD AUTO: 13.9 FL (ref 9.2–12.9)
PMV BLD AUTO: 13.9 FL (ref 9.2–12.9)
PMV BLD AUTO: 14 FL (ref 9.2–12.9)
PMV BLD AUTO: 14.1 FL (ref 9.2–12.9)
PMV BLD AUTO: ABNORMAL FL (ref 9.2–12.9)
PMV BLD AUTO: ABNORMAL FL (ref 9.2–12.9)
PO2 BLDA: 28 MMHG (ref 40–60)
PO2 BLDA: 52 MMHG (ref 80–100)
PO2 BLDA: 52 MMHG (ref 80–100)
PO2 BLDA: 53 MMHG (ref 75–100)
PO2 BLDA: 56 MMHG (ref 75–100)
POC BE: 10 MMOL/L
POC BE: 15.8 MMOL/L (ref -2–2)
POC BE: 18 MMOL/L
POC BE: 18 MMOL/L (ref -2–2)
POC BE: 7 MMOL/L
POC COHB: 2.4 % (ref 0–3)
POC COHB: 3.9 % (ref 0–3)
POC METHB: 1.4 % (ref 0–1.5)
POC METHB: 1.8 % (ref 0–1.5)
POC O2HB ARTERIAL: 86 % (ref 94–100)
POC O2HB ARTERIAL: 87.7 % (ref 94–100)
POC SATURATED O2: 50 % (ref 95–100)
POC SATURATED O2: 87 % (ref 95–100)
POC SATURATED O2: 89 % (ref 95–100)
POC SATURATED O2: 89.9 % (ref 90–100)
POC SATURATED O2: 92.6 % (ref 90–100)
POC TCO2: 33 MMOL/L (ref 24–29)
POC TCO2: 35 MMOL/L (ref 23–27)
POC TCO2: 43 MMOL/L (ref 23–27)
POC TCO2: 43.4 MMOL/L
POC TCO2: 45.7 MMOL/L
POC THB: 11.6 G/DL (ref 12–18)
POC THB: 9.6 G/DL (ref 12–18)
POCT GLUCOSE: 101 MG/DL (ref 70–110)
POCT GLUCOSE: 104 MG/DL (ref 70–110)
POCT GLUCOSE: 107 MG/DL (ref 70–110)
POCT GLUCOSE: 111 MG/DL (ref 70–110)
POCT GLUCOSE: 120 MG/DL (ref 70–110)
POCT GLUCOSE: 125 MG/DL (ref 70–110)
POCT GLUCOSE: 134 MG/DL (ref 70–110)
POCT GLUCOSE: 144 MG/DL (ref 70–110)
POCT GLUCOSE: 165 MG/DL (ref 70–110)
POCT GLUCOSE: 168 MG/DL (ref 70–110)
POCT GLUCOSE: 174 MG/DL (ref 70–110)
POCT GLUCOSE: 175 MG/DL (ref 70–110)
POCT GLUCOSE: 176 MG/DL (ref 70–110)
POCT GLUCOSE: 176 MG/DL (ref 70–110)
POCT GLUCOSE: 181 MG/DL (ref 70–110)
POCT GLUCOSE: 183 MG/DL (ref 70–110)
POCT GLUCOSE: 185 MG/DL (ref 70–110)
POCT GLUCOSE: 186 MG/DL (ref 70–110)
POCT GLUCOSE: 187 MG/DL (ref 70–110)
POCT GLUCOSE: 190 MG/DL (ref 70–110)
POCT GLUCOSE: 191 MG/DL (ref 70–110)
POCT GLUCOSE: 192 MG/DL (ref 70–110)
POCT GLUCOSE: 200 MG/DL (ref 70–110)
POCT GLUCOSE: 203 MG/DL (ref 70–110)
POCT GLUCOSE: 211 MG/DL (ref 70–110)
POCT GLUCOSE: 218 MG/DL (ref 70–110)
POCT GLUCOSE: 224 MG/DL (ref 70–110)
POCT GLUCOSE: 229 MG/DL (ref 70–110)
POCT GLUCOSE: 233 MG/DL (ref 70–110)
POCT GLUCOSE: 233 MG/DL (ref 70–110)
POCT GLUCOSE: 237 MG/DL (ref 70–110)
POCT GLUCOSE: 240 MG/DL (ref 70–110)
POCT GLUCOSE: 241 MG/DL (ref 70–110)
POCT GLUCOSE: 243 MG/DL (ref 70–110)
POCT GLUCOSE: 246 MG/DL (ref 70–110)
POCT GLUCOSE: 259 MG/DL (ref 70–110)
POCT GLUCOSE: 260 MG/DL (ref 70–110)
POCT GLUCOSE: 260 MG/DL (ref 70–110)
POCT GLUCOSE: 261 MG/DL (ref 70–110)
POCT GLUCOSE: 271 MG/DL (ref 70–110)
POCT GLUCOSE: 278 MG/DL (ref 70–110)
POCT GLUCOSE: 281 MG/DL (ref 70–110)
POCT GLUCOSE: 286 MG/DL (ref 70–110)
POCT GLUCOSE: 286 MG/DL (ref 70–110)
POCT GLUCOSE: 287 MG/DL (ref 70–110)
POCT GLUCOSE: 299 MG/DL (ref 70–110)
POCT GLUCOSE: 300 MG/DL (ref 70–110)
POCT GLUCOSE: 300 MG/DL (ref 70–110)
POCT GLUCOSE: 301 MG/DL (ref 70–110)
POCT GLUCOSE: 309 MG/DL (ref 70–110)
POCT GLUCOSE: 316 MG/DL (ref 70–110)
POCT GLUCOSE: 329 MG/DL (ref 70–110)
POCT GLUCOSE: 332 MG/DL (ref 70–110)
POCT GLUCOSE: 340 MG/DL (ref 70–110)
POCT GLUCOSE: 362 MG/DL (ref 70–110)
POCT GLUCOSE: 383 MG/DL (ref 70–110)
POCT GLUCOSE: 387 MG/DL (ref 70–110)
POCT GLUCOSE: 435 MG/DL (ref 70–110)
POCT GLUCOSE: 71 MG/DL (ref 70–110)
POCT GLUCOSE: 74 MG/DL (ref 70–110)
POCT GLUCOSE: 81 MG/DL (ref 70–110)
POCT GLUCOSE: 92 MG/DL (ref 70–110)
POCT GLUCOSE: 94 MG/DL (ref 70–110)
POCT GLUCOSE: 94 MG/DL (ref 70–110)
POCT GLUCOSE: 97 MG/DL (ref 70–110)
POIKILOCYTOSIS BLD QL SMEAR: ABNORMAL
POIKILOCYTOSIS BLD QL SMEAR: SLIGHT
POLYCHROMASIA BLD QL SMEAR: ABNORMAL
POTASSIUM SERPL-SCNC: 2.9 MMOL/L (ref 3.5–5.1)
POTASSIUM SERPL-SCNC: 2.9 MMOL/L (ref 3.5–5.1)
POTASSIUM SERPL-SCNC: 3.2 MMOL/L (ref 3.5–5.1)
POTASSIUM SERPL-SCNC: 3.3 MMOL/L (ref 3.5–5.1)
POTASSIUM SERPL-SCNC: 3.6 MMOL/L (ref 3.5–5.1)
POTASSIUM SERPL-SCNC: 3.7 MMOL/L (ref 3.5–5.1)
POTASSIUM SERPL-SCNC: 3.8 MMOL/L (ref 3.5–5.1)
POTASSIUM SERPL-SCNC: 3.9 MMOL/L (ref 3.5–5.1)
POTASSIUM SERPL-SCNC: 4 MMOL/L (ref 3.5–5.1)
POTASSIUM SERPL-SCNC: 4 MMOL/L (ref 3.5–5.1)
POTASSIUM SERPL-SCNC: 4.1 MMOL/L (ref 3.5–5.1)
POTASSIUM SERPL-SCNC: 4.2 MMOL/L (ref 3.5–5.1)
POTASSIUM SERPL-SCNC: 4.3 MMOL/L (ref 3.5–5.1)
POTASSIUM SERPL-SCNC: 4.3 MMOL/L (ref 3.5–5.1)
POTASSIUM SERPL-SCNC: 4.4 MMOL/L (ref 3.5–5.1)
POTASSIUM SERPL-SCNC: 4.5 MMOL/L (ref 3.5–5.1)
POTASSIUM SERPL-SCNC: 4.5 MMOL/L (ref 3.5–5.1)
POTASSIUM SERPL-SCNC: 4.6 MMOL/L (ref 3.5–5.1)
POTASSIUM SERPL-SCNC: 4.6 MMOL/L (ref 3.5–5.1)
POTASSIUM SERPL-SCNC: 4.7 MMOL/L (ref 3.5–5.1)
POTASSIUM SERPL-SCNC: 4.9 MMOL/L (ref 3.5–5.1)
POTASSIUM SERPL-SCNC: 5.1 MMOL/L (ref 3.5–5.1)
PROCALCITONIN SERPL IA-MCNC: 0.11 NG/ML
PROCALCITONIN SERPL IA-MCNC: 4.23 NG/ML
PROLACTIN SERPL IA-MCNC: 24.2 NG/ML (ref 5.2–26.5)
PROT SERPL-MCNC: 5.3 G/DL (ref 6–8.4)
PROT SERPL-MCNC: 5.4 G/DL (ref 6–8.4)
PROT SERPL-MCNC: 5.5 G/DL (ref 6–8.4)
PROT SERPL-MCNC: 5.5 G/DL (ref 6–8.4)
PROT SERPL-MCNC: 5.6 G/DL (ref 6–8.4)
PROT SERPL-MCNC: 5.6 G/DL (ref 6–8.4)
PROT SERPL-MCNC: 5.7 G/DL (ref 6–8.4)
PROT SERPL-MCNC: 5.8 G/DL (ref 6–8.4)
PROT SERPL-MCNC: 5.8 G/DL (ref 6–8.4)
PROT SERPL-MCNC: 5.9 G/DL (ref 6–8.4)
PROT SERPL-MCNC: 6 G/DL (ref 6–8.4)
PROT SERPL-MCNC: 6.1 G/DL (ref 6–8.4)
PROT SERPL-MCNC: 6.2 G/DL (ref 6–8.4)
PROT SERPL-MCNC: 6.3 G/DL (ref 6–8.4)
PROT SERPL-MCNC: 6.4 G/DL (ref 6–8.4)
PROT SERPL-MCNC: 6.6 G/DL (ref 6–8.4)
PROT SERPL-MCNC: 6.6 G/DL (ref 6–8.4)
PROT SERPL-MCNC: 7 G/DL (ref 6–8.4)
PROT SERPL-MCNC: 7.5 G/DL (ref 6–8.4)
PROT UR QL STRIP: ABNORMAL
PROT UR QL STRIP: NEGATIVE
PROT UR QL STRIP: NEGATIVE
PROTHROMBIN TIME: 11.4 SEC (ref 9–12.5)
PROTHROMBIN TIME: 12.1 SEC (ref 9–12.5)
PROTHROMBIN TIME: 12.3 SEC (ref 9–12.5)
PROTHROMBIN TIME: 12.4 SEC (ref 9–12.5)
PROTHROMBIN TIME: 12.4 SEC (ref 9–12.5)
PROTHROMBIN TIME: 12.9 SEC (ref 9–12.5)
PROTHROMBIN TIME: 13.5 SEC (ref 9–12.5)
PROTHROMBIN TIME: 13.6 SEC (ref 9–12.5)
RA MAJOR: 6 CM
RA PRESSURE: 3 MMHG
RA WIDTH: 3 CM
RBC # BLD AUTO: 1.46 M/UL (ref 4–5.4)
RBC # BLD AUTO: 1.47 M/UL (ref 4–5.4)
RBC # BLD AUTO: 1.82 M/UL (ref 4–5.4)
RBC # BLD AUTO: 1.92 M/UL (ref 4–5.4)
RBC # BLD AUTO: 1.98 M/UL (ref 4–5.4)
RBC # BLD AUTO: 2.02 M/UL (ref 4–5.4)
RBC # BLD AUTO: 2.06 M/UL (ref 4–5.4)
RBC # BLD AUTO: 2.11 M/UL (ref 4–5.4)
RBC # BLD AUTO: 2.19 M/UL (ref 4–5.4)
RBC # BLD AUTO: 2.2 M/UL (ref 4–5.4)
RBC # BLD AUTO: 2.21 M/UL (ref 4–5.4)
RBC # BLD AUTO: 2.22 M/UL (ref 4–5.4)
RBC # BLD AUTO: 2.27 M/UL (ref 4–5.4)
RBC # BLD AUTO: 2.28 M/UL (ref 4–5.4)
RBC # BLD AUTO: 2.29 M/UL (ref 4–5.4)
RBC # BLD AUTO: 2.32 M/UL (ref 4–5.4)
RBC # BLD AUTO: 2.32 M/UL (ref 4–5.4)
RBC # BLD AUTO: 2.4 M/UL (ref 4–5.4)
RBC # BLD AUTO: 2.4 M/UL (ref 4–5.4)
RBC # BLD AUTO: 2.44 M/UL (ref 4–5.4)
RBC # BLD AUTO: 2.46 M/UL (ref 4–5.4)
RBC # BLD AUTO: 2.51 M/UL (ref 4–5.4)
RBC # BLD AUTO: 2.51 M/UL (ref 4–5.4)
RBC # BLD AUTO: 2.56 M/UL (ref 4–5.4)
RBC # BLD AUTO: 2.6 M/UL (ref 4–5.4)
RBC # BLD AUTO: 2.62 M/UL (ref 4–5.4)
RBC # BLD AUTO: 2.62 M/UL (ref 4–5.4)
RBC # BLD AUTO: 2.69 M/UL (ref 4–5.4)
RBC # BLD AUTO: 2.69 M/UL (ref 4–5.4)
RBC # BLD AUTO: 2.7 M/UL (ref 4–5.4)
RBC # BLD AUTO: 2.75 M/UL (ref 4–5.4)
RBC # BLD AUTO: 2.77 M/UL (ref 4–5.4)
RBC # BLD AUTO: 2.78 M/UL (ref 4–5.4)
RBC # BLD AUTO: 2.82 M/UL (ref 4–5.4)
RBC # BLD AUTO: 2.85 M/UL (ref 4–5.4)
RBC # BLD AUTO: 2.9 M/UL (ref 4–5.4)
RBC # BLD AUTO: 2.98 M/UL (ref 4–5.4)
RBC # BLD AUTO: 3.21 M/UL (ref 4–5.4)
RBC # BLD AUTO: 3.54 M/UL (ref 4–5.4)
RBC #/AREA URNS AUTO: 2 /HPF (ref 0–4)
RBC #/AREA URNS HPF: 3 /HPF (ref 0–4)
RETICS/RBC NFR AUTO: 4.1 % (ref 0.5–2.5)
RETICS/RBC NFR AUTO: 4.6 % (ref 0.5–2.5)
RH BLD: NORMAL
RH BLD: NORMAL
RIGHT VENTRICULAR END-DIASTOLIC DIMENSION: 3.18 CM
RV TISSUE DOPPLER FREE WALL SYSTOLIC VELOCITY 1 (APICAL 4 CHAMBER VIEW): 10.01 CM/S
SAMPLE: ABNORMAL
SARS-COV-2 RDRP RESP QL NAA+PROBE: NEGATIVE
SATURATED IRON: 36 % (ref 20–50)
SATURATED IRON: 83 % (ref 20–50)
SATURATED IRON: 83 % (ref 20–50)
SCHISTOCYTES BLD QL SMEAR: ABNORMAL
SCHISTOCYTES BLD QL SMEAR: PRESENT
SINUS: 2.94 CM
SITE: ABNORMAL
SODIUM SERPL-SCNC: 134 MMOL/L (ref 136–145)
SODIUM SERPL-SCNC: 134 MMOL/L (ref 136–145)
SODIUM SERPL-SCNC: 136 MMOL/L (ref 136–145)
SODIUM SERPL-SCNC: 136 MMOL/L (ref 136–145)
SODIUM SERPL-SCNC: 138 MMOL/L (ref 136–145)
SODIUM SERPL-SCNC: 139 MMOL/L (ref 136–145)
SODIUM SERPL-SCNC: 140 MMOL/L (ref 136–145)
SODIUM SERPL-SCNC: 141 MMOL/L (ref 136–145)
SODIUM SERPL-SCNC: 142 MMOL/L (ref 136–145)
SODIUM SERPL-SCNC: 143 MMOL/L (ref 136–145)
SODIUM SERPL-SCNC: 144 MMOL/L (ref 136–145)
SODIUM SERPL-SCNC: 145 MMOL/L (ref 136–145)
SODIUM SERPL-SCNC: 147 MMOL/L (ref 136–145)
SP GR UR STRIP: 1.01 (ref 1–1.03)
SP GR UR STRIP: 1.02 (ref 1–1.03)
SP GR UR STRIP: >1.03 (ref 1–1.03)
SP02: 90
SPECIMEN SOURCE: NORMAL
SPECIMEN SOURCE: NORMAL
SPHEROCYTES BLD QL SMEAR: ABNORMAL
SPONT RATE: 25
SQUAMOUS #/AREA URNS AUTO: 1 /HPF
SQUAMOUS #/AREA URNS HPF: 15 /HPF
STJ: 2.41 CM
STOMATOCYTES BLD QL SMEAR: PRESENT
T4 FREE SERPL-MCNC: 0.94 NG/DL (ref 0.71–1.51)
T4 FREE SERPL-MCNC: 1.57 NG/DL (ref 0.71–1.51)
TDI LATERAL: 0.09 M/S
TDI SEPTAL: 0.06 M/S
TDI: 0.08 M/S
TOTAL IRON BINDING CAPACITY: 235 UG/DL (ref 250–450)
TOTAL IRON BINDING CAPACITY: 258 UG/DL (ref 250–450)
TOTAL IRON BINDING CAPACITY: 278 UG/DL (ref 250–450)
TR MAX PG: 69 MMHG
TRANS ERYTHROCYTES VOL PATIENT: NORMAL ML
TRANSFERRIN SERPL-MCNC: 159 MG/DL (ref 200–375)
TRANSFERRIN SERPL-MCNC: 174 MG/DL (ref 200–375)
TRANSFERRIN SERPL-MCNC: 188 MG/DL (ref 200–375)
TRICUSPID ANNULAR PLANE SYSTOLIC EXCURSION: 1.94 CM
TRIGL SERPL-MCNC: 76 MG/DL (ref 30–150)
TRIGL SERPL-MCNC: 87 MG/DL (ref 30–150)
TROPONIN I SERPL DL<=0.01 NG/ML-MCNC: 0.01 NG/ML (ref 0–0.03)
TROPONIN I SERPL DL<=0.01 NG/ML-MCNC: 0.02 NG/ML (ref 0–0.03)
TROPONIN I SERPL DL<=0.01 NG/ML-MCNC: 0.03 NG/ML (ref 0–0.03)
TROPONIN I SERPL DL<=0.01 NG/ML-MCNC: 0.04 NG/ML (ref 0–0.03)
TROPONIN I SERPL DL<=0.01 NG/ML-MCNC: 0.05 NG/ML (ref 0–0.03)
TROPONIN I SERPL DL<=0.01 NG/ML-MCNC: 0.09 NG/ML (ref 0–0.03)
TROPONIN I SERPL DL<=0.01 NG/ML-MCNC: 0.23 NG/ML (ref 0–0.03)
TSH SERPL DL<=0.005 MIU/L-ACNC: 0.14 UIU/ML (ref 0.4–4)
TSH SERPL DL<=0.005 MIU/L-ACNC: 2.12 UIU/ML (ref 0.4–4)
TSH SERPL DL<=0.005 MIU/L-ACNC: 2.49 UIU/ML (ref 0.4–4)
TSH SERPL DL<=0.005 MIU/L-ACNC: 5.62 UIU/ML (ref 0.4–4)
TV REST PULMONARY ARTERY PRESSURE: 72 MMHG
URN SPEC COLLECT METH UR: ABNORMAL
UROBILINOGEN UR STRIP-ACNC: NEGATIVE EU/DL
UROBILINOGEN UR STRIP-ACNC: NEGATIVE EU/DL
VANCOMYCIN SERPL-MCNC: 9.3 UG/ML
VANCOMYCIN TROUGH SERPL-MCNC: 10.6 UG/ML (ref 10–22)
VIT B12 SERPL-MCNC: 583 PG/ML (ref 210–950)
VIT B12 SERPL-MCNC: 666 PG/ML (ref 210–950)
WBC # BLD AUTO: 10.35 K/UL (ref 3.9–12.7)
WBC # BLD AUTO: 10.58 K/UL (ref 3.9–12.7)
WBC # BLD AUTO: 10.64 K/UL (ref 3.9–12.7)
WBC # BLD AUTO: 10.78 K/UL (ref 3.9–12.7)
WBC # BLD AUTO: 10.95 K/UL (ref 3.9–12.7)
WBC # BLD AUTO: 11.1 K/UL (ref 3.9–12.7)
WBC # BLD AUTO: 11.39 K/UL (ref 3.9–12.7)
WBC # BLD AUTO: 11.92 K/UL (ref 3.9–12.7)
WBC # BLD AUTO: 12.37 K/UL (ref 3.9–12.7)
WBC # BLD AUTO: 12.4 K/UL (ref 3.9–12.7)
WBC # BLD AUTO: 12.76 K/UL (ref 3.9–12.7)
WBC # BLD AUTO: 12.98 K/UL (ref 3.9–12.7)
WBC # BLD AUTO: 13.75 K/UL (ref 3.9–12.7)
WBC # BLD AUTO: 13.77 K/UL (ref 3.9–12.7)
WBC # BLD AUTO: 13.79 K/UL (ref 3.9–12.7)
WBC # BLD AUTO: 15.35 K/UL (ref 3.9–12.7)
WBC # BLD AUTO: 15.79 K/UL (ref 3.9–12.7)
WBC # BLD AUTO: 15.82 K/UL (ref 3.9–12.7)
WBC # BLD AUTO: 18.49 K/UL (ref 3.9–12.7)
WBC # BLD AUTO: 19.84 K/UL (ref 3.9–12.7)
WBC # BLD AUTO: 20.66 K/UL (ref 3.9–12.7)
WBC # BLD AUTO: 21.32 K/UL (ref 3.9–12.7)
WBC # BLD AUTO: 23.25 K/UL (ref 3.9–12.7)
WBC # BLD AUTO: 29.04 K/UL (ref 3.9–12.7)
WBC # BLD AUTO: 42.04 K/UL (ref 3.9–12.7)
WBC # BLD AUTO: 6.64 K/UL (ref 3.9–12.7)
WBC # BLD AUTO: 7.47 K/UL (ref 3.9–12.7)
WBC # BLD AUTO: 7.76 K/UL (ref 3.9–12.7)
WBC # BLD AUTO: 8.02 K/UL (ref 3.9–12.7)
WBC # BLD AUTO: 8.19 K/UL (ref 3.9–12.7)
WBC # BLD AUTO: 8.42 K/UL (ref 3.9–12.7)
WBC # BLD AUTO: 8.58 K/UL (ref 3.9–12.7)
WBC # BLD AUTO: 8.8 K/UL (ref 3.9–12.7)
WBC # BLD AUTO: 8.99 K/UL (ref 3.9–12.7)
WBC # BLD AUTO: 9.15 K/UL (ref 3.9–12.7)
WBC # BLD AUTO: 9.31 K/UL (ref 3.9–12.7)
WBC # BLD AUTO: 9.7 K/UL (ref 3.9–12.7)
WBC # BLD AUTO: 9.95 K/UL (ref 3.9–12.7)
WBC # BLD AUTO: 9.96 K/UL (ref 3.9–12.7)
WBC #/AREA URNS AUTO: 4 /HPF (ref 0–5)
WBC #/AREA URNS HPF: 6 /HPF (ref 0–5)
WBC TOXIC VACUOLES BLD QL SMEAR: PRESENT

## 2020-01-01 PROCEDURE — 97530 THERAPEUTIC ACTIVITIES: CPT | Mod: HCNC

## 2020-01-01 PROCEDURE — 99499 RISK ADDL DX/OHS AUDIT: ICD-10-PCS | Mod: HCNC,S$GLB,, | Performed by: FAMILY MEDICINE

## 2020-01-01 PROCEDURE — 83540 ASSAY OF IRON: CPT | Mod: HCNC

## 2020-01-01 PROCEDURE — 82803 BLOOD GASES ANY COMBINATION: CPT | Mod: HCNC

## 2020-01-01 PROCEDURE — 85045 AUTOMATED RETICULOCYTE COUNT: CPT | Mod: HCNC

## 2020-01-01 PROCEDURE — 80053 COMPREHEN METABOLIC PANEL: CPT | Mod: HCNC

## 2020-01-01 PROCEDURE — 36415 COLL VENOUS BLD VENIPUNCTURE: CPT | Mod: HCNC

## 2020-01-01 PROCEDURE — 94660 CPAP INITIATION&MGMT: CPT | Mod: HCNC

## 2020-01-01 PROCEDURE — 94640 AIRWAY INHALATION TREATMENT: CPT | Mod: HCNC

## 2020-01-01 PROCEDURE — 80048 BASIC METABOLIC PNL TOTAL CA: CPT | Mod: HCNC

## 2020-01-01 PROCEDURE — 63600175 PHARM REV CODE 636 W HCPCS: Mod: HCNC | Performed by: SURGERY

## 2020-01-01 PROCEDURE — 85610 PROTHROMBIN TIME: CPT | Mod: HCNC

## 2020-01-01 PROCEDURE — 99233 SBSQ HOSP IP/OBS HIGH 50: CPT | Mod: HCNC,,, | Performed by: INTERNAL MEDICINE

## 2020-01-01 PROCEDURE — 25000003 PHARM REV CODE 250: Mod: HCNC | Performed by: STUDENT IN AN ORGANIZED HEALTH CARE EDUCATION/TRAINING PROGRAM

## 2020-01-01 PROCEDURE — 99285 EMERGENCY DEPT VISIT HI MDM: CPT | Mod: 25,HCNC

## 2020-01-01 PROCEDURE — 84443 ASSAY THYROID STIM HORMONE: CPT | Mod: HCNC

## 2020-01-01 PROCEDURE — 81003 URINALYSIS AUTO W/O SCOPE: CPT | Mod: HCNC

## 2020-01-01 PROCEDURE — 77062 BREAST TOMOSYNTHESIS BI: CPT | Mod: 26,HCNC,, | Performed by: RADIOLOGY

## 2020-01-01 PROCEDURE — 96372 THER/PROPH/DIAG INJ SC/IM: CPT | Mod: 59

## 2020-01-01 PROCEDURE — 87651 STREP A DNA AMP PROBE: CPT | Mod: HCNC

## 2020-01-01 PROCEDURE — 63600175 PHARM REV CODE 636 W HCPCS: Mod: HCNC | Performed by: STUDENT IN AN ORGANIZED HEALTH CARE EDUCATION/TRAINING PROGRAM

## 2020-01-01 PROCEDURE — C9113 INJ PANTOPRAZOLE SODIUM, VIA: HCPCS | Mod: HCNC | Performed by: STUDENT IN AN ORGANIZED HEALTH CARE EDUCATION/TRAINING PROGRAM

## 2020-01-01 PROCEDURE — 99223 PR INITIAL HOSPITAL CARE,LEVL III: ICD-10-PCS | Mod: HCNC,,, | Performed by: FAMILY MEDICINE

## 2020-01-01 PROCEDURE — 99215 OFFICE O/P EST HI 40 MIN: CPT | Mod: HCNC,S$GLB,, | Performed by: FAMILY MEDICINE

## 2020-01-01 PROCEDURE — 20600001 HC STEP DOWN PRIVATE ROOM: Mod: HCNC

## 2020-01-01 PROCEDURE — 83605 ASSAY OF LACTIC ACID: CPT | Mod: HCNC

## 2020-01-01 PROCEDURE — 82272 OCCULT BLD FECES 1-3 TESTS: CPT | Mod: HCNC

## 2020-01-01 PROCEDURE — 86920 COMPATIBILITY TEST SPIN: CPT | Mod: HCNC

## 2020-01-01 PROCEDURE — 25000242 PHARM REV CODE 250 ALT 637 W/ HCPCS: Mod: HCNC | Performed by: INTERNAL MEDICINE

## 2020-01-01 PROCEDURE — 96375 TX/PRO/DX INJ NEW DRUG ADDON: CPT | Mod: HCNC

## 2020-01-01 PROCEDURE — 99223 1ST HOSP IP/OBS HIGH 75: CPT | Mod: AI,HCNC,, | Performed by: INTERNAL MEDICINE

## 2020-01-01 PROCEDURE — 93005 ELECTROCARDIOGRAM TRACING: CPT | Mod: HCNC

## 2020-01-01 PROCEDURE — 20000000 HC ICU ROOM: Mod: HCNC

## 2020-01-01 PROCEDURE — 99217 PR OBSERVATION CARE DISCHARGE: ICD-10-PCS | Mod: HCNC,,, | Performed by: STUDENT IN AN ORGANIZED HEALTH CARE EDUCATION/TRAINING PROGRAM

## 2020-01-01 PROCEDURE — 82553 CREATINE MB FRACTION: CPT | Mod: 91,HCNC

## 2020-01-01 PROCEDURE — 83090 ASSAY OF HOMOCYSTEINE: CPT | Mod: HCNC

## 2020-01-01 PROCEDURE — 96374 THER/PROPH/DIAG INJ IV PUSH: CPT | Mod: HCNC

## 2020-01-01 PROCEDURE — 99233 PR SUBSEQUENT HOSPITAL CARE,LEVL III: ICD-10-PCS | Mod: HCNC,,, | Performed by: HOSPITALIST

## 2020-01-01 PROCEDURE — 86334 PATHOLOGIST INTERPRETATION IFE: ICD-10-PCS | Mod: 26,HCNC,, | Performed by: PATHOLOGY

## 2020-01-01 PROCEDURE — 85027 COMPLETE CBC AUTOMATED: CPT | Mod: HCNC

## 2020-01-01 PROCEDURE — 84484 ASSAY OF TROPONIN QUANT: CPT | Mod: HCNC

## 2020-01-01 PROCEDURE — 85007 BL SMEAR W/DIFF WBC COUNT: CPT | Mod: HCNC

## 2020-01-01 PROCEDURE — 99239 PR HOSPITAL DISCHARGE DAY,>30 MIN: ICD-10-PCS | Mod: HCNC,GC,, | Performed by: HOSPITALIST

## 2020-01-01 PROCEDURE — 84100 ASSAY OF PHOSPHORUS: CPT | Mod: HCNC

## 2020-01-01 PROCEDURE — 25000242 PHARM REV CODE 250 ALT 637 W/ HCPCS: Mod: HCNC | Performed by: NURSE PRACTITIONER

## 2020-01-01 PROCEDURE — 90694 FLU VACCINE - QUADRIVALENT - ADJUVANTED: ICD-10-PCS | Mod: HCNC,S$GLB,, | Performed by: FAMILY MEDICINE

## 2020-01-01 PROCEDURE — 83735 ASSAY OF MAGNESIUM: CPT | Mod: HCNC

## 2020-01-01 PROCEDURE — 83615 LACTATE (LD) (LDH) ENZYME: CPT | Mod: HCNC

## 2020-01-01 PROCEDURE — 27000190 HC CPAP FULL FACE MASK W/VALVE: Mod: HCNC

## 2020-01-01 PROCEDURE — 25000003 PHARM REV CODE 250: Mod: HCNC | Performed by: INTERNAL MEDICINE

## 2020-01-01 PROCEDURE — 25000003 PHARM REV CODE 250: Mod: HCNC | Performed by: SURGERY

## 2020-01-01 PROCEDURE — 84484 ASSAY OF TROPONIN QUANT: CPT | Mod: 91,HCNC

## 2020-01-01 PROCEDURE — 99499 UNLISTED E&M SERVICE: CPT | Mod: HCNC,S$GLB,, | Performed by: FAMILY MEDICINE

## 2020-01-01 PROCEDURE — 99999 PR PBB SHADOW E&M-EST. PATIENT-LVL V: CPT | Mod: PBBFAC,HCNC,, | Performed by: FAMILY MEDICINE

## 2020-01-01 PROCEDURE — 97803 MED NUTRITION INDIV SUBSEQ: CPT | Mod: HCNC

## 2020-01-01 PROCEDURE — 99900035 HC TECH TIME PER 15 MIN (STAT): Mod: HCNC

## 2020-01-01 PROCEDURE — 85025 COMPLETE CBC W/AUTO DIFF WBC: CPT | Mod: HCNC

## 2020-01-01 PROCEDURE — 81000 URINALYSIS NONAUTO W/SCOPE: CPT | Mod: HCNC

## 2020-01-01 PROCEDURE — 83880 ASSAY OF NATRIURETIC PEPTIDE: CPT | Mod: HCNC

## 2020-01-01 PROCEDURE — 1125F AMNT PAIN NOTED PAIN PRSNT: CPT | Mod: HCNC,S$GLB,, | Performed by: FAMILY MEDICINE

## 2020-01-01 PROCEDURE — 1101F PT FALLS ASSESS-DOCD LE1/YR: CPT | Mod: HCNC,CPTII,S$GLB, | Performed by: FAMILY MEDICINE

## 2020-01-01 PROCEDURE — 84145 PROCALCITONIN (PCT): CPT | Mod: HCNC

## 2020-01-01 PROCEDURE — P9021 RED BLOOD CELLS UNIT: HCPCS | Mod: HCNC

## 2020-01-01 PROCEDURE — 86334 IMMUNOFIX E-PHORESIS SERUM: CPT | Mod: HCNC

## 2020-01-01 PROCEDURE — 96365 THER/PROPH/DIAG IV INF INIT: CPT | Mod: HCNC,59

## 2020-01-01 PROCEDURE — 85379 FIBRIN DEGRADATION QUANT: CPT | Mod: HCNC

## 2020-01-01 PROCEDURE — 86985 SPLIT BLOOD OR PRODUCTS: CPT | Mod: HCNC

## 2020-01-01 PROCEDURE — 93010 ELECTROCARDIOGRAM REPORT: CPT | Mod: HCNC,,, | Performed by: INTERNAL MEDICINE

## 2020-01-01 PROCEDURE — 94761 N-INVAS EAR/PLS OXIMETRY MLT: CPT | Mod: HCNC

## 2020-01-01 PROCEDURE — 93010 EKG 12-LEAD: ICD-10-PCS | Mod: HCNC,,, | Performed by: INTERNAL MEDICINE

## 2020-01-01 PROCEDURE — 27000221 HC OXYGEN, UP TO 24 HOURS: Mod: HCNC

## 2020-01-01 PROCEDURE — 25000242 PHARM REV CODE 250 ALT 637 W/ HCPCS: Mod: HCNC

## 2020-01-01 PROCEDURE — 84165 PROTEIN E-PHORESIS SERUM: CPT | Mod: 26,HCNC,, | Performed by: PATHOLOGY

## 2020-01-01 PROCEDURE — 80202 ASSAY OF VANCOMYCIN: CPT | Mod: HCNC

## 2020-01-01 PROCEDURE — 99441 PR PHYSICIAN TELEPHONE EVALUATION 5-10 MIN: CPT | Mod: 95,HCNC,ICN, | Performed by: FAMILY MEDICINE

## 2020-01-01 PROCEDURE — 99999 PR PBB SHADOW E&M-EST. PATIENT-LVL IV: CPT | Mod: PBBFAC,HCNC,, | Performed by: FAMILY MEDICINE

## 2020-01-01 PROCEDURE — 83036 HEMOGLOBIN GLYCOSYLATED A1C: CPT | Mod: HCNC

## 2020-01-01 PROCEDURE — 25000242 PHARM REV CODE 250 ALT 637 W/ HCPCS: Mod: HCNC | Performed by: HOSPITALIST

## 2020-01-01 PROCEDURE — 85060 BLOOD SMEAR INTERPRETATION: CPT | Mod: HCNC,,, | Performed by: PATHOLOGY

## 2020-01-01 PROCEDURE — 25000242 PHARM REV CODE 250 ALT 637 W/ HCPCS: Mod: HCNC | Performed by: SURGERY

## 2020-01-01 PROCEDURE — 99217 PR OBSERVATION CARE DISCHARGE: ICD-10-PCS | Mod: HCNC,,, | Performed by: FAMILY MEDICINE

## 2020-01-01 PROCEDURE — C9399 UNCLASSIFIED DRUGS OR BIOLOG: HCPCS | Mod: HCNC | Performed by: INTERNAL MEDICINE

## 2020-01-01 PROCEDURE — 1125F PR PAIN SEVERITY QUANTIFIED, PAIN PRESENT: ICD-10-PCS | Mod: HCNC,S$GLB,, | Performed by: FAMILY MEDICINE

## 2020-01-01 PROCEDURE — G0378 HOSPITAL OBSERVATION PER HR: HCPCS | Mod: HCNC

## 2020-01-01 PROCEDURE — 36430 TRANSFUSION BLD/BLD COMPNT: CPT | Mod: HCNC

## 2020-01-01 PROCEDURE — 96376 TX/PRO/DX INJ SAME DRUG ADON: CPT | Mod: 59,HCNC

## 2020-01-01 PROCEDURE — 63600175 PHARM REV CODE 636 W HCPCS: Mod: HCNC | Performed by: NURSE PRACTITIONER

## 2020-01-01 PROCEDURE — 80061 LIPID PANEL: CPT | Mod: HCNC

## 2020-01-01 PROCEDURE — 25000242 PHARM REV CODE 250 ALT 637 W/ HCPCS: Mod: HCNC | Performed by: STUDENT IN AN ORGANIZED HEALTH CARE EDUCATION/TRAINING PROGRAM

## 2020-01-01 PROCEDURE — 63600175 PHARM REV CODE 636 W HCPCS: Mod: HCNC | Performed by: HOSPITALIST

## 2020-01-01 PROCEDURE — 25500020 PHARM REV CODE 255: Mod: HCNC | Performed by: SURGERY

## 2020-01-01 PROCEDURE — 87040 BLOOD CULTURE FOR BACTERIA: CPT | Mod: HCNC

## 2020-01-01 PROCEDURE — 70450 CT HEAD WITHOUT CONTRAST: ICD-10-PCS | Mod: 26,HCNC,, | Performed by: RADIOLOGY

## 2020-01-01 PROCEDURE — 25000003 PHARM REV CODE 250: Mod: HCNC | Performed by: PSYCHIATRY & NEUROLOGY

## 2020-01-01 PROCEDURE — 25000003 PHARM REV CODE 250: Mod: HCNC | Performed by: FAMILY MEDICINE

## 2020-01-01 PROCEDURE — 85027 COMPLETE CBC AUTOMATED: CPT | Mod: HCNC,59

## 2020-01-01 PROCEDURE — 25000003 PHARM REV CODE 250: Mod: HCNC | Performed by: NURSE PRACTITIONER

## 2020-01-01 PROCEDURE — C9399 UNCLASSIFIED DRUGS OR BIOLOG: HCPCS | Mod: HCNC | Performed by: STUDENT IN AN ORGANIZED HEALTH CARE EDUCATION/TRAINING PROGRAM

## 2020-01-01 PROCEDURE — 99999 PR PBB SHADOW E&M-EST. PATIENT-LVL IV: ICD-10-PCS | Mod: PBBFAC,HCNC,, | Performed by: FAMILY MEDICINE

## 2020-01-01 PROCEDURE — 99233 PR SUBSEQUENT HOSPITAL CARE,LEVL III: ICD-10-PCS | Mod: HCNC,GC,, | Performed by: PSYCHIATRY & NEUROLOGY

## 2020-01-01 PROCEDURE — 36600 WITHDRAWAL OF ARTERIAL BLOOD: CPT | Mod: HCNC

## 2020-01-01 PROCEDURE — 85651 RBC SED RATE NONAUTOMATED: CPT | Mod: HCNC

## 2020-01-01 PROCEDURE — 71046 X-RAY EXAM CHEST 2 VIEWS: CPT | Mod: 26,HCNC,, | Performed by: RADIOLOGY

## 2020-01-01 PROCEDURE — 77062 MAMMO DIGITAL DIAGNOSTIC BILAT WITH TOMOSYNTHESIS_CAD: ICD-10-PCS | Mod: 26,HCNC,, | Performed by: RADIOLOGY

## 2020-01-01 PROCEDURE — 86901 BLOOD TYPING SEROLOGIC RH(D): CPT | Mod: HCNC

## 2020-01-01 PROCEDURE — 25000003 PHARM REV CODE 250: Mod: HCNC | Performed by: HOSPITALIST

## 2020-01-01 PROCEDURE — 97165 OT EVAL LOW COMPLEX 30 MIN: CPT | Mod: HCNC

## 2020-01-01 PROCEDURE — 99999 PR PBB SHADOW E&M-EST. PATIENT-LVL III: CPT | Mod: PBBFAC,HCNC,, | Performed by: FAMILY MEDICINE

## 2020-01-01 PROCEDURE — 99291 CRITICAL CARE FIRST HOUR: CPT | Mod: HCNC,,, | Performed by: PHYSICIAN ASSISTANT

## 2020-01-01 PROCEDURE — 1157F ADVNC CARE PLAN IN RCRD: CPT | Mod: HCNC,S$GLB,, | Performed by: FAMILY MEDICINE

## 2020-01-01 PROCEDURE — 99496 TRANSJ CARE MGMT HIGH F2F 7D: CPT | Mod: HCNC,S$GLB,, | Performed by: FAMILY MEDICINE

## 2020-01-01 PROCEDURE — 85730 THROMBOPLASTIN TIME PARTIAL: CPT | Mod: HCNC

## 2020-01-01 PROCEDURE — 99233 PR SUBSEQUENT HOSPITAL CARE,LEVL III: ICD-10-PCS | Mod: HCNC,,, | Performed by: PHYSICIAN ASSISTANT

## 2020-01-01 PROCEDURE — G0179 MD RECERTIFICATION HHA PT: HCPCS | Mod: ,,, | Performed by: FAMILY MEDICINE

## 2020-01-01 PROCEDURE — 3288F PR FALLS RISK ASSESSMENT DOCUMENTED: ICD-10-PCS | Mod: HCNC,CPTII,S$GLB, | Performed by: FAMILY MEDICINE

## 2020-01-01 PROCEDURE — 99213 PR OFFICE/OUTPT VISIT, EST, LEVL III, 20-29 MIN: ICD-10-PCS | Mod: HCNC,25,S$GLB, | Performed by: FAMILY MEDICINE

## 2020-01-01 PROCEDURE — 82553 CREATINE MB FRACTION: CPT | Mod: HCNC

## 2020-01-01 PROCEDURE — 3288F FALL RISK ASSESSMENT DOCD: CPT | Mod: HCNC,CPTII,S$GLB, | Performed by: FAMILY MEDICINE

## 2020-01-01 PROCEDURE — 82803 BLOOD GASES ANY COMBINATION: CPT | Mod: HCNC | Performed by: SURGERY

## 2020-01-01 PROCEDURE — 36592 COLLECT BLOOD FROM PICC: CPT | Mod: HCNC

## 2020-01-01 PROCEDURE — U0002 COVID-19 LAB TEST NON-CDC: HCPCS | Mod: HCNC

## 2020-01-01 PROCEDURE — 83735 ASSAY OF MAGNESIUM: CPT | Mod: 91,HCNC

## 2020-01-01 PROCEDURE — 36600 WITHDRAWAL OF ARTERIAL BLOOD: CPT | Mod: HCNC,59

## 2020-01-01 PROCEDURE — 99999 PR PBB SHADOW E&M-EST. PATIENT-LVL V: ICD-10-PCS | Mod: PBBFAC,HCNC,, | Performed by: FAMILY MEDICINE

## 2020-01-01 PROCEDURE — P9016 RBC LEUKOCYTES REDUCED: HCPCS | Mod: HCNC

## 2020-01-01 PROCEDURE — 36415 COLL VENOUS BLD VENIPUNCTURE: CPT | Mod: HCNC,S$GLB,, | Performed by: FAMILY MEDICINE

## 2020-01-01 PROCEDURE — 99239 HOSP IP/OBS DSCHRG MGMT >30: CPT | Mod: HCNC,,, | Performed by: HOSPITALIST

## 2020-01-01 PROCEDURE — 99499 UNLISTED E&M SERVICE: CPT | Mod: HCNC,95,, | Performed by: FAMILY MEDICINE

## 2020-01-01 PROCEDURE — 63600175 PHARM REV CODE 636 W HCPCS: Mod: HCNC | Performed by: FAMILY MEDICINE

## 2020-01-01 PROCEDURE — 94760 N-INVAS EAR/PLS OXIMETRY 1: CPT | Mod: HCNC

## 2020-01-01 PROCEDURE — 36415 PR COLLECTION VENOUS BLOOD,VENIPUNCTURE: ICD-10-PCS | Mod: HCNC,S$GLB,, | Performed by: FAMILY MEDICINE

## 2020-01-01 PROCEDURE — G0179 PR HOME HEALTH MD RECERTIFICATION: ICD-10-PCS | Mod: ,,, | Performed by: FAMILY MEDICINE

## 2020-01-01 PROCEDURE — 82962 GLUCOSE BLOOD TEST: CPT | Mod: HCNC

## 2020-01-01 PROCEDURE — 99213 OFFICE O/P EST LOW 20 MIN: CPT | Mod: HCNC,25,S$GLB, | Performed by: FAMILY MEDICINE

## 2020-01-01 PROCEDURE — 99499 UNLISTED E&M SERVICE: CPT | Mod: S$GLB,,, | Performed by: FAMILY MEDICINE

## 2020-01-01 PROCEDURE — 63600175 PHARM REV CODE 636 W HCPCS: Mod: HCNC | Performed by: INTERNAL MEDICINE

## 2020-01-01 PROCEDURE — 99496 TRANSITIONAL CARE MANAGE SERVICE 7 DAY DISCHARGE: ICD-10-PCS | Mod: HCNC,S$GLB,, | Performed by: FAMILY MEDICINE

## 2020-01-01 PROCEDURE — 85025 COMPLETE CBC W/AUTO DIFF WBC: CPT | Mod: 91,HCNC

## 2020-01-01 PROCEDURE — 99222 1ST HOSP IP/OBS MODERATE 55: CPT | Mod: HCNC,GC,, | Performed by: INTERNAL MEDICINE

## 2020-01-01 PROCEDURE — 86140 C-REACTIVE PROTEIN: CPT | Mod: HCNC

## 2020-01-01 PROCEDURE — 82525 ASSAY OF COPPER: CPT | Mod: HCNC

## 2020-01-01 PROCEDURE — 99233 SBSQ HOSP IP/OBS HIGH 50: CPT | Mod: HCNC,,, | Performed by: HOSPITALIST

## 2020-01-01 PROCEDURE — 86850 RBC ANTIBODY SCREEN: CPT | Mod: HCNC

## 2020-01-01 PROCEDURE — 82728 ASSAY OF FERRITIN: CPT | Mod: HCNC

## 2020-01-01 PROCEDURE — 99499 RISK ADDL DX/OHS AUDIT: ICD-10-PCS | Mod: HCNC,95,, | Performed by: FAMILY MEDICINE

## 2020-01-01 PROCEDURE — 3078F PR MOST RECENT DIASTOLIC BLOOD PRESSURE < 80 MM HG: ICD-10-PCS | Mod: HCNC,CPTII,S$GLB, | Performed by: FAMILY MEDICINE

## 2020-01-01 PROCEDURE — 96375 TX/PRO/DX INJ NEW DRUG ADDON: CPT | Mod: HCNC,59

## 2020-01-01 PROCEDURE — 94799 UNLISTED PULMONARY SVC/PX: CPT | Mod: HCNC

## 2020-01-01 PROCEDURE — 84146 ASSAY OF PROLACTIN: CPT | Mod: HCNC

## 2020-01-01 PROCEDURE — 99495 TCM SERVICES (MODERATE COMPLEXITY): ICD-10-PCS | Mod: HCNC,S$GLB,, | Performed by: FAMILY MEDICINE

## 2020-01-01 PROCEDURE — 99223 PR INITIAL HOSPITAL CARE,LEVL III: ICD-10-PCS | Mod: HCNC,,, | Performed by: INTERNAL MEDICINE

## 2020-01-01 PROCEDURE — 87502 INFLUENZA DNA AMP PROBE: CPT | Mod: HCNC

## 2020-01-01 PROCEDURE — 96374 THER/PROPH/DIAG INJ IV PUSH: CPT | Mod: 59,HCNC

## 2020-01-01 PROCEDURE — 82550 ASSAY OF CK (CPK): CPT | Mod: 91,HCNC

## 2020-01-01 PROCEDURE — 99233 SBSQ HOSP IP/OBS HIGH 50: CPT | Mod: HCNC,,, | Performed by: PHYSICIAN ASSISTANT

## 2020-01-01 PROCEDURE — 99223 PR INITIAL HOSPITAL CARE,LEVL III: ICD-10-PCS | Mod: AI,HCNC,, | Performed by: INTERNAL MEDICINE

## 2020-01-01 PROCEDURE — 1157F PR ADVANCE CARE PLAN OR EQUIV PRESENT IN MEDICAL RECORD: ICD-10-PCS | Mod: HCNC,S$GLB,, | Performed by: FAMILY MEDICINE

## 2020-01-01 PROCEDURE — P9011 BLOOD SPLIT UNIT: HCPCS | Mod: HCNC

## 2020-01-01 PROCEDURE — 86920 COMPATIBILITY TEST SPIN: CPT | Mod: HCNC,59

## 2020-01-01 PROCEDURE — 1159F MED LIST DOCD IN RCRD: CPT | Mod: HCNC,S$GLB,, | Performed by: FAMILY MEDICINE

## 2020-01-01 PROCEDURE — 51702 INSERT TEMP BLADDER CATH: CPT | Mod: HCNC

## 2020-01-01 PROCEDURE — 99999 PR PBB SHADOW E&M-EST. PATIENT-LVL III: ICD-10-PCS | Mod: PBBFAC,HCNC,, | Performed by: FAMILY MEDICINE

## 2020-01-01 PROCEDURE — 97110 THERAPEUTIC EXERCISES: CPT | Mod: HCNC

## 2020-01-01 PROCEDURE — 99214 OFFICE O/P EST MOD 30 MIN: CPT | Mod: HCNC,S$GLB,, | Performed by: FAMILY MEDICINE

## 2020-01-01 PROCEDURE — G0008 FLU VACCINE - QUADRIVALENT - ADJUVANTED: ICD-10-PCS | Mod: HCNC,S$GLB,, | Performed by: FAMILY MEDICINE

## 2020-01-01 PROCEDURE — 25000242 PHARM REV CODE 250 ALT 637 W/ HCPCS: Mod: HCNC | Performed by: FAMILY MEDICINE

## 2020-01-01 PROCEDURE — 93005 ELECTROCARDIOGRAM TRACING: CPT | Mod: HCNC,59

## 2020-01-01 PROCEDURE — 99441 PR PHYSICIAN TELEPHONE EVALUATION 5-10 MIN: ICD-10-PCS | Mod: 95,HCNC,ICN, | Performed by: FAMILY MEDICINE

## 2020-01-01 PROCEDURE — C9399 UNCLASSIFIED DRUGS OR BIOLOG: HCPCS | Mod: HCNC | Performed by: SURGERY

## 2020-01-01 PROCEDURE — 63700000 PHARM REV CODE 250 ALT 637 W/O HCPCS: Mod: HCNC | Performed by: NURSE PRACTITIONER

## 2020-01-01 PROCEDURE — 99499 RISK ADDL DX/OHS AUDIT: ICD-10-PCS | Mod: S$GLB,,, | Performed by: FAMILY MEDICINE

## 2020-01-01 PROCEDURE — 92610 EVALUATE SWALLOWING FUNCTION: CPT | Mod: HCNC

## 2020-01-01 PROCEDURE — 82746 ASSAY OF FOLIC ACID SERUM: CPT | Mod: HCNC

## 2020-01-01 PROCEDURE — 96360 HYDRATION IV INFUSION INIT: CPT | Mod: HCNC

## 2020-01-01 PROCEDURE — 99214 PR OFFICE/OUTPT VISIT, EST, LEVL IV, 30-39 MIN: ICD-10-PCS | Mod: HCNC,S$GLB,, | Performed by: FAMILY MEDICINE

## 2020-01-01 PROCEDURE — 81001 URINALYSIS AUTO W/SCOPE: CPT | Mod: HCNC

## 2020-01-01 PROCEDURE — 99217 PR OBSERVATION CARE DISCHARGE: CPT | Mod: HCNC,,, | Performed by: FAMILY MEDICINE

## 2020-01-01 PROCEDURE — 82607 VITAMIN B-12: CPT | Mod: HCNC

## 2020-01-01 PROCEDURE — 71046 XR CHEST PA AND LATERAL: ICD-10-PCS | Mod: 26,HCNC,, | Performed by: RADIOLOGY

## 2020-01-01 PROCEDURE — 3077F SYST BP >= 140 MM HG: CPT | Mod: HCNC,CPTII,S$GLB, | Performed by: FAMILY MEDICINE

## 2020-01-01 PROCEDURE — 3078F DIAST BP <80 MM HG: CPT | Mod: HCNC,CPTII,S$GLB, | Performed by: FAMILY MEDICINE

## 2020-01-01 PROCEDURE — 99499 NO LOS: ICD-10-PCS | Mod: ,,, | Performed by: FAMILY MEDICINE

## 2020-01-01 PROCEDURE — 63600175 PHARM REV CODE 636 W HCPCS: Mod: HCNC | Performed by: PHYSICIAN ASSISTANT

## 2020-01-01 PROCEDURE — 82550 ASSAY OF CK (CPK): CPT | Mod: HCNC

## 2020-01-01 PROCEDURE — 99217 PR OBSERVATION CARE DISCHARGE: CPT | Mod: HCNC,,, | Performed by: STUDENT IN AN ORGANIZED HEALTH CARE EDUCATION/TRAINING PROGRAM

## 2020-01-01 PROCEDURE — C9399 UNCLASSIFIED DRUGS OR BIOLOG: HCPCS | Mod: HCNC | Performed by: HOSPITALIST

## 2020-01-01 PROCEDURE — 63600175 PHARM REV CODE 636 W HCPCS: Mod: HCNC | Performed by: PSYCHIATRY & NEUROLOGY

## 2020-01-01 PROCEDURE — C9113 INJ PANTOPRAZOLE SODIUM, VIA: HCPCS | Mod: HCNC | Performed by: NURSE PRACTITIONER

## 2020-01-01 PROCEDURE — 96366 THER/PROPH/DIAG IV INF ADDON: CPT

## 2020-01-01 PROCEDURE — 70450 CT HEAD/BRAIN W/O DYE: CPT | Mod: 26,HCNC,, | Performed by: RADIOLOGY

## 2020-01-01 PROCEDURE — 86704 HEP B CORE ANTIBODY TOTAL: CPT | Mod: HCNC

## 2020-01-01 PROCEDURE — 96376 TX/PRO/DX INJ SAME DRUG ADON: CPT | Mod: HCNC

## 2020-01-01 PROCEDURE — 97161 PT EVAL LOW COMPLEX 20 MIN: CPT | Mod: HCNC

## 2020-01-01 PROCEDURE — 86900 BLOOD TYPING SEROLOGIC ABO: CPT | Mod: HCNC

## 2020-01-01 PROCEDURE — 85060 PATHOLOGIST REVIEW: ICD-10-PCS | Mod: HCNC,,, | Performed by: PATHOLOGY

## 2020-01-01 PROCEDURE — 99291 PR CRITICAL CARE, E/M 30-74 MINUTES: ICD-10-PCS | Mod: HCNC,,, | Performed by: PHYSICIAN ASSISTANT

## 2020-01-01 PROCEDURE — 96368 THER/DIAG CONCURRENT INF: CPT | Mod: HCNC

## 2020-01-01 PROCEDURE — G0008 ADMIN INFLUENZA VIRUS VAC: HCPCS | Mod: HCNC,S$GLB,, | Performed by: FAMILY MEDICINE

## 2020-01-01 PROCEDURE — 99223 1ST HOSP IP/OBS HIGH 75: CPT | Mod: HCNC,,, | Performed by: FAMILY MEDICINE

## 2020-01-01 PROCEDURE — 83880 ASSAY OF NATRIURETIC PEPTIDE: CPT | Mod: 91,HCNC

## 2020-01-01 PROCEDURE — 36415 COLL VENOUS BLD VENIPUNCTURE: CPT | Mod: S$GLB

## 2020-01-01 PROCEDURE — 76641 US BREAST LEFT COMPLETE: ICD-10-PCS | Mod: 26,HCNC,LT, | Performed by: RADIOLOGY

## 2020-01-01 PROCEDURE — 97163 PT EVAL HIGH COMPLEX 45 MIN: CPT | Mod: HCNC

## 2020-01-01 PROCEDURE — 99495 TRANSJ CARE MGMT MOD F2F 14D: CPT | Mod: HCNC,S$GLB,, | Performed by: FAMILY MEDICINE

## 2020-01-01 PROCEDURE — 99219 PR INITIAL OBSERVATION CARE,LEVL II: ICD-10-PCS | Mod: HCNC,,, | Performed by: STUDENT IN AN ORGANIZED HEALTH CARE EDUCATION/TRAINING PROGRAM

## 2020-01-01 PROCEDURE — 99222 PR INITIAL HOSPITAL CARE,LEVL II: ICD-10-PCS | Mod: HCNC,GC,, | Performed by: INTERNAL MEDICINE

## 2020-01-01 PROCEDURE — 99239 PR HOSPITAL DISCHARGE DAY,>30 MIN: ICD-10-PCS | Mod: HCNC,,, | Performed by: INTERNAL MEDICINE

## 2020-01-01 PROCEDURE — 97162 PT EVAL MOD COMPLEX 30 MIN: CPT | Mod: HCNC

## 2020-01-01 PROCEDURE — 99239 HOSP IP/OBS DSCHRG MGMT >30: CPT | Mod: HCNC,,, | Performed by: FAMILY MEDICINE

## 2020-01-01 PROCEDURE — 27100171 HC OXYGEN HIGH FLOW UP TO 24 HOURS: Mod: HCNC

## 2020-01-01 PROCEDURE — 86803 HEPATITIS C AB TEST: CPT | Mod: HCNC

## 2020-01-01 PROCEDURE — 92611 MOTION FLUOROSCOPY/SWALLOW: CPT | Mod: HCNC

## 2020-01-01 PROCEDURE — 82248 BILIRUBIN DIRECT: CPT | Mod: HCNC

## 2020-01-01 PROCEDURE — 96361 HYDRATE IV INFUSION ADD-ON: CPT | Mod: HCNC

## 2020-01-01 PROCEDURE — 99233 PR SUBSEQUENT HOSPITAL CARE,LEVL III: ICD-10-PCS | Mod: HCNC,,, | Performed by: INTERNAL MEDICINE

## 2020-01-01 PROCEDURE — 82800 BLOOD PH: CPT | Mod: HCNC

## 2020-01-01 PROCEDURE — 99239 PR HOSPITAL DISCHARGE DAY,>30 MIN: ICD-10-PCS | Mod: HCNC,,, | Performed by: FAMILY MEDICINE

## 2020-01-01 PROCEDURE — 87040 BLOOD CULTURE FOR BACTERIA: CPT | Mod: 59,HCNC

## 2020-01-01 PROCEDURE — 86850 RBC ANTIBODY SCREEN: CPT | Mod: 91,HCNC

## 2020-01-01 PROCEDURE — 96365 THER/PROPH/DIAG IV INF INIT: CPT

## 2020-01-01 PROCEDURE — 80053 COMPREHEN METABOLIC PANEL: CPT | Mod: 91,HCNC

## 2020-01-01 PROCEDURE — 99233 SBSQ HOSP IP/OBS HIGH 50: CPT | Mod: HCNC,GC,, | Performed by: PSYCHIATRY & NEUROLOGY

## 2020-01-01 PROCEDURE — 70450 CT HEAD/BRAIN W/O DYE: CPT | Mod: TC,HCNC

## 2020-01-01 PROCEDURE — 97535 SELF CARE MNGMENT TRAINING: CPT | Mod: HCNC

## 2020-01-01 PROCEDURE — 74230 X-RAY XM SWLNG FUNCJ C+: CPT | Mod: TC,HCNC

## 2020-01-01 PROCEDURE — 83921 ORGANIC ACID SINGLE QUANT: CPT | Mod: HCNC

## 2020-01-01 PROCEDURE — 1101F PR PT FALLS ASSESS DOC 0-1 FALLS W/OUT INJ PAST YR: ICD-10-PCS | Mod: HCNC,CPTII,S$GLB, | Performed by: FAMILY MEDICINE

## 2020-01-01 PROCEDURE — 83010 ASSAY OF HAPTOGLOBIN QUANT: CPT | Mod: HCNC

## 2020-01-01 PROCEDURE — 77066 MAMMO DIGITAL DIAGNOSTIC BILAT WITH TOMOSYNTHESIS_CAD: ICD-10-PCS | Mod: 26,HCNC,, | Performed by: RADIOLOGY

## 2020-01-01 PROCEDURE — 3077F PR MOST RECENT SYSTOLIC BLOOD PRESSURE >= 140 MM HG: ICD-10-PCS | Mod: HCNC,CPTII,S$GLB, | Performed by: FAMILY MEDICINE

## 2020-01-01 PROCEDURE — 99219 PR INITIAL OBSERVATION CARE,LEVL II: CPT | Mod: HCNC,,, | Performed by: STUDENT IN AN ORGANIZED HEALTH CARE EDUCATION/TRAINING PROGRAM

## 2020-01-01 PROCEDURE — 84439 ASSAY OF FREE THYROXINE: CPT | Mod: HCNC

## 2020-01-01 PROCEDURE — 84165 PATHOLOGIST INTERPRETATION SPE: ICD-10-PCS | Mod: 26,HCNC,, | Performed by: PATHOLOGY

## 2020-01-01 PROCEDURE — 84132 ASSAY OF SERUM POTASSIUM: CPT | Mod: HCNC

## 2020-01-01 PROCEDURE — 99232 SBSQ HOSP IP/OBS MODERATE 35: CPT | Mod: HCNC,GC,, | Performed by: PSYCHIATRY & NEUROLOGY

## 2020-01-01 PROCEDURE — 99223 1ST HOSP IP/OBS HIGH 75: CPT | Mod: HCNC,AI,, | Performed by: HOSPITALIST

## 2020-01-01 PROCEDURE — 71046 X-RAY EXAM CHEST 2 VIEWS: CPT | Mod: TC,HCNC,PO

## 2020-01-01 PROCEDURE — 25500020 PHARM REV CODE 255: Mod: HCNC | Performed by: FAMILY MEDICINE

## 2020-01-01 PROCEDURE — 99223 1ST HOSP IP/OBS HIGH 75: CPT | Mod: HCNC,,, | Performed by: INTERNAL MEDICINE

## 2020-01-01 PROCEDURE — 99239 PR HOSPITAL DISCHARGE DAY,>30 MIN: ICD-10-PCS | Mod: HCNC,,, | Performed by: HOSPITALIST

## 2020-01-01 PROCEDURE — 86334 IMMUNOFIX E-PHORESIS SERUM: CPT | Mod: 26,HCNC,, | Performed by: PATHOLOGY

## 2020-01-01 PROCEDURE — 99239 HOSP IP/OBS DSCHRG MGMT >30: CPT | Mod: HCNC,,, | Performed by: INTERNAL MEDICINE

## 2020-01-01 PROCEDURE — C9113 INJ PANTOPRAZOLE SODIUM, VIA: HCPCS | Mod: HCNC | Performed by: INTERNAL MEDICINE

## 2020-01-01 PROCEDURE — 99223 PR INITIAL HOSPITAL CARE,LEVL III: ICD-10-PCS | Mod: HCNC,AI,, | Performed by: HOSPITALIST

## 2020-01-01 PROCEDURE — 99223 1ST HOSP IP/OBS HIGH 75: CPT | Mod: AI,HCNC,GC, | Performed by: HOSPITALIST

## 2020-01-01 PROCEDURE — 76641 ULTRASOUND BREAST COMPLETE: CPT | Mod: 26,HCNC,LT, | Performed by: RADIOLOGY

## 2020-01-01 PROCEDURE — 85018 HEMOGLOBIN: CPT | Mod: HCNC

## 2020-01-01 PROCEDURE — C9399 UNCLASSIFIED DRUGS OR BIOLOG: HCPCS | Mod: HCNC | Performed by: FAMILY MEDICINE

## 2020-01-01 PROCEDURE — 90694 VACC AIIV4 NO PRSRV 0.5ML IM: CPT | Mod: HCNC,S$GLB,, | Performed by: FAMILY MEDICINE

## 2020-01-01 PROCEDURE — 99499 UNLISTED E&M SERVICE: CPT | Mod: ,,, | Performed by: FAMILY MEDICINE

## 2020-01-01 PROCEDURE — 77066 DX MAMMO INCL CAD BI: CPT | Mod: 26,HCNC,, | Performed by: RADIOLOGY

## 2020-01-01 PROCEDURE — 86703 HIV-1/HIV-2 1 RESULT ANTBDY: CPT | Mod: HCNC

## 2020-01-01 PROCEDURE — 36430 TRANSFUSION BLD/BLD COMPNT: CPT

## 2020-01-01 PROCEDURE — 77066 DX MAMMO INCL CAD BI: CPT | Mod: TC,HCNC

## 2020-01-01 PROCEDURE — 99239 HOSP IP/OBS DSCHRG MGMT >30: CPT | Mod: HCNC,GC,, | Performed by: HOSPITALIST

## 2020-01-01 PROCEDURE — 85007 BL SMEAR W/DIFF WBC COUNT: CPT | Mod: 91,HCNC

## 2020-01-01 PROCEDURE — 87340 HEPATITIS B SURFACE AG IA: CPT | Mod: HCNC

## 2020-01-01 PROCEDURE — C9399 UNCLASSIFIED DRUGS OR BIOLOG: HCPCS | Mod: HCNC | Performed by: PHYSICIAN ASSISTANT

## 2020-01-01 PROCEDURE — 99223 PR INITIAL HOSPITAL CARE,LEVL III: ICD-10-PCS | Mod: AI,HCNC,GC, | Performed by: HOSPITALIST

## 2020-01-01 PROCEDURE — 99291 CRITICAL CARE FIRST HOUR: CPT | Mod: HCNC,,, | Performed by: NURSE PRACTITIONER

## 2020-01-01 PROCEDURE — 1126F AMNT PAIN NOTED NONE PRSNT: CPT | Mod: HCNC,S$GLB,, | Performed by: FAMILY MEDICINE

## 2020-01-01 PROCEDURE — 1159F PR MEDICATION LIST DOCUMENTED IN MEDICAL RECORD: ICD-10-PCS | Mod: HCNC,S$GLB,, | Performed by: FAMILY MEDICINE

## 2020-01-01 PROCEDURE — 96366 THER/PROPH/DIAG IV INF ADDON: CPT | Mod: HCNC

## 2020-01-01 PROCEDURE — A9698 NON-RAD CONTRAST MATERIALNOC: HCPCS | Mod: HCNC | Performed by: FAMILY MEDICINE

## 2020-01-01 PROCEDURE — 99291 PR CRITICAL CARE, E/M 30-74 MINUTES: ICD-10-PCS | Mod: HCNC,,, | Performed by: NURSE PRACTITIONER

## 2020-01-01 PROCEDURE — 1126F PR PAIN SEVERITY QUANTIFIED, NO PAIN PRESENT: ICD-10-PCS | Mod: HCNC,S$GLB,, | Performed by: FAMILY MEDICINE

## 2020-01-01 PROCEDURE — 76641 ULTRASOUND BREAST COMPLETE: CPT | Mod: TC,HCNC,LT

## 2020-01-01 PROCEDURE — 99232 PR SUBSEQUENT HOSPITAL CARE,LEVL II: ICD-10-PCS | Mod: HCNC,GC,, | Performed by: PSYCHIATRY & NEUROLOGY

## 2020-01-01 PROCEDURE — 99215 PR OFFICE/OUTPT VISIT, EST, LEVL V, 40-54 MIN: ICD-10-PCS | Mod: HCNC,S$GLB,, | Performed by: FAMILY MEDICINE

## 2020-01-01 PROCEDURE — 11000001 HC ACUTE MED/SURG PRIVATE ROOM: Mod: HCNC

## 2020-01-01 PROCEDURE — 84165 PROTEIN E-PHORESIS SERUM: CPT | Mod: HCNC

## 2020-01-01 RX ORDER — LOSARTAN POTASSIUM 50 MG/1
50 TABLET ORAL DAILY
Status: DISCONTINUED | OUTPATIENT
Start: 2020-01-01 | End: 2020-01-01 | Stop reason: HOSPADM

## 2020-01-01 RX ORDER — FUROSEMIDE 20 MG/1
20 TABLET ORAL DAILY
Qty: 5 TABLET | Refills: 0 | Status: ON HOLD | OUTPATIENT
Start: 2020-01-01 | End: 2020-01-01 | Stop reason: CLARIF

## 2020-01-01 RX ORDER — IPRATROPIUM BROMIDE AND ALBUTEROL SULFATE 2.5; .5 MG/3ML; MG/3ML
3 SOLUTION RESPIRATORY (INHALATION)
Status: COMPLETED | OUTPATIENT
Start: 2020-01-01 | End: 2020-01-01

## 2020-01-01 RX ORDER — ISOPROPYL ALCOHOL 70 ML/100ML
1 SWAB TOPICAL
Qty: 100 EACH | Refills: 5 | Status: ON HOLD | OUTPATIENT
Start: 2020-01-01 | End: 2020-01-01 | Stop reason: HOSPADM

## 2020-01-01 RX ORDER — ROPINIROLE 0.25 MG/1
1 TABLET, FILM COATED ORAL DAILY PRN
Status: DISCONTINUED | OUTPATIENT
Start: 2020-01-01 | End: 2020-01-01 | Stop reason: HOSPADM

## 2020-01-01 RX ORDER — POTASSIUM CHLORIDE 20 MEQ/1
40 TABLET, EXTENDED RELEASE ORAL
Status: COMPLETED | OUTPATIENT
Start: 2020-01-01 | End: 2020-01-01

## 2020-01-01 RX ORDER — MIDAZOLAM HYDROCHLORIDE 1 MG/ML
1 INJECTION INTRAMUSCULAR; INTRAVENOUS ONCE
Status: COMPLETED | OUTPATIENT
Start: 2020-01-01 | End: 2020-01-01

## 2020-01-01 RX ORDER — CITALOPRAM 20 MG/1
20 TABLET, FILM COATED ORAL DAILY
Status: DISCONTINUED | OUTPATIENT
Start: 2020-01-01 | End: 2020-01-01 | Stop reason: HOSPADM

## 2020-01-01 RX ORDER — THIAMINE HCL 100 MG
100 TABLET ORAL DAILY
Status: DISCONTINUED | OUTPATIENT
Start: 2020-01-01 | End: 2020-01-01 | Stop reason: HOSPADM

## 2020-01-01 RX ORDER — ASPIRIN 81 MG/1
81 TABLET ORAL DAILY
Status: DISCONTINUED | OUTPATIENT
Start: 2020-01-01 | End: 2020-01-01 | Stop reason: HOSPADM

## 2020-01-01 RX ORDER — POTASSIUM CHLORIDE 20 MEQ/1
20 TABLET, EXTENDED RELEASE ORAL ONCE
Status: COMPLETED | OUTPATIENT
Start: 2020-01-01 | End: 2020-01-01

## 2020-01-01 RX ORDER — FUROSEMIDE 10 MG/ML
40 INJECTION INTRAMUSCULAR; INTRAVENOUS
Status: COMPLETED | OUTPATIENT
Start: 2020-01-01 | End: 2020-01-01

## 2020-01-01 RX ORDER — TORSEMIDE 20 MG/1
20 TABLET ORAL DAILY
Status: DISCONTINUED | OUTPATIENT
Start: 2020-01-01 | End: 2020-01-01

## 2020-01-01 RX ORDER — TRAZODONE HYDROCHLORIDE 50 MG/1
50 TABLET ORAL NIGHTLY
Status: DISCONTINUED | OUTPATIENT
Start: 2020-01-01 | End: 2020-01-01 | Stop reason: HOSPADM

## 2020-01-01 RX ORDER — ACETAMINOPHEN 325 MG/1
650 TABLET ORAL EVERY 8 HOURS PRN
Status: DISCONTINUED | OUTPATIENT
Start: 2020-01-01 | End: 2020-01-01 | Stop reason: HOSPADM

## 2020-01-01 RX ORDER — TORSEMIDE 20 MG/1
20 TABLET ORAL DAILY
Status: DISCONTINUED | OUTPATIENT
Start: 2020-01-01 | End: 2020-01-01 | Stop reason: HOSPADM

## 2020-01-01 RX ORDER — SODIUM CHLORIDE 0.9 % (FLUSH) 0.9 %
10 SYRINGE (ML) INJECTION
Status: DISCONTINUED | OUTPATIENT
Start: 2020-01-01 | End: 2020-01-01 | Stop reason: HOSPADM

## 2020-01-01 RX ORDER — AMOXICILLIN AND CLAVULANATE POTASSIUM 875; 125 MG/1; MG/1
1 TABLET, FILM COATED ORAL 2 TIMES DAILY
Qty: 14 TABLET | Refills: 0 | Status: SHIPPED | OUTPATIENT
Start: 2020-01-01 | End: 2020-01-01

## 2020-01-01 RX ORDER — HYDROCODONE BITARTRATE AND ACETAMINOPHEN 500; 5 MG/1; MG/1
TABLET ORAL
Status: DISCONTINUED | OUTPATIENT
Start: 2020-01-01 | End: 2020-01-01 | Stop reason: HOSPADM

## 2020-01-01 RX ORDER — METHYLPREDNISOLONE SOD SUCC 125 MG
125 VIAL (EA) INJECTION ONCE
Status: COMPLETED | OUTPATIENT
Start: 2020-01-01 | End: 2020-01-01

## 2020-01-01 RX ORDER — IPRATROPIUM BROMIDE AND ALBUTEROL SULFATE 2.5; .5 MG/3ML; MG/3ML
3 SOLUTION RESPIRATORY (INHALATION) EVERY 4 HOURS PRN
Status: DISCONTINUED | OUTPATIENT
Start: 2020-01-01 | End: 2020-01-01 | Stop reason: HOSPADM

## 2020-01-01 RX ORDER — POTASSIUM CHLORIDE 20 MEQ/1
TABLET, EXTENDED RELEASE ORAL
Status: ON HOLD | COMMUNITY
Start: 2020-01-01 | End: 2020-01-01

## 2020-01-01 RX ORDER — PANTOPRAZOLE SODIUM 40 MG/10ML
40 INJECTION, POWDER, LYOPHILIZED, FOR SOLUTION INTRAVENOUS 2 TIMES DAILY
Status: DISCONTINUED | OUTPATIENT
Start: 2020-01-01 | End: 2020-01-01

## 2020-01-01 RX ORDER — PRAVASTATIN SODIUM 40 MG/1
40 TABLET ORAL DAILY
COMMUNITY

## 2020-01-01 RX ORDER — FOLIC ACID 1 MG/1
2000 TABLET ORAL DAILY
COMMUNITY
Start: 2020-01-01

## 2020-01-01 RX ORDER — INSULIN ASPART 100 [IU]/ML
0-5 INJECTION, SOLUTION INTRAVENOUS; SUBCUTANEOUS
Status: DISCONTINUED | OUTPATIENT
Start: 2020-01-01 | End: 2020-01-01 | Stop reason: HOSPADM

## 2020-01-01 RX ORDER — AMIODARONE HYDROCHLORIDE 100 MG/1
100 TABLET ORAL DAILY
Status: DISCONTINUED | OUTPATIENT
Start: 2020-01-01 | End: 2020-01-01 | Stop reason: HOSPADM

## 2020-01-01 RX ORDER — HYDROCODONE BITARTRATE AND ACETAMINOPHEN 5; 325 MG/1; MG/1
1 TABLET ORAL EVERY 4 HOURS PRN
Status: DISCONTINUED | OUTPATIENT
Start: 2020-01-01 | End: 2020-01-01 | Stop reason: HOSPADM

## 2020-01-01 RX ORDER — LEVALBUTEROL 1.25 MG/.5ML
1.25 SOLUTION, CONCENTRATE RESPIRATORY (INHALATION) EVERY 6 HOURS PRN
Status: DISCONTINUED | OUTPATIENT
Start: 2020-01-01 | End: 2020-01-01 | Stop reason: HOSPADM

## 2020-01-01 RX ORDER — ASPIRIN 325 MG
325 TABLET ORAL
Status: COMPLETED | OUTPATIENT
Start: 2020-01-01 | End: 2020-01-01

## 2020-01-01 RX ORDER — METHYLPREDNISOLONE SOD SUCC 125 MG
80 VIAL (EA) INJECTION
Status: COMPLETED | OUTPATIENT
Start: 2020-01-01 | End: 2020-01-01

## 2020-01-01 RX ORDER — LANCETS
1 EACH MISCELLANEOUS DAILY
Qty: 100 EACH | Refills: 5 | Status: ON HOLD | OUTPATIENT
Start: 2020-01-01 | End: 2020-01-01 | Stop reason: HOSPADM

## 2020-01-01 RX ORDER — TRAZODONE HYDROCHLORIDE 50 MG/1
50 TABLET ORAL NIGHTLY
Qty: 90 TABLET | Refills: 0 | Status: SHIPPED | OUTPATIENT
Start: 2020-01-01 | End: 2020-01-01

## 2020-01-01 RX ORDER — METOPROLOL TARTRATE 25 MG/1
25 TABLET, FILM COATED ORAL ONCE
Status: DISCONTINUED | OUTPATIENT
Start: 2020-01-01 | End: 2020-01-01

## 2020-01-01 RX ORDER — ACETAMINOPHEN 325 MG/1
650 TABLET ORAL EVERY 6 HOURS PRN
Status: DISCONTINUED | OUTPATIENT
Start: 2020-01-01 | End: 2020-01-01 | Stop reason: HOSPADM

## 2020-01-01 RX ORDER — BENZONATATE 100 MG/1
100 CAPSULE ORAL 3 TIMES DAILY PRN
COMMUNITY

## 2020-01-01 RX ORDER — IBUPROFEN 200 MG
16 TABLET ORAL
Status: DISCONTINUED | OUTPATIENT
Start: 2020-01-01 | End: 2020-01-01 | Stop reason: HOSPADM

## 2020-01-01 RX ORDER — DILTIAZEM HYDROCHLORIDE 5 MG/ML
10 INJECTION INTRAVENOUS
Status: COMPLETED | OUTPATIENT
Start: 2020-01-01 | End: 2020-01-01

## 2020-01-01 RX ORDER — METHYLPREDNISOLONE SOD SUCC 125 MG
125 VIAL (EA) INJECTION EVERY 8 HOURS
Status: DISCONTINUED | OUTPATIENT
Start: 2020-01-01 | End: 2020-01-01

## 2020-01-01 RX ORDER — TORSEMIDE 20 MG/1
TABLET ORAL
COMMUNITY
Start: 2020-01-01 | End: 2020-01-01

## 2020-01-01 RX ORDER — FUROSEMIDE 10 MG/ML
20 INJECTION INTRAMUSCULAR; INTRAVENOUS ONCE AS NEEDED
Status: COMPLETED | OUTPATIENT
Start: 2020-01-01 | End: 2020-01-01

## 2020-01-01 RX ORDER — ROPINIROLE 0.5 MG/1
1 TABLET, FILM COATED ORAL NIGHTLY
Status: DISCONTINUED | OUTPATIENT
Start: 2020-01-01 | End: 2020-01-01 | Stop reason: HOSPADM

## 2020-01-01 RX ORDER — ONDANSETRON 2 MG/ML
4 INJECTION INTRAMUSCULAR; INTRAVENOUS EVERY 8 HOURS PRN
Status: DISCONTINUED | OUTPATIENT
Start: 2020-01-01 | End: 2020-01-01 | Stop reason: HOSPADM

## 2020-01-01 RX ORDER — GUAIFENESIN 100 MG/5ML
200 SOLUTION ORAL EVERY 4 HOURS
Status: DISCONTINUED | OUTPATIENT
Start: 2020-01-01 | End: 2020-01-01

## 2020-01-01 RX ORDER — CEFTRIAXONE 1 G/1
1 INJECTION, POWDER, FOR SOLUTION INTRAMUSCULAR; INTRAVENOUS
Status: DISCONTINUED | OUTPATIENT
Start: 2020-01-01 | End: 2020-01-01 | Stop reason: HOSPADM

## 2020-01-01 RX ORDER — GLUCAGON 1 MG
1 KIT INJECTION
Status: DISCONTINUED | OUTPATIENT
Start: 2020-01-01 | End: 2020-01-01 | Stop reason: HOSPADM

## 2020-01-01 RX ORDER — IBUPROFEN 200 MG
24 TABLET ORAL
Status: DISCONTINUED | OUTPATIENT
Start: 2020-01-01 | End: 2020-01-01 | Stop reason: HOSPADM

## 2020-01-01 RX ORDER — LANCETS
EACH MISCELLANEOUS
COMMUNITY
Start: 2019-12-31 | End: 2020-01-01 | Stop reason: SDUPTHER

## 2020-01-01 RX ORDER — LANOLIN ALCOHOL/MO/W.PET/CERES
800 CREAM (GRAM) TOPICAL
Status: DISCONTINUED | OUTPATIENT
Start: 2020-01-01 | End: 2020-01-01 | Stop reason: HOSPADM

## 2020-01-01 RX ORDER — ROPINIROLE 0.25 MG/1
1 TABLET, FILM COATED ORAL NIGHTLY
Status: DISCONTINUED | OUTPATIENT
Start: 2020-01-01 | End: 2020-01-01 | Stop reason: HOSPADM

## 2020-01-01 RX ORDER — PANTOPRAZOLE SODIUM 40 MG/1
40 TABLET, DELAYED RELEASE ORAL DAILY
Qty: 14 TABLET | Refills: 0 | Status: ON HOLD | OUTPATIENT
Start: 2020-01-01 | End: 2020-01-01 | Stop reason: HOSPADM

## 2020-01-01 RX ORDER — DEXTROSE 4 G
1 TABLET,CHEWABLE ORAL DAILY
Qty: 1 EACH | Refills: 0 | Status: ON HOLD | OUTPATIENT
Start: 2020-01-01 | End: 2020-01-01 | Stop reason: HOSPADM

## 2020-01-01 RX ORDER — PREDNISONE 20 MG/1
40 TABLET ORAL DAILY
Status: COMPLETED | OUTPATIENT
Start: 2020-01-01 | End: 2020-01-01

## 2020-01-01 RX ORDER — PREDNISONE 20 MG/1
20 TABLET ORAL DAILY
Qty: 90 TABLET | Refills: 1 | Status: ON HOLD | OUTPATIENT
Start: 2020-01-01 | End: 2020-01-01

## 2020-01-01 RX ORDER — METOPROLOL TARTRATE 25 MG/1
25 TABLET, FILM COATED ORAL 2 TIMES DAILY
Status: DISCONTINUED | OUTPATIENT
Start: 2020-01-01 | End: 2020-01-01

## 2020-01-01 RX ORDER — HYDROCODONE BITARTRATE AND ACETAMINOPHEN 500; 5 MG/1; MG/1
TABLET ORAL ONCE
Status: DISCONTINUED | OUTPATIENT
Start: 2020-01-01 | End: 2020-01-01 | Stop reason: HOSPADM

## 2020-01-01 RX ORDER — FERROUS SULFATE, DRIED 160(50) MG
1 TABLET, EXTENDED RELEASE ORAL ONCE
Status: COMPLETED | OUTPATIENT
Start: 2020-01-01 | End: 2020-01-01

## 2020-01-01 RX ORDER — TORSEMIDE 20 MG/1
20 TABLET ORAL DAILY
Qty: 30 TABLET | Refills: 11 | Status: ON HOLD
Start: 2020-01-01 | End: 2020-01-01 | Stop reason: SDUPTHER

## 2020-01-01 RX ORDER — METOPROLOL TARTRATE 25 MG/1
25 TABLET, FILM COATED ORAL EVERY 12 HOURS
Status: DISCONTINUED | OUTPATIENT
Start: 2020-01-01 | End: 2020-01-01

## 2020-01-01 RX ORDER — INSULIN ASPART 100 [IU]/ML
1-10 INJECTION, SOLUTION INTRAVENOUS; SUBCUTANEOUS EVERY 6 HOURS PRN
Status: DISCONTINUED | OUTPATIENT
Start: 2020-01-01 | End: 2020-01-01

## 2020-01-01 RX ORDER — SODIUM,POTASSIUM PHOSPHATES 280-250MG
2 POWDER IN PACKET (EA) ORAL
Status: DISCONTINUED | OUTPATIENT
Start: 2020-01-01 | End: 2020-01-01 | Stop reason: HOSPADM

## 2020-01-01 RX ORDER — ROPINIROLE 0.25 MG/1
0.5 TABLET, FILM COATED ORAL DAILY PRN
Status: DISCONTINUED | OUTPATIENT
Start: 2020-01-01 | End: 2020-01-01

## 2020-01-01 RX ORDER — LEVALBUTEROL 1.25 MG/.5ML
1.25 SOLUTION, CONCENTRATE RESPIRATORY (INHALATION)
Status: COMPLETED | OUTPATIENT
Start: 2020-01-01 | End: 2020-01-01

## 2020-01-01 RX ORDER — SODIUM CHLORIDE 9 MG/ML
1000 INJECTION, SOLUTION INTRAVENOUS
Status: COMPLETED | OUTPATIENT
Start: 2020-01-01 | End: 2020-01-01

## 2020-01-01 RX ORDER — MECLIZINE HCL 12.5 MG 12.5 MG/1
12.5 TABLET ORAL NIGHTLY
Qty: 5 TABLET | Refills: 0 | Status: SHIPPED | OUTPATIENT
Start: 2020-01-01 | End: 2020-01-01

## 2020-01-01 RX ORDER — LANOLIN ALCOHOL/MO/W.PET/CERES
50 CREAM (GRAM) TOPICAL DAILY
Status: DISCONTINUED | OUTPATIENT
Start: 2020-01-01 | End: 2020-01-01 | Stop reason: HOSPADM

## 2020-01-01 RX ORDER — PANTOPRAZOLE SODIUM 40 MG/1
40 TABLET, DELAYED RELEASE ORAL DAILY
Qty: 14 TABLET | Refills: 0 | Status: SHIPPED | OUTPATIENT
Start: 2020-01-01 | End: 2020-01-01 | Stop reason: SDUPTHER

## 2020-01-01 RX ORDER — IPRATROPIUM BROMIDE AND ALBUTEROL SULFATE 2.5; .5 MG/3ML; MG/3ML
3 SOLUTION RESPIRATORY (INHALATION) EVERY 4 HOURS
Status: DISCONTINUED | OUTPATIENT
Start: 2020-01-01 | End: 2020-01-01

## 2020-01-01 RX ORDER — ROPINIROLE 0.25 MG/1
1 TABLET, FILM COATED ORAL ONCE
Status: COMPLETED | OUTPATIENT
Start: 2020-01-01 | End: 2020-01-01

## 2020-01-01 RX ORDER — FLUTICASONE FUROATE AND VILANTEROL 100; 25 UG/1; UG/1
1 POWDER RESPIRATORY (INHALATION) DAILY
Status: DISCONTINUED | OUTPATIENT
Start: 2020-01-01 | End: 2020-01-01 | Stop reason: HOSPADM

## 2020-01-01 RX ORDER — CITALOPRAM 20 MG/1
20 TABLET, FILM COATED ORAL DAILY
Qty: 30 TABLET | Refills: 11 | Status: CANCELLED | OUTPATIENT
Start: 2020-01-01 | End: 2021-03-25

## 2020-01-01 RX ORDER — INSULIN ASPART 100 [IU]/ML
4 INJECTION, SOLUTION INTRAVENOUS; SUBCUTANEOUS
Status: DISCONTINUED | OUTPATIENT
Start: 2020-01-01 | End: 2020-01-01 | Stop reason: HOSPADM

## 2020-01-01 RX ORDER — PREDNISONE 20 MG/1
20 TABLET ORAL DAILY
Qty: 30 TABLET | Refills: 1 | Status: SHIPPED | OUTPATIENT
Start: 2020-01-01 | End: 2020-01-01 | Stop reason: SDUPTHER

## 2020-01-01 RX ORDER — FUROSEMIDE 10 MG/ML
10 INJECTION INTRAMUSCULAR; INTRAVENOUS ONCE
Status: COMPLETED | OUTPATIENT
Start: 2020-01-01 | End: 2020-01-01

## 2020-01-01 RX ORDER — DOXYCYCLINE HYCLATE 100 MG
100 TABLET ORAL EVERY 12 HOURS
Status: DISCONTINUED | OUTPATIENT
Start: 2020-01-01 | End: 2020-01-01 | Stop reason: HOSPADM

## 2020-01-01 RX ORDER — FUROSEMIDE 10 MG/ML
40 INJECTION INTRAMUSCULAR; INTRAVENOUS DAILY
Status: DISCONTINUED | OUTPATIENT
Start: 2020-01-01 | End: 2020-01-01

## 2020-01-01 RX ORDER — VANCOMYCIN HCL IN 5 % DEXTROSE 1G/250ML
1000 PLASTIC BAG, INJECTION (ML) INTRAVENOUS
Status: DISCONTINUED | OUTPATIENT
Start: 2020-01-01 | End: 2020-01-01

## 2020-01-01 RX ORDER — CITALOPRAM 10 MG/1
20 TABLET ORAL DAILY
Status: DISCONTINUED | OUTPATIENT
Start: 2020-01-01 | End: 2020-01-01 | Stop reason: HOSPADM

## 2020-01-01 RX ORDER — ACETAMINOPHEN 500 MG
500 TABLET ORAL
Status: COMPLETED | OUTPATIENT
Start: 2020-01-01 | End: 2020-01-01

## 2020-01-01 RX ORDER — ALPRAZOLAM 0.25 MG/1
0.25 TABLET ORAL ONCE
Status: DISCONTINUED | OUTPATIENT
Start: 2020-01-01 | End: 2020-01-01

## 2020-01-01 RX ORDER — DILTIAZEM HCL 1 MG/ML
5 INJECTION, SOLUTION INTRAVENOUS CONTINUOUS
Status: DISCONTINUED | OUTPATIENT
Start: 2020-01-01 | End: 2020-01-01 | Stop reason: HOSPADM

## 2020-01-01 RX ORDER — PREDNISONE 20 MG/1
20 TABLET ORAL DAILY
Status: DISCONTINUED | OUTPATIENT
Start: 2020-01-01 | End: 2020-01-01 | Stop reason: HOSPADM

## 2020-01-01 RX ORDER — FOLIC ACID 1 MG/1
2000 TABLET ORAL DAILY
Status: DISCONTINUED | OUTPATIENT
Start: 2020-01-01 | End: 2020-01-01 | Stop reason: HOSPADM

## 2020-01-01 RX ORDER — METOPROLOL TARTRATE 25 MG/1
25 TABLET, FILM COATED ORAL 3 TIMES DAILY
Status: DISCONTINUED | OUTPATIENT
Start: 2020-01-01 | End: 2020-01-01 | Stop reason: HOSPADM

## 2020-01-01 RX ORDER — PREDNISONE 20 MG/1
20 TABLET ORAL DAILY
Qty: 30 TABLET | Refills: 1 | Status: ON HOLD | OUTPATIENT
Start: 2020-01-01 | End: 2020-01-01 | Stop reason: SDUPTHER

## 2020-01-01 RX ORDER — TRAZODONE HYDROCHLORIDE 50 MG/1
50 TABLET ORAL NIGHTLY
Qty: 90 TABLET | Refills: 0 | Status: SHIPPED | OUTPATIENT
Start: 2020-01-01 | End: 2020-01-01 | Stop reason: SDUPTHER

## 2020-01-01 RX ORDER — POTASSIUM CHLORIDE 20 MEQ/1
40 TABLET, EXTENDED RELEASE ORAL ONCE
Status: COMPLETED | OUTPATIENT
Start: 2020-01-01 | End: 2020-01-01

## 2020-01-01 RX ORDER — ROPINIROLE 1 MG/1
1 TABLET, FILM COATED ORAL NIGHTLY
Status: DISCONTINUED | OUTPATIENT
Start: 2020-01-01 | End: 2020-01-01 | Stop reason: HOSPADM

## 2020-01-01 RX ORDER — PRAVASTATIN SODIUM 40 MG/1
40 TABLET ORAL DAILY
Status: DISCONTINUED | OUTPATIENT
Start: 2020-01-01 | End: 2020-01-01 | Stop reason: HOSPADM

## 2020-01-01 RX ORDER — GLUCAGON 1 MG
1 KIT INJECTION
Status: DISCONTINUED | OUTPATIENT
Start: 2020-01-01 | End: 2020-01-01

## 2020-01-01 RX ORDER — AMIODARONE HYDROCHLORIDE 200 MG/1
100 TABLET ORAL DAILY
COMMUNITY
End: 2020-01-01

## 2020-01-01 RX ORDER — ENOXAPARIN SODIUM 100 MG/ML
30 INJECTION SUBCUTANEOUS EVERY 24 HOURS
Status: DISCONTINUED | OUTPATIENT
Start: 2020-01-01 | End: 2020-01-01 | Stop reason: HOSPADM

## 2020-01-01 RX ORDER — PROMETHAZINE HYDROCHLORIDE 25 MG/1
25 TABLET ORAL EVERY 6 HOURS PRN
Status: DISCONTINUED | OUTPATIENT
Start: 2020-01-01 | End: 2020-01-01 | Stop reason: HOSPADM

## 2020-01-01 RX ORDER — METOPROLOL TARTRATE 1 MG/ML
5 INJECTION, SOLUTION INTRAVENOUS ONCE
Status: COMPLETED | OUTPATIENT
Start: 2020-01-01 | End: 2020-01-01

## 2020-01-01 RX ORDER — BENZONATATE 100 MG/1
100 CAPSULE ORAL 3 TIMES DAILY PRN
Status: DISCONTINUED | OUTPATIENT
Start: 2020-01-01 | End: 2020-01-01 | Stop reason: HOSPADM

## 2020-01-01 RX ORDER — PREDNISONE 50 MG/1
50 TABLET ORAL DAILY
Status: DISCONTINUED | OUTPATIENT
Start: 2020-01-01 | End: 2020-01-01 | Stop reason: HOSPADM

## 2020-01-01 RX ORDER — FUROSEMIDE 10 MG/ML
40 INJECTION INTRAMUSCULAR; INTRAVENOUS
Status: DISCONTINUED | OUTPATIENT
Start: 2020-01-01 | End: 2020-01-01

## 2020-01-01 RX ORDER — METOPROLOL SUCCINATE 50 MG/1
50 TABLET, EXTENDED RELEASE ORAL 2 TIMES DAILY
Status: DISCONTINUED | OUTPATIENT
Start: 2020-01-01 | End: 2020-01-01 | Stop reason: HOSPADM

## 2020-01-01 RX ORDER — PREDNISONE 50 MG/1
50 TABLET ORAL DAILY
Qty: 3 TABLET | Refills: 0 | Status: SHIPPED | OUTPATIENT
Start: 2020-01-01 | End: 2020-01-01

## 2020-01-01 RX ORDER — POTASSIUM CHLORIDE 20 MEQ/15ML
40 SOLUTION ORAL
Status: DISCONTINUED | OUTPATIENT
Start: 2020-01-01 | End: 2020-01-01 | Stop reason: HOSPADM

## 2020-01-01 RX ORDER — MAGNESIUM SULFATE HEPTAHYDRATE 40 MG/ML
2 INJECTION, SOLUTION INTRAVENOUS ONCE
Status: COMPLETED | OUTPATIENT
Start: 2020-01-01 | End: 2020-01-01

## 2020-01-01 RX ORDER — DIGOXIN 125 MCG
0.12 TABLET ORAL DAILY
Status: DISCONTINUED | OUTPATIENT
Start: 2020-01-01 | End: 2020-01-01 | Stop reason: HOSPADM

## 2020-01-01 RX ORDER — METOPROLOL TARTRATE 1 MG/ML
5 INJECTION, SOLUTION INTRAVENOUS EVERY 5 MIN PRN
Status: DISCONTINUED | OUTPATIENT
Start: 2020-01-01 | End: 2020-01-01 | Stop reason: HOSPADM

## 2020-01-01 RX ORDER — MUPIROCIN 20 MG/G
OINTMENT TOPICAL 2 TIMES DAILY
Status: DISCONTINUED | OUTPATIENT
Start: 2020-01-01 | End: 2020-01-01 | Stop reason: HOSPADM

## 2020-01-01 RX ORDER — INSULIN ASPART 100 [IU]/ML
1-10 INJECTION, SOLUTION INTRAVENOUS; SUBCUTANEOUS
Status: DISCONTINUED | OUTPATIENT
Start: 2020-01-01 | End: 2020-01-01 | Stop reason: HOSPADM

## 2020-01-01 RX ORDER — IPRATROPIUM BROMIDE AND ALBUTEROL SULFATE 2.5; .5 MG/3ML; MG/3ML
3 SOLUTION RESPIRATORY (INHALATION) EVERY 6 HOURS
Status: DISCONTINUED | OUTPATIENT
Start: 2020-01-01 | End: 2020-01-01 | Stop reason: HOSPADM

## 2020-01-01 RX ORDER — MUPIROCIN 20 MG/G
OINTMENT TOPICAL 2 TIMES DAILY
Qty: 22 G | Refills: 3 | Status: SHIPPED | OUTPATIENT
Start: 2020-01-01 | End: 2020-01-01 | Stop reason: SDUPTHER

## 2020-01-01 RX ORDER — FUROSEMIDE 10 MG/ML
100 INJECTION INTRAMUSCULAR; INTRAVENOUS ONCE
Status: COMPLETED | OUTPATIENT
Start: 2020-01-01 | End: 2020-01-01

## 2020-01-01 RX ORDER — METOPROLOL SUCCINATE 25 MG/1
50 TABLET, EXTENDED RELEASE ORAL 2 TIMES DAILY
Status: DISCONTINUED | OUTPATIENT
Start: 2020-01-01 | End: 2020-01-01

## 2020-01-01 RX ORDER — POTASSIUM CHLORIDE 20 MEQ/15ML
60 SOLUTION ORAL
Status: DISCONTINUED | OUTPATIENT
Start: 2020-01-01 | End: 2020-01-01 | Stop reason: HOSPADM

## 2020-01-01 RX ORDER — PANTOPRAZOLE SODIUM 40 MG/1
40 TABLET, DELAYED RELEASE ORAL DAILY
Status: DISCONTINUED | OUTPATIENT
Start: 2020-01-01 | End: 2020-01-01 | Stop reason: HOSPADM

## 2020-01-01 RX ORDER — PREDNISONE 20 MG/1
TABLET ORAL
Qty: 30 TABLET | Refills: 0 | Status: SHIPPED | OUTPATIENT
Start: 2020-01-01 | End: 2020-01-01

## 2020-01-01 RX ORDER — BACLOFEN 10 MG/1
10 TABLET ORAL 3 TIMES DAILY
COMMUNITY

## 2020-01-01 RX ORDER — IPRATROPIUM BROMIDE AND ALBUTEROL SULFATE 2.5; .5 MG/3ML; MG/3ML
3 SOLUTION RESPIRATORY (INHALATION)
Status: DISCONTINUED | OUTPATIENT
Start: 2020-01-01 | End: 2020-01-01 | Stop reason: HOSPADM

## 2020-01-01 RX ORDER — IPRATROPIUM BROMIDE AND ALBUTEROL SULFATE 2.5; .5 MG/3ML; MG/3ML
SOLUTION RESPIRATORY (INHALATION)
Status: COMPLETED
Start: 2020-01-01 | End: 2020-01-01

## 2020-01-01 RX ORDER — GUAIFENESIN 600 MG/1
600 TABLET, EXTENDED RELEASE ORAL 2 TIMES DAILY
Status: DISCONTINUED | OUTPATIENT
Start: 2020-01-01 | End: 2020-01-01

## 2020-01-01 RX ORDER — LANOLIN ALCOHOL/MO/W.PET/CERES
100 CREAM (GRAM) TOPICAL DAILY
Qty: 60 TABLET | Refills: 3 | Status: SHIPPED | OUTPATIENT
Start: 2020-01-01 | End: 2020-01-01

## 2020-01-01 RX ORDER — PEN NEEDLE, DIABETIC 31 GX5/16"
NEEDLE, DISPOSABLE MISCELLANEOUS
Qty: 100 EACH | Refills: 0 | Status: SHIPPED | OUTPATIENT
Start: 2020-01-01 | End: 2020-01-01

## 2020-01-01 RX ORDER — DOXYCYCLINE HYCLATE 100 MG
100 TABLET ORAL 2 TIMES DAILY
Qty: 20 TABLET | Refills: 0 | Status: SHIPPED | OUTPATIENT
Start: 2020-01-01 | End: 2020-01-01

## 2020-01-01 RX ORDER — PANTOPRAZOLE SODIUM 40 MG/1
40 TABLET, DELAYED RELEASE ORAL
Status: DISCONTINUED | OUTPATIENT
Start: 2020-01-01 | End: 2020-01-01 | Stop reason: HOSPADM

## 2020-01-01 RX ORDER — DIGOXIN 125 MCG
0.12 TABLET ORAL DAILY
Qty: 30 TABLET | Refills: 0 | Status: SHIPPED | OUTPATIENT
Start: 2020-01-01 | End: 2021-12-25

## 2020-01-01 RX ORDER — TIOTROPIUM BROMIDE 18 UG/1
1 CAPSULE ORAL; RESPIRATORY (INHALATION) DAILY
Status: DISCONTINUED | OUTPATIENT
Start: 2020-01-01 | End: 2020-01-01 | Stop reason: HOSPADM

## 2020-01-01 RX ORDER — PANTOPRAZOLE SODIUM 40 MG/1
40 TABLET, DELAYED RELEASE ORAL DAILY
Qty: 14 TABLET | Refills: 0 | Status: CANCELLED | OUTPATIENT
Start: 2020-01-01 | End: 2020-01-01

## 2020-01-01 RX ORDER — ACETAMINOPHEN 325 MG/1
650 TABLET ORAL EVERY 4 HOURS PRN
Status: DISCONTINUED | OUTPATIENT
Start: 2020-01-01 | End: 2020-01-01 | Stop reason: HOSPADM

## 2020-01-01 RX ORDER — POTASSIUM CHLORIDE 750 MG/1
10 CAPSULE, EXTENDED RELEASE ORAL ONCE
Status: COMPLETED | OUTPATIENT
Start: 2020-01-01 | End: 2020-01-01

## 2020-01-01 RX ORDER — PREDNISONE 20 MG/1
40 TABLET ORAL ONCE
Status: COMPLETED | OUTPATIENT
Start: 2020-01-01 | End: 2020-01-01

## 2020-01-01 RX ORDER — FUROSEMIDE 10 MG/ML
10 INJECTION INTRAMUSCULAR; INTRAVENOUS ONCE
Status: DISCONTINUED | OUTPATIENT
Start: 2020-01-01 | End: 2020-01-01

## 2020-01-01 RX ORDER — TIOTROPIUM BROMIDE 18 UG/1
1 CAPSULE ORAL; RESPIRATORY (INHALATION) DAILY
Qty: 30 CAPSULE | Refills: 2 | Status: SHIPPED | OUTPATIENT
Start: 2020-01-01 | End: 2020-01-01

## 2020-01-01 RX ORDER — LOSARTAN POTASSIUM 50 MG/1
50 TABLET ORAL DAILY
Start: 2020-01-01

## 2020-01-01 RX ORDER — PANTOPRAZOLE SODIUM 40 MG/10ML
40 INJECTION, POWDER, LYOPHILIZED, FOR SOLUTION INTRAVENOUS EVERY 12 HOURS
Status: DISCONTINUED | OUTPATIENT
Start: 2020-01-01 | End: 2020-01-01

## 2020-01-01 RX ORDER — PANTOPRAZOLE SODIUM 40 MG/10ML
40 INJECTION, POWDER, LYOPHILIZED, FOR SOLUTION INTRAVENOUS
Status: COMPLETED | OUTPATIENT
Start: 2020-01-01 | End: 2020-01-01

## 2020-01-01 RX ORDER — SODIUM CHLORIDE 0.9 % (FLUSH) 0.9 %
10 SYRINGE (ML) INJECTION
Status: CANCELLED | OUTPATIENT
Start: 2020-01-01

## 2020-01-01 RX ORDER — DILTIAZEM HYDROCHLORIDE 5 MG/ML
INJECTION INTRAVENOUS
Status: DISCONTINUED
Start: 2020-01-01 | End: 2020-01-01 | Stop reason: HOSPADM

## 2020-01-01 RX ORDER — GUAIFENESIN 600 MG/1
1200 TABLET, EXTENDED RELEASE ORAL 2 TIMES DAILY
Status: DISCONTINUED | OUTPATIENT
Start: 2020-01-01 | End: 2020-01-01 | Stop reason: HOSPADM

## 2020-01-01 RX ORDER — PREDNISONE 20 MG/1
20 TABLET ORAL DAILY
Qty: 30 TABLET | Refills: 0 | Status: ON HOLD | OUTPATIENT
Start: 2020-01-01 | End: 2020-01-01 | Stop reason: HOSPADM

## 2020-01-01 RX ORDER — LOSARTAN POTASSIUM 50 MG/1
50 TABLET ORAL DAILY
Status: ON HOLD | COMMUNITY
Start: 2020-01-01 | End: 2020-01-01 | Stop reason: SDUPTHER

## 2020-01-01 RX ORDER — TORSEMIDE 20 MG/1
20 TABLET ORAL DAILY
Qty: 30 TABLET | Refills: 11
Start: 2020-01-01 | End: 2020-01-01 | Stop reason: SDUPTHER

## 2020-01-01 RX ORDER — TALC
6 POWDER (GRAM) TOPICAL NIGHTLY PRN
Status: DISCONTINUED | OUTPATIENT
Start: 2020-01-01 | End: 2020-01-01 | Stop reason: HOSPADM

## 2020-01-01 RX ORDER — LEVOFLOXACIN 750 MG/1
750 TABLET ORAL
Qty: 2 TABLET | Refills: 0 | Status: SHIPPED | OUTPATIENT
Start: 2020-01-01 | End: 2020-01-01

## 2020-01-01 RX ORDER — METOPROLOL SUCCINATE 50 MG/1
50 TABLET, EXTENDED RELEASE ORAL 2 TIMES DAILY
Status: DISCONTINUED | OUTPATIENT
Start: 2020-01-01 | End: 2020-01-01

## 2020-01-01 RX ORDER — PEN NEEDLE, DIABETIC 31 GX5/16"
NEEDLE, DISPOSABLE MISCELLANEOUS
Qty: 100 EACH | Refills: 2 | Status: ON HOLD | OUTPATIENT
Start: 2020-01-01 | End: 2020-01-01 | Stop reason: HOSPADM

## 2020-01-01 RX ORDER — CIPROFLOXACIN 500 MG/1
500 TABLET ORAL EVERY 24 HOURS
Status: DISCONTINUED | OUTPATIENT
Start: 2020-01-01 | End: 2020-01-01

## 2020-01-01 RX ORDER — MECLIZINE HYDROCHLORIDE 25 MG/1
TABLET ORAL 2 TIMES DAILY PRN
COMMUNITY
Start: 2020-01-01

## 2020-01-01 RX ORDER — FUROSEMIDE 10 MG/ML
40 INJECTION INTRAMUSCULAR; INTRAVENOUS ONCE
Status: DISCONTINUED | OUTPATIENT
Start: 2020-01-01 | End: 2020-01-01 | Stop reason: HOSPADM

## 2020-01-01 RX ORDER — POTASSIUM CHLORIDE 20 MEQ/1
20 TABLET, EXTENDED RELEASE ORAL
Status: COMPLETED | OUTPATIENT
Start: 2020-01-01 | End: 2020-01-01

## 2020-01-01 RX ORDER — PREDNISONE 5 MG/1
15 TABLET ORAL DAILY
Start: 2020-01-01 | End: 2020-01-01

## 2020-01-01 RX ORDER — BUDESONIDE AND FORMOTEROL FUMARATE DIHYDRATE 160; 4.5 UG/1; UG/1
2 AEROSOL RESPIRATORY (INHALATION) EVERY 12 HOURS
Status: ON HOLD | COMMUNITY
End: 2020-01-01 | Stop reason: CLARIF

## 2020-01-01 RX ORDER — LEVOFLOXACIN 750 MG/1
750 TABLET ORAL
Status: DISCONTINUED | OUTPATIENT
Start: 2020-01-01 | End: 2020-01-01 | Stop reason: HOSPADM

## 2020-01-01 RX ORDER — INSULIN ASPART 100 [IU]/ML
0-5 INJECTION, SOLUTION INTRAVENOUS; SUBCUTANEOUS
Status: DISCONTINUED | OUTPATIENT
Start: 2020-01-01 | End: 2020-01-01

## 2020-01-01 RX ORDER — PANTOPRAZOLE SODIUM 40 MG/1
40 TABLET, DELAYED RELEASE ORAL DAILY
Status: DISCONTINUED | OUTPATIENT
Start: 2020-01-01 | End: 2020-01-01

## 2020-01-01 RX ORDER — IPRATROPIUM BROMIDE AND ALBUTEROL SULFATE 2.5; .5 MG/3ML; MG/3ML
3 SOLUTION RESPIRATORY (INHALATION) EVERY 4 HOURS
Status: DISCONTINUED | OUTPATIENT
Start: 2020-01-01 | End: 2020-01-01 | Stop reason: HOSPADM

## 2020-01-01 RX ORDER — LOSARTAN POTASSIUM 50 MG/1
25 TABLET ORAL DAILY
Status: ON HOLD
Start: 2020-01-01 | End: 2020-01-01 | Stop reason: SDUPTHER

## 2020-01-01 RX ORDER — IRON SUCROSE 20 MG/ML
100 INJECTION, SOLUTION INTRAVENOUS
Qty: 5 ML | Refills: 2 | Status: SHIPPED | OUTPATIENT
Start: 2020-01-01 | End: 2020-01-01

## 2020-01-01 RX ORDER — MUPIROCIN 20 MG/G
OINTMENT TOPICAL 2 TIMES DAILY
Qty: 22 G | Refills: 3 | Status: ON HOLD | OUTPATIENT
Start: 2020-01-01 | End: 2020-01-01 | Stop reason: HOSPADM

## 2020-01-01 RX ORDER — METHYLPREDNISOLONE SOD SUCC 125 MG
125 VIAL (EA) INJECTION
Status: COMPLETED | OUTPATIENT
Start: 2020-01-01 | End: 2020-01-01

## 2020-01-01 RX ORDER — PREDNISONE 5 MG/1
20 TABLET ORAL DAILY
Status: DISCONTINUED | OUTPATIENT
Start: 2020-01-01 | End: 2020-01-01 | Stop reason: HOSPADM

## 2020-01-01 RX ORDER — IPRATROPIUM BROMIDE AND ALBUTEROL SULFATE 2.5; .5 MG/3ML; MG/3ML
3 SOLUTION RESPIRATORY (INHALATION) EVERY 6 HOURS
Status: DISCONTINUED | OUTPATIENT
Start: 2020-01-01 | End: 2020-01-01

## 2020-01-01 RX ORDER — METOPROLOL SUCCINATE 50 MG/1
50 TABLET, EXTENDED RELEASE ORAL 2 TIMES DAILY
COMMUNITY
Start: 2020-02-04

## 2020-01-01 RX ORDER — DIGOXIN 125 MCG
0.12 TABLET ORAL DAILY
COMMUNITY
Start: 2020-01-01 | End: 2020-01-01

## 2020-01-01 RX ORDER — ASPIRIN 325 MG/1
100 TABLET, FILM COATED ORAL DAILY
Status: DISCONTINUED | OUTPATIENT
Start: 2020-01-01 | End: 2020-01-01 | Stop reason: SDUPTHER

## 2020-01-01 RX ORDER — METOPROLOL SUCCINATE 25 MG/1
50 TABLET, EXTENDED RELEASE ORAL 2 TIMES DAILY
Status: DISCONTINUED | OUTPATIENT
Start: 2020-01-01 | End: 2020-01-01 | Stop reason: HOSPADM

## 2020-01-01 RX ORDER — ROPINIROLE 1 MG/1
TABLET, FILM COATED ORAL
Qty: 90 TABLET | Refills: 5 | Status: SHIPPED | OUTPATIENT
Start: 2020-01-01

## 2020-01-01 RX ORDER — FOLIC ACID 1 MG/1
1000 TABLET ORAL DAILY
Status: DISCONTINUED | OUTPATIENT
Start: 2020-01-01 | End: 2020-01-01 | Stop reason: HOSPADM

## 2020-01-01 RX ORDER — MONTELUKAST SODIUM 10 MG/1
10 TABLET ORAL DAILY
Status: DISCONTINUED | OUTPATIENT
Start: 2020-01-01 | End: 2020-01-01 | Stop reason: HOSPADM

## 2020-01-01 RX ORDER — FUROSEMIDE 10 MG/ML
80 INJECTION INTRAMUSCULAR; INTRAVENOUS ONCE
Status: DISCONTINUED | OUTPATIENT
Start: 2020-01-01 | End: 2020-01-01

## 2020-01-01 RX ORDER — TRAZODONE HYDROCHLORIDE 50 MG/1
50 TABLET ORAL NIGHTLY
COMMUNITY
End: 2020-01-01 | Stop reason: SDUPTHER

## 2020-01-01 RX ORDER — OMEPRAZOLE 40 MG/1
CAPSULE, DELAYED RELEASE ORAL
COMMUNITY
Start: 2020-01-01 | End: 2020-01-01

## 2020-01-01 RX ORDER — CITALOPRAM 20 MG/1
20 TABLET, FILM COATED ORAL DAILY
Status: DISCONTINUED | OUTPATIENT
Start: 2020-01-01 | End: 2020-01-01

## 2020-01-01 RX ORDER — METOPROLOL TARTRATE 1 MG/ML
5 INJECTION, SOLUTION INTRAVENOUS
Status: COMPLETED | OUTPATIENT
Start: 2020-01-01 | End: 2020-01-01

## 2020-01-01 RX ORDER — UBIDECARENONE 75 MG
500 CAPSULE ORAL DAILY
Status: DISCONTINUED | OUTPATIENT
Start: 2020-01-01 | End: 2020-01-01 | Stop reason: HOSPADM

## 2020-01-01 RX ORDER — INSULIN ASPART 100 [IU]/ML
5 INJECTION, SOLUTION INTRAVENOUS; SUBCUTANEOUS
Status: DISCONTINUED | OUTPATIENT
Start: 2020-01-01 | End: 2020-01-01 | Stop reason: HOSPADM

## 2020-01-01 RX ORDER — TORSEMIDE 20 MG/1
20 TABLET ORAL DAILY
Qty: 30 TABLET | Refills: 11 | Status: SHIPPED | OUTPATIENT
Start: 2020-01-01 | End: 2021-08-25

## 2020-01-01 RX ORDER — MIDAZOLAM HYDROCHLORIDE 1 MG/ML
1 INJECTION INTRAMUSCULAR; INTRAVENOUS ONCE
Status: DISCONTINUED | OUTPATIENT
Start: 2020-01-01 | End: 2020-01-01 | Stop reason: SDUPTHER

## 2020-01-01 RX ORDER — AMIODARONE HYDROCHLORIDE 100 MG/1
100 TABLET ORAL DAILY
Status: DISCONTINUED | OUTPATIENT
Start: 2020-01-01 | End: 2020-01-01

## 2020-01-01 RX ORDER — LANOLIN ALCOHOL/MO/W.PET/CERES
400 CREAM (GRAM) TOPICAL ONCE
Status: COMPLETED | OUTPATIENT
Start: 2020-01-01 | End: 2020-01-01

## 2020-01-01 RX ORDER — AMOXICILLIN AND CLAVULANATE POTASSIUM 562.5; 437.5; 62.5 MG/1; MG/1; MG/1
2 TABLET, FILM COATED, EXTENDED RELEASE ORAL EVERY 12 HOURS
Qty: 28 TABLET | Refills: 0 | Status: SHIPPED | OUTPATIENT
Start: 2020-01-01 | End: 2020-01-01

## 2020-01-01 RX ORDER — METOLAZONE 5 MG/1
5 TABLET ORAL DAILY
Qty: 30 TABLET | Refills: 0 | Status: SHIPPED | OUTPATIENT
Start: 2020-01-01 | End: 2020-01-01

## 2020-01-01 RX ORDER — PREDNISONE 20 MG/1
20 TABLET ORAL DAILY
COMMUNITY
End: 2020-01-01 | Stop reason: SDUPTHER

## 2020-01-01 RX ADMIN — IPRATROPIUM BROMIDE AND ALBUTEROL SULFATE 3 ML: .5; 3 SOLUTION RESPIRATORY (INHALATION) at 02:04

## 2020-01-01 RX ADMIN — IPRATROPIUM BROMIDE AND ALBUTEROL SULFATE 3 ML: .5; 3 SOLUTION RESPIRATORY (INHALATION) at 07:12

## 2020-01-01 RX ADMIN — POTASSIUM CHLORIDE 40 MEQ: 1500 TABLET, EXTENDED RELEASE ORAL at 05:12

## 2020-01-01 RX ADMIN — IPRATROPIUM BROMIDE AND ALBUTEROL SULFATE 3 ML: .5; 2.5 SOLUTION RESPIRATORY (INHALATION) at 08:08

## 2020-01-01 RX ADMIN — METOPROLOL TARTRATE 25 MG: 25 TABLET, FILM COATED ORAL at 06:08

## 2020-01-01 RX ADMIN — INSULIN ASPART 4 UNITS: 100 INJECTION, SOLUTION INTRAVENOUS; SUBCUTANEOUS at 07:08

## 2020-01-01 RX ADMIN — Medication 800 MG: at 10:04

## 2020-01-01 RX ADMIN — INSULIN ASPART 5 UNITS: 100 INJECTION, SOLUTION INTRAVENOUS; SUBCUTANEOUS at 05:11

## 2020-01-01 RX ADMIN — ROPINIROLE HYDROCHLORIDE 1 MG: 1 TABLET, FILM COATED ORAL at 09:12

## 2020-01-01 RX ADMIN — TORSEMIDE 20 MG: 20 TABLET ORAL at 08:08

## 2020-01-01 RX ADMIN — IPRATROPIUM BROMIDE AND ALBUTEROL SULFATE 3 ML: .5; 3 SOLUTION RESPIRATORY (INHALATION) at 08:04

## 2020-01-01 RX ADMIN — POTASSIUM CHLORIDE 20 MEQ: 1500 TABLET, EXTENDED RELEASE ORAL at 04:08

## 2020-01-01 RX ADMIN — TRAZODONE HYDROCHLORIDE 50 MG: 50 TABLET ORAL at 08:07

## 2020-01-01 RX ADMIN — METOPROLOL TARTRATE 25 MG: 25 TABLET ORAL at 08:04

## 2020-01-01 RX ADMIN — PRAVASTATIN SODIUM 40 MG: 40 TABLET ORAL at 08:08

## 2020-01-01 RX ADMIN — PANTOPRAZOLE SODIUM 40 MG: 40 TABLET, DELAYED RELEASE ORAL at 07:04

## 2020-01-01 RX ADMIN — PRAVASTATIN SODIUM 40 MG: 40 TABLET ORAL at 09:07

## 2020-01-01 RX ADMIN — IPRATROPIUM BROMIDE AND ALBUTEROL SULFATE 3 ML: .5; 3 SOLUTION RESPIRATORY (INHALATION) at 03:12

## 2020-01-01 RX ADMIN — IPRATROPIUM BROMIDE AND ALBUTEROL SULFATE 3 ML: .5; 3 SOLUTION RESPIRATORY (INHALATION) at 12:04

## 2020-01-01 RX ADMIN — MUPIROCIN: 20 OINTMENT TOPICAL at 08:12

## 2020-01-01 RX ADMIN — LEVOFLOXACIN 750 MG: 750 TABLET, FILM COATED ORAL at 08:08

## 2020-01-01 RX ADMIN — ROPINIROLE HYDROCHLORIDE 1 MG: 1 TABLET, FILM COATED ORAL at 09:08

## 2020-01-01 RX ADMIN — METOPROLOL SUCCINATE 50 MG: 25 TABLET, EXTENDED RELEASE ORAL at 08:08

## 2020-01-01 RX ADMIN — INSULIN DETEMIR 5 UNITS: 100 INJECTION, SOLUTION SUBCUTANEOUS at 10:11

## 2020-01-01 RX ADMIN — TORSEMIDE 20 MG: 20 TABLET ORAL at 10:04

## 2020-01-01 RX ADMIN — FOLIC ACID 2000 MCG: 1 TABLET ORAL at 08:12

## 2020-01-01 RX ADMIN — PRAVASTATIN SODIUM 40 MG: 40 TABLET ORAL at 10:10

## 2020-01-01 RX ADMIN — ASPIRIN 81 MG: 81 TABLET, COATED ORAL at 08:08

## 2020-01-01 RX ADMIN — TORSEMIDE 20 MG: 20 TABLET ORAL at 10:10

## 2020-01-01 RX ADMIN — TRAZODONE HYDROCHLORIDE 50 MG: 50 TABLET ORAL at 08:10

## 2020-01-01 RX ADMIN — METOPROLOL SUCCINATE 50 MG: 50 TABLET, EXTENDED RELEASE ORAL at 08:11

## 2020-01-01 RX ADMIN — LEVALBUTEROL 1.25 MG: 1.25 SOLUTION, CONCENTRATE RESPIRATORY (INHALATION) at 08:07

## 2020-01-01 RX ADMIN — INSULIN ASPART 8 UNITS: 100 INJECTION, SOLUTION INTRAVENOUS; SUBCUTANEOUS at 04:08

## 2020-01-01 RX ADMIN — ASPIRIN 81 MG: 81 TABLET, COATED ORAL at 08:11

## 2020-01-01 RX ADMIN — METOPROLOL SUCCINATE 50 MG: 50 TABLET, EXTENDED RELEASE ORAL at 09:12

## 2020-01-01 RX ADMIN — ASPIRIN 81 MG: 81 TABLET, COATED ORAL at 09:12

## 2020-01-01 RX ADMIN — Medication 100 MG: at 10:10

## 2020-01-01 RX ADMIN — Medication 6 MG: at 08:12

## 2020-01-01 RX ADMIN — CITALOPRAM HYDROBROMIDE 20 MG: 10 TABLET ORAL at 08:11

## 2020-01-01 RX ADMIN — INSULIN DETEMIR 8 UNITS: 100 INJECTION, SOLUTION SUBCUTANEOUS at 08:07

## 2020-01-01 RX ADMIN — METHYLPREDNISOLONE SODIUM SUCCINATE 125 MG: 125 INJECTION, POWDER, FOR SOLUTION INTRAMUSCULAR; INTRAVENOUS at 02:10

## 2020-01-01 RX ADMIN — IPRATROPIUM BROMIDE AND ALBUTEROL SULFATE 3 ML: .5; 2.5 SOLUTION RESPIRATORY (INHALATION) at 07:08

## 2020-01-01 RX ADMIN — TORSEMIDE 20 MG: 20 TABLET ORAL at 09:07

## 2020-01-01 RX ADMIN — IPRATROPIUM BROMIDE AND ALBUTEROL SULFATE 3 ML: .5; 2.5 SOLUTION RESPIRATORY (INHALATION) at 01:08

## 2020-01-01 RX ADMIN — TRAZODONE HYDROCHLORIDE 50 MG: 50 TABLET ORAL at 08:08

## 2020-01-01 RX ADMIN — CYANOCOBALAMIN TAB 500 MCG 500 MCG: 500 TAB at 08:11

## 2020-01-01 RX ADMIN — PANTOPRAZOLE SODIUM 40 MG: 40 INJECTION, POWDER, LYOPHILIZED, FOR SOLUTION INTRAVENOUS at 06:07

## 2020-01-01 RX ADMIN — SODIUM CHLORIDE: 0.9 INJECTION, SOLUTION INTRAVENOUS at 08:11

## 2020-01-01 RX ADMIN — Medication 100 MG: at 08:08

## 2020-01-01 RX ADMIN — CITALOPRAM HYDROBROMIDE 20 MG: 10 TABLET ORAL at 09:11

## 2020-01-01 RX ADMIN — TIOTROPIUM BROMIDE 18 MCG: 18 CAPSULE ORAL; RESPIRATORY (INHALATION) at 04:08

## 2020-01-01 RX ADMIN — PRAVASTATIN SODIUM 40 MG: 40 TABLET ORAL at 08:12

## 2020-01-01 RX ADMIN — CITALOPRAM HYDROBROMIDE 20 MG: 20 TABLET ORAL at 08:08

## 2020-01-01 RX ADMIN — MAGNESIUM OXIDE 400 MG (241.3 MG MAGNESIUM) TABLET 400 MG: TABLET at 09:08

## 2020-01-01 RX ADMIN — DEXTROSE MONOHYDRATE 10 MG/HR: 50 INJECTION, SOLUTION INTRAVENOUS at 03:04

## 2020-01-01 RX ADMIN — TRAZODONE HYDROCHLORIDE 25 MG: 50 TABLET ORAL at 11:04

## 2020-01-01 RX ADMIN — Medication 100 MG: at 09:11

## 2020-01-01 RX ADMIN — INSULIN DETEMIR 10 UNITS: 100 INJECTION, SOLUTION SUBCUTANEOUS at 10:08

## 2020-01-01 RX ADMIN — PREDNISONE 20 MG: 20 TABLET ORAL at 08:11

## 2020-01-01 RX ADMIN — ROPINIROLE HYDROCHLORIDE 1 MG: 0.25 TABLET, FILM COATED ORAL at 08:12

## 2020-01-01 RX ADMIN — PIPERACILLIN AND TAZOBACTAM 4.5 G: 4; .5 INJECTION, POWDER, LYOPHILIZED, FOR SOLUTION INTRAVENOUS at 04:04

## 2020-01-01 RX ADMIN — PANTOPRAZOLE SODIUM 40 MG: 40 TABLET, DELAYED RELEASE ORAL at 08:11

## 2020-01-01 RX ADMIN — FUROSEMIDE 20 MG: 10 INJECTION, SOLUTION INTRAMUSCULAR; INTRAVENOUS at 10:11

## 2020-01-01 RX ADMIN — ROPINIROLE HYDROCHLORIDE 1 MG: 0.25 TABLET, FILM COATED ORAL at 09:11

## 2020-01-01 RX ADMIN — IPRATROPIUM BROMIDE AND ALBUTEROL SULFATE 3 ML: .5; 3 SOLUTION RESPIRATORY (INHALATION) at 09:12

## 2020-01-01 RX ADMIN — METOROPROLOL TARTRATE 5 MG: 5 INJECTION, SOLUTION INTRAVENOUS at 04:08

## 2020-01-01 RX ADMIN — PANTOPRAZOLE SODIUM 40 MG: 40 TABLET, DELAYED RELEASE ORAL at 05:04

## 2020-01-01 RX ADMIN — INSULIN ASPART 3 UNITS: 100 INJECTION, SOLUTION INTRAVENOUS; SUBCUTANEOUS at 08:12

## 2020-01-01 RX ADMIN — LEVALBUTEROL 1.25 MG: 1.25 SOLUTION, CONCENTRATE RESPIRATORY (INHALATION) at 06:11

## 2020-01-01 RX ADMIN — PANTOPRAZOLE SODIUM 40 MG: 40 TABLET, DELAYED RELEASE ORAL at 06:04

## 2020-01-01 RX ADMIN — PRAVASTATIN SODIUM 40 MG: 40 TABLET ORAL at 09:12

## 2020-01-01 RX ADMIN — LOSARTAN POTASSIUM 50 MG: 50 TABLET, FILM COATED ORAL at 08:11

## 2020-01-01 RX ADMIN — METOPROLOL TARTRATE 25 MG: 25 TABLET ORAL at 09:04

## 2020-01-01 RX ADMIN — INSULIN ASPART 3 UNITS: 100 INJECTION, SOLUTION INTRAVENOUS; SUBCUTANEOUS at 05:12

## 2020-01-01 RX ADMIN — PREDNISONE 40 MG: 20 TABLET ORAL at 08:08

## 2020-01-01 RX ADMIN — CITALOPRAM HYDROBROMIDE 20 MG: 10 TABLET ORAL at 10:10

## 2020-01-01 RX ADMIN — IPRATROPIUM BROMIDE AND ALBUTEROL SULFATE 3 ML: .5; 3 SOLUTION RESPIRATORY (INHALATION) at 04:12

## 2020-01-01 RX ADMIN — FUROSEMIDE 40 MG: 10 INJECTION, SOLUTION INTRAMUSCULAR; INTRAVENOUS at 04:12

## 2020-01-01 RX ADMIN — INSULIN ASPART 1 UNITS: 100 INJECTION, SOLUTION INTRAVENOUS; SUBCUTANEOUS at 09:12

## 2020-01-01 RX ADMIN — DOXYCYCLINE HYCLATE 100 MG: 100 TABLET, COATED ORAL at 09:12

## 2020-01-01 RX ADMIN — ROPINIROLE 1 MG: 0.25 TABLET ORAL at 12:04

## 2020-01-01 RX ADMIN — MAGNESIUM SULFATE 2 G: 2 INJECTION INTRAVENOUS at 10:08

## 2020-01-01 RX ADMIN — GUAIFENESIN 1200 MG: 600 TABLET, EXTENDED RELEASE ORAL at 08:08

## 2020-01-01 RX ADMIN — INSULIN ASPART 1 UNITS: 100 INJECTION, SOLUTION INTRAVENOUS; SUBCUTANEOUS at 09:04

## 2020-01-01 RX ADMIN — IPRATROPIUM BROMIDE AND ALBUTEROL SULFATE 3 ML: .5; 3 SOLUTION RESPIRATORY (INHALATION) at 03:04

## 2020-01-01 RX ADMIN — ASPIRIN 81 MG: 81 TABLET, COATED ORAL at 09:07

## 2020-01-01 RX ADMIN — AMIODARONE HYDROCHLORIDE 100 MG: 100 TABLET ORAL at 09:04

## 2020-01-01 RX ADMIN — FUROSEMIDE 40 MG: 10 INJECTION, SOLUTION INTRAMUSCULAR; INTRAVENOUS at 07:08

## 2020-01-01 RX ADMIN — TRAZODONE HYDROCHLORIDE 50 MG: 50 TABLET ORAL at 09:12

## 2020-01-01 RX ADMIN — IPRATROPIUM BROMIDE AND ALBUTEROL SULFATE 3 ML: .5; 3 SOLUTION RESPIRATORY (INHALATION) at 09:04

## 2020-01-01 RX ADMIN — LOSARTAN POTASSIUM 50 MG: 50 TABLET, FILM COATED ORAL at 10:10

## 2020-01-01 RX ADMIN — ROPINIROLE HYDROCHLORIDE 1 MG: 0.5 TABLET, FILM COATED ORAL at 08:07

## 2020-01-01 RX ADMIN — VANCOMYCIN HYDROCHLORIDE 500 MG: 500 INJECTION, POWDER, LYOPHILIZED, FOR SOLUTION INTRAVENOUS at 11:08

## 2020-01-01 RX ADMIN — IPRATROPIUM BROMIDE AND ALBUTEROL SULFATE 3 ML: .5; 3 SOLUTION RESPIRATORY (INHALATION) at 03:07

## 2020-01-01 RX ADMIN — TORSEMIDE 20 MG: 20 TABLET ORAL at 10:12

## 2020-01-01 RX ADMIN — POTASSIUM CHLORIDE 40 MEQ: 1500 TABLET, EXTENDED RELEASE ORAL at 01:12

## 2020-01-01 RX ADMIN — METOPROLOL SUCCINATE 50 MG: 25 TABLET, EXTENDED RELEASE ORAL at 09:08

## 2020-01-01 RX ADMIN — ROPINIROLE HYDROCHLORIDE 1 MG: 1 TABLET, FILM COATED ORAL at 08:08

## 2020-01-01 RX ADMIN — IPRATROPIUM BROMIDE AND ALBUTEROL SULFATE 3 ML: .5; 3 SOLUTION RESPIRATORY (INHALATION) at 12:07

## 2020-01-01 RX ADMIN — IPRATROPIUM BROMIDE AND ALBUTEROL SULFATE 3 ML: .5; 3 SOLUTION RESPIRATORY (INHALATION) at 05:07

## 2020-01-01 RX ADMIN — PREDNISONE 20 MG: 5 TABLET ORAL at 08:04

## 2020-01-01 RX ADMIN — VANCOMYCIN HYDROCHLORIDE 1000 MG: 1 INJECTION, POWDER, LYOPHILIZED, FOR SOLUTION INTRAVENOUS at 04:04

## 2020-01-01 RX ADMIN — DEXTROSE MONOHYDRATE 5 MG/HR: 50 INJECTION, SOLUTION INTRAVENOUS at 12:04

## 2020-01-01 RX ADMIN — DIGOXIN 0.12 MG: 125 TABLET ORAL at 08:12

## 2020-01-01 RX ADMIN — POTASSIUM CHLORIDE 40 MEQ: 1500 TABLET, EXTENDED RELEASE ORAL at 10:08

## 2020-01-01 RX ADMIN — Medication 100 MG: at 01:08

## 2020-01-01 RX ADMIN — PIPERACILLIN SODIUM AND TAZOBACTAM SODIUM 4.5 G: 4; .5 INJECTION, POWDER, LYOPHILIZED, FOR SOLUTION INTRAVENOUS at 06:08

## 2020-01-01 RX ADMIN — IPRATROPIUM BROMIDE AND ALBUTEROL SULFATE 3 ML: .5; 3 SOLUTION RESPIRATORY (INHALATION) at 08:07

## 2020-01-01 RX ADMIN — PANTOPRAZOLE SODIUM 40 MG: 40 TABLET, DELAYED RELEASE ORAL at 01:08

## 2020-01-01 RX ADMIN — PRAVASTATIN SODIUM 40 MG: 40 TABLET ORAL at 01:08

## 2020-01-01 RX ADMIN — TRAZODONE HYDROCHLORIDE 50 MG: 50 TABLET ORAL at 08:12

## 2020-01-01 RX ADMIN — TRAZODONE HYDROCHLORIDE 50 MG: 50 TABLET ORAL at 09:08

## 2020-01-01 RX ADMIN — CEFTRIAXONE SODIUM 1 G: 1 INJECTION, POWDER, FOR SOLUTION INTRAMUSCULAR; INTRAVENOUS at 12:07

## 2020-01-01 RX ADMIN — POTASSIUM CHLORIDE 40 MEQ: 20 SOLUTION ORAL at 06:04

## 2020-01-01 RX ADMIN — PANTOPRAZOLE SODIUM 40 MG: 40 INJECTION, POWDER, LYOPHILIZED, FOR SOLUTION INTRAVENOUS at 05:07

## 2020-01-01 RX ADMIN — INSULIN ASPART 6 UNITS: 100 INJECTION, SOLUTION INTRAVENOUS; SUBCUTANEOUS at 05:04

## 2020-01-01 RX ADMIN — PIPERACILLIN SODIUM AND TAZOBACTAM SODIUM 4.5 G: 4; .5 INJECTION, POWDER, LYOPHILIZED, FOR SOLUTION INTRAVENOUS at 11:08

## 2020-01-01 RX ADMIN — Medication 1 TABLET: at 09:11

## 2020-01-01 RX ADMIN — TORSEMIDE 20 MG: 20 TABLET ORAL at 12:04

## 2020-01-01 RX ADMIN — METHYLPREDNISOLONE SODIUM SUCCINATE 80 MG: 125 INJECTION, POWDER, FOR SOLUTION INTRAMUSCULAR; INTRAVENOUS at 12:12

## 2020-01-01 RX ADMIN — ENOXAPARIN SODIUM 30 MG: 100 INJECTION SUBCUTANEOUS at 04:08

## 2020-01-01 RX ADMIN — CIPROFLOXACIN HYDROCHLORIDE 500 MG: 500 TABLET, FILM COATED ORAL at 08:08

## 2020-01-01 RX ADMIN — AMIODARONE HYDROCHLORIDE 100 MG: 100 TABLET ORAL at 08:04

## 2020-01-01 RX ADMIN — PIPERACILLIN AND TAZOBACTAM 4.5 G: 4; .5 INJECTION, POWDER, LYOPHILIZED, FOR SOLUTION INTRAVENOUS at 09:04

## 2020-01-01 RX ADMIN — CITALOPRAM HYDROBROMIDE 20 MG: 10 TABLET ORAL at 08:12

## 2020-01-01 RX ADMIN — INSULIN ASPART 2 UNITS: 100 INJECTION, SOLUTION INTRAVENOUS; SUBCUTANEOUS at 09:04

## 2020-01-01 RX ADMIN — DOXYCYCLINE HYCLATE 100 MG: 100 TABLET, COATED ORAL at 08:12

## 2020-01-01 RX ADMIN — PANTOPRAZOLE SODIUM 40 MG: 40 TABLET, DELAYED RELEASE ORAL at 08:08

## 2020-01-01 RX ADMIN — INSULIN DETEMIR 10 UNITS: 100 INJECTION, SOLUTION SUBCUTANEOUS at 08:04

## 2020-01-01 RX ADMIN — METOPROLOL SUCCINATE 50 MG: 50 TABLET, EXTENDED RELEASE ORAL at 08:07

## 2020-01-01 RX ADMIN — CITALOPRAM HYDROBROMIDE 20 MG: 20 TABLET ORAL at 08:04

## 2020-01-01 RX ADMIN — INSULIN ASPART 2 UNITS: 100 INJECTION, SOLUTION INTRAVENOUS; SUBCUTANEOUS at 11:04

## 2020-01-01 RX ADMIN — IPRATROPIUM BROMIDE AND ALBUTEROL SULFATE 3 ML: .5; 3 SOLUTION RESPIRATORY (INHALATION) at 04:07

## 2020-01-01 RX ADMIN — METHYLPREDNISOLONE SODIUM SUCCINATE 125 MG: 125 INJECTION, POWDER, FOR SOLUTION INTRAMUSCULAR; INTRAVENOUS at 11:10

## 2020-01-01 RX ADMIN — LEVALBUTEROL 1.25 MG: 1.25 SOLUTION, CONCENTRATE RESPIRATORY (INHALATION) at 11:11

## 2020-01-01 RX ADMIN — INSULIN DETEMIR 8 UNITS: 100 INJECTION, SOLUTION SUBCUTANEOUS at 08:10

## 2020-01-01 RX ADMIN — PIPERACILLIN AND TAZOBACTAM 4.5 G: 4; .5 INJECTION, POWDER, LYOPHILIZED, FOR SOLUTION INTRAVENOUS at 02:04

## 2020-01-01 RX ADMIN — METHYLPREDNISOLONE SODIUM SUCCINATE 125 MG: 125 INJECTION, POWDER, FOR SOLUTION INTRAMUSCULAR; INTRAVENOUS at 02:04

## 2020-01-01 RX ADMIN — IPRATROPIUM BROMIDE AND ALBUTEROL SULFATE 3 ML: .5; 2.5 SOLUTION RESPIRATORY (INHALATION) at 11:08

## 2020-01-01 RX ADMIN — IPRATROPIUM BROMIDE AND ALBUTEROL SULFATE 3 ML: .5; 2.5 SOLUTION RESPIRATORY (INHALATION) at 04:08

## 2020-01-01 RX ADMIN — ROPINIROLE HYDROCHLORIDE 1 MG: 0.25 TABLET, FILM COATED ORAL at 08:04

## 2020-01-01 RX ADMIN — IPRATROPIUM BROMIDE AND ALBUTEROL SULFATE 3 ML: .5; 3 SOLUTION RESPIRATORY (INHALATION) at 12:12

## 2020-01-01 RX ADMIN — METHYLPREDNISOLONE SODIUM SUCCINATE 80 MG: 125 INJECTION, POWDER, FOR SOLUTION INTRAMUSCULAR; INTRAVENOUS at 04:12

## 2020-01-01 RX ADMIN — FUROSEMIDE 40 MG: 20 INJECTION, SOLUTION INTRAMUSCULAR; INTRAVENOUS at 10:12

## 2020-01-01 RX ADMIN — INSULIN ASPART 2 UNITS: 100 INJECTION, SOLUTION INTRAVENOUS; SUBCUTANEOUS at 05:04

## 2020-01-01 RX ADMIN — INSULIN ASPART 5 UNITS: 100 INJECTION, SOLUTION INTRAVENOUS; SUBCUTANEOUS at 12:11

## 2020-01-01 RX ADMIN — INSULIN ASPART 5 UNITS: 100 INJECTION, SOLUTION INTRAVENOUS; SUBCUTANEOUS at 06:12

## 2020-01-01 RX ADMIN — IPRATROPIUM BROMIDE AND ALBUTEROL SULFATE 3 ML: .5; 3 SOLUTION RESPIRATORY (INHALATION) at 01:07

## 2020-01-01 RX ADMIN — PIPERACILLIN AND TAZOBACTAM 4.5 G: 4; .5 INJECTION, POWDER, LYOPHILIZED, FOR SOLUTION INTRAVENOUS at 06:04

## 2020-01-01 RX ADMIN — INSULIN ASPART 8 UNITS: 100 INJECTION, SOLUTION INTRAVENOUS; SUBCUTANEOUS at 05:08

## 2020-01-01 RX ADMIN — ASPIRIN 81 MG: 81 TABLET, COATED ORAL at 01:08

## 2020-01-01 RX ADMIN — INSULIN DETEMIR 10 UNITS: 100 INJECTION, SOLUTION SUBCUTANEOUS at 08:08

## 2020-01-01 RX ADMIN — INSULIN ASPART 2 UNITS: 100 INJECTION, SOLUTION INTRAVENOUS; SUBCUTANEOUS at 10:08

## 2020-01-01 RX ADMIN — ROPINIROLE HYDROCHLORIDE 0.5 MG: 0.25 TABLET, FILM COATED ORAL at 08:04

## 2020-01-01 RX ADMIN — VANCOMYCIN HYDROCHLORIDE 1000 MG: 1 INJECTION, POWDER, LYOPHILIZED, FOR SOLUTION INTRAVENOUS at 03:04

## 2020-01-01 RX ADMIN — INSULIN DETEMIR 10 UNITS: 100 INJECTION, SOLUTION SUBCUTANEOUS at 09:12

## 2020-01-01 RX ADMIN — FUROSEMIDE 40 MG: 10 INJECTION, SOLUTION INTRAMUSCULAR; INTRAVENOUS at 12:08

## 2020-01-01 RX ADMIN — Medication 800 MG: at 06:04

## 2020-01-01 RX ADMIN — MONTELUKAST 10 MG: 10 TABLET, FILM COATED ORAL at 08:11

## 2020-01-01 RX ADMIN — PANTOPRAZOLE SODIUM 40 MG: 40 TABLET, DELAYED RELEASE ORAL at 04:04

## 2020-01-01 RX ADMIN — ROPINIROLE HYDROCHLORIDE 1 MG: 0.25 TABLET, FILM COATED ORAL at 08:11

## 2020-01-01 RX ADMIN — INSULIN ASPART 1 UNITS: 100 INJECTION, SOLUTION INTRAVENOUS; SUBCUTANEOUS at 08:07

## 2020-01-01 RX ADMIN — PREDNISONE 50 MG: 50 TABLET ORAL at 09:07

## 2020-01-01 RX ADMIN — INSULIN ASPART 3 UNITS: 100 INJECTION, SOLUTION INTRAVENOUS; SUBCUTANEOUS at 05:10

## 2020-01-01 RX ADMIN — PREDNISONE 20 MG: 20 TABLET ORAL at 09:11

## 2020-01-01 RX ADMIN — INSULIN ASPART 3 UNITS: 100 INJECTION, SOLUTION INTRAVENOUS; SUBCUTANEOUS at 08:10

## 2020-01-01 RX ADMIN — INSULIN ASPART 3 UNITS: 100 INJECTION, SOLUTION INTRAVENOUS; SUBCUTANEOUS at 11:08

## 2020-01-01 RX ADMIN — AZITHROMYCIN MONOHYDRATE 500 MG: 500 INJECTION, POWDER, LYOPHILIZED, FOR SOLUTION INTRAVENOUS at 12:07

## 2020-01-01 RX ADMIN — CITALOPRAM HYDROBROMIDE 20 MG: 20 TABLET ORAL at 09:04

## 2020-01-01 RX ADMIN — LEVALBUTEROL 1.25 MG: 1.25 SOLUTION, CONCENTRATE RESPIRATORY (INHALATION) at 10:08

## 2020-01-01 RX ADMIN — FUROSEMIDE 10 MG: 20 INJECTION, SOLUTION INTRAMUSCULAR; INTRAVENOUS at 12:10

## 2020-01-01 RX ADMIN — TORSEMIDE 20 MG: 20 TABLET ORAL at 08:11

## 2020-01-01 RX ADMIN — INSULIN ASPART 2 UNITS: 100 INJECTION, SOLUTION INTRAVENOUS; SUBCUTANEOUS at 11:08

## 2020-01-01 RX ADMIN — METHYLPREDNISOLONE SODIUM SUCCINATE 125 MG: 125 INJECTION, POWDER, FOR SOLUTION INTRAMUSCULAR; INTRAVENOUS at 05:10

## 2020-01-01 RX ADMIN — METOPROLOL SUCCINATE 50 MG: 50 TABLET, EXTENDED RELEASE ORAL at 02:12

## 2020-01-01 RX ADMIN — INSULIN ASPART 3 UNITS: 100 INJECTION, SOLUTION INTRAVENOUS; SUBCUTANEOUS at 09:11

## 2020-01-01 RX ADMIN — IPRATROPIUM BROMIDE AND ALBUTEROL SULFATE 3 ML: .5; 3 SOLUTION RESPIRATORY (INHALATION) at 11:04

## 2020-01-01 RX ADMIN — DIGOXIN 0.12 MG: 125 TABLET ORAL at 01:12

## 2020-01-01 RX ADMIN — ACETAMINOPHEN 650 MG: 325 TABLET ORAL at 12:08

## 2020-01-01 RX ADMIN — PREDNISONE 20 MG: 20 TABLET ORAL at 09:12

## 2020-01-01 RX ADMIN — TRAZODONE HYDROCHLORIDE 50 MG: 50 TABLET ORAL at 08:11

## 2020-01-01 RX ADMIN — METHYLPREDNISOLONE SODIUM SUCCINATE 125 MG: 125 INJECTION, POWDER, FOR SOLUTION INTRAMUSCULAR; INTRAVENOUS at 06:07

## 2020-01-01 RX ADMIN — METOPROLOL SUCCINATE 50 MG: 50 TABLET, EXTENDED RELEASE ORAL at 08:12

## 2020-01-01 RX ADMIN — INSULIN ASPART 4 UNITS: 100 INJECTION, SOLUTION INTRAVENOUS; SUBCUTANEOUS at 12:10

## 2020-01-01 RX ADMIN — PIPERACILLIN SODIUM AND TAZOBACTAM SODIUM 4.5 G: 4; .5 INJECTION, POWDER, LYOPHILIZED, FOR SOLUTION INTRAVENOUS at 12:08

## 2020-01-01 RX ADMIN — FUROSEMIDE 40 MG: 10 INJECTION, SOLUTION INTRAMUSCULAR; INTRAVENOUS at 01:12

## 2020-01-01 RX ADMIN — FUROSEMIDE 40 MG: 10 INJECTION, SOLUTION INTRAMUSCULAR; INTRAVENOUS at 11:10

## 2020-01-01 RX ADMIN — IPRATROPIUM BROMIDE AND ALBUTEROL SULFATE 3 ML: .5; 3 SOLUTION RESPIRATORY (INHALATION) at 11:12

## 2020-01-01 RX ADMIN — INSULIN ASPART 3 UNITS: 100 INJECTION, SOLUTION INTRAVENOUS; SUBCUTANEOUS at 09:08

## 2020-01-01 RX ADMIN — DEXTROSE MONOHYDRATE 7.5 MG/HR: 50 INJECTION, SOLUTION INTRAVENOUS at 04:04

## 2020-01-01 RX ADMIN — GUAIFENESIN 1200 MG: 600 TABLET, EXTENDED RELEASE ORAL at 09:08

## 2020-01-01 RX ADMIN — POTASSIUM CHLORIDE 10 MEQ: 750 CAPSULE, EXTENDED RELEASE ORAL at 09:08

## 2020-01-01 RX ADMIN — Medication 100 MG: at 09:07

## 2020-01-01 RX ADMIN — CITALOPRAM HYDROBROMIDE 20 MG: 20 TABLET ORAL at 01:08

## 2020-01-01 RX ADMIN — SODIUM CHLORIDE 1000 ML: 0.9 INJECTION, SOLUTION INTRAVENOUS at 03:02

## 2020-01-01 RX ADMIN — IPRATROPIUM BROMIDE AND ALBUTEROL SULFATE 3 ML: .5; 2.5 SOLUTION RESPIRATORY (INHALATION) at 12:08

## 2020-01-01 RX ADMIN — METOPROLOL TARTRATE 5 MG: 5 INJECTION, SOLUTION INTRAVENOUS at 07:04

## 2020-01-01 RX ADMIN — INSULIN ASPART 4 UNITS: 100 INJECTION, SOLUTION INTRAVENOUS; SUBCUTANEOUS at 05:10

## 2020-01-01 RX ADMIN — Medication 800 MG: at 09:04

## 2020-01-01 RX ADMIN — METHYLPREDNISOLONE SODIUM SUCCINATE 80 MG: 40 INJECTION, POWDER, FOR SOLUTION INTRAMUSCULAR; INTRAVENOUS at 12:10

## 2020-01-01 RX ADMIN — INSULIN ASPART 3 UNITS: 100 INJECTION, SOLUTION INTRAVENOUS; SUBCUTANEOUS at 08:04

## 2020-01-01 RX ADMIN — DILTIAZEM HYDROCHLORIDE 5 MG/HR: 5 INJECTION INTRAVENOUS at 08:04

## 2020-01-01 RX ADMIN — DILTIAZEM HYDROCHLORIDE 10 MG: 5 INJECTION, SOLUTION INTRAVENOUS at 07:04

## 2020-01-01 RX ADMIN — TRAZODONE HYDROCHLORIDE 50 MG: 50 TABLET ORAL at 08:04

## 2020-01-01 RX ADMIN — INSULIN ASPART 5 UNITS: 100 INJECTION, SOLUTION INTRAVENOUS; SUBCUTANEOUS at 08:11

## 2020-01-01 RX ADMIN — INSULIN ASPART 4 UNITS: 100 INJECTION, SOLUTION INTRAVENOUS; SUBCUTANEOUS at 07:10

## 2020-01-01 RX ADMIN — DEXTROSE MONOHYDRATE 1250 MG: 50 INJECTION, SOLUTION INTRAVENOUS at 08:08

## 2020-01-01 RX ADMIN — IPRATROPIUM BROMIDE AND ALBUTEROL SULFATE 3 ML: .5; 3 SOLUTION RESPIRATORY (INHALATION) at 09:07

## 2020-01-01 RX ADMIN — INSULIN ASPART 3 UNITS: 100 INJECTION, SOLUTION INTRAVENOUS; SUBCUTANEOUS at 12:10

## 2020-01-01 RX ADMIN — METOPROLOL SUCCINATE 50 MG: 50 TABLET, EXTENDED RELEASE ORAL at 09:07

## 2020-01-01 RX ADMIN — POTASSIUM CHLORIDE 40 MEQ: 1500 TABLET, EXTENDED RELEASE ORAL at 01:08

## 2020-01-01 RX ADMIN — ASPIRIN 81 MG: 81 TABLET, COATED ORAL at 08:12

## 2020-01-01 RX ADMIN — METOPROLOL TARTRATE 25 MG: 25 TABLET ORAL at 04:04

## 2020-01-01 RX ADMIN — PREDNISONE 20 MG: 5 TABLET ORAL at 09:04

## 2020-01-01 RX ADMIN — PANTOPRAZOLE SODIUM 40 MG: 40 INJECTION, POWDER, LYOPHILIZED, FOR SOLUTION INTRAVENOUS at 09:07

## 2020-01-01 RX ADMIN — LEVALBUTEROL 1.25 MG: 1.25 SOLUTION, CONCENTRATE RESPIRATORY (INHALATION) at 07:11

## 2020-01-01 RX ADMIN — INSULIN ASPART 3 UNITS: 100 INJECTION, SOLUTION INTRAVENOUS; SUBCUTANEOUS at 04:11

## 2020-01-01 RX ADMIN — INSULIN ASPART 8 UNITS: 100 INJECTION, SOLUTION INTRAVENOUS; SUBCUTANEOUS at 04:04

## 2020-01-01 RX ADMIN — BARIUM SULFATE 176 G: 960 POWDER, FOR SUSPENSION ORAL at 10:07

## 2020-01-01 RX ADMIN — TIOTROPIUM BROMIDE 18 MCG: 18 CAPSULE ORAL; RESPIRATORY (INHALATION) at 07:08

## 2020-01-01 RX ADMIN — ACETAMINOPHEN 500 MG: 500 TABLET, FILM COATED ORAL at 06:07

## 2020-01-01 RX ADMIN — POTASSIUM CHLORIDE 20 MEQ: 1500 TABLET, EXTENDED RELEASE ORAL at 09:12

## 2020-01-01 RX ADMIN — AMIODARONE HYDROCHLORIDE 100 MG: 100 TABLET ORAL at 02:04

## 2020-01-01 RX ADMIN — INSULIN ASPART 3 UNITS: 100 INJECTION, SOLUTION INTRAVENOUS; SUBCUTANEOUS at 04:08

## 2020-01-01 RX ADMIN — IPRATROPIUM BROMIDE AND ALBUTEROL SULFATE 3 ML: .5; 3 SOLUTION RESPIRATORY (INHALATION) at 04:04

## 2020-01-01 RX ADMIN — INSULIN DETEMIR 10 UNITS: 100 INJECTION, SOLUTION SUBCUTANEOUS at 08:12

## 2020-01-01 RX ADMIN — TRAZODONE HYDROCHLORIDE 50 MG: 50 TABLET ORAL at 09:04

## 2020-01-01 RX ADMIN — INSULIN ASPART 5 UNITS: 100 INJECTION, SOLUTION INTRAVENOUS; SUBCUTANEOUS at 04:11

## 2020-01-01 RX ADMIN — INSULIN DETEMIR 10 UNITS: 100 INJECTION, SOLUTION SUBCUTANEOUS at 11:08

## 2020-01-01 RX ADMIN — PIPERACILLIN SODIUM AND TAZOBACTAM SODIUM 4.5 G: 4; .5 INJECTION, POWDER, LYOPHILIZED, FOR SOLUTION INTRAVENOUS at 04:08

## 2020-01-01 RX ADMIN — INSULIN ASPART 4 UNITS: 100 INJECTION, SOLUTION INTRAVENOUS; SUBCUTANEOUS at 07:04

## 2020-01-01 RX ADMIN — CIPROFLOXACIN HYDROCHLORIDE 500 MG: 500 TABLET, FILM COATED ORAL at 09:08

## 2020-01-01 RX ADMIN — INSULIN DETEMIR 5 UNITS: 100 INJECTION, SOLUTION SUBCUTANEOUS at 09:11

## 2020-01-01 RX ADMIN — INSULIN ASPART 2 UNITS: 100 INJECTION, SOLUTION INTRAVENOUS; SUBCUTANEOUS at 06:07

## 2020-01-01 RX ADMIN — ROPINIROLE HYDROCHLORIDE 0.5 MG: 0.25 TABLET, FILM COATED ORAL at 02:04

## 2020-01-01 RX ADMIN — FOLIC ACID 1000 MCG: 1 TABLET ORAL at 08:12

## 2020-01-01 RX ADMIN — SODIUM CHLORIDE: 0.9 INJECTION, SOLUTION INTRAVENOUS at 03:11

## 2020-01-01 RX ADMIN — PANTOPRAZOLE SODIUM 40 MG: 40 TABLET, DELAYED RELEASE ORAL at 03:04

## 2020-01-01 RX ADMIN — METOPROLOL SUCCINATE 50 MG: 50 TABLET, EXTENDED RELEASE ORAL at 10:10

## 2020-01-01 RX ADMIN — IPRATROPIUM BROMIDE AND ALBUTEROL SULFATE 3 ML: .5; 2.5 SOLUTION RESPIRATORY (INHALATION) at 09:08

## 2020-01-01 RX ADMIN — MAGNESIUM SULFATE HEPTAHYDRATE 2 G: 40 INJECTION, SOLUTION INTRAVENOUS at 06:12

## 2020-01-01 RX ADMIN — IOHEXOL 75 ML: 350 INJECTION, SOLUTION INTRAVENOUS at 09:04

## 2020-01-01 RX ADMIN — PIPERACILLIN SODIUM AND TAZOBACTAM SODIUM 4.5 G: 4; .5 INJECTION, POWDER, LYOPHILIZED, FOR SOLUTION INTRAVENOUS at 09:08

## 2020-01-01 RX ADMIN — ROPINIROLE HYDROCHLORIDE 1 MG: 0.25 TABLET, FILM COATED ORAL at 09:04

## 2020-01-01 RX ADMIN — INSULIN ASPART 2 UNITS: 100 INJECTION, SOLUTION INTRAVENOUS; SUBCUTANEOUS at 10:04

## 2020-01-01 RX ADMIN — FUROSEMIDE 40 MG: 10 INJECTION, SOLUTION INTRAMUSCULAR; INTRAVENOUS at 03:11

## 2020-01-01 RX ADMIN — FUROSEMIDE 100 MG: 10 INJECTION, SOLUTION INTRAMUSCULAR; INTRAVENOUS at 10:08

## 2020-01-01 RX ADMIN — IPRATROPIUM BROMIDE AND ALBUTEROL SULFATE 3 ML: .5; 3 SOLUTION RESPIRATORY (INHALATION) at 12:11

## 2020-01-01 RX ADMIN — FLUTICASONE FUROATE AND VILANTEROL TRIFENATATE 1 PUFF: 100; 25 POWDER RESPIRATORY (INHALATION) at 04:08

## 2020-01-01 RX ADMIN — POTASSIUM CHLORIDE 40 MEQ: 1500 TABLET, EXTENDED RELEASE ORAL at 05:02

## 2020-01-01 RX ADMIN — TRAZODONE HYDROCHLORIDE 50 MG: 50 TABLET ORAL at 09:11

## 2020-01-01 RX ADMIN — METOPROLOL TARTRATE 25 MG: 25 TABLET ORAL at 03:04

## 2020-01-01 RX ADMIN — PIPERACILLIN AND TAZOBACTAM 4.5 G: 4; .5 INJECTION, POWDER, LYOPHILIZED, FOR SOLUTION INTRAVENOUS at 05:04

## 2020-01-01 RX ADMIN — MAGNESIUM SULFATE 2 G: 2 INJECTION INTRAVENOUS at 11:08

## 2020-01-01 RX ADMIN — INSULIN DETEMIR 10 UNITS: 100 INJECTION, SOLUTION SUBCUTANEOUS at 10:04

## 2020-01-01 RX ADMIN — METHYLPREDNISOLONE SODIUM SUCCINATE 125 MG: 125 INJECTION, POWDER, FOR SOLUTION INTRAMUSCULAR; INTRAVENOUS at 03:11

## 2020-01-01 RX ADMIN — IPRATROPIUM BROMIDE AND ALBUTEROL SULFATE 3 ML: .5; 3 SOLUTION RESPIRATORY (INHALATION) at 07:11

## 2020-01-01 RX ADMIN — INSULIN DETEMIR 5 UNITS: 100 INJECTION, SOLUTION SUBCUTANEOUS at 08:11

## 2020-01-01 RX ADMIN — IPRATROPIUM BROMIDE AND ALBUTEROL SULFATE 3 ML: .5; 3 SOLUTION RESPIRATORY (INHALATION) at 05:04

## 2020-01-01 RX ADMIN — PREDNISONE 40 MG: 20 TABLET ORAL at 01:08

## 2020-01-01 RX ADMIN — IPRATROPIUM BROMIDE AND ALBUTEROL SULFATE 3 ML: .5; 3 SOLUTION RESPIRATORY (INHALATION) at 07:04

## 2020-01-01 RX ADMIN — Medication 6 MG: at 09:12

## 2020-01-01 RX ADMIN — LEVALBUTEROL 1.25 MG: 1.25 SOLUTION, CONCENTRATE RESPIRATORY (INHALATION) at 10:10

## 2020-01-01 RX ADMIN — PANTOPRAZOLE SODIUM 40 MG: 40 INJECTION, POWDER, FOR SOLUTION INTRAVENOUS at 08:04

## 2020-01-01 RX ADMIN — PIPERACILLIN AND TAZOBACTAM 4.5 G: 4; .5 INJECTION, POWDER, LYOPHILIZED, FOR SOLUTION INTRAVENOUS; PARENTERAL at 10:04

## 2020-01-01 RX ADMIN — CITALOPRAM HYDROBROMIDE 20 MG: 10 TABLET ORAL at 09:12

## 2020-01-01 RX ADMIN — PRAVASTATIN SODIUM 40 MG: 40 TABLET ORAL at 08:11

## 2020-01-01 RX ADMIN — ASPIRIN 325 MG ORAL TABLET 325 MG: 325 PILL ORAL at 04:02

## 2020-01-01 RX ADMIN — IPRATROPIUM BROMIDE AND ALBUTEROL SULFATE 3 ML: .5; 3 SOLUTION RESPIRATORY (INHALATION) at 01:12

## 2020-01-01 RX ADMIN — FLUTICASONE FUROATE AND VILANTEROL TRIFENATATE 1 PUFF: 100; 25 POWDER RESPIRATORY (INHALATION) at 07:08

## 2020-01-01 RX ADMIN — TORSEMIDE 20 MG: 20 TABLET ORAL at 10:08

## 2020-01-01 RX ADMIN — PREDNISONE 15 MG: 10 TABLET ORAL at 08:12

## 2020-01-01 RX ADMIN — MIDAZOLAM HYDROCHLORIDE 1 MG: 1 INJECTION, SOLUTION INTRAMUSCULAR; INTRAVENOUS at 09:04

## 2020-01-01 RX ADMIN — LOSARTAN POTASSIUM 50 MG: 50 TABLET, FILM COATED ORAL at 10:12

## 2020-01-01 RX ADMIN — INSULIN DETEMIR 10 UNITS: 100 INJECTION, SOLUTION SUBCUTANEOUS at 09:08

## 2020-01-01 RX ADMIN — DEXTROSE MONOHYDRATE 5 MG/HR: 50 INJECTION, SOLUTION INTRAVENOUS at 04:04

## 2020-01-01 RX ADMIN — HYDROCODONE BITARTRATE AND ACETAMINOPHEN 1 TABLET: 5; 325 TABLET ORAL at 07:11

## 2020-01-01 RX ADMIN — ROPINIROLE HYDROCHLORIDE 0.5 MG: 0.25 TABLET, FILM COATED ORAL at 09:04

## 2020-01-01 RX ADMIN — Medication 100 MG: at 08:11

## 2020-01-01 RX ADMIN — METOPROLOL SUCCINATE 50 MG: 25 TABLET, EXTENDED RELEASE ORAL at 01:08

## 2020-01-01 RX ADMIN — INSULIN ASPART 5 UNITS: 100 INJECTION, SOLUTION INTRAVENOUS; SUBCUTANEOUS at 10:08

## 2020-01-01 RX ADMIN — CITALOPRAM HYDROBROMIDE 20 MG: 20 TABLET, FILM COATED ORAL at 09:07

## 2020-01-01 RX ADMIN — METOPROLOL SUCCINATE 50 MG: 50 TABLET, EXTENDED RELEASE ORAL at 08:10

## 2020-01-01 RX ADMIN — ROPINIROLE HYDROCHLORIDE 1 MG: 1 TABLET, FILM COATED ORAL at 08:10

## 2020-01-01 RX ADMIN — FOLIC ACID 2000 MCG: 1 TABLET ORAL at 09:12

## 2020-01-01 RX ADMIN — INSULIN ASPART 4 UNITS: 100 INJECTION, SOLUTION INTRAVENOUS; SUBCUTANEOUS at 09:08

## 2020-01-01 RX ADMIN — PIPERACILLIN AND TAZOBACTAM 4.5 G: 4; .5 INJECTION, POWDER, LYOPHILIZED, FOR SOLUTION INTRAVENOUS at 10:04

## 2020-01-01 RX ADMIN — INSULIN ASPART 1 UNITS: 100 INJECTION, SOLUTION INTRAVENOUS; SUBCUTANEOUS at 08:11

## 2020-01-01 RX ADMIN — PREDNISONE 20 MG: 20 TABLET ORAL at 08:12

## 2020-01-01 RX ADMIN — POTASSIUM CHLORIDE 20 MEQ: 1500 TABLET, EXTENDED RELEASE ORAL at 06:08

## 2020-01-01 RX ADMIN — SODIUM CHLORIDE: 0.9 INJECTION, SOLUTION INTRAVENOUS at 01:12

## 2020-01-01 RX ADMIN — FUROSEMIDE 20 MG: 10 INJECTION, SOLUTION INTRAMUSCULAR; INTRAVENOUS at 01:11

## 2020-01-02 ENCOUNTER — OFFICE VISIT (OUTPATIENT)
Dept: FAMILY MEDICINE | Facility: CLINIC | Age: 85
End: 2020-01-02
Payer: MEDICARE

## 2020-01-02 ENCOUNTER — TELEPHONE (OUTPATIENT)
Dept: FAMILY MEDICINE | Facility: CLINIC | Age: 85
End: 2020-01-02

## 2020-01-02 VITALS
SYSTOLIC BLOOD PRESSURE: 92 MMHG | RESPIRATION RATE: 12 BRPM | BODY MASS INDEX: 24.51 KG/M2 | HEIGHT: 62 IN | HEART RATE: 62 BPM | DIASTOLIC BLOOD PRESSURE: 60 MMHG

## 2020-01-02 DIAGNOSIS — I50.9 ACUTE ON CHRONIC CONGESTIVE HEART FAILURE, UNSPECIFIED HEART FAILURE TYPE: ICD-10-CM

## 2020-01-02 DIAGNOSIS — N18.30 CKD (CHRONIC KIDNEY DISEASE), STAGE III: ICD-10-CM

## 2020-01-02 DIAGNOSIS — R04.2 COUGH WITH HEMOPTYSIS: ICD-10-CM

## 2020-01-02 DIAGNOSIS — G25.81 RLS (RESTLESS LEGS SYNDROME): Primary | ICD-10-CM

## 2020-01-02 DIAGNOSIS — R63.0 ANOREXIA: ICD-10-CM

## 2020-01-02 DIAGNOSIS — E11.65 UNCONTROLLED TYPE 2 DIABETES MELLITUS WITH HYPERGLYCEMIA: ICD-10-CM

## 2020-01-02 DIAGNOSIS — D64.9 SYMPTOMATIC ANEMIA: ICD-10-CM

## 2020-01-02 DIAGNOSIS — I48.91 ATRIAL FIBRILLATION WITH RAPID VENTRICULAR RESPONSE: ICD-10-CM

## 2020-01-02 DIAGNOSIS — I77.1 TORTUOUS AORTA: ICD-10-CM

## 2020-01-02 DIAGNOSIS — J41.0 SIMPLE CHRONIC BRONCHITIS: ICD-10-CM

## 2020-01-02 DIAGNOSIS — F32.5 MAJOR DEPRESSIVE DISORDER WITH SINGLE EPISODE, IN FULL REMISSION: ICD-10-CM

## 2020-01-02 DIAGNOSIS — G47.00 INSOMNIA, UNSPECIFIED TYPE: ICD-10-CM

## 2020-01-02 DIAGNOSIS — J96.21 ACUTE ON CHRONIC RESPIRATORY FAILURE WITH HYPOXIA: ICD-10-CM

## 2020-01-02 DIAGNOSIS — J96.11 CHRONIC RESPIRATORY FAILURE WITH HYPOXIA: ICD-10-CM

## 2020-01-02 LAB
ANION GAP SERPL CALC-SCNC: 8 MMOL/L (ref 8–16)
ANISOCYTOSIS BLD QL SMEAR: ABNORMAL
BASOPHILS # BLD AUTO: ABNORMAL K/UL (ref 0–0.2)
BASOPHILS NFR BLD: 0 % (ref 0–1.9)
BUN SERPL-MCNC: 36 MG/DL (ref 8–23)
CALCIUM SERPL-MCNC: 8.7 MG/DL (ref 8.7–10.5)
CHLORIDE SERPL-SCNC: 94 MMOL/L (ref 95–110)
CO2 SERPL-SCNC: 35 MMOL/L (ref 23–29)
CREAT SERPL-MCNC: 1.1 MG/DL (ref 0.5–1.4)
DACRYOCYTES BLD QL SMEAR: ABNORMAL
DIFFERENTIAL METHOD: ABNORMAL
EOSINOPHIL # BLD AUTO: ABNORMAL K/UL (ref 0–0.5)
EOSINOPHIL NFR BLD: 2 % (ref 0–8)
ERYTHROCYTE [DISTWIDTH] IN BLOOD BY AUTOMATED COUNT: 22.6 % (ref 11.5–14.5)
EST. GFR  (AFRICAN AMERICAN): 54 ML/MIN/1.73 M^2
EST. GFR  (NON AFRICAN AMERICAN): 47 ML/MIN/1.73 M^2
GLUCOSE SERPL-MCNC: 71 MG/DL (ref 70–110)
HCT VFR BLD AUTO: 26.7 % (ref 37–48.5)
HGB BLD-MCNC: 8.3 G/DL (ref 12–16)
IMM GRANULOCYTES # BLD AUTO: ABNORMAL K/UL (ref 0–0.04)
IMM GRANULOCYTES NFR BLD AUTO: ABNORMAL % (ref 0–0.5)
LYMPHOCYTES # BLD AUTO: ABNORMAL K/UL (ref 1–4.8)
LYMPHOCYTES NFR BLD: 6 % (ref 18–48)
MCH RBC QN AUTO: 34.6 PG (ref 27–31)
MCHC RBC AUTO-ENTMCNC: 31.1 G/DL (ref 32–36)
MCV RBC AUTO: 111 FL (ref 82–98)
MONOCYTES # BLD AUTO: ABNORMAL K/UL (ref 0.3–1)
MONOCYTES NFR BLD: 20 % (ref 4–15)
NEUTROPHILS NFR BLD: 53 % (ref 38–73)
NEUTS BAND NFR BLD MANUAL: 19 %
NRBC BLD-RTO: 0 /100 WBC
OVALOCYTES BLD QL SMEAR: ABNORMAL
PLATELET # BLD AUTO: 207 K/UL (ref 150–350)
PMV BLD AUTO: 14.3 FL (ref 9.2–12.9)
POIKILOCYTOSIS BLD QL SMEAR: SLIGHT
POTASSIUM SERPL-SCNC: 4.4 MMOL/L (ref 3.5–5.1)
RBC # BLD AUTO: 2.4 M/UL (ref 4–5.4)
SCHISTOCYTES BLD QL SMEAR: ABNORMAL
SODIUM SERPL-SCNC: 137 MMOL/L (ref 136–145)
WBC # BLD AUTO: 10.24 K/UL (ref 3.9–12.7)

## 2020-01-02 PROCEDURE — 36415 PR COLLECTION VENOUS BLOOD,VENIPUNCTURE: ICD-10-PCS | Mod: HCNC,S$GLB,, | Performed by: FAMILY MEDICINE

## 2020-01-02 PROCEDURE — 80048 BASIC METABOLIC PNL TOTAL CA: CPT | Mod: HCNC

## 2020-01-02 PROCEDURE — 85027 COMPLETE CBC AUTOMATED: CPT | Mod: HCNC

## 2020-01-02 PROCEDURE — 99499 RISK ADDL DX/OHS AUDIT: ICD-10-PCS | Mod: HCNC,S$GLB,, | Performed by: FAMILY MEDICINE

## 2020-01-02 PROCEDURE — 99999 PR PBB SHADOW E&M-EST. PATIENT-LVL III: CPT | Mod: PBBFAC,HCNC,, | Performed by: FAMILY MEDICINE

## 2020-01-02 PROCEDURE — 99214 OFFICE O/P EST MOD 30 MIN: CPT | Mod: HCNC,S$GLB,, | Performed by: FAMILY MEDICINE

## 2020-01-02 PROCEDURE — 99214 PR OFFICE/OUTPT VISIT, EST, LEVL IV, 30-39 MIN: ICD-10-PCS | Mod: HCNC,S$GLB,, | Performed by: FAMILY MEDICINE

## 2020-01-02 PROCEDURE — 85007 BL SMEAR W/DIFF WBC COUNT: CPT | Mod: HCNC

## 2020-01-02 PROCEDURE — 99999 PR PBB SHADOW E&M-EST. PATIENT-LVL III: ICD-10-PCS | Mod: PBBFAC,HCNC,, | Performed by: FAMILY MEDICINE

## 2020-01-02 PROCEDURE — 36415 COLL VENOUS BLD VENIPUNCTURE: CPT | Mod: HCNC,S$GLB,, | Performed by: FAMILY MEDICINE

## 2020-01-02 PROCEDURE — 99499 UNLISTED E&M SERVICE: CPT | Mod: HCNC,S$GLB,, | Performed by: FAMILY MEDICINE

## 2020-01-02 RX ORDER — MUPIROCIN 20 MG/G
OINTMENT TOPICAL 2 TIMES DAILY
Qty: 22 G | Refills: 3 | Status: ON HOLD | OUTPATIENT
Start: 2020-01-02 | End: 2020-01-01 | Stop reason: CLARIF

## 2020-01-02 RX ORDER — METOPROLOL SUCCINATE 25 MG/1
TABLET, EXTENDED RELEASE ORAL
COMMUNITY
Start: 2019-12-27 | End: 2020-01-02

## 2020-01-02 RX ORDER — LEVOFLOXACIN 750 MG/1
TABLET ORAL
Status: ON HOLD | COMMUNITY
Start: 2019-12-19 | End: 2020-01-20 | Stop reason: HOSPADM

## 2020-01-02 RX ORDER — ONDANSETRON 4 MG/1
4 TABLET, FILM COATED ORAL EVERY 8 HOURS PRN
Qty: 20 TABLET | Refills: 0 | Status: ON HOLD | OUTPATIENT
Start: 2020-01-02 | End: 2020-01-01 | Stop reason: CLARIF

## 2020-01-02 RX ORDER — CYPROHEPTADINE HYDROCHLORIDE 4 MG/1
4 TABLET ORAL NIGHTLY
Qty: 30 TABLET | Refills: 5 | Status: ON HOLD | OUTPATIENT
Start: 2020-01-02 | End: 2020-01-20 | Stop reason: HOSPADM

## 2020-01-02 RX ORDER — AMIODARONE HYDROCHLORIDE 200 MG/1
TABLET ORAL
COMMUNITY
Start: 2019-12-26 | End: 2020-01-01

## 2020-01-02 RX ORDER — METOPROLOL TARTRATE 25 MG/1
12.5 TABLET, FILM COATED ORAL 2 TIMES DAILY
Qty: 30 TABLET | Refills: 11 | Status: ON HOLD
Start: 2020-01-02 | End: 2020-01-20 | Stop reason: HOSPADM

## 2020-01-02 RX ORDER — AMIODARONE HYDROCHLORIDE 400 MG/1
TABLET ORAL
COMMUNITY
Start: 2019-12-27 | End: 2020-01-02

## 2020-01-02 RX ORDER — ASPIRIN 81 MG/1
81 TABLET ORAL DAILY
Status: ON HOLD | COMMUNITY
End: 2020-01-01 | Stop reason: CLARIF

## 2020-01-02 NOTE — PROGRESS NOTES
Transitional Care Note  Subjective:       Patient ID: Chantell Herrera is a 84 y.o. female.  Chief Complaint: Follow-up (ER fu)    Family and/or Caretaker present at visit?  Yes.  Diagnostic tests reviewed/disposition: No diagnosic tests pending after this hospitalization.  Disease/illness education: heart failure, bradycardia, chronic resp failure, a fib, recent pacer placement  Home health/community services discussion/referrals: Patient has home health established at [facility].   Establishment or re-establishment of referral orders for community resources: No other necessary community resources.   Discussion with other health care providers: No discussion with other health care providers necessary.   84 year old female with chronic respiratory failure on 2 L NC at home long term and known a fib with anticoagulation with variable tolerance of this as well as anemia and chornic renal insufficiency comes in for f/u of recent hospital stay secondary to acute on chronic exacerbation of her systolic heart failure, tachy litzy syndrome with pacer placement and adjustment of her afib meds to lower metoprolol and switch from diltiazem to amiodarone. She has had bruising to her legs and a recent tear to her right upper arm. She has had decreased appetitie and increased weakness since d/c. She has had no fevers and says that overall she has had no swelling - but some noted on exam today. Bowels are moving. She also had hemoptysis in the hospital, but this has resolved.    Review of Systems   Constitutional: Positive for appetite change. Negative for chills and fever.   HENT: Negative for congestion, ear pain, postnasal drip, rhinorrhea, sore throat and trouble swallowing.    Eyes: Negative for redness and itching.   Respiratory: Positive for cough. Negative for shortness of breath and wheezing.    Cardiovascular: Negative for chest pain and palpitations.   Gastrointestinal: Positive for nausea. Negative for abdominal  pain, diarrhea and vomiting.   Genitourinary: Negative for dysuria and frequency.   Skin: Positive for wound. Negative for rash.   Neurological: Negative for weakness and headaches.   Psychiatric/Behavioral: The patient is nervous/anxious.        Objective:      Physical Exam   Constitutional: She is oriented to person, place, and time. She appears well-developed. No distress.   In wheelchair   HENT:   Head: Normocephalic and atraumatic.   O2 in place - 2 L   Eyes: Pupils are equal, round, and reactive to light. Conjunctivae are normal.   Neck: Normal range of motion. Neck supple. No thyromegaly present.   Cardiovascular: Normal rate, regular rhythm, normal heart sounds and intact distal pulses.   Pulmonary/Chest: Effort normal and breath sounds normal. No respiratory distress. She has no wheezes.   Abdominal: Soft. Bowel sounds are normal. There is no tenderness.   Musculoskeletal: Normal range of motion. She exhibits no edema.   Weakness generalized   Lymphadenopathy:     She has no cervical adenopathy.   Neurological: She is alert and oriented to person, place, and time.   Skin: Skin is warm and dry. No rash noted.   Right upper extrem with skin tear.    Right thigh with large bruise   Psychiatric: She has a normal mood and affect. Her behavior is normal.   Nursing note and vitals reviewed.      Assessment:       1. RLS (restless legs syndrome)    2. Major depressive disorder with single episode, in full remission    3. Acute on chronic respiratory failure with hypoxia    4. Chronic respiratory failure with hypoxia    5. Simple chronic bronchitis    6. Cough with hemoptysis    7. Atrial fibrillation with rapid ventricular response    8. CKD (chronic kidney disease), stage III    9. Symptomatic anemia    10. Uncontrolled type 2 diabetes mellitus with hyperglycemia    11. Insomnia, unspecified type    12. Anorexia    13. Tortuous aorta    14. Acute on chronic congestive heart failure, unspecified heart failure type         Plan:       Chantell was seen today for follow-up.    Diagnoses and all orders for this visit:    RLS (restless legs syndrome)    Major depressive disorder with single episode, in full remission    Acute on chronic respiratory failure with hypoxia    Chronic respiratory failure with hypoxia    Simple chronic bronchitis    Cough with hemoptysis    Atrial fibrillation with rapid ventricular response  -     metoprolol tartrate (LOPRESSOR) 25 MG tablet; Take 0.5 tablets (12.5 mg total) by mouth 2 (two) times daily.    CKD (chronic kidney disease), stage III  -     Basic metabolic panel; Future  -     Basic metabolic panel    Symptomatic anemia  -     CBC auto differential; Future  -     CBC auto differential    Uncontrolled type 2 diabetes mellitus with hyperglycemia    Insomnia, unspecified type  -     cyproheptadine (PERIACTIN) 4 mg tablet; Take 1 tablet (4 mg total) by mouth every evening.    Anorexia  -     cyproheptadine (PERIACTIN) 4 mg tablet; Take 1 tablet (4 mg total) by mouth every evening.    Tortuous aorta    Acute on chronic congestive heart failure, unspecified heart failure type    Other orders  -     mupirocin (BACTROBAN) 2 % ointment; Apply topically 2 (two) times daily.  -     ondansetron (ZOFRAN) 4 MG tablet; Take 1 tablet (4 mg total) by mouth every 8 (eight) hours as needed for Nausea.    RTC if condition acutely worsens or any other concerns, otherwise RTC as scheduled

## 2020-01-02 NOTE — TELEPHONE ENCOUNTER
She already has home healht - shouldn't need new orders, right?  Can just call in verbal to add in wound care eval.  mh

## 2020-01-02 NOTE — TELEPHONE ENCOUNTER
Would you please put in the orders for wound care to be sent to [facility], thank you.

## 2020-01-03 ENCOUNTER — OUTPATIENT CASE MANAGEMENT (OUTPATIENT)
Dept: ADMINISTRATIVE | Facility: OTHER | Age: 85
End: 2020-01-03

## 2020-01-03 ENCOUNTER — TELEPHONE (OUTPATIENT)
Dept: FAMILY MEDICINE | Facility: CLINIC | Age: 85
End: 2020-01-03

## 2020-01-03 NOTE — TELEPHONE ENCOUNTER
----- Message from David Kirkpatrick sent at 1/3/2020  8:08 AM CST -----  Contact:  - Jordan Herrera  MRN: [id number]  : 1935  PCP: Kari Gaspar  Home Phone      [phone]  Work Phone      Not on file.  Mobile          [phone]      MESSAGE: has some abn test results - needs advise on what needs to be done for patient    Call Jordan @ [phone]    PCP: Chasity

## 2020-01-03 NOTE — TELEPHONE ENCOUNTER
Spoke with Gwen at [facility]--gave verbal orders from Dr Gaspar for eval for wound care to sore on right forearm.

## 2020-01-06 ENCOUNTER — PES CALL (OUTPATIENT)
Dept: ADMINISTRATIVE | Facility: CLINIC | Age: 85
End: 2020-01-06

## 2020-01-07 LAB — FUNGUS SPEC CULT: ABNORMAL

## 2020-01-10 ENCOUNTER — HOSPITAL ENCOUNTER (INPATIENT)
Facility: HOSPITAL | Age: 85
LOS: 10 days | Discharge: HOME HEALTH | DRG: 291 | End: 2020-01-20
Attending: SURGERY | Admitting: FAMILY MEDICINE
Payer: MEDICARE

## 2020-01-10 DIAGNOSIS — R10.13 EPIGASTRIC PAIN: ICD-10-CM

## 2020-01-10 DIAGNOSIS — J96.11 CHRONIC RESPIRATORY FAILURE WITH HYPOXIA: ICD-10-CM

## 2020-01-10 DIAGNOSIS — N18.30 CKD (CHRONIC KIDNEY DISEASE), STAGE III: ICD-10-CM

## 2020-01-10 DIAGNOSIS — I50.9 ACUTE ON CHRONIC CONGESTIVE HEART FAILURE, UNSPECIFIED HEART FAILURE TYPE: Primary | ICD-10-CM

## 2020-01-10 DIAGNOSIS — I50.9 HEART FAILURE: ICD-10-CM

## 2020-01-10 DIAGNOSIS — J41.0 SIMPLE CHRONIC BRONCHITIS: ICD-10-CM

## 2020-01-10 DIAGNOSIS — I50.9 ACUTE ON CHRONIC CONGESTIVE HEART FAILURE: ICD-10-CM

## 2020-01-10 DIAGNOSIS — E11.65 UNCONTROLLED TYPE 2 DIABETES MELLITUS WITH HYPERGLYCEMIA: ICD-10-CM

## 2020-01-10 DIAGNOSIS — D64.9 ANEMIA, UNSPECIFIED TYPE: ICD-10-CM

## 2020-01-10 DIAGNOSIS — J18.9 PNEUMONIA OF LEFT LOWER LOBE DUE TO INFECTIOUS ORGANISM: ICD-10-CM

## 2020-01-10 DIAGNOSIS — R53.1 WEAKNESS: ICD-10-CM

## 2020-01-10 DIAGNOSIS — N30.00 ACUTE CYSTITIS WITHOUT HEMATURIA: ICD-10-CM

## 2020-01-10 DIAGNOSIS — I48.91 ATRIAL FIBRILLATION: ICD-10-CM

## 2020-01-10 LAB
ABO + RH BLD: NORMAL
ALBUMIN SERPL BCP-MCNC: 3.3 G/DL (ref 3.5–5.2)
ALP SERPL-CCNC: 211 U/L (ref 55–135)
ALT SERPL W/O P-5'-P-CCNC: 121 U/L (ref 10–44)
AMORPH CRY URNS QL MICRO: ABNORMAL
AMPHET+METHAMPHET UR QL: NEGATIVE
ANION GAP SERPL CALC-SCNC: 10 MMOL/L (ref 8–16)
ANISOCYTOSIS BLD QL SMEAR: ABNORMAL
AST SERPL-CCNC: 114 U/L (ref 10–40)
BACTERIA #/AREA URNS HPF: ABNORMAL /HPF
BARBITURATES UR QL SCN>200 NG/ML: NEGATIVE
BASOPHILS NFR BLD: 0 % (ref 0–1.9)
BENZODIAZ UR QL SCN>200 NG/ML: NEGATIVE
BILIRUB SERPL-MCNC: 0.9 MG/DL (ref 0.1–1)
BILIRUB UR QL STRIP: NEGATIVE
BLD GP AB SCN CELLS X3 SERPL QL: NORMAL
BNP SERPL-MCNC: 2990 PG/ML (ref 0–99)
BUN SERPL-MCNC: 47 MG/DL (ref 8–23)
BZE UR QL SCN: NEGATIVE
CALCIUM SERPL-MCNC: 8.5 MG/DL (ref 8.7–10.5)
CANNABINOIDS UR QL SCN: NEGATIVE
CHLORIDE SERPL-SCNC: 97 MMOL/L (ref 95–110)
CK MB SERPL-MCNC: 1.2 NG/ML (ref 0.1–6.5)
CK MB SERPL-RTO: 10 % (ref 0–5)
CK SERPL-CCNC: 12 U/L (ref 20–180)
CK SERPL-CCNC: 12 U/L (ref 20–180)
CLARITY UR: CLEAR
CO2 SERPL-SCNC: 31 MMOL/L (ref 23–29)
COLOR UR: YELLOW
CREAT SERPL-MCNC: 1.4 MG/DL (ref 0.5–1.4)
CREAT UR-MCNC: 90.7 MG/DL (ref 15–325)
DACRYOCYTES BLD QL SMEAR: ABNORMAL
DIFFERENTIAL METHOD: ABNORMAL
EOSINOPHIL NFR BLD: 5 % (ref 0–8)
ERYTHROCYTE [DISTWIDTH] IN BLOOD BY AUTOMATED COUNT: 24.1 % (ref 11.5–14.5)
EST. GFR  (AFRICAN AMERICAN): 40 ML/MIN/1.73 M^2
EST. GFR  (NON AFRICAN AMERICAN): 35 ML/MIN/1.73 M^2
GIANT PLATELETS BLD QL SMEAR: PRESENT
GLUCOSE SERPL-MCNC: 131 MG/DL (ref 70–110)
GLUCOSE UR QL STRIP: NEGATIVE
HCT VFR BLD AUTO: 25.2 % (ref 37–48.5)
HGB BLD-MCNC: 7.8 G/DL (ref 12–16)
HGB UR QL STRIP: NEGATIVE
HYALINE CASTS #/AREA URNS LPF: 21 /LPF
IMM GRANULOCYTES # BLD AUTO: ABNORMAL K/UL (ref 0–0.04)
IMM GRANULOCYTES NFR BLD AUTO: ABNORMAL % (ref 0–0.5)
KETONES UR QL STRIP: NEGATIVE
LACTATE SERPL-SCNC: 2.2 MMOL/L (ref 0.5–2.2)
LEUKOCYTE ESTERASE UR QL STRIP: ABNORMAL
LIPASE SERPL-CCNC: 7 U/L (ref 4–60)
LYMPHOCYTES NFR BLD: 13 % (ref 18–48)
MAGNESIUM SERPL-MCNC: 1.7 MG/DL (ref 1.6–2.6)
MCH RBC QN AUTO: 35 PG (ref 27–31)
MCHC RBC AUTO-ENTMCNC: 31 G/DL (ref 32–36)
MCV RBC AUTO: 113 FL (ref 82–98)
METHADONE UR QL SCN>300 NG/ML: NEGATIVE
MICROSCOPIC COMMENT: ABNORMAL
MONOCYTES NFR BLD: 17 % (ref 4–15)
NEUTROPHILS NFR BLD: 61 % (ref 38–73)
NEUTS BAND NFR BLD MANUAL: 4 %
NITRITE UR QL STRIP: NEGATIVE
NON-SQ EPI CELLS #/AREA URNS HPF: 1 /HPF
NRBC BLD-RTO: 0 /100 WBC
OPIATES UR QL SCN: NEGATIVE
OVALOCYTES BLD QL SMEAR: ABNORMAL
PCP UR QL SCN>25 NG/ML: NEGATIVE
PH UR STRIP: 6 [PH] (ref 5–8)
PHOSPHATE SERPL-MCNC: 4.9 MG/DL (ref 2.7–4.5)
PLATELET # BLD AUTO: 212 K/UL (ref 150–350)
PLATELET BLD QL SMEAR: ABNORMAL
PMV BLD AUTO: 13.7 FL (ref 9.2–12.9)
POCT GLUCOSE: 132 MG/DL (ref 70–110)
POTASSIUM SERPL-SCNC: 4.9 MMOL/L (ref 3.5–5.1)
PROT SERPL-MCNC: 5.4 G/DL (ref 6–8.4)
PROT UR QL STRIP: ABNORMAL
RBC # BLD AUTO: 2.23 M/UL (ref 4–5.4)
RBC #/AREA URNS HPF: 1 /HPF (ref 0–4)
SCHISTOCYTES BLD QL SMEAR: ABNORMAL
SODIUM SERPL-SCNC: 138 MMOL/L (ref 136–145)
SP GR UR STRIP: 1.01 (ref 1–1.03)
SPHEROCYTES BLD QL SMEAR: ABNORMAL
SQUAMOUS #/AREA URNS HPF: 1 /HPF
STOMATOCYTES BLD QL SMEAR: PRESENT
TOXICOLOGY INFORMATION: NORMAL
TROPONIN I SERPL DL<=0.01 NG/ML-MCNC: 0.06 NG/ML (ref 0–0.03)
TSH SERPL DL<=0.005 MIU/L-ACNC: 2.78 UIU/ML (ref 0.4–4)
URN SPEC COLLECT METH UR: ABNORMAL
UROBILINOGEN UR STRIP-ACNC: 1 EU/DL
WBC # BLD AUTO: 10.57 K/UL (ref 3.9–12.7)
WBC #/AREA URNS HPF: 20 /HPF (ref 0–5)

## 2020-01-10 PROCEDURE — 96365 THER/PROPH/DIAG IV INF INIT: CPT | Performed by: SURGERY

## 2020-01-10 PROCEDURE — 82553 CREATINE MB FRACTION: CPT | Mod: HCNC

## 2020-01-10 PROCEDURE — 85007 BL SMEAR W/DIFF WBC COUNT: CPT | Mod: HCNC

## 2020-01-10 PROCEDURE — 63600175 PHARM REV CODE 636 W HCPCS: Mod: HCNC | Performed by: SURGERY

## 2020-01-10 PROCEDURE — 80053 COMPREHEN METABOLIC PANEL: CPT | Mod: HCNC

## 2020-01-10 PROCEDURE — 11000001 HC ACUTE MED/SURG PRIVATE ROOM: Mod: HCNC

## 2020-01-10 PROCEDURE — 94760 N-INVAS EAR/PLS OXIMETRY 1: CPT | Mod: HCNC

## 2020-01-10 PROCEDURE — 87077 CULTURE AEROBIC IDENTIFY: CPT | Mod: HCNC

## 2020-01-10 PROCEDURE — 99285 EMERGENCY DEPT VISIT HI MDM: CPT | Mod: 25,HCNC

## 2020-01-10 PROCEDURE — 86901 BLOOD TYPING SEROLOGIC RH(D): CPT | Mod: HCNC

## 2020-01-10 PROCEDURE — 84443 ASSAY THYROID STIM HORMONE: CPT | Mod: HCNC

## 2020-01-10 PROCEDURE — 96375 TX/PRO/DX INJ NEW DRUG ADDON: CPT | Performed by: SURGERY

## 2020-01-10 PROCEDURE — 86920 COMPATIBILITY TEST SPIN: CPT | Mod: HCNC

## 2020-01-10 PROCEDURE — 84100 ASSAY OF PHOSPHORUS: CPT | Mod: HCNC

## 2020-01-10 PROCEDURE — 36415 COLL VENOUS BLD VENIPUNCTURE: CPT | Mod: HCNC

## 2020-01-10 PROCEDURE — 27000221 HC OXYGEN, UP TO 24 HOURS: Mod: HCNC

## 2020-01-10 PROCEDURE — 80307 DRUG TEST PRSMV CHEM ANLYZR: CPT | Mod: HCNC

## 2020-01-10 PROCEDURE — 84484 ASSAY OF TROPONIN QUANT: CPT | Mod: HCNC

## 2020-01-10 PROCEDURE — 83605 ASSAY OF LACTIC ACID: CPT | Mod: HCNC

## 2020-01-10 PROCEDURE — 83735 ASSAY OF MAGNESIUM: CPT | Mod: HCNC

## 2020-01-10 PROCEDURE — G0378 HOSPITAL OBSERVATION PER HR: HCPCS | Mod: HCNC

## 2020-01-10 PROCEDURE — 93010 ELECTROCARDIOGRAM REPORT: CPT | Mod: HCNC,,, | Performed by: INTERNAL MEDICINE

## 2020-01-10 PROCEDURE — 87088 URINE BACTERIA CULTURE: CPT | Mod: HCNC

## 2020-01-10 PROCEDURE — 81000 URINALYSIS NONAUTO W/SCOPE: CPT | Mod: HCNC,59

## 2020-01-10 PROCEDURE — 83880 ASSAY OF NATRIURETIC PEPTIDE: CPT | Mod: HCNC

## 2020-01-10 PROCEDURE — 87186 SC STD MICRODIL/AGAR DIL: CPT | Mod: HCNC

## 2020-01-10 PROCEDURE — 87086 URINE CULTURE/COLONY COUNT: CPT | Mod: HCNC

## 2020-01-10 PROCEDURE — 93010 EKG 12-LEAD: ICD-10-PCS | Mod: HCNC,,, | Performed by: INTERNAL MEDICINE

## 2020-01-10 PROCEDURE — 85027 COMPLETE CBC AUTOMATED: CPT | Mod: HCNC

## 2020-01-10 PROCEDURE — 83690 ASSAY OF LIPASE: CPT | Mod: HCNC

## 2020-01-10 PROCEDURE — 87040 BLOOD CULTURE FOR BACTERIA: CPT | Mod: HCNC

## 2020-01-10 PROCEDURE — 82550 ASSAY OF CK (CPK): CPT | Mod: HCNC

## 2020-01-10 PROCEDURE — 93005 ELECTROCARDIOGRAM TRACING: CPT | Mod: HCNC

## 2020-01-10 RX ORDER — PANTOPRAZOLE SODIUM 40 MG/1
40 TABLET, DELAYED RELEASE ORAL DAILY
Status: DISCONTINUED | OUTPATIENT
Start: 2020-01-11 | End: 2020-01-20 | Stop reason: HOSPADM

## 2020-01-10 RX ORDER — ACETAMINOPHEN 325 MG/1
650 TABLET ORAL EVERY 8 HOURS PRN
Status: DISCONTINUED | OUTPATIENT
Start: 2020-01-10 | End: 2020-01-20 | Stop reason: HOSPADM

## 2020-01-10 RX ORDER — SODIUM CHLORIDE 0.9 % (FLUSH) 0.9 %
10 SYRINGE (ML) INJECTION
Status: DISCONTINUED | OUTPATIENT
Start: 2020-01-10 | End: 2020-01-20 | Stop reason: HOSPADM

## 2020-01-10 RX ORDER — HYDROCODONE BITARTRATE AND ACETAMINOPHEN 5; 325 MG/1; MG/1
1 TABLET ORAL EVERY 4 HOURS PRN
Status: DISCONTINUED | OUTPATIENT
Start: 2020-01-10 | End: 2020-01-20 | Stop reason: HOSPADM

## 2020-01-10 RX ORDER — FUROSEMIDE 10 MG/ML
20 INJECTION INTRAMUSCULAR; INTRAVENOUS
Status: COMPLETED | OUTPATIENT
Start: 2020-01-10 | End: 2020-01-10

## 2020-01-10 RX ORDER — ONDANSETRON 2 MG/ML
4 INJECTION INTRAMUSCULAR; INTRAVENOUS EVERY 8 HOURS PRN
Status: DISCONTINUED | OUTPATIENT
Start: 2020-01-10 | End: 2020-01-20 | Stop reason: HOSPADM

## 2020-01-10 RX ORDER — LEVALBUTEROL 1.25 MG/.5ML
1.25 SOLUTION, CONCENTRATE RESPIRATORY (INHALATION) EVERY 6 HOURS PRN
Status: DISCONTINUED | OUTPATIENT
Start: 2020-01-10 | End: 2020-01-11

## 2020-01-10 RX ADMIN — FUROSEMIDE 20 MG: 10 INJECTION, SOLUTION INTRAMUSCULAR; INTRAVENOUS at 08:01

## 2020-01-10 RX ADMIN — CEFTRIAXONE 2 G: 2 INJECTION, SOLUTION INTRAVENOUS at 09:01

## 2020-01-11 LAB
ALBUMIN SERPL BCP-MCNC: 3.3 G/DL (ref 3.5–5.2)
ALP SERPL-CCNC: 196 U/L (ref 55–135)
ALT SERPL W/O P-5'-P-CCNC: 144 U/L (ref 10–44)
ANION GAP SERPL CALC-SCNC: 12 MMOL/L (ref 8–16)
ANION GAP SERPL CALC-SCNC: 14 MMOL/L (ref 8–16)
ANISOCYTOSIS BLD QL SMEAR: ABNORMAL
ANISOCYTOSIS BLD QL SMEAR: ABNORMAL
AST SERPL-CCNC: 123 U/L (ref 10–40)
BASOPHILS # BLD AUTO: ABNORMAL K/UL (ref 0–0.2)
BASOPHILS # BLD AUTO: ABNORMAL K/UL (ref 0–0.2)
BASOPHILS NFR BLD: 0 % (ref 0–1.9)
BASOPHILS NFR BLD: 0 % (ref 0–1.9)
BILIRUB SERPL-MCNC: 0.7 MG/DL (ref 0.1–1)
BUN SERPL-MCNC: 49 MG/DL (ref 8–23)
BUN SERPL-MCNC: 51 MG/DL (ref 8–23)
CALCIUM SERPL-MCNC: 8.2 MG/DL (ref 8.7–10.5)
CALCIUM SERPL-MCNC: 8.3 MG/DL (ref 8.7–10.5)
CHLORIDE SERPL-SCNC: 96 MMOL/L (ref 95–110)
CHLORIDE SERPL-SCNC: 96 MMOL/L (ref 95–110)
CO2 SERPL-SCNC: 25 MMOL/L (ref 23–29)
CO2 SERPL-SCNC: 30 MMOL/L (ref 23–29)
CREAT SERPL-MCNC: 1.5 MG/DL (ref 0.5–1.4)
CREAT SERPL-MCNC: 1.6 MG/DL (ref 0.5–1.4)
DACRYOCYTES BLD QL SMEAR: ABNORMAL
DACRYOCYTES BLD QL SMEAR: ABNORMAL
DIFFERENTIAL METHOD: ABNORMAL
DIFFERENTIAL METHOD: ABNORMAL
EOSINOPHIL # BLD AUTO: ABNORMAL K/UL (ref 0–0.5)
EOSINOPHIL # BLD AUTO: ABNORMAL K/UL (ref 0–0.5)
EOSINOPHIL NFR BLD: 0 % (ref 0–8)
EOSINOPHIL NFR BLD: 2 % (ref 0–8)
ERYTHROCYTE [DISTWIDTH] IN BLOOD BY AUTOMATED COUNT: 24.2 % (ref 11.5–14.5)
ERYTHROCYTE [DISTWIDTH] IN BLOOD BY AUTOMATED COUNT: 24.4 % (ref 11.5–14.5)
EST. GFR  (AFRICAN AMERICAN): 34 ML/MIN/1.73 M^2
EST. GFR  (AFRICAN AMERICAN): 37 ML/MIN/1.73 M^2
EST. GFR  (NON AFRICAN AMERICAN): 30 ML/MIN/1.73 M^2
EST. GFR  (NON AFRICAN AMERICAN): 32 ML/MIN/1.73 M^2
GLUCOSE SERPL-MCNC: 110 MG/DL (ref 70–110)
GLUCOSE SERPL-MCNC: 139 MG/DL (ref 70–110)
HCT VFR BLD AUTO: 24.9 % (ref 37–48.5)
HCT VFR BLD AUTO: 26.4 % (ref 37–48.5)
HGB BLD-MCNC: 7.8 G/DL (ref 12–16)
HGB BLD-MCNC: 8.4 G/DL (ref 12–16)
IMM GRANULOCYTES # BLD AUTO: ABNORMAL K/UL (ref 0–0.04)
IMM GRANULOCYTES # BLD AUTO: ABNORMAL K/UL (ref 0–0.04)
IMM GRANULOCYTES NFR BLD AUTO: ABNORMAL % (ref 0–0.5)
IMM GRANULOCYTES NFR BLD AUTO: ABNORMAL % (ref 0–0.5)
IRON SERPL-MCNC: 173 UG/DL (ref 30–160)
LACTATE SERPL-SCNC: 1.7 MMOL/L (ref 0.5–2.2)
LACTATE SERPL-SCNC: 2 MMOL/L (ref 0.5–2.2)
LYMPHOCYTES # BLD AUTO: ABNORMAL K/UL (ref 1–4.8)
LYMPHOCYTES # BLD AUTO: ABNORMAL K/UL (ref 1–4.8)
LYMPHOCYTES NFR BLD: 12 % (ref 18–48)
LYMPHOCYTES NFR BLD: 7 % (ref 18–48)
MCH RBC QN AUTO: 35.3 PG (ref 27–31)
MCH RBC QN AUTO: 35.3 PG (ref 27–31)
MCHC RBC AUTO-ENTMCNC: 31.3 G/DL (ref 32–36)
MCHC RBC AUTO-ENTMCNC: 31.8 G/DL (ref 32–36)
MCV RBC AUTO: 111 FL (ref 82–98)
MCV RBC AUTO: 113 FL (ref 82–98)
MONOCYTES # BLD AUTO: ABNORMAL K/UL (ref 0.3–1)
MONOCYTES # BLD AUTO: ABNORMAL K/UL (ref 0.3–1)
MONOCYTES NFR BLD: 15 % (ref 4–15)
MONOCYTES NFR BLD: 15 % (ref 4–15)
NEUTROPHILS NFR BLD: 71 % (ref 38–73)
NEUTROPHILS NFR BLD: 75 % (ref 38–73)
NEUTS BAND NFR BLD MANUAL: 3 %
NRBC BLD-RTO: 0 /100 WBC
NRBC BLD-RTO: 0 /100 WBC
OVALOCYTES BLD QL SMEAR: ABNORMAL
PLATELET # BLD AUTO: 192 K/UL (ref 150–350)
PLATELET # BLD AUTO: 205 K/UL (ref 150–350)
PLATELET BLD QL SMEAR: ABNORMAL
PLATELET BLD QL SMEAR: ABNORMAL
PMV BLD AUTO: 14 FL (ref 9.2–12.9)
PMV BLD AUTO: 14.1 FL (ref 9.2–12.9)
POCT GLUCOSE: 113 MG/DL (ref 70–110)
POCT GLUCOSE: 126 MG/DL (ref 70–110)
POCT GLUCOSE: 144 MG/DL (ref 70–110)
POCT GLUCOSE: 172 MG/DL (ref 70–110)
POIKILOCYTOSIS BLD QL SMEAR: SLIGHT
POIKILOCYTOSIS BLD QL SMEAR: SLIGHT
POTASSIUM SERPL-SCNC: 4.6 MMOL/L (ref 3.5–5.1)
POTASSIUM SERPL-SCNC: 5.1 MMOL/L (ref 3.5–5.1)
PROT SERPL-MCNC: 5.4 G/DL (ref 6–8.4)
RBC # BLD AUTO: 2.21 M/UL (ref 4–5.4)
RBC # BLD AUTO: 2.38 M/UL (ref 4–5.4)
SATURATED IRON: 90 % (ref 20–50)
SODIUM SERPL-SCNC: 135 MMOL/L (ref 136–145)
SODIUM SERPL-SCNC: 138 MMOL/L (ref 136–145)
SPHEROCYTES BLD QL SMEAR: ABNORMAL
TOTAL IRON BINDING CAPACITY: 192 UG/DL (ref 250–450)
TRANSFERRIN SERPL-MCNC: 130 MG/DL (ref 200–375)
TROPONIN I SERPL DL<=0.01 NG/ML-MCNC: 0.06 NG/ML (ref 0–0.03)
TROPONIN I SERPL DL<=0.01 NG/ML-MCNC: 0.07 NG/ML (ref 0–0.03)
TROPONIN I SERPL DL<=0.01 NG/ML-MCNC: 0.07 NG/ML (ref 0–0.03)
TROPONIN I SERPL DL<=0.01 NG/ML-MCNC: 0.08 NG/ML (ref 0–0.03)
WBC # BLD AUTO: 9.32 K/UL (ref 3.9–12.7)
WBC # BLD AUTO: 9.75 K/UL (ref 3.9–12.7)

## 2020-01-11 PROCEDURE — S0171 BUMETANIDE 0.5 MG: HCPCS | Mod: HCNC | Performed by: FAMILY MEDICINE

## 2020-01-11 PROCEDURE — 25000003 PHARM REV CODE 250: Mod: HCNC | Performed by: FAMILY MEDICINE

## 2020-01-11 PROCEDURE — 84484 ASSAY OF TROPONIN QUANT: CPT | Mod: 91,HCNC

## 2020-01-11 PROCEDURE — 96375 TX/PRO/DX INJ NEW DRUG ADDON: CPT | Performed by: SURGERY

## 2020-01-11 PROCEDURE — 99220 PR INITIAL OBSERVATION CARE,LEVL III: ICD-10-PCS | Mod: HCNC,,, | Performed by: FAMILY MEDICINE

## 2020-01-11 PROCEDURE — 25000242 PHARM REV CODE 250 ALT 637 W/ HCPCS: Mod: HCNC | Performed by: FAMILY MEDICINE

## 2020-01-11 PROCEDURE — 94761 N-INVAS EAR/PLS OXIMETRY MLT: CPT | Mod: HCNC

## 2020-01-11 PROCEDURE — G0378 HOSPITAL OBSERVATION PER HR: HCPCS | Mod: HCNC

## 2020-01-11 PROCEDURE — 94640 AIRWAY INHALATION TREATMENT: CPT | Mod: HCNC

## 2020-01-11 PROCEDURE — C9399 UNCLASSIFIED DRUGS OR BIOLOG: HCPCS | Mod: HCNC | Performed by: FAMILY MEDICINE

## 2020-01-11 PROCEDURE — 63600175 PHARM REV CODE 636 W HCPCS: Mod: HCNC | Performed by: FAMILY MEDICINE

## 2020-01-11 PROCEDURE — 80053 COMPREHEN METABOLIC PANEL: CPT | Mod: HCNC

## 2020-01-11 PROCEDURE — 99220 PR INITIAL OBSERVATION CARE,LEVL III: CPT | Mod: HCNC,,, | Performed by: FAMILY MEDICINE

## 2020-01-11 PROCEDURE — 83540 ASSAY OF IRON: CPT | Mod: HCNC

## 2020-01-11 PROCEDURE — 96372 THER/PROPH/DIAG INJ SC/IM: CPT | Mod: 59 | Performed by: SURGERY

## 2020-01-11 PROCEDURE — 80048 BASIC METABOLIC PNL TOTAL CA: CPT | Mod: HCNC

## 2020-01-11 PROCEDURE — 36415 COLL VENOUS BLD VENIPUNCTURE: CPT | Mod: HCNC

## 2020-01-11 PROCEDURE — 85027 COMPLETE CBC AUTOMATED: CPT | Mod: 91,HCNC

## 2020-01-11 PROCEDURE — 83605 ASSAY OF LACTIC ACID: CPT | Mod: 91,HCNC

## 2020-01-11 PROCEDURE — 82728 ASSAY OF FERRITIN: CPT | Mod: HCNC

## 2020-01-11 PROCEDURE — 27000221 HC OXYGEN, UP TO 24 HOURS: Mod: HCNC

## 2020-01-11 PROCEDURE — 11000001 HC ACUTE MED/SURG PRIVATE ROOM: Mod: HCNC

## 2020-01-11 PROCEDURE — 85007 BL SMEAR W/DIFF WBC COUNT: CPT | Mod: 91,HCNC

## 2020-01-11 RX ORDER — AMIODARONE HYDROCHLORIDE 200 MG/1
200 TABLET ORAL DAILY
Status: DISCONTINUED | OUTPATIENT
Start: 2020-01-11 | End: 2020-01-13

## 2020-01-11 RX ORDER — PREDNISONE 20 MG/1
20 TABLET ORAL DAILY
Status: DISCONTINUED | OUTPATIENT
Start: 2020-01-11 | End: 2020-01-20 | Stop reason: HOSPADM

## 2020-01-11 RX ORDER — BUMETANIDE 0.25 MG/ML
0.5 INJECTION INTRAMUSCULAR; INTRAVENOUS DAILY
Status: DISCONTINUED | OUTPATIENT
Start: 2020-01-11 | End: 2020-01-12

## 2020-01-11 RX ORDER — ROPINIROLE 0.5 MG/1
1 TABLET, FILM COATED ORAL NIGHTLY
Status: DISCONTINUED | OUTPATIENT
Start: 2020-01-11 | End: 2020-01-20 | Stop reason: HOSPADM

## 2020-01-11 RX ORDER — BACLOFEN 10 MG/1
10 TABLET ORAL 3 TIMES DAILY PRN
Status: DISCONTINUED | OUTPATIENT
Start: 2020-01-11 | End: 2020-01-20 | Stop reason: HOSPADM

## 2020-01-11 RX ORDER — MIRTAZAPINE 15 MG/1
15 TABLET, ORALLY DISINTEGRATING ORAL NIGHTLY
Status: DISCONTINUED | OUTPATIENT
Start: 2020-01-11 | End: 2020-01-20 | Stop reason: HOSPADM

## 2020-01-11 RX ORDER — ASPIRIN 81 MG/1
81 TABLET ORAL DAILY
Status: DISCONTINUED | OUTPATIENT
Start: 2020-01-11 | End: 2020-01-20 | Stop reason: HOSPADM

## 2020-01-11 RX ORDER — BUMETANIDE 0.25 MG/ML
1 INJECTION INTRAMUSCULAR; INTRAVENOUS DAILY
Status: DISCONTINUED | OUTPATIENT
Start: 2020-01-11 | End: 2020-01-11

## 2020-01-11 RX ORDER — CITALOPRAM 10 MG/1
20 TABLET ORAL DAILY
Status: DISCONTINUED | OUTPATIENT
Start: 2020-01-11 | End: 2020-01-20 | Stop reason: HOSPADM

## 2020-01-11 RX ORDER — AMLODIPINE BESYLATE 5 MG/1
5 TABLET ORAL DAILY
Status: DISCONTINUED | OUTPATIENT
Start: 2020-01-11 | End: 2020-01-20 | Stop reason: HOSPADM

## 2020-01-11 RX ORDER — LEVALBUTEROL 1.25 MG/.5ML
1.25 SOLUTION, CONCENTRATE RESPIRATORY (INHALATION) EVERY 6 HOURS
Status: DISCONTINUED | OUTPATIENT
Start: 2020-01-11 | End: 2020-01-20 | Stop reason: HOSPADM

## 2020-01-11 RX ORDER — BISACODYL 5 MG
5 TABLET, DELAYED RELEASE (ENTERIC COATED) ORAL DAILY PRN
Status: DISCONTINUED | OUTPATIENT
Start: 2020-01-11 | End: 2020-01-20 | Stop reason: HOSPADM

## 2020-01-11 RX ORDER — METOPROLOL TARTRATE 25 MG/1
12.5 TABLET ORAL 2 TIMES DAILY
Status: DISCONTINUED | OUTPATIENT
Start: 2020-01-11 | End: 2020-01-16

## 2020-01-11 RX ADMIN — MIRTAZAPINE 15 MG: 15 TABLET, ORALLY DISINTEGRATING ORAL at 08:01

## 2020-01-11 RX ADMIN — AMIODARONE HYDROCHLORIDE 200 MG: 200 TABLET ORAL at 08:01

## 2020-01-11 RX ADMIN — PANTOPRAZOLE SODIUM 40 MG: 40 TABLET, DELAYED RELEASE ORAL at 08:01

## 2020-01-11 RX ADMIN — METOPROLOL TARTRATE 12.5 MG: 25 TABLET, FILM COATED ORAL at 08:01

## 2020-01-11 RX ADMIN — CITALOPRAM HYDROBROMIDE 20 MG: 10 TABLET ORAL at 08:01

## 2020-01-11 RX ADMIN — LEVALBUTEROL 1.25 MG: 1.25 SOLUTION, CONCENTRATE RESPIRATORY (INHALATION) at 07:01

## 2020-01-11 RX ADMIN — PREDNISONE 20 MG: 20 TABLET ORAL at 08:01

## 2020-01-11 RX ADMIN — AMLODIPINE BESYLATE 5 MG: 5 TABLET ORAL at 08:01

## 2020-01-11 RX ADMIN — BUMETANIDE 0.5 MG: 0.25 INJECTION INTRAMUSCULAR; INTRAVENOUS at 08:01

## 2020-01-11 RX ADMIN — ROPINIROLE HYDROCHLORIDE 1 MG: 0.5 TABLET, FILM COATED ORAL at 08:01

## 2020-01-11 RX ADMIN — ASPIRIN 81 MG: 81 TABLET, COATED ORAL at 08:01

## 2020-01-11 RX ADMIN — INSULIN DETEMIR 12 UNITS: 100 INJECTION, SOLUTION SUBCUTANEOUS at 08:01

## 2020-01-11 RX ADMIN — LEVALBUTEROL 1.25 MG: 1.25 SOLUTION, CONCENTRATE RESPIRATORY (INHALATION) at 12:01

## 2020-01-11 NOTE — ED PROVIDER NOTES
Encounter Date: 1/10/2020       History     Chief Complaint   Patient presents with    Weakness    Shortness of Breath     hx of copd/dyspnea on exertion     84-year-old female with past medical history significant for AFib, congestive heart failure, anxiety, anemia, COPD, hypertension, diabetes presents today with generalized weakness worsening over the last few days.  She also reports abdominal pain and nausea without vomiting.  Reports that she had constipation but that resolved today.  Reports dysuria.  Reports swelling to bilateral lower extremities.  Patient reports that shortness of breath is chronic and currently at baseline.  Denies chest pain.  Denies syncopal episode or falling.  Recently had blood work by her primary care doctor which showed anemia and decreased kidney function.    The history is provided by the patient and a relative.     Review of patient's allergies indicates:  No Known Allergies  Past Medical History:   Diagnosis Date    A-fib     Acute on chronic congestive heart failure 2/1/2019    Anxiety     Arthritis     Asthma     Atrial fibrillation with rapid ventricular response     CHF (congestive heart failure) 11/29/2018    Chronic bronchitis     COPD (chronic obstructive pulmonary disease)     Depression     Diabetes mellitus, type 2     SANTIAGO (dyspnea on exertion)     Emphysema of lung     Fatigue     Hyperlipidemia     Hypertension     Symptomatic anemia 1/22/2019    Trouble in sleeping      Past Surgical History:   Procedure Laterality Date    APPENDECTOMY      BRONCHOSCOPY N/A 12/3/2019    Procedure: BRONCHOSCOPY;  Surgeon: Emmanuel Hickman MD;  Location: [facility] OR;  Service: Pulmonary;  Laterality: N/A;    EYE SURGERY      HYSTERECTOMY      INSERTION OF PACEMAKER  12/26/2019    TONSILLECTOMY       Family History   Problem Relation Age of Onset    Heart disease Mother     Hypertension Mother     Hypertension Father     Diabetes Father     Cancer Sister      COPD Brother     Hypertension Brother     No Known Problems Daughter     Kidney disease Son     COPD Daughter      Social History     Tobacco Use    Smoking status: Former Smoker     Last attempt to quit: 8/3/2000     Years since quittin.4    Smokeless tobacco: Never Used   Substance Use Topics    Alcohol use: No    Drug use: No     Review of Systems   Constitutional: Negative for fever.   HENT: Negative for congestion, ear pain, rhinorrhea, sore throat and trouble swallowing.    Eyes: Negative for pain and redness.   Respiratory: Positive for shortness of breath (Chronic, at baseline). Negative for cough.    Cardiovascular: Negative for chest pain.   Gastrointestinal: Positive for abdominal pain, constipation (Resolved today) and nausea. Negative for diarrhea and vomiting.   Genitourinary: Positive for dysuria. Negative for difficulty urinating.   Musculoskeletal: Negative for arthralgias, back pain, myalgias and neck pain.        Swelling to bilateral lower extremities.   Skin: Negative for rash and wound.   Neurological: Positive for weakness. Negative for seizures and headaches.   Psychiatric/Behavioral: Negative.        Physical Exam     Initial Vitals [01/10/20 1850]   BP Pulse Resp Temp SpO2   109/60 61 (!) 23 97.8 °F (36.6 °C) (!) 94 %      MAP       --         Physical Exam    Nursing note and vitals reviewed.  Constitutional: No distress.   HENT:   Head: Normocephalic and atraumatic.   Right Ear: External ear normal.   Left Ear: External ear normal.   Nose: Nose normal.   Mouth/Throat: Oropharynx is clear and moist and mucous membranes are normal.   Eyes: Conjunctivae, EOM and lids are normal. Pupils are equal, round, and reactive to light.   Neck: Neck supple.   Cardiovascular: Normal rate, regular rhythm, S1 normal, S2 normal, normal heart sounds and intact distal pulses.   Pulmonary/Chest: Effort normal. Tachypnea noted. No respiratory distress. She has decreased breath sounds.   Abdominal:  Soft. Bowel sounds are normal. There is no tenderness.   Musculoskeletal: Normal range of motion.        Right lower leg: She exhibits edema.        Left lower leg: She exhibits edema.   Neurological: She is alert and oriented to person, place, and time. She has normal strength. GCS eye subscore is 4. GCS verbal subscore is 5. GCS motor subscore is 6.   Skin: Skin is warm and dry. Capillary refill takes less than 2 seconds. No rash noted. There is pallor.   Psychiatric: She has a normal mood and affect. Her speech is normal and behavior is normal.         ED Course   Procedures  Labs Reviewed   CBC W/ AUTO DIFFERENTIAL - Abnormal; Notable for the following components:       Result Value    RBC 2.23 (*)     Hemoglobin 7.8 (*)     Hematocrit 25.2 (*)     Mean Corpuscular Volume 113 (*)     Mean Corpuscular Hemoglobin 35.0 (*)     Mean Corpuscular Hemoglobin Conc 31.0 (*)     RDW 24.1 (*)     MPV 13.7 (*)     Lymph% 13.0 (*)     Mono% 17.0 (*)     All other components within normal limits   COMPREHENSIVE METABOLIC PANEL - Abnormal; Notable for the following components:    CO2 31 (*)     Glucose 131 (*)     BUN, Bld 47 (*)     Calcium 8.5 (*)     Total Protein 5.4 (*)     Albumin 3.3 (*)     Alkaline Phosphatase 211 (*)      (*)      (*)     eGFR if  40 (*)     eGFR if non  35 (*)     All other components within normal limits   CK-MB - Abnormal; Notable for the following components:    CPK 12 (*)     MB% 10.0 (*)     All other components within normal limits   CK - Abnormal; Notable for the following components:    CPK 12 (*)     All other components within normal limits   TROPONIN I - Abnormal; Notable for the following components:    Troponin I 0.060 (*)     All other components within normal limits   B-TYPE NATRIURETIC PEPTIDE - Abnormal; Notable for the following components:    BNP 2,990 (*)     All other components within normal limits   PHOSPHORUS - Abnormal; Notable  for the following components:    Phosphorus 4.9 (*)     All other components within normal limits   URINALYSIS, REFLEX TO URINE CULTURE - Abnormal; Notable for the following components:    Protein, UA 1+ (*)     Leukocytes, UA Trace (*)     All other components within normal limits    Narrative:     Preferred Collection Type->Urine, Clean Catch   URINALYSIS MICROSCOPIC - Abnormal; Notable for the following components:    WBC, UA 20 (*)     Bacteria Many (*)     Non-Squam Epith 1 (*)     Hyaline Casts, UA 21 (*)     All other components within normal limits    Narrative:     Preferred Collection Type->Urine, Clean Catch   CULTURE, URINE   MAGNESIUM   TSH   DRUG SCREEN PANEL, URINE EMERGENCY   LIPASE   TYPE & SCREEN     EKG Readings: (Independently Interpreted)   EKG read with MD at the time it was performed. EKG without concerning findings.  V paced rhythm.       Imaging Results          X-Ray Chest 1 View (Final result)  Result time 01/10/20 19:28:08    Final result by Joaquin Evangelista MD (01/10/20 19:28:08)                 Impression:      1. Discoid atelectasis or scarring along the minor fissure.  No change since 12/15/2019.  2. Interval placement single lead left-sided pacemaker.  Negative for evidence of complicating process.      Electronically signed by: Joaquin Evangelista  Date:    01/10/2020  Time:    19:28             Narrative:    EXAMINATION:  XR CHEST 1 VIEW    CLINICAL HISTORY:  Weakness    COMPARISON:  12/15/2019    FINDINGS:  Discoid atelectasis or scar is identified along the minor fissure.  Otherwise lungs are clear.  Heart size within normal limits.  Vascular calcifications of the aorta present.Left-sided single lead pacemaker is identified, which is new since the prior exam.  Regional bones are unremarkable.                                        Medications   cefTRIAXone (ROCEPHIN) 2 g in dextrose 5 % 50 mL IVPB (has no administration in time range)   furosemide injection 20 mg (20 mg  Intravenous Given 1/10/20 2045)                   ED Course as of Clemente 10 2114   Fri Clemente 10, 2020   1905 Plan of care discussed with collaborating provider. Agrees with plan of care today.      [EW]      ED Course User Index  [EW] Radha Guillermo NP                Clinical Impression:       ICD-10-CM ICD-9-CM   1. Acute on chronic congestive heart failure, unspecified heart failure type I50.9 428.0   2. Weakness R53.1 780.79   3. Acute cystitis without hematuria N30.00 595.0         Disposition:   Disposition: Placed in Observation  Condition: Stable            This Patient Was Turned Over To Me From The Nurse Practitioner     -- I took over this note from the nurse practitioner this shift  -- His/her documentation and exam is above this line, my documentation is below  -- this patient presents with weakness and shortness of breath, chronic in nature  -- patient however has a BNP of almost 3000, this is new from previous baseline  -- patient recently had a pacemaker placed in December 2019, chronic SOB  -- in addition the patient has chronic anemia, takes 20 of oral Lasix every day  -- 20 of IV Lasix given in the ER with a breathing treatment, diuresing now  -- lab suggest fluid overload, discussed with Dr. Chasity bello  -- admit for IV diuresis & breathing treatments, serial troponins   -- patient also has a slight UTI, urine culture as well as IV antibiotics given the ER                     Festus Maurer MD  01/10/20 2114

## 2020-01-11 NOTE — ASSESSMENT & PLAN NOTE
AOCD.  Has had iron in the past.  Will check for any signs of blood loss.  Has been asked to stop her eliquis.  While she is symptomatic and anemic, will try to diuresis before giving blood.  In the past she had flash pulm edema from transfusion vs transfusion reaction.  If counts remain less than 8 will go ahead with 1 unit.

## 2020-01-11 NOTE — H&P
[facility] Medicine  History & Physical    Patient Name: Chantell Herrera  MRN: [id number]  Admission Date: 1/10/2020  Attending Physician: Kari Gaspar MD   Primary Care Provider: Kari Gaspar MD         Patient information was obtained from patient and ER records.     Subjective:     Principal Problem:Acute on chronic congestive heart failure    Chief Complaint:   Chief Complaint   Patient presents with    Weakness    Shortness of Breath     hx of copd/dyspnea on exertion        HPI: 84 year old female with chronic respiratory failure and recent admission for pacer placement comes in because of weakness. She has been feeling more and more swollen, and she is getting more weak. She says that she has also had a lot of abdominal pain especially when getting up to walk. She feels nauseated and sick to her stomach when this happens. She has not been taking any diuretics. Last EF is unknown.    Past Medical History:   Diagnosis Date    A-fib     Acute on chronic congestive heart failure 2/1/2019    Anxiety     Arthritis     Asthma     Atrial fibrillation with rapid ventricular response     CHF (congestive heart failure) 11/29/2018    Chronic bronchitis     COPD (chronic obstructive pulmonary disease)     Depression     Diabetes mellitus, type 2     SANTIAGO (dyspnea on exertion)     Emphysema of lung     Fatigue     Hyperlipidemia     Hypertension     Symptomatic anemia 1/22/2019    Trouble in sleeping        Past Surgical History:   Procedure Laterality Date    APPENDECTOMY      BRONCHOSCOPY N/A 12/3/2019    Procedure: BRONCHOSCOPY;  Surgeon: Emmanuel Hickman MD;  Location: [facility] OR;  Service: Pulmonary;  Laterality: N/A;    EYE SURGERY      HYSTERECTOMY      INSERTION OF PACEMAKER  12/26/2019    TONSILLECTOMY         Review of patient's allergies indicates:  No Known Allergies    No current facility-administered medications on file prior to encounter.      Current  Outpatient Medications on File Prior to Encounter   Medication Sig    albuterol (PROVENTIL) 2.5 mg /3 mL (0.083 %) nebulizer solution Take 3 mLs (2.5 mg total) by nebulization every 6 (six) hours as needed for Wheezing.    aspirin (ECOTRIN) 81 MG EC tablet Take 81 mg by mouth once daily.    cholecalciferol, vitamin D3, (VITAMIN D3) 2,000 unit Tab Take 2,000 Units by mouth once daily.    citalopram (CELEXA) 20 MG tablet Take 1 tablet (20 mg total) by mouth once daily.    cyproheptadine (PERIACTIN) 4 mg tablet Take 1 tablet (4 mg total) by mouth every evening.    furosemide (LASIX) 20 MG tablet Take 20 mg by mouth every other day.     insulin detemir U-100 (LEVEMIR FLEXTOUCH) 100 unit/mL (3 mL) SubQ InPn pen Inject 15 Units into the skin every evening.    ipratropium (ATROVENT) 0.02 % nebulizer solution Take 500 mcg by nebulization 4 (four) times daily.     magnesium oxide (MAG-OX) 400 mg (241.3 mg magnesium) tablet Take 1 tablet by mouth 2 (two) times daily.    metoprolol tartrate (LOPRESSOR) 25 MG tablet Take 0.5 tablets (12.5 mg total) by mouth 2 (two) times daily.    rOPINIRole (REQUIP) 1 MG tablet Take 1 tablet (1 mg total) by mouth every evening.    traZODone (DESYREL) 50 MG tablet TAKE 1 TABLET EVERY EVENING    amiodarone (PACERONE) 200 MG Tab     amLODIPine (NORVASC) 5 MG tablet TAKE 1 TABLET BY MOUTH ONCE DAILY ( DOSE DECREASE )    baclofen (LIORESAL) 10 MG tablet Take 1 tablet (10 mg total) by mouth 3 (three) times daily. (Patient taking differently: Take 10 mg by mouth 3 (three) times daily as needed. )    benzonatate (TESSALON) 200 MG capsule TAKE 1 CAPSULE THREE TIMES DAILY AS NEEDED (SUBSTITUTED FOR  TESSALON)    blood sugar diagnostic Strp Test BG twice a day Accu-Chek Celia plus  DM 2  E 11.9  Dr Gaspar    blood-glucose meter kit Test BG twice a day Accu-Chek Celia plus  DM 2  E 11.9  Dr Gaspar    blood-glucose meter Misc Use as directed    fluticasone-umeclidin-vilanter (TRELEGY  "ELLIPTA) 100-62.5-25 mcg DsDv Inhale 1 puff into the lungs once daily.    lancets 33 gauge Misc Test BG twice a day Accu-Chek Celia plus  DM 2  E 11.9  Dr Gaspar    levoFLOXacin (LEVAQUIN) 750 MG tablet     mupirocin (BACTROBAN) 2 % ointment Apply topically 3 (three) times daily. (Patient not taking: Reported on 2020)    mupirocin (BACTROBAN) 2 % ointment Apply topically 2 (two) times daily.    ondansetron (ZOFRAN) 4 MG tablet Take 1 tablet (4 mg total) by mouth every 8 (eight) hours as needed for Nausea.    pen needle, diabetic 31 gauge x 5/16" Ndle Use to inject once daily with Levemir    pravastatin (PRAVACHOL) 40 MG tablet TAKE 1 TABLET EVERY DAY    predniSONE (DELTASONE) 10 MG tablet Take 4 po q day x 4 days, then 3 po q day x 3 days, then 20 mg     Family History     Problem Relation (Age of Onset)    COPD Brother, Daughter    Cancer Sister    Diabetes Father    Heart disease Mother    Hypertension Mother, Father, Brother    Kidney disease Son    No Known Problems Daughter        Tobacco Use    Smoking status: Former Smoker     Last attempt to quit: 8/3/2000     Years since quittin.4    Smokeless tobacco: Never Used   Substance and Sexual Activity    Alcohol use: No    Drug use: No    Sexual activity: Not Currently     Review of Systems   Constitutional: Positive for fatigue and unexpected weight change. Negative for chills and fever.   HENT: Negative for congestion, ear pain, postnasal drip, rhinorrhea, sore throat and trouble swallowing.    Eyes: Negative for redness and itching.   Respiratory: Positive for shortness of breath. Negative for cough and wheezing.    Cardiovascular: Positive for leg swelling. Negative for chest pain and palpitations.   Gastrointestinal: Positive for abdominal pain. Negative for diarrhea, nausea and vomiting.   Genitourinary: Negative for dysuria and frequency.   Skin: Negative for rash.   Neurological: Positive for weakness. Negative for headaches. "     Objective:     Vital Signs (Most Recent):  Temp: 96.7 °F (35.9 °C) (01/11/20 1201)  Pulse: 88 (01/11/20 1230)  Resp: 20 (01/11/20 1230)  BP: (!) 96/51 (01/11/20 1201)  SpO2: 96 % (01/11/20 1230) Vital Signs (24h Range):  Temp:  [96.1 °F (35.6 °C)-97.8 °F (36.6 °C)] 96.7 °F (35.9 °C)  Pulse:  [59-88] 88  Resp:  [20-26] 20  SpO2:  [79 %-100 %] 96 %  BP: ()/(51-62) 96/51     Weight: 64 kg (141 lb 1.5 oz)  Body mass index is 25.81 kg/m².    Physical Exam   Constitutional: She is oriented to person, place, and time. She appears well-developed. No distress.   HENT:   Head: Normocephalic and atraumatic.   Eyes: Pupils are equal, round, and reactive to light. Conjunctivae are normal.   Neck: Normal range of motion. Neck supple. JVD present. No thyromegaly present.   +hepatojug reflux   Cardiovascular: Normal rate and intact distal pulses.   Murmur heard.  Pulmonary/Chest: Effort normal. No respiratory distress. She has wheezes. She has rales.   Abdominal: Soft. Bowel sounds are normal. There is tenderness (epigastric and RUQ).   Musculoskeletal: Normal range of motion. She exhibits edema.   Lymphadenopathy:     She has no cervical adenopathy.   Neurological: She is alert and oriented to person, place, and time.   Skin: Skin is warm and dry. No rash noted.   Psychiatric: She has a normal mood and affect. Her behavior is normal.   Nursing note and vitals reviewed.        CRANIAL NERVES     CN III, IV, VI   Pupils are equal, round, and reactive to light.       Significant Labs:   CBC:   Recent Labs   Lab 01/10/20  1910 01/11/20  0515   WBC 10.57 9.75   HGB 7.8* 7.8*   HCT 25.2* 24.9*    205     CMP:   Recent Labs   Lab 01/10/20  1910 01/11/20  0515    138   K 4.9 4.6   CL 97 96   CO2 31* 30*   * 110   BUN 47* 49*   CREATININE 1.4 1.5*   CALCIUM 8.5* 8.2*   PROT 5.4* 5.4*   ALBUMIN 3.3* 3.3*   BILITOT 0.9 0.7   ALKPHOS 211* 196*   * 123*   * 144*   ANIONGAP 10 12   EGFRNONAA 35* 32*      Cardiac Markers:   Recent Labs   Lab 01/10/20  1910   BNP 2,990*     Troponin:   Recent Labs   Lab 01/10/20  1910 01/11/20  0122 01/11/20  0809   TROPONINI 0.060* 0.078* 0.071*     TSH:   Recent Labs   Lab 01/10/20  1910   TSH 2.780       Significant Imaging: I have reviewed all pertinent imaging results/findings within the past 24 hours.     1. Cholelithiasis with no sonographic evidence of acute cholecystitis.  2. Small volume of ascites.    No evidence of deep venous thrombosis in either lower extremity.      1. Discoid atelectasis or scarring along the minor fissure.  No change since 12/15/2019.  2. Interval placement single lead left-sided pacemaker.  Negative for evidence of complicating process.    Assessment/Plan:     * Acute on chronic congestive heart failure  Given small dose of lasix yesterday wtihout much change. She seems fluid overloaded.  Will get last echo from [facility] and give 0.5mg of bumex this moring.  Recheck bmp at 3.      Anorexia  Switch from periactin to remeron      Diabetes mellitus type 2, uncontrolled  Cont with ss      Anemia  AOCD.  Has had iron in the past.  Will check for any signs of blood loss.  Has been asked to stop her eliquis.  While she is symptomatic and anemic, will try to diuresis before giving blood.  In the past she had flash pulm edema from transfusion vs transfusion reaction.  If counts remain less than 8 will go ahead with 1 unit.      Acute on chronic respiratory failure with hypoxia  She is actually pretty close to her baseline, but she is short of breath all of the time.  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 With superimposed pulm edema, slightly more labored.      Atrial fibrillation with rapid ventricular response  Cont on amiodarone and lopressor      Epigastric pain        COPD (chronic obstructive pulmonary disease)  Seems at baseline.  Will give small prednisone dose - 20mg daily      Cholelithiases  R/o cholecystitis.  U/s  ordered        VTE Risk Mitigation (From admission, onward)         Ordered     IP VTE HIGH RISK PATIENT  Once      01/10/20 2131     Place sequential compression device  Until discontinued      01/10/20 2131                   Kari Gaspar MD  Department of Hospital Medicine   [facility]

## 2020-01-11 NOTE — SUBJECTIVE & OBJECTIVE
Past Medical History:   Diagnosis Date    A-fib     Acute on chronic congestive heart failure 2/1/2019    Anxiety     Arthritis     Asthma     Atrial fibrillation with rapid ventricular response     CHF (congestive heart failure) 11/29/2018    Chronic bronchitis     COPD (chronic obstructive pulmonary disease)     Depression     Diabetes mellitus, type 2     SANTIAGO (dyspnea on exertion)     Emphysema of lung     Fatigue     Hyperlipidemia     Hypertension     Symptomatic anemia 1/22/2019    Trouble in sleeping        Past Surgical History:   Procedure Laterality Date    APPENDECTOMY      BRONCHOSCOPY N/A 12/3/2019    Procedure: BRONCHOSCOPY;  Surgeon: Emmanuel Hickman MD;  Location: [facility] OR;  Service: Pulmonary;  Laterality: N/A;    EYE SURGERY      HYSTERECTOMY      INSERTION OF PACEMAKER  12/26/2019    TONSILLECTOMY         Review of patient's allergies indicates:  No Known Allergies    No current facility-administered medications on file prior to encounter.      Current Outpatient Medications on File Prior to Encounter   Medication Sig    albuterol (PROVENTIL) 2.5 mg /3 mL (0.083 %) nebulizer solution Take 3 mLs (2.5 mg total) by nebulization every 6 (six) hours as needed for Wheezing.    aspirin (ECOTRIN) 81 MG EC tablet Take 81 mg by mouth once daily.    cholecalciferol, vitamin D3, (VITAMIN D3) 2,000 unit Tab Take 2,000 Units by mouth once daily.    citalopram (CELEXA) 20 MG tablet Take 1 tablet (20 mg total) by mouth once daily.    cyproheptadine (PERIACTIN) 4 mg tablet Take 1 tablet (4 mg total) by mouth every evening.    furosemide (LASIX) 20 MG tablet Take 20 mg by mouth every other day.     insulin detemir U-100 (LEVEMIR FLEXTOUCH) 100 unit/mL (3 mL) SubQ InPn pen Inject 15 Units into the skin every evening.    ipratropium (ATROVENT) 0.02 % nebulizer solution Take 500 mcg by nebulization 4 (four) times daily.     magnesium oxide (MAG-OX) 400 mg (241.3 mg magnesium) tablet Take 1  "tablet by mouth 2 (two) times daily.    metoprolol tartrate (LOPRESSOR) 25 MG tablet Take 0.5 tablets (12.5 mg total) by mouth 2 (two) times daily.    rOPINIRole (REQUIP) 1 MG tablet Take 1 tablet (1 mg total) by mouth every evening.    traZODone (DESYREL) 50 MG tablet TAKE 1 TABLET EVERY EVENING    amiodarone (PACERONE) 200 MG Tab     amLODIPine (NORVASC) 5 MG tablet TAKE 1 TABLET BY MOUTH ONCE DAILY ( DOSE DECREASE )    baclofen (LIORESAL) 10 MG tablet Take 1 tablet (10 mg total) by mouth 3 (three) times daily. (Patient taking differently: Take 10 mg by mouth 3 (three) times daily as needed. )    benzonatate (TESSALON) 200 MG capsule TAKE 1 CAPSULE THREE TIMES DAILY AS NEEDED (SUBSTITUTED FOR  TESSALON)    blood sugar diagnostic Strp Test BG twice a day Accu-Chek Celia plus  DM 2  E 11.9  Dr Gaspar    blood-glucose meter kit Test BG twice a day Accu-Chek Celia plus  DM 2  E 11.9  Dr Gaspar    blood-glucose meter Misc Use as directed    fluticasone-umeclidin-vilanter (TRELEGY ELLIPTA) 100-62.5-25 mcg DsDv Inhale 1 puff into the lungs once daily.    lancets 33 gauge Misc Test BG twice a day Accu-Chek Celia plus  DM 2  E 11.9  Dr Gaspar    levoFLOXacin (LEVAQUIN) 750 MG tablet     mupirocin (BACTROBAN) 2 % ointment Apply topically 3 (three) times daily. (Patient not taking: Reported on 1/2/2020)    mupirocin (BACTROBAN) 2 % ointment Apply topically 2 (two) times daily.    ondansetron (ZOFRAN) 4 MG tablet Take 1 tablet (4 mg total) by mouth every 8 (eight) hours as needed for Nausea.    pen needle, diabetic 31 gauge x 5/16" Ndle Use to inject once daily with Levemir    pravastatin (PRAVACHOL) 40 MG tablet TAKE 1 TABLET EVERY DAY    predniSONE (DELTASONE) 10 MG tablet Take 4 po q day x 4 days, then 3 po q day x 3 days, then 20 mg     Family History     Problem Relation (Age of Onset)    COPD Brother, Daughter    Cancer Sister    Diabetes Father    Heart disease Mother    Hypertension Mother, " Father, Brother    Kidney disease Son    No Known Problems Daughter        Tobacco Use    Smoking status: Former Smoker     Last attempt to quit: 8/3/2000     Years since quittin.4    Smokeless tobacco: Never Used   Substance and Sexual Activity    Alcohol use: No    Drug use: No    Sexual activity: Not Currently     Review of Systems   Constitutional: Positive for fatigue and unexpected weight change. Negative for chills and fever.   HENT: Negative for congestion, ear pain, postnasal drip, rhinorrhea, sore throat and trouble swallowing.    Eyes: Negative for redness and itching.   Respiratory: Positive for shortness of breath. Negative for cough and wheezing.    Cardiovascular: Positive for leg swelling. Negative for chest pain and palpitations.   Gastrointestinal: Positive for abdominal pain. Negative for diarrhea, nausea and vomiting.   Genitourinary: Negative for dysuria and frequency.   Skin: Negative for rash.   Neurological: Positive for weakness. Negative for headaches.     Objective:     Vital Signs (Most Recent):  Temp: 96.7 °F (35.9 °C) (20 1201)  Pulse: 88 (20 1230)  Resp: 20 (20 1230)  BP: (!) 96/51 (20 1201)  SpO2: 96 % (20 1230) Vital Signs (24h Range):  Temp:  [96.1 °F (35.6 °C)-97.8 °F (36.6 °C)] 96.7 °F (35.9 °C)  Pulse:  [59-88] 88  Resp:  [20-26] 20  SpO2:  [79 %-100 %] 96 %  BP: ()/(51-62) 96/51     Weight: 64 kg (141 lb 1.5 oz)  Body mass index is 25.81 kg/m².    Physical Exam   Constitutional: She is oriented to person, place, and time. She appears well-developed. No distress.   HENT:   Head: Normocephalic and atraumatic.   Eyes: Pupils are equal, round, and reactive to light. Conjunctivae are normal.   Neck: Normal range of motion. Neck supple. JVD present. No thyromegaly present.   +hepatojug reflux   Cardiovascular: Normal rate and intact distal pulses.   Murmur heard.  Pulmonary/Chest: Effort normal. No respiratory distress. She has wheezes.  She has rales.   Abdominal: Soft. Bowel sounds are normal. There is tenderness (epigastric and RUQ).   Musculoskeletal: Normal range of motion. She exhibits edema.   Lymphadenopathy:     She has no cervical adenopathy.   Neurological: She is alert and oriented to person, place, and time.   Skin: Skin is warm and dry. No rash noted.   Psychiatric: She has a normal mood and affect. Her behavior is normal.   Nursing note and vitals reviewed.        CRANIAL NERVES     CN III, IV, VI   Pupils are equal, round, and reactive to light.       Significant Labs:   CBC:   Recent Labs   Lab 01/10/20  1910 01/11/20  0515   WBC 10.57 9.75   HGB 7.8* 7.8*   HCT 25.2* 24.9*    205     CMP:   Recent Labs   Lab 01/10/20  1910 01/11/20  0515    138   K 4.9 4.6   CL 97 96   CO2 31* 30*   * 110   BUN 47* 49*   CREATININE 1.4 1.5*   CALCIUM 8.5* 8.2*   PROT 5.4* 5.4*   ALBUMIN 3.3* 3.3*   BILITOT 0.9 0.7   ALKPHOS 211* 196*   * 123*   * 144*   ANIONGAP 10 12   EGFRNONAA 35* 32*     Cardiac Markers:   Recent Labs   Lab 01/10/20  1910   BNP 2,990*     Troponin:   Recent Labs   Lab 01/10/20  1910 01/11/20  0122 01/11/20  0809   TROPONINI 0.060* 0.078* 0.071*     TSH:   Recent Labs   Lab 01/10/20  1910   TSH 2.780       Significant Imaging: I have reviewed all pertinent imaging results/findings within the past 24 hours.     1. Cholelithiasis with no sonographic evidence of acute cholecystitis.  2. Small volume of ascites.    No evidence of deep venous thrombosis in either lower extremity.      1. Discoid atelectasis or scarring along the minor fissure.  No change since 12/15/2019.  2. Interval placement single lead left-sided pacemaker.  Negative for evidence of complicating process.

## 2020-01-11 NOTE — NURSING
Patient transported to 3rd floor room 303. NADN. Care assumed after report received from STACIA Mortensen. Patient is AAOx3. No complaints of chest pain or any other discomforts. Patient oriented to room, bed in low position, side rails up x2, call bell in reach. Will continue to monitor.

## 2020-01-11 NOTE — HPI
84 year old female with chronic respiratory failure and recent admission for pacer placement comes in because of weakness. She has been feeling more and more swollen, and she is getting more weak. She says that she has also had a lot of abdominal pain especially when getting up to walk. She feels nauseated and sick to her stomach when this happens. She has not been taking any diuretics. Last EF is unknown.

## 2020-01-11 NOTE — ASSESSMENT & PLAN NOTE
She is actually pretty close to her baseline, but she is short of breath all of the time.  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 With superimposed pulm edema, slightly more labored.

## 2020-01-11 NOTE — ASSESSMENT & PLAN NOTE
Given small dose of lasix yesterday wtihout much change. She seems fluid overloaded.  Will get last echo from [facility] and give 0.5mg of bumex this moring.  Recheck bmp at 3.

## 2020-01-11 NOTE — NURSING
Received call from [facility]; I spoke with STACIA Cummings I relayed information from STACIA clements that we need copy of patient's recent ECHO fax # given.

## 2020-01-11 NOTE — NURSING
Noted slight increase in troponin. Patient is sleeping comfortably. NADN. No changes noted on cardiac monitor.

## 2020-01-11 NOTE — ED TRIAGE NOTES
Has been having generalized weakness x 3 days.  Also had a pacemaker implanted approx 3 weeks ago at [facility]. gen

## 2020-01-12 LAB
ALBUMIN SERPL BCP-MCNC: 3.1 G/DL (ref 3.5–5.2)
ALP SERPL-CCNC: 169 U/L (ref 55–135)
ALT SERPL W/O P-5'-P-CCNC: 152 U/L (ref 10–44)
ANION GAP SERPL CALC-SCNC: 12 MMOL/L (ref 8–16)
AST SERPL-CCNC: 92 U/L (ref 10–40)
BILIRUB SERPL-MCNC: 0.6 MG/DL (ref 0.1–1)
BNP SERPL-MCNC: 3124 PG/ML (ref 0–99)
BUN SERPL-MCNC: 58 MG/DL (ref 8–23)
CALCIUM SERPL-MCNC: 7.8 MG/DL (ref 8.7–10.5)
CHLORIDE SERPL-SCNC: 97 MMOL/L (ref 95–110)
CO2 SERPL-SCNC: 31 MMOL/L (ref 23–29)
CREAT SERPL-MCNC: 1.7 MG/DL (ref 0.5–1.4)
EST. GFR  (AFRICAN AMERICAN): 32 ML/MIN/1.73 M^2
EST. GFR  (NON AFRICAN AMERICAN): 28 ML/MIN/1.73 M^2
FERRITIN SERPL-MCNC: 2124 NG/ML (ref 20–300)
GLUCOSE SERPL-MCNC: 153 MG/DL (ref 70–110)
POCT GLUCOSE: 154 MG/DL (ref 70–110)
POCT GLUCOSE: 192 MG/DL (ref 70–110)
POCT GLUCOSE: 196 MG/DL (ref 70–110)
POCT GLUCOSE: 208 MG/DL (ref 70–110)
POCT GLUCOSE: 252 MG/DL (ref 70–110)
POTASSIUM SERPL-SCNC: 4.1 MMOL/L (ref 3.5–5.1)
PROT SERPL-MCNC: 5.1 G/DL (ref 6–8.4)
SODIUM SERPL-SCNC: 140 MMOL/L (ref 136–145)
TROPONIN I SERPL DL<=0.01 NG/ML-MCNC: 0.06 NG/ML (ref 0–0.03)

## 2020-01-12 PROCEDURE — 83880 ASSAY OF NATRIURETIC PEPTIDE: CPT | Mod: HCNC

## 2020-01-12 PROCEDURE — 99225 PR SUBSEQUENT OBSERVATION CARE,LEVEL II: ICD-10-PCS | Mod: HCNC,,, | Performed by: FAMILY MEDICINE

## 2020-01-12 PROCEDURE — 94640 AIRWAY INHALATION TREATMENT: CPT | Mod: HCNC

## 2020-01-12 PROCEDURE — 96372 THER/PROPH/DIAG INJ SC/IM: CPT | Mod: 59 | Performed by: SURGERY

## 2020-01-12 PROCEDURE — 84484 ASSAY OF TROPONIN QUANT: CPT | Mod: HCNC

## 2020-01-12 PROCEDURE — 11000001 HC ACUTE MED/SURG PRIVATE ROOM: Mod: HCNC

## 2020-01-12 PROCEDURE — 99225 PR SUBSEQUENT OBSERVATION CARE,LEVEL II: CPT | Mod: HCNC,,, | Performed by: FAMILY MEDICINE

## 2020-01-12 PROCEDURE — 80053 COMPREHEN METABOLIC PANEL: CPT | Mod: HCNC

## 2020-01-12 PROCEDURE — 36415 COLL VENOUS BLD VENIPUNCTURE: CPT | Mod: HCNC

## 2020-01-12 PROCEDURE — G0378 HOSPITAL OBSERVATION PER HR: HCPCS | Mod: HCNC

## 2020-01-12 PROCEDURE — 25000003 PHARM REV CODE 250: Mod: HCNC | Performed by: FAMILY MEDICINE

## 2020-01-12 PROCEDURE — 27000221 HC OXYGEN, UP TO 24 HOURS: Mod: HCNC

## 2020-01-12 PROCEDURE — 94761 N-INVAS EAR/PLS OXIMETRY MLT: CPT | Mod: HCNC

## 2020-01-12 PROCEDURE — 99900035 HC TECH TIME PER 15 MIN (STAT): Mod: HCNC

## 2020-01-12 PROCEDURE — 25000242 PHARM REV CODE 250 ALT 637 W/ HCPCS: Mod: HCNC | Performed by: FAMILY MEDICINE

## 2020-01-12 PROCEDURE — 63600175 PHARM REV CODE 636 W HCPCS: Mod: HCNC | Performed by: FAMILY MEDICINE

## 2020-01-12 RX ORDER — BENZONATATE 100 MG/1
100 CAPSULE ORAL 3 TIMES DAILY PRN
Status: DISCONTINUED | OUTPATIENT
Start: 2020-01-12 | End: 2020-01-20 | Stop reason: HOSPADM

## 2020-01-12 RX ORDER — GLUCAGON 1 MG
1 KIT INJECTION
Status: DISCONTINUED | OUTPATIENT
Start: 2020-01-12 | End: 2020-01-20 | Stop reason: HOSPADM

## 2020-01-12 RX ORDER — IBUPROFEN 200 MG
24 TABLET ORAL
Status: DISCONTINUED | OUTPATIENT
Start: 2020-01-12 | End: 2020-01-20 | Stop reason: HOSPADM

## 2020-01-12 RX ORDER — TRAZODONE HYDROCHLORIDE 50 MG/1
50 TABLET ORAL NIGHTLY PRN
Status: DISCONTINUED | OUTPATIENT
Start: 2020-01-12 | End: 2020-01-20 | Stop reason: HOSPADM

## 2020-01-12 RX ORDER — IBUPROFEN 200 MG
16 TABLET ORAL
Status: DISCONTINUED | OUTPATIENT
Start: 2020-01-12 | End: 2020-01-20 | Stop reason: HOSPADM

## 2020-01-12 RX ORDER — INSULIN ASPART 100 [IU]/ML
0-5 INJECTION, SOLUTION INTRAVENOUS; SUBCUTANEOUS
Status: DISCONTINUED | OUTPATIENT
Start: 2020-01-12 | End: 2020-01-20 | Stop reason: HOSPADM

## 2020-01-12 RX ADMIN — ASPIRIN 81 MG: 81 TABLET, COATED ORAL at 08:01

## 2020-01-12 RX ADMIN — LEVALBUTEROL 1.25 MG: 1.25 SOLUTION, CONCENTRATE RESPIRATORY (INHALATION) at 08:01

## 2020-01-12 RX ADMIN — AMIODARONE HYDROCHLORIDE 200 MG: 200 TABLET ORAL at 08:01

## 2020-01-12 RX ADMIN — BISACODYL 5 MG: 5 TABLET, COATED ORAL at 04:01

## 2020-01-12 RX ADMIN — CITALOPRAM HYDROBROMIDE 20 MG: 10 TABLET ORAL at 08:01

## 2020-01-12 RX ADMIN — PREDNISONE 20 MG: 20 TABLET ORAL at 08:01

## 2020-01-12 RX ADMIN — LEVALBUTEROL 1.25 MG: 1.25 SOLUTION, CONCENTRATE RESPIRATORY (INHALATION) at 01:01

## 2020-01-12 RX ADMIN — TRAZODONE HYDROCHLORIDE 50 MG: 50 TABLET ORAL at 10:01

## 2020-01-12 RX ADMIN — BENZONATATE 100 MG: 100 CAPSULE ORAL at 04:01

## 2020-01-12 RX ADMIN — METOPROLOL TARTRATE 12.5 MG: 25 TABLET, FILM COATED ORAL at 08:01

## 2020-01-12 RX ADMIN — INSULIN DETEMIR 12 UNITS: 100 INJECTION, SOLUTION SUBCUTANEOUS at 08:01

## 2020-01-12 RX ADMIN — LEVALBUTEROL 1.25 MG: 1.25 SOLUTION, CONCENTRATE RESPIRATORY (INHALATION) at 06:01

## 2020-01-12 RX ADMIN — ROPINIROLE HYDROCHLORIDE 1 MG: 0.5 TABLET, FILM COATED ORAL at 08:01

## 2020-01-12 RX ADMIN — MIRTAZAPINE 15 MG: 15 TABLET, ORALLY DISINTEGRATING ORAL at 08:01

## 2020-01-12 RX ADMIN — PANTOPRAZOLE SODIUM 40 MG: 40 TABLET, DELAYED RELEASE ORAL at 08:01

## 2020-01-12 RX ADMIN — AMLODIPINE BESYLATE 5 MG: 5 TABLET ORAL at 08:01

## 2020-01-12 NOTE — SUBJECTIVE & OBJECTIVE
Review of Systems   Constitutional: Positive for fatigue. Negative for chills, fever and unexpected weight change.   HENT: Negative for congestion, ear pain, postnasal drip, rhinorrhea, sore throat and trouble swallowing.    Eyes: Negative for redness and itching.   Respiratory: Negative for cough, shortness of breath and wheezing.    Cardiovascular: Positive for leg swelling. Negative for chest pain and palpitations.   Gastrointestinal: Positive for abdominal pain. Negative for diarrhea, nausea and vomiting.   Genitourinary: Negative for dysuria and frequency.   Skin: Negative for rash.   Neurological: Positive for weakness. Negative for headaches.     Objective:     Vital Signs (Most Recent):  Temp: 98.2 °F (36.8 °C) (01/12/20 0708)  Pulse: 60 (01/12/20 0708)  Resp: 20 (01/12/20 0708)  BP: 113/60 (01/12/20 0708)  SpO2: 96 % (01/12/20 0708) Vital Signs (24h Range):  Temp:  [96.7 °F (35.9 °C)-98.2 °F (36.8 °C)] 98.2 °F (36.8 °C)  Pulse:  [59-88] 60  Resp:  [18-20] 20  SpO2:  [90 %-97 %] 96 %  BP: ()/(51-61) 113/60     Weight: 64 kg (141 lb 1.5 oz)  Body mass index is 25.81 kg/m².    Physical Exam   Constitutional: She is oriented to person, place, and time. She appears well-developed. No distress.   HENT:   Head: Normocephalic and atraumatic.   Eyes: Pupils are equal, round, and reactive to light. Conjunctivae are normal.   Neck: Normal range of motion. Neck supple. JVD present. No thyromegaly present.   +hepatojug reflux   Cardiovascular: Normal rate and intact distal pulses.   Murmur heard.  Pulmonary/Chest: Effort normal. No respiratory distress. She has wheezes. She has rales.   Abdominal: Soft. Bowel sounds are normal. There is tenderness (epigastric and RUQ).   Musculoskeletal: Normal range of motion. She exhibits edema.   Lymphadenopathy:     She has no cervical adenopathy.   Neurological: She is alert and oriented to person, place, and time.   Skin: Skin is warm and dry. No rash noted.   Psychiatric:  She has a normal mood and affect. Her behavior is normal.   Nursing note and vitals reviewed.        CRANIAL NERVES     CN III, IV, VI   Pupils are equal, round, and reactive to light.       Significant Labs:   CBC:   Recent Labs   Lab 01/10/20  1910 01/11/20  0515 01/11/20  1445   WBC 10.57 9.75 9.32   HGB 7.8* 7.8* 8.4*   HCT 25.2* 24.9* 26.4*    205 192     CMP:   Recent Labs   Lab 01/10/20  1910 01/11/20  0515 01/11/20  1401    138 135*   K 4.9 4.6 5.1   CL 97 96 96   CO2 31* 30* 25   * 110 139*   BUN 47* 49* 51*   CREATININE 1.4 1.5* 1.6*   CALCIUM 8.5* 8.2* 8.3*   PROT 5.4* 5.4*  --    ALBUMIN 3.3* 3.3*  --    BILITOT 0.9 0.7  --    ALKPHOS 211* 196*  --    * 123*  --    * 144*  --    ANIONGAP 10 12 14   EGFRNONAA 35* 32* 30*     Cardiac Markers:   Recent Labs   Lab 01/10/20  1910   BNP 2,990*     Troponin:   Recent Labs   Lab 01/11/20  1401 01/11/20 1957 01/12/20  0205   TROPONINI 0.059* 0.065* 0.063*     TSH:   Recent Labs   Lab 01/10/20  1910   TSH 2.780       Significant Imaging: I have reviewed all pertinent imaging results/findings within the past 24 hours.     1. Cholelithiasis with no sonographic evidence of acute cholecystitis.  2. Small volume of ascites.    No evidence of deep venous thrombosis in either lower extremity.      1. Discoid atelectasis or scarring along the minor fissure.  No change since 12/15/2019.  2. Interval placement single lead left-sided pacemaker.  Negative for evidence of complicating process.

## 2020-01-12 NOTE — ASSESSMENT & PLAN NOTE
Given small dose of lasix yesterday wtihout much change. She seems fluid overloaded.  Will get last echo from [facility] and give 0.5mg of bumex this moring.  Recheck bmp at 3.    Needs repeat echo. (last one from 12/18)    Repeat BNP this morning.  Hold off on diuresis for now.

## 2020-01-12 NOTE — PROGRESS NOTES
[facility] Medicine  Progress Note    Patient Name: Chantell Herrera  MRN: [id number]  Patient Class: OP- Observation   Admission Date: 1/10/2020  Length of Stay: 0 days  Attending Physician: Kari Gaspar MD  Primary Care Provider: Kari Gaspar MD        Subjective:     Principal Problem:Acute on chronic congestive heart failure        HPI:  84 year old female with chronic respiratory failure and recent admission for pacer placement comes in because of weakness. She has been feeling more and more swollen, and she is getting more weak. She says that she has also had a lot of abdominal pain especially when getting up to walk. She feels nauseated and sick to her stomach when this happens. She has not been taking any diuretics. Last EF is unknown.    Overview/Hospital Course:  Patient with continued weakness. O2 doing okay. Slight improvement of fluid, but only diuresed about 1 L.   Blood counts improved with diuresis - no transfusion at this point.  Overall about the same.    Review of Systems   Constitutional: Positive for fatigue. Negative for chills, fever and unexpected weight change.   HENT: Negative for congestion, ear pain, postnasal drip, rhinorrhea, sore throat and trouble swallowing.    Eyes: Negative for redness and itching.   Respiratory: Negative for cough, shortness of breath and wheezing.    Cardiovascular: Positive for leg swelling. Negative for chest pain and palpitations.   Gastrointestinal: Positive for abdominal pain. Negative for diarrhea, nausea and vomiting.   Genitourinary: Negative for dysuria and frequency.   Skin: Negative for rash.   Neurological: Positive for weakness. Negative for headaches.     Objective:     Vital Signs (Most Recent):  Temp: 98.2 °F (36.8 °C) (01/12/20 0708)  Pulse: 60 (01/12/20 0708)  Resp: 20 (01/12/20 0708)  BP: 113/60 (01/12/20 0708)  SpO2: 96 % (01/12/20 0708) Vital Signs (24h Range):  Temp:  [96.7 °F (35.9 °C)-98.2 °F (36.8 °C)]  98.2 °F (36.8 °C)  Pulse:  [59-88] 60  Resp:  [18-20] 20  SpO2:  [90 %-97 %] 96 %  BP: ()/(51-61) 113/60     Weight: 64 kg (141 lb 1.5 oz)  Body mass index is 25.81 kg/m².    Physical Exam   Constitutional: She is oriented to person, place, and time. She appears well-developed. No distress.   HENT:   Head: Normocephalic and atraumatic.   Eyes: Pupils are equal, round, and reactive to light. Conjunctivae are normal.   Neck: Normal range of motion. Neck supple. JVD present. No thyromegaly present.   +hepatojug reflux   Cardiovascular: Normal rate and intact distal pulses.   Murmur heard.  Pulmonary/Chest: Effort normal. No respiratory distress. She has wheezes. She has rales.   Abdominal: Soft. Bowel sounds are normal. There is tenderness (epigastric and RUQ).   Musculoskeletal: Normal range of motion. She exhibits edema.   Lymphadenopathy:     She has no cervical adenopathy.   Neurological: She is alert and oriented to person, place, and time.   Skin: Skin is warm and dry. No rash noted.   Psychiatric: She has a normal mood and affect. Her behavior is normal.   Nursing note and vitals reviewed.        CRANIAL NERVES     CN III, IV, VI   Pupils are equal, round, and reactive to light.       Significant Labs:   CBC:   Recent Labs   Lab 01/10/20  1910 01/11/20  0515 01/11/20  1445   WBC 10.57 9.75 9.32   HGB 7.8* 7.8* 8.4*   HCT 25.2* 24.9* 26.4*    205 192     CMP:   Recent Labs   Lab 01/10/20  1910 01/11/20  0515 01/11/20  1401    138 135*   K 4.9 4.6 5.1   CL 97 96 96   CO2 31* 30* 25   * 110 139*   BUN 47* 49* 51*   CREATININE 1.4 1.5* 1.6*   CALCIUM 8.5* 8.2* 8.3*   PROT 5.4* 5.4*  --    ALBUMIN 3.3* 3.3*  --    BILITOT 0.9 0.7  --    ALKPHOS 211* 196*  --    * 123*  --    * 144*  --    ANIONGAP 10 12 14   EGFRNONAA 35* 32* 30*     Cardiac Markers:   Recent Labs   Lab 01/10/20  1910   BNP 2,990*     Troponin:   Recent Labs   Lab 01/11/20  1401 01/11/20 1957 01/12/20  0205    TROPONINI 0.059* 0.065* 0.063*     TSH:   Recent Labs   Lab 01/10/20  1910   TSH 2.780       Significant Imaging: I have reviewed all pertinent imaging results/findings within the past 24 hours.     1. Cholelithiasis with no sonographic evidence of acute cholecystitis.  2. Small volume of ascites.    No evidence of deep venous thrombosis in either lower extremity.      1. Discoid atelectasis or scarring along the minor fissure.  No change since 12/15/2019.  2. Interval placement single lead left-sided pacemaker.  Negative for evidence of complicating process.      Assessment/Plan:      * Acute on chronic congestive heart failure  Given small dose of lasix yesterday wtihout much change. She seems fluid overloaded.  Will get last echo from cis and give 0.5mg of bumex this moring.  Recheck bmp at 3.    Needs repeat echo. (last one from 12/18)    Repeat BNP this morning.  Hold off on diuresis for now.        Anorexia  Switch from periactin to remeron      Diabetes mellitus type 2, uncontrolled  Cont with ss   this morning      Anemia  AOCD - iron studies confirm this.  Has had iron in the past.  Will check for any signs of blood loss.  Has been asked to stop her eliquis.  While she is symptomatic and anemic, will try to diuresis before giving blood.  In the past she had flash pulm edema from transfusion vs transfusion reaction.  If counts remain less than 8 will go ahead with 1 unit.        Acute on chronic respiratory failure with hypoxia  She is actually pretty close to her baseline, but she is short of breath all of the time.  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 With superimposed pulm edema, slightly more labored.      Atrial fibrillation with rapid ventricular response  Cont on amiodarone and lopressor.  Check Echo in am once cardiology available.      Epigastric pain  U/S shows some ascites      CKD (chronic kidney disease), stage III  Contraction/prerenal azotemia.  Hold off on  diuresis and allow for homeostasis.      Insomnia  Cont on remeron      COPD (chronic obstructive pulmonary disease)  Seems at baseline.  Will give small prednisone dose - 20mg daily - cont this.  Cont nebs.      Cholelithiases  R/o cholecystitis.  U/s ordered and no signs of cholecystitis        VTE Risk Mitigation (From admission, onward)         Ordered     IP VTE HIGH RISK PATIENT  Once      01/10/20 2131     Place sequential compression device  Until discontinued      01/10/20 2131                      Kari Gaspar MD  Department of Hospital Medicine   [facility]

## 2020-01-12 NOTE — HOSPITAL COURSE
1/12/2020 Patient with continued weakness. O2 doing okay. Slight improvement of fluid, but only diuresed about 1 L.   Blood counts improved with diuresis - no transfusion at this point.  Overall about the same.    1/13/2020 pt renal and liver function elevated. Prior to recent cardiac procedure both were fine. She has liver u/s yesterday. Beside a gallstone without inflammation read as normal. On amiodarone for A fib. Discussed with Dr Metcalf this am as she is ventricular paced on tele and HR 60 will hold amiodarone and cont BB. Follow LFT. Also CXR without congestion. She was sent home on lasix but denies taking it although felt swollen. Given 20mg IV lasix 1/10 and bumex 0.5mg IV x 1 on 1/11. BNP was 2990 on admission and went up to 3124 2 days later and 2909 today.     Cbc pending 7.6 today. Ferritin very high as well as elevated fe studies. CBC shows eleavted MCV and MCH. Folate and B12 2 months ago normal, will repeat.     1/14/2020 Pt NAD this morning. Notes swelling much better but has persistent junky cough during exam, and still wheezing     Negative -738, Still with significant SANTIAGO with minimal exertion, O2 sat 90-92% on 3LNC   Afebrile, Day 3 Rocephin for UTI, ( Had 2 grams on 1/10/20 and 1Gm yesterday    ENTEROCOCCUS FAECALIS   50,000 - 99,999 cfu/ml      Resulting Agency [facility]   Susceptibility      Enterococcus faecalis     CULTURE, URINE     Ampicillin <=2 mcg/mL Sensitive     Nitrofurantoin <=32 mcg/mL Sensitive     Tetracycline >8 mcg/mL Resistant     Vancomycin 2 mcg/mL Sensitive       Blood culture x1 done- NGTD x 4d H&H up to 8.9/27.8 after 1UPRBCs from 7.6/23.5  AST 76>82, >158 , creat 1.6   Was noted to have choking issues, ST eval ordered  Pt is being Rxed for CHF, a UTI and COPD issues     1/15/2020 Day 4 IV  Rocephin for UTI,ENTEROCOCCUS FAECALIS sensitive to ampicillin    H&H 8.7/27.1   Creat 1.6>1.3, LFTs better AST 82>42, >127, since off amiodarone   o2 sat 91-95% still on 3LNC  "(USES 3LNC at home 24hrs- states her sates are usually 91-92% , still with some SANTIAGO, intermittent wheezing, prednisone 20mg daily   xopenex q 6   Notes soreness to legs better but had "weeping" overnight   · RW Ambulation using portable oxygen x 75 feet with Supervision; goal is 200ft     1/16/2020 Day 5 IV  Rocephin for UTI,ENTEROCOCCUS FAECALIS sensitive to ampicillin   Repeat U/A yesterday good, leukocytes, nitrites negative, WBC 9.01, afebrile   H&H 8.4/26.4, k+ 3.4  Creat 1.3>1.0 BUN 46>37, AST 47>31- now normal, >100   No further amiodarone, Cards recommending increase current BB , pt has PPM   Stopped amlodipine yesterday due to LE complaints of swelling  Low BS 60s this am despite decreasing levemir from 12 to 8- will stop insulin  Will transfer with 1 unit PRBCs today and d'c in AM    1/17/2020 Pt admitted for weakness; she received 1 UPRBCs on 1/13/2020, And a 2nd unit yesterday   H&H 10/6/33.9 this am . PRBC given slowly over 4 hrs due to hx of flash pulm edema, She did have episode of SOB and oxygen desat to the 70s yesterday when getting up to [facility]. She was given IV lasix 20mg and CXR demonstrated;There is a mild amount of haziness scattered throughout the lower half of the right lung. There is a mild amount of haziness scattered throughout the lower 2/3 of the left lung.  This represents an interval worsening of the appearance of the lungs and is characteristic of pneumonia.  2. There is opacification of the base of the left hemithorax.  This is characteristic of a small pleural effusion.  She has completed 5/5 of Rocephin for ENTEROCOCCUS FAECALIS UTI sensitive to ampicillin   She has chronic Resp failure with itermittent wheezing and O2 sats 91-92% at baseline per pt.   Afebrile, WBC nml BP stable , pt reports feeling stronger. Still notes some congestion. Productive cough   AST 20/> 83   Added levaquin for concern for pneumonia     1/18/20  Yesterday she dropped her pulseox to 81   Felt " severely short winded   Seems very congested still   We added Levaquin for possible pneumonia   CXR had fluffed up     1/19/2020  I diuresed her a little;e more aggressively and she feels better   Her creatinine is bumping but her breathing is better and she is less congested  Oxygenation is better   Lasix 40 one more time . Continue Levaquin   CXR 1/17  1. Findings consistent with mild congestive heart failure.  2. Moderate bilateral pleural effusions with atelectasis versus infiltrate at the lung bases.  Correlate clinically with possible fever and/or elevated white count.  3. Underlying COPD.  4. Pacemaker.    1/20/20  Doing very well . Anxious to go home   Less wheezy  Ready for discharge   This is the best I have seen her in few days

## 2020-01-12 NOTE — ASSESSMENT & PLAN NOTE
AOCD - iron studies confirm this.  Has had iron in the past.  Will check for any signs of blood loss.  Has been asked to stop her eliquis.  While she is symptomatic and anemic, will try to diuresis before giving blood.  In the past she had flash pulm edema from transfusion vs transfusion reaction.  If counts remain less than 8 will go ahead with 1 unit.

## 2020-01-13 PROBLEM — R53.1 WEAKNESS: Status: ACTIVE | Noted: 2020-01-13

## 2020-01-13 PROBLEM — N30.00 ACUTE CYSTITIS WITHOUT HEMATURIA: Status: ACTIVE | Noted: 2020-01-13

## 2020-01-13 LAB
ALBUMIN SERPL BCP-MCNC: 3.2 G/DL (ref 3.5–5.2)
ALP SERPL-CCNC: 158 U/L (ref 55–135)
ALT SERPL W/O P-5'-P-CCNC: 146 U/L (ref 10–44)
ANION GAP SERPL CALC-SCNC: 10 MMOL/L (ref 8–16)
AORTIC ROOT ANNULUS: 2.89 CM
AST SERPL-CCNC: 76 U/L (ref 10–40)
AV INDEX (PROSTH): 0.62
AV MEAN GRADIENT: 8 MMHG
AV PEAK GRADIENT: 14 MMHG
AV VALVE AREA: 1.9 CM2
AV VELOCITY RATIO: 0.76
BASOPHILS # BLD AUTO: ABNORMAL K/UL (ref 0–0.2)
BASOPHILS NFR BLD: 1 % (ref 0–1.9)
BILIRUB SERPL-MCNC: 0.6 MG/DL (ref 0.1–1)
BLD PROD TYP BPU: NORMAL
BLOOD UNIT EXPIRATION DATE: NORMAL
BLOOD UNIT TYPE CODE: 6200
BLOOD UNIT TYPE: NORMAL
BNP SERPL-MCNC: 2909 PG/ML (ref 0–99)
BSA FOR ECHO PROCEDURE: 1.67 M2
BUN SERPL-MCNC: 50 MG/DL (ref 8–23)
CALCIUM SERPL-MCNC: 7.8 MG/DL (ref 8.7–10.5)
CHLORIDE SERPL-SCNC: 97 MMOL/L (ref 95–110)
CO2 SERPL-SCNC: 32 MMOL/L (ref 23–29)
CODING SYSTEM: NORMAL
CREAT SERPL-MCNC: 1.5 MG/DL (ref 0.5–1.4)
CV ECHO LV RWT: 0.46 CM
DACRYOCYTES BLD QL SMEAR: ABNORMAL
DIFFERENTIAL METHOD: ABNORMAL
DISPENSE STATUS: NORMAL
DOP CALC AO PEAK VEL: 1.84 M/S
DOP CALC AO VTI: 39.59 CM
DOP CALC LVOT AREA: 3 CM2
DOP CALC LVOT DIAMETER: 1.97 CM
DOP CALC LVOT PEAK VEL: 1.39 M/S
DOP CALC LVOT STROKE VOLUME: 75.25 CM3
DOP CALCLVOT PEAK VEL VTI: 24.7 CM
E WAVE DECELERATION TIME: 229.71 MSEC
E/A RATIO: 3.03
ECHO LV POSTERIOR WALL: 0.98 CM (ref 0.6–1.1)
EOSINOPHIL # BLD AUTO: ABNORMAL K/UL (ref 0–0.5)
EOSINOPHIL NFR BLD: 2 % (ref 0–8)
ERYTHROCYTE [DISTWIDTH] IN BLOOD BY AUTOMATED COUNT: 24.6 % (ref 11.5–14.5)
EST. GFR  (AFRICAN AMERICAN): 37 ML/MIN/1.73 M^2
EST. GFR  (NON AFRICAN AMERICAN): 32 ML/MIN/1.73 M^2
ESTIMATED AVG GLUCOSE: 146 MG/DL (ref 68–131)
FOLATE SERPL-MCNC: 14.2 NG/ML (ref 4–24)
FRACTIONAL SHORTENING: 47 % (ref 28–44)
GIANT PLATELETS BLD QL SMEAR: PRESENT
GLUCOSE SERPL-MCNC: 133 MG/DL (ref 70–110)
HBA1C MFR BLD HPLC: 6.7 % (ref 4–5.6)
HCT VFR BLD AUTO: 23.5 % (ref 37–48.5)
HGB BLD-MCNC: 7.6 G/DL (ref 12–16)
IMM GRANULOCYTES # BLD AUTO: ABNORMAL K/UL (ref 0–0.04)
IMM GRANULOCYTES NFR BLD AUTO: ABNORMAL % (ref 0–0.5)
INTERVENTRICULAR SEPTUM: 1.04 CM (ref 0.6–1.1)
IVRT: 0.12 MSEC
LA MAJOR: 5.2 CM
LA MINOR: 5.13 CM
LA WIDTH: 3.15 CM
LEFT ATRIUM SIZE: 3.17 CM
LEFT ATRIUM VOLUME INDEX: 27.1 ML/M2
LEFT ATRIUM VOLUME: 43.84 CM3
LEFT INTERNAL DIMENSION IN SYSTOLE: 2.25 CM (ref 2.1–4)
LEFT VENTRICLE DIASTOLIC VOLUME INDEX: 49.1 ML/M2
LEFT VENTRICLE DIASTOLIC VOLUME: 79.28 ML
LEFT VENTRICLE MASS INDEX: 87 G/M2
LEFT VENTRICLE SYSTOLIC VOLUME INDEX: 10.6 ML/M2
LEFT VENTRICLE SYSTOLIC VOLUME: 17.18 ML
LEFT VENTRICULAR INTERNAL DIMENSION IN DIASTOLE: 4.22 CM (ref 3.5–6)
LEFT VENTRICULAR MASS: 140.23 G
LYMPHOCYTES # BLD AUTO: ABNORMAL K/UL (ref 1–4.8)
LYMPHOCYTES NFR BLD: 3 % (ref 18–48)
MCH RBC QN AUTO: 35.8 PG (ref 27–31)
MCHC RBC AUTO-ENTMCNC: 32.3 G/DL (ref 32–36)
MCV RBC AUTO: 111 FL (ref 82–98)
MONOCYTES # BLD AUTO: ABNORMAL K/UL (ref 0.3–1)
MONOCYTES NFR BLD: 18 % (ref 4–15)
MV PEAK A VEL: 0.31 M/S
MV PEAK E VEL: 0.94 M/S
NEUTROPHILS # BLD AUTO: ABNORMAL K/UL (ref 1.8–7.7)
NEUTROPHILS NFR BLD: 71 % (ref 38–73)
NEUTS BAND NFR BLD MANUAL: 5 %
NRBC BLD-RTO: 0 /100 WBC
OVALOCYTES BLD QL SMEAR: ABNORMAL
PISA TR MAX VEL: 2.98 M/S
PLATELET # BLD AUTO: 211 K/UL (ref 150–350)
PLATELET BLD QL SMEAR: ABNORMAL
PMV BLD AUTO: 14 FL (ref 9.2–12.9)
POCT GLUCOSE: 154 MG/DL (ref 70–110)
POCT GLUCOSE: 162 MG/DL (ref 70–110)
POCT GLUCOSE: 233 MG/DL (ref 70–110)
POCT GLUCOSE: 238 MG/DL (ref 70–110)
POIKILOCYTOSIS BLD QL SMEAR: SLIGHT
POTASSIUM SERPL-SCNC: 3.9 MMOL/L (ref 3.5–5.1)
PROT SERPL-MCNC: 5.2 G/DL (ref 6–8.4)
PV PEAK VELOCITY: 0.86 CM/S
RA MAJOR: 5.8 CM
RBC # BLD AUTO: 2.12 M/UL (ref 4–5.4)
RETICS/RBC NFR AUTO: 2.9 % (ref 0.5–2.5)
RIGHT VENTRICULAR END-DIASTOLIC DIMENSION: 3.08 CM
SODIUM SERPL-SCNC: 139 MMOL/L (ref 136–145)
STJ: 2.72 CM
TR MAX PG: 36 MMHG
TRANS ERYTHROCYTES VOL PATIENT: NORMAL ML
VIT B12 SERPL-MCNC: 1723 PG/ML (ref 210–950)
WBC # BLD AUTO: 10.09 K/UL (ref 3.9–12.7)
WBC OTHER NFR BLD MANUAL: 0 %

## 2020-01-13 PROCEDURE — 80053 COMPREHEN METABOLIC PANEL: CPT | Mod: HCNC

## 2020-01-13 PROCEDURE — 25000003 PHARM REV CODE 250: Mod: HCNC | Performed by: NURSE PRACTITIONER

## 2020-01-13 PROCEDURE — 36415 COLL VENOUS BLD VENIPUNCTURE: CPT | Mod: HCNC

## 2020-01-13 PROCEDURE — 83036 HEMOGLOBIN GLYCOSYLATED A1C: CPT | Mod: HCNC

## 2020-01-13 PROCEDURE — P9021 RED BLOOD CELLS UNIT: HCPCS | Mod: HCNC

## 2020-01-13 PROCEDURE — 27000221 HC OXYGEN, UP TO 24 HOURS: Mod: HCNC

## 2020-01-13 PROCEDURE — 63600175 PHARM REV CODE 636 W HCPCS: Mod: HCNC | Performed by: NURSE PRACTITIONER

## 2020-01-13 PROCEDURE — 83880 ASSAY OF NATRIURETIC PEPTIDE: CPT | Mod: HCNC

## 2020-01-13 PROCEDURE — 85027 COMPLETE CBC AUTOMATED: CPT | Mod: HCNC

## 2020-01-13 PROCEDURE — 63600175 PHARM REV CODE 636 W HCPCS: Mod: HCNC | Performed by: FAMILY MEDICINE

## 2020-01-13 PROCEDURE — 25000003 PHARM REV CODE 250: Mod: HCNC | Performed by: FAMILY MEDICINE

## 2020-01-13 PROCEDURE — 82746 ASSAY OF FOLIC ACID SERUM: CPT | Mod: HCNC

## 2020-01-13 PROCEDURE — 82607 VITAMIN B-12: CPT | Mod: HCNC

## 2020-01-13 PROCEDURE — 80074 ACUTE HEPATITIS PANEL: CPT | Mod: HCNC

## 2020-01-13 PROCEDURE — 94761 N-INVAS EAR/PLS OXIMETRY MLT: CPT | Mod: HCNC

## 2020-01-13 PROCEDURE — 25000242 PHARM REV CODE 250 ALT 637 W/ HCPCS: Mod: HCNC | Performed by: FAMILY MEDICINE

## 2020-01-13 PROCEDURE — 11000001 HC ACUTE MED/SURG PRIVATE ROOM: Mod: HCNC

## 2020-01-13 PROCEDURE — 99900035 HC TECH TIME PER 15 MIN (STAT): Mod: HCNC

## 2020-01-13 PROCEDURE — 99233 PR SUBSEQUENT HOSPITAL CARE,LEVL III: ICD-10-PCS | Mod: HCNC,,, | Performed by: FAMILY MEDICINE

## 2020-01-13 PROCEDURE — 85045 AUTOMATED RETICULOCYTE COUNT: CPT | Mod: HCNC

## 2020-01-13 PROCEDURE — 85007 BL SMEAR W/DIFF WBC COUNT: CPT | Mod: HCNC

## 2020-01-13 PROCEDURE — 94640 AIRWAY INHALATION TREATMENT: CPT | Mod: HCNC

## 2020-01-13 PROCEDURE — 99233 SBSQ HOSP IP/OBS HIGH 50: CPT | Mod: HCNC,,, | Performed by: FAMILY MEDICINE

## 2020-01-13 PROCEDURE — 92610 EVALUATE SWALLOWING FUNCTION: CPT | Mod: HCNC

## 2020-01-13 RX ORDER — HYDROCODONE BITARTRATE AND ACETAMINOPHEN 500; 5 MG/1; MG/1
TABLET ORAL
Status: DISCONTINUED | OUTPATIENT
Start: 2020-01-13 | End: 2020-01-16

## 2020-01-13 RX ORDER — DIPHENHYDRAMINE HYDROCHLORIDE 50 MG/ML
25 INJECTION INTRAMUSCULAR; INTRAVENOUS ONCE
Status: COMPLETED | OUTPATIENT
Start: 2020-01-13 | End: 2020-01-13

## 2020-01-13 RX ORDER — TORSEMIDE 20 MG/1
20 TABLET ORAL DAILY
Status: DISCONTINUED | OUTPATIENT
Start: 2020-01-13 | End: 2020-01-14

## 2020-01-13 RX ORDER — METHYLPREDNISOLONE SOD SUCC 125 MG
40 VIAL (EA) INJECTION ONCE
Status: COMPLETED | OUTPATIENT
Start: 2020-01-13 | End: 2020-01-13

## 2020-01-13 RX ORDER — GUAIFENESIN 600 MG/1
600 TABLET, EXTENDED RELEASE ORAL 2 TIMES DAILY
Status: DISCONTINUED | OUTPATIENT
Start: 2020-01-13 | End: 2020-01-20 | Stop reason: HOSPADM

## 2020-01-13 RX ADMIN — INSULIN DETEMIR 12 UNITS: 100 INJECTION, SOLUTION SUBCUTANEOUS at 08:01

## 2020-01-13 RX ADMIN — DIPHENHYDRAMINE HYDROCHLORIDE 25 MG: 50 INJECTION INTRAMUSCULAR; INTRAVENOUS at 11:01

## 2020-01-13 RX ADMIN — LEVALBUTEROL 1.25 MG: 1.25 SOLUTION, CONCENTRATE RESPIRATORY (INHALATION) at 01:01

## 2020-01-13 RX ADMIN — INSULIN ASPART 2 UNITS: 100 INJECTION, SOLUTION INTRAVENOUS; SUBCUTANEOUS at 05:01

## 2020-01-13 RX ADMIN — AMLODIPINE BESYLATE 5 MG: 5 TABLET ORAL at 08:01

## 2020-01-13 RX ADMIN — LEVALBUTEROL 1.25 MG: 1.25 SOLUTION, CONCENTRATE RESPIRATORY (INHALATION) at 07:01

## 2020-01-13 RX ADMIN — CEFTRIAXONE 1 G: 1 INJECTION, SOLUTION INTRAVENOUS at 10:01

## 2020-01-13 RX ADMIN — INSULIN ASPART 1 UNITS: 100 INJECTION, SOLUTION INTRAVENOUS; SUBCUTANEOUS at 08:01

## 2020-01-13 RX ADMIN — GUAIFENESIN 600 MG: 600 TABLET, EXTENDED RELEASE ORAL at 08:01

## 2020-01-13 RX ADMIN — BENZONATATE 100 MG: 100 CAPSULE ORAL at 08:01

## 2020-01-13 RX ADMIN — TORSEMIDE 20 MG: 20 TABLET ORAL at 10:01

## 2020-01-13 RX ADMIN — PANTOPRAZOLE SODIUM 40 MG: 40 TABLET, DELAYED RELEASE ORAL at 08:01

## 2020-01-13 RX ADMIN — ASPIRIN 81 MG: 81 TABLET, COATED ORAL at 08:01

## 2020-01-13 RX ADMIN — ROPINIROLE HYDROCHLORIDE 1 MG: 0.5 TABLET, FILM COATED ORAL at 08:01

## 2020-01-13 RX ADMIN — METHYLPREDNISOLONE SODIUM SUCCINATE 40 MG: 125 INJECTION, POWDER, FOR SOLUTION INTRAMUSCULAR; INTRAVENOUS at 11:01

## 2020-01-13 RX ADMIN — CITALOPRAM HYDROBROMIDE 20 MG: 10 TABLET ORAL at 08:01

## 2020-01-13 RX ADMIN — PREDNISONE 20 MG: 20 TABLET ORAL at 08:01

## 2020-01-13 RX ADMIN — GUAIFENESIN 600 MG: 600 TABLET, EXTENDED RELEASE ORAL at 10:01

## 2020-01-13 RX ADMIN — METOPROLOL TARTRATE 12.5 MG: 25 TABLET, FILM COATED ORAL at 08:01

## 2020-01-13 RX ADMIN — MIRTAZAPINE 15 MG: 15 TABLET, ORALLY DISINTEGRATING ORAL at 08:01

## 2020-01-13 NOTE — HPI
84 year old female with history of PAF s/p PPM, COPD, carotid artery stenosis, HHD, bradycardia, dyslipidemia, HTN, GERD, presented to ED with c/o increased weakness, leg swelling, abdominal pain, N/V.

## 2020-01-13 NOTE — ASSESSMENT & PLAN NOTE
She is actually pretty close to her baseline, but she is short of breath all of the time.  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 With superimposed pulm edema, slightly more labored.  Cont O2 support  Repeat cxr and add mucinex and IPV

## 2020-01-13 NOTE — ASSESSMENT & PLAN NOTE
AOCD - iron studies confirm this.  Has had iron in the past.  Will check for any signs of blood loss.  Has been asked to stop her eliquis.  While she is symptomatic and anemic, will try to diuresis before giving blood.  In the past she had flash pulm edema from transfusion vs transfusion reaction.    Referred to heme/onc in past  Now lower than 8- ordered 1 units PRBC, pre med and give over 4 hours

## 2020-01-13 NOTE — PROGRESS NOTES
Ms. Herrera states that she has been well informed of all meds being given.  We also discussed fall precaution and pain control. She reports no pain and has no further questions or concerns regarding her meds. She does not want bedside delivery of meds.    Neena Shah, PharmD  [phone]

## 2020-01-13 NOTE — PT/OT/SLP PROGRESS
Physical Therapy      Patient Name:  Chantell Herrera   MRN:  [id number]    Patient not seen today secondary to Other (Comment)(Withhold PT eval & tx today due to patient is receiving blood transfusion.). Will follow-up tomorrow.    Theodore Sellers, PT

## 2020-01-13 NOTE — SUBJECTIVE & OBJECTIVE
Review of Systems   Constitutional: Positive for fatigue. Negative for chills, fever and unexpected weight change.   HENT: Positive for trouble swallowing. Negative for congestion, ear pain, postnasal drip, rhinorrhea and sore throat.    Eyes: Negative for redness and itching.   Respiratory: Negative for cough, shortness of breath and wheezing.    Cardiovascular: Positive for leg swelling. Negative for chest pain and palpitations.   Gastrointestinal: Positive for abdominal pain. Negative for diarrhea, nausea and vomiting.   Genitourinary: Negative for dysuria and frequency.   Skin: Negative for rash.   Neurological: Positive for weakness. Negative for headaches.     Objective:     Vital Signs (Most Recent):  Temp: 96.4 °F (35.8 °C) (01/13/20 0725)  Pulse: 60 (01/13/20 1000)  Resp: 20 (01/13/20 0725)  BP: (!) 111/59 (01/13/20 0725)  SpO2: (!) 90 % (01/13/20 0725) Vital Signs (24h Range):  Temp:  [96.4 °F (35.8 °C)-98 °F (36.7 °C)] 96.4 °F (35.8 °C)  Pulse:  [59-84] 60  Resp:  [18-20] 20  SpO2:  [86 %-97 %] 90 %  BP: ()/(52-72) 111/59     Weight: 61 kg (134 lb 7.7 oz)  Body mass index is 24.6 kg/m².    Physical Exam   Constitutional: She is oriented to person, place, and time. She appears well-developed. No distress.   HENT:   Head: Normocephalic and atraumatic.   Eyes: Pupils are equal, round, and reactive to light. Conjunctivae are normal.   Neck: Normal range of motion. Neck supple. JVD present. No thyromegaly present.   +hepatojug reflux   Cardiovascular: Normal rate and intact distal pulses.   Murmur heard.  Pulmonary/Chest: Effort normal. No respiratory distress. She has wheezes. She has rales.   Abdominal: Soft. Bowel sounds are normal. There is tenderness (epigastric and RUQ).   Musculoskeletal: Normal range of motion. She exhibits edema.   Lymphadenopathy:     She has no cervical adenopathy.   Neurological: She is alert and oriented to person, place, and time.   Skin: Skin is warm and dry. No rash  noted.   Scattered ecchymoses   Psychiatric: She has a normal mood and affect. Her behavior is normal.   Nursing note and vitals reviewed.        CRANIAL NERVES     CN III, IV, VI   Pupils are equal, round, and reactive to light.       Significant Labs:   CBC:   Recent Labs   Lab 01/11/20  1445 01/13/20  0541   WBC 9.32 10.09   HGB 8.4* 7.6*   HCT 26.4* 23.5*    211     CMP:   Recent Labs   Lab 01/11/20  1401 01/12/20  0733 01/13/20  0541   * 140 139   K 5.1 4.1 3.9   CL 96 97 97   CO2 25 31* 32*   * 153* 133*   BUN 51* 58* 50*   CREATININE 1.6* 1.7* 1.5*   CALCIUM 8.3* 7.8* 7.8*   PROT  --  5.1* 5.2*   ALBUMIN  --  3.1* 3.2*   BILITOT  --  0.6 0.6   ALKPHOS  --  169* 158*   AST  --  92* 76*   ALT  --  152* 146*   ANIONGAP 14 12 10   EGFRNONAA 30* 28* 32*     Lab Results   Component Value Date    IRON 173 (H) 01/11/2020    TIBC 192 (L) 01/11/2020    FERRITIN 2,124 (H) 01/11/2020       Cardiac Markers:   Recent Labs   Lab 01/13/20  0831   BNP 2,909*     Troponin:   Recent Labs   Lab 01/11/20  1401 01/11/20  1957 01/12/20  0205   TROPONINI 0.059* 0.065* 0.063*     TSH:   Recent Labs   Lab 01/10/20  1910   TSH 2.780     A1C in process    Blood cultures NGTD    Urine culture enterococcus     Significant Imaging:      US abd:  1. Cholelithiasis with no sonographic evidence of acute cholecystitis.  2. Small volume of ascites.      US lower ext   No evidence of deep venous thrombosis in either lower extremity.    CXR   1. Discoid atelectasis or scarring along the minor fissure.  No change since 12/15/2019.  2. Interval placement single lead left-sided pacemaker.  Negative for evidence of complicating process.    ECHO:     EKG ventricular paced

## 2020-01-13 NOTE — ASSESSMENT & PLAN NOTE
Given small dose of lasix yesterday wtihout much change. She seems fluid overloaded.  Will get last echo from cis and give 0.5mg of bumex this moring.  Recheck bmp at 3.    Needs repeat echo. (last one from 12/18)    Repeat BNP this morning.  Holding off on diuresis for now.  Creat is better 1.5 but basline WNL    Echo today- add demadex today and watch kidneys closely

## 2020-01-13 NOTE — ASSESSMENT & PLAN NOTE
Seems at baseline.  Will give small prednisone dose - 20mg daily - cont this.  Cont nebs.  Start rocephin.  Start on mucinex.  IPV ordered.

## 2020-01-13 NOTE — CONSULTS
[facility]  Cardiology  Consult Note    Patient Name: Chantell Herrera  MRN: [id number]  Admission Date: 1/10/2020  Hospital Length of Stay: 0 days  Code Status: Full Code   Attending Provider: Kari Gaspar MD   Consulting Provider: Kahlil Falcon NP  Primary Care Physician: Kari Gaspar MD  Principal Problem:Acute on chronic congestive heart failure    Patient information was obtained from patient, past medical records and ER records.     Consults  Subjective:     Chief Complaint:  Weakness, SOB, abdominal pain, N/V    HPI:   84 year old female with history of PAF s/p PPM, COPD, carotid artery stenosis, HHD, bradycardia, dyslipidemia, HTN, GERD, presented to ED with c/o increased weakness, leg swelling, abdominal pain, N/V.        Past Medical History:   Diagnosis Date    A-fib     Acute on chronic congestive heart failure 2/1/2019    Anxiety     Arthritis     Asthma     Atrial fibrillation with rapid ventricular response     CHF (congestive heart failure) 11/29/2018    Chronic bronchitis     COPD (chronic obstructive pulmonary disease)     Depression     Diabetes mellitus, type 2     SANTIAGO (dyspnea on exertion)     Emphysema of lung     Fatigue     Hyperlipidemia     Hypertension     Symptomatic anemia 1/22/2019    Trouble in sleeping        Past Surgical History:   Procedure Laterality Date    APPENDECTOMY      BRONCHOSCOPY N/A 12/3/2019    Procedure: BRONCHOSCOPY;  Surgeon: Emmanuel Hickman MD;  Location: [facility];  Service: Pulmonary;  Laterality: N/A;    EYE SURGERY      HYSTERECTOMY      INSERTION OF PACEMAKER  12/26/2019    TONSILLECTOMY         Review of patient's allergies indicates:  No Known Allergies    No current facility-administered medications on file prior to encounter.      Current Outpatient Medications on File Prior to Encounter   Medication Sig    albuterol (PROVENTIL) 2.5 mg /3 mL (0.083 %) nebulizer solution Take 3 mLs (2.5 mg total) by nebulization  every 6 (six) hours as needed for Wheezing.    aspirin (ECOTRIN) 81 MG EC tablet Take 81 mg by mouth once daily.    cholecalciferol, vitamin D3, (VITAMIN D3) 2,000 unit Tab Take 2,000 Units by mouth once daily.    citalopram (CELEXA) 20 MG tablet Take 1 tablet (20 mg total) by mouth once daily.    cyproheptadine (PERIACTIN) 4 mg tablet Take 1 tablet (4 mg total) by mouth every evening.    furosemide (LASIX) 20 MG tablet Take 20 mg by mouth every other day.     insulin detemir U-100 (LEVEMIR FLEXTOUCH) 100 unit/mL (3 mL) SubQ InPn pen Inject 15 Units into the skin every evening.    ipratropium (ATROVENT) 0.02 % nebulizer solution Take 500 mcg by nebulization 4 (four) times daily.     magnesium oxide (MAG-OX) 400 mg (241.3 mg magnesium) tablet Take 1 tablet by mouth 2 (two) times daily.    metoprolol tartrate (LOPRESSOR) 25 MG tablet Take 0.5 tablets (12.5 mg total) by mouth 2 (two) times daily.    rOPINIRole (REQUIP) 1 MG tablet Take 1 tablet (1 mg total) by mouth every evening.    traZODone (DESYREL) 50 MG tablet TAKE 1 TABLET EVERY EVENING    amiodarone (PACERONE) 200 MG Tab     amLODIPine (NORVASC) 5 MG tablet TAKE 1 TABLET BY MOUTH ONCE DAILY ( DOSE DECREASE )    baclofen (LIORESAL) 10 MG tablet Take 1 tablet (10 mg total) by mouth 3 (three) times daily. (Patient taking differently: Take 10 mg by mouth 3 (three) times daily as needed. )    benzonatate (TESSALON) 200 MG capsule TAKE 1 CAPSULE THREE TIMES DAILY AS NEEDED (SUBSTITUTED FOR  TESSALON)    blood sugar diagnostic Strp Test BG twice a day Accu-Chek Celia plus  DM 2  E 11.9  Dr Gaspar    blood-glucose meter kit Test BG twice a day Accu-Chek Celia plus  DM 2  E 11.9  Dr Gaspar    blood-glucose meter Misc Use as directed    fluticasone-umeclidin-vilanter (TRELEGY ELLIPTA) 100-62.5-25 mcg DsDv Inhale 1 puff into the lungs once daily.    lancets 33 gauge Misc Test BG twice a day Accu-Chek Celia plus  DM 2  E 11.9  Dr Gaspar     "levoFLOXacin (LEVAQUIN) 750 MG tablet     mupirocin (BACTROBAN) 2 % ointment Apply topically 3 (three) times daily. (Patient not taking: Reported on 2020)    mupirocin (BACTROBAN) 2 % ointment Apply topically 2 (two) times daily.    ondansetron (ZOFRAN) 4 MG tablet Take 1 tablet (4 mg total) by mouth every 8 (eight) hours as needed for Nausea.    pen needle, diabetic 31 gauge x 5/16" Ndle Use to inject once daily with Levemir    pravastatin (PRAVACHOL) 40 MG tablet TAKE 1 TABLET EVERY DAY    predniSONE (DELTASONE) 10 MG tablet Take 4 po q day x 4 days, then 3 po q day x 3 days, then 20 mg     Family History     Problem Relation (Age of Onset)    COPD Brother, Daughter    Cancer Sister    Diabetes Father    Heart disease Mother    Hypertension Mother, Father, Brother    Kidney disease Son    No Known Problems Daughter        Tobacco Use    Smoking status: Former Smoker     Last attempt to quit: 8/3/2000     Years since quittin.4    Smokeless tobacco: Never Used   Substance and Sexual Activity    Alcohol use: No    Drug use: No    Sexual activity: Not Currently     Review of Systems   Constitution: Positive for decreased appetite, malaise/fatigue and weight loss. Negative for chills, diaphoresis, fever and night sweats.   HENT: Negative.    Eyes: Negative.    Cardiovascular: Positive for dyspnea on exertion and leg swelling. Negative for chest pain, claudication, cyanosis, irregular heartbeat, near-syncope, orthopnea, palpitations, paroxysmal nocturnal dyspnea and syncope.   Respiratory: Positive for shortness of breath and wheezing. Negative for cough, hemoptysis, sleep disturbances due to breathing, snoring and sputum production.    Endocrine: Negative.    Hematologic/Lymphatic: Negative.    Skin: Negative.    Musculoskeletal: Positive for muscle weakness. Negative for arthritis, back pain, falls, gout, joint pain, joint swelling, muscle cramps, myalgias, neck pain and stiffness. "   Gastrointestinal: Positive for abdominal pain, anorexia, nausea and vomiting. Negative for bloating, change in bowel habit, bowel incontinence, constipation, diarrhea, dysphagia, excessive appetite, flatus, heartburn, hematemesis, hematochezia, hemorrhoids, jaundice and melena.   Genitourinary: Negative.    Neurological: Positive for weakness. Negative for aphonia, brief paralysis, difficulty with concentration, disturbances in coordination, excessive daytime sleepiness, dizziness, focal weakness, headaches, light-headedness, loss of balance, numbness, paresthesias, seizures, sensory change, tremors and vertigo.   Psychiatric/Behavioral: Negative.    Allergic/Immunologic: Negative.      Objective:     Vital Signs (Most Recent):  Temp: 96.4 °F (35.8 °C) (01/13/20 0725)  Pulse: 66 (01/13/20 0725)  Resp: 20 (01/13/20 0725)  BP: (!) 111/59 (01/13/20 0725)  SpO2: (!) 90 % (01/13/20 0725) Vital Signs (24h Range):  Temp:  [96.4 °F (35.8 °C)-98 °F (36.7 °C)] 96.4 °F (35.8 °C)  Pulse:  [59-84] 66  Resp:  [18-20] 20  SpO2:  [86 %-97 %] 90 %  BP: ()/(52-72) 111/59     Weight: 61 kg (134 lb 7.7 oz)  Body mass index is 24.6 kg/m².    SpO2: (!) 90 %  O2 Device (Oxygen Therapy): nasal cannula      Intake/Output Summary (Last 24 hours) at 1/13/2020 0817  Last data filed at 1/13/2020 0200  Gross per 24 hour   Intake --   Output 400 ml   Net -400 ml       Lines/Drains/Airways     Peripheral Intravenous Line                 Peripheral IV - Single Lumen 01/10/20 1920 20 G Right Antecubital 2 days                Physical Exam   Constitutional: She is oriented to person, place, and time. She appears well-developed and well-nourished. No distress.   HENT:   Head: Normocephalic and atraumatic.   Neck: Normal range of motion. Neck supple. JVD present. No tracheal deviation present. No thyromegaly present.   Cardiovascular: Intact distal pulses. Exam reveals no gallop and no friction rub.   Murmur (2/6 MARKOS) heard.  Pulmonary/Chest:  Effort normal. No stridor. No respiratory distress. She has wheezes. She has rales. She exhibits no tenderness.   Abdominal: Soft. Bowel sounds are normal. She exhibits no distension and no mass. There is tenderness. There is no rebound and no guarding.   Musculoskeletal: Normal range of motion. She exhibits edema (1+ pitting edema BLE). She exhibits no tenderness or deformity.   Neurological: She is alert and oriented to person, place, and time.   Skin: Skin is warm and dry. No rash noted. She is not diaphoretic. No erythema. No pallor.   Psychiatric: She has a normal mood and affect. Her behavior is normal. Judgment and thought content normal.       Significant Labs:   ABG: No results for input(s): PH, PCO2, HCO3, POCSATURATED, BE in the last 48 hours., Blood Culture: No results for input(s): LABBLOO in the last 48 hours., BMP:   Recent Labs   Lab 01/11/20  1401 01/12/20  0733   * 153*   * 140   K 5.1 4.1   CL 96 97   CO2 25 31*   BUN 51* 58*   CREATININE 1.6* 1.7*   CALCIUM 8.3* 7.8*   , CMP   Recent Labs   Lab 01/11/20  1401 01/12/20  0733   * 140   K 5.1 4.1   CL 96 97   CO2 25 31*   * 153*   BUN 51* 58*   CREATININE 1.6* 1.7*   CALCIUM 8.3* 7.8*   PROT  --  5.1*   ALBUMIN  --  3.1*   BILITOT  --  0.6   ALKPHOS  --  169*   AST  --  92*   ALT  --  152*   ANIONGAP 14 12   ESTGFRAFRICA 34* 32*   EGFRNONAA 30* 28*   , CBC   Recent Labs   Lab 01/11/20  1445   WBC 9.32   HGB 8.4*   HCT 26.4*      , INR No results for input(s): INR, PROTIME in the last 48 hours., Lipid Panel No results for input(s): CHOL, HDL, LDLCALC, TRIG, CHOLHDL in the last 48 hours.,   Pathology Results  (Last 10 years)    None      , Troponin   Recent Labs   Lab 01/11/20  1401 01/11/20 1957 01/12/20  0205   TROPONINI 0.059* 0.065* 0.063*   , All pertinent lab results from the last 24 hours have been reviewed. and   Recent Lab Results       01/13/20  0631   01/12/20  2223   01/12/20 2008 01/12/20  1647    01/12/20  1145        POCT Glucose 154 192 208 252 196                          Significant Imaging: CT scan: CT ABDOMEN PELVIS WITH CONTRAST: No results found for this visit on 01/10/20. and CT ABDOMEN PELVIS WITHOUT CONTRAST: No results found for this visit on 01/10/20., Echocardiogram: 2D echo with color flow doppler: No results found for this or any previous visit. and Transthoracic echo (TTE) complete (Cupid Only): No results found for this or any previous visit. and X-Ray: CXR: X-Ray Chest 1 View (CXR):   Results for orders placed or performed during the hospital encounter of 01/10/20   X-Ray Chest 1 View    Narrative    EXAMINATION:  XR CHEST 1 VIEW    CLINICAL HISTORY:  Weakness    COMPARISON:  12/15/2019    FINDINGS:  Discoid atelectasis or scar is identified along the minor fissure.  Otherwise lungs are clear.  Heart size within normal limits.  Vascular calcifications of the aorta present.Left-sided single lead pacemaker is identified, which is new since the prior exam.  Regional bones are unremarkable.      Impression    1. Discoid atelectasis or scarring along the minor fissure.  No change since 12/15/2019.  2. Interval placement single lead left-sided pacemaker.  Negative for evidence of complicating process.      Electronically signed by: Joaquin Evangelista  Date:    01/10/2020  Time:    19:28    and X-Ray Chest PA and Lateral (CXR): No results found for this visit on 01/10/20. and KUB: X-Ray Abdomen AP 1 View (KUB): No results found for this visit on 01/10/20.    Assessment and Plan:     No notes have been filed under this hospital service.  Service: Cardiology      VTE Risk Mitigation (From admission, onward)         Ordered     IP VTE HIGH RISK PATIENT  Once      01/10/20 2131     Place sequential compression device  Until discontinued      01/10/20 2131              Current Facility-Administered Medications   Medication    acetaminophen tablet 650 mg    amLODIPine tablet 5 mg    aspirin EC  tablet 81 mg    baclofen tablet 10 mg    benzonatate capsule 100 mg    bisacodyl EC tablet 5 mg    citalopram tablet 20 mg    dextrose 10% (D10W) Bolus    dextrose 10% (D10W) Bolus    glucagon (human recombinant) injection 1 mg    glucose chewable tablet 16 g    glucose chewable tablet 24 g    HYDROcodone-acetaminophen 5-325 mg per tablet 1 tablet    insulin aspart U-100 pen 0-5 Units    insulin detemir U-100 pen 12 Units    levalbuterol nebulizer solution 1.25 mg    metoprolol tartrate (LOPRESSOR) split tablet 12.5 mg    mirtazapine disintegrating tablet 15 mg    ondansetron injection 4 mg    pantoprazole EC tablet 40 mg    predniSONE tablet 20 mg    promethazine (PHENERGAN) 12.5 mg in dextrose 5 % 50 mL IVPB    rOPINIRole tablet 1 mg    sodium chloride 0.9% flush 10 mL    traZODone tablet 50 mg     Echo 12/12/2018: LVEF 55%, LV diastolic function normal, moderate TR, mild to moderate MR, PA systolic pressure 51 mmHg    MPI 12/14/2018: equivocal study. Small partially reversible perfusion abnormality of moderate intensity in the anterior region      DX: YESSICA  Acute liver failure, may be medication induced with recent addition of amiodarone 12/2019 for Aflutter  Acute on chronic resiratory failure with hypoxia  Anemia with recent transcfusion  PAF with PPM 12/2019 due to SSS  HTN  COPD; wheezing ++/ NO Pulmonary edema  DM2  Cholecystitis      Plan: Repeat echocardiogram  DC amiodarone and hold statin  Avoid further diuresis  F/u BMP LFTs  Adjust BB as needed    Thank you for your consult. I will follow-up with patient. Please contact us if you have any additional questions.      Transcribed by  Kahlil Falcon NP for Dr CANDIDA Metcalf  Cardiology   [facility]  I attest that I have personally seen and examined this patient. I have reviewed and discussed the management in detail as outlined above.

## 2020-01-13 NOTE — SUBJECTIVE & OBJECTIVE
Past Medical History:   Diagnosis Date    A-fib     Acute on chronic congestive heart failure 2/1/2019    Anxiety     Arthritis     Asthma     Atrial fibrillation with rapid ventricular response     CHF (congestive heart failure) 11/29/2018    Chronic bronchitis     COPD (chronic obstructive pulmonary disease)     Depression     Diabetes mellitus, type 2     SANTIAGO (dyspnea on exertion)     Emphysema of lung     Fatigue     Hyperlipidemia     Hypertension     Symptomatic anemia 1/22/2019    Trouble in sleeping        Past Surgical History:   Procedure Laterality Date    APPENDECTOMY      BRONCHOSCOPY N/A 12/3/2019    Procedure: BRONCHOSCOPY;  Surgeon: Emmanuel Hickman MD;  Location: [facility] OR;  Service: Pulmonary;  Laterality: N/A;    EYE SURGERY      HYSTERECTOMY      INSERTION OF PACEMAKER  12/26/2019    TONSILLECTOMY         Review of patient's allergies indicates:  No Known Allergies    No current facility-administered medications on file prior to encounter.      Current Outpatient Medications on File Prior to Encounter   Medication Sig    albuterol (PROVENTIL) 2.5 mg /3 mL (0.083 %) nebulizer solution Take 3 mLs (2.5 mg total) by nebulization every 6 (six) hours as needed for Wheezing.    aspirin (ECOTRIN) 81 MG EC tablet Take 81 mg by mouth once daily.    cholecalciferol, vitamin D3, (VITAMIN D3) 2,000 unit Tab Take 2,000 Units by mouth once daily.    citalopram (CELEXA) 20 MG tablet Take 1 tablet (20 mg total) by mouth once daily.    cyproheptadine (PERIACTIN) 4 mg tablet Take 1 tablet (4 mg total) by mouth every evening.    furosemide (LASIX) 20 MG tablet Take 20 mg by mouth every other day.     insulin detemir U-100 (LEVEMIR FLEXTOUCH) 100 unit/mL (3 mL) SubQ InPn pen Inject 15 Units into the skin every evening.    ipratropium (ATROVENT) 0.02 % nebulizer solution Take 500 mcg by nebulization 4 (four) times daily.     magnesium oxide (MAG-OX) 400 mg (241.3 mg magnesium) tablet Take 1  "tablet by mouth 2 (two) times daily.    metoprolol tartrate (LOPRESSOR) 25 MG tablet Take 0.5 tablets (12.5 mg total) by mouth 2 (two) times daily.    rOPINIRole (REQUIP) 1 MG tablet Take 1 tablet (1 mg total) by mouth every evening.    traZODone (DESYREL) 50 MG tablet TAKE 1 TABLET EVERY EVENING    amiodarone (PACERONE) 200 MG Tab     amLODIPine (NORVASC) 5 MG tablet TAKE 1 TABLET BY MOUTH ONCE DAILY ( DOSE DECREASE )    baclofen (LIORESAL) 10 MG tablet Take 1 tablet (10 mg total) by mouth 3 (three) times daily. (Patient taking differently: Take 10 mg by mouth 3 (three) times daily as needed. )    benzonatate (TESSALON) 200 MG capsule TAKE 1 CAPSULE THREE TIMES DAILY AS NEEDED (SUBSTITUTED FOR  TESSALON)    blood sugar diagnostic Strp Test BG twice a day Accu-Chek Celia plus  DM 2  E 11.9  Dr Gaspar    blood-glucose meter kit Test BG twice a day Accu-Chek Celia plus  DM 2  E 11.9  Dr Gaspar    blood-glucose meter Misc Use as directed    fluticasone-umeclidin-vilanter (TRELEGY ELLIPTA) 100-62.5-25 mcg DsDv Inhale 1 puff into the lungs once daily.    lancets 33 gauge Misc Test BG twice a day Accu-Chek Celia plus  DM 2  E 11.9  Dr Gaspar    levoFLOXacin (LEVAQUIN) 750 MG tablet     mupirocin (BACTROBAN) 2 % ointment Apply topically 3 (three) times daily. (Patient not taking: Reported on 1/2/2020)    mupirocin (BACTROBAN) 2 % ointment Apply topically 2 (two) times daily.    ondansetron (ZOFRAN) 4 MG tablet Take 1 tablet (4 mg total) by mouth every 8 (eight) hours as needed for Nausea.    pen needle, diabetic 31 gauge x 5/16" Ndle Use to inject once daily with Levemir    pravastatin (PRAVACHOL) 40 MG tablet TAKE 1 TABLET EVERY DAY    predniSONE (DELTASONE) 10 MG tablet Take 4 po q day x 4 days, then 3 po q day x 3 days, then 20 mg     Family History     Problem Relation (Age of Onset)    COPD Brother, Daughter    Cancer Sister    Diabetes Father    Heart disease Mother    Hypertension Mother, " Father, Brother    Kidney disease Son    No Known Problems Daughter        Tobacco Use    Smoking status: Former Smoker     Last attempt to quit: 8/3/2000     Years since quittin.4    Smokeless tobacco: Never Used   Substance and Sexual Activity    Alcohol use: No    Drug use: No    Sexual activity: Not Currently     Review of Systems   Constitution: Positive for decreased appetite, malaise/fatigue and weight loss. Negative for chills, diaphoresis, fever and night sweats.   HENT: Negative.    Eyes: Negative.    Cardiovascular: Positive for dyspnea on exertion and leg swelling. Negative for chest pain, claudication, cyanosis, irregular heartbeat, near-syncope, orthopnea, palpitations, paroxysmal nocturnal dyspnea and syncope.   Respiratory: Positive for shortness of breath and wheezing. Negative for cough, hemoptysis, sleep disturbances due to breathing, snoring and sputum production.    Endocrine: Negative.    Hematologic/Lymphatic: Negative.    Skin: Negative.    Musculoskeletal: Positive for muscle weakness. Negative for arthritis, back pain, falls, gout, joint pain, joint swelling, muscle cramps, myalgias, neck pain and stiffness.   Gastrointestinal: Positive for abdominal pain, anorexia, nausea and vomiting. Negative for bloating, change in bowel habit, bowel incontinence, constipation, diarrhea, dysphagia, excessive appetite, flatus, heartburn, hematemesis, hematochezia, hemorrhoids, jaundice and melena.   Genitourinary: Negative.    Neurological: Positive for weakness. Negative for aphonia, brief paralysis, difficulty with concentration, disturbances in coordination, excessive daytime sleepiness, dizziness, focal weakness, headaches, light-headedness, loss of balance, numbness, paresthesias, seizures, sensory change, tremors and vertigo.   Psychiatric/Behavioral: Negative.    Allergic/Immunologic: Negative.      Objective:     Vital Signs (Most Recent):  Temp: 96.4 °F (35.8 °C) (20  0725)  Pulse: 66 (01/13/20 0725)  Resp: 20 (01/13/20 0725)  BP: (!) 111/59 (01/13/20 0725)  SpO2: (!) 90 % (01/13/20 0725) Vital Signs (24h Range):  Temp:  [96.4 °F (35.8 °C)-98 °F (36.7 °C)] 96.4 °F (35.8 °C)  Pulse:  [59-84] 66  Resp:  [18-20] 20  SpO2:  [86 %-97 %] 90 %  BP: ()/(52-72) 111/59     Weight: 61 kg (134 lb 7.7 oz)  Body mass index is 24.6 kg/m².    SpO2: (!) 90 %  O2 Device (Oxygen Therapy): nasal cannula      Intake/Output Summary (Last 24 hours) at 1/13/2020 0817  Last data filed at 1/13/2020 0200  Gross per 24 hour   Intake --   Output 400 ml   Net -400 ml       Lines/Drains/Airways     Peripheral Intravenous Line                 Peripheral IV - Single Lumen 01/10/20 1920 20 G Right Antecubital 2 days                Physical Exam   Constitutional: She is oriented to person, place, and time. She appears well-developed and well-nourished. No distress.   HENT:   Head: Normocephalic and atraumatic.   Neck: Normal range of motion. Neck supple. JVD present. No tracheal deviation present. No thyromegaly present.   Cardiovascular: Intact distal pulses. Exam reveals no gallop and no friction rub.   Murmur (2/6 MARKOS) heard.  Pulmonary/Chest: Effort normal. No stridor. No respiratory distress. She has wheezes. She has rales. She exhibits no tenderness.   Abdominal: Soft. Bowel sounds are normal. She exhibits no distension and no mass. There is tenderness. There is no rebound and no guarding.   Musculoskeletal: Normal range of motion. She exhibits edema (2+ pitting edema BLE). She exhibits no tenderness or deformity.   Neurological: She is alert and oriented to person, place, and time.   Skin: Skin is warm and dry. No rash noted. She is not diaphoretic. No erythema. No pallor.   Psychiatric: She has a normal mood and affect. Her behavior is normal. Judgment and thought content normal.       Significant Labs:   ABG: No results for input(s): PH, PCO2, HCO3, POCSATURATED, BE in the last 48 hours., Blood  Culture: No results for input(s): LABBLOO in the last 48 hours., BMP:   Recent Labs   Lab 01/11/20  1401 01/12/20  0733   * 153*   * 140   K 5.1 4.1   CL 96 97   CO2 25 31*   BUN 51* 58*   CREATININE 1.6* 1.7*   CALCIUM 8.3* 7.8*   , CMP   Recent Labs   Lab 01/11/20  1401 01/12/20  0733   * 140   K 5.1 4.1   CL 96 97   CO2 25 31*   * 153*   BUN 51* 58*   CREATININE 1.6* 1.7*   CALCIUM 8.3* 7.8*   PROT  --  5.1*   ALBUMIN  --  3.1*   BILITOT  --  0.6   ALKPHOS  --  169*   AST  --  92*   ALT  --  152*   ANIONGAP 14 12   ESTGFRAFRICA 34* 32*   EGFRNONAA 30* 28*   , CBC   Recent Labs   Lab 01/11/20  1445   WBC 9.32   HGB 8.4*   HCT 26.4*      , INR No results for input(s): INR, PROTIME in the last 48 hours., Lipid Panel No results for input(s): CHOL, HDL, LDLCALC, TRIG, CHOLHDL in the last 48 hours.,   Pathology Results  (Last 10 years)    None      , Troponin   Recent Labs   Lab 01/11/20  1401 01/11/20  1957 01/12/20  0205   TROPONINI 0.059* 0.065* 0.063*   , All pertinent lab results from the last 24 hours have been reviewed. and   Recent Lab Results       01/13/20  0631   01/12/20  2223   01/12/20  2008   01/12/20  1647   01/12/20  1145        POCT Glucose 154 192 208 252 196                          Significant Imaging: CT scan: CT ABDOMEN PELVIS WITH CONTRAST: No results found for this visit on 01/10/20. and CT ABDOMEN PELVIS WITHOUT CONTRAST: No results found for this visit on 01/10/20., Echocardiogram: 2D echo with color flow doppler: No results found for this or any previous visit. and Transthoracic echo (TTE) complete (Cupid Only): No results found for this or any previous visit. and X-Ray: CXR: X-Ray Chest 1 View (CXR):   Results for orders placed or performed during the hospital encounter of 01/10/20   X-Ray Chest 1 View    Narrative    EXAMINATION:  XR CHEST 1 VIEW    CLINICAL HISTORY:  Weakness    COMPARISON:  12/15/2019    FINDINGS:  Discoid atelectasis or scar is  identified along the minor fissure.  Otherwise lungs are clear.  Heart size within normal limits.  Vascular calcifications of the aorta present.Left-sided single lead pacemaker is identified, which is new since the prior exam.  Regional bones are unremarkable.      Impression    1. Discoid atelectasis or scarring along the minor fissure.  No change since 12/15/2019.  2. Interval placement single lead left-sided pacemaker.  Negative for evidence of complicating process.      Electronically signed by: Joaquin Evangelista  Date:    01/10/2020  Time:    19:28    and X-Ray Chest PA and Lateral (CXR): No results found for this visit on 01/10/20. and KUB: X-Ray Abdomen AP 1 View (KUB): No results found for this visit on 01/10/20.

## 2020-01-13 NOTE — ASSESSMENT & PLAN NOTE
Contraction/prerenal azotemia.  Hold off on diuresis and allow for homeostasis.  Also cont rocephin for UTI and f/u culture

## 2020-01-13 NOTE — ASSESSMENT & PLAN NOTE
Cont on amiodarone and lopressor.- ventricular paced  D/c amiodarone due to elevated LFT  Check Echo today

## 2020-01-13 NOTE — PROGRESS NOTES
[facility] Medicine  Progress Note    Patient Name: Chantell Herrera  MRN: [id number]  Patient Class: IP- Inpatient   Admission Date: 1/10/2020  Length of Stay: 0 days  Attending Physician: Kari Gaspar MD  Primary Care Provider: Kari Gaspar MD        Subjective:     Principal Problem:Acute on chronic congestive heart failure        HPI:  84 year old female with chronic respiratory failure and recent admission for pacer placement comes in because of weakness. She has been feeling more and more swollen, and she is getting more weak. She says that she has also had a lot of abdominal pain especially when getting up to walk. She feels nauseated and sick to her stomach when this happens. She has not been taking any diuretics. Last EF is unknown.    Overview/Hospital Course:  1/12/2020 Patient with continued weakness. O2 doing okay. Slight improvement of fluid, but only diuresed about 1 L.   Blood counts improved with diuresis - no transfusion at this point.  Overall about the same.    1/13/2020 pt renal and liver function elevated. Prior to recent cardiac procedure both were fine. She has liver u/s yesterday. Beside a gallstone without inflammation read as normal. On amiodarone for A fib. Discussed with Dr Metcalf this am as she is ventricular paced on tele and HR 60 will hold amiodarone and cont BB. Follow LFT. Also CXR without congestion. She was sent home on lasix but denies taking it although felt swollen. Given 20mg IV lasix 1/10 and bumex 0.5mg IV x 1 on 1/11. BNP was 2990 on admission and went up to 3124 2 days later and 2909 today.     Cbc pending 7.6 today. Ferritin very high as well as elevated fe studies. CBC shows eleavted MCV and MCH. Folate and B12 2 months ago normal, will repeat.               Review of Systems   Constitutional: Positive for fatigue. Negative for chills, fever and unexpected weight change.   HENT: Positive for trouble swallowing. Negative for congestion,  ear pain, postnasal drip, rhinorrhea and sore throat.    Eyes: Negative for redness and itching.   Respiratory: Negative for cough, shortness of breath and wheezing.    Cardiovascular: Positive for leg swelling. Negative for chest pain and palpitations.   Gastrointestinal: Positive for abdominal pain. Negative for diarrhea, nausea and vomiting.   Genitourinary: Negative for dysuria and frequency.   Skin: Negative for rash.   Neurological: Positive for weakness. Negative for headaches.     Objective:     Vital Signs (Most Recent):  Temp: 96.4 °F (35.8 °C) (01/13/20 0725)  Pulse: 60 (01/13/20 1000)  Resp: 20 (01/13/20 0725)  BP: (!) 111/59 (01/13/20 0725)  SpO2: (!) 90 % (01/13/20 0725) Vital Signs (24h Range):  Temp:  [96.4 °F (35.8 °C)-98 °F (36.7 °C)] 96.4 °F (35.8 °C)  Pulse:  [59-84] 60  Resp:  [18-20] 20  SpO2:  [86 %-97 %] 90 %  BP: ()/(52-72) 111/59     Weight: 61 kg (134 lb 7.7 oz)  Body mass index is 24.6 kg/m².    Physical Exam   Constitutional: She is oriented to person, place, and time. She appears well-developed. No distress.   HENT:   Head: Normocephalic and atraumatic.   Eyes: Pupils are equal, round, and reactive to light. Conjunctivae are normal.   Neck: Normal range of motion. Neck supple. JVD present. No thyromegaly present.   +hepatojug reflux   Cardiovascular: Normal rate and intact distal pulses.   Murmur heard.  Pulmonary/Chest: Effort normal. No respiratory distress. She has wheezes. She has rales.   Abdominal: Soft. Bowel sounds are normal. There is tenderness (epigastric and RUQ).   Musculoskeletal: Normal range of motion. She exhibits edema.   Lymphadenopathy:     She has no cervical adenopathy.   Neurological: She is alert and oriented to person, place, and time.   Skin: Skin is warm and dry. No rash noted.   Scattered ecchymoses   Psychiatric: She has a normal mood and affect. Her behavior is normal.   Nursing note and vitals reviewed.        CRANIAL NERVES     CN III, IV, VI    Pupils are equal, round, and reactive to light.       Significant Labs:   CBC:   Recent Labs   Lab 01/11/20  1445 01/13/20  0541   WBC 9.32 10.09   HGB 8.4* 7.6*   HCT 26.4* 23.5*    211     CMP:   Recent Labs   Lab 01/11/20  1401 01/12/20  0733 01/13/20  0541   * 140 139   K 5.1 4.1 3.9   CL 96 97 97   CO2 25 31* 32*   * 153* 133*   BUN 51* 58* 50*   CREATININE 1.6* 1.7* 1.5*   CALCIUM 8.3* 7.8* 7.8*   PROT  --  5.1* 5.2*   ALBUMIN  --  3.1* 3.2*   BILITOT  --  0.6 0.6   ALKPHOS  --  169* 158*   AST  --  92* 76*   ALT  --  152* 146*   ANIONGAP 14 12 10   EGFRNONAA 30* 28* 32*     Lab Results   Component Value Date    IRON 173 (H) 01/11/2020    TIBC 192 (L) 01/11/2020    FERRITIN 2,124 (H) 01/11/2020       Cardiac Markers:   Recent Labs   Lab 01/13/20  0831   BNP 2,909*     Troponin:   Recent Labs   Lab 01/11/20  1401 01/11/20  1957 01/12/20  0205   TROPONINI 0.059* 0.065* 0.063*     TSH:   Recent Labs   Lab 01/10/20  1910   TSH 2.780     A1C in process    Blood cultures NGTD    Urine culture enterococcus     Significant Imaging:      US abd:  1. Cholelithiasis with no sonographic evidence of acute cholecystitis.  2. Small volume of ascites.      US lower ext   No evidence of deep venous thrombosis in either lower extremity.    CXR   1. Discoid atelectasis or scarring along the minor fissure.  No change since 12/15/2019.  2. Interval placement single lead left-sided pacemaker.  Negative for evidence of complicating process.    ECHO:     EKG ventricular paced      Assessment/Plan:      * Acute on chronic congestive heart failure  Given small dose of lasix yesterday wtihout much change. She seems fluid overloaded.  Will get last echo from cis and give 0.5mg of bumex this moring.  Recheck bmp at 3.    Needs repeat echo. (last one from 12/18)    Repeat BNP this morning.  Holding off on diuresis for now.  Creat is better 1.5 but basline WNL    Echo today- add demadex today and watch kidneys closely          Acute cystitis without hematuria  Add rocephin   Follow culture      Weakness  Add pt      Anorexia  Switch from periactin to remeron      Diabetes mellitus type 2, uncontrolled  Cont with ss   this morning      Anemia  AOCD - iron studies confirm this.  Has had iron in the past.  Will check for any signs of blood loss.  Has been asked to stop her eliquis.  While she is symptomatic and anemic, will try to diuresis before giving blood.  In the past she had flash pulm edema from transfusion vs transfusion reaction.    Referred to heme/onc in past  Now lower than 8- ordered 1 units PRBC, pre med and give over 4 hours         Acute on chronic respiratory failure with hypoxia  She is actually pretty close to her baseline, but she is short of breath all of the time.  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 With superimposed pulm edema, slightly more labored.  Cont O2 support  Repeat cxr and add mucinex and IPV      Atrial fibrillation with rapid ventricular response  Cont on amiodarone and lopressor.- ventricular paced  D/c amiodarone due to elevated LFT  Check Echo today      Epigastric pain  U/S shows some ascites      CKD (chronic kidney disease), stage III  Contraction/prerenal azotemia.  Hold off on diuresis and allow for homeostasis.  Also cont rocephin for UTI and f/u culture    Insomnia  Cont on remeron      COPD (chronic obstructive pulmonary disease)  Seems at baseline.  Will give small prednisone dose - 20mg daily - cont this.  Cont nebs.  Start rocephin.  Start on mucinex.  IPV ordered.    Cholelithiases  R/o cholecystitis.  U/s ordered and no signs of cholecystitis  Cholelithiasis no cholecystitis       VTE Risk Mitigation (From admission, onward)         Ordered     IP VTE HIGH RISK PATIENT  Once      01/10/20 2131     Place sequential compression device  Until discontinued      01/10/20 2131                      Kari Gaspar MD  Department of Hospital Medicine   [facility]

## 2020-01-13 NOTE — NURSING
Called placed to on call MD patient requesting sleeping medication. MD ordered trazodone and sliding scale insulin due to blood surgar being elevated earlier. Upon recheck blood sugar was 192 no need for correction dose at bed time.

## 2020-01-13 NOTE — PT/OT/SLP EVAL
Speech Language Pathology Evaluation  Bedside Swallow    Patient Name:  Chantell Herrera   MRN:  [id number]  Admitting Diagnosis: Acute on chronic congestive heart failure    Recommendations:                 General Recommendations:  Modified barium swallow study   Diet recommendations:  Regular with meats coated in gravy, Thin   Aspiration Precautions: Standard aspiration precautions   General Precautions: Standard,    Communication strategies:  none    History:     Past Medical History:   Diagnosis Date    A-fib     Acute on chronic congestive heart failure 2/1/2019    Anxiety     Arthritis     Asthma     Atrial fibrillation with rapid ventricular response     CHF (congestive heart failure) 11/29/2018    Chronic bronchitis     COPD (chronic obstructive pulmonary disease)     Depression     Diabetes mellitus, type 2     SANTIAGO (dyspnea on exertion)     Emphysema of lung     Fatigue     Hyperlipidemia     Hypertension     Symptomatic anemia 1/22/2019    Trouble in sleeping        Past Surgical History:   Procedure Laterality Date    APPENDECTOMY      BRONCHOSCOPY N/A 12/3/2019    Procedure: BRONCHOSCOPY;  Surgeon: Emmanuel Hickman MD;  Location: [facility];  Service: Pulmonary;  Laterality: N/A;    EYE SURGERY      HYSTERECTOMY      INSERTION OF PACEMAKER  12/26/2019    TONSILLECTOMY         Prior Intubation HX:  n/a    Modified Barium Swallow: n/a    Chest X-Rays: 1/10/20- Discoid atelectasis or scar is identified along the minor fissure. Otherwise lungs are clear. Heart size within normal limits. Vascular calcifications of the aorta present. Left-sided single lead pacemaker is identified, which is new since the prior exam.    Prior diet: Regular diet with thin liquids    Occupation/hobbies/homemaking: none stated    Subjective     Pt found resting in bed upon SLP entry into room. Large amounts of productive coughing noted with sputum expectorated intermittently. Consistent clavicular breathing noted.  "Pt reported that concerns for dysphagia have been ongoing for several years and was unable to specify time of onset. She endorses that symptoms are worse when she consumes dry foods or large pieces of meat and specifically listed hamburgers as being difficult. There are no circumstances in which swallow function is improved. Pt was an active participant throughout all trials.     Patient goals: none stated     Pain/Comfort:  Pain Rating 1: (pt reported generalized chest and abdominal discomfort associated with increased coughing but did not rate pain)    Objective:     Oral Musculature Evaluation  Oral Musculature: WFL  Dentition: upper dentures  Mucosal Quality: dry  Mandibular Strength and Mobility: WFL  Oral Labial Strength and Mobility: WFL  Lingual Strength and Mobility: WFL  Velar Elevation: WFL  Buccal Strength and Mobility: decreased tone  Volitional Cough: (productive)  Voice Prior to PO Intake: (clear)  Oral Musculature Comments: (pt reported consistent dry mouth which she believes is medicaiton-induced)    Bedside Swallow Eval:   Consistencies Assessed:  Thin liquids: self-fed by the single and consecutive open cup and straw sips  Pudding: self-fed by the tsp of pudding  · Crackers: self-fed by the square inch of lucrecia cracker coated in pudding    Oral Phase:   · Pt reported consistent xerostomia but this did not appear to hinder oral phase during evaluation     Pharyngeal Phase:   · No overt clinical signs/symptoms of aspiration    Compensatory Strategies  · Iced liquids  · Change in bolus size    Treatment: Pt reported difficulty with "getting food to go down" and identified multiple locations of globus sensation across trialed consistencies. She stated that puree and soft solid consistencies resulted in greater ability to complete the swallow. As pt presents with long-standing history of complaints of dysphagia which have not been alleviated, altered breathing patterns raising concern for coordination " of respiration and swallowing, and pt's perceived decreased quality of life due to swallowing concerns, recommend MBSS to objectively identify presence/severity of dysphagia. SLP outlined details of study to pt, and pt inquired to if her insurance would cover cost of procedure. She was encouraged to speak with MD and that procedure could be deferred for today if preferred. Pt respectfully wished to defer testing at this time but was made aware that study could be done while IP or on an OP basis. SLP and pt discussed coating meats in gravy to combat effects of xerostomia and pt was agreeable to changes. Pt verbalized understanding to all skilled speech recommendations. SLP spoke with MD and NP regarding pt concerns for insurance coverage and lack of urgency for completing study. All verbalized understanding.     Assessment:     Chantell Herrera is a 84 y.o. female who presents with persistent complaints of dysphagia dating back several years without improvement. During evaluation, pt consistently verbalized discomfort and globus sensation across consistencies which was not alleviated by attempted compensatory strategies. As pt presents with long-standing history of complaints of dysphagia which have not been alleviated, altered breathing patterns raising concern for coordination of respiration and swallowing, and pt's perceived decreased quality of life due to swallowing concerns, recommend MBSS to objectively identify presence/severity of dysphagia. Study not completed at this time as pt wishes to determine if she would like to have study performed. Additionally recommend a regular diet with meats coated in gravy (due to complaints of xerostomia) and thin liquids with standard aspiration precautions. SLP to continue to follow up for ongoing diagnostic tx and completion of MBSS.     Goals:   Multidisciplinary Problems     SLP Goals        Problem: SLP Goal    Goal Priority Disciplines Outcome   SLP Goal     SLP     Description:    Long Term Goals:  1. Pt will tolerate least restrictive PO diet with no overt s/s of aspiration in order to maintain adequate hydration and nutrition   2. Pt will undergo MBSS in order to objectively identify presence/severity of dysphagia                    Plan:     · Patient to be seen:  5 x/week   · Plan of Care expires:  01/27/20  · Plan of Care reviewed with:  patient   · SLP Follow-Up:  Yes       Discharge recommendations:  home   Barriers to Discharge:  Level of Skilled Assistance Needed - pt continues to require skilled personnel for management of medical diagnoses    Time Tracking:     SLP Treatment Date:   01/13/20  Speech Start Time:  0840  Speech Stop Time:  0918     Speech Total Time (min):  38 min    Billable Minutes: Eval Swallow and Oral Function - 23 minutes      Dora Rivas CCC-SLP  01/13/2020
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
## 2020-01-14 PROBLEM — T17.320A CHOKING DUE TO FOOD (REGURGITATED): Status: ACTIVE | Noted: 2020-01-14

## 2020-01-14 LAB
ALBUMIN SERPL BCP-MCNC: 3.4 G/DL (ref 3.5–5.2)
ALP SERPL-CCNC: 174 U/L (ref 55–135)
ALT SERPL W/O P-5'-P-CCNC: 158 U/L (ref 10–44)
ANION GAP SERPL CALC-SCNC: 8 MMOL/L (ref 8–16)
AST SERPL-CCNC: 82 U/L (ref 10–40)
BACTERIA UR CULT: ABNORMAL
BASOPHILS # BLD AUTO: 0.03 K/UL (ref 0–0.2)
BASOPHILS NFR BLD: 0.3 % (ref 0–1.9)
BILIRUB SERPL-MCNC: 0.9 MG/DL (ref 0.1–1)
BUN SERPL-MCNC: 45 MG/DL (ref 8–23)
CALCIUM SERPL-MCNC: 7.9 MG/DL (ref 8.7–10.5)
CHLORIDE SERPL-SCNC: 95 MMOL/L (ref 95–110)
CO2 SERPL-SCNC: 37 MMOL/L (ref 23–29)
CREAT SERPL-MCNC: 1.6 MG/DL (ref 0.5–1.4)
DIFFERENTIAL METHOD: ABNORMAL
EOSINOPHIL # BLD AUTO: 0 K/UL (ref 0–0.5)
EOSINOPHIL NFR BLD: 0.2 % (ref 0–8)
ERYTHROCYTE [DISTWIDTH] IN BLOOD BY AUTOMATED COUNT: 24.1 % (ref 11.5–14.5)
EST. GFR  (AFRICAN AMERICAN): 34 ML/MIN/1.73 M^2
EST. GFR  (NON AFRICAN AMERICAN): 30 ML/MIN/1.73 M^2
GLUCOSE SERPL-MCNC: 188 MG/DL (ref 70–110)
HAV IGM SERPL QL IA: NEGATIVE
HBV CORE IGM SERPL QL IA: NEGATIVE
HBV SURFACE AG SERPL QL IA: NEGATIVE
HCT VFR BLD AUTO: 27.8 % (ref 37–48.5)
HCV AB SERPL QL IA: NEGATIVE
HGB BLD-MCNC: 8.9 G/DL (ref 12–16)
IMM GRANULOCYTES # BLD AUTO: 0.16 K/UL (ref 0–0.04)
IMM GRANULOCYTES NFR BLD AUTO: 1.7 % (ref 0–0.5)
LYMPHOCYTES # BLD AUTO: 0.2 K/UL (ref 1–4.8)
LYMPHOCYTES NFR BLD: 2.3 % (ref 18–48)
MCH RBC QN AUTO: 34.8 PG (ref 27–31)
MCHC RBC AUTO-ENTMCNC: 32 G/DL (ref 32–36)
MCV RBC AUTO: 109 FL (ref 82–98)
MONOCYTES # BLD AUTO: 1.2 K/UL (ref 0.3–1)
MONOCYTES NFR BLD: 12.5 % (ref 4–15)
NEUTROPHILS # BLD AUTO: 7.8 K/UL (ref 1.8–7.7)
NEUTROPHILS NFR BLD: 83 % (ref 38–73)
NRBC BLD-RTO: 0 /100 WBC
PLATELET # BLD AUTO: 199 K/UL (ref 150–350)
PMV BLD AUTO: 13.9 FL (ref 9.2–12.9)
POCT GLUCOSE: 164 MG/DL (ref 70–110)
POCT GLUCOSE: 173 MG/DL (ref 70–110)
POCT GLUCOSE: 206 MG/DL (ref 70–110)
POCT GLUCOSE: 268 MG/DL (ref 70–110)
POTASSIUM SERPL-SCNC: 3.7 MMOL/L (ref 3.5–5.1)
PROT SERPL-MCNC: 5.6 G/DL (ref 6–8.4)
RBC # BLD AUTO: 2.56 M/UL (ref 4–5.4)
SODIUM SERPL-SCNC: 140 MMOL/L (ref 136–145)
WBC # BLD AUTO: 9.45 K/UL (ref 3.9–12.7)

## 2020-01-14 PROCEDURE — 63600175 PHARM REV CODE 636 W HCPCS: Mod: HCNC | Performed by: NURSE PRACTITIONER

## 2020-01-14 PROCEDURE — 25000003 PHARM REV CODE 250: Mod: HCNC | Performed by: NURSE PRACTITIONER

## 2020-01-14 PROCEDURE — 94761 N-INVAS EAR/PLS OXIMETRY MLT: CPT | Mod: HCNC

## 2020-01-14 PROCEDURE — 99900035 HC TECH TIME PER 15 MIN (STAT): Mod: HCNC

## 2020-01-14 PROCEDURE — 92526 ORAL FUNCTION THERAPY: CPT | Mod: HCNC

## 2020-01-14 PROCEDURE — 27000646 HC AEROBIKA DEVICE: Mod: HCNC

## 2020-01-14 PROCEDURE — 94640 AIRWAY INHALATION TREATMENT: CPT | Mod: HCNC

## 2020-01-14 PROCEDURE — 25000242 PHARM REV CODE 250 ALT 637 W/ HCPCS: Mod: HCNC | Performed by: FAMILY MEDICINE

## 2020-01-14 PROCEDURE — 11000001 HC ACUTE MED/SURG PRIVATE ROOM: Mod: HCNC

## 2020-01-14 PROCEDURE — 97162 PT EVAL MOD COMPLEX 30 MIN: CPT | Mod: HCNC

## 2020-01-14 PROCEDURE — 99232 SBSQ HOSP IP/OBS MODERATE 35: CPT | Mod: HCNC,,, | Performed by: FAMILY MEDICINE

## 2020-01-14 PROCEDURE — 36415 COLL VENOUS BLD VENIPUNCTURE: CPT | Mod: HCNC

## 2020-01-14 PROCEDURE — 25000003 PHARM REV CODE 250: Mod: HCNC | Performed by: FAMILY MEDICINE

## 2020-01-14 PROCEDURE — 94664 DEMO&/EVAL PT USE INHALER: CPT | Mod: HCNC

## 2020-01-14 PROCEDURE — 27000221 HC OXYGEN, UP TO 24 HOURS: Mod: HCNC

## 2020-01-14 PROCEDURE — 80053 COMPREHEN METABOLIC PANEL: CPT | Mod: HCNC

## 2020-01-14 PROCEDURE — 99232 PR SUBSEQUENT HOSPITAL CARE,LEVL II: ICD-10-PCS | Mod: HCNC,,, | Performed by: FAMILY MEDICINE

## 2020-01-14 PROCEDURE — 85025 COMPLETE CBC W/AUTO DIFF WBC: CPT | Mod: HCNC

## 2020-01-14 PROCEDURE — 97116 GAIT TRAINING THERAPY: CPT | Mod: HCNC

## 2020-01-14 PROCEDURE — 63600175 PHARM REV CODE 636 W HCPCS: Mod: HCNC | Performed by: FAMILY MEDICINE

## 2020-01-14 RX ADMIN — PANTOPRAZOLE SODIUM 40 MG: 40 TABLET, DELAYED RELEASE ORAL at 09:01

## 2020-01-14 RX ADMIN — METOPROLOL TARTRATE 12.5 MG: 25 TABLET, FILM COATED ORAL at 09:01

## 2020-01-14 RX ADMIN — GUAIFENESIN 600 MG: 600 TABLET, EXTENDED RELEASE ORAL at 09:01

## 2020-01-14 RX ADMIN — MIRTAZAPINE 15 MG: 15 TABLET, ORALLY DISINTEGRATING ORAL at 09:01

## 2020-01-14 RX ADMIN — TRAZODONE HYDROCHLORIDE 50 MG: 50 TABLET ORAL at 09:01

## 2020-01-14 RX ADMIN — INSULIN DETEMIR 12 UNITS: 100 INJECTION, SOLUTION SUBCUTANEOUS at 09:01

## 2020-01-14 RX ADMIN — TRAZODONE HYDROCHLORIDE 50 MG: 50 TABLET ORAL at 01:01

## 2020-01-14 RX ADMIN — LEVALBUTEROL 1.25 MG: 1.25 SOLUTION, CONCENTRATE RESPIRATORY (INHALATION) at 01:01

## 2020-01-14 RX ADMIN — LEVALBUTEROL 1.25 MG: 1.25 SOLUTION, CONCENTRATE RESPIRATORY (INHALATION) at 12:01

## 2020-01-14 RX ADMIN — LEVALBUTEROL 1.25 MG: 1.25 SOLUTION, CONCENTRATE RESPIRATORY (INHALATION) at 07:01

## 2020-01-14 RX ADMIN — AMLODIPINE BESYLATE 5 MG: 5 TABLET ORAL at 09:01

## 2020-01-14 RX ADMIN — ASPIRIN 81 MG: 81 TABLET, COATED ORAL at 09:01

## 2020-01-14 RX ADMIN — CITALOPRAM HYDROBROMIDE 20 MG: 10 TABLET ORAL at 09:01

## 2020-01-14 RX ADMIN — INSULIN ASPART 3 UNITS: 100 INJECTION, SOLUTION INTRAVENOUS; SUBCUTANEOUS at 05:01

## 2020-01-14 RX ADMIN — PREDNISONE 20 MG: 20 TABLET ORAL at 09:01

## 2020-01-14 RX ADMIN — ROPINIROLE HYDROCHLORIDE 1 MG: 0.5 TABLET, FILM COATED ORAL at 09:01

## 2020-01-14 RX ADMIN — TORSEMIDE 20 MG: 20 TABLET ORAL at 09:01

## 2020-01-14 RX ADMIN — INSULIN ASPART 2 UNITS: 100 INJECTION, SOLUTION INTRAVENOUS; SUBCUTANEOUS at 12:01

## 2020-01-14 RX ADMIN — CEFTRIAXONE 1 G: 1 INJECTION, SOLUTION INTRAVENOUS at 09:01

## 2020-01-14 NOTE — PROGRESS NOTES
[facility] Hospital Medicine  Progress Note    Patient Name: Chantell Herrera  MRN: [id number]  Patient Class: IP- Inpatient   Admission Date: 1/10/2020  Length of Stay: 1 days  Attending Physician: Kari Gaspar MD  Primary Care Provider: Kari Gaspar MD        Subjective:     Principal Problem:Acute on chronic congestive heart failure        HPI:  84 year old female with chronic respiratory failure and recent admission for pacer placement comes in because of weakness. She has been feeling more and more swollen, and she is getting more weak. She says that she has also had a lot of abdominal pain especially when getting up to walk. She feels nauseated and sick to her stomach when this happens. She has not been taking any diuretics. Last EF is unknown.    Overview/Hospital Course:  1/12/2020 Patient with continued weakness. O2 doing okay. Slight improvement of fluid, but only diuresed about 1 L.   Blood counts improved with diuresis - no transfusion at this point.  Overall about the same.    1/13/2020 pt renal and liver function elevated. Prior to recent cardiac procedure both were fine. She has liver u/s yesterday. Beside a gallstone without inflammation read as normal. On amiodarone for A fib. Discussed with Dr Metcalf this am as she is ventricular paced on tele and HR 60 will hold amiodarone and cont BB. Follow LFT. Also CXR without congestion. She was sent home on lasix but denies taking it although felt swollen. Given 20mg IV lasix 1/10 and bumex 0.5mg IV x 1 on 1/11. BNP was 2990 on admission and went up to 3124 2 days later and 2909 today.     Cbc pending 7.6 today. Ferritin very high as well as elevated fe studies. CBC shows eleavted MCV and MCH. Folate and B12 2 months ago normal, will repeat.     1/14/2020 Pt NAD this morning. Notes swelling much better but has persistent junky cough during exam, and still wheezing     Negative -738, Still with significant SANTIAGO with minimal exertion,  O2 sat 90-92% on 3LNC   Afebrile, Day 3 Rocephin for UTI, ( Had 2 grams on 1/10/20 and 1Gm yesterday    ENTEROCOCCUS FAECALIS   50,000 - 99,999 cfu/ml      Resulting Agency [facility]   Susceptibility      Enterococcus faecalis     CULTURE, URINE     Ampicillin <=2 mcg/mL Sensitive     Nitrofurantoin <=32 mcg/mL Sensitive     Tetracycline >8 mcg/mL Resistant     Vancomycin 2 mcg/mL Sensitive       Blood culture x1 done- NGTD x 4d H&H up to 8.9/27.8 after 1UPRBCs from 7.6/23.5  AST 76>82, >158 , creat 1.6   Was noted to have choking issues, ST eval ordered    Pt is being Rxed for CHF, a UTI and COPD issues               Review of Systems   Constitutional: Positive for fatigue. Negative for chills, fever and unexpected weight change.   HENT: Positive for trouble swallowing. Negative for congestion, ear pain, postnasal drip, rhinorrhea and sore throat.    Eyes: Negative for redness and itching.   Respiratory: Negative for cough, shortness of breath and wheezing.    Cardiovascular: Positive for leg swelling. Negative for chest pain and palpitations.   Gastrointestinal: Positive for abdominal pain. Negative for diarrhea, nausea and vomiting.   Genitourinary: Negative for dysuria and frequency.   Skin: Negative for rash.   Neurological: Positive for weakness. Negative for headaches.     Objective:     Vital Signs (Most Recent):  Temp: 96.7 °F (35.9 °C) (01/14/20 0511)  Pulse: 63 (01/14/20 0712)  Resp: (!) 21 (01/14/20 0712)  BP: 139/75 (01/14/20 0511)  SpO2: (!) 90 % (01/14/20 0712) Vital Signs (24h Range):  Temp:  [96.7 °F (35.9 °C)-98.5 °F (36.9 °C)] 96.7 °F (35.9 °C)  Pulse:  [60-79] 63  Resp:  [20-23] 21  SpO2:  [82 %-93 %] 90 %  BP: ()/(51-75) 139/75     Weight: 61 kg (134 lb 7.7 oz)  Body mass index is 24.6 kg/m².    Physical Exam   Constitutional: She is oriented to person, place, and time. She appears well-developed. No distress.   HENT:   Head: Normocephalic and atraumatic.   Eyes: Pupils are equal, round,  and reactive to light. Conjunctivae are normal.   Neck: Normal range of motion. Neck supple. JVD present. No thyromegaly present.   +hepatojug reflux   Cardiovascular: Normal rate and intact distal pulses.   Murmur heard.  Pulmonary/Chest: Effort normal. No respiratory distress. She has wheezes. She has rales.   Abdominal: Soft. Bowel sounds are normal. There is tenderness (epigastric and RUQ).   Musculoskeletal: Normal range of motion. She exhibits edema.   Lymphadenopathy:     She has no cervical adenopathy.   Neurological: She is alert and oriented to person, place, and time.   Skin: Skin is warm and dry. No rash noted.   Scattered ecchymoses   Psychiatric: She has a normal mood and affect. Her behavior is normal.   Nursing note and vitals reviewed.        CRANIAL NERVES     CN III, IV, VI   Pupils are equal, round, and reactive to light.       Significant Labs:   CBC:   Recent Labs   Lab 01/13/20  0541 01/14/20  0546   WBC 10.09 9.45   HGB 7.6* 8.9*   HCT 23.5* 27.8*    199     CMP:   Recent Labs   Lab 01/12/20  0733 01/13/20  0541 01/14/20  0546    139 140   K 4.1 3.9 3.7   CL 97 97 95   CO2 31* 32* 37*   * 133* 188*   BUN 58* 50* 45*   CREATININE 1.7* 1.5* 1.6*   CALCIUM 7.8* 7.8* 7.9*   PROT 5.1* 5.2* 5.6*   ALBUMIN 3.1* 3.2* 3.4*   BILITOT 0.6 0.6 0.9   ALKPHOS 169* 158* 174*   AST 92* 76* 82*   * 146* 158*   ANIONGAP 12 10 8   EGFRNONAA 28* 32* 30*     Lab Results   Component Value Date    IRON 173 (H) 01/11/2020    TIBC 192 (L) 01/11/2020    FERRITIN 2,124 (H) 01/11/2020       Cardiac Markers:   Recent Labs   Lab 01/13/20  0831   BNP 2,909*     Troponin:   No results for input(s): TROPONINI in the last 48 hours.  TSH:   Recent Labs   Lab 01/10/20  1910   TSH 2.780     A1C in process    Blood cultures NGTD    Urine culture enterococcus   ENTEROCOCCUS FAECALIS   50,000 - 99,999 cfu/ml      Resulting Agency [facility]   Susceptibility      Enterococcus faecalis     CULTURE, URINE      Ampicillin <=2 mcg/mL Sensitive     Nitrofurantoin <=32 mcg/mL Sensitive     Tetracycline >8 mcg/mL Resistant     Vancomycin 2 mcg/mL Sensitive           Significant Imaging:      US abd:  1. Cholelithiasis with no sonographic evidence of acute cholecystitis.  2. Small volume of ascites.      US lower ext   No evidence of deep venous thrombosis in either lower extremity.    CXR   1. Discoid atelectasis or scarring along the minor fissure.  No change since 12/15/2019.  2. Interval placement single lead left-sided pacemaker.  Negative for evidence of complicating process.    ECHO: 1/13/2020   · Concentric left ventricular remodeling.  · Low normal left ventricular systolic function. The estimated ejection fraction is 50%.  · Normal LV diastolic function.  · Normal right ventricular systolic function.  · Mild mitral sclerosis.  · There is moderate leaflet calcification of the Mitral Valve.  · Moderate tricuspid regurgitation.  Mild to moderate pulmonary hypertension present    EKG ventricular paced      Assessment/Plan:      * Acute on chronic congestive heart failure  Given small dose of lasix yesterday wtihout much change. She seems fluid overloaded.  Will get last echo from cis and give 0.5mg of bumex this moring.  Recheck bmp at 3.    Needs repeat echo. (last one from 12/18)    Repeat BNP this morning.  Holding off on diuresis for now.  Creat is better 1.5 but basline WNL    Echo today- add demadex today and watch kidneys closely   EF 50%, normal diastolic fx, mild to moderate pulmonary HTN         Choking due to food (regurgitated)    Speech eval     Acute cystitis without hematuria  Add rocephin   Follow culture    4d ago    Urine Culture, Routine Abnormal    ENTEROCOCCUS FAECALIS   50,000 - 99,999 cfu/ml     Resulting Agency [facility]   Susceptibility      Enterococcus faecalis     CULTURE, URINE     Ampicillin <=2 mcg/mL Sensitive     Nitrofurantoin <=32 mcg/mL Sensitive     Tetracycline >8 mcg/mL Resistant      Vancomycin 2 mcg/mL Sensitive         Had 2 gms Rocephin on 1/10 and 1GM yesterday     Weakness  Add PT       Anorexia  Switch from periactin to remeron      Diabetes mellitus type 2, uncontrolled  Cont with ss   this morning  1/14/20 , 238, 164        Anemia  AOCD - iron studies confirm this.  Has had iron in the past.  Will check for any signs of blood loss.  Has been asked to stop her eliquis.  While she is symptomatic and anemic, will try to diuresis before giving blood.  In the past she had flash pulm edema from transfusion vs transfusion reaction.    Referred to heme/onc in past  Now lower than 8- ordered 1 units PRBC, pre med and give over 4 hours         Acute on chronic respiratory failure with hypoxia  She is actually pretty close to her baseline, but she is short of breath all of the time.  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 With superimposed pulm edema, slightly more labored.  Cont O2 support  Repeat cxr and add mucinex and IPV      Atrial fibrillation with rapid ventricular response  Cont on amiodarone and lopressor.- ventricular paced  D/c amiodarone due to elevated LFT  Check Echo today- EF 50% , normal LV diastolic Fx  HR well controlled with BB   No more amiodarone per Dr. Metcalf; may be candidate for sotolol in future    Epigastric pain  U/S shows some ascites      CKD (chronic kidney disease), stage III  Contraction/prerenal azotemia.  Hold off on diuresis and allow for homeostasis.  Also cont rocephin for UTI and f/u culture    Insomnia  Cont on remeron      COPD (chronic obstructive pulmonary disease)  Seems at baseline.  Will give small prednisone dose - 20mg daily - cont this.  Cont nebs.  Start rocephin.  Start on mucinex.  IPV ordered.    Cholelithiases  R/o cholecystitis.  U/s ordered and no signs of cholecystitis  Cholelithiasis no cholecystitis, bilirubin normal       VTE Risk Mitigation (From admission, onward)         Ordered     IP VTE HIGH RISK PATIENT   Once      01/10/20 2131     Place sequential compression device  Until discontinued      01/10/20 2131                      Noe Fofana MD  Department of Hospital Medicine   [facility]

## 2020-01-14 NOTE — ASSESSMENT & PLAN NOTE
R/o cholecystitis.  U/s ordered and no signs of cholecystitis  Cholelithiasis no cholecystitis, bilirubin normal

## 2020-01-14 NOTE — ASSESSMENT & PLAN NOTE
Given small dose of lasix yesterday wtihout much change. She seems fluid overloaded.  Will get last echo from cis and give 0.5mg of bumex this moring.  Recheck bmp at 3.    Needs repeat echo. (last one from 12/18)    Repeat BNP this morning.  Holding off on diuresis for now.  Creat is better 1.5 but basline WNL    Echo today- add demadex today and watch kidneys closely   EF 50%, normal diastolic fx, mild to moderate pulmonary HTN

## 2020-01-14 NOTE — SUBJECTIVE & OBJECTIVE
Review of Systems   Constitutional: Positive for fatigue. Negative for chills, fever and unexpected weight change.   HENT: Positive for trouble swallowing. Negative for congestion, ear pain, postnasal drip, rhinorrhea and sore throat.    Eyes: Negative for redness and itching.   Respiratory: Negative for cough, shortness of breath and wheezing.    Cardiovascular: Positive for leg swelling. Negative for chest pain and palpitations.   Gastrointestinal: Positive for abdominal pain. Negative for diarrhea, nausea and vomiting.   Genitourinary: Negative for dysuria and frequency.   Skin: Negative for rash.   Neurological: Positive for weakness. Negative for headaches.     Objective:     Vital Signs (Most Recent):  Temp: 96.7 °F (35.9 °C) (01/14/20 0511)  Pulse: 63 (01/14/20 0712)  Resp: (!) 21 (01/14/20 0712)  BP: 139/75 (01/14/20 0511)  SpO2: (!) 90 % (01/14/20 0712) Vital Signs (24h Range):  Temp:  [96.7 °F (35.9 °C)-98.5 °F (36.9 °C)] 96.7 °F (35.9 °C)  Pulse:  [60-79] 63  Resp:  [20-23] 21  SpO2:  [82 %-93 %] 90 %  BP: ()/(51-75) 139/75     Weight: 61 kg (134 lb 7.7 oz)  Body mass index is 24.6 kg/m².    Physical Exam   Constitutional: She is oriented to person, place, and time. She appears well-developed. No distress.   HENT:   Head: Normocephalic and atraumatic.   Eyes: Pupils are equal, round, and reactive to light. Conjunctivae are normal.   Neck: Normal range of motion. Neck supple. JVD present. No thyromegaly present.   +hepatojug reflux   Cardiovascular: Normal rate and intact distal pulses.   Murmur heard.  Pulmonary/Chest: Effort normal. No respiratory distress. She has wheezes. She has rales.   Abdominal: Soft. Bowel sounds are normal. There is tenderness (epigastric and RUQ).   Musculoskeletal: Normal range of motion. She exhibits edema.   Lymphadenopathy:     She has no cervical adenopathy.   Neurological: She is alert and oriented to person, place, and time.   Skin: Skin is warm and dry. No rash  noted.   Scattered ecchymoses   Psychiatric: She has a normal mood and affect. Her behavior is normal.   Nursing note and vitals reviewed.        CRANIAL NERVES     CN III, IV, VI   Pupils are equal, round, and reactive to light.       Significant Labs:   CBC:   Recent Labs   Lab 01/13/20  0541 01/14/20  0546   WBC 10.09 9.45   HGB 7.6* 8.9*   HCT 23.5* 27.8*    199     CMP:   Recent Labs   Lab 01/12/20  0733 01/13/20  0541 01/14/20  0546    139 140   K 4.1 3.9 3.7   CL 97 97 95   CO2 31* 32* 37*   * 133* 188*   BUN 58* 50* 45*   CREATININE 1.7* 1.5* 1.6*   CALCIUM 7.8* 7.8* 7.9*   PROT 5.1* 5.2* 5.6*   ALBUMIN 3.1* 3.2* 3.4*   BILITOT 0.6 0.6 0.9   ALKPHOS 169* 158* 174*   AST 92* 76* 82*   * 146* 158*   ANIONGAP 12 10 8   EGFRNONAA 28* 32* 30*     Lab Results   Component Value Date    IRON 173 (H) 01/11/2020    TIBC 192 (L) 01/11/2020    FERRITIN 2,124 (H) 01/11/2020       Cardiac Markers:   Recent Labs   Lab 01/13/20  0831   BNP 2,909*     Troponin:   No results for input(s): TROPONINI in the last 48 hours.  TSH:   Recent Labs   Lab 01/10/20  1910   TSH 2.780     A1C in process    Blood cultures NGTD    Urine culture enterococcus   ENTEROCOCCUS FAECALIS   50,000 - 99,999 cfu/ml      Resulting Agency [facility]   Susceptibility      Enterococcus faecalis     CULTURE, URINE     Ampicillin <=2 mcg/mL Sensitive     Nitrofurantoin <=32 mcg/mL Sensitive     Tetracycline >8 mcg/mL Resistant     Vancomycin 2 mcg/mL Sensitive           Significant Imaging:      US abd:  1. Cholelithiasis with no sonographic evidence of acute cholecystitis.  2. Small volume of ascites.      US lower ext   No evidence of deep venous thrombosis in either lower extremity.    CXR   1. Discoid atelectasis or scarring along the minor fissure.  No change since 12/15/2019.  2. Interval placement single lead left-sided pacemaker.  Negative for evidence of complicating process.    ECHO: 1/13/2020   · Concentric left ventricular  remodeling.  · Low normal left ventricular systolic function. The estimated ejection fraction is 50%.  · Normal LV diastolic function.  · Normal right ventricular systolic function.  · Mild mitral sclerosis.  · There is moderate leaflet calcification of the Mitral Valve.  · Moderate tricuspid regurgitation.  Mild to moderate pulmonary hypertension present    EKG ventricular paced

## 2020-01-14 NOTE — PT/OT/SLP EVAL
"Physical Therapy Evaluation    Patient Name:  Chantell Herrera   MRN:  [id number]    Recommendations:     Discharge Recommendations:  home, home health PT   Discharge Equipment Recommendations: none   Barriers to discharge: None    Assessment:     Chantell Herrera is a 84 y.o. female admitted with a medical diagnosis of Acute on chronic congestive heart failure.  She presents with the following impairments/functional limitations:  weakness, impaired endurance, impaired cardiopulmonary response to activity, impaired functional mobilty, impaired self care skills, decreased lower extremity function. Patient seen on bed with oxygen at 3.0 l /m. Noted patient with minimal shortness of breath upon exertion leading to unsteady during toilet transfers and gait functions.  Patient will benefit from Skilled PT tx to inc safety and inc independence with mobility, self care tasks and gait functions.    Rehab Prognosis: Fair; patient would benefit from acute skilled PT services to address these deficits and reach maximum level of function.    Recent Surgery: * No surgery found *      Plan:     During this hospitalization, patient to be seen daily to address the identified rehab impairments via gait training, therapeutic activities, therapeutic exercises and progress toward the following goals:    · Plan of Care Expires:  01/20/20    Subjective     Chief Complaint: Shortness of breath upon exertion  Patient/Family Comments/goals: " To get better and go home".  Pain/Comfort:  · Pain Rating 1: 0/10  · Pain Rating Post-Intervention 1: 0/10    Patients cultural, spiritual, Adventist conflicts given the current situation: no    Living Environment:  Patient lives  with  in a [facility] with ramp access to enter/exit .  Prior to admission, patients level of function was Modified Independent using Rollator and oxygen  with gait functions, self care tasks and mobility at home .  Equipment used at home: rollator, BIPAP, oxygen, nebulizer, " shower chair.  DME owned (not currently used): none.  Upon discharge, patient will have assistance from .    Objective:     Communicated with patient prior to session.  Patient found HOB elevated with peripheral IV  upon PT entry to room.    General Precautions: Standard, fall   Orthopedic Precautions:N/A   Braces: N/A     Exams:  · Cognitive Exam:  Patient is oriented to Person, Place, Time and Situation  · Gross Motor Coordination:  WFL  · Skin Integrity/Edema:      · -       Skin integrity: Visible skin intact  · RUE ROM: WFL  · RUE Strength: WFL  · LUE ROM: WFL  · LUE Strength: WFL  · RLE ROM: WFL  · RLE Strength: WFL  · LLE ROM: WFL  · LLE Strength: WFL    Functional Mobility:  · Bed Mobility:     · Rolling Left:  independence  · Rolling Right: independence  · Scooting: independence  · Supine to Sit: independence  · Sit to Supine: independence  · Transfers:     · Sit to Stand:  supervision with hand-held assist  · Bed to Chair: supervision with  hand-held assist  using  Stand Pivot  · Gait: RW Ambulation using portable oxygen x 75 feet with Supervision. Reciprocal gait with dec jasmin.  · Balance: Stand dynamic: Good- grade      Therapeutic Activities and Exercises:   Completed PT eval. Educated and trained pt with proper energy conservation tech during mobility and gait trng using RW.    AM-PAC 6 CLICK MOBILITY  Total Score:20     Patient left HOB elevated with all lines intact, call button in reach and nurse notified.    GOALS:   Multidisciplinary Problems     Physical Therapy Goals        Problem: Physical Therapy Goal    Goal Priority Disciplines Outcome Goal Variances Interventions   Physical Therapy Goal     PT, PT/OT      Description:  Goals to be met by: 20     Patient will increase functional independence with mobility by performin. Sit to stand transfer with Independent  2. Bed to chair/toilet transfer with Independentwith or without rolling walker using Stand Pivot TECHNIQUE  3.  Gait  x 200  feet with Modified Independent with or without rolling walker  4. Lower extremity exercise program x10 reps(2 sets) per handout, with assistance as needed                    History:     Past Medical History:   Diagnosis Date    A-fib     Acute on chronic congestive heart failure 2/1/2019    Anxiety     Arthritis     Asthma     Atrial fibrillation with rapid ventricular response     CHF (congestive heart failure) 11/29/2018    Chronic bronchitis     COPD (chronic obstructive pulmonary disease)     Depression     Diabetes mellitus, type 2     SANTIAGO (dyspnea on exertion)     Emphysema of lung     Fatigue     Hyperlipidemia     Hypertension     Symptomatic anemia 1/22/2019    Trouble in sleeping        Past Surgical History:   Procedure Laterality Date    APPENDECTOMY      BRONCHOSCOPY N/A 12/3/2019    Procedure: BRONCHOSCOPY;  Surgeon: Emmanuel Hickman MD;  Location: [facility];  Service: Pulmonary;  Laterality: N/A;    EYE SURGERY      HYSTERECTOMY      INSERTION OF PACEMAKER  12/26/2019    TONSILLECTOMY         Time Tracking:     PT Received On: 01/14/20  PT Start Time: 1210     PT Stop Time: 1235  PT Total Time (min): 25 min     Billable Minutes: Evaluation 15 and Gait Training 10      Theodore Sellers, PT  01/14/2020

## 2020-01-14 NOTE — PROGRESS NOTES
[facility]  Cardiology  Progress Note    Patient Name: Chantell Herrera  MRN: [id number]  Admission Date: 1/10/2020  Hospital Length of Stay: 1 days  Code Status: Full Code   Attending Physician: Kari Gaspar MD   Primary Care Physician: Kari Gaspar MD  Expected Discharge Date:   Principal Problem:Acute on chronic congestive heart failure    Subjective:     Chief Complaint:  Weakness, SOB, abdominal pain, N/V     HPI:   84 year old female with history of PAF s/p PPM, COPD, carotid artery stenosis, HHD, bradycardia, dyslipidemia, HTN, GERD, presented to ED with c/o increased weakness, leg swelling, abdominal pain, N/V.        ROS   Constitution: Positive for decreased appetite, malaise/fatigue and weight loss. Negative for chills, diaphoresis, fever and night sweats.   HENT: Negative.    Eyes: Negative.    Cardiovascular: Positive for dyspnea on exertion and leg swelling. Negative for chest pain, claudication, cyanosis, irregular heartbeat, near-syncope, orthopnea, palpitations, paroxysmal nocturnal dyspnea and syncope.   Respiratory: Positive for shortness of breath and wheezing. Negative for cough, hemoptysis, sleep disturbances due to breathing, snoring and sputum production.    Endocrine: Negative.    Hematologic/Lymphatic: Negative.    Skin: Negative.    Musculoskeletal: Positive for muscle weakness. Negative for arthritis, back pain, falls, gout, joint pain, joint swelling, muscle cramps, myalgias, neck pain and stiffness.   Gastrointestinal: Positive for abdominal pain, anorexia, nausea and vomiting. Negative for bloating, change in bowel habit, bowel incontinence, constipation, diarrhea, dysphagia, excessive appetite, flatus, heartburn, hematemesis, hematochezia, hemorrhoids, jaundice and melena.   Genitourinary: Negative.    Neurological: Positive for weakness. Negative for aphonia, brief paralysis, difficulty with concentration, disturbances in coordination, excessive daytime  sleepiness, dizziness, focal weakness, headaches, light-headedness, loss of balance, numbness, paresthesias, seizures, sensory change, tremors and vertigo.   Psychiatric/Behavioral: Negative.    Allergic/Immunologic: Negative.       Objective:     Vital Signs (Most Recent):  Temp: 97.4 °F (36.3 °C) (01/14/20 0759)  Pulse: 67 (01/14/20 0800)  Resp: 20 (01/14/20 0759)  BP: 129/76 (01/14/20 0759)  SpO2: (!) 91 % (01/14/20 0759) Vital Signs (24h Range):  Temp:  [96.7 °F (35.9 °C)-98.5 °F (36.9 °C)] 97.4 °F (36.3 °C)  Pulse:  [60-80] 67  Resp:  [20-23] 20  SpO2:  [82 %-93 %] 91 %  BP: ()/(51-76) 129/76     Weight: 60.4 kg (133 lb 2.5 oz)  Body mass index is 24.35 kg/m².    SpO2: (!) 91 %  O2 Device (Oxygen Therapy): nasal cannula      Intake/Output Summary (Last 24 hours) at 1/14/2020 0844  Last data filed at 1/14/2020 0000  Gross per 24 hour   Intake 762 ml   Output 1500 ml   Net -738 ml       Lines/Drains/Airways     Peripheral Intravenous Line                 Peripheral IV - Single Lumen 01/10/20 1920 20 G Right Antecubital 3 days                Physical Exam   Constitutional: She is oriented to person, place, and time. She appears well-developed and well-nourished. No distress.   HENT:   Head: Normocephalic and atraumatic.   Neck: Normal range of motion. Neck supple. JVD present. No tracheal deviation present. No thyromegaly present.   Cardiovascular: Intact distal pulses. Exam reveals no gallop and no friction rub.   Murmur (2/6 MARKOS) heard.  Pulmonary/Chest: Effort normal. No stridor. No respiratory distress. She has wheezes. She has rales. She exhibits no tenderness.   Abdominal: Soft. Bowel sounds are normal. She exhibits no distension and no mass. There is tenderness. There is no rebound and no guarding.   Musculoskeletal: Normal range of motion. She exhibits edema (1+ pitting edema BLE). She exhibits no tenderness or deformity.   Neurological: She is alert and oriented to person, place, and time.   Skin:  Skin is warm and dry. No rash noted. She is not diaphoretic. No erythema. No pallor.   Psychiatric: She has a normal mood and affect. Her behavior is normal. Judgment and thought content normal.        Significant Labs:   ABG: No results for input(s): PH, PCO2, HCO3, POCSATURATED, BE in the last 48 hours., Blood Culture: No results for input(s): LABBLOO in the last 48 hours., BMP:   Recent Labs   Lab 01/13/20  0541 01/14/20  0546   * 188*    140   K 3.9 3.7   CL 97 95   CO2 32* 37*   BUN 50* 45*   CREATININE 1.5* 1.6*   CALCIUM 7.8* 7.9*   , CMP   Recent Labs   Lab 01/13/20  0541 01/14/20  0546    140   K 3.9 3.7   CL 97 95   CO2 32* 37*   * 188*   BUN 50* 45*   CREATININE 1.5* 1.6*   CALCIUM 7.8* 7.9*   PROT 5.2* 5.6*   ALBUMIN 3.2* 3.4*   BILITOT 0.6 0.9   ALKPHOS 158* 174*   AST 76* 82*   * 158*   ANIONGAP 10 8   ESTGFRAFRICA 37* 34*   EGFRNONAA 32* 30*   , CBC   Recent Labs   Lab 01/13/20  0541 01/14/20  0546   WBC 10.09 9.45   HGB 7.6* 8.9*   HCT 23.5* 27.8*    199   , INR No results for input(s): INR, PROTIME in the last 48 hours., Lipid Panel No results for input(s): CHOL, HDL, LDLCALC, TRIG, CHOLHDL in the last 48 hours.,   Pathology Results  (Last 10 years)    None      , Troponin No results for input(s): TROPONINI in the last 48 hours., All pertinent lab results from the last 24 hours have been reviewed. and   Recent Lab Results       01/14/20  0729   01/14/20  0546   01/13/20 2057 01/13/20  1650   01/13/20  1146        Albumin   3.4           Alkaline Phosphatase   174           ALT   158           Anion Gap   8           Ao root annulus               Ao peak sudhir               Ao VTI               AST   82           AV valve area               AV mean gradient               AV index (prosthetic)               AV peak gradient               AV Velocity Ratio               Baso #   0.03           Basophil%   0.3           BILIRUBIN TOTAL   0.9  Comment:  For  infants and newborns, interpretation of results should be based  on gestational age, weight and in agreement with clinical  observations.  Premature Infant recommended reference ranges:  Up to 24 hours.............<8.0 mg/dL  Up to 48 hours............<12.0 mg/dL  3-5 days..................<15.0 mg/dL  6-29 days.................<15.0 mg/dL             BSA               BUN, Bld   45           Calcium   7.9           Chloride   95           CO2   37           Creatinine   1.6           Left Ventricle Relative Wall Thickness               Differential Method   Automated           E/A ratio               eGFR if    34           eGFR if non    30  Comment:  Calculation used to obtain the estimated glomerular filtration  rate (eGFR) is the CKD-EPI equation.              Eos #   0.0           Eosinophil%   0.2           E wave decelartion time               FS               Glucose   188           Gran # (ANC)   7.8           Gran%   83.0           Hematocrit   27.8           Hemoglobin   8.9           Immature Grans (Abs)   0.16  Comment:  Mild elevation in immature granulocytes is non specific and   can be seen in a variety of conditions including stress response,   acute inflammation, trauma and pregnancy. Correlation with other   laboratory and clinical findings is essential.             Immature Granulocytes   1.7           IVRT               IVS               LA WIDTH               Left Atrium Major Axis               Left Atrium Minor Axis               LA size               LA volume               LA Volume Index               LVOT area               LV Diastolic Volume               LV Diastolic Volume Index               LVIDD               LVIDS               LV mass               LV Mass Index               LVOT diameter               LVOT peak sudhir               LVOT stroke volume               LVOT peak VTI               LV Systolic Volume               LV Systolic Volume Index                Lymph #   0.2           Lymph%   2.3           MCH   34.8           MCHC   32.0           MCV   109           Mono #   1.2           Mono%   12.5           MPV   13.9           MV Peak A Shimon               MV Peak E Shimon               nRBC   0           Platelets   199           POCT Glucose 164   238 233 162     Potassium   3.7           PROTEIN TOTAL   5.6           PV PEAK VELOCITY               PW               RA Major Axis               RBC   2.56           RDW   24.1           Retic               RVDD               Sodium   140           STJ               Triscuspid Valve Regurgitation Peak Gradient               TR Max Shimon               WBC   9.45                            01/13/20  1015   01/13/20  0958        Albumin         Alkaline Phosphatase         ALT         Anion Gap         Ao root annulus 2.89       Ao peak shimon 1.84       Ao VTI 39.59       AST         AV valve area 1.90       AV mean gradient 8       AV index (prosthetic) 0.62       AV peak gradient 14       AV Velocity Ratio 0.76       Baso #         Basophil%         BILIRUBIN TOTAL         BSA 1.67       BUN, Bld         Calcium         Chloride         CO2         Creatinine         Left Ventricle Relative Wall Thickness 0.46       Differential Method         E/A ratio 3.03       eGFR if          eGFR if non          Eos #         Eosinophil%         E wave decelartion time 229.71       FS 47       Glucose         Gran # (ANC)         Gran%         Hematocrit         Hemoglobin         Immature Grans (Abs)         Immature Granulocytes         IVRT 0.12       IVS 1.04       LA WIDTH 3.15       Left Atrium Major Axis 5.20       Left Atrium Minor Axis 5.13       LA size 3.17       LA volume 43.84       LA Volume Index 27.1       LVOT area 3.0       LV Diastolic Volume 79.28       LV Diastolic Volume Index 49.10       LVIDD 4.22       LVIDS 2.25       LV mass 140.23       LV Mass Index 87       LVOT  diameter 1.97       LVOT peak shimon 1.39       LVOT stroke volume 75.25       LVOT peak VTI 24.70       LV Systolic Volume 17.18       LV Systolic Volume Index 10.6       Lymph #         Lymph%         MCH         MCHC         MCV         Mono #         Mono%         MPV         MV Peak A Shimon 0.31       MV Peak E Shimon 0.94       nRBC         Platelets         POCT Glucose         Potassium         PROTEIN TOTAL         PV PEAK VELOCITY 0.86       PW 0.98       RA Major Axis 5.80       RBC         RDW         Retic   2.9     RVDD 3.08       Sodium         STJ 2.72       Triscuspid Valve Regurgitation Peak Gradient 36       TR Max Shimon 2.98       WBC               Significant Imaging: CT scan: CT ABDOMEN PELVIS WITH CONTRAST: No results found for this visit on 01/10/20. and CT ABDOMEN PELVIS WITHOUT CONTRAST: No results found for this visit on 01/10/20., Echocardiogram:   2D echo with color flow doppler: No results found for this or any previous visit. and Transthoracic echo (TTE) complete (Cupid Only):   Results for orders placed or performed during the hospital encounter of 01/10/20   Echo Color Flow Doppler? Yes   Result Value Ref Range    BSA 1.67 m2    LA WIDTH 3.15 cm    PV PEAK VELOCITY 0.86 cm/s    LVIDD 4.22 3.5 - 6.0 cm    IVS 1.04 0.6 - 1.1 cm    PW 0.98 0.6 - 1.1 cm    Ao root annulus 2.89 cm    LVIDS 2.25 2.1 - 4.0 cm    FS 47 28 - 44 %    LA volume 43.84 cm3    STJ 2.72 cm    LV mass 140.23 g    LA size 3.17 cm    RVDD 3.08 cm    Left Ventricle Relative Wall Thickness 0.46 cm    AV mean gradient 8 mmHg    AV valve area 1.90 cm2    AV Velocity Ratio 0.76     AV index (prosthetic) 0.62     E/A ratio 3.03     E wave decelartion time 229.71 msec    IVRT 0.12 msec    LVOT diameter 1.97 cm    LVOT area 3.0 cm2    LVOT peak shimon 1.39 m/s    LVOT peak VTI 24.70 cm    Ao peak shimon 1.84 m/s    Ao VTI 39.59 cm    LVOT stroke volume 75.25 cm3    AV peak gradient 14 mmHg    MV Peak E Shimon 0.94 m/s    TR Max Shimon 2.98 m/s     MV Peak A Shimon 0.31 m/s    LV Systolic Volume 17.18 mL    LV Systolic Volume Index 10.6 mL/m2    LV Diastolic Volume 79.28 mL    LV Diastolic Volume Index 49.10 mL/m2    LA Volume Index 27.1 mL/m2    LV Mass Index 87 g/m2    RA Major Axis 5.80 cm    Left Atrium Minor Axis 5.13 cm    Left Atrium Major Axis 5.20 cm    Triscuspid Valve Regurgitation Peak Gradient 36 mmHg    Narrative    · Concentric left ventricular remodeling.  · Low normal left ventricular systolic function. The estimated ejection   fraction is 50%.  · Normal LV diastolic function.  · Normal right ventricular systolic function.  · Mild mitral sclerosis.  · There is moderate leaflet calcification of the Mitral Valve.  · Moderate tricuspid regurgitation.  · Mild to moderate pulmonary hypertension present.       and X-Ray: CXR: X-Ray Chest 1 View (CXR):   Results for orders placed or performed during the hospital encounter of 01/10/20   X-Ray Chest 1 View    Narrative    EXAMINATION:  XR CHEST 1 VIEW    CLINICAL HISTORY:  Weakness    COMPARISON:  12/15/2019    FINDINGS:  Discoid atelectasis or scar is identified along the minor fissure.  Otherwise lungs are clear.  Heart size within normal limits.  Vascular calcifications of the aorta present.Left-sided single lead pacemaker is identified, which is new since the prior exam.  Regional bones are unremarkable.      Impression    1. Discoid atelectasis or scarring along the minor fissure.  No change since 12/15/2019.  2. Interval placement single lead left-sided pacemaker.  Negative for evidence of complicating process.      Electronically signed by: Joaquin Evangelista  Date:    01/10/2020  Time:    19:28    and X-Ray Chest PA and Lateral (CXR):   Results for orders placed or performed during the hospital encounter of 01/10/20   X-Ray Chest PA And Lateral    Narrative    EXAMINATION:  XR CHEST PA AND LATERAL    CLINICAL HISTORY:  cough;    TECHNIQUE:  PA and lateral views of the chest were  performed.    COMPARISON:  01/10/2020    FINDINGS:  No detrimental change in the cardiopulmonary findings.  Small left pleural fluid is noted there is also likely a small right-sided pleural effusion with fluid seen in the right minor fissure.  There is pulmonary vascular congestion and mild interstitial edema.  The heart is at the upper limits of normal in size.  Calcified atheromatous disease affects the aorta.  Left-sided pacemaker is in place.  Age-appropriate degenerative changes affect the skeleton.      Impression    No significant interval change as compared to 01/10/2020.      Electronically signed by: Eileen Velasquez MD  Date:    01/13/2020  Time:    10:41    and KUB: X-Ray Abdomen AP 1 View (KUB): No results found for this visit on 01/10/20.  Assessment and Plan:       Active Diagnoses:    Diagnosis Date Noted POA    PRINCIPAL PROBLEM:  Acute on chronic congestive heart failure [I50.9] 02/01/2019 Yes    Weakness [R53.1] 01/13/2020 Yes    Acute cystitis without hematuria [N30.00] 01/13/2020 Yes    Anorexia [R63.0] 01/02/2020 Yes    Diabetes mellitus type 2, uncontrolled [E11.65] 09/02/2019 Yes    Anemia [D64.9] 09/01/2019 Yes    Acute on chronic respiratory failure with hypoxia [J96.21] 01/22/2019 Yes    Atrial fibrillation with rapid ventricular response [I48.91] 11/16/2018 Yes    Epigastric pain [R10.13] 11/13/2018 Yes    CKD (chronic kidney disease), stage III [N18.3] 05/10/2016 Yes    Insomnia [G47.00] 08/10/2015 Yes    COPD (chronic obstructive pulmonary disease) [J44.9] 05/03/2015 Yes    Cholelithiases [K80.20] 01/28/2015 Yes      Problems Resolved During this Admission:       VTE Risk Mitigation (From admission, onward)         Ordered     IP VTE HIGH RISK PATIENT  Once      01/10/20 2131     Place sequential compression device  Until discontinued      01/10/20 2131              Current Facility-Administered Medications   Medication    0.9%  NaCl infusion (for blood administration)     acetaminophen tablet 650 mg    amLODIPine tablet 5 mg    aspirin EC tablet 81 mg    baclofen tablet 10 mg    benzonatate capsule 100 mg    bisacodyl EC tablet 5 mg    cefTRIAXone (ROCEPHIN) 1 g in dextrose 5 % 50 mL IVPB    citalopram tablet 20 mg    dextrose 10% (D10W) Bolus    dextrose 10% (D10W) Bolus    glucagon (human recombinant) injection 1 mg    glucose chewable tablet 16 g    glucose chewable tablet 24 g    guaiFENesin 12 hr tablet 600 mg    HYDROcodone-acetaminophen 5-325 mg per tablet 1 tablet    insulin aspart U-100 pen 0-5 Units    insulin detemir U-100 pen 12 Units    levalbuterol nebulizer solution 1.25 mg    metoprolol tartrate (LOPRESSOR) split tablet 12.5 mg    mirtazapine disintegrating tablet 15 mg    ondansetron injection 4 mg    pantoprazole EC tablet 40 mg    predniSONE tablet 20 mg    promethazine (PHENERGAN) 12.5 mg in dextrose 5 % 50 mL IVPB    rOPINIRole tablet 1 mg    sodium chloride 0.9% flush 10 mL    torsemide tablet 20 mg    traZODone tablet 50 mg     Echo 12/12/2018: LVEF 55%, LV diastolic function normal, moderate TR, mild to moderate MR, PA systolic pressure 51 mmHg     MPI 12/14/2018: equivocal study. Small partially reversible perfusion abnormality of moderate intensity in the anterior region        DX:Feels better  stable YESSICA  Acute liver failure, may be medication induced with recent addition of amiodarone 12/2019 for Aflutter  Acute on chronic resiratory failure with hypoxia  Anemia with recent transcfusion  PAF with PPM 12/2019 due to SSS  HTN  COPD; wheezing improved/ NO Pulmonary edema  DM2  Cholecystitis        Plan: Repeat echocardiogram showed LVEF 50%, LVEDP OK  Now off amiodarone and hold statin  Avoid further diuresis  F/u BMP LFTs  Adjust BB as needed  Consider Sotalol     Transcribed by  Kahlil Falcon NP for Dr CANDIDA Metcalf  Cardiology  [facility]  I attest that I have personally seen and examined this patient. I have  reviewed and discussed the management in detail as outlined above.

## 2020-01-14 NOTE — ASSESSMENT & PLAN NOTE
Cont on amiodarone and lopressor.- ventricular paced  D/c amiodarone due to elevated LFT  Check Echo today- EF 50% , normal LV diastolic Fx  HR well controlled with BB   No more amiodarone per Dr. Metcalf; may be candidate for sotolol in future

## 2020-01-14 NOTE — PT/OT/SLP PROGRESS
Speech Language Pathology Treatment    Patient Name:  Chantell Herrera   MRN:  [id number]  Admitting Diagnosis: Acute on chronic congestive heart failure    Recommendations:                 General Recommendations:  Initiation of medical management for potential GI-related dysfunciton  Diet recommendations:  Regular, Liquid Diet Level: Thin   Aspiration Precautions: HOB to 90 degrees and Remain upright 30 minutes post meal   General Precautions: Standard, fall  Communication strategies:  none    Subjective     Pt found resting in bed with family and nurse Wanda present upon SLP entry into room. Pt was an active participant in her care throughout session.    Patient goals: to go home     Pain/Comfort:  · Pain Rating 1: 0/10    Objective:     Has the patient been evaluated by SLP for swallowing?   Yes  Keep patient NPO? No   Current Respiratory Status: nasal cannula      Pt continued to verbalize globus sensation with PO intake. Pt noted to have decreased work of breathing this AM. Compensatory strategies including chin tuck, breath hold, and exhalation following the swallow were trialed but unsuccessful in eliminating globus sensation. Per pt's 's reports, pt often eats meals while in recliner and heavy levels of coughing during and after PO intake. Further questioning was conducted and pt and spouse noted that pt belches frequently despite pt stating yesterday that she has minimal belching. Spouse additionally reported pt takes Tums to alleviate discomfort after eating and pt concurred. Pt stated minimal consumption of reflux-triggering foods such as whole milk, raw garlic or onions, mint, chocolate, or citrus foods, she endorsed caffeine intake and significantly reduced water intake. Pt also noted that she has greater mucous production following PO, raising further concern for reflux. Additional precautions including sitting up to 90 degrees during PO (optimally in bedside chair or at EOB) and remaining  upright following meals was discussed. Both pt and spouse verbalized understanding.     Assessment:     Chantell Herrera is a 84 y.o. female who presents with continued tolerance of current diet orders without overt s/s of aspiration. Given pt's history of reflux-related symptoms, globus sensation, no overt s/s of aspiration, frequent belching, and poor positioning during and after PO intake, pt likely presents with GI-related dysfunction(s) resulting in discomfort with PO intake. Recommend initiation of medical management of potential GI dysfunctions to develop most appropriate treatment plan. As pt's symptoms appear to be related to probable reflux, completion of MBSS is not warranted at this time. SLP to follow up for diet tolerance only.     Goals:   Multidisciplinary Problems     SLP Goals        Problem: SLP Goal    Goal Priority Disciplines Outcome   SLP Goal     SLP Ongoing, Progressing   Description:    Long Term Goals:  1. Pt will tolerate least restrictive PO diet with no overt s/s of aspiration in order to maintain adequate hydration and nutrition   2. Pt will undergo MBSS in order to objectively identify presence/severity of dysphagia D/C- not appropriate                     Plan:     · Patient to be seen:  5 x/week   · Plan of Care expires:  01/27/20  · Plan of Care reviewed with:  patient, spouse   · SLP Follow-Up:  Yes       Discharge recommendations:  home   Barriers to Discharge:  none per speech    Time Tracking:     SLP Treatment Date:   01/14/20  Speech Start Time:  0948  Speech Stop Time:  1017     Speech Total Time (min):  29 min    Billable Minutes: Treatment Swallowing Dysfunction - 29 minutes       Dora Rivas CCC-SLP  01/14/2020

## 2020-01-14 NOTE — ASSESSMENT & PLAN NOTE
Add rocephin   Follow culture    4d ago    Urine Culture, Routine Abnormal    ENTEROCOCCUS FAECALIS   50,000 - 99,999 cfu/ml     Resulting Agency [facility]   Susceptibility      Enterococcus faecalis     CULTURE, URINE     Ampicillin <=2 mcg/mL Sensitive     Nitrofurantoin <=32 mcg/mL Sensitive     Tetracycline >8 mcg/mL Resistant     Vancomycin 2 mcg/mL Sensitive         Had 2 gms Rocephin on 1/10 and 1GM yesterday

## 2020-01-15 LAB
ALBUMIN SERPL BCP-MCNC: 3.3 G/DL (ref 3.5–5.2)
ALP SERPL-CCNC: 150 U/L (ref 55–135)
ALT SERPL W/O P-5'-P-CCNC: 127 U/L (ref 10–44)
ANION GAP SERPL CALC-SCNC: 12 MMOL/L (ref 8–16)
ANISOCYTOSIS BLD QL SMEAR: ABNORMAL
AST SERPL-CCNC: 47 U/L (ref 10–40)
BASOPHILS # BLD AUTO: ABNORMAL K/UL (ref 0–0.2)
BASOPHILS NFR BLD: 1 % (ref 0–1.9)
BILIRUB SERPL-MCNC: 0.7 MG/DL (ref 0.1–1)
BILIRUB UR QL STRIP: NEGATIVE
BUN SERPL-MCNC: 46 MG/DL (ref 8–23)
CALCIUM SERPL-MCNC: 7.9 MG/DL (ref 8.7–10.5)
CHLORIDE SERPL-SCNC: 96 MMOL/L (ref 95–110)
CLARITY UR: CLEAR
CO2 SERPL-SCNC: 37 MMOL/L (ref 23–29)
COLOR UR: YELLOW
CREAT SERPL-MCNC: 1.3 MG/DL (ref 0.5–1.4)
DACRYOCYTES BLD QL SMEAR: ABNORMAL
DIFFERENTIAL METHOD: ABNORMAL
EOSINOPHIL # BLD AUTO: ABNORMAL K/UL (ref 0–0.5)
EOSINOPHIL NFR BLD: 3 % (ref 0–8)
ERYTHROCYTE [DISTWIDTH] IN BLOOD BY AUTOMATED COUNT: 24.6 % (ref 11.5–14.5)
EST. GFR  (AFRICAN AMERICAN): 44 ML/MIN/1.73 M^2
EST. GFR  (NON AFRICAN AMERICAN): 38 ML/MIN/1.73 M^2
GLUCOSE SERPL-MCNC: 88 MG/DL (ref 70–110)
GLUCOSE UR QL STRIP: NEGATIVE
HCT VFR BLD AUTO: 27.1 % (ref 37–48.5)
HGB BLD-MCNC: 8.7 G/DL (ref 12–16)
HGB UR QL STRIP: NEGATIVE
HYPOCHROMIA BLD QL SMEAR: ABNORMAL
IMM GRANULOCYTES # BLD AUTO: ABNORMAL K/UL (ref 0–0.04)
IMM GRANULOCYTES NFR BLD AUTO: ABNORMAL % (ref 0–0.5)
KETONES UR QL STRIP: NEGATIVE
LEUKOCYTE ESTERASE UR QL STRIP: NEGATIVE
LYMPHOCYTES # BLD AUTO: ABNORMAL K/UL (ref 1–4.8)
LYMPHOCYTES NFR BLD: 2 % (ref 18–48)
MCH RBC QN AUTO: 34.7 PG (ref 27–31)
MCHC RBC AUTO-ENTMCNC: 32.1 G/DL (ref 32–36)
MCV RBC AUTO: 108 FL (ref 82–98)
MONOCYTES # BLD AUTO: ABNORMAL K/UL (ref 0.3–1)
MONOCYTES NFR BLD: 8 % (ref 4–15)
NEUTROPHILS NFR BLD: 79 % (ref 38–73)
NEUTS BAND NFR BLD MANUAL: 7 %
NITRITE UR QL STRIP: NEGATIVE
NRBC BLD-RTO: 0 /100 WBC
PH UR STRIP: 6 [PH] (ref 5–8)
PLATELET # BLD AUTO: 223 K/UL (ref 150–350)
PLATELET BLD QL SMEAR: ABNORMAL
PMV BLD AUTO: 13.5 FL (ref 9.2–12.9)
POCT GLUCOSE: 105 MG/DL (ref 70–110)
POCT GLUCOSE: 145 MG/DL (ref 70–110)
POCT GLUCOSE: 179 MG/DL (ref 70–110)
POCT GLUCOSE: 244 MG/DL (ref 70–110)
POCT GLUCOSE: 72 MG/DL (ref 70–110)
POIKILOCYTOSIS BLD QL SMEAR: SLIGHT
POLYCHROMASIA BLD QL SMEAR: ABNORMAL
POTASSIUM SERPL-SCNC: 3.5 MMOL/L (ref 3.5–5.1)
PROT SERPL-MCNC: 5.5 G/DL (ref 6–8.4)
PROT UR QL STRIP: ABNORMAL
RBC # BLD AUTO: 2.51 M/UL (ref 4–5.4)
SCHISTOCYTES BLD QL SMEAR: ABNORMAL
SODIUM SERPL-SCNC: 145 MMOL/L (ref 136–145)
SP GR UR STRIP: 1.01 (ref 1–1.03)
URN SPEC COLLECT METH UR: ABNORMAL
UROBILINOGEN UR STRIP-ACNC: 1 EU/DL
WBC # BLD AUTO: 11.17 K/UL (ref 3.9–12.7)

## 2020-01-15 PROCEDURE — 63600175 PHARM REV CODE 636 W HCPCS: Mod: HCNC | Performed by: NURSE PRACTITIONER

## 2020-01-15 PROCEDURE — 25000003 PHARM REV CODE 250: Mod: HCNC | Performed by: NURSE PRACTITIONER

## 2020-01-15 PROCEDURE — 99900035 HC TECH TIME PER 15 MIN (STAT): Mod: HCNC

## 2020-01-15 PROCEDURE — 97110 THERAPEUTIC EXERCISES: CPT | Mod: HCNC

## 2020-01-15 PROCEDURE — 94664 DEMO&/EVAL PT USE INHALER: CPT | Mod: HCNC

## 2020-01-15 PROCEDURE — 97116 GAIT TRAINING THERAPY: CPT | Mod: HCNC

## 2020-01-15 PROCEDURE — 94640 AIRWAY INHALATION TREATMENT: CPT | Mod: HCNC

## 2020-01-15 PROCEDURE — 25000003 PHARM REV CODE 250: Mod: HCNC | Performed by: FAMILY MEDICINE

## 2020-01-15 PROCEDURE — 85007 BL SMEAR W/DIFF WBC COUNT: CPT | Mod: HCNC

## 2020-01-15 PROCEDURE — 36415 COLL VENOUS BLD VENIPUNCTURE: CPT | Mod: HCNC

## 2020-01-15 PROCEDURE — 85027 COMPLETE CBC AUTOMATED: CPT | Mod: HCNC

## 2020-01-15 PROCEDURE — 99232 PR SUBSEQUENT HOSPITAL CARE,LEVL II: ICD-10-PCS | Mod: HCNC,,, | Performed by: FAMILY MEDICINE

## 2020-01-15 PROCEDURE — 25000242 PHARM REV CODE 250 ALT 637 W/ HCPCS: Mod: HCNC | Performed by: FAMILY MEDICINE

## 2020-01-15 PROCEDURE — 94761 N-INVAS EAR/PLS OXIMETRY MLT: CPT | Mod: HCNC

## 2020-01-15 PROCEDURE — 81003 URINALYSIS AUTO W/O SCOPE: CPT | Mod: HCNC

## 2020-01-15 PROCEDURE — 63600175 PHARM REV CODE 636 W HCPCS: Mod: HCNC | Performed by: FAMILY MEDICINE

## 2020-01-15 PROCEDURE — 99232 SBSQ HOSP IP/OBS MODERATE 35: CPT | Mod: HCNC,,, | Performed by: FAMILY MEDICINE

## 2020-01-15 PROCEDURE — 27000221 HC OXYGEN, UP TO 24 HOURS: Mod: HCNC

## 2020-01-15 PROCEDURE — 80053 COMPREHEN METABOLIC PANEL: CPT | Mod: HCNC

## 2020-01-15 PROCEDURE — 11000001 HC ACUTE MED/SURG PRIVATE ROOM: Mod: HCNC

## 2020-01-15 RX ADMIN — AMLODIPINE BESYLATE 5 MG: 5 TABLET ORAL at 09:01

## 2020-01-15 RX ADMIN — METOPROLOL TARTRATE 12.5 MG: 25 TABLET, FILM COATED ORAL at 08:01

## 2020-01-15 RX ADMIN — TRAZODONE HYDROCHLORIDE 50 MG: 50 TABLET ORAL at 08:01

## 2020-01-15 RX ADMIN — GUAIFENESIN 600 MG: 600 TABLET, EXTENDED RELEASE ORAL at 08:01

## 2020-01-15 RX ADMIN — ROPINIROLE HYDROCHLORIDE 1 MG: 0.5 TABLET, FILM COATED ORAL at 08:01

## 2020-01-15 RX ADMIN — LEVALBUTEROL 1.25 MG: 1.25 SOLUTION, CONCENTRATE RESPIRATORY (INHALATION) at 06:01

## 2020-01-15 RX ADMIN — METOPROLOL TARTRATE 12.5 MG: 25 TABLET, FILM COATED ORAL at 09:01

## 2020-01-15 RX ADMIN — CITALOPRAM HYDROBROMIDE 20 MG: 10 TABLET ORAL at 09:01

## 2020-01-15 RX ADMIN — LEVALBUTEROL 1.25 MG: 1.25 SOLUTION, CONCENTRATE RESPIRATORY (INHALATION) at 12:01

## 2020-01-15 RX ADMIN — MIRTAZAPINE 15 MG: 15 TABLET, ORALLY DISINTEGRATING ORAL at 08:01

## 2020-01-15 RX ADMIN — CEFTRIAXONE 1 G: 1 INJECTION, SOLUTION INTRAVENOUS at 09:01

## 2020-01-15 RX ADMIN — PREDNISONE 20 MG: 20 TABLET ORAL at 09:01

## 2020-01-15 RX ADMIN — GUAIFENESIN 600 MG: 600 TABLET, EXTENDED RELEASE ORAL at 09:01

## 2020-01-15 RX ADMIN — ASPIRIN 81 MG: 81 TABLET, COATED ORAL at 09:01

## 2020-01-15 RX ADMIN — PANTOPRAZOLE SODIUM 40 MG: 40 TABLET, DELAYED RELEASE ORAL at 09:01

## 2020-01-15 NOTE — ASSESSMENT & PLAN NOTE
She is actually pretty close to her baseline, but she is short of breath all of the time.  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 With superimposed pulm edema, slightly more labored.  Cont O2 support  Repeat cxr and add mucinex and IPV- CXR- No significant interval change as compared to 01/10/2020.

## 2020-01-15 NOTE — ASSESSMENT & PLAN NOTE
Cont with ss   this morning  1/14/20 , 238, 164    1/15/2020 BS low this am 70s  Received 5 units yesterday with correction scale and getting 12 units nightly detemir- will decrease to 8 units tonight

## 2020-01-15 NOTE — PHYSICIAN QUERY
"PT Name: Chantell Herrera  MR #: [id number]    Physician Query Form - Heart  Condition Clarification     CDS/: Mercy Fermin RN             Contact information:[phone]  This form is a permanent document in the medical record.     Query Date: January 15, 2020    By submitting this query, we are merely seeking further clarification of documentation. Please utilize your independent clinical judgment when addressing the question(s) below.    The medical record contains the following   Indicators     Supporting Clinical Findings Location in Medical Record   x BNP BNP 2,990   Lab 1/10   x EF · The estimated ejection fraction is 50%     Echo 1/13    Radiology findings     x Echo Results · Concentric left ventricular remodeling.  · Low normal left ventricular systolic function. The estimated ejection fraction is 50%.  · Normal LV diastolic function.  · Normal right ventricular systolic function.  · Mild mitral sclerosis.  · There is moderate leaflet calcification of the Mitral Valve.  · Moderate tricuspid regurgitation.  · Mild to moderate pulmonary hypertension present.     Echo 1/13   x "Ascites" documented Epigastric pain   U/S shows some ascites    Progress Note 1/12       Hospital Medicine             x "SOB" or "SANTIAGO" documented Shortness of Breath   hx of copd/dyspnea on exertion      H&P 1/11       Hospital Medicine   x "Hypoxia" documented Acute on chronic respiratory failure with hypoxia    H&P 1/11       Hospital Medicine     x Heart Failure documented Acute on chronic congestive heart failure    H&P 1/11       Hospital Medicine     x "Edema" documented She exhibits edema   H&P 1/11       Hospital Medicine   x Diuretics/Meds Furosemide 20 mg IV   Bumetanide 1 mg IV  Torsemide 20 mg oral MAR 1/10 in  ED  MAR 1/11  MAR 1/13, 1/14      Treatment:      Other:      Heart failure (HF) can be acute, chronic or both. It is generally further specificed as systolic, diastolic, or combined. Lastly, it is important to " identify an underlying etiology if known or suspected.     Common clues to acute exacerbation:  Rapidly progressive symptoms (w/in 2 weeks of presentation), using IV diuretics to treat, using supplemental O2, pulmonary edema on Xray, MI w/in 4 weeks, and/or BNP >500    Systolic Heart Failure: is defined as chart documentation of a left ventricular ejection fraction (LVEF) less than 40%     Diastolic Heart Failure: is defined as a left ventricular ejection fraction (LVEF) greater than 40%   +      Evidence of diastolic dysfunction on echocardiography OR    Right heart catheterization wedge pressure above 12 mm Hg OR    Left heart catheterization left ventricular end diastolic pressure 18 mm Hg or above.    References: *American Heart Association    The clinical guidelines noted below are only system guidelines, and do not replace the providers clinical judgment.     Provider, please specify the diagnosis associated with above clinical findings    Doctor, please specify the type of acute on chronic heart failure.    [ x  ] Acute on Chronic Diastolic Heart Failure -    Pre-existing diastoic HF diagnosis.  EF > 40%  and acute HF symptoms documented       [   ] Other Type of Heart Failure (please specify type):     [   ] Other (please specify):     [  ] Clinically Undetermined                           Please document in your progress notes daily for the duration of treatment until resolved and include in your discharge summary.

## 2020-01-15 NOTE — ASSESSMENT & PLAN NOTE
Seems at baseline.  Will give small prednisone dose - 20mg daily - cont this.  Cont nebs.  Start rocephin.  Start on mucinex.  IPV ordered.  1/15 acapella ordered yesterday - still having intermittent wheezing

## 2020-01-15 NOTE — ASSESSMENT & PLAN NOTE
AOCD - iron studies confirm this.  Has had iron in the past.  Will check for any signs of blood loss.  Has been asked to stop her eliquis.  While she is symptomatic and anemic, will try to diuresis before giving blood.  In the past she had flash pulm edema from transfusion vs transfusion reaction.    Referred to heme/onc in past  Now lower than 8- ordered 1 units PRBC, pre med and give over 4 hours   1/15/2020 H&H 8.7/27.1

## 2020-01-15 NOTE — PT/OT/SLP PROGRESS
"Speech Language Pathology      Pt found resting in bed upon SLP entry into room. Pt reported no overt s/s of aspiration including no throat clearing, no coughing/choking, and no changes in vocal quality with most recent meals. She stated that she does not feel that this is a concerning issue for her and that "it's just the way it's going to be." SLP reviewed safe swallow strategies including HOB to 90 degrees, increased water intake, and remaining upright following meals and pt verbalized understanding. Of note, pt's nurse Magaly reported pt recently started on pantoprazole.     Continue with current regular diet and thin liquids and medical management of potential GI-related dysfuncitons. No additional skilled speech services required at this time.       Dora Rivas, CCC-SLP   1/15/2020      "

## 2020-01-15 NOTE — PROGRESS NOTES
[facility] Hospital Medicine  Progress Note    Patient Name: Chantell Herrera  MRN: [id number]  Patient Class: IP- Inpatient   Admission Date: 1/10/2020  Length of Stay: 2 days  Attending Physician: Kari Gaspar MD  Primary Care Provider: Kari Gaspar MD        Subjective:     Principal Problem:Acute on chronic congestive heart failure        HPI:  84 year old female with chronic respiratory failure and recent admission for pacer placement comes in because of weakness. She has been feeling more and more swollen, and she is getting more weak. She says that she has also had a lot of abdominal pain especially when getting up to walk. She feels nauseated and sick to her stomach when this happens. She has not been taking any diuretics. Last EF is unknown.    Overview/Hospital Course:  1/12/2020 Patient with continued weakness. O2 doing okay. Slight improvement of fluid, but only diuresed about 1 L.   Blood counts improved with diuresis - no transfusion at this point.  Overall about the same.    1/13/2020 pt renal and liver function elevated. Prior to recent cardiac procedure both were fine. She has liver u/s yesterday. Beside a gallstone without inflammation read as normal. On amiodarone for A fib. Discussed with Dr Metcalf this am as she is ventricular paced on tele and HR 60 will hold amiodarone and cont BB. Follow LFT. Also CXR without congestion. She was sent home on lasix but denies taking it although felt swollen. Given 20mg IV lasix 1/10 and bumex 0.5mg IV x 1 on 1/11. BNP was 2990 on admission and went up to 3124 2 days later and 2909 today.     Cbc pending 7.6 today. Ferritin very high as well as elevated fe studies. CBC shows eleavted MCV and MCH. Folate and B12 2 months ago normal, will repeat.     1/14/2020 Pt NAD this morning. Notes swelling much better but has persistent junky cough during exam, and still wheezing     Negative -738, Still with significant SANTIAGO with minimal exertion,  "O2 sat 90-92% on 3LNC   Afebrile, Day 3 Rocephin for UTI, ( Had 2 grams on 1/10/20 and 1Gm yesterday    ENTEROCOCCUS FAECALIS   50,000 - 99,999 cfu/ml      Resulting Agency [facility]   Susceptibility      Enterococcus faecalis     CULTURE, URINE     Ampicillin <=2 mcg/mL Sensitive     Nitrofurantoin <=32 mcg/mL Sensitive     Tetracycline >8 mcg/mL Resistant     Vancomycin 2 mcg/mL Sensitive       Blood culture x1 done- NGTD x 4d H&H up to 8.9/27.8 after 1UPRBCs from 7.6/23.5  AST 76>82, >158 , creat 1.6   Was noted to have choking issues, ST eval ordered  Pt is being Rxed for CHF, a UTI and COPD issues     1/15/2020 Day 4 IV  Rocephin for UTI,ENTEROCOCCUS FAECALIS sensitive to ampicillin    H&H 8.7/27.1   Creat 1.6>1.3, LFTs better AST 82>42, >127, since off amiodarone   o2 sat 91-95% still on 3LNC (USES 3LNC at home 24hrs- states her sates are usually 91-92% , still with some SANTIAGO, intermittent wheezing, prednisone 20mg daily   xopenex q 6   Notes soreness to legs better but had "weeping" overnight   · RW Ambulation using portable oxygen x 75 feet with Supervision; goal is 200ft       Review of Systems   Constitutional: Positive for fatigue. Negative for chills, fever and unexpected weight change.   HENT: Positive for trouble swallowing. Negative for congestion, ear pain, postnasal drip, rhinorrhea and sore throat.    Eyes: Negative for redness and itching.   Respiratory: Positive for wheezing. Negative for cough and shortness of breath.    Cardiovascular: Positive for leg swelling. Negative for chest pain and palpitations.   Gastrointestinal: Positive for abdominal pain. Negative for diarrhea, nausea and vomiting.   Genitourinary: Negative for dysuria and frequency.   Skin: Negative for rash.   Neurological: Positive for weakness. Negative for headaches.     Objective:     Vital Signs (Most Recent):  Temp: 98.4 °F (36.9 °C) (01/15/20 0747)  Pulse: 68 (01/15/20 0821)  Resp: 18 (01/15/20 0747)  BP: (!) " 128/58 (01/15/20 0747)  SpO2: (!) 91 % (01/15/20 0747) Vital Signs (24h Range):  Temp:  [96 °F (35.6 °C)-98.4 °F (36.9 °C)] 98.4 °F (36.9 °C)  Pulse:  [59-83] 68  Resp:  [18-22] 18  SpO2:  [86 %-95 %] 91 %  BP: ()/(52-71) 128/58     Weight: 65.6 kg (144 lb 10 oz)  Body mass index is 26.45 kg/m².    Physical Exam   Constitutional: She is oriented to person, place, and time. She appears well-developed. No distress.   HENT:   Head: Normocephalic and atraumatic.   Eyes: Pupils are equal, round, and reactive to light. Conjunctivae are normal.   Neck: Normal range of motion. Neck supple. JVD present. No thyromegaly present.   +hepatojug reflux   Cardiovascular: Normal rate and intact distal pulses.   Murmur heard.  Pulmonary/Chest: Effort normal. No respiratory distress. She has wheezes. She has rales.   Abdominal: Soft. Bowel sounds are normal. There is tenderness (epigastric and RUQ).   Musculoskeletal: Normal range of motion. She exhibits edema.   Lymphadenopathy:     She has no cervical adenopathy.   Neurological: She is alert and oriented to person, place, and time.   Skin: Skin is warm and dry. No rash noted.   Scattered ecchymoses   Psychiatric: She has a normal mood and affect. Her behavior is normal.   Nursing note and vitals reviewed.        CRANIAL NERVES     CN III, IV, VI   Pupils are equal, round, and reactive to light.       Significant Labs:   CBC:   Recent Labs   Lab 01/14/20 0546 01/15/20  0548   WBC 9.45 11.17   HGB 8.9* 8.7*   HCT 27.8* 27.1*    223     CMP:   Recent Labs   Lab 01/14/20 0546 01/15/20  0548    145   K 3.7 3.5   CL 95 96   CO2 37* 37*   * 88   BUN 45* 46*   CREATININE 1.6* 1.3   CALCIUM 7.9* 7.9*   PROT 5.6* 5.5*   ALBUMIN 3.4* 3.3*   BILITOT 0.9 0.7   ALKPHOS 174* 150*   AST 82* 47*   * 127*   ANIONGAP 8 12   EGFRNONAA 30* 38*     Lab Results   Component Value Date    IRON 173 (H) 01/11/2020    TIBC 192 (L) 01/11/2020    FERRITIN 2,124 (H) 01/11/2020        Cardiac Markers:   No results for input(s): CKMB, MYOGLOBIN, BNP, TROPISTAT in the last 48 hours.  Troponin:   No results for input(s): TROPONINI in the last 48 hours.  TSH:   Recent Labs   Lab 01/10/20  1910   TSH 2.780     A1C in process    Blood cultures NGTD    Urine culture enterococcus   ENTEROCOCCUS FAECALIS   50,000 - 99,999 cfu/ml      Resulting Agency [facility]   Susceptibility      Enterococcus faecalis     CULTURE, URINE     Ampicillin <=2 mcg/mL Sensitive     Nitrofurantoin <=32 mcg/mL Sensitive     Tetracycline >8 mcg/mL Resistant     Vancomycin 2 mcg/mL Sensitive           Significant Imaging:      US abd:  1. Cholelithiasis with no sonographic evidence of acute cholecystitis.  2. Small volume of ascites.      US lower ext   No evidence of deep venous thrombosis in either lower extremity.    CXR   1. Discoid atelectasis or scarring along the minor fissure.  No change since 12/15/2019.  2. Interval placement single lead left-sided pacemaker.  Negative for evidence of complicating process.    ECHO: 1/13/2020   · Concentric left ventricular remodeling.  · Low normal left ventricular systolic function. The estimated ejection fraction is 50%.  · Normal LV diastolic function.  · Normal right ventricular systolic function.  · Mild mitral sclerosis.  · There is moderate leaflet calcification of the Mitral Valve.  · Moderate tricuspid regurgitation.  Mild to moderate pulmonary hypertension present    EKG ventricular paced      Assessment/Plan:      * Acute on chronic congestive heart failure  Given small dose of lasix yesterday wtihout much change. She seems fluid overloaded.  Will get last echo from cis and give 0.5mg of bumex this moring.  Recheck bmp at 3.    Needs repeat echo. (last one from 12/18)    Repeat BNP this morning.  Holding off on diuresis for now.  Creat is better 1.5 but basline WNL    Echo today- add demadex today and watch kidneys closely   EF 50%, normal diastolic fx, mild to moderate  pulmonary HTN   1/15/2020 EF 50%, normal diastolic fx, mild to moderate pulmonary HTN; cards recommending avoid overdiueresing  Not getting diuretic currently; consider stopping norvasc due to LE complaints         Choking due to food (regurgitated)    Speech eval   Given pt's history of reflux-related symptoms, globus sensation, no overt s/s of aspiration, frequent belching, and poor positioning during and after PO intake, pt likely presents with GI-related dysfunction(s) resulting in discomfort with PO intake. Recommend initiation of medical management of potential GI dysfunctions to develop most appropriate treatment plan. As pt's symptoms appear to be related to probable reflux, completion of MBSS is not warranted at this time. SLP to follow up for diet tolerance only.   Getting protonix     Acute cystitis without hematuria  Add rocephin   Follow culture    4d ago    Urine Culture, Routine Abnormal    ENTEROCOCCUS FAECALIS   50,000 - 99,999 cfu/ml     Resulting Agency [facility]   Susceptibility      Enterococcus faecalis     CULTURE, URINE     Ampicillin <=2 mcg/mL Sensitive     Nitrofurantoin <=32 mcg/mL Sensitive     Tetracycline >8 mcg/mL Resistant     Vancomycin 2 mcg/mL Sensitive         Had 2 gms Rocephin on 1/10 and 1GM yesterday   1/15/2020 Day 4 IV rocephin; repeat U/A today     Weakness  Add PT   ambulated 75 ft with PT yesterday ; goal 200ft       Anorexia  Switch from periactin to remeron      Diabetes mellitus type 2, uncontrolled  Cont with ss   this morning  1/14/20 , 238, 164    1/15/2020 BS low this am 70s  Received 5 units yesterday with correction scale and getting 12 units nightly detemir- will decrease to 8 units tonight      Anemia  AOCD - iron studies confirm this.  Has had iron in the past.  Will check for any signs of blood loss.  Has been asked to stop her eliquis.  While she is symptomatic and anemic, will try to diuresis before giving blood.  In the past she had flash pulm  edema from transfusion vs transfusion reaction.    Referred to heme/onc in past  Now lower than 8- ordered 1 units PRBC, pre med and give over 4 hours   1/15/2020 H&H 8.7/27.1         Acute on chronic respiratory failure with hypoxia  She is actually pretty close to her baseline, but she is short of breath all of the time.  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 With superimposed pulm edema, slightly more labored.  Cont O2 support  Repeat cxr and add mucinex and IPV- CXR- No significant interval change as compared to 01/10/2020.      Atrial fibrillation with rapid ventricular response  Cont on amiodarone and lopressor.- ventricular paced  D/c amiodarone due to elevated LFT  Check Echo today- EF 50% , normal LV diastolic Fx  HR well controlled with BB   No more amiodarone per Dr. Metcalf; may be candidate for sotolol in future    Epigastric pain  U/S shows some ascites      CKD (chronic kidney disease), stage III  Contraction/prerenal azotemia.  Hold off on diuresis and allow for homeostasis.  Also cont rocephin for UTI and f/u culture    Insomnia  Cont on remeron      COPD (chronic obstructive pulmonary disease)  Seems at baseline.  Will give small prednisone dose - 20mg daily - cont this.  Cont nebs.  Start rocephin.  Start on mucinex.  IPV ordered.  1/15 acapella ordered yesterday - still having intermittent wheezing     Cholelithiases  R/o cholecystitis.  U/s ordered and no signs of cholecystitis  Cholelithiasis no cholecystitis, bilirubin normal       VTE Risk Mitigation (From admission, onward)         Ordered     IP VTE HIGH RISK PATIENT  Once      01/10/20 2131     Place sequential compression device  Until discontinued      01/10/20 2131                      Noe Fofana MD  Department of Hospital Medicine   [facility]

## 2020-01-15 NOTE — SUBJECTIVE & OBJECTIVE
Review of Systems   Constitutional: Positive for fatigue. Negative for chills, fever and unexpected weight change.   HENT: Positive for trouble swallowing. Negative for congestion, ear pain, postnasal drip, rhinorrhea and sore throat.    Eyes: Negative for redness and itching.   Respiratory: Positive for wheezing. Negative for cough and shortness of breath.    Cardiovascular: Positive for leg swelling. Negative for chest pain and palpitations.   Gastrointestinal: Positive for abdominal pain. Negative for diarrhea, nausea and vomiting.   Genitourinary: Negative for dysuria and frequency.   Skin: Negative for rash.   Neurological: Positive for weakness. Negative for headaches.     Objective:     Vital Signs (Most Recent):  Temp: 98.4 °F (36.9 °C) (01/15/20 0747)  Pulse: 68 (01/15/20 0821)  Resp: 18 (01/15/20 0747)  BP: (!) 128/58 (01/15/20 0747)  SpO2: (!) 91 % (01/15/20 0747) Vital Signs (24h Range):  Temp:  [96 °F (35.6 °C)-98.4 °F (36.9 °C)] 98.4 °F (36.9 °C)  Pulse:  [59-83] 68  Resp:  [18-22] 18  SpO2:  [86 %-95 %] 91 %  BP: ()/(52-71) 128/58     Weight: 65.6 kg (144 lb 10 oz)  Body mass index is 26.45 kg/m².    Physical Exam   Constitutional: She is oriented to person, place, and time. She appears well-developed. No distress.   HENT:   Head: Normocephalic and atraumatic.   Eyes: Pupils are equal, round, and reactive to light. Conjunctivae are normal.   Neck: Normal range of motion. Neck supple. JVD present. No thyromegaly present.   +hepatojug reflux   Cardiovascular: Normal rate and intact distal pulses.   Murmur heard.  Pulmonary/Chest: Effort normal. No respiratory distress. She has wheezes. She has rales.   Abdominal: Soft. Bowel sounds are normal. There is tenderness (epigastric and RUQ).   Musculoskeletal: Normal range of motion. She exhibits edema.   Lymphadenopathy:     She has no cervical adenopathy.   Neurological: She is alert and oriented to person, place, and time.   Skin: Skin is warm and  dry. No rash noted.   Scattered ecchymoses   Psychiatric: She has a normal mood and affect. Her behavior is normal.   Nursing note and vitals reviewed.        CRANIAL NERVES     CN III, IV, VI   Pupils are equal, round, and reactive to light.       Significant Labs:   CBC:   Recent Labs   Lab 01/14/20  0546 01/15/20  0548   WBC 9.45 11.17   HGB 8.9* 8.7*   HCT 27.8* 27.1*    223     CMP:   Recent Labs   Lab 01/14/20  0546 01/15/20  0548    145   K 3.7 3.5   CL 95 96   CO2 37* 37*   * 88   BUN 45* 46*   CREATININE 1.6* 1.3   CALCIUM 7.9* 7.9*   PROT 5.6* 5.5*   ALBUMIN 3.4* 3.3*   BILITOT 0.9 0.7   ALKPHOS 174* 150*   AST 82* 47*   * 127*   ANIONGAP 8 12   EGFRNONAA 30* 38*     Lab Results   Component Value Date    IRON 173 (H) 01/11/2020    TIBC 192 (L) 01/11/2020    FERRITIN 2,124 (H) 01/11/2020       Cardiac Markers:   No results for input(s): CKMB, MYOGLOBIN, BNP, TROPISTAT in the last 48 hours.  Troponin:   No results for input(s): TROPONINI in the last 48 hours.  TSH:   Recent Labs   Lab 01/10/20  1910   TSH 2.780     A1C in process    Blood cultures NGTD    Urine culture enterococcus   ENTEROCOCCUS FAECALIS   50,000 - 99,999 cfu/ml      Resulting Agency [facility]   Susceptibility      Enterococcus faecalis     CULTURE, URINE     Ampicillin <=2 mcg/mL Sensitive     Nitrofurantoin <=32 mcg/mL Sensitive     Tetracycline >8 mcg/mL Resistant     Vancomycin 2 mcg/mL Sensitive           Significant Imaging:      US abd:  1. Cholelithiasis with no sonographic evidence of acute cholecystitis.  2. Small volume of ascites.      US lower ext   No evidence of deep venous thrombosis in either lower extremity.    CXR   1. Discoid atelectasis or scarring along the minor fissure.  No change since 12/15/2019.  2. Interval placement single lead left-sided pacemaker.  Negative for evidence of complicating process.    ECHO: 1/13/2020   · Concentric left ventricular remodeling.  · Low normal left ventricular  systolic function. The estimated ejection fraction is 50%.  · Normal LV diastolic function.  · Normal right ventricular systolic function.  · Mild mitral sclerosis.  · There is moderate leaflet calcification of the Mitral Valve.  · Moderate tricuspid regurgitation.  Mild to moderate pulmonary hypertension present    EKG ventricular paced

## 2020-01-15 NOTE — CONSULTS
[facility]  Pulmonology  Consult Note    Patient Name: Chantell Herrera  MRN: [id number]  Admission Date: 1/10/2020  Hospital Length of Stay: 2 days  Code Status: Full Code  Attending Physician: Kari Gaspar MD  Primary Care Provider: Kari Gaspar MD   Principal Problem: Acute on chronic congestive heart failure    Consults  Subjective:     HPI:  Chantell Herrera is a 84 y.o. year old female that's presents with a chief complaint of SOB and Wheezing for 10 days PTA  That progressively has worsened .Started to cough with production according to Patient . Consulted to evaluate Respiratory status.    Past Medical History:   Diagnosis Date    A-fib     Acute on chronic congestive heart failure 2/1/2019    Anxiety     Arthritis     Asthma     Atrial fibrillation with rapid ventricular response     CHF (congestive heart failure) 11/29/2018    Chronic bronchitis     COPD (chronic obstructive pulmonary disease)     Depression     Diabetes mellitus, type 2     SANTIAGO (dyspnea on exertion)     Emphysema of lung     Fatigue     Hyperlipidemia     Hypertension     Symptomatic anemia 1/22/2019    Trouble in sleeping         Past Surgical History:   Procedure Laterality Date    APPENDECTOMY      BRONCHOSCOPY N/A 12/3/2019    Procedure: BRONCHOSCOPY;  Surgeon: Emmanuel Hickman MD;  Location: [facility];  Service: Pulmonary;  Laterality: N/A;    EYE SURGERY      HYSTERECTOMY      INSERTION OF PACEMAKER  12/26/2019    TONSILLECTOMY         Prior to Admission medications    Medication Sig Start Date End Date Taking? Authorizing Provider   albuterol (PROVENTIL) 2.5 mg /3 mL (0.083 %) nebulizer solution Take 3 mLs (2.5 mg total) by nebulization every 6 (six) hours as needed for Wheezing. 12/1/19  Yes Chicho Fisher MD   aspirin (ECOTRIN) 81 MG EC tablet Take 81 mg by mouth once daily.   Yes Historical Provider, MD   cholecalciferol, vitamin D3, (VITAMIN D3) 2,000 unit Tab Take 2,000 Units  by mouth once daily.   Yes Historical Provider, MD   citalopram (CELEXA) 20 MG tablet Take 1 tablet (20 mg total) by mouth once daily. 12/2/19 12/1/20 Yes Chicho Fisher MD   cyproheptadine (PERIACTIN) 4 mg tablet Take 1 tablet (4 mg total) by mouth every evening. 1/2/20  Yes Kari Gaspar MD   furosemide (LASIX) 20 MG tablet Take 20 mg by mouth every other day.    Yes Historical Provider, MD   insulin detemir U-100 (LEVEMIR FLEXTOUCH) 100 unit/mL (3 mL) SubQ InPn pen Inject 15 Units into the skin every evening. 12/5/19 12/4/20 Yes Kari Gaspar MD   ipratropium (ATROVENT) 0.02 % nebulizer solution Take 500 mcg by nebulization 4 (four) times daily.  3/18/16  Yes Historical Provider, MD   magnesium oxide (MAG-OX) 400 mg (241.3 mg magnesium) tablet Take 1 tablet by mouth 2 (two) times daily. 11/4/19  Yes Historical Provider, MD   metoprolol tartrate (LOPRESSOR) 25 MG tablet Take 0.5 tablets (12.5 mg total) by mouth 2 (two) times daily. 1/2/20 1/1/21 Yes Kari Gaspar MD   rOPINIRole (REQUIP) 1 MG tablet Take 1 tablet (1 mg total) by mouth every evening. 3/26/19  Yes Kari Gaspar MD   traZODone (DESYREL) 50 MG tablet TAKE 1 TABLET EVERY EVENING 9/10/19  Yes Kari Gaspar MD   amiodarone (PACERONE) 200 MG Tab  12/26/19   Historical Provider, MD   amLODIPine (NORVASC) 5 MG tablet TAKE 1 TABLET BY MOUTH ONCE DAILY ( DOSE DECREASE ) 10/31/19   Historical Provider, MD   baclofen (LIORESAL) 10 MG tablet Take 1 tablet (10 mg total) by mouth 3 (three) times daily.  Patient taking differently: Take 10 mg by mouth 3 (three) times daily as needed.  6/6/18 1/2/20  Anson Leiva MD   benzonatate (TESSALON) 200 MG capsule TAKE 1 CAPSULE THREE TIMES DAILY AS NEEDED (SUBSTITUTED FOR  TESSALON) 12/11/19   Anson Leiva MD   blood sugar diagnostic Strp Test BG twice a day Accu-Chek Celia plus  DM 2  E 11.9  Dr Gaspar 10/17/19   Kari Gaspar MD   blood-glucose meter kit Test BG twice a day  "Accu-Chek Celia plus  DM 2  E 11.9  Dr Gaspar 10/17/19 10/16/20  Kari Gaspar MD   blood-glucose meter Misc Use as directed 19   Carrol Funes NP   fluticasone-umeclidin-vilanter (TRELEGY ELLIPTA) 100-62.5-25 mcg DsDv Inhale 1 puff into the lungs once daily. 19   Kari Gaspar MD   lancets 33 gauge Misc Test BG twice a day Accu-Chek Celia plus  DM 2  E 11.9  Dr Gaspar 10/17/19   Kari Gaspar MD   levoFLOXacin (LEVAQUIN) 750 MG tablet  19   Historical Provider, MD   mupirocin (BACTROBAN) 2 % ointment Apply topically 3 (three) times daily.  Patient not taking: Reported on 2020   Radha Guillermo NP   mupirocin (BACTROBAN) 2 % ointment Apply topically 2 (two) times daily. 20   Kari Gaspar MD   ondansetron (ZOFRAN) 4 MG tablet Take 1 tablet (4 mg total) by mouth every 8 (eight) hours as needed for Nausea. 20   Kari Gaspar MD   pen needle, diabetic 31 gauge x " Ndle Use to inject once daily with Levemir 19   Kari Gaspar MD   pravastatin (PRAVACHOL) 40 MG tablet TAKE 1 TABLET EVERY DAY 10/15/18   Anson Leiva MD   predniSONE (DELTASONE) 10 MG tablet Take 4 po q day x 4 days, then 3 po q day x 3 days, then 20 mg 19   Chicho Fisher MD       Social History     Socioeconomic History    Marital status:      Spouse name: Not on file    Number of children: Not on file    Years of education: Not on file    Highest education level: Not on file   Occupational History    Not on file   Social Needs    Financial resource strain: Not on file    Food insecurity:     Worry: Not on file     Inability: Not on file    Transportation needs:     Medical: Not on file     Non-medical: Not on file   Tobacco Use    Smoking status: Former Smoker     Last attempt to quit: 8/3/2000     Years since quittin.4    Smokeless tobacco: Never Used   Substance and Sexual Activity    Alcohol use: No    Drug use: No    Sexual activity: " Not Currently   Lifestyle    Physical activity:     Days per week: Not on file     Minutes per session: Not on file    Stress: Not on file   Relationships    Social connections:     Talks on phone: Not on file     Gets together: Not on file     Attends Holiness service: Not on file     Active member of club or organization: Not on file     Attends meetings of clubs or organizations: Not on file     Relationship status: Not on file   Other Topics Concern    Not on file   Social History Narrative    Not on file       Family History   Problem Relation Age of Onset    Heart disease Mother     Hypertension Mother     Hypertension Father     Diabetes Father     Cancer Sister     COPD Brother     Hypertension Brother     No Known Problems Daughter     Kidney disease Son     COPD Daughter        Review of patient's allergies indicates:  No Known Allergies Allergies have been reviewed.     ROS: ROS    PE:   Vitals:    01/15/20 0747 01/15/20 0821 01/15/20 1024 01/15/20 1134   BP: (!) 128/58   110/62   BP Location: Left arm      Patient Position: Lying   Lying   Pulse: 63 68 94 62   Resp: 18   18   Temp: 98.4 °F (36.9 °C)   98.4 °F (36.9 °C)   TempSrc: Oral   Tympanic   SpO2: (!) 91%   (!) 87%   Weight:       Height:        Physical Exam    Alert and orientated X 3   HEENT: Head: Normocephalic no trauma                Ears : Normal Pinna No Drainage no Battles sign                Eyes: Vision Unchanged, No conjunctivitis,No drainage                Neck: Supple, No JVD,No Abnormal Carotid Pulsations                Throat: No Erythema, No pus,No Swelling,Mallampati score= 3    Chest: Decreased BS compared to baseline   Cardiac: Irregular : +M = 2/6, No R/H/G  Abdomen: Bowel Sounds are Normal.Soft Abdomen. No organomegaly of Liver,Spleen,or Kidneys   CNS: Non focal and intact. Cranial nerves 2, 346,8,9,10 and 12 are normal.Norrmal gait.Normal posture.  Extremities: No Clubbing,No Cyanosis with oxygen,Positive mild  edema of lower extremities Bilateral  Skin: No Rash, No Ulcerative sores,and No cellulitis of the IV site.    Lab Results   Component Value Date    WBC 11.17 01/15/2020    HGB 8.7 (L) 01/15/2020    HCT 27.1 (L) 01/15/2020     01/15/2020    CHOL 147 05/28/2018    TRIG 75 05/28/2018    HDL 74 05/28/2018     (H) 01/15/2020    AST 47 (H) 01/15/2020     01/15/2020    K 3.5 01/15/2020    CL 96 01/15/2020    CREATININE 1.3 01/15/2020    BUN 46 (H) 01/15/2020    CO2 37 (H) 01/15/2020    TSH 2.780 01/10/2020    INR 1.2 12/15/2019    HGBA1C 6.7 (H) 01/13/2020               Assessment/Plan:     * Acute on chronic congestive heart failure  Stable     Chronic respiratory failure with hypoxia  Low po2    Cough with hemoptysis  No hemoptysis     Acute on chronic respiratory failure with hypoxia  Chronic respiratory failure     Atrial fibrillation with rapid ventricular response  Take meds     COPD (chronic obstructive pulmonary disease)  severe    will adjust trilogy       Thank you for your consult. I will follow-up with patient. Please contact us if you have any additional questions.     Emmanuel Hickman MD  Pulmonology  [facility]

## 2020-01-15 NOTE — PROGRESS NOTES
[facility]  Cardiology  Progress Note    Patient Name: Chantell Herrera  MRN: [id number]  Admission Date: 1/10/2020  Hospital Length of Stay: 2 days  Code Status: Full Code   Attending Physician: Kari Gaspar MD   Primary Care Physician: Kari Gaspar MD  Expected Discharge Date:   Principal Problem:Acute on chronic congestive heart failure    Subjective:     Chief Complaint:  Weakness, SOB, abdominal pain, N/V     HPI:   84 year old female with history of PAF s/p PPM, COPD, carotid artery stenosis, HHD, bradycardia, dyslipidemia, HTN, GERD, presented to ED with c/o increased weakness, leg swelling, abdominal pain, N/V.        ROS   Constitution: Positive for decreased appetite, malaise/fatigue and weight loss. Negative for chills, diaphoresis, fever and night sweats.   HENT: Negative.    Eyes: Negative.    Cardiovascular: Positive for dyspnea on exertion and leg swelling. Negative for chest pain, claudication, cyanosis, irregular heartbeat, near-syncope, orthopnea, palpitations, paroxysmal nocturnal dyspnea and syncope.   Respiratory: Positive for shortness of breath and wheezing. Negative for cough, hemoptysis, sleep disturbances due to breathing, snoring and sputum production.    Endocrine: Negative.    Hematologic/Lymphatic: Negative.    Skin: Negative.    Musculoskeletal: Positive for muscle weakness. Negative for arthritis, back pain, falls, gout, joint pain, joint swelling, muscle cramps, myalgias, neck pain and stiffness.   Gastrointestinal: Positive for abdominal pain, anorexia, nausea and vomiting. Negative for bloating, change in bowel habit, bowel incontinence, constipation, diarrhea, dysphagia, excessive appetite, flatus, heartburn, hematemesis, hematochezia, hemorrhoids, jaundice and melena.   Genitourinary: Negative.    Neurological: Positive for weakness. Negative for aphonia, brief paralysis, difficulty with concentration, disturbances in coordination, excessive daytime  sleepiness, dizziness, focal weakness, headaches, light-headedness, loss of balance, numbness, paresthesias, seizures, sensory change, tremors and vertigo.   Psychiatric/Behavioral: Negative.    Allergic/Immunologic: Negative.       Objective:     Vital Signs (Most Recent):  Temp: 98.4 °F (36.9 °C) (01/15/20 0747)  Pulse: 63 (01/15/20 0747)  Resp: 18 (01/15/20 0747)  BP: (!) 128/58 (01/15/20 0747)  SpO2: (!) 91 % (01/15/20 0747) Vital Signs (24h Range):  Temp:  [96 °F (35.6 °C)-98.4 °F (36.9 °C)] 98.4 °F (36.9 °C)  Pulse:  [59-83] 63  Resp:  [18-22] 18  SpO2:  [86 %-95 %] 91 %  BP: ()/(52-71) 128/58     Weight: 65.6 kg (144 lb 10 oz)  Body mass index is 26.45 kg/m².    SpO2: (!) 91 %  O2 Device (Oxygen Therapy): Other (see comments)      Intake/Output Summary (Last 24 hours) at 1/15/2020 0828  Last data filed at 1/15/2020 0700  Gross per 24 hour   Intake 1160 ml   Output 1800 ml   Net -640 ml       Lines/Drains/Airways     Peripheral Intravenous Line                 Peripheral IV - Single Lumen 01/10/20 1920 20 G Right Antecubital 4 days                Physical Exam  Constitutional: She is oriented to person, place, and time. She appears well-developed and well-nourished. No distress.   HENT:   Head: Normocephalic and atraumatic.   Neck: Normal range of motion. Neck supple. JVD present. No tracheal deviation present. No thyromegaly present.   Cardiovascular: Intact distal pulses. Exam reveals no gallop and no friction rub.   Murmur (2/6 MARKOS) heard.  Pulmonary/Chest: Effort normal. No stridor. No respiratory distress. She has wheezes. She has rales. She exhibits no tenderness.   Abdominal: Soft. Bowel sounds are normal. She exhibits no distension and no mass. There is tenderness. There is no rebound and no guarding.   Musculoskeletal: Normal range of motion. She exhibits edema (1+ pitting edema BLE). She exhibits no tenderness or deformity.   Neurological: She is alert and oriented to person, place, and time.    Skin: Skin is warm and dry. No rash noted. She is not diaphoretic. No erythema. No pallor.   Psychiatric: She has a normal mood and affect. Her behavior is normal. Judgment and thought content normal.         Significant Labs:   ABG: No results for input(s): PH, PCO2, HCO3, POCSATURATED, BE in the last 48 hours., Blood Culture: No results for input(s): LABBLOO in the last 48 hours., BMP:   Recent Labs   Lab 01/14/20  0546 01/15/20  0548   * 88    145   K 3.7 3.5   CL 95 96   CO2 37* 37*   BUN 45* 46*   CREATININE 1.6* 1.3   CALCIUM 7.9* 7.9*   , CMP   Recent Labs   Lab 01/14/20  0546 01/15/20  0548    145   K 3.7 3.5   CL 95 96   CO2 37* 37*   * 88   BUN 45* 46*   CREATININE 1.6* 1.3   CALCIUM 7.9* 7.9*   PROT 5.6* 5.5*   ALBUMIN 3.4* 3.3*   BILITOT 0.9 0.7   ALKPHOS 174* 150*   AST 82* 47*   * 127*   ANIONGAP 8 12   ESTGFRAFRICA 34* 44*   EGFRNONAA 30* 38*   , CBC   Recent Labs   Lab 01/14/20  0546 01/15/20  0548   WBC 9.45 11.17   HGB 8.9* 8.7*   HCT 27.8* 27.1*    223   , INR No results for input(s): INR, PROTIME in the last 48 hours., Lipid Panel No results for input(s): CHOL, HDL, LDLCALC, TRIG, CHOLHDL in the last 48 hours.,   Pathology Results  (Last 10 years)    None      , Troponin No results for input(s): TROPONINI in the last 48 hours., All pertinent lab results from the last 24 hours have been reviewed. and   Recent Lab Results       01/15/20  0646   01/15/20  0548   01/14/20  2127   01/14/20  1630   01/14/20  1135        Albumin   3.3           Alkaline Phosphatase   150           ALT   127           Anion Gap   12           Aniso   Moderate           AST   47           BANDS   7.0           Baso #   CANCELED  Comment:  Result canceled by the ancillary.           Basophil%   1.0           BILIRUBIN TOTAL   0.7  Comment:  For infants and newborns, interpretation of results should be based  on gestational age, weight and in agreement with  clinical  observations.  Premature Infant recommended reference ranges:  Up to 24 hours.............<8.0 mg/dL  Up to 48 hours............<12.0 mg/dL  3-5 days..................<15.0 mg/dL  6-29 days.................<15.0 mg/dL             BUN, Bld   46           Calcium   7.9           Chloride   96           CO2   37           Creatinine   1.3           Differential Method   Manual           eGFR if    44           eGFR if non    38  Comment:  Calculation used to obtain the estimated glomerular filtration  rate (eGFR) is the CKD-EPI equation.              Eos #   CANCELED  Comment:  Result canceled by the ancillary.           Eosinophil%   3.0           Fragmented Cells   Occasional           Glucose   88           Gran%   79.0           Hematocrit   27.1           Hemoglobin   8.7           Hypo   Occasional           Immature Grans (Abs)   CANCELED  Comment:  Mild elevation in immature granulocytes is non specific and   can be seen in a variety of conditions including stress response,   acute inflammation, trauma and pregnancy. Correlation with other   laboratory and clinical findings is essential.    Result canceled by the ancillary.             Immature Granulocytes   CANCELED  Comment:  Result canceled by the ancillary.           Lymph #   CANCELED  Comment:  Result canceled by the ancillary.           Lymph%   2.0           MCH   34.7           MCHC   32.1           MCV   108           Mono #   CANCELED  Comment:  Result canceled by the ancillary.           Mono%   8.0           MPV   13.5           nRBC   0           Platelet Estimate   Appears normal           Platelets   223           POCT Glucose 72   173 268 206     Poik   Slight           Poly   Occasional           Potassium   3.5           PROTEIN TOTAL   5.5           RBC   2.51           RDW   24.6           Sodium   145           Tear Drop Cells   Occasional           WBC   11.17                                 Significant Imaging: CT scan: CT ABDOMEN PELVIS WITH CONTRAST: No results found for this visit on 01/10/20. and CT ABDOMEN PELVIS WITHOUT CONTRAST: No results found for this visit on 01/10/20., Echocardiogram:   2D echo with color flow doppler: No results found for this or any previous visit. and Transthoracic echo (TTE) complete (Cupid Only):   Results for orders placed or performed during the hospital encounter of 01/10/20   Echo Color Flow Doppler? Yes   Result Value Ref Range    BSA 1.67 m2    LA WIDTH 3.15 cm    PV PEAK VELOCITY 0.86 cm/s    LVIDD 4.22 3.5 - 6.0 cm    IVS 1.04 0.6 - 1.1 cm    PW 0.98 0.6 - 1.1 cm    Ao root annulus 2.89 cm    LVIDS 2.25 2.1 - 4.0 cm    FS 47 28 - 44 %    LA volume 43.84 cm3    STJ 2.72 cm    LV mass 140.23 g    LA size 3.17 cm    RVDD 3.08 cm    Left Ventricle Relative Wall Thickness 0.46 cm    AV mean gradient 8 mmHg    AV valve area 1.90 cm2    AV Velocity Ratio 0.76     AV index (prosthetic) 0.62     E/A ratio 3.03     E wave decelartion time 229.71 msec    IVRT 0.12 msec    LVOT diameter 1.97 cm    LVOT area 3.0 cm2    LVOT peak shimon 1.39 m/s    LVOT peak VTI 24.70 cm    Ao peak shimon 1.84 m/s    Ao VTI 39.59 cm    LVOT stroke volume 75.25 cm3    AV peak gradient 14 mmHg    MV Peak E Shimon 0.94 m/s    TR Max Shimon 2.98 m/s    MV Peak A Shimon 0.31 m/s    LV Systolic Volume 17.18 mL    LV Systolic Volume Index 10.6 mL/m2    LV Diastolic Volume 79.28 mL    LV Diastolic Volume Index 49.10 mL/m2    LA Volume Index 27.1 mL/m2    LV Mass Index 87 g/m2    RA Major Axis 5.80 cm    Left Atrium Minor Axis 5.13 cm    Left Atrium Major Axis 5.20 cm    Triscuspid Valve Regurgitation Peak Gradient 36 mmHg    Narrative    · Concentric left ventricular remodeling.  · Low normal left ventricular systolic function. The estimated ejection   fraction is 50%.  · Normal LV diastolic function.  · Normal right ventricular systolic function.  · Mild mitral sclerosis.  · There is moderate leaflet  calcification of the Mitral Valve.  · Moderate tricuspid regurgitation.  · Mild to moderate pulmonary hypertension present.       and X-Ray: CXR: X-Ray Chest 1 View (CXR):   Results for orders placed or performed during the hospital encounter of 01/10/20   X-Ray Chest 1 View    Narrative    EXAMINATION:  XR CHEST 1 VIEW    CLINICAL HISTORY:  Weakness    COMPARISON:  12/15/2019    FINDINGS:  Discoid atelectasis or scar is identified along the minor fissure.  Otherwise lungs are clear.  Heart size within normal limits.  Vascular calcifications of the aorta present.Left-sided single lead pacemaker is identified, which is new since the prior exam.  Regional bones are unremarkable.      Impression    1. Discoid atelectasis or scarring along the minor fissure.  No change since 12/15/2019.  2. Interval placement single lead left-sided pacemaker.  Negative for evidence of complicating process.      Electronically signed by: Joaquin Evangelista  Date:    01/10/2020  Time:    19:28    and X-Ray Chest PA and Lateral (CXR):   Results for orders placed or performed during the hospital encounter of 01/10/20   X-Ray Chest PA And Lateral    Narrative    EXAMINATION:  XR CHEST PA AND LATERAL    CLINICAL HISTORY:  cough;    TECHNIQUE:  PA and lateral views of the chest were performed.    COMPARISON:  01/10/2020    FINDINGS:  No detrimental change in the cardiopulmonary findings.  Small left pleural fluid is noted there is also likely a small right-sided pleural effusion with fluid seen in the right minor fissure.  There is pulmonary vascular congestion and mild interstitial edema.  The heart is at the upper limits of normal in size.  Calcified atheromatous disease affects the aorta.  Left-sided pacemaker is in place.  Age-appropriate degenerative changes affect the skeleton.      Impression    No significant interval change as compared to 01/10/2020.      Electronically signed by: Eileen Velasquez MD  Date:    01/13/2020  Time:    10:41     and KUB: X-Ray Abdomen AP 1 View (KUB): No results found for this visit on 01/10/20.  Assessment and Plan:          Active Diagnoses:    Diagnosis Date Noted POA    PRINCIPAL PROBLEM:  Acute on chronic congestive heart failure [I50.9] 02/01/2019 Yes    Choking due to food (regurgitated) [T17.320A] 01/14/2020 Yes    Weakness [R53.1] 01/13/2020 Yes    Acute cystitis without hematuria [N30.00] 01/13/2020 Yes    Anorexia [R63.0] 01/02/2020 Yes    Diabetes mellitus type 2, uncontrolled [E11.65] 09/02/2019 Yes    Anemia [D64.9] 09/01/2019 Yes    Acute on chronic respiratory failure with hypoxia [J96.21] 01/22/2019 Yes    Atrial fibrillation with rapid ventricular response [I48.91] 11/16/2018 Yes    Epigastric pain [R10.13] 11/13/2018 Yes    CKD (chronic kidney disease), stage III [N18.3] 05/10/2016 Yes    Insomnia [G47.00] 08/10/2015 Yes    COPD (chronic obstructive pulmonary disease) [J44.9] 05/03/2015 Yes    Cholelithiases [K80.20] 01/28/2015 Yes      Problems Resolved During this Admission:       VTE Risk Mitigation (From admission, onward)         Ordered     IP VTE HIGH RISK PATIENT  Once      01/10/20 2131     Place sequential compression device  Until discontinued      01/10/20 2131              Current Facility-Administered Medications   Medication    0.9%  NaCl infusion (for blood administration)    acetaminophen tablet 650 mg    amLODIPine tablet 5 mg    aspirin EC tablet 81 mg    baclofen tablet 10 mg    benzonatate capsule 100 mg    bisacodyl EC tablet 5 mg    cefTRIAXone (ROCEPHIN) 1 g in dextrose 5 % 50 mL IVPB    citalopram tablet 20 mg    dextrose 10% (D10W) Bolus    dextrose 10% (D10W) Bolus    glucagon (human recombinant) injection 1 mg    glucose chewable tablet 16 g    glucose chewable tablet 24 g    guaiFENesin 12 hr tablet 600 mg    HYDROcodone-acetaminophen 5-325 mg per tablet 1 tablet    insulin aspart U-100 pen 0-5 Units    insulin detemir U-100 pen 12 Units     levalbuterol nebulizer solution 1.25 mg    metoprolol tartrate (LOPRESSOR) split tablet 12.5 mg    mirtazapine disintegrating tablet 15 mg    ondansetron injection 4 mg    pantoprazole EC tablet 40 mg    predniSONE tablet 20 mg    promethazine (PHENERGAN) 12.5 mg in dextrose 5 % 50 mL IVPB    rOPINIRole tablet 1 mg    sodium chloride 0.9% flush 10 mL    traZODone tablet 50 mg        DX:Feels better/ wheezing resolved  YESSICA resolving  LFTs coming down off amiodarone 12/2019 for Aflutter  Acute on chronic resiratory failure with hypoxia  Anemia with recent transcfusion  PAF with PPM 12/2019 due to SSS  HTN  COPD; wheezing improved/ NO Pulmonary edema  DM2  Cholecystitis        Plan: Repeat echocardiogram showed LVEF 50%, LVEDP OK  Now off amiodarone and hold statin  Avoid further diuresis  F/u BMP LFTs  Adjust BB as needed  Start low dose Sotalol if further AF RVR, but for now increase BB, given multiple drug interactions     Transcribed by  Kahlil Falcon NP for Dr CANDIDA Metcalf  Cardiology  [facility]  I attest that I have personally seen and examined this patient. I have reviewed and discussed the management in detail as outlined above.

## 2020-01-15 NOTE — PT/OT/SLP PROGRESS
"Physical Therapy Treatment    Patient Name:  Chantell Herrera   MRN:  [id number]    Recommendations:     Discharge Recommendations:  home   Discharge Equipment Recommendations: none   Barriers to discharge: None    Assessment:     Chantell Herrera is a 84 y.o. female admitted with a medical diagnosis of Acute on chronic congestive heart failure.  She presents with the following impairments/functional limitations:  weakness, impaired endurance, impaired cardiopulmonary response to activity, impaired functional mobilty, impaired self care skills, gait instability. Patient seen on bed with oxygen at 3.0 l/m. Monitored patient's oxygen level with SPO2 at  Supine rest: 88 %. Provided patient diaphragmatic deep breathing ex x 5 reps and SPO2 went up to 91%. Ambulated pt using RW x 150 feet(twice) and maintained SPO2 at 91%. Provided LE therapeutic ex with rest periods and maintained SPO2 at 91%.    Rehab Prognosis: Good; patient would benefit from acute skilled PT services to address these deficits and reach maximum level of function.    Recent Surgery: * No surgery found *      Plan:     During this hospitalization, patient to be seen daily to address the identified rehab impairments via therapeutic activities, gait training, therapeutic exercises and progress toward the following goals:    · Plan of Care Expires:  01/20/20    Subjective     Chief Complaint: Shortness of breath upon exertion.  Patient/Family Comments/goals: "To get better and go home".  Pain/Comfort:  · Pain Rating 1: 0/10  · Pain Rating Post-Intervention 1: 0/10      Objective:     Communicated with patient prior to session.  Patient found HOB elevated with peripheral IV, telemetry upon PT entry to room.     General Precautions: Standard, fall   Orthopedic Precautions:N/A   Braces: N/A     Functional Mobility:  · Bed Mobility:     · Rolling Left:  independence  · Rolling Right: independence  · Scooting: independence  · Supine to Sit: independence  · Sit to " Supine: independence  · Transfers:     · Sit to Stand:  supervision with rolling walker  · Bed to Chair: supervision with  rolling walker  using  Step Transfer  · Toilet Transfer: supervision with  no AD  using  Stand Pivot  · Gait: RW Ambulation using portable oxygen at 3.0 l/m x 150 feet(twice) with Supervision. Reciprocal  gait with dec cedence.   · Balance: Stand Dynamic: Good grade.      AM-PAC 6 CLICK MOBILITY  Turning over in bed (including adjusting bedclothes, sheets and blankets)?: 4  Sitting down on and standing up from a chair with arms (e.g., wheelchair, bedside commode, etc.): 3  Moving from lying on back to sitting on the side of the bed?: 4  Moving to and from a bed to a chair (including a wheelchair)?: 3  Need to walk in hospital room?: 3  Climbing 3-5 steps with a railing?: 3  Basic Mobility Total Score: 20       Therapeutic Activities and Exercises:   Performed Gait trng using RW and portable oxygen with increased gait distances x 150 feet (twice) with supervision. Also performed LE strengthening and coordination ex. X 10 reps on each such as Ankle Pumps, LAQ, Side steps at sitting, Marching at sitting and Arm Push Ups at sitting.   PT discussed importance of patient's performance of Home Exercise Program (HEP) with pt verbalizing understanding.      Patient left HOB elevated with all lines intact, call button in reach, nurse notified and son present..    GOALS:   Multidisciplinary Problems     Physical Therapy Goals        Problem: Physical Therapy Goal    Goal Priority Disciplines Outcome Goal Variances Interventions   Physical Therapy Goal     PT, PT/OT Ongoing, Progressing     Description:  Goals to be met by: 20     Patient will increase functional independence with mobility by performin. Sit to stand transfer with Independent  2. Bed to chair/toilet transfer with Independentwith or without rolling walker using Stand Pivot TECHNIQUE  3. Gait  x 200  feet with Modified Independent  with or without rolling walker  4. Lower extremity exercise program x10 reps(2 sets) per handout, with assistance as needed                    Time Tracking:     PT Received On: 01/15/20  PT Start Time: 1231     PT Stop Time: 1302  PT Total Time (min): 31 min     Billable Minutes: Gait Training 15 and Therapeutic Exercise 15    Treatment Type: Treatment  PT/PTA: PT           Theodore Sellers, PT  01/15/2020

## 2020-01-15 NOTE — PHYSICIAN QUERY
PT Name: Chantell Herrera  MR #: [id number]    Physician Query Form - Consultant Diagnosis Clarification     CDS/: Mercy Fermin RN              Contact information:[phone]  This form is a permanent document in the medical record.     Query Date: January 15, 2020      By submitting this query, we are merely seeking further clarification of documentation.  Please utilize your independent clinical judgment when addressing the question(s) below.      The Medical record contains the following:   Diagnosis Supporting Clinical Information Location in Medical Record     YESSICA  CKD (chronic kidney disease), stage III   Contraction/prerenal azotemia.   Hold off on diuresis and allow for homeostasis.         Repeat BNP this morning.   Holding off on diuresis for now.   Creat is better 1.5 but basline WNL         YESSICA           stable YESSICA         BUN    47->  51-> 58-> 50-> 45  Cr      1.4-> 1.6-> 1.7->1.5->1.6  GFR    35-> 30-> 28-> 32-> 30 Progress Note 1/12       Hospital Medicine          Progress Note 1/13       Hospital Medicine          Consult 1/13       Cardiology      Progress Note 1/14       Cardiology        Lab 1/10, 1/11, 1/12, 1/13, 1/14,            Do you agree with the Consultants diagnosis of _________AKI __________________________?    [ x] Yes     [  ] No     [  ] Other/Clarification of findings:     [  ] Clinically undetermined

## 2020-01-15 NOTE — ASSESSMENT & PLAN NOTE
Add rocephin   Follow culture    4d ago    Urine Culture, Routine Abnormal    ENTEROCOCCUS FAECALIS   50,000 - 99,999 cfu/ml     Resulting Agency [facility]   Susceptibility      Enterococcus faecalis     CULTURE, URINE     Ampicillin <=2 mcg/mL Sensitive     Nitrofurantoin <=32 mcg/mL Sensitive     Tetracycline >8 mcg/mL Resistant     Vancomycin 2 mcg/mL Sensitive         Had 2 gms Rocephin on 1/10 and 1GM yesterday   1/15/2020 Day 4 IV rocephin; repeat U/A today

## 2020-01-15 NOTE — ASSESSMENT & PLAN NOTE
Speech eval   Given pt's history of reflux-related symptoms, globus sensation, no overt s/s of aspiration, frequent belching, and poor positioning during and after PO intake, pt likely presents with GI-related dysfunction(s) resulting in discomfort with PO intake. Recommend initiation of medical management of potential GI dysfunctions to develop most appropriate treatment plan. As pt's symptoms appear to be related to probable reflux, completion of MBSS is not warranted at this time. SLP to follow up for diet tolerance only.   Getting protonix

## 2020-01-15 NOTE — ASSESSMENT & PLAN NOTE
Given small dose of lasix yesterday wtihout much change. She seems fluid overloaded.  Will get last echo from [facility] and give 0.5mg of bumex this moring.  Recheck bmp at 3.    Needs repeat echo. (last one from 12/18)    Repeat BNP this morning.  Holding off on diuresis for now.  Creat is better 1.5 but basline WNL    Echo today- add demadex today and watch kidneys closely   EF 50%, normal diastolic fx, mild to moderate pulmonary HTN   1/15/2020 EF 50%, normal diastolic fx, mild to moderate pulmonary HTN; cards recommending avoid overdiueresing  Not getting diuretic currently; consider stopping norvasc due to LE complaints

## 2020-01-16 ENCOUNTER — OUTPATIENT CASE MANAGEMENT (OUTPATIENT)
Dept: ADMINISTRATIVE | Facility: OTHER | Age: 85
End: 2020-01-16

## 2020-01-16 LAB
ABO + RH BLD: NORMAL
ALBUMIN SERPL BCP-MCNC: 3.3 G/DL (ref 3.5–5.2)
ALP SERPL-CCNC: 149 U/L (ref 55–135)
ALT SERPL W/O P-5'-P-CCNC: 100 U/L (ref 10–44)
ANION GAP SERPL CALC-SCNC: 10 MMOL/L (ref 8–16)
ANISOCYTOSIS BLD QL SMEAR: ABNORMAL
AST SERPL-CCNC: 31 U/L (ref 10–40)
BACTERIA BLD CULT: NORMAL
BASOPHILS # BLD AUTO: ABNORMAL K/UL (ref 0–0.2)
BASOPHILS NFR BLD: 0 % (ref 0–1.9)
BILIRUB SERPL-MCNC: 0.7 MG/DL (ref 0.1–1)
BLD GP AB SCN CELLS X3 SERPL QL: NORMAL
BLD PROD TYP BPU: NORMAL
BLOOD UNIT EXPIRATION DATE: NORMAL
BLOOD UNIT TYPE CODE: 6200
BLOOD UNIT TYPE: NORMAL
BUN SERPL-MCNC: 37 MG/DL (ref 8–23)
CALCIUM SERPL-MCNC: 8.4 MG/DL (ref 8.7–10.5)
CHLORIDE SERPL-SCNC: 96 MMOL/L (ref 95–110)
CO2 SERPL-SCNC: 38 MMOL/L (ref 23–29)
CODING SYSTEM: NORMAL
CREAT SERPL-MCNC: 1 MG/DL (ref 0.5–1.4)
DIFFERENTIAL METHOD: ABNORMAL
DISPENSE STATUS: NORMAL
EOSINOPHIL # BLD AUTO: ABNORMAL K/UL (ref 0–0.5)
EOSINOPHIL NFR BLD: 5 % (ref 0–8)
ERYTHROCYTE [DISTWIDTH] IN BLOOD BY AUTOMATED COUNT: 24.3 % (ref 11.5–14.5)
EST. GFR  (AFRICAN AMERICAN): >60 ML/MIN/1.73 M^2
EST. GFR  (NON AFRICAN AMERICAN): 53 ML/MIN/1.73 M^2
GLUCOSE SERPL-MCNC: 67 MG/DL (ref 70–110)
HCT VFR BLD AUTO: 26.4 % (ref 37–48.5)
HGB BLD-MCNC: 8.4 G/DL (ref 12–16)
IMM GRANULOCYTES # BLD AUTO: ABNORMAL K/UL (ref 0–0.04)
IMM GRANULOCYTES NFR BLD AUTO: ABNORMAL % (ref 0–0.5)
LYMPHOCYTES # BLD AUTO: ABNORMAL K/UL (ref 1–4.8)
LYMPHOCYTES NFR BLD: 11 % (ref 18–48)
MCH RBC QN AUTO: 34.9 PG (ref 27–31)
MCHC RBC AUTO-ENTMCNC: 31.8 G/DL (ref 32–36)
MCV RBC AUTO: 110 FL (ref 82–98)
MONOCYTES # BLD AUTO: ABNORMAL K/UL (ref 0.3–1)
MONOCYTES NFR BLD: 14 % (ref 4–15)
NEUTROPHILS NFR BLD: 70 % (ref 38–73)
NRBC BLD-RTO: 0 /100 WBC
PLATELET # BLD AUTO: 212 K/UL (ref 150–350)
PLATELET BLD QL SMEAR: ABNORMAL
PMV BLD AUTO: 13.1 FL (ref 9.2–12.9)
POCT GLUCOSE: 122 MG/DL (ref 70–110)
POCT GLUCOSE: 126 MG/DL (ref 70–110)
POCT GLUCOSE: 230 MG/DL (ref 70–110)
POCT GLUCOSE: 277 MG/DL (ref 70–110)
POCT GLUCOSE: 54 MG/DL (ref 70–110)
POIKILOCYTOSIS BLD QL SMEAR: SLIGHT
POTASSIUM SERPL-SCNC: 3.4 MMOL/L (ref 3.5–5.1)
PROT SERPL-MCNC: 5.2 G/DL (ref 6–8.4)
RBC # BLD AUTO: 2.41 M/UL (ref 4–5.4)
SODIUM SERPL-SCNC: 144 MMOL/L (ref 136–145)
SPHEROCYTES BLD QL SMEAR: ABNORMAL
STOMATOCYTES BLD QL SMEAR: PRESENT
TRANS ERYTHROCYTES VOL PATIENT: NORMAL ML
WBC # BLD AUTO: 9.01 K/UL (ref 3.9–12.7)

## 2020-01-16 PROCEDURE — 63600175 PHARM REV CODE 636 W HCPCS: Mod: HCNC | Performed by: NURSE PRACTITIONER

## 2020-01-16 PROCEDURE — 25000003 PHARM REV CODE 250: Mod: HCNC | Performed by: NURSE PRACTITIONER

## 2020-01-16 PROCEDURE — 85027 COMPLETE CBC AUTOMATED: CPT | Mod: HCNC

## 2020-01-16 PROCEDURE — 97110 THERAPEUTIC EXERCISES: CPT | Mod: HCNC

## 2020-01-16 PROCEDURE — 94640 AIRWAY INHALATION TREATMENT: CPT | Mod: HCNC

## 2020-01-16 PROCEDURE — 99232 PR SUBSEQUENT HOSPITAL CARE,LEVL II: ICD-10-PCS | Mod: HCNC,,, | Performed by: FAMILY MEDICINE

## 2020-01-16 PROCEDURE — 25000003 PHARM REV CODE 250: Mod: HCNC | Performed by: FAMILY MEDICINE

## 2020-01-16 PROCEDURE — 11000001 HC ACUTE MED/SURG PRIVATE ROOM: Mod: HCNC

## 2020-01-16 PROCEDURE — 86920 COMPATIBILITY TEST SPIN: CPT | Mod: HCNC

## 2020-01-16 PROCEDURE — 25000242 PHARM REV CODE 250 ALT 637 W/ HCPCS: Mod: HCNC | Performed by: FAMILY MEDICINE

## 2020-01-16 PROCEDURE — 99900035 HC TECH TIME PER 15 MIN (STAT): Mod: HCNC

## 2020-01-16 PROCEDURE — P9021 RED BLOOD CELLS UNIT: HCPCS | Mod: HCNC

## 2020-01-16 PROCEDURE — 93005 ELECTROCARDIOGRAM TRACING: CPT | Mod: HCNC

## 2020-01-16 PROCEDURE — 36415 COLL VENOUS BLD VENIPUNCTURE: CPT | Mod: HCNC

## 2020-01-16 PROCEDURE — 63600175 PHARM REV CODE 636 W HCPCS: Mod: HCNC | Performed by: FAMILY MEDICINE

## 2020-01-16 PROCEDURE — 94761 N-INVAS EAR/PLS OXIMETRY MLT: CPT | Mod: HCNC

## 2020-01-16 PROCEDURE — 94664 DEMO&/EVAL PT USE INHALER: CPT | Mod: HCNC

## 2020-01-16 PROCEDURE — 80053 COMPREHEN METABOLIC PANEL: CPT | Mod: HCNC

## 2020-01-16 PROCEDURE — 86901 BLOOD TYPING SEROLOGIC RH(D): CPT | Mod: HCNC

## 2020-01-16 PROCEDURE — 99232 SBSQ HOSP IP/OBS MODERATE 35: CPT | Mod: HCNC,,, | Performed by: FAMILY MEDICINE

## 2020-01-16 PROCEDURE — 27000221 HC OXYGEN, UP TO 24 HOURS: Mod: HCNC

## 2020-01-16 PROCEDURE — 36430 TRANSFUSION BLD/BLD COMPNT: CPT | Mod: HCNC

## 2020-01-16 PROCEDURE — 85007 BL SMEAR W/DIFF WBC COUNT: CPT | Mod: HCNC

## 2020-01-16 RX ORDER — HYDROCODONE BITARTRATE AND ACETAMINOPHEN 500; 5 MG/1; MG/1
TABLET ORAL
Status: DISCONTINUED | OUTPATIENT
Start: 2020-01-16 | End: 2020-01-20 | Stop reason: HOSPADM

## 2020-01-16 RX ORDER — METOPROLOL TARTRATE 25 MG/1
25 TABLET, FILM COATED ORAL 2 TIMES DAILY
Status: DISCONTINUED | OUTPATIENT
Start: 2020-01-16 | End: 2020-01-20 | Stop reason: HOSPADM

## 2020-01-16 RX ORDER — FUROSEMIDE 20 MG/1
20 TABLET ORAL DAILY
Status: DISCONTINUED | OUTPATIENT
Start: 2020-01-16 | End: 2020-01-19

## 2020-01-16 RX ORDER — FUROSEMIDE 10 MG/ML
20 INJECTION INTRAMUSCULAR; INTRAVENOUS ONCE
Status: COMPLETED | OUTPATIENT
Start: 2020-01-16 | End: 2020-01-16

## 2020-01-16 RX ORDER — POTASSIUM CHLORIDE 20 MEQ/1
40 TABLET, EXTENDED RELEASE ORAL ONCE
Status: COMPLETED | OUTPATIENT
Start: 2020-01-16 | End: 2020-01-16

## 2020-01-16 RX ADMIN — AMLODIPINE BESYLATE 5 MG: 5 TABLET ORAL at 08:01

## 2020-01-16 RX ADMIN — LEVALBUTEROL 1.25 MG: 1.25 SOLUTION, CONCENTRATE RESPIRATORY (INHALATION) at 06:01

## 2020-01-16 RX ADMIN — METOPROLOL TARTRATE 25 MG: 25 TABLET ORAL at 08:01

## 2020-01-16 RX ADMIN — LEVALBUTEROL 1.25 MG: 1.25 SOLUTION, CONCENTRATE RESPIRATORY (INHALATION) at 11:01

## 2020-01-16 RX ADMIN — FUROSEMIDE 20 MG: 10 INJECTION, SOLUTION INTRAMUSCULAR; INTRAVENOUS at 04:01

## 2020-01-16 RX ADMIN — METOPROLOL TARTRATE 12.5 MG: 25 TABLET, FILM COATED ORAL at 08:01

## 2020-01-16 RX ADMIN — LEVALBUTEROL 1.25 MG: 1.25 SOLUTION, CONCENTRATE RESPIRATORY (INHALATION) at 12:01

## 2020-01-16 RX ADMIN — CEFTRIAXONE 1 G: 1 INJECTION, SOLUTION INTRAVENOUS at 10:01

## 2020-01-16 RX ADMIN — INSULIN ASPART 1 UNITS: 100 INJECTION, SOLUTION INTRAVENOUS; SUBCUTANEOUS at 08:01

## 2020-01-16 RX ADMIN — TRAZODONE HYDROCHLORIDE 50 MG: 50 TABLET ORAL at 08:01

## 2020-01-16 RX ADMIN — PANTOPRAZOLE SODIUM 40 MG: 40 TABLET, DELAYED RELEASE ORAL at 08:01

## 2020-01-16 RX ADMIN — CITALOPRAM HYDROBROMIDE 20 MG: 10 TABLET ORAL at 08:01

## 2020-01-16 RX ADMIN — PREDNISONE 20 MG: 20 TABLET ORAL at 08:01

## 2020-01-16 RX ADMIN — ASPIRIN 81 MG: 81 TABLET, COATED ORAL at 08:01

## 2020-01-16 RX ADMIN — MIRTAZAPINE 15 MG: 15 TABLET, ORALLY DISINTEGRATING ORAL at 08:01

## 2020-01-16 RX ADMIN — INSULIN ASPART 2 UNITS: 100 INJECTION, SOLUTION INTRAVENOUS; SUBCUTANEOUS at 04:01

## 2020-01-16 RX ADMIN — GUAIFENESIN 600 MG: 600 TABLET, EXTENDED RELEASE ORAL at 08:01

## 2020-01-16 RX ADMIN — POTASSIUM CHLORIDE 40 MEQ: 1500 TABLET, EXTENDED RELEASE ORAL at 08:01

## 2020-01-16 RX ADMIN — FUROSEMIDE 20 MG: 20 TABLET ORAL at 12:01

## 2020-01-16 RX ADMIN — ROPINIROLE HYDROCHLORIDE 1 MG: 0.5 TABLET, FILM COATED ORAL at 08:01

## 2020-01-16 RX ADMIN — Medication 16 G: at 07:01

## 2020-01-16 NOTE — SUBJECTIVE & OBJECTIVE
Interval History: weakness, anemia and COPD    Review of Systems  Objective:     Vital Signs (Most Recent):  Temp: 96.7 °F (35.9 °C) (01/16/20 0716)  Pulse: 62 (01/16/20 1019)  Resp: 20 (01/16/20 0716)  BP: (!) 156/71 (01/16/20 0716)  SpO2: (!) 90 % (01/16/20 0716) Vital Signs (24h Range):  Temp:  [96.4 °F (35.8 °C)-98.4 °F (36.9 °C)] 96.7 °F (35.9 °C)  Pulse:  [59-88] 62  Resp:  [16-22] 20  SpO2:  [82 %-94 %] 90 %  BP: (101-156)/(57-73) 156/71     Weight: 60 kg (132 lb 4.4 oz)  Body mass index is 24.19 kg/m².    Intake/Output Summary (Last 24 hours) at 1/16/2020 1028  Last data filed at 1/16/2020 0600  Gross per 24 hour   Intake --   Output 700 ml   Net -700 ml      Physical Exam    Significant Labs:   CBC:   Recent Labs   Lab 01/15/20  0548 01/16/20  0557   WBC 11.17 9.01   HGB 8.7* 8.4*   HCT 27.1* 26.4*    212     CMP:   Recent Labs   Lab 01/15/20  0548 01/16/20  0556    144   K 3.5 3.4*   CL 96 96   CO2 37* 38*   GLU 88 67*   BUN 46* 37*   CREATININE 1.3 1.0   CALCIUM 7.9* 8.4*   PROT 5.5* 5.2*   ALBUMIN 3.3* 3.3*   BILITOT 0.7 0.7   ALKPHOS 150* 149*   AST 47* 31   * 100*   ANIONGAP 12 10   EGFRNONAA 38* 53*       Significant Imaging: U/S: I have reviewed all pertinent results/findings within the past 24 hours and my personal findings are:  anemic  I have reviewed all pertinent imaging results/findings within the past 24 hours.

## 2020-01-16 NOTE — PROGRESS NOTES
[facility]  Cardiology  Progress Note    Patient Name: Chantell Herrera  MRN: [id number]  Admission Date: 1/10/2020  Hospital Length of Stay: 3 days  Code Status: Full Code   Attending Physician: Kari Gaspar MD   Primary Care Physician: Kari Gaspar MD  Expected Discharge Date:   Principal Problem:Acute on chronic congestive heart failure    Subjective:     Chief Complaint:  Weakness, SOB, abdominal pain, N/V     HPI:   84 year old female with history of PAF s/p PPM, COPD, carotid artery stenosis, HHD, bradycardia, dyslipidemia, HTN, GERD, presented to ED with c/o increased weakness, leg swelling, abdominal pain, N/V.        ROS   Constitution: Positive for decreased appetite, malaise/fatigue and weight loss. Negative for chills, diaphoresis, fever and night sweats.   HENT: Negative.    Eyes: Negative.    Cardiovascular: Positive for dyspnea on exertion and leg swelling. Negative for chest pain, claudication, cyanosis, irregular heartbeat, near-syncope, orthopnea, palpitations, paroxysmal nocturnal dyspnea and syncope.   Respiratory: Positive for shortness of breath and wheezing. Negative for cough, hemoptysis, sleep disturbances due to breathing, snoring and sputum production.    Endocrine: Negative.    Hematologic/Lymphatic: Negative.    Skin: Negative.    Musculoskeletal: Positive for muscle weakness. Negative for arthritis, back pain, falls, gout, joint pain, joint swelling, muscle cramps, myalgias, neck pain and stiffness.   Gastrointestinal: Positive for abdominal pain, anorexia, nausea and vomiting. Negative for bloating, change in bowel habit, bowel incontinence, constipation, diarrhea, dysphagia, excessive appetite, flatus, heartburn, hematemesis, hematochezia, hemorrhoids, jaundice and melena.   Genitourinary: Negative.    Neurological: Positive for weakness. Negative for aphonia, brief paralysis, difficulty with concentration, disturbances in coordination, excessive daytime  sleepiness, dizziness, focal weakness, headaches, light-headedness, loss of balance, numbness, paresthesias, seizures, sensory change, tremors and vertigo.   Psychiatric/Behavioral: Negative.    Allergic/Immunologic: Negative  Objective:     Vital Signs (Most Recent):  Temp: 96.7 °F (35.9 °C) (01/16/20 0716)  Pulse: 65 (01/16/20 0716)  Resp: 20 (01/16/20 0716)  BP: (!) 156/71 (01/16/20 0716)  SpO2: (!) 90 % (01/16/20 0716) Vital Signs (24h Range):  Temp:  [96.4 °F (35.8 °C)-98.4 °F (36.9 °C)] 96.7 °F (35.9 °C)  Pulse:  [59-94] 65  Resp:  [16-22] 20  SpO2:  [82 %-94 %] 90 %  BP: (101-156)/(57-73) 156/71     Weight: 60 kg (132 lb 4.4 oz)  Body mass index is 24.19 kg/m².    SpO2: (!) 90 %  O2 Device (Oxygen Therapy): nasal cannula      Intake/Output Summary (Last 24 hours) at 1/16/2020 0807  Last data filed at 1/16/2020 0600  Gross per 24 hour   Intake 240 ml   Output 700 ml   Net -460 ml       Lines/Drains/Airways     Peripheral Intravenous Line                 Peripheral IV - Single Lumen 01/10/20 1920 20 G Right Antecubital 5 days                Physical Exam  Constitutional: She is oriented to person, place, and time. She appears well-developed and well-nourished. No distress.   HENT:   Head: Normocephalic and atraumatic.   Neck: Normal range of motion. Neck supple. JVD present. No tracheal deviation present. No thyromegaly present.   Cardiovascular: Intact distal pulses. Exam reveals no gallop and no friction rub.   Murmur (2/6 MARKOS) heard.  Pulmonary/Chest: Effort normal. No stridor. No respiratory distress. She has wheezes. She has rales. She exhibits no tenderness.   Abdominal: Soft. Bowel sounds are normal. She exhibits no distension and no mass. There is tenderness. There is no rebound and no guarding.   Musculoskeletal: Normal range of motion. She exhibits edema (1+ pitting edema BLE). She exhibits no tenderness or deformity.   Neurological: She is alert and oriented to person, place, and time.   Skin: Skin  is warm and dry. No rash noted. She is not diaphoretic. No erythema. No pallor.   Psychiatric: She has a normal mood and affect. Her behavior is normal. Judgment and thought content normal.      Significant Labs:   ABG: No results for input(s): PH, PCO2, HCO3, POCSATURATED, BE in the last 48 hours., Blood Culture: No results for input(s): LABBLOO in the last 48 hours., BMP:   Recent Labs   Lab 01/15/20  0548 01/16/20  0556   GLU 88 67*    144   K 3.5 3.4*   CL 96 96   CO2 37* 38*   BUN 46* 37*   CREATININE 1.3 1.0   CALCIUM 7.9* 8.4*   , CMP   Recent Labs   Lab 01/15/20  0548 01/16/20  0556    144   K 3.5 3.4*   CL 96 96   CO2 37* 38*   GLU 88 67*   BUN 46* 37*   CREATININE 1.3 1.0   CALCIUM 7.9* 8.4*   PROT 5.5* 5.2*   ALBUMIN 3.3* 3.3*   BILITOT 0.7 0.7   ALKPHOS 150* 149*   AST 47* 31   * 100*   ANIONGAP 12 10   ESTGFRAFRICA 44* >60   EGFRNONAA 38* 53*   , CBC   Recent Labs   Lab 01/15/20  0548 01/16/20  0557   WBC 11.17 9.01   HGB 8.7* 8.4*   HCT 27.1* 26.4*    212   , INR No results for input(s): INR, PROTIME in the last 48 hours., Lipid Panel No results for input(s): CHOL, HDL, LDLCALC, TRIG, CHOLHDL in the last 48 hours.,   Pathology Results  (Last 10 years)    None      , Troponin No results for input(s): TROPONINI in the last 48 hours., All pertinent lab results from the last 24 hours have been reviewed. and   Recent Lab Results       01/16/20  0655   01/16/20  0557   01/16/20  0556   01/15/20  2021   01/15/20  1856        Albumin     3.3         Alkaline Phosphatase     149         ALT     100         Anion Gap     10         Aniso   Moderate           Appearance, UA               AST     31         Baso #   CANCELED  Comment:  Result canceled by the ancillary.           Basophil%   0.0           Bilirubin (UA)               BILIRUBIN TOTAL     0.7  Comment:  For infants and newborns, interpretation of results should be based  on gestational age, weight and in agreement with  clinical  observations.  Premature Infant recommended reference ranges:  Up to 24 hours.............<8.0 mg/dL  Up to 48 hours............<12.0 mg/dL  3-5 days..................<15.0 mg/dL  6-29 days.................<15.0 mg/dL           BUN, Bld     37         Calcium     8.4         Chloride     96         CO2     38         Color, UA               Creatinine     1.0         Differential Method   Manual           eGFR if      >60         eGFR if non      53  Comment:  Calculation used to obtain the estimated glomerular filtration  rate (eGFR) is the CKD-EPI equation.            Eos #   CANCELED  Comment:  Result canceled by the ancillary.           Eosinophil%   5.0           Glucose     67         Glucose, UA               Gran%   70.0           Hematocrit   26.4           Hemoglobin   8.4           Immature Grans (Abs)   CANCELED  Comment:  Mild elevation in immature granulocytes is non specific and   can be seen in a variety of conditions including stress response,   acute inflammation, trauma and pregnancy. Correlation with other   laboratory and clinical findings is essential.    Result canceled by the ancillary.             Immature Granulocytes   CANCELED  Comment:  Result canceled by the ancillary.           Ketones, UA               Leukocytes, UA               Lymph #   CANCELED  Comment:  Result canceled by the ancillary.           Lymph%   11.0           MCH   34.9           MCHC   31.8           MCV   110           Mono #   CANCELED  Comment:  Result canceled by the ancillary.           Mono%   14.0           MPV   13.1           NITRITE UA               nRBC   0           Occult Blood UA               pH, UA               Platelet Estimate   Appears normal           Platelets   212           POCT Glucose 54     244 179     Poik   Slight           Potassium     3.4         PROTEIN TOTAL     5.2         Protein, UA               RBC   2.41           RDW   24.3            Sodium     144         Specific Creola, UA               Specimen UA               Spherocytes   Occasional           Stomatocytes   Present           UROBILINOGEN UA               WBC   9.01                            01/15/20  1633   01/15/20  1415   01/15/20  1137        Albumin           Alkaline Phosphatase           ALT           Anion Gap           Aniso           Appearance, UA   Clear       AST           Baso #           Basophil%           Bilirubin (UA)   Negative       BILIRUBIN TOTAL           BUN, Bld           Calcium           Chloride           CO2           Color, UA   Yellow       Creatinine           Differential Method           eGFR if            eGFR if non            Eos #           Eosinophil%           Glucose           Glucose, UA   Negative       Gran%           Hematocrit           Hemoglobin           Immature Grans (Abs)           Immature Granulocytes           Ketones, UA   Negative       Leukocytes, UA   Negative       Lymph #           Lymph%           MCH           MCHC           MCV           Mono #           Mono%           MPV           NITRITE UA   Negative       nRBC           Occult Blood UA   Negative       pH, UA   6.0       Platelet Estimate           Platelets           POCT Glucose 145   105     Poik           Potassium           PROTEIN TOTAL           Protein, UA   Trace  Comment:  Recommend a 24 hour urine protein or a urine   protein/creatinine ratio if globulin induced proteinuria is  clinically suspected.         RBC           RDW           Sodium           Specific Creola, UA   1.015       Specimen UA   Urine, Clean Catch       Spherocytes           Stomatocytes           UROBILINOGEN UA   1.0       WBC                 Significant Imaging: CT scan: CT ABDOMEN PELVIS WITH CONTRAST: No results found for this visit on 01/10/20. and CT ABDOMEN PELVIS WITHOUT CONTRAST: No results found for this visit on 01/10/20., Echocardiogram:   2D  echo with color flow doppler: No results found for this or any previous visit. and Transthoracic echo (TTE) complete (Cupid Only):   Results for orders placed or performed during the hospital encounter of 01/10/20   Echo Color Flow Doppler? Yes   Result Value Ref Range    BSA 1.67 m2    LA WIDTH 3.15 cm    PV PEAK VELOCITY 0.86 cm/s    LVIDD 4.22 3.5 - 6.0 cm    IVS 1.04 0.6 - 1.1 cm    PW 0.98 0.6 - 1.1 cm    Ao root annulus 2.89 cm    LVIDS 2.25 2.1 - 4.0 cm    FS 47 28 - 44 %    LA volume 43.84 cm3    STJ 2.72 cm    LV mass 140.23 g    LA size 3.17 cm    RVDD 3.08 cm    Left Ventricle Relative Wall Thickness 0.46 cm    AV mean gradient 8 mmHg    AV valve area 1.90 cm2    AV Velocity Ratio 0.76     AV index (prosthetic) 0.62     E/A ratio 3.03     E wave decelartion time 229.71 msec    IVRT 0.12 msec    LVOT diameter 1.97 cm    LVOT area 3.0 cm2    LVOT peak shimon 1.39 m/s    LVOT peak VTI 24.70 cm    Ao peak shimon 1.84 m/s    Ao VTI 39.59 cm    LVOT stroke volume 75.25 cm3    AV peak gradient 14 mmHg    MV Peak E Shimon 0.94 m/s    TR Max Shimon 2.98 m/s    MV Peak A Shimon 0.31 m/s    LV Systolic Volume 17.18 mL    LV Systolic Volume Index 10.6 mL/m2    LV Diastolic Volume 79.28 mL    LV Diastolic Volume Index 49.10 mL/m2    LA Volume Index 27.1 mL/m2    LV Mass Index 87 g/m2    RA Major Axis 5.80 cm    Left Atrium Minor Axis 5.13 cm    Left Atrium Major Axis 5.20 cm    Triscuspid Valve Regurgitation Peak Gradient 36 mmHg    Narrative    · Concentric left ventricular remodeling.  · Low normal left ventricular systolic function. The estimated ejection   fraction is 50%.  · Normal LV diastolic function.  · Normal right ventricular systolic function.  · Mild mitral sclerosis.  · There is moderate leaflet calcification of the Mitral Valve.  · Moderate tricuspid regurgitation.  · Mild to moderate pulmonary hypertension present.       and X-Ray: CXR: X-Ray Chest 1 View (CXR):   Results for orders placed or performed during the  hospital encounter of 01/10/20   X-Ray Chest 1 View    Narrative    EXAMINATION:  XR CHEST 1 VIEW    CLINICAL HISTORY:  Weakness    COMPARISON:  12/15/2019    FINDINGS:  Discoid atelectasis or scar is identified along the minor fissure.  Otherwise lungs are clear.  Heart size within normal limits.  Vascular calcifications of the aorta present.Left-sided single lead pacemaker is identified, which is new since the prior exam.  Regional bones are unremarkable.      Impression    1. Discoid atelectasis or scarring along the minor fissure.  No change since 12/15/2019.  2. Interval placement single lead left-sided pacemaker.  Negative for evidence of complicating process.      Electronically signed by: Joaquin Evangelista  Date:    01/10/2020  Time:    19:28    and X-Ray Chest PA and Lateral (CXR):   Results for orders placed or performed during the hospital encounter of 01/10/20   X-Ray Chest PA And Lateral    Narrative    EXAMINATION:  XR CHEST PA AND LATERAL    CLINICAL HISTORY:  cough;    TECHNIQUE:  PA and lateral views of the chest were performed.    COMPARISON:  01/10/2020    FINDINGS:  No detrimental change in the cardiopulmonary findings.  Small left pleural fluid is noted there is also likely a small right-sided pleural effusion with fluid seen in the right minor fissure.  There is pulmonary vascular congestion and mild interstitial edema.  The heart is at the upper limits of normal in size.  Calcified atheromatous disease affects the aorta.  Left-sided pacemaker is in place.  Age-appropriate degenerative changes affect the skeleton.      Impression    No significant interval change as compared to 01/10/2020.      Electronically signed by: Eileen Velasquez MD  Date:    01/13/2020  Time:    10:41    and KUB: X-Ray Abdomen AP 1 View (KUB): No results found for this visit on 01/10/20.  Assessment and Plan:         Active Diagnoses:    Diagnosis Date Noted POA    PRINCIPAL PROBLEM:  Acute on chronic congestive heart  failure [I50.9] 02/01/2019 Yes    Choking due to food (regurgitated) [T17.320A] 01/14/2020 Yes    Weakness [R53.1] 01/13/2020 Yes    Acute cystitis without hematuria [N30.00] 01/13/2020 Yes    Anorexia [R63.0] 01/02/2020 Yes    Cough with hemoptysis [R04.2] 12/03/2019 Yes    Diabetes mellitus type 2, uncontrolled [E11.65] 09/02/2019 Yes    Anemia [D64.9] 09/01/2019 Yes    Acute on chronic respiratory failure with hypoxia [J96.21] 01/22/2019 Yes    Atrial fibrillation with rapid ventricular response [I48.91] 11/16/2018 Yes    Epigastric pain [R10.13] 11/13/2018 Yes    Chronic respiratory failure with hypoxia [J96.11] 05/17/2017 Yes    CKD (chronic kidney disease), stage III [N18.3] 05/10/2016 Yes    Insomnia [G47.00] 08/10/2015 Yes    COPD (chronic obstructive pulmonary disease) [J44.9] 05/03/2015 Yes    Cholelithiases [K80.20] 01/28/2015 Yes      Problems Resolved During this Admission:       VTE Risk Mitigation (From admission, onward)         Ordered     IP VTE HIGH RISK PATIENT  Once      01/10/20 2131     Place sequential compression device  Until discontinued      01/10/20 2131              Current Facility-Administered Medications   Medication    0.9%  NaCl infusion (for blood administration)    acetaminophen tablet 650 mg    amLODIPine tablet 5 mg    aspirin EC tablet 81 mg    baclofen tablet 10 mg    benzonatate capsule 100 mg    bisacodyl EC tablet 5 mg    cefTRIAXone (ROCEPHIN) 1 g in dextrose 5 % 50 mL IVPB    citalopram tablet 20 mg    dextrose 10% (D10W) Bolus    dextrose 10% (D10W) Bolus    glucagon (human recombinant) injection 1 mg    glucose chewable tablet 16 g    glucose chewable tablet 24 g    guaiFENesin 12 hr tablet 600 mg    HYDROcodone-acetaminophen 5-325 mg per tablet 1 tablet    insulin aspart U-100 pen 0-5 Units    insulin detemir U-100 pen 8 Units    levalbuterol nebulizer solution 1.25 mg    metoprolol tartrate (LOPRESSOR) split tablet 12.5 mg     mirtazapine disintegrating tablet 15 mg    ondansetron injection 4 mg    pantoprazole EC tablet 40 mg    predniSONE tablet 20 mg    promethazine (PHENERGAN) 12.5 mg in dextrose 5 % 50 mL IVPB    rOPINIRole tablet 1 mg    sodium chloride 0.9% flush 10 mL    traZODone tablet 50 mg          DX:Feels better/ wheezing mostly resolved  YESSICA resolved  LFTs coming down off amiodarone 12/2019 for Aflutter  Acute on chronic resiratory failure with hypoxia  Anemia with recent transcfusion  PAF with PPM 12/2019 due to SSS  HTN  COPD; wheezing improved/ NO Pulmonary edema  DM2  Cholecystitis        Plan:   Correct K  Repeat echocardiogram showed LVEF 50%, LVEDP OK  Now off amiodarone and hold statin  Adjust diuretic dose PRN  F/u BMP LFTs  Adjust BB as needed; double metoprolol  Start low dose Sotalol if further AF RVR, but for now increase BB, given multiple drug interactions      Transcribed by   Kahlil Falcon NP for Dr CANDIDA Metcalf  Cardiology    I attest that I have personally seen and examined this patient. I have reviewed and discussed the management in detail as outlined above.  [facility]

## 2020-01-16 NOTE — PT/OT/SLP PROGRESS
"Physical Therapy Treatment    Patient Name:  Chantell Herrera   MRN:  [id number]    Recommendations:     Discharge Recommendations:  home with home health   Discharge Equipment Recommendations: none   Barriers to discharge: None    Assessment:     Chantell Herrera is a 84 y.o. female admitted with a medical diagnosis of Acute on chronic congestive heart failure.  She presents with the following impairments/functional limitations:  weakness, impaired endurance, impaired cardiopulmonary response to activity, impaired functional mobilty, impaired self care skills, gait instability. Patient seen on bed with Bipap. Patient complain shortness of breath and likes to go to sleep. Patient agreed with PT tx for LE therapeutic ex but refused to walk today. Monitored patient's oxygen level SPO2 at rest: 85%. Provided patient with octavia breathing ex and helped to bumped up oxygen level to 90%. Performed LE therapeutic  Ex x 10 reps  Provided with rest periods. SPO2 dropped down to 85%. Left patient on bed with SPO2 at 88% after another deep breathing ex.    Rehab Prognosis: Fair; patient would benefit from acute skilled PT services to address these deficits and reach maximum level of function.    Recent Surgery: * No surgery found *      Plan:     During this hospitalization, patient to be seen daily to address the identified rehab impairments via gait training, therapeutic activities, therapeutic exercises and progress toward the following goals:    · Plan of Care Expires:  01/20/20    Subjective     Chief Complaint: Shortness of breath  Patient/Family Comments/goals: "To get better and go home".  Pain/Comfort:  · Pain Rating 1: 0/10  · Pain Rating Post-Intervention 1: 0/10      Objective:     Communicated with patient  prior to session.  Patient found HOB elevated with peripheral IV, telemetry upon PT entry to room.     General Precautions: Standard, fall   Orthopedic Precautions:N/A   Braces: N/A     Functional Mobility:  · Bed " Mobility:     · Rolling Left:  independence  · Rolling Right: independence  · Scooting: independence  · Supine to Sit: independence  · Sit to Supine: independence      AM-PAC 6 CLICK MOBILITY  Turning over in bed (including adjusting bedclothes, sheets and blankets)?: 4  Sitting down on and standing up from a chair with arms (e.g., wheelchair, bedside commode, etc.): 3  Moving from lying on back to sitting on the side of the bed?: 4  Moving to and from a bed to a chair (including a wheelchair)?: 3  Need to walk in hospital room?: 3  Climbing 3-5 steps with a railing?: 3  Basic Mobility Total Score: 20       Therapeutic Activities and Exercises:   Performed Bilateral LE eccentric strengthening ex x 10 reps on each such as Ankle Pumps, Heel Slides, Hip abd/add and Long arch quads. Provided pt with rest periods and deep breathing  in between ex.    Patient left HOB elevated with all lines intact, call button in reach and nurse notified..    GOALS:   Multidisciplinary Problems     Physical Therapy Goals        Problem: Physical Therapy Goal    Goal Priority Disciplines Outcome Goal Variances Interventions   Physical Therapy Goal     PT, PT/OT Ongoing, Progressing     Description:  Goals to be met by: 20     Patient will increase functional independence with mobility by performin. Sit to stand transfer with Independent  2. Bed to chair/toilet transfer with Independentwith or without rolling walker using Stand Pivot TECHNIQUE  3. Gait  x 200  feet with Modified Independent with or without rolling walker  4. Lower extremity exercise program x10 reps(2 sets) per handout, with assistance as needed                    Time Tracking:     PT Received On: 20  PT Start Time: 1231     PT Stop Time: 1246  PT Total Time (min): 15 min     Billable Minutes: Therapeutic Exercise 15    Treatment Type: Treatment  PT/PTA: PT           Theodore Sellers, PT  2020

## 2020-01-16 NOTE — ASSESSMENT & PLAN NOTE
Seems at baseline.  Will give small prednisone dose - 20mg daily - cont this.  Cont nebs.  Start rocephin.  Start on mucinex.  IPV ordered.  1/15 acapella ordered yesterday - still having intermittent wheezing   1/16 stable ; no wheezing this am; at baseline

## 2020-01-16 NOTE — ASSESSMENT & PLAN NOTE
AOCD - iron studies confirm this.  Has had iron in the past.  Will check for any signs of blood loss.  Has been asked to stop her eliquis.  While she is symptomatic and anemic, will try to diuresis before giving blood.  In the past she had flash pulm edema from transfusion vs transfusion reaction.    Referred to heme/onc in past  Now lower than 8- ordered 1 units PRBC, pre med and give over 4 hours   1/15/2020 H&H 8.7/27.1     1-16 Hct is 26, so since she responded well to blood will transfuse with one unit today  And d'c in the am

## 2020-01-16 NOTE — PROGRESS NOTES
[facility] Medicine  Progress Note    Patient Name: Chantell Herrera  MRN: [id number]  Patient Class: IP- Inpatient   Admission Date: 1/10/2020  Length of Stay: 3 days  Attending Physician: Kari Gaspar MD  Primary Care Provider: Kari Gaspar MD        Subjective:     Principal Problem:Acute on chronic congestive heart failure        HPI:  84 year old female with chronic respiratory failure and recent admission for pacer placement comes in because of weakness. She has been feeling more and more swollen, and she is getting more weak. She says that she has also had a lot of abdominal pain especially when getting up to walk. She feels nauseated and sick to her stomach when this happens. She has not been taking any diuretics. Last EF is unknown.    Overview/Hospital Course:  1/12/2020 Patient with continued weakness. O2 doing okay. Slight improvement of fluid, but only diuresed about 1 L.   Blood counts improved with diuresis - no transfusion at this point.  Overall about the same.    1/13/2020 pt renal and liver function elevated. Prior to recent cardiac procedure both were fine. She has liver u/s yesterday. Beside a gallstone without inflammation read as normal. On amiodarone for A fib. Discussed with Dr Metcalf this am as she is ventricular paced on tele and HR 60 will hold amiodarone and cont BB. Follow LFT. Also CXR without congestion. She was sent home on lasix but denies taking it although felt swollen. Given 20mg IV lasix 1/10 and bumex 0.5mg IV x 1 on 1/11. BNP was 2990 on admission and went up to 3124 2 days later and 2909 today.     Cbc pending 7.6 today. Ferritin very high as well as elevated fe studies. CBC shows eleavted MCV and MCH. Folate and B12 2 months ago normal, will repeat.     1/14/2020 Pt NAD this morning. Notes swelling much better but has persistent junky cough during exam, and still wheezing     Negative -738, Still with significant SANTIAGO with minimal exertion,  "O2 sat 90-92% on 3LNC   Afebrile, Day 3 Rocephin for UTI, ( Had 2 grams on 1/10/20 and 1Gm yesterday    ENTEROCOCCUS FAECALIS   50,000 - 99,999 cfu/ml      Resulting Agency [facility]   Susceptibility      Enterococcus faecalis     CULTURE, URINE     Ampicillin <=2 mcg/mL Sensitive     Nitrofurantoin <=32 mcg/mL Sensitive     Tetracycline >8 mcg/mL Resistant     Vancomycin 2 mcg/mL Sensitive       Blood culture x1 done- NGTD x 4d H&H up to 8.9/27.8 after 1UPRBCs from 7.6/23.5  AST 76>82, >158 , creat 1.6   Was noted to have choking issues, ST eval ordered  Pt is being Rxed for CHF, a UTI and COPD issues     1/15/2020 Day 4 IV  Rocephin for UTI,ENTEROCOCCUS FAECALIS sensitive to ampicillin    H&H 8.7/27.1   Creat 1.6>1.3, LFTs better AST 82>42, >127, since off amiodarone   o2 sat 91-95% still on 3LNC (USES 3LNC at home 24hrs- states her sates are usually 91-92% , still with some SANTIAGO, intermittent wheezing, prednisone 20mg daily   xopenex q 6   Notes soreness to legs better but had "weeping" overnight   · RW Ambulation using portable oxygen x 75 feet with Supervision; goal is 200ft     1/16/2020 Day 5 IV  Rocephin for UTI,ENTEROCOCCUS FAECALIS sensitive to ampicillin   Repeat U/A yesterday good, leukocytes, nitrites negative, WBC 9.01, afebrile   H&H 8.4/26.4, k+ 3.4  Creat 1.3>1.0 BUN 46>37, AST 47>31- now normal, >100   No further amiodarone, Cards recommending increase current BB , pt has PPM   Stopped amlodipine yesterday due to LE complaints of swelling  Low BS 60s this am despite decreasing levemir from 12 to 8- will stop insulin  Will transfer with 1 unit PRBCs today and d'c in AM    Interval History: weakness, anemia and COPD    Review of Systems  Objective:     Vital Signs (Most Recent):  Temp: 96.7 °F (35.9 °C) (01/16/20 0716)  Pulse: 62 (01/16/20 1019)  Resp: 20 (01/16/20 0716)  BP: (!) 156/71 (01/16/20 0716)  SpO2: (!) 90 % (01/16/20 0716) Vital Signs (24h Range):  Temp:  [96.4 °F (35.8 " °C)-98.4 °F (36.9 °C)] 96.7 °F (35.9 °C)  Pulse:  [59-88] 62  Resp:  [16-22] 20  SpO2:  [82 %-94 %] 90 %  BP: (101-156)/(57-73) 156/71     Weight: 60 kg (132 lb 4.4 oz)  Body mass index is 24.19 kg/m².    Intake/Output Summary (Last 24 hours) at 1/16/2020 1028  Last data filed at 1/16/2020 0600  Gross per 24 hour   Intake --   Output 700 ml   Net -700 ml      Physical Exam    Significant Labs:   CBC:   Recent Labs   Lab 01/15/20  0548 01/16/20  0557   WBC 11.17 9.01   HGB 8.7* 8.4*   HCT 27.1* 26.4*    212     CMP:   Recent Labs   Lab 01/15/20  0548 01/16/20  0556    144   K 3.5 3.4*   CL 96 96   CO2 37* 38*   GLU 88 67*   BUN 46* 37*   CREATININE 1.3 1.0   CALCIUM 7.9* 8.4*   PROT 5.5* 5.2*   ALBUMIN 3.3* 3.3*   BILITOT 0.7 0.7   ALKPHOS 150* 149*   AST 47* 31   * 100*   ANIONGAP 12 10   EGFRNONAA 38* 53*       Significant Imaging: U/S: I have reviewed all pertinent results/findings within the past 24 hours and my personal findings are:  anemic  I have reviewed all pertinent imaging results/findings within the past 24 hours.      Assessment/Plan:      * Acute on chronic congestive heart failure  Given small dose of lasix yesterday wtihout much change. She seems fluid overloaded.  Will get last echo from [facility] and give 0.5mg of bumex this moring.  Recheck bmp at 3.    Needs repeat echo. (last one from 12/18)    Repeat BNP this morning.  Holding off on diuresis for now.  Creat is better 1.5 but basline WNL    Echo today- add demadex today and watch kidneys closely   EF 50%, normal diastolic fx, mild to moderate pulmonary HTN   1/15/2020 EF 50%, normal diastolic fx, mild to moderate pulmonary HTN; cards recommending avoid overdiueresing  Not getting diuretic currently; consider stopping norvasc due to LE complaints         Choking due to food (regurgitated)    Speech eval   Given pt's history of reflux-related symptoms, globus sensation, no overt s/s of aspiration, frequent belching, and poor  positioning during and after PO intake, pt likely presents with GI-related dysfunction(s) resulting in discomfort with PO intake. Recommend initiation of medical management of potential GI dysfunctions to develop most appropriate treatment plan. As pt's symptoms appear to be related to probable reflux, completion of MBSS is not warranted at this time. SLP to follow up for diet tolerance only.   Getting protonix     Acute cystitis without hematuria  Add rocephin   Follow culture    4d ago    Urine Culture, Routine Abnormal    ENTEROCOCCUS FAECALIS   50,000 - 99,999 cfu/ml     Resulting Agency [facility]   Susceptibility      Enterococcus faecalis     CULTURE, URINE     Ampicillin <=2 mcg/mL Sensitive     Nitrofurantoin <=32 mcg/mL Sensitive     Tetracycline >8 mcg/mL Resistant     Vancomycin 2 mcg/mL Sensitive         Had 2 gms Rocephin on 1/10 and 1GM yesterday   1/15/2020 Day 4 IV rocephin; repeat U/A today   1/16 u/A yesterday normal; day 5/5  rocephin today- completed       Weakness  Add PT   ambulated 75 ft with PT yesterday ; goal 200ft   1/16 Performed Gait trng using RW and portable oxygen with increased gait distances x 150 feet (twice) with supervision      Anorexia  Switch from periactin to remeron      Cough with hemoptysis        Diabetes mellitus type 2, uncontrolled  Cont with ss   this morning  1/14/20 , 238, 164    1/15/2020 BS low this am 70s  Received 5 units yesterday with correction scale and getting 12 units nightly detemir- will decrease to 8 units tonight  1/16 Low BS 60s this am despite decreasing levemir from 12 to 8- will stop insulin      Anemia  AOCD - iron studies confirm this.  Has had iron in the past.  Will check for any signs of blood loss.  Has been asked to stop her eliquis.  While she is symptomatic and anemic, will try to diuresis before giving blood.  In the past she had flash pulm edema from transfusion vs transfusion reaction.    Referred to heme/onc in past  Now lower  than 8- ordered 1 units PRBC, pre med and give over 4 hours   1/15/2020 H&H 8.7/27.1     1-16 Hct is 26, so since she responded well to blood will transfuse with one unit today  And d'c in the am        Acute on chronic respiratory failure with hypoxia  She is actually pretty close to her baseline, but she is short of breath all of the time.  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 With superimposed pulm edema, slightly more labored.  Cont O2 support  Repeat cxr and add mucinex and IPV- CXR- No significant interval change as compared to 01/10/2020.      Atrial fibrillation with rapid ventricular response  Cont on amiodarone and lopressor.- ventricular paced  D/c amiodarone due to elevated LFT  Check Echo today- EF 50% , normal LV diastolic Fx  HR well controlled with BB   No more amiodarone per Dr. Metcalf; may be candidate for sotolol in future    Epigastric pain  U/S shows some ascites      Chronic respiratory failure with hypoxia        CKD (chronic kidney disease), stage III  Contraction/prerenal azotemia.  Hold off on diuresis and allow for homeostasis.  Also cont rocephin for UTI and f/u culture    1/16 creat 1.0    Insomnia  Cont on remeron      COPD (chronic obstructive pulmonary disease)  Seems at baseline.  Will give small prednisone dose - 20mg daily - cont this.  Cont nebs.  Start rocephin.  Start on mucinex.  IPV ordered.  1/15 acapella ordered yesterday - still having intermittent wheezing   1/16 stable ; no wheezing this am; at baseline     Cholelithiases  R/o cholecystitis.  U/s ordered and no signs of cholecystitis  Cholelithiasis no cholecystitis, bilirubin normal       VTE Risk Mitigation (From admission, onward)         Ordered     IP VTE HIGH RISK PATIENT  Once      01/10/20 2131     Place sequential compression device  Until discontinued      01/10/20 2131                      Noe Fofana MD  Department of Hospital Medicine   [facility]

## 2020-01-16 NOTE — PROGRESS NOTES
[facility]  Pulmonology  Progress Note    Patient Name: Chantell Herrera  MRN: [id number]  Admission Date: 1/10/2020  Hospital Length of Stay: 3 days  Code Status: Full Code  Attending Provider: Kari Gaspar MD  Primary Care Provider: Kari Gaspar MD   Principal Problem: Acute on chronic congestive heart failure    Subjective:     Interval History: coughing now productive BS 64    Objective:     Vital Signs (Most Recent):  Temp: 97 °F (36.1 °C) (01/16/20 0351)  Pulse: 64 (01/16/20 0638)  Resp: 18 (01/16/20 0638)  BP: 135/73 (01/16/20 0351)  SpO2: (!) 82 %(3lpm in line with cpap/increased to 4) (01/16/20 0638) Vital Signs (24h Range):  Temp:  [96.4 °F (35.8 °C)-98.4 °F (36.9 °C)] 97 °F (36.1 °C)  Pulse:  [59-94] 64  Resp:  [16-22] 18  SpO2:  [82 %-94 %] 82 %  BP: (101-135)/(57-73) 135/73     Weight: 60 kg (132 lb 4.4 oz)  Body mass index is 24.19 kg/m².      Intake/Output Summary (Last 24 hours) at 1/16/2020 0702  Last data filed at 1/16/2020 0600  Gross per 24 hour   Intake 240 ml   Output 700 ml   Net -460 ml       Physical Exam   Constitutional: She is oriented to person, place, and time. She appears well-developed and well-nourished. She is cooperative.  Non-toxic appearance. She does not appear ill. No distress.   HENT:   Head: Normocephalic and atraumatic.   Right Ear: Hearing, tympanic membrane, external ear and ear canal normal.   Left Ear: Hearing, tympanic membrane, external ear and ear canal normal.   Nose: Nose normal. No mucosal edema, rhinorrhea or nasal deformity. No epistaxis. Right sinus exhibits no maxillary sinus tenderness and no frontal sinus tenderness. Left sinus exhibits no maxillary sinus tenderness and no frontal sinus tenderness.   Mouth/Throat: Uvula is midline, oropharynx is clear and moist and mucous membranes are normal. No trismus in the jaw. Normal dentition. No uvula swelling. No posterior oropharyngeal erythema.   Eyes: Conjunctivae and lids are normal. No  scleral icterus.   Sclera clear bilat   Neck: Trachea normal, full passive range of motion without pain and phonation normal. Neck supple.   Cardiovascular: Normal rate, regular rhythm, S1 normal, S2 normal, normal heart sounds, intact distal pulses and normal pulses.   No murmur heard.  Pulmonary/Chest: She is in respiratory distress (mild to moderate). She has decreased breath sounds in the right lower field and the left lower field. She has wheezes in the right lower field and the left lower field. She has rhonchi in the right lower field and the left lower field.   Abdominal: Soft. Normal appearance and bowel sounds are normal. She exhibits no distension. There is no tenderness.   Musculoskeletal: Normal range of motion. She exhibits no edema or deformity.   Neurological: She is alert and oriented to person, place, and time. She exhibits normal muscle tone. Coordination normal.   Skin: Skin is warm, dry and intact. She is not diaphoretic. No pallor.   Psychiatric: She has a normal mood and affect. Her speech is normal and behavior is normal. Judgment and thought content normal. Cognition and memory are normal.   Nursing note and vitals reviewed.      Vents:  Oxygen Concentration (%): 3 (01/15/20 1259)    Lines/Drains/Airways     Peripheral Intravenous Line                 Peripheral IV - Single Lumen 01/10/20 1920 20 G Right Antecubital 5 days                Significant Labs:    CBC/Anemia Profile:  Recent Labs   Lab 01/15/20  0548   WBC 11.17   HGB 8.7*   HCT 27.1*      *   RDW 24.6*        Chemistries:  Recent Labs   Lab 01/15/20  0548      K 3.5   CL 96   CO2 37*   BUN 46*   CREATININE 1.3   CALCIUM 7.9*   ALBUMIN 3.3*   PROT 5.5*   BILITOT 0.7   ALKPHOS 150*   *   AST 47*       All pertinent labs within the past 24 hours have been reviewed.    Significant Imaging:  I have reviewed all pertinent imaging results/findings within the past 24 hours.    Assessment/Plan:     Chronic  respiratory failure with hypoxia  Improving slowly  Low po2    Acute on chronic respiratory failure with hypoxia  Feeling better  But still SOB   Chronic respiratory failure     Atrial fibrillation with rapid ventricular response  Take meds     COPD (chronic obstructive pulmonary disease)  Continue support wearing NIV  severe           Emmanuel Hickman MD  Pulmonology  [facility]

## 2020-01-16 NOTE — PHYSICIAN QUERY
PT Name: Chantell Herrera  MR #: [id number]     Physician Query Form - Diabetic Condition Clarification     CDS/: Mercy Fermin RN              Contact information:[phone]  This form is a permanent document in the medical record.     Query Date: January 16, 2020    By submitting this query, we are merely seeking further clarification of documentation to reflect the severity of illness of your patient. Please utilize your independent clinical judgment when addressing the question(s) below.    The Medical record reflects the following:     Indicators   Supporting Clinical Findings Location in Medical Record   x Diabetes uncontrolled documented Diabetes mellitus type 2, uncontrolled   Cont with ss    H&P 1/11   x Lab Value(s), POCT glucose value(s) Hemoglobin A1C  6.7    Glucose  132-> 172--> 252--> 233 Lab 1/13    Point of care 1/10, 1/11, 1/12, 1/13    Treatment                                 Medication      Other       Provider, please specify the meaning of the term uncontrolled:    [  x ] Diabetes mellitus Type 2 with hyperglycemia     [   ] Other diabetes complication (please specify): ____________     [   ]  Clinically Undetermined       Please document in your progress notes daily for the duration of treatment until resolved, and include in your discharge summary.

## 2020-01-16 NOTE — ASSESSMENT & PLAN NOTE
Add PT   ambulated 75 ft with PT yesterday ; goal 200ft   1/16 Performed Gait trng using RW and portable oxygen with increased gait distances x 150 feet (twice) with supervision

## 2020-01-16 NOTE — PROGRESS NOTES
Outpatient Care Management  Plan of Care Follow Up Visit    Patient: Chantell Herrera  MRN: [id number]  Date of Service: 01/16/2020  Completed by: Cathryn Aquino RN  Referral Date: 09/03/2019  Program: Case Management (High Risk)    Reason for Visit   Patient presents with    OPCM Chart Review    Update Plan Of Care       Brief Summary:Pt reports that she saw Dr. Hickman this morning, the cardiologist. Pt reports that she has swelling in both feet. Pt reports that she currently has her feet elevated. Pt reports that when she elevate her legs the fluid moves to the thighs. Pt reports that she weighed herself this am with result of 135#. Pt reports that she does not know the wt from yesterday while in the hospital. Pt reports that she has to take the Lasix 3 times a day for 3 days and then once daily. Educated pt to notify MD of wt gain of 3 lbs or more in one day or 5 lbs in 7 days. Pt reports that she does not use salt to cook.Pt reports that the home health nurse just arrived to the home.       Patient Summary     Involvement of Care:  Do I have permission to speak with other family members about your care?       Patient Reported Labs & Vitals:  1.  Any Patient Reported Labs & Vitals?     2.  Patient Reported Blood Pressure:     3.  Patient Reported Pulse:     4.  Patient Reported Weight (Kg):     5.  Patient Reported Blood Glucose (mg/dl):       Medical History:  Reviewed medical history with patient and/or caregiver    Social History:  Reviewed social history with patient and/or caregiver    Clinical Assessment     Reviewed and provided basic information on available community resources for mental health, transportation, wellness resources, and palliative care programs with patient and/or caregiver.    Complex Care Plan     Care plan was discussed and completed today with input from patient and/or caregiver.    Goals      Patient/caregiver will accept life style changes to manage and improve Respiratory  Infection prior to discharge from OPCM. - Priority: High      Overall Time to Completion  2 months from 09/05/2019    OPC Identified Patient Barriers:  Health Literacy: Care plan created      RN Identified Patient Barriers:  Upper Respiratory Infection: Care plan created  COPD: Care plan created      Short Term Goals  Patient/caregiver will verbalize 2 risk factors of Upper Respiratory Infection within 2 weeks.  Interventions   · Assess patient's ability to perform ADLs.  · Collaborate with Physician as appropriate to meet patient needs.  · Encourage Medication Compliance.  · Recognize and provide educational material (KRAMES).     Status  · Met 9/19/19      Patient/caregiver will verbalize 2 signs and symptoms of Upper Respiratory Infection within 2 weeks.   Interventions   · Collaborate with Physician as appropriate to meet patient needs.  · Recognize and provide educational material (KRAMES).     Status  Met    Patient/caregiver will verbalize 2 ways of preventing complications due to disease process within 3 weeks.  Interventions   · Collaborate with Physician as appropriate to meet patient needs.  · Encourage Medication Compliance.  · Recognize and provide educational material (KRAMES).     Status  · Met 9/19/19             Patient/caregiver will have action plan in place to manage and prevent complications of COPD prior to discharge from OPCM. - Priority: High      Overall Time to Completion  2 months from 09/05/2019     [facility] [place] Identified Patient Barriers:  Health Literacy: Care plan created      RN Identified Patient Barriers:  Upper Respiratory Infection: Care plan created  COPD: Care plan created        Patient/caregiver will verbalize 2 risk factors of COPD within 2 weeks.  Interventions   · Collaborate with Physician as appropriate to meet patient needs.  · Encourage Medication Compliance.  · Facilitate referral to Pulmonary Rehab.  · Recognize and provide educational material (KRAMES).  ·       Status  · Met 12/19/19      Patient/caregiver will verbalize 2 signs and symptoms of COPD within 3 weeks.   Interventions   · Collaborate with Physician as appropriate to meet patient needs.  · Empower patient/caregiver to discuss treatment plan with Physician/care team.  · Encourage compliance with Physician follow-ups.  · Recognize and provide educational material (KRAMES).     Status              Met 11/5/19  Patient/caregiver will verbalize 2 strategies to DME usage, improve airway clearance , limit/reduce the risk of infections, proper breathing techniques and the use of rescue unhalers  within 3 weeks.  Interventions   · Assess for availability of working Oxygen Concentrator, Portable Oxygen, Nebulizer, CPAP, BIPAP in home setting.  · Collaborate with Physician as appropriate to meet patient needs.  · Empower patient/caregiver to discuss treatment plan with Physician/care team.  · Encourage compliance with Physician follow-ups.  · Facilitate referral to Pulmonary Rehab.  · Recognize and provide educational material (KRAMES).  ·      Status  Met 11/25/19    Patient/caregiver will verbalize 2 ways of preventing complications due to disease process within 3 weeks.  Interventions   · Collaborate with Oxygen DME agency for needed DME supplies.  · Collaborate with Physician as appropriate to meet patient needs.  · Empower patient/caregiver to discuss treatment plan with Physician/care team.  · Encourage compliance with Physician follow-ups.  · Facilitate referral to Pulmonary Rehab.  · Recognize and provide educational material (KRAMES).  ·      Status  Met 11/25/19    Patient/caregiver will verbalize safety measures for Oxygen usage within 3 weeks.  Interventions   · Assess for availability of working Oxygen Concentrator, Portable Oxygen, Nebulizer, CPAP, BIPAP in home setting.  · Collaborate with Oxygen DME agency for needed DME supplies.  · Recognize and provide educational material (KRAMES).    Status  · Partially met               Patient/caregiver will have an action plan in place to manage and prevent complications of CHF prior to discharge from OPCM - Priority: High      Overall Time to Completion  2 months from 01/21/2020     OPCM Identified Patient Needs:    Health Literacy: Care plan created      RN Identified Patient Barriers:  Upper Respiratory Infection: Care plan created  COPD: Care plan created            OPCM Identified Disease Education Needs:      Short Term Goals      Patient/caregiver will measure and record the weight 1 times per day for 2 weeks.  Interventions   · Assess for availability of working scale in home setting.  · Collaborate with Physician as appropriate to meet patient needs.  · Mail Weight log for home use.1/21/2020  · Recognize and provide educational material (MONICA).1/21/2020  Patient/Caregiver agrees to weigh herself daily by  2 weeks.  · Patient/Caregiver agrees to OPCM follow up within 1 week to assess progress to goal.   ·   ·      Status  · Partially met      Patient/caregiver will notify doctor if patient gains more than 3 pounds in one day or 5 pounds in one week within 2 weeks.  Interventions   · Collaborate with Physician as appropriate to meet patient needs.  · Recognize and provide educational material (KRAMES).  ·      Status  · Partially met      Patient/caregiver will verbalize 2 signs and symptoms of Congestive Heart Failure within 3 weeks.   Interventions   · Collaborate with Physician as appropriate to meet patient needs.  · Recognize and provide educational material (KRAMES).  ·      Status  · Partially met      Patient/caregiver will verbalize 2 ways of preventing complications due to disease process within 3 weeks.  Interventions   · Collaborate with Physician as appropriate to meet patient needs.  · Encourage Dietary Compliance.  · Encourage Exercise.  · Encourage Medication Compliance.  · Recognize and provide educational material (MITALIMES).  ·       Status  · Partially met                    Patient Instructions     Instructions were provided via the [facility] patient resources and are available for the patient to view on the patient portal.    Next Steps:   No follow-ups on file.      Todays OPCM Self-Management Care Plan was developed with the patients/caregivers input and was based on identified barriers from todays assessment.  Goals were written today with the patient/caregiver and the patient has agreed to work towards these goals to improve his/her overall well-being. Patient verbalized understanding of the care plan, goals, and all of today's instructions. Encouraged patient/caregiver to communicate with his/her physician and health care team about health conditions and the treatment plan.  Provided my contact information today and encouraged patient/caregiver to call me with any questions as needed.Pt is currently inpt.

## 2020-01-16 NOTE — ASSESSMENT & PLAN NOTE
Contraction/prerenal azotemia.  Hold off on diuresis and allow for homeostasis.  Also cont rocephin for UTI and f/u culture    1/16 creat 1.0

## 2020-01-16 NOTE — SUBJECTIVE & OBJECTIVE
Interval History: coughing now productive BS 64    Objective:     Vital Signs (Most Recent):  Temp: 97 °F (36.1 °C) (01/16/20 0351)  Pulse: 64 (01/16/20 0638)  Resp: 18 (01/16/20 0638)  BP: 135/73 (01/16/20 0351)  SpO2: (!) 82 %(3lpm in line with cpap/increased to 4) (01/16/20 0638) Vital Signs (24h Range):  Temp:  [96.4 °F (35.8 °C)-98.4 °F (36.9 °C)] 97 °F (36.1 °C)  Pulse:  [59-94] 64  Resp:  [16-22] 18  SpO2:  [82 %-94 %] 82 %  BP: (101-135)/(57-73) 135/73     Weight: 60 kg (132 lb 4.4 oz)  Body mass index is 24.19 kg/m².      Intake/Output Summary (Last 24 hours) at 1/16/2020 0702  Last data filed at 1/16/2020 0600  Gross per 24 hour   Intake 240 ml   Output 700 ml   Net -460 ml       Physical Exam   Constitutional: She is oriented to person, place, and time. She appears well-developed and well-nourished. She is cooperative.  Non-toxic appearance. She does not appear ill. No distress.   HENT:   Head: Normocephalic and atraumatic.   Right Ear: Hearing, tympanic membrane, external ear and ear canal normal.   Left Ear: Hearing, tympanic membrane, external ear and ear canal normal.   Nose: Nose normal. No mucosal edema, rhinorrhea or nasal deformity. No epistaxis. Right sinus exhibits no maxillary sinus tenderness and no frontal sinus tenderness. Left sinus exhibits no maxillary sinus tenderness and no frontal sinus tenderness.   Mouth/Throat: Uvula is midline, oropharynx is clear and moist and mucous membranes are normal. No trismus in the jaw. Normal dentition. No uvula swelling. No posterior oropharyngeal erythema.   Eyes: Conjunctivae and lids are normal. No scleral icterus.   Sclera clear bilat   Neck: Trachea normal, full passive range of motion without pain and phonation normal. Neck supple.   Cardiovascular: Normal rate, regular rhythm, S1 normal, S2 normal, normal heart sounds, intact distal pulses and normal pulses.   No murmur heard.  Pulmonary/Chest: She is in respiratory distress (mild to moderate).  She has decreased breath sounds in the right lower field and the left lower field. She has wheezes in the right lower field and the left lower field. She has rhonchi in the right lower field and the left lower field.   Abdominal: Soft. Normal appearance and bowel sounds are normal. She exhibits no distension. There is no tenderness.   Musculoskeletal: Normal range of motion. She exhibits no edema or deformity.   Neurological: She is alert and oriented to person, place, and time. She exhibits normal muscle tone. Coordination normal.   Skin: Skin is warm, dry and intact. She is not diaphoretic. No pallor.   Psychiatric: She has a normal mood and affect. Her speech is normal and behavior is normal. Judgment and thought content normal. Cognition and memory are normal.   Nursing note and vitals reviewed.      Vents:  Oxygen Concentration (%): 3 (01/15/20 1259)    Lines/Drains/Airways     Peripheral Intravenous Line                 Peripheral IV - Single Lumen 01/10/20 1920 20 G Right Antecubital 5 days                Significant Labs:    CBC/Anemia Profile:  Recent Labs   Lab 01/15/20  0548   WBC 11.17   HGB 8.7*   HCT 27.1*      *   RDW 24.6*        Chemistries:  Recent Labs   Lab 01/15/20  0548      K 3.5   CL 96   CO2 37*   BUN 46*   CREATININE 1.3   CALCIUM 7.9*   ALBUMIN 3.3*   PROT 5.5*   BILITOT 0.7   ALKPHOS 150*   *   AST 47*       All pertinent labs within the past 24 hours have been reviewed.    Significant Imaging:  I have reviewed all pertinent imaging results/findings within the past 24 hours.

## 2020-01-16 NOTE — ASSESSMENT & PLAN NOTE
Cont with ss   this morning  1/14/20 , 238, 164    1/15/2020 BS low this am 70s  Received 5 units yesterday with correction scale and getting 12 units nightly detemir- will decrease to 8 units tonight  1/16 Low BS 60s this am despite decreasing levemir from 12 to 8- will stop insulin

## 2020-01-16 NOTE — NURSING
Blood sugar of 244 was right after patient ate ice cream blood sugar a little prior was 179. Patient just given long acting scheduled insulin.

## 2020-01-16 NOTE — ASSESSMENT & PLAN NOTE
Add rocephin   Follow culture    4d ago    Urine Culture, Routine Abnormal    ENTEROCOCCUS FAECALIS   50,000 - 99,999 cfu/ml     Resulting Agency [facility]   Susceptibility      Enterococcus faecalis     CULTURE, URINE     Ampicillin <=2 mcg/mL Sensitive     Nitrofurantoin <=32 mcg/mL Sensitive     Tetracycline >8 mcg/mL Resistant     Vancomycin 2 mcg/mL Sensitive         Had 2 gms Rocephin on 1/10 and 1GM yesterday   1/15/2020 Day 4 IV rocephin; repeat U/A today   1/16 u/A yesterday normal; day 5/5  rocephin today- completed

## 2020-01-16 NOTE — NURSING
Rupesh in respiratory called to bedside.  Patient got up to bedside commode, and sats dropped to the 72.  No change in lung sounds.  Upon his arrival to the room, sats were 84.  Dennise Virgen notified.  Sats now 87.

## 2020-01-17 PROBLEM — J18.9 PNEUMONIA OF LEFT LOWER LOBE DUE TO INFECTIOUS ORGANISM: Status: ACTIVE | Noted: 2020-01-17

## 2020-01-17 LAB
ALBUMIN SERPL BCP-MCNC: 3.4 G/DL (ref 3.5–5.2)
ALLENS TEST: ABNORMAL
ALP SERPL-CCNC: 149 U/L (ref 55–135)
ALT SERPL W/O P-5'-P-CCNC: 83 U/L (ref 10–44)
ANION GAP SERPL CALC-SCNC: 11 MMOL/L (ref 8–16)
ANISOCYTOSIS BLD QL SMEAR: ABNORMAL
AST SERPL-CCNC: 20 U/L (ref 10–40)
BASOPHILS # BLD AUTO: ABNORMAL K/UL (ref 0–0.2)
BASOPHILS NFR BLD: 0 % (ref 0–1.9)
BILIRUB SERPL-MCNC: 0.8 MG/DL (ref 0.1–1)
BUN SERPL-MCNC: 30 MG/DL (ref 8–23)
CALCIUM SERPL-MCNC: 8.2 MG/DL (ref 8.7–10.5)
CHLORIDE SERPL-SCNC: 97 MMOL/L (ref 95–110)
CO2 SERPL-SCNC: 36 MMOL/L (ref 23–29)
CREAT SERPL-MCNC: 1 MG/DL (ref 0.5–1.4)
DACRYOCYTES BLD QL SMEAR: ABNORMAL
DELSYS: ABNORMAL
DIFFERENTIAL METHOD: ABNORMAL
EOSINOPHIL # BLD AUTO: ABNORMAL K/UL (ref 0–0.5)
EOSINOPHIL NFR BLD: 2 % (ref 0–8)
ERYTHROCYTE [DISTWIDTH] IN BLOOD BY AUTOMATED COUNT: 24.9 % (ref 11.5–14.5)
EST. GFR  (AFRICAN AMERICAN): >60 ML/MIN/1.73 M^2
EST. GFR  (NON AFRICAN AMERICAN): 53 ML/MIN/1.73 M^2
GLUCOSE SERPL-MCNC: 154 MG/DL (ref 70–110)
HCO3 UR-SCNC: 39.4 MMOL/L (ref 22–26)
HCT VFR BLD AUTO: 33.9 % (ref 37–48.5)
HGB BLD-MCNC: 10.6 G/DL (ref 12–16)
HYPOCHROMIA BLD QL SMEAR: ABNORMAL
IMM GRANULOCYTES # BLD AUTO: ABNORMAL K/UL (ref 0–0.04)
IMM GRANULOCYTES NFR BLD AUTO: ABNORMAL % (ref 0–0.5)
LYMPHOCYTES # BLD AUTO: ABNORMAL K/UL (ref 1–4.8)
LYMPHOCYTES NFR BLD: 11 % (ref 18–48)
MCH RBC QN AUTO: 32.7 PG (ref 27–31)
MCHC RBC AUTO-ENTMCNC: 31.3 G/DL (ref 32–36)
MCV RBC AUTO: 105 FL (ref 82–98)
MONOCYTES # BLD AUTO: ABNORMAL K/UL (ref 0.3–1)
MONOCYTES NFR BLD: 22 % (ref 4–15)
NEUTROPHILS NFR BLD: 65 % (ref 38–73)
NRBC BLD-RTO: 0 /100 WBC
OB PNL STL: NEGATIVE
OVALOCYTES BLD QL SMEAR: ABNORMAL
PCO2 BLDA: 58 MMHG (ref 35–45)
PH SMN: 7.44 [PH] (ref 7.35–7.45)
PLATELET # BLD AUTO: 218 K/UL (ref 150–350)
PLATELET BLD QL SMEAR: ABNORMAL
PMV BLD AUTO: 13.8 FL (ref 9.2–12.9)
PO2 BLDA: 70 MMHG (ref 75–100)
POC BE: 13.3 MMOL/L (ref -2–2)
POC COHB: 2.3 % (ref 0–3)
POC METHB: 1.1 % (ref 0–1.5)
POC O2HB ARTERIAL: 93.9 % (ref 94–100)
POC SATURATED O2: 97.3 % (ref 90–100)
POC TCO2: 41.2 MMOL/L
POC THB: 10.2 G/DL (ref 12–18)
POCT GLUCOSE: 131 MG/DL (ref 70–110)
POCT GLUCOSE: 167 MG/DL (ref 70–110)
POCT GLUCOSE: 295 MG/DL (ref 70–110)
POCT GLUCOSE: 297 MG/DL (ref 70–110)
POIKILOCYTOSIS BLD QL SMEAR: SLIGHT
POLYCHROMASIA BLD QL SMEAR: ABNORMAL
POTASSIUM SERPL-SCNC: 3.9 MMOL/L (ref 3.5–5.1)
PROT SERPL-MCNC: 5.7 G/DL (ref 6–8.4)
RBC # BLD AUTO: 3.24 M/UL (ref 4–5.4)
SITE: ABNORMAL
SODIUM SERPL-SCNC: 144 MMOL/L (ref 136–145)
WBC # BLD AUTO: 11.24 K/UL (ref 3.9–12.7)

## 2020-01-17 PROCEDURE — 99900035 HC TECH TIME PER 15 MIN (STAT): Mod: HCNC

## 2020-01-17 PROCEDURE — 63600175 PHARM REV CODE 636 W HCPCS: Mod: HCNC | Performed by: FAMILY MEDICINE

## 2020-01-17 PROCEDURE — 63600175 PHARM REV CODE 636 W HCPCS: Mod: HCNC | Performed by: INTERNAL MEDICINE

## 2020-01-17 PROCEDURE — 25000003 PHARM REV CODE 250: Mod: HCNC | Performed by: FAMILY MEDICINE

## 2020-01-17 PROCEDURE — 85007 BL SMEAR W/DIFF WBC COUNT: CPT | Mod: HCNC

## 2020-01-17 PROCEDURE — 25000003 PHARM REV CODE 250: Mod: HCNC | Performed by: NURSE PRACTITIONER

## 2020-01-17 PROCEDURE — 82272 OCCULT BLD FECES 1-3 TESTS: CPT | Mod: HCNC

## 2020-01-17 PROCEDURE — 99233 PR SUBSEQUENT HOSPITAL CARE,LEVL III: ICD-10-PCS | Mod: HCNC,,, | Performed by: INTERNAL MEDICINE

## 2020-01-17 PROCEDURE — 11000001 HC ACUTE MED/SURG PRIVATE ROOM: Mod: HCNC

## 2020-01-17 PROCEDURE — 25000242 PHARM REV CODE 250 ALT 637 W/ HCPCS: Mod: HCNC | Performed by: FAMILY MEDICINE

## 2020-01-17 PROCEDURE — 99233 SBSQ HOSP IP/OBS HIGH 50: CPT | Mod: HCNC,,, | Performed by: INTERNAL MEDICINE

## 2020-01-17 PROCEDURE — 82803 BLOOD GASES ANY COMBINATION: CPT | Mod: HCNC | Performed by: FAMILY MEDICINE

## 2020-01-17 PROCEDURE — 36415 COLL VENOUS BLD VENIPUNCTURE: CPT | Mod: HCNC

## 2020-01-17 PROCEDURE — 80053 COMPREHEN METABOLIC PANEL: CPT | Mod: HCNC

## 2020-01-17 PROCEDURE — 94640 AIRWAY INHALATION TREATMENT: CPT | Mod: HCNC

## 2020-01-17 PROCEDURE — 27000190 HC CPAP FULL FACE MASK W/VALVE: Mod: HCNC

## 2020-01-17 PROCEDURE — 94660 CPAP INITIATION&MGMT: CPT | Mod: HCNC

## 2020-01-17 PROCEDURE — 85027 COMPLETE CBC AUTOMATED: CPT | Mod: HCNC

## 2020-01-17 PROCEDURE — 97116 GAIT TRAINING THERAPY: CPT | Mod: HCNC

## 2020-01-17 PROCEDURE — 36600 WITHDRAWAL OF ARTERIAL BLOOD: CPT | Mod: HCNC

## 2020-01-17 RX ORDER — LEVOFLOXACIN 500 MG/1
500 TABLET, FILM COATED ORAL DAILY
Status: DISCONTINUED | OUTPATIENT
Start: 2020-01-17 | End: 2020-01-19

## 2020-01-17 RX ADMIN — LEVOFLOXACIN 500 MG: 500 TABLET, FILM COATED ORAL at 09:01

## 2020-01-17 RX ADMIN — GUAIFENESIN 600 MG: 600 TABLET, EXTENDED RELEASE ORAL at 09:01

## 2020-01-17 RX ADMIN — METOPROLOL TARTRATE 25 MG: 25 TABLET ORAL at 09:01

## 2020-01-17 RX ADMIN — LEVALBUTEROL 1.25 MG: 1.25 SOLUTION, CONCENTRATE RESPIRATORY (INHALATION) at 01:01

## 2020-01-17 RX ADMIN — ASPIRIN 81 MG: 81 TABLET, COATED ORAL at 09:01

## 2020-01-17 RX ADMIN — FUROSEMIDE 20 MG: 20 TABLET ORAL at 09:01

## 2020-01-17 RX ADMIN — LEVALBUTEROL 1.25 MG: 1.25 SOLUTION, CONCENTRATE RESPIRATORY (INHALATION) at 07:01

## 2020-01-17 RX ADMIN — INSULIN ASPART 1 UNITS: 100 INJECTION, SOLUTION INTRAVENOUS; SUBCUTANEOUS at 09:01

## 2020-01-17 RX ADMIN — LEVALBUTEROL 1.25 MG: 1.25 SOLUTION, CONCENTRATE RESPIRATORY (INHALATION) at 12:01

## 2020-01-17 RX ADMIN — ROPINIROLE HYDROCHLORIDE 1 MG: 0.5 TABLET, FILM COATED ORAL at 09:01

## 2020-01-17 RX ADMIN — METHYLPREDNISOLONE SODIUM SUCCINATE 40 MG: 40 INJECTION, POWDER, FOR SOLUTION INTRAMUSCULAR; INTRAVENOUS at 03:01

## 2020-01-17 RX ADMIN — MIRTAZAPINE 15 MG: 15 TABLET, ORALLY DISINTEGRATING ORAL at 09:01

## 2020-01-17 RX ADMIN — METHYLPREDNISOLONE SODIUM SUCCINATE 40 MG: 40 INJECTION, POWDER, FOR SOLUTION INTRAMUSCULAR; INTRAVENOUS at 09:01

## 2020-01-17 RX ADMIN — PREDNISONE 20 MG: 20 TABLET ORAL at 09:01

## 2020-01-17 RX ADMIN — AMLODIPINE BESYLATE 5 MG: 5 TABLET ORAL at 09:01

## 2020-01-17 RX ADMIN — PANTOPRAZOLE SODIUM 40 MG: 40 TABLET, DELAYED RELEASE ORAL at 09:01

## 2020-01-17 RX ADMIN — CITALOPRAM HYDROBROMIDE 20 MG: 10 TABLET ORAL at 09:01

## 2020-01-17 RX ADMIN — TRAZODONE HYDROCHLORIDE 50 MG: 50 TABLET ORAL at 09:01

## 2020-01-17 NOTE — CONSULTS
Patient returning to home health services through [facility] at discharge. Patient has all needed DME to include oxygen and a trilogy.     Daniela Castro LMSW

## 2020-01-17 NOTE — SUBJECTIVE & OBJECTIVE
Interval History: weakness, anemia and COPD    Review of Systems   Constitutional: Positive for fatigue. Negative for appetite change, chills and fever.   HENT: Negative for congestion, ear discharge, ear pain, rhinorrhea, sinus pressure and sore throat.    Respiratory: Positive for cough, shortness of breath and wheezing. Negative for choking and chest tightness.         With coughing and increased activity   Cardiovascular: Negative for chest pain and palpitations.   Gastrointestinal: Negative for constipation, diarrhea, nausea and vomiting.   Musculoskeletal: Negative for joint swelling and myalgias.   Skin: Negative for rash and wound.   Neurological: Negative for dizziness, syncope, weakness, light-headedness and numbness.     Objective:     Vital Signs (Most Recent):  Temp: 96.7 °F (35.9 °C) (01/17/20 0500)  Pulse: 63 (01/17/20 0600)  Resp: 18 (01/17/20 0500)  BP: (!) 154/88 (01/17/20 0500)  SpO2: (!) 93 % (01/17/20 0500) Vital Signs (24h Range):  Temp:  [96.6 °F (35.9 °C)-98.5 °F (36.9 °C)] 96.7 °F (35.9 °C)  Pulse:  [59-78] 63  Resp:  [18-22] 18  SpO2:  [87 %-93 %] 93 %  BP: (100-156)/(55-88) 154/88     Weight: 60.2 kg (132 lb 11.5 oz)  Body mass index is 24.27 kg/m².    Intake/Output Summary (Last 24 hours) at 1/17/2020 0706  Last data filed at 1/17/2020 0600  Gross per 24 hour   Intake 895.68 ml   Output 800 ml   Net 95.68 ml      Physical Exam   Constitutional: She is oriented to person, place, and time. She appears well-developed and well-nourished. She is cooperative.  Non-toxic appearance. She does not appear ill. No distress.   HENT:   Head: Normocephalic and atraumatic.   Right Ear: Hearing, tympanic membrane, external ear and ear canal normal.   Left Ear: Hearing, tympanic membrane, external ear and ear canal normal.   Nose: Nose normal. No mucosal edema, rhinorrhea or nasal deformity. No epistaxis. Right sinus exhibits no maxillary sinus tenderness and no frontal sinus tenderness. Left sinus  exhibits no maxillary sinus tenderness and no frontal sinus tenderness.   Mouth/Throat: Uvula is midline, oropharynx is clear and moist and mucous membranes are normal. No trismus in the jaw. Normal dentition. No uvula swelling. No posterior oropharyngeal erythema.   Eyes: Conjunctivae and lids are normal. No scleral icterus.   Sclera clear bilat   Neck: Trachea normal, full passive range of motion without pain and phonation normal. Neck supple.   Cardiovascular: Normal rate, regular rhythm, S1 normal, S2 normal, normal heart sounds, intact distal pulses and normal pulses.   No murmur heard.  Pulmonary/Chest: Respiratory distress: mild to moderate. She has decreased breath sounds in the right lower field and the left lower field. She has wheezes in the right lower field and the left lower field. She has rhonchi in the right lower field and the left lower field.   Raspy cough ; off white sputum ; chest congestion    Abdominal: Soft. Normal appearance and bowel sounds are normal. She exhibits no distension. There is no tenderness.   Musculoskeletal: Normal range of motion. She exhibits no edema or deformity.   Neurological: She is alert and oriented to person, place, and time. She exhibits normal muscle tone. Coordination normal.   Skin: Skin is warm, dry and intact. She is not diaphoretic. No pallor.   Psychiatric: She has a normal mood and affect. Her speech is normal and behavior is normal. Judgment and thought content normal. Cognition and memory are normal.   Nursing note and vitals reviewed.      Significant Labs:   CBC:   Recent Labs   Lab 01/16/20  0557 01/17/20  0534   WBC 9.01 11.24   HGB 8.4* 10.6*   HCT 26.4* 33.9*    218     CMP:   Recent Labs   Lab 01/16/20  0556 01/17/20  0534    144   K 3.4* 3.9   CL 96 97   CO2 38* 36*   GLU 67* 154*   BUN 37*  --    CREATININE 1.0 1.0   CALCIUM 8.4* 8.2*   PROT 5.2* 5.7*   ALBUMIN 3.3* 3.4*   BILITOT 0.7 0.8   ALKPHOS 149* 149*   AST 31 20   * 83*    ANIONGAP 10 11   EGFRNONAA 53* 53*       Significant Imaging: U/S: I have reviewed all pertinent results/findings within the past 24 hours and my personal findings are:  anemic  I have reviewed all pertinent imaging results/findings within the past 24 hours.

## 2020-01-17 NOTE — ASSESSMENT & PLAN NOTE
Cont with ss   this morning  1/14/20 , 238, 164    1/15/2020 BS low this am 70s  Received 5 units yesterday with correction scale and getting 12 units nightly detemir- will decrease to 8 units tonight  1/16 Low BS 60s this am despite decreasing levemir from 12 to 8- will stop insulin  1/17 -167 , no insulin due to recent hypoglycemia .

## 2020-01-17 NOTE — ASSESSMENT & PLAN NOTE
She is actually pretty close to her baseline, but she is short of breath all of the time.  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 With superimposed pulm edema, slightly more labored.  Cont O2 support  Repeat cxr and add mucinex and IPV- CXR- No significant interval change as compared to 01/10/2020

## 2020-01-17 NOTE — ASSESSMENT & PLAN NOTE
Seems at baseline.  Will give small prednisone dose - 20mg daily - cont this.  Cont nebs.  Start rocephin.  Start on mucinex.  IPV ordered.  1/15 acapella ordered yesterday - still having intermittent wheezing   1/16 stable ; no wheezing this am; at baseline   1/17  Concern for pneumonia; added levaquin

## 2020-01-17 NOTE — RESPIRATORY THERAPY
Patient extremely short of breath on home trilogy with 6l oxygen in line.  Pulse oximetery 81%.  Placed patient on Vision Bipap with settings noted. Spo2 increased to 91%.  Patient states she had ambulated with physical therapy and then went to the restroom prior to her exacerbation.  Patients work of breathing decreased and is stating she feels better.  Patient remains on Vision Bipap at this time.

## 2020-01-17 NOTE — ASSESSMENT & PLAN NOTE
AOCD - iron studies confirm this.  Has had iron in the past.  Will check for any signs of blood loss.  Has been asked to stop her eliquis.  While she is symptomatic and anemic, will try to diuresis before giving blood.  In the past she had flash pulm edema from transfusion vs transfusion reaction.    Referred to heme/onc in past  Now lower than 8- ordered 1 units PRBC, pre med and give over 4 hours   1/15/2020 H&H 8.7/27.1     1-16 Hct is 26, so since she responded well to blood will transfuse with one unit today  And d'c in the am  1/17/2020 [facility] stable after 2nd UPRBC , 10/6/33.9 .

## 2020-01-17 NOTE — ASSESSMENT & PLAN NOTE
R/o cholecystitis.  U/s ordered and no signs of cholecystitis  Cholelithiasis no cholecystitis, bilirubin normal   No intervention at this time   asymptomatic

## 2020-01-17 NOTE — ASSESSMENT & PLAN NOTE
Given small dose of lasix yesterday wtihout much change. She seems fluid overloaded.  Will get last echo from [facility] and give 0.5mg of bumex this moring.  Recheck bmp at 3.    Needs repeat echo. (last one from 12/18)    Repeat BNP this morning.  Holding off on diuresis for now.  Creat is better 1.5 but basline WNL    Echo today- add demadex today and watch kidneys closely   EF 50%, normal diastolic fx, mild to moderate pulmonary HTN   1/15/2020 EF 50%, normal diastolic fx, mild to moderate pulmonary HTN; cards recommending avoid overdiueresing  Not getting diuretic currently; consider stopping norvasc due to LE complaints .

## 2020-01-17 NOTE — ASSESSMENT & PLAN NOTE
Add PT   ambulated 75 ft with PT yesterday ; goal 200ft   1/16 Performed Gait trng using RW and portable oxygen with increased gait distances x 150 feet (twice) with supervision  1/17/2020 Bilateral LE eccentric strengthening ex x 10 reps on each such as Ankle Pumps, Heel Slides, Hip abd/add and Long arch quads. Provided pt with rest periods and deep breathing  in between ex

## 2020-01-17 NOTE — ASSESSMENT & PLAN NOTE
Add levaquin      History of MRI  w/sedation  2014 or 2015, no complications.  Port catheter in place  left chest wall, single lumen  22G x 3/4" [place]

## 2020-01-17 NOTE — ASSESSMENT & PLAN NOTE
Add rocephin   Follow culture    4d ago    Urine Culture, Routine Abnormal    ENTEROCOCCUS FAECALIS   50,000 - 99,999 cfu/ml     Resulting Agency [facility]   Susceptibility      Enterococcus faecalis     CULTURE, URINE     Ampicillin <=2 mcg/mL Sensitive     Nitrofurantoin <=32 mcg/mL Sensitive     Tetracycline >8 mcg/mL Resistant     Vancomycin 2 mcg/mL Sensitive         Had 2 gms Rocephin on 1/10 and 1GM yesterday   1/15/2020 Day 4 IV rocephin; repeat U/A today   1/16 u/A yesterday normal; day 5/5  rocephin today- completed   Resolved.

## 2020-01-17 NOTE — ASSESSMENT & PLAN NOTE
Speech eval   Given pt's history of reflux-related symptoms, globus sensation, no overt s/s of aspiration, frequent belching, and poor positioning during and after PO intake, pt likely presents with GI-related dysfunction(s) resulting in discomfort with PO intake. Recommend initiation of medical management of potential GI dysfunctions to develop most appropriate treatment plan. As pt's symptoms appear to be related to probable reflux, completion of MBSS is not warranted at this time. SLP to follow up for diet tolerance only.   Getting protonix .

## 2020-01-17 NOTE — SUBJECTIVE & OBJECTIVE
Interval History: somewhat better new infiltrate on xray juan follow     Objective:     Vital Signs (Most Recent):  Temp: 98.7 °F (37.1 °C) (01/17/20 1106)  Pulse: 60 (01/17/20 1106)  Resp: 18 (01/17/20 1106)  BP: 134/68 (01/17/20 1106)  SpO2: (!) 90 % (01/17/20 1106) Vital Signs (24h Range):  Temp:  [96.5 °F (35.8 °C)-98.7 °F (37.1 °C)] 98.7 °F (37.1 °C)  Pulse:  [59-78] 60  Resp:  [18-22] 18  SpO2:  [87 %-96 %] 90 %  BP: (100-154)/(55-88) 134/68     Weight: 60.2 kg (132 lb 11.5 oz)  Body mass index is 24.27 kg/m².      Intake/Output Summary (Last 24 hours) at 1/17/2020 1156  Last data filed at 1/17/2020 0900  Gross per 24 hour   Intake 775.68 ml   Output 800 ml   Net -24.32 ml       Physical Exam   Pulmonary/Chest: No accessory muscle usage. She is in respiratory distress (mild wearing nasal mask ). She has decreased breath sounds in the right upper field, the right middle field, the right lower field, the left upper field, the left middle field and the left lower field. She has wheezes in the right lower field and the left lower field. She has rhonchi in the right lower field and the left lower field.       Vents:  Oxygen Concentration (%): 3 (01/15/20 1259)    Lines/Drains/Airways     Peripheral Intravenous Line                 Peripheral IV - Single Lumen 01/16/20 1631 18 G Posterior;Right Forearm less than 1 day                Significant Labs:    CBC/Anemia Profile:  Recent Labs   Lab 01/16/20  0557 01/17/20  0534 01/17/20  0550   WBC 9.01 11.24  --    HGB 8.4* 10.6*  --    HCT 26.4* 33.9*  --     218  --    * 105*  --    RDW 24.3* 24.9*  --    OCCULTBLOOD  --   --  Negative        Chemistries:  Recent Labs   Lab 01/16/20  0556 01/17/20  0534    144   K 3.4* 3.9   CL 96 97   CO2 38* 36*   BUN 37* 30*   CREATININE 1.0 1.0   CALCIUM 8.4* 8.2*   ALBUMIN 3.3* 3.4*   PROT 5.2* 5.7*   BILITOT 0.7 0.8   ALKPHOS 149* 149*   * 83*   AST 31 20       All pertinent labs within the past 24 hours  have been reviewed.    Significant Imaging:  I have reviewed all pertinent imaging results/findings within the past 24 hours.

## 2020-01-17 NOTE — PROGRESS NOTES
[facility] Hospital Medicine  Progress Note    Patient Name: Chantell Herrera  MRN: [id number]  Patient Class: IP- Inpatient   Admission Date: 1/10/2020  Length of Stay: 4 days  Attending Physician: Kari Gaspar MD  Primary Care Provider: Kari Gaspar MD        Subjective:     Principal Problem:Acute on chronic congestive heart failure        HPI:  84 year old female with chronic respiratory failure and recent admission for pacer placement comes in because of weakness. She has been feeling more and more swollen, and she is getting more weak. She says that she has also had a lot of abdominal pain especially when getting up to walk. She feels nauseated and sick to her stomach when this happens. She has not been taking any diuretics. Last EF is unknown.    Overview/Hospital Course:  1/12/2020 Patient with continued weakness. O2 doing okay. Slight improvement of fluid, but only diuresed about 1 L.   Blood counts improved with diuresis - no transfusion at this point.  Overall about the same.    1/13/2020 pt renal and liver function elevated. Prior to recent cardiac procedure both were fine. She has liver u/s yesterday. Beside a gallstone without inflammation read as normal. On amiodarone for A fib. Discussed with Dr Metcalf this am as she is ventricular paced on tele and HR 60 will hold amiodarone and cont BB. Follow LFT. Also CXR without congestion. She was sent home on lasix but denies taking it although felt swollen. Given 20mg IV lasix 1/10 and bumex 0.5mg IV x 1 on 1/11. BNP was 2990 on admission and went up to 3124 2 days later and 2909 today.     Cbc pending 7.6 today. Ferritin very high as well as elevated fe studies. CBC shows eleavted MCV and MCH. Folate and B12 2 months ago normal, will repeat.     1/14/2020 Pt NAD this morning. Notes swelling much better but has persistent junky cough during exam, and still wheezing     Negative -738, Still with significant SANTIAGO with minimal exertion,  "O2 sat 90-92% on 3LNC   Afebrile, Day 3 Rocephin for UTI, ( Had 2 grams on 1/10/20 and 1Gm yesterday    ENTEROCOCCUS FAECALIS   50,000 - 99,999 cfu/ml      Resulting Agency [facility]   Susceptibility      Enterococcus faecalis     CULTURE, URINE     Ampicillin <=2 mcg/mL Sensitive     Nitrofurantoin <=32 mcg/mL Sensitive     Tetracycline >8 mcg/mL Resistant     Vancomycin 2 mcg/mL Sensitive       Blood culture x1 done- NGTD x 4d H&H up to 8.9/27.8 after 1UPRBCs from 7.6/23.5  AST 76>82, >158 , creat 1.6   Was noted to have choking issues, ST eval ordered  Pt is being Rxed for CHF, a UTI and COPD issues     1/15/2020 Day 4 IV  Rocephin for UTI,ENTEROCOCCUS FAECALIS sensitive to ampicillin    H&H 8.7/27.1   Creat 1.6>1.3, LFTs better AST 82>42, >127, since off amiodarone   o2 sat 91-95% still on 3LNC (USES 3LNC at home 24hrs- states her sates are usually 91-92% , still with some SANTIAGO, intermittent wheezing, prednisone 20mg daily   xopenex q 6   Notes soreness to legs better but had "weeping" overnight   · RW Ambulation using portable oxygen x 75 feet with Supervision; goal is 200ft     1/16/2020 Day 5 IV  Rocephin for UTI,ENTEROCOCCUS FAECALIS sensitive to ampicillin   Repeat U/A yesterday good, leukocytes, nitrites negative, WBC 9.01, afebrile   H&H 8.4/26.4, k+ 3.4  Creat 1.3>1.0 BUN 46>37, AST 47>31- now normal, >100   No further amiodarone, Cards recommending increase current BB , pt has PPM   Stopped amlodipine yesterday due to LE complaints of swelling  Low BS 60s this am despite decreasing levemir from 12 to 8- will stop insulin  Will transfer with 1 unit PRBCs today and d'c in AM    1/17/2020 Pt admitted for weakness; she received 1 UPRBCs on 1/13/2020, And a 2nd unit yesterday   H&H 10/6/33.9 this am . PRBC given slowly over 4 hrs due to hx of flash pulm edema, She did have episode of SOB and oxygen desat to the 70s yesterday when getting up to BSC. She was given IV lasix 20mg and CXR " demonstrated;There is a mild amount of haziness scattered throughout the lower half of the right lung. There is a mild amount of haziness scattered throughout the lower 2/3 of the left lung.  This represents an interval worsening of the appearance of the lungs and is characteristic of pneumonia.  2. There is opacification of the base of the left hemithorax.  This is characteristic of a small pleural effusion.  She has completed 5/5 of Rocephin for ENTEROCOCCUS FAECALIS UTI sensitive to ampicillin   She has chronic Resp failure with itermittent wheezing and O2 sats 91-92% at baseline per pt.   Afebrile, WBC nml BP stable , pt reports feeling stronger. Still notes some congestion. Productive cough   AST 20/> 83   Added levaquin for concern for pneumonia       Interval History: weakness, anemia and COPD    Review of Systems   Constitutional: Positive for fatigue. Negative for appetite change, chills and fever.   HENT: Negative for congestion, ear discharge, ear pain, rhinorrhea, sinus pressure and sore throat.    Respiratory: Positive for cough, shortness of breath and wheezing. Negative for choking and chest tightness.         With coughing and increased activity   Cardiovascular: Negative for chest pain and palpitations.   Gastrointestinal: Negative for constipation, diarrhea, nausea and vomiting.   Musculoskeletal: Negative for joint swelling and myalgias.   Skin: Negative for rash and wound.   Neurological: Negative for dizziness, syncope, weakness, light-headedness and numbness.     Objective:     Vital Signs (Most Recent):  Temp: 96.7 °F (35.9 °C) (01/17/20 0500)  Pulse: 63 (01/17/20 0600)  Resp: 18 (01/17/20 0500)  BP: (!) 154/88 (01/17/20 0500)  SpO2: (!) 93 % (01/17/20 0500) Vital Signs (24h Range):  Temp:  [96.6 °F (35.9 °C)-98.5 °F (36.9 °C)] 96.7 °F (35.9 °C)  Pulse:  [59-78] 63  Resp:  [18-22] 18  SpO2:  [87 %-93 %] 93 %  BP: (100-156)/(55-88) 154/88     Weight: 60.2 kg (132 lb 11.5 oz)  Body mass  index is 24.27 kg/m².    Intake/Output Summary (Last 24 hours) at 1/17/2020 0706  Last data filed at 1/17/2020 0600  Gross per 24 hour   Intake 895.68 ml   Output 800 ml   Net 95.68 ml      Physical Exam   Constitutional: She is oriented to person, place, and time. She appears well-developed and well-nourished. She is cooperative.  Non-toxic appearance. She does not appear ill. No distress.   HENT:   Head: Normocephalic and atraumatic.   Right Ear: Hearing, tympanic membrane, external ear and ear canal normal.   Left Ear: Hearing, tympanic membrane, external ear and ear canal normal.   Nose: Nose normal. No mucosal edema, rhinorrhea or nasal deformity. No epistaxis. Right sinus exhibits no maxillary sinus tenderness and no frontal sinus tenderness. Left sinus exhibits no maxillary sinus tenderness and no frontal sinus tenderness.   Mouth/Throat: Uvula is midline, oropharynx is clear and moist and mucous membranes are normal. No trismus in the jaw. Normal dentition. No uvula swelling. No posterior oropharyngeal erythema.   Eyes: Conjunctivae and lids are normal. No scleral icterus.   Sclera clear bilat   Neck: Trachea normal, full passive range of motion without pain and phonation normal. Neck supple.   Cardiovascular: Normal rate, regular rhythm, S1 normal, S2 normal, normal heart sounds, intact distal pulses and normal pulses.   No murmur heard.  Pulmonary/Chest: Respiratory distress: mild to moderate. She has decreased breath sounds in the right lower field and the left lower field. She has wheezes in the right lower field and the left lower field. She has rhonchi in the right lower field and the left lower field.   Raspy cough ; off white sputum ; chest congestion    Abdominal: Soft. Normal appearance and bowel sounds are normal. She exhibits no distension. There is no tenderness.   Musculoskeletal: Normal range of motion. She exhibits no edema or deformity.   Neurological: She is alert and oriented to person,  place, and time. She exhibits normal muscle tone. Coordination normal.   Skin: Skin is warm, dry and intact. She is not diaphoretic. No pallor.   Psychiatric: She has a normal mood and affect. Her speech is normal and behavior is normal. Judgment and thought content normal. Cognition and memory are normal.   Nursing note and vitals reviewed.      Significant Labs:   CBC:   Recent Labs   Lab 01/16/20  0557 01/17/20  0534   WBC 9.01 11.24   HGB 8.4* 10.6*   HCT 26.4* 33.9*    218     CMP:   Recent Labs   Lab 01/16/20  0556 01/17/20  0534    144   K 3.4* 3.9   CL 96 97   CO2 38* 36*   GLU 67* 154*   BUN 37*  --    CREATININE 1.0 1.0   CALCIUM 8.4* 8.2*   PROT 5.2* 5.7*   ALBUMIN 3.3* 3.4*   BILITOT 0.7 0.8   ALKPHOS 149* 149*   AST 31 20   * 83*   ANIONGAP 10 11   EGFRNONAA 53* 53*       Significant Imaging: U/S: I have reviewed all pertinent results/findings within the past 24 hours and my personal findings are:  anemic  I have reviewed all pertinent imaging results/findings within the past 24 hours.      Assessment/Plan:      * Acute on chronic congestive heart failure  Given small dose of lasix yesterday wtihout much change. She seems fluid overloaded.  Will get last echo from cis and give 0.5mg of bumex this moring.  Recheck bmp at 3.    Needs repeat echo. (last one from 12/18)    Repeat BNP this morning.  Holding off on diuresis for now.  Creat is better 1.5 but basline WNL    Echo today- add demadex today and watch kidneys closely   EF 50%, normal diastolic fx, mild to moderate pulmonary HTN   1/15/2020 EF 50%, normal diastolic fx, mild to moderate pulmonary HTN; cards recommending avoid overdiueresing  Not getting diuretic currently; consider stopping norvasc due to LE complaints .        Pneumonia of left lower lobe due to infectious organism  Start levoquin today   Continue neb  CXR in am  Monitor  labs .      Choking due to food (regurgitated)    Speech eval   Given pt's history of  reflux-related symptoms, globus sensation, no overt s/s of aspiration, frequent belching, and poor positioning during and after PO intake, pt likely presents with GI-related dysfunction(s) resulting in discomfort with PO intake. Recommend initiation of medical management of potential GI dysfunctions to develop most appropriate treatment plan. As pt's symptoms appear to be related to probable reflux, completion of MBSS is not warranted at this time. SLP to follow up for diet tolerance only.   Getting protonix .    Acute cystitis without hematuria  Add rocephin   Follow culture    4d ago    Urine Culture, Routine Abnormal    ENTEROCOCCUS FAECALIS   50,000 - 99,999 cfu/ml     Resulting Agency [facility]   Susceptibility      Enterococcus faecalis     CULTURE, URINE     Ampicillin <=2 mcg/mL Sensitive     Nitrofurantoin <=32 mcg/mL Sensitive     Tetracycline >8 mcg/mL Resistant     Vancomycin 2 mcg/mL Sensitive         Had 2 gms Rocephin on 1/10 and 1GM yesterday   1/15/2020 Day 4 IV rocephin; repeat U/A today   1/16 u/A yesterday normal; day 5/5  rocephin today- completed   Resolved.      Weakness  Add PT   ambulated 75 ft with PT yesterday ; goal 200ft   1/16 Performed Gait trng using RW and portable oxygen with increased gait distances x 150 feet (twice) with supervision  1/17/2020 Bilateral LE eccentric strengthening ex x 10 reps on each such as Ankle Pumps, Heel Slides, Hip abd/add and Long arch quads. Provided pt with rest periods and deep breathing  in between ex      Anorexia  Switch from periactin to remeron.      Cough with hemoptysis  Add levaquin       Diabetes mellitus type 2, uncontrolled  Cont with ss   this morning  1/14/20 , 238, 164    1/15/2020 BS low this am 70s  Received 5 units yesterday with correction scale and getting 12 units nightly detemir- will decrease to 8 units tonight  1/16 Low BS 60s this am despite decreasing levemir from 12 to 8- will stop insulin  1/17 -167 , no insulin  due to recent hypoglycemia .    Anemia  AOCD - iron studies confirm this.  Has had iron in the past.  Will check for any signs of blood loss.  Has been asked to stop her eliquis.  While she is symptomatic and anemic, will try to diuresis before giving blood.  In the past she had flash pulm edema from transfusion vs transfusion reaction.    Referred to heme/onc in past  Now lower than 8- ordered 1 units PRBC, pre med and give over 4 hours   1/15/2020 H&H 8.7/27.1     1-16 Hct is 26, so since she responded well to blood will transfuse with one unit today  And d'c in the am  1/17/2020 HHH stable after 2nd UPRBC , 10/6/33.9 .        Acute on chronic respiratory failure with hypoxia  She is actually pretty close to her baseline, but she is short of breath all of the time.  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 With superimposed pulm edema, slightly more labored.  Cont O2 support  Repeat cxr and add mucinex and IPV- CXR- No significant interval change as compared to 01/10/2020      Atrial fibrillation with rapid ventricular response  Cont on amiodarone and lopressor.- ventricular paced  D/c amiodarone due to elevated LFT  Check Echo today- EF 50% , normal LV diastolic Fx  HR well controlled with BB   No more amiodarone per Dr. Metcalf; may be candidate for sotolol in future      Epigastric pain  U/S shows some ascites  resolved      Chronic respiratory failure with hypoxia  Continue Oxygen       CKD (chronic kidney disease), stage III  Contraction/prerenal azotemia.  Hold off on diuresis and allow for homeostasis.  Also cont rocephin for UTI and f/u culture    1/16 creat 1.0  1/17  BMP  Lab Results   Component Value Date     01/17/2020    K 3.9 01/17/2020    CL 97 01/17/2020    CO2 36 (H) 01/17/2020    BUN 30 (H) 01/17/2020    CREATININE 1.0 01/17/2020    CALCIUM 8.2 (L) 01/17/2020    ANIONGAP 11 01/17/2020    ESTGFRAFRICA >60 01/17/2020    EGFRNONAA 53 (A) 01/17/2020         Insomnia  Continue  on  remeron      COPD (chronic obstructive pulmonary disease)  Seems at baseline.  Will give small prednisone dose - 20mg daily - cont this.  Cont nebs.  Start rocephin.  Start on mucinex.  IPV ordered.  1/15 acapella ordered yesterday - still having intermittent wheezing   1/16 stable ; no wheezing this am; at baseline   1/17  Concern for pneumonia; added levaquin       Cholelithiases  R/o cholecystitis.  U/s ordered and no signs of cholecystitis  Cholelithiasis no cholecystitis, bilirubin normal   No intervention at this time   asymptomatic      VTE Risk Mitigation (From admission, onward)         Ordered     IP VTE HIGH RISK PATIENT  Once      01/10/20 2131     Place sequential compression device  Until discontinued      01/10/20 2131                      Milo Estevez MD  Department of Hospital Medicine   [facility]

## 2020-01-17 NOTE — PT/OT/SLP PROGRESS
"Physical Therapy Treatment    Patient Name:  Chantell Herrera   MRN:  [id number]    Recommendations:     Discharge Recommendations:  home with home health   Discharge Equipment Recommendations: none   Barriers to discharge: None    Assessment:     Chantell Herrera is a 84 y.o. female admitted with a medical diagnosis of Acute on chronic congestive heart failure.  She presents with the following impairments/functional limitations:  weakness, impaired endurance, impaired cardiopulmonary response to activity, impaired functional mobilty, impaired self care skills, gait instability. Patient reports feeling better after the blood transfusion yesterday. Patient agreed to walk with Physical Therapy today. Monitored patient's SPO2 at rest 92%. Ambulated pt using RW  X 100 feet and portable oxygen SPO2 went down to 82%. Instructed patient to perform slow deep breathing ex while resting for 1-2 minutes SPO2: 88%. Ambulated pt back to bedroom to use the toilet. No sign of respiratory distress.    Rehab Prognosis: Fair; patient would benefit from acute skilled PT services to address these deficits and reach maximum level of function.    Recent Surgery: * No surgery found *      Plan:     During this hospitalization, patient to be seen daily to address the identified rehab impairments via gait training, therapeutic exercises, therapeutic activities and progress toward the following goals:    · Plan of Care Expires:  01/20/20    Subjective     Chief Complaint: Shortness of breath  Patient/Family Comments/goals: To go home tomorrow".  Pain/Comfort:  · Pain Rating 1: 0/10  · Pain Rating Post-Intervention 1: 0/10      Objective:     Communicated with patient prior to session.  Patient found HOB elevated with peripheral IV, telemetry upon PT entry to room.     General Precautions: Standard, fall   Orthopedic Precautions:N/A   Braces: N/A     Functional Mobility:  · Bed Mobility:     · Rolling Left:  independence  · Rolling Right: " independence  · Scooting: independence  · Supine to Sit: independence  · Sit to Supine: independence  · Transfers:     · Sit to Stand:  modified independence with rolling walker  · Bed to Chair: modified independence with  no AD and rolling walker  using  Stand Pivot  · Toilet Transfer: modified independence with  rolling walker  using  Step Transfer  · Gait: RW Ambulation using portable oxygen at 3.0 l /m x 100 feet(twice) with Supervision. Reciprocal gait and dec jasmin.   · Balance: Stand Dynamic: Good grade using RW.      AM-PAC 6 CLICK MOBILITY  Turning over in bed (including adjusting bedclothes, sheets and blankets)?: 4  Sitting down on and standing up from a chair with arms (e.g., wheelchair, bedside commode, etc.): 3  Moving from lying on back to sitting on the side of the bed?: 4  Moving to and from a bed to a chair (including a wheelchair)?: 3  Need to walk in hospital room?: 3  Climbing 3-5 steps with a railing?: 3  Basic Mobility Total Score: 20       Therapeutic Activities and Exercises:   GAIT: Pt able to ambulate with rolling walker using portable oxygen at 3 l/m with Supervision or Set-up Assistance x 100 ft(twice) with the following gait deviation(s): decreased jasmin and reciprocal gait     Patient left sitting up at edge of the bed with all lines intact, call button in reach and nurse notified..    GOALS:   Multidisciplinary Problems     Physical Therapy Goals        Problem: Physical Therapy Goal    Goal Priority Disciplines Outcome Goal Variances Interventions   Physical Therapy Goal     PT, PT/OT Ongoing, Progressing     Description:  Goals to be met by: 20     Patient will increase functional independence with mobility by performin. Sit to stand transfer with Independent  2. Bed to chair/toilet transfer with Independentwith or without rolling walker using Stand Pivot TECHNIQUE  3. Gait  x 200  feet with Modified Independent with or without rolling walker  4. Lower extremity  exercise program x10 reps(2 sets) per handout, with assistance as needed                    Time Tracking:     PT Received On: 01/17/20  PT Start Time: 1225     PT Stop Time: 1250  PT Total Time (min): 25 min     Billable Minutes: Gait Training 15    Treatment Type: Treatment  PT/PTA: PT           Theodore Sellers, PT  01/17/2020

## 2020-01-17 NOTE — PROGRESS NOTES
[facility]  Pulmonology  Progress Note    Patient Name: Chantell Herrera  MRN: [id number]  Admission Date: 1/10/2020  Hospital Length of Stay: 4 days  Code Status: Full Code  Attending Provider: Kari Gaspar MD  Primary Care Provider: Kari Gaspar MD   Principal Problem: Acute on chronic congestive heart failure    Subjective:     Interval History: somewhat better new infiltrate on xray juan follow     Objective:     Vital Signs (Most Recent):  Temp: 98.7 °F (37.1 °C) (01/17/20 1106)  Pulse: 60 (01/17/20 1106)  Resp: 18 (01/17/20 1106)  BP: 134/68 (01/17/20 1106)  SpO2: (!) 90 % (01/17/20 1106) Vital Signs (24h Range):  Temp:  [96.5 °F (35.8 °C)-98.7 °F (37.1 °C)] 98.7 °F (37.1 °C)  Pulse:  [59-78] 60  Resp:  [18-22] 18  SpO2:  [87 %-96 %] 90 %  BP: (100-154)/(55-88) 134/68     Weight: 60.2 kg (132 lb 11.5 oz)  Body mass index is 24.27 kg/m².      Intake/Output Summary (Last 24 hours) at 1/17/2020 1156  Last data filed at 1/17/2020 0900  Gross per 24 hour   Intake 775.68 ml   Output 800 ml   Net -24.32 ml       Physical Exam   Pulmonary/Chest: No accessory muscle usage. She is in respiratory distress (mild wearing nasal mask ). She has decreased breath sounds in the right upper field, the right middle field, the right lower field, the left upper field, the left middle field and the left lower field. She has wheezes in the right lower field and the left lower field. She has rhonchi in the right lower field and the left lower field.       Vents:  Oxygen Concentration (%): 3 (01/15/20 1259)    Lines/Drains/Airways     Peripheral Intravenous Line                 Peripheral IV - Single Lumen 01/16/20 1631 18 G Posterior;Right Forearm less than 1 day                Significant Labs:    CBC/Anemia Profile:  Recent Labs   Lab 01/16/20  0557 01/17/20  0534 01/17/20  0550   WBC 9.01 11.24  --    HGB 8.4* 10.6*  --    HCT 26.4* 33.9*  --     218  --    * 105*  --    RDW 24.3* 24.9*  --   "  OCCULTBLOOD  --   --  Negative        Chemistries:  Recent Labs   Lab 01/16/20  0556 01/17/20  0534    144   K 3.4* 3.9   CL 96 97   CO2 38* 36*   BUN 37* 30*   CREATININE 1.0 1.0   CALCIUM 8.4* 8.2*   ALBUMIN 3.3* 3.4*   PROT 5.2* 5.7*   BILITOT 0.7 0.8   ALKPHOS 149* 149*   * 83*   AST 31 20       All pertinent labs within the past 24 hours have been reviewed.    Significant Imaging:  I have reviewed all pertinent imaging results/findings within the past 24 hours.    Assessment/Plan:     * Acute on chronic congestive heart failure  Stable     Chronic respiratory failure with hypoxia  Improving slowly  Low po2    Pneumonia of left lower lobe due to infectious organism  Worsened or actually "fluffed out"    Cough with hemoptysis  No hemoptysis     Acute on chronic respiratory failure with hypoxia  Worsening xray ?? Improving clinical Feeling better  But still SOB   Chronic respiratory failure     Insomnia  Improved with trilogy    COPD (chronic obstructive pulmonary disease)  Continue support wearing NIV  severe           Emmanuel Hickman MD  Pulmonology  [facility]    "

## 2020-01-18 LAB
ALBUMIN SERPL BCP-MCNC: 3.2 G/DL (ref 3.5–5.2)
ALP SERPL-CCNC: 132 U/L (ref 55–135)
ALT SERPL W/O P-5'-P-CCNC: 64 U/L (ref 10–44)
ANION GAP SERPL CALC-SCNC: 8 MMOL/L (ref 8–16)
AST SERPL-CCNC: 15 U/L (ref 10–40)
BASOPHILS # BLD AUTO: 0.03 K/UL (ref 0–0.2)
BASOPHILS NFR BLD: 0.3 % (ref 0–1.9)
BILIRUB SERPL-MCNC: 0.8 MG/DL (ref 0.1–1)
BUN SERPL-MCNC: 27 MG/DL (ref 8–23)
CALCIUM SERPL-MCNC: 8.4 MG/DL (ref 8.7–10.5)
CHLORIDE SERPL-SCNC: 95 MMOL/L (ref 95–110)
CO2 SERPL-SCNC: 39 MMOL/L (ref 23–29)
CREAT SERPL-MCNC: 1.1 MG/DL (ref 0.5–1.4)
DIFFERENTIAL METHOD: ABNORMAL
EOSINOPHIL # BLD AUTO: 0 K/UL (ref 0–0.5)
EOSINOPHIL NFR BLD: 0.1 % (ref 0–8)
ERYTHROCYTE [DISTWIDTH] IN BLOOD BY AUTOMATED COUNT: 24.4 % (ref 11.5–14.5)
EST. GFR  (AFRICAN AMERICAN): 54 ML/MIN/1.73 M^2
EST. GFR  (NON AFRICAN AMERICAN): 47 ML/MIN/1.73 M^2
GLUCOSE SERPL-MCNC: 215 MG/DL (ref 70–110)
HCT VFR BLD AUTO: 32.9 % (ref 37–48.5)
HGB BLD-MCNC: 10.1 G/DL (ref 12–16)
IMM GRANULOCYTES # BLD AUTO: 0.13 K/UL (ref 0–0.04)
IMM GRANULOCYTES NFR BLD AUTO: 1.5 % (ref 0–0.5)
LYMPHOCYTES # BLD AUTO: 0.3 K/UL (ref 1–4.8)
LYMPHOCYTES NFR BLD: 2.8 % (ref 18–48)
MCH RBC QN AUTO: 32.7 PG (ref 27–31)
MCHC RBC AUTO-ENTMCNC: 30.7 G/DL (ref 32–36)
MCV RBC AUTO: 107 FL (ref 82–98)
MONOCYTES # BLD AUTO: 1.1 K/UL (ref 0.3–1)
MONOCYTES NFR BLD: 12 % (ref 4–15)
NEUTROPHILS # BLD AUTO: 7.5 K/UL (ref 1.8–7.7)
NEUTROPHILS NFR BLD: 83.3 % (ref 38–73)
NRBC BLD-RTO: 0 /100 WBC
PLATELET # BLD AUTO: 205 K/UL (ref 150–350)
PMV BLD AUTO: 13.4 FL (ref 9.2–12.9)
POCT GLUCOSE: 165 MG/DL (ref 70–110)
POCT GLUCOSE: 207 MG/DL (ref 70–110)
POCT GLUCOSE: 257 MG/DL (ref 70–110)
POCT GLUCOSE: 310 MG/DL (ref 70–110)
POTASSIUM SERPL-SCNC: 4.2 MMOL/L (ref 3.5–5.1)
PROT SERPL-MCNC: 5.4 G/DL (ref 6–8.4)
RBC # BLD AUTO: 3.09 M/UL (ref 4–5.4)
SODIUM SERPL-SCNC: 142 MMOL/L (ref 136–145)
WBC # BLD AUTO: 8.95 K/UL (ref 3.9–12.7)

## 2020-01-18 PROCEDURE — 97530 THERAPEUTIC ACTIVITIES: CPT | Mod: HCNC

## 2020-01-18 PROCEDURE — 36415 COLL VENOUS BLD VENIPUNCTURE: CPT | Mod: HCNC

## 2020-01-18 PROCEDURE — 99233 SBSQ HOSP IP/OBS HIGH 50: CPT | Mod: HCNC,,, | Performed by: INTERNAL MEDICINE

## 2020-01-18 PROCEDURE — 85025 COMPLETE CBC W/AUTO DIFF WBC: CPT | Mod: HCNC

## 2020-01-18 PROCEDURE — 80053 COMPREHEN METABOLIC PANEL: CPT | Mod: HCNC

## 2020-01-18 PROCEDURE — 94761 N-INVAS EAR/PLS OXIMETRY MLT: CPT | Mod: HCNC

## 2020-01-18 PROCEDURE — 25000242 PHARM REV CODE 250 ALT 637 W/ HCPCS: Mod: HCNC | Performed by: FAMILY MEDICINE

## 2020-01-18 PROCEDURE — 25000003 PHARM REV CODE 250: Mod: HCNC | Performed by: FAMILY MEDICINE

## 2020-01-18 PROCEDURE — 27000221 HC OXYGEN, UP TO 24 HOURS: Mod: HCNC

## 2020-01-18 PROCEDURE — 25000003 PHARM REV CODE 250: Mod: HCNC | Performed by: NURSE PRACTITIONER

## 2020-01-18 PROCEDURE — 94640 AIRWAY INHALATION TREATMENT: CPT | Mod: HCNC

## 2020-01-18 PROCEDURE — 94664 DEMO&/EVAL PT USE INHALER: CPT | Mod: HCNC

## 2020-01-18 PROCEDURE — 99900035 HC TECH TIME PER 15 MIN (STAT): Mod: HCNC

## 2020-01-18 PROCEDURE — 99233 PR SUBSEQUENT HOSPITAL CARE,LEVL III: ICD-10-PCS | Mod: HCNC,,, | Performed by: INTERNAL MEDICINE

## 2020-01-18 PROCEDURE — 25000003 PHARM REV CODE 250: Mod: HCNC | Performed by: INTERNAL MEDICINE

## 2020-01-18 PROCEDURE — 63600175 PHARM REV CODE 636 W HCPCS: Mod: HCNC | Performed by: INTERNAL MEDICINE

## 2020-01-18 PROCEDURE — 11000001 HC ACUTE MED/SURG PRIVATE ROOM: Mod: HCNC

## 2020-01-18 PROCEDURE — 63600175 PHARM REV CODE 636 W HCPCS: Mod: HCNC | Performed by: FAMILY MEDICINE

## 2020-01-18 RX ORDER — FUROSEMIDE 20 MG/1
20 TABLET ORAL ONCE
Status: COMPLETED | OUTPATIENT
Start: 2020-01-18 | End: 2020-01-18

## 2020-01-18 RX ADMIN — INSULIN ASPART 2 UNITS: 100 INJECTION, SOLUTION INTRAVENOUS; SUBCUTANEOUS at 08:01

## 2020-01-18 RX ADMIN — AMLODIPINE BESYLATE 5 MG: 5 TABLET ORAL at 08:01

## 2020-01-18 RX ADMIN — GUAIFENESIN 600 MG: 600 TABLET, EXTENDED RELEASE ORAL at 08:01

## 2020-01-18 RX ADMIN — PREDNISONE 20 MG: 20 TABLET ORAL at 08:01

## 2020-01-18 RX ADMIN — LEVALBUTEROL 1.25 MG: 1.25 SOLUTION, CONCENTRATE RESPIRATORY (INHALATION) at 12:01

## 2020-01-18 RX ADMIN — ROPINIROLE HYDROCHLORIDE 1 MG: 0.5 TABLET, FILM COATED ORAL at 10:01

## 2020-01-18 RX ADMIN — PANTOPRAZOLE SODIUM 40 MG: 40 TABLET, DELAYED RELEASE ORAL at 08:01

## 2020-01-18 RX ADMIN — METOPROLOL TARTRATE 25 MG: 25 TABLET ORAL at 08:01

## 2020-01-18 RX ADMIN — GUAIFENESIN 600 MG: 600 TABLET, EXTENDED RELEASE ORAL at 10:01

## 2020-01-18 RX ADMIN — LEVOFLOXACIN 500 MG: 500 TABLET, FILM COATED ORAL at 08:01

## 2020-01-18 RX ADMIN — INSULIN ASPART 3 UNITS: 100 INJECTION, SOLUTION INTRAVENOUS; SUBCUTANEOUS at 12:01

## 2020-01-18 RX ADMIN — CITALOPRAM HYDROBROMIDE 20 MG: 10 TABLET ORAL at 08:01

## 2020-01-18 RX ADMIN — METHYLPREDNISOLONE SODIUM SUCCINATE 40 MG: 40 INJECTION, POWDER, FOR SOLUTION INTRAMUSCULAR; INTRAVENOUS at 05:01

## 2020-01-18 RX ADMIN — FUROSEMIDE 20 MG: 20 TABLET ORAL at 08:01

## 2020-01-18 RX ADMIN — MIRTAZAPINE 15 MG: 15 TABLET, ORALLY DISINTEGRATING ORAL at 10:01

## 2020-01-18 RX ADMIN — LEVALBUTEROL 1.25 MG: 1.25 SOLUTION, CONCENTRATE RESPIRATORY (INHALATION) at 01:01

## 2020-01-18 RX ADMIN — ASPIRIN 81 MG: 81 TABLET, COATED ORAL at 08:01

## 2020-01-18 RX ADMIN — METHYLPREDNISOLONE SODIUM SUCCINATE 40 MG: 40 INJECTION, POWDER, FOR SOLUTION INTRAMUSCULAR; INTRAVENOUS at 10:01

## 2020-01-18 RX ADMIN — METOPROLOL TARTRATE 25 MG: 25 TABLET ORAL at 10:01

## 2020-01-18 RX ADMIN — FUROSEMIDE 20 MG: 20 TABLET ORAL at 10:01

## 2020-01-18 RX ADMIN — METHYLPREDNISOLONE SODIUM SUCCINATE 40 MG: 40 INJECTION, POWDER, FOR SOLUTION INTRAMUSCULAR; INTRAVENOUS at 02:01

## 2020-01-18 RX ADMIN — LEVALBUTEROL 1.25 MG: 1.25 SOLUTION, CONCENTRATE RESPIRATORY (INHALATION) at 07:01

## 2020-01-18 RX ADMIN — INSULIN ASPART 2 UNITS: 100 INJECTION, SOLUTION INTRAVENOUS; SUBCUTANEOUS at 10:01

## 2020-01-18 NOTE — SUBJECTIVE & OBJECTIVE
Review of Systems   Constitutional: Positive for fatigue. Negative for appetite change, chills and fever.   HENT: Negative for congestion, ear discharge, ear pain, rhinorrhea, sinus pressure and sore throat.    Respiratory: Positive for cough, shortness of breath and wheezing. Negative for choking and chest tightness.         With coughing and increased activity   Cardiovascular: Negative for chest pain and palpitations.   Gastrointestinal: Negative for constipation, diarrhea, nausea and vomiting.   Musculoskeletal: Negative for joint swelling and myalgias.   Skin: Negative for rash and wound.   Neurological: Negative for dizziness, syncope, weakness, light-headedness and numbness.     Objective:     Vital Signs (Most Recent):  Temp: 96.6 °F (35.9 °C) (01/18/20 0726)  Pulse: 73 (01/18/20 0800)  Resp: 18 (01/18/20 0737)  BP: 127/76 (01/18/20 0726)  SpO2: (!) 91 % (01/18/20 0737) Vital Signs (24h Range):  Temp:  [96.6 °F (35.9 °C)-98.7 °F (37.1 °C)] 96.6 °F (35.9 °C)  Pulse:  [59-79] 73  Resp:  [16-26] 18  SpO2:  [82 %-96 %] 91 %  BP: (115-145)/(68-79) 127/76     Weight: 58.4 kg (128 lb 12 oz)  Body mass index is 23.55 kg/m².    Intake/Output Summary (Last 24 hours) at 1/18/2020 0858  Last data filed at 1/17/2020 1932  Gross per 24 hour   Intake 660 ml   Output 851 ml   Net -191 ml      Physical Exam   Constitutional: She is oriented to person, place, and time. She appears well-developed and well-nourished. She is cooperative.  Non-toxic appearance. She does not appear ill. No distress.   HENT:   Head: Normocephalic and atraumatic.   Right Ear: Hearing, tympanic membrane, external ear and ear canal normal.   Left Ear: Hearing, tympanic membrane, external ear and ear canal normal.   Nose: Nose normal. No mucosal edema, rhinorrhea or nasal deformity. No epistaxis. Right sinus exhibits no maxillary sinus tenderness and no frontal sinus tenderness. Left sinus exhibits no maxillary sinus tenderness and no frontal sinus  tenderness.   Mouth/Throat: Uvula is midline, oropharynx is clear and moist and mucous membranes are normal. No trismus in the jaw. Normal dentition. No uvula swelling. No posterior oropharyngeal erythema.   Eyes: Conjunctivae and lids are normal. No scleral icterus.   Sclera clear bilat   Neck: Trachea normal, full passive range of motion without pain and phonation normal. Neck supple.   Cardiovascular: Normal rate, regular rhythm, S1 normal, S2 normal, normal heart sounds, intact distal pulses and normal pulses.   No murmur heard.  Pulmonary/Chest: Respiratory distress: mild to moderate. She has decreased breath sounds in the right lower field and the left lower field. She has wheezes in the right lower field and the left lower field. She has rhonchi in the right lower field and the left lower field.   Raspy cough ; off white sputum ; chest congestion    Abdominal: Soft. Normal appearance and bowel sounds are normal. She exhibits no distension. There is no tenderness.   Musculoskeletal: Normal range of motion. She exhibits no edema or deformity.   Neurological: She is alert and oriented to person, place, and time. She exhibits normal muscle tone. Coordination normal.   Skin: Skin is warm, dry and intact. She is not diaphoretic. No pallor.   Psychiatric: She has a normal mood and affect. Her speech is normal and behavior is normal. Judgment and thought content normal. Cognition and memory are normal.   Nursing note and vitals reviewed.      Significant Labs:   CBC:   Recent Labs   Lab 01/17/20  0534 01/18/20  0556   WBC 11.24 8.95   HGB 10.6* 10.1*   HCT 33.9* 32.9*    205     CMP:   Recent Labs   Lab 01/17/20  0534 01/18/20  0556    142   K 3.9 4.2   CL 97 95   CO2 36* 39*   * 215*   BUN 30* 27*   CREATININE 1.0 1.1   CALCIUM 8.2* 8.4*   PROT 5.7* 5.4*   ALBUMIN 3.4* 3.2*   BILITOT 0.8 0.8   ALKPHOS 149* 132   AST 20 15   ALT 83* 64*   ANIONGAP 11 8   EGFRNONAA 53* 47*       Significant  Imaging: CXR: I have reviewed all pertinent results/findings within the past 24 hours and my personal findings are:  CHF/COPD/pneumonia

## 2020-01-18 NOTE — PROGRESS NOTES
[facility] Hospital Medicine  Progress Note    Patient Name: Chantell Herrera  MRN: [id number]  Patient Class: IP- Inpatient   Admission Date: 1/10/2020  Length of Stay: 5 days  Attending Physician: Kari Gaspar MD  Primary Care Provider: Kari Gaspar MD        Subjective:     Principal Problem:Acute on chronic congestive heart failure        HPI:  84 year old female with chronic respiratory failure and recent admission for pacer placement comes in because of weakness. She has been feeling more and more swollen, and she is getting more weak. She says that she has also had a lot of abdominal pain especially when getting up to walk. She feels nauseated and sick to her stomach when this happens. She has not been taking any diuretics. Last EF is unknown.    Overview/Hospital Course:  1/12/2020 Patient with continued weakness. O2 doing okay. Slight improvement of fluid, but only diuresed about 1 L.   Blood counts improved with diuresis - no transfusion at this point.  Overall about the same.    1/13/2020 pt renal and liver function elevated. Prior to recent cardiac procedure both were fine. She has liver u/s yesterday. Beside a gallstone without inflammation read as normal. On amiodarone for A fib. Discussed with Dr Metcalf this am as she is ventricular paced on tele and HR 60 will hold amiodarone and cont BB. Follow LFT. Also CXR without congestion. She was sent home on lasix but denies taking it although felt swollen. Given 20mg IV lasix 1/10 and bumex 0.5mg IV x 1 on 1/11. BNP was 2990 on admission and went up to 3124 2 days later and 2909 today.     Cbc pending 7.6 today. Ferritin very high as well as elevated fe studies. CBC shows eleavted MCV and MCH. Folate and B12 2 months ago normal, will repeat.     1/14/2020 Pt NAD this morning. Notes swelling much better but has persistent junky cough during exam, and still wheezing     Negative -738, Still with significant SANTIAGO with minimal exertion,  "O2 sat 90-92% on 3LNC   Afebrile, Day 3 Rocephin for UTI, ( Had 2 grams on 1/10/20 and 1Gm yesterday    ENTEROCOCCUS FAECALIS   50,000 - 99,999 cfu/ml      Resulting Agency [facility]   Susceptibility      Enterococcus faecalis     CULTURE, URINE     Ampicillin <=2 mcg/mL Sensitive     Nitrofurantoin <=32 mcg/mL Sensitive     Tetracycline >8 mcg/mL Resistant     Vancomycin 2 mcg/mL Sensitive       Blood culture x1 done- NGTD x 4d H&H up to 8.9/27.8 after 1UPRBCs from 7.6/23.5  AST 76>82, >158 , creat 1.6   Was noted to have choking issues, ST eval ordered  Pt is being Rxed for CHF, a UTI and COPD issues     1/15/2020 Day 4 IV  Rocephin for UTI,ENTEROCOCCUS FAECALIS sensitive to ampicillin    H&H 8.7/27.1   Creat 1.6>1.3, LFTs better AST 82>42, >127, since off amiodarone   o2 sat 91-95% still on 3LNC (USES 3LNC at home 24hrs- states her sates are usually 91-92% , still with some SANTIAGO, intermittent wheezing, prednisone 20mg daily   xopenex q 6   Notes soreness to legs better but had "weeping" overnight   · RW Ambulation using portable oxygen x 75 feet with Supervision; goal is 200ft     1/16/2020 Day 5 IV  Rocephin for UTI,ENTEROCOCCUS FAECALIS sensitive to ampicillin   Repeat U/A yesterday good, leukocytes, nitrites negative, WBC 9.01, afebrile   H&H 8.4/26.4, k+ 3.4  Creat 1.3>1.0 BUN 46>37, AST 47>31- now normal, >100   No further amiodarone, Cards recommending increase current BB , pt has PPM   Stopped amlodipine yesterday due to LE complaints of swelling  Low BS 60s this am despite decreasing levemir from 12 to 8- will stop insulin  Will transfer with 1 unit PRBCs today and d'c in AM    1/17/2020 Pt admitted for weakness; she received 1 UPRBCs on 1/13/2020, And a 2nd unit yesterday   H&H 10/6/33.9 this am . PRBC given slowly over 4 hrs due to hx of flash pulm edema, She did have episode of SOB and oxygen desat to the 70s yesterday when getting up to BSC. She was given IV lasix 20mg and CXR " demonstrated;There is a mild amount of haziness scattered throughout the lower half of the right lung. There is a mild amount of haziness scattered throughout the lower 2/3 of the left lung.  This represents an interval worsening of the appearance of the lungs and is characteristic of pneumonia.  2. There is opacification of the base of the left hemithorax.  This is characteristic of a small pleural effusion.  She has completed 5/5 of Rocephin for ENTEROCOCCUS FAECALIS UTI sensitive to ampicillin   She has chronic Resp failure with itermittent wheezing and O2 sats 91-92% at baseline per pt.   Afebrile, WBC nml BP stable , pt reports feeling stronger. Still notes some congestion. Productive cough   AST 20/> 83   Added levaquin for concern for pneumonia     1/18/20  Yesterday she dropped her pulseox to 81   Felt severely short winded   Seems very congested still   We added Levaquin for possible pneumonia   CXR had fluffed up           Review of Systems   Constitutional: Positive for fatigue. Negative for appetite change, chills and fever.   HENT: Negative for congestion, ear discharge, ear pain, rhinorrhea, sinus pressure and sore throat.    Respiratory: Positive for cough, shortness of breath and wheezing. Negative for choking and chest tightness.         With coughing and increased activity   Cardiovascular: Negative for chest pain and palpitations.   Gastrointestinal: Negative for constipation, diarrhea, nausea and vomiting.   Musculoskeletal: Negative for joint swelling and myalgias.   Skin: Negative for rash and wound.   Neurological: Negative for dizziness, syncope, weakness, light-headedness and numbness.     Objective:     Vital Signs (Most Recent):  Temp: 96.6 °F (35.9 °C) (01/18/20 0726)  Pulse: 73 (01/18/20 0800)  Resp: 18 (01/18/20 0737)  BP: 127/76 (01/18/20 0726)  SpO2: (!) 91 % (01/18/20 0737) Vital Signs (24h Range):  Temp:  [96.6 °F (35.9 °C)-98.7 °F (37.1 °C)] 96.6 °F (35.9 °C)  Pulse:   [59-79] 73  Resp:  [16-26] 18  SpO2:  [82 %-96 %] 91 %  BP: (115-145)/(68-79) 127/76     Weight: 58.4 kg (128 lb 12 oz)  Body mass index is 23.55 kg/m².    Intake/Output Summary (Last 24 hours) at 1/18/2020 0858  Last data filed at 1/17/2020 1932  Gross per 24 hour   Intake 660 ml   Output 851 ml   Net -191 ml      Physical Exam   Constitutional: She is oriented to person, place, and time. She appears well-developed and well-nourished. She is cooperative.  Non-toxic appearance. She does not appear ill. No distress.   HENT:   Head: Normocephalic and atraumatic.   Right Ear: Hearing, tympanic membrane, external ear and ear canal normal.   Left Ear: Hearing, tympanic membrane, external ear and ear canal normal.   Nose: Nose normal. No mucosal edema, rhinorrhea or nasal deformity. No epistaxis. Right sinus exhibits no maxillary sinus tenderness and no frontal sinus tenderness. Left sinus exhibits no maxillary sinus tenderness and no frontal sinus tenderness.   Mouth/Throat: Uvula is midline, oropharynx is clear and moist and mucous membranes are normal. No trismus in the jaw. Normal dentition. No uvula swelling. No posterior oropharyngeal erythema.   Eyes: Conjunctivae and lids are normal. No scleral icterus.   Sclera clear bilat   Neck: Trachea normal, full passive range of motion without pain and phonation normal. Neck supple.   Cardiovascular: Normal rate, regular rhythm, S1 normal, S2 normal, normal heart sounds, intact distal pulses and normal pulses.   No murmur heard.  Pulmonary/Chest: Respiratory distress: mild to moderate. She has decreased breath sounds in the right lower field and the left lower field. She has wheezes in the right lower field and the left lower field. She has rhonchi in the right lower field and the left lower field.   Raspy cough ; off white sputum ; chest congestion    Abdominal: Soft. Normal appearance and bowel sounds are normal. She exhibits no distension. There is no tenderness.    Musculoskeletal: Normal range of motion. She exhibits no edema or deformity.   Neurological: She is alert and oriented to person, place, and time. She exhibits normal muscle tone. Coordination normal.   Skin: Skin is warm, dry and intact. She is not diaphoretic. No pallor.   Psychiatric: She has a normal mood and affect. Her speech is normal and behavior is normal. Judgment and thought content normal. Cognition and memory are normal.   Nursing note and vitals reviewed.      Significant Labs:   CBC:   Recent Labs   Lab 01/17/20  0534 01/18/20  0556   WBC 11.24 8.95   HGB 10.6* 10.1*   HCT 33.9* 32.9*    205     CMP:   Recent Labs   Lab 01/17/20  0534 01/18/20  0556    142   K 3.9 4.2   CL 97 95   CO2 36* 39*   * 215*   BUN 30* 27*   CREATININE 1.0 1.1   CALCIUM 8.2* 8.4*   PROT 5.7* 5.4*   ALBUMIN 3.4* 3.2*   BILITOT 0.8 0.8   ALKPHOS 149* 132   AST 20 15   ALT 83* 64*   ANIONGAP 11 8   EGFRNONAA 53* 47*       Significant Imaging: CXR: I have reviewed all pertinent results/findings within the past 24 hours and my personal findings are:  CHF/COPD/pneumonia      Assessment/Plan:      * Acute on chronic congestive heart failure  Given small dose of lasix yesterday wtihout much change. She seems fluid overloaded.  Will get last echo from cis and give 0.5mg of bumex this moring.  Recheck bmp at 3.    Needs repeat echo. (last one from 12/18)    Repeat BNP this morning.  Holding off on diuresis for now.  Creat is better 1.5 but basline WNL    Echo today- add demadex today and watch kidneys closely   EF 50%, normal diastolic fx, mild to moderate pulmonary HTN   1/15/2020 EF 50%, normal diastolic fx, mild to moderate pulmonary HTN; cards recommending avoid overdiueresing  Not getting diuretic currently; consider stopping norvasc due to LE complaints .    1/18  Extra lasix 40 mg today     Pneumonia of left lower lobe due to infectious organism  Start levoquin today   Continue neb  CXR in am  Monitor  labs  .      Choking due to food (regurgitated)    Speech eval   Given pt's history of reflux-related symptoms, globus sensation, no overt s/s of aspiration, frequent belching, and poor positioning during and after PO intake, pt likely presents with GI-related dysfunction(s) resulting in discomfort with PO intake. Recommend initiation of medical management of potential GI dysfunctions to develop most appropriate treatment plan. As pt's symptoms appear to be related to probable reflux, completion of MBSS is not warranted at this time. SLP to follow up for diet tolerance only.   Getting protonix .    Acute cystitis without hematuria  Add rocephin   Follow culture    4d ago    Urine Culture, Routine Abnormal    ENTEROCOCCUS FAECALIS   50,000 - 99,999 cfu/ml     Resulting Agency [facility]   Susceptibility      Enterococcus faecalis     CULTURE, URINE     Ampicillin <=2 mcg/mL Sensitive     Nitrofurantoin <=32 mcg/mL Sensitive     Tetracycline >8 mcg/mL Resistant     Vancomycin 2 mcg/mL Sensitive         Had 2 gms Rocephin on 1/10 and 1GM yesterday   1/15/2020 Day 4 IV rocephin; repeat U/A today   1/16 u/A yesterday normal; day 5/5  rocephin today- completed   Resolved.      Weakness  Add PT   ambulated 75 ft with PT yesterday ; goal 200ft   1/16 Performed Gait trng using RW and portable oxygen with increased gait distances x 150 feet (twice) with supervision  1/17/2020 Bilateral LE eccentric strengthening ex x 10 reps on each such as Ankle Pumps, Heel Slides, Hip abd/add and Long arch quads. Provided pt with rest periods and deep breathing  in between ex      Anorexia  Switch from periactin to remeron.      Cough with hemoptysis  Add levaquin day 2      Diabetes mellitus type 2, uncontrolled  Cont with ss   this morning  1/14/20 , 238, 164    1/15/2020 BS low this am 70s  Received 5 units yesterday with correction scale and getting 12 units nightly detemir- will decrease to 8 units tonight  1/16 Low BS 60s this am  despite decreasing levemir from 12 to 8- will stop insulin  1/18   Lab Results   Component Value Date    HGBA1C 6.7 (H) 01/13/2020      -167 , no insulin due to recent hypoglycemia .    Anemia  AOCD - iron studies confirm this.  Has had iron in the past.  Will check for any signs of blood loss.  Has been asked to stop her eliquis.  While she is symptomatic and anemic, will try to diuresis before giving blood.  In the past she had flash pulm edema from transfusion vs transfusion reaction.    Referred to heme/onc in past  Now lower than 8- ordered 1 units PRBC, pre med and give over 4 hours   1/15/2020 H&H 8.7/27.1     1-16 Hct is 26, so since she responded well to blood will transfuse with one unit today  And d'c in the am  1/17/2020 HHH stable after 2nd UPRBC , 10/6/33.9 .        Acute on chronic respiratory failure with hypoxia  She is actually pretty close to her baseline, but she is short of breath all of the time.  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 With superimposed pulm edema, slightly more labored.  Cont O2 support  Repeat cxr and add mucinex and IPV- CXR- No significant interval change as compared to 01/10/2020      Atrial fibrillation with rapid ventricular response  Cont on amiodarone and lopressor.- ventricular paced  D/c amiodarone due to elevated LFT  Check Echo today- EF 50% , normal LV diastolic Fx  HR well controlled with BB   No more amiodarone per Dr. Metcalf; may be candidate for sotolol in future      Epigastric pain  U/S shows some ascites  resolved      Chronic respiratory failure with hypoxia  Continue Oxygen .  bipap yesterday .   Uses NIV as needed       CKD (chronic kidney disease), stage III  Contraction/prerenal azotemia.  Hold off on diuresis and allow for homeostasis.  Also cont rocephin for UTI and f/u culture.    1/16 creat 1.0  1/17  BMP  Lab Results   Component Value Date     01/18/2020    K 4.2 01/18/2020    CL 95 01/18/2020    CO2 39 (H) 01/18/2020     BUN 27 (H) 01/18/2020    CREATININE 1.1 01/18/2020    CALCIUM 8.4 (L) 01/18/2020    ANIONGAP 8 01/18/2020    ESTGFRAFRICA 54 (A) 01/18/2020    EGFRNONAA 47 (A) 01/18/2020         Insomnia  Continue  on remeron      COPD (chronic obstructive pulmonary disease)  Seems at baseline.  Will give small prednisone dose - 20mg daily - cont this.  Cont nebs.  Start rocephin.  Start on mucinex.  IPV ordered.  1/15 acapella ordered yesterday - still having intermittent wheezing   1/16 stable ; no wheezing this am; at baseline   1/18  Concern for pneumonia; added levaquin  Day 2       Cholelithiases  R/o cholecystitis.  U/s ordered and no signs of cholecystitis  Cholelithiasis no cholecystitis, bilirubin normal   No intervention at this time   asymptomatic      VTE Risk Mitigation (From admission, onward)         Ordered     IP VTE HIGH RISK PATIENT  Once      01/10/20 2131     Place sequential compression device  Until discontinued      01/10/20 2131                      Milo Estevez MD  Department of Hospital Medicine   [facility]

## 2020-01-18 NOTE — ASSESSMENT & PLAN NOTE
Given small dose of lasix yesterday wtihout much change. She seems fluid overloaded.  Will get last echo from [facility] and give 0.5mg of bumex this moring.  Recheck bmp at 3.    Needs repeat echo. (last one from 12/18)    Repeat BNP this morning.  Holding off on diuresis for now.  Creat is better 1.5 but basline WNL    Echo today- add demadex today and watch kidneys closely   EF 50%, normal diastolic fx, mild to moderate pulmonary HTN   1/15/2020 EF 50%, normal diastolic fx, mild to moderate pulmonary HTN; cards recommending avoid overdiueresing  Not getting diuretic currently; consider stopping norvasc due to LE complaints .    1/18  Extra lasix 40 mg today

## 2020-01-18 NOTE — PT/OT/SLP PROGRESS
Physical Therapy Treatment    Patient Name:  Chantell Herrera   MRN:  [id number]    Recommendations:     Discharge Recommendations:  home with home health, home health PT   Discharge Equipment Recommendations: none   Barriers to discharge: Inaccessible home and Decreased caregiver support    Assessment:     Chantell Herrera is a 84 y.o. female admitted with a medical diagnosis of Acute on chronic congestive heart failure.  She presents with the following impairments/functional limitations:  weakness, impaired endurance, impaired self care skills, gait instability, impaired balance, impaired cardiopulmonary response to activity.  Pt continues to display decreased CP response to acute PT treatment requiring rest breaks for SOB.    Rehab Prognosis: Poor; patient would benefit from acute skilled PT services to address these deficits and reach maximum level of function.    Recent Surgery: * No surgery found *      Plan:     During this hospitalization, patient to be seen daily to address the identified rehab impairments via gait training, therapeutic activities, therapeutic exercises and progress toward the following goals:    · Plan of Care Expires:  01/20/20    Subjective     Chief Complaint: SOB with activity  Patient/Family Comments/goals: decrease SOB  Pain/Comfort:  · Pain Rating 1: 0/10      Objective:     Communicated with pt prior to session.  Patient found supine with peripheral IV, telemetry upon PT entry to room.     General Precautions: Standard, fall   Orthopedic Precautions:N/A   Braces: N/A     Functional Mobility:  · Bed Mobility:     · Rolling Left:  supervision  · Scooting: supervision  · Supine to Sit: supervision  · Transfers:     · Sit to Stand:  stand by assistance with rolling walker  · Bed to Chair: contact guard assistance with  rolling walker  using  Step Transfer  · Gait: CGA 15' w/ RW      AM-PAC 6 CLICK MOBILITY  Turning over in bed (including adjusting bedclothes, sheets and blankets)?:  4  Sitting down on and standing up from a chair with arms (e.g., wheelchair, bedside commode, etc.): 3  Moving to and from a bed to a chair (including a wheelchair)?: 3  Need to walk in hospital room?: 3  Climbing 3-5 steps with a railing?: 2       Therapeutic Activities and Exercises:   Supine to sit  Diaphragmatic and pursed lip breathing x 5 min  Sit to stand x 2  Gait 15' w/ RW  Diaphragmatic and pursed lip breathing x 3 min    Patient left up in chair with all lines intact and call button in reach..    GOALS:   Multidisciplinary Problems     Physical Therapy Goals        Problem: Physical Therapy Goal    Goal Priority Disciplines Outcome Goal Variances Interventions   Physical Therapy Goal     PT, PT/OT Ongoing, Progressing     Description:  Goals to be met by: 20     Patient will increase functional independence with mobility by performin. Sit to stand transfer with Independent  2. Bed to chair/toilet transfer with Independentwith or without rolling walker using Stand Pivot TECHNIQUE  3. Gait  x 200  feet with Modified Independent with or without rolling walker  4. Lower extremity exercise program x10 reps(2 sets) per handout, with assistance as needed                    Time Tracking:     PT Received On: 20  PT Start Time: 1015     PT Stop Time: 1030  PT Total Time (min): 15 min     Billable Minutes: Therapeutic Activity 15    Treatment Type: Treatment  PT/PTA: PT           Jayden Virk, PT  2020

## 2020-01-18 NOTE — PROGRESS NOTES
[facility]  Pulmonology  Progress Note    Patient Name: Chantell Herrera  MRN: [id number]  Admission Date: 1/10/2020  Hospital Length of Stay: 5 days  Code Status: Full Code  Attending Provider: Kari Gaspar MD  Primary Care Provider: Kari Gaspar MD   Principal Problem: Acute on chronic congestive heart failure    Subjective:     Interval History: grumpy today wants to go home     Objective:     Vital Signs (Most Recent):  Temp: 97.4 °F (36.3 °C) (01/18/20 1129)  Pulse: 63 (01/18/20 1200)  Resp: 18 (01/18/20 1129)  BP: 110/61 (01/18/20 1129)  SpO2: (!) 94 % (01/18/20 1129) Vital Signs (24h Range):  Temp:  [96.6 °F (35.9 °C)-98.6 °F (37 °C)] 97.4 °F (36.3 °C)  Pulse:  [59-79] 63  Resp:  [16-22] 18  SpO2:  [89 %-96 %] 94 %  BP: (110-145)/(61-79) 110/61     Weight: 58.4 kg (128 lb 12 oz)  Body mass index is 23.55 kg/m².      Intake/Output Summary (Last 24 hours) at 1/18/2020 1336  Last data filed at 1/17/2020 1932  Gross per 24 hour   Intake 240 ml   Output 450 ml   Net -210 ml       Physical Exam   Constitutional: She is oriented to person, place, and time. She appears well-developed and well-nourished. She is cooperative.  Non-toxic appearance. She does not appear ill. No distress.   HENT:   Head: Normocephalic and atraumatic.   Right Ear: Hearing, tympanic membrane, external ear and ear canal normal.   Left Ear: Hearing, tympanic membrane, external ear and ear canal normal.   Nose: Nose normal. No mucosal edema, rhinorrhea or nasal deformity. No epistaxis. Right sinus exhibits no maxillary sinus tenderness and no frontal sinus tenderness. Left sinus exhibits no maxillary sinus tenderness and no frontal sinus tenderness.   Mouth/Throat: Uvula is midline, oropharynx is clear and moist and mucous membranes are normal. No trismus in the jaw. Normal dentition. No uvula swelling. No posterior oropharyngeal erythema.   Eyes: Conjunctivae and lids are normal. No scleral icterus.   Sclera clear  bilat   Neck: Trachea normal, full passive range of motion without pain and phonation normal. Neck supple.   Cardiovascular: Normal rate, regular rhythm, normal heart sounds, intact distal pulses and normal pulses.   Pulmonary/Chest: She is in respiratory distress (mild to moderate). She has decreased breath sounds in the right middle field, the right lower field, the left middle field and the left lower field. She has wheezes in the right lower field and the left lower field. She has rhonchi in the right lower field and the left lower field. She has rales in the right lower field and the left lower field.   Abdominal: Soft. Normal appearance and bowel sounds are normal. She exhibits no distension. There is no tenderness.   Musculoskeletal: Normal range of motion. She exhibits no edema or deformity.   Neurological: She is alert and oriented to person, place, and time. She exhibits normal muscle tone. Coordination normal.   Skin: Skin is warm, dry and intact. She is not diaphoretic. No pallor.   Psychiatric: She has a normal mood and affect. Her speech is normal and behavior is normal. Judgment and thought content normal. Cognition and memory are normal.   Nursing note and vitals reviewed.      Vents:  Oxygen Concentration (%): 3 (01/15/20 1259)    Lines/Drains/Airways     Peripheral Intravenous Line                 Peripheral IV - Single Lumen 01/16/20 1631 18 G Posterior;Right Forearm 1 day                Significant Labs:    CBC/Anemia Profile:  Recent Labs   Lab 01/17/20  0534 01/17/20  0550 01/18/20  0556   WBC 11.24  --  8.95   HGB 10.6*  --  10.1*   HCT 33.9*  --  32.9*     --  205   *  --  107*   RDW 24.9*  --  24.4*   OCCULTBLOOD  --  Negative  --         Chemistries:  Recent Labs   Lab 01/17/20  0534 01/18/20  0556    142   K 3.9 4.2   CL 97 95   CO2 36* 39*   BUN 30* 27*   CREATININE 1.0 1.1   CALCIUM 8.2* 8.4*   ALBUMIN 3.4* 3.2*   PROT 5.7* 5.4*   BILITOT 0.8 0.8   ALKPHOS 149* 132  "  ALT 83* 64*   AST 20 15       All pertinent labs within the past 24 hours have been reviewed.    Significant Imaging:  I have reviewed all pertinent imaging results/findings within the past 24 hours.    Assessment/Plan:     * Acute on chronic congestive heart failure  Stable     Chronic respiratory failure with hypoxia  Improving slowly  Low po2    Pneumonia of left lower lobe due to infectious organism  Worsened or actually "fluffed out"    Cough with hemoptysis  No hemoptysis     COPD with acute exacerbation  Severe     Acute on chronic respiratory failure with hypoxia  Worsening xray ?? Improving clinical Feeling better  But still SOB   Chronic respiratory failure     Atrial fibrillation with rapid ventricular response  Take meds     Insomnia  Improved with trilogy    COPD (chronic obstructive pulmonary disease)  Continue support wearing NIV  severe           Emmanuel Hickman MD  Pulmonology  [facility]    "

## 2020-01-18 NOTE — SUBJECTIVE & OBJECTIVE
Interval History: grumpy today wants to go home     Objective:     Vital Signs (Most Recent):  Temp: 97.4 °F (36.3 °C) (01/18/20 1129)  Pulse: 63 (01/18/20 1200)  Resp: 18 (01/18/20 1129)  BP: 110/61 (01/18/20 1129)  SpO2: (!) 94 % (01/18/20 1129) Vital Signs (24h Range):  Temp:  [96.6 °F (35.9 °C)-98.6 °F (37 °C)] 97.4 °F (36.3 °C)  Pulse:  [59-79] 63  Resp:  [16-22] 18  SpO2:  [89 %-96 %] 94 %  BP: (110-145)/(61-79) 110/61     Weight: 58.4 kg (128 lb 12 oz)  Body mass index is 23.55 kg/m².      Intake/Output Summary (Last 24 hours) at 1/18/2020 1336  Last data filed at 1/17/2020 1932  Gross per 24 hour   Intake 240 ml   Output 450 ml   Net -210 ml       Physical Exam   Constitutional: She is oriented to person, place, and time. She appears well-developed and well-nourished. She is cooperative.  Non-toxic appearance. She does not appear ill. No distress.   HENT:   Head: Normocephalic and atraumatic.   Right Ear: Hearing, tympanic membrane, external ear and ear canal normal.   Left Ear: Hearing, tympanic membrane, external ear and ear canal normal.   Nose: Nose normal. No mucosal edema, rhinorrhea or nasal deformity. No epistaxis. Right sinus exhibits no maxillary sinus tenderness and no frontal sinus tenderness. Left sinus exhibits no maxillary sinus tenderness and no frontal sinus tenderness.   Mouth/Throat: Uvula is midline, oropharynx is clear and moist and mucous membranes are normal. No trismus in the jaw. Normal dentition. No uvula swelling. No posterior oropharyngeal erythema.   Eyes: Conjunctivae and lids are normal. No scleral icterus.   Sclera clear bilat   Neck: Trachea normal, full passive range of motion without pain and phonation normal. Neck supple.   Cardiovascular: Normal rate, regular rhythm, normal heart sounds, intact distal pulses and normal pulses.   Pulmonary/Chest: She is in respiratory distress (mild to moderate). She has decreased breath sounds in the right middle field, the right lower  field, the left middle field and the left lower field. She has wheezes in the right lower field and the left lower field. She has rhonchi in the right lower field and the left lower field. She has rales in the right lower field and the left lower field.   Abdominal: Soft. Normal appearance and bowel sounds are normal. She exhibits no distension. There is no tenderness.   Musculoskeletal: Normal range of motion. She exhibits no edema or deformity.   Neurological: She is alert and oriented to person, place, and time. She exhibits normal muscle tone. Coordination normal.   Skin: Skin is warm, dry and intact. She is not diaphoretic. No pallor.   Psychiatric: She has a normal mood and affect. Her speech is normal and behavior is normal. Judgment and thought content normal. Cognition and memory are normal.   Nursing note and vitals reviewed.      Vents:  Oxygen Concentration (%): 3 (01/15/20 1259)    Lines/Drains/Airways     Peripheral Intravenous Line                 Peripheral IV - Single Lumen 01/16/20 1631 18 G Posterior;Right Forearm 1 day                Significant Labs:    CBC/Anemia Profile:  Recent Labs   Lab 01/17/20  0534 01/17/20  0550 01/18/20  0556   WBC 11.24  --  8.95   HGB 10.6*  --  10.1*   HCT 33.9*  --  32.9*     --  205   *  --  107*   RDW 24.9*  --  24.4*   OCCULTBLOOD  --  Negative  --         Chemistries:  Recent Labs   Lab 01/17/20  0534 01/18/20  0556    142   K 3.9 4.2   CL 97 95   CO2 36* 39*   BUN 30* 27*   CREATININE 1.0 1.1   CALCIUM 8.2* 8.4*   ALBUMIN 3.4* 3.2*   PROT 5.7* 5.4*   BILITOT 0.8 0.8   ALKPHOS 149* 132   ALT 83* 64*   AST 20 15       All pertinent labs within the past 24 hours have been reviewed.    Significant Imaging:  I have reviewed all pertinent imaging results/findings within the past 24 hours.

## 2020-01-18 NOTE — ASSESSMENT & PLAN NOTE
Seems at baseline.  Will give small prednisone dose - 20mg daily - cont this.  Cont nebs.  Start rocephin.  Start on mucinex.  IPV ordered.  1/15 acapella ordered yesterday - still having intermittent wheezing   1/16 stable ; no wheezing this am; at baseline   1/18  Concern for pneumonia; added levaquin  Day 2

## 2020-01-18 NOTE — ASSESSMENT & PLAN NOTE
Cont with ss   this morning  1/14/20 , 238, 164    1/15/2020 BS low this am 70s  Received 5 units yesterday with correction scale and getting 12 units nightly detemir- will decrease to 8 units tonight  1/16 Low BS 60s this am despite decreasing levemir from 12 to 8- will stop insulin  1/18   Lab Results   Component Value Date    HGBA1C 6.7 (H) 01/13/2020      -167 , no insulin due to recent hypoglycemia .

## 2020-01-18 NOTE — ASSESSMENT & PLAN NOTE
Contraction/prerenal azotemia.  Hold off on diuresis and allow for homeostasis.  Also cont rocephin for UTI and f/u culture.    1/16 creat 1.0  1/17  BMP  Lab Results   Component Value Date     01/18/2020    K 4.2 01/18/2020    CL 95 01/18/2020    CO2 39 (H) 01/18/2020    BUN 27 (H) 01/18/2020    CREATININE 1.1 01/18/2020    CALCIUM 8.4 (L) 01/18/2020    ANIONGAP 8 01/18/2020    ESTGFRAFRICA 54 (A) 01/18/2020    EGFRNONAA 47 (A) 01/18/2020

## 2020-01-19 LAB
ALBUMIN SERPL BCP-MCNC: 3.3 G/DL (ref 3.5–5.2)
ALP SERPL-CCNC: 119 U/L (ref 55–135)
ALT SERPL W/O P-5'-P-CCNC: 54 U/L (ref 10–44)
ANION GAP SERPL CALC-SCNC: 13 MMOL/L (ref 8–16)
AST SERPL-CCNC: 13 U/L (ref 10–40)
BASOPHILS # BLD AUTO: 0.03 K/UL (ref 0–0.2)
BASOPHILS NFR BLD: 0.2 % (ref 0–1.9)
BILIRUB SERPL-MCNC: 0.6 MG/DL (ref 0.1–1)
BNP SERPL-MCNC: 2723 PG/ML (ref 0–99)
BUN SERPL-MCNC: 33 MG/DL (ref 8–23)
CALCIUM SERPL-MCNC: 8.5 MG/DL (ref 8.7–10.5)
CHLORIDE SERPL-SCNC: 95 MMOL/L (ref 95–110)
CO2 SERPL-SCNC: 35 MMOL/L (ref 23–29)
CREAT SERPL-MCNC: 1.3 MG/DL (ref 0.5–1.4)
DIFFERENTIAL METHOD: ABNORMAL
EOSINOPHIL # BLD AUTO: 0 K/UL (ref 0–0.5)
EOSINOPHIL NFR BLD: 0.1 % (ref 0–8)
ERYTHROCYTE [DISTWIDTH] IN BLOOD BY AUTOMATED COUNT: 24.2 % (ref 11.5–14.5)
EST. GFR  (AFRICAN AMERICAN): 44 ML/MIN/1.73 M^2
EST. GFR  (NON AFRICAN AMERICAN): 38 ML/MIN/1.73 M^2
GLUCOSE SERPL-MCNC: 236 MG/DL (ref 70–110)
HCT VFR BLD AUTO: 31.8 % (ref 37–48.5)
HGB BLD-MCNC: 9.8 G/DL (ref 12–16)
IMM GRANULOCYTES # BLD AUTO: 0.25 K/UL (ref 0–0.04)
IMM GRANULOCYTES NFR BLD AUTO: 1.7 % (ref 0–0.5)
LYMPHOCYTES # BLD AUTO: 0.3 K/UL (ref 1–4.8)
LYMPHOCYTES NFR BLD: 1.9 % (ref 18–48)
MCH RBC QN AUTO: 32.6 PG (ref 27–31)
MCHC RBC AUTO-ENTMCNC: 30.8 G/DL (ref 32–36)
MCV RBC AUTO: 106 FL (ref 82–98)
MONOCYTES # BLD AUTO: 1.5 K/UL (ref 0.3–1)
MONOCYTES NFR BLD: 10.4 % (ref 4–15)
NEUTROPHILS # BLD AUTO: 12.3 K/UL (ref 1.8–7.7)
NEUTROPHILS NFR BLD: 85.7 % (ref 38–73)
NRBC BLD-RTO: 0 /100 WBC
PLATELET # BLD AUTO: 212 K/UL (ref 150–350)
PMV BLD AUTO: 13.5 FL (ref 9.2–12.9)
POCT GLUCOSE: 161 MG/DL (ref 70–110)
POCT GLUCOSE: 192 MG/DL (ref 70–110)
POCT GLUCOSE: 228 MG/DL (ref 70–110)
POCT GLUCOSE: 305 MG/DL (ref 70–110)
POTASSIUM SERPL-SCNC: 3.7 MMOL/L (ref 3.5–5.1)
PROT SERPL-MCNC: 5.5 G/DL (ref 6–8.4)
RBC # BLD AUTO: 3.01 M/UL (ref 4–5.4)
SODIUM SERPL-SCNC: 143 MMOL/L (ref 136–145)
WBC # BLD AUTO: 14.34 K/UL (ref 3.9–12.7)

## 2020-01-19 PROCEDURE — 63600175 PHARM REV CODE 636 W HCPCS: Mod: HCNC | Performed by: INTERNAL MEDICINE

## 2020-01-19 PROCEDURE — 25000003 PHARM REV CODE 250: Mod: HCNC | Performed by: NURSE PRACTITIONER

## 2020-01-19 PROCEDURE — 11000001 HC ACUTE MED/SURG PRIVATE ROOM: Mod: HCNC

## 2020-01-19 PROCEDURE — 83880 ASSAY OF NATRIURETIC PEPTIDE: CPT | Mod: HCNC

## 2020-01-19 PROCEDURE — 99233 PR SUBSEQUENT HOSPITAL CARE,LEVL III: ICD-10-PCS | Mod: HCNC,,, | Performed by: INTERNAL MEDICINE

## 2020-01-19 PROCEDURE — 94761 N-INVAS EAR/PLS OXIMETRY MLT: CPT | Mod: HCNC

## 2020-01-19 PROCEDURE — 85025 COMPLETE CBC W/AUTO DIFF WBC: CPT | Mod: HCNC

## 2020-01-19 PROCEDURE — 99900035 HC TECH TIME PER 15 MIN (STAT): Mod: HCNC

## 2020-01-19 PROCEDURE — 97530 THERAPEUTIC ACTIVITIES: CPT | Mod: HCNC

## 2020-01-19 PROCEDURE — 25000242 PHARM REV CODE 250 ALT 637 W/ HCPCS: Mod: HCNC | Performed by: FAMILY MEDICINE

## 2020-01-19 PROCEDURE — 80053 COMPREHEN METABOLIC PANEL: CPT | Mod: HCNC

## 2020-01-19 PROCEDURE — 99233 SBSQ HOSP IP/OBS HIGH 50: CPT | Mod: HCNC,,, | Performed by: INTERNAL MEDICINE

## 2020-01-19 PROCEDURE — 94640 AIRWAY INHALATION TREATMENT: CPT | Mod: HCNC

## 2020-01-19 PROCEDURE — 25000003 PHARM REV CODE 250: Mod: HCNC | Performed by: FAMILY MEDICINE

## 2020-01-19 PROCEDURE — 36415 COLL VENOUS BLD VENIPUNCTURE: CPT | Mod: HCNC

## 2020-01-19 PROCEDURE — 63600175 PHARM REV CODE 636 W HCPCS: Mod: HCNC | Performed by: FAMILY MEDICINE

## 2020-01-19 PROCEDURE — 94664 DEMO&/EVAL PT USE INHALER: CPT | Mod: HCNC

## 2020-01-19 PROCEDURE — 27000221 HC OXYGEN, UP TO 24 HOURS: Mod: HCNC

## 2020-01-19 RX ORDER — LEVOFLOXACIN 500 MG/1
500 TABLET, FILM COATED ORAL
Status: DISCONTINUED | OUTPATIENT
Start: 2020-01-21 | End: 2020-01-20 | Stop reason: HOSPADM

## 2020-01-19 RX ORDER — FUROSEMIDE 10 MG/ML
40 INJECTION INTRAMUSCULAR; INTRAVENOUS ONCE
Status: COMPLETED | OUTPATIENT
Start: 2020-01-19 | End: 2020-01-19

## 2020-01-19 RX ADMIN — LEVALBUTEROL 1.25 MG: 1.25 SOLUTION, CONCENTRATE RESPIRATORY (INHALATION) at 07:01

## 2020-01-19 RX ADMIN — AMLODIPINE BESYLATE 5 MG: 5 TABLET ORAL at 11:01

## 2020-01-19 RX ADMIN — LEVOFLOXACIN 500 MG: 500 TABLET, FILM COATED ORAL at 11:01

## 2020-01-19 RX ADMIN — INSULIN ASPART 2 UNITS: 100 INJECTION, SOLUTION INTRAVENOUS; SUBCUTANEOUS at 04:01

## 2020-01-19 RX ADMIN — MIRTAZAPINE 15 MG: 15 TABLET, ORALLY DISINTEGRATING ORAL at 10:01

## 2020-01-19 RX ADMIN — INSULIN ASPART 4 UNITS: 100 INJECTION, SOLUTION INTRAVENOUS; SUBCUTANEOUS at 11:01

## 2020-01-19 RX ADMIN — PANTOPRAZOLE SODIUM 40 MG: 40 TABLET, DELAYED RELEASE ORAL at 11:01

## 2020-01-19 RX ADMIN — PREDNISONE 20 MG: 20 TABLET ORAL at 11:01

## 2020-01-19 RX ADMIN — GUAIFENESIN 600 MG: 600 TABLET, EXTENDED RELEASE ORAL at 11:01

## 2020-01-19 RX ADMIN — METOPROLOL TARTRATE 25 MG: 25 TABLET ORAL at 11:01

## 2020-01-19 RX ADMIN — METOPROLOL TARTRATE 25 MG: 25 TABLET ORAL at 10:01

## 2020-01-19 RX ADMIN — GUAIFENESIN 600 MG: 600 TABLET, EXTENDED RELEASE ORAL at 10:01

## 2020-01-19 RX ADMIN — METHYLPREDNISOLONE SODIUM SUCCINATE 40 MG: 40 INJECTION, POWDER, FOR SOLUTION INTRAMUSCULAR; INTRAVENOUS at 05:01

## 2020-01-19 RX ADMIN — CITALOPRAM HYDROBROMIDE 20 MG: 10 TABLET ORAL at 11:01

## 2020-01-19 RX ADMIN — METHYLPREDNISOLONE SODIUM SUCCINATE 40 MG: 40 INJECTION, POWDER, FOR SOLUTION INTRAMUSCULAR; INTRAVENOUS at 03:01

## 2020-01-19 RX ADMIN — LEVALBUTEROL 1.25 MG: 1.25 SOLUTION, CONCENTRATE RESPIRATORY (INHALATION) at 12:01

## 2020-01-19 RX ADMIN — FUROSEMIDE 40 MG: 10 INJECTION, SOLUTION INTRAMUSCULAR; INTRAVENOUS at 11:01

## 2020-01-19 RX ADMIN — ASPIRIN 81 MG: 81 TABLET, COATED ORAL at 11:01

## 2020-01-19 RX ADMIN — METHYLPREDNISOLONE SODIUM SUCCINATE 40 MG: 40 INJECTION, POWDER, FOR SOLUTION INTRAMUSCULAR; INTRAVENOUS at 10:01

## 2020-01-19 RX ADMIN — ROPINIROLE HYDROCHLORIDE 1 MG: 0.5 TABLET, FILM COATED ORAL at 10:01

## 2020-01-19 NOTE — SUBJECTIVE & OBJECTIVE
Review of Systems   Constitutional: Positive for fatigue. Negative for appetite change, chills and fever.   HENT: Negative for congestion, ear discharge, ear pain, rhinorrhea, sinus pressure and sore throat.    Respiratory: Positive for shortness of breath and wheezing. Negative for choking and chest tightness.         With coughing and increased activity   Cardiovascular: Negative for chest pain and palpitations.   Gastrointestinal: Negative for constipation, diarrhea, nausea and vomiting.   Musculoskeletal: Negative for joint swelling and myalgias.   Skin: Negative for rash and wound.   Neurological: Negative for dizziness, syncope, weakness, light-headedness and numbness.     Objective:     Vital Signs (Most Recent):  Temp: 97.7 °F (36.5 °C) (01/19/20 0839)  Pulse: 61 (01/19/20 1000)  Resp: 20 (01/19/20 0839)  BP: 118/64 (01/19/20 0839)  SpO2: (!) 89 % (01/19/20 0839) Vital Signs (24h Range):  Temp:  [96 °F (35.6 °C)-97.7 °F (36.5 °C)] 97.7 °F (36.5 °C)  Pulse:  [60-98] 61  Resp:  [16-20] 20  SpO2:  [89 %-95 %] 89 %  BP: ()/(57-79) 118/64     Weight: 62.3 kg (137 lb 5.6 oz)  Body mass index is 25.12 kg/m².    Intake/Output Summary (Last 24 hours) at 1/19/2020 1023  Last data filed at 1/19/2020 0400  Gross per 24 hour   Intake 111 ml   Output 700 ml   Net -589 ml      Physical Exam   Constitutional: She is oriented to person, place, and time. She appears well-developed and well-nourished. She is cooperative.  Non-toxic appearance. She does not appear ill. No distress.   HENT:   Head: Normocephalic and atraumatic.   Right Ear: Hearing, tympanic membrane, external ear and ear canal normal.   Left Ear: Hearing, tympanic membrane, external ear and ear canal normal.   Nose: Nose normal. No mucosal edema, rhinorrhea or nasal deformity. No epistaxis. Right sinus exhibits no maxillary sinus tenderness and no frontal sinus tenderness. Left sinus exhibits no maxillary sinus tenderness and no frontal sinus  tenderness.   Mouth/Throat: Uvula is midline, oropharynx is clear and moist and mucous membranes are normal. No trismus in the jaw. Normal dentition. No uvula swelling. No posterior oropharyngeal erythema.   Eyes: Conjunctivae and lids are normal. No scleral icterus.   Sclera clear bilat   Neck: Trachea normal, full passive range of motion without pain and phonation normal. Neck supple.   Cardiovascular: Normal rate, regular rhythm, S1 normal, S2 normal, normal heart sounds, intact distal pulses and normal pulses.   No murmur heard.  Pulmonary/Chest: Respiratory distress: mild to moderate. She has decreased breath sounds in the right lower field and the left lower field. She has wheezes in the right lower field and the left lower field. She has rhonchi in the right lower field and the left lower field.   Raspy cough ; off white sputum ; chest congestion    Abdominal: Soft. Normal appearance and bowel sounds are normal. She exhibits no distension. There is no tenderness.   Musculoskeletal: Normal range of motion. She exhibits no edema or deformity.   Neurological: She is alert and oriented to person, place, and time. She exhibits normal muscle tone. Coordination normal.   Skin: Skin is warm, dry and intact. She is not diaphoretic. No pallor.   Psychiatric: She has a normal mood and affect. Her speech is normal and behavior is normal. Judgment and thought content normal. Cognition and memory are normal.   Nursing note and vitals reviewed.      Significant Labs:   Blood Culture: No results for input(s): LABBLOO in the last 48 hours.  CBC:   Recent Labs   Lab 01/18/20  0556 01/19/20  0556   WBC 8.95 14.34*   HGB 10.1* 9.8*   HCT 32.9* 31.8*    212     BMP  Lab Results   Component Value Date     01/19/2020    K 3.7 01/19/2020    CL 95 01/19/2020    CO2 35 (H) 01/19/2020    BUN 33 (H) 01/19/2020    CREATININE 1.3 01/19/2020    CALCIUM 8.5 (L) 01/19/2020    ANIONGAP 13 01/19/2020    ESTGFRAFRICA 44 (A)  01/19/2020    EGFRNONAA 38 (A) 01/19/2020       Significant Imaging: CXR: I have reviewed all pertinent results/findings within the past 24 hours and my personal findings are:  1. Findings consistent with mild congestive heart failure.

## 2020-01-19 NOTE — SUBJECTIVE & OBJECTIVE
Interval History: feeling better on trilogy increased TV     Objective:     Vital Signs (Most Recent):  Temp: 96.2 °F (35.7 °C) (01/19/20 1106)  Pulse: 60 (01/19/20 1400)  Resp: 18 (01/19/20 1252)  BP: 131/67 (01/19/20 1106)  SpO2: (!) 92 % (01/19/20 1252) Vital Signs (24h Range):  Temp:  [96 °F (35.6 °C)-97.7 °F (36.5 °C)] 96.2 °F (35.7 °C)  Pulse:  [60-98] 60  Resp:  [16-20] 18  SpO2:  [89 %-95 %] 92 %  BP: ()/(57-79) 131/67     Weight: 62.3 kg (137 lb 5.6 oz)  Body mass index is 25.12 kg/m².      Intake/Output Summary (Last 24 hours) at 1/19/2020 1531  Last data filed at 1/19/2020 1030  Gross per 24 hour   Intake 111 ml   Output 1000 ml   Net -889 ml       Physical Exam   Constitutional: She is oriented to person, place, and time. She appears well-developed and well-nourished. She is cooperative.  Non-toxic appearance. She does not appear ill. No distress.   HENT:   Head: Normocephalic and atraumatic.   Right Ear: Hearing, tympanic membrane, external ear and ear canal normal.   Left Ear: Hearing, tympanic membrane, external ear and ear canal normal.   Nose: Nose normal. No mucosal edema, rhinorrhea or nasal deformity. No epistaxis. Right sinus exhibits no maxillary sinus tenderness and no frontal sinus tenderness. Left sinus exhibits no maxillary sinus tenderness and no frontal sinus tenderness.   Mouth/Throat: Uvula is midline, oropharynx is clear and moist and mucous membranes are normal. No trismus in the jaw. Normal dentition. No uvula swelling. No posterior oropharyngeal erythema.   Eyes: Conjunctivae and lids are normal. No scleral icterus.   Sclera clear bilat   Neck: Trachea normal, full passive range of motion without pain and phonation normal. Neck supple.   Cardiovascular: Normal rate, regular rhythm, normal heart sounds, intact distal pulses and normal pulses.   Pulmonary/Chest: No accessory muscle usage. She is in respiratory distress (mild ). She has decreased breath sounds in the right upper  field, the right middle field, the right lower field, the left upper field, the left middle field and the left lower field. She has wheezes in the right middle field, the right lower field, the left middle field and the left lower field. She has rhonchi in the right middle field, the right lower field, the left middle field and the left lower field.   Abdominal: Soft. Normal appearance and bowel sounds are normal. She exhibits no distension. There is no tenderness.   Musculoskeletal: Normal range of motion. She exhibits no edema or deformity.   Neurological: She is alert and oriented to person, place, and time. She exhibits normal muscle tone. Coordination normal.   Skin: Skin is warm, dry and intact. She is not diaphoretic. No pallor.   Psychiatric: She has a normal mood and affect. Her speech is normal and behavior is normal. Judgment and thought content normal. Cognition and memory are normal.   Nursing note and vitals reviewed.      Vents:  Oxygen Concentration (%): 3 (01/18/20 1335)    Lines/Drains/Airways     Peripheral Intravenous Line                 Peripheral IV - Single Lumen 01/16/20 1631 18 G Posterior;Right Forearm 2 days                Significant Labs:    CBC/Anemia Profile:  Recent Labs   Lab 01/18/20  0556 01/19/20  0556   WBC 8.95 14.34*   HGB 10.1* 9.8*   HCT 32.9* 31.8*    212   * 106*   RDW 24.4* 24.2*        Chemistries:  Recent Labs   Lab 01/18/20  0556 01/19/20  0556    143   K 4.2 3.7   CL 95 95   CO2 39* 35*   BUN 27* 33*   CREATININE 1.1 1.3   CALCIUM 8.4* 8.5*   ALBUMIN 3.2* 3.3*   PROT 5.4* 5.5*   BILITOT 0.8 0.6   ALKPHOS 132 119   ALT 64* 54*   AST 15 13       All pertinent labs within the past 24 hours have been reviewed.    Significant Imaging:  I have reviewed all pertinent imaging results/findings within the past 24 hours.

## 2020-01-19 NOTE — ASSESSMENT & PLAN NOTE
Contraction/prerenal azotemia.  Hold off on diuresis and allow for homeostasis.  Also cont rocephin for UTI and f/u culture.    1/16 creat 1.0  1/17  BMP  Lab Results   Component Value Date     01/19/2020    K 3.7 01/19/2020    CL 95 01/19/2020    CO2 35 (H) 01/19/2020    BUN 33 (H) 01/19/2020    CREATININE 1.3 01/19/2020    CALCIUM 8.5 (L) 01/19/2020    ANIONGAP 13 01/19/2020    ESTGFRAFRICA 44 (A) 01/19/2020    EGFRNONAA 38 (A) 01/19/2020

## 2020-01-19 NOTE — PROGRESS NOTES
Staff Handoff  Bedside report received from STACIA Chowdhury. Patient AAOx4. No complaints of pain. Denies SOB. POC discussed. Asked to call for assistance.       Resident Handoff

## 2020-01-19 NOTE — PROGRESS NOTES
[facility] Medicine  Progress Note    Patient Name: Chantell Herrera  MRN: [id number]  Patient Class: IP- Inpatient   Admission Date: 1/10/2020  Length of Stay: 6 days  Attending Physician: Kari Gaspar MD  Primary Care Provider: Kari Gaspar MD        Subjective:     Principal Problem:Acute on chronic congestive heart failure        HPI:  84 year old female with chronic respiratory failure and recent admission for pacer placement comes in because of weakness. She has been feeling more and more swollen, and she is getting more weak. She says that she has also had a lot of abdominal pain especially when getting up to walk. She feels nauseated and sick to her stomach when this happens. She has not been taking any diuretics. Last EF is unknown.    Overview/Hospital Course:  1/12/2020 Patient with continued weakness. O2 doing okay. Slight improvement of fluid, but only diuresed about 1 L.   Blood counts improved with diuresis - no transfusion at this point.  Overall about the same.    1/13/2020 pt renal and liver function elevated. Prior to recent cardiac procedure both were fine. She has liver u/s yesterday. Beside a gallstone without inflammation read as normal. On amiodarone for A fib. Discussed with Dr Metcalf this am as she is ventricular paced on tele and HR 60 will hold amiodarone and cont BB. Follow LFT. Also CXR without congestion. She was sent home on lasix but denies taking it although felt swollen. Given 20mg IV lasix 1/10 and bumex 0.5mg IV x 1 on 1/11. BNP was 2990 on admission and went up to 3124 2 days later and 2909 today.     Cbc pending 7.6 today. Ferritin very high as well as elevated fe studies. CBC shows eleavted MCV and MCH. Folate and B12 2 months ago normal, will repeat.     1/14/2020 Pt NAD this morning. Notes swelling much better but has persistent junky cough during exam, and still wheezing     Negative -738, Still with significant SANTIAGO with minimal exertion,  "O2 sat 90-92% on 3LNC   Afebrile, Day 3 Rocephin for UTI, ( Had 2 grams on 1/10/20 and 1Gm yesterday    ENTEROCOCCUS FAECALIS   50,000 - 99,999 cfu/ml      Resulting Agency [facility]   Susceptibility      Enterococcus faecalis     CULTURE, URINE     Ampicillin <=2 mcg/mL Sensitive     Nitrofurantoin <=32 mcg/mL Sensitive     Tetracycline >8 mcg/mL Resistant     Vancomycin 2 mcg/mL Sensitive       Blood culture x1 done- NGTD x 4d H&H up to 8.9/27.8 after 1UPRBCs from 7.6/23.5  AST 76>82, >158 , creat 1.6   Was noted to have choking issues, ST eval ordered  Pt is being Rxed for CHF, a UTI and COPD issues     1/15/2020 Day 4 IV  Rocephin for UTI,ENTEROCOCCUS FAECALIS sensitive to ampicillin    H&H 8.7/27.1   Creat 1.6>1.3, LFTs better AST 82>42, >127, since off amiodarone   o2 sat 91-95% still on 3LNC (USES 3LNC at home 24hrs- states her sates are usually 91-92% , still with some SANTIAGO, intermittent wheezing, prednisone 20mg daily   xopenex q 6   Notes soreness to legs better but had "weeping" overnight   · RW Ambulation using portable oxygen x 75 feet with Supervision; goal is 200ft     1/16/2020 Day 5 IV  Rocephin for UTI,ENTEROCOCCUS FAECALIS sensitive to ampicillin   Repeat U/A yesterday good, leukocytes, nitrites negative, WBC 9.01, afebrile   H&H 8.4/26.4, k+ 3.4  Creat 1.3>1.0 BUN 46>37, AST 47>31- now normal, >100   No further amiodarone, Cards recommending increase current BB , pt has PPM   Stopped amlodipine yesterday due to LE complaints of swelling  Low BS 60s this am despite decreasing levemir from 12 to 8- will stop insulin  Will transfer with 1 unit PRBCs today and d'c in AM    1/17/2020 Pt admitted for weakness; she received 1 UPRBCs on 1/13/2020, And a 2nd unit yesterday   H&H 10/6/33.9 this am . PRBC given slowly over 4 hrs due to hx of flash pulm edema, She did have episode of SOB and oxygen desat to the 70s yesterday when getting up to BSC. She was given IV lasix 20mg and CXR " demonstrated;There is a mild amount of haziness scattered throughout the lower half of the right lung. There is a mild amount of haziness scattered throughout the lower 2/3 of the left lung.  This represents an interval worsening of the appearance of the lungs and is characteristic of pneumonia.  2. There is opacification of the base of the left hemithorax.  This is characteristic of a small pleural effusion.  She has completed 5/5 of Rocephin for ENTEROCOCCUS FAECALIS UTI sensitive to ampicillin   She has chronic Resp failure with itermittent wheezing and O2 sats 91-92% at baseline per pt.   Afebrile, WBC nml BP stable , pt reports feeling stronger. Still notes some congestion. Productive cough   AST 20/> 83   Added levaquin for concern for pneumonia     1/18/20  Yesterday she dropped her pulseox to 81   Felt severely short winded   Seems very congested still   We added Levaquin for possible pneumonia   CXR had fluffed up     1/19/2020  I diuresed her a little;e more aggressively and she feels better   Her creatinine is bumping but her breathing is better and she is less congested  Oxygenation is better   Lasix 40 one more time . Continue Levaquin   CXR 1/17  1. Findings consistent with mild congestive heart failure.  2. Moderate bilateral pleural effusions with atelectasis versus infiltrate at the lung bases.  Correlate clinically with possible fever and/or elevated white count.  3. Underlying COPD.  4. Pacemaker.        Review of Systems   Constitutional: Positive for fatigue. Negative for appetite change, chills and fever.   HENT: Negative for congestion, ear discharge, ear pain, rhinorrhea, sinus pressure and sore throat.    Respiratory: Positive for shortness of breath and wheezing. Negative for choking and chest tightness.         With coughing and increased activity   Cardiovascular: Negative for chest pain and palpitations.   Gastrointestinal: Negative for constipation, diarrhea, nausea and vomiting.    Musculoskeletal: Negative for joint swelling and myalgias.   Skin: Negative for rash and wound.   Neurological: Negative for dizziness, syncope, weakness, light-headedness and numbness.     Objective:     Vital Signs (Most Recent):  Temp: 97.7 °F (36.5 °C) (01/19/20 0839)  Pulse: 61 (01/19/20 1000)  Resp: 20 (01/19/20 0839)  BP: 118/64 (01/19/20 0839)  SpO2: (!) 89 % (01/19/20 0839) Vital Signs (24h Range):  Temp:  [96 °F (35.6 °C)-97.7 °F (36.5 °C)] 97.7 °F (36.5 °C)  Pulse:  [60-98] 61  Resp:  [16-20] 20  SpO2:  [89 %-95 %] 89 %  BP: ()/(57-79) 118/64     Weight: 62.3 kg (137 lb 5.6 oz)  Body mass index is 25.12 kg/m².    Intake/Output Summary (Last 24 hours) at 1/19/2020 1023  Last data filed at 1/19/2020 0400  Gross per 24 hour   Intake 111 ml   Output 700 ml   Net -589 ml      Physical Exam   Constitutional: She is oriented to person, place, and time. She appears well-developed and well-nourished. She is cooperative.  Non-toxic appearance. She does not appear ill. No distress.   HENT:   Head: Normocephalic and atraumatic.   Right Ear: Hearing, tympanic membrane, external ear and ear canal normal.   Left Ear: Hearing, tympanic membrane, external ear and ear canal normal.   Nose: Nose normal. No mucosal edema, rhinorrhea or nasal deformity. No epistaxis. Right sinus exhibits no maxillary sinus tenderness and no frontal sinus tenderness. Left sinus exhibits no maxillary sinus tenderness and no frontal sinus tenderness.   Mouth/Throat: Uvula is midline, oropharynx is clear and moist and mucous membranes are normal. No trismus in the jaw. Normal dentition. No uvula swelling. No posterior oropharyngeal erythema.   Eyes: Conjunctivae and lids are normal. No scleral icterus.   Sclera clear bilat   Neck: Trachea normal, full passive range of motion without pain and phonation normal. Neck supple.   Cardiovascular: Normal rate, regular rhythm, S1 normal, S2 normal, normal heart sounds, intact distal pulses and  normal pulses.   No murmur heard.  Pulmonary/Chest: Respiratory distress: mild to moderate. She has decreased breath sounds in the right lower field and the left lower field. She has wheezes in the right lower field and the left lower field. She has rhonchi in the right lower field and the left lower field.   Raspy cough ; off white sputum ; chest congestion    Abdominal: Soft. Normal appearance and bowel sounds are normal. She exhibits no distension. There is no tenderness.   Musculoskeletal: Normal range of motion. She exhibits no edema or deformity.   Neurological: She is alert and oriented to person, place, and time. She exhibits normal muscle tone. Coordination normal.   Skin: Skin is warm, dry and intact. She is not diaphoretic. No pallor.   Psychiatric: She has a normal mood and affect. Her speech is normal and behavior is normal. Judgment and thought content normal. Cognition and memory are normal.   Nursing note and vitals reviewed.      Significant Labs:   Blood Culture: No results for input(s): LABBLOO in the last 48 hours.  CBC:   Recent Labs   Lab 01/18/20  0556 01/19/20  0556   WBC 8.95 14.34*   HGB 10.1* 9.8*   HCT 32.9* 31.8*    212     BMP  Lab Results   Component Value Date     01/19/2020    K 3.7 01/19/2020    CL 95 01/19/2020    CO2 35 (H) 01/19/2020    BUN 33 (H) 01/19/2020    CREATININE 1.3 01/19/2020    CALCIUM 8.5 (L) 01/19/2020    ANIONGAP 13 01/19/2020    ESTGFRAFRICA 44 (A) 01/19/2020    EGFRNONAA 38 (A) 01/19/2020       Significant Imaging: CXR: I have reviewed all pertinent results/findings within the past 24 hours and my personal findings are:  1. Findings consistent with mild congestive heart failure.      Assessment/Plan:      * Acute on chronic congestive heart failure  Given small dose of lasix yesterday wtihout much change. She seems fluid overloaded.  Will get last echo from cis and give 0.5mg of bumex this moring.  Recheck bmp at 3.    Needs repeat echo. (last one  from 12/18)    Repeat BNP this morning.  Holding off on diuresis for now.  Creat is better 1.5 but basline WNL    Echo today- add demadex today and watch kidneys closely   EF 50%, normal diastolic fx, mild to moderate pulmonary HTN   1/15/2020 EF 50%, normal diastolic fx, mild to moderate pulmonary HTN; cards recommending avoid overdiueresing  Not getting diuretic currently; consider stopping norvasc due to LE complaints .    1/19  Extra lasix 40 mg today   Follow BMP       Pneumonia of left lower lobe due to infectious organism  Start levoquin today   Continue neb  CXR in am  Monitor  labs .      Choking due to food (regurgitated)    Speech eval   Given pt's history of reflux-related symptoms, globus sensation, no overt s/s of aspiration, frequent belching, and poor positioning during and after PO intake, pt likely presents with GI-related dysfunction(s) resulting in discomfort with PO intake. Recommend initiation of medical management of potential GI dysfunctions to develop most appropriate treatment plan. As pt's symptoms appear to be related to probable reflux, completion of MBSS is not warranted at this time. SLP to follow up for diet tolerance only.   Getting protonix .    Acute cystitis without hematuria  Add rocephin   Follow culture    4d ago    Urine Culture, Routine Abnormal    ENTEROCOCCUS FAECALIS   50,000 - 99,999 cfu/ml     Resulting Agency [facility]   Susceptibility      Enterococcus faecalis     CULTURE, URINE     Ampicillin <=2 mcg/mL Sensitive     Nitrofurantoin <=32 mcg/mL Sensitive     Tetracycline >8 mcg/mL Resistant     Vancomycin 2 mcg/mL Sensitive         Had 2 gms Rocephin on 1/10 and 1GM yesterday   1/15/2020 Day 4 IV rocephin; repeat U/A today   1/16 u/A yesterday normal; day 5/5  rocephin today- completed   Resolved.      Weakness  Add PT   ambulated 75 ft with PT yesterday ; goal 200ft   1/16 Performed Gait trng using RW and portable oxygen with increased gait distances x 150 feet (twice)  with supervision  1/17/2020 Bilateral LE eccentric strengthening ex x 10 reps on each such as Ankle Pumps, Heel Slides, Hip abd/add and Long arch quads. Provided pt with rest periods and deep breathing  in between ex      Anorexia  Switch from periactin to remeron.      Cough with hemoptysis  Add levaquin day 3      COPD with acute exacerbation        Diabetes mellitus type 2, uncontrolled  Cont with ss   this morning  1/14/20 , 238, 164    1/15/2020 BS low this am 70s  Received 5 units yesterday with correction scale and getting 12 units nightly detemir- will decrease to 8 units tonight  1/16 Low BS 60s this am despite decreasing levemir from 12 to 8- will stop insulin  1/18   Lab Results   Component Value Date    HGBA1C 6.7 (H) 01/13/2020      -167 , no insulin due to recent hypoglycemia .    Anemia  AOCD - iron studies confirm this.  Has had iron in the past.  Will check for any signs of blood loss.  Has been asked to stop her eliquis.  While she is symptomatic and anemic, will try to diuresis before giving blood.  In the past she had flash pulm edema from transfusion vs transfusion reaction.    Referred to heme/onc in past  Now lower than 8- ordered 1 units PRBC, pre med and give over 4 hours   1/15/2020 H&H 8.7/27.1     1-16 Hct is 26, so since she responded well to blood will transfuse with one unit today  And d'c in the am  1/17/2020 HHH stable after 2nd UPRBC , 10/6/33.9 .        Acute on chronic respiratory failure with hypoxia  She is actually pretty close to her baseline, but she is short of breath all of the time.  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 With superimposed pulm edema, slightly more labored.  Cont O2 support  Repeat cxr and add mucinex and IPV- CXR- No significant interval change as compared to 01/10/2020      Atrial fibrillation with rapid ventricular response  Cont on amiodarone and lopressor.- ventricular paced  D/c amiodarone due to elevated  LFT  Check Echo today- EF 50% , normal LV diastolic Fx  HR well controlled with BB   No more amiodarone per Dr. Metcalf; may be candidate for sotolol in future      Epigastric pain  U/S shows some ascites  resolved      Chronic respiratory failure with hypoxia  Continue Oxygen .  bipap yesterday .   Uses NIV as needed       CKD (chronic kidney disease), stage III  Contraction/prerenal azotemia.  Hold off on diuresis and allow for homeostasis.  Also cont rocephin for UTI and f/u culture.    1/16 creat 1.0  1/17  BMP  Lab Results   Component Value Date     01/19/2020    K 3.7 01/19/2020    CL 95 01/19/2020    CO2 35 (H) 01/19/2020    BUN 33 (H) 01/19/2020    CREATININE 1.3 01/19/2020    CALCIUM 8.5 (L) 01/19/2020    ANIONGAP 13 01/19/2020    ESTGFRAFRICA 44 (A) 01/19/2020    EGFRNONAA 38 (A) 01/19/2020         Insomnia  Continue  on remeron      COPD (chronic obstructive pulmonary disease)  Seems at baseline.  Will give small prednisone dose - 20mg daily - cont this.  Cont nebs.  Start rocephin.  Start on mucinex.  IPV ordered.  1/15 acapella ordered yesterday - still having intermittent wheezing   1/16 stable ; no wheezing this am; at baseline   1/18  Concern for pneumonia; added levaquin  Day 2       Cholelithiases  R/o cholecystitis.  U/s ordered and no signs of cholecystitis  Cholelithiasis no cholecystitis, bilirubin normal   No intervention at this time   asymptomatic      VTE Risk Mitigation (From admission, onward)         Ordered     IP VTE HIGH RISK PATIENT  Once      01/10/20 2131     Place sequential compression device  Until discontinued      01/10/20 2131                      Milo Estevez MD  Department of Hospital Medicine   [facility]

## 2020-01-19 NOTE — PROGRESS NOTES
Staff Handoff  Bedside report received from MANNY Juan RN. Patient AAOx4. No complaints of pain. Denies SOB. POC discussed. Asked to call for assistance.       Resident Handoff

## 2020-01-19 NOTE — PROGRESS NOTES
[facility]  Pulmonology  Progress Note    Patient Name: Chantell Herrera  MRN: [id number]  Admission Date: 1/10/2020  Hospital Length of Stay: 6 days  Code Status: Full Code  Attending Provider: Kari Gaspar MD  Primary Care Provider: Kari Gaspar MD   Principal Problem: Acute on chronic congestive heart failure    Subjective:     Interval History: feeling better on trilogy increased TV     Objective:     Vital Signs (Most Recent):  Temp: 96.2 °F (35.7 °C) (01/19/20 1106)  Pulse: 60 (01/19/20 1400)  Resp: 18 (01/19/20 1252)  BP: 131/67 (01/19/20 1106)  SpO2: (!) 92 % (01/19/20 1252) Vital Signs (24h Range):  Temp:  [96 °F (35.6 °C)-97.7 °F (36.5 °C)] 96.2 °F (35.7 °C)  Pulse:  [60-98] 60  Resp:  [16-20] 18  SpO2:  [89 %-95 %] 92 %  BP: ()/(57-79) 131/67     Weight: 62.3 kg (137 lb 5.6 oz)  Body mass index is 25.12 kg/m².      Intake/Output Summary (Last 24 hours) at 1/19/2020 1531  Last data filed at 1/19/2020 1030  Gross per 24 hour   Intake 111 ml   Output 1000 ml   Net -889 ml       Physical Exam   Constitutional: She is oriented to person, place, and time. She appears well-developed and well-nourished. She is cooperative.  Non-toxic appearance. She does not appear ill. No distress.   HENT:   Head: Normocephalic and atraumatic.   Right Ear: Hearing, tympanic membrane, external ear and ear canal normal.   Left Ear: Hearing, tympanic membrane, external ear and ear canal normal.   Nose: Nose normal. No mucosal edema, rhinorrhea or nasal deformity. No epistaxis. Right sinus exhibits no maxillary sinus tenderness and no frontal sinus tenderness. Left sinus exhibits no maxillary sinus tenderness and no frontal sinus tenderness.   Mouth/Throat: Uvula is midline, oropharynx is clear and moist and mucous membranes are normal. No trismus in the jaw. Normal dentition. No uvula swelling. No posterior oropharyngeal erythema.   Eyes: Conjunctivae and lids are normal. No scleral icterus.   Sclera  clear bilat   Neck: Trachea normal, full passive range of motion without pain and phonation normal. Neck supple.   Cardiovascular: Normal rate, regular rhythm, normal heart sounds, intact distal pulses and normal pulses.   Pulmonary/Chest: No accessory muscle usage. She is in respiratory distress (mild ). She has decreased breath sounds in the right upper field, the right middle field, the right lower field, the left upper field, the left middle field and the left lower field. She has wheezes in the right middle field, the right lower field, the left middle field and the left lower field. She has rhonchi in the right middle field, the right lower field, the left middle field and the left lower field.   Abdominal: Soft. Normal appearance and bowel sounds are normal. She exhibits no distension. There is no tenderness.   Musculoskeletal: Normal range of motion. She exhibits no edema or deformity.   Neurological: She is alert and oriented to person, place, and time. She exhibits normal muscle tone. Coordination normal.   Skin: Skin is warm, dry and intact. She is not diaphoretic. No pallor.   Psychiatric: She has a normal mood and affect. Her speech is normal and behavior is normal. Judgment and thought content normal. Cognition and memory are normal.   Nursing note and vitals reviewed.      Vents:  Oxygen Concentration (%): 3 (01/18/20 1335)    Lines/Drains/Airways     Peripheral Intravenous Line                 Peripheral IV - Single Lumen 01/16/20 1631 18 G Posterior;Right Forearm 2 days                Significant Labs:    CBC/Anemia Profile:  Recent Labs   Lab 01/18/20  0556 01/19/20  0556   WBC 8.95 14.34*   HGB 10.1* 9.8*   HCT 32.9* 31.8*    212   * 106*   RDW 24.4* 24.2*        Chemistries:  Recent Labs   Lab 01/18/20  0556 01/19/20  0556    143   K 4.2 3.7   CL 95 95   CO2 39* 35*   BUN 27* 33*   CREATININE 1.1 1.3   CALCIUM 8.4* 8.5*   ALBUMIN 3.2* 3.3*   PROT 5.4* 5.5*   BILITOT 0.8 0.6  "  ALKPHOS 132 119   ALT 64* 54*   AST 15 13       All pertinent labs within the past 24 hours have been reviewed.    Significant Imaging:  I have reviewed all pertinent imaging results/findings within the past 24 hours.    Assessment/Plan:     * Acute on chronic congestive heart failure  Stable     Chronic respiratory failure with hypoxia  Improving slowly  Low po2    Pneumonia of left lower lobe due to infectious organism  Worsened or actually "fluffed out" infiltrate    Cough with hemoptysis  No hemoptysis     COPD with acute exacerbation  Severe     Diabetes mellitus type 2, uncontrolled  stable    Acute on chronic respiratory failure with hypoxia  Worsening xray ?? Improving clinical Feeling better  But still SOB   Chronic respiratory failure     Atrial fibrillation with rapid ventricular response  Take meds     Insomnia  Improved with trilogy    COPD (chronic obstructive pulmonary disease)  Continue support wearing NIV  severe           Emmanuel Hickman MD  Pulmonology  [facility]    "

## 2020-01-19 NOTE — ASSESSMENT & PLAN NOTE
Given small dose of lasix yesterday wtihout much change. She seems fluid overloaded.  Will get last echo from [facility] and give 0.5mg of bumex this moring.  Recheck bmp at 3.    Needs repeat echo. (last one from 12/18)    Repeat BNP this morning.  Holding off on diuresis for now.  Creat is better 1.5 but basline WNL    Echo today- add demadex today and watch kidneys closely   EF 50%, normal diastolic fx, mild to moderate pulmonary HTN   1/15/2020 EF 50%, normal diastolic fx, mild to moderate pulmonary HTN; cards recommending avoid overdiueresing  Not getting diuretic currently; consider stopping norvasc due to LE complaints .    1/19  Extra lasix 40 mg today   Follow BMP

## 2020-01-19 NOTE — PT/OT/SLP PROGRESS
Physical Therapy Treatment    Patient Name:  Chantell Herrera   MRN:  [id number]    Recommendations:     Discharge Recommendations:  home health PT, home with home health   Discharge Equipment Recommendations: none   Barriers to discharge: Inaccessible home and Decreased caregiver support    Assessment:     Chantell Herrera is a 84 y.o. female admitted with a medical diagnosis of Acute on chronic congestive heart failure.  She presents with the following impairments/functional limitations:  weakness, impaired endurance, impaired self care skills, gait instability, impaired balance, impaired cardiopulmonary response to activity.  Pt displayed greatly increased endurance with gait tasks from previous session.    Rehab Prognosis: Fair; patient would benefit from acute skilled PT services to address these deficits and reach maximum level of function.    Recent Surgery: * No surgery found *      Plan:     During this hospitalization, patient to be seen daily to address the identified rehab impairments via gait training, therapeutic activities, therapeutic exercises and progress toward the following goals:    · Plan of Care Expires:  01/20/20    Subjective     Chief Complaint: SOB  Patient/Family Comments/goals: decrease SOB  Pain/Comfort:  · Pain Rating 1: 0/10      Objective:     Communicated with pt prior to session.  Patient found supine with telemetry upon PT entry to room.     General Precautions: Standard, fall   Orthopedic Precautions:N/A   Braces: N/A     Functional Mobility:  · Bed Mobility:     · Rolling Left:  modified independence  · Rolling Right: modified independence  · Scooting: modified independence  · Supine to Sit: modified independence  · Sit to Supine: modified independence  · Transfers:     · Sit to Stand:  modified independence with rolling walker  · Bed to Chair: stand by assistance with  rolling walker  using  Step Transfer  · Gait: SBA 2 x 30' w/ RW      AM-PAC 6 CLICK MOBILITY  Turning over in bed  (including adjusting bedclothes, sheets and blankets)?: 4  Sitting down on and standing up from a chair with arms (e.g., wheelchair, bedside commode, etc.): 4  Moving from lying on back to sitting on the side of the bed?: 4  Moving to and from a bed to a chair (including a wheelchair)?: 3  Need to walk in hospital room?: 3  Climbing 3-5 steps with a railing?: 2  Basic Mobility Total Score: 20       Therapeutic Activities and Exercises:   Supine to sit x 2  Sit to stand x 2  Gait 2 x 30' w/ RW    Patient left supine with all lines intact and call button in reach..    GOALS:   Multidisciplinary Problems     Physical Therapy Goals        Problem: Physical Therapy Goal    Goal Priority Disciplines Outcome Goal Variances Interventions   Physical Therapy Goal     PT, PT/OT Ongoing, Progressing     Description:  Goals to be met by: 20     Patient will increase functional independence with mobility by performin. Sit to stand transfer with Independent  2. Bed to chair/toilet transfer with Independentwith or without rolling walker using Stand Pivot TECHNIQUE  3. Gait  x 200  feet with Modified Independent with or without rolling walker  4. Lower extremity exercise program x10 reps(2 sets) per handout, with assistance as needed                    Time Tracking:     PT Received On: 20  PT Start Time: 1015     PT Stop Time: 1025  PT Total Time (min): 10 min     Billable Minutes: Therapeutic Activity 10    Treatment Type: Treatment  PT/PTA: PT           Jayden Virk, PT  2020

## 2020-01-20 VITALS
HEIGHT: 62 IN | SYSTOLIC BLOOD PRESSURE: 129 MMHG | HEART RATE: 61 BPM | DIASTOLIC BLOOD PRESSURE: 68 MMHG | OXYGEN SATURATION: 94 % | WEIGHT: 133.19 LBS | TEMPERATURE: 98 F | BODY MASS INDEX: 24.51 KG/M2 | RESPIRATION RATE: 20 BRPM

## 2020-01-20 LAB
ALBUMIN SERPL BCP-MCNC: 3.4 G/DL (ref 3.5–5.2)
ALP SERPL-CCNC: 122 U/L (ref 55–135)
ALT SERPL W/O P-5'-P-CCNC: 58 U/L (ref 10–44)
ANION GAP SERPL CALC-SCNC: 13 MMOL/L (ref 8–16)
AST SERPL-CCNC: 19 U/L (ref 10–40)
BASOPHILS # BLD AUTO: 0.02 K/UL (ref 0–0.2)
BASOPHILS NFR BLD: 0.2 % (ref 0–1.9)
BILIRUB SERPL-MCNC: 0.8 MG/DL (ref 0.1–1)
BNP SERPL-MCNC: 2504 PG/ML (ref 0–99)
BUN SERPL-MCNC: 35 MG/DL (ref 8–23)
CALCIUM SERPL-MCNC: 8.4 MG/DL (ref 8.7–10.5)
CHLORIDE SERPL-SCNC: 95 MMOL/L (ref 95–110)
CO2 SERPL-SCNC: 34 MMOL/L (ref 23–29)
CREAT SERPL-MCNC: 1.2 MG/DL (ref 0.5–1.4)
DIFFERENTIAL METHOD: ABNORMAL
EOSINOPHIL # BLD AUTO: 0 K/UL (ref 0–0.5)
EOSINOPHIL NFR BLD: 0.2 % (ref 0–8)
ERYTHROCYTE [DISTWIDTH] IN BLOOD BY AUTOMATED COUNT: 24.4 % (ref 11.5–14.5)
EST. GFR  (AFRICAN AMERICAN): 49 ML/MIN/1.73 M^2
EST. GFR  (NON AFRICAN AMERICAN): 42 ML/MIN/1.73 M^2
GLUCOSE SERPL-MCNC: 235 MG/DL (ref 70–110)
HCT VFR BLD AUTO: 32 % (ref 37–48.5)
HGB BLD-MCNC: 10.2 G/DL (ref 12–16)
IMM GRANULOCYTES # BLD AUTO: 0.25 K/UL (ref 0–0.04)
IMM GRANULOCYTES NFR BLD AUTO: 2 % (ref 0–0.5)
LYMPHOCYTES # BLD AUTO: 0.2 K/UL (ref 1–4.8)
LYMPHOCYTES NFR BLD: 1.8 % (ref 18–48)
MCH RBC QN AUTO: 33.7 PG (ref 27–31)
MCHC RBC AUTO-ENTMCNC: 31.9 G/DL (ref 32–36)
MCV RBC AUTO: 106 FL (ref 82–98)
MONOCYTES # BLD AUTO: 1.5 K/UL (ref 0.3–1)
MONOCYTES NFR BLD: 11.6 % (ref 4–15)
NEUTROPHILS # BLD AUTO: 10.8 K/UL (ref 1.8–7.7)
NEUTROPHILS NFR BLD: 84.2 % (ref 38–73)
NRBC BLD-RTO: 0 /100 WBC
PLATELET # BLD AUTO: 202 K/UL (ref 150–350)
PMV BLD AUTO: 13.5 FL (ref 9.2–12.9)
POCT GLUCOSE: 191 MG/DL (ref 70–110)
POTASSIUM SERPL-SCNC: 3.5 MMOL/L (ref 3.5–5.1)
PROT SERPL-MCNC: 5.6 G/DL (ref 6–8.4)
RBC # BLD AUTO: 3.03 M/UL (ref 4–5.4)
SODIUM SERPL-SCNC: 142 MMOL/L (ref 136–145)
WBC # BLD AUTO: 12.77 K/UL (ref 3.9–12.7)

## 2020-01-20 PROCEDURE — 80053 COMPREHEN METABOLIC PANEL: CPT | Mod: HCNC

## 2020-01-20 PROCEDURE — 25000242 PHARM REV CODE 250 ALT 637 W/ HCPCS: Mod: HCNC | Performed by: FAMILY MEDICINE

## 2020-01-20 PROCEDURE — 36415 COLL VENOUS BLD VENIPUNCTURE: CPT | Mod: HCNC

## 2020-01-20 PROCEDURE — 94760 N-INVAS EAR/PLS OXIMETRY 1: CPT | Mod: HCNC

## 2020-01-20 PROCEDURE — 25000003 PHARM REV CODE 250: Mod: HCNC | Performed by: NURSE PRACTITIONER

## 2020-01-20 PROCEDURE — 63600175 PHARM REV CODE 636 W HCPCS: Mod: HCNC | Performed by: FAMILY MEDICINE

## 2020-01-20 PROCEDURE — 83880 ASSAY OF NATRIURETIC PEPTIDE: CPT | Mod: HCNC

## 2020-01-20 PROCEDURE — 25000003 PHARM REV CODE 250: Mod: HCNC | Performed by: FAMILY MEDICINE

## 2020-01-20 PROCEDURE — 99239 PR HOSPITAL DISCHARGE DAY,>30 MIN: ICD-10-PCS | Mod: HCNC,,, | Performed by: INTERNAL MEDICINE

## 2020-01-20 PROCEDURE — 99239 HOSP IP/OBS DSCHRG MGMT >30: CPT | Mod: HCNC,,, | Performed by: INTERNAL MEDICINE

## 2020-01-20 PROCEDURE — 27000221 HC OXYGEN, UP TO 24 HOURS: Mod: HCNC

## 2020-01-20 PROCEDURE — 63600175 PHARM REV CODE 636 W HCPCS: Mod: HCNC | Performed by: INTERNAL MEDICINE

## 2020-01-20 PROCEDURE — 94640 AIRWAY INHALATION TREATMENT: CPT | Mod: HCNC

## 2020-01-20 PROCEDURE — 85025 COMPLETE CBC W/AUTO DIFF WBC: CPT | Mod: HCNC

## 2020-01-20 RX ORDER — PREDNISONE 20 MG/1
20 TABLET ORAL DAILY
Qty: 30 TABLET | Refills: 0 | Status: SHIPPED | OUTPATIENT
Start: 2020-01-21 | End: 2020-01-01

## 2020-01-20 RX ORDER — FUROSEMIDE 20 MG/1
20 TABLET ORAL DAILY
Qty: 30 TABLET | Refills: 0 | Status: SHIPPED | OUTPATIENT
Start: 2020-01-20 | End: 2020-01-01

## 2020-01-20 RX ORDER — METOPROLOL TARTRATE 25 MG/1
25 TABLET, FILM COATED ORAL 2 TIMES DAILY
Qty: 60 TABLET | Refills: 11 | Status: SHIPPED | OUTPATIENT
Start: 2020-01-20 | End: 2020-01-01

## 2020-01-20 RX ORDER — ASPIRIN 81 MG/1
81 TABLET ORAL DAILY
Qty: 30 TABLET | Refills: 0 | Status: SHIPPED | OUTPATIENT
Start: 2020-01-21 | End: 2021-01-01

## 2020-01-20 RX ADMIN — LEVALBUTEROL 1.25 MG: 1.25 SOLUTION, CONCENTRATE RESPIRATORY (INHALATION) at 07:01

## 2020-01-20 RX ADMIN — AMLODIPINE BESYLATE 5 MG: 5 TABLET ORAL at 09:01

## 2020-01-20 RX ADMIN — ASPIRIN 81 MG: 81 TABLET, COATED ORAL at 09:01

## 2020-01-20 RX ADMIN — GUAIFENESIN 600 MG: 600 TABLET, EXTENDED RELEASE ORAL at 09:01

## 2020-01-20 RX ADMIN — LEVALBUTEROL 1.25 MG: 1.25 SOLUTION, CONCENTRATE RESPIRATORY (INHALATION) at 12:01

## 2020-01-20 RX ADMIN — METHYLPREDNISOLONE SODIUM SUCCINATE 40 MG: 40 INJECTION, POWDER, FOR SOLUTION INTRAMUSCULAR; INTRAVENOUS at 05:01

## 2020-01-20 RX ADMIN — PANTOPRAZOLE SODIUM 40 MG: 40 TABLET, DELAYED RELEASE ORAL at 09:01

## 2020-01-20 RX ADMIN — PREDNISONE 20 MG: 20 TABLET ORAL at 09:01

## 2020-01-20 RX ADMIN — METOPROLOL TARTRATE 25 MG: 25 TABLET ORAL at 09:01

## 2020-01-20 RX ADMIN — CITALOPRAM HYDROBROMIDE 20 MG: 10 TABLET ORAL at 09:01

## 2020-01-20 NOTE — ASSESSMENT & PLAN NOTE
Start levoquin today   Continue neb  CXR in am  Monitor  labs .    1/20  levaquin 500mg po daily for 5 days

## 2020-01-20 NOTE — PROGRESS NOTES
[facility]  Pulmonology  Progress Note    Patient Name: Chantell Herrera  MRN: [id number]  Admission Date: 1/10/2020  Hospital Length of Stay: 7 days  Code Status: Full Code  Attending Provider: Kari Gaspar MD  Primary Care Provider: Kari Gaspar MD   Principal Problem: Acute on chronic congestive heart failure    Subjective:     Interval History: No wheezing now staying on NIV     Objective:     Vital Signs (Most Recent):  Temp: 97.5 °F (36.4 °C) (01/20/20 0743)  Pulse: 74 (01/20/20 0743)  Resp: 20 (01/20/20 0743)  BP: 129/68 (01/20/20 0743)  SpO2: (!) 94 % (01/20/20 0743) Vital Signs (24h Range):  Temp:  [96 °F (35.6 °C)-97.7 °F (36.5 °C)] 97.5 °F (36.4 °C)  Pulse:  [59-79] 74  Resp:  [18-20] 20  SpO2:  [89 %-97 %] 94 %  BP: (110-134)/(64-76) 129/68     Weight: 60.4 kg (133 lb 2.5 oz)  Body mass index is 24.35 kg/m².      Intake/Output Summary (Last 24 hours) at 1/20/2020 0748  Last data filed at 1/20/2020 0600  Gross per 24 hour   Intake 744 ml   Output 1275 ml   Net -531 ml       Physical Exam   Constitutional: She is oriented to person, place, and time. She appears well-developed and well-nourished. She is cooperative.  Non-toxic appearance. She does not appear ill. No distress.   HENT:   Head: Normocephalic and atraumatic.   Right Ear: Hearing, tympanic membrane, external ear and ear canal normal.   Left Ear: Hearing, tympanic membrane, external ear and ear canal normal.   Nose: Nose normal. No mucosal edema, rhinorrhea or nasal deformity. No epistaxis. Right sinus exhibits no maxillary sinus tenderness and no frontal sinus tenderness. Left sinus exhibits no maxillary sinus tenderness and no frontal sinus tenderness.   Mouth/Throat: Uvula is midline, oropharynx is clear and moist and mucous membranes are normal. No trismus in the jaw. Normal dentition. No uvula swelling. No posterior oropharyngeal erythema.   Eyes: Conjunctivae and lids are normal. No scleral icterus.   Sclera clear  bilat   Neck: Trachea normal, full passive range of motion without pain and phonation normal. Neck supple.   Cardiovascular: Normal rate, regular rhythm, normal heart sounds, intact distal pulses and normal pulses.   Pulmonary/Chest: She is in respiratory distress (mild to moderate). She has decreased breath sounds in the right middle field, the right lower field, the left middle field and the left lower field. She has no wheezes. She has no rhonchi. She has no rales.   Abdominal: Soft. Normal appearance and bowel sounds are normal. She exhibits no distension. There is no tenderness.   Musculoskeletal: Normal range of motion. She exhibits no edema or deformity.   Neurological: She is alert and oriented to person, place, and time. She exhibits normal muscle tone. Coordination normal.   Skin: Skin is warm, dry and intact. She is not diaphoretic. No pallor.   Psychiatric: She has a normal mood and affect. Her speech is normal and behavior is normal. Judgment and thought content normal. Cognition and memory are normal.   Nursing note and vitals reviewed.      Vents:  Oxygen Concentration (%): 3 (01/18/20 1335)    Lines/Drains/Airways     Peripheral Intravenous Line                 Peripheral IV - Single Lumen 01/16/20 1631 18 G Posterior;Right Forearm 3 days                Significant Labs:    CBC/Anemia Profile:  Recent Labs   Lab 01/19/20  0556 01/20/20  0649   WBC 14.34* 12.77*   HGB 9.8* 10.2*   HCT 31.8* 32.0*    202   * 106*   RDW 24.2* 24.4*        Chemistries:  Recent Labs   Lab 01/19/20  0556      K 3.7   CL 95   CO2 35*   BUN 33*   CREATININE 1.3   CALCIUM 8.5*   ALBUMIN 3.3*   PROT 5.5*   BILITOT 0.6   ALKPHOS 119   ALT 54*   AST 13       All pertinent labs within the past 24 hours have been reviewed.    Significant Imaging:  I have reviewed all pertinent imaging results/findings within the past 24 hours.    Assessment/Plan:     Pneumonia of left lower lobe due to infectious  "organism  Clinically much improved   Worsened or actually "fluffed out" infiltrate    COPD with acute exacerbation  Severe     Acute on chronic respiratory failure with hypoxia  Clinically much better  Worsening xray ?? Improving clinical Feeling better  But still SOB   Chronic respiratory failure     Insomnia  Improved with trilogy    COPD (chronic obstructive pulmonary disease)  Continue support wearing NIV  severe           Emmanuel Hickman MD  Pulmonology  [facility]    "

## 2020-01-20 NOTE — SUBJECTIVE & OBJECTIVE
Interval History: No wheezing now staying on NIV     Objective:     Vital Signs (Most Recent):  Temp: 97.5 °F (36.4 °C) (01/20/20 0743)  Pulse: 74 (01/20/20 0743)  Resp: 20 (01/20/20 0743)  BP: 129/68 (01/20/20 0743)  SpO2: (!) 94 % (01/20/20 0743) Vital Signs (24h Range):  Temp:  [96 °F (35.6 °C)-97.7 °F (36.5 °C)] 97.5 °F (36.4 °C)  Pulse:  [59-79] 74  Resp:  [18-20] 20  SpO2:  [89 %-97 %] 94 %  BP: (110-134)/(64-76) 129/68     Weight: 60.4 kg (133 lb 2.5 oz)  Body mass index is 24.35 kg/m².      Intake/Output Summary (Last 24 hours) at 1/20/2020 0748  Last data filed at 1/20/2020 0600  Gross per 24 hour   Intake 744 ml   Output 1275 ml   Net -531 ml       Physical Exam   Constitutional: She is oriented to person, place, and time. She appears well-developed and well-nourished. She is cooperative.  Non-toxic appearance. She does not appear ill. No distress.   HENT:   Head: Normocephalic and atraumatic.   Right Ear: Hearing, tympanic membrane, external ear and ear canal normal.   Left Ear: Hearing, tympanic membrane, external ear and ear canal normal.   Nose: Nose normal. No mucosal edema, rhinorrhea or nasal deformity. No epistaxis. Right sinus exhibits no maxillary sinus tenderness and no frontal sinus tenderness. Left sinus exhibits no maxillary sinus tenderness and no frontal sinus tenderness.   Mouth/Throat: Uvula is midline, oropharynx is clear and moist and mucous membranes are normal. No trismus in the jaw. Normal dentition. No uvula swelling. No posterior oropharyngeal erythema.   Eyes: Conjunctivae and lids are normal. No scleral icterus.   Sclera clear bilat   Neck: Trachea normal, full passive range of motion without pain and phonation normal. Neck supple.   Cardiovascular: Normal rate, regular rhythm, normal heart sounds, intact distal pulses and normal pulses.   Pulmonary/Chest: She is in respiratory distress (mild to moderate). She has decreased breath sounds in the right middle field, the right lower  field, the left middle field and the left lower field. She has no wheezes. She has no rhonchi. She has no rales.   Abdominal: Soft. Normal appearance and bowel sounds are normal. She exhibits no distension. There is no tenderness.   Musculoskeletal: Normal range of motion. She exhibits no edema or deformity.   Neurological: She is alert and oriented to person, place, and time. She exhibits normal muscle tone. Coordination normal.   Skin: Skin is warm, dry and intact. She is not diaphoretic. No pallor.   Psychiatric: She has a normal mood and affect. Her speech is normal and behavior is normal. Judgment and thought content normal. Cognition and memory are normal.   Nursing note and vitals reviewed.      Vents:  Oxygen Concentration (%): 3 (01/18/20 1335)    Lines/Drains/Airways     Peripheral Intravenous Line                 Peripheral IV - Single Lumen 01/16/20 1631 18 G Posterior;Right Forearm 3 days                Significant Labs:    CBC/Anemia Profile:  Recent Labs   Lab 01/19/20  0556 01/20/20  0649   WBC 14.34* 12.77*   HGB 9.8* 10.2*   HCT 31.8* 32.0*    202   * 106*   RDW 24.2* 24.4*        Chemistries:  Recent Labs   Lab 01/19/20  0556      K 3.7   CL 95   CO2 35*   BUN 33*   CREATININE 1.3   CALCIUM 8.5*   ALBUMIN 3.3*   PROT 5.5*   BILITOT 0.6   ALKPHOS 119   ALT 54*   AST 13       All pertinent labs within the past 24 hours have been reviewed.    Significant Imaging:  I have reviewed all pertinent imaging results/findings within the past 24 hours.

## 2020-01-20 NOTE — PROGRESS NOTES
Staff Handoff  Bedside report received from STACIA Brar. Patient AAOx4. No complaints of pain. Denies SOB. POC discussed. Asked to call for assistance.       Resident Handoff

## 2020-01-20 NOTE — PLAN OF CARE
01/13/20 1037   Discharge Reassessment   Assessment Type Discharge Planning Assessment   Provided patient/caregiver education on the expected discharge date and the discharge plan Yes   Do you have any problems affording any of your prescribed medications? No   Discharge Plan A Home   Discharge Plan B Home with family   DME Needed Upon Discharge  none   Patient choice form signed by patient/caregiver N/A   Anticipated Discharge Disposition Home-Health   Can the patient answer the patient profile reliably? Yes, cognitively intact   How does the patient rate their overall health at the present time? Fair   Describe the patient's ability to walk at the present time. Walks with the help of equipment         Patient admitted for CHF. This is a long standing issue with patient. Prior to admit, patient was receiving home health care services through [facility]. This will resume upon discharge. At this time there is no post acute needs identified aside from resuming home health. Patient educated on diagnosis for this admission, and patient verbalizes understanding. Discharge planning brochure and educational handout of what signs and symptoms to look for after discharge, and how to manage health at home, given to patient and family. Patient and family are encouraged to call with any questions or help the would need. Patient aware that she will receive blood transfusion today, along with PT consult. She verbalized understanding, and agreemtn with plan of care. Contact information given. CM will continue to follow patient throughout the transitions of care, and assist with any discharge needs.      
   01/13/20 1045   Discharge Assessment   Assessment Type Discharge Planning Assessment   Assessment information obtained from? Medical Record   Prior to hospitilization cognitive status: Alert/Oriented   Prior to hospitalization functional status: Assistive Equipment   Current cognitive status: Alert/Oriented   Current Functional Status: Assistive Equipment   Facility Arrived From: Home   Lives With spouse   Able to Return to Prior Arrangements yes   Is patient able to care for self after discharge? Yes   Who are your caregiver(s) and their phone number(s)? Emmanuel Herrera (Spouse) [phone]   Patient's perception of discharge disposition home health   Readmission Within the Last 30 Days no previous admission in last 30 days   Patient currently being followed by outpatient case management? No   Patient currently receives any other outside agency services? Yes   Name and contact number of agency or person providing outside services [facility]   Is it the patient/care giver preference to resume care with the current outside agency? Yes   Equipment Currently Used at Home BIPAP;rollator;oxygen;nebulizer;glucometer;power chair   Do you have any problems affording any of your prescribed medications? No   Does the patient have transportation home? Yes   Transportation Anticipated family or friend will provide   Discharge Plan A Home Health   Discharge Plan B Home with family   DME Needed Upon Discharge  none   Patient/Family in Agreement with Plan yes       Patient currently receiving services through [facility]. Wishes to return to their services at discharge. SW to remain available as needed for discharge planning.     Daniela Castro LMSW    
   01/14/20 1355   Discharge Reassessment   Assessment Type Discharge Planning Reassessment         Patient will remain for continued treatment today. No needs or concerns voiced at this time. CM will continue to follow and assist as needed.     
   01/15/20 1313   Discharge Reassessment   Assessment Type Discharge Planning Reassessment       Patient will remain for continued treatment today. No needs or concerns voiced at this time. CM will continue to follow and assist as needed.     
   01/16/20 0750   Medicare Message   Important Message from Medicare regarding Discharge Appeal Rights Given to patient/caregiver;Explained to patient/caregiver;Signed/date by patient/caregiver   Date IMM was signed 01/10/20   Time IMM was signed 0834     
   01/16/20 1052   Discharge Reassessment   Assessment Type Discharge Planning Reassessment       Patient will continue treatment today. Patient will receive 1 unit PRBC's, and planned discharge in the AM. CM will continue to follow and assist as needed with discharge plan.   
   01/17/20 0942   Post-Acute Status   Post-Acute Authorization Home Health/Hospice   Home Health/Hospice Status Referrals Sent   Discharge Delays None known at this time       Referral sent through [facility] ([facility]) for patient to resume home healthcare services. Anticipated discharge date is this weekend.     Daniela Castro LMSW    
   01/17/20 1007   Discharge Reassessment   Assessment Type Discharge Planning Reassessment         Patient will remain for continued treatment today. She received a unit of blood yesterday, and had to receive Lasix during the administration, due to SOB and oxygen desaturation. Oxygen saturation better today, MD states possible discharge tomorrow or Sunday. CM will continue to follow and assist with discharge plan as needed.   
   01/17/20 1126   Post-Acute Status   Post-Acute Authorization Home Health/Hospice   Home Health/Hospice Status Set-up Complete   Discharge Delays None known at this time       Patient accepted in [facility] by [facility]. RUBEN wrote note on the patient's chart for the nurse to fax DC summary and call [facility] to inform of discharge over the weekend.     Daniela Castro LMSW    
   01/20/20 0909   Discharge Reassessment   Assessment Type Discharge Planning Reassessment         Patient will discharge home today. She will resume home health with [facility]. There are no other discharge needs. Discharge medication will be sent to [facility] per patient request. Patient reminded about what signs and symptoms to look for when discharged, and educated that if any symptoms return, make a same day appointment with PCP or come back to the ED. Patient states understanding and agreement. Patient denies any other needs or concerns for discharge.     
   01/20/20 1255   Medicare Message   Important Message from Medicare regarding Discharge Appeal Rights Given to patient/caregiver;Explained to patient/caregiver;Signed/date by patient/caregiver   Date IMM was signed 01/17/20   Time IMM was signed 1455       Signed for discharge.     Daniela Castro LMSW    
   01/20/20 1255   Post-Acute Status   Post-Acute Authorization Home Health/Hospice   Home Health/Hospice Status Set-up Complete   Discharge Delays None known at this time       SW updated [facility] of patient's discharge today via [facility] ([facility]     Daniela Castro LMSW    
  Problem: Cardiac Output Decreased (Heart Failure)  Goal: Optimal Cardiac Output  Outcome: Ongoing, Progressing     Problem: Skin Injury Risk Increased  Goal: Skin Health and Integrity  Outcome: Ongoing, Progressing     Problem: Fluid Imbalance (Heart Failure)  Goal: Fluid Balance  Outcome: Ongoing, Progressing     Problem: Adjustment to Illness (Heart Failure)  Goal: Optimal Coping  Outcome: Ongoing, Progressing     
  Problem: Physical Therapy Goal  Goal: Physical Therapy Goal  Description  Goals to be met by: 20     Patient will increase functional independence with mobility by performin. Sit to stand transfer with Independent  2. Bed to chair/toilet transfer with Independentwith or without rolling walker using Stand Pivot TECHNIQUE  3. Gait  x 200  feet with Modified Independent with or without rolling walker  4. Lower extremity exercise program x10 reps(2 sets) per handout, with assistance as needed   Outcome: Ongoing, Progressing     
  Problem: Wound  Goal: Optimal Wound Healing  Outcome: Ongoing, Progressing     Problem: Fall Injury Risk  Goal: Absence of Fall and Fall-Related Injury  Outcome: Ongoing, Progressing     Problem: Adult Inpatient Plan of Care  Goal: Plan of Care Review  Outcome: Ongoing, Progressing  Goal: Patient-Specific Goal (Individualization)  Outcome: Ongoing, Progressing  Goal: Absence of Hospital-Acquired Illness or Injury  Outcome: Ongoing, Progressing  Goal: Optimal Comfort and Wellbeing  Outcome: Ongoing, Progressing  Goal: Readiness for Transition of Care  Outcome: Ongoing, Progressing  Goal: Rounds/Family Conference  Outcome: Ongoing, Progressing     Problem: Diabetes Comorbidity  Goal: Blood Glucose Level Within Desired Range  Outcome: Ongoing, Progressing     Problem: Adjustment to Illness (Heart Failure)  Goal: Optimal Coping  Outcome: Ongoing, Progressing     Problem: Arrhythmia/Dysrhythmia (Heart Failure)  Goal: Stable Heart Rate and Rhythm  Outcome: Ongoing, Progressing     Problem: Cardiac Output Decreased (Heart Failure)  Goal: Optimal Cardiac Output  Outcome: Ongoing, Progressing     Problem: Fluid Imbalance (Heart Failure)  Goal: Fluid Balance  Outcome: Ongoing, Progressing     Problem: Functional Ability Impaired (Heart Failure)  Goal: Optimal Functional Ability  Outcome: Ongoing, Progressing     Problem: Oral Intake Inadequate (Heart Failure)  Goal: Optimal Nutrition Intake  Outcome: Ongoing, Progressing     Problem: Respiratory Compromise (Heart Failure)  Goal: Effective Oxygenation and Ventilation  Outcome: Ongoing, Progressing     Problem: Sleep Disordered Breathing (Heart Failure)  Goal: Effective Breathing Pattern During Sleep  Outcome: Ongoing, Progressing     Problem: Skin Injury Risk Increased  Goal: Skin Health and Integrity  Outcome: Ongoing, Progressing     
  Problem: Wound  Goal: Optimal Wound Healing  Outcome: Ongoing, Progressing     Problem: Fall Injury Risk  Goal: Absence of Fall and Fall-Related Injury  Outcome: Ongoing, Progressing     Problem: Diabetes Comorbidity  Goal: Blood Glucose Level Within Desired Range  Outcome: Ongoing, Progressing     Problem: Adjustment to Illness (Heart Failure)  Goal: Optimal Coping  Outcome: Ongoing, Progressing     Problem: Arrhythmia/Dysrhythmia (Heart Failure)  Goal: Stable Heart Rate and Rhythm  Outcome: Ongoing, Progressing     Problem: Cardiac Output Decreased (Heart Failure)  Goal: Optimal Cardiac Output  Outcome: Ongoing, Progressing     Problem: Respiratory Compromise (Heart Failure)  Goal: Effective Oxygenation and Ventilation  Outcome: Ongoing, Progressing     
A/Ox3. Fall precautions in place, up to bsc independently with sob when returning. BS are diminished, coarse, 02 @3L/min n/c. PO fluids encouraged. DM managed with insulin. V-paced on the monitor.  
Admitted with CHF.  Resolved acutely as torsemide discontinued today by cardiology.  Edema significantly improved with diurhesing and elevation of lower extremities.  VSS, afebrile on 3 liters nasal cannula.  Nebs Q6, ac capella added to respiratory regimen.  Accu-checks with meals; insulin given as ordered. New pacemaker, rare pacer spikes with NSR on heart montior.   Speech evaluation completed due to choking with eating and difficulty swallowing at home.    Care plan discussed, verbalizes understanding.   
Assessment complete per flow sheet, AAOX4, RR even, BBS diminished, O2 on 6 L NC. SOB with  exertion, Home C-PAP at bedside. Cardiac monitor in use, paced rhythm noted.  ABD soft, non distended with active bowel sounds x 4 quads. Tolerating diet well.  PIV  SL  dressing clean,dry,intact.  Medications administered per MAR, tolerated well. safety precautions maintained, BSC at bedside, free from falls/ injury, call bell in reach, instructed to call for needs, voiced understanding, will monitor.       MD rounding, new orders noted.  Patient rested quietly today with NAD noted.   
Assessment complete per flow sheet, AAOX4, RR even, SOB noted, BBS diminished, O2 sat = 84% on 3 L NC. Increased O2 to 6 L NC, O2 sat =88 %. Patient states that's about what she is all the time. Home C-PAP at bedside. Cardiac monitor in use, paced rhythm noted.  ABD soft, non distended with active bowel sounds x 4 quads. Tolerating diet well.  PIV  SL  dressing clean,dry,intact.  Medications administered per MAR, tolerated well. safety precautions maintained, BSC at bedside, free from falls/ injury, call bell in reach, instructed to call for needs, voiced understanding, will monitor.       MD rounding, new orders noted.  Called to patients room states she cant breath, O2 sat = 80 % on 6L NC, respiratory called to room also, breathing treatment given, O2 sat = 85%, patient then put on hospital BIPAP, O2 sat up to 91%.  ABG'S obtained by respiratory.   After wearing BIPAP for approximately 1 hour, patient removed mask stating she was not wearing it anymore. Respiratory called and will change out and put patient back on home C-PAP.    
Patient came to the floor from ER. IV antibiotic given. Has been sleeping soundly. Has had no complaints of pain or distress. Patient is understanding and compliant with labs and procedures. Patient is free from falls and injury. Plan of care reviewed and agreed upon with patient. Will continue to monitor.            
Patient has been compliant with medication and lab draws tonight. Has been complaint with oxygen therapy. Did say she got short of breath when getting up to bedside commode but states that's baseline for her. Patient is free from falls and injury. VSS. Plan of care reviewed and agreed upon with patient. Will continue to monitor.          
Patient has been resting comfortably. O2 sats have been in the high 80s to low 90s between nasal canula and Bipap. Has had no complaints of chest pain or shortness of breath. Patient is compliant medication management. Patient is understanding and compliant with lab draws and procedures. Patient is free from falls and injury. VSS. Plan of care reviewed and agreed upon with patient. Will continue to monitor.    
Patient has started to feel strong enough to get up and transfer to bedside commode independently. Patient is compliant with medications, lab draws and procedures. Patient is free from falls and injury. VSS. Will continue to monitor.       
Patient is compliant with fluid and medication management.  Patient is compliant with with all lab testing and procedures.  Patient remains free from all falls and injury.  VSS.  3L NC.  Plan of care reviewed and agreed upon with patient.  Will continue to monitor.     
Patient is compliant with fluid and medication management.  Patient is compliant with with all lab testing and procedures.  Patient remains free from all falls and injury.  VSS.  Blood product administration today.  Requiring increased FiO2 - MD aware.  Plan of care reviewed and agreed upon with patient.  Will continue to monitor.     
Patient is compliant with fluid and medication management.  Patient is compliant with with all lab testing and procedures.  Patient remains free from all falls and injury.  VSS.  IV antibiotics.  Alternates between nasal cannula and BiPAP.  POCT glucose.  Plan of care reviewed and agreed upon with patient.  Will continue to monitor.     
Patient requested sleeping medication tonight prn trazodone given which was effective. BP was low at beginning of shift held night time dose of metoprolol. Added correction dose insulin due to blood sugars being above 200. Patient is understanding and compliant with medications, lab draws and procedures. Patient is free from falls and injury. VSS. Will continue to monitor.         
Remains A/Ox3. Fall precautions in  place discussed with patient voices understanding. DM managed with insulin. Adjustment to illness is fair, patient does have some anxiety. Severe sob with activity,02 maintained at 6l/min n/c and triology home gear. 02SA 90-92%.   
detailed exam

## 2020-01-20 NOTE — ASSESSMENT & PLAN NOTE
Clinically much better  Worsening xray ?? Improving clinical Feeling better  But still SOB   Chronic respiratory failure

## 2020-01-20 NOTE — SUBJECTIVE & OBJECTIVE
Interval History:   Doing well  Ready for discharge      Review of Systems   Constitutional: Negative for appetite change, chills and fever.   HENT: Negative for congestion, ear discharge, ear pain, rhinorrhea, sinus pressure and sore throat.    Respiratory: Positive for cough. Negative for choking and chest tightness.         With coughing and increased activity   Cardiovascular: Negative for chest pain and palpitations.   Gastrointestinal: Negative for constipation, diarrhea, nausea and vomiting.   Musculoskeletal: Negative for joint swelling and myalgias.   Skin: Negative for rash and wound.   Neurological: Negative for dizziness, syncope, weakness, light-headedness and numbness.     Objective:     Vital Signs (Most Recent):  Temp: 97.5 °F (36.4 °C) (01/20/20 0743)  Pulse: 83 (01/20/20 0800)  Resp: 20 (01/20/20 0743)  BP: 129/68 (01/20/20 0743)  SpO2: (!) 94 % (01/20/20 0743) Vital Signs (24h Range):  Temp:  [96 °F (35.6 °C)-97.5 °F (36.4 °C)] 97.5 °F (36.4 °C)  Pulse:  [59-83] 83  Resp:  [18-20] 20  SpO2:  [90 %-97 %] 94 %  BP: (110-134)/(66-76) 129/68     Weight: 60.4 kg (133 lb 2.5 oz)  Body mass index is 24.35 kg/m².    Intake/Output Summary (Last 24 hours) at 1/20/2020 0906  Last data filed at 1/20/2020 0600  Gross per 24 hour   Intake 624 ml   Output 1275 ml   Net -651 ml      Physical Exam   Constitutional: She is oriented to person, place, and time. She appears well-developed and well-nourished. She is cooperative.  Non-toxic appearance. She does not appear ill. No distress.   HENT:   Head: Normocephalic and atraumatic.   Right Ear: Hearing, tympanic membrane, external ear and ear canal normal.   Left Ear: Hearing, tympanic membrane, external ear and ear canal normal.   Nose: Nose normal. No mucosal edema, rhinorrhea or nasal deformity. No epistaxis. Right sinus exhibits no maxillary sinus tenderness and no frontal sinus tenderness. Left sinus exhibits no maxillary sinus tenderness and no frontal sinus  tenderness.   Mouth/Throat: Uvula is midline, oropharynx is clear and moist and mucous membranes are normal. No trismus in the jaw. Normal dentition. No uvula swelling. No posterior oropharyngeal erythema.   Eyes: Conjunctivae and lids are normal. No scleral icterus.   Sclera clear bilat   Neck: Trachea normal, full passive range of motion without pain and phonation normal. Neck supple.   Cardiovascular: Normal rate, regular rhythm, S1 normal, S2 normal, normal heart sounds, intact distal pulses and normal pulses.   No murmur heard.  Pulmonary/Chest: Respiratory distress: mild to moderate. She has decreased breath sounds in the right lower field and the left lower field. She has wheezes in the right lower field and the left lower field. She has rhonchi in the right lower field and the left lower field.   Raspy cough ; off white sputum ; chest congestion    Abdominal: Soft. Normal appearance and bowel sounds are normal. She exhibits no distension. There is no tenderness.   Musculoskeletal: Normal range of motion. She exhibits no edema or deformity.   Neurological: She is alert and oriented to person, place, and time. She exhibits normal muscle tone. Coordination normal.   Skin: Skin is warm, dry and intact. She is not diaphoretic. No pallor.   Psychiatric: She has a normal mood and affect. Her speech is normal and behavior is normal. Judgment and thought content normal. Cognition and memory are normal.   Nursing note and vitals reviewed.      Significant Labs:   CBC:   Recent Labs   Lab 01/19/20  0556 01/20/20  0649   WBC 14.34* 12.77*   HGB 9.8* 10.2*   HCT 31.8* 32.0*    202     CMP:   Recent Labs   Lab 01/19/20  0556 01/20/20  0649    142   K 3.7 3.5   CL 95 95   CO2 35* 34*   * 235*   BUN 33* 35*   CREATININE 1.3 1.2   CALCIUM 8.5* 8.4*   PROT 5.5* 5.6*   ALBUMIN 3.3* 3.4*   BILITOT 0.6 0.8   ALKPHOS 119 122   AST 13 19   ALT 54* 58*   ANIONGAP 13 13   EGFRNONAA 38* 42*

## 2020-01-20 NOTE — DISCHARGE SUMMARY
Principal Problem:Acute on chronic congestive heart failure           HPI:  84 year old female with chronic respiratory failure and recent admission for pacer placement comes in because of weakness. She has been feeling more and more swollen, and she is getting more weak. She says that she has also had a lot of abdominal pain especially when getting up to walk. She feels nauseated and sick to her stomach when this happens. She has not been taking any diuretics. Last EF is unknown.     Overview/Hospital Course:  1/12/2020 Patient with continued weakness. O2 doing okay. Slight improvement of fluid, but only diuresed about 1 L.   Blood counts improved with diuresis - no transfusion at this point.  Overall about the same.     1/13/2020 pt renal and liver function elevated. Prior to recent cardiac procedure both were fine. She has liver u/s yesterday. Beside a gallstone without inflammation read as normal. On amiodarone for A fib. Discussed with Dr Metcalf this am as she is ventricular paced on tele and HR 60 will hold amiodarone and cont BB. Follow LFT. Also CXR without congestion. She was sent home on lasix but denies taking it although felt swollen. Given 20mg IV lasix 1/10 and bumex 0.5mg IV x 1 on 1/11. BNP was 2990 on admission and went up to 3124 2 days later and 2909 today.      Cbc pending 7.6 today. Ferritin very high as well as elevated fe studies. CBC shows eleavted MCV and MCH. Folate and B12 2 months ago normal, will repeat.      1/14/2020 Pt NAD this morning. Notes swelling much better but has persistent junky cough during exam, and still wheezing     Negative -738, Still with significant SANTIAGO with minimal exertion, O2 sat 90-92% on 3LNC   Afebrile, Day 3 Rocephin for UTI, ( Had 2 grams on 1/10/20 and 1Gm yesterday    ENTEROCOCCUS FAECALIS   50,000 - 99,999 cfu/ml       Resulting Agency [facility]   Susceptibility               Enterococcus faecalis       CULTURE, URINE       Ampicillin <=2 mcg/mL Sensitive        "Nitrofurantoin <=32 mcg/mL Sensitive       Tetracycline >8 mcg/mL Resistant       Vancomycin 2 mcg/mL Sensitive        Blood culture x1 done- NGTD x 4d H&H up to 8.9/27.8 after 1UPRBCs from 7.6/23.5  AST 76>82, >158 , creat 1.6   Was noted to have choking issues, ST josephal ordered  Pt is being Rxed for CHF, a UTI and COPD issues      1/15/2020 Day 4 IV  Rocephin for UTI,ENTEROCOCCUS FAECALIS sensitive to ampicillin    H&H 8.7/27.1   Creat 1.6>1.3, LFTs better AST 82>42, >127, since off amiodarone   o2 sat 91-95% still on 3LNC (USES 3LNC at home 24hrs- states her sates are usually 91-92% , still with some SANTIAGO, intermittent wheezing, prednisone 20mg daily   xopenex q 6   Notes soreness to legs better but had "weeping" overnight   · RW Ambulation using portable oxygen x 75 feet with Supervision; goal is 200ft      1/16/2020 Day 5 IV  Rocephin for UTI,ENTEROCOCCUS FAECALIS sensitive to ampicillin   Repeat U/A yesterday good, leukocytes, nitrites negative, WBC 9.01, afebrile   H&H 8.4/26.4, k+ 3.4  Creat 1.3>1.0 BUN 46>37, AST 47>31- now normal, >100   No further amiodarone, Cards recommending increase current BB , pt has PPM   Stopped amlodipine yesterday due to LE complaints of swelling  Low BS 60s this am despite decreasing levemir from 12 to 8- will stop insulin  Will transfer with 1 unit PRBCs today and d'c in AM     1/17/2020 Pt admitted for weakness; she received 1 UPRBCs on 1/13/2020, And a 2nd unit yesterday   H&H 10/6/33.9 this am . PRBC given slowly over 4 hrs due to hx of flash pulm edema, She did have episode of SOB and oxygen desat to the 70s yesterday when getting up to [facility]. She was given IV lasix 20mg and CXR demonstrated;There is a mild amount of haziness scattered throughout the lower half of the right lung. There is a mild amount of haziness scattered throughout the lower 2/3 of the left lung.  This represents an interval worsening of the appearance of the lungs and is characteristic " of pneumonia.  2. There is opacification of the base of the left hemithorax.  This is characteristic of a small pleural effusion.  She has completed 5/5 of Rocephin for ENTEROCOCCUS FAECALIS UTI sensitive to ampicillin   She has chronic Resp failure with itermittent wheezing and O2 sats 91-92% at baseline per pt.   Afebrile, WBC nml BP stable , pt reports feeling stronger. Still notes some congestion. Productive cough   AST 20/> 83   Added levaquin for concern for pneumonia      1/18/20  Yesterday she dropped her pulseox to 81   Felt severely short winded   Seems very congested still   We added Levaquin for possible pneumonia   CXR had fluffed up      1/19/2020  I diuresed her a little;e more aggressively and she feels better   Her creatinine is bumping but her breathing is better and she is less congested  Oxygenation is better   Lasix 40 one more time . Continue Levaquin   CXR 1/17  1. Findings consistent with mild congestive heart failure.  2. Moderate bilateral pleural effusions with atelectasis versus infiltrate at the lung bases.  Correlate clinically with possible fever and/or elevated white count.  3. Underlying COPD.  4. Pacemaker.     1/20/20  Doing very well . Anxious to go home   Less wheezy  Ready for discharge   This is the best I have seen her in few days         Interval History:   Doing well  Ready for discharge        Review of Systems   Constitutional: Negative for appetite change, chills and fever.   HENT: Negative for congestion, ear discharge, ear pain, rhinorrhea, sinus pressure and sore throat.    Respiratory: Positive for cough. Negative for choking and chest tightness.         With coughing and increased activity   Cardiovascular: Negative for chest pain and palpitations.   Gastrointestinal: Negative for constipation, diarrhea, nausea and vomiting.   Musculoskeletal: Negative for joint swelling and myalgias.   Skin: Negative for rash and wound.   Neurological: Negative for dizziness,  syncope, weakness, light-headedness and numbness.      Objective:      Vital Signs (Most Recent):  Temp: 97.5 °F (36.4 °C) (01/20/20 0743)  Pulse: 83 (01/20/20 0800)  Resp: 20 (01/20/20 0743)  BP: 129/68 (01/20/20 0743)  SpO2: (!) 94 % (01/20/20 0743) Vital Signs (24h Range):  Temp:  [96 °F (35.6 °C)-97.5 °F (36.4 °C)] 97.5 °F (36.4 °C)  Pulse:  [59-83] 83  Resp:  [18-20] 20  SpO2:  [90 %-97 %] 94 %  BP: (110-134)/(66-76) 129/68      Weight: 60.4 kg (133 lb 2.5 oz)  Body mass index is 24.35 kg/m².     Intake/Output Summary (Last 24 hours) at 1/20/2020 0906  Last data filed at 1/20/2020 0600      Gross per 24 hour   Intake 624 ml   Output 1275 ml   Net -651 ml      Physical Exam   Constitutional: She is oriented to person, place, and time. She appears well-developed and well-nourished. She is cooperative.  Non-toxic appearance. She does not appear ill. No distress.   HENT:   Head: Normocephalic and atraumatic.   Right Ear: Hearing, tympanic membrane, external ear and ear canal normal.   Left Ear: Hearing, tympanic membrane, external ear and ear canal normal.   Nose: Nose normal. No mucosal edema, rhinorrhea or nasal deformity. No epistaxis. Right sinus exhibits no maxillary sinus tenderness and no frontal sinus tenderness. Left sinus exhibits no maxillary sinus tenderness and no frontal sinus tenderness.   Mouth/Throat: Uvula is midline, oropharynx is clear and moist and mucous membranes are normal. No trismus in the jaw. Normal dentition. No uvula swelling. No posterior oropharyngeal erythema.   Eyes: Conjunctivae and lids are normal. No scleral icterus.   Sclera clear bilat   Neck: Trachea normal, full passive range of motion without pain and phonation normal. Neck supple.   Cardiovascular: Normal rate, regular rhythm, S1 normal, S2 normal, normal heart sounds, intact distal pulses and normal pulses.   No murmur heard.  Pulmonary/Chest: Respiratory distress: mild to moderate. She has decreased breath sounds in the  right lower field and the left lower field. She has wheezes in the right lower field and the left lower field. She has rhonchi in the right lower field and the left lower field.   Raspy cough ; off white sputum ; chest congestion    Abdominal: Soft. Normal appearance and bowel sounds are normal. She exhibits no distension. There is no tenderness.   Musculoskeletal: Normal range of motion. She exhibits no edema or deformity.   Neurological: She is alert and oriented to person, place, and time. She exhibits normal muscle tone. Coordination normal.   Skin: Skin is warm, dry and intact. She is not diaphoretic. No pallor.   Psychiatric: She has a normal mood and affect. Her speech is normal and behavior is normal. Judgment and thought content normal. Cognition and memory are normal.   Nursing note and vitals reviewed.        Significant Labs:   CBC:        Recent Labs   Lab 01/19/20  0556 01/20/20  0649   WBC 14.34* 12.77*   HGB 9.8* 10.2*   HCT 31.8* 32.0*    202      CMP:        Recent Labs   Lab 01/19/20  0556 01/20/20  0649    142   K 3.7 3.5   CL 95 95   CO2 35* 34*   * 235*   BUN 33* 35*   CREATININE 1.3 1.2   CALCIUM 8.5* 8.4*   PROT 5.5* 5.6*   ALBUMIN 3.3* 3.4*   BILITOT 0.6 0.8   ALKPHOS 119 122   AST 13 19   ALT 54* 58*   ANIONGAP 13 13   EGFRNONAA 38* 42*               Assessment/Plan:      * Acute on chronic congestive heart failure  Given small dose of lasix yesterday wtihout much change. She seems fluid overloaded.  Will get last echo from [facility] and give 0.5mg of bumex this moring.  Recheck bmp at 3.     Needs repeat echo. (last one from 12/18)    Repeat BNP this morning.  Holding off on diuresis for now.  Creat is better 1.5 but basline WNL     Echo today- add demadex today and watch kidneys closely   EF 50%, normal diastolic fx, mild to moderate pulmonary HTN   1/15/2020 EF 50%, normal diastolic fx, mild to moderate pulmonary HTN; cards recommending avoid overdiueresing  Not getting  diuretic currently; consider stopping norvasc due to LE complaints .     1/19  Extra lasix 40 mg today   Follow BMP         Pneumonia of left lower lobe due to infectious organism  Start levoquin today   Continue neb  CXR in am  Monitor  labs .     1/20  levaquin 500mg po daily for 5 days        Choking due to food (regurgitated)     Speech eval   Given pt's history of reflux-related symptoms, globus sensation, no overt s/s of aspiration, frequent belching, and poor positioning during and after PO intake, pt likely presents with GI-related dysfunction(s) resulting in discomfort with PO intake. Recommend initiation of medical management of potential GI dysfunctions to develop most appropriate treatment plan. As pt's symptoms appear to be related to probable reflux, completion of MBSS is not warranted at this time. SLP to follow up for diet tolerance only.   Getting protonix .     Acute cystitis without hematuria  Add rocephin   Follow culture     4d ago     Urine Culture, Routine Abnormal    ENTEROCOCCUS FAECALIS   50,000 - 99,999 cfu/ml     Resulting Agency [facility]   Susceptibility               Enterococcus faecalis       CULTURE, URINE       Ampicillin <=2 mcg/mL Sensitive       Nitrofurantoin <=32 mcg/mL Sensitive       Tetracycline >8 mcg/mL Resistant       Vancomycin 2 mcg/mL Sensitive           Had 2 gms Rocephin on 1/10 and 1GM yesterday   1/15/2020 Day 4 IV rocephin; repeat U/A today   1/16 u/A yesterday normal; day 5/5  rocephin today- completed   Resolved.        Weakness  Add PT   ambulated 75 ft with PT yesterday ; goal 200ft   1/16 Performed Gait trng using RW and portable oxygen with increased gait distances x 150 feet (twice) with supervision  1/17/2020 Bilateral LE eccentric strengthening ex x 10 reps on each such as Ankle Pumps, Heel Slides, Hip abd/add and Long arch quads. Provided pt with rest periods and deep breathing  in between ex        Anorexia  Switch from periactin to remeron.        Cough  with hemoptysis  On  levaquin day 4        COPD with acute exacerbation           Diabetes mellitus type 2, uncontrolled  Cont with ss   this morning  1/14/20 , 238, 164    1/15/2020 BS low this am 70s  Received 5 units yesterday with correction scale and getting 12 units nightly detemir- will decrease to 8 units tonight  1/16 Low BS 60s this am despite decreasing levemir from 12 to 8- will stop insulin  1/18         Lab Results   Component Value Date     HGBA1C 6.7 (H) 01/13/2020       -167 , no insulin due to recent hypoglycemia .     Anemia  AOCD - iron studies confirm this.  Has had iron in the past.  Will check for any signs of blood loss.  Has been asked to stop her eliquis.  While she is symptomatic and anemic, will try to diuresis before giving blood.  In the past she had flash pulm edema from transfusion vs transfusion reaction.     Referred to heme/onc in past  Now lower than 8- ordered 1 units PRBC, pre med and give over 4 hours   1/15/2020 H&H 8.7/27.1      1-16 Hct is 26, so since she responded well to blood will transfuse with one unit today  And d'c in the am  1/17/2020 HHH stable after 2nd UPRBC , 10/6/33.9 .           Acute on chronic respiratory failure with hypoxia  She is actually pretty close to her baseline, but she is short of breath all of the time.  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 With superimposed pulm edema, slightly more labored.  Cont O2 support  Repeat cxr and add mucinex and IPV- CXR- No significant interval change as compared to 01/10/2020        Atrial fibrillation with rapid ventricular response  Cont on amiodarone and lopressor.- ventricular paced  D/c amiodarone due to elevated LFT  Check Echo today- EF 50% , normal LV diastolic Fx  HR well controlled with BB   No more amiodarone per Dr. Metcalf; may be candidate for sotolol in future        Epigastric pain  U/S shows some ascites  resolved        Chronic respiratory failure with  hypoxia  Continue Oxygen .  bipap yesterday .   Uses NIV as needed         CKD (chronic kidney disease), stage III  Contraction/prerenal azotemia.  Hold off on diuresis and allow for homeostasis.  Also cont rocephin for UTI and f/u culture.     1/16 creat 1.0  1/17  BMP        Lab Results   Component Value Date      01/20/2020     K 3.5 01/20/2020     CL 95 01/20/2020     CO2 34 (H) 01/20/2020     BUN 35 (H) 01/20/2020     CREATININE 1.2 01/20/2020     CALCIUM 8.4 (L) 01/20/2020     ANIONGAP 13 01/20/2020     ESTGFRAFRICA 49 (A) 01/20/2020     EGFRNONAA 42 (A) 01/20/2020            Insomnia  Continue  on remeron        COPD (chronic obstructive pulmonary disease)  Seems at baseline.  Will give small prednisone dose - 20mg daily - cont this.  Cont nebs.  Start rocephin.  Start on mucinex.  IPV ordered.  1/15 acapella ordered yesterday - still having intermittent wheezing   1/16 stable ; no wheezing this am; at baseline   1/18  Concern for pneumonia; added levaquin  Day 2         1/20  She wass levaquin . No more needed      Cholelithiases  R/o cholecystitis.  U/s ordered and no signs of cholecystitis  Cholelithiasis no cholecystitis, bilirubin normal   No intervention at this time   asymptomatic            VTE Risk Mitigation (From admission, onward)                  Ordered        IP VTE HIGH RISK PATIENT  Once      01/10/20 2131        Place sequential compression device  Until discontinued      01/10/20 2131                      Discharge HOME with home health         Milo Estevez MD  Department of Hospital Medicine   [facility]      Revision History

## 2020-01-20 NOTE — DISCHARGE INSTRUCTIONS

## 2020-01-20 NOTE — PROGRESS NOTES
[facility] Medicine  Progress Note    Patient Name: Chantell Herrera  MRN: [id number]  Patient Class: IP- Inpatient   Admission Date: 1/10/2020  Length of Stay: 7 days  Attending Physician: Kari Gaspar MD  Primary Care Provider: Kari Gaspar MD        Subjective:     Principal Problem:Acute on chronic congestive heart failure        HPI:  84 year old female with chronic respiratory failure and recent admission for pacer placement comes in because of weakness. She has been feeling more and more swollen, and she is getting more weak. She says that she has also had a lot of abdominal pain especially when getting up to walk. She feels nauseated and sick to her stomach when this happens. She has not been taking any diuretics. Last EF is unknown.    Overview/Hospital Course:  1/12/2020 Patient with continued weakness. O2 doing okay. Slight improvement of fluid, but only diuresed about 1 L.   Blood counts improved with diuresis - no transfusion at this point.  Overall about the same.    1/13/2020 pt renal and liver function elevated. Prior to recent cardiac procedure both were fine. She has liver u/s yesterday. Beside a gallstone without inflammation read as normal. On amiodarone for A fib. Discussed with Dr Metcalf this am as she is ventricular paced on tele and HR 60 will hold amiodarone and cont BB. Follow LFT. Also CXR without congestion. She was sent home on lasix but denies taking it although felt swollen. Given 20mg IV lasix 1/10 and bumex 0.5mg IV x 1 on 1/11. BNP was 2990 on admission and went up to 3124 2 days later and 2909 today.     Cbc pending 7.6 today. Ferritin very high as well as elevated fe studies. CBC shows eleavted MCV and MCH. Folate and B12 2 months ago normal, will repeat.     1/14/2020 Pt NAD this morning. Notes swelling much better but has persistent junky cough during exam, and still wheezing     Negative -738, Still with significant SANTIAGO with minimal exertion,  "O2 sat 90-92% on 3LNC   Afebrile, Day 3 Rocephin for UTI, ( Had 2 grams on 1/10/20 and 1Gm yesterday    ENTEROCOCCUS FAECALIS   50,000 - 99,999 cfu/ml      Resulting Agency [facility]   Susceptibility      Enterococcus faecalis     CULTURE, URINE     Ampicillin <=2 mcg/mL Sensitive     Nitrofurantoin <=32 mcg/mL Sensitive     Tetracycline >8 mcg/mL Resistant     Vancomycin 2 mcg/mL Sensitive       Blood culture x1 done- NGTD x 4d H&H up to 8.9/27.8 after 1UPRBCs from 7.6/23.5  AST 76>82, >158 , creat 1.6   Was noted to have choking issues, ST eval ordered  Pt is being Rxed for CHF, a UTI and COPD issues     1/15/2020 Day 4 IV  Rocephin for UTI,ENTEROCOCCUS FAECALIS sensitive to ampicillin    H&H 8.7/27.1   Creat 1.6>1.3, LFTs better AST 82>42, >127, since off amiodarone   o2 sat 91-95% still on 3LNC (USES 3LNC at home 24hrs- states her sates are usually 91-92% , still with some SANTIAGO, intermittent wheezing, prednisone 20mg daily   xopenex q 6   Notes soreness to legs better but had "weeping" overnight   · RW Ambulation using portable oxygen x 75 feet with Supervision; goal is 200ft     1/16/2020 Day 5 IV  Rocephin for UTI,ENTEROCOCCUS FAECALIS sensitive to ampicillin   Repeat U/A yesterday good, leukocytes, nitrites negative, WBC 9.01, afebrile   H&H 8.4/26.4, k+ 3.4  Creat 1.3>1.0 BUN 46>37, AST 47>31- now normal, >100   No further amiodarone, Cards recommending increase current BB , pt has PPM   Stopped amlodipine yesterday due to LE complaints of swelling  Low BS 60s this am despite decreasing levemir from 12 to 8- will stop insulin  Will transfer with 1 unit PRBCs today and d'c in AM    1/17/2020 Pt admitted for weakness; she received 1 UPRBCs on 1/13/2020, And a 2nd unit yesterday   H&H 10/6/33.9 this am . PRBC given slowly over 4 hrs due to hx of flash pulm edema, She did have episode of SOB and oxygen desat to the 70s yesterday when getting up to [facility]. She was given IV lasix 20mg and CXR " demonstrated;There is a mild amount of haziness scattered throughout the lower half of the right lung. There is a mild amount of haziness scattered throughout the lower 2/3 of the left lung.  This represents an interval worsening of the appearance of the lungs and is characteristic of pneumonia.  2. There is opacification of the base of the left hemithorax.  This is characteristic of a small pleural effusion.  She has completed 5/5 of Rocephin for ENTEROCOCCUS FAECALIS UTI sensitive to ampicillin   She has chronic Resp failure with itermittent wheezing and O2 sats 91-92% at baseline per pt.   Afebrile, WBC nml BP stable , pt reports feeling stronger. Still notes some congestion. Productive cough   AST 20/> 83   Added levaquin for concern for pneumonia     1/18/20  Yesterday she dropped her pulseox to 81   Felt severely short winded   Seems very congested still   We added Levaquin for possible pneumonia   CXR had fluffed up     1/19/2020  I diuresed her a little;e more aggressively and she feels better   Her creatinine is bumping but her breathing is better and she is less congested  Oxygenation is better   Lasix 40 one more time . Continue Levaquin   CXR 1/17  1. Findings consistent with mild congestive heart failure.  2. Moderate bilateral pleural effusions with atelectasis versus infiltrate at the lung bases.  Correlate clinically with possible fever and/or elevated white count.  3. Underlying COPD.  4. Pacemaker.    1/20/20  Doing very well . Anxious to go home   Less wheezy  Ready for discharge   This is the best I have seen her in few days       Interval History:   Doing well  Ready for discharge      Review of Systems   Constitutional: Negative for appetite change, chills and fever.   HENT: Negative for congestion, ear discharge, ear pain, rhinorrhea, sinus pressure and sore throat.    Respiratory: Positive for cough. Negative for choking and chest tightness.         With coughing and increased activity    Cardiovascular: Negative for chest pain and palpitations.   Gastrointestinal: Negative for constipation, diarrhea, nausea and vomiting.   Musculoskeletal: Negative for joint swelling and myalgias.   Skin: Negative for rash and wound.   Neurological: Negative for dizziness, syncope, weakness, light-headedness and numbness.     Objective:     Vital Signs (Most Recent):  Temp: 97.5 °F (36.4 °C) (01/20/20 0743)  Pulse: 83 (01/20/20 0800)  Resp: 20 (01/20/20 0743)  BP: 129/68 (01/20/20 0743)  SpO2: (!) 94 % (01/20/20 0743) Vital Signs (24h Range):  Temp:  [96 °F (35.6 °C)-97.5 °F (36.4 °C)] 97.5 °F (36.4 °C)  Pulse:  [59-83] 83  Resp:  [18-20] 20  SpO2:  [90 %-97 %] 94 %  BP: (110-134)/(66-76) 129/68     Weight: 60.4 kg (133 lb 2.5 oz)  Body mass index is 24.35 kg/m².    Intake/Output Summary (Last 24 hours) at 1/20/2020 0906  Last data filed at 1/20/2020 0600  Gross per 24 hour   Intake 624 ml   Output 1275 ml   Net -651 ml      Physical Exam   Constitutional: She is oriented to person, place, and time. She appears well-developed and well-nourished. She is cooperative.  Non-toxic appearance. She does not appear ill. No distress.   HENT:   Head: Normocephalic and atraumatic.   Right Ear: Hearing, tympanic membrane, external ear and ear canal normal.   Left Ear: Hearing, tympanic membrane, external ear and ear canal normal.   Nose: Nose normal. No mucosal edema, rhinorrhea or nasal deformity. No epistaxis. Right sinus exhibits no maxillary sinus tenderness and no frontal sinus tenderness. Left sinus exhibits no maxillary sinus tenderness and no frontal sinus tenderness.   Mouth/Throat: Uvula is midline, oropharynx is clear and moist and mucous membranes are normal. No trismus in the jaw. Normal dentition. No uvula swelling. No posterior oropharyngeal erythema.   Eyes: Conjunctivae and lids are normal. No scleral icterus.   Sclera clear bilat   Neck: Trachea normal, full passive range of motion without pain and phonation  normal. Neck supple.   Cardiovascular: Normal rate, regular rhythm, S1 normal, S2 normal, normal heart sounds, intact distal pulses and normal pulses.   No murmur heard.  Pulmonary/Chest: Respiratory distress: mild to moderate. She has decreased breath sounds in the right lower field and the left lower field. She has wheezes in the right lower field and the left lower field. She has rhonchi in the right lower field and the left lower field.   Raspy cough ; off white sputum ; chest congestion    Abdominal: Soft. Normal appearance and bowel sounds are normal. She exhibits no distension. There is no tenderness.   Musculoskeletal: Normal range of motion. She exhibits no edema or deformity.   Neurological: She is alert and oriented to person, place, and time. She exhibits normal muscle tone. Coordination normal.   Skin: Skin is warm, dry and intact. She is not diaphoretic. No pallor.   Psychiatric: She has a normal mood and affect. Her speech is normal and behavior is normal. Judgment and thought content normal. Cognition and memory are normal.   Nursing note and vitals reviewed.      Significant Labs:   CBC:   Recent Labs   Lab 01/19/20  0556 01/20/20  0649   WBC 14.34* 12.77*   HGB 9.8* 10.2*   HCT 31.8* 32.0*    202     CMP:   Recent Labs   Lab 01/19/20  0556 01/20/20  0649    142   K 3.7 3.5   CL 95 95   CO2 35* 34*   * 235*   BUN 33* 35*   CREATININE 1.3 1.2   CALCIUM 8.5* 8.4*   PROT 5.5* 5.6*   ALBUMIN 3.3* 3.4*   BILITOT 0.6 0.8   ALKPHOS 119 122   AST 13 19   ALT 54* 58*   ANIONGAP 13 13   EGFRNONAA 38* 42*           Assessment/Plan:      * Acute on chronic congestive heart failure  Given small dose of lasix yesterday wtihout much change. She seems fluid overloaded.  Will get last echo from cis and give 0.5mg of bumex this moring.  Recheck bmp at 3.    Needs repeat echo. (last one from 12/18)    Repeat BNP this morning.  Holding off on diuresis for now.  Creat is better 1.5 but basline  WNL    Echo today- add demadex today and watch kidneys closely   EF 50%, normal diastolic fx, mild to moderate pulmonary HTN   1/15/2020 EF 50%, normal diastolic fx, mild to moderate pulmonary HTN; cards recommending avoid overdiueresing  Not getting diuretic currently; consider stopping norvasc due to LE complaints .    1/19  Extra lasix 40 mg today   Follow BMP       Pneumonia of left lower lobe due to infectious organism  Start levoquin today   Continue neb  CXR in am  Monitor  labs .    1/20  levaquin 500mg po daily for 5 days      Choking due to food (regurgitated)    Speech eval   Given pt's history of reflux-related symptoms, globus sensation, no overt s/s of aspiration, frequent belching, and poor positioning during and after PO intake, pt likely presents with GI-related dysfunction(s) resulting in discomfort with PO intake. Recommend initiation of medical management of potential GI dysfunctions to develop most appropriate treatment plan. As pt's symptoms appear to be related to probable reflux, completion of MBSS is not warranted at this time. SLP to follow up for diet tolerance only.   Getting protonix .    Acute cystitis without hematuria  Add rocephin   Follow culture    4d ago    Urine Culture, Routine Abnormal    ENTEROCOCCUS FAECALIS   50,000 - 99,999 cfu/ml     Resulting Agency [facility]   Susceptibility      Enterococcus faecalis     CULTURE, URINE     Ampicillin <=2 mcg/mL Sensitive     Nitrofurantoin <=32 mcg/mL Sensitive     Tetracycline >8 mcg/mL Resistant     Vancomycin 2 mcg/mL Sensitive         Had 2 gms Rocephin on 1/10 and 1GM yesterday   1/15/2020 Day 4 IV rocephin; repeat U/A today   1/16 u/A yesterday normal; day 5/5  rocephin today- completed   Resolved.      Weakness  Add PT   ambulated 75 ft with PT yesterday ; goal 200ft   1/16 Performed Gait trng using RW and portable oxygen with increased gait distances x 150 feet (twice) with supervision  1/17/2020 Bilateral LE eccentric strengthening  ex x 10 reps on each such as Ankle Pumps, Heel Slides, Hip abd/add and Long arch quads. Provided pt with rest periods and deep breathing  in between ex      Anorexia  Switch from periactin to remeron.      Cough with hemoptysis  On  levaquin day 4      COPD with acute exacerbation        Diabetes mellitus type 2, uncontrolled  Cont with ss   this morning  1/14/20 , 238, 164    1/15/2020 BS low this am 70s  Received 5 units yesterday with correction scale and getting 12 units nightly detemir- will decrease to 8 units tonight  1/16 Low BS 60s this am despite decreasing levemir from 12 to 8- will stop insulin  1/18   Lab Results   Component Value Date    HGBA1C 6.7 (H) 01/13/2020      -167 , no insulin due to recent hypoglycemia .    Anemia  AOCD - iron studies confirm this.  Has had iron in the past.  Will check for any signs of blood loss.  Has been asked to stop her eliquis.  While she is symptomatic and anemic, will try to diuresis before giving blood.  In the past she had flash pulm edema from transfusion vs transfusion reaction.    Referred to heme/onc in past  Now lower than 8- ordered 1 units PRBC, pre med and give over 4 hours   1/15/2020 H&H 8.7/27.1     1-16 Hct is 26, so since she responded well to blood will transfuse with one unit today  And d'c in the am  1/17/2020 HHH stable after 2nd UPRBC , 10/6/33.9 .        Acute on chronic respiratory failure with hypoxia  She is actually pretty close to her baseline, but she is short of breath all of the time.  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 With superimposed pulm edema, slightly more labored.  Cont O2 support  Repeat cxr and add mucinex and IPV- CXR- No significant interval change as compared to 01/10/2020      Atrial fibrillation with rapid ventricular response  Cont on amiodarone and lopressor.- ventricular paced  D/c amiodarone due to elevated LFT  Check Echo today- EF 50% , normal LV diastolic Fx  HR well controlled  with BB   No more amiodarone per Dr. Metcalf; may be candidate for sotolol in future      Epigastric pain  U/S shows some ascites  resolved      Chronic respiratory failure with hypoxia  Continue Oxygen .  bipap yesterday .   Uses NIV as needed       CKD (chronic kidney disease), stage III  Contraction/prerenal azotemia.  Hold off on diuresis and allow for homeostasis.  Also cont rocephin for UTI and f/u culture.    1/16 creat 1.0  1/17  BMP  Lab Results   Component Value Date     01/20/2020    K 3.5 01/20/2020    CL 95 01/20/2020    CO2 34 (H) 01/20/2020    BUN 35 (H) 01/20/2020    CREATININE 1.2 01/20/2020    CALCIUM 8.4 (L) 01/20/2020    ANIONGAP 13 01/20/2020    ESTGFRAFRICA 49 (A) 01/20/2020    EGFRNONAA 42 (A) 01/20/2020         Insomnia  Continue  on remeron      COPD (chronic obstructive pulmonary disease)  Seems at baseline.  Will give small prednisone dose - 20mg daily - cont this.  Cont nebs.  Start rocephin.  Start on mucinex.  IPV ordered.  1/15 acapella ordered yesterday - still having intermittent wheezing   1/16 stable ; no wheezing this am; at baseline   1/18  Concern for pneumonia; added levaquin  Day 2       1/20  She wass levaquin . No more needed     Cholelithiases  R/o cholecystitis.  U/s ordered and no signs of cholecystitis  Cholelithiasis no cholecystitis, bilirubin normal   No intervention at this time   asymptomatic      VTE Risk Mitigation (From admission, onward)         Ordered     IP VTE HIGH RISK PATIENT  Once      01/10/20 2131     Place sequential compression device  Until discontinued      01/10/20 2131                      Milo Estevez MD  Department of Hospital Medicine   [facility]

## 2020-01-20 NOTE — PROGRESS NOTES
[facility]  Cardiology  Progress Note    Patient Name: Chantell Herrera  MRN: [id number]  Admission Date: 1/10/2020  Hospital Length of Stay: 7 days  Code Status: Full Code   Attending Physician: Kari Gaspar MD   Primary Care Physician: Kari Gaspar MD  Expected Discharge Date:   Principal Problem:Acute on chronic congestive heart failure    Subjective:     Hospital Course: Patient feeling better, resp status improving. Likely DC soon.     Interval History:   84 year old female with history of PAF s/p PPM, COPD, carotid artery stenosis, HHD, bradycardia, dyslipidemia, HTN, GERD, presented to ED with c/o increased weakness, leg swelling, abdominal pain, N/V.         ROS   Constitution: Positive for decreased appetite, malaise/fatigue and weight loss. Negative for chills, diaphoresis, fever and night sweats.   HENT: Negative.    Eyes: Negative.    Cardiovascular: Positive for dyspnea on exertion and leg swelling. Negative for chest pain, claudication, cyanosis, irregular heartbeat, near-syncope, orthopnea, palpitations, paroxysmal nocturnal dyspnea and syncope.   Respiratory: Positive for shortness of breath and wheezing. Negative for cough, hemoptysis, sleep disturbances due to breathing, snoring and sputum production.    Endocrine: Negative.    Hematologic/Lymphatic: Negative.    Skin: Negative.    Musculoskeletal: Positive for muscle weakness. Negative for arthritis, back pain, falls, gout, joint pain, joint swelling, muscle cramps, myalgias, neck pain and stiffness.   Gastrointestinal: Positive for abdominal pain, anorexia, nausea and vomiting. Negative for bloating, change in bowel habit, bowel incontinence, constipation, diarrhea, dysphagia, excessive appetite, flatus, heartburn, hematemesis, hematochezia, hemorrhoids, jaundice and melena.   Genitourinary: Negative.    Neurological: Positive for weakness. Negative for aphonia, brief paralysis, difficulty with  concentration, disturbances in coordination, excessive daytime sleepiness, dizziness, focal weakness, headaches, light-headedness, loss of balance, numbness, paresthesias, seizures, sensory change, tremors and vertigo.   Psychiatric/Behavioral: Negative.    Allergic/Immunologic: Negative  Objective:     Vital Signs (Most Recent):  Temp: 97.5 °F (36.4 °C) (01/20/20 0743)  Pulse: 83 (01/20/20 0800)  Resp: 20 (01/20/20 0743)  BP: 129/68 (01/20/20 0743)  SpO2: (!) 94 % (01/20/20 0743) Vital Signs (24h Range):  Temp:  [96 °F (35.6 °C)-97.7 °F (36.5 °C)] 97.5 °F (36.4 °C)  Pulse:  [59-83] 83  Resp:  [18-20] 20  SpO2:  [89 %-97 %] 94 %  BP: (110-134)/(64-76) 129/68     Weight: 60.4 kg (133 lb 2.5 oz)  Body mass index is 24.35 kg/m².    SpO2: (!) 94 %  O2 Device (Oxygen Therapy): BiPAP      Intake/Output Summary (Last 24 hours) at 1/20/2020 0833  Last data filed at 1/20/2020 0600  Gross per 24 hour   Intake 624 ml   Output 1275 ml   Net -651 ml       Lines/Drains/Airways     Peripheral Intravenous Line                 Peripheral IV - Single Lumen 01/16/20 1631 18 G Posterior;Right Forearm 3 days                Physical Exam  Constitutional: She is oriented to person, place, and time. She appears well-developed and well-nourished. No distress.   HENT:   Head: Normocephalic and atraumatic.   Neck: Normal range of motion. Neck supple. JVD present. No tracheal deviation present. No thyromegaly present.   Cardiovascular: Intact distal pulses. Exam reveals no gallop and no friction rub.   Murmur (2/6 MARKOS) heard.  Pulmonary/Chest: Effort normal. No stridor. No respiratory distress. BBS clear.  She exhibits no tenderness.   Abdominal: Soft. Bowel sounds are normal. She exhibits no distension and no mass. There is tenderness. There is no rebound and no guarding.   Musculoskeletal: Normal range of motion. She exhibits trace edema. She exhibits no tenderness or deformity.   Neurological: She is alert and oriented to person, place, and  time.   Skin: Skin is warm and dry. No rash noted. She is not diaphoretic. No erythema. No pallor.   Psychiatric: She has a normal mood and affect. Her behavior is normal. Judgment and thought content normal.   Significant Labs:   ABG: No results for input(s): PH, PCO2, HCO3, POCSATURATED, BE in the last 48 hours., Blood Culture: No results for input(s): LABBLOO in the last 48 hours., BMP:   Recent Labs   Lab 01/19/20  0556 01/20/20  0649   * 235*    142   K 3.7 3.5   CL 95 95   CO2 35* 34*   BUN 33* 35*   CREATININE 1.3 1.2   CALCIUM 8.5* 8.4*   , CMP   Recent Labs   Lab 01/19/20  0556 01/20/20  0649    142   K 3.7 3.5   CL 95 95   CO2 35* 34*   * 235*   BUN 33* 35*   CREATININE 1.3 1.2   CALCIUM 8.5* 8.4*   PROT 5.5* 5.6*   ALBUMIN 3.3* 3.4*   BILITOT 0.6 0.8   ALKPHOS 119 122   AST 13 19   ALT 54* 58*   ANIONGAP 13 13   ESTGFRAFRICA 44* 49*   EGFRNONAA 38* 42*   , CBC   Recent Labs   Lab 01/19/20  0556 01/20/20  0649   WBC 14.34* 12.77*   HGB 9.8* 10.2*   HCT 31.8* 32.0*    202   , INR No results for input(s): INR, PROTIME in the last 48 hours., Lipid Panel No results for input(s): CHOL, HDL, LDLCALC, TRIG, CHOLHDL in the last 48 hours.,   Pathology Results  (Last 10 years)    None       and Troponin No results for input(s): TROPONINI in the last 48 hours.    Significant Imaging: Cardiac Cath: , CT scan: CT ABDOMEN PELVIS WITH CONTRAST: No results found for this visit on 01/10/20., CT ABDOMEN PELVIS WITHOUT CONTRAST: No results found for this visit on 01/10/20. and , Echocardiogram:   2D echo with color flow doppler: No results found for this or any previous visit. and Transthoracic echo (TTE) complete (Cupid Only):   Results for orders placed or performed during the hospital encounter of 01/10/20   Echo Color Flow Doppler? Yes   Result Value Ref Range    BSA 1.67 m2    LA WIDTH 3.15 cm    PV PEAK VELOCITY 0.86 cm/s    LVIDD 4.22 3.5 - 6.0 cm    IVS 1.04 0.6 - 1.1 cm    PW 0.98  0.6 - 1.1 cm    Ao root annulus 2.89 cm    LVIDS 2.25 2.1 - 4.0 cm    FS 47 28 - 44 %    LA volume 43.84 cm3    STJ 2.72 cm    LV mass 140.23 g    LA size 3.17 cm    RVDD 3.08 cm    Left Ventricle Relative Wall Thickness 0.46 cm    AV mean gradient 8 mmHg    AV valve area 1.90 cm2    AV Velocity Ratio 0.76     AV index (prosthetic) 0.62     E/A ratio 3.03     E wave decelartion time 229.71 msec    IVRT 0.12 msec    LVOT diameter 1.97 cm    LVOT area 3.0 cm2    LVOT peak shimon 1.39 m/s    LVOT peak VTI 24.70 cm    Ao peak shimon 1.84 m/s    Ao VTI 39.59 cm    LVOT stroke volume 75.25 cm3    AV peak gradient 14 mmHg    MV Peak E Shimon 0.94 m/s    TR Max Shimon 2.98 m/s    MV Peak A Shimon 0.31 m/s    LV Systolic Volume 17.18 mL    LV Systolic Volume Index 10.6 mL/m2    LV Diastolic Volume 79.28 mL    LV Diastolic Volume Index 49.10 mL/m2    LA Volume Index 27.1 mL/m2    LV Mass Index 87 g/m2    RA Major Axis 5.80 cm    Left Atrium Minor Axis 5.13 cm    Left Atrium Major Axis 5.20 cm    Triscuspid Valve Regurgitation Peak Gradient 36 mmHg    Narrative    · Concentric left ventricular remodeling.  · Low normal left ventricular systolic function. The estimated ejection   fraction is 50%.  · Normal LV diastolic function.  · Normal right ventricular systolic function.  · Mild mitral sclerosis.  · There is moderate leaflet calcification of the Mitral Valve.  · Moderate tricuspid regurgitation.  · Mild to moderate pulmonary hypertension present.      , EKG: , Stress Test:  and X-Ray: CXR: X-Ray Chest 1 View (CXR):   Results for orders placed or performed during the hospital encounter of 01/10/20   X-Ray Chest 1 View    Narrative    EXAMINATION:  XR CHEST 1 VIEW    CLINICAL HISTORY:  hypoxia;    COMPARISON:  01/13/2020    FINDINGS:  A cardiac defibrillator remains in place.  The size of the heart is normal.  There is a mild amount of haziness scattered throughout the lower half of the right lung.  There is a mild amount of haziness  scattered throughout the lower 2/3 of the left lung.  There is opacification of the base of the left hemithorax.  The right costophrenic angle is sharp.  There is no pneumothorax.      Impression    1. There is a mild amount of haziness scattered throughout the lower half of the right lung. There is a mild amount of haziness scattered throughout the lower 2/3 of the left lung.  This represents an interval worsening of the appearance of the lungs and is characteristic of pneumonia.  2. There is opacification of the base of the left hemithorax.  This is characteristic of a small pleural effusion.  .      Electronically signed by: Joaquin Nash MD  Date:    01/16/2020  Time:    16:04   , X-Ray Chest PA and Lateral (CXR):   Results for orders placed or performed during the hospital encounter of 01/10/20   X-Ray Chest PA And Lateral    Narrative    EXAMINATION:  XR CHEST PA AND LATERAL    CLINICAL HISTORY:  pneumonia;    TECHNIQUE:  PA and lateral views of the chest were performed.    COMPARISON:  01/16/2020.    FINDINGS:  There is a mild diffuse prominence of the pulmonary interstitium most predominant within the perihilar lungs consistent with interstitial edema. There are moderate bilateral pleural effusions with atelectasis versus infiltrate at the lung bases.  Underlying lungs are hyperexpanded with flattening of the diaphragms consistent with COPD.    Heart size is enlarged.  Mediastinal contours unremarkable.  Trachea midline.  Calcified aortic plaque.    Bony thorax intact.  Left axillary pacemaker in place.      Impression    1. Findings consistent with mild congestive heart failure.  2. Moderate bilateral pleural effusions with atelectasis versus infiltrate at the lung bases.  Correlate clinically with possible fever and/or elevated white count.  3. Underlying COPD.  4. Pacemaker.      Electronically signed by: Ulysses Gonzalez  Date:    01/18/2020  Time:    08:40    and , KUB: X-Ray Abdomen AP 1 View (KUB): No  results found for this visit on 01/10/20. and  and   Assessment and Plan:       Active Diagnoses:    Diagnosis Date Noted POA    PRINCIPAL PROBLEM:  Acute on chronic congestive heart failure [I50.9] 02/01/2019 Yes    Pneumonia of left lower lobe due to infectious organism [J18.1] 01/17/2020 No    Choking due to food (regurgitated) [T17.320A] 01/14/2020 Yes    Weakness [R53.1] 01/13/2020 Yes    Acute cystitis without hematuria [N30.00] 01/13/2020 Yes    Anorexia [R63.0] 01/02/2020 Yes    Cough with hemoptysis [R04.2] 12/03/2019 Yes    COPD with acute exacerbation [J44.1] 11/28/2019 Yes    Diabetes mellitus type 2, uncontrolled [E11.65] 09/02/2019 Yes    Anemia [D64.9] 09/01/2019 Yes    Acute on chronic respiratory failure with hypoxia [J96.21] 01/22/2019 Yes    Atrial fibrillation with rapid ventricular response [I48.91] 11/16/2018 Yes    Epigastric pain [R10.13] 11/13/2018 Yes    Chronic respiratory failure with hypoxia [J96.11] 05/17/2017 Yes    CKD (chronic kidney disease), stage III [N18.3] 05/10/2016 Yes    Insomnia [G47.00] 08/10/2015 Yes    COPD (chronic obstructive pulmonary disease) [J44.9] 05/03/2015 Yes    Cholelithiases [K80.20] 01/28/2015 Yes      Problems Resolved During this Admission:       VTE Risk Mitigation (From admission, onward)         Ordered     IP VTE HIGH RISK PATIENT  Once      01/10/20 2131     Place sequential compression device  Until discontinued      01/10/20 2131              Current Facility-Administered Medications   Medication    0.9%  NaCl infusion (for blood administration)    acetaminophen tablet 650 mg    amLODIPine tablet 5 mg    aspirin EC tablet 81 mg    baclofen tablet 10 mg    benzonatate capsule 100 mg    bisacodyl EC tablet 5 mg    citalopram tablet 20 mg    dextrose 10% (D10W) Bolus    dextrose 10% (D10W) Bolus    glucagon (human recombinant) injection 1 mg    glucose chewable tablet 16 g    glucose chewable tablet 24 g    guaiFENesin 12  hr tablet 600 mg    HYDROcodone-acetaminophen 5-325 mg per tablet 1 tablet    insulin aspart U-100 pen 0-5 Units    levalbuterol nebulizer solution 1.25 mg    [START ON 1/21/2020] levoFLOXacin tablet 500 mg    methylPREDNISolone sodium succinate injection 40 mg    metoprolol tartrate (LOPRESSOR) tablet 25 mg    mirtazapine disintegrating tablet 15 mg    ondansetron injection 4 mg    pantoprazole EC tablet 40 mg    predniSONE tablet 20 mg    promethazine (PHENERGAN) 12.5 mg in dextrose 5 % 50 mL IVPB    rOPINIRole tablet 1 mg    sodium chloride 0.9% flush 10 mL    traZODone tablet 50 mg     DX:Feels better/ Going home today wheezing mostly resolved  YESSICA resolved  LFTs coming down off amiodarone 12/2019 for Aflutter  Acute on chronic resiratory failure with hypoxia  Anemia with recent transcfusion  PAF with PPM 12/2019 due to SSS  HTN  COPD; wheezing improved/ NO Pulmonary edema  DM2  Cholecystitis        Plan:   Home on asa, metoprolol, amlodipine  Repeat echocardiogram showed LVEF 50%, LVEDP OK  Now off amiodarone and hold statin  Adjust diuretic dose PRN  F/u BMP LFTs  Adjust BB as needed        CHAYO Myers for Dr. Metcalf   Cardiology  [facility]  I attest that I have personally seen and examined this patient. I have reviewed and discussed the management in detail as outlined above.

## 2020-01-20 NOTE — ASSESSMENT & PLAN NOTE
Seems at baseline.  Will give small prednisone dose - 20mg daily - cont this.  Cont nebs.  Start rocephin.  Start on mucinex.  IPV ordered.  1/15 acapella ordered yesterday - still having intermittent wheezing   1/16 stable ; no wheezing this am; at baseline   1/18  Concern for pneumonia; added levaquin  Day 2       1/20  Dc home on levaquin 500 mg po 5 days

## 2020-01-20 NOTE — ASSESSMENT & PLAN NOTE
Contraction/prerenal azotemia.  Hold off on diuresis and allow for homeostasis.  Also cont rocephin for UTI and f/u culture.    1/16 creat 1.0  1/17  BMP  Lab Results   Component Value Date     01/20/2020    K 3.5 01/20/2020    CL 95 01/20/2020    CO2 34 (H) 01/20/2020    BUN 35 (H) 01/20/2020    CREATININE 1.2 01/20/2020    CALCIUM 8.4 (L) 01/20/2020    ANIONGAP 13 01/20/2020    ESTGFRAFRICA 49 (A) 01/20/2020    EGFRNONAA 42 (A) 01/20/2020

## 2020-01-21 NOTE — PT/OT/SLP DISCHARGE
Physical Therapy Discharge Summary    Name: Chantell Herrera  MRN: [id number]   Principal Problem: Acute on chronic congestive heart failure     Patient Discharged from acute Physical Therapy on 20.  Please refer to prior PT noted date on 20 for functional status.     Assessment:     Patient was discharged unexpectedly.  Information required to complete an accurate discharge summary is unknown.  Refer to therapy initial evaluation and last progress note for initial and most recent functional status and goal achievement.  Recommendations made may be found in medical record.    Objective:     GOALS:   Multidisciplinary Problems     Physical Therapy Goals     Not on file          Multidisciplinary Problems (Resolved)        Problem: Physical Therapy Goal    Goal Priority Disciplines Outcome Goal Variances Interventions   Physical Therapy Goal   (Resolved)     PT, PT/OT Met     Description:  Goals to be met by: 20     Patient will increase functional independence with mobility by performin. Sit to stand transfer with Independent  2. Bed to chair/toilet transfer with Independentwith or without rolling walker using Stand Pivot TECHNIQUE  3. Gait  x 200  feet with Modified Independent with or without rolling walker  4. Lower extremity exercise program x10 reps(2 sets) per handout, with assistance as needed                    Reasons for Discontinuation of Therapy Services  Transfer to alternate level of care.      Plan:     Patient Discharged to: Home with Home Health Service.    Theodore Sellers, PT  2020

## 2020-01-21 NOTE — PATIENT INSTRUCTIONS
Left- or Right- Side Congestive Heart Failure (CHF)    The heart is a large muscle. It is a pump that circulates blood throughout the body. Blood carries oxygen to all of the organs, including the brain, muscles, and skin. After your body takes the oxygen out of the blood, the blood returns to the heart. The right side of the heart collects the blood from the body and pumps it to the lungs. In the lungs, it gets fresh oxygen and gives up carbon dioxide. The oxygen-rich blood from the lungs then returns to the left side of the heart, where it is pumped back out to the rest of your body, starting the process all over.  Congestive heart failure (CHF) occurs when the heart muscle is weakened. This affects the pumping action of the heart. Heart failure can affect the right side of the heart or the left side. But heart failure may affect not only the right side of the heart or only the left side. Although it may have started on one side, it can and often eventually does affect both sides.  Right-side heart failure  When the right side of the heart is weakened, it cant handle the blood it is getting from the rest of the body. This blood returns to the heart through veins. When too much pressure builds up in the veins, fluid leaks out into the tissues. Gravity then causes that fluid to move to those parts of the body that are the lowest. So one of the first symptoms of right-side CHF can include swelling in the feet and ankles. If the condition gets worse, the swelling can even go up past the knees. Sometimes it gets so severe, the liver can get congested as well.  Left-side heart failure  When the left side of the heart is weakened, it cant handle the blood it gets from the lungs. Pressure then builds up in the veins of the lungs, causing fluid to leak into the lung tissues. This may cause CHF and pulmonary edema. This causes you to feel short of breath, weak, or dizzy. These symptoms are often worse with exertion,  such as when climbing stairs or walking up hills. Lying with your head flat is uncomfortable and can make your breathing worse. This may make sleeping difficult. You may need to use extra pillows to elevate your upper body to sleep well. The same is true when just resting during the daytime.  There are many causes of heart failure including:  · Coronary artery disease  · Past heart attack (also known as acute myocardial infarction, or AMI)  · High blood pressure  · Damaged heart valve  · Diabetes  · Obesity  · Cigarette smoking  · Alcohol abuse  Heart failure is a chronic condition. There is no cure. The purpose of medical treatment is to improve the pumping action of the heart. The main way to do this is to remove excess water from the body. A number of medicines can help reach this goal, improve symptoms, and prevent the heart from becoming weaker. Sometimes, heart failure can become so severe that a device is placed in the heart to help with pumping. Another major goal is to better treat the causes of heart failure, such as diabetes and high blood pressure, by making changes in your lifestyle and maximizing medical control when needed.  Home care  Follow these guidelines when caring for yourself at home:  · Check your weight every day. This is very important because a sudden increase in weight gain could mean worsening heart failure. Keep these things in mind:  ¨ Use the same scale every day.  ¨ Weigh yourself at the same time every day.  ¨ Make sure the scale is on a hard floor surface, not on a rug or carpet.  ¨ Keep a record of your weight every day so your healthcare provider can see it. If you are not given a log sheet for this, keep a separate journal for this purpose.   · Cut back on the amount of salt (sodium) you eat. Follow your healthcare provider's recommendation on how much salt or sodium you should have each day.  ¨ Avoid high-salt foods. These include olives, pickles, smoked meats, salted potato  chips, and most prepared foods.  ¨ Don't add salt to your food at the table. Use only small amounts of salt when cooking.  ¨ Read the labels carefully on food packages to learn how much salt or sodium is in each serving in the package. Remember, a can or package of food may contain more than 1 serving. So if you eat all the food in the package, you may be getting more salt than you think.  · Follow your healthcare provider's recommendations about how much fluid you should have. Be aware that some foods, such as soup, pudding, and juicy fruits like oranges or melons, contain liquid. You'll need to count the liquid in those foods as part of your daily fluid intake. Your provider can help you with this.  · Stop smoking.  · Cut back on how much alcohol you drink.  · Lose weight if you are overweight. The excess weight adds a lot of stress on the workload of the heart.  · Stay active. Talk with your provider about an exercise program that is safe for your heart.  · Keep your feet elevated to reduce swelling. Ask your provider about support hose as a preventive treatment for daytime leg swelling.  Besides taking your medicine as instructed, an important part of treatment is lifestyle changes. These include diet, physical activity, stopping smoking, and weight control.  Improve your diet by including more fresh foods, cutting back on how much sugar and saturated fat you eat, and eating fewer processed foods and less salt.  Follow-up care  Follow up with your healthcare provider, or as advised.  Make sure to keep any appointments that were made for you. These can help better control your congestive heart failure. You will need to follow up with your provider on a routine basis to make sure your heart failure is well managed.  If an X-ray, ECG, or other tests were done, you will be told of any new findings that may affect your care.  Call 911  Call 911 if you:  · Become severely short of breath  · Feel lightheaded, or feel  like you might pass out or faint  · Have chest pain or discomfort that is different than usual, the medicines your doctor told you to use for this don't help, or the pain lasts longer than 10 to 15 minutes  · You suddenly develop a rapid heart rate  When to seek medical advice  The following may be signs that your heart failure is getting worse. Call your healthcare provider right away if any of these happen:  · Sudden weight gain. This means 3 or more pounds in one day, or 5 or more pounds in 1 week.  · Trouble breathing not related to being active  · New or increased swelling of your legs or ankles  · Swelling or pain in your abdomen  · Breathing trouble at night. This means waking up short of breath or needing more pillows to breathe.  · Frequent coughing that doesnt go away  · Feeling much more tired than usual  Date Last Reviewed: 1/4/2016  © 6882-1032 [employer]. [address] [place] [address]. All rights reserved. This information is not intended as a substitute for professional medical care. Always follow your healthcare professional's instructions.        Coping with Heart Failure  Its normal to feel sad or down at times when youre living with heart failure. Some medicines can also affect your mood. Following your treatment plan may seem difficult at times. If you feel overwhelmed, just focus on one day at a time. Dont be afraid to ask others for help when you need it.    Ways to feel better  Try not to withdraw from family and friends, even if you are finding it hard to talk to them. They can still be a good source of support. To feel better, you can also:  · Spend time doing things you enjoy. This may include participating in a favorite hobby, meditating, praying, or spending time with people you care about. Find activities that make you happy and make those a priority.  · Share what you learn about heart failure with the people in your life. Invite family members along when  you visit your healthcare provider. This will help you feel supported as well as help you discuss the care plan you've agreed upon with your doctor.  · Think about joining a support group for people with heart failure. It may be easier to talk to people who know firsthand what youre going through. They can offer advice and share stories. You may want to ask loved ones to join you for a meeting.  Asking for help  Having heart failure doesnt mean that you have to feel bad all the time. Consider talking to your healthcare provider or a therapist if:  · You feel worthless or helpless, or are thinking about suicide. These are warning signs of depression. Treatment can help you feel better. When depression is under control, your overall health may also improve.  · You feel anxious about what will happen to your loved ones if your health gets worse. Taking care of legal arrangements, such as a living will and durable power of , can help you feel more secure about the future.  · Social support helps alleviate stress and helps your stick with your healthy lifestyle changes. Without social support, you may end up back in the hospital.  Date Last Reviewed: 3/20/2016  © 5972-5174 [employer]. [address] [place] [address]. All rights reserved. This information is not intended as a substitute for professional medical care. Always follow your healthcare professional's instructions.        Heart Failure: Tracking Your Weight  You have a condition called heart failure. When you have heart failure, a sudden weight gain or a steady rise in weight is a warning sign that your body is retaining too much water and salt. This could mean your heart failure is getting worse. If left untreated, it can cause problems for your lungs and result in shortness of breath. Weighing yourself each day is the best way to know if youre retaining water. If your weight goes up quickly, call your doctor. You will be  given instructions on how to get rid of the excess water. You will likely need medicines and to avoid salt. This will help your heart work better.  Call your doctor if you gain more than 2 pounds in 1 day, more than 5 pounds in 1 week, or whatever weight gain you were told to report by your doctor. This is often a sign of worsening heart failure and needs to be evaluated and treated. Your doctor will tell you what to do next.   Tips for weighing yourself    · Weigh yourself at the same time each morning, wearing the same clothes. Weigh yourself after urinating and before eating.  · Use the same scale each day. Make sure the numbers are easy to read. Put the scale on a flat, hard surface -- not on a rug or carpet.  · Do not stop weighing yourself. If you forget one day, weigh again the next morning.  How to use your weight chart  · Keep your weight chart near the scale. Write your weight on the chart as soon as you get off the scale.  · Fill in the month and the start date on the chart. Then write down your weight each day. Your chart will look like this:    · If you miss a day, leave the space blank. Weigh yourself the next day and write your weight in the next space.  · Take your weight chart with you when you go to see your doctor.  Date Last Reviewed: 3/20/2016  © 4118-1958 [employer]. [address] [place] [address], [place] [address]. All rights reserved. This information is not intended as a substitute for professional medical care. Always follow your healthcare professional's instructions.

## 2020-01-22 ENCOUNTER — PATIENT OUTREACH (OUTPATIENT)
Dept: ADMINISTRATIVE | Facility: CLINIC | Age: 85
End: 2020-01-22

## 2020-01-22 NOTE — PROGRESS NOTES
C3 nurse attempted to contact patient. The following occurred:   C3 nurse attempted to contact Chantell for a TCC post hospital discharge follow up call. The patient is unable to conduct the call @ this time. The patient requested a callback.    The patient has a scheduled HOSFU appointment with Kari Gaspar MD  on 01/23/20 @ 4389. Message sent to Physician staff.

## 2020-01-27 ENCOUNTER — LAB VISIT (OUTPATIENT)
Dept: LAB | Facility: HOSPITAL | Age: 85
End: 2020-01-27
Attending: INTERNAL MEDICINE
Payer: MEDICARE

## 2020-01-27 DIAGNOSIS — N18.30 CHRONIC KIDNEY DISEASE, STAGE III (MODERATE): ICD-10-CM

## 2020-01-27 DIAGNOSIS — I50.9 HEART FAILURE: Primary | ICD-10-CM

## 2020-01-27 LAB
ALBUMIN SERPL BCP-MCNC: 3 G/DL (ref 3.5–5.2)
ALP SERPL-CCNC: 115 U/L (ref 55–135)
ALT SERPL W/O P-5'-P-CCNC: 22 U/L (ref 10–44)
ANION GAP SERPL CALC-SCNC: 14 MMOL/L (ref 8–16)
AST SERPL-CCNC: 20 U/L (ref 10–40)
BILIRUB SERPL-MCNC: 1.1 MG/DL (ref 0.1–1)
BUN SERPL-MCNC: 29 MG/DL (ref 8–23)
CALCIUM SERPL-MCNC: 8.1 MG/DL (ref 8.7–10.5)
CHLORIDE SERPL-SCNC: 99 MMOL/L (ref 95–110)
CO2 SERPL-SCNC: 30 MMOL/L (ref 23–29)
CREAT SERPL-MCNC: 1 MG/DL (ref 0.5–1.4)
EST. GFR  (AFRICAN AMERICAN): >60 ML/MIN/1.73 M^2
EST. GFR  (NON AFRICAN AMERICAN): 53 ML/MIN/1.73 M^2
GLUCOSE SERPL-MCNC: 75 MG/DL (ref 70–110)
POTASSIUM SERPL-SCNC: 4.4 MMOL/L (ref 3.5–5.1)
PROT SERPL-MCNC: 5.3 G/DL (ref 6–8.4)
SODIUM SERPL-SCNC: 143 MMOL/L (ref 136–145)

## 2020-01-27 PROCEDURE — 36415 COLL VENOUS BLD VENIPUNCTURE: CPT | Mod: HCNC

## 2020-01-27 PROCEDURE — 80053 COMPREHEN METABOLIC PANEL: CPT | Mod: HCNC

## 2020-01-28 ENCOUNTER — OUTPATIENT CASE MANAGEMENT (OUTPATIENT)
Dept: ADMINISTRATIVE | Facility: OTHER | Age: 85
End: 2020-01-28

## 2020-01-28 NOTE — PROGRESS NOTES
Outpatient Care Management  Plan of Care Follow Up Visit    Patient: Chantell Herrera  MRN: [id number]  Date of Service: 01/28/2020  Completed by: Cathryn Aquino RN  Referral Date: 09/03/2019  Program: Case Management (High Risk)    Reason for Visit   Patient presents with    Update Plan Of Care       Brief Summary: Pt reports that she is weighing herself mostly everyday, Pt reports that she is not eating foods with large amts of salt. Pt reports that she is sitting with her legs elevated periodically during the day. Pt reports that she is still receiving home health. Pt reports that the therapy has arrived for home visit.          Patient Summary     Involvement of Care:  Do I have permission to speak with other family members about your care? Yes      Patient Reported Labs & Vitals:  1.  Any Patient Reported Labs & Vitals?     2.  Patient Reported Blood Pressure:     3.  Patient Reported Pulse:     4.  Patient Reported Weight (Kg):     5.  Patient Reported Blood Glucose (mg/dl):       Medical History:  Reviewed medical history with patient and/or caregiver    Social History:  Reviewed social history with patient and/or caregiver    Clinical Assessment     Reviewed and provided basic information on available community resources for mental health, transportation, wellness resources, and palliative care programs with patient and/or caregiver.    Complex Care Plan     Care plan was discussed and completed today with input from patient and/or caregiver.    Goals      Patient/caregiver will accept life style changes to manage and improve Respiratory Infection prior to discharge from OPCM. - Priority: High      Overall Time to Completion  2 months from 09/05/2019    OPC Identified Patient Barriers:  Health Literacy: Care plan created      RN Identified Patient Barriers:  Upper Respiratory Infection: Care plan created  COPD: Care plan created      Short Term Goals  Patient/caregiver will verbalize 2 risk factors of  Upper Respiratory Infection within 2 weeks.  Interventions   · Assess patient's ability to perform ADLs.  · Collaborate with Physician as appropriate to meet patient needs.  · Encourage Medication Compliance.  · Recognize and provide educational material (KRAMES).     Status  · Met 9/19/19      Patient/caregiver will verbalize 2 signs and symptoms of Upper Respiratory Infection within 2 weeks.   Interventions   · Collaborate with Physician as appropriate to meet patient needs.  · Recognize and provide educational material (KRAMES).     Status  Met    Patient/caregiver will verbalize 2 ways of preventing complications due to disease process within 3 weeks.  Interventions   · Collaborate with Physician as appropriate to meet patient needs.  · Encourage Medication Compliance.  · Recognize and provide educational material (KRAMES).     Status  · Met 9/19/19             Patient/caregiver will have action plan in place to manage and prevent complications of COPD prior to discharge from OPCM. - Priority: High      Overall Time to Completion  2 months from 09/05/2019     OPC Identified Patient Barriers:  Health Literacy: Care plan created      RN Identified Patient Barriers:  Upper Respiratory Infection: Care plan created  COPD: Care plan created        Patient/caregiver will verbalize 2 risk factors of COPD within 2 weeks.  Interventions   · Collaborate with Physician as appropriate to meet patient needs.  · Encourage Medication Compliance.  · Facilitate referral to Pulmonary Rehab.  · Recognize and provide educational material (KRAMES).  ·      Status  · Met 12/19/19      Patient/caregiver will verbalize 2 signs and symptoms of COPD within 3 weeks.   Interventions   · Collaborate with Physician as appropriate to meet patient needs.  · Empower patient/caregiver to discuss treatment plan with Physician/care team.  · Encourage compliance with Physician follow-ups.  · Recognize and provide educational material (KRAMES).      Status              Met 11/5/19  Patient/caregiver will verbalize 2 strategies to DME usage, improve airway clearance , limit/reduce the risk of infections, proper breathing techniques and the use of rescue unhalers  within 3 weeks.  Interventions   · Assess for availability of working Oxygen Concentrator, Portable Oxygen, Nebulizer, CPAP, BIPAP in home setting.  · Collaborate with Physician as appropriate to meet patient needs.  · Empower patient/caregiver to discuss treatment plan with Physician/care team.  · Encourage compliance with Physician follow-ups.  · Facilitate referral to Pulmonary Rehab.  · Recognize and provide educational material (MONICA).  ·      Status  Met 11/25/19    Patient/caregiver will verbalize 2 ways of preventing complications due to disease process within 3 weeks.  Interventions   · Collaborate with Oxygen DME agency for needed DME supplies.  · Collaborate with Physician as appropriate to meet patient needs.  · Empower patient/caregiver to discuss treatment plan with Physician/care team.  · Encourage compliance with Physician follow-ups.  · Facilitate referral to Pulmonary Rehab.  · Recognize and provide educational material (MITALIMES).  ·      Status  Met 11/25/19    Patient/caregiver will verbalize safety measures for Oxygen usage within 3 weeks.  Interventions   · Assess for availability of working Oxygen Concentrator, Portable Oxygen, Nebulizer, CPAP, BIPAP in home setting.1/28/2020  · Recognize and provide educational material (MONICA).   Status  · Met 1/282020               Patient/caregiver will have an action plan in place to manage and prevent complications of CHF prior to discharge from OPCM - Priority: High      Overall Time to Completion  2 months from 01/21/2020     OPCM Identified Patient Needs:    Health Literacy: Care plan created      RN Identified Patient Barriers:  Upper Respiratory Infection: Care plan created  COPD: Care plan created      OPCM Identified Disease  Education Needs:      Short Term Goals      Patient/caregiver will measure and record the weight 1 times per day for 2 weeks.  Interventions   · Assess for availability of working scale in home setting.  · Collaborate with Physician as appropriate to meet patient needs.  · Mail Weight log for home use.  · Recognize and provide educational material (MONICA).1/21/2020  Patient/Caregiver agrees to weigh and log wt daily by  2 weeks.1/28/2020  · Patient/Caregiver agrees to OPCM follow up within 2 weekd to assess progress to goal.   ·     Patient/Caregiver agrees to weigh herself daily by  2 weeks.  · Patient/Caregiver agrees to OPCM follow up within 1 week to assess progress to goal.   ·   ·      Status  · Partially met      Patient/caregiver will notify doctor if patient gains more than 3 pounds in one day or 5 pounds in one week within 2 weeks.  Interventions   · Collaborate with Physician as appropriate to meet patient needs.  · Recognize and provide educational material (MONICA).  Patient/Caregiver agrees to know when to report wt increases by  2 weeks  Patient/Caregiver agrees to OPCM follow up within 2 weeks to assess progress to goal. 1/28/2020  ·   ·      Status  · Partially met      Patient/caregiver will verbalize 2 signs and symptoms of Congestive Heart Failure within 3 weeks.   Interventions   · Collaborate with Physician as appropriate to meet patient needs.  · Recognize and provide educational material (MONICA).  ·      Status  · Partially met      Patient/caregiver will verbalize 2 ways of preventing complications due to disease process within 3 weeks.  Interventions   · Collaborate with Physician as appropriate to meet patient needs.  · Encourage Dietary Compliance.  · Encourage Exercise.  · Encourage Medication Compliance.  · Recognize and provide educational material (MONICA).  ·      Status  · Partially met                    Patient Instructions     Instructions were provided via the official[employer] patient  resources and are available for the patient to view on the patient portal.    Next Steps:Educate pt on weighing and logging her wt daily                      Educate pt on when to notify MD of wt changes      No follow-ups on file.      Todays OPCM Self-Management Care Plan was developed with the patients/caregivers input and was based on identified barriers from todays assessment.  Goals were written today with the patient/caregiver and the patient has agreed to work towards these goals to improve his/her overall well-being. Patient verbalized understanding of the care plan, goals, and all of today's instructions. Encouraged patient/caregiver to communicate with his/her physician and health care team about health conditions and the treatment plan.  Provided my contact information today and encouraged patient/caregiver to call me with any questions as needed.

## 2020-02-03 ENCOUNTER — TELEPHONE (OUTPATIENT)
Dept: FAMILY MEDICINE | Facility: CLINIC | Age: 85
End: 2020-02-03

## 2020-02-03 NOTE — TELEPHONE ENCOUNTER
----- Message from David Kirkpatrick sent at 2/3/2020 10:39 AM CST -----  Contact: Elda @ [facility]  Chantell Herrera  MRN: [id number]  : 1935  PCP: Kari Gaspar  Home Phone      [phone]  Work Phone      Not on file.  Mobile          [phone]      MESSAGE: fluid retention - 6# wt gain since 20 -- 2+ pitting edema bilateral upper & lower extremities -- fall yesterday, a few skin tears -- foam pads & tegrederm applied -- please advise    Call Elda @ [phone]    PCP: Chasity

## 2020-02-04 ENCOUNTER — LAB VISIT (OUTPATIENT)
Dept: LAB | Facility: HOSPITAL | Age: 85
End: 2020-02-04
Attending: INTERNAL MEDICINE
Payer: MEDICARE

## 2020-02-04 DIAGNOSIS — I50.9 HEART FAILURE: Primary | ICD-10-CM

## 2020-02-04 DIAGNOSIS — N18.9 CKD (CHRONIC KIDNEY DISEASE): ICD-10-CM

## 2020-02-04 LAB
ANION GAP SERPL CALC-SCNC: 11 MMOL/L (ref 8–16)
BUN SERPL-MCNC: 49 MG/DL (ref 8–23)
CALCIUM SERPL-MCNC: 8.4 MG/DL (ref 8.7–10.5)
CHLORIDE SERPL-SCNC: 98 MMOL/L (ref 95–110)
CO2 SERPL-SCNC: 34 MMOL/L (ref 23–29)
CREAT SERPL-MCNC: 1.5 MG/DL (ref 0.5–1.4)
EST. GFR  (AFRICAN AMERICAN): 37 ML/MIN/1.73 M^2
EST. GFR  (NON AFRICAN AMERICAN): 32 ML/MIN/1.73 M^2
GLUCOSE SERPL-MCNC: 75 MG/DL (ref 70–110)
POTASSIUM SERPL-SCNC: 3.8 MMOL/L (ref 3.5–5.1)
SODIUM SERPL-SCNC: 143 MMOL/L (ref 136–145)

## 2020-02-04 PROCEDURE — 80048 BASIC METABOLIC PNL TOTAL CA: CPT | Mod: HCNC

## 2020-02-04 NOTE — TELEPHONE ENCOUNTER
Check BMP, increase lasix to 40 daily x 3 days and repeat BMP.  Please make sure she is being compliant with low sodium diet.  donald

## 2020-02-04 NOTE — TELEPHONE ENCOUNTER
Spoke with Elda - She states Dr. Metcalf said to increase lasix to 40mg x 3 days, check BMP today, Dr. Metcalf said to repeat BMP in 1 week - Next Monday.     Elda states that pt is being compliant with the low sodium diet. She states that she wrote some orders for the skin tears on the elbow and the right knee. Some tegaderm for the right elbow - Change every 5-7 and some gentle foam for the right knee change every 3-5 days. She states that the skin tear on the right knee is about 10.5cm long, that one is the worst.

## 2020-02-06 NOTE — TELEPHONE ENCOUNTER
Per Elda, wound care has officially been added. She just needed to know if Dr. Gaspar had any different recommendations.

## 2020-02-07 NOTE — TELEPHONE ENCOUNTER
Has an apt on 2/11/20 Advised her to go to the ER She refused. Wants to wait an see you next week.       On 2/4/20  Gained 12 pounds  Off Lasix  Dr Metcalf gave her Demidex    139 pounds  And today she's 142 pounds .Dr Metcalf was notified While the nurse was there the patient didn't answer the phone.  She is not checking her BS . No compliant        Alyson with [facility]  Phone: [phone]

## 2020-02-07 NOTE — TELEPHONE ENCOUNTER
Agree with starting demedex and will need to follow weights and have repeat bmp on Monday/Tuesday.  mh

## 2020-02-07 NOTE — TELEPHONE ENCOUNTER
----- Message from Marisa Mohamud sent at 2020 10:57 AM CST -----  Contact: Alyson with [facility]  Chantell Herrera  MRN: [id number]  : 1935  PCP: Kari Gaspar  Home Phone      [phone]  Work Phone      Not on file.  Mobile          [phone]      MESSAGE:  Pt has a stage 1 sore in inner left buttcheck been there for 7 days and they would like wound care orders.  Phone: [phone]

## 2020-02-11 NOTE — TELEPHONE ENCOUNTER
Please call  and go over meds.  Her med rec in our system matches [facility].  She should NOT be on amiodarone and she should have already stopped amlodipine as well.  mh

## 2020-02-13 NOTE — PROGRESS NOTES
Outpatient Care Management  Plan of Care Follow Up Visit    Patient: Chantell Herrera  MRN: [id number]  Date of Service: 02/13/2020  Completed by: Cathryn Aquino RN  Referral Date: 09/03/2019  Program: Case Management (High Risk)    Reason for Visit   Patient presents with    Update Plan Of Care       Brief Summary: Pt reports that she is following a low salt diet. Educated pt on the importance of reading food labels including breads. Pt reports that her swelling has decreased with being placed on demedex. Pt reports that she is going to the bathroom more frequently. Pt reports that she is sitting with her legs elevated and wearing compression stockings. Educated pt to assess the compression stockings for areas that are creased or putting pressure onto the skin to prevent skin breakage. Pt reports that her legs seep fluid all of the time. Pt reports that she received PT on yesterday. Pt reports that she is walking better. Pt reports that she had a recent fall which caused a wound to the right knee. Pt reports that her  is applying dsg to the knee. Pt reports that she is compliant with meds. Will f/u next week with pending case closure.      Patient Summary     Involvement of Care:  Do I have permission to speak with other family members about your care?  yes    Patient Reported Labs & Vitals:  1.  Any Patient Reported Labs & Vitals?     2.  Patient Reported Blood Pressure:     3.  Patient Reported Pulse:     4.  Patient Reported Weight (Kg):     5.  Patient Reported Blood Glucose (mg/dl):       Medical History:  Reviewed medical history with patient and/or caregiver    Social History:  Reviewed social history with patient and/or caregiver    Clinical Assessment     Reviewed and provided basic information on available community resources for mental health, transportation, wellness resources, and palliative care programs with patient and/or caregiver.    Complex Care Plan     Care plan was discussed and  completed today with input from patient and/or caregiver.    Goals      Patient/caregiver will accept life style changes to manage and improve Respiratory Infection prior to discharge from OPCM. - Priority: High      Overall Time to Completion  2 months from 09/05/2019    OPCM Identified Patient Barriers:  Health Literacy: Care plan created      RN Identified Patient Barriers:  Upper Respiratory Infection: Care plan created  COPD: Care plan created      Short Term Goals  Patient/caregiver will verbalize 2 risk factors of Upper Respiratory Infection within 2 weeks.  Interventions   · Assess patient's ability to perform ADLs.  · Collaborate with Physician as appropriate to meet patient needs.  · Encourage Medication Compliance.  · Recognize and provide educational material (KRAMES).     Status  · Met 9/19/19      Patient/caregiver will verbalize 2 signs and symptoms of Upper Respiratory Infection within 2 weeks.   Interventions   · Collaborate with Physician as appropriate to meet patient needs.  · Recognize and provide educational material (KRAMES).     Status  Met    Patient/caregiver will verbalize 2 ways of preventing complications due to disease process within 3 weeks.  Interventions   · Collaborate with Physician as appropriate to meet patient needs.  · Encourage Medication Compliance.  · Recognize and provide educational material (KRAMES).     Status  · Met 9/19/19             Patient/caregiver will have action plan in place to manage and prevent complications of COPD prior to discharge from OPCM. - Priority: High      Overall Time to Completion  2 months from 09/05/2019     OPCM Identified Patient Barriers:  Health Literacy: Care plan created      RN Identified Patient Barriers:  Upper Respiratory Infection: Care plan created  COPD: Care plan created        Patient/caregiver will verbalize 2 risk factors of COPD within 2 weeks.  Interventions   · Collaborate with Physician as appropriate to meet patient  needs.  · Encourage Medication Compliance.  · Facilitate referral to Pulmonary Rehab.  · Recognize and provide educational material (KRAMES).  ·      Status  · Met 12/19/19      Patient/caregiver will verbalize 2 signs and symptoms of COPD within 3 weeks.   Interventions   · Collaborate with Physician as appropriate to meet patient needs.  · Empower patient/caregiver to discuss treatment plan with Physician/care team.  · Encourage compliance with Physician follow-ups.  · Recognize and provide educational material (KRAMES).     Status              Met 11/5/19  Patient/caregiver will verbalize 2 strategies to DME usage, improve airway clearance , limit/reduce the risk of infections, proper breathing techniques and the use of rescue unhalers  within 3 weeks.  Interventions   · Assess for availability of working Oxygen Concentrator, Portable Oxygen, Nebulizer, CPAP, BIPAP in home setting.  · Collaborate with Physician as appropriate to meet patient needs.  · Empower patient/caregiver to discuss treatment plan with Physician/care team.  · Encourage compliance with Physician follow-ups.  · Facilitate referral to Pulmonary Rehab.  · Recognize and provide educational material (KRAMES).  ·      Status  Met 11/25/19    Patient/caregiver will verbalize 2 ways of preventing complications due to disease process within 3 weeks.  Interventions   · Collaborate with Oxygen DME agency for needed DME supplies.  · Collaborate with Physician as appropriate to meet patient needs.  · Empower patient/caregiver to discuss treatment plan with Physician/care team.  · Encourage compliance with Physician follow-ups.  · Facilitate referral to Pulmonary Rehab.  · Recognize and provide educational material (KRAMES).  ·      Status  Met 11/25/19    Patient/caregiver will verbalize safety measures for Oxygen usage within 3 weeks.  Interventions   · Assess for availability of working Oxygen Concentrator, Portable Oxygen, Nebulizer, CPAP, BIPAP in  home setting.  · Collaborate with Oxygen DME agency for needed DME supplies.  · Recognize and provide educational material (MONICA).   Status  · Partially met               Patient/caregiver will have an action plan in place to manage and prevent complications of CHF prior to discharge from OPCM - Priority: High      Overall Time to Completion  2 months from 01/21/2020     OPCM Identified Patient Needs:    Health Literacy: Care plan created      RN Identified Patient Barriers:  Upper Respiratory Infection: Care plan created  COPD: Care plan created            OPCM Identified Disease Education Needs:      Short Term Goals      Patient/caregiver will measure and record the weight 1 times per day for 2 weeks.  Interventions   · Assess for availability of working scale in home setting.  · Collaborate with Physician as appropriate to meet patient needs.  · Mail Weight log for home use.  · Recognize and provide educational material (MONICA).1/21/2020  Patient/Caregiver agrees to know when to call the doctor for significant increase in wt by  2 weeks.  · Patient/Caregiver agrees to OPCM follow up within 2 weeks to assess progress to goal. 2/13/2020    Patient/Caregiver agrees to weigh herself daily by  2 weeks.  · Patient/Caregiver agrees to OPCM follow up within 1 week to assess progress to goal. 2/13/2020  ·   ·      Status  · Partially met      Patient/caregiver will notify doctor if patient gains more than 3 pounds in one day or 5 pounds in one week within 2 weeks.  Interventions   · Collaborate with Physician as appropriate to meet patient needs.  · Recognize and provide educational material (MITALIMES).  ·      Status  · Partially met      Patient/caregiver will verbalize 2 signs and symptoms of Congestive Heart Failure within 3 weeks.   Interventions   · Collaborate with Physician as appropriate to meet patient needs.  · Recognize and provide educational material (MONICA).  ·      Status  · Partially  met      Patient/caregiver will verbalize 2 ways of preventing complications due to disease process within 3 weeks.  Interventions   · Collaborate with Physician as appropriate to meet patient needs.  · Encourage Dietary Compliance.  · Encourage Exercise.  · Encourage Medication Compliance.  · Recognize and provide educational material (MONICA).  ·      Status  · Partially met                    Patient Instructions     Instructions were provided via the [employer] patient resources and are available for the patient to view on the patient portal.    Next Steps: Educate pt on compliance with low salt diet.                      F//u with daily wt and when to call the MD  Follow up in about 1 week (around 2/20/2020) for RN Follow.      Todays OPCM Self-Management Care Plan was developed with the patients/caregivers input and was based on identified barriers from todays assessment.  Goals were written today with the patient/caregiver and the patient has agreed to work towards these goals to improve his/her overall well-being. Patient verbalized understanding of the care plan, goals, and all of today's instructions. Encouraged patient/caregiver to communicate with his/her physician and health care team about health conditions and the treatment plan.  Provided my contact information today and encouraged patient/caregiver to call me with any questions as needed.

## 2020-02-14 NOTE — TELEPHONE ENCOUNTER
Hh called states pt has R elbow skin tear. Wants verbal orders to use Tegaderm every 3 days. Please advise, thank you.

## 2020-02-16 NOTE — ED PROVIDER NOTES
[facility]                                                 Chief Complaint  84 y.o. female with Hypotension (spouse states home health nurse advised to come to ER for BP of 64/41 at home)    History of Present Illness  Chantell Herrera presents to the emergency room with hypotension today  Patient has systolic blood pressure at home inches the 60s per   Patient was brought in by EMS with a systolic blood pressure inches the 80s  Patient recently started a new congestive heart failure medication by [facility]  Recently had the blood pressure medication doubled per 's history  Patient presents with mild chest pressure, also SOB on arrival today    The history is provided by the patient   device was not used during this ER visit    Past Medical History   -- Anxiety     -- Arthritis     -- Asthma     -- Chronic bronchitis     -- COPD (chronic obstructive pulmonary disease)     -- Depression     -- SANTIAGO (dyspnea on exertion)     -- Emphysema of lung     -- Fatigue     -- Hyperlipidemia     -- Hypertension     -- Trouble in sleeping        Surgical HX: Appendectomy  No known allergies    I have reviewed all of this patient's past medical, surgical, family, and social   histories as well as active allergies and medications documented in the  electronic medical record    Review of Systems and Physical Exam      Review of Systems  -- Constitution - weakness fatigue and hypertension, no fever chills  -- Eyes - no tearing or redness, no visual disturbance  -- Ear, Nose - no tinnitus or earache, no nasal congestion or discharge  -- Mouth,Throat - no sore throat, no toothache, normal voice, normal swallowing  -- Respiratory - shortness of breath no cough or congestion  -- Cardiovascular - chest pressure, no palpitations, denies claudication  -- Gastrointestinal - denies abdominal pain, nausea, vomiting, or diarrhea  -- Genitourinary - no dysuria, denies flank pain, no hematuria, no  STD risk  -- Musculoskeletal - denies back pain, negative for trauma or injury  -- Neurological - no headache, denies weakness or seizure; no LOC  -- Skin - denies pallor, rash, or changes in skin. no hives or welts noted  -- Psychiatric - Denies SI or HI, no psychosis or fractured thought noted     Vital Signs  Her tympanic temperature is 98.2 °F (36.8 °C).   Her blood pressure is 109/72 and her pulse is 65.   Her respiration is 24 and oxygen saturation is 90%    Physical Exam  -- Nursing note and vitals reviewed  -- Constitutional: Appears well-developed and well-nourished  -- Head: Atraumatic. Normocephalic. No obvious abnormality  -- Eyes: Pupils are equal and reactive to light. Normal conjunctiva and lids  -- Cardiac: Normal rate, regular rhythm and normal heart sounds  -- Pulmonary:  Rhonchi in all fields with no active wheezing  -- Abdominal: Soft, no tenderness. Normal bowel sounds. Normal liver edge  -- Musculoskeletal: Normal range of motion, no effusions. Joints stable   -- Neurological: No focal deficits. Showed good interaction with staff  -- Vascular: Posterior tibial, dorsalis pedis and radial pulses 2+ bilaterally      Emergency Room Course      Lab Results     K 2.9 (L)   CL 73 (LL)   CO2 50 (HH)   BUN 33 (H)   CREATININE 1.6 (H)      ALKPHOS 135   AST 25   ALT 24   BILITOT 1.8 (H)   ALBUMIN 3.3 (L)   PROT 5.8 (L)   WBC 10.58   HGB 7.9 (L)   HCT 25.3 (L)      CPK 14 (L)   CPK 14 (L)   CPKMB 0.6   TROPONINI 0.229 (H)   INR 1.3 (H)   BNP 3,150 (H)   DDIMER 0.77 (H)   LACTATE 2.0   MG 1.6   TSH 5.623 (H)     EKG  -- ventricular paced rhythm on EKG    Chest x-ray  Radiographic findings which could relate to cardiac decompensation/heart failure, mildly improved when compared with the previous study.  Please correlate clinically.     Medications Given  0.9%  NaCl infusion (1,000 mLs Intravenous New Bag 2/16/20 1514)   aspirin tablet 325 mg (325 mg Oral Given 2/16/20 1639)     ED  Physician Management  -- Diagnosis management comments: 84 y.o. female with hypotension  -- patient with low systolic blood pressure on arrival, IV fluids given  -- patient's blood pressure improved but has a markedly elevated BNP  -- patient also has a markedly elevated troponin, CO2 50 ABG  -- patient placed on BiPAP for CO2 retention COPD history  -- also consider NSTEMI, patient has long history of CHF also  -- patient also has a long history of aortic stenosis with cardiac issues  -- patient cannot be given Lasix in the ER due to her low blood pressure  -- patient cannot be given nitropaste or other meds due to hypotension  -- transferred to [facility] [place], we do not have a cardiologist here today     Diagnosis  Hypotension    Congestive heart failure, unspecified HF chronicity, unspecified heart failure type    NSTEMI (non-ST elevated myocardial infarction)    Carbon dioxide retention    Aortic valve stenosis, etiology of cardiac valve disease unspecified       Disposition and Plan  -- Disposition: transfer  -- Condition: stable    This note is dictated on M*Modal word recognition program.  There are word recognition mistakes that are occasionally missed on review.         Festus Maurer MD  02/16/20 [id number]

## 2020-02-17 PROBLEM — N30.00 ACUTE CYSTITIS WITHOUT HEMATURIA: Status: RESOLVED | Noted: 2020-01-13 | Resolved: 2020-01-01

## 2020-02-17 PROBLEM — J18.9 PNEUMONIA OF LEFT LOWER LOBE DUE TO INFECTIOUS ORGANISM: Status: RESOLVED | Noted: 2020-01-17 | Resolved: 2020-01-01

## 2020-02-17 NOTE — TELEPHONE ENCOUNTER
----- Message from David Kirkpatrick sent at 2020  8:58 AM CST -----  Contact:  - Jordan Herrera  MRN: [id number]  : 1935  PCP: Kari Gaspar  Home Phone      [phone]  Work Phone      Not on file.  Mobile          [phone]      MESSAGE: copy of current med lis needed @ [facility] -- send Attn: ICU -- needed ASAP    Call Jordan @ [phone]    PCP: Chasity

## 2020-02-18 NOTE — PROGRESS NOTES
Transitional Care Note  Subjective:       Patient ID: Chantell Herrera is a 84 y.o. female.  Chief Complaint: Hospital Follow Up    Family and/or Caretaker present at visit?  Yes.  Diagnostic tests reviewed/disposition: I have reviewed all completed as well as pending diagnostic tests at the time of discharge.  Disease/illness education: Anasarca, dyspnea, skin tear/wound  Home health/community services discussion/referrals: Patient has home health established at [facility].   Establishment or re-establishment of referral orders for community resources: No other necessary community resources.   Discussion with other health care providers: No discussion with other health care providers necessary.   84 year old female comes in with her  to f/u on recent hospital stay. She is confused abotu the meds that she takes, but has home health that has been following her. Since d/c she is terribly swollen and her mobility is limited by this. She has no dyspnea or fevers. She has had several skin tears that have been being care for by wound care. She is tired.     Review of Systems   Constitutional: Positive for unexpected weight change. Negative for chills and fever.   HENT: Negative for congestion, ear pain, postnasal drip, rhinorrhea, sore throat and trouble swallowing.    Eyes: Negative for redness and itching.   Respiratory: Negative for cough, shortness of breath and wheezing.    Cardiovascular: Positive for leg swelling. Negative for chest pain and palpitations.   Gastrointestinal: Negative for abdominal pain, diarrhea, nausea and vomiting.   Genitourinary: Negative for dysuria and frequency.   Skin: Positive for wound. Negative for rash.   Neurological: Negative for weakness and headaches.       Objective:      Physical Exam   Constitutional: She is oriented to person, place, and time. She appears well-developed. No distress.   Sitting in wheelchair   HENT:   Head: Normocephalic and atraumatic.   Eyes: Pupils are  equal, round, and reactive to light. Conjunctivae are normal.   Neck: Normal range of motion. Neck supple. No thyromegaly present.   Cardiovascular: Normal rate, regular rhythm and intact distal pulses.   Murmur heard.  Pulmonary/Chest: Effort normal and breath sounds normal. No respiratory distress. She has no wheezes.   Abdominal: Soft. Bowel sounds are normal. There is no tenderness.   Musculoskeletal: Normal range of motion. She exhibits edema (3+ doughy legs).   Lymphadenopathy:     She has no cervical adenopathy.   Neurological: She is alert and oriented to person, place, and time.   Skin: Skin is warm and dry. Capillary refill takes 2 to 3 seconds. No rash noted.   Skin tear with weeping to right lower extrem    Stage 2 ulceration to gluteal cleft   Psychiatric: She has a normal mood and affect. Her behavior is normal.   Nursing note and vitals reviewed.      Assessment:       1. RLS (restless legs syndrome)    2. Major depressive disorder with single episode, in full remission    3. Chronic respiratory failure with hypoxia    4. Atrial fibrillation with rapid ventricular response    5. Essential hypertension    6. Symptomatic anemia    7. Uncontrolled type 2 diabetes mellitus with hyperglycemia    8. Pressure injury of left buttock, stage 2        Plan:       Check bmp    Stop amiodarone - get list of meds from [facility].    Needs fluid meds - but has fragile kidneys. Start on demedex + metalazone and recheck BMP 3 days later. Monitor weights with home helath. Attempt comng in 24 hours. If no improvement in diuresis, will need to be admitted for anasarca.    RTC if condition acutely worsens or any other concerns, otherwise RTC as scheduled

## 2020-02-19 NOTE — PROGRESS NOTES
Patient, Chantell Herrera (MRN #[id number]), presented with a recent Estimated Glumerular Filtration Rate (EGFR) between 15 and 29 consistent with the definition of chronic kidney disease stage 4 (ICD10 - N18.4).    eGFR if non    Date Value Ref Range Status   02/16/2020 30 (A) >60 mL/min/1.73 m^2 Final     Comment:     Calculation used to obtain the estimated glomerular filtration  rate (eGFR) is the CKD-EPI equation.          The patient's chronic kidney disease stage 4 was monitored, evaluated, addressed and/or treated. This addendum to the medical record is made on 02/19/2020.

## 2020-02-21 NOTE — PROGRESS NOTES
Outpatient Care Management  Plan of Care Follow Up Visit    Patient: Chantell Herrera  MRN: [id number]  Date of Service: 02/21/2020  Completed by: Cathryn Aquino RN  Referral Date: 09/03/2019  Program: Case Management (High Risk)    Reason for Visit   Patient presents with    Update Plan Of Care       Brief Summary: Pt reports that she was discharged from [email or web address] on Wednesday. Pt reports that she has some swelling to her legs. Pt reports that she has the stockings on at this time. Pt reports that she just started today with a cough, but she has tessalon perles. Pt reports that she is receiving home health at this time. Pt reports that she is taking her medications as prescribed. Pt reports that she is concerned about her recent increase in admissions. Education given to pt on the importance of taking medications as prescribed. Also educated pt on the s/s of CHF and daily weights.     Patient Summary     Involvement of Care:  Do I have permission to speak with other family members about your care?  yes    Patient Reported Labs & Vitals:  1.  Any Patient Reported Labs & Vitals?     2.  Patient Reported Blood Pressure:     3.  Patient Reported Pulse:     4.  Patient Reported Weight (Kg):     5.  Patient Reported Blood Glucose (mg/dl):       Medical History:  Reviewed medical history with patient and/or caregiver    Social History:  Reviewed social history with patient and/or caregiver    Clinical Assessment     Reviewed and provided basic information on available community resources for mental health, transportation, wellness resources, and palliative care programs with patient and/or caregiver.    Complex Care Plan     Care plan was discussed and completed today with input from patient and/or caregiver.    Goals      Patient/caregiver will accept life style changes to manage and improve Respiratory Infection prior to discharge from OPCM. - Priority: High      Overall Time to Completion  2  months from 09/05/2019    [facility] Identified Patient Barriers:  Health Literacy: Care plan created      RN Identified Patient Barriers:  Upper Respiratory Infection: Care plan created  COPD: Care plan created      Short Term Goals  Patient/caregiver will verbalize 2 risk factors of Upper Respiratory Infection within 2 weeks.  Interventions   · Assess patient's ability to perform ADLs.  · Collaborate with Physician as appropriate to meet patient needs.  · Encourage Medication Compliance.  · Recognize and provide educational material (KRAMES).     Status  · Met 9/19/19      Patient/caregiver will verbalize 2 signs and symptoms of Upper Respiratory Infection within 2 weeks.   Interventions   · Collaborate with Physician as appropriate to meet patient needs.  · Recognize and provide educational material (KRAMES).     Status  Met    Patient/caregiver will verbalize 2 ways of preventing complications due to disease process within 3 weeks.  Interventions   · Collaborate with Physician as appropriate to meet patient needs.  · Encourage Medication Compliance.  · Recognize and provide educational material (KRAMES).     Status  · Met 9/19/19             Patient/caregiver will have action plan in place to manage and prevent complications of COPD prior to discharge from [facility]. - Priority: High      Overall Time to Completion  2 months from 09/05/2019     [facility] Identified Patient Barriers:  Health Literacy: Care plan created      RN Identified Patient Barriers:  Upper Respiratory Infection: Care plan created  COPD: Care plan created        Patient/caregiver will verbalize 2 risk factors of COPD within 2 weeks.  Interventions   · Collaborate with Physician as appropriate to meet patient needs.  · Encourage Medication Compliance.  · Facilitate referral to Pulmonary Rehab.  · Recognize and provide educational material (KRAMES).  ·      Status  · Met 12/19/19      Patient/caregiver will verbalize 2 signs and symptoms of COPD within 3  weeks.   Interventions   · Collaborate with Physician as appropriate to meet patient needs.  · Empower patient/caregiver to discuss treatment plan with Physician/care team.  · Encourage compliance with Physician follow-ups.  · Recognize and provide educational material (KRAMES).     Status              Met 11/5/19  Patient/caregiver will verbalize 2 strategies to DME usage, improve airway clearance , limit/reduce the risk of infections, proper breathing techniques and the use of rescue unhalers  within 3 weeks.  Interventions   · Assess for availability of working Oxygen Concentrator, Portable Oxygen, Nebulizer, CPAP, BIPAP in home setting.  · Collaborate with Physician as appropriate to meet patient needs.  · Empower patient/caregiver to discuss treatment plan with Physician/care team.  · Encourage compliance with Physician follow-ups.  · Facilitate referral to Pulmonary Rehab.  · Recognize and provide educational material (KRAMES).  ·      Status  Met 11/25/19    Patient/caregiver will verbalize 2 ways of preventing complications due to disease process within 3 weeks.  Interventions   · Collaborate with [employer] agency for needed DME supplies.  · Collaborate with Physician as appropriate to meet patient needs.  · Empower patient/caregiver to discuss treatment plan with Physician/care team.  · Encourage compliance with Physician follow-ups.  · Facilitate referral to Pulmonary Rehab.  · Recognize and provide educational material (KRAMES).  ·      Status  Met 11/25/19                 Patient/caregiver will have an action plan in place to manage and prevent complications of CHF prior to discharge from OPCM - Priority: High      Overall Time to Completion  2 months from 01/21/2020     [facility] Identified Patient Needs:    Health Literacy: Care plan created      RN Identified Patient Barriers:  Upper Respiratory Infection: Care plan created  COPD: Care plan created            OPCM Identified Disease Education  Needs:      Short Term Goals      Patient/caregiver will measure and record the weight 1 times per day for 2 weeks.  Interventions   · Assess for availability of working scale in home setting.  · Collaborate with Physician as appropriate to meet patient needs.  · Mail Weight log for home use.  · Recognize and provide educational material (MONICA).1/21/2020  Patient/Caregiver agrees to know when to call the doctor for significant increase in wt by  2 weeks. 2/21/2020  · Patient/Caregiver agrees to OPCM follow up within 2 weeks to assess progress to goal. 2/13/2020    Patient/Caregiver agrees to weigh herself daily by  2 weeks.  · Patient/Caregiver agrees to OPCM follow up within 1 week to assess progress to goal. 2/13/2020  ·   ·      Status  · Partially met      Patient/caregiver will notify doctor if patient gains more than 3 pounds in one day or 5 pounds in one week within 2 weeks.  Interventions   · Collaborate with Physician as appropriate to meet patient needs.2/13/2020  · Recognize and provide educational material (MONICA).  ·      Status  · Partially met    Patient/caregiver will verbalize 2 ways of preventing complications due to disease process within 3 weeks.  Interventions   · Collaborate with Physician as appropriate to meet patient needs.  · Encourage Dietary Compliance.  · Encourage Exercise.  · Encourage Medication Compliance.  · Recognize and provide educational material (MONICA).  ·      Status  · Partially met                    Patient Instructions     Instructions were provided via the [employer] patient resources and are available for the patient to view on the patient portal.    Next Steps: Educate pt on compliance with diet and medicines    Follow up in about 1 week (around 2/28/2020) for RN Follow.      [facility] OPCM Self-Management Care Plan was developed with the patients/caregivers input and was based on identified barriers from todays assessment.  Goals were written today with the  patient/caregiver and the patient has agreed to work towards these goals to improve his/her overall well-being. Patient verbalized understanding of the care plan, goals, and all of today's instructions. Encouraged patient/caregiver to communicate with his/her physician and health care team about health conditions and the treatment plan.  Provided my contact information today and encouraged patient/caregiver to call me with any questions as needed.

## 2020-03-06 NOTE — PROGRESS NOTES
Outpatient Care Management  Plan of Care Follow Up Visit    Patient: Chantell Herrera  MRN: [id number]  Date of Service: 03/06/2020  Completed by: Cathryn Aquino RN  Referral Date: 09/03/2019  Program: Case Management (High Risk)    Reason for Visit   Patient presents with    Update Plan Of Care       Brief Summary: Pt reports that she is not weighing herself everyday. Pt reports that she is not able to read the scale. Pt reports that her last wt was 2 days ago with a reading of 122 lbs. Educated pt on the importance of weighing herself daily and notifying the doctor about wt increase of 3 lbs or more in one day and 5 lbs within one week. Pt verbalized understanding at this time. Pt reports that she was informed by the home health nurse that she would order a digital scale. Pt reports that she does not have any swelling in her legs at this time. Pt reports that she does not order from the [facility] pharmacy. Informed pt about the OTC benefit. 2020 OTC put in the mail for the pt. Pt reports that she is taking her medications and following a low salt diet. Educated pt on the affects that salt has on fluid retention.      Call placed to [facility] and spoke with Dennise. Informed that they did not receive an order from the PCP for a digital scale for the pt. Informed that they would send the order to [employer] for the digital scale and it is covered by [employer].  Message sent to PCP's staff to inform that pt is not weighing herself daily due to inability to read the scale. Informed that with an order faxed to [facility] a scaled will be ordered from [employer].    Patient Summary     Involvement of Care:  Do I have permission to speak with other family members about your care?  Yes    Patient Reported Labs & Vitals:  1.  Any Patient Reported Labs & Vitals?     2.  Patient Reported Blood Pressure:     3.  Patient Reported Pulse:     4.  Patient Reported Weight (Kg):     5.  Patient Reported Blood Glucose (mg/dl):       Medical  History:  Reviewed medical history with patient and/or caregiver    Social History:  Reviewed social history with patient and/or caregiver    Clinical Assessment     Reviewed and provided basic information on available community resources for mental health, transportation, wellness resources, and palliative care programs with patient and/or caregiver.    Complex Care Plan     Care plan was discussed and completed today with input from patient and/or caregiver.    Goals      Patient/caregiver will accept life style changes to manage and improve Respiratory Infection prior to discharge from OPCM. - Priority: High      Overall Time to Completion  2 months from 09/05/2019    OPCM Identified Patient Barriers:  Health Literacy: Care plan created      RN Identified Patient Barriers:  Upper Respiratory Infection: Care plan created  COPD: Care plan created      Short Term Goals  Patient/caregiver will verbalize 2 risk factors of Upper Respiratory Infection within 2 weeks.  Interventions   · Assess patient's ability to perform ADLs.  · Collaborate with Physician as appropriate to meet patient needs.  · Encourage Medication Compliance.  · Recognize and provide educational material (KRAMES).     Status  · Met 9/19/19      Patient/caregiver will verbalize 2 signs and symptoms of Upper Respiratory Infection within 2 weeks.   Interventions   · Collaborate with Physician as appropriate to meet patient needs.  · Recognize and provide educational material (KRAMES).     Status  Met    Patient/caregiver will verbalize 2 ways of preventing complications due to disease process within 3 weeks.  Interventions   · Collaborate with Physician as appropriate to meet patient needs.  · Encourage Medication Compliance.  · Recognize and provide educational material (KRAMES).     Status  · Met 9/19/19             Patient/caregiver will have action plan in place to manage and prevent complications of COPD prior to discharge from OPCM. - Priority:  High      Overall Time to Completion  2 months from 09/05/2019     [facility]:  Health Literacy: Care plan created      RN Identified Patient Barriers:  Upper Respiratory Infection: Care plan created  COPD: Care plan created        Patient/caregiver will verbalize 2 risk factors of COPD within 2 weeks.  Interventions   · Collaborate with Physician as appropriate to meet patient needs.  · Encourage Medication Compliance.  · Facilitate referral to Pulmonary Rehab.  · Recognize and provide educational material (KRAMES).  ·      Status  · Met 12/19/19      Patient/caregiver will verbalize 2 signs and symptoms of COPD within 3 weeks.   Interventions   · Collaborate with Physician as appropriate to meet patient needs.  · Empower patient/caregiver to discuss treatment plan with Physician/care team.  · Encourage compliance with Physician follow-ups.  · Recognize and provide educational material (KRAMES).     Status              Met 11/5/19  Patient/caregiver will verbalize 2 strategies to DME usage, improve airway clearance , limit/reduce the risk of infections, proper breathing techniques and the use of rescue unhalers  within 3 weeks.  Interventions   · Assess for availability of working Oxygen Concentrator, Portable Oxygen, Nebulizer, CPAP, BIPAP in home setting.  · Collaborate with Physician as appropriate to meet patient needs.  · Empower patient/caregiver to discuss treatment plan with Physician/care team.  · Encourage compliance with Physician follow-ups.  · Facilitate referral to Pulmonary Rehab.  · Recognize and provide educational material (KRAMES).  ·      Status  Met 11/25/19    Patient/caregiver will verbalize 2 ways of preventing complications due to disease process within 3 weeks.  Interventions   · Collaborate with Oxygen D[employer] agency for needed DME supplies.  · Collaborate with Physician as appropriate to meet patient needs.  · Empower patient/caregiver to discuss treatment plan with  Physician/care team.  · Encourage compliance with Physician follow-ups.  · Facilitate referral to Pulmonary Rehab.  · Recognize and provide educational material (KRAMES).  ·      Status  Met 11/25/19                 Patient/caregiver will have an action plan in place to manage and prevent complications of CHF prior to discharge from OPCM - Priority: High      Overall Time to Completion  2 months from 01/21/2020     OPCM Identified Patient Needs:    Health Literacy: Care plan created      RN Identified Patient Barriers:  Upper Respiratory Infection: Care plan created  COPD: Care plan created            OPCM Identified Disease Education Needs:      Short Term Goals      Patient/caregiver will measure and record the weight 1 times per day for 2 weeks.  Interventions   · Assess for availability of working scale in home setting. 3/6/2020  · Collaborate with Physician as appropriate to meet patient needs. 3/6/2020  · Mail Weight log for home use.  · Recognize and provide educational material (KRAMES).1/21/2020  ·   Patient/Caregiver agrees to know when to call the doctor for significant increase in wt by  2 weeks.  · Patient/Caregiver agrees to OPCM follow up within 2 weeks to assess progress to goal. 2/13/2020, 3/6/2020    Patient/Caregiver agrees to weigh herself daily by  2 weeks.  · Patient/Caregiver agrees to OPCM follow up within 1 week to assess progress to goal. 2/13/2020  ·   ·      Status  · Partially met      Patient/caregiver will notify doctor if patient gains more than 3 pounds in one day or 5 pounds in one week within 2 weeks.  Interventions   · Collaborate with Physician as appropriate to meet patient needs.2/13/2020  · Recognize and provide educational material (KRAMES).3/6/2020  ·      Status  · Partially met    Patient/caregiver will verbalize 2 ways of preventing complications due to disease process within 3 weeks.  Interventions   · Collaborate with Physician as appropriate to meet patient  needs.  · Encourage Dietary Compliance.3/6/2020  · Encourage Exercise.  · Encourage Medication Compliance.3/6/2020  · Recognize and provide educational material (MONICA).  ·      Status  · Partially met                    Patient Instructions     Instructions were provided via the [employer] patient resources and are available for the patient to view on the patient portal.    Next Steps: F/u with obtaining digital scale                          Follow up in about 1 week (around 3/13/2020) for RN Follow.      Todays OPCM Self-Management Care Plan was developed with the patients/caregivers input and was based on identified barriers from todays assessment.  Goals were written today with the patient/caregiver and the patient has agreed to work towards these goals to improve his/her overall well-being. Patient verbalized understanding of the care plan, goals, and all of today's instructions. Encouraged patient/caregiver to communicate with his/her physician and health care team about health conditions and the treatment plan.  Provided my contact information today and encouraged patient/caregiver to call me with any questions as needed.

## 2020-03-06 NOTE — TELEPHONE ENCOUNTER
----- Message from Cathryn Aquino RN sent at 3/6/2020 11:33 AM CST -----  Contact: STACIA Gonzales this is Cathryn with [facility]. Pt reports that she is not weighing herself daily because she can not read the results on the scale. Pt is requesting a digital scale. [place] reports that it can be ordered through [employer] with a doctor's order. Fax number ot [facility] is [phone].    Please advise  Thank you

## 2020-03-12 NOTE — PROGRESS NOTES
Transitional Care Note  Subjective:       Patient ID: Chantell Herrera is a 84 y.o. female.  Chief Complaint: Hospital Follow Up    Family and/or Caretaker present at visit?  Yes.  Diagnostic tests reviewed/disposition: No diagnosic tests pending after this hospitalization.  Disease/illness education: CHF exacerbation and acute on chronic hypoxia  Home health/community services discussion/referrals: Patient has home health established at [facility].   Establishment or re-establishment of referral orders for community resources: No other necessary community resources.   Discussion with other health care providers: No discussion with other health care providers necessary.   84 year old female with diastolic heart failure and pulm hypertension, chronic respiratory failure and copd was recently admited for anasarca and ebiltiy. Since being home, she has improved. Her swelling is better, she as returned to 2L of O2. Her skin tears are improving as well. Her only concern is of swallowing. She was evaluated in the hospital and it was concluded that she has symptoms of reflux - not being treated.    Review of Systems   Constitutional: Negative for chills and fever.   HENT: Positive for trouble swallowing. Negative for congestion, ear pain, postnasal drip, rhinorrhea and sore throat.    Eyes: Negative for redness and itching.   Respiratory: Negative for cough, shortness of breath and wheezing.    Cardiovascular: Negative for chest pain and palpitations.   Gastrointestinal: Negative for abdominal pain, diarrhea, nausea and vomiting.   Genitourinary: Negative for dysuria and frequency.   Skin: Positive for wound. Negative for rash.   Neurological: Negative for weakness and headaches.       Objective:      Physical Exam   Constitutional: She is oriented to person, place, and time. She appears well-developed. No distress.   HENT:   Head: Normocephalic and atraumatic.   2 L NC in place 90%    Eyes: Pupils are equal, round, and  reactive to light. Conjunctivae are normal.   Neck: Normal range of motion. Neck supple. No thyromegaly present.   Cardiovascular: Normal rate, regular rhythm, normal heart sounds and intact distal pulses.   Pulmonary/Chest: Effort normal and breath sounds normal. No respiratory distress. She has no wheezes.   Abdominal: Soft. Bowel sounds are normal. There is no tenderness.   Musculoskeletal: Normal range of motion. She exhibits no edema.   Lymphadenopathy:     She has no cervical adenopathy.   Neurological: She is alert and oriented to person, place, and time.   Skin: Skin is warm and dry. No rash noted.   Healing skin tear to right knee   Psychiatric: She has a normal mood and affect. Her behavior is normal.   Nursing note and vitals reviewed.      Assessment:       1. Hypomagnesemia    2. Dysphagia, unspecified type    3. Choking due to food in larynx, subsequent encounter    4. Chronic respiratory failure with hypoxia    5. Simple chronic bronchitis    6. Oxygen dependent    7. Atrial fibrillation with rapid ventricular response        Plan:       Chantell was seen today for hospital follow up.    Diagnoses and all orders for this visit:    Hypomagnesemia  -     Comprehensive metabolic panel; Future  -     Magnesium; Future    Dysphagia, unspecified type    Choking due to food in larynx, subsequent encounter  -     pantoprazole (PROTONIX) 40 MG tablet; Take 1 tablet (40 mg total) by mouth once daily. for 14 days    Chronic respiratory failure with hypoxia    Simple chronic bronchitis    Oxygen dependent    Atrial fibrillation with rapid ventricular response    cont with home health. Cont on demadex. Check mag.    RTC if condition acutely worsens or any other concerns, otherwise RTC as scheduled

## 2020-03-16 NOTE — TELEPHONE ENCOUNTER
----- Message from Rupal Contreras sent at 3/14/2020 11:51 AM CDT -----  Contact: FAX  Chantell Herrera  MRN: [id number]  : 1935  PCP: Kari Gaspar  Home Phone      [phone]  Work Phone      Not on file.  Mobile          [phone]      MESSAGE:   Pt requesting refill or new Rx.   Is this a refill or new RX:  refill  RX name and strength:   BD SINGLE USE SWAB  ACCU-CHEK KUMAR SOLUTION  ACCU-CHECK KUMAR PLUS METER  ACCu-CHEK KUMAR PLUS TEST STRP  ACCU-CHEK SOFTCLIX LANCETS  torsemide (DEMADEX) 20 MG Tab  pantoprazole (PROTONIX) 40 MG tablet   potassium chloride SA (K-DUR,KLOR-CON) 20 MEQ tablet    Last office visit: 3/10  Is this a 30-day or 90-day RX:  90-DAy  Pharmacy name and location:  [facility]  Comments:

## 2020-03-16 NOTE — TELEPHONE ENCOUNTER
----- Message from Rupal Contreras sent at 3/16/2020 10:22 AM CDT -----  Contact: Self  Chantell Herrera  MRN: [id number]  : 1935  PCP: Kari Gaspar  Home Phone      [phone]  Work Phone      Not on file.  Mobile          [phone]      MESSAGE: Patient states she will no longer take pantoprazole (PROTONIX) 40 MG tablet and it makes her sick to her stomach.     Phone:  [phone]

## 2020-03-18 NOTE — PROGRESS NOTES
Outpatient Care Management  Plan of Care Follow Up Visit    Patient: Chantell Herrera  MRN: [id number]  Date of Service: 03/18/2020  Completed by: Cathryn Aquino RN  Referral Date: 09/03/2019  Program: Case Management (High Risk)    No chief complaint on file.      Brief Summary: Pt reports that she is taking her medications as prescribed. Pt reports that she does not have any swelling in the lower extremities at this time. Pt reports that she is following a low salt diet. Pt reports that she has some concerns about the Coronavirus at this time. Pt reports that she has not left the home. Pt reports that her daughter visited today and did not enter the home due to protection for the pt. Pt reports that her  has gone to the stores to buy hand  but all of the stores are out. Pt instructed to wash her hands for 20 seconds with soap and water. Pt's asked if she can use bleach. Instructed that he can use one capful of bleach to a gallon of water. Also instructed to prevent contact from anyone who is sick. Pt verbalized understanding. Pt also given the 1800 phone number for [facility].      Patient Summary     Involvement of Care:  Do I have permission to speak with other family members about your care?  Yes    Patient Reported Labs & Vitals:  1.  Any Patient Reported Labs & Vitals?     2.  Patient Reported Blood Pressure:     3.  Patient Reported Pulse:     4.  Patient Reported Weight (Kg):     5.  Patient Reported Blood Glucose (mg/dl):       Medical History:  Reviewed medical history with patient and/or caregiver    Social History:  Reviewed social history with patient and/or caregiver    Clinical Assessment     Reviewed and provided basic information on available community resources for mental health, transportation, wellness resources, and palliative care programs with patient and/or caregiver.    Complex Care Plan     Care plan was discussed and completed today with input from patient and/or  caregiver.    Goals      Patient/caregiver will accept life style changes to manage and improve Respiratory Infection prior to discharge from OPCM. - Priority: High      Overall Time to Completion  2 months from 09/05/2019    OPCM Identified Patient Barriers:  Health Literacy: Care plan created      RN Identified Patient Barriers:  Upper Respiratory Infection: Care plan created  COPD: Care plan created      Short Term Goals  Patient/caregiver will verbalize 2 risk factors of Upper Respiratory Infection within 2 weeks.  Interventions   · Assess patient's ability to perform ADLs.  · Collaborate with Physician as appropriate to meet patient needs.  · Encourage Medication Compliance.  · Recognize and provide educational material (KRAMES).     Status  · Met 9/19/19      Patient/caregiver will verbalize 2 signs and symptoms of Upper Respiratory Infection within 2 weeks.   Interventions   · Collaborate with Physician as appropriate to meet patient needs.  · Recognize and provide educational material (KRAMES).     Status  Met    Patient/caregiver will verbalize 2 ways of preventing complications due to disease process within 3 weeks.  Interventions   · Collaborate with Physician as appropriate to meet patient needs.  · Encourage Medication Compliance.  · Recognize and provide educational material (KRAMES).     Status  · Met 9/19/19             Patient/caregiver will have action plan in place to manage and prevent complications of COPD prior to discharge from OPCM. - Priority: High      Overall Time to Completion  2 months from 09/05/2019     OPCM Identified Patient Barriers:  Health Literacy: Care plan created      RN Identified Patient Barriers:  Upper Respiratory Infection: Care plan created  COPD: Care plan created        Patient/caregiver will verbalize 2 risk factors of COPD within 2 weeks.  Interventions   · Collaborate with Physician as appropriate to meet patient needs.  · Encourage Medication  Compliance.  · Facilitate referral to Pulmonary Rehab.  · Recognize and provide educational material (KRAMES).  ·      Status  · Met 12/19/19      Patient/caregiver will verbalize 2 signs and symptoms of COPD within 3 weeks.   Interventions   · Collaborate with Physician as appropriate to meet patient needs.  · Empower patient/caregiver to discuss treatment plan with Physician/care team.  · Encourage compliance with Physician follow-ups.  · Recognize and provide educational material (KRAMES).     Status              Met 11/5/19  Patient/caregiver will verbalize 2 strategies to DME usage, improve airway clearance , limit/reduce the risk of infections, proper breathing techniques and the use of rescue unhalers  within 3 weeks.  Interventions   · Assess for availability of working Oxygen Concentrator, Portable Oxygen, Nebulizer, CPAP, BIPAP in home setting.  · Collaborate with Physician as appropriate to meet patient needs.  · Empower patient/caregiver to discuss treatment plan with Physician/care team.  · Encourage compliance with Physician follow-ups.  · Facilitate referral to Pulmonary Rehab.  · Recognize and provide educational material (KRAMES).  ·      Status  Met 11/25/19    Patient/caregiver will verbalize 2 ways of preventing complications due to disease process within 3 weeks.  Interventions   · Collaborate with [facility] agency for needed DME supplies.  · Collaborate with Physician as appropriate to meet patient needs.  · Empower patient/caregiver to discuss treatment plan with Physician/care team.  · Encourage compliance with Physician follow-ups.  · Facilitate referral to Pulmonary Rehab.  · Recognize and provide educational material (KRAMES).  ·      Status  Met 11/25/19                 Patient/caregiver will have an action plan in place to manage and prevent complications of CHF prior to discharge from OPCM - Priority: High      Overall Time to Completion  2 months from 01/21/2020     OPC Identified  Patient Needs:    Health Literacy: Care plan created      RN Identified Patient Barriers:  Upper Respiratory Infection: Care plan created  COPD: Care plan created            OPCM Identified Disease Education Needs:      Short Term Goals      Patient/caregiver will measure and record the weight 1 times per day for 2 weeks.  Interventions   · Assess for availability of working scale in home setting. 3/6/2020  · Collaborate with Physician as appropriate to meet patient needs. 3/6/2020  · Mail Weight log for home use.  · Recognize and provide educational material (KRAMES).1/21/2020  ·   Patient/Caregiver agrees to know when to call the doctor for significant increase in wt by  2 weeks.  · Patient/Caregiver agrees to OPCM follow up within 2 weeks to assess progress to goal. 2/13/2020, 3/6/2020, 3/18/2020    Patient/Caregiver agrees to weigh herself daily by  2 weeks.  · Patient/Caregiver agrees to OPCM follow up within 1 week to assess progress to goal. 2/13/2020  ·      Status  · Partially met      Patient/caregiver will notify doctor if patient gains more than 3 pounds in one day or 5 pounds in one week within 2 weeks.  Interventions   · Collaborate with Physician as appropriate to meet patient needs.2/13/2020  · Recognize and provide educational material (KRAMES).  ·      Status  · Partially met    Patient/caregiver will verbalize 2 ways of preventing complications due to disease process within 3 weeks.  Interventions   · Collaborate with Physician as appropriate to meet patient needs.  · Encourage Dietary Compliance.3/18/2020  · Encourage Exercise.  · Encourage Medication Compliance.3/18/2020  · Recognize and provide educational material (KRAMES).    Patient/Caregiver agrees to continue taking medicines as prescribed by  1 week.  Patient/Caregiver agrees to OPCM follow up within 1 week to assess progress to goal.     ·      Status  · Partially met 3/18/2020                    Patient Instructions     Instructions  were provided via the [employer] patient resources and are available for the patient to view on the patient portal.    Next Steps: F/u that pt received a scale and is weighing daily                      Educate pt on the prevention of complications of CHF                      Pending case closure     Follow up in about 1 week (around 3/25/2020) for RN Follow.      Todays OPCM Self-Management Care Plan was developed with the patients/caregivers input and was based on identified barriers from todays assessment.  Goals were written today with the patient/caregiver and the patient has agreed to work towards these goals to improve his/her overall well-being. Patient verbalized understanding of the care plan, goals, and all of today's instructions. Encouraged patient/caregiver to communicate with his/her physician and health care team about health conditions and the treatment plan.  Provided my contact information today and encouraged patient/caregiver to call me with any questions as needed.

## 2020-03-25 NOTE — TELEPHONE ENCOUNTER
Left message for patient to return call to clarify medications since Prednisone is not listed as an active medication on her med list.

## 2020-03-25 NOTE — TELEPHONE ENCOUNTER
----- Message from Rupal Contreras sent at 3/25/2020  3:55 PM CDT -----  Contact: Careydiaz Herrera  MRN: [id number]  : 1935  PCP: Kari Gaspar  Home Phone      [phone]  Work Phone      Not on file.  Mobile          [phone]      MESSAGE:   Pt requesting refill or new Rx.   Is this a refill or new RX:  refill  RX name and strength:   predniSONE (DELTASONE) 20 MG tablet  furosemide (LASIX) 20 MG tablet  Pharmacy name and location:  [facility]  Comments:

## 2020-03-25 NOTE — PROGRESS NOTES
Outpatient Care Management  Plan of Care Follow Up Visit    Patient: Chantell Herrera  MRN: [id number]  Date of Service: 03/25/2020  Completed by: Cathryn Aquino RN  Referral Date: 09/03/2019  Program: Case Management (High Risk)    Reason for Visit   Patient presents with    Update Plan Of Care       Brief Summary: Pt reports that her breathing is fine at this time.Pt reports that she is taking her medications as prescribed. Pt reports that she is following a low salt diet. Pt reports that she does not have any swelling in her legs at this time. Educated pt on the importance of taking Demadex as prescribed. Pt verbalized understanding at this time. Pt able to verbalize the effect that salt has on fluid retention. Informed pt of pending case closure.  Education on COVID 19 provided to patient during call. Instructed patient to call [facility] first if experiencing fever greater than 100.4, coughing and SOB. Provided patient [facility] phone number [phone] as well as [facility] COVID 19 hotline [phone], verbalized understanding. Informed patient that they may contact their PCP for further guidance as well. Reviewed with patient precautions to take to help prevent the spread of this respiratory virus: Wash hands often (for 20 seconds singing Happy Birthday), cover your cough or sneeze into your elbow or a tissue, stay away from people who are sick, don't touch your eyes, nose or mouth with unwashed hands. Provided Instruction to stay home as directed by local government officials and only make trips that are of essential needs (Ex:grocery, pharmacy, medical appointments). Reviewed with patient supplies to have on hand while staying at home (food, water, medications, medical supplies and other essentials).           Patient Summary     Involvement of Care:  Do I have permission to speak with other family members about your care?   Yes    Patient Reported Labs & Vitals:  1.  Any Patient Reported  Labs & Vitals?     2.  Patient Reported Blood Pressure:     3.  Patient Reported Pulse:     4.  Patient Reported Weight (Kg):     5.  Patient Reported Blood Glucose (mg/dl):       Medical History:  Reviewed medical history with patient and/or caregiver    Social History:  Reviewed social history with patient and/or caregiver    Clinical Assessment     Reviewed and provided basic information on available community resources for mental health, transportation, wellness resources, and palliative care programs with patient and/or caregiver.    Complex Care Plan     Care plan was discussed and completed today with input from patient and/or caregiver.    Goals      Patient/caregiver will accept life style changes to manage and improve Respiratory Infection prior to discharge from OPCM. - Priority: High      Overall Time to Completion  2 months from 09/05/2019    OPCM Identified Patient Barriers:  Health Literacy: Care plan created      RN Identified Patient Barriers:  Upper Respiratory Infection: Care plan created  COPD: Care plan created      Short Term Goals  Patient/caregiver will verbalize 2 risk factors of Upper Respiratory Infection within 2 weeks.  Interventions   · Assess patient's ability to perform ADLs.  · Collaborate with Physician as appropriate to meet patient needs.  · Encourage Medication Compliance.  · Recognize and provide educational material (MONICA).     Status  · Met 9/19/19      Patient/caregiver will verbalize 2 signs and symptoms of Upper Respiratory Infection within 2 weeks.   Interventions   · Collaborate with Physician as appropriate to meet patient needs.  · Recognize and provide educational material (MONICA).     Status  Met    Patient/caregiver will verbalize 2 ways of preventing complications due to disease process within 3 weeks.  Interventions   · Collaborate with Physician as appropriate to meet patient needs.  · Encourage Medication Compliance.  · Recognize and provide educational  material (MONICA).     Status  · Met 9/19/19             Patient/caregiver will have action plan in place to manage and prevent complications of COPD prior to discharge from OPCM. - Priority: High      Overall Time to Completion  2 months from 09/05/2019     OPCM Identified Patient Barriers:  Health Literacy: Care plan created      RN Identified Patient Barriers:  Upper Respiratory Infection: Care plan created  COPD: Care plan created        Patient/caregiver will verbalize 2 risk factors of COPD within 2 weeks.  Interventions   · Collaborate with Physician as appropriate to meet patient needs.  · Encourage Medication Compliance.  · Facilitate referral to Pulmonary Rehab.  · Recognize and provide educational material (KRAMES).  ·      Status  · Met 12/19/19      Patient/caregiver will verbalize 2 signs and symptoms of COPD within 3 weeks.   Interventions   · Collaborate with Physician as appropriate to meet patient needs.  · Empower patient/caregiver to discuss treatment plan with Physician/care team.  · Encourage compliance with Physician follow-ups.  · Recognize and provide educational material (KRAMES).     Status              Met 11/5/19  Patient/caregiver will verbalize 2 strategies to DME usage, improve airway clearance , limit/reduce the risk of infections, proper breathing techniques and the use of rescue unhalers  within 3 weeks.  Interventions   · Assess for availability of working Oxygen Concentrator, Portable Oxygen, Nebulizer, CPAP, BIPAP in home setting.  · Collaborate with Physician as appropriate to meet patient needs.  · Empower patient/caregiver to discuss treatment plan with Physician/care team.  · Encourage compliance with Physician follow-ups.  · Facilitate referral to Pulmonary Rehab.  · Recognize and provide educational material (KRAMES).  ·      Status  Met 11/25/19    Patient/caregiver will verbalize 2 ways of preventing complications due to disease process within 3 weeks.  Interventions    · Collaborate with [facility] agency for needed DME supplies.  · Collaborate with Physician as appropriate to meet patient needs.  · Empower patient/caregiver to discuss treatment plan with Physician/care team.  · Encourage compliance with Physician follow-ups.  · Facilitate referral to Pulmonary Rehab.  · Recognize and provide educational material (MONICA).  ·      Status  Met 11/25/19                 Patient/caregiver will have an action plan in place to manage and prevent complications of CHF prior to discharge from OPCM - Priority: High      Overall Time to Completion  2 months from 01/21/2020     OPCM Identified Patient Needs:    Health Literacy: Care plan created      RN Identified Patient Barriers:  Upper Respiratory Infection: Care plan created  COPD: Care plan created            OPCM Identified Disease Education Needs:      Short Term Goals      Patient/caregiver will measure and record the weight 1 times per day for 2 weeks.  Interventions   · Assess for availability of working scale in home setting. 3/6/2020  · Collaborate with Physician as appropriate to meet patient needs. 3/6/2020  · Mail Weight log for home use.  · Recognize and provide educational material (MONICA).1/21/2020  ·   Patient/Caregiver agrees to know when to call the doctor for significant increase in wt by  2 weeks.  · Patient/Caregiver agrees to OPCM follow up within 2 weeks to assess progress to goal. 2/13/2020, 3/6/2020, 3/18/2020    Patient/Caregiver agrees to weigh herself daily by  2 weeks.  · Patient/Caregiver agrees to OPCM follow up within 1 week to assess progress to goal. 2/13/2020  ·      Status  · Partially met      Patient/caregiver will notify doctor if patient gains more than 3 pounds in one day or 5 pounds in one week within 2 weeks.  Interventions   · Collaborate with Physician as appropriate to meet patient needs.2/13/2020  · Recognize and provide educational material (MONICA).  ·      Status  · Partially  met    Patient/caregiver will verbalize 2 ways of preventing complications due to disease process within 3 weeks.  Interventions   · Collaborate with Physician as appropriate to meet patient needs.  · Encourage Dietary Compliance.3/18/2020  · Encourage Exercise.  · Encourage Medication Compliance.3/18/2020  · Recognize and provide educational material (MONICA).    Patient/Caregiver agrees to continue taking medicines as prescribed by  1 week.  Patient/Caregiver agrees to OPCM follow up within 1 week to assess progress to goal. 3/25/202    ·      Status  · Partially met 3/18/2020                    Patient Instructions     Instructions were provided via the [employer] patient resources and are available for the patient to view on the patient portal.    Next Steps:   Follow up in about 2 weeks (around 4/8/2020) for RN Follow.      [facility] OPCM Self-Management Care Plan was developed with the patients/caregivers input and was based on identified barriers from [facility] assessment.  Goals were written today with the patient/caregiver and the patient has agreed to work towards these goals to improve his/her overall well-being. Patient verbalized understanding of the care plan, goals, and all of today's instructions. Encouraged patient/caregiver to communicate with his/her physician and health care team about health conditions and the treatment plan.  Provided my contact information today and encouraged patient/caregiver to call me with any questions as needed.

## 2020-03-27 NOTE — TELEPHONE ENCOUNTER
"Patient states that Dr. Hickman placed her on the prednisone.     States that her foot is "the size of a baseball right now." So, she would like a refill on the Lasix.      "

## 2020-03-27 NOTE — TELEPHONE ENCOUNTER
----- Message from Kari Gaspar MD sent at 3/27/2020  3:43 PM CDT -----  Kideny function is fantastic - go ahead with lasix through weekend and repeat bmp on Monday with home helath.

## 2020-03-27 NOTE — PROGRESS NOTES
Kideny function is fantastic - go ahead with lasix through weekend and repeat bmp on Monday with home helath.  mh

## 2020-03-27 NOTE — TELEPHONE ENCOUNTER
Please have home helath go out and get a bmp today and again on Monday if she is going to take lasix through the weekend.  mh

## 2020-04-08 NOTE — TELEPHONE ENCOUNTER
Spoke with pt and she stated she is not taking this medication    [facility] called her early in the morning, and she was not totally awake when they asked her what medications she needed filled

## 2020-04-08 NOTE — TELEPHONE ENCOUNTER
----- Message from Marisa Mohamud sent at 2020  9:02 AM CDT -----  Contact: Fax  Chantell Herrera  MRN: [id number]  : 1935  PCP: Kari Gaspar  Home Phone      [phone]  Work Phone      Not on file.  Mobile          [phone]      MESSAGE:   Pt requesting refill or new Rx.   Is this a refill or new RX:  refill  RX name and strength:pantoprazole (PROTONIX) 40 MG tablet  Last office visit: 03/10/2020  Is this a 30-day or 90-day RX:  n/a  Pharmacy name and location:  [facility]  Comments:      Phone:  [phone]

## 2020-04-09 NOTE — TELEPHONE ENCOUNTER
----- Message from Marisa Mohamud sent at 2020  9:55 AM CDT -----  Contact: fax  Chantell Herrera  MRN: [id number]  : 1935  PCP: Kari Gaspar  Home Phone      [phone]  Work Phone      Not on file.  Mobile          [phone]      MESSAGE:   Pt requesting refill or new Rx.   Is this a refill or new RX: refill  RX name and strength: pantoprazole (PROTONIX) 40 MG tablet  Last office visit: 03/10/2020  Is this a 30-day or 90-day RX:  n/a  Pharmacy name and location:  [facility]  Comments:      Phone:  [phone]

## 2020-04-09 NOTE — PROGRESS NOTES
Outpatient Care Management  Plan of Care Follow Up Visit    Patient: Chantell Herrera  MRN: [id number]  Date of Service: 04/09/2020  Completed by: Cathryn Aquino RN  Referral Date: 09/03/2019  Program: Case Management (High Risk)    Reason for Visit   Patient presents with    Update Plan Of Care    Case Closure       Brief Summary:Pt reports that she is doing fairy well today. Pt reports that she has some swelling in her left foot. Pt reports that she will take the lasix during the weekend. Pt reports that she is always SOB but she has nothing that is out of the ordinary. Pt reports that she has her legs elevated. Pt reports that she is eating a low salt diet. Pt reports that she has no new concerns at this time. Instructed pt to notify MD of wt gain of 3 lbs or more in one day or 5 lbs in one week. Pt verbalized understanding. Informed pt of case closure.     Patient Summary     Involvement of Care:  Do I have permission to speak with other family members about your care?   yes    Patient Reported Labs & Vitals:  1.  Any Patient Reported Labs & Vitals?     2.  Patient Reported Blood Pressure:     3.  Patient Reported Pulse:     4.  Patient Reported Weight (Kg):     5.  Patient Reported Blood Glucose (mg/dl):       Medical History:  Reviewed medical history with patient and/or caregiver    Social History:  Reviewed social history with patient and/or caregiver    Clinical Assessment     Reviewed and provided basic information on available community resources for mental health, transportation, wellness resources, and palliative care programs with patient and/or caregiver.    Complex Care Plan     Care plan was discussed and completed today with input from patient and/or caregiver.    Goals      Patient/caregiver will accept life style changes to manage and improve Respiratory Infection prior to discharge from OPCM. - Priority: High      Overall Time to Completion  2 months from 09/05/2019    OPCM Identified  Patient Barriers:  Health Literacy: Care plan created      RN Identified Patient Barriers:  Upper Respiratory Infection: Care plan created  COPD: Care plan created      Short Term Goals  Patient/caregiver will verbalize 2 risk factors of Upper Respiratory Infection within 2 weeks.  Interventions   · Assess patient's ability to perform ADLs.  · Collaborate with Physician as appropriate to meet patient needs.  · Encourage Medication Compliance.  · Recognize and provide educational material (KRAMES).     Status  · Met 9/19/19      Patient/caregiver will verbalize 2 signs and symptoms of Upper Respiratory Infection within 2 weeks.   Interventions   · Collaborate with Physician as appropriate to meet patient needs.  · Recognize and provide educational material (KRAMES).     Status  Met    Patient/caregiver will verbalize 2 ways of preventing complications due to disease process within 3 weeks.  Interventions   · Collaborate with Physician as appropriate to meet patient needs.  · Encourage Medication Compliance.  · Recognize and provide educational material (KRAMES).     Status  · Met 9/19/19             Patient/caregiver will have action plan in place to manage and prevent complications of COPD prior to discharge from OPC. - Priority: High      Overall Time to Completion  2 months from 09/05/2019     [facility] Identified Patient Barriers:  Health Literacy: Care plan created      RN Identified Patient Barriers:  Upper Respiratory Infection: Care plan created  COPD: Care plan created        Patient/caregiver will verbalize 2 risk factors of COPD within 2 weeks.  Interventions   · Collaborate with Physician as appropriate to meet patient needs.  · Encourage Medication Compliance.  · Facilitate referral to Pulmonary Rehab.  · Recognize and provide educational material (KRAMES).  ·      Status  · Met 12/19/19      Patient/caregiver will verbalize 2 signs and symptoms of COPD within 3 weeks.   Interventions   · Collaborate with  Physician as appropriate to meet patient needs.  · Empower patient/caregiver to discuss treatment plan with Physician/care team.  · Encourage compliance with Physician follow-ups.  · Recognize and provide educational material (MONICA).     Status              Met 11/5/19  Patient/caregiver will verbalize 2 strategies to DME usage, improve airway clearance , limit/reduce the risk of infections, proper breathing techniques and the use of rescue unhalers  within 3 weeks.  Interventions   · Assess for availability of working Oxygen Concentrator, Portable Oxygen, Nebulizer, CPAP, BIPAP in home setting.  · Collaborate with Physician as appropriate to meet patient needs.  · Empower patient/caregiver to discuss treatment plan with Physician/care team.  · Encourage compliance with Physician follow-ups.  · Facilitate referral to Pulmonary Rehab.  · Recognize and provide educational material (MONICA).  ·      Status  Met 11/25/19    Patient/caregiver will verbalize 2 ways of preventing complications due to disease process within 3 weeks.  Interventions   · Collaborate with Oxygen DME agency for needed DME supplies.  · Collaborate with Physician as appropriate to meet patient needs.  · Empower patient/caregiver to discuss treatment plan with Physician/care team.  · Encourage compliance with Physician follow-ups.  · Facilitate referral to Pulmonary Rehab.  · Recognize and provide educational material (MONICA).  ·      Status  Met 11/25/19                 Patient/caregiver will have an action plan in place to manage and prevent complications of CHF prior to discharge from OPCM - Priority: High      Overall Time to Completion  2 months from 01/21/2020     [facility] Identified Patient Needs:    Health Literacy: Care plan created      RN Identified Patient Barriers:  Upper Respiratory Infection: Care plan created  COPD: Care plan created            OPC Identified Disease Education Needs:      Short Term Goals      Patient/caregiver  will measure and record the weight 1 times per day for 2 weeks.  Interventions   · Assess for availability of working scale in home setting. 3/6/2020  · Collaborate with Physician as appropriate to meet patient needs. 3/6/2020  · Mail Weight log for home use.  · Recognize and provide educational material (MONICA).1/21/2020  ·   Patient/Caregiver agrees to know when to call the doctor for significant increase in wt by  2 weeks.  · Patient/Caregiver agrees to OPCM follow up within 2 weeks to assess progress to goal. 2/13/2020, 3/6/2020, 3/18/2020, 4/9/2020    Patient/Caregiver agrees to weigh herself daily by  2 weeks.  · Patient/Caregiver agrees to OPCM follow up within 1 week to assess progress to goal. 2/13/2020  ·      Status               Met 4/9/2020  Patient/caregiver will notify doctor if patient gains more than 3 pounds in one day or 5 pounds in one week within 2 weeks.  Interventions   · Collaborate with Physician as appropriate to meet patient needs.2/13/2020  · Recognize and provide educational material (MONICA).  ·      Status  · Met 4/9/2020t    Patient/caregiver will verbalize 2 ways of preventing complications due to disease process within 3 weeks.  Interventions   · Collaborate with Physician as appropriate to meet patient needs.  · Encourage Dietary Compliance.3/18/2020  · Encourage Exercise.  · Encourage Medication Compliance.3/18/2020  · Recognize and provide educational material (MONICA).    Patient/Caregiver agrees to continue taking medicines as prescribed by  1 week.  Patient/Caregiver agrees to OPCM follow up within 1 week to assess progress to goal. 3/25/202    ·      Status  · Partially met 3/18/2020, Met  4/9/2020                    Patient Instructions     Instructions were provided via the [facility] patient resources and are available for the patient to view on the patient portal.    Next Steps: na    No follow-ups on file.na      Todays OPCM Self-Management Care Plan was developed with the  patients/caregivers input and was based on identified barriers from todays assessment.  Goals were written today with the patient/caregiver and the patient has agreed to work towards these goals to improve his/her overall well-being. Patient verbalized understanding of the care plan, goals, and all of today's instructions. Encouraged patient/caregiver to communicate with his/her physician and health care team about health conditions and the treatment plan.  Provided my contact information today and encouraged patient/caregiver to call me with any questions as needed.

## 2020-04-13 PROBLEM — E87.20 LACTIC ACIDOSIS: Status: ACTIVE | Noted: 2020-01-01

## 2020-04-13 PROBLEM — K92.2 GIB (GASTROINTESTINAL BLEEDING): Status: ACTIVE | Noted: 2020-01-01

## 2020-04-13 NOTE — ED PROVIDER NOTES
[facility]                                                 Chief Complaint  84 y.o. female with Shortness of Breath (Patient report shortness of breath onset yesterday. )    History of Present Illness  Chantell Herrera presents to the emergency room with shortness of breath tonight  Patient with shortness of breath since yesterday, cough and cold symptoms tonight  Patient with long history of COPD, with a long history of CHF reported this evening  Patient presents in atrial flutter, states she has a history of atrial fibrillation for years  Patient has not felt well last couple days, denies any coronavirus symptoms tonight    The history is provided by the patient   device was not used during this ER visit    Past Medical History   -- Anxiety     -- Arthritis     -- Asthma     -- Chronic bronchitis     -- COPD (chronic obstructive pulmonary disease)     -- Depression     -- SANTIAGO (dyspnea on exertion)     -- Emphysema of lung     -- Fatigue     -- Hyperlipidemia     -- Hypertension     -- Trouble in sleeping        Surgical HX: Appendectomy  No known allergies    I have reviewed all of this patient's past medical, surgical, family, and social   histories as well as active allergies and medications documented in the  electronic medical record    Review of Systems and Physical Exam      Review of Systems  -- Constitution - fatigue, weakness, no chills, no fever  -- Eyes - no tearing or redness, no visual disturbance  -- Ear, Nose - no tinnitus or earache, no nasal congestion or discharge  -- Mouth,Throat - no sore throat, no toothache, normal voice, normal swallowing  -- Respiratory - cough and congestion, no shortness of breath, no SANTIAGO  -- Cardiovascular - denies chest pain, no palpitations, denies claudication  -- Gastrointestinal - denies abdominal pain, nausea, vomiting, or diarrhea  -- Genitourinary - no dysuria, denies flank pain, no hematuria, no STD risk  --  Musculoskeletal - denies back pain, negative for trauma or injury  -- Neurological - no headache, denies weakness or seizure; no LOC  -- Skin - denies pallor, rash, or changes in skin. no hives or welts noted    Vital Signs  Her oral temperature is 98 °F (36.7 °C).   Her blood pressure is 102/77 and her pulse is 101.   Her respiration is 24 and oxygen saturation is 97%.     Physical Exam  -- Nursing note and vitals reviewed  -- Constitutional: Appears well-developed and well-nourished  -- Head: Atraumatic. Normocephalic. No obvious abnormality  -- Eyes: Pupils are equal and reactive to light. Normal conjunctiva and lids  -- Nose: Nose normal in appearance, nares grossly normal. No discharge  -- Throat: Mucous membranes moist, pharynx normal, normal tonsils. No lesions   -- Ears: External ears and TM normal bilaterally. Normal hearing and no drainage  -- Neck: Normal range of motion. Neck supple. No masses, trachea midline  -- Cardiac:  Irregular tachycardia consistent with atrial flutter versus fib  -- Pulmonary: faint rhonchi at the bilateral bases with no active wheezing   -- Abdominal: Soft, no tenderness. Normal bowel sounds. Normal liver edge  -- Musculoskeletal: Normal range of motion, no effusions. Joints stable   -- Neurological: No focal deficits. Showed good interaction with staff  -- Vascular: Posterior tibial, dorsalis pedis and radial pulses 2+ bilaterally      Emergency Room Course      Lab Results     K 4.7   CL 97   CO2 30 (H)   BUN 23   CREATININE 1.0    (H)   ALKPHOS 93   AST 12   ALT 15   BILITOT 0.8   ALBUMIN 3.6   PROT 5.8 (L)   WBC 12.98 (H)   HGB 5.7 (LL)   HCT 18.5 (LL)      CPK 9 (L)   CPK 9 (L)   CPKMB 1.1   TROPONINI 0.047 (H)   INR 1.3 (H)   BNP 1,330 (H)   DDIMER 1.76 (H)   LACTATE 6.4 (HH)   MG 1.6   TSH 2.493     EKG  -- atrial fibrillation RVR    Radiology  -- chest x-ray shows no acute finding  -- CT chest abdomen pelvis shows pleural effusions, no abdominal  findings    Additional Work up  -- rapid Coronavirus PCR in the emergency room was negative  -- the patient tested negative for influenza   -- reticulocyte count is 4.6    Medications Given  diltiaZEM 125 mg in D5W 125 mL infusion (5 mg/hr Intravenous New Bag 4/12/20 2045)   0.9%  NaCl infusion (for blood administration) (has no administration in time range)   piperacillin-tazobactam 4.5 g in dextrose 5 % 100 mL IVPB (ready to mix system)   diltiaZEM injection 10 mg (10 mg Intravenous Given 4/12/20 1955)   metoprolol injection 5 mg (5 mg Intravenous Given 4/12/20 1947)   iohexoL (OMNIPAQUE 350) injection 75 mL (75 mLs Intravenous Given 4/12/20 2148)     ED Physician Management  -- Diagnosis management comments: 84 y.o. female with shortness of breath  -- COPD issues with a white count elevated lactic acid, COVID negative tonight  -- patient meet sepsis criteria, IV antibiotics given in the ER this evening  -- patient also has a hemoglobin of 5.7 and reticulocyte count of 4.6 tonight  -- transfusion initiated, antibiotics initiated, atrial flutter treated with Cardizem  -- patient will need an ICU bed a higher level of care with several consults    Critical Care ED Physician Time (minutes):  -- Performed by: Festus Maurer M.D.  -- Date/Time: 10:51 PM 4/12/2020   -- Direct Patient Care (Face Time): 5  -- Additional History from Records or Taking Additional History: 5  -- Ordering, Reviewing, and Interpreting Diagnostic Studies: 5  -- Total Time in Documentation: 5  -- Consultation with Other Physicians: 5  -- Consultation with Family Related to Condition: 0  -- Total Critical Care Time: 25    Diagnosis  Atrial flutter, unspecified type    Sepsis, due to unspecified organism, unspecified whether acute organ dysfunction present    Anemia, unspecified type    Congestive heart failure, unspecified HF chronicity, unspecified heart failure type       Disposition and Plan  -- Disposition: transfer  -- Condition:  stable    This note is dictated on M*Modal word recognition program.  There are word recognition mistakes that are occasionally missed on review.         Festus Maurer MD  04/12/20 9824

## 2020-04-13 NOTE — HPI
Chantell Herrera is an 84 y.o. female with a PMH of Afib, CHF, COPD, HTN, HLD who presents to [facility] for anemia, Shortness of Breath, GI bleed and Afib RVR. She presented to OSH ED with c/o increased SOB x 1 day and general fatigue . She was found to hane Hgb of 5.7 and  2 units PRBC. Lacitc Acid elevated and concern for sepsis she was started on broad spectrum antibiotics. Flu and covid Neg at OSH. She is being admitted to [facility] for a higher level of care.

## 2020-04-13 NOTE — ED NOTES
Patient placed on continuous cardiac monitor, automatic blood pressure cuff and continuous pulse oximeter. Respiratory at bedside. Bed locked and lower. Call bell in reach. Patient provided medical history. Medications reviewed from CIS list.

## 2020-04-13 NOTE — PLAN OF CARE
Due to COVID 19 restrictions limiting patient contact and visits, Discharge Planning Assessment completed via phone with the patient    Patient is able to answer questions.  Per the patient, she lives with her  in a single story house with a ramp to enter.   Per patient, she was independent with ADLS and used rollator for ambulation.  Patient will have assistance from her  upon discharge.  Patient stated that she is current with [facility] and would like to continue.   All questions addressed.  CM will follow for needs.         04/13/20 2151   Discharge Assessment   Assessment Type Discharge Planning Assessment   Confirmed/corrected address and phone number on facesheet? Yes   Assessment information obtained from? Patient   Expected Length of Stay (days) 4   Communicated expected length of stay with patient/caregiver yes   Prior to hospitilization cognitive status: Alert/Oriented   Prior to hospitalization functional status: Independent;Assistive Equipment   Current cognitive status: Alert/Oriented   Current Functional Status: Needs Assistance   Lives With spouse   Able to Return to Prior Arrangements yes   Is patient able to care for self after discharge? Unable to determine at this time (comments)   Who are your caregiver(s) and their phone number(s)? Jordan Herrera () [phone] Jennifer Dykes (Daughter) [phone], cell   Patient's perception of discharge disposition home or selfcare;home health   Readmission Within the Last 30 Days no previous admission in last 30 days   Patient currently being followed by outpatient case management? No   Patient currently receives any other outside agency services? Yes   Name and contact number of agency or person providing outside services [facility]    Is it the patient/care giver preference to resume care with the current outside agency? Yes   Equipment Currently Used at Home rollator;BIPAP;nebulizer;oxygen;shower chair   Do you have any  problems affording any of your prescribed medications? Yes   If yes, what medications? Insulin    Is the patient taking medications as prescribed? yes   Does the patient have transportation home? Yes   Transportation Anticipated family or friend will provide  (son in law)   Discharge Plan A Home Health   Discharge Plan B Home Health   DME Needed Upon Discharge  other (see comments)  (tbd)   Patient/Family in Agreement with Plan yes                PCP:  Kari Gaspar MD        Pharmacy:    [facility] - [place], [place] - [address]  [address] [facility] [address]  Phone: [phone] Fax: [phone]    [facility] - [place], [place] - [address]  [facility] [address]  Phone: [phone] Fax: [phone]    [facility] - [place], [place] - [address]  [address]  [facility] [address]  Phone: [phone] Fax: [phone]    [facility], [place], [place] - [address]  [address] [facility] [address]  [facility] [address]  Phone: [phone] Fax: [phone]        Emergency Contacts:  Extended Emergency Contact Information  Primary Emergency Contact: Emmanuel Herrera  Address: [address] [facility]  Mobile Phone: [phone]  Relation: Spouse      Insurance:    Payor: [employer] MANAGED MEDICARE / Plan: HUMAN MEDICARE HMO / Product Type: Capitation /       04/13/2020  3:45 PM

## 2020-04-13 NOTE — ASSESSMENT & PLAN NOTE
· Concentric left ventricular remodeling.  · Low normal left ventricular systolic function. The estimated ejection fraction is 50%.  · Normal LV diastolic function.  · Normal right ventricular systolic function.  · Mild mitral sclerosis.  · There is moderate leaflet calcification of the Mitral Valve.  · Moderate tricuspid regurgitation.  · Mild to moderate pulmonary hypertension present.     Echo 1/13  Repeat echo

## 2020-04-13 NOTE — CONSULTS
Wound care consult received for skin tear to R shin. Nurse reports skin tear orders in place and no other skin breakdown noted.  Wound care team to sign off.  Please reconsult for any further needs. [id number]

## 2020-04-13 NOTE — H&P
[facility]  Neurocritical Care  History & Physical    Admit Date: 4/13/2020  Service Date: 04/13/2020  Length of Stay: 0    Subjective:     Chief Complaint: COPD (chronic obstructive pulmonary disease)    History of Present Illness: Chantell Herrera is an 84 y.o. female with  a PMH of Afib, CHF, COPD, HTN, HLD who presents to [facility] for anemia, Shortness of Breath, GI bleed and Afib RVR. She presented to OSH ED with c/o increased SOB x 1 day and general fatigue . She was found to hane Hgb of 5.7 and  2 units PRBC. Lacitc Acid elevated and concern for sepsis she was started on broad spectrum antibiotics. Flu and covid Neg at OSH. She is being admitted to [facility] for a higher level of care.     Past Medical History:   Diagnosis Date    A-fib     Acute on chronic congestive heart failure 2/1/2019    Anxiety     Arthritis     Asthma     Atrial fibrillation with rapid ventricular response     CHF (congestive heart failure) 11/29/2018    Chronic bronchitis     COPD (chronic obstructive pulmonary disease)     Depression     Diabetes mellitus, type 2     SANTIAGO (dyspnea on exertion)     Emphysema of lung     Fatigue     Hyperlipidemia     Hypertension     Symptomatic anemia 1/22/2019    Trouble in sleeping      Past Surgical History:   Procedure Laterality Date    APPENDECTOMY      BRONCHOSCOPY N/A 12/3/2019    Procedure: BRONCHOSCOPY;  Surgeon: Emmanuel Hickman MD;  Location: [facility] OR;  Service: Pulmonary;  Laterality: N/A;    EYE SURGERY      HYSTERECTOMY      INSERTION OF PACEMAKER  12/26/2019    TONSILLECTOMY        Current Facility-Administered Medications on File Prior to Encounter   Medication Dose Route Frequency Provider Last Rate Last Dose    [COMPLETED] diltiaZEM injection 10 mg  10 mg Intravenous ED 1 Time Festus Maurer MD   10 mg at 04/12/20 1955    [COMPLETED] iohexoL (OMNIPAQUE 350) injection 75 mL  75 mL Intravenous ONCE PRN Festus Maurer MD   75 mL at 04/12/20  2148    [COMPLETED] metoprolol injection 5 mg  5 mg Intravenous ED 1 Time Festus Maurer MD   5 mg at 04/12/20 1947    [COMPLETED] piperacillin-tazobactam 4.5 g in dextrose 5 % 100 mL IVPB (ready to mix system)  4.5 g Intravenous ED 1 Time Festus Maurer MD   Stopped at 04/12/20 2250    [DISCONTINUED] 0.9%  NaCl infusion (for blood administration)   Intravenous Q24H PRN Festus Maurer MD        [DISCONTINUED] diltiaZEM 125 mg in D5W 125 mL infusion  5 mg/hr Intravenous Continuous Festus Maurer MD 2.5 mL/hr at 04/13/20 0011 2.5 mg/hr at 04/13/20 0011     Current Outpatient Medications on File Prior to Encounter   Medication Sig Dispense Refill    ACCU-CHEK SOFTCLIX LANCETS Misc 1 each by Misc.(Non-Drug; Combo Route) route once daily. 100 each 5    albuterol (PROVENTIL) 2.5 mg /3 mL (0.083 %) nebulizer solution Take 3 mLs (2.5 mg total) by nebulization every 6 (six) hours as needed for Wheezing. 300 mL 3    alcohol swabs (BD ALCOHOL SWABS) PadM Apply 1 each topically as needed. 100 each 5    amiodarone (PACERONE) 200 MG Tab Take 100 mg by mouth once daily.      aspirin (ECOTRIN) 81 MG EC tablet Take 81 mg by mouth once daily.      aspirin (ECOTRIN) 81 MG EC tablet Take 1 tablet (81 mg total) by mouth once daily. 30 tablet 0    baclofen (LIORESAL) 10 MG tablet Take 10 mg by mouth 3 (three) times daily.      blood glucose control high,low (ACCU-CHEK KUMAR CONTROL SOLN) Soln 1 each by Misc.(Non-Drug; Combo Route) route as needed. 1 each 1    blood sugar diagnostic (ACCU-CHEK KUMAR PLUS TEST STRP) Strp 1 strip by Misc.(Non-Drug; Combo Route) route once daily. 100 strip 5    blood-glucose meter (ACCU-CHEK KUMAR PLUS METER) Misc 1 each by Misc.(Non-Drug; Combo Route) route once daily. 1 each 0    budesonide-formoterol 160-4.5 mcg (SYMBICORT) 160-4.5 mcg/actuation HFAA Inhale 2 puffs into the lungs every 12 (twelve) hours. Controller      cholecalciferol, vitamin D3, (VITAMIN D3) 2,000 unit Tab Take  2,000 Units by mouth once daily.      citalopram (CELEXA) 20 MG tablet Take 1 tablet (20 mg total) by mouth once daily. 30 tablet 11    fluticasone-umeclidin-vilanter (TRELEGY ELLIPTA) 100-62.5-25 mcg DsDv Inhale 1 puff into the lungs once daily. 30 each 5    furosemide (LASIX) 20 MG tablet Take 1 tablet (20 mg total) by mouth once daily. 5 tablet 0    insulin detemir U-100 (LEVEMIR FLEXTOUCH) 100 unit/mL (3 mL) SubQ InPn pen Inject 15 Units into the skin every evening. 1 Box 0    ipratropium (ATROVENT) 0.02 % nebulizer solution Take 500 mcg by nebulization 4 (four) times daily.       metoprolol succinate (TOPROL-XL) 50 MG 24 hr tablet Take 12.5 mg by mouth 2 (two) times daily.      mupirocin (BACTROBAN) 2 % ointment Apply topically 2 (two) times daily. 22 g 3    ondansetron (ZOFRAN) 4 MG tablet Take 1 tablet (4 mg total) by mouth every 8 (eight) hours as needed for Nausea. 20 tablet 0    pantoprazole (PROTONIX) 40 MG tablet Take 1 tablet (40 mg total) by mouth once daily. for 14 days 14 tablet 0    potassium chloride SA (K-DUR,KLOR-CON) 20 MEQ tablet       pravastatin (PRAVACHOL) 40 MG tablet Take 40 mg by mouth once daily.      predniSONE (DELTASONE) 20 MG tablet Take 20 mg by mouth once daily.      rOPINIRole (REQUIP) 1 MG tablet TAKE 1 TABLET  EVERY EVENING. 90 tablet 5    torsemide (DEMADEX) 20 MG Tab Take 1 tablet (20 mg total) by mouth once daily. 30 tablet 11    traZODone (DESYREL) 50 MG tablet Take 50 mg by mouth every evening.      weigh scale Misc Record daily weights 1 each 0      Allergies: Patient has no known allergies.    Family History   Problem Relation Age of Onset    Heart disease Mother     Hypertension Mother     Hypertension Father     Diabetes Father     Cancer Sister     COPD Brother     Hypertension Brother     No Known Problems Daughter     Kidney disease Son     COPD Daughter      Social History     Tobacco Use    Smoking status: Former Smoker     Last attempt  to quit: 8/3/2000     Years since quittin.7    Smokeless tobacco: Never Used   Substance Use Topics    Alcohol use: No    Drug use: No     Review of Systems   Review of Symptoms:  Constitutional: Denies fevers, weight loss, chills, + weakness.  Eyes: Denies changes in vision.  ENT: Denies dysphagia, nasal discharge, ear pain or discharge.  Cardiovascular: Denies chest pain, palpitations, orthopnea, or claudication.  Respiratory: Denies  cough, hemoptysis,  + shortness of breath,  wheezing.  GI: Denies nausea/vomitting, hematochezia, melena, abd pain, or changes in appetite.  : Denies dysuria, incontinence, or hematuria.  Musculoskeletal: Denies joint pain or myalgias.  Skin/breast: Denies rashes, lumps, lesions, or discharge.  Neurologic: Denies headache, dizziness, vertigo, or paresthesias.  Psychiatric: Denies changes in mood or hallucinations.  Endocrine: Denies polyuria, polydipsia, heat/cold intolerance.  Hematologic/Lymph: Denies lymphadenopathy, easy bruising or easy bleeding.  Allergic/Immunologic: Denies rash, rhinitis.     Objective:     Vitals:         Temp  Min: 98 °F (36.7 °C)  Max: 98.7 °F (37.1 °C)  Pulse  Min: 85  Max: 142  BP  Min: 96/63  Max: 135/67  MAP (mmHg)  Min: 76  Max: 101  Resp  Min: 19  Max: 27  SpO2  Min: 79 %  Max: 100 %    No intake/output data recorded.           Physical Exam     Physical Exam:  GA: Alert, anxious, no acute distress.   HEENT: No scleral icterus or JVD.   Pulmonary: severely diminished throughout , Wheezing  Cardiac: irregular   Abdominal: Bowel sounds present x 4.   Skin: No jaundice, rashes, or visible lesions.  Psych: Mental Status:  Alert follows pleasant   Neuro:  --GCS: E4 V5 M6  --CN II-XII grossly intact.   --Pupils 3mm, PERRL.   --Corneal reflex, gag, cough intact.  --LUE strength: 5/5  --RUE strength: 5/5  --LLE strength: 5/5  --RLE strength: 5/5    Unable to test gait due to level of consciousness.    Today I personally reviewed pertinent  medications, lines/drains/airways, imaging, cardiology results, laboratory results, notably:        Assessment/Plan:     Pulmonary  * COPD (chronic obstructive pulmonary disease)  - duo nebs q 4   - O2 nc   - Bipap prn   - Restart home prednisone   - Solumedrol 125 x 1    Chronic respiratory failure with hypoxia  See copd     Cardiac/Vascular  Acute on chronic congestive heart failure  · Concentric left ventricular remodeling.  · Low normal left ventricular systolic function. The estimated ejection fraction is 50%.  · Normal LV diastolic function.  · Normal right ventricular systolic function.  · Mild mitral sclerosis.  · There is moderate leaflet calcification of the Mitral Valve.  · Moderate tricuspid regurgitation.  · Mild to moderate pulmonary hypertension present.     Echo 1/13  Repeat echo     Atrial fibrillation with rapid ventricular response  - restart home amiodarone, metoprolol  - cardizem gtt for hr <130    Dyslipidemia  Lipid panel     Renal/  Lactic acidosis  - Lactic 6.4   - Repeat pending  - started on Vanc and zosyn      CKD (chronic kidney disease), stage III  - Cr 1  - Monitor daily   - Gentle IVF     Oncology  Anemia  - cbc Q 4   - PRBC x 2     Endocrine  Diabetes mellitus type 2, uncontrolled  - A1c  - SSI with accu checks      GI  GIB (gastrointestinal bleeding)  - neg for occult blood  - ppi BID  - cbc q 4        The patient is being Prophylaxed for:  Venous Thromboembolism with: Mechanical  Stress Ulcer with: PPI  Ventilator Pneumonia with: not applicable    Activity Orders          Diet NPO: NPO starting at 04/13 0201    Commode at bedside starting at 04/13 0154        Full Code     CCT 45 min     Matt Jade NP  Neurocritical Care  [facility]

## 2020-04-13 NOTE — PROGRESS NOTES
Patient arrived to [facility] from [facility] via EMS     Type of stroke/diagnosis:  SOB     TPA start and end time n/a  Thrombectomy start and end time n/a    Current symptoms: SOB, 3 L NC, AOx4    Skin assessment done: yes  Wounds noted: R shin abrasion, L shin bruising   ERINN Armband Applied: no    NCC notified: DEREK Mills at bedside

## 2020-04-13 NOTE — PLAN OF CARE
POC reviewed with pt at 1600. Pt verbalized understanding. Questions and concerns addressed. No acute events today. Dilt gtt continued for rate control. BiPAP on and off throughout shift. Diet advanced. Pt progressing toward goals. Will continue to monitor. See flowsheets for full assessment and VS info.

## 2020-04-13 NOTE — HOSPITAL COURSE
4/13: Admit NCC: Antibiotics, covid and flu neg, Lactic 6.4, bipap duo nebs, steroids, dilt gtt.   04/14/2020 patient with anxiety today and desaturated into high 70s, resolved with 1 mg of versed, now currently on 4L NC and sating >88%. Bipap on/of overnight. Remains on diltiazem gtt. Will keep in ICU for now.   04/15/2020 NAEO. Doing well on NC. PT today and OOB. Wean diltiazem gtt.   04/17/2020: Much improved now, can be discharged home today to follow up with PCP as outpatient

## 2020-04-13 NOTE — PLAN OF CARE
POC reviewed with pt at 0500. Pt verbalized understanding. Questions and concerns addressed. Pt placed on BiPaP following respitory distrsess. Blood transfusion completed from outside hospital, pt seen taking off bipap, pt educated on why she needs to keep the bipap in place at this time due to decompensation when off. Labs sent as ordered for admission. On Diltiazem drip at this time. New PIV x2. Will continue to monitor. See flowsheets for full assessment and VS info

## 2020-04-13 NOTE — ED NOTES
Notified Dr. Festus Maurer patient heart rate 89 bpm to 104 bpm. Blood pressure 115/77 after dose of metoprolol and diltiazem. Dr. Maurer order diltiazem on low dose.

## 2020-04-14 NOTE — ASSESSMENT & PLAN NOTE
Baseline 3L home O2 and Bipap prn. Admitted for COPD exacerbation. Covid negative.   -- Solumedrol 125 x 1  -- duo nebs q 4   -- Continue home prednisone   -- Daily Abg  -- Episode of desaturation today associated with anxiety, resolved with versed 1 mg push  -- Elevated lactic acid on admission, started on vancomycin and zoysn  -- cultures NGTD, afebrile, no leukocytosis  -- procal pending

## 2020-04-14 NOTE — ASSESSMENT & PLAN NOTE
· Concentric left ventricular remodeling.  · Low normal left ventricular systolic function. The estimated ejection fraction is 50%.  · Normal LV diastolic function.  · Normal right ventricular systolic function.  · Mild mitral sclerosis.  · There is moderate leaflet calcification of the Mitral Valve.  · Moderate tricuspid regurgitation.  · Mild to moderate pulmonary hypertension present.

## 2020-04-14 NOTE — ASSESSMENT & PLAN NOTE
--Given 2 units PRBC upon admit  --Hgb dropped this AM to 6.9, transfused 1 more unit  --Occult fecal test negative  --Daily CBC, transfuse for Hgb < 7  --Likely anemia of chronic disease

## 2020-04-14 NOTE — PROGRESS NOTES
Pt placed on bedpan for BM. During BM pt with increasing WOB, SOB sats >88%.  RRT to bedside, BiPAP secure, settings appropriate. Pt with increasing restlessness, sats low 80s with poor waveform. MD Kristin to bedside. 1MG versed given as ordered. Pt with decreased WOB, sat 98%, pt reporting less anxiety. Will continue to monitor.

## 2020-04-14 NOTE — PLAN OF CARE
POC reviewed with pt at 0500. Pt verbalized understanding. Questions and concerns addressed. Pt needed BiPAP to maintain SPO2 goals, nightly Trazodone administered but pt was unable to sleep. Will continue to monitor. See flowsheets for full assessment and VS info

## 2020-04-14 NOTE — PLAN OF CARE
POC reviewed with pt and family at 1300. Pt and family verbalized understanding. Questions and concerns addressed. No acute events today. Pt progressing toward goals. Dilt gtt continued for HR <120. Diabetic diet tolerated well. 4L NC for most of shift, tolerated well. BM x1. Will continue to monitor. See flowsheets for full assessment and VS info.

## 2020-04-14 NOTE — PROGRESS NOTES
[facility]  Neurocritical Care  Progress Note    Admit Date: 4/13/2020  Service Date: 04/14/2020  Length of Stay: 1    Subjective:     Chief Complaint: COPD (chronic obstructive pulmonary disease)    History of Present Illness: Chantell Herrera is an 84 y.o. female with  a PMH of Afib, CHF, COPD, HTN, HLD who presents to [facility] for anemia, Shortness of Breath, GI bleed and Afib RVR. She presented to OSH ED with c/o increased SOB x 1 day and general fatigue . She was found to hane Hgb of 5.7 and  2 units PRBC. Lacitc Acid elevated and concern for sepsis she was started on broad spectrum antibiotics. Flu and covid Neg at OSH. She is being admitted to [facility] for a higher level of care.     Hospital Course: 4/13: Admit NCC: Antibiotics, covid and flu neg, Lactic 6.4, bipap duo nebs, steroids, dilt gtt.   04/14/2020 patient with anxiety today and desaturated into high 70s, resolved with 1 mg of versed, now currently on 4L NC and sating >88%. Bipap on/of overnight. Remains on diltiazem gtt. Will keep in ICU for now.       Review of Systems:   Review of Systems   Constitutional: Negative for chills and fever.   HENT: Negative for hearing loss.    Eyes: Negative for blurred vision and double vision.   Respiratory: Positive for cough and shortness of breath.    Cardiovascular: Positive for palpitations. Negative for chest pain.   Gastrointestinal: Negative for abdominal pain, constipation, diarrhea, nausea and vomiting.   Genitourinary: Negative for dysuria.   Musculoskeletal: Negative for back pain.   Neurological: Negative for dizziness, speech change, focal weakness and headaches.   Psychiatric/Behavioral: The patient is nervous/anxious.          Vitals:   Temp: 98.4 °F (36.9 °C)  Pulse: (!) 111  Rhythm: atrial rhythm  BP: 125/76  MAP (mmHg): 92  Resp: 19  SpO2: (!) 90 %  Oxygen Concentration (%): 35  O2 Device (Oxygen Therapy): nasal cannula    Temp  Min: 97.7 °F (36.5 °C)  Max: 98.4 °F (36.9  °C)  Pulse  Min: 79  Max: 118  BP  Min: 101/55  Max: 142/78  MAP (mmHg)  Min: 71  Max: 103  Resp  Min: 15  Max: 46  SpO2  Min: 86 %  Max: 100 %  Oxygen Concentration (%)  Min: 35  Max: 35    04/13 0701 - 04/14 0700  In: 1234.8 [P.O.:420; I.V.:264.8]  Out: 500 [Urine:500]   Unmeasured Output  Urine Occurrence: 1  Stool Occurrence: 1  Emesis Occurrence: 0  Pad Count: 2     Examination:   GA: Alert, anxious, no acute distress.   HEENT: No scleral icterus or JVD.   Pulmonary: diminished throughout , Wheezing  Cardiac: irregular   Abdominal: Bowel sounds present x 4.   Skin: No jaundice, rashes, or visible lesions.  Psych: Mental Status:  Alert follows pleasant   Neuro:  --GCS: E4 V5 M6  --CN II-XII grossly intact.   --Pupils 3mm, PERRL.   --Corneal reflex, gag, cough intact.  --LUE strength: 5/5  --RUE strength: 5/5  --LLE strength: 5/5  --RLE strength: 5/5  Medications:   Continuous  diltiaZEM 125 mg in dextrose 5% 125 mL infusion Last Rate: 5 mg/hr (04/14/20 1500)   Scheduled  albuterol-ipratropium 3 mL Q4H WAKE   ALPRAZolam 0.25 mg Once   amiodarone 100 mg Daily   citalopram 20 mg Daily   insulin detemir U-100 10 Units QHS   metoprolol tartrate 25 mg Q12H   pantoprazole 40 mg BID AC   piperacillin-tazobactam 4.5 g in dextrose 5 % 100 mL IVPB (ready to mix system) 4.5 g Q8H   predniSONE 20 mg Daily   traZODone 50 mg QHS   vancomycin (VANCOCIN) IVPB 1,000 mg Q24H   PRN  sodium chloride  Q24H PRN   acetaminophen 650 mg Q4H PRN   albuterol-ipratropium 3 mL Q4H PRN   Dextrose 10% Bolus 12.5 g PRN   glucagon (human recombinant) 1 mg PRN   glucose 16 g PRN   glucose 24 g PRN   insulin aspart U-100 1-10 Units QID (AC + HS) PRN   magnesium oxide 800 mg PRN   magnesium oxide 800 mg PRN   ondansetron 4 mg Q8H PRN   potassium chloride 10% 40 mEq PRN   potassium chloride 10% 40 mEq PRN   potassium chloride 10% 60 mEq PRN   potassium, sodium phosphates 2 packet PRN   potassium, sodium phosphates 2 packet PRN   potassium, sodium  phosphates 2 packet PRN   rOPINIRole 0.5 mg Daily PRN   sodium chloride 0.9% 10 mL PRN      Today I independently reviewed pertinent medications, lines/drains/airways, imaging, cardiology results, laboratory results, microbiology results,     ISTAT: No results for input(s): PH, PCO2, PO2, POCSATURATED, HCO3, BE, POCNA, POCK, POCTCO2, POCGLU, POCICA, POCLAC, SAMPLE in the last 24 hours.   Chem: Recent Labs   Lab 04/14/20  0307   *   K 3.6   CL 95   CO2 30*   *   BUN 29*   CREATININE 0.9   ESTGFRAFRICA >60.0   EGFRNONAA 59.7*   CALCIUM 8.1*   MG 1.7   PHOS 3.3   ANIONGAP 9   PROT 5.4*   ALBUMIN 3.3*   BILITOT 1.5*   ALKPHOS 73   AST 12   ALT 19     Heme: Recent Labs   Lab 04/14/20  0307   WBC 13.77*   HGB 6.9*   HCT 20.9*   *     Endo:   Recent Labs   Lab 04/14/20  0612 04/14/20  0710 04/14/20  1142   POCTGLUCOSE 233* 237* 200*      Assessment/Plan:     Pulmonary  * COPD (chronic obstructive pulmonary disease)  Baseline 3L home O2 and Bipap prn. Admitted for COPD exacerbation. Covid negative.   -- Solumedrol 125 x 1  -- duo nebs q 4   -- Continue home prednisone   -- Daily Abg  -- Episode of desaturation today associated with anxiety, resolved with versed 1 mg push  -- Elevated lactic acid on admission, started on vancomycin and zoysn  -- cultures NGTD, afebrile, no leukocytosis  -- procal pending     Chronic respiratory failure with hypoxia  See copd     Cardiac/Vascular  Acute on chronic congestive heart failure  · Concentric left ventricular remodeling.  · Low normal left ventricular systolic function. The estimated ejection fraction is 50%.  · Normal LV diastolic function.  · Normal right ventricular systolic function.  · Mild mitral sclerosis.  · There is moderate leaflet calcification of the Mitral Valve.  · Moderate tricuspid regurgitation.  · Mild to moderate pulmonary hypertension present.    Atrial fibrillation with rapid ventricular response  -- restart home amiodarone, metoprolol  --  cardizem gtt for hr <130    Dyslipidemia  Lipid panel WNL    Renal/  CKD (chronic kidney disease), stage III  -- Cr 1  -- Monitor daily       Oncology  Anemia  --Given 2 units PRBC upon admit  --Hgb dropped this AM to 6.9, transfused 1 more unit  --Occult fecal test negative  --Daily CBC, transfuse for Hgb < 7  --Likely anemia of chronic disease       Endocrine  Diabetes mellitus type 2, uncontrolled  - A1c  - SSI with accu checks      GI  GIB (gastrointestinal bleeding)  --neg for occult blood  -- ppi BID  -- cbc daily           The patient is being Prophylaxed for:  Venous Thromboembolism with: Mechanical  Stress Ulcer with: Not Applicable   Ventilator Pneumonia with: not applicable    Activity Orders          Diet diabetic [facility]; 2000 Calorie; Thin: Diabetic starting at 04/13 1336    Commode at bedside starting at 04/13 0154        Full Code   CCT 35 min    Katlyn Mnasfield PA-C  Neurocritical Care  [facility]

## 2020-04-15 NOTE — CONSULTS
Therapy with vancomycin complete and/or consult discontinued by provider.  Pharmacy will sign off, please re-consult as needed.    Keily Ridley, PharmD, BCCCP  Neurocritical Care Clinical Pharmacist  Spectralink: [id number]

## 2020-04-15 NOTE — PROGRESS NOTES
POC reviewed with pt at 1720. Pt verbalized understanding. Questions and concerns addressed. Pt up with assist, currently in chair. Pt now able to use bedside commode. BiPAP placed on and off throughout shift when pt was SOB. Pt progressing toward goals. Will continue to monitor. See flowsheets for full assessment and VS info.

## 2020-04-15 NOTE — PROGRESS NOTES
[facility]  Neurocritical Care  Progress Note    Admit Date: 4/13/2020  Service Date: 04/15/2020  Length of Stay: 2    Subjective:     Chief Complaint: COPD (chronic obstructive pulmonary disease)    History of Present Illness: Chantell Herrera is an 84 y.o. female with  a PMH of Afib, CHF, COPD, HTN, HLD who presents to [facility] for anemia, Shortness of Breath, GI bleed and Afib RVR. She presented to OSH ED with c/o increased SOB x 1 day and general fatigue . She was found to hane Hgb of 5.7 and  2 units PRBC. Lacitc Acid elevated and concern for sepsis she was started on broad spectrum antibiotics. Flu and covid Neg at OSH. She is being admitted to [facility] for a higher level of care.     Hospital Course: 4/13: Admit NCC: Antibiotics, covid and flu neg, Lactic 6.4, bipap duo nebs, steroids, dilt gtt.   04/14/2020 patient with anxiety today and desaturated into high 70s, resolved with 1 mg of versed, now currently on 4L NC and sating >88%. Bipap on/of overnight. Remains on diltiazem gtt. Will keep in ICU for now.   04/15/2020 NAEO. Doing well on NC. PT today and OOB. Wean diltiazem gtt.     Review of Systems:   Review of Systems   Constitutional: Negative for chills and fever.   HENT: Negative for hearing loss.    Eyes: Negative for blurred vision and double vision.   Respiratory: Positive for cough and shortness of breath.    Cardiovascular: Positive for palpitations. Negative for chest pain.   Gastrointestinal: Negative for abdominal pain, constipation, diarrhea, nausea and vomiting.   Genitourinary: Negative for dysuria.   Musculoskeletal: Negative for back pain.   Neurological: Negative for dizziness, speech change, focal weakness and headaches.   Psychiatric/Behavioral: The patient is nervous/anxious.      Vitals:   Temp: 97.7 °F (36.5 °C)  Pulse: 86  Rhythm: atrial rhythm  BP: (!) 163/84  MAP (mmHg): 114  Resp: 20  SpO2: 97 %  Oxygen Concentration (%): 35  O2 Device (Oxygen Therapy): nasal  cannula w/ humidification    Temp  Min: 97.7 °F (36.5 °C)  Max: 98.7 °F (37.1 °C)  Pulse  Min: 77  Max: 132  BP  Min: 113/80  Max: 163/84  MAP (mmHg)  Min: 78  Max: 114  Resp  Min: 5  Max: 42  SpO2  Min: 82 %  Max: 100 %  Oxygen Concentration (%)  Min: 35  Max: 40    04/14 0701 - 04/15 0700  In: 1565 [P.O.:100; I.V.:233.7]  Out: 595 [Urine:595]   Unmeasured Output  Urine Occurrence: 1  Stool Occurrence: 1  Emesis Occurrence: 0  Pad Count: 2     Examination:   GA: Alert, anxious, no acute distress.   HEENT: No scleral icterus or JVD.   Pulmonary: diminished throughout , Wheezing  Cardiac: irregular   Abdominal: Bowel sounds present x 4.   Skin: No jaundice, rashes, or visible lesions.  Psych: Mental Status:  Alert follows pleasant   Neuro:  --GCS: E4 V5 M6  --CN II-XII grossly intact.   --Pupils 3mm, PERRL.   --Corneal reflex, gag, cough intact.  --LUE strength: 5/5  --RUE strength: 5/5  --LLE strength: 5/5  --RLE strength: 5/5    Medications:   Continuous  diltiaZEM 125 mg in dextrose 5% 125 mL infusion Last Rate: 2.5 mg/hr (04/15/20 1405)   Scheduled  albuterol-ipratropium 3 mL Q4H WAKE   ALPRAZolam 0.25 mg Once   amiodarone 100 mg Daily   citalopram 20 mg Daily   insulin detemir U-100 10 Units QHS   metoprolol tartrate 25 mg Q12H   pantoprazole 40 mg BID AC   piperacillin-tazobactam 4.5 g in dextrose 5 % 100 mL IVPB (ready to mix system) 4.5 g Q8H   predniSONE 20 mg Daily   traZODone 50 mg QHS   [START ON 4/16/2020] vancomycin (VANCOCIN) IVPB 1,250 mg Q24H   PRN  sodium chloride  Q24H PRN   acetaminophen 650 mg Q4H PRN   albuterol-ipratropium 3 mL Q4H PRN   Dextrose 10% Bolus 12.5 g PRN   glucagon (human recombinant) 1 mg PRN   glucose 16 g PRN   glucose 24 g PRN   insulin aspart U-100 1-10 Units QID (AC + HS) PRN   magnesium oxide 800 mg PRN   magnesium oxide 800 mg PRN   ondansetron 4 mg Q8H PRN   potassium chloride 10% 40 mEq PRN   potassium chloride 10% 40 mEq PRN   potassium chloride 10% 60 mEq PRN    potassium, sodium phosphates 2 packet PRN   potassium, sodium phosphates 2 packet PRN   potassium, sodium phosphates 2 packet PRN   rOPINIRole 1 mg Daily PRN   sodium chloride 0.9% 10 mL PRN      Today I independently reviewed pertinent medications, lines/drains/airways, imaging, cardiology results, laboratory results, microbiology results, notably:     ISTAT: No results for input(s): PH, PCO2, PO2, POCSATURATED, HCO3, BE, POCNA, POCK, POCTCO2, POCGLU, POCICA, POCLAC, SAMPLE in the last 24 hours.   Chem: Recent Labs   Lab 04/15/20  0355   *   K 4.1   CL 93*   CO2 28   *   BUN 28*   CREATININE 1.2   ESTGFRAFRICA 48.6*   EGFRNONAA 42.2*   CALCIUM 8.3*   MG 1.9   PHOS 3.0   ANIONGAP 13   PROT 5.6*   ALBUMIN 3.4*   BILITOT 1.2*   ALKPHOS 68   AST 12   ALT 19     Heme: Recent Labs   Lab 04/15/20  0354   WBC 12.40   HGB 8.4*   HCT 25.7*   *     Endo:   Recent Labs   Lab 04/14/20  1703 04/14/20  2015 04/15/20  1127   POCTGLUCOSE 259* 278* 92      Assessment/Plan:     Pulmonary  * COPD (chronic obstructive pulmonary disease)  Baseline 3L home O2 and Bipap prn. Admitted for COPD exacerbation. Covid negative.   -- Solumedrol 125 x 1  -- duo nebs q 4   -- Continue home prednisone   -- Elevated lactic acid on admission, started on vancomycin and zoysn  -- cultures NGTD, afebrile, no leukocytosis  -- Bipap PRN  -- O2 > 88%, currently 99% on 4L NC  -- OOB today and work with PT today. Patient uses walker at home at baseline with home O2 3L         Chronic respiratory failure with hypoxia  See copd     Cardiac/Vascular  Acute on chronic congestive heart failure  · Concentric left ventricular remodeling.  · Low normal left ventricular systolic function. The estimated ejection fraction is 50%.  · Normal LV diastolic function.  · Normal right ventricular systolic function.  · Mild mitral sclerosis.  · There is moderate leaflet calcification of the Mitral Valve.  · Moderate tricuspid regurgitation.  · Mild to  moderate pulmonary hypertension present.    Atrial fibrillation with rapid ventricular response  -- restart home amiodarone, metoprolol  -- cardizem gtt for hr <130, wean diltiazem gtt     Renal/  CKD (chronic kidney disease), stage III  -- Cr 1  -- Monitor daily       Oncology  Anemia  --Given 2 units PRBC upon admit  --(4/14)Hgb dropped this AM to 6.9, transfused 1 more unit  --Occult fecal test negative  --Daily CBC, transfuse for Hgb < 7  --Likely anemia of chronic disease       Endocrine  Diabetes mellitus type 2, uncontrolled  - A1c  - SSI with accu checks      GI  GIB (gastrointestinal bleeding)  --neg for occult blood  -- ppi BID  -- cbc daily           The patient is being Prophylaxed for:  Venous Thromboembolism with: Mechanical or Chemical  Stress Ulcer with: Not Applicable   Ventilator Pneumonia with: not applicable    Activity Orders          Diet diabetic [facility]; 2000 Calorie; Thin: Diabetic starting at 04/13 1336    Commode at bedside starting at 04/13 0154        Full Code   Level 3 care    Katlyn Mansfield PA-C  Neurocritical Care  [facility]

## 2020-04-15 NOTE — ASSESSMENT & PLAN NOTE
Baseline 3L home O2 and Bipap prn. Admitted for COPD exacerbation. Covid negative.   -- Solumedrol 125 x 1  -- duo nebs q 4   -- Continue home prednisone   -- Elevated lactic acid on admission, started on vancomycin and zoysn  -- cultures NGTD, afebrile, no leukocytosis  -- Bipap PRN  -- O2 > 88%, currently 99% on 4L NC  -- OOB today and work with PT today. Patient uses walker at home at baseline with home O2 3L

## 2020-04-15 NOTE — PLAN OF CARE
04/15/20 1515   Post-Acute Status   Post-Acute Authorization Placement   Post-Acute Placement Status Awaiting Therapy Documentation  (anticipated PT eval/recs tomorrow)     Luciana Torres LCSW  Neurocritical Care   [facility]  [id number]

## 2020-04-15 NOTE — ASSESSMENT & PLAN NOTE
--Given 2 units PRBC upon admit  --(4/14)Hgb dropped this AM to 6.9, transfused 1 more unit  --Occult fecal test negative  --Daily CBC, transfuse for Hgb < 7  --Likely anemia of chronic disease

## 2020-04-15 NOTE — PLAN OF CARE
POC reviewed with pt at 0500. Pt verbalized understanding. Questions and concerns addressed. BiPAP worn thru the night. Placed on NC 4L at 0600. Using purewick well, Pt unable to sleep over night.  No acute events Medications given as per MAR.  Pt progressing toward goals. Will continue to monitor. See flowsheets for full assessment and VS info

## 2020-04-15 NOTE — PROGRESS NOTES
Pharmacokinetic Assessment Follow Up: IV Vancomycin  · 4/15 trough resulted at 10.6 mcg/mL  · Below goal of 15-20 mcg/mL  · Increase vancomycin to 1250 mg q24hr  · Draw next trough on 4/18 at 04:00 prior to 3rd dose    Drug levels (last 3 results):  Recent Labs   Lab Result Units 04/15/20  0354   Vancomycin-Trough ug/mL 10.6       Pharmacy will continue to follow and monitor vancomycin.    Please contact pharmacy at extension [phone] for questions regarding this assessment.    Thank you for the consult,   Celine Obregon       Patient brief summary:  Chantell Herrera is a 84 y.o. female initiated on antimicrobial therapy with IV Vancomycin for treatment of bacteremia    Drug Allergies:   Review of patient's allergies indicates:  No Known Allergies    Actual Body Weight:   62.9 kg    Renal Function:   Estimated Creatinine Clearance: 31.5 mL/min (based on SCr of 1.2 mg/dL).,     Dialysis Method (if applicable):  N/A    CBC (last 72 hours):  Recent Labs   Lab Result Units 04/12/20  1934 04/13/20  0210 04/13/20  0458 04/13/20  0814 04/13/20  1200 04/14/20  0307 04/15/20  0354   WBC K/uL 12.98* 21.32* 29.04* 23.25*  --  13.77* 12.40   Hemoglobin g/dL 5.7* 8.4*  8.4* 8.6* 7.8* 7.5* 6.9* 8.4*   Hemoglobin A1C %  --  5.4  --   --   --   --   --    Hematocrit % 18.5* 28.3* 27.6* 25.5*  --  20.9* 25.7*   Platelets K/uL 252 186 167 143*  --  124* 121*   Gran% % 57.0 89.2* 88.3* 91.7*  --  88.2* 79.6*   Lymph% % 20.0 2.9* 1.0* 0.9*  --  1.7* 3.4*   Mono% % 11.0 3.7* 7.0 5.1  --  8.2 12.3   Eosinophil% % 6.0 1.2 0.8 0.1  --  0.4 2.0   Basophil% % 2.0* 0.6 0.4 0.4  --  0.3 0.4   Differential Method  Manual Automated Automated Automated  --  Automated Automated       Metabolic Panel (last 72 hours):  Recent Labs   Lab Result Units 04/12/20  1935 04/13/20  0210 04/13/20  0422 04/14/20  0307 04/14/20  1711 04/15/20  0355   Sodium mmol/L 140 139  139  --  134*  --  134*   Potassium mmol/L 4.7 4.7  4.7  --  3.6 4.9 4.1   Chloride  mmol/L 97 98  98  --  95  --  93*   CO2 mmol/L 30* 30*  30*  --  30*  --  28   Glucose mg/dL 167* 118*  118*  --  237*  --  243*   Glucose, UA   --   --  Negative  --   --   --    BUN, Bld mg/dL 23 24*  24*  --  29*  --  28*   Creatinine mg/dL 1.0 0.9  0.9  --  0.9  --  1.2   Albumin g/dL 3.6 4.0  4.0  --  3.3*  --  3.4*   Total Bilirubin mg/dL 0.8 5.4*  5.4*  --  1.5*  --  1.2*   Alkaline Phosphatase U/L 93 118  118  --  73  --  68   AST U/L 12 34  34  --  12  --  12   ALT U/L 15 29  29  --  19  --  19   Magnesium mg/dL 1.6 1.7  --  1.7 2.0 1.9   Phosphorus mg/dL 3.9 4.3  --  3.3  --  3.0       Vancomycin Administrations:  vancomycin given in the last 96 hours                   vancomycin in dextrose 5 % 1 gram/250 mL IVPB 1,000 mg (mg) 1,000 mg New Bag 04/15/20 0435     1,000 mg New Bag 04/14/20 0330     1,000 mg New Bag 04/13/20 0416                Microbiologic Results:  Microbiology Results (last 7 days)     ** No results found for the last 168 hours. **

## 2020-04-16 NOTE — PLAN OF CARE
Patient on Diltiazem gtt.  Not medically ready for dishcarge.      diltiaZEM 125 mg in dextrose 5% 125 mL infusion 2.5 mg/hr (04/16/20 0905)     Awaiting therapy recs for needs.  Patient is current with [facility].       04/16/20 1004   Discharge Reassessment   Assessment Type Discharge Planning Reassessment   Provided patient/caregiver education on the expected discharge date and the discharge plan Yes   Do you have any problems affording any of your prescribed medications? Yes   If yes, what medications? Insulin   Discharge Plan A Home Health   Discharge Plan B Rehab   DME Needed Upon Discharge  other (see comments)  (tbd)   Patient choice form signed by patient/caregiver N/A   Anticipated Discharge Disposition Home-Health  (Patient is current with [facility])   Describe the patient's ability to walk at the present time. Does not walk or unable to take any steps at all   How often would a person be available to care for the patient? Whenever needed   Number of comorbid conditions (as recorded on the chart) Five or more     Unique Bettencourt RN, CCRN-K, [facility]  Neuro-Critical Care   [phone]

## 2020-04-16 NOTE — PROGRESS NOTES
[facility]  [facility] Progress Note    Patient Name: Chantell Herrera  YOB: 1935  MRN: [id number]  CSN: [id number]       Admit Date: 4/13/2020   Attending: Alexei Foster MD   Location: 9587/9087 A Date of Procedure:  * Surgery not found *  Code Status: Full Code   Advanced Directive: <no information>  Isolation Precautions: No active isolations  Capacity: Full capacity  Hospital Day: 4      SUBJECTIVE:   HPI: Chantell Herrera is a 84 y.o. female who  has a past medical history of A-fib, Acute on chronic congestive heart failure (2/1/2019), Anxiety, Arthritis, Asthma, Atrial fibrillation with rapid ventricular response, CHF (congestive heart failure) (11/29/2018), Chronic bronchitis, COPD (chronic obstructive pulmonary disease), Depression, Diabetes mellitus, type 2, SANTIAGO (dyspnea on exertion), Emphysema of lung, Fatigue, Hyperlipidemia, Hypertension, Symptomatic anemia (1/22/2019), and Trouble in sleeping..   Chantell Herrera is an 84 y.o. female with  a PMH of Afib, CHF, COPD, HTN, HLD who presents to [facility] for anemia, Shortness of Breath, GI bleed and Afib RVR. She presented to OSH ED with c/o increased SOB x 1 day and general fatigue . She was found to hane Hgb of 5.7 and  2 units PRBC. Lacitc Acid elevated and concern for sepsis she was started on broad spectrum antibiotics. Flu and covid Neg at OSH. She is being admitted to [facility] for a higher level of care.     Hospital LOS: 3 days  ICU LOS: 3d 6h  Patient transferred to ICU    Review of Systems:Review of Systems   Constitutional: Negative for chills and fever.   HENT: Negative for hearing loss.    Eyes: Negative for blurred vision and double vision.   Respiratory: Positive for cough and shortness of breath.    Cardiovascular: Positive for palpitations. Negative for chest pain.   Gastrointestinal: Negative for abdominal pain, constipation, diarrhea, nausea and vomiting.   Genitourinary: Negative for dysuria.   Musculoskeletal:  Negative for back pain.   Neurological: Negative for dizziness, speech change, focal weakness and headaches.   Psychiatric/Behavioral: The patient is nervous/anxious.           HISTORY:   Review of patient's allergies indicates:  No Known Allergies  Past Medical History:   Diagnosis Date    A-fib     Acute on chronic congestive heart failure 2019    Anxiety     Arthritis     Asthma     Atrial fibrillation with rapid ventricular response     CHF (congestive heart failure) 2018    Chronic bronchitis     COPD (chronic obstructive pulmonary disease)     Depression     Diabetes mellitus, type 2     SANTIAGO (dyspnea on exertion)     Emphysema of lung     Fatigue     Hyperlipidemia     Hypertension     Symptomatic anemia 2019    Trouble in sleeping      Past Surgical History:   Procedure Laterality Date    APPENDECTOMY      BRONCHOSCOPY N/A 12/3/2019    Procedure: BRONCHOSCOPY;  Surgeon: Emmanuel Hickman MD;  Location[facility];  Service: Pulmonary;  Laterality: N/A;    EYE SURGERY      HYSTERECTOMY      INSERTION OF PACEMAKER  2019    TONSILLECTOMY       Social History     Tobacco Use    Smoking status: Former Smoker     Last attempt to quit: 8/3/2000     Years since quittin.7    Smokeless tobacco: Never Used   Substance Use Topics    Alcohol use: No    Drug use: No      OBJECTIVE:   Physical Exam   Physical Exam   Physical Exam   GA: Alert, anxious, no acute distress.   HEENT: No scleral icterus or JVD.   Pulmonary: diminished throughout , Wheezing  Cardiac: irregular   Abdominal: Bowel sounds present x 4.   Skin: No jaundice, rashes, or visible lesions.  Psych: Mental Status:  Alert follows pleasant   Neuro:  --GCS: E4 V5 M6  --CN II-XII grossly intact.   --Pupils 3mm, PERRL.   --Corneal reflex, gag, cough intact.  --LUE strength: 5/5  --RUE strength: 5/5  --LLE strength: 5/5  --RLE strength: 5/5   --sensory: intact to soft touch and pain throughout  --Reflexes: not  "tested  --Gait: deferred     Lines/Drains:  Lines/Drains/Airways     None               Ventilator Data: N/A, on BIPAP PRN         Vital Signs (Last 24H):  BP (!) 145/85 (BP Location: Right arm, Patient Position: Lying)   Pulse 92   Temp 97.7 °F (36.5 °C) (Oral)   Resp (!) 30   Ht 5' 2" (1.575 m)   Wt 62.9 kg (138 lb 10.7 oz)   SpO2 (!) 91%   Breastfeeding? No   BMI 25.36 kg/m²      Vital Signs Range (Last 24H):  Temp:  [97.7 °F (36.5 °C)-98.7 °F (37.1 °C)]   Pulse:  []   Resp:  [13-40]   BP: (109-163)/(58-98)   SpO2:  [71 %-100 %]  Wt Readings from Last 4 Encounters:   04/13/20 62.9 kg (138 lb 10.7 oz)   04/12/20 60 kg (132 lb 4.4 oz)   03/10/20 55.7 kg (122 lb 12.7 oz)   02/16/20 55.7 kg (122 lb 11 oz)           Date 04/16/20 0700 - 04/17/20 0659   Shift 3193-8614 0958-1209 0831-6817 24 Hour Total   INTAKE   I.V.(mL/kg) 7.7(0.1)   7.7(0.1)   Shift Total(mL/kg) 7.7(0.1)   7.7(0.1)   OUTPUT   Shift Total(mL/kg)       Weight (kg) 62.9 62.9 62.9 62.9      Intake/Output - Last 3 Shifts       04/14 0700 - 04/15 0659 04/15 0700 - 04/16 0659 04/16 0700 - 04/17 0659    P.O. 200      I.V. (mL/kg) 243 (3.9) 152.7 (2.4) 7.7 (0.1)    Blood 681.3      IV Piggyback 550 100     Total Intake(mL/kg) 1674.3 (26.6) 252.7 (4) 7.7 (0.1)    Urine (mL/kg/hr) 595 (0.4) 150 (0.1)     Emesis/NG output       Other       Stool       Total Output 595 150     Net +1079.3 +102.7 +7.7           Urine Occurrence 2 x 4 x     Stool Occurrence 1 x 1 x            Diagnostic Results::   Laboratory:  Recent Labs   Lab 04/12/20  1935  04/13/20  0210 04/13/20  0458 04/13/20  0814 04/13/20  1200 04/14/20  0307 04/15/20  0354 04/16/20  0326   WBC  --   --  21.32* 29.04* 23.25*  --  13.77* 12.40 9.70   HGB  --   --  8.4*  8.4* 8.6* 7.8* 7.5* 6.9* 8.4* 9.0*   HCT  --   --  28.3* 27.6* 25.5*  --  20.9* 25.7* 28.6*   PLT  --   --  186 167 143*  --  124* 121* 135*   LYMPH  --    < > 2.9*  0.6* 1.0*  0.3* 0.9*  0.2*  --  1.7*  0.2* 3.4*  0.4* " 2.9*  0.3*   CPKMB 1.1  --  1.2  --   --   --   --   --   --    TROPONINI 0.047*  --  0.034*  --   --   --   --   --   --    MB 12.2*  --  6.0*  --   --   --   --   --   --     < > = values in this interval not displayed.     REF: Lymph% 18-48%  Recent Labs   Lab 04/12/20  1935 04/13/20  0210 04/13/20  0224 04/14/20  0307 04/14/20  1711 04/15/20  0355 04/16/20  0326    139  139  --  134*  --  134* 136   K 4.7 4.7  4.7  --  3.6 4.9 4.1 4.5   CL 97 98  98  --  95  --  93* 97   CO2 30* 30*  30*  --  30*  --  28 32*   BUN 23 24*  24*  --  29*  --  28* 24*   CREATININE 1.0 0.9  0.9  --  0.9  --  1.2 1.1   * 118*  118*  --  237*  --  243* 221*   CALCIUM 8.2* 8.5*  8.5*  --  8.1*  --  8.3* 8.4*   MG 1.6 1.7  --  1.7 2.0 1.9 2.1   PHOS 3.9 4.3  --  3.3  --  3.0 3.7   ANIONGAP 13 11  11  --  9  --  13 7*   ALT 15 29  29  --  19  --  19 22   AST 12 34  34  --  12  --  12 11   ALKPHOS 93 118  118  --  73  --  68 78   BILITOT 0.8 5.4*  5.4*  --  1.5*  --  1.2* 1.2*   PROT 5.8* 6.6  6.6  --  5.4*  --  5.6* 5.9*   ALBUMIN 3.6 4.0  4.0  --  3.3*  --  3.4* 3.5   LABPROT 13.6*  --  12.9*  --   --   --   --    INR 1.3*  --  1.3*  --   --   --   --    APTT 21.9  --  <21.0  --   --   --   --    CHOL  --  129  --   --   --   --   --    TRIG  --  87  --   --   --   --   --    HDL  --  58  --   --   --   --   --    DDIMER 1.76*  --   --   --   --   --   --    FERRITIN 1,297*  --   --   --   --   --   --    CPK 9*  9* 20  --   --   --   --   --      Recent Labs   Lab 04/12/20  1935 04/13/20 0224 04/13/20 0233   PH  --   --  7.447   PCO2  --   --  48.7*   PO2  --   --  52*   HCO3  --   --  33.7*   POCSATURATED  --   --  87*   BE  --   --  10   LACTATE 6.4* 2.4*  --      Lab Results   Component Value Date     (H) 04/16/2020    HGBA1C 5.4 04/13/2020    ALT 22 04/16/2020    AST 11 04/16/2020    ALKPHOS 78 04/16/2020    BILITOT 1.2 (H) 04/16/2020    PROT 5.9 (L) 04/16/2020    ALBUMIN 3.5 04/16/2020     LABPROT 12.9 (H) 04/13/2020    INR 1.3 (H) 04/13/2020    APTT <21.0 04/13/2020    CRP 3.0 11/20/2019    FERRITIN 1,297 (H) 04/12/2020    DDIMER 1.76 (H) 04/12/2020    CPK 20 04/13/2020    TROPONINI 0.034 (H) 04/13/2020    CPKMB 1.2 04/13/2020    MB 6.0 (H) 04/13/2020    LACTATE 2.4 (H) 04/13/2020     Lab Results   Component Value Date    COLORU Yellow 04/13/2020    SPECGRAV >1.030 (A) 04/13/2020    PHUR 6.0 04/13/2020    PROTEINUA 1+ (A) 04/13/2020    BACTERIA Rare 04/13/2020    NITRITE Negative 04/13/2020    LEUKOCYTESUR Negative 04/13/2020    UROBILINOGEN 1.0 01/15/2020    HYALINECASTS 1 04/13/2020       Results for orders placed or performed during the hospital encounter of 04/12/20 (from the past 1008 hour(s))   Blood culture   Result Value Ref Range    Blood Culture, Routine No Growth to date     Blood Culture, Routine No Growth to date     Blood Culture, Routine No Growth to date     Blood Culture, Routine No Growth to date    Influenza A & B by Molecular   Result Value Ref Range    Influenza A, Molecular Negative Negative    Influenza B, Molecular Negative Negative    Flu A & B Source Nasal swab    Blood culture   Result Value Ref Range    Blood Culture, Routine No Growth to date     Blood Culture, Routine No Growth to date     Blood Culture, Routine No Growth to date    Group A Strep, Molecular   Result Value Ref Range    Group A Strep, Molecular Negative Negative         TTE:  Results for orders placed or performed during the hospital encounter of 01/10/20   Echo Color Flow Doppler? Yes   Result Value Ref Range    BSA 1.67 m2    LA WIDTH 3.15 cm    PV PEAK VELOCITY 0.86 cm/s    LVIDD 4.22 3.5 - 6.0 cm    IVS 1.04 0.6 - 1.1 cm    PW 0.98 0.6 - 1.1 cm    Ao root annulus 2.89 cm    LVIDS 2.25 2.1 - 4.0 cm    FS 47 28 - 44 %    LA volume 43.84 cm3    STJ 2.72 cm    LV mass 140.23 g    LA size 3.17 cm    RVDD 3.08 cm    Left Ventricle Relative Wall Thickness 0.46 cm    AV mean gradient 8 mmHg    AV valve area  1.90 cm2    AV Velocity Ratio 0.76     AV index (prosthetic) 0.62     E/A ratio 3.03     E wave decelartion time 229.71 msec    IVRT 0.12 msec    LVOT diameter 1.97 cm    LVOT area 3.0 cm2    LVOT peak shimon 1.39 m/s    LVOT peak VTI 24.70 cm    Ao peak shimon 1.84 m/s    Ao VTI 39.59 cm    LVOT stroke volume 75.25 cm3    AV peak gradient 14 mmHg    MV Peak E Shimon 0.94 m/s    TR Max Shimon 2.98 m/s    MV Peak A Shimon 0.31 m/s    LV Systolic Volume 17.18 mL    LV Systolic Volume Index 10.6 mL/m2    LV Diastolic Volume 79.28 mL    LV Diastolic Volume Index 49.10 mL/m2    LA Volume Index 27.1 mL/m2    LV Mass Index 87 g/m2    RA Major Axis 5.80 cm    Left Atrium Minor Axis 5.13 cm    Left Atrium Major Axis 5.20 cm    Triscuspid Valve Regurgitation Peak Gradient 36 mmHg    Narrative    · Concentric left ventricular remodeling.  · Low normal left ventricular systolic function. The estimated ejection   fraction is 50%.  · Normal LV diastolic function.  · Normal right ventricular systolic function.  · Mild mitral sclerosis.  · There is moderate leaflet calcification of the Mitral Valve.  · Moderate tricuspid regurgitation.  · Mild to moderate pulmonary hypertension present.        MARTÍNEZ:  No results found for this or any previous visit.  Stress Test:  No results found for this or any previous visit.   LHC:  No results found for this or any previous visit.   MEDICATIONS:     PTA Medications   Medication Sig    albuterol (PROVENTIL) 2.5 mg /3 mL (0.083 %) nebulizer solution Take 3 mLs (2.5 mg total) by nebulization every 6 (six) hours as needed for Wheezing.    amiodarone (PACERONE) 200 MG Tab Take 100 mg by mouth once daily.    citalopram (CELEXA) 20 MG tablet Take 1 tablet (20 mg total) by mouth once daily.    metoprolol succinate (TOPROL-XL) 50 MG 24 hr tablet Take 50 mg by mouth 2 (two) times daily.     pantoprazole (PROTONIX) 40 MG tablet Take 1 tablet (40 mg total) by mouth once daily. for 14 days    predniSONE (DELTASONE) 20  MG tablet Take 20 mg by mouth once daily.    rOPINIRole (REQUIP) 1 MG tablet TAKE 1 TABLET  EVERY EVENING.    torsemide (DEMADEX) 20 MG Tab Take 1 tablet (20 mg total) by mouth once daily.    traZODone (DESYREL) 50 MG tablet Take 50 mg by mouth every evening.    ACCU-CHEK SOFTCLIX LANCETS Misc 1 each by Misc.(Non-Drug; Combo Route) route once daily.    alcohol swabs (BD ALCOHOL SWABS) PadM Apply 1 each topically as needed.    aspirin (ECOTRIN) 81 MG EC tablet Take 1 tablet (81 mg total) by mouth once daily.    baclofen (LIORESAL) 10 MG tablet Take 10 mg by mouth 3 (three) times daily.    blood glucose control high,low (ACCU-CHEK KUMAR CONTROL SOLN) Soln 1 each by Misc.(Non-Drug; Combo Route) route as needed.    blood sugar diagnostic (ACCU-CHEK KUMAR PLUS TEST STRP) Strp 1 strip by Misc.(Non-Drug; Combo Route) route once daily.    blood-glucose meter (ACCU-CHEK KUMAR PLUS METER) Misc 1 each by Misc.(Non-Drug; Combo Route) route once daily.    cholecalciferol, vitamin D3, (VITAMIN D3) 2,000 unit Tab Take 2,000 Units by mouth once daily.    insulin detemir U-100 (LEVEMIR FLEXTOUCH) 100 unit/mL (3 mL) SubQ InPn pen Inject 15 Units into the skin every evening.    ipratropium (ATROVENT) 0.02 % nebulizer solution Take 500 mcg by nebulization 4 (four) times daily.     potassium chloride SA (K-DUR,KLOR-CON) 20 MEQ tablet     pravastatin (PRAVACHOL) 40 MG tablet Take 40 mg by mouth once daily.    weigh scale Misc Record daily weights     Inpatient Medications:  Scheduled Meds:   albuterol-ipratropium  3 mL Nebulization Q4H WAKE    amiodarone  100 mg Oral Daily    citalopram  20 mg Oral Daily    insulin detemir U-100  10 Units Subcutaneous QHS    metoprolol tartrate  25 mg Oral Q12H    pantoprazole  40 mg Oral BID AC    predniSONE  20 mg Oral Daily    traZODone  50 mg Oral QHS     Continuous Infusions:   diltiaZEM 125 mg in dextrose 5% 125 mL infusion 2.5 mg/hr (04/16/20 0705)     PRN Meds:.sodium  chloride, acetaminophen, albuterol-ipratropium, Dextrose 10% Bolus, glucagon (human recombinant), glucose, glucose, insulin aspart U-100, magnesium oxide, magnesium oxide, ondansetron, potassium chloride 10%, potassium chloride 10%, potassium chloride 10%, potassium, sodium phosphates, potassium, sodium phosphates, potassium, sodium phosphates, rOPINIRole, sodium chloride 0.9%  ASSESSMENT & PLAN:    This is a 84 y.o. female who  has a past medical history of A-fib, Acute on chronic congestive heart failure (2/1/2019), Anxiety, Arthritis, Asthma, Atrial fibrillation with rapid ventricular response, CHF (congestive heart failure) (11/29/2018), Chronic bronchitis, COPD (chronic obstructive pulmonary disease), Depression, Diabetes mellitus, type 2, SANTIAGO (dyspnea on exertion), Emphysema of lung, Fatigue, Hyperlipidemia, Hypertension, Symptomatic anemia (1/22/2019), and Trouble in sleeping., admitted on 4/13/2020 for  COPD exarcebation now improving with some pedal edema today, will resume home Torsemide 20mg dly    Active Ambulatory Problems     Diagnosis Date Noted    HTN (hypertension) 06/19/2014    ACE-inhibitor cough 06/19/2014    Cholelithiases 01/28/2015    COPD (chronic obstructive pulmonary disease) 05/03/2015    Hyperlipidemia 05/03/2015    Cervical radiculopathy 08/10/2015    Anxiety state 08/10/2015    Insomnia 08/10/2015    Major depressive disorder with single episode, in full remission 05/10/2016    Hyponatremia 05/10/2016    CKD (chronic kidney disease), stage III 05/10/2016    History of intraocular lens implant 05/10/2016    Tortuous aorta 05/10/2016    Essential tremor 01/03/2017    Dyslipidemia 01/03/2017    Excessive involuntary blinking 01/03/2017    Oxygen dependent 01/03/2017    Chronic respiratory failure with hypoxia 05/17/2017    RLS (restless legs syndrome) 05/17/2017    Elevated d-dimer 11/12/2018    Epigastric pain 11/13/2018    Atrial fibrillation with rapid ventricular  response 11/16/2018    Symptomatic anemia 01/22/2019    Acute on chronic respiratory failure with hypoxia 01/22/2019    Acute on chronic congestive heart failure 02/01/2019    Anemia 09/01/2019    Chronic CHF 09/01/2019    Diabetes mellitus type 2, uncontrolled 09/02/2019    Subconjunctival hemorrhage of right eye 09/02/2019    COPD with acute exacerbation 11/28/2019    Cough with hemoptysis 12/03/2019    Anorexia 01/02/2020    Weakness 01/13/2020    Choking due to food (regurgitated) 01/14/2020     Resolved Ambulatory Problems     Diagnosis Date Noted    Shortness of breath     COPD exacerbation 05/03/2015    COPD bronchitis 03/08/2016    Acute renal insufficiency 03/09/2016    COPD with acute bronchitis 05/17/2017    Current chronic use of systemic steroids 05/28/2018    COPD exacerbation 11/11/2018    Pneumonia 01/22/2019    COPD exacerbation 09/01/2019    Acute cystitis without hematuria 01/13/2020    Pneumonia of left lower lobe due to infectious organism 01/17/2020     Past Medical History:   Diagnosis Date    A-fib     Anxiety     Arthritis     Asthma     CHF (congestive heart failure) 11/29/2018    Chronic bronchitis     Depression     Diabetes mellitus, type 2     SANTIAGO (dyspnea on exertion)     Emphysema of lung     Fatigue     Hypertension     Trouble in sleeping        Neuro:    diltiaZEM 125 mg in dextrose 5% 125 mL infusion 2.5 mg/hr (04/16/20 0705)      Recent Labs   Lab 04/13/20  0210   TRIG 87          CV:  Temp:  [97.7 °F (36.5 °C)-98.7 °F (37.1 °C)] 97.7 °F (36.5 °C)  Pulse:  [] 92  Resp:  [13-40] 30  SpO2:  [71 %-100 %] 91 %  BP: (109-163)/(58-98) 145/85    ECHO:No results found for this or any previous visit. Results for orders placed or performed during the hospital encounter of 04/12/20   EKG 12-lead    Collection Time: 04/12/20  7:25 PM    Narrative    Test Reason : R06.02    Vent. Rate : 140 BPM     Atrial Rate : 280 BPM     P-R Int : 000 ms           QRS Dur : 100 ms      QT Int : 318 ms       P-R-T Axes : 095 -78 106 degrees     QTc Int : 485 ms    Atrial flutter with 2:1 A-V conduction  Left axis deviation  Nonspecific ST and T wave abnormality  Abnormal ECG  When compared with ECG of 16-FEB-2020 15:08,  Atrial flutter has replaced Electronic ventricular pacemaker  Vent. rate has increased BY  68 BPM  Confirmed by EDINSON BOBO MD ([id number]) on 4/13/2020 9:36:32 AM    Referred By: [email or web address]   SELF           Confirmed By:EDINSON BOBO MD        Recent Labs   Lab 04/12/20  1935 04/13/20  0210 04/13/20  0224 04/13/20  0233  04/15/20  0355 04/16/20  0326   PH  --   --   --  7.447  --   --   --    PCO2  --   --   --  48.7*  --   --   --    PO2  --   --   --  52*  --   --   --    HCO3  --   --   --  33.7*  --   --   --    POCSATURATED  --   --   --  87*  --   --   --    BE  --   --   --  10  --   --   --    LACTATE 6.4*  --  2.4*  --   --   --   --    ANIONGAP 13 11  11  --   --    < > 13 7*   CPKMB 1.1 1.2  --   --   --   --   --    TROPONINI 0.047* 0.034*  --   --   --   --   --     < > = values in this interval not displayed.     -If hypertensive, keep BP <180/90  -If hypotensive, Maintain MAP's b/t 60-70 with vasopressor support  -Consideration for stress dose steroids if multiple vasopressors required.   -Monitor for signs of viral CM/myocarditis/RV HF      Pulmonary:   [unfilled]  Temp:  [97.7 °F (36.5 °C)-98.7 °F (37.1 °C)] 97.7 °F (36.5 °C)  Pulse:  [] 92  Resp:  [13-40] 30  SpO2:  [71 %-100 %] 91 %  BP: (109-163)/(58-98) 145/85    -Initial vent settings: ACVC+, TV 6ml/kg IBW, FiO2 100%, PEEP 5      FiO2 0.3 0.4 0.5 0.6 0.7 0.8 0.9 1.0   PEEP 5 5-8 8-10 10 10-14 14 14-18 18-24     No results found in the last 24 hours.   No results for input(s): PH, PCO2, PO2, HCO3, POCSATURATED, BE, CRP, PROCAL, DDIMER, FERRITIN, CPK in the last 72 hours.    Invalid input(s): CK-MB  --Keep sats b/t 92-96%. If FiO2 >50% and/or >40L/min flow ? INTUBATE   - Encourage IS qhr  once extubated     ID:      Microbiology Results (last 7 days)     ** No results found for the last 168 hours. **         Recent Labs   Lab 04/14/20  0307 04/15/20  0354 04/15/20  0355 04/16/20  0326   WBC 13.77* 12.40  --  9.70   * 121*  --  135*   LYMPH 1.7*  0.2* 3.4*  0.4*  --  2.9*  0.3*   ALKPHOS 73  --  68 78   ALT 19  --  19 22   AST 12  --  12 11   BILITOT 1.5*  --  1.2* 1.2*       Antimicrobials (From admission, onward)    None        -NP swab if not intubated, tracheal aspirate if intubated.  -Follow-up blood/urine/respiratory tract cultures. NGTD.  -Monitor temp. Dominique Hugger PRN hypothermia.  --Acetaminophen PRN hyperthermia  -Daily CBC w diff, LDH, CRP, d-dimer, ferritin, and CK-MB to trend immune and inflammatory response.   -POCUS PRN to assess any acute changes to heart and lung function  -CXR and ABG unless clinically contraindicated.  -Special isolation and aspiration precautions ordered if applicable and followed.  -On CAP therapy; deescalate antibiotics if cultures negative    MuLBSTA Mortality Risk score (multilobular infiltrates:5, lympho <0.8*109/L:4, +bact Cx:4, smoker:3, former smoker:2, HTN:2, >/= 59yo:2) ==> 12pt:15%, 15pt:33%, 17pt:50%, 20pt:70% 90d mortality risk    CURB-65 (Confusion,Urea>7,RR>30,BP<90/60,>65) ==> 0-1 (Home Tx), 2 (admit to floor), 3-5 (admit to ICU)     Renal/:   Recent Labs   Lab 04/14/20 0307 04/14/20  1711 04/15/20  0355 04/16/20  0326   *  --  134* 136   K 3.6 4.9 4.1 4.5   CO2 30*  --  28 32*   CL 95  --  93* 97   BUN 29*  --  28* 24*   CREATININE 0.9  --  1.2 1.1   ANIONGAP 9  --  13 7*           Intake/Output Summary (Last 24 hours) at 4/16/2020 0833  Last data filed at 4/16/2020 0705  Gross per 24 hour   Intake 232.5 ml   Output 150 ml   Net 82.5 ml      Wt Readings from Last 3 Encounters:   04/13/20 62.9 kg (138 lb 10.7 oz)   04/12/20 60 kg (132 lb 4.4 oz)   03/10/20 55.7 kg (122 lb 12.7 oz)      -(Not currently on dialysis)  -Trending renal  function daily.   -Strict I/O's.   -Replace lytes PRN  -If Nephrology consulted, appreciate recs.      FEN/GI:   GI Medications (From admission, onward)    Start     Stop Route Frequency Ordered    04/13/20 1615  pantoprazole EC tablet 40 mg      -- Oral 2 times daily before meals 04/13/20 1507    04/13/20 0312  ondansetron injection 4 mg      -- IV Every 8 hours PRN 04/13/20 0212        Recent Labs   Lab 04/14/20  0307 04/15/20  0355 04/16/20  0326   ALT 19 19 22   AST 12 12 11   ALKPHOS 73 68 78   BILITOT 1.5* 1.2* 1.2*   PROT 5.4* 5.6* 5.9*   ALBUMIN 3.3* 3.4* 3.5     -Monitor liver function daily.   --Tolerating oral feeds  -GI ulcer prophylaxis: pantoprazole or famotidine until tolerating feeds     Endocrine:   Lab Results   Component Value Date    HGBA1C 5.4 04/13/2020      Recent Labs   Lab 04/13/20  1206 04/13/20  1632 04/13/20  2112 04/14/20  0612 04/14/20  0710 04/14/20  1142 04/14/20  1703 04/14/20  2015 04/15/20  1127 04/15/20  1716   POCTGLUCOSE 243* 316* 218* 233* 237* 200* 259* 278* 92 186*      -Hold if on home oral diabetic agents  -Goal glucose 140-180  -POCT glucose checks Q6H while NPO.   -Sliding scale insulin and/or insulin drip ordered, if necessary for goal.      Hematology/Oncology:   Recent Labs   Lab 04/14/20  0307 04/15/20  0354 04/16/20  0326   WBC 13.77* 12.40 9.70   HGB 6.9* 8.4* 9.0*   HCT 20.9* 25.7* 28.6*   * 121* 135*     VTE Risk Mitigation (From admission, onward)         Ordered     IP VTE LOW RISK PATIENT  Once      04/13/20 0153     Place sequential compression device  Until discontinued      04/13/20 0153               -Trending CBC daily and monitor for signs of bleeding.   -Transfuse for Hgb <7.      Appreciate Consults:  Problem List as of 4/16/2020 Reviewed: 3/18/2020  5:52 PM by Kari Gaspar MD       Neuro    Cervical radiculopathy    Last Assessment & Plan 1/22/2019 Hospital Encounter Written 1/26/2019  2:38 PM by Kari Gaspar MD     Hold baclofen for  now           Essential tremor    RLS (restless legs syndrome)    Last Assessment & Plan 1/22/2019 Hospital Encounter Written 1/26/2019  2:39 PM by Kari Gaspar MD     Cont ropinirole              Psychiatric    Anxiety state    Last Assessment & Plan 9/1/2019 Hospital Encounter Written 9/4/2019 11:28 AM by Carrol Funes NP     Resume her citalopram.           Major depressive disorder with single episode, in full remission    Last Assessment & Plan 11/28/2019 Hospital Encounter Written 12/1/2019 11:39 AM by Chicho Fisher MD     Continue trazodone and Celexa              Ophtho    History of intraocular lens implant    Excessive involuntary blinking    Subconjunctival hemorrhage of right eye    Last Assessment & Plan 9/1/2019 Hospital Encounter Written 9/4/2019 11:36 AM by Carrol Funes NP     She is on eliquis   Cold packs  Avoid scratching .              ENT    Choking due to food (regurgitated)    Last Assessment & Plan 1/10/2020 Hospital Encounter Written 1/20/2020  9:11 AM by Milo Estevez MD       Speech eval   Given pt's history of reflux-related symptoms, globus sensation, no overt s/s of aspiration, frequent belching, and poor positioning during and after PO intake, pt likely presents with GI-related dysfunction(s) resulting in discomfort with PO intake. Recommend initiation of medical management of potential GI dysfunctions to develop most appropriate treatment plan. As pt's symptoms appear to be related to probable reflux, completion of MBSS is not warranted at this time. SLP to follow up for diet tolerance only.   Getting protonix .            Pulmonary    ACE-inhibitor cough    * (Principal) COPD (chronic obstructive pulmonary disease)    Last Assessment & Plan 4/12/2020 Hospital Encounter Edited 4/15/2020  3:13 PM by Katlyn Mansfield PA-C     Baseline 3L home O2 and Bipap prn. Admitted for COPD exacerbation. Covid negative.   -- Solumedrol 125 x 1  -- duo nebs q 4   --  Continue home prednisone   -- Elevated lactic acid on admission, started on vancomycin and zoysn  -- cultures NGTD, afebrile, no leukocytosis  -- Bipap PRN  -- O2 > 88%, currently 99% on 4L NC  -- OOB today and work with PT today. Patient uses walker at home at baseline with home O2 3L              Oxygen dependent    Last Assessment & Plan 1/22/2019 Hospital Encounter Written 1/26/2019  2:39 PM by Kari Gaspar MD     Does qualify for O2           Chronic respiratory failure with hypoxia    Last Assessment & Plan 4/12/2020 Hospital Encounter Written 4/15/2020  3:13 PM by Katlyn Mansfield PA-C     See copd          Acute on chronic respiratory failure with hypoxia    Last Assessment & Plan 1/10/2020 Hospital Encounter Written 1/20/2020  9:10 AM by Milo Estevez MD     She is actually pretty close to her baseline, but she is short of breath all of the time.  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 With superimposed pulm edema, slightly more labored.  Cont O2 support  Repeat cxr and add mucinex and IPV- CXR- No significant interval change as compared to 01/10/2020           COPD with acute exacerbation    Last Assessment & Plan 1/10/2020 Hospital Encounter Written 1/20/2020  9:10 AM by Milo Estevez MD                Cough with hemoptysis    Last Assessment & Plan 1/10/2020 Hospital Encounter Written 1/20/2020  9:10 AM by Milo Estevez MD     On  levaquin day 4              Cardiac/Vascular    HTN (hypertension)    Last Assessment & Plan 11/28/2019 Hospital Encounter Written 12/1/2019 11:39 AM by Chicho Fisher MD     Continue amlodipine 5 mg daily  Continue diltiazem extended release 240 mg daily  Continue losartan 50 mg daily  Continue metoprolol 25 mg p.o. b.i.d.  Continue Lasix every other day as needed for swelling           Hyperlipidemia    Last Assessment & Plan 11/11/2018 Hospital Encounter Written 11/18/2018 11:44 AM by Chicho Fisher MD     Cont home  pravastatin           Tortuous aorta    Dyslipidemia    Last Assessment & Plan 4/12/2020 Hospital Encounter Written 4/14/2020  2:59 PM by Katlyn Mansfield PA-C     Lipid panel WNL         Atrial fibrillation with rapid ventricular response    Last Assessment & Plan 4/12/2020 Hospital Encounter Written 4/15/2020  3:14 PM by Katlyn Mansfield PA-C     -- restart home amiodarone, metoprolol  -- cardizem gtt for hr <130, wean diltiazem gtt          Acute on chronic congestive heart failure    Last Assessment & Plan 4/12/2020 Hospital Encounter Written 4/15/2020  3:14 PM by Katlyn Mansfield PA-C     · Concentric left ventricular remodeling.  · Low normal left ventricular systolic function. The estimated ejection fraction is 50%.  · Normal LV diastolic function.  · Normal right ventricular systolic function.  · Mild mitral sclerosis.  · There is moderate leaflet calcification of the Mitral Valve.  · Moderate tricuspid regurgitation.  · Mild to moderate pulmonary hypertension present.         Chronic CHF    Last Assessment & Plan 9/1/2019 Hospital Encounter Written 9/4/2019 11:28 AM by Carrol Funes NP     Cont ARB, diltiazem, and hold lasix for now- hold lasix till Sat repeat bmp Monday            Renal/    Hyponatremia    Last Assessment & Plan 1/22/2019 Hospital Encounter Written 1/26/2019  2:38 PM by Kari Gaspar MD     Hold SSRI  Hold lasix  Getting blood with NS   Repeat cmp  Na improving 136 today    Resolved         CKD (chronic kidney disease), stage III    Last Assessment & Plan 4/12/2020 Hospital Encounter Written 4/15/2020  3:13 PM by Katlyn Mansfield PA-C     -- Cr 1  -- Monitor daily            Lactic acidosis resolved    Last Assessment & Plan 4/12/2020 Hospital Encounter Written 4/13/2020  2:55 AM by Matt Jade NP     - started on Vanc and zosyn              Oncology    Symptomatic anemia    Last Assessment & Plan 1/22/2019 Hospital Encounter Written 1/26/2019  2:39 PM by Kari HOOKER  MD Chasity     Not fe deficient, b12 normal, occult negative. H/H 6/21, given 2 units PRBC, repeat CBC pending  eliquis held    Check retic and folate- normal  MMA, will need to see hematology for potential bm - she has a very high MCV with normal b12.    Counts holding.  CT of chest negative for PE         Anemia    Last Assessment & Plan 4/12/2020 Hospital Encounter Written 4/15/2020  3:14 PM by Katlyn Mansfield PA-C     --Given 2 units PRBC upon admit  --(4/16)Hgb 9,  --Occult fecal test negative  --Daily CBC, transfuse for Hgb < 7  --Likely anemia of chronic disease               Endocrine    Diabetes mellitus type 2, uncontrolled    Last Assessment & Plan 4/12/2020 Hospital Encounter Written 4/15/2020  3:14 PM by Katlyn Mansfield PA-C     - A1c  - SSI with accu checks              GI    Cholelithiases    Last Assessment & Plan 1/10/2020 Hospital Encounter Written 1/20/2020  9:09 AM by Milo Estevez MD     R/o cholecystitis.  U/s ordered and no signs of cholecystitis  Cholelithiasis no cholecystitis, bilirubin normal   No intervention at this time   asymptomatic         Epigastric pain    Last Assessment & Plan 1/10/2020 Hospital Encounter Written 1/20/2020  9:10 AM by Milo Estevez MD     U/S shows some ascites  resolved           GIB (gastrointestinal bleeding)    Last Assessment & Plan 4/12/2020 Hospital Encounter Written 4/15/2020  3:14 PM by Katlyn Mansfield PA-C     --neg for occult blood  -- ppi BID  -- cbc daily             Other    Insomnia    Last Assessment & Plan 1/10/2020 Hospital Encounter Written 1/20/2020  9:10 AM by Milo Estevez MD     Continue  on remeron           Elevated d-dimer    Last Assessment & Plan 11/11/2018 Hospital Encounter Written 11/18/2018 11:44 AM by Chicho Fisher MD     [facility] ordered- negative  lovenox DVT proph  Lower suspicion for PE           Anorexia    Last Assessment & Plan 1/10/2020 Hospital Encounter Written 1/20/2020  9:10 AM by Milo  CIARA Estevez MD     Switch from periactin to remeron.           Weakness    Last Assessment & Plan 1/10/2020 Hospital Encounter Written 1/20/2020  9:10 AM by Milo Estevez MD     Add PT   ambulated 75 ft with PT yesterday ; goal 200ft   1/16 Performed Gait trng using RW and portable oxygen with increased gait distances x 150 feet (twice) with supervision  1/17/2020 Bilateral LE eccentric strengthening ex x 10 reps on each such as Ankle Pumps, Heel Slides, Hip abd/add and Long arch quads. Provided pt with rest periods and deep breathing  in between ex                 Dispo:  Expected Discharge Date: 4/17/2020  -continue present mgt    ICU DAILY CHECKLIST:   Daily labs: CBC, CMP, CRP, Coags, serum lactate, LFTs daily until downtrending, triglycerides Q3 days while on sedation.  1. Awake with RASS goal of 0?    yes      2. SBT/SAT performed and coordinated?       yes           3. Sedation Minimized? yes   4. Head of bed >30 degrees?  yes       5. Early Mobility: PT/OT, UOOB to chair   yes     6. Tube feeding initiated?  no   7. Glucose at goal? yes     8. Having bowel movements daily? yes                 9. Stress ulcer prophylaxis ordered?  no  10. DVT prophylaxis ordered?  yes   11. Can indwelling catheter be discontinued? CVL/Lozoya/Rosaura/  12. Can antimicrobials be discontinued? no   Pressure ulcer (mattress, turning, OOB, foam, boots)        Wound 01/11/20 0712 Skin Tear lower Arm (Active)   01/11/20 0712    Pre-existing: Yes   Primary Wound Type: Skin tear   Side: Right   Orientation: lower   Location: Arm   Wound/PI Number (optional):    Ankle-Brachial Index:    Pulses:    Removal Indication and Assessment:    Wound Outcome:    (Retired) Wound Type:    (Retired) Wound Length (cm):    (Retired) Wound Width (cm):    (Retired) Depth (cm):    Wound Description (Comments):    Removal Indications:    Number of days: 96            Wound 04/13/20 0200 Skin Tear Right lower Leg #1 (Active)   04/13/20 0200     Pre-existing: Yes   Primary Wound Type: Skin tear   Side: Right   Orientation: lower   Location: Leg   Wound/PI Number (optional): #1   Ankle-Brachial Index:    Pulses:    Removal Indication and Assessment:    Wound Outcome:    (Retired) Wound Type:    (Retired) Wound Length (cm):    (Retired) Wound Width (cm):    (Retired) Depth (cm):    Wound Description (Comments):    Removal Indications:    Wound WDL ex 4/16/2020  7:05 AM   Dressing Appearance Open to air 4/16/2020  7:05 AM   Drainage Amount None 4/16/2020  7:05 AM   Drainage Characteristics/Odor No odor 4/16/2020  7:05 AM   Appearance Pink;Red;Maroon 4/16/2020  7:05 AM   Periwound Area Dry 4/16/2020  7:05 AM   Periwound Care Dry periwound area maintained 4/16/2020  7:05 AM   Number of days: 3            Wound 04/13/20 0200 Contusion Left anterior Leg #2 (Active)   04/13/20 0200    Pre-existing: Yes   Primary Wound Type: Contusion   Side: Left   Orientation: anterior   Location: Leg   Wound/PI Number (optional): #2   Ankle-Brachial Index:    Pulses:    Removal Indication and Assessment:    Wound Outcome:    (Retired) Wound Type:    (Retired) Wound Length (cm):    (Retired) Wound Width (cm):    (Retired) Depth (cm):    Wound Description (Comments):    Removal Indications:    Wound WDL ex 4/16/2020  7:05 AM   Dressing Appearance Open to air 4/16/2020  7:05 AM   Drainage Amount None 4/16/2020  7:05 AM   Drainage Characteristics/Odor No odor 4/16/2020  7:05 AM   Appearance Pink;Maroon 4/16/2020  7:05 AM   Periwound Area Dry 4/16/2020  7:05 AM   Periwound Care Dry periwound area maintained 4/16/2020  7:05 AM   Number of days: 3   13.   14. Keratitis   15. CPR status: Full Code   16. Can transfer out of ICU? yes   17. Family updated?  yes     Attending Staff physician involved in 33 minutes of critical care time and in-person contact today.     - This patient has a critical neurological condition/illness, with high morbidity and mortality.  - There is high  probability for acute neurological change leading to clinical and possibly life-threatening deterioration requiring highest level of physician preparedness for urgent intervention.  - Continuous in-person monitoring of the patient was done, including vital signs, neurological status, NIHSS or ICP monitoring with ongoing treatment changes based on patient need.  - Care was coordinated with other physicians involved in the patient's care.  - Radiologic studies and laboratory data were reviewed and interpreted, and plan of care was re-assessed based on the results.  - Diagnosis, treatment options and prognosis were discussed with the patient and/or family members or caregiver.      Fred Bermudez M.D., M.P.H.  Neurocritical Care Fellow  Department of Neurocritical Care  Critical Care Medicine- Respiratory ICU   [facility]  [phone]/ [phone]

## 2020-04-16 NOTE — PHYSICIAN QUERY
PT Name: Chantell Herrera  MR #: [id number]     Diabetic Condition Clarification     CDS/: Edgar Correa RN             Contact information: [email or web address]  This form is a permanent document in the medical record.     Query Date: April 16, 2020    By submitting this query, we are merely seeking further clarification of documentation to reflect the severity of illness of your patient. Please utilize your independent clinical judgment when addressing the question(s) below.    The medical record reflects the following:     Indicators   Supporting Clinical Findings Location in Medical Record   x Diabetes uncontrolled documented Diabetes mellitus type 2, uncontrolled  - A1c  - SSI with accu checks       H/P 4/13 ([facility]) Neuro [facility]   x Lab Value(s), POCT glucose value(s) , 316, 186 Lab 4/12, 4/13, 4/15    Beta-OHxy Butyric Acid levels      Serum Osmolarity      pH      Anion gap/ Bicarb levels      Treatment/Medication      Other       Provider, please specify the meaning of the term uncontrolled:    [ x  ] Diabetes mellitus Type 2 with hyperglycemia   [   ] Other diabetes complication (please specify): ____________   [   ]  Clinically Undetermined     Present on admission (POA) status:   [   ] Yes (Y)                          [  ] Clinically Undetermined (W)  [   ] No (N)                            [   ] Documentation insufficient to determine if condition is POA (U)     Please document in your progress notes daily for the duration of treatment until resolved, and include in your discharge summary.

## 2020-04-16 NOTE — LETTER
Pharmacy  Assistance- [facility]  [address] [place] [address]  PHONE: [phone]  FAX: [phone]  EMAIL: [email or web address]           April 16,2020       Chantell Herrera  [address] [place] [address]          Re: Pharmacy Patient Assistance Program        Dear Chantell Herrera,             Thank you for choosing [facility]. Pharmacy Patient Assistance Technician, Maribell Ocampo attempted to contact you to offer assistance with the cost of your prescriptions. If you are still in need of assistance, please give us a call at [phone].Our hours of operation are: Monday through Friday, 8:00am to 4:30pm.             NOTE: The Pharmacy Patient Assistance Program will require the following information to apply:       Proof of Income (tax return, Social Security award letter or W-2).   Print out from your Pharmacy and/or Insurance Company that shows your out-of-pocket cost (co-pays- for this year).          Thank you for allowing us the opportunity to assist you with your medications. We look forward to hearing from you soon.      Warmest Regards,  Maribell Ocampo [facility]

## 2020-04-16 NOTE — PHYSICIAN QUERY
PT Name: Chantell Herrera  MR #: [id number]    Physician Query Form - CardioPulmonary Clarification      CDS/: Edgar Correa RN              Contact information: [email or web address]    This form is a permanent document in the medical record.    Query Date: April 16, 2020    By submitting this query, we are merely seeking further clarification of documentation. Please utilize your independent clinical judgment when addressing the question(s) below.    The Medical record contains the following:   Indicators   Supporting Clinical Findings Location in Medical Record   x Pulmonary Hypertension documented · Mild to moderate pulmonary hypertension present. H/P (maribel) 4/13 Neuro [facility]   x Acute/Chronic Illness 84 y.o. female with  a PMH of Afib, CHF, COPD, HTN, HLD who presents to [facility] for anemia, Shortness of Breath, GI bleed and Afib RVR. She presented to OSH ED with c/o increased SOB x 1 day and general fatigue . She was found to hane Hgb of 5.7 and  2 units PRBC. Lacitc Acid elevated and concern for sepsis she was started on broad spectrum antibiotics. Flu and covid Neg at OSH. She is being admitted to [facility] for a higher level of care.  H/P (maribel) 4/13 Neuro CC   x Echo and/or Heart Cath Findings Cardiac/Vascular  Acute on chronic congestive heart failure  · Concentric left ventricular remodeling.  · Low normal left ventricular systolic function. The estimated ejection fraction is 50%.  · Normal LV diastolic function.  · Normal right ventricular systolic function.  · Mild mitral sclerosis.  · There is moderate leaflet calcification of the Mitral Valve.  · Moderate tricuspid regurgitation.  · Mild to moderate pulmonary hypertension present.     Echo 1/13  Repeat echo  H/P (maribel) 4/13 Neuro [facility]   x BiPAP/Intubation/Supplemental O2 COPD (chronic obstructive pulmonary disease)  - duo nebs q 4   - O2 nc   - Bipap prn   - Restart home prednisone   - Solumedrol 125 x 1     Chronic respiratory failure with  hypoxia  See copd     H/P (maribel) 4/13 Neuro CC    SOB, SANTIAGO, Fatigue, Dizziness, LE Edema, Cyanosis, Chest Pain, Respiratory Distress, Hypoxia, etc.      Treatment         Medication      Other     Provider, please specify the type of pulmonary hypertension:  [   ] Group 2:  Pulmonary Hypertension due to Left Heart Disease, including left heart failure and/or left heart valve disease     [ x  ] Group 3:  Pulmonary Hypertension due to Lung Disease     [   ] Group 5:  Pulmonary Hypertension due to other, multifactorial, or unclear mechanisms     [   ] Pulmonary Hypertension, unspecified     [   ] Other Cardiopulmonary Condition (please specify):     [  ] Clinically Undetermined         Please document in your progress notes daily for the duration of treatment, until resolved, and include in your discharge summary.

## 2020-04-16 NOTE — PLAN OF CARE
04/16/20 1611   Post-Acute Status   Post-Acute Authorization Placement;Home Health   Post-Acute Placement Status Awaiting Internal Medical Clearance   Home Health Status Referrals Sent  ([place])     Luciana Torres LCSW  Neurocritical Care   [facility]  [id number]

## 2020-04-16 NOTE — TELEPHONE ENCOUNTER
Patient may be eligible for the [employer] Patient assistance program. Will need proof of income and proof of put of pocket prescription cost for this year to apply. Left voicemail and Mailed letter

## 2020-04-16 NOTE — PT/OT/SLP EVAL
Physical Therapy Evaluation    Patient Name:  Chantell Herrera   MRN:  [id number]    Recommendations:     Discharge Recommendations:  home with home health   Discharge Equipment Recommendations: none   Barriers to discharge: None    Assessment:     Chantell Herrera is a 84 y.o. female admitted with a medical diagnosis of COPD (chronic obstructive pulmonary disease).  She presents with the following impairments/functional limitations:  weakness, impaired endurance, impaired balance, gait instability, impaired functional mobilty, impaired self care skills, decreased lower extremity function, edema, impaired cardiopulmonary response to activity.     Rehab Prognosis: Good; patient would benefit from acute skilled PT services to address these deficits and reach maximum level of function.    Recent Surgery: * No surgery found *      Plan:     During this hospitalization, patient to be seen 4 x/week to address the identified rehab impairments via gait training, therapeutic activities, therapeutic exercises, neuromuscular re-education and progress toward the following goals:    · Plan of Care Expires:  05/16/20    Subjective     Chief Complaint: swelling and edema in the L>R side of her body  Patient/Family Comments/goals: Pt reports she is ready to go home.   Pain/Comfort:  · Pain Rating 1: 0/10  · Pain Rating Post-Intervention 1: 0/10    Patients cultural, spiritual, Lutheran conflicts given the current situation: no    Living Environment:  Pt lives with her  in a [facility] with ramp access. Pt has a tub/shower.   Prior to admission, patients level of function was Independent with ADLs.  Equipment used at home: rollator, BIPAP, nebulizer, oxygen, shower chair.  DME owned (not currently used): scooter.  Upon discharge, patient will have assistance from .    Objective:     Communicated with nursing prior to session.  Patient found HOB elevated with bed alarm, blood pressure cuff, oxygen, peripheral IV, pulse ox  (continuous), telemetry  upon PT entry to room.    General Precautions: Standard, fall, respiratory   Orthopedic Precautions:N/A   Braces: N/A     Exams:  · Cognitive Exam:  Patient is oriented to Person, Place, Time and Situation  · Fine Motor Coordination:    · -       Intact, BLE, heel/shin  · Gross Motor Coordination:  WFL  · Postural Exam:  Patient presented with the following abnormalities:    · -       Rounded shoulders  · -       Forward head  · Sensation:    · -       Intact, BLE  · RLE ROM: WFL  · RLE Strength: grossly 5/5, except hip flexion 4/5,   · LLE ROM: WFL  · LLE Strength: grossly 4 to 5/5, except hip flexion 3/5    Functional Mobility:  · Bed Mobility:     · Scooting: supervision  · Supine to Sit: supervision  · Sit to Supine: supervision  · Transfers:     · Sit to Stand:  stand by assistance with rolling walker  · Gait: 7ft to bedside chair, RW, SBA  · Balance:   · Static Sitting: independent  · Dynamic Sitting: Independent  · Static Standing: SBA with RW  · Dynamic Standing: SBA with RW for ambulation      Therapeutic Activities and Exercises:  Patient also educated and instructed on HEP.   HEP included the following exercises: Hip ABD, Heel slides, marches, Long Arc Quads, Bilateral Heel raises and bilateral toes raises. 1x10 each. Pt easily fatigues on LLE>RLE.    Patient educated on role of therapy, goals of session, and benefits of mobilizing.   Discussed PT plan of care during hospitalization.   Patient educated that they need to call for assistance to mobilize out of bed.   Patient educated on how their diagnosis impacts their mobility within PT scope of practice.   Communication board updated.  All questions answered within PT scope of practice.        AM-PAC 6 CLICK MOBILITY  Total Score:17     Patient left up in chair with all lines intact, call button in reach, chair alarm on and nursing notified.    GOALS:   Multidisciplinary Problems     Physical Therapy Goals        Problem:  Physical Therapy Goal    Goal Priority Disciplines Outcome Goal Variances Interventions   Physical Therapy Goal     PT, PT/OT Ongoing, Progressing     Description:  Goals to be met by: 2020    Patient will increase functional independence with mobility by performin. Pt will perform bed mobility (rolling L/R, scooting, and bridging) with independence.  2. Pt will perform supine to/from sit with independence.  3. Pt will sit EOB x 15 mins with no UE support with independence assistance.  4. Pt with perform sit to stand with modified independence assistance and RW.  5. Pt will ambulate 30 feet with Supervision assistance and RW.  6. Pt will perform there-ex from handout x 15 reps to improve strength for functional mobility.                        History:     Past Medical History:   Diagnosis Date    A-fib     Acute on chronic congestive heart failure 2019    Anxiety     Arthritis     Asthma     Atrial fibrillation with rapid ventricular response     CHF (congestive heart failure) 2018    Chronic bronchitis     COPD (chronic obstructive pulmonary disease)     Depression     Diabetes mellitus, type 2     SANTIAGO (dyspnea on exertion)     Emphysema of lung     Fatigue     Hyperlipidemia     Hypertension     Symptomatic anemia 2019    Trouble in sleeping        Past Surgical History:   Procedure Laterality Date    APPENDECTOMY      BRONCHOSCOPY N/A 12/3/2019    Procedure: BRONCHOSCOPY;  Surgeon: Emmanuel Hickman MD;  Location[facility];  Service: Pulmonary;  Laterality: N/A;    EYE SURGERY      HYSTERECTOMY      INSERTION OF PACEMAKER  2019    TONSILLECTOMY         Time Tracking:     PT Received On: 20  PT Start Time: 1006     PT Stop Time: 1040  PT Total Time (min): 34 min     Billable Minutes: Evaluation 11, Therapeutic Activity 12 and Therapeutic Exercise 12      Glenny Araujo, PT  2020

## 2020-04-16 NOTE — PLAN OF CARE
POC reviewed with pt at 0500. Pt verbalized understanding. Questions and concerns addressed. No acute events overnight. Pt off BiPAP since yesterday, maintaining SPO2 with 4L NC, Pt progressing toward goals. Will continue to monitor. See flowsheets for full assessment and VS info

## 2020-04-16 NOTE — PLAN OF CARE
PT Eval complete and POC established.    Glenny Araujo, PT, DPT  2020      Problem: Physical Therapy Goal  Goal: Physical Therapy Goal  Description  Goals to be met by: 2020    Patient will increase functional independence with mobility by performin. Pt will perform bed mobility (rolling L/R, scooting, and bridging) with independence.  2. Pt will perform supine to/from sit with independence.  3. Pt will sit EOB x 15 mins with no UE support with independence assistance.  4. Pt with perform sit to stand with modified independence assistance and RW.  5. Pt will ambulate 30 feet with Supervision assistance and RW.  6. Pt will perform there-ex from handout x 15 reps to improve strength for functional mobility.       Outcome: Ongoing, Progressing

## 2020-04-16 NOTE — TELEPHONE ENCOUNTER
----- Message from Maribell Ocampo sent at 4/14/2020  3:38 PM CDT -----      ----- Message -----  From: Unique Bettencourt RN  Sent: 4/13/2020   3:42 PM CDT  To: Pharmacy Patient Assistance Team    Patient cannot afford her insulin.  Can you assist?    Thanks  Unique Bettencourt RN, CCRN-K, [facility]  Neuro-Critical Care   [phone]

## 2020-04-16 NOTE — PROGRESS NOTES
POC reviewed with pt and family at 1700. Pt verbalized understanding. Questions and concerns addressed. No acute events today. Pt off BiPAP since yesterday, Maintaining SPO2 w/ 4L NC. Diltiazem gtt off since 1105, pt tolerating well & HR maintaining <120. Pt progressing toward goals. Will continue to monitor. See flowsheets for full assessment and VS info.

## 2020-04-16 NOTE — PHYSICIAN QUERY
"PT Name: Chantell Herrera  MR #: [id number]    Physician Query Form - Heart  Condition Clarification     CDS/: Edgar Correa RN             Contact information: [email or web address]  This form is a permanent document in the medical record.     Query Date: April 16, 2020    By submitting this query, we are merely seeking further clarification of documentation. Please utilize your independent clinical judgment when addressing the question(s) below.    The medical record contains the following   Indicators     Supporting Clinical Findings Location in Medical Record   x BNP BNP 1,330    ED note 4/12    EF     x Radiology findings FINDINGS:  The right lung is clear.  There is an ill-defined left basilar opacity which obscures the hemidiaphragm concerning for an infiltrate with probable associated pleural fluid.  The cardiac silhouette is prominent.  There is calcification of the aorta and a left-sided cardiac defibrillator.  The osseous structures are unremarkable. Chest Xray 4/12    Echo Results      "Ascites" documented      "SOB" or "SANTIAGO" documented     x "Hypoxia" documented Chronic respiratory failure with hypoxia      Last Assessment & Plan 4/12/2020 Hospital Encounter Written 4/15/2020        Progress note 4/16 ([facility]) [facility]    x Heart Failure documented Acute on chronic congestive heart failure  · Concentric left ventricular remodeling.  · Low normal left ventricular systolic function. The estimated ejection fraction is 50%.  · Normal LV diastolic function.  · Normal right ventricular systolic function.  · Mild mitral sclerosis.  · There is moderate leaflet calcification of the Mitral Valve.  · Moderate tricuspid regurgitation.  · Mild to moderate pulmonary hypertension present.    H/P 4/13 ([facility]) [facility]    "Edema" documented      Diuretics/Meds      Treatment:      Other:      Heart failure (HF) can be acute, chronic or both. It is generally further specificed as systolic, diastolic, or combined. " Lastly, it is important to identify an underlying etiology if known or suspected.     Common clues to acute exacerbation:  Rapidly progressive symptoms (w/in 2 weeks of presentation), using IV diuretics to treat, using supplemental O2, pulmonary edema on Xray, MI w/in 4 weeks, and/or BNP >500    Systolic Heart Failure: is defined as chart documentation of a left ventricular ejection fraction (LVEF) less than 40%     Diastolic Heart Failure: is defined as a left ventricular ejection fraction (LVEF) greater than 40%   +      Evidence of diastolic dysfunction on echocardiography OR    Right heart catheterization wedge pressure above 12 mm Hg OR    Left heart catheterization left ventricular end diastolic pressure 18 mm Hg or above.    References: *American Heart Association    The clinical guidelines noted below are only system guidelines, and do not replace the providers clinical judgment.     Provider, please specify the diagnosis associated with above clinical findings  [ x  ] Acute on Chronic Diastolic Heart Failure -    Pre-existing diastoic HF diagnosis.  EF > 40%  and acute HF symptoms documented       [   ] Other Type of Heart Failure (please specify type):   [   ] Other (please specify):   [  ] Clinically Undetermined                           Please document in your progress notes daily for the duration of treatment until resolved and include in your discharge summary.

## 2020-04-17 NOTE — PLAN OF CARE
POC reviewed with pt at 0500. Pt verbalized understanding. Questions and concerns addressed. Maintaining SpO2 with 4L NC. No acute events overnight. Pt progressing toward goals. Will continue to monitor. See flowsheets for full assessment and VS info

## 2020-04-17 NOTE — PT/OT/SLP PROGRESS
Physical Therapy Treatment    Patient Name:  Chantell Herrera   MRN:  [id number]    Recommendations:     Discharge Recommendations:  home with home health   Discharge Equipment Recommendations: none   Barriers to discharge: None    Assessment:     Chantell Herrera is a 84 y.o. female admitted with a medical diagnosis of COPD (chronic obstructive pulmonary disease).  She presents with the following impairments/functional limitations:  weakness, impaired endurance, gait instability, impaired balance, impaired functional mobilty, decreased lower extremity function, impaired cardiopulmonary response to activity. Pt tolerated session well and performed all therapeutic exercises with Reji. Pt reporting she is close to her functional baseline. Pt would benefit from  services to improving endurance and functional mobility.     Rehab Prognosis: Good; patient would benefit from acute skilled PT services to address these deficits and reach maximum level of function.    Recent Surgery: * No surgery found *      Plan:     During this hospitalization, patient to be seen 4 x/week to address the identified rehab impairments via gait training, therapeutic activities, therapeutic exercises, neuromuscular re-education and progress toward the following goals:    · Plan of Care Expires:  05/16/20    Subjective     Chief Complaint: none verbalized  Patient/Family Comments/goals: pt eager to go home  Pain/Comfort:  · Pain Rating 1: 0/10  · Pain Rating Post-Intervention 1: 0/10      Objective:     Communicated with nursing prior to session.  Patient found up in chair with blood pressure cuff, pulse ox (continuous), oxygen, telemetry upon PT entry to room.     General Precautions: Standard, fall, respiratory   Orthopedic Precautions:N/A   Braces: N/A     Functional Mobility:  · Bed Mobility:   N/T, pt up in chair  · Transfers:     · Sit to Stand:  supervision with rolling walker  · Gait: 40 ft, RW, Supervision, VCing to decrease speed and  keep walker on the ground for safety; no LOBs or unsteadiness noted  · Balance:   · Static and Dynamic Sitting: independent  · Static Standing: Supervision  · Dynamic Standing: ambulation 40 ft with Supervision and RW; VCing to decrease speed and keep walker on the ground for safety; no LOBs or unsteadiness noted      AM-PAC 6 CLICK MOBILITY  Turning over in bed (including adjusting bedclothes, sheets and blankets)?: 4  Sitting down on and standing up from a chair with arms (e.g., wheelchair, bedside commode, etc.): 3  Moving from lying on back to sitting on the side of the bed?: 4  Moving to and from a bed to a chair (including a wheelchair)?: 3  Need to walk in hospital room?: 3  Climbing 3-5 steps with a railing?: 2  Basic Mobility Total Score: 19       Therapeutic Activities and Exercises:   Patient also educated, instructed, and given handout for HEP. HEP included the following exercises: Hip ABD, Heel slides, Ankle pumps, Hip ADD, Bridging, Straight Leg Raise, Marches, Long Arc Quads, Bilateral Heel raises, bilateral toe raises. Pt performed 2 x 10 each on BLEs.    Patient educated on role of therapy, goals of session, and benefits of mobilizing.   Discussed PT plan of care during hospitalization.   Patient educated that they need to call for assistance to mobilize out of bed.   Patient educated on how their diagnosis impacts their mobility within PT scope of practice.   Communication board updated.  All questions answered within PT scope of practice.        Patient left up in chair with all lines intact, call button in reach and nursing notified..    GOALS:   Multidisciplinary Problems     Physical Therapy Goals        Problem: Physical Therapy Goal    Goal Priority Disciplines Outcome Goal Variances Interventions   Physical Therapy Goal     PT, PT/OT Ongoing, Progressing     Description:  Goals to be met by: 2020    Patient will increase functional independence with mobility by performin. Pt will  perform bed mobility (rolling L/R, scooting, and bridging) with independence.  2. Pt will perform supine to/from sit with independence.  3. Pt will sit EOB x 15 mins with no UE support with independence assistance.  4. Pt with perform sit to stand with modified independence assistance and RW.  5. Pt will ambulate 30 feet with Supervision assistance and RW.  6. Pt will perform there-ex from handout x 15 reps to improve strength for functional mobility.                        Time Tracking:     PT Received On: 04/17/20  PT Start Time: 1021     PT Stop Time: 1059  PT Total Time (min): 38 min     Billable Minutes: Therapeutic Activity 10 and Therapeutic Exercise 28    Treatment Type: Treatment  PT/PTA: PT           Glenny Araujo, PT  04/17/2020

## 2020-04-17 NOTE — PLAN OF CARE
Pt is progressing towards goals as expected. Goals remain appropriate continue with current POC.     Glenny Araujo, PT, DPT  2020     Problem: Physical Therapy Goal  Goal: Physical Therapy Goal  Description  Goals to be met by: 2020    Patient will increase functional independence with mobility by performin. Pt will perform bed mobility (rolling L/R, scooting, and bridging) with independence.  2. Pt will perform supine to/from sit with independence.  3. Pt will sit EOB x 15 mins with no UE support with independence assistance.  4. Pt with perform sit to stand with modified independence assistance and RW.  5. Pt will ambulate 30 feet with Supervision assistance and RW.  6. Pt will perform there-ex from handout x 15 reps to improve strength for functional mobility.       Outcome: Ongoing, Progressing

## 2020-04-17 NOTE — NURSING
Pt verbalized understanding to home discharge instructions.Saline locks removed.Pt discharged to personal car via wheelchair accompanied per pct without noted distress.

## 2020-04-17 NOTE — PLAN OF CARE
04/17/20 1634   Post-Acute Status   Post-Acute Authorization Home Health   Post-Acute Placement Status Set-up Complete   Home Health Status Set-up Complete     RUBEN advised by MD that Pt will dc home today with HH. She is placing orders.    RUBEN received and sent orders to [facility] via . Placed note to them Pt will dc today.    Luciana Torres LCSW  Neurocritical Care   [facility]  [id number]

## 2020-04-17 NOTE — TELEPHONE ENCOUNTER
----- Message from Anya Goldstein sent at 4/17/2020 11:27 AM CDT -----  Hosp discharge 4/17   COPD    1 to 2 weeks f/u    Please call pt  [phone]    Thanks

## 2020-04-17 NOTE — PROGRESS NOTES
[facility]  [facility] Progress Note    Patient Name: Chantell Herrera  YOB: 1935  MRN: [id number]  CSN: [id number]       Admit Date: 4/13/2020   Attending: Alexei Foster MD   Location: 4487/9087 A Date of Procedure:  * Surgery not found *  Code Status: Full Code   Advanced Directive: <no information>  Isolation Precautions: No active isolations  Capacity: Full capacity  Hospital Day: 5      SUBJECTIVE:   HPI: Chantell Herrera is a 84 y.o. female who  has a past medical history of A-fib, Acute on chronic congestive heart failure (2/1/2019), Anxiety, Arthritis, Asthma, Atrial fibrillation with rapid ventricular response, CHF (congestive heart failure) (11/29/2018), Chronic bronchitis, COPD (chronic obstructive pulmonary disease), Depression, Diabetes mellitus, type 2, SANTIAGO (dyspnea on exertion), Emphysema of lung, Fatigue, Hyperlipidemia, Hypertension, Symptomatic anemia (1/22/2019), and Trouble in sleeping..   Chantell Herrera is an 84 y.o. female with  a PMH of Afib, CHF, COPD, HTN, HLD who presents to [facility] for anemia, Shortness of Breath, GI bleed and Afib RVR. She presented to OSH ED with c/o increased SOB x 1 day and general fatigue . She was found to hane Hgb of 5.7 and  2 units PRBC. Lacitc Acid elevated and concern for sepsis she was started on broad spectrum antibiotics. Flu and covid Neg at OSH. She is being admitted to [facility] for a higher level of care.     Hospital LOS: 4 days  ICU LOS: 4d 11h  Patient transferred to ICU    Review of Systems:ROS  Constitutional: Negative for chills and fever.   HENT: Negative for hearing loss.    Eyes: Negative for blurred vision and double vision.   Respiratory: Positive for cough and shortness of breath.    Cardiovascular: Positive for palpitations. Negative for chest pain.   Gastrointestinal: Negative for abdominal pain, constipation, diarrhea, nausea and vomiting.   Genitourinary: Negative for dysuria.   Musculoskeletal: Negative for  back pain.   Neurological: Negative for dizziness, speech change, focal weakness and headaches.   Psychiatric/Behavioral: The patient is nervous/anxious.      HISTORY:   Review of patient's allergies indicates:  No Known Allergies  Past Medical History:   Diagnosis Date    A-fib     Acute on chronic congestive heart failure 2019    Anxiety     Arthritis     Asthma     Atrial fibrillation with rapid ventricular response     CHF (congestive heart failure) 2018    Chronic bronchitis     COPD (chronic obstructive pulmonary disease)     Depression     Diabetes mellitus, type 2     SANTIAGO (dyspnea on exertion)     Emphysema of lung     Fatigue     Hyperlipidemia     Hypertension     Symptomatic anemia 2019    Trouble in sleeping      Past Surgical History:   Procedure Laterality Date    APPENDECTOMY      BRONCHOSCOPY N/A 12/3/2019    Procedure: BRONCHOSCOPY;  Surgeon: Emmanuel Hickman MD;  Location: [facility] OR;  Service: Pulmonary;  Laterality: N/A;    EYE SURGERY      HYSTERECTOMY      INSERTION OF PACEMAKER  2019    TONSILLECTOMY       Social History     Tobacco Use    Smoking status: Former Smoker     Last attempt to quit: 8/3/2000     Years since quittin.7    Smokeless tobacco: Never Used   Substance Use Topics    Alcohol use: No    Drug use: No      OBJECTIVE:   Physical Exam   Physical Exam   GA: Alert, anxious, no acute distress.   HEENT: No scleral icterus or JVD.   Pulmonary: diminished throughout , Wheezing  Cardiac: irregular   Abdominal: Bowel sounds present x 4.   Skin: No jaundice, rashes, or visible lesions.  Psych: Mental Status:  Alert follows pleasant   Neuro:  --GCS: E4 V5 M6  --CN II-XII grossly intact.   --Pupils 3mm, PERRL.   --Corneal reflex, gag, cough intact.  --LUE strength: 5/5  --RUE strength: 5/5  --LLE strength: 5/5  --RLE strength: 5/5   --sensory: intact to soft touch and pain throughout  --Reflexes: not tested  --Gait: deferred  "    Lines/Drains:  Lines/Drains/Airways     None               Ventilator Data: N/A         Vital Signs (Last 24H):  /65   Pulse 101   Temp 98.8 °F (37.1 °C) (Oral)   Resp 15   Ht 5' 2" (1.575 m)   Wt 62.9 kg (138 lb 10.7 oz)   SpO2 98%   Breastfeeding? No   BMI 25.36 kg/m²      Vital Signs Range (Last 24H):  Temp:  [97.9 °F (36.6 °C)-98.8 °F (37.1 °C)]   Pulse:  []   Resp:  [14-50]   BP: (113-168)/(59-93)   SpO2:  [87 %-100 %]  Wt Readings from Last 4 Encounters:   04/13/20 62.9 kg (138 lb 10.7 oz)   04/12/20 60 kg (132 lb 4.4 oz)   03/10/20 55.7 kg (122 lb 12.7 oz)   02/16/20 55.7 kg (122 lb 11 oz)           Date 04/17/20 0700 - 04/18/20 0659   Shift 4622-4658 8286-0027 0556-1290 24 Hour Total   INTAKE   I.V.(mL/kg) 5(0.1)   5(0.1)   Shift Total(mL/kg) 5(0.1)   5(0.1)   OUTPUT   Urine(mL/kg/hr) 310   310   Shift Total(mL/kg) 310(4.9)   310(4.9)   Weight (kg) 62.9 62.9 62.9 62.9      Intake/Output - Last 3 Shifts       04/15 0700 - 04/16 0659 04/16 0700 - 04/17 0659 04/17 0700 - 04/18 0659    P.O.  360     I.V. (mL/kg) 152.7 (2.4) 95.2 (1.5) 5 (0.1)    Blood       IV Piggyback 100      Total Intake(mL/kg) 252.7 (4) 455.2 (7.2) 5 (0.1)    Urine (mL/kg/hr) 150 (0.1) 3900 (2.6) 310 (0.7)    Stool  0     Total Output 150 3900 310    Net +102.7 -3444.8 -305           Urine Occurrence 4 x      Stool Occurrence 1 x 0 x            Diagnostic Results::   Laboratory:  Recent Labs   Lab 04/12/20  1935  04/13/20  0210 04/13/20  0458 04/13/20  0814  04/14/20  0307 04/15/20  0354 04/16/20  0326 04/17/20  0209   WBC  --   --  21.32* 29.04* 23.25*  --  13.77* 12.40 9.70 9.15   HGB  --   --  8.4*  8.4* 8.6* 7.8*   < > 6.9* 8.4* 9.0* 9.5*   HCT  --   --  28.3* 27.6* 25.5*  --  20.9* 25.7* 28.6* 30.7*   PLT  --   --  186 167 143*  --  124* 121* 135* 149*   LYMPH  --    < > 2.9*  0.6* 1.0*  0.3* 0.9*  0.2*  --  1.7*  0.2* 3.4*  0.4* 2.9*  0.3* 5.5*  0.5*   CPKMB 1.1  --  1.2  --   --   --   --   --   --   " --    TROPONINI 0.047*  --  0.034*  --   --   --   --   --   --   --    MB 12.2*  --  6.0*  --   --   --   --   --   --   --     < > = values in this interval not displayed.     REF: Lymph% 18-48%  Recent Labs   Lab 04/12/20  1935 04/13/20  0210 04/13/20  0224  04/15/20  0355 04/16/20  0326 04/17/20  0209    139  139  --    < > 134* 136 136   K 4.7 4.7  4.7  --    < > 4.1 4.5 4.2   CL 97 98  98  --    < > 93* 97 93*   CO2 30* 30*  30*  --    < > 28 32* 34*   BUN 23 24*  24*  --    < > 28* 24* 27*   CREATININE 1.0 0.9  0.9  --    < > 1.2 1.1 1.0   * 118*  118*  --    < > 243* 221* 136*   CALCIUM 8.2* 8.5*  8.5*  --    < > 8.3* 8.4* 8.7   MG 1.6 1.7  --    < > 1.9 2.1 1.8   PHOS 3.9 4.3  --    < > 3.0 3.7 3.9   ANIONGAP 13 11  11  --    < > 13 7* 9   ALT 15 29  29  --    < > 19 22 46*   AST 12 34  34  --    < > 12 11 26   ALKPHOS 93 118  118  --    < > 68 78 101   BILITOT 0.8 5.4*  5.4*  --    < > 1.2* 1.2* 1.3*   PROT 5.8* 6.6  6.6  --    < > 5.6* 5.9* 6.1   ALBUMIN 3.6 4.0  4.0  --    < > 3.4* 3.5 3.6   LABPROT 13.6*  --  12.9*  --   --   --   --    INR 1.3*  --  1.3*  --   --   --   --    APTT 21.9  --  <21.0  --   --   --   --    CHOL  --  129  --   --   --   --   --    TRIG  --  87  --   --   --   --   --    HDL  --  58  --   --   --   --   --    DDIMER 1.76*  --   --   --   --   --   --    FERRITIN 1,297*  --   --   --   --   --   --    CPK 9*  9* 20  --   --   --   --   --     < > = values in this interval not displayed.     Recent Labs   Lab 04/12/20  1935 04/13/20 0224 04/13/20 0233   PH  --   --  7.447   PCO2  --   --  48.7*   PO2  --   --  52*   HCO3  --   --  33.7*   POCSATURATED  --   --  87*   BE  --   --  10   LACTATE 6.4* 2.4*  --      Lab Results   Component Value Date     (H) 04/17/2020    HGBA1C 5.4 04/13/2020    ALT 46 (H) 04/17/2020    AST 26 04/17/2020    ALKPHOS 101 04/17/2020    BILITOT 1.3 (H) 04/17/2020    PROT 6.1 04/17/2020    ALBUMIN 3.6 04/17/2020     LABPROT 12.9 (H) 04/13/2020    INR 1.3 (H) 04/13/2020    APTT <21.0 04/13/2020    CRP 3.0 11/20/2019    FERRITIN 1,297 (H) 04/12/2020    DDIMER 1.76 (H) 04/12/2020    CPK 20 04/13/2020    TROPONINI 0.034 (H) 04/13/2020    CPKMB 1.2 04/13/2020    MB 6.0 (H) 04/13/2020    LACTATE 2.4 (H) 04/13/2020     Lab Results   Component Value Date    COLORU Yellow 04/13/2020    SPECGRAV >1.030 (A) 04/13/2020    PHUR 6.0 04/13/2020    PROTEINUA 1+ (A) 04/13/2020    BACTERIA Rare 04/13/2020    NITRITE Negative 04/13/2020    LEUKOCYTESUR Negative 04/13/2020    UROBILINOGEN 1.0 01/15/2020    HYALINECASTS 1 04/13/2020       Results for orders placed or performed during the hospital encounter of 04/12/20 (from the past 1008 hour(s))   Blood culture   Result Value Ref Range    Blood Culture, Routine No Growth after 4 days.     Influenza A & B by Molecular   Result Value Ref Range    Influenza A, Molecular Negative Negative    Influenza B, Molecular Negative Negative    Flu A & B Source Nasal swab    Blood culture   Result Value Ref Range    Blood Culture, Routine No Growth after 4 days.     Group A Strep, Molecular   Result Value Ref Range    Group A Strep, Molecular Negative Negative         TTE:  Results for orders placed or performed during the hospital encounter of 01/10/20   Echo Color Flow Doppler? Yes   Result Value Ref Range    BSA 1.67 m2    LA WIDTH 3.15 cm    PV PEAK VELOCITY 0.86 cm/s    LVIDD 4.22 3.5 - 6.0 cm    IVS 1.04 0.6 - 1.1 cm    PW 0.98 0.6 - 1.1 cm    Ao root annulus 2.89 cm    LVIDS 2.25 2.1 - 4.0 cm    FS 47 28 - 44 %    LA volume 43.84 cm3    STJ 2.72 cm    LV mass 140.23 g    LA size 3.17 cm    RVDD 3.08 cm    Left Ventricle Relative Wall Thickness 0.46 cm    AV mean gradient 8 mmHg    AV valve area 1.90 cm2    AV Velocity Ratio 0.76     AV index (prosthetic) 0.62     E/A ratio 3.03     E wave decelartion time 229.71 msec    IVRT 0.12 msec    LVOT diameter 1.97 cm    LVOT area 3.0 cm2    LVOT peak sudhir 1.39  m/s    LVOT peak VTI 24.70 cm    Ao peak shimon 1.84 m/s    Ao VTI 39.59 cm    LVOT stroke volume 75.25 cm3    AV peak gradient 14 mmHg    MV Peak E Shimon 0.94 m/s    TR Max Shimon 2.98 m/s    MV Peak A Shimon 0.31 m/s    LV Systolic Volume 17.18 mL    LV Systolic Volume Index 10.6 mL/m2    LV Diastolic Volume 79.28 mL    LV Diastolic Volume Index 49.10 mL/m2    LA Volume Index 27.1 mL/m2    LV Mass Index 87 g/m2    RA Major Axis 5.80 cm    Left Atrium Minor Axis 5.13 cm    Left Atrium Major Axis 5.20 cm    Triscuspid Valve Regurgitation Peak Gradient 36 mmHg    Narrative    · Concentric left ventricular remodeling.  · Low normal left ventricular systolic function. The estimated ejection   fraction is 50%.  · Normal LV diastolic function.  · Normal right ventricular systolic function.  · Mild mitral sclerosis.  · There is moderate leaflet calcification of the Mitral Valve.  · Moderate tricuspid regurgitation.  · Mild to moderate pulmonary hypertension present.        MARTÍNEZ:  No results found for this or any previous visit.  Stress Test:  No results found for this or any previous visit.   LHC:  No results found for this or any previous visit.   MEDICATIONS:     PTA Medications   Medication Sig    albuterol (PROVENTIL) 2.5 mg /3 mL (0.083 %) nebulizer solution Take 3 mLs (2.5 mg total) by nebulization every 6 (six) hours as needed for Wheezing.    amiodarone (PACERONE) 200 MG Tab Take 100 mg by mouth once daily.    citalopram (CELEXA) 20 MG tablet Take 1 tablet (20 mg total) by mouth once daily.    metoprolol succinate (TOPROL-XL) 50 MG 24 hr tablet Take 50 mg by mouth 2 (two) times daily.     pantoprazole (PROTONIX) 40 MG tablet Take 1 tablet (40 mg total) by mouth once daily. for 14 days    predniSONE (DELTASONE) 20 MG tablet Take 20 mg by mouth once daily.    rOPINIRole (REQUIP) 1 MG tablet TAKE 1 TABLET  EVERY EVENING.    torsemide (DEMADEX) 20 MG Tab Take 1 tablet (20 mg total) by mouth once daily.    traZODone  (DESYREL) 50 MG tablet Take 50 mg by mouth every evening.    ACCU-CHEK SOFTCLIX LANCETS Misc 1 each by Misc.(Non-Drug; Combo Route) route once daily.    alcohol swabs (BD ALCOHOL SWABS) PadM Apply 1 each topically as needed.    aspirin (ECOTRIN) 81 MG EC tablet Take 1 tablet (81 mg total) by mouth once daily.    baclofen (LIORESAL) 10 MG tablet Take 10 mg by mouth 3 (three) times daily.    blood glucose control high,low (ACCU-CHEK KUMAR CONTROL SOLN) Soln 1 each by Misc.(Non-Drug; Combo Route) route as needed.    blood sugar diagnostic (ACCU-CHEK KUMAR PLUS TEST STRP) Strp 1 strip by Misc.(Non-Drug; Combo Route) route once daily.    blood-glucose meter (ACCU-CHEK KUMAR PLUS METER) Misc 1 each by Misc.(Non-Drug; Combo Route) route once daily.    cholecalciferol, vitamin D3, (VITAMIN D3) 2,000 unit Tab Take 2,000 Units by mouth once daily.    insulin detemir U-100 (LEVEMIR FLEXTOUCH) 100 unit/mL (3 mL) SubQ InPn pen Inject 15 Units into the skin every evening.    ipratropium (ATROVENT) 0.02 % nebulizer solution Take 500 mcg by nebulization 4 (four) times daily.     potassium chloride SA (K-DUR,KLOR-CON) 20 MEQ tablet     pravastatin (PRAVACHOL) 40 MG tablet Take 40 mg by mouth once daily.    weigh scale Misc Record daily weights     Inpatient Medications:  Scheduled Meds:   albuterol-ipratropium  3 mL Nebulization Q4H WAKE    amiodarone  100 mg Oral Daily    citalopram  20 mg Oral Daily    insulin detemir U-100  15 Units Subcutaneous QHS    metoprolol tartrate  25 mg Oral TID    pantoprazole  40 mg Oral BID AC    predniSONE  20 mg Oral Daily    torsemide  20 mg Oral Daily    traZODone  50 mg Oral QHS     Continuous Infusions:   diltiaZEM 125 mg in dextrose 5% 125 mL infusion Stopped (04/16/20 1105)     PRN Meds:.sodium chloride, acetaminophen, albuterol-ipratropium, Dextrose 10% Bolus, glucagon (human recombinant), glucose, glucose, insulin aspart U-100, magnesium oxide, magnesium oxide,  ondansetron, potassium chloride 10%, potassium chloride 10%, potassium chloride 10%, potassium, sodium phosphates, potassium, sodium phosphates, potassium, sodium phosphates, rOPINIRole, sodium chloride 0.9%  ASSESSMENT & PLAN:    This is a 84 y.o. female who  has a past medical history of A-fib, Acute on chronic congestive heart failure (2/1/2019), Anxiety, Arthritis, Asthma, Atrial fibrillation with rapid ventricular response, CHF (congestive heart failure) (11/29/2018), Chronic bronchitis, COPD (chronic obstructive pulmonary disease), Depression, Diabetes mellitus, type 2, SANTIAGO (dyspnea on exertion), Emphysema of lung, Fatigue, Hyperlipidemia, Hypertension, Symptomatic anemia (1/22/2019), and Trouble in sleeping., admitted on 4/13/2020 for COPD exarcebation now improving with some pedal edema today, will resume home Torsemide 20mg dly    Active Ambulatory Problems     Diagnosis Date Noted    HTN (hypertension) 06/19/2014    ACE-inhibitor cough 06/19/2014    Cholelithiases 01/28/2015    COPD (chronic obstructive pulmonary disease) 05/03/2015    Hyperlipidemia 05/03/2015    Cervical radiculopathy 08/10/2015    Anxiety state 08/10/2015    Insomnia 08/10/2015    Major depressive disorder with single episode, in full remission 05/10/2016    Hyponatremia 05/10/2016    CKD (chronic kidney disease), stage III 05/10/2016    History of intraocular lens implant 05/10/2016    Tortuous aorta 05/10/2016    Essential tremor 01/03/2017    Dyslipidemia 01/03/2017    Excessive involuntary blinking 01/03/2017    Oxygen dependent 01/03/2017    Chronic respiratory failure with hypoxia 05/17/2017    RLS (restless legs syndrome) 05/17/2017    Elevated d-dimer 11/12/2018    Epigastric pain 11/13/2018    Atrial fibrillation with rapid ventricular response 11/16/2018    Symptomatic anemia 01/22/2019    Acute on chronic respiratory failure with hypoxia 01/22/2019    Acute on chronic congestive heart failure  02/01/2019    Anemia 09/01/2019    Chronic CHF 09/01/2019    Diabetes mellitus type 2, uncontrolled 09/02/2019    Subconjunctival hemorrhage of right eye 09/02/2019    COPD with acute exacerbation 11/28/2019    Cough with hemoptysis 12/03/2019    Anorexia 01/02/2020    Weakness 01/13/2020    Choking due to food (regurgitated) 01/14/2020     Resolved Ambulatory Problems     Diagnosis Date Noted    Shortness of breath     COPD exacerbation 05/03/2015    COPD bronchitis 03/08/2016    Acute renal insufficiency 03/09/2016    COPD with acute bronchitis 05/17/2017    Current chronic use of systemic steroids 05/28/2018    COPD exacerbation 11/11/2018    Pneumonia 01/22/2019    COPD exacerbation 09/01/2019    Acute cystitis without hematuria 01/13/2020    Pneumonia of left lower lobe due to infectious organism 01/17/2020     Past Medical History:   Diagnosis Date    A-fib     Anxiety     Arthritis     Asthma     CHF (congestive heart failure) 11/29/2018    Chronic bronchitis     Depression     Diabetes mellitus, type 2     SANTIAGO (dyspnea on exertion)     Emphysema of lung     Fatigue     Hypertension     Trouble in sleeping        Neuro:    diltiaZEM 125 mg in dextrose 5% 125 mL infusion Stopped (04/16/20 1105)      Recent Labs   Lab 04/13/20  0210   TRIG 87          CV:  Temp:  [97.9 °F (36.6 °C)-98.8 °F (37.1 °C)] 98.8 °F (37.1 °C)  Pulse:  [] 101  Resp:  [14-50] 15  SpO2:  [87 %-100 %] 98 %  BP: (113-168)/(59-93) 132/65    ECHO:No results found for this or any previous visit. Results for orders placed or performed during the hospital encounter of 04/12/20   EKG 12-lead    Collection Time: 04/12/20  7:25 PM    Narrative    Test Reason : R06.02    Vent. Rate : 140 BPM     Atrial Rate : 280 BPM     P-R Int : 000 ms          QRS Dur : 100 ms      QT Int : 318 ms       P-R-T Axes : 095 -78 106 degrees     QTc Int : 485 ms    Atrial flutter with 2:1 A-V conduction  Left axis  deviation  Nonspecific ST and T wave abnormality  Abnormal ECG  When compared with ECG of 16-FEB-2020 15:08,  Atrial flutter has replaced Electronic ventricular pacemaker  Vent. rate has increased BY  68 BPM  Confirmed by EDINSON BOBO MD ([id number]) on 4/13/2020 9:36:32 AM    Referred By: [email or web address]   SELF           Confirmed By:EDINSON BOBO MD        Recent Labs   Lab 04/12/20  1935 04/13/20  0210 04/13/20  0224 04/13/20  0233  04/16/20  0326 04/17/20  0209   PH  --   --   --  7.447  --   --   --    PCO2  --   --   --  48.7*  --   --   --    PO2  --   --   --  52*  --   --   --    HCO3  --   --   --  33.7*  --   --   --    POCSATURATED  --   --   --  87*  --   --   --    BE  --   --   --  10  --   --   --    LACTATE 6.4*  --  2.4*  --   --   --   --    ANIONGAP 13 11  11  --   --    < > 7* 9   CPKMB 1.1 1.2  --   --   --   --   --    TROPONINI 0.047* 0.034*  --   --   --   --   --     < > = values in this interval not displayed.     -If hypertensive, keep BP <180/90  -If hypotensive, Maintain MAP's b/t 60-70 with vasopressor support  -Consideration for stress dose steroids if multiple vasopressors required.   -Monitor for signs of viral CM/myocarditis/RV HF      Pulmonary:   [unfilled]  Temp:  [97.9 °F (36.6 °C)-98.8 °F (37.1 °C)] 98.8 °F (37.1 °C)  Pulse:  [] 101  Resp:  [14-50] 15  SpO2:  [87 %-100 %] 98 %  BP: (113-168)/(59-93) 132/65    -Initial vent settings: ACVC+, TV 6ml/kg IBW, FiO2 100%, PEEP 5      FiO2 0.3 0.4 0.5 0.6 0.7 0.8 0.9 1.0   PEEP 5 5-8 8-10 10 10-14 14 14-18 18-24     No results found in the last 24 hours.   No results for input(s): PH, PCO2, PO2, HCO3, POCSATURATED, BE, CRP, PROCAL, DDIMER, FERRITIN, CPK in the last 72 hours.    Invalid input(s): CK-MB  --Keep sats b/t 92-96%. If FiO2 >50% and/or >40L/min flow ? INTUBATE   - Encourage IS qhr once extubated     ID:      Microbiology Results (last 7 days)     ** No results found for the last 168 hours. **         Recent Labs   Lab  04/15/20  0354 04/15/20  0355 04/16/20  0326 04/17/20  0209   WBC 12.40  --  9.70 9.15   *  --  135* 149*   LYMPH 3.4*  0.4*  --  2.9*  0.3* 5.5*  0.5*   ALKPHOS  --  68 78 101   ALT  --  19 22 46*   AST  --  12 11 26   BILITOT  --  1.2* 1.2* 1.3*       Antimicrobials (From admission, onward)    None        -NP swab if not intubated, tracheal aspirate if intubated.  -Follow-up blood/urine/respiratory tract cultures. NGTD.  -Monitor temp. Dominique Hugger PRN hypothermia.  --Acetaminophen PRN hyperthermia  -Daily CBC w diff, LDH, CRP, d-dimer, ferritin, and CK-MB to trend immune and inflammatory response.   -POCUS PRN to assess any acute changes to heart and lung function  -CXR and ABG unless clinically contraindicated.  -Special isolation and aspiration precautions ordered if applicable and followed.  -On CAP therapy; deescalate antibiotics if cultures negative    [facility] Mortality Risk score (multilobular infiltrates:5, lympho <0.8*109/L:4, +bact Cx:4, smoker:3, former smoker:2, HTN:2, >/= 61yo:2) ==> 12pt:15%, 15pt:33%, 17pt:50%, 20pt:70% 90d mortality risk    CURB-65 (Confusion,Urea>7,RR>30,BP<90/60,>65) ==> 0-1 (Home Tx), 2 (admit to floor), 3-5 (admit to ICU)     Renal/:   Recent Labs   Lab 04/15/20  0355 04/16/20  0326 04/17/20  0209   * 136 136   K 4.1 4.5 4.2   CO2 28 32* 34*   CL 93* 97 93*   BUN 28* 24* 27*   CREATININE 1.2 1.1 1.0   ANIONGAP 13 7* 9           Intake/Output Summary (Last 24 hours) at 4/17/2020 1343  Last data filed at 4/17/2020 1300  Gross per 24 hour   Intake 430 ml   Output 3810 ml   Net -3380 ml      Wt Readings from Last 3 Encounters:   04/13/20 62.9 kg (138 lb 10.7 oz)   04/12/20 60 kg (132 lb 4.4 oz)   03/10/20 55.7 kg (122 lb 12.7 oz)      -(Not currently on dialysis)  -Trending renal function daily.   -Strict I/O's.   -Replace lytes PRN  -If Nephrology consulted, appreciate recs. **     FEN/GI:   GI Medications (From admission, onward)    Start     Stop Route Frequency  Ordered    04/13/20 1615  pantoprazole EC tablet 40 mg      -- Oral 2 times daily before meals 04/13/20 1507    04/13/20 0312  ondansetron injection 4 mg      -- IV Every 8 hours PRN 04/13/20 0212        Recent Labs   Lab 04/15/20  0355 04/16/20  0326 04/17/20  0209   ALT 19 22 46*   AST 12 11 26   ALKPHOS 68 78 101   BILITOT 1.2* 1.2* 1.3*   PROT 5.6* 5.9* 6.1   ALBUMIN 3.4* 3.5 3.6     -Monitor liver function daily.   --tolerating oral feeds  -GI ulcer prophylaxis: pantoprazole or famotidine until tolerating feeds     Endocrine:   Lab Results   Component Value Date    HGBA1C 5.4 04/13/2020      Recent Labs   Lab 04/14/20  1703 04/14/20  2015 04/15/20  1127 04/15/20  1716 04/15/20  2203 04/16/20  1115 04/16/20  1722 04/16/20  2122 04/17/20  0628 04/17/20  1101   POCTGLUCOSE 259* 278* 92 186* 241* 97 174* 176* 71 94      -Hold if on home oral diabetic agents  -Goal glucose 140-180  -POCT glucose checks Q6H while NPO.   -Sliding scale insulin and/or insulin drip ordered, if necessary for goal.      Hematology/Oncology:   Recent Labs   Lab 04/15/20  0354 04/16/20  0326 04/17/20  0209   WBC 12.40 9.70 9.15   HGB 8.4* 9.0* 9.5*   HCT 25.7* 28.6* 30.7*   * 135* 149*     VTE Risk Mitigation (From admission, onward)         Ordered     IP VTE LOW RISK PATIENT  Once      04/13/20 0153     Place sequential compression device  Until discontinued      04/13/20 0153               -Trending CBC daily and monitor for signs of bleeding.   -Transfuse for Hgb <7.      Appreciate Consults:  Problem List as of 4/17/2020 Reviewed: 3/18/2020  5:52 PM by Kari Gaspar MD       Neuro    Cervical radiculopathy    Last Assessment & Plan 1/22/2019 Hospital Encounter Written 1/26/2019  2:38 PM by Kari Gaspar MD     Hold baclofen for now           Essential tremor    RLS (restless legs syndrome)    Last Assessment & Plan 1/22/2019 Hospital Encounter Written 1/26/2019  2:39 PM by Kari Gaspar MD     Cont ropinirole               Psychiatric    Anxiety state    Last Assessment & Plan 9/1/2019 Hospital Encounter Written 9/4/2019 11:28 AM by Carrol Funes NP     Resume her citalopram.           Major depressive disorder with single episode, in full remission    Last Assessment & Plan 11/28/2019 Hospital Encounter Written 12/1/2019 11:39 AM by Chicho Fisher MD     Continue trazodone and Celexa              Ophtho    History of intraocular lens implant    Excessive involuntary blinking    Subconjunctival hemorrhage of right eye    Last Assessment & Plan 9/1/2019 Hospital Encounter Written 9/4/2019 11:36 AM by Carrol Funes NP     She is on eliquis   Cold packs  Avoid scratching .              ENT    Choking due to food (regurgitated)    Last Assessment & Plan 1/10/2020 Hospital Encounter Written 1/20/2020  9:11 AM by Milo Estevez MD       Speech eval   Given pt's history of reflux-related symptoms, globus sensation, no overt s/s of aspiration, frequent belching, and poor positioning during and after PO intake, pt likely presents with GI-related dysfunction(s) resulting in discomfort with PO intake. Recommend initiation of medical management of potential GI dysfunctions to develop most appropriate treatment plan. As pt's symptoms appear to be related to probable reflux, completion of MBSS is not warranted at this time. SLP to follow up for diet tolerance only.   Getting protonix .            Pulmonary    ACE-inhibitor cough    * (Principal) COPD (chronic obstructive pulmonary disease)    Last Assessment & Plan 4/12/2020 Hospital Encounter Edited 4/15/2020  3:13 PM by Katlyn Mansfield PA-C     Baseline 3L home O2 and Bipap prn. Admitted for COPD exacerbation. Covid negative.   -- Solumedrol 125 x 1  -- duo nebs q 4   -- Continue home prednisone   -- Elevated lactic acid on admission, started on vancomycin and zoysn  -- cultures NGTD, afebrile, no leukocytosis  -- Bipap PRN  -- O2 > 88%, currently 99% on 4L NC  -- OOB  today and work with PT today. Patient uses walker at home at baseline with home O2 3L   -- Discharge home today with home hospice             Oxygen dependent    Last Assessment & Plan 1/22/2019 Hospital Encounter Written 1/26/2019  2:39 PM by Kari Gaspar MD     Does qualify for O2           Chronic respiratory failure with hypoxia    Last Assessment & Plan 4/12/2020 Hospital Encounter Written 4/15/2020  3:13 PM by Katlyn Mansfield PA-C     See copd          Acute on chronic respiratory failure with hypoxia    Last Assessment & Plan 1/10/2020 Hospital Encounter Written 1/20/2020  9:10 AM by Milo Estevez MD     She is actually pretty close to her baseline, but she is short of breath all of the time.  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 With superimposed pulm edema, slightly more labored.  Cont O2 support  Repeat cxr and add mucinex and IPV- CXR- No significant interval change as compared to 01/10/2020           COPD with acute exacerbation    Last Assessment & Plan 1/10/2020 Hospital Encounter Written 1/20/2020  9:10 AM by Milo Estevez MD                Cough with hemoptysis    Last Assessment & Plan 1/10/2020 Hospital Encounter Written 1/20/2020  9:10 AM by Milo Estevez MD     On  levaquin day 4              Cardiac/Vascular    HTN (hypertension)    Last Assessment & Plan 11/28/2019 Hospital Encounter Written 12/1/2019 11:39 AM by Chicho Fisher MD     Continue amlodipine 5 mg daily  Continue diltiazem extended release 240 mg daily  Continue losartan 50 mg daily  Continue metoprolol 25 mg p.o. b.i.d.  Continue Lasix every other day as needed for swelling           Hyperlipidemia    Last Assessment & Plan 11/11/2018 Hospital Encounter Written 11/18/2018 11:44 AM by Chicho Fisher MD     Cont home pravastatin           Tortuous aorta    Dyslipidemia    Last Assessment & Plan 4/12/2020 Hospital Encounter Written 4/14/2020  2:59 PM by Katlyn Mansfield PA-C      Lipid panel WNL         Atrial fibrillation with rapid ventricular response    Last Assessment & Plan 4/12/2020 Hospital Encounter Written 4/15/2020  3:14 PM by Katlyn Mansfield PA-C     -- restart home amiodarone, metoprolol  -- cardizem gtt for hr <130, wean diltiazem gtt          Acute on chronic congestive heart failure    Last Assessment & Plan 4/12/2020 Hospital Encounter Written 4/15/2020  3:14 PM by Katlyn Mansfield PA-C     · Concentric left ventricular remodeling.  · Low normal left ventricular systolic function. The estimated ejection fraction is 50%.  · Normal LV diastolic function.  · Normal right ventricular systolic function.  · Mild mitral sclerosis.  · There is moderate leaflet calcification of the Mitral Valve.  · Moderate tricuspid regurgitation.  · Mild to moderate pulmonary hypertension present.         Chronic CHF    Last Assessment & Plan 9/1/2019 Hospital Encounter Written 9/4/2019 11:28 AM by Carrol Funes NP     Cont ARB, diltiazem, and hold lasix for now- hold lasix till Sat repeat bmp Monday            Renal/    Hyponatremia    Last Assessment & Plan 1/22/2019 Hospital Encounter Written 1/26/2019  2:38 PM by Kari Gaspar MD     Hold SSRI  Hold lasix  Getting blood with NS   Repeat cmp  Na improving 136 today    Resolved         CKD (chronic kidney disease), stage III    Last Assessment & Plan 4/12/2020 Hospital Encounter Written 4/15/2020  3:13 PM by Katlyn Mansfield PA-C     -- Cr 1  -- Monitor daily            Lactic acidosis    Last Assessment & Plan 4/12/2020 Hospital Encounter Written 4/13/2020  2:55 AM by Matt Jade NP     - - started on Vanc and zosyn              Oncology    Symptomatic anemia    Last Assessment & Plan 1/22/2019 Hospital Encounter Written 1/26/2019  2:39 PM by Kari Gaspar MD     Not fe deficient, b12 normal, occult negative. H/H 6/21, given 2 units PRBC, repeat CBC pending  eliquis held    Check retic and folate- normal  MMA, will need  to see hematology for potential bm - she has a very high MCV with normal b12.    Counts holding.  CT of chest negative for PE         Anemia    Last Assessment & Plan 4/12/2020 Hospital Encounter Written 4/15/2020  3:14 PM by Katlyn Mansfield PA-C     --Given 2 units PRBC upon admit  --(4/14)Hgb dropped this AM to 6.9, transfused 1 more unit Hgb today 04/17/2020 is 9.5 ok to discharge home to follow up with pCP within 1 week  --Occult fecal test negative  --Daily CBC, transfuse for Hgb < 7  --Likely anemia of chronic disease               Endocrine    Diabetes mellitus type 2, uncontrolled    Last Assessment & Plan 4/12/2020 Hospital Encounter Written 4/15/2020  3:14 PM by Katlyn Mansfield PA-C     - A1c  - SSI with accu checks              GI    Cholelithiases    Last Assessment & Plan 1/10/2020 Hospital Encounter Written 1/20/2020  9:09 AM by Milo Estevez MD     R/o cholecystitis.  U/s ordered and no signs of cholecystitis  Cholelithiasis no cholecystitis, bilirubin normal   No intervention at this time   asymptomatic         Epigastric pain    Last Assessment & Plan 1/10/2020 Hospital Encounter Written 1/20/2020  9:10 AM by Milo Estevez MD     U/S shows some ascites (resolved)  resolved           GIB (gastrointestinal bleeding)    Last Assessment & Plan 4/12/2020 Hospital Encounter Written 4/15/2020  3:14 PM by Katlyn Mansfield PA-C     --neg for occult blood  -- ppi BID  -- cbc daily             Other    Insomnia    Last Assessment & Plan 1/10/2020 Hospital Encounter Written 1/20/2020  9:10 AM by Milo Estevez MD     Continue  on remeron           Elevated d-dimer    Last Assessment & Plan 11/11/2018 Hospital Encounter Written 11/18/2018 11:44 AM by Chicho Fisher MD     LE  ordered- negative  lovenox DVT proph  Lower suspicion for PE           Anorexia    Last Assessment & Plan 1/10/2020 Hospital Encounter Written 1/20/2020  9:10 AM by Milo Estevez MD     Switch from  periactin to remeron.           Weakness    Last Assessment & Plan 1/10/2020 Hospital Encounter Written 1/20/2020  9:10 AM by Milo Estevez MD     Add PT   ambulated 75 ft with PT yesterday ; goal 200ft   1/16 Performed Gait trng using RW and portable oxygen with increased gait distances x 150 feet (twice) with supervision  1/17/2020 Bilateral LE eccentric strengthening ex x 10 reps on each such as Ankle Pumps, Heel Slides, Hip abd/add and Long arch quads. Provided pt with rest periods and deep breathing  in between ex                 Dispo:  Expected Discharge Date: 4/17/2020  -continue present mgt    ICU DAILY CHECKLIST:   Daily labs: CBC, CMP, CRP, Coags, serum lactate, LFTs daily until downtrending, triglycerides Q3 days while on sedation.  1. Awake with RASS goal of 0?    yes      2. SBT/SAT performed and coordinated?       yes           3. Sedation Minimized? yes   4. Head of bed >30 degrees?  yes       5. Early Mobility: PT/OT, UOOB to chair   yes     6. Tube feeding initiated?  no   7. Glucose at goal? yes     8. Having bowel movements daily? yes                 9. Stress ulcer prophylaxis ordered?  yes  10. DVT prophylaxis ordered?  yes   11. Can indwelling catheter be discontinued? CVL/[place]/  12. Can antimicrobials be discontinued? yes   Pressure ulcer (mattress, turning, OOB, foam, boots)        Wound 01/11/20 0712 Skin Tear lower Arm (Active)   01/11/20 0712    Pre-existing: Yes   Primary Wound Type: Skin tear   Side: Right   Orientation: lower   Location: Arm   Wound/PI Number (optional):    Ankle-Brachial Index:    Pulses:    Removal Indication and Assessment:    Wound Outcome:    (Retired) Wound Type:    (Retired) Wound Length (cm):    (Retired) Wound Width (cm):    (Retired) Depth (cm):    Wound Description (Comments):    Removal Indications:    Number of days: 97            Wound 04/13/20 0200 Skin Tear Right lower Leg #1 (Active)   04/13/20 0200    Pre-existing: Yes   Primary Wound  Type: Skin tear   Side: Right   Orientation: lower   Location: Leg   Wound/PI Number (optional): #1   Ankle-Brachial Index:    Pulses:    Removal Indication and Assessment:    Wound Outcome:    (Retired) Wound Type:    (Retired) Wound Length (cm):    (Retired) Wound Width (cm):    (Retired) Depth (cm):    Wound Description (Comments):    Removal Indications:    Wound WDL ex 4/17/2020 11:00 AM   Dressing Appearance Open to air 4/17/2020 11:00 AM   Drainage Amount None 4/17/2020 11:00 AM   Drainage Characteristics/Odor No odor 4/17/2020 11:00 AM   Appearance Pink;Red;Maroon 4/17/2020 11:00 AM   Periwound Area Dry 4/16/2020  7:05 AM   Periwound Care Dry periwound area maintained 4/16/2020  7:05 AM   Number of days: 4            Wound 04/13/20 0200 Contusion Left anterior Leg #2 (Active)   04/13/20 0200    Pre-existing: Yes   Primary Wound Type: Contusion   Side: Left   Orientation: anterior   Location: Leg   Wound/PI Number (optional): #2   Ankle-Brachial Index:    Pulses:    Removal Indication and Assessment:    Wound Outcome:    (Retired) Wound Type:    (Retired) Wound Length (cm):    (Retired) Wound Width (cm):    (Retired) Depth (cm):    Wound Description (Comments):    Removal Indications:    Wound WDL ex 4/17/2020 11:00 AM   Dressing Appearance Open to air 4/17/2020 11:00 AM   Drainage Amount None 4/17/2020 11:00 AM   Drainage Characteristics/Odor No odor 4/17/2020 11:00 AM   Appearance Pink;Red;Maroon 4/17/2020 11:00 AM   Periwound Area Dry 4/16/2020  7:05 AM   Periwound Care Dry periwound area maintained 4/16/2020  7:05 AM   Number of days: 4   13.   14. Keratitis   15. CPR status: Full Code   16. Can transfer out of ICU? yes   17. Family updated?  yes     Attending Staff physician involved in 33 minutes of critical care time and in-person contact today.     - This patient has a critical neurological condition/illness, with high morbidity and mortality.  - There is high probability for acute neurological change  leading to clinical and possibly life-threatening deterioration requiring highest level of physician preparedness for urgent intervention.  - Continuous in-person monitoring of the patient was done, including vital signs, neurological status, NIHSS or ICP monitoring with ongoing treatment changes based on patient need.  - Care was coordinated with other physicians involved in the patient's care.  - Radiologic studies and laboratory data were reviewed and interpreted, and plan of care was re-assessed based on the results.  - Diagnosis, treatment options and prognosis were discussed with the patient and/or family members or caregiver.      Fred Bermudez M.D., M.P.H.  Neurocritical Care Fellow  Department of Neurocritical Care  Critical Care Medicine- Respiratory ICU   [facility]  [phone]/ [id number]

## 2020-04-17 NOTE — PLAN OF CARE
Patient to discharge to home with [facility]        04/17/20 1439   Final Note   Assessment Type Final Discharge Note   Anticipated Discharge Disposition Home-Health   Hospital Follow Up  Appt(s) scheduled? Yes   Right Care Referral Info   Post Acute Recommendation Home-care   Referral Type [facility]        Follow-up Information     Kari Gaspar MD. Go on 4/23/2020.    Specialty:  Family Medicine  Why:  Hospital follow up at 2 pm   Contact information:  [address]  [facility] [address]  [phone]             [facility]. Schedule an appointment as soon as possible for a visit in 2 weeks.    Specialty:  Pulmonology  Why:  Hospital follow up.  Per Anya in Pulmonary, a nurse will call you to set up a virtual visit.  If you do not receive a call by next week, please call to roberto carlos  Contact information:  [address]  [facility] [address] [phone]  Additional information:  9th Floor           [facility].    Why:  Home Health   Contact information:  [address] [address]           Kari Gaspar MD In 1 week.    Specialty:  Family Medicine  Contact information:  [address]  [facility] [address]  [phone]                   Future Appointments   Date Time Provider Department Center   4/23/2020  2:00 PM Kari Gaspar MD [facility]   5/5/2020 11:00 AM ANNUAL WELLNESS VISIT-NURSE PRACTITIONER, LOCC 1 [facility]  [place]

## 2020-04-17 NOTE — PLAN OF CARE
[facility] ORDERS  FACE TO FACE ENCOUNTER    Patient Name: Chantell Herrera  YOB: 1935    PCP: Kari Gaspar MD   [facility] [address]  PCP Phone Number: [phone]  PCP Fax: [phone]    Encounter Date: 04/17/2020    Admit to Home Health    Diagnoses:  Active Hospital Problems    Diagnosis  POA    *COPD (chronic obstructive pulmonary disease) [J44.9]  Yes    GIB (gastrointestinal bleeding) [K92.2]  Yes    Lactic acidosis [E87.2]  Yes    Diabetes mellitus type 2, uncontrolled [E11.65]  Yes    Anemia [D64.9]  Yes    Acute on chronic congestive heart failure [I50.9]  Yes    Atrial fibrillation with rapid ventricular response [I48.91]  Yes    Chronic respiratory failure with hypoxia [J96.11]  Yes    Dyslipidemia [E78.5]  Yes    CKD (chronic kidney disease), stage III [N18.3]  Yes      Resolved Hospital Problems   No resolved problems to display.       Future Appointments   Date Time Provider Department Center   4/23/2020  2:00 PM Kari Gaspar MD [facility]   5/5/2020 11:00 AM ANNUAL [facility]LL[facility]     Follow-up Information     Kari Gaspar MD. Go on 4/23/2020.    Specialty:  Family Medicine  Why:  Hospital follow up at 2 pm   Contact information:  [address]  [facility] [address]  [phone]             [facility] - Pulmonary Services. Schedule an appointment as soon as possible for a visit in 2 weeks.    Specialty:  Pulmonology  Why:  Hospital follow up.  Per Anya in Pulmonary, a nurse will call you to set up a virtual visit.  If you do not receive a call by next week, please call to roberto carlos  Contact information:  [address] [facility] [address] [phone]  Additional information:  9th Floor [facility].    Why:  Home Health   Contact information:  [address] [address]            Kari Gaspar MD In 1 week.    Specialty:  Family Medicine  Contact information:  [address]  [facility] [address]  [phone]                     I have seen and examined this patient face to face today. My clinical findings that support the need for the home health skilled services and home bound status are the following:  Requiring assistive device to leave home due to unsteady gait caused by  COPD Exacerbation.  Patient with medication mismanagement issues requiring home bound status as evidenced by  Unstable vital signs (blood pressure, heart rate).  Mental confusion making it unsafe for patient to leave home alone due to  Anxiety.    Allergies:Review of patient's allergies indicates:  No Known Allergies    Diet: 2 gram sodium diet    Activities: activity as tolerated    Nursing:   SN to complete comprehensive assessment including routine vital signs. Instruct on disease process and s/s of complications to report to MD. Review/verify medication list sent home with the patient at time of discharge  and instruct patient/caregiver as needed. Frequency may be adjusted depending on start of care date.    Notify MD if SBP > 160 or < 90; DBP > 90 or < 50; HR > 120 or < 50; Temp > 101; Other:   SAT <88%      CONSULTS:    Physical Therapy to evaluate and treat. Evaluate for home safety and equipment needs; Establish/upgrade home exercise program. Perform / instruct on therapeutic exercises, gait training, transfer training, and Range of Motion.  Occupational Therapy to evaluate and treat. Evaluate home environment for safety and equipment needs. Perform/Instruct on transfers, ADL training, ROM, and therapeutic exercises.  Speech Therapy  to evaluate and treat for  Language, Swallowing and Cognition.   to evaluate for community resources/long-range planning.  Aide to provide assistance with personal care, ADLs, and vital signs.    MISCELLANEOUS CARE:  Home Oxygen:  Assess oxygen saturation  via pulse oximeter as needed for increase in SOB.  COPD: Teach patient MDI/HFA usage and guidelines  Please provide smoking education, cessation, and nicotine replacement options if patient is a current smoker or if anyone in the household is a smoker  Please ensure that patient has a pulse oximeter and is educated on his normal oxygen saturations: 88-92%  Please ensure patient has a functioning nebulizer and provide education on its usage.  If patient has increased cough or symptoms, please initiate COPD protocol including  supplemental oxygen at 2 LPM for O2 > 92%  schedule appointment with PCP or pulmonologist within 24 hours    WOUND CARE ORDERS  n/a      Medications: Review discharge medications with patient and family and provide education.      Current Discharge Medication List      CONTINUE these medications which have NOT CHANGED    Details   albuterol (PROVENTIL) 2.5 mg /3 mL (0.083 %) nebulizer solution Take 3 mLs (2.5 mg total) by nebulization every 6 (six) hours as needed for Wheezing.  Qty: 300 mL, Refills: 3    Associated Diagnoses: Simple chronic bronchitis      amiodarone (PACERONE) 200 MG Tab Take 100 mg by mouth once daily.      citalopram (CELEXA) 20 MG tablet Take 1 tablet (20 mg total) by mouth once daily.  Qty: 30 tablet, Refills: 11      metoprolol succinate (TOPROL-XL) 50 MG 24 hr tablet Take 50 mg by mouth 2 (two) times daily.       pantoprazole (PROTONIX) 40 MG tablet Take 1 tablet (40 mg total) by mouth once daily. for 14 days  Qty: 14 tablet, Refills: 0    Associated Diagnoses: Choking due to food in larynx, subsequent encounter      predniSONE (DELTASONE) 20 MG tablet Take 20 mg by mouth once daily.      rOPINIRole (REQUIP) 1 MG tablet TAKE 1 TABLET  EVERY EVENING.  Qty: 90 tablet, Refills: 5    Associated Diagnoses: RLS (restless legs syndrome)      torsemide (DEMADEX) 20 MG Tab Take 1 tablet (20 mg total) by mouth once daily.  Qty: 30 tablet, Refills: 11      traZODone (DESYREL) 50 MG  tablet Take 50 mg by mouth every evening.      ACCU-CHEK SOFTCLIX LANCETS Misc 1 each by Misc.(Non-Drug; Combo Route) route once daily.  Qty: 100 each, Refills: 5      alcohol swabs (BD ALCOHOL SWABS) PadM Apply 1 each topically as needed.  Qty: 100 each, Refills: 5      aspirin (ECOTRIN) 81 MG EC tablet Take 1 tablet (81 mg total) by mouth once daily.  Qty: 30 tablet, Refills: 0      baclofen (LIORESAL) 10 MG tablet Take 10 mg by mouth 3 (three) times daily.      blood glucose control high,low (ACCU-CHEK KUMAR CONTROL SOLN) Soln 1 each by Misc.(Non-Drug; Combo Route) route as needed.  Qty: 1 each, Refills: 1      blood sugar diagnostic (ACCU-CHEK KUMAR PLUS TEST STRP) Strp 1 strip by Misc.(Non-Drug; Combo Route) route once daily.  Qty: 100 strip, Refills: 5      blood-glucose meter (ACCU-CHEK KUMAR PLUS METER) Misc 1 each by Misc.(Non-Drug; Combo Route) route once daily.  Qty: 1 each, Refills: 0      cholecalciferol, vitamin D3, (VITAMIN D3) 2,000 unit Tab Take 2,000 Units by mouth once daily.      insulin detemir U-100 (LEVEMIR FLEXTOUCH) 100 unit/mL (3 mL) SubQ InPn pen Inject 15 Units into the skin every evening.  Qty: 1 Box, Refills: 0    Associated Diagnoses: Uncontrolled type 2 diabetes mellitus with hyperglycemia      ipratropium (ATROVENT) 0.02 % nebulizer solution Take 500 mcg by nebulization 4 (four) times daily.       potassium chloride SA (K-DUR,KLOR-CON) 20 MEQ tablet     Associated Diagnoses: Hypomagnesemia      pravastatin (PRAVACHOL) 40 MG tablet Take 40 mg by mouth once daily.      weigh scale Misc Record daily weights  Qty: 1 each, Refills: 0             I certify that this patient is confined to her home and needs physical therapy and occupational therapy.

## 2020-04-17 NOTE — ASSESSMENT & PLAN NOTE
Baseline 3L home O2 and Bipap prn. Admitted for COPD exacerbation. Covid negative.   -- Solumedrol 125 x 1  -- duo nebs q 4   -- Continue home prednisone   -- Elevated lactic acid on admission, started on vancomycin and zoysn  -- cultures NGTD, afebrile, no leukocytosis  -- Bipap PRN  -- O2 > 88%, currently 99% on 4L NC  -- OOB today and work with PT today. Patient uses walker at home at baseline with home O2 3L   Discharge home today

## 2020-04-17 NOTE — TELEPHONE ENCOUNTER
I spoke with  regarding follow up Pulmonary appointment after being discharged from the hospital.  I advised patient during this time our Pulmonary Providers are offering Virtual Visit to ensure patient safety and practice social distancing.Pt stated she doesn't need a Pulmonary appointment she has a Pulmonary Provider in [place]!

## 2020-04-20 NOTE — TELEPHONE ENCOUNTER
----- Message from Rupal Contreras sent at 2020  1:47 PM CDT -----  Contact: Self  Chantell Herrera  MRN: [id number]  : 1935  PCP: Kari Gaspar  Home Phone      [phone]  Work Phone      Not on file.  Mobile          [phone]      MESSAGE:   Patient states she is feeling really well and would like to cancel her appt on Thursday, would like David Gaspar to call her if she has any questions or concerns.     Phone:  [phone]

## 2020-04-20 NOTE — PATIENT INSTRUCTIONS
COPD Flare    You have had a flare-up of your COPD.  COPD, or chronic obstructive pulmonary disease, is a common lung disease. It causes your airways to become irritated and narrower. This makes it harder for you to breathe. Emphysema and chronic bronchitis are both types of COPD. This is a chronic condition, which means you always have it. Sometimes it gets worse. When this happens, it is called a flare-up.  Symptoms of COPD  People with COPD may have symptoms most of the time. In a flare-up, your symptoms get worse. These symptoms may mean you are having a flare-up:  · Shortness of breath, shallow or rapid breathing, or wheezing that gets worse  · Lung infection  · Cough that gets worse  · More mucus, thicker mucus or mucus of a different color  · Tiredness, decreased energy, or trouble doing your usual activities  · Fever  · Chest tightness  · Your symptoms dont get better even when you use your usual medicines, inhalers, and nebulizer  · Trouble talking  · You feel confused  Causes of flare-ups  Unfortunately, a flare-up can happen even though you did everything right, and you followed your doctors instructions. Some causes of flare-ups are:  · Smoking or secondhand smoke  · Colds, the flu, or respiratory infections  · Air pollution  · Sudden change in the weather  · Dust, irritating chemicals, or strong fumes  · Not taking your medicines as prescribed  Home care  Here are some things you can do at home to treat a flare-up:  · Try not to panic. This makes it harder to breathe, and keeps you from doing the right things.  · Dont smoke or be around others who are smoking.  · Try to drink more fluids than usual during a flare-up, unless your doctor has told you not to because of heart and kidney problems. More fluids can help loosen the mucus.  · Use your inhalers and nebulizer, if you have one, as you have been told to.  · If you were given antibiotics, take them until they are used up or your doctor tells you  to stop. Its important to finish the antibiotics, even though you feel better. This will make sure the infection has cleared.  · If you were given prednisone or another steroid, finish it even if you feel better.  Preventing a flare-up  Even though flare-ups happen, the best way to treat one is to prevent it before it starts. Here are some pointers:  · Dont smoke or be around others who are smoking.  · Take your medicines as you have been told.  · Talk with your doctor about getting a flu shot every year. Also find out if you need a pneumonia shot.  · If there is a weather advisory warning to stay indoors, try to stay inside when possible.  · Try to eat healthy and get plenty of sleep.  · Try to avoid things that usually set you off, like dust, chemical fumes, hairsprays, or strong perfumes.  Follow-up care  Follow up with your healthcare provider, or as advised.  If a culture was done, you will be told if your treatment needs to be changed. You can call as directed for the results.  If X-rays were done, you will be notified of any new findings that may affect your care.  Call 911  Call 911 if any of these occur:  · You have trouble breathing  · You feel confused or its difficult to wake you up  · You faint or lose consciousness  · You have a rapid heart rate  · You have new pain in your chest, arm, shoulder, neck or upper back  When to seek medical advice  Call your healthcare provider right away if any of these occur:  · Wheezing or shortness of breath gets worse  · You need to use your inhalers more often than usual without relief  · Fever of 100.4°F (38ºC) or higher, or as directed by your healthcare provider  · Coughing up lots of dark-colored or bloody mucus (sputum)  · Chest pain with each breath  · You do not start to get better within 24 hours  · Swelling of your ankles gets worse  · Dizziness or weakness  Date Last Reviewed: 9/1/2016  © 0861-5330 [employer]. [address] [facility],  [place] [address]. All rights reserved. This information is not intended as a substitute for professional medical care. Always follow your healthcare professional's instructions.

## 2020-04-23 NOTE — TELEPHONE ENCOUNTER
MESSAGE:   Patient would like to discuss doing a audio visit with doctor bryson for her hospital f/u that was suppose to be today.  They do not have mychart to do a virtual visit.     [phone]       OK to do wound care     Ayana is calling because patient had a fall and has a skin tear on right arm near elbow, she is needing wound care order.

## 2020-04-23 NOTE — TELEPHONE ENCOUNTER
----- Message from Cathy Burnett sent at 2020 12:33 PM CDT -----  Contact: bryce Herrera  MRN: [id number]  : 1935  PCP: Kari Gaspar  Home Phone      [phone]  Work Phone      Not on file.  Mobile          [phone]      MESSAGE:   [facility] is calling because patient had a fall and has a skin tear on right arm near elbow, she is needing wound care order.       [phone]
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
## 2020-04-29 PROBLEM — T17.320A CHOKING DUE TO FOOD (REGURGITATED): Status: RESOLVED | Noted: 2020-01-14 | Resolved: 2020-01-01

## 2020-04-29 NOTE — TELEPHONE ENCOUNTER
----- Message from Cathy Burnett sent at 2020  2:59 PM CDT -----  Chantell Herrera  MRN: [id number]  : 1935  PCP: Kari Gaspar  Home Phone      [phone]  Work Phone      Not on file.  Mobile          [phone]      MESSAGE:   Pt requesting refill or new Rx.   Is this a refill or new RX:  refil  RX name and strength: insulin detemir U-100 (LEVEMIR FLEXTOUCH) 100 unit/mL (3 mL) SubQ InPn pen  Last office visit: 20  Is this a 30-day or 90-day RX:  30  Pharm[facility] location:  [facility]  Comments:      Phone:  [phone]

## 2020-04-29 NOTE — TELEPHONE ENCOUNTER
----- Message from Cathy Burnett sent at 2020  2:59 PM CDT -----  Contact: aniMelany Herrera  MRN: [id number]  : 1935  PCP: Kari Gaspar  Home Phone      [phone]  Work Phone      Not on file.  Mobile          [phone]      MESSAGE:   Ani went see patient today and observed patients breast, she said the left was more swollen than the right and she thinks patient should get a mammogram done. Patient also has some skin tears on both legs and is using a antibiotic ointment. Would like to make sure that is okay with     [phone]  Ani

## 2020-04-29 NOTE — PROGRESS NOTES
Established Patient - Audio Only Telehealth Visit   10:57 -  The patient location is: home  The chief complaint leading to consultation is: hospital f/u  Visit type: Virtual visit with audio only (telephone)     The reason for the audio only service rather than synchronous audio and video virtual visit was related to technical difficulties or patient preference/necessity.     Each patient to whom I provide medical services by telemedicine is:  (1) informed of the relationship between the physician and patient and the respective role of any other health care provider with respect to management of the patient; and (2) notified that they may decline to receive medical services by telemedicine and may withdraw from such care at any time. Patient verbally consented to receive this service via voice-only telephone call.                               This service was not originating from a related E/M service provided within the previous 7 days nor will  to an E/M service or procedure within the next 24 hours or my soonest available appointment.  Prevailing standard of care was able to be met in this audio-only visit.        Transitional Care Note  Subjective:       Patient ID: Chantell Herrear is a 84 y.o. female.  Chief Complaint: No chief complaint on file.    Family and/or Caretaker present at visit?  Yes.  Diagnostic tests reviewed/disposition: No diagnosic tests pending after this hospitalization.  Disease/illness education: recent hospitalization  Home health/community services discussion/referrals: Patient has home health established at [facility].   Establishment or re-establishment of referral orders for community resources: No other necessary community resources.   Discussion with other health care providers: No discussion with other health care providers necessary.   84 year old female is c/o left breast swelling still present since hospital stay. She notes she was short of breath at home and tachycardic  and home helath called me - and I sent her to ER. She was admitted to [facility] and given 3 U of blood. She notes that swelling started there, but her meds were not adjusted. HR has been good. BP a little high this morning. Blood sugars 150s. She feels like her breathing is at its baseline.    Review of Systems   Constitutional: Negative for chills and fever.   HENT: Negative for congestion, ear pain, postnasal drip, rhinorrhea, sore throat and trouble swallowing.    Eyes: Negative for redness and itching.   Respiratory: Positive for shortness of breath. Negative for cough and wheezing.    Cardiovascular: Positive for leg swelling (left > right). Negative for chest pain and palpitations.   Gastrointestinal: Negative for abdominal pain, diarrhea, nausea and vomiting.   Genitourinary: Negative for dysuria and frequency.   Skin: Negative for rash.        Left breast swelling   Neurological: Negative for weakness and headaches.       Objective:      Physical Exam   Constitutional:   No physical exam done - patient is not in clinic.  Home health to supplement visit today with exam - especially cardiorespiratory and left breast       Assessment:       1. Anemia, unspecified type    2. Renal failure, unspecified chronicity    3. Chronic respiratory failure with hypoxia    4. Simple chronic bronchitis    5. Atrial fibrillation with rapid ventricular response    6. Chronic congestive heart failure, unspecified heart failure type    7. Essential hypertension    8. CKD (chronic kidney disease), stage III    9. Symptomatic anemia    10. Uncontrolled type 2 diabetes mellitus with hyperglycemia        Plan:       Diagnoses and all orders for this visit:    Anemia, unspecified type  -     CBC auto differential; Future    Renal failure, unspecified chronicity  -     Comprehensive metabolic panel; Future    Chronic respiratory failure with hypoxia    Simple chronic bronchitis    Atrial fibrillation with rapid ventricular  response    Chronic congestive heart failure, unspecified heart failure type    Essential hypertension    CKD (chronic kidney disease), stage III    Symptomatic anemia    Uncontrolled type 2 diabetes mellitus with hyperglycemia    Other orders  -     predniSONE (DELTASONE) 20 MG tablet; Take 1 tablet (20 mg total) by mouth once daily.    today, home health will draw labs and needs to call me with breast exam. If she is showing signs of edema and cr allows, will go up on torsemide to BID x 3 days with repeat of bmp on Monday.  If not, will need mammogram.    Cont on 3L NC at this time.    RTC if condition acutely worsens or any other concerns, otherwise RTC as scheduled

## 2020-04-29 NOTE — PROGRESS NOTES
Patient, Chantell Herrera (MRN #[id number]), presented with a recent Platelet count less than 150 K/uL consistent with the definition of thrombocytopenia (ICD10 - D69.6).    Platelets   Date Value Ref Range Status   04/17/2020 149 (L) 150 - 350 K/uL Final     The patient's thrombocytopenia was monitored, evaluated, addressed and/or treated. This addendum to the medical record is made on 04/29/2020.

## 2020-04-30 NOTE — TELEPHONE ENCOUNTER
Notified Ani - Verbalized understanding.     Attempted to contact pt to notify of rx and schedule mammo. No answer, LM requesting callback.

## 2020-04-30 NOTE — TELEPHONE ENCOUNTER
----- Message from Kari Gaspar MD sent at 4/30/2020  9:17 AM CDT -----  Labs are overall stable. Go ahead and double up on torsemide like we discussed for 3 days.  Then repeat BMP on Monday with home health.  mh

## 2020-04-30 NOTE — TELEPHONE ENCOUNTER
Pt notified of results and recommendations. Verbalized understanding. Home health notified to draw BMP on Monday

## 2020-05-05 NOTE — TELEPHONE ENCOUNTER
----- Message from Marisa Mohamud sent at 2020 12:33 PM CDT -----  Contact: Fax  Chantell Herrera  MRN: [id number]  : 1935  PCP: Kari Gaspar  Home Phone      [phone]  Work Phone      Not on file.  Mobile          [phone]      MESSAGE:   Pt requesting refill or new Rx.   Is this a refill or new RX:  new  RX name and strength: traZODone (DESYREL) 50 MG tablet  Last office visit:2020  Is this a 30-day or 90-day RX:  n/a  Ph[facility] name and location: [facility]  Comments:      Phone:  [phone]

## 2020-05-13 NOTE — TELEPHONE ENCOUNTER
----- Message from Marisa Mohamud sent at 2020  9:54 AM CDT -----  Contact: fax  Chantell Herrera  MRN: [id number]  : 1935  PCP: Kari Gaspar  Home Phone      [phone]  Work Phone      Not on file.  Mobile          [phone]      MESSAGE:   Pt requesting refill or new Rx.   Is this a refill or new RX:  new  RX name and strength: traZODone (DESYREL) 50 MG tablet  Last office visit: 2020  Is this a 30-day or 90-day RX:  90  Pharmacy name and location:  [facility]  Comments:      Phone:  [phone]

## 2020-05-15 NOTE — TELEPHONE ENCOUNTER
The radiologist has recommended the patient have a breast biopsy.  Please have your staff schedule her with a surgeon.  If she chooses to go to [facility], contact Misael Lawler for an appointment.  All Sanjiv guided Breast Biopsies need to be sent to [facility].    Type of Biopsy Recommended in Report.      A letter is being mailed to patient explaining findings and recommendations.

## 2020-05-21 NOTE — TELEPHONE ENCOUNTER
----- Message from David Kirkpatrick sent at 2020 11:37 AM CDT -----  Contact: Patient  Chantell Herrera  MRN: [id number]  : 1935  PCP: Kari Gaspar  Home Phone      [phone]  Work Phone      Not on file.  Mobile          [phone]      MESSAGE: received letter stating she needs a breast biopsy -- would like appt to discuss with Dr Gaspar -- please advise    Call [phone]    PCP: Chasity

## 2020-05-21 NOTE — TELEPHONE ENCOUNTER
I have attempted to call patient several times.  Her mammogram shows thickening of the skin in the swollen breast. She needs to have a biopsy to rule out inflammatory change.  I put in a referral to [facility].  Please help arrange an appt.  donald

## 2020-05-21 NOTE — TELEPHONE ENCOUNTER
Attempted to contact pt no answer, LM for pt to return call     I have attempted to call patient several times.  Her mammogram shows thickening of the skin in the swollen breast. She needs to have a biopsy to rule out inflammatory change.  I put in a referral to [facility].  Please help arrange an appt.           Documentation

## 2020-05-22 NOTE — TELEPHONE ENCOUNTER
Raymundo Douglas I just spoke with MsGeoffrey Chantell, and she said she does not want to go to [facility].    I will see about send her to [facility].    Thank you

## 2020-06-03 NOTE — TELEPHONE ENCOUNTER
----- Message from Cathy Burnett sent at 6/3/2020  2:20 PM CDT -----  [facility]  MRN: [id number]  : 1935  PCP: Kari Gaspar  Home Phone      [phone]  Work Phone      Not on file.  Mobile          [phone]      MESSAGE:   Kelsey is calling to get testing frequency & diagnosed code for diabetic supplies faxed to her.    Phone:[phone]  Fax:[phone]

## 2020-06-03 NOTE — TELEPHONE ENCOUNTER
Spoke with Kelsey - States that we faxed paperwork to them on the 21st that was uncompleted. Requested she sends it back to me so that I may fix the mistakes and return it to her.

## 2020-06-25 NOTE — TELEPHONE ENCOUNTER
----- Message from Rupal Contreras sent at 2020  9:15 AM CDT -----  Regarding: surgery  Contact: Self  Chantell Herrera  MRN: [id number]  : 1935  PCP: Kari Gaspar  Home Phone      [phone]  Work Phone      Not on file.  Mobile          [phone]      MESSAGE:   Patient states she received a phone call from her insurance saying that she was approved for surgery and would like to know what this was about. States she she had a mammogram a while back but isnt sure what the results are. Please advise    Phone:  [phone]

## 2020-06-25 NOTE — TELEPHONE ENCOUNTER
Contacted pt states some one called her and told her she was approved for surgery. Pt unsure what surgery they were talking about. Do you know anything about this? Please advise, thank you.

## 2020-07-03 PROBLEM — R10.13 EPIGASTRIC PAIN: Status: RESOLVED | Noted: 2018-11-13 | Resolved: 2020-01-01

## 2020-07-03 PROBLEM — J96.02 ACUTE RESPIRATORY FAILURE WITH HYPOXIA AND HYPERCARBIA: Status: ACTIVE | Noted: 2020-01-01

## 2020-07-03 PROBLEM — J96.01 ACUTE RESPIRATORY FAILURE WITH HYPOXIA AND HYPERCARBIA: Status: ACTIVE | Noted: 2020-01-01

## 2020-07-03 NOTE — ED PROVIDER NOTES
"Encounter Date: 7/3/2020       History     Chief Complaint   Patient presents with    Shortness of Breath     This 85 yo woman with a long h/o severe COPD, atrial fib (off anticoag.), DM II,  and anemia of ?GI origin presents with a several day h/o progressive dyspnea despite home oxygen. She has not had fever, chills, or cough but has noted weakness and worsening SANTIAGO she says feels like "I'm anemic again." She has not had melena, hemtochezia, hematemesis, or hemoptysis. She has not had chest pain. She has had no known Covid-19 exposure. Her last hospitalization for similar symptoms was in April.        Review of patient's allergies indicates:  No Known Allergies  Past Medical History:   Diagnosis Date    A-fib     Acute on chronic congestive heart failure 2/1/2019    Anxiety     Arthritis     Asthma     Atrial fibrillation with rapid ventricular response     CHF (congestive heart failure) 11/29/2018    Chronic bronchitis     COPD (chronic obstructive pulmonary disease)     Depression     Diabetes mellitus, type 2     SANTIAGO (dyspnea on exertion)     Emphysema of lung     Fatigue     Hyperlipidemia     Hypertension     Symptomatic anemia 1/22/2019    Trouble in sleeping      Past Surgical History:   Procedure Laterality Date    APPENDECTOMY      BRONCHOSCOPY N/A 12/3/2019    Procedure: BRONCHOSCOPY;  Surgeon: Emmanuel Hickman MD;  Location: [facility] OR;  Service: Pulmonary;  Laterality: N/A;    EYE SURGERY      HYSTERECTOMY      INSERTION OF PACEMAKER  12/26/2019    OOPHORECTOMY      TONSILLECTOMY       Family History   Problem Relation Age of Onset    Heart disease Mother     Hypertension Mother     Hypertension Father     Diabetes Father     Cancer Sister     COPD Brother     Hypertension Brother     No Known Problems Daughter     Kidney disease Son     COPD Daughter      Social History     Tobacco Use    Smoking status: Former Smoker     Quit date: 8/3/2000     Years since quitting: " 19.9    Smokeless tobacco: Never Used   Substance Use Topics    Alcohol use: No    Drug use: No     Review of Systems   All other systems reviewed and are negative.      Physical Exam     Initial Vitals   BP Pulse Resp Temp SpO2   07/03/20 0544 07/03/20 0544 07/03/20 0544 07/03/20 0548 07/03/20 0544   (!) 165/70 98 (!) 41 99.2 °F (37.3 °C) 100 %      MAP       --                Physical Exam    Nursing note and vitals reviewed.  Constitutional: She is not diaphoretic. She appears distressed.   Anxious , thin woman in mild respiratory distress   HENT:   Head: Normocephalic.   Nose: Nose normal.   Mouth/Throat: Oropharynx is clear and moist.   Eyes: Conjunctivae and EOM are normal. Pupils are equal, round, and reactive to light.   Neck: Normal range of motion. Neck supple.   Cardiovascular: Normal rate, regular rhythm, normal heart sounds and intact distal pulses. Exam reveals no gallop and no friction rub.    No murmur heard.  Pulmonary/Chest: Breath sounds normal. She has no wheezes. She has no rhonchi. She has no rales. She exhibits no tenderness.   dimihished breath sounds bilat with prolonged expiratory phase.   Abdominal: Soft. Bowel sounds are normal.   Musculoskeletal: Normal range of motion.   Neurological: She is alert and oriented to person, place, and time. She has normal strength.   Skin: Skin is warm and dry. Capillary refill takes less than 2 seconds.         ED Course   Procedures  Labs Reviewed   CBC W/ AUTO DIFFERENTIAL - Abnormal; Notable for the following components:       Result Value    RBC 1.47 (*)     Hemoglobin 6.0 (*)     Hematocrit 19.4 (*)     Mean Corpuscular Volume 132 (*)     Mean Corpuscular Hemoglobin 40.8 (*)     Mean Corpuscular Hemoglobin Conc 30.9 (*)     RDW 24.8 (*)     MPV 13.0 (*)     Immature Granulocytes 1.5 (*)     Gran # (ANC) 7.8 (*)     Immature Grans (Abs) 0.17 (*)     Lymph # 0.9 (*)     Mono # 1.5 (*)     Eos # 0.7 (*)     Lymph% 8.5 (*)     All other components  within normal limits    Narrative:     Hct   critical result(s) called and verbal readback obtained from   Emmanuel Raza RN by [facility] 07/03/2020 06:49   COMPREHENSIVE METABOLIC PANEL - Abnormal; Notable for the following components:    Glucose 172 (*)     All other components within normal limits   TROPONIN I - Abnormal; Notable for the following components:    Troponin I 0.035 (*)     All other components within normal limits   B-TYPE NATRIURETIC PEPTIDE - Abnormal; Notable for the following components:    BNP 1,034 (*)     All other components within normal limits   RETICULOCYTES - Abnormal; Notable for the following components:    Retic 4.1 (*)     All other components within normal limits   SARS-COV-2 RNA AMPLIFICATION, QUAL   IRON AND TIBC   VITAMIN B12   FOLATE   FERRITIN   TSH   TYPE & SCREEN   PREPARE RBC SOFT     EKG Readings: (Independently Interpreted)   Initial Reading: No STEMI.   NSR .LAD       Imaging Results          X-Ray Chest AP Portable (Final result)  Result time 07/03/20 08:11:12    Final result by Shiloh Apple MD (07/03/20 08:11:12)                 Impression:      No acute cardiopulmonary disease      Electronically signed by: Shiloh Apple MD  Date:    07/03/2020  Time:    08:11             Narrative:    EXAMINATION:  XR CHEST AP PORTABLE    CLINICAL HISTORY:  Dyspnea, unspecified    TECHNIQUE:  Single frontal view of the chest was performed.    COMPARISON:  04/12/2020    FINDINGS:  The cardiomediastinal silhouette is stable.  Cardiac pacing lead remains in place.  There is thin chronic atelectatic stranding right midlung.  There is improved left basilar aeration with no longer the left basilar opacification that was seen on the prior exam.  Costophrenic sulci are sharp.                              X-Rays:   Independently Interpreted Readings:   Other Readings:  CXR- severe emphysematous changes with degenerative changes of the aorta.                         MDM  Patient with significant  shortness of breath at this evening  Patient was brought in with a hemoglobin of 6, chronic anemia  Patient thought to be too unstable last hospitalization for endoscopy  Patient however has dropped her crit again and is highly symptomatic  Patient ambulates and oxygenation with to 79%, several comorbidities  Patient being transfused and is now on BiPAP in the ER today  Will transfer to high level care with GI evaluation           Clinical Impression:       ICD-10-CM ICD-9-CM   1. Anemia, unspecified type  D64.9 285.9   2. Dyspnea  R06.00 786.09   3. COPD exacerbation  J44.1 491.21             ED Disposition Condition    Transfer to Another Facility Stable           Change Of Shift Note     -- I took this patient over to a.m. shift change from Dr Twila Melton  -- transfer pending for anemia, GI bleed, COPD on BiPAP                     Festus Maurer MD  07/03/20 1308

## 2020-07-03 NOTE — PROGRESS NOTES
Please call extension [phone] upon patient arrival to floor for Hospital Medicine admit team assignment and for additional admit orders. If patient is coming from another [facility] facility please also call [phone] to inform the admit team/office that patient has arrived from the [facility] facility to the floor so patient can be evaluated.    Outside Transfer Acceptance Note    Transferring Physician: Festus Maurer MD (EM)    Accepting Physician: Nikolas VERA    Date of Acceptance: 07/03/2020     Code Status: FULL    Transferring Facility/Hospital: [facility]    Reason for Transfer:  GI consultation for symptomatic anemia    Report from Transferring Physician or Mid-Level provider/Hospital course:   84F with history of COPD (on home oxygen) and pulmonary HTN, atrial fibrillation (currently off anticoagulation) admitted for evaluation of shortness of breath and progressive SANTIAGO. She has had occasional melena but no hematochezia, hematemesis, or hemoptysis. She has not had chest pain. She has had no known Covid-19 exposure. Her last hospitalization for similar symptoms was in April.  Patient found to have anemia, HgB 6.0. INR pending. Baseline may be closer to ~8. Patient is being appropriately resuscitated, with BP and HR stable. Patient does have acute on chronic hypoxemic RF and is currently stable on BiPAP.  Given the recent admission and reports of melena from transferring physician the patient warrants consultation with GI, with services unavailable at [facility].  Case discussed extensively with ED provider and felt that stepdown bed at [facility] can meet needs at this time.    TTE 1/2020  · Concentric left ventricular remodeling.  · Low normal left ventricular systolic function.   · The estimated ejection fraction is 50%.  · Normal LV diastolic function.  · Normal right ventricular systolic function.  · Mild mitral sclerosis.  · There is moderate leaflet calcification of the Mitral  Valve.  · Moderate tricuspid regurgitation.  · Mild to moderate pulmonary hypertension present.    To do list upon patient arrival:   - Continue with resuscitation protocol re: suspected GIB  - Consult to GI  - Further w/u and management of acute on chronic hypoxemic  - C/w BiPAP; consider ABG to assess response    EDINSON SNOW MD  Attending Staff Physician   [facility]  Cell: [phone]  Office: [phone]

## 2020-07-03 NOTE — PLAN OF CARE
Pt Aax4, pt transferred from [facility], 02@5L NC. Pt desats into mid 70-low 80 when ambulating. Administered medication per orders pt tolerated well. Strict I and O maintained. VSS, frequent rounding completed. No other significant events throughout shift. Will continue to monitor.

## 2020-07-03 NOTE — HPI
Chantell Herrera is an 83 yo woman with severe COPD, atrial fib (off anticoag.), CHF, DM II,  and symptomatic anemia presents with a several days progressive shortness of breath. It worsens with activity and decreases with rest.  She is on 3L O2 at home and uses BiPAP nightly. She reports productive cough that varies in color and sometimes appears blood streaked.She has not had fever or chills but has noted weakness and worsening SANTIAGO is similar to the symptoms she had in April, when she was hospitalized for low hemoglobin. Today, patient was found to have anemia, HgB 6.0 at [facility] where she had a transfusion of  2 units of blood. She was transferred for further GI workup, as GI bleeding was suspected. She denies melena, hematochezia or hematemesis. Denies HA, chest pain, palpitations, N/V.

## 2020-07-03 NOTE — ED NOTES
Pt signed blood transfusion consent form.  Pt continues to have bouts of weakness and sob on exertion.

## 2020-07-04 PROBLEM — I48.20 CHRONIC ATRIAL FIBRILLATION: Status: ACTIVE | Noted: 2018-11-16

## 2020-07-04 PROBLEM — R07.9 CHEST PAIN: Status: ACTIVE | Noted: 2020-01-01

## 2020-07-04 PROBLEM — Z75.8 DISCHARGE PLANNING ISSUES: Status: ACTIVE | Noted: 2020-01-01

## 2020-07-04 PROBLEM — D53.9 MACROCYTIC ANEMIA: Status: ACTIVE | Noted: 2019-01-22

## 2020-07-04 PROBLEM — J96.22 ACUTE ON CHRONIC RESPIRATORY FAILURE WITH HYPOXIA AND HYPERCAPNIA: Status: ACTIVE | Noted: 2020-01-01

## 2020-07-04 PROBLEM — Z79.52 LONG TERM CURRENT USE OF SYSTEMIC STEROIDS: Chronic | Status: ACTIVE | Noted: 2020-01-01

## 2020-07-04 PROBLEM — D69.6 THROMBOCYTOPENIA: Status: ACTIVE | Noted: 2020-01-01

## 2020-07-04 PROBLEM — J96.21 ACUTE ON CHRONIC RESPIRATORY FAILURE WITH HYPOXIA AND HYPERCAPNIA: Status: ACTIVE | Noted: 2020-01-01

## 2020-07-04 NOTE — ASSESSMENT & PLAN NOTE
Progressive dyspnea, on 3L O2 at home, placed on BiPAP at [facility].   Possibly secondary to COPD exacerbation vs anemia vs acute on chronic heart failure.  Currently stable on 5L O2 nasal canula  ABG pH 7.37, pCO2 54.4, pO2 28  -Transfused 2 units of blood, Hgb up 9.6  -Prednisone 50mg daily x5d  -Duo nebs q6   -Bipap PRN

## 2020-07-04 NOTE — ASSESSMENT & PLAN NOTE
CHADSVASC 6  Pt takes ASA 81 at home, no other anticoagulation  - Continue home meds: Metoprolol succinate 50 BID

## 2020-07-04 NOTE — SUBJECTIVE & OBJECTIVE
Past Medical History:   Diagnosis Date    A-fib     Acute on chronic congestive heart failure 2/1/2019    Anxiety     Arthritis     Asthma     Atrial fibrillation with rapid ventricular response     CHF (congestive heart failure) 11/29/2018    Chronic bronchitis     COPD (chronic obstructive pulmonary disease)     Depression     Diabetes mellitus, type 2     SANTIAGO (dyspnea on exertion)     Emphysema of lung     Fatigue     Hyperlipidemia     Hypertension     Symptomatic anemia 1/22/2019    Trouble in sleeping        Past Surgical History:   Procedure Laterality Date    APPENDECTOMY      BRONCHOSCOPY N/A 12/3/2019    Procedure: BRONCHOSCOPY;  Surgeon: Emmanuel Hickman MD;  [facility];  Service: Pulmonary;  Laterality: N/A;    EYE SURGERY      HYSTERECTOMY      INSERTION OF PACEMAKER  12/26/2019    OOPHORECTOMY      TONSILLECTOMY         Review of patient's allergies indicates:  No Known Allergies    Current Facility-Administered Medications on File Prior to Encounter   Medication    [COMPLETED] acetaminophen tablet 500 mg    [COMPLETED] albuterol-ipratropium 2.5 mg-0.5 mg/3 mL nebulizer solution 3 mL    [COMPLETED] levalbuterol nebulizer solution 1.25 mg    [COMPLETED] methylPREDNISolone sodium succinate injection 125 mg    [COMPLETED] pantoprazole injection 40 mg    [DISCONTINUED] 0.9%  NaCl infusion (for blood administration)     Current Outpatient Medications on File Prior to Encounter   Medication Sig    ACCU-CHEK SOFTCLIX LANCETS Misc 1 each by Misc.(Non-Drug; Combo Route) route once daily.    albuterol (PROVENTIL) 2.5 mg /3 mL (0.083 %) nebulizer solution Take 3 mLs (2.5 mg total) by nebulization every 6 (six) hours as needed for Wheezing.    alcohol swabs (BD ALCOHOL SWABS) PadM Apply 1 each topically as needed.    aspirin (ECOTRIN) 81 MG EC tablet Take 1 tablet (81 mg total) by mouth once daily.    baclofen (LIORESAL) 10 MG tablet Take 10 mg by mouth 3 (three) times  "daily.    BD ULTRA-FINE SHORT PEN NEEDLE 31 gauge x 5/16" Ndle USE TO INJECT LEVEMIR ONCE DAILY    blood glucose control high,low (ACCU-CHEK KUMAR CONTROL SOLN) Soln 1 each by Misc.(Non-Drug; Combo Route) route as needed.    blood sugar diagnostic (ACCU-CHEK KUMAR PLUS TEST STRP) Strp 1 strip by Misc.(Non-Drug; Combo Route) route once daily.    blood-glucose meter (ACCU-CHEK KUMAR PLUS METER) Misc 1 each by Misc.(Non-Drug; Combo Route) route once daily.    cholecalciferol, vitamin D3, (VITAMIN D3) 2,000 unit Tab Take 2,000 Units by mouth once daily.    citalopram (CELEXA) 20 MG tablet Take 1 tablet (20 mg total) by mouth once daily.    insulin detemir U-100 (LEVEMIR FLEXTOUCH) 100 unit/mL (3 mL) SubQ InPn pen Inject 10 Units into the skin every evening.    ipratropium (ATROVENT) 0.02 % nebulizer solution Take 500 mcg by nebulization 4 (four) times daily.     metoprolol succinate (TOPROL-XL) 50 MG 24 hr tablet Take 50 mg by mouth 2 (two) times daily.     mupirocin (BACTROBAN) 2 % ointment Apply topically 2 (two) times daily.    omeprazole (PRILOSEC) 40 MG capsule     pantoprazole (PROTONIX) 40 MG tablet Take 1 tablet (40 mg total) by mouth once daily. for 14 days    potassium chloride SA (K-DUR,KLOR-CON) 20 MEQ tablet     pravastatin (PRAVACHOL) 40 MG tablet Take 40 mg by mouth once daily.    predniSONE (DELTASONE) 20 MG tablet Take 1 tablet (20 mg total) by mouth once daily.    rOPINIRole (REQUIP) 1 MG tablet TAKE 1 TABLET  EVERY EVENING.    torsemide (DEMADEX) 20 MG Tab Take 1 tablet (20 mg total) by mouth once daily.    traZODone (DESYREL) 50 MG tablet Take 1 tablet (50 mg total) by mouth every evening.    weigh scale [facility] [place]     Family History     Problem Relation (Age of Onset)    COPD Brother, Daughter    Cancer Sister    Diabetes Father    Heart disease Mother    Hypertension Mother, Father, Brother    Kidney disease Son    No Known Problems Daughter        Tobacco Use    " Smoking status: Former Smoker     Quit date: 8/3/2000     Years since quittin.9    Smokeless tobacco: Never Used   Substance and Sexual Activity    Alcohol use: No    Drug use: No    Sexual activity: Not Currently     Review of Systems   Constitutional: Positive for appetite change and fatigue. Negative for chills and fever.   HENT: Positive for congestion. Negative for sore throat.    Respiratory: Positive for cough and shortness of breath. Negative for chest tightness.    Cardiovascular: Negative for chest pain, palpitations and leg swelling.   Gastrointestinal: Negative for abdominal pain, diarrhea, nausea and vomiting.   Genitourinary: Negative for difficulty urinating and dysuria.   Musculoskeletal: Negative for myalgias.   Neurological: Positive for dizziness and weakness. Negative for numbness and headaches.   Psychiatric/Behavioral: Negative for agitation, behavioral problems and confusion.      .  Objective:     Vital Signs (Most Recent):  Temp: 98.2 °F (36.8 °C) (20 1546)  Pulse: 76 (20 1803)  Resp: (!) 34 (20 1803)  BP: (!) 170/79 (20 1546)  SpO2: (!) 93 % (20 1803) Vital Signs (24h Range):  Temp:  [98 °F (36.7 °C)-99.2 °F (37.3 °C)] 98.2 °F (36.8 °C)  Pulse:  [72-98] 76  Resp:  [20-41] 34  SpO2:  [83 %-100 %] 93 %  BP: (132-170)/(60-79) 170/79     Weight: 58.3 kg (128 lb 8.5 oz)  Body mass index is 23.51 kg/m².    Physical Exam  Constitutional:       Comments: Mild respiratory distress with use of accessory muscles.   HENT:      Head: Normocephalic and atraumatic.   Eyes:      Pupils: Pupils are equal, round, and reactive to light.      Comments: Pale conjunctivas BL   Cardiovascular:      Rate and Rhythm: Normal rate and regular rhythm.      Pulses: Normal pulses.      Heart sounds: Normal heart sounds.   Pulmonary:      Breath sounds: No wheezing, rhonchi or rales.   Abdominal:      General: Bowel sounds are normal.      Palpations: Abdomen is soft.       Tenderness: There is no abdominal tenderness.   Musculoskeletal: Normal range of motion.         General: No tenderness.   Skin:     General: Skin is warm.      Coloration: Skin is pale.   Neurological:      General: No focal deficit present.      Mental Status: She is alert and oriented to person, place, and time.   Psychiatric:         Mood and Affect: Mood normal.           CRANIAL NERVES     CN III, IV, VI   Pupils are equal, round, and reactive to light.       Significant Labs:   Recent Lab Results       07/03/20  1806   07/03/20  1803   07/03/20  1801   07/03/20  1007   07/03/20  0613        Unit Blood Type Code               Unit Expiration               Unit Blood Type               Albumin               Alkaline Phosphatase               ALT               Anion Gap               Aniso               aPTT       23.5  Comment:  aPTT therapeutic range = 39-69 seconds       AST               Baso # 0.03             Basophil% 0.3             BILIRUBIN TOTAL               BNP               BUN, Bld               Calcium               Chloride               CO2               CODING SYSTEM               Creatinine               Differential Method Automated             DISPENSE STATUS               eGFR if                eGFR if non                Eos # 0.0             Eosinophil% 0.1             Ferritin         1,189     Folate         9.6     Glucose               Gran # (ANC) 8.5             Gran% 85.5             Group & Rh               Hematocrit 29.8             Hemoglobin 9.6             Immature Grans (Abs) 0.15  Comment:  Mild elevation in immature granulocytes is non specific and   can be seen in a variety of conditions including stress response,   acute inflammation, trauma and pregnancy. Correlation with other   laboratory and clinical findings is essential.               Immature Granulocytes 1.5             INDIRECT BRANDY               INR        1.2  Comment:  Coumadin Therapy:  2.0 - 3.0 for INR for all indicators except mechanical heart valves  and antiphospholipid syndromes which should use 2.5 - 3.5.         Iron         100     Lactate, Elia 1.8  Comment:  Falsely low lactic acid results can be found in samples   containing >=13.0 mg/dL total bilirubin and/or >=3.5 mg/dL   direct bilirubin.               Lymph # 0.2             Lymph% 2.1             MCH 35.7             MCHC 32.2                          Mono # 1.1             Mono% 10.5             MPV 12.9             nRBC 0             Platelets 161             POC BE   7           POC HCO3   31.9           POC PCO2   54.4           POC PH   7.375           POC PO2   28           POC SATURATED O2   50           POC TCO2   33           POCT Glucose     229         Poik               Potassium               Product Code               PROTEIN TOTAL               Protime       12.3       RBC 2.69             RDW SEE COMMENT  Comment:  Result not available.             Retic         4.1     Sample   VENOUS           SARS-CoV-2 RNA, Amplification, Qual         Negative  Comment:  This test utilizes isothermal nucleic acid amplification   technology to detect the SARS-CoV-2 RdRp nucleic acid segment.   The analytical sensitivity (limit of detection) is 125 genome   equivalents/mL.   A POSITIVE result implies infection with the SARS-CoV-2 virus;  the patient is presumed to be contagious.    A NEGATIVE result means that SARS-CoV-2 nucleic acids are not  present above the limit of detection. A NEGATIVE result should be   treated as presumptive. It does not rule out the possibility of   COVID-19 and should not be the sole basis for treatment decisions.   If COVID-19 is strongly suspected based on clinical and exposure   history, re-testing using an alternate molecular assay should be   considered.   This test is only for use under the Food and Drug   Administration s Emergency Use Authorization (EUA).    Commercial kits are provided by [employer].   Performance characteristics of the EUA have been independently  verified by [facility] Department of  Pathology and Laboratory Medicine.   _________________________________________________________________  The ID NOW COVID-19 Letter of Authorization, along with the   authorized Fact Sheet for Healthcare Providers, the authorized Fact  Sheet for Patients, and authorized labeling are available on the FDA   website:  www.fda.gov/MedicalDevices/Safety/EmergencySituations/ffj534595.htm       Saturated Iron         36     Sodium               TIBC         278     Transferrin         188     Troponin I 0.037  Comment:  The reference interval for Troponin I represents the 99th percentile   cutoff   for our facility and is consistent with 3rd generation assay   performance.               TSH         2.122     UNIT NUMBER               Vitamin B-12         583     WBC 9.96                              07/03/20  0612        Unit Blood Type Code 6200[P]      6200[P]     Unit Expiration [id number][P]      [id number][P]     Unit Blood Type A POS[P]      A POS[P]     Albumin 3.6     Alkaline Phosphatase 112     ALT 38     Anion Gap 12     Aniso Moderate     aPTT       AST 17     Baso # 0.04     Basophil% 0.4     BILIRUBIN TOTAL 0.7  Comment:  For infants and newborns, interpretation of results should be based  on gestational age, weight and in agreement with clinical  observations.  Premature Infant recommended reference ranges:  Up to 24 hours.............<8.0 mg/dL  Up to 48 hours............<12.0 mg/dL  3-5 days..................<15.0 mg/dL  6-29 days.................<15.0 mg/dL       BNP 1,034  Comment:  Values of less than 100 pg/ml are consistent with non-CHF populations.     BUN, Bld 22     Calcium 8.8     Chloride 102     CO2 29     CODING SYSTEM PVPD090[P]      IYDD306[P]     Creatinine 0.9     Differential Method Automated     DISPENSE STATUS  ISSUED[P]      ISSUED[P]     eGFR if  >60     eGFR if non  60  Comment:  Calculation used to obtain the estimated glomerular filtration  rate (eGFR) is the CKD-EPI equation.        Eos # 0.7     Eosinophil% 6.0     Ferritin       Folate       Glucose 172     Gran # (ANC) 7.8     Gran% 70.4     Group & Rh A POS     Hematocrit 19.4  Comment:  Hct   critical result(s) called and verbal readback obtained from   Emmanuel Raza RN by [facility] 07/03/2020 06:49       Hemoglobin 6.0     Immature Grans (Abs) 0.17  Comment:  Mild elevation in immature granulocytes is non specific and   can be seen in a variety of conditions including stress response,   acute inflammation, trauma and pregnancy. Correlation with other   laboratory and clinical findings is essential.       Immature Granulocytes 1.5     INDIRECT BRANDY NEG     INR       Iron       Lactate, Elia       Lymph # 0.9     Lymph% 8.5     MCH 40.8     MCHC 30.9          Mono # 1.5     Mono% 13.2     MPV 13.0     nRBC 0     Platelets 157     POC BE       POC HCO3       POC PCO2       POC PH       POC PO2       POC SATURATED O2       POC TCO2       POCT Glucose       Poik Slight     Potassium 4.1     Product Code M2642B63[P]      [id number][P]     PROTEIN TOTAL 6.1     Protime       RBC 1.47     RDW 24.8     Retic       Sample       SARS-CoV-2 RNA, Amplification, Qual       Saturated Iron       Sodium 143     TIBC       Transferrin       Troponin I 0.035  Comment:  The reference interval for Troponin I represents the 99th percentile   cutoff   for our facility and is consistent with 3rd generation assay   performance.       TSH       UNIT NUMBER [id number][P]      [id number][P]     Vitamin B-12       WBC 11.10         All pertinent labs within the past 24 hours have been reviewed.    SIgnificant Imaging: I have reviewed all pertinent imaging results/findings within the past 24 hours.

## 2020-07-04 NOTE — MEDICAL/APP STUDENT
Chantell Herrera  [id number]  07/04/2020    History & Physical  Hospital Medicine      SUBJECTIVE:     Chief Complaint/Reason for Admission: Progressive shortness of breath for 1-2 weeks.     History of Present Illness:  Ms. Chantell Herrera is a 84 y.o. w/ known COPD with @ home O2, Afib (off anticoagulation), CHF, DMII and symptomatic anemia. She presented with progressive shortness of breath for 1-2 weeks. She woke yesterday morning at 4am with difficulty breathing. She reported a productive cough with various colored sputum and occasional streaked blood. She uses 3L O@ @ home and uses a Trilogy to sleep. She denies fever, chills but has increased weakness and SANTIAGO similar to symptoms experienced in [place]    She has a 3-4 yr Hx of COPD on b/g of 40 pack yr smoking Hx (quit 2002). Denies hematochezia, hematemesis, abdominal pain, melena, HA, chest pain, N/V, or palpitations. She was given 2 units of blood yesterday @ [facility] for Hb of 6.0 and was transferred here for GI w/u for suspected GI bleed. She was hospitalized for anemia and transfused in April when she presented with similar symptoms.     Review of Systems:  Review of Systems   Constitutional: Positive for malaise/fatigue. Negative for chills, fever and weight loss.   Eyes: Negative for blurred vision and double vision.   Respiratory: Positive for cough, hemoptysis, sputum production, shortness of breath and wheezing.         Some blood streaking    Cardiovascular: Negative for chest pain and palpitations.   Gastrointestinal:        Possible melena; mentioned difficulty swallowing yesterday   Neurological: Negative.    Endo/Heme/Allergies: Bruises/bleeds easily.   Psychiatric/Behavioral: Negative.        Patient History:  Colonoscopy in the 1990's.  Afib - no anticoagulants  DMII  CHF    PTA Medications   Medication Sig    ACCU-CHEK SOFTCLIX LANCETS Misc 1 each by Misc.(Non-Drug; Combo Route) route once daily.    albuterol (PROVENTIL) 2.5 mg /3 mL  "(0.083 %) nebulizer solution Take 3 mLs (2.5 mg total) by nebulization every 6 (six) hours as needed for Wheezing.    alcohol swabs (BD ALCOHOL SWABS) PadM Apply 1 each topically as needed.    aspirin (ECOTRIN) 81 MG EC tablet Take 1 tablet (81 mg total) by mouth once daily.    baclofen (LIORESAL) 10 MG tablet Take 10 mg by mouth 3 (three) times daily.    BD ULTRA-FINE SHORT PEN NEEDLE 31 gauge x 5/16" Ndle USE TO INJECT LEVEMIR ONCE DAILY    blood glucose control high,low (ACCU-CHEK KUMAR CONTROL SOLN) Soln 1 each by Misc.(Non-Drug; Combo Route) route as needed.    blood sugar diagnostic (ACCU-CHEK KUMAR PLUS TEST STRP) Strp 1 strip by Misc.(Non-Drug; Combo Route) route once daily.    blood-glucose meter (ACCU-CHEK KUMAR PLUS METER) Misc 1 each by Misc.(Non-Drug; Combo Route) route once daily.    cholecalciferol, vitamin D3, (VITAMIN D3) 2,000 unit Tab Take 2,000 Units by mouth once daily.    citalopram (CELEXA) 20 MG tablet Take 1 tablet (20 mg total) by mouth once daily.    insulin detemir U-100 (LEVEMIR FLEXTOUCH) 100 unit/mL (3 mL) SubQ InPn pen Inject 10 Units into the skin every evening.    ipratropium (ATROVENT) 0.02 % nebulizer solution Take 500 mcg by nebulization 4 (four) times daily.     metoprolol succinate (TOPROL-XL) 50 MG 24 hr tablet Take 50 mg by mouth 2 (two) times daily.     mupirocin (BACTROBAN) 2 % ointment Apply topically 2 (two) times daily.    omeprazole (PRILOSEC) 40 MG capsule     pantoprazole (PROTONIX) 40 MG tablet Take 1 tablet (40 mg total) by mouth once daily. for 14 days    potassium chloride SA (K-DUR,KLOR-CON) 20 MEQ tablet     pravastatin (PRAVACHOL) 40 MG tablet Take 40 mg by mouth once daily.    predniSONE (DELTASONE) 20 MG tablet Take 1 tablet (20 mg total) by mouth once daily.    rOPINIRole (REQUIP) 1 MG tablet TAKE 1 TABLET  EVERY EVENING.    torsemide (DEMADEX) 20 MG Tab Take 1 tablet (20 mg total) by mouth once daily.    traZODone (DESYREL) 50 MG tablet " Take 1 tablet (50 mg total) by mouth every evening.    weigh scale Misc Record daily weights        Review of patient's allergies indicates:  No Known Allergies    Past Medical History:   Diagnosis Date    A-fib     Acute on chronic congestive heart failure 2019    Anxiety     Arthritis     Asthma     Atrial fibrillation with rapid ventricular response     CHF (congestive heart failure) 2018    Chronic bronchitis     COPD (chronic obstructive pulmonary disease)     Depression     Diabetes mellitus, type 2     SANTIAGO (dyspnea on exertion)     Emphysema of lung     Fatigue     Hyperlipidemia     Hypertension     Symptomatic anemia 2019    Trouble in sleeping        Past Surgical History:   Procedure Laterality Date    APPENDECTOMY      BRONCHOSCOPY N/A 12/3/2019    Procedure: BRONCHOSCOPY;  Surgeon: Emmanuel Hickman MD;  Location: [facility];  Service: Pulmonary;  Laterality: N/A;    EYE SURGERY      HYSTERECTOMY      INSERTION OF PACEMAKER  2019    OOPHORECTOMY      TONSILLECTOMY         Family History   Problem Relation Age of Onset    Heart disease Mother     Hypertension Mother     Hypertension Father     Diabetes Father     Cancer Sister     COPD Brother     Hypertension Brother     No Known Problems Daughter     Kidney disease Son     COPD Daughter         Social History     Socioeconomic History    Marital status:      Spouse name: Not on file    Number of children: Not on file    Years of education: Not on file    Highest education level: Not on file   Occupational History    Not on file   Social Needs    Financial resource strain: Not on file    Food insecurity     Worry: Not on file     Inability: Not on file    Transportation needs     Medical: Not on file     Non-medical: Not on file   Tobacco Use    Smoking status: Former Smoker     Quit date: 8/3/2000     Years since quittin.9    Smokeless tobacco: Never Used   Substance and Sexual  Activity    Alcohol use: No    Drug use: No    Sexual activity: Not Currently   Lifestyle    Physical activity     Days per week: Not on file     Minutes per session: Not on file    Stress: Not on file   Relationships    Social connections     Talks on phone: Not on file     Gets together: Not on file     Attends [employer] service: Not on file     Active member of club or organization: Not on file     Attends meetings of clubs or organizations: Not on file     Relationship status: Not on file   Other Topics Concern    Not on file   Social History Narrative    Not on file         OBJECTIVE:     Temp:  [97.3 °F (36.3 °C)-98.6 °F (37 °C)] 98.6 °F (37 °C)  Pulse:  [68-98] 98  Resp:  [18-37] 30  SpO2:  [83 %-97 %] 88 %  BP: (135-170)/(65-79) 148/70  Body mass index is 24.11 kg/m².     Physical Exam:  Physical Exam  Constitutional:       General: She is not in acute distress.  Eyes:      Comments: Conjunctival pallor   Cardiovascular:      Rate and Rhythm: Normal rate and regular rhythm.      Pulses: Normal pulses.      Heart sounds: Normal heart sounds. No murmur. No friction rub. No gallop.    Pulmonary:      Breath sounds: Wheezing present.   Abdominal:      General: Abdomen is flat. Bowel sounds are normal.      Palpations: Abdomen is soft.      Tenderness: There is no abdominal tenderness.   Skin:     Findings: Bruising present.   Neurological:      General: No focal deficit present.      Mental Status: She is oriented to person, place, and time.   Psychiatric:         Mood and Affect: Mood normal.         Laboratory: Reviewed in Epic - Please see chart for full lab data.  Findings of note include Hb 9.7, , Platelets 127, Reticulocyte Index 1.92      Diagnostic Tests:  CXR: no acute pulmonary disease  EKG: Normal sinus, pulmonary disease pattern    ASSESSMENT/PLAN:     Active Hospital Problems    Diagnosis  POA    *Acute respiratory failure with hypoxia and hypercarbia [J96.01, J96.02]  Unknown     Diabetes mellitus type 2, uncontrolled [E11.65]  Yes    Chronic CHF [I50.9]  Yes    Symptomatic anemia [D64.9]  Yes    Atrial fibrillation with rapid ventricular response [I48.91]  Yes      Resolved Hospital Problems   No resolved problems to display.       Dispo-  Acute respiratory failure on b/g of COPD - on 5L O2 up from 3L O2 @ home. Showed improvement. Desats occur when talking.    -Continue on 5L O2  -Predsione 50mg x5/day  -Nebulizer q6    Anemia/pancytopenia - suspected GI bleed vs myelodysplasia     -consult GI  -consult Heme/Onc  -group and hold in case Hb drops below 7.0      Eulalio [facility]   Medical Student

## 2020-07-04 NOTE — CONSULTS
Consult Note    Inpatient consult to Hematology  Consult performed by: Ruben Riley MD  Consult ordered by: Lulu Jacinto MD        SUBJECTIVE:     History of Present Illness   Chantell Herrera is a 85 y/o F admitted to [facility] on 07/03 with COPD exacerbation and macrocytic anemia. Presented with a Hgb of 6.0 and was transfused 2u PRBCs with improvement in Hgb to 9.0 which has remained stable over the past 24h. Denies obvious external bleeding, BRBPR, melena or dark tarry stool. On my interview she says that she has never seen a hematologist before, her family doctor has never told her about problems with her blood counts. She is not aware of a family history of blood disorders, thrombocytopenia or blood clots. Denies petechiae, ecchymoses, epistaxis, gingival bleeding. Denies new medications exposure, herbal supplements. Denies recent viral infections. Has no dietary practices that could cause nutrient deficiencies. Not aware of any rheumatologic diseases and has never undergone bariatric surgery. Hemogram significant for mild thrombocytopenia ranging from 160-127. Lab have found normal iron, slightly increased retic index, normal iron stores, folate and vitamin B12. GI have been consulted to evaluate for GIB. Denies ETOH use.       Past Medical History:   Diagnosis Date    A-fib     Acute on chronic congestive heart failure 2/1/2019    Anxiety     Arthritis     Asthma     Atrial fibrillation with rapid ventricular response     CHF (congestive heart failure) 11/29/2018    Chronic bronchitis     COPD (chronic obstructive pulmonary disease)     Depression     Diabetes mellitus, type 2     SANTIAGO (dyspnea on exertion)     Emphysema of lung     Fatigue     Hyperlipidemia     Hypertension     Symptomatic anemia 1/22/2019    Trouble in sleeping      Past Surgical History:   Procedure Laterality Date    APPENDECTOMY      BRONCHOSCOPY N/A 12/3/2019    Procedure: BRONCHOSCOPY;  Surgeon: Emmanuel Hickman MD;   Location: [facility] OR;  Service: Pulmonary;  Laterality: N/A;    EYE SURGERY      HYSTERECTOMY      INSERTION OF PACEMAKER  2019    OOPHORECTOMY      TONSILLECTOMY       Family History   Problem Relation Age of Onset    Heart disease Mother     Hypertension Mother     Hypertension Father     Diabetes Father     Cancer Sister     COPD Brother     Hypertension Brother     No Known Problems Daughter     Kidney disease Son     COPD Daughter      Social History     Tobacco Use    Smoking status: Former Smoker     Quit date: 8/3/2000     Years since quittin.9    Smokeless tobacco: Never Used   Substance Use Topics    Alcohol use: No    Drug use: No     Review of Systems   Constitutional: Positive for malaise/fatigue. Negative for chills, fever and weight loss.   HENT: Negative for nosebleeds.    Eyes: Negative for blurred vision and double vision.   Respiratory: Positive for shortness of breath.    Cardiovascular: Negative for chest pain and palpitations.   Gastrointestinal: Negative for abdominal pain and blood in stool.   Genitourinary: Negative for dysuria.   Musculoskeletal: Negative for myalgias.   Skin: Negative for rash.   Neurological: Negative for focal weakness.   Endo/Heme/Allergies: Does not bruise/bleed easily.   Psychiatric/Behavioral: The patient is not nervous/anxious.        OBJECTIVE:     Vital Signs:  Temp:  [97.6 °F (36.4 °C)-98.6 °F (37 °C)]   Pulse:  [77-98]   Resp:  [18-30]   BP: (143-162)/(68-79)   SpO2:  [88 %-97 %]     Physical Exam  Constitutional:       General: She is not in acute distress.     Appearance: She is not ill-appearing.   HENT:      Head: Normocephalic and atraumatic.   Eyes:      General: No scleral icterus.  Neck:      Musculoskeletal: Normal range of motion.   Cardiovascular:      Rate and Rhythm: Normal rate and regular rhythm.   Pulmonary:      Effort: Pulmonary effort is normal. No respiratory distress.   Abdominal:      General: Abdomen is flat.  There is no distension.      Tenderness: There is no abdominal tenderness.   Musculoskeletal: Normal range of motion.   Skin:     General: Skin is warm and dry.   Neurological:      General: No focal deficit present.      Mental Status: She is alert and oriented to person, place, and time. Mental status is at baseline.   Psychiatric:         Mood and Affect: Mood normal.       Laboratory:  Recent Labs   Lab 07/04/20  0837   WBC 10.64   RBC 2.62*   HGB 9.7*   HCT 29.5*   *   *   MCH 37.0*   MCHC 32.9     Recent Labs   Lab 07/04/20  0246      K 4.3      CO2 31*   BUN 28*   CREATININE 0.9   MG 1.9        Diagnostic Results:    ASSESSMENT/PLAN:     Chantell Herrera is a 85 y/o admitted with COPD, hematology have been asked to assist in evaluation of macrocytic anemia. Differentials include GIB vs primary bone marrow process such as MDS vs hemolysis given reticulocytosis. B12, Folate and TFTs are normal.    Recommend screening for HIV, HBV, HCV, checking copper, SPEP, SIFE, haptoglobin, LDH,  Peripheral smear.   Recommend starting PO Thiamine.  Ultimately a bone marrow aspirate and biopsy may be needed but given only mild lab abnormalities and the fact that the patient lives in [place] and is unable to travel to [place] for care she can have this done as an out patient with a local hematologist. I will try and arrange follow up at [facility] per her request.     The patient will be discussed with the attending physician . Recommendations outlined in this note are not final until attending attestation. Thank you for consulting hematology, please page with questions.    Dedrick Mtz MD  Clinical Fellow  Hematology and Medical Oncology.  07/04/2020        I have reviewed the notes, assessments, and/or procedures performed by the housestaff, as above.  I have personally interviewed and examined the patient at the beside, and rounded with the housestaff. I  concur with her/his assessment and plan and the documentation of Chantell Herrera.  I, Dr. Ruben Riley, personally spent more than  80 mins during this encounter, greater than 50% was spent in direct counseling and/or coordination of care.     Ruben Riley M.D., M.S., F.A.C.P.  Hematology/Oncology Attending  [facility]

## 2020-07-04 NOTE — PLAN OF CARE
Pt AAO*4, calm, cooperative. BG monitoring, no supplemental insulin given. Pt on 5L oxygen via nasal cannula. NPO from midnight per doctor request. Pt tolerated bipap for 4 hrs overnight. No acute changes overnight. No falls or injuries reported. Call light within reach, questions answered at bedside. Will continue to monitor.

## 2020-07-04 NOTE — H&P
[facility] Hospital Medicine  History & Physical    Patient Name: Chantell Herrera  MRN: [id number]  Admission Date: 7/3/2020  Attending Physician: Alondra Patel MD   Primary Care Provider: Kari Gaspar MD    [facility] Medicine Team: Networked reference to record PCT  Ary Martel MD     Patient information was obtained from patient and ER records.     Subjective:     Principal Problem:Acute respiratory failure with hypoxia and hypercarbia    Chief Complaint: No chief complaint on file.       HPI: Chantell Herrera is an 83 yo woman with severe COPD, atrial fib (off anticoag.), CHF, DM II,  and symptomatic anemia presents with a several days progressive shortness of breath. It worsens with activity and decreases with rest.  She is on 3L O2 at home and uses BiPAP nightly. She reports productive cough that varies in color and sometimes appears blood streaked.She has not had fever or chills but has noted weakness and worsening SANTIAGO is similar to the symptoms she had in April, when she was hospitalized for low hemoglobin. Today, patient was found to have anemia, HgB 6.0 at [facility] where she had a transfusion of  2 units of blood. She was transferred for further GI workup, as GI bleeding was suspected. She denies melena, hematochezia or hematemesis. Denies HA, chest pain, palpitations, N/V.      Past Medical History:   Diagnosis Date    A-fib     Acute on chronic congestive heart failure 2/1/2019    Anxiety     Arthritis     Asthma     Atrial fibrillation with rapid ventricular response     CHF (congestive heart failure) 11/29/2018    Chronic bronchitis     COPD (chronic obstructive pulmonary disease)     Depression     Diabetes mellitus, type 2     SANTIAGO (dyspnea on exertion)     Emphysema of lung     Fatigue     Hyperlipidemia     Hypertension     Symptomatic anemia 1/22/2019    Trouble in sleeping        Past Surgical History:   Procedure Laterality Date     "APPENDECTOMY      BRONCHOSCOPY N/A 12/3/2019    Procedure: BRONCHOSCOPY;  Surgeon: Emmanuel Hickman MD;  Location: [facility];  Service: Pulmonary;  Laterality: N/A;    EYE SURGERY      HYSTERECTOMY      INSERTION OF PACEMAKER  12/26/2019    OOPHORECTOMY      TONSILLECTOMY         Review of patient's allergies indicates:  No Known Allergies    Current Facility-Administered Medications on File Prior to Encounter   Medication    [COMPLETED] acetaminophen tablet 500 mg    [COMPLETED] albuterol-ipratropium 2.5 mg-0.5 mg/3 mL nebulizer solution 3 mL    [COMPLETED] levalbuterol nebulizer solution 1.25 mg    [COMPLETED] methylPREDNISolone sodium succinate injection 125 mg    [COMPLETED] pantoprazole injection 40 mg    [DISCONTINUED] 0.9%  NaCl infusion (for blood administration)     Current Outpatient Medications on File Prior to Encounter   Medication Sig    ACCU-CHEK SOFTCLIX LANCETS Misc 1 each by Misc.(Non-Drug; Combo Route) route once daily.    albuterol (PROVENTIL) 2.5 mg /3 mL (0.083 %) nebulizer solution Take 3 mLs (2.5 mg total) by nebulization every 6 (six) hours as needed for Wheezing.    alcohol swabs (BD ALCOHOL SWABS) PadM Apply 1 each topically as needed.    aspirin (ECOTRIN) 81 MG EC tablet Take 1 tablet (81 mg total) by mouth once daily.    baclofen (LIORESAL) 10 MG tablet Take 10 mg by mouth 3 (three) times daily.    BD ULTRA-FINE SHORT PEN NEEDLE 31 gauge x 5/16" Ndle USE TO INJECT LEVEMIR ONCE DAILY    blood glucose control high,low (ACCU-CHEK KUMAR CONTROL SOLN) Soln 1 each by Misc.(Non-Drug; Combo Route) route as needed.    blood sugar diagnostic (ACCU-CHEK KUMAR PLUS TEST STRP) Strp 1 strip by Misc.(Non-Drug; Combo Route) route once daily.    blood-glucose meter (ACCU-CHEK KUMAR PLUS METER) Misc 1 each by Misc.(Non-Drug; Combo Route) route once daily.    cholecalciferol, vitamin D3, (VITAMIN D3) 2,000 unit Tab Take 2,000 Units by mouth once daily.    citalopram (CELEXA) 20 MG tablet " Take 1 tablet (20 mg total) by mouth once daily.    insulin detemir U-100 (LEVEMIR FLEXTOUCH) 100 unit/mL (3 mL) SubQ InPn pen Inject 10 Units into the skin every evening.    ipratropium (ATROVENT) 0.02 % nebulizer solution Take 500 mcg by nebulization 4 (four) times daily.     metoprolol succinate (TOPROL-XL) 50 MG 24 hr tablet Take 50 mg by mouth 2 (two) times daily.     mupirocin (BACTROBAN) 2 % ointment Apply topically 2 (two) times daily.    omeprazole (PRILOSEC) 40 MG capsule     pantoprazole (PROTONIX) 40 MG tablet Take 1 tablet (40 mg total) by mouth once daily. for 14 days    potassium chloride SA (K-DUR,KLOR-CON) 20 MEQ tablet     pravastatin (PRAVACHOL) 40 MG tablet Take 40 mg by mouth once daily.    predniSONE (DELTASONE) 20 MG tablet Take 1 tablet (20 mg total) by mouth once daily.    rOPINIRole (REQUIP) 1 MG tablet TAKE 1 TABLET  EVERY EVENING.    torsemide (DEMADEX) 20 MG Tab Take 1 tablet (20 mg total) by mouth once daily.    traZODone (DESYREL) 50 MG tablet Take 1 tablet (50 mg total) by mouth every evening.    weigh scale [facility] Record daily weights     Family History     Problem Relation (Age of Onset)    COPD Brother, Daughter    Cancer Sister    Diabetes Father    Heart disease Mother    Hypertension Mother, Father, Brother    Kidney disease Son    No Known Problems Daughter        Tobacco Use    Smoking status: Former Smoker     Quit date: 8/3/2000     Years since quittin.9    Smokeless tobacco: Never Used   Substance and Sexual Activity    Alcohol use: No    Drug use: No    Sexual activity: Not Currently     Review of Systems   Constitutional: Positive for appetite change and fatigue. Negative for chills and fever.   HENT: Positive for congestion. Negative for sore throat.    Respiratory: Positive for cough and shortness of breath. Negative for chest tightness.    Cardiovascular: Negative for chest pain, palpitations and leg swelling.   Gastrointestinal: Negative for  abdominal pain, diarrhea, nausea and vomiting.   Genitourinary: Negative for difficulty urinating and dysuria.   Musculoskeletal: Negative for myalgias.   Neurological: Positive for dizziness and weakness. Negative for numbness and headaches.   Psychiatric/Behavioral: Negative for agitation, behavioral problems and confusion.      .  Objective:     Vital Signs (Most Recent):  Temp: 98.2 °F (36.8 °C) (07/03/20 1546)  Pulse: 76 (07/03/20 1803)  Resp: (!) 34 (07/03/20 1803)  BP: (!) 170/79 (07/03/20 1546)  SpO2: (!) 93 % (07/03/20 1803) Vital Signs (24h Range):  Temp:  [98 °F (36.7 °C)-99.2 °F (37.3 °C)] 98.2 °F (36.8 °C)  Pulse:  [72-98] 76  Resp:  [20-41] 34  SpO2:  [83 %-100 %] 93 %  BP: (132-170)/(60-79) 170/79     Weight: 58.3 kg (128 lb 8.5 oz)  Body mass index is 23.51 kg/m².    Physical Exam  Constitutional:       Comments: Mild respiratory distress with use of accessory muscles.   HENT:      Head: Normocephalic and atraumatic.   Eyes:      Pupils: Pupils are equal, round, and reactive to light.      Comments: Pale conjunctivas BL   Cardiovascular:      Rate and Rhythm: Normal rate and regular rhythm.      Pulses: Normal pulses.      Heart sounds: Normal heart sounds.   Pulmonary:      Breath sounds: No wheezing, rhonchi or rales.   Abdominal:      General: Bowel sounds are normal.      Palpations: Abdomen is soft.      Tenderness: There is no abdominal tenderness.   Musculoskeletal: Normal range of motion.         General: No tenderness.   Skin:     General: Skin is warm.      Coloration: Skin is pale.   Neurological:      General: No focal deficit present.      Mental Status: She is alert and oriented to person, place, and time.   Psychiatric:         Mood and Affect: Mood normal.           CRANIAL NERVES     CN III, IV, VI   Pupils are equal, round, and reactive to light.       Significant Labs:   Recent Lab Results       07/03/20  1806   07/03/20  1803   07/03/20  1801   07/03/20  1007   07/03/20  0613         Unit Blood Type Code               Unit Expiration               Unit Blood Type               Albumin               Alkaline Phosphatase               ALT               Anion Gap               Aniso               aPTT       23.5  Comment:  aPTT therapeutic range = 39-69 seconds       AST               Baso # 0.03             Basophil% 0.3             BILIRUBIN TOTAL               BNP               BUN, Bld               Calcium               Chloride               CO2               CODING SYSTEM               Creatinine               Differential Method Automated             DISPENSE STATUS               eGFR if                eGFR if non                Eos # 0.0             Eosinophil% 0.1             Ferritin         1,189     Folate         9.6     Glucose               Gran # (ANC) 8.5             Gran% 85.5             Group & Rh               Hematocrit 29.8             Hemoglobin 9.6             Immature Grans (Abs) 0.15  Comment:  Mild elevation in immature granulocytes is non specific and   can be seen in a variety of conditions including stress response,   acute inflammation, trauma and pregnancy. Correlation with other   laboratory and clinical findings is essential.               Immature Granulocytes 1.5             INDIRECT BRANDY               INR       1.2  Comment:  Coumadin Therapy:  2.0 - 3.0 for INR for all indicators except mechanical heart valves  and antiphospholipid syndromes which should use 2.5 - 3.5.         Iron         100     Lactate, Elia 1.8  Comment:  Falsely low lactic acid results can be found in samples   containing >=13.0 mg/dL total bilirubin and/or >=3.5 mg/dL   direct bilirubin.               Lymph # 0.2             Lymph% 2.1             MCH 35.7             MCHC 32.2                          Mono # 1.1             Mono% 10.5             MPV 12.9             nRBC 0             Platelets 161             POC BE   7           POC HCO3    31.9           POC PCO2   54.4           POC PH   7.375           POC PO2   28           POC SATURATED O2   50           POC TCO2   33           POCT Glucose     229         Poik               Potassium               Product Code               PROTEIN TOTAL               Protime       12.3       RBC 2.69             RDW SEE COMMENT  Comment:  Result not available.             Retic         4.1     Sample   VENOUS           SARS-CoV-2 RNA, Amplification, Qual         Negative  Comment:  This test utilizes isothermal nucleic acid amplification   technology to detect the SARS-CoV-2 RdRp nucleic acid segment.   The analytical sensitivity (limit of detection) is 125 genome   equivalents/mL.   A POSITIVE result implies infection with the SARS-CoV-2 virus;  the patient is presumed to be contagious.    A NEGATIVE result means that SARS-CoV-2 nucleic acids are not  present above the limit of detection. A NEGATIVE result should be   treated as presumptive. It does not rule out the possibility of   COVID-19 and should not be the sole basis for treatment decisions.   If COVID-19 is strongly suspected based on clinical and exposure   history, re-testing using an alternate molecular assay should be   considered.   This test is only for use under the Food and Drug   Administration s Emergency Use Authorization (EUA).   Commercial kits are provided by "[employer].   Performance characteristics of the EUA have been independently  verified by [facility] Department of  Pathology and Laboratory Medicine.   _________________________________________________________________  The ID NOW COVID-19 Letter of Authorization, along with the   authorized Fact Sheet for Healthcare Providers, the authorized Fact  Sheet for Patients, and authorized labeling are available on the FDA   website:  www.fda.gov/MedicalDevices/Safety/EmergencySituations/zzr392881.htm       Saturated Iron         36     Sodium               TIBC          278     Transferrin         188     Troponin I 0.037  Comment:  The reference interval for Troponin I represents the 99th percentile   cutoff   for our facility and is consistent with 3rd generation assay   performance.               TSH         2.122     UNIT NUMBER               Vitamin B-12         583     WBC 9.96                              07/03/20  0612        Unit Blood Type Code 6200[P]      6200[P]     Unit Expiration [id number][P]      [id number][P]     Unit Blood Type A POS[P]      A POS[P]     Albumin 3.6     Alkaline Phosphatase 112     ALT 38     Anion Gap 12     Aniso Moderate     aPTT       AST 17     Baso # 0.04     Basophil% 0.4     BILIRUBIN TOTAL 0.7  Comment:  For infants and newborns, interpretation of results should be based  on gestational age, weight and in agreement with clinical  observations.  Premature Infant recommended reference ranges:  Up to 24 hours.............<8.0 mg/dL  Up to 48 hours............<12.0 mg/dL  3-5 days..................<15.0 mg/dL  6-29 days.................<15.0 mg/dL       BNP 1,034  Comment:  Values of less than 100 pg/ml are consistent with non-CHF populations.     BUN, Bld 22     Calcium 8.8     Chloride 102     CO2 29     CODING SYSTEM IMOG129[P]      QTCF553[P]     Creatinine 0.9     Differential Method Automated     DISPENSE STATUS ISSUED[P]      ISSUED[P]     eGFR if  >60     eGFR if non  60  Comment:  Calculation used to obtain the estimated glomerular filtration  rate (eGFR) is the CKD-EPI equation.        Eos # 0.7     Eosinophil% 6.0     Ferritin       Folate       Glucose 172     Gran # (ANC) 7.8     Gran% 70.4     Group & Rh A POS     Hematocrit 19.4  Comment:  Hct   critical result(s) called and verbal readback obtained from   Emmanuel Raza RN by [facility] 07/03/2020 06:49       Hemoglobin 6.0     Immature Grans (Abs) 0.17  Comment:  Mild elevation in immature granulocytes is non specific and   can be seen in a variety  of conditions including stress response,   acute inflammation, trauma and pregnancy. Correlation with other   laboratory and clinical findings is essential.       Immature Granulocytes 1.5     INDIRECT BRANDY NEG     INR       Iron       Lactate, Elia       Lymph # 0.9     Lymph% 8.5     MCH 40.8     MCHC 30.9          Mono # 1.5     Mono% 13.2     MPV 13.0     nRBC 0     Platelets 157     POC BE       POC HCO3       POC PCO2       POC PH       POC PO2       POC SATURATED O2       POC TCO2       POCT Glucose       Poik Slight     Potassium 4.1     Product Code O8878P09[P]      [id number][P]     PROTEIN TOTAL 6.1     Protime       RBC 1.47     RDW 24.8     Retic       Sample       SARS-CoV-2 RNA, Amplification, Qual       Saturated Iron       Sodium 143     TIBC       Transferrin       Troponin I 0.035  Comment:  The reference interval for Troponin I represents the 99th percentile   cutoff   for our facility and is consistent with 3rd generation assay   performance.       TSH       UNIT NUMBER [id number][P]      [id number][P]     Vitamin B-12       WBC 11.10         All pertinent labs within the past 24 hours have been reviewed.    SIgnificant Imaging: I have reviewed all pertinent imaging results/findings within the past 24 hours.    Assessment/Plan:     * Acute respiratory failure with hypoxia and hypercarbia  Progressive dyspnea, on 3L O2 at home, placed on BiPAP at [facility].   Possibly secondary to COPD exacerbation vs anemia vs acute on chronic heart failure.  Currently stable on 5L O2 nasal canula  ABG pH 7.37, pCO2 54.4, pO2 28  -Transfused 2 units of blood, Hgb up 9.6  -Prednisone 50mg daily x5d  -Duo nebs q6   -Bipap PRN             Diabetes mellitus type 2, uncontrolled  - Detemir 8 units qhs  - [facility]      Chronic CHF  -Continue home meds: torsemide 20mg      Symptomatic anemia  Pt is feeling weak, dizzy and short of breath. Similar symptoms and hospitalization on  4/2020  Hgb 6 this AM, increased to 9.6 after transfusion  Suspect GI bleed vs anemia of chronic disease.  - GI consulted  - Monitor CBC  - On tele    Atrial fibrillation with rapid ventricular response  CHADSVASC 6  Pt takes ASA 81 at home, no other anticoagulation  - Continue home meds: Metoprolol succinate 50 BID      VTE Risk Mitigation (From admission, onward)         Ordered     IP VTE HIGH RISK PATIENT  Once      07/03/20 1737     Place sequential compression device  Until discontinued      07/03/20 1737                Ary Martel DO  PGY-1 Internal Medicine  Department of Hospital Medicine   [facility]

## 2020-07-04 NOTE — PLAN OF CARE
Pt free of falls/trauma/injuries.  Denies c/o SOB; O2Sats remain stable on 5L .  Incentive spirometry encouraged throughout shift.   Generalized skin remains CDI; with multiple bruised spots and arms and legs. Pt being diuresed with torsemide diuresing well. Did have to explain to pt the importance of watching fluid intake and not to get over loaded.  She stated she understood that she has had to do that for many years. She was able to repeat back with understanding.  Wt remains stable.  Electrolytes replaced as ordered.  PT/OT following; pt able to ambulate in hallway standby assist. Pt was able to sit up in the chair for a couple of hours does get somewhat winded after any kind of movement and needs a little bit to compensate for that. She was having some midsteral pain that is sharp and comes and goes. EKG done , chest xray  and labs were done they have not resulted at this time.  was having some   Pt tolerating plan of care.

## 2020-07-04 NOTE — ASSESSMENT & PLAN NOTE
Pt is feeling weak, dizzy and short of breath. Similar symptoms and hospitalization on 4/2020  Hgb 6 this AM, increased to 9.6 after transfusion  Suspect GI bleed vs anemia of chronic disease.  - GI consulted  - Monitor CBC  - On tele

## 2020-07-04 NOTE — PROGRESS NOTES
[facility] Hospital Medicine  Progress Note    Patient Name: Chantell Herrera  MRN: [id number]  Patient Class: IP- Inpatient   Admission Date: 7/3/2020  Length of Stay: 1 days  Attending Physician: Alondra Patel MD  Primary Care Provider: Kari Gaspar MD    [facility] Medicine Team: [facility] MED 5 Ary Martel MD    Subjective:     Principal Problem:Acute respiratory failure with hypoxia and hypercarbia        HPI:  Chantell Herrera is an 85 yo woman with severe COPD, atrial fib (off anticoag.), CHF, DM II,  and symptomatic anemia presents with a several days progressive shortness of breath. It worsens with activity and decreases with rest.  She is on 3L O2 at home and uses BiPAP nightly. She reports productive cough that varies in color and sometimes appears blood streaked.She has not had fever or chills but has noted weakness and worsening SANTIAGO is similar to the symptoms she had in April, when she was hospitalized for low hemoglobin. Today, patient was found to have anemia, HgB 6.0 at [facility] where she had a transfusion of  2 units of blood. She was transferred for further GI workup, as GI bleeding was suspected. She denies melena, hematochezia or hematemesis. Denies HA, chest pain, palpitations, N/V.      Overview/Hospital Course:  No notes on file    Interval History: Pt in mild respiratory distress. Reports feeling better after transfusion. States she is still short of breath but it is close to her baseline, worse when transferring from the chair to bed. Better once she sits and rest. She reports several episodes of sharp chest pain that lasts seconds, it is non radiating, not reproduced on palpation, not affected by breathing.     Review of Systems   Constitutional: Positive for fatigue (improving). Negative for appetite change.   Respiratory: Positive for cough, shortness of breath and wheezing (on all lung fields).    Cardiovascular: Positive for chest pain. Negative for  palpitations.   Gastrointestinal: Negative for abdominal pain, constipation, diarrhea, nausea and vomiting.   Genitourinary: Negative for difficulty urinating and dysuria.   Musculoskeletal: Negative for myalgias.   Neurological: Negative for dizziness, numbness and headaches.   Psychiatric/Behavioral: Negative for behavioral problems and confusion.     Objective:     Vital Signs (Most Recent):  Temp: 98.5 °F (36.9 °C) (07/04/20 1227)  Pulse: 93 (07/04/20 1558)  Resp: 20 (07/04/20 1558)  BP: (!) 148/70 (07/04/20 1227)  SpO2: (!) 94 % (07/04/20 1558) Vital Signs (24h Range):  Temp:  [97.3 °F (36.3 °C)-98.6 °F (37 °C)] 98.5 °F (36.9 °C)  Pulse:  [68-98] 93  Resp:  [18-34] 20  SpO2:  [88 %-97 %] 94 %  BP: (135-162)/(65-79) 148/70     Weight: 59.8 kg (131 lb 13.4 oz)  Body mass index is 24.11 kg/m².    Intake/Output Summary (Last 24 hours) at 7/4/2020 1619  Last data filed at 7/4/2020 1200  Gross per 24 hour   Intake --   Output 650 ml   Net -650 ml      Physical Exam  Constitutional:       General: She is in acute distress (mild respiratory distress, use of accessory muscle).   HENT:      Head: Normocephalic and atraumatic.   Cardiovascular:      Rate and Rhythm: Normal rate and regular rhythm.      Pulses: Normal pulses.      Heart sounds: Normal heart sounds.   Pulmonary:      Breath sounds: Wheezing present.   Neurological:      Mental Status: She is alert.         Significant Labs: All pertinent labs within the past 24 hours have been reviewed.    Significant Imaging: I have reviewed all pertinent imaging results/findings within the past 24 hours.      Assessment/Plan:      * Acute respiratory failure with hypoxia and hypercarbia  Progressive dyspnea, on 3L O2 at home, placed on BiPAP at [facility].   Possibly secondary to COPD exacerbation vs anemia vs acute on chronic heart failure.  Currently stable on 5L O2 nasal canula  ABG pH 7.37, pCO2 54.4, pO2 28  -Transfused 2 units of blood, Hgb up 9.6  -Prednisone  50mg daily x5d  -Duo nebs q6   -Bipap PRN             Chest pain  Intermittent episodes of sharp chest pain, midline on sternum that last seconds, non radiating, not reproduced on palpation, nonpleuretic. Denies worsening SOB or epigastric pain.  Hx of A fib, CHF, COPD  Cardiac (ACS, dissection) vs Pulmonary (PE, pneumothorax, PNA) vs GI( esophageal perf)  -Pending EKG, CXR, and troponin  -Echo pending        Discharge planning issues  PT/OT recommended discharge home with home health    Diabetes mellitus type 2, uncontrolled  - Detemir 8 units qhs  [facility]      Chronic CHF  -Continue home meds: torsemide 20mg      Macrocytic anemia  Pt is feeling weak, dizzy and short of breath. Similar symptoms and hospitalization on 4/2020  Hgb 6 this AM, increased to 9.6 after transfusion  Repeated episodes of anemia, with previous unremarkable studies. . Reticulocyte index of 1.9  - GI consulted, recommends outpt lab work 1 month after transfusion for iron studies  - Hematology consulted to focus etiology    - Monitor CBC  - On tele  - Pending MMA, homocystine, HIV, HBV, HCV, checking copper, SPEP, SIFE, haptoglobin, LDH,  Peripheral smear.    Atrial fibrillation with rapid ventricular response  CHADSVASC 6  Pt takes ASA 81 at home, no other anticoagulation  - Continue home meds: Metoprolol succinate 50 BID      VTE Risk Mitigation (From admission, onward)         Ordered     IP VTE HIGH RISK PATIENT  Once      07/03/20 1737     Place sequential compression device  Until discontinued      07/03/20 1737                    Ary Martel DO  PGY-1 Internal Medicine  Department of Hospital Medicine   [facility]

## 2020-07-04 NOTE — SUBJECTIVE & OBJECTIVE
Interval History: Pt in mild respiratory distress. Reports feeling better after transfusion. States she is still short of breath but it is close to her baseline, worse when transferring from the chair to bed. Better once she sits and rest. She reports several episodes of sharp chest pain that lasts seconds, it is non radiating, not reproduced on palpation, not affected by breathing.     Review of Systems   Constitutional: Positive for fatigue (improving). Negative for appetite change.   Respiratory: Positive for cough, shortness of breath and wheezing (on all lung fields).    Cardiovascular: Positive for chest pain. Negative for palpitations.   Gastrointestinal: Negative for abdominal pain, constipation, diarrhea, nausea and vomiting.   Genitourinary: Negative for difficulty urinating and dysuria.   Musculoskeletal: Negative for myalgias.   Neurological: Negative for dizziness, numbness and headaches.   Psychiatric/Behavioral: Negative for behavioral problems and confusion.     Objective:     Vital Signs (Most Recent):  Temp: 98.5 °F (36.9 °C) (07/04/20 1227)  Pulse: 93 (07/04/20 1558)  Resp: 20 (07/04/20 1558)  BP: (!) 148/70 (07/04/20 1227)  SpO2: (!) 94 % (07/04/20 1558) Vital Signs (24h Range):  Temp:  [97.3 °F (36.3 °C)-98.6 °F (37 °C)] 98.5 °F (36.9 °C)  Pulse:  [68-98] 93  Resp:  [18-34] 20  SpO2:  [88 %-97 %] 94 %  BP: (135-162)/(65-79) 148/70     Weight: 59.8 kg (131 lb 13.4 oz)  Body mass index is 24.11 kg/m².    Intake/Output Summary (Last 24 hours) at 7/4/2020 1619  Last data filed at 7/4/2020 1200  Gross per 24 hour   Intake --   Output 650 ml   Net -650 ml      Physical Exam  Constitutional:       General: She is in acute distress (mild respiratory distress, use of accessory muscle).   HENT:      Head: Normocephalic and atraumatic.   Cardiovascular:      Rate and Rhythm: Normal rate and regular rhythm.      Pulses: Normal pulses.      Heart sounds: Normal heart sounds.   Pulmonary:      Breath sounds:  Wheezing present.   Neurological:      Mental Status: She is alert.         Significant Labs: All pertinent labs within the past 24 hours have been reviewed.    Significant Imaging: I have reviewed all pertinent imaging results/findings within the past 24 hours.

## 2020-07-04 NOTE — PT/OT/SLP EVAL
"Physical Therapy Evaluation and Discharge Note    Patient Name:  Chantell Herrera   MRN:  [id number]    Recommendations:     Discharge Recommendations:  home health PT   Discharge Equipment Recommendations: none   Barriers to discharge: None    Assessment:     Chantell Herrera is a 84 y.o. female admitted with a medical diagnosis of Acute respiratory failure with hypoxia and hypercarbia. Pt presents with severe COPD, atrial fib (off anticoag.), CHF, DM II,  and symptomatic anemia presents with a several days progressive shortness of breath. It worsens with activity and decreases with rest.  She is on 3L O2 at home and uses BiPAP nightly.  Today, patient was found to have anemia, HgB 6.0 at [facility] where she had a transfusion of  2 units of blood.     Upon evaluation, pt requires S to perform stand pivot transfers, with use of RW for support.  Pt has a history of 1 fall this year.    Recent Surgery: * No surgery found *      Plan:     During this hospitalization, patient does not require further acute PT services.  Please re-consult if situation changes.      Subjective     Chief Complaint: Contusions on B LEs  Patient/Family Comments/goals: "I haven't gotten breakfast"  Pain/Comfort:  · Pain Rating 1: 0/10    Patients cultural, spiritual, Mosque conflicts given the current situation: no    Living Environment:  Pt lives with spouse, SSH, ramp present.   Prior to admission, patients level of function required use of rollator to amb, A with ADLs PRN and S with bathing.  Equipment used at home: rollator, BIPAP, oxygen, power chair, shower chair.  DME owned (not currently used): none.  Upon discharge, patient will have assistance from spouse.    Objective:     Communicated with nursing prior to session.  Patient found supine with blood pressure cuff, oxygen, peripheral IV, pulse ox (continuous), telemetry upon PT entry to room.    General Precautions: Standard, fall, diabetic, respiratory   Orthopedic " Precautions:N/A   Braces: N/A     Exams:  · Cognitive Exam:  Patient is oriented to Person, Place, Time and Situation  · Fine Motor Coordination:    · -       Intact  Left hand thumb/finger opposition skills and Right hand thumb/finger opposition skills  · Gross Motor Coordination:  WFL  · Postural Exam:  Patient presented with the following abnormalities:    · -       No postural abnormalities identified  · Sensation:    · -       Intact  · Skin Integrity/Edema:      · -       Skin integrity: Contusions throughout lower legs B, vesicles R elbow and R knee  · RUE ROM: WFL  · RUE Strength: WFL  · LUE ROM: WFL  · LUE Strength: WFL  · RLE ROM: WFL  · RLE Strength: WFL  · LLE ROM: WFL  · LLE Strength: WFL    Functional Mobility:  · Bed Mobility:     · Scooting: independence  · Supine to Sit: independence  · Transfers:     · Sit to Stand:  independence with no AD  · Bed to Chair: supervision with  rolling walker  using  Stand Pivot  · Gait: Pt amb a few steps during stand pivot transfer, S, with RW, no episodes of LOB, no SANTIAGO noted  · Balance: S: dynamic standing balance with AD    [facility] 6 CLICK MOBILITY  Total Score:21       Therapeutic Activities and Exercises:   Whiteboard updated    [facility] 6 CLICK MOBILITY  Total Score:21     Patient left up in chair with all lines intact, call button in reach and nursing notified.    GOALS:   Multidisciplinary Problems     Physical Therapy Goals     Not on file                History:     Past Medical History:   Diagnosis Date    A-fib     Acute on chronic congestive heart failure 2/1/2019    Anxiety     Arthritis     Asthma     Atrial fibrillation with rapid ventricular response     CHF (congestive heart failure) 11/29/2018    Chronic bronchitis     COPD (chronic obstructive pulmonary disease)     Depression     Diabetes mellitus, type 2     SANTIAGO (dyspnea on exertion)     Emphysema of lung     Fatigue     Hyperlipidemia     Hypertension     Symptomatic anemia  1/22/2019    Trouble in sleeping        Past Surgical History:   Procedure Laterality Date    APPENDECTOMY      BRONCHOSCOPY N/A 12/3/2019    Procedure: BRONCHOSCOPY;  Surgeon: Emmanuel Hickman MD;  Location: [facility];  Service: Pulmonary;  Laterality: N/A;    EYE SURGERY      HYSTERECTOMY      INSERTION OF PACEMAKER  12/26/2019    OOPHORECTOMY      TONSILLECTOMY         Time Tracking:     PT Received On: 07/04/20  PT Start Time: 0845     PT Stop Time: 0902  PT Total Time (min): 17 min     Billable Minutes: Evaluation 9 and Therapeutic Activity 8      Mercy Rizzo, PT  07/04/2020

## 2020-07-04 NOTE — ASSESSMENT & PLAN NOTE
Pt is feeling weak, dizzy and short of breath. Similar symptoms and hospitalization on 4/2020  Hgb 6 this AM, increased to 9.6 after transfusion  Repeated episodes of anemia, with previous unremarkable studies. . Reticulocyte index of 1.9  - GI consulted, recommends outpt lab work 1 month after transfusion for iron studies  - Hematology consulted to focus etiology    - Monitor CBC  - On tele  - Pending MMA, homocystine, HIV, HBV, HCV, checking copper, SPEP, SIFE, haptoglobin, LDH,  Peripheral smear.

## 2020-07-04 NOTE — ASSESSMENT & PLAN NOTE
Intermittent episodes of sharp chest pain, midline on sternum that last seconds, non radiating, not reproduced on palpation, nonpleuretic. Denies worsening SOB or epigastric pain.  Hx of A fib, CHF, COPD  Cardiac (ACS, dissection) vs Pulmonary (PE, pneumothorax, PNA) vs GI( esophageal perf)  -Pending EKG, CXR, and troponin  -Echo pending

## 2020-07-04 NOTE — CODE/ RAPID DOCUMENTATION
Rapid Response Nurse Chart Check     Chart check completed, abnormal VS noted. Bedside RN, Anju contacted, no concerns verbalized at this time, instructed to call [phone] for further concerns or assistance.

## 2020-07-04 NOTE — PT/OT/SLP PROGRESS
Speech Language Pathology      Chantell Herrera  MRN: [id number]     Patient not seen today secondary to Nursing hold (Comment) patient NPO for GI. ST will follow up 7/5 as appropriate.     DIANE Noel, CCC-SLP   Pager: [phone]  07/04/2020

## 2020-07-05 NOTE — NURSING
Pt AAx4, 02@3L NC titrated down from 5L, occasional SOB with desaturation to low 80's. Strict I and O maintained. Administered medication per orders, pt tolerated well. POC reviewed with pt, pt stated understanding. VSS, frequent rounding completed. No other significant events throughout shift. Will continue to monitor.

## 2020-07-05 NOTE — PLAN OF CARE
Bedside swallow assessment completed. Oral and pharyngeal phases of the swallow are WFL; however pt reports symptoms consistent with esophageal dysphagia. Would benefit from GI consult to further assess. Recommend continue regular diet with thin liquids, ST to f/u to ensure tolerance. Ashley Avila CCC-SLP  7/5/2020 10:51 AM

## 2020-07-05 NOTE — CARE UPDATE
Rapid Response Nurse Chart Check     Chart check completed, abnormal VS noted. bedside RNGwen contacted, no concerns verbalized at this time. Bedside RN to see pt and reasses vitals. Instructed to call [phone] for further concerns or assistance.

## 2020-07-05 NOTE — PLAN OF CARE
Patient chooses to resume home health services with [facility].   Home health orders faxed to [phone].

## 2020-07-05 NOTE — PLAN OF CARE
[facility]    HOME HEALTH ORDERS  FACE TO FACE ENCOUNTER    Patient Name: Chantell Herrera  YOB: 1935    PCP: Kari Gaspar MD   PCP Address: [address] / [place] [address]  PCP Phone Number: [phone]  PCP Fax: [phone]    Discharging Team(s): [facility] 5    Encounter Date: 07/05/2020    Admit to Home Health    Diagnoses:  Active Hospital Problems    Diagnosis  POA    *Acute on chronic respiratory failure with hypoxia and hypercapnia [J96.21, J96.22]  Yes    Discharge planning issues [Z02.9]  Not Applicable    Chest pain [R07.9]  Yes    Thrombocytopenia [D69.6]  Yes    Long term current use of systemic steroids [Z79.52]  Not Applicable     Chronic    Diabetes mellitus type 2, uncontrolled [E11.65]  Yes    Macrocytic anemia [D53.9]  Yes    Chronic atrial fibrillation [I48.20]  Yes      Resolved Hospital Problems   No resolved problems to display.       Future Appointments   Date Time Provider Department Center   7/9/2020 12:30 PM Kari Gaspar MD [facility]     Follow-up Information     Schedule an appointment as soon as possible for a visit with Kari Gaspar MD.    Specialty: Family Medicine  Contact information:  [address]  [facility] [address] [phone]                     I have seen and examined this patient face to face today. My clinical findings that support the need for the home health skilled services and home bound status are the following:  Medical restrictions requiring assistance of another human to leave home due to  Dyspnea on exertion (SOB) and Home oxygen requirement.    Allergies:Review of patient's allergies indicates:  No Known Allergies    Diet: diabetic diet: 2000 calorie    Activities: activity as tolerated    Nursing:   SN to complete comprehensive assessment including routine vital signs. Instruct on disease process and s/s of complications to report to MD. Review/verify medication list sent  home with the patient at time of discharge  and instruct patient/caregiver as needed. Frequency may be adjusted depending on start of care date.    Notify MD if SBP > 160 or < 90; DBP > 90 or < 50; HR > 120 or < 50; Temp > 101      CONSULTS:    Physical Therapy to evaluate and treat. Evaluate for home safety and equipment needs; Establish/upgrade home exercise program. Perform / instruct on therapeutic exercises, gait training, transfer training, and Range of Motion.  Occupational Therapy to evaluate and treat. Evaluate home environment for safety and equipment needs. Perform/Instruct on transfers, ADL training, ROM, and therapeutic exercises.  Speech Therapy  to evaluate and treat for  Swallowing.   to evaluate for community resources/long-range planning.    MISCELLANEOUS CARE:  Home Oxygen:  No change    WOUND CARE ORDERS  n/a      Medications: Review discharge medications with patient and family and provide education.      Current Discharge Medication List      START taking these medications    Details   amoxicillin-clavulanate 1,000-62.5 mg (AUGMENTIN XR) 1,000-62.5 mg per tablet Take 2 tablets by mouth every 12 (twelve) hours. for 7 days  Qty: 28 tablet, Refills: 0      !! predniSONE (DELTASONE) 50 MG Tab Take 1 tablet (50 mg total) by mouth once daily. for 3 days  Qty: 3 tablet, Refills: 0      thiamine 100 MG tablet Take 1 tablet (100 mg total) by mouth once daily.  Qty: 60 tablet, Refills: 3       !! - Potential duplicate medications found. Please discuss with provider.      CONTINUE these medications which have CHANGED    Details   !! predniSONE (DELTASONE) 20 MG tablet Take 1 tablet (20 mg total) by mouth once daily. Resume this medication at 7/9/2020  Qty: 30 tablet, Refills: 1    Comments: Resume this medication at 7/9/2020       !! - Potential duplicate medications found. Please discuss with provider.      CONTINUE these medications which have NOT CHANGED    Details   ACCU-CHEK SOFTCLIX  "LANCETS Misc 1 each by Misc.(Non-Drug; Combo Route) route once daily.  Qty: 100 each, Refills: 5      albuterol (PROVENTIL) 2.5 mg /3 mL (0.083 %) nebulizer solution Take 3 mLs (2.5 mg total) by nebulization every 6 (six) hours as needed for Wheezing.  Qty: 300 mL, Refills: 3    Associated Diagnoses: Simple chronic bronchitis      alcohol swabs (BD ALCOHOL SWABS) PadM Apply 1 each topically as needed.  Qty: 100 each, Refills: 5      aspirin (ECOTRIN) 81 MG EC tablet Take 1 tablet (81 mg total) by mouth once daily.  Qty: 30 tablet, Refills: 0      baclofen (LIORESAL) 10 MG tablet Take 10 mg by mouth 3 (three) times daily.      BD ULTRA-FINE SHORT PEN NEEDLE 31 gauge x 5/16" Ndle USE TO INJECT LEVEMIR ONCE DAILY  Qty: 100 each, Refills: 2    Associated Diagnoses: Uncontrolled type 2 diabetes mellitus with hyperglycemia      blood glucose control high,low (ACCU-CHEK KUMAR CONTROL SOLN) Soln 1 each by Misc.(Non-Drug; Combo Route) route as needed.  Qty: 1 each, Refills: 1      blood sugar diagnostic (ACCU-CHEK KUMAR PLUS TEST STRP) Strp 1 strip by Misc.(Non-Drug; Combo Route) route once daily.  Qty: 100 strip, Refills: 5      blood-glucose meter (ACCU-CHEK KUMAR PLUS METER) Misc 1 each by Misc.(Non-Drug; Combo Route) route once daily.  Qty: 1 each, Refills: 0      cholecalciferol, vitamin D3, (VITAMIN D3) 2,000 unit Tab Take 2,000 Units by mouth once daily.      citalopram (CELEXA) 20 MG tablet Take 1 tablet (20 mg total) by mouth once daily.  Qty: 30 tablet, Refills: 11      insulin detemir U-100 (LEVEMIR FLEXTOUCH) 100 unit/mL (3 mL) SubQ InPn pen Inject 10 Units into the skin every evening.  Qty: 15 mL, Refills: 0    Associated Diagnoses: Uncontrolled type 2 diabetes mellitus with hyperglycemia      ipratropium (ATROVENT) 0.02 % nebulizer solution Take 500 mcg by nebulization 4 (four) times daily.       metoprolol succinate (TOPROL-XL) 50 MG 24 hr tablet Take 50 mg by mouth 2 (two) times daily.       mupirocin " (BACTROBAN) 2 % ointment Apply topically 2 (two) times daily.  Qty: 22 g, Refills: 3      omeprazole (PRILOSEC) 40 MG capsule       pantoprazole (PROTONIX) 40 MG tablet Take 1 tablet (40 mg total) by mouth once daily. for 14 days  Qty: 14 tablet, Refills: 0    Associated Diagnoses: Choking due to food in larynx, subsequent encounter      potassium chloride SA (K-DUR,KLOR-CON) 20 MEQ tablet     Associated Diagnoses: Hypomagnesemia      pravastatin (PRAVACHOL) 40 MG tablet Take 40 mg by mouth once daily.      rOPINIRole (REQUIP) 1 MG tablet TAKE 1 TABLET  EVERY EVENING.  Qty: 90 tablet, Refills: 5    Associated Diagnoses: RLS (restless legs syndrome)      torsemide (DEMADEX) 20 MG Tab Take 1 tablet (20 mg total) by mouth once daily.  Qty: 30 tablet, Refills: 11      traZODone (DESYREL) 50 MG tablet Take 1 tablet (50 mg total) by mouth every evening.  Qty: 90 tablet, Refills: 0      weigh scale Misc Record daily weights  Qty: 1 each, Refills: 0             I certify that this patient is confined to her home and needs physical therapy, speech therapy and occupational therapy.

## 2020-07-05 NOTE — NURSING
Pt discharging home. Discharge instructions provided. Pt had no further questions. Family to bring home O2 tank for discharge. Awaiting  by family.

## 2020-07-05 NOTE — NURSING
PT AAO x 4. VSS. Continues on 5 LPM via NC. Pt refusing BIPAP at HS.  No acute events . Pt denies chest pain. Callbell placed within reach and use encouraged.

## 2020-07-05 NOTE — HOSPITAL COURSE
Mrs. Herrera presented was transferred from [place] for GI follow up for anemia requiring a blood transfusion. She reported progressive shortness of breath and weakness. She denied any melena, hematochezia or hematemesis. Her respiratory failure was thought to be COPD exacerbation vs anemia. She was started on prednisone 50mg, duonebs and O2. GI was consulted, no endoscopic intervention recommend. Hematology consulted, performed anemia workup (results still pending)and recommend starting PO Thiamine. Pt states she feels better and her SOB and O2 needs are back to baseline.

## 2020-07-05 NOTE — DISCHARGE SUMMARY
[facility] Hospital Medicine  Discharge Summary      Patient Name: Chantell Herrera  MRN: [id number]  Admission Date: 7/3/2020  Hospital Length of Stay: 2 days  Discharge Date and Time:  07/05/2020 4:14 PM  Attending Physician: Alondra Patel MD   Discharging Provider: Ary Martel MD  Primary Care Provider: Kari Gaspar MD  [facility] Medicine Team: [facility] [place] HOSP MED 5 Ary Martel MD    HPI:   Chantell Herrera is an 83 yo woman with severe COPD, atrial fib (off anticoag.), CHF, DM II,  and symptomatic anemia presents with a several days progressive shortness of breath. It worsens with activity and decreases with rest.  She is on 3L O2 at home and uses BiPAP nightly. She reports productive cough that varies in color and sometimes appears blood streaked.She has not had fever or chills but has noted weakness and worsening SANTIAGO is similar to the symptoms she had in April, when she was hospitalized for low hemoglobin. Today, patient was found to have anemia, HgB 6.0 at [facility] where she had a transfusion of  2 units of blood. She was transferred for further GI workup, as GI bleeding was suspected. She denies melena, hematochezia or hematemesis. Denies HA, chest pain, palpitations, N/V.      * No surgery found *      Hospital Course:   Mrs. Herrera presented was transferred from [place] for GI follow up for anemia requiring a blood transfusion. She reported progressive shortness of breath and weakness. She denied any melena, hematochezia or hematemesis. Her respiratory failure was thought to be COPD exacerbation vs anemia. She was started on prednisone 50mg, duonebs and O2. GI was consulted, no endoscopic intervention recommend. Hematology consulted, performed anemia workup (results still pending)and recommend starting PO Thiamine. Pt states she feels better and her SOB and O2 needs are back to baseline.      Consults:   Consults (From admission, onward)        Status Ordering Provider      Inpatient consult to Gastroenterology  Once     Provider:  (Not yet assigned)    Completed JULIÁN SANTIAGO     Inpatient consult to Hematology  Once     Provider:  (Not yet assigned)    Completed JULIÁN SANTIAGO          * Acute on chronic respiratory failure with hypoxia and hypercapnia  Progressive dyspnea, on 3L O2 at home, placed on BiPAP at [facility].   Possibly secondary to COPD exacerbation vs anemia vs acute on chronic heart failure.  On admission, ABG pH 7.37, pCO2 54.4, pO2 28  Currently stable on 3L O2 nasal canula    -Transfused 2 units of blood, Hgb up 9.6  -Prednisone 50mg daily x5d, then continue on 20mg home dose  -Continue home regimen of duonebs and BiPAP             Chest pain  Intermittent episodes of sharp chest pain, midline on sternum that last seconds, non radiating, not reproduced on palpation, nonpleuretic. Denies worsening SOB or epigastric pain.  Hx of A fib, CHF, COPD  - Denies any chest pain today, F/U with cardiology outpt        Discharge planning issues  PT/OT recommended discharge home with home health  F/U with hematology for further anemia workup  F/U with pulmonology for COPD management  F/U with cardiology for evaluation    Diabetes mellitus type 2, uncontrolled  - Detemir 8 units qhs      Macrocytic anemia  Pt is feeling weak, dizzy and short of breath. Similar symptoms and hospitalization on 4/2020  Hgb 6 this AM, increased to 9.6 after transfusion  Repeated episodes of anemia, with previous unremarkable studies. . Reticulocyte index of 1.9  - GI consulted, recommends outpt lab work 1 month after transfusion for iron studies  - Hematology consulted to focus etiology    - Hematology workup still pending, F/U with outpt hematology for further studies.      Chronic atrial fibrillation  CHADSVASC 6  Pt takes ASA 81 at home, no other anticoagulation  - Continue home meds: Metoprolol succinate 50 BID      Final Active Diagnoses:    Diagnosis Date Noted POA    PRINCIPAL  PROBLEM:  Acute on chronic respiratory failure with hypoxia and hypercapnia [J96.21, J96.22] 07/03/2020 Yes    Symptomatic anemia [D64.9]  Yes    Discharge planning issues [Z02.9] 07/04/2020 Not Applicable    Chest pain [R07.9] 07/04/2020 Yes    Thrombocytopenia [D69.6] 07/04/2020 Yes    Long term current use of systemic steroids [Z79.52] 07/04/2020 Not Applicable     Chronic    Diabetes mellitus type 2, uncontrolled [E11.65] 09/02/2019 Yes    Macrocytic anemia [D53.9] 01/22/2019 Yes    Chronic atrial fibrillation [I48.20] 11/16/2018 Yes      Problems Resolved During this Admission:       Discharged Condition: stable    Disposition: Home or Self Care    Follow Up:  Follow-up Information     Schedule an appointment as soon as possible for a visit with Kari Gaspar MD.    Specialty: Family Medicine  Contact information:  [facility] [address]  [facility] [address]  [phone]                 Patient Instructions:      Ambulatory referral/consult to Gastroenterology   Standing Status: Future   Referral Priority: Routine Referral Type: Consultation   Referral Reason: Specialty Services Required   Requested Specialty: Gastroenterology   Number of Visits Requested: 1     Ambulatory referral/consult to Hematology / Oncology   Standing Status: Future   Referral Priority: Routine Referral Type: Consultation   Referral Reason: Specialty Services Required   Requested Specialty: Hematology and Oncology   Number of Visits Requested: 1     Ambulatory referral/consult to Pulmonology   Standing Status: Future   Referral Priority: Routine Referral Type: Consultation   Referral Reason: Specialty Services Required   Requested Specialty: Pulmonary Disease   Number of Visits Requested: 1     Diet diabetic     Diet Cardiac     Notify your health care provider if you experience any of the following:  temperature >100.4     Notify your health care provider if you experience any of the following:  persistent nausea and vomiting or  diarrhea     Notify your health care provider if you experience any of the following:  severe uncontrolled pain     Notify your health care provider if you experience any of the following:  redness, tenderness, or signs of infection (pain, swelling, redness, odor or green/yellow discharge around incision site)     Notify your health care provider if you experience any of the following:  severe persistent headache     Notify your health care provider if you experience any of the following:  difficulty breathing or increased cough     Notify your health care provider if you experience any of the following:  worsening rash     Notify your health care provider if you experience any of the following:  persistent dizziness, light-headedness, or visual disturbances     Notify your health care provider if you experience any of the following:  increased confusion or weakness     Notify your health care provider if you experience any of the following:     Activity as tolerated       Significant Diagnostic Studies: Labs: All labs within the past 24 hours have been reviewed  Radiology: X-Ray: CXR: X-Ray Chest 1 View (CXR): No results found for this visit on 07/03/20.  Cardiac Graphics: ECG: incomplete RBBB, with PACs and septal infarct    Pending Diagnostic Studies:     Procedure Component Value Units Date/Time    Copper, serum [[id number]] Collected: 07/04/20 1532    Order Status: Sent Lab Status: In process Updated: 07/04/20 1541    Specimen: Blood     Narrative:      Collection has been rescheduled by RC at 07/04/2020 14:24 Reason:   Nurse Anju sharma Labs to be drawn for 1600     Echo Color Flow Doppler? Yes [[id number]]     Order Status: Sent Lab Status: No result     HIV 1/2 Ag/Ab (4th Gen) [[id number]] Collected: 07/04/20 1532    Order Status: Sent Lab Status: In process Updated: 07/04/20 1533    Specimen: Blood     Narrative:      Collection has been rescheduled by RC2 at 07/04/2020 14:24 Reason:   Nurse Anju sharma  Labs to be drawn for 1600     Hepatitis B core antibody, total [[id number]] Collected: 07/04/20 1532    Order Status: Sent Lab Status: In process Updated: 07/04/20 1533    Specimen: Blood     Narrative:      Collection has been rescheduled by RC2 at 07/04/2020 14:24 Reason:   Nurse Anju wants Labs to be drawn for 1600     Hepatitis B surface antigen [[id number]] Collected: 07/04/20 1532    Order Status: Sent Lab Status: In process Updated: 07/04/20 1533    Specimen: Blood     Narrative:      Collection has been rescheduled by RC2 at 07/04/2020 14:24 Reason:   Nurse Anju wants Labs to be drawn for 1600     Hepatitis C antibody [[id number]] Collected: 07/04/20 1532    Order Status: Sent Lab Status: In process Updated: 07/04/20 1533    Specimen: Blood     Narrative:      Collection has been rescheduled by RC2 at 07/04/2020 14:24 Reason:   Nurse Anju wants Labs to be drawn for 1600     Homocysteine, serum [[id number]] Collected: 07/04/20 1341    Order Status: Sent Lab Status: In process Updated: 07/04/20 1341    Specimen: Blood     Immunofixation electrophoresis [[id number]] Collected: 07/04/20 1532    Order Status: Sent Lab Status: In process Updated: 07/04/20 1541    Specimen: Blood     Narrative:      Collection has been rescheduled by RC2 at 07/04/2020 14:24 Reason:   Nurse Anju wants Labs to be drawn for 1600     Methylmalonic acid, serum [[id number]] Collected: 07/04/20 1341    Order Status: Sent Lab Status: In process Updated: 07/04/20 1404    Specimen: Blood          Medications:  Reconciled Home Medications:      Medication List      START taking these medications    amoxicillin-clavulanate 1,000-62.5 mg 1,000-62.5 mg per tablet  Commonly known as: AUGMENTIN XR  Take 2 tablets by mouth every 12 (twelve) hours. for 7 days     thiamine 100 MG tablet  Take 1 tablet (100 mg total) by mouth once daily.  Start taking on: July 6, 2020        CHANGE how you take these medications    * predniSONE 50 MG  "Tab  Commonly known as: DELTASONE  Take 1 tablet (50 mg total) by mouth once daily. for 3 days  Start taking on: July 6, 2020  What changed: You were already taking a medication with the same name, and this prescription was added. Make sure you understand how and when to take each.     * predniSONE 20 MG tablet  Commonly known as: DELTASONE  Take 1 tablet (20 mg total) by mouth once daily. Resume this medication at 7/9/2020  Start taking on: July 9, 2020  What changed:   · additional instructions  · These instructions start on July 9, 2020. If you are unsure what to do until then, ask your doctor or other care provider.         * This list has 2 medication(s) that are the same as other medications prescribed for you. Read the directions carefully, and ask your doctor or other care provider to review them with you.            CONTINUE taking these medications    ACCU-CHEK SOFTCLIX LANCETS Misc  Generic drug: lancets  1 each by Misc.(Non-Drug; Combo Route) route once daily.     albuterol 2.5 mg /3 mL (0.083 %) nebulizer solution  Commonly known as: PROVENTIL  Take 3 mLs (2.5 mg total) by nebulization every 6 (six) hours as needed for Wheezing.     alcohol swabs Padm  Commonly known as: BD ALCOHOL SWABS  Apply 1 each topically as needed.     aspirin 81 MG EC tablet  Commonly known as: ECOTRIN  Take 1 tablet (81 mg total) by mouth once daily.     baclofen 10 MG tablet  Commonly known as: LIORESAL  Take 10 mg by mouth 3 (three) times daily.     BD ULTRA-FINE SHORT PEN NEEDLE 31 gauge x 5/16" Ndle  Generic drug: pen needle, diabetic  USE TO INJECT LEVEMIR ONCE DAILY     blood glucose control high,low Soln  Commonly known as: ACCU-CHEK KUMAR CONTROL SOLN  1 each by Misc.(Non-Drug; Combo Route) route as needed.     blood sugar diagnostic Strp  Commonly known as: ACCU-CHEK KUMAR PLUS TEST STRP  1 strip by Misc.(Non-Drug; Combo Route) route once daily.     blood-glucose meter Misc  Commonly known as: ACCU-CHEK KUMAR PLUS " METER  1 each by Misc.(Non-Drug; Combo Route) route once daily.     citalopram 20 MG tablet  Commonly known as: CELEXA  Take 1 tablet (20 mg total) by mouth once daily.     insulin detemir U-100 100 unit/mL (3 mL) Inpn pen  Commonly known as: LEVEMIR FLEXTOUCH  Inject 10 Units into the skin every evening.     ipratropium 0.02 % nebulizer solution  Commonly known as: ATROVENT  Take 500 mcg by nebulization 4 (four) times daily.     metoprolol succinate 50 MG 24 hr tablet  Commonly known as: TOPROL-XL  Take 50 mg by mouth 2 (two) times daily.     mupirocin 2 % ointment  Commonly known as: BACTROBAN  Apply topically 2 (two) times daily.     omeprazole 40 MG capsule  Commonly known as: PRILOSEC     pantoprazole 40 MG tablet  Commonly known as: PROTONIX  Take 1 tablet (40 mg total) by mouth once daily. for 14 days     potassium chloride SA 20 MEQ tablet  Commonly known as: K-DUR,KLOR-CON     pravastatin 40 MG tablet  Commonly known as: PRAVACHOL  Take 40 mg by mouth once daily.     rOPINIRole 1 MG tablet  Commonly known as: REQUIP  TAKE 1 TABLET  EVERY EVENING.     torsemide 20 MG Tab  Commonly known as: DEMADEX  Take 1 tablet (20 mg total) by mouth once daily.     traZODone 50 MG tablet  Commonly known as: DESYREL  Take 1 tablet (50 mg total) by mouth every evening.     VITAMIN D3 50 mcg (2,000 unit) Tab  Generic drug: cholecalciferol (vitamin D3)  Take 2,000 Units by mouth once daily.     weigh scale Misc  Record daily weights            Indwelling Lines/Drains at time of discharge:   Lines/Drains/Airways     None                 Time spent on the discharge of patient: 35 minutes  Patient was seen and examined on the date of discharge and determined to be suitable for discharge.         Ary Martel DO  PGY-1 Internal Medicine  Department of Hospital Medicine   [facility]

## 2020-07-05 NOTE — ASSESSMENT & PLAN NOTE
Pt is feeling weak, dizzy and short of breath. Similar symptoms and hospitalization on 4/2020  Hgb 6 this AM, increased to 9.6 after transfusion  Repeated episodes of anemia, with previous unremarkable studies. . Reticulocyte index of 1.9  - GI consulted, recommends outpt lab work 1 month after transfusion for iron studies  - Hematology consulted to focus etiology    - Hematology workup still pending, F/U with outpt hematology for further studies.

## 2020-07-05 NOTE — PT/OT/SLP EVAL
"Occupational Therapy   Evaluation and Discharge Note    Name: Chantell Herrera  MRN: [id number]  Admitting Diagnosis:  Acute on chronic respiratory failure with hypoxia and hypercapnia      Recommendations:     Discharge Recommendations: home  Discharge Equipment Recommendations:  none  Barriers to discharge:    None. Patient states wish for immediate community re-entry. Patient politely declining further interventions at present.     Assessment:     Chantell Herrera is a 84 y.o. female with a medical diagnosis of Acute on chronic respiratory failure with hypoxia and hypercapnia. At this time, patient is functioning at their prior level of function and does not require further acute OT services.     Plan:     During this hospitalization, patient does not require further acute OT services.  Please re-consult if situation changes.    · Plan of Care Reviewed with: patient    Subjective   "I'm ready to go! That is what I thought you were here for."   Chief Complaint: None, save multiple medical lines.   Patient/Family Comments/goals: Community re-entry as soon as can be approved by MD.   Occupational Profile:  Living Environment/Previous level of function: (I) <> MOD (I).   Equipment Used at home:  rollator, BIPAP, oxygen, shower chair  Assistance upon Discharge: Daughter on phone w/ Patient at time of oT visit.     Pain/Comfort:  · Pain Rating 1: 0/10    Patients cultural, spiritual, Hindu conflicts given the current situation:      Objective:     Communicated with: RN prior to session.  Patient found with bed in chair position with telemetry, pulse ox (continuous), oxygen, blood pressure cuff, peripheral IV upon OT entry to room.    General Precautions: Standard, diabetic, respiratory, fall   Orthopedic Precautions:    Braces:       Occupational Performance:    Bed Mobility/Functional Mobility/Transfers:SBA        Activities of Daily Living:  · Feeding:  independence    · Grooming: modified independence bed " sitting  · Upper Body Dressing: stand by assistance gown, lines    Cognitive/Visual Perceptual:  A+Ox4, Patient able to make needs known. Patient politely declining services, active in own d/c planning at present.       [facility] 6 Click ADL:  AMPAC Total Score: 20    Treatment & Education:  Intro role of OT. Patient states Hx W/ HH OT/PT. General in room safety edu.   Education:    Patient left with bed in chair position with all lines intact, call button in reach and RN notified    GOALS:   Multidisciplinary Problems     Occupational Therapy Goals     Not on file                History:     Past Medical History:   Diagnosis Date    A-fib     Acute on chronic congestive heart failure 2/1/2019    Anxiety     Arthritis     Asthma     Atrial fibrillation with rapid ventricular response     CHF (congestive heart failure) 11/29/2018    Chronic bronchitis     COPD (chronic obstructive pulmonary disease)     Depression     Diabetes mellitus, type 2     SANTIAGO (dyspnea on exertion)     Emphysema of lung     Fatigue     Hyperlipidemia     Hypertension     Symptomatic anemia 1/22/2019    Trouble in sleeping        Past Surgical History:   Procedure Laterality Date    APPENDECTOMY      BRONCHOSCOPY N/A 12/3/2019    Procedure: BRONCHOSCOPY;  Surgeon: Emmanuel Hickman MD;  Location: [facility];  Service: Pulmonary;  Laterality: N/A;    EYE SURGERY      HYSTERECTOMY      INSERTION OF PACEMAKER  12/26/2019    OOPHORECTOMY      TONSILLECTOMY         Time Tracking:     OT Date of Treatment: 07/05/20  OT Start Time: 1550  OT Stop Time: 1602  OT Total Time (min): 12 min    Billable Minutes:Evaluation 12 mins    DESMOND Ramos  7/5/2020

## 2020-07-05 NOTE — ASSESSMENT & PLAN NOTE
Progressive dyspnea, on 3L O2 at home, placed on BiPAP at [facility].   Possibly secondary to COPD exacerbation vs anemia vs acute on chronic heart failure.  On admission, ABG pH 7.37, pCO2 54.4, pO2 28  Currently stable on 3L O2 nasal canula    -Transfused 2 units of blood, Hgb up 9.6  -Prednisone 50mg daily x5d, then continue on 20mg home dose  -Continue home regimen of duonebs and BiPAP

## 2020-07-05 NOTE — PT/OT/SLP EVAL
"Speech Language Pathology Evaluation  Bedside Swallow    Patient Name:  Chantell Herrera   MRN:  [id number]  Admitting Diagnosis: Acute on chronic respiratory failure with hypoxia and hypercapnia    Recommendations:                 General Recommendations:  GI evaluation and Dysphagia therapy  Diet recommendations:  Regular, Thin   Aspiration Precautions: HOB to 90 degrees, Remain upright 30 minutes post meal, Small bites/sips and Standard aspiration precautions   General Precautions: Standard,    Communication strategies:  none    History:     Past Medical History:   Diagnosis Date    A-fib     Acute on chronic congestive heart failure 2/1/2019    Anxiety     Arthritis     Asthma     Atrial fibrillation with rapid ventricular response     CHF (congestive heart failure) 11/29/2018    Chronic bronchitis     COPD (chronic obstructive pulmonary disease)     Depression     Diabetes mellitus, type 2     SANTIAGO (dyspnea on exertion)     Emphysema of lung     Fatigue     Hyperlipidemia     Hypertension     Symptomatic anemia 1/22/2019    Trouble in sleeping        Past Surgical History:   Procedure Laterality Date    APPENDECTOMY      BRONCHOSCOPY N/A 12/3/2019    Procedure: BRONCHOSCOPY;  Surgeon: Emmanuel Hickman MD;  Location: [facility];  Service: Pulmonary;  Laterality: N/A;    EYE SURGERY      HYSTERECTOMY      INSERTION OF PACEMAKER  12/26/2019    OOPHORECTOMY      TONSILLECTOMY         Chest X-Rays: 7/4- Interval development of mild atelectasis and pleural fluid at the left lung base with no other interval change     Prior diet: regular diet with thin liquids    Subjective     "Food sometimes gets stuck right about here and I have to wait for it to go down" (Pt pointing to area around base of neck)     Pain/Comfort:  · Pain Rating 1: 0/10  · Pain Rating Post-Intervention 1: 0/10    Objective:     Oral Musculature Evaluation  · Oral Musculature: WFL  · Dentition: present and adequate  · Secretion " Management: adequate  · Oral Labial Strength and Mobility: WFL  · Volitional Cough: elicited  · Volitional Swallow: elicited  · Voice Prior to PO Intake: clear    Bedside Swallow Eval:   Consistencies Assessed:  · Thin liquids via single cup and straw sips  · Puree applesauce  · Solids cracker     Oral Phase:   · WFL    Pharyngeal Phase:   · globus sensation with initial sip of thins; not reported with subsequent trials of thins or any other trials  · no overt clinical signs/symptoms of aspiration    Compensatory Strategies  · None    Treatment: Provided education to pt re: role of ST, POC, swallow precautions, recommendations for regular consistency diet with thin liquids, and plan to f/u to ensure tolerance. Also requesting GI consult to further assess reports of esophageal dysphagia. She verbalized understanding. White board updated. RN and MD notified of results and recs.      Assessment:     Chantell Herrera is a 84 y.o. female with an SLP diagnosis of functional oropharyngeal swallow.  She presents with symptoms of esophageal dysphagia.    Goals:   Multidisciplinary Problems     SLP Goals        Problem: SLP Goal    Goal Priority Disciplines Outcome   SLP Goal     SLP    Description: Goals expected to be met by 7/12:  1. Pt will tolerate least restrictive diet without overt s/sx airway threat.                    Plan:     · Patient to be seen:  3 x/week   · Plan of Care expires:  08/03/20  · Plan of Care reviewed with:  patient   · SLP Follow-Up:  Yes       Discharge recommendations:      Barriers to Discharge:  None    Time Tracking:     SLP Treatment Date:   07/05/20  Speech Start Time:  0858  Speech Stop Time:  0912     Speech Total Time (min):  14 min    Billable Minutes: Eval Swallow and Oral Function 6 minutes and Self Care/Home Management Training 8 minutes    Ashley Avila CCC-SLP  07/05/2020

## 2020-07-05 NOTE — CONSULTS
[facility]  Gastroenterology  Consult Note    Patient Name: Chantell Herrera  MRN: [id number]  Admission Date: 7/3/2020  Hospital Length of Stay: 1 days  Code Status: Full Code   Attending Provider: Alondra Patel MD   Consulting Provider: Karley Doherty MD  Primary Care Physician: Kari Gaspar MD  Principal Problem:Acute respiratory failure with hypoxia and hypercarbia    Consults  Subjective:     HPI: Chantell Herrear is a 84 y.o. female with history of COPD who is admitted for COPD exacerbation. Upon admission, the patient was found to Hgb of 6 and was transfused 2U PRBC and responded appropriately with Hgb improving to 10.3. GI was consulted for possible GI bleed.    The patient denies any hx of melena or hematochezia. She denies any GI symptoms including N/V, abdominal pain. She is on ASA at home, but denies any hx of prior GI bleeds. She denies any FHx of GI cancers or GI bleeding. She says she had a colonoscopy done >10 years ago at [facility] and was found to have a few polyps but otherwise was unremarkable. She has never had an EGD. It appears that the patient has had similar episodes of anemia in the past and received blood transfusions in the absence of overt GI bleeding as well.      Past Medical History:   Diagnosis Date    A-fib     Acute on chronic congestive heart failure 2/1/2019    Anxiety     Arthritis     Asthma     Atrial fibrillation with rapid ventricular response     CHF (congestive heart failure) 11/29/2018    Chronic bronchitis     COPD (chronic obstructive pulmonary disease)     Depression     Diabetes mellitus, type 2     SANTIAGO (dyspnea on exertion)     Emphysema of lung     Fatigue     Hyperlipidemia     Hypertension     Symptomatic anemia 1/22/2019    Trouble in sleeping        Past Surgical History:   Procedure Laterality Date    APPENDECTOMY      BRONCHOSCOPY N/A 12/3/2019    Procedure: BRONCHOSCOPY;  Surgeon: Emmanuel Hickman MD;  Location:  [facility] OR;  Service: Pulmonary;  Laterality: N/A;    EYE SURGERY      HYSTERECTOMY      INSERTION OF PACEMAKER  2019    OOPHORECTOMY      TONSILLECTOMY         Family History   Problem Relation Age of Onset    Heart disease Mother     Hypertension Mother     Hypertension Father     Diabetes Father     Cancer Sister     COPD Brother     Hypertension Brother     No Known Problems Daughter     Kidney disease Son     COPD Daughter        Social History     Socioeconomic History    Marital status:      Spouse name: Not on file    Number of children: Not on file    Years of education: Not on file    Highest education level: Not on file   Occupational History    Not on file   Social Needs    Financial resource strain: Not on file    Food insecurity     Worry: Not on file     Inability: Not on file    Transportation needs     Medical: Not on file     Non-medical: Not on file   Tobacco Use    Smoking status: Former Smoker     Quit date: 8/3/2000     Years since quittin.9    Smokeless tobacco: Never Used   Substance and Sexual Activity    Alcohol use: No    Drug use: No    Sexual activity: Not Currently   Lifestyle    Physical activity     Days per week: Not on file     Minutes per session: Not on file    Stress: Not on file   Relationships    Social connections     Talks on phone: Not on file     Gets together: Not on file     Attends [employer] service: Not on file     Active member of club or organization: Not on file     Attends meetings of clubs or organizations: Not on file     Relationship status: Not on file   Other Topics Concern    Not on file   Social History Narrative    Not on file       No current facility-administered medications on file prior to encounter.      Current Outpatient Medications on File Prior to Encounter   Medication Sig Dispense Refill    ACCU-CHEK SOFTCLIX LANCETS Misc 1 each by Misc.(Non-Drug; Combo Route) route once daily. 100 each 5     "albuterol (PROVENTIL) 2.5 mg /3 mL (0.083 %) nebulizer solution Take 3 mLs (2.5 mg total) by nebulization every 6 (six) hours as needed for Wheezing. 300 mL 3    alcohol swabs (BD ALCOHOL SWABS) PadM Apply 1 each topically as needed. 100 each 5    aspirin (ECOTRIN) 81 MG EC tablet Take 1 tablet (81 mg total) by mouth once daily. 30 tablet 0    baclofen (LIORESAL) 10 MG tablet Take 10 mg by mouth 3 (three) times daily.      BD ULTRA-FINE SHORT PEN NEEDLE 31 gauge x 5/16" Ndle USE TO INJECT LEVEMIR ONCE DAILY 100 each 2    blood glucose control high,low (ACCU-CHEK KUMAR CONTROL SOLN) Soln 1 each by Misc.(Non-Drug; Combo Route) route as needed. 1 each 1    blood sugar diagnostic (ACCU-CHEK KUMAR PLUS TEST STRP) Strp 1 strip by Misc.(Non-Drug; Combo Route) route once daily. 100 strip 5    blood-glucose meter (ACCU-CHEK KUMAR PLUS METER) Misc 1 each by Misc.(Non-Drug; Combo Route) route once daily. 1 each 0    cholecalciferol, vitamin D3, (VITAMIN D3) 2,000 unit Tab Take 2,000 Units by mouth once daily.      citalopram (CELEXA) 20 MG tablet Take 1 tablet (20 mg total) by mouth once daily. 30 tablet 11    insulin detemir U-100 (LEVEMIR FLEXTOUCH) 100 unit/mL (3 mL) SubQ InPn pen Inject 10 Units into the skin every evening. 15 mL 0    ipratropium (ATROVENT) 0.02 % nebulizer solution Take 500 mcg by nebulization 4 (four) times daily.       metoprolol succinate (TOPROL-XL) 50 MG 24 hr tablet Take 50 mg by mouth 2 (two) times daily.       mupirocin (BACTROBAN) 2 % ointment Apply topically 2 (two) times daily. 22 g 3    omeprazole (PRILOSEC) 40 MG capsule       pantoprazole (PROTONIX) 40 MG tablet Take 1 tablet (40 mg total) by mouth once daily. for 14 days 14 tablet 0    potassium chloride SA (K-DUR,KLOR-CON) 20 MEQ tablet       pravastatin (PRAVACHOL) 40 MG tablet Take 40 mg by mouth once daily.      predniSONE (DELTASONE) 20 MG tablet Take 1 tablet (20 mg total) by mouth once daily. 30 tablet 1    " rOPINIRole (REQUIP) 1 MG tablet TAKE 1 TABLET  EVERY EVENING. 90 tablet 5    torsemide (DEMADEX) 20 MG Tab Take 1 tablet (20 mg total) by mouth once daily. 30 tablet 11    traZODone (DESYREL) 50 MG tablet Take 1 tablet (50 mg total) by mouth every evening. 90 tablet 0    weigh scale Misc Record daily weights 1 each 0       Review of patient's allergies indicates:  No Known Allergies    Review of Systems   Constitutional: Negative.    HENT: Negative.    Eyes: Negative.    Respiratory: Positive for shortness of breath.    Cardiovascular: Negative.    Gastrointestinal: Negative for abdominal pain, blood in stool, constipation, diarrhea, melena, nausea and vomiting.   Genitourinary: Negative.    Musculoskeletal: Negative.    Neurological: Negative.    Psychiatric/Behavioral: Negative.         Objective:     Vitals:    07/04/20 1558   BP:    Pulse: 93   Resp: 20   Temp:          Constitutional:  not in acute distress and well developed  HENT: Head: Normal, normocephalic, atraumatic.  Eyes: conjunctiva clear and sclera nonicteric  Cardiovascular: regular rate and rhythm and no murmur  Respiratory: normal chest expansion & respiratory effort   and no accessory muscle use  GI: soft, non-tender, without masses or organomegaly  Musculoskeletal: no muscular tenderness noted  Skin: negative for - jaundice, spider hemangioma, telangiectasia, palmar erythema, ecchymosis and atrophy  Neurological: alert, oriented x3  Psychiatric: mood and affect are within normal limits, pt is a good historian; no memory problems were noted    Significant Labs:  Recent Labs   Lab 07/04/20  0837 07/04/20  1532 07/04/20  1706   HGB 9.7* 9.5* 10.3*       Lab Results   Component Value Date    WBC 10.95 07/04/2020    HGB 10.3 (L) 07/04/2020    HCT 32.6 (L) 07/04/2020     (H) 07/04/2020     07/04/2020       Lab Results   Component Value Date     07/04/2020    K 4.3 07/04/2020     07/04/2020    CO2 31 (H) 07/04/2020    BUN 28  (H) 07/04/2020    CREATININE 0.9 07/04/2020    CALCIUM 8.7 07/04/2020    ANIONGAP 7 (L) 07/04/2020    ESTGFRAFRICA >60.0 07/04/2020    EGFRNONAA 59.7 (A) 07/04/2020       Lab Results   Component Value Date    ALT 35 07/04/2020    AST 17 07/04/2020    ALKPHOS 112 07/04/2020    BILITOT 1.3 (H) 07/04/2020       Lab Results   Component Value Date    INR 1.2 07/03/2020    INR 1.3 (H) 04/13/2020    INR 1.3 (H) 04/12/2020       Significant Imaging:  Reviewed pertinent radiology findings.       Assessment/Plan:     Chantell Herrera is a 84 y.o. female with history of COPD who is admitted for SOB and COPD exacerbation. GI was consulted for concern for GI bleeding.    Oncology  Macrocytic anemia  Assessment & Plan  Patient with no evidence of overt GI bleeding  Iron studies show elevated ferritin and mostly adequate iron saturation except for one study in the past, however iron studies this admission were performed after a blood transfusion which render them inaccurate  In the absence clear iron deficiency, and the anemia being macrocytic, would think GI bleeding is less likely and would favor a bone marrow process such as vitamin deficiency or MDS (platelets also borderline low)  Since the patient is hemodynamically stable, with stable Hgb and absence of active bleeding and her requiring large amounts of O2 to maintain SpO2 due to COPD, would refrain from endoscopic interventions as inpatient    Plan  - No endoscopic interventions this admission  - Patient can follow up with her PCP for repeat CBC and iron studies since she is unable and unwilling to travel to [place] to perform procedures here  - Recommend hematology follow up outpatient  - Local GI referral pending hematology workup or if develops evidence of GI bleeding  - No need to trend H/H more frequent than daily unless significant drop is noted or starts bleeding        Thank you for involving us in the care of Chantell Herrera. Please call with any additional  questions, concerns or changes in the patient's clinical status. We will sign off.    Karley Doherty MD  Gastroenterology Fellow PGY IV   [facility]

## 2020-07-05 NOTE — ASSESSMENT & PLAN NOTE
Intermittent episodes of sharp chest pain, midline on sternum that last seconds, non radiating, not reproduced on palpation, nonpleuretic. Denies worsening SOB or epigastric pain.  Hx of A fib, CHF, COPD  - Denies any chest pain today, F/U with cardiology outpt

## 2020-07-05 NOTE — ASSESSMENT & PLAN NOTE
Patient with no evidence of overt GI bleeding  Iron studies show elevated ferritin and mostly adequate iron saturation except for one study in the past, however iron studies this admission were performed after a blood transfusion which render them inaccurate  In the absence clear iron deficiency, and the anemia being macrocytic, would think GI bleeding is less likely and would favor a bone marrow process such as vitamin deficiency or MDS (platelets also borderline low)  Since the patient is hemodynamically stable, with stable Hgb and absence of active bleeding and her requiring large amounts of O2 to maintain SpO2 due to COPD, would refrain from endoscopic interventions as inpatient    Plan  - No endoscopic interventions this admission  - Patient can follow up with her PCP for repeat CBC and iron studies since she is unable and unwilling to travel to [place] to perform procedures here  - Recommend hematology follow up outpatient  - Local GI referral pending hematology workup or if develops evidence of GI bleeding  - No need to trend H/H more frequent than daily unless significant drop is noted or starts bleeding

## 2020-07-05 NOTE — PLAN OF CARE
83 y/o F with Hx of Afib, COPD, CHF, DMII, symptomatic anemia, admitted for COPD exacerbation and GIB. At signout, day team concerned of new onset chest pain, that lasts seconds, it is non radiating, not reproduced on palpation, not affected by breathing. Troponins, EKG, CXR and Echo were orderd. .  At [facility],  patient was seen at bedside, she was comfortable, denied chest pain and no new onset of SOB.     Results:   Troponins: 0.036,0.031, 0.043;   EKG report incomplete Right Bundle Branch Block, Premature atrial complexes and septal infarct;   CXR: mild atelectasis and pleural fluid.  ECHO: pending    Cardiology fellow was called to discuss if further management required. At the moment he is not concerned for signs of ischemic changes, for which he recommend starting ASA due to hx of Afib, as well as continuing treatment for COPD exacerbation. If new symptoms reported or ECHO remarkable for gross changes, should do cardiology workup with new troponins and EKG.   Due to patient's history of GIB, will hold Aspirin and defer further management to day team.

## 2020-07-05 NOTE — ASSESSMENT & PLAN NOTE
Cleared for all sports  PT/OT recommended discharge home with home health  F/U with hematology for further anemia workup  F/U with pulmonology for COPD management  F/U with cardiology for evaluation

## 2020-07-05 NOTE — SIGNIFICANT EVENT
Notified by artificial intelligence system of deterioration risk.  Vital signs, labs, and notes reviewed.  Agree with continuing to manage as COPD exacerbation.  No indication for change to treatment plan at this time.

## 2020-07-07 NOTE — PROGRESS NOTES
C3 nurse attempted to contact patient. No answer. The following message was left for the patient to return the call:  Good morning.  I am a nurse calling on behalf of [facility] from the [facility].  This is a Transitional Care Call for Chantell Herrera. When you have a moment please contact us at [phone] or [phone] Monday through Friday, between the hours of 8 am to 4 pm. We look forward to speaking with you. On behalf of [facility] have a nice day.    The patient has a scheduled HOSFU appointment with Kari Gaspar MD on 07/09/20 @ 1242. Message sent to Physician staff.

## 2020-07-09 NOTE — PATIENT INSTRUCTIONS
Anemia  Anemia is a condition that occurs when your body does not have enough healthy red blood cells (RBCs). RBCs are the parts of your blood that carry oxygen throughout your body. A protein called hemoglobin allows your RBCs to absorb and release oxygen. Without enough RBCs or hemoglobin, your body doesn't get enough oxygen. Symptoms of anemia may then occur.    What are the symptoms of anemia?  Some people with anemia have no symptoms. But most people have symptoms that range from mild to severe. These can include:  · Tiredness (fatigue)  · Weakness  · Pale skin  · Shortness of breath  · Dizziness or fainting  · Rapid heartbeat  · Trouble doing normal amounts of activity  · Jaundice (yellowing of your eyes, skin, or mouth; dark urine)  What causes anemia?  Anemia can occur when your body:  · Loses too much blood  · Does not make enough RBCs  · Destroys your RBCs at a faster rate than it can replace them  · Does not make a normal amount of hemoglobin in your RBCs  These problems can occur for many reasons, including:  · A condition that you are born with (congenital or inherited), such as sickle cell disease or thalassemia  · Heavy bleeding for any reason, including injury, surgery, childbirth, or even heavy menstrual periods  · Being low in certain nutrients, such as iron, folate, or vitamin B12, possibly from a poor diet or a condition like celiac disease or Crohn's disease  · Certain chronic conditions like diabetes, arthritis, or kidney disease  · Certain chronic infections like tuberculosis or HIV  · Exposure to certain medicines, such as those used for chemotherapy  There are different types of anemia. Your healthcare provider can tell you more about the type of anemia you have and what may have caused it.  How is anemia diagnosed?  To diagnose anemia, your healthcare provider orders blood tests. These can include:  · Complete blood cell count (CBC). This test measures the amounts of the different types  of blood cells.  · Blood smear. This test checks the size and shape of your blood cells. To do the test, a drop of your blood is viewed under a microscope. A stain is used to make the blood cells easier to see.  · Iron studies. These tests measure the amount of iron in your blood. Your body needs iron to make hemoglobin in your RBCs.  · Vitamin B12 and folate studies. These tests check for some of the components that help give RBCs a normal size and shape.  · Reticulocyte count. This test measures the amount of new RBCs that your bone marrow makes.  · Hemoglobin electrophoresis. This test checks for problems with your hemoglobin in RBCs.  How is anemia treated?  Treatment for anemia is based on the type of anemia, its cause, and the severity of your symptoms. Treatments may include:  · Diet changes. This involves increasing the amount of certain nutrients in your diet, such as iron, vitamin B12, or folate. Your healthcare provider may also prescribe nutrient supplements.  · Medicines. Certain medicines treat the cause of your anemia. Others help build new RBCs or relieve symptoms. If a medicine is the cause of your anemia, you may need to stop or change it.  · Blood transfusions. Replacing some of your blood can increase the number of healthy RBCs in your body.  · Surgery. In some cases, your doctor may do surgery to treat the underlying cause of anemia. If you need surgery, your healthcare provider will explain the procedure and outline the risks and benefits for you.  What are the long-term concerns?  If you have a certain type of anemia, you can expect a full recovery after treatment. If you have other types of anemia (especially a type you're born with), you will need to manage it for life. Your doctor can tell you more.  Date Last Reviewed: 12/1/2016  © 1900-2574 [employer]. [address] [place] [address], [place] [address]. All rights reserved. This information is not intended as a substitute for  professional medical care. Always follow your healthcare professional's instructions.        COPD Flare    You have had a flare-up of your COPD.  COPD, or chronic obstructive pulmonary disease, is a common lung disease. It causes your airways to become irritated and narrower. This makes it harder for you to breathe. Emphysema and chronic bronchitis are both types of COPD. This is a chronic condition, which means you always have it. Sometimes it gets worse. When this happens, it is called a flare-up.  Symptoms of COPD  People with COPD may have symptoms most of the time. In a flare-up, your symptoms get worse. These symptoms may mean you are having a flare-up:  · Shortness of breath, shallow or rapid breathing, or wheezing that gets worse  · Lung infection  · Cough that gets worse  · More mucus, thicker mucus or mucus of a different color  · Tiredness, decreased energy, or trouble doing your usual activities  · Fever  · Chest tightness  · Your symptoms dont get better even when you use your usual medicines, inhalers, and nebulizer  · Trouble talking  · You feel confused  Causes of flare-ups  Unfortunately, a flare-up can happen even though you did everything right, and you followed your doctors instructions. Some causes of flare-ups are:  · Smoking or secondhand smoke  · Colds, the flu, or respiratory infections  · Air pollution  · Sudden change in the weather  · Dust, irritating chemicals, or strong fumes  · Not taking your medicines as prescribed  Home care  Here are some things you can do at home to treat a flare-up:  · Try not to panic. This makes it harder to breathe, and keeps you from doing the right things.  · Dont smoke or be around others who are smoking.  · Try to drink more fluids than usual during a flare-up, unless your doctor has told you not to because of heart and kidney problems. More fluids can help loosen the mucus.  · Use your inhalers and nebulizer, if you have one, as you have been told  to.  · If you were given antibiotics, take them until they are used up or your doctor tells you to stop. Its important to finish the antibiotics, even though you feel better. This will make sure the infection has cleared.  · If you were given prednisone or another steroid, finish it even if you feel better.  Preventing a flare-up  Even though flare-ups happen, the best way to treat one is to prevent it before it starts. Here are some pointers:  · Dont smoke or be around others who are smoking.  · Take your medicines as you have been told.  · Talk with your doctor about getting a flu shot every year. Also find out if you need a pneumonia shot.  · If there is a weather advisory warning to stay indoors, try to stay inside when possible.  · Try to eat healthy and get plenty of sleep.  · Try to avoid things that usually set you off, like dust, chemical fumes, hairsprays, or strong perfumes.  Follow-up care  Follow up with your healthcare provider, or as advised.  If a culture was done, you will be told if your treatment needs to be changed. You can call as directed for the results.  If X-rays were done, you will be notified of any new findings that may affect your care.  Call 911  Call 911 if any of these occur:  · You have trouble breathing  · You feel confused or its difficult to wake you up  · You faint or lose consciousness  · You have a rapid heart rate  · You have new pain in your chest, arm, shoulder, neck or upper back  When to seek medical advice  Call your healthcare provider right away if any of these occur:  · Wheezing or shortness of breath gets worse  · You need to use your inhalers more often than usual without relief  · Fever of 100.4°F (38ºC) or higher, or as directed by your healthcare provider  · Coughing up lots of dark-colored or bloody mucus (sputum)  · Chest pain with each breath  · You do not start to get better within 24 hours  · Swelling of your ankles gets worse  · Dizziness or  weakness  Date Last Reviewed: 9/1/2016  © 6261-4872 [employer]. [address] [place] [address], [place] [address]. All rights reserved. This information is not intended as a substitute for professional medical care. Always follow your healthcare professional's instructions.

## 2020-07-09 NOTE — TELEPHONE ENCOUNTER
----- Message from Argelia Miller RN sent at 7/9/2020  9:16 AM CDT -----  HI,  I did a TCC call with above patient. Just wanted to let Dr. Gaspar know that her meds were sent to [facility] and she hasn't received them yet. Augmentin, Prednisone and thiamine were sent.      Argelia Miller RN

## 2020-07-09 NOTE — PROGRESS NOTES
Fl Modified Barium Swallow Speech Notified Kenyatta She will contact PT FOR STUDY [phone]      Orders for Vestibular Rehab / OT faxed to [facility]

## 2020-07-09 NOTE — PROGRESS NOTES
Chief Complaint  Chief Complaint   Patient presents with    Follow-up       HPI  Chantell Herrera is a 84 y.o. female with multiple medical diagnoses as listed in the medical history and problem list that presents for annual.  Their last appointment with primary care was 4/29/2020.  She was recently inpatient, discharged 7/5/2020 with acute GI blood loss, GI deferred scope.  Labs showed macrocytic hypoproliferative anemia, seen by hematology, lab evaluation ordered, follow-up with outpatient heme.  Responded well to transfusion of 2 units pRBC.      Regarding the anemia, she reports one episode of pale colored stools just prior to admission but denies any [place] bleeding, melena, hematochezia.Currently on 4L home O2.     She has been having dizzy spells for several months. Precipitated by leaning to the right and lying down at night. Reports an earache about a month ago that has resolved    PAST MEDICAL HISTORY:  Past Medical History:   Diagnosis Date    A-fib     Acute on chronic congestive heart failure 2/1/2019    Anxiety     Arthritis     Asthma     Atrial fibrillation with rapid ventricular response     CHF (congestive heart failure) 11/29/2018    Chronic bronchitis     COPD (chronic obstructive pulmonary disease)     Depression     Diabetes mellitus, type 2     SANTIAGO (dyspnea on exertion)     Emphysema of lung     Fatigue     Hyperlipidemia     Hypertension     Symptomatic anemia 1/22/2019    Trouble in sleeping        PAST SURGICAL HISTORY:  Past Surgical History:   Procedure Laterality Date    APPENDECTOMY      BRONCHOSCOPY N/A 12/3/2019    Procedure: BRONCHOSCOPY;  Surgeon: Emmanuel Hickman MD;  Location: [facility];  Service: Pulmonary;  Laterality: N/A;    EYE SURGERY      HYSTERECTOMY      INSERTION OF PACEMAKER  12/26/2019    OOPHORECTOMY      TONSILLECTOMY         SOCIAL HISTORY:  Social History     Socioeconomic History    Marital status:      Spouse name: Not on file    Number  of children: Not on file    Years of education: Not on file    Highest education level: Not on file   Occupational History    Not on file   Social Needs    Financial resource strain: Not on file    Food insecurity     Worry: Not on file     Inability: Not on file    Transportation needs     Medical: Not on file     Non-medical: Not on file   Tobacco Use    Smoking status: Former Smoker     Quit date: 8/3/2000     Years since quittin.9    Smokeless tobacco: Never Used   Substance and Sexual Activity    Alcohol use: No    Drug use: No    Sexual activity: Not Currently   Lifestyle    Physical activity     Days per week: Not on file     Minutes per session: Not on file    Stress: Not on file   Relationships    Social connections     Talks on phone: Not on file     Gets together: Not on file     Attends Scientologist service: Not on file     Active member of club or organization: Not on file     Attends meetings of clubs or organizations: Not on file     Relationship status: Not on file   Other Topics Concern    Not on file   Social History Narrative    Not on file       FAMILY HISTORY:  Family History   Problem Relation Age of Onset    Heart disease Mother     Hypertension Mother     Hypertension Father     Diabetes Father     Cancer Sister     COPD Brother     Hypertension Brother     No Known Problems Daughter     Kidney disease Son     COPD Daughter        ALLERGIES AND MEDICATIONS: updated and reviewed.  Review of patient's allergies indicates:  No Known Allergies  Current Outpatient Medications   Medication Sig Dispense Refill    ACCU-CHEK SOFTCLIX LANCETS Misc 1 each by Misc.(Non-Drug; Combo Route) route once daily. 100 each 5    albuterol (PROVENTIL) 2.5 mg /3 mL (0.083 %) nebulizer solution Take 3 mLs (2.5 mg total) by nebulization every 6 (six) hours as needed for Wheezing. 300 mL 3    alcohol swabs (BD ALCOHOL SWABS) PadM Apply 1 each topically as needed. 100 each 5    aspirin  "(ECOTRIN) 81 MG EC tablet Take 1 tablet (81 mg total) by mouth once daily. 30 tablet 0    BD ULTRA-FINE SHORT PEN NEEDLE 31 gauge x 5/16" Ndle USE TO INJECT LEVEMIR ONCE DAILY 100 each 2    blood glucose control high,low (ACCU-CHEK KUMAR CONTROL SOLN) Soln 1 each by Misc.(Non-Drug; Combo Route) route as needed. 1 each 1    blood sugar diagnostic (ACCU-CHEK KUMAR PLUS TEST STRP) Strp 1 strip by Misc.(Non-Drug; Combo Route) route once daily. 100 strip 5    blood-glucose meter (ACCU-CHEK KUMAR PLUS METER) Misc 1 each by Misc.(Non-Drug; Combo Route) route once daily. 1 each 0    citalopram (CELEXA) 20 MG tablet Take 1 tablet (20 mg total) by mouth once daily. 30 tablet 11    insulin detemir U-100 (LEVEMIR FLEXTOUCH) 100 unit/mL (3 mL) SubQ InPn pen Inject 10 Units into the skin every evening. 15 mL 0    ipratropium (ATROVENT) 0.02 % nebulizer solution Take 500 mcg by nebulization 4 (four) times daily.       metoprolol succinate (TOPROL-XL) 50 MG 24 hr tablet Take 50 mg by mouth 2 (two) times daily.       omeprazole (PRILOSEC) 40 MG capsule       pravastatin (PRAVACHOL) 40 MG tablet Take 40 mg by mouth once daily.      predniSONE (DELTASONE) 20 MG tablet Take 1 tablet (20 mg total) by mouth once daily. Resume this medication at 7/9/2020 30 tablet 1    rOPINIRole (REQUIP) 1 MG tablet TAKE 1 TABLET  EVERY EVENING. 90 tablet 5    traZODone (DESYREL) 50 MG tablet Take 1 tablet (50 mg total) by mouth every evening. 90 tablet 0    weigh scale Misc Record daily weights 1 each 0    amoxicillin-clavulanate 1,000-62.5 mg (AUGMENTIN XR) 1,000-62.5 mg per tablet Take 2 tablets by mouth every 12 (twelve) hours. for 7 days (Patient not taking: Reported on 7/9/2020) 28 tablet 0    baclofen (LIORESAL) 10 MG tablet Take 10 mg by mouth 3 (three) times daily.      cholecalciferol, vitamin D3, (VITAMIN D3) 2,000 unit Tab Take 2,000 Units by mouth once daily.      mupirocin (BACTROBAN) 2 % ointment Apply topically 2 (two) " "times daily. (Patient not taking: Reported on 7/9/2020) 22 g 3    pantoprazole (PROTONIX) 40 MG tablet Take 1 tablet (40 mg total) by mouth once daily. for 14 days (Patient not taking: Reported on 7/9/2020) 14 tablet 0    potassium chloride SA (K-DUR,KLOR-CON) 20 MEQ tablet       predniSONE (DELTASONE) 50 MG Tab Take 1 tablet (50 mg total) by mouth once daily. for 3 days (Patient not taking: Reported on 7/9/2020) 3 tablet 0    thiamine 100 MG tablet Take 1 tablet (100 mg total) by mouth once daily. (Patient not taking: Reported on 7/9/2020) 60 tablet 3    torsemide (DEMADEX) 20 MG Tab Take 1 tablet (20 mg total) by mouth once daily. (Patient not taking: Reported on 7/9/2020) 30 tablet 11     No current facility-administered medications for this visit.          ROS  Review of Systems   Constitutional: Negative for chills and fever.   HENT: Negative for congestion, ear pain, postnasal drip, rhinorrhea, sore throat and trouble swallowing.    Eyes: Positive for redness. Negative for itching.   Respiratory: Positive for shortness of breath. Negative for cough and wheezing.    Cardiovascular: Negative for chest pain, palpitations and leg swelling.   Gastrointestinal: Negative for abdominal pain, diarrhea, nausea and vomiting.   Genitourinary: Negative for dysuria and frequency.   Skin: Negative for rash.   Neurological: Positive for dizziness. Negative for weakness and headaches.           Physical Exam  Vitals:    07/09/20 1241   BP: 128/64   Pulse: 67   Resp: 18   Temp: 97.3 °F (36.3 °C)   TempSrc: Temporal   SpO2: 96%   Weight: 54.6 kg (120 lb 5.9 oz)   Height: 5' 2" (1.575 m)    Body mass index is 22.02 kg/m².  Weight: 54.6 kg (120 lb 5.9 oz)   Height: 5' 2" (157.5 cm)   Physical Exam      Health Maintenance       Date Due Completion Date    Urine Microalbumin 11/04/1945 ---    Shingles Vaccine (1 of 2) 11/04/1987 ---    Eye Exam 08/20/2019 8/20/2018    Influenza Vaccine (1) 09/01/2020 9/10/2019    DEXA SCAN " 09/28/2020 9/28/2018    Foot Exam 10/02/2020 10/2/2019    Hemoglobin A1c 10/13/2020 4/13/2020    Lipid Panel 04/13/2021 4/13/2020    Override on 6/19/2014: Declined    TETANUS VACCINE 10/01/2025 10/1/2015 (Done)    Override on 10/1/2015: Done            Assessment and Plan:  There are no diagnoses linked to this encounter.

## 2020-07-09 NOTE — PROGRESS NOTES
Transitional Care Note  Subjective:       Patient ID: Chantell Herrera is a 84 y.o. female.  Chief Complaint: Follow-up    Family and/or Caretaker present at visit?  Yes.  Diagnostic tests reviewed/disposition: I have reviewed all completed as well as pending diagnostic tests at the time of discharge.  Disease/illness education: Recent anemia, acute on chronic resp failure  Home health/community services discussion/referrals: Patient has home health established at [facility].   Establishment or re-establishment of referral orders for community resources: No other necessary community resources.    Discussion with other health care providers: No discussion with other health care providers necessary.   Ms. Abdul is an 84 year old female with chronic resp failure who comes in to clinic for f/u of recent hospital stay after admission for acute on chronic hypoxia as related to anemia and aspiration pna. She has not been able to get her meds as they were sent to the mail in pharmacy.    Review of Systems   Constitutional: Positive for fatigue. Negative for chills and fever.   HENT: Positive for congestion and trouble swallowing. Negative for ear pain, postnasal drip, rhinorrhea and sore throat.    Eyes: Negative for redness and itching.   Respiratory: Positive for cough, chest tightness and shortness of breath. Negative for wheezing.    Cardiovascular: Negative for chest pain and palpitations.   Gastrointestinal: Negative for abdominal pain, diarrhea, nausea and vomiting.   Genitourinary: Negative for dysuria and frequency.   Skin: Negative for rash.   Neurological: Negative for weakness and headaches.   Hematological: Bruises/bleeds easily.       Objective:      Physical Exam  Vitals signs and nursing note reviewed.   Constitutional:       General: She is not in acute distress.     Appearance: She is well-developed.   HENT:      Head: Normocephalic and atraumatic.      Nose:      Comments: 5L NC in place  Eyes:       Conjunctiva/sclera: Conjunctivae normal.      Pupils: Pupils are equal, round, and reactive to light.   Neck:      Musculoskeletal: Normal range of motion and neck supple.      Thyroid: No thyromegaly.   Cardiovascular:      Rate and Rhythm: Normal rate and regular rhythm.      Heart sounds: Murmur present.   Pulmonary:      Effort: Pulmonary effort is normal. No respiratory distress.      Breath sounds: Normal breath sounds. No wheezing.   Abdominal:      General: Bowel sounds are normal.      Palpations: Abdomen is soft.      Tenderness: There is no abdominal tenderness.   Musculoskeletal: Normal range of motion.   Lymphadenopathy:      Cervical: No cervical adenopathy.   Skin:     General: Skin is warm and dry.      Findings: Bruising present. No rash.   Neurological:      Mental Status: She is alert and oriented to person, place, and time.   Psychiatric:         Behavior: Behavior normal.         Assessment:       1. Dysphasia    2. Pneumonitis due to inhalation of food and vomit         Plan:       Chantell was seen today for follow-up.    Diagnoses and all orders for this visit:    Dysphasia  -     Fl Modified Barium Swallow Speech; Future  -     SLP video swallow; Future    Pneumonitis due to inhalation of food and vomit   -     Fl Modified Barium Swallow Speech; Future    Other orders  -     amoxicillin-clavulanate 875-125mg (AUGMENTIN) 875-125 mg per tablet; Take 1 tablet by mouth 2 (two) times daily. for 7 days  -     meclizine (ANTIVERT) 12.5 mg tablet; Take 1 tablet (12.5 mg total) by mouth every evening. for 5 days

## 2020-07-15 NOTE — TELEPHONE ENCOUNTER
Patient states that she does NOT want anything from [facility] - She isn't sure who they are or how they got her information.     Patient states that she received a letter stating that her mammogram results on 04/29/2020 mention that she needs a biopsy.     She is asking Dr. Gaspar to please review this and order one if necessary.

## 2020-07-15 NOTE — TELEPHONE ENCOUNTER
----- Message from David Kirkpatrick sent at 7/15/2020  2:31 PM CDT -----  Contact: Patient  Chantell Herrera  MRN: [id number]  : 1935  PCP: Kari Gaspar  Home Phone      [phone]  Work Phone      Not on file.  Mobile          [phone]      MESSAGE:  Diabetes supply request was faxed here on 20 for completion by Dr Gaspar - no response -- wants to know if request was received -- please advise    Call Althea @ [facility] [phone]    PCP: Chasity

## 2020-07-22 NOTE — TELEPHONE ENCOUNTER
Please let mrs. Abdul know that we had put a referral in in may for her to go to [facility].  There should be everything set up already.  Misael had tried to get in touch with her and [facility] was working on this.  I DO want her to go there and have the biopsy done.

## 2020-07-23 NOTE — TELEPHONE ENCOUNTER
----- Message from Marisa Mohamud sent at 2020 10:28 AM CDT -----  Contact: self  Chantell Herrera  MRN: [id number]  : 1935  PCP: Kari Gaspar  Home Phone      [phone]  Work Phone      Not on file.  Mobile          [phone]      MESSAGE:  The pt states that she received a call this morning from insurance stating she was cleared for a procedure. Pt states doesn't know about having any procedure only thing she remembers discussing but never set up was issues with her mammogram.   Phone: [phone]

## 2020-07-31 NOTE — PROGRESS NOTES
SPEECH LANGUAGE PATHOLOGY  Outpatient Modified Barium Swallow Study    Time In: 10:10  Time Out: 10:40  Total Time: 30 minutes  Charges: x1 MBSS  Referring Physician: Kari Gaspar MD    History     Chantell Herrera is a 84 y.o. female who was seen in Radiology today for a modified barium swallow study. She was seated upright in a chair for a lateral videofluoroscopic view. Past medical history was collected via Epic and pt interview and is significant for intermittent coughing during PO intake and globus sensation. This study is being done to assess swallow physiology, determine the most appropriate diet, and establish the most appropriate treatment plan.      Past Medical History:   Diagnosis Date    A-fib     Acute on chronic congestive heart failure 2/1/2019    Anxiety     Arthritis     Asthma     Atrial fibrillation with rapid ventricular response     CHF (congestive heart failure) 11/29/2018    Chronic bronchitis     COPD (chronic obstructive pulmonary disease)     Depression     Diabetes mellitus, type 2     SANTIAGO (dyspnea on exertion)     Emphysema of lung     Fatigue     Hyperlipidemia     Hypertension     Symptomatic anemia 1/22/2019    Trouble in sleeping      Past Surgical History:   Procedure Laterality Date    APPENDECTOMY      BRONCHOSCOPY N/A 12/3/2019    Procedure: BRONCHOSCOPY;  Surgeon: Emmanuel Hickman MD;  Location: [facility];  Service: Pulmonary;  Laterality: N/A;    EYE SURGERY      HYSTERECTOMY      INSERTION OF PACEMAKER  12/26/2019    OOPHORECTOMY      TONSILLECTOMY         Current Outpatient Medications:     ACCU-CHEK SOFTCLIX LANCETS Misc, 1 each by Misc.(Non-Drug; Combo Route) route once daily., Disp: 100 each, Rfl: 5    albuterol (PROVENTIL) 2.5 mg /3 mL (0.083 %) nebulizer solution, Take 3 mLs (2.5 mg total) by nebulization every 6 (six) hours as needed for Wheezing., Disp: 300 mL, Rfl: 3    alcohol swabs (BD ALCOHOL SWABS) PadM, Apply 1 each topically as needed.,  "Disp: 100 each, Rfl: 5    aspirin (ECOTRIN) 81 MG EC tablet, Take 1 tablet (81 mg total) by mouth once daily., Disp: 30 tablet, Rfl: 0    baclofen (LIORESAL) 10 MG tablet, Take 10 mg by mouth 3 (three) times daily., Disp: , Rfl:     BD ULTRA-FINE SHORT PEN NEEDLE 31 gauge x 5/16" Ndle, USE TO INJECT LEVEMIR ONCE DAILY, Disp: 100 each, Rfl: 2    blood glucose control high,low (ACCU-CHEK KUMAR CONTROL SOLN) Soln, 1 each by Misc.(Non-Drug; Combo Route) route as needed., Disp: 1 each, Rfl: 1    blood sugar diagnostic (ACCU-CHEK KUMAR PLUS TEST STRP) Strp, 1 strip by Misc.(Non-Drug; Combo Route) route once daily., Disp: 100 strip, Rfl: 5    blood-glucose meter (ACCU-CHEK KUMAR PLUS METER) Misc, 1 each by Misc.(Non-Drug; Combo Route) route once daily., Disp: 1 each, Rfl: 0    cholecalciferol, vitamin D3, (VITAMIN D3) 2,000 unit Tab, Take 2,000 Units by mouth once daily., Disp: , Rfl:     citalopram (CELEXA) 20 MG tablet, Take 1 tablet (20 mg total) by mouth once daily., Disp: 30 tablet, Rfl: 11    insulin detemir U-100 (LEVEMIR FLEXTOUCH) 100 unit/mL (3 mL) SubQ InPn pen, Inject 10 Units into the skin every evening., Disp: 15 mL, Rfl: 0    ipratropium (ATROVENT) 0.02 % nebulizer solution, Take 500 mcg by nebulization 4 (four) times daily. , Disp: , Rfl:     meclizine (ANTIVERT) 12.5 mg tablet, Take 1 tablet (12.5 mg total) by mouth every evening. for 5 days, Disp: 5 tablet, Rfl: 0    metoprolol succinate (TOPROL-XL) 50 MG 24 hr tablet, Take 50 mg by mouth 2 (two) times daily. , Disp: , Rfl:     mupirocin (BACTROBAN) 2 % ointment, Apply topically 2 (two) times daily. (Patient not taking: Reported on 7/9/2020), Disp: 22 g, Rfl: 3    omeprazole (PRILOSEC) 40 MG capsule, , Disp: , Rfl:     pantoprazole (PROTONIX) 40 MG tablet, Take 1 tablet (40 mg total) by mouth once daily. for 14 days (Patient not taking: Reported on 7/9/2020), Disp: 14 tablet, Rfl: 0    potassium chloride SA (K-DUR,KLOR-CON) 20 MEQ tablet, " , Disp: , Rfl:     pravastatin (PRAVACHOL) 40 MG tablet, Take 40 mg by mouth once daily., Disp: , Rfl:     predniSONE (DELTASONE) 20 MG tablet, Take 1 tablet (20 mg total) by mouth once daily. Resume this medication at 7/9/2020, Disp: 90 tablet, Rfl: 1    rOPINIRole (REQUIP) 1 MG tablet, TAKE 1 TABLET  EVERY EVENING., Disp: 90 tablet, Rfl: 5    thiamine 100 MG tablet, Take 1 tablet (100 mg total) by mouth once daily. (Patient not taking: Reported on 7/9/2020), Disp: 60 tablet, Rfl: 3    torsemide (DEMADEX) 20 MG Tab, Take 1 tablet (20 mg total) by mouth once daily. (Patient not taking: Reported on 7/9/2020), Disp: 30 tablet, Rfl: 11    traZODone (DESYREL) 50 MG tablet, Take 1 tablet (50 mg total) by mouth every evening., Disp: 90 tablet, Rfl: 0    weigh scale Misc, Record daily weights, Disp: 1 each, Rfl: 0      Evaluation  All presentations were mixed with dry barium  Thin liquids: self-fed by the single and consecutive open cup and straw sips  Pudding: self-fed by the tsp of pudding  Solid: self-fed by the square inch of lucrecia cracker    Oral phase of swallow was characterized by premature spillage to the posterior surface of the epiglottis given all trials of thin liquids. Adequate lip closure, tongue control during bolus formation, bolus preparation/mastication, and AP transport noted across all consistencies.       Pharyngeal phase of swallow was characterized by minimally decreased laryngeal vestibular closure, resulting in flash penetration during the swallow given thin liquids. Adequate soft palate elevation, base of tongue retraction, epiglottic inversion, laryngeal elevation, anterior hyoid excursion, and pharyngeal stripping wave noted across all consistencies. No aspiration was observed during this study.     Esophageal phase of the swallow was characterized by stasis and retrograde movement across thin liquid and puree trials. Initial thin liquid bolus resulted in stasis of greater than 25% but  less than 50% of the bolus within the medial and distal 1/3 of the esophagus with slowed inferior movement. MILD Backflow of stasis from the distal 1/3 of the esophagus to the medial 1/3 of the esophagus exhibited. Puree trials revealed more diffuse MILD esophageal residue and continued slowed movement towards the LES. A 45 second break was taken but MILD residue persisted; liquid wash was employed and while some minimal backflow was noted, puree stasis was cleared fully in a timely fashion.       Impressions/Recommendations:    While oral and pharyngeal function remains preserved overall, pt demonstrated esophageal dysphagia as characterized by retrograde movement stasis within the medial and distal 1/3 of the esophagus during thin liquids and puree trials. SLP reviewed various potential trigger foods including high intake of caffeine, mint, tomato-based products, and citrus fruits. Education completed regarding limited intake of GERD-triggering foods and additional precautions including sitting up 60 minutes following meals, avoiding tight clothing, and avoiding oral intake within 2 hours of sleeping.     Recommend initiation of medical management for observed backflow of foods and liquids in the setting of pt reported discomfort. Should implementation of medical intervention not alleviate discomfort with PO intake, recommend referral to GI for additional testing to establish the most appropriate treatment plan. Additionally, recommend a regular diet with thin liquids and safe swallow strategies including alternating bites and sips, medds one at a time with lots of water, and HOB to 90 degrees for all PO intake. Pt verbalized understanding and had no further questions/concerns. No additional skilled speech services required at this time.        Dora Rivas CCC-SLP  7/31/2020

## 2020-08-05 NOTE — TELEPHONE ENCOUNTER
----- Message from Kari Gaspar MD sent at 8/3/2020  8:10 PM CDT -----  Looks like speech didn't really see anything structural.  Follow rene's instructions and stay on protonix.  If no improvement, will get her to see dr. corrales - lets give it 3-4 weeks though.  mh

## 2020-08-18 NOTE — TELEPHONE ENCOUNTER
Elda lou/ [facility]  called on Ms. Abdul  [phone]    States patient feels she may be anemic again. Feeling more SOB, more lethargic and weak than in the last couple of days, BP was a little low (100-something over 52). Asking if we can do a CBC to check her blood count.    Also states patient has a mild productive cough with green sputum. Denies fever at this time. Asking for recs on this cough as well.

## 2020-08-19 NOTE — TELEPHONE ENCOUNTER
Pt was not febrile as of yesterday when I spoke w/ Elda. Called and spoke to Ana this morning. A nurse is going out today to see her and will check her temp. Notified of need for CXR.

## 2020-08-21 NOTE — TELEPHONE ENCOUNTER
----- Message from Kari Gaspar MD sent at 8/20/2020 12:40 PM CDT -----  Blood counts are low.  If she is acutely short of breath or weak/tired, she can/should go to the ER to get a transfusion.  However, we can try to get it arranged in the infusion center if she is not.

## 2020-08-21 NOTE — TELEPHONE ENCOUNTER
Spoke w/ Dennise with HH. She reports that on the notes from the patient's visit it was indicated that the patient has SOB with rest.     Dennise will contact patient to go to the ER for the transfusion.

## 2020-08-21 NOTE — ED NOTES
Care assumed.   Pt lying in bed comfortably, AAO x 4, RR even/labored and mildly tachypnic on 3 L NC which is worse with movement, NAD noted.Verbalizes no needs at this time.   First unit of RBCs infusing, educated on plan to administer second unit once first is complete. V/u.

## 2020-08-22 PROBLEM — J96.90 RESPIRATORY FAILURE: Status: RESOLVED | Noted: 2020-01-01 | Resolved: 2020-01-01

## 2020-08-22 PROBLEM — J96.90 RESPIRATORY FAILURE: Status: ACTIVE | Noted: 2020-01-01

## 2020-08-22 NOTE — ED PROVIDER NOTES
[facility]                                                 Chief Complaint  84 y.o. female with Anemia      History of Present Illness  Chantell Herrera presents to the emergency room with anemia today  Patient was seen and evaluated by her PCP recently, chronic anemia  Patient has a chronic anemia issues, likely slow GI bleed problem  Patient reported to her PCP that she has been short of breath today  Patient's hemoglobin had dropped, referred to ER for transfusion  PCP would like the patient transfused in the ER, not admission  Concerns about COVID virus by patient, PCP wants ER transfusion    The history is provided by the patient   device was not used during this ER visit    Past Medical History   -- Anxiety     -- Arthritis     -- Asthma     -- Chronic bronchitis     -- COPD (chronic obstructive pulmonary disease)     -- Depression     -- SANTIAGO (dyspnea on exertion)     -- Emphysema of lung     -- Fatigue     -- Hyperlipidemia     -- Hypertension     -- Trouble in sleeping      Medical history:  Appy, bronchoscopy, JOHNNY, pacemaker, OOH, tonsils, eye surgery  No Known Allergies     I have reviewed all of this patient's past medical, surgical, family, and social   histories as well as active allergies and medications documented in the  electronic medical record    Review of Systems and Physical Exam      Review of Systems  -- Constitution - no fever, denies fatigue, no weakness, no chills  -- Eyes - no tearing or redness, no visual disturbance  -- Ear, Nose - no tinnitus or earache, no nasal congestion or discharge  -- Mouth,Throat - no sore throat, no toothache, normal voice, normal swallowing  -- Respiratory - cough and congestion,  shortness of breath,  SANTIAGO  -- Cardiovascular - denies chest pain, no palpitations, denies claudication  -- Gastrointestinal - denies abdominal pain, nausea, vomiting, or diarrhea  -- Genitourinary - no dysuria, denies flank pain, no hematuria,  no STD risk  -- Musculoskeletal - denies back pain, negative for trauma or injury  -- Neurological - no headache, denies weakness or seizure; no LOC  -- Skin - denies pallor, rash, or changes in skin. no hives or welts noted  -- Psychiatric - Denies SI or HI, no psychosis or fractured thought noted     Vital Signs  Temperature is 98.1 °F (36.7 °C).   Her blood pressure is 122/64 and her pulse is 77.   Her respiration is 22 and oxygen saturation is 92%     Physical Exam  -- Nursing note and vitals reviewed  -- Constitutional: Appears well-developed and well-nourished  -- Head: Atraumatic. Normocephalic. No obvious abnormality  -- Eyes: Pupils are equal and reactive to light. Normal conjunctiva and lids  -- Cardiac: Normal rate, regular rhythm and normal heart sounds  -- Pulmonary: coarse breath sounds in all fields  -- Abdominal: Soft, no tenderness. Normal bowel sounds. Normal liver edge  -- Musculoskeletal: Normal range of motion, no effusions. Joints stable   -- Neurological: No focal deficits. Showed good interaction with staff  -- Vascular: Posterior tibial, dorsalis pedis and radial pulses 2+ bilaterally      Emergency Room Course      Lab Results     K 4.0   CL 92 (L)   CO2 36 (H)   BUN 33 (H)   CREATININE 1.1    (H)   ALKPHOS 121   AST 21   ALT 32   BILITOT 3.9 (H)   ALBUMIN 4.4   PROT 7.5   WBC 42.04 (H)   HGB 12.4   HCT 38.0      CPK 16 (L)   CPK 16 (L)   CPKMB 0.7   TROPONINI 0.018   BNP 1,380 (H)     Urinalysis  -- Urinalysis performed during this ER visit showed no signs of infection      EKG  -- The EKG findings today were without concerning findings from baseline     Radiology  -- initial chest x-ray appears to be stable from previous findings  -- 2nd chest x-ray after transfusion shows pulmonary edema    Additional Work up  -- rapid Coronavirus PCR was negative    Medications Given  0.9%  NaCl infusion (for blood administration) (has no administration in time range)   furosemide  injection 40 mg (40 mg Intravenous Given 8/21/20 1940)   levalbuterol nebulizer solution 1.25 mg (1.25 mg Nebulization Given 8/21/20 2258)   furosemide injection 40 mg (40 mg Intravenous Given 8/22/20 0036)     Arterial blood gas   PH 7.48*   PCO2 56*   PO2 56*   HCO3 41.70*   POCSATURATED 92.6   BE 15.80*      Critical Care ED Physician Time (minutes):  -- Performed by: Festus Maurer M.D.  -- Date/Time: 6:00 AM 8/22/2020   -- Direct Patient Care (Face Time): 5  -- Additional History from Records or Taking Additional History: 0  -- Ordering, Reviewing, and Interpreting Diagnostic Studies: 5  -- Total Time in Documentation: 5  -- Consultation with Other Physicians: 5  -- Consultation with Family Related to Condition: 0  -- Total Critical Care Time: 20     ED Physician Management  -- Diagnosis management comments: 84 y.o. female with symptomatic anemia  -- primary care physician ordered transfusion in the emergency room this evening  -- patient receive slow transfusion, however became short of breath after transfusion  -- this is happened before, patient has had a hard time tolerating transfusion with CHF  -- repeat chest x-ray showed pulmonary edema, patient became hypoxic in the ER  -- IV Lasix given, patient started on BiPAP with good result, long history of CHF  -- patient will need to be diuresed, needs BiPAP for good oxygenation today  -- transferred to high level care to a step-down [facility] at [facility]    Diagnosis  [D64.9] Anemia  [R06.02] Shortness of breath  [D64.9] Symptomatic anemia (Primary)  [J96.20] Acute on chronic respiratory failure    Disposition and Plan  -- Disposition: transfer  -- Condition: stable    This note is dictated on M*Modal word recognition program.  There are word recognition mistakes that are occasionally missed on review.         Festus Maurer MD  08/22/20 0606

## 2020-08-22 NOTE — PROGRESS NOTES
Pharmacist Renal Dose Adjustment Note    Chantell Hererra is a 84 y.o. female being treated with the medication enoxaparin    Patient Data:    Vital Signs (Most Recent):  Temp: 99.1 °F (37.3 °C) (08/22/20 0730)  Pulse: 84 (08/22/20 1130)  Resp: (!) 22 (08/22/20 0730)  BP: (!) 124/56 (08/22/20 0730)  SpO2: (!) 90 % (08/22/20 0730)   Vital Signs (72h Range):  Temp:  [97.3 °F (36.3 °C)-99.1 °F (37.3 °C)]   Pulse:  []   Resp:  [16-31]   BP: ()/(52-96)   SpO2:  [85 %-100 %]      Recent Labs   Lab 08/21/20  1452 08/21/20  2326 08/22/20  0857   CREATININE 1.2 1.1 1.2     Serum creatinine: 1.2 mg/dL 08/22/20 0857  Estimated creatinine clearance: 29.9 mL/min    Medication:enoxaparin 40 mg SC Q24h will be changed to enoxaparin 30 mg SC Q24h    Pharmacist's Name: Coco Brooks  Pharmacist's Extension: [phone]

## 2020-08-22 NOTE — ED NOTES
Pt lying in stretcher.   Remains SOB with movement, breathing labored, tachypnic.   Provider aware, cont to monitor.       AAO x 4.

## 2020-08-22 NOTE — PLAN OF CARE
(Physician in Lead of Transfers)   Outside Transfer Acceptance Note / Regional Referral Center      Upon patient arrival to floor, please call extension [phone] (if no answer, this will flip to a beeper, so enter your call back number) for Hospital Medicine admit team assignment and for additional admit orders for the patient.  Do not page the attending physician associated with the patient on arrival (this physician may not be on duty at the time of arrival).  Rather, always call [phone] to reach the triage physician for orders and team assignment.      Transferring Physician: Dr. Maurer    Accepting Physician: Jolynn George MD    Date of Acceptance: 2020    Transferring Facility: [facility]    Reason for Transfer: CHF, needs continuous bipap; no ICU beds     Report from Transferring Physician/Hospital course: The patient is an 85 y/o female with PMH of HTN, HLP, COPD, anxiety and depression, and chronic anemia who presented with anemia. Hb has dipped down to 8.1 and was 7.6 on  from the 10s back in early July. She was transfused two units during her time in the ER. BNP was 1300 and she did receive 40 mg IV lasix between units however she still went into volume overload and seems to be now in CHF exacerbation. CXR showed worsening vascular congestion. AB.48/56/56. She was given an additional dose of lasix 40 mg IV x 1 and has so far diuresed 800 ml. Repeat CBC showed Hb of 12.4 but a leukocytosis of 42. She is afebrile and unclear if this is leukocytosis is due to her transfusion although is quite high; she has not received steroids. She was admitted at [facility] in early July for anemia and COPD exacerabtion with a similar clinical picture to this prestnation although her ABG was not quite hypoxic; PO2 was 28. She has not been able to wean off the BIPAP yet but has improved with diuresis. Due to ICU admit requirement with continuous BIPAP at the community facilities and lack of ICU beds, she will transfer  to [facility] to a stepdown bed.        Labs & Radiographs: see EPIC      To Do List:   1) Admit to stepdown  2) Continue diuresis  3) Wean BIPAP as tolerated       Jolynn George MD  Hospital Medicine Staff

## 2020-08-22 NOTE — H&P
[facility] Hospital Medicine  History & Physical    Admitting Team: IM-N  Attending Physician: Bina Razo MD  Date of Admit: 2020    Chief Complaint     SOB x 1 d    Subjective:      History of Present Illness:  Chantell Herrera is a 84 y.o. female with HFpEF, AF, HTN, CKD3, DM2, COPD on 3 L at home, anxiety, depression, HLD, chronic anemia who presents as transfer from [facility] for acute on chronic hypoxic respiratory failure secondary to decompensated heart failure after receiving blood transfusion for anemia at [facility] ED. She presented to [facility] ED with anemia. Hb had dipped down to 8.1 and was 7.6 on  from the 10s back in early July. She was transfused two units during her time in the ER. BNP was 1300 and she did receive 40 mg IV lasix between units however she still went into volume overload with concern for CHF exacerbation. CXR showed worsening vascular congestion. AB.48/56/56. She was given an additional dose of lasix 40 mg IV x 1 and diuresed 800 ml at time of transfer acceptance. Repeat CBC showed Hb of 12.4 but a leukocytosis of 42. She is afebrile and unclear if this is leukocytosis is due to her transfusion although is quite high; she has not received steroids. She was admitted at [facility] in early July for anemia and COPD exacerabtion with a similar clinical picture to this presentation although her ABG was not quite hypoxic; PO2 was 28. Shehad not been able to wean off the BIPAP at time of transfer acceptance but has improved with diuresis. Due to ICU admit requirement with continuous BIPAP at the community facilities and lack of ICU beds, she transferred to [facility] to a stepdown bed.   Pt reports moderate wheezing a/w SOB and productive cough. Denies weight gain, abdominal swelling, edema, PND, orthopnea, fever, chills.          Past Medical History:  Past Medical History:   Diagnosis Date    A-fib     Acute on chronic congestive heart failure 2019    Anxiety     Arthritis   "   Asthma     Atrial fibrillation with rapid ventricular response     CHF (congestive heart failure) 11/29/2018    Chronic bronchitis     COPD (chronic obstructive pulmonary disease)     Depression     Diabetes mellitus, type 2     SANTIAGO (dyspnea on exertion)     Emphysema of lung     Fatigue     Hyperlipidemia     Hypertension     Symptomatic anemia 1/22/2019    Trouble in sleeping        Past Surgical History:  Past Surgical History:   Procedure Laterality Date    APPENDECTOMY      BRONCHOSCOPY N/A 12/3/2019    Procedure: BRONCHOSCOPY;  Surgeon: Emmanuel Hickman MD;  Location: [facility] OR;  Service: Pulmonary;  Laterality: N/A;    EYE SURGERY      HYSTERECTOMY      INSERTION OF PACEMAKER  12/26/2019    OOPHORECTOMY      TONSILLECTOMY         Allergies:  Review of patient's allergies indicates:  No Known Allergies    Home Medications:  Prior to Admission medications    Medication Sig Start Date End Date Taking? Authorizing Provider   omeprazole (PRILOSEC) 40 MG capsule  4/22/20  Yes Historical Provider, MD   ACCU-CHEK SOFTCLIX LANCETS Misc 1 each by Misc.(Non-Drug; Combo Route) route once daily. 3/17/20   Kari Gaspar MD   albuterol (PROVENTIL) 2.5 mg /3 mL (0.083 %) nebulizer solution Take 3 mLs (2.5 mg total) by nebulization every 6 (six) hours as needed for Wheezing. 12/1/19   Chicho Fisher MD   alcohol swabs (BD ALCOHOL SWABS) PadM Apply 1 each topically as needed. 3/17/20   Kari Gaspar MD   aspirin (ECOTRIN) 81 MG EC tablet Take 1 tablet (81 mg total) by mouth once daily. 1/21/20 1/20/21  Milo Estevez MD   baclofen (LIORESAL) 10 MG tablet Take 10 mg by mouth 3 (three) times daily.    Historical Provider, MD   BD ULTRA-FINE SHORT PEN NEEDLE 31 gauge x 5/16" Ndle USE TO INJECT LEVEMIR ONCE DAILY 5/12/20   Kari Gaspar MD   blood glucose control high,low (ACCU-CHEK KUMAR CONTROL SOLN) Soln 1 each by Misc.(Non-Drug; Combo Route) route as needed. 3/17/20 3/17/21  Kari HOOKER" MD Chasity   blood sugar diagnostic (ACCU-CHEK KUMAR PLUS TEST STRP) Strp 1 strip by Misc.(Non-Drug; Combo Route) route once daily. 3/17/20   Kari Gaspar MD   blood-glucose meter (ACCU-CHEK KUMAR PLUS METER) Misc 1 each by Misc.(Non-Drug; Combo Route) route once daily. 3/17/20 3/17/21  Kari Gaspar MD   cholecalciferol, vitamin D3, (VITAMIN D3) 2,000 unit Tab Take 2,000 Units by mouth once daily.    Historical Provider, MD   citalopram (CELEXA) 20 MG tablet Take 1 tablet (20 mg total) by mouth once daily. 12/2/19 12/1/20  Chicho Fisher MD   insulin detemir U-100 (LEVEMIR FLEXTOUCH) 100 unit/mL (3 mL) SubQ InPn pen Inject 10 Units into the skin every evening. 7/14/20 7/14/21  Kari Gaspar MD   ipratropium (ATROVENT) 0.02 % nebulizer solution Take 500 mcg by nebulization 4 (four) times daily.  3/18/16   Historical Provider, MD   meclizine (ANTIVERT) 12.5 mg tablet Take 1 tablet (12.5 mg total) by mouth every evening. for 5 days 7/9/20 7/14/20  Kari Gaspar MD   metoprolol succinate (TOPROL-XL) 50 MG 24 hr tablet Take 50 mg by mouth 2 (two) times daily.  2/4/20   Historical Provider, MD   mupirocin (BACTROBAN) 2 % ointment Apply topically 2 (two) times daily.  Patient not taking: Reported on 7/9/2020 4/29/20   Kari Gaspar MD   pantoprazole (PROTONIX) 40 MG tablet Take 1 tablet (40 mg total) by mouth once daily. for 14 days  Patient not taking: Reported on 7/9/2020 3/17/20 4/13/20  Kari Gaspar MD   pravastatin (PRAVACHOL) 40 MG tablet Take 40 mg by mouth once daily.    Historical Provider, MD   rOPINIRole (REQUIP) 1 MG tablet TAKE 1 TABLET  EVERY EVENING. 4/7/20   Kari Gaspar MD   thiamine 100 MG tablet Take 1 tablet (100 mg total) by mouth once daily.  Patient not taking: Reported on 7/9/2020 7/6/20   Lulu Jacinto MD   torsemide (DEMADEX) 20 MG Tab Take 1 tablet (20 mg total) by mouth once daily.  Patient not taking: Reported on 7/9/2020 3/17/20 3/17/21  Kari Gaspar,  MD   traZODone (DESYREL) 50 MG tablet Take 1 tablet (50 mg total) by mouth every evening. 20   Kari Gaspar MD   weigh scale [facility] Record daily weights 3/10/20   Kari Gaspar MD   potassium chloride SA (K-DUR,KLOR-CON) 20 MEQ tablet  20  Historical Provider, MD   predniSONE (DELTASONE) 20 MG tablet Take 1 tablet (20 mg total) by mouth once daily. Resume this medication at 2020 7/15/20 8/22/20  Kari Gaspar MD       Family History:  Family History   Problem Relation Age of Onset    Heart disease Mother     Hypertension Mother     Hypertension Father     Diabetes Father     Cancer Sister     COPD Brother     Hypertension Brother     No Known Problems Daughter     Kidney disease Son     COPD Daughter        Social History:  Social History     Tobacco Use    Smoking status: Former Smoker     Quit date: 8/3/2000     Years since quittin.0    Smokeless tobacco: Never Used   Substance Use Topics    Alcohol use: No    Drug use: No       Review of Systems:  As per HPI  General: no fever, no chills, no weight loss, no fatigue  Nose, Sinuses: no rhinorrhea, no facial pain, no epistaxis  Cardiovascular: no chest pain, no orthopnea  Respiratory: , no chest pain  Urinary: no dysuria, no increased frequency, no hematuria  Hematologic: no easy bruising, no overt bleeding, no petechiae  Endocrine: no heat intolerance, no cold intolerance, no polyuria  Neurologic: no seizures, no tremors, no numbness  Psychiatric: no hallucination, no depression, no suicidal ideation  Skin: no rashes, no pruritus, no pallor  All other systems reviewed & are negative.        Objective:   Last 24 Hour Vital Signs:  BP  Min: 91/53  Max: 203/84  Temp  Av °F (36.7 °C)  Min: 97.3 °F (36.3 °C)  Max: 99.1 °F (37.3 °C)  Pulse  Av  Min: 58  Max: 104  Resp  Av.5  Min: 16  Max: 31  SpO2  Av.2 %  Min: 85 %  Max: 100 %  Weight  Av.4 kg (120 lb)  Min: 54.4 kg (120 lb)  Max: 54.4 kg (120  lb)  There is no height or weight on file to calculate BMI.  No intake/output data recorded.    Physical Examination:  GEN: AAOx3, NAD  HEENT: NCAT, MMM, PERRL, EOMI, oropharynx clear  CV: RRR, no m/r  RESP: appears short of breath on NC, b/l wheezes and crackles, improved respiratory effort on CPAP  ABD: soft, NTND, normoactive BS, no organomegaly  EXTR: no c/c/e, 2+ distal pulses x 4  NEURO: PERRL, EOMI, 5/5 motor strength all four extremities, intact sensation to light touch, no focal deficits  SKIN: no rashes, lesions, or color changes  PSYCH: normal affect     Laboratory:  I have reviewed all pertinent laboratory data within the past 24 hours.    Other Results:  EKG (my interpretation): sinus, left axis deviation, pulmonary disease pattern    Radiology:  I have reviewed all pertinent radiology imaging within the past 24 hours.    Prior records reviewed.     Assessment/Plan:     Chantell Herrera is a 84 y.o. female with:    Acute on chronic diastolic HF  Acute on chronic hypoxic respiratory failure  Acute COPD exacerbation  Pulmonary HTN  -elevated BNP with clinical signs of overload and edema on CXR  -last echo with normal EF & pulm HTN  -IV diuresis with Lasix  -strict I/O's, daily weights, fluid/sodium restriction  -ABG with pH 7.5, chronic hypercapnia; therefore weaned from bipap to cpap  -scheduled duonebs and prednisone for COPD flare    Leukocytosis  -unclear etiology  -tmax 99, BP and HR WNL  -procal, blood cx's pending  -UA WNL  -improved from 42 > 20 without intervention  -possibly stress response, monitor    Chronic anemia  -apparently received 2 units of blood for Hb 8 at OSH ED  -Hb 11 today without bleeding    Hypomagnesemia  -replete prn    AFib  -resume home toprol  -in sinus  -not on chronic AC presumably due to chronic anemia and concern for occult GI bleed    CKD3  -renal function stable  -monitor    DM2  -resume home insulin, LDSSI  -A1C    HTN  -resume home meds    Diet- diabetic, cardiac,  1.5 L  PPX- scd's  Dispo- clinical improvement  Appts-  Discharge needs- Cardiology, Pulmonology f/u      Bina Razo MD  Hospital Medicine Staff  [facility]

## 2020-08-22 NOTE — ED NOTES
Pt departing via [facility] unit 72 to [facility] .  Report given to receiving nurse and transport team.     Upon departure, pt AAO x 4, RR even/unlabored on Bipap, maintaining O2 sats WNL. VSS.   NAD noted.   Bipap continuing in route.

## 2020-08-22 NOTE — NURSING
ABG completed, Dr Razo ordered to place pt on NC, pt states she wears 3L NC at home. Pt's O2 sats dropped to 70%. Increased to 6LNC, O2 sats increased to 80-82%. MD was at bedside. Dr Razo ordered to place on CPAP, pt requested to put on home CPAP machine, MD stated that was OK. Place on home CPAP with 6L O2. O2 sats came up to 89-92% which is pt's goal. Pt maintained sats well. Will continue to monitor pt.            1030 Pt's O2 sats back down to 80%, RT in room. MD Razo notified of situation. MD ordered to place pt on our CPAP machine. RT unable to get O2 sat to increase on our machine either. MD notified that RT had to place pt back on bipap to achieve O2 goal of 89-92%. Notified MD pt can't get PO meds at this time as she is on bipap. MD stated to hold and hopefully we can get her off bipap later today. Will continue to monitor pt.

## 2020-08-22 NOTE — ED NOTES
Pt awake, eyes open spontaneously, requesting water.  AAO x 4. RR even/unlabored on Bipap. NAD noted.

## 2020-08-22 NOTE — ED NOTES
PT awake, eyes open spontaneously.   AAO x 4. RR even/unlabored, maintaining O2 sats WNL on bipap.   Aware of pending transfer and v/u.   NAD noted, Cont to monitor.

## 2020-08-22 NOTE — ED NOTES
No change from previous assessment.   Pt lying in stretcher resting comfortably on Bipap, RR even/unlabored, NAD noted.   Arouses to verbal stimuli.

## 2020-08-22 NOTE — PLAN OF CARE
Plan of care discussed with patient. Patient is free of fall/trauma/injury. Pt complained of HA, resolved with tylenol. K and Mg replaced. IVP lasix administered. Pt transitioned to high flow NC 10L, tolerating well. Continuous pulse ox in place. BG monitored, supplemental insulin administered. All questions addressed. Will continue to monitor.

## 2020-08-22 NOTE — ED NOTES
Continues to report SOB despite breathing treatment. Tachypnea and labored breathing persist and worsening.   Provider notified.

## 2020-08-22 NOTE — ED NOTES
Pt lying in stretcher resting comfortably, RR even/unlabored on bipap.   Arouses to verbal stimuli.   AAO x 4. NAD noted.

## 2020-08-22 NOTE — ED NOTES
No change.   Pt continues to rest comfortably on Bipap. RR even/unlabored, maintaining sats WNL.   Arouses to verbal stimuli.  NAD noted.

## 2020-08-22 NOTE — ED NOTES
Pt accepted at [facility] room 321 by Dr. George.   #[phone]    [facility] to establish transportation with [facility].    Pt educated and v/u.

## 2020-08-22 NOTE — NURSING
Patient admitted to CSU. Patient arrived to floor no evidence of distress; patient AAO x4 at this time. Patient placed on tele. Vital signs obtained. Patient voices no complaints at this time. Plan of care initiated with patient. Bed in lowest position, locked, SR up x2, call bell in reach. Will continue to monitor patient.

## 2020-08-22 NOTE — ED NOTES
Pt cont to reports SOB when going to restroom.   Breathing labored and tachypnic. Provider aware, Cont to monitor.

## 2020-08-22 NOTE — CARE UPDATE
Rapid Response Nurse Chart Check     Chart check completed, abnormal VS noted.  Pt currently on 15 L high flow NC.   Bedside RN, Janeth contacted. No concerns verbalized at this time. Instructed to call [phone] for further concerns or assistance.

## 2020-08-22 NOTE — PROGRESS NOTES
08/22/20 1655   Vital Signs   BP (!) 95/51   MAP (mmHg) 67   BP Location Left arm   BP Method Automatic   Patient Position Lying     NOtified  Dr Razo. BP lower than pt had been running. MD ordered to hold off on lasix dose until BP improves.

## 2020-08-22 NOTE — PROGRESS NOTES
"   08/22/20 1818 08/22/20 1821   Vital Signs   BP (!) 84/51 (!) 86/50   BP Location Right arm Right arm   BP Method Automatic Manual   Patient Position Lying Lying    Notified Dr Razo of BP's. Pt stated she felt fine "just tired". HR, O2 and Temp all WNL. MD ordered to recheck BP in about 30 minutes and call her back with the results.    "

## 2020-08-22 NOTE — ED NOTES
Pt  reports increased SOB when getting up and going to restroom.   Breathing labored and tachypnic. Provider aware, Cont to monitor.

## 2020-08-23 NOTE — PLAN OF CARE
Plan of care discussed with patient. Patient is free of fall/trauma/injury. Pt O2 titrated down to high flow NC 7L, tolerating well. Continuous pulse ox in place. BG monitored, supplemental insulin administered. All questions addressed. Will continue to monitor.

## 2020-08-23 NOTE — PLAN OF CARE
PCP: Kari Gaspar    Payor: [employer] HMO ([id number])    Pharmacy: [facility] [address] [place] [address], [place] [address] [phone]

## 2020-08-23 NOTE — PROGRESS NOTES
8/22/2020 21:36   POCT Glucose 435 (H)     DOM Ward MD notified of above CBG.  Patient asymptomatic.  MD to adjust SSI order for bedtime.  Will continue to monitor.

## 2020-08-23 NOTE — CONSULTS
Pt denies any recent weight changes, pt consuming 100% of meals. Pt appears well nourished, NFPE not warranted at this time. Pt educated on salt and fluid restrictions. Literature for low sodium diet and fluid instructions left with pt. All questions answered.

## 2020-08-23 NOTE — PROGRESS NOTES
08/22/20 1904   Vital Signs   BP (!) 99/54   MAP (mmHg) 74   BP Location Right arm   BP Method Automatic   Patient Position Lying     Notified Dr Razo. Dr Razo stated that she already D/C her lasix and metoprolol. Will continue to monitor pt.

## 2020-08-23 NOTE — PLAN OF CARE
PCP: Kari Gaspar     Payor: [employer] HMO ([id number])    Pharmacy: [facility] [address] [place] [address], [place] [address]  [phone]    SW visited pt at bs. Alert and oriented. Pt stated spouse provides care when needed. Denies hx of dialysis and coumadin clinic visits. DME listed in assessment. Pt stated family will provide transport at d/c.    No further needs at time of assessment.       08/23/20 1337   Discharge Assessment   Assessment Type Discharge Planning Assessment   Confirmed/corrected address and phone number on facesheet? Yes   Assessment information obtained from? Patient   Communicated expected length of stay with patient/caregiver no   Prior to hospitilization cognitive status: Alert/Oriented   Prior to hospitalization functional status: Independent   Current cognitive status: Alert/Oriented   Current Functional Status: Independent   Facility Arrived From: home   Lives With spouse   Able to Return to Prior Arrangements yes   Is patient able to care for self after discharge? Unable to determine at this time (comments)   Who are your caregiver(s) and their phone number(s)? Spouse Emmanuel Herrera    Patient's perception of discharge disposition home or selfcare   Readmission Within the Last 30 Days unable to assess   Patient currently being followed by outpatient case management? Unable to determine (comments)   Patient currently receives any other outside agency services? No   Equipment Currently Used at Home shower chair;wheelchair;walker, standard;walker, rolling  (electric scooter)   Do you have any problems affording any of your prescribed medications? No   Is the patient taking medications as prescribed? yes   Does the patient have transportation home? Yes   Transportation Anticipated family or friend will provide   Dialysis Name and Scheduled days n/a   Does the patient receive services at the Coumadin Clinic? No   Discharge Plan A Skilled Nursing Facility   Discharge Plan B Home Health    DME Needed Upon Discharge  none   Patient/Family in Agreement with Plan yes       Jennifer Bryant LMSW  Case Management Social Worker   [facility]

## 2020-08-23 NOTE — CARE UPDATE
"RAPID RESPONSE NURSE PROACTIVE ROUNDING NOTE     Time of Visit:     Admit Date: 2020  LOS: 1  Code Status: Partial Code   Date of Visit: 2020  : 1935  Age: 84 y.o.  Sex: female  Race: White  Bed: 354/354 A:   MRN: [id number]  Was the patient discharged from an ICU this admission? no   Was the patient discharged from a PACU within last 24 hours?  no  Did the patient receive conscious sedation/general anesthesia in last 24 hours?  no  Was the patient in the ED within the past 24 hours?  yes  Was the patient started on NIPPV within the past 24 hours?  yes  Attending Physician: Bina Razo MD  Primary Service: [facility] MED N    ASSESSMENT     Notified by bedside RNJannie, during rounds.  Reason for alert: hypotension    Diagnosis: <principal problem not specified>    Abnormal Vital Signs: BP (!) 90/52 (BP Location: Left arm, Patient Position: Lying)   Pulse 88   Temp 98 °F (36.7 °C) (Oral)   Resp 20   Ht 5' 3" (1.6 m)   Wt 56.4 kg (124 lb 5.4 oz)   LMP  (LMP Unknown)   SpO2 96%   Breastfeeding No   BMI 22.03 kg/m²      Clinical Issues: Circulatory    Patient  has a past medical history of A-fib, Acute on chronic congestive heart failure, Anxiety, Arthritis, Asthma, Atrial fibrillation with rapid ventricular response, CHF (congestive heart failure), Chronic bronchitis, COPD (chronic obstructive pulmonary disease), Depression, Diabetes mellitus, type 2, SANTIAGO (dyspnea on exertion), Emphysema of lung, Fatigue, Hyperlipidemia, Hypertension, Symptomatic anemia, and Trouble in sleeping.      BP per PCT 82/43, MAP 57. BP retaken per bedside RN 93/51 w/ MAP 68. Pt is AAOx4. Metoprolol and lasix d/c'd prior d/t hypotension.     INTERVENTIONS/ RECOMMENDATIONS     Continue to monitor VS.    Discussed plan of care with Jannie PALOMO.    PHYSICIAN ESCALATION     Yes/No  no    Orders received and case discussed with N/A.    Disposition: Remain in room 354.    FOLLOW-UP     Call back the Rapid Response Nurse, " BJORN SCHNEIDER RN at [facility] for additional questions or concerns.

## 2020-08-23 NOTE — PLAN OF CARE
Patient free from falls and injuries throughout shift.  AAO and VSS.  Patient denies chest pain and SOB.  Blood glucose managed through meals and insulin; .  Patient continues on ciprofloxacin 500 mg Q24 hours, lasix 100 mg IVP x1, prednisone 40 mg daily, and zosyn 4.5 g Q 8 hours.  Patient started on vancomycin 1.25g x1.   K+3.6 and Mg 1.3; replaced.   BUN/Cre 38/1.3.  No acute events overnight. Patient resting well at this time.  Plan of care discussed with patient.  Patient verbalizes understanding.  Will continue to monitor.

## 2020-08-23 NOTE — PROGRESS NOTES
Pharmacokinetic Initial Assessment: IV Vancomycin    Assessment/Plan:    Initiate intravenous vancomycin with loading dose of 1250 mg once followed by a maintenance dose of vancomycin 500mg IV every 24 hours  Desired empiric serum trough concentration is 15 to 20 mcg/mL  Draw vancomycin trough level 30 min prior to third dose on 8/24/2020 at approximately 2030  Pharmacy will continue to follow and monitor vancomycin.      Please contact pharmacy at extension [phone]   with any questions regarding this assessment.     Thank you for the consult,   Deandra Nj       Patient brief summary:  Chantell Herrera is a 84 y.o. female initiated on antimicrobial therapy with IV Vancomycin for treatment of suspected Pneumonia     Drug Allergies:   Review of patient's allergies indicates:  No Known Allergies    Actual Body Weight:   56.4kg       Renal Function:   Estimated Creatinine Clearance: 27.6 mL/min (based on SCr of 1.3 mg/dL).,     Dialysis Method (if applicable):  N/A    CBC (last 72 hours):  Recent Labs   Lab Result Units 08/21/20  1451 08/21/20  2326 08/22/20  0857   WBC K/uL 12.37 42.04* 20.66*   Hemoglobin g/dL 8.1* 12.4 11.3*   Hematocrit % 25.6* 38.0 35.2*   Platelets K/uL 224 244 181   Gran% % 77.3* 78.0* 67.2   Lymph% % 5.3* 2.0* 3.4*   Mono% % 13.3 3.0* 25.4*   Eosinophil% % 1.5 0.0 0.9   Basophil% % 0.5 0.0 0.6   Differential Method  Automated Manual Automated       Metabolic Panel (last 72 hours):  Recent Labs   Lab Result Units 08/21/20  1452 08/21/20  2326 08/22/20  0350 08/22/20  0857 08/22/20  1519   Sodium mmol/L 141 143  --  140 140   Potassium mmol/L 3.9 4.0  --  3.6 3.6   Chloride mmol/L 93* 92*  --  89* 89*   CO2 mmol/L 38* 36*  --  43* 41*   Glucose mg/dL 300* 145*  --  201* 307*   Glucose, UA   --   --  Negative  --   --    BUN, Bld mg/dL 33* 33*  --  36* 38*   Creatinine mg/dL 1.2 1.1  --  1.2 1.3   Albumin g/dL 3.7 4.4  --  3.8  --    Total Bilirubin mg/dL 0.7 3.9*  --  1.6*  --     Alkaline Phosphatase U/L 96 121  --  102  --    AST U/L 14 21  --  14  --    ALT U/L 27 32  --  25  --    Magnesium mg/dL  --   --   --  1.3*  --        Drug levels (last 3 results):  No results for input(s): VANCOMYCINRA, VANCOMYCINPE, VANCOMYCINTR in the last 72 hours.    Microbiologic Results:  Microbiology Results (last 7 days)     Procedure Component Value Units Date/Time    Blood culture [[id number]] Collected: 08/22/20 1218    Order Status: Sent Specimen: Blood from Peripheral, Right Hand Updated: 08/22/20 1254    Blood culture [[id number]] Collected: 08/22/20 1214    Order Status: Sent Specimen: Blood from Antecubital, Right Arm Updated: 08/22/20 1251

## 2020-08-24 NOTE — PROGRESS NOTES
DEUCE aLckey DO notified patient HR sustaining in the 120's-150's.  EKG confirmed A-fib RVR.  Patient w/ history.  Metoprolol orders d/c d/t low BP 08/22.  Patient BP currently 110-120's/50's.  Patient asymptomatic.  Orders for Metoprolol  5 mg IVP x1.  Will continue to monitor.

## 2020-08-24 NOTE — PROGRESS NOTES
DEUCE Lackey DO notified patient HR sustaining in the 120's-150's after Metoprolol  5 mg IVP administered x1. Orders for metoprolol 5 mg IVP x3.  Will continue to monitor.

## 2020-08-24 NOTE — PROGRESS NOTES
Patient developed asymptomatic Afib with RVR overnight with baseline h/o Afib. EKG showed afib w RVR @ 140 bpm. Metoprolol XL 50 mg was stopped yesterday due to borderline BP. SBP in 110s. Will try lopressor 5 mg IVP and restart on metoprolol 25 mg po bid.     Tierra Lackey .   [facility] Medicine.

## 2020-08-24 NOTE — PLAN OF CARE
Pt free of falls/trauma/injuries. Denies c/o SOB; O2Sats remain stable on 4L high flow NC, weaning down as tolerated. Incentive spirometry encouraged throughout shift. Pt denies pain. Continued Abx therapy per MAR. Started new IV, left AC 20 gauge. Following fluid restriction of 1500mL per 24 hours. Pt had 2 BM today. Patient tolerating plan of care.

## 2020-08-24 NOTE — PROGRESS NOTES
Pharmacokinetic Assessment Follow Up: IV Vancomycin    Vancomycin serum concentration assessment(s):    The random level was drawn correctly and can be used to guide therapy at this time. The measurement is below the desired definitive target range of 15 to 20 mcg/mL.    Vancomycin Regimen Plan:    Continue regimen to Vancomycin 500 mg IV every 24 hours with next serum trough concentration measured at 2200 prior to 4th dose on 8/25/2020    Drug levels (last 3 results):  Recent Labs   Lab Result Units 08/23/20  1916   Vancomycin, Random ug/mL 9.3       Pharmacy will continue to follow and monitor vancomycin.    Please contact pharmacy at extension [phone] for questions regarding this assessment.    Thank you for the consult,   Deandra Nj       Patient brief summary:  Chantell Herrera is a 84 y.o. female initiated on antimicrobial therapy with IV Vancomycin for treatment of Pneumonia    The patient's current regimen is Vancomycin 500mg IV  Q24 Hours    Drug Allergies:   Review of patient's allergies indicates:  No Known Allergies    Actual Body Weight:   56.2 kg     Renal Function:   Estimated Creatinine Clearance: 23.9 mL/min (A) (based on SCr of 1.5 mg/dL (H)).,     Dialysis Method (if applicable):  N/A    CBC (last 72 hours):  Recent Labs   Lab Result Units 08/21/20  1451 08/21/20  2326 08/22/20  0857 08/22/20  2149 08/23/20  0347   WBC K/uL 12.37 42.04* 20.66*  --  15.35*   Hemoglobin g/dL 8.1* 12.4 11.3*  --  10.5*   Hemoglobin A1C %  --   --   --  6.5* 6.6*   Hematocrit % 25.6* 38.0 35.2*  --  33.0*   Platelets K/uL 224 244 181  --  166   Gran% % 77.3* 78.0* 67.2  --  74.0*   Lymph% % 5.3* 2.0* 3.4*  --  2.1*   Mono% % 13.3 3.0* 25.4*  --  21.7*   Eosinophil% % 1.5 0.0 0.9  --  0.3   Basophil% % 0.5 0.0 0.6  --  0.3   Differential Method  Automated Manual Automated  --  Automated       Metabolic Panel (last 72 hours):  Recent Labs   Lab Result Units 08/21/20  1452 08/21/20  2326 08/22/20  0356  08/22/20  0857 08/22/20  1519 08/23/20  0347   Sodium mmol/L 141 143  --  140 140 143   Potassium mmol/L 3.9 4.0  --  3.6 3.6 4.1   Chloride mmol/L 93* 92*  --  89* 89* 94*   CO2 mmol/L 38* 36*  --  43* 41* 38*   Glucose mg/dL 300* 145*  --  201* 307* 241*   Glucose, UA   --   --  Negative  --   --   --    BUN, Bld mg/dL 33* 33*  --  36* 38* 44*   Creatinine mg/dL 1.2 1.1  --  1.2 1.3 1.5*   Albumin g/dL 3.7 4.4  --  3.8  --  3.2*   Total Bilirubin mg/dL 0.7 3.9*  --  1.6*  --  1.0   Alkaline Phosphatase U/L 96 121  --  102  --  83   AST U/L 14 21  --  14  --  7*   ALT U/L 27 32  --  25  --  18   Magnesium mg/dL  --   --   --  1.3*  --  2.2       Vancomycin Administrations:  vancomycin given in the last 96 hours                   vancomycin 1.25 g in dextrose 5% 250 mL IVPB (ready to mix) (mg) 1,250 mg New Bag 08/22/20 2014                Microbiologic Results:  Microbiology Results (last 7 days)     Procedure Component Value Units Date/Time    Blood culture [[id number]] Collected: 08/22/20 1218    Order Status: Completed Specimen: Blood from Peripheral, Right Hand Updated: 08/23/20 1412     Blood Culture, Routine No Growth to date      No Growth to date    Blood culture [[id number]] Collected: 08/22/20 1214    Order Status: Completed Specimen: Blood from Antecubital, Right Arm Updated: 08/23/20 1412     Blood Culture, Routine No Growth to date      No Growth to date

## 2020-08-24 NOTE — PROGRESS NOTES
[facility] Hospital Medicine  Progress Note    Admitting Team: IM-N  Attending Physician: Bina Razo MD  Date of Admit: 2020      Subjective:      History of Present Illness:  Chantell Herrera is a 84 y.o. female with HFpEF, AF, HTN, CKD3, DM2, COPD on 3 L at home, anxiety, depression, HLD, chronic anemia who presents as transfer from OSH for acute on chronic hypoxic respiratory failure secondary to decompensated heart failure after receiving blood transfusion for anemia at OSH ED. She presented to [facility] ED with anemia. Hb had dipped down to 8.1 and was 7.6 on  from the 10s back in early July. She was transfused two units during her time in the ER. BNP was 1300 and she did receive 40 mg IV lasix between units however she still went into volume overload with concern for CHF exacerbation. CXR showed worsening vascular congestion. AB.48/56/56. She was given an additional dose of lasix 40 mg IV x 1 and diuresed 800 ml at time of transfer acceptance. Repeat CBC showed Hb of 12.4 but a leukocytosis of 42. She is afebrile and unclear if this is leukocytosis is due to her transfusion although is quite high; she has not received steroids. She was admitted at [facility] in early July for anemia and COPD exacerabtion with a similar clinical picture to this presentation although her ABG was not quite hypoxic; PO2 was 28. Shehad not been able to wean off the BIPAP at time of transfer acceptance but has improved with diuresis. Due to ICU admit requirement with continuous BIPAP at the community facilities and lack of ICU beds, she transferred to [facility] to a stepdown bed.   Pt reports moderate wheezing a/w SOB and productive cough. Denies weight gain, abdominal swelling, edema, PND, orthopnea, fever, chills.      Interval History  AF with RVR overnight, resolved with IV and PO metoprolol. O2 requirements steadily decreasing. 96% on 5 L currently, will continue to wean as tolerated. Patient c/o of moderate  intermittent cough productive of sputum a/w pleuritic chest pain. Echo pending.       Past Medical History:  Past Medical History:   Diagnosis Date    A-fib     Acute on chronic congestive heart failure 2/1/2019    Anxiety     Arthritis     Asthma     Atrial fibrillation with rapid ventricular response     CHF (congestive heart failure) 11/29/2018    Chronic bronchitis     COPD (chronic obstructive pulmonary disease)     Depression     Diabetes mellitus, type 2     SANTIAGO (dyspnea on exertion)     Emphysema of lung     Fatigue     Hyperlipidemia     Hypertension     Symptomatic anemia 1/22/2019    Trouble in sleeping        Past Surgical History:  Past Surgical History:   Procedure Laterality Date    APPENDECTOMY      BRONCHOSCOPY N/A 12/3/2019    Procedure: BRONCHOSCOPY;  Surgeon: Emmanuel Hickman MD;  Location: [facility];  Service: Pulmonary;  Laterality: N/A;    EYE SURGERY      HYSTERECTOMY      INSERTION OF PACEMAKER  12/26/2019    OOPHORECTOMY      TONSILLECTOMY         Allergies:  Review of patient's allergies indicates:  No Known Allergies    Home Medications:  Prior to Admission medications    Medication Sig Start Date End Date Taking? Authorizing Provider   omeprazole (PRILOSEC) 40 MG capsule  4/22/20  Yes Historical Provider, MD   ACCU-CHEK SOFTCLIX LANCETS Misc 1 each by Misc.(Non-Drug; Combo Route) route once daily. 3/17/20   Kari Gaspar MD   albuterol (PROVENTIL) 2.5 mg /3 mL (0.083 %) nebulizer solution Take 3 mLs (2.5 mg total) by nebulization every 6 (six) hours as needed for Wheezing. 12/1/19   Chicho Fisher MD   alcohol swabs (BD ALCOHOL SWABS) PadM Apply 1 each topically as needed. 3/17/20   Kari Gaspar MD   aspirin (ECOTRIN) 81 MG EC tablet Take 1 tablet (81 mg total) by mouth once daily. 1/21/20 1/20/21  Milo Estevez MD   baclofen (LIORESAL) 10 MG tablet Take 10 mg by mouth 3 (three) times daily.    Historical Provider, MD LEON ULTRA-FINE SHORT PEN  "NEEDLE 31 gauge x 5/16" Ndle USE TO INJECT LEVEMIR ONCE DAILY 5/12/20   Kari Gaspar MD   blood glucose control high,low (ACCU-CHEK KUMAR CONTROL SOLN) Soln 1 each by Misc.(Non-Drug; Combo Route) route as needed. 3/17/20 3/17/21  Kari Gaspar MD   blood sugar diagnostic (ACCU-CHEK KUMAR PLUS TEST STRP) Strp 1 strip by Misc.(Non-Drug; Combo Route) route once daily. 3/17/20   Kari Gaspar MD   blood-glucose meter (ACCU-CHEK KUMAR PLUS METER) Misc 1 each by Misc.(Non-Drug; Combo Route) route once daily. 3/17/20 3/17/21  Kari Gaspar MD   cholecalciferol, vitamin D3, (VITAMIN D3) 2,000 unit Tab Take 2,000 Units by mouth once daily.    Historical Provider, MD   citalopram (CELEXA) 20 MG tablet Take 1 tablet (20 mg total) by mouth once daily. 12/2/19 12/1/20  Chicho Fisher MD   insulin detemir U-100 (LEVEMIR FLEXTOUCH) 100 unit/mL (3 mL) SubQ InPn pen Inject 10 Units into the skin every evening. 7/14/20 7/14/21  Kari Gaspar MD   ipratropium (ATROVENT) 0.02 % nebulizer solution Take 500 mcg by nebulization 4 (four) times daily.  3/18/16   Historical Provider, MD   meclizine (ANTIVERT) 12.5 mg tablet Take 1 tablet (12.5 mg total) by mouth every evening. for 5 days 7/9/20 7/14/20  Kari Gaspar MD   metoprolol succinate (TOPROL-XL) 50 MG 24 hr tablet Take 50 mg by mouth 2 (two) times daily.  2/4/20   Historical Provider, MD   mupirocin (BACTROBAN) 2 % ointment Apply topically 2 (two) times daily.  Patient not taking: Reported on 7/9/2020 4/29/20   Kari Gaspar MD   pantoprazole (PROTONIX) 40 MG tablet Take 1 tablet (40 mg total) by mouth once daily. for 14 days  Patient not taking: Reported on 7/9/2020 3/17/20 4/13/20  Kari Gaspar MD   pravastatin (PRAVACHOL) 40 MG tablet Take 40 mg by mouth once daily.    Historical Provider, MD   rOPINIRole (REQUIP) 1 MG tablet TAKE 1 TABLET  EVERY EVENING. 4/7/20   Kari Gaspar MD   thiamine 100 MG tablet Take 1 tablet (100 mg total) by " mouth once daily.  Patient not taking: Reported on 2020   Lulu Jacinto MD   torsemide (DEMADEX) 20 MG Tab Take 1 tablet (20 mg total) by mouth once daily.  Patient not taking: Reported on 2020 3/17/20 3/17/21  Kari Gaspar MD   traZODone (DESYREL) 50 MG tablet Take 1 tablet (50 mg total) by mouth every evening. 20   Kari Gaspar MD   weigh scale [facility] Record daily weights 3/10/20   Kari Gaspar MD   potassium chloride SA (K-DUR,KLOR-CON) 20 MEQ tablet  20  Historical Provider, MD   predniSONE (DELTASONE) 20 MG tablet Take 1 tablet (20 mg total) by mouth once daily. Resume this medication at 2020 7/15/20 8/22/20  Kari Gaspar MD       Family History:  Family History   Problem Relation Age of Onset    Heart disease Mother     Hypertension Mother     Hypertension Father     Diabetes Father     Cancer Sister     COPD Brother     Hypertension Brother     No Known Problems Daughter     Kidney disease Son     COPD Daughter        Social History:  Social History     Tobacco Use    Smoking status: Former Smoker     Quit date: 8/3/2000     Years since quittin.0    Smokeless tobacco: Never Used   Substance Use Topics    Alcohol use: No    Drug use: No       Review of Systems:  As per HPI  General: no fever, no chills, no weight loss, no fatigue  Nose, Sinuses: no rhinorrhea, no facial pain, no epistaxis  Cardiovascular: no chest pain, no orthopnea  Urinary: no dysuria, no increased frequency, no hematuria  Hematologic: no easy bruising, no overt bleeding, no petechiae  Endocrine: no heat intolerance, no cold intolerance, no polyuria  Neurologic: no seizures, no tremors, no numbness  Psychiatric: no hallucination, no depression, no suicidal ideation  Skin: no rashes, no pruritus, no pallor  All other systems reviewed & are negative.        Objective:   Last 24 Hour Vital Signs:  BP  Min: 98/54  Max: 124/61  Temp  Av.6 °F (36.4 °C)  Min: 96.8 °F (36  "°C)  Max: 98 °F (36.7 °C)  Pulse  Av.5  Min: 75  Max: 146  Resp  Av.7  Min: 17  Max: 21  SpO2  Av.5 %  Min: 85 %  Max: 99 %  Height  Av' 3" (160 cm)  Min: 5' 3" (160 cm)  Max: 5' 3" (160 cm)  Weight  Av.2 kg (124 lb)  Min: 56.2 kg (124 lb)  Max: 56.2 kg (124 lb)  Body mass index is 21.97 kg/m².  I/O last 3 completed shifts:  In: 1845 [P.O.:1845]  Out: 750 [Urine:750]    Physical Examination:  GEN: AAOx3, NAD  HEENT: NCAT, MMM, PERRL, EOMI, oropharynx clear  CV: RRR, no m/r  RESP: appears comfortable on HFNC, no wheezes, mild bibasilar crackles  ABD: soft, NTND, normoactive BS, no organomegaly  EXTR: no c/c/e, 2+ distal pulses x 4  NEURO: PERRL, EOMI, 5/5 motor strength all four extremities, intact sensation to light touch, no focal deficits  SKIN: no rashes, lesions, or color changes  PSYCH: normal affect     Laboratory:  I have reviewed all pertinent laboratory data within the past 24 hours.    Other Results:  EKG (my interpretation): sinus, left axis deviation, pulmonary disease pattern    Radiology:  I have reviewed all pertinent radiology imaging within the past 24 hours.    Prior records reviewed.     Assessment/Plan:     Chantell Herrera is a 84 y.o. female with:    Acute on chronic diastolic HF  Acute on chronic hypoxic respiratory failure  Acute COPD exacerbation  Pulmonary HTN  Hospital-acquired pneumonia  -elevated BNP with clinical signs of overload and edema on CXR  -last echo with normal EF & pulm HTN  -strict I/O's, daily weights, fluid/sodium restriction  -improved with IV diuresis with Lasix; now euvolemic-appearing so will hold on further diuresis  -repeat echo pending  -ABG with pH 7.5, chronic hypercapnia; therefore weaned from bipap  -scheduled duonebs and prednisone for COPD flare  -concern for PNA given low-grade temps, leukocytosis, elevated procal, and opacities on CXR - started broad spectrum empiric abx for hospital organisms coverage; transition to orals " tomorrow    Chronic anemia  -apparently received 2 units of blood for Hb 8 at OSH ED  -Hb stable today without bleeding    Hypomagnesemia  -replete prn    AFib  -resume home toprol as BP's have improved  -not on chronic AC presumably due to chronic anemia and concern for occult GI bleed    CKD3  -renal function stable  -monitor    DM2  -resume home insulin, LDSSI  -A1C    HTN  -resume home meds    Diet- diabetic, cardiac, 1.5 L  PPX- scd's  Dispo- clinical improvement  Appts-  Discharge needs- Cardiology, Pulmonology f/u      Bina Razo MD  Hospital Medicine Staff  [facility]

## 2020-08-24 NOTE — PROGRESS NOTES
DEUCE Lackey DO notified patient HR sustaining in the 100's-150's.  Patient asymptomatic.  Metoprolol  5 mg IVP administered x3 per order.  Orders to give metoprolol 25 mg PO x1.  Will continue to monitor.  Will continue to monitor.

## 2020-08-24 NOTE — PLAN OF CARE
Patient free from falls and injuries throughout shift.  AAO and VSS.  Patient denies chest pain and SOB.  Blood glucose managed through meals and insulin; .  Patient continues on ciprofloxacin 500 mg Q24 hours, prednisone 40 mg daily, and zosyn 4.5 g Q 8 hours.  Patient started on vancomycin 500 mg q24 hours; vanc trough 08/25 @ 2200.   K+4.1 and Mg 2.2.   BUN/Cre 44/1.5.  No acute events overnight. Patient resting well at this time.  Plan of care discussed with patient.  Patient verbalizes understanding.  Will continue to monitor.

## 2020-08-25 NOTE — PT/OT/SLP EVAL
Occupational Therapy   Evaluation    Name: Chantell Herrera  MRN: [id number]  Admitting Diagnosis:  Acute on chronic respiratory failure with hypoxia      Recommendations:     Discharge Recommendations: home with home health  Discharge Equipment Recommendations:  none  Barriers to discharge:  None    Assessment:     Chantell Herrera is a 84 y.o. female with a medical diagnosis of Acute on chronic respiratory failure with hypoxia. Patient admitted 8/22/20 from [facility]. She presents with the following performance deficits affecting function: weakness, impaired cardiopulmonary response to activity, impaired endurance, impaired self care skills, impaired functional mobilty, gait instability, impaired balance. Patient agreeable to therapy evaluation and OOB activity. Patient completed functional mobility within room with 1 loss of balance requiring physical assist from therapist. At this time, patient will continue to benefit from acute skilled therapy intervention to address deficits/underlying impairments and progress towards prior level of function. After discharge, patient will benefit from receiving home health therapy services to ensure safety and independence in the home environment.    Rehab Prognosis: Good; patient would benefit from acute skilled OT services to address these deficits and reach maximum level of function.       Plan:     Patient to be seen 3 x/week to address the above listed problems via self-care/home management, therapeutic activities, therapeutic exercises  · Plan of Care Expires: 09/25/20  · Plan of Care Reviewed with: patient    Subjective     Chief Complaint: SOB and dizziness following ambulation    Occupational Profile:  Living Environment: Patient lives with  in [facility] with ramp entry. Patient has a tub/shower combo with shower chair and grab bar.  Previous level of function: Independent with ADLs; mod I with ambulation using rollator;  performs housekeeping tasks  Roles and  Routines: Does not drive; primarily stays at home  Equipment Used at Home: shower chair, grab bar, power chair, cane, straight, oxygen, rollator  Assistance upon Discharge:  can assist as needed    Pain/Comfort:  · Pain Rating 1: 0/10    Patients cultural, spiritual, Church conflicts given the current situation: no    Objective:     Communicated with: RN prior to session. Patient found HOB elevated with telemetry, pulse ox (continuous), oxygen, bed alarm, [employer] camera upon OT entry to room.    General Precautions: Standard, fall   Orthopedic Precautions:N/A   Braces: N/A     Occupational Performance:    Bed Mobility:    · Patient completed Supine to Sit with modified independence; HOB elevated  · Patient completed Sit to Supine with modified independence; HOB elevated    Functional Mobility/Transfers:  · Patient completed Sit <> Stand Transfer with stand by assistance  with no AD  · Patient completed Toilet Transfer Step Transfer technique with stand by assistance with no AD  · Functional Mobility: Patient ambulated EOB <> bathroom with SBA/CGA with no AD.  · Patient presented with 1 loss of balance requiring min assist from therapist  · Patient c/o dizziness and SOB following ambulation; SPO2 94%    Activities of Daily Living:  · Lower Body Dressing: supervision adjusting socks sitting EOB    Cognitive/Visual Perceptual:  Cognitive/Psychosocial Skills:     -       Oriented to: Person, Place, Time and Situation   -       Follows Commands/attention:Follows multistep  commands  -       Communication: clear/fluent  -       Memory: No Deficits noted  -       Safety awareness/insight to disability: intact   -       Mood/Affect/Coping skills/emotional control: Appropriate to situation, Cooperative and Pleasant    Physical Exam:  Postural examination/scapula alignment:    -       No postural abnormalities identified  Dominant hand:    -       Right  Upper Extremity Range of Motion:     -       Right Upper  Extremity: WNL  -       Left Upper Extremity: WNL  Upper Extremity Strength:    -       Right Upper Extremity: WFL  -       Left Upper Extremity: WFL   Strength:    -       Right Upper Extremity: WFL  -       Left Upper Extremity: WFL  Fine Motor Coordination:    -       Intact  Gross motor coordination:   WFL    AMPAC 6 Click ADL:  AMPAC Total Score: 21    Treatment & Education:   Therapist provided facilitation and instruction of proper body mechanics, energy conservation, and fall prevention strategies during tasks listed above.   Instructed patient to sit in bedside chair daily to increase OOB/activity tolerance.   Instructed patient to use call light to have nursing staff assist with transfers.    Educated patient on OT POC and answered all questions within OT scope of practice.   Whiteboard updated   Education:    Patient left HOB elevated with all lines intact and call button in reach    GOALS:   Multidisciplinary Problems     Occupational Therapy Goals        Problem: Occupational Therapy Goal    Goal Priority Disciplines Outcome Interventions   Occupational Therapy Goal     OT, PT/OT Ongoing, Progressing    Description: Goals to be met by: 9/8/2020    Patient will increase functional independence with ADLs by performing:    LE Dressing with Set-up Assistance.  Grooming while standing with Set-up Assistance.  Toileting from toilet with [place] for hygiene and clothing management.   Step transfer with Modified [place]  Toilet transfer to toilet with Modified [place].                     History:     Past Medical History:   Diagnosis Date    A-fib     Acute on chronic congestive heart failure 2/1/2019    Anxiety     Arthritis     Asthma     Atrial fibrillation with rapid ventricular response     CHF (congestive heart failure) 11/29/2018    Chronic bronchitis     COPD (chronic obstructive pulmonary disease)     Depression     Diabetes mellitus, type 2     SANTIAGO (dyspnea on  exertion)     Emphysema of lung     Fatigue     Hyperlipidemia     Hypertension     Symptomatic anemia 1/22/2019    Trouble in sleeping        Past Surgical History:   Procedure Laterality Date    APPENDECTOMY      BRONCHOSCOPY N/A 12/3/2019    Procedure: BRONCHOSCOPY;  Surgeon: Emmanuel Hickman MD;  [facility];  Service: Pulmonary;  Laterality: N/A;    EYE SURGERY      HYSTERECTOMY      INSERTION OF PACEMAKER  12/26/2019    OOPHORECTOMY      TONSILLECTOMY         Time Tracking:     OT Date of Treatment: 08/25/20  OT Start Time: 1522  OT Stop Time: 1538  OT Total Time (min): 16 min    Billable Minutes:Evaluation 8  Therapeutic Activity 8    Kirstin James OT  8/25/2020

## 2020-08-25 NOTE — PROGRESS NOTES
[facility] Hospital Medicine  Progress Note    Admitting Team: IM-N  Attending Physician: Bina Razo MD  Date of Admit: 2020      Subjective:      History of Present Illness:  Chantell Herrera is a 84 y.o. female with HFpEF, AF, HTN, CKD3, DM2, COPD on 3 L at home, anxiety, depression, HLD, chronic anemia who presents as transfer from OSH for acute on chronic hypoxic respiratory failure secondary to decompensated heart failure after receiving blood transfusion for anemia at OSH ED. She presented to [facility] ED with anemia. Hb had dipped down to 8.1 and was 7.6 on  from the 10s back in early July. She was transfused two units during her time in the ER. BNP was 1300 and she did receive 40 mg IV lasix between units however she still went into volume overload with concern for CHF exacerbation. CXR showed worsening vascular congestion. AB.48/56/56. She was given an additional dose of lasix 40 mg IV x 1 and diuresed 800 ml at time of transfer acceptance. Repeat CBC showed Hb of 12.4 but a leukocytosis of 42. She is afebrile and unclear if this is leukocytosis is due to her transfusion although is quite high; she has not received steroids. She was admitted at [facility] in early July for anemia and COPD exacerabtion with a similar clinical picture to this presentation although her ABG was not quite hypoxic; PO2 was 28. Shehad not been able to wean off the BIPAP at time of transfer acceptance but has improved with diuresis. Due to ICU admit requirement with continuous BIPAP at the community facilities and lack of ICU beds, she transferred to [facility] to a stepdown bed.   Pt reports moderate wheezing a/w SOB and productive cough. Denies weight gain, abdominal swelling, edema, PND, orthopnea, fever, chills.      Interval History  Down to 4.5 L today. Leukocytosis resolved. Cr 1.6 > 1.2. Resume home torsemide today. Patient reports reproducible chest pain worse with taking a deep breath. States she wears  between 3-4 L O2 at home depending on how she feels. Will have PTOT evaluate today.       Past Medical History:  Past Medical History:   Diagnosis Date    A-fib     Acute on chronic congestive heart failure 2/1/2019    Anxiety     Arthritis     Asthma     Atrial fibrillation with rapid ventricular response     CHF (congestive heart failure) 11/29/2018    Chronic bronchitis     COPD (chronic obstructive pulmonary disease)     Depression     Diabetes mellitus, type 2     SANTIAGO (dyspnea on exertion)     Emphysema of lung     Fatigue     Hyperlipidemia     Hypertension     Symptomatic anemia 1/22/2019    Trouble in sleeping        Past Surgical History:  Past Surgical History:   Procedure Laterality Date    APPENDECTOMY      BRONCHOSCOPY N/A 12/3/2019    Procedure: BRONCHOSCOPY;  Surgeon: Emmanuel Hickman MD;  Location: [facility];  Service: Pulmonary;  Laterality: N/A;    EYE SURGERY      HYSTERECTOMY      INSERTION OF PACEMAKER  12/26/2019    OOPHORECTOMY      TONSILLECTOMY         Allergies:  Review of patient's allergies indicates:  No Known Allergies    Home Medications:  Prior to Admission medications    Medication Sig Start Date End Date Taking? Authorizing Provider   omeprazole (PRILOSEC) 40 MG capsule  4/22/20  Yes Historical Provider, MD   ACCU-CHEK SOFTCLIX LANCETS Misc 1 each by Misc.(Non-Drug; Combo Route) route once daily. 3/17/20   Kari Gaspar MD   albuterol (PROVENTIL) 2.5 mg /3 mL (0.083 %) nebulizer solution Take 3 mLs (2.5 mg total) by nebulization every 6 (six) hours as needed for Wheezing. 12/1/19   Chicho Fisher MD   alcohol swabs (BD ALCOHOL SWABS) PadM Apply 1 each topically as needed. 3/17/20   Kari Gaspar MD   aspirin (ECOTRIN) 81 MG EC tablet Take 1 tablet (81 mg total) by mouth once daily. 1/21/20 1/20/21  Milo Estevez MD   baclofen (LIORESAL) 10 MG tablet Take 10 mg by mouth 3 (three) times daily.    Historical Provider, MD LEON ULTRA-FINE SHORT PEN  "NEEDLE 31 gauge x 5/16" Ndle USE TO INJECT LEVEMIR ONCE DAILY 5/12/20   Kari Gaspar MD   blood glucose control high,low (ACCU-CHEK KUMAR CONTROL SOLN) Soln 1 each by Misc.(Non-Drug; Combo Route) route as needed. 3/17/20 3/17/21  Kari Gaspar MD   blood sugar diagnostic (ACCU-CHEK KUMAR PLUS TEST STRP) Strp 1 strip by Misc.(Non-Drug; Combo Route) route once daily. 3/17/20   Kari Gaspar MD   blood-glucose meter (ACCU-CHEK KUMAR PLUS METER) Misc 1 each by Misc.(Non-Drug; Combo Route) route once daily. 3/17/20 3/17/21  Kari Gaspar MD   cholecalciferol, vitamin D3, (VITAMIN D3) 2,000 unit Tab Take 2,000 Units by mouth once daily.    Historical Provider, MD   citalopram (CELEXA) 20 MG tablet Take 1 tablet (20 mg total) by mouth once daily. 12/2/19 12/1/20  Chicho Fisher MD   insulin detemir U-100 (LEVEMIR FLEXTOUCH) 100 unit/mL (3 mL) SubQ InPn pen Inject 10 Units into the skin every evening. 7/14/20 7/14/21  Kari Gaspar MD   ipratropium (ATROVENT) 0.02 % nebulizer solution Take 500 mcg by nebulization 4 (four) times daily.  3/18/16   Historical Provider, MD   meclizine (ANTIVERT) 12.5 mg tablet Take 1 tablet (12.5 mg total) by mouth every evening. for 5 days 7/9/20 7/14/20  Kari Gaspar MD   metoprolol succinate (TOPROL-XL) 50 MG 24 hr tablet Take 50 mg by mouth 2 (two) times daily.  2/4/20   Historical Provider, MD   mupirocin (BACTROBAN) 2 % ointment Apply topically 2 (two) times daily.  Patient not taking: Reported on 7/9/2020 4/29/20   Kari Gaspar MD   pantoprazole (PROTONIX) 40 MG tablet Take 1 tablet (40 mg total) by mouth once daily. for 14 days  Patient not taking: Reported on 7/9/2020 3/17/20 4/13/20  Kari Gaspar MD   pravastatin (PRAVACHOL) 40 MG tablet Take 40 mg by mouth once daily.    Historical Provider, MD   rOPINIRole (REQUIP) 1 MG tablet TAKE 1 TABLET  EVERY EVENING. 4/7/20   Kari Gaspar MD   thiamine 100 MG tablet Take 1 tablet (100 mg total) by " mouth once daily.  Patient not taking: Reported on 2020   Lulu Jacinto MD   torsemide (DEMADEX) 20 MG Tab Take 1 tablet (20 mg total) by mouth once daily.  Patient not taking: Reported on 2020 3/17/20 3/17/21  Kari Gaspar MD   traZODone (DESYREL) 50 MG tablet Take 1 tablet (50 mg total) by mouth every evening. 20   Kari Gaspar MD   weigh scale [facility] daily weights 3/10/20   Kari Gaspar MD   potassium chloride SA (K-DUR,KLOR-CON) 20 MEQ tablet  20  Historical Provider, MD   predniSONE (DELTASONE) 20 MG tablet Take 1 tablet (20 mg total) by mouth once daily. Resume this medication at 2020 7/15/20 8/22/20  Kari Gaspar MD       Family History:  Family History   Problem Relation Age of Onset    Heart disease Mother     Hypertension Mother     Hypertension Father     Diabetes Father     Cancer Sister     COPD Brother     Hypertension Brother     No Known Problems Daughter     Kidney disease Son     COPD Daughter        Social History:  Social History     Tobacco Use    Smoking status: Former Smoker     Quit date: 8/3/2000     Years since quittin.0    Smokeless tobacco: Never Used   Substance Use Topics    Alcohol use: No    Drug use: No       Review of Systems:  As per HPI  General: no fever, no chills, no weight loss, no fatigue  Nose, Sinuses: no rhinorrhea, no facial pain, no epistaxis  Cardiovascular: no chest pain, no orthopnea  Urinary: no dysuria, no increased frequency, no hematuria  Hematologic: no easy bruising, no overt bleeding, no petechiae  Endocrine: no heat intolerance, no cold intolerance, no polyuria  Neurologic: no seizures, no tremors, no numbness  Psychiatric: no hallucination, no depression, no suicidal ideation  Skin: no rashes, no pruritus, no pallor  All other systems reviewed & are negative.        Objective:   Last 24 Hour Vital Signs:  BP  Min: 108/67  Max: 141/66  Temp  Av °F (36.7 °C)  Min: 97 °F (36.1  °C)  Max: 98.5 °F (36.9 °C)  Pulse  Av.1  Min: 60  Max: 102  Resp  Av  Min: 14  Max: 20  SpO2  Av.4 %  Min: 83 %  Max: 98 %  Weight  Av.8 kg (120 lb 13 oz)  Min: 54.8 kg (120 lb 13 oz)  Max: 54.8 kg (120 lb 13 oz)  Body mass index is 21.4 kg/m².  I/O last 3 completed shifts:  In: 1900 [P.O.:1800; IV Piggyback:100]  Out: 950 [Urine:950]    Physical Examination:  GEN: AAOx3, NAD  HEENT: NCAT, MMM, PERRL, EOMI, oropharynx clear  CV: RRR, no m/r  RESP: appears comfortable on HFNC, no crackles, bilateral wheezes in anterior fields  ABD: soft, NTND, normoactive BS, no organomegaly  EXTR: no c/c/e, 2+ distal pulses x 4  NEURO: PERRL, EOMI, 5/5 motor strength all four extremities, intact sensation to light touch, no focal deficits  SKIN: no rashes, lesions, or color changes  PSYCH: normal affect     Laboratory:  I have reviewed all pertinent laboratory data within the past 24 hours.    Other Results:  EKG (my interpretation): sinus, left axis deviation, pulmonary disease pattern    Radiology:  I have reviewed all pertinent radiology imaging within the past 24 hours.    Prior records reviewed.     Assessment/Plan:     Chantell Herrera is a 84 y.o. female with:    Acute on chronic diastolic HF  Acute on chronic hypoxic respiratory failure  Acute COPD exacerbation  Pulmonary HTN  Hospital-acquired pneumonia  -elevated BNP with clinical signs of overload and edema on CXR  -last echo with normal EF & pulm HTN  -repeat echo reviewed: normal EF, G1DD, PAP 72  -ABG with pH 7.5, chronic hypercapnia; therefore weaned from bipap  -strict I/O's, daily weights, fluid/sodium restriction  -received IV diuresis; now euvolemic; resume home torsemide  -scheduled duonebs and prednisone for COPD flare  -concern for PNA given low-grade temps, leukocytosis, elevated procal, and opacities on CXR - started broad spectrum empiric abx for hospital organisms coverage -  transitioned to PO Levaquin today  -leukocytosis  resolved    Chronic anemia  -apparently received 2 units of blood for Hb 8 at OSH ED  -Hb stable today without bleeding    Hypomagnesemia  -replete prn    AFib  -resume home toprol as BP's have improved  -not on chronic AC presumably due to chronic anemia and concern for occult GI bleed    CKD3  -renal function stable  -monitor    DM2  -resume home insulin, LDSSI  -A1C    HTN  -resume home meds    Diet- diabetic, cardiac, 1.5 L  PPX- scd's  Dispo- possible dc home tomorrow  Appts-  Discharge needs- Cardiology, Pulmonology f/u      Bina Razo MD  Hospital Medicine Staff  [facility]

## 2020-08-25 NOTE — PLAN OF CARE
[facility]    HOME HEALTH ORDERS  FACE TO FACE ENCOUNTER    Patient Name: Chantell Herrera  YOB: 1935    PCP: Kari Gaspar MD   PCP Address: [address] [facility] [address]  PCP Phone Number: [phone]  PCP Fax: [phone]    Encounter Date: 08/25/2020    Admit to Home Health    Diagnoses:  Active Hospital Problems    Diagnosis  POA    *Acute on chronic respiratory failure with hypoxia [J96.21]  Yes    COPD with acute exacerbation [J44.1]  Yes    Diabetes mellitus type 2, uncontrolled [E11.65]  Yes    Anemia [D64.9]  Yes    Acute on chronic congestive heart failure [I50.9]  Yes    Chronic atrial fibrillation [I48.20]  Yes    CKD (chronic kidney disease), stage III [N18.3]  Yes    HTN (hypertension) [I10]  Yes      Resolved Hospital Problems    Diagnosis Date Resolved POA    Respiratory failure [J96.90] 08/22/2020 Yes       No future appointments.  Follow-up Information     Schedule an appointment as soon as possible for a visit with Kari Gaspar MD.    Specialty: Family Medicine  Contact information:  [address]  [facility] [address]  [phone]             Schedule an appointment as soon as possible for a visit with Art Yen-Cardiology [facility] 3rd Floor.    Specialty: Cardiology  Why: heart follow up  Contact information:  [address]  [facility] [address] [phone]  Additional information:  Cardiology Services Clinics - 3rd floor           Schedule an appointment as soon as possible for a visit with Art Yen - Pulmonary [address].    Specialty: Pulmonology  Why: lung follow up  Contact information:  [address]  [facility] [address] [phone]  Additional information:  Main Building, 9th Floor   Please park in [facility] and use Clinic elevator                   I have seen and examined this patient face to face today. My clinical findings that support the need for the home health skilled  services and home bound status are the following:  Weakness/numbness causing balance and gait disturbance due to Heart Failure and Weakness/Debility making it taxing to leave home.  Medical restrictions requiring assistance of another human to leave home due to  Dyspnea on exertion (SOB), Fluid/volume overload, Unstable ambulation and Home oxygen requirement.    Allergies:Review of patient's allergies indicates:  No Known Allergies    Diet: cardiac diet, fluid restriction: 2 L and 2 gram sodium diet    Activities: ambulate in house with assistance    Nursing:   SN to complete comprehensive assessment including routine vital signs. Instruct on disease process and s/s of complications to report to MD. Review/verify medication list sent home with the patient at time of discharge  and instruct patient/caregiver as needed. Frequency may be adjusted depending on start of care date.    Notify MD if SBP > 160 or < 90; DBP > 90 or < 50; HR > 120 or < 50; Temp > 101; Other:         CONSULTS:    Physical Therapy to evaluate and treat. Evaluate for home safety and equipment needs; Establish/upgrade home exercise program. Perform / instruct on therapeutic exercises, gait training, transfer training, and Range of Motion.  Occupational Therapy to evaluate and treat. Evaluate home environment for safety and equipment needs. Perform/Instruct on transfers, ADL training, ROM, and therapeutic exercises.    MISCELLANEOUS CARE:  Heart Failure:      SN to instruct on the following:    Instruct on the definition of CHF.   Instruct on the signs/sympoms of CHF to be reported.   Instruct on and monitor daily weights.   Instruct on factors that cause exacerbation.   Instruct on action, dose, schedule, and side effects of medications.   Instruct on diet as prescribed.   Instruct on activity allowed.   Instruct on life-style modifications for life long management of CHF   SN to assess compliance with daily weights, diet, medications, fluid  retention,    safety precautions, activities permitted and life-style modifications.   Additional 1-2 SN visits per week as needed for signs and symptoms     of CHF exacerbation.      For Weight Gain > 2-3 lbs in 1 day or 4-6 lbs over 1 week notify PCP or Cardiologist.   Home Oxygen:  No change, Oxygen at 3 L/min nasal canula to be used:  Continuously. and Assess oxygen saturation via pulse oximeter as needed for increase in SOB.  COPD: Teach patient MDI/HFA usage and guidelines  Please ensure that patient has a pulse oximeter and is educated on his normal oxygen saturations: 88-92%  Please ensure patient has a functioning nebulizer and provide education on its usage.    WOUND CARE ORDERS  n/a      Medications: Review discharge medications with patient and family and provide education.      Current Discharge Medication List      START taking these medications    Details   levoFLOXacin (LEVAQUIN) 750 MG tablet Take 1 tablet (750 mg total) by mouth every 48 hours. for 2 doses  Qty: 2 tablet, Refills: 0         CONTINUE these medications which have CHANGED    Details   torsemide (DEMADEX) 20 MG Tab Take 1 tablet (20 mg total) by mouth once daily.  Qty: 30 tablet, Refills: 11    Comments: .         CONTINUE these medications which have NOT CHANGED    Details   omeprazole (PRILOSEC) 40 MG capsule       ACCU-CHEK SOFTCLIX LANCETS Misc 1 each by Misc.(Non-Drug; Combo Route) route once daily.  Qty: 100 each, Refills: 5      albuterol (PROVENTIL) 2.5 mg /3 mL (0.083 %) nebulizer solution Take 3 mLs (2.5 mg total) by nebulization every 6 (six) hours as needed for Wheezing.  Qty: 300 mL, Refills: 3    Associated Diagnoses: Simple chronic bronchitis      alcohol swabs (BD ALCOHOL SWABS) PadM Apply 1 each topically as needed.  Qty: 100 each, Refills: 5      aspirin (ECOTRIN) 81 MG EC tablet Take 1 tablet (81 mg total) by mouth once daily.  Qty: 30 tablet, Refills: 0      baclofen (LIORESAL) 10 MG tablet Take 10 mg by mouth 3  "(three) times daily.      BD ULTRA-FINE SHORT PEN NEEDLE 31 gauge x 5/16" Ndle USE TO INJECT LEVEMIR ONCE DAILY  Qty: 100 each, Refills: 2    Associated Diagnoses: Uncontrolled type 2 diabetes mellitus with hyperglycemia      blood glucose control high,low (ACCU-CHEK KUMAR CONTROL SOLN) Soln 1 each by Misc.(Non-Drug; Combo Route) route as needed.  Qty: 1 each, Refills: 1      blood sugar diagnostic (ACCU-CHEK KUMAR PLUS TEST STRP) Strp 1 strip by Misc.(Non-Drug; Combo Route) route once daily.  Qty: 100 strip, Refills: 5      blood-glucose meter (ACCU-CHEK KUMAR PLUS METER) Misc 1 each by Misc.(Non-Drug; Combo Route) route once daily.  Qty: 1 each, Refills: 0      cholecalciferol, vitamin D3, (VITAMIN D3) 2,000 unit Tab Take 2,000 Units by mouth once daily.      citalopram (CELEXA) 20 MG tablet Take 1 tablet (20 mg total) by mouth once daily.  Qty: 30 tablet, Refills: 11      insulin detemir U-100 (LEVEMIR FLEXTOUCH) 100 unit/mL (3 mL) SubQ InPn pen Inject 10 Units into the skin every evening.  Qty: 15 mL, Refills: 0    Associated Diagnoses: Uncontrolled type 2 diabetes mellitus with hyperglycemia      ipratropium (ATROVENT) 0.02 % nebulizer solution Take 500 mcg by nebulization 4 (four) times daily.       meclizine (ANTIVERT) 12.5 mg tablet Take 1 tablet (12.5 mg total) by mouth every evening. for 5 days  Qty: 5 tablet, Refills: 0      metoprolol succinate (TOPROL-XL) 50 MG 24 hr tablet Take 50 mg by mouth 2 (two) times daily.       mupirocin (BACTROBAN) 2 % ointment Apply topically 2 (two) times daily.  Qty: 22 g, Refills: 3      pravastatin (PRAVACHOL) 40 MG tablet Take 40 mg by mouth once daily.      rOPINIRole (REQUIP) 1 MG tablet TAKE 1 TABLET  EVERY EVENING.  Qty: 90 tablet, Refills: 5    Associated Diagnoses: RLS (restless legs syndrome)      thiamine 100 MG tablet Take 1 tablet (100 mg total) by mouth once daily.  Qty: 60 tablet, Refills: 3      traZODone (DESYREL) 50 MG tablet Take 1 tablet (50 mg total) by " mouth every evening.  Qty: 90 tablet, Refills: 0      weigh scale Misc Record daily weights  Qty: 1 each, Refills: 0         STOP taking these medications       pantoprazole (PROTONIX) 40 MG tablet Comments:   Reason for Stopping:         potassium chloride SA (K-DUR,KLOR-CON) 20 MEQ tablet Comments:   Reason for Stopping:         predniSONE (DELTASONE) 20 MG tablet Comments:   Reason for Stopping:               I certify that this patient is confined to her home and needs intermittent skilled nursing care, physical therapy and occupational therapy.

## 2020-08-25 NOTE — PLAN OF CARE
Patient free from falls and injuries.O2 sats remain stable on 4L high flow NC, weaning down as tolerated. CPAP at night. Incentive spirometry encouraged. 1500 mL fluid restriction encouraged. Skin clean, dry, and intact. Reviewed plan of care with patient. Patient AAOX4, vital signs stable. In NAD. No questions or concerns at this time. Will continue to monitor.

## 2020-08-25 NOTE — PLAN OF CARE
CASA mackenzie completed and goals established 8/25/2020    DWIGHT Gupta/ANTHONY  Pager #: [phone]  8/25/2020    Problem: Occupational Therapy Goal  Goal: Occupational Therapy Goal  Description: Goals to be met by: 9/8/2020    Patient will increase functional independence with ADLs by performing:    LE Dressing with Set-up Assistance.  Grooming while standing with Set-up Assistance.  Toileting from toilet with [place] for hygiene and clothing management.   Step transfer with Modified [place]  Toilet transfer to toilet with Modified [place].    Outcome: Ongoing, Progressing

## 2020-08-25 NOTE — PLAN OF CARE
Plan of care discussed with patient. Patient is free of fall/trauma/injury. Denies CP, SOB, or pain/discomfort. Pt uses home CPAP at night, titrating O2 per nasal cannula. Blood glucose checks performed. Pt on mucinex and prednisone. All questions addressed. Will continue to monitor

## 2020-08-26 NOTE — PROGRESS NOTES
"[facility]  Adult Nutrition  Progress Note    SUMMARY       Recommendations  1. Continue diabetic cardiac diet as tolerated, with fluid restriction per MD.   2. RD to monitor.    Goals: Adequate PO intake to meet > 75% EEN/EPN by RD follow up  Nutrition Goal Status: new  Communication of RD Recs: other (comment)(POC)    Reason for Assessment    Reason For Assessment: RD follow-up  Diagnosis: (Acute on chronic respiratory failure with hypoxia)  Relevant Medical History: CHF, HTN, HLD, DM2, CKD3, COPD, chronic anemia  Interdisciplinary Rounds: did not attend  General Information Comments: Pt reports good appetite and states she has been consuming % of meals. Some variable intake noted per chart, with increased intake % of meals in the past 3 days. She reports some decreased intake PTA  > 3 months 2/2 sometimes doesn't want to eat what her  cooks. She denies wt changes and reports her UBW is 120-121#, confirmed per chart. NFPE completed today with age appropriate wasting noted. Pt does not meet criteria for malnutrition at this time.  Nutrition Discharge Planning: Adequate PO intake on diabetic cardiac diet    Nutrition Risk Screen    Nutrition Risk Screen: no indicators present    Nutrition/Diet History    Spiritual, Cultural Beliefs, Orthodox Practices, Values that Affect Care: no    Anthropometrics    Temp: 98.3 °F (36.8 °C)  Height Method: Stated  Height: 5' 3" (160 cm)  Height (inches): 63 in  Weight Method: Standard Scale  Weight: 54.2 kg (119 lb 7.8 oz)  Weight (lb): 119.49 lb  Ideal Body Weight (IBW), Female: 115 lb  % Ideal Body Weight, Female (lb): 107.83 %  BMI (Calculated): 21.2    Lab/Procedures/Meds    Pertinent Labs Reviewed: reviewed  Pertinent Labs Comments: BUN 40, GFR 42.2, Alb 3.2  Pertinent Medications Reviewed: reviewed  Pertinent Medications Comments: insulin, pantoprazole, statin, thiamine, torsemide    Estimated/Assessed Needs    Weight Used For Calorie " Calculations: 54.2 kg (119 lb 7.8 oz)  Energy Calorie Requirements (kcal): 1355 kcal/day  Energy Need Method: Kcal/kg(25)  Protein Requirements: 65-76 g/day(1.2-1.4 g/kg)  Weight Used For Protein Calculations: 54.2 kg (119 lb 7.8 oz)  Fluid Requirements (mL): per MD or 1 mL/kcal     RDA Method (mL): 1355  CHO Requirement: 169 g/day    Nutrition Prescription Ordered    Current Diet Order: Diabetic cardiac  Nutrition Order Comments: 1500mL FR    Evaluation of Received Nutrient/Fluid Intake    Comments: LBM 8/25  % Intake of Estimated Energy Needs: 75 - 100 %  % Meal Intake: 75 - 100 %    Nutrition Risk    Level of Risk/Frequency of Follow-up: low     Assessment and Plan    Nutrition Problem  Increased nutrient needs    Related to (etiology):   Physiological demands    Signs and Symptoms (as evidenced by):   Acute COPD exacerbation, CHF    Interventions (treatment strategy):  Collaboration with other providers  Nutrition education - low sodium diet    Nutrition Diagnosis Status:   New    Monitor and Evaluation    Food and Nutrient Intake: energy intake, food and beverage intake  Food and Nutrient Adminstration: diet order  Knowledge/Beliefs/Attitudes: food and nutrition knowledge/skill  Anthropometric Measurements: weight, weight change, body mass index  Biochemical Data, Medical Tests and Procedures: electrolyte and renal panel, gastrointestinal profile, glucose/endocrine profile, inflammatory profile, lipid profile  Nutrition-Focused Physical Findings: overall appearance    Malnutrition Assessment  Orbital Region (Subcutaneous Fat Loss): mild depletion  Upper Arm Region (Subcutaneous Fat Loss): moderate depletion   Adventism Region (Muscle Loss): mild depletion  Clavicle Bone Region (Muscle Loss): mild depletion  Clavicle and Acromion Bone Region (Muscle Loss): mild depletion  Dorsal Hand (Muscle Loss): mild depletion  Anterior Thigh Region (Muscle Loss): mild depletion  Posterior Calf Region (Muscle Loss): mild  depletion     Nutrition Follow-Up    RD Follow-up?: Yes

## 2020-08-26 NOTE — PROGRESS NOTES
[facility] Hospital Medicine  Progress Note    Admitting Team: IM-N  Attending Physician: Bina Razo MD  Date of Admit: 2020      Subjective:      History of Present Illness:  Chantell Herrera is a 84 y.o. female with HFpEF, AF, HTN, CKD3, DM2, COPD on 3 L at home, anxiety, depression, HLD, chronic anemia who presents as transfer from OSH for acute on chronic hypoxic respiratory failure secondary to decompensated heart failure after receiving blood transfusion for anemia at OSH ED. She presented to OSH ED with anemia. Hb had dipped down to 8.1 and was 7.6 on  from the 10s back in early July. She was transfused two units during her time in the ER. BNP was 1300 and she did receive 40 mg IV lasix between units however she still went into volume overload with concern for CHF exacerbation. CXR showed worsening vascular congestion. AB.48/56/56. She was given an additional dose of lasix 40 mg IV x 1 and diuresed 800 ml at time of transfer acceptance. Repeat CBC showed Hb of 12.4 but a leukocytosis of 42. She is afebrile and unclear if this is leukocytosis is due to her transfusion although is quite high; she has not received steroids. She was admitted at [facility] in early July for anemia and COPD exacerabtion with a similar clinical picture to this presentation although her ABG was not quite hypoxic; PO2 was 28. Shehad not been able to wean off the BIPAP at time of transfer acceptance but has improved with diuresis. Due to ICU admit requirement with continuous BIPAP at the community facilities and lack of ICU beds, she transferred to [facility] to a stepdown bed.   Pt reports moderate wheezing a/w SOB and productive cough. Denies weight gain, abdominal swelling, edema, PND, orthopnea, fever, chills.      Hospital Course  Transferred from OSH ED for acute hypoxic respiratory failure on BIPAP, secondary to acute decompensated heart failure from blood transfusion, COPD exacerbation, and suspected  pneumonia. S/p IV diuresis, now euvolemic. Improving on scheduled Duonebs and antibiotics. Completed 5 day prednisone course. Goal wean to home 3-4 L prior to discharge.     Interval History  On 5 L this morning with continued cough and pleuritic chest pain. Doesn't want Tylenol for the pain. Otherwise is improving overall. Continue to wean O2 as tolerated.       Past Medical History:  Past Medical History:   Diagnosis Date    A-fib     Acute on chronic congestive heart failure 2/1/2019    Anxiety     Arthritis     Asthma     Atrial fibrillation with rapid ventricular response     CHF (congestive heart failure) 11/29/2018    Chronic bronchitis     COPD (chronic obstructive pulmonary disease)     Depression     Diabetes mellitus, type 2     SANTIAGO (dyspnea on exertion)     Emphysema of lung     Fatigue     Hyperlipidemia     Hypertension     Symptomatic anemia 1/22/2019    Trouble in sleeping        Past Surgical History:  Past Surgical History:   Procedure Laterality Date    APPENDECTOMY      BRONCHOSCOPY N/A 12/3/2019    Procedure: BRONCHOSCOPY;  Surgeon: Emmanuel Hickman MD;  Location: [facility];  Service: Pulmonary;  Laterality: N/A;    EYE SURGERY      HYSTERECTOMY      INSERTION OF PACEMAKER  12/26/2019    OOPHORECTOMY      TONSILLECTOMY         Allergies:  Review of patient's allergies indicates:  No Known Allergies    Home Medications:  Prior to Admission medications    Medication Sig Start Date End Date Taking? Authorizing Provider   omeprazole (PRILOSEC) 40 MG capsule  4/22/20  Yes Historical Provider, MD   ACCU-CHEK SOFTCLIX LANCETS Misc 1 each by Misc.(Non-Drug; Combo Route) route once daily. 3/17/20   Kari Gaspar MD   albuterol (PROVENTIL) 2.5 mg /3 mL (0.083 %) nebulizer solution Take 3 mLs (2.5 mg total) by nebulization every 6 (six) hours as needed for Wheezing. 12/1/19   Chicho Fisher MD   alcohol swabs (BD ALCOHOL SWABS) PadM Apply 1 each topically as needed. 3/17/20    "Kari Gaspar MD   aspirin (ECOTRIN) 81 MG EC tablet Take 1 tablet (81 mg total) by mouth once daily. 1/21/20 1/20/21  Milo Estevez MD   baclofen (LIORESAL) 10 MG tablet Take 10 mg by mouth 3 (three) times daily.    Historical Provider, MD   BD ULTRA-FINE SHORT PEN NEEDLE 31 gauge x 5/16" Ndle USE TO INJECT LEVEMIR ONCE DAILY 5/12/20   Kari Gaspar MD   blood glucose control high,low (ACCU-CHEK KUMAR CONTROL SOLN) Soln 1 each by Misc.(Non-Drug; Combo Route) route as needed. 3/17/20 3/17/21  Kari Gaspar MD   blood sugar diagnostic (ACCU-CHEK KUMAR PLUS TEST STRP) Strp 1 strip by Misc.(Non-Drug; Combo Route) route once daily. 3/17/20   Kari Gaspar MD   blood-glucose meter (ACCU-CHEK KUMAR PLUS METER) Misc 1 each by Misc.(Non-Drug; Combo Route) route once daily. 3/17/20 3/17/21  Kari Gaspar MD   cholecalciferol, vitamin D3, (VITAMIN D3) 2,000 unit Tab Take 2,000 Units by mouth once daily.    Historical Provider, MD   citalopram (CELEXA) 20 MG tablet Take 1 tablet (20 mg total) by mouth once daily. 12/2/19 12/1/20  Chicho Fisher MD   insulin detemir U-100 (LEVEMIR FLEXTOUCH) 100 unit/mL (3 mL) SubQ InPn pen Inject 10 Units into the skin every evening. 7/14/20 7/14/21  Kari Gaspar MD   ipratropium (ATROVENT) 0.02 % nebulizer solution Take 500 mcg by nebulization 4 (four) times daily.  3/18/16   Historical Provider, MD   meclizine (ANTIVERT) 12.5 mg tablet Take 1 tablet (12.5 mg total) by mouth every evening. for 5 days 7/9/20 7/14/20  Kari Gaspar MD   metoprolol succinate (TOPROL-XL) 50 MG 24 hr tablet Take 50 mg by mouth 2 (two) times daily.  2/4/20   Historical Provider, MD   mupirocin (BACTROBAN) 2 % ointment Apply topically 2 (two) times daily.  Patient not taking: Reported on 7/9/2020 4/29/20   Kari Gaspar MD   pantoprazole (PROTONIX) 40 MG tablet Take 1 tablet (40 mg total) by mouth once daily. for 14 days  Patient not taking: Reported on 7/9/2020 3/17/20 " 20  Kari Gaspar MD   pravastatin (PRAVACHOL) 40 MG tablet Take 40 mg by mouth once daily.    Historical Provider, MD   rOPINIRole (REQUIP) 1 MG tablet TAKE 1 TABLET  EVERY EVENING. 20   Kari Gaspar MD   thiamine 100 MG tablet Take 1 tablet (100 mg total) by mouth once daily.  Patient not taking: Reported on 2020   Lulu Jacinto MD   torsemide (DEMADEX) 20 MG Tab Take 1 tablet (20 mg total) by mouth once daily.  Patient not taking: Reported on 2020 3/17/20 3/17/21  Kari Gaspar MD   traZODone (DESYREL) 50 MG tablet Take 1 tablet (50 mg total) by mouth every evening. 20   Kari Gaspar MD   weigh scale Misc Record daily weights 3/10/20   Kari Gaspar MD   potassium chloride SA (K-DUR,KLOR-CON) 20 MEQ tablet  20  Historical Provider, MD   predniSONE (DELTASONE) 20 MG tablet Take 1 tablet (20 mg total) by mouth once daily. Resume this medication at 2020 7/15/20 8/22/20  Kari Gaspar MD       Family History:  Family History   Problem Relation Age of Onset    Heart disease Mother     Hypertension Mother     Hypertension Father     Diabetes Father     Cancer Sister     COPD Brother     Hypertension Brother     No Known Problems Daughter     Kidney disease Son     COPD Daughter        Social History:  Social History     Tobacco Use    Smoking status: Former Smoker     Quit date: 8/3/2000     Years since quittin.0    Smokeless tobacco: Never Used   Substance Use Topics    Alcohol use: No    Drug use: No       Review of Systems:  As per HPI  General: no fever, no chills, no weight loss, no fatigue  Nose, Sinuses: no rhinorrhea, no facial pain, no epistaxis  Cardiovascular: no chest pain, no orthopnea  Urinary: no dysuria, no increased frequency, no hematuria  Hematologic: no easy bruising, no overt bleeding, no petechiae  Endocrine: no heat intolerance, no cold intolerance, no polyuria  Neurologic: no seizures, no tremors, no  numbness  Psychiatric: no hallucination, no depression, no suicidal ideation  Skin: no rashes, no pruritus, no pallor  All other systems reviewed & are negative.        Objective:   Last 24 Hour Vital Signs:  BP  Min: 127/58  Max: 165/71  Temp  Av.7 °F (36.5 °C)  Min: 97 °F (36.1 °C)  Max: 98.3 °F (36.8 °C)  Pulse  Av  Min: 61  Max: 81  Resp  Av.5  Min: 16  Max: 24  SpO2  Av.8 %  Min: 90 %  Max: 98 %  Weight  Av.2 kg (119 lb 7.8 oz)  Min: 54.2 kg (119 lb 7.8 oz)  Max: 54.2 kg (119 lb 7.8 oz)  Body mass index is 21.17 kg/m².  I/O last 3 completed shifts:  In: 1602 [P.O.:1602]  Out: 1300 [Urine:1300]    Physical Examination:  GEN: AAOx3, NAD  HEENT: NCAT, MMM, PERRL, EOMI, oropharynx clear  CV: RRR, no m/r  RESP: no crackles or wheezes, no increased WOB  ABD: soft, NTND, normoactive BS, no organomegaly  EXTR: no c/c/e, 2+ distal pulses x 4  NEURO: PERRL, EOMI, 5/5 motor strength all four extremities, intact sensation to light touch, no focal deficits  SKIN: no rashes, lesions, or color changes  PSYCH: normal affect     Laboratory:  I have reviewed all pertinent laboratory data within the past 24 hours.    Other Results:  EKG (my interpretation): sinus, left axis deviation, pulmonary disease pattern    Radiology:  I have reviewed all pertinent radiology imaging within the past 24 hours.    Prior records reviewed.     Assessment/Plan:     Chantell Herrera is a 84 y.o. female with:    Acute on chronic diastolic HF  Acute on chronic hypoxic respiratory failure  Acute COPD exacerbation  Pulmonary HTN  Hospital-acquired pneumonia  -elevated BNP with clinical signs of overload and edema on CXR  -last echo with normal EF & pulm HTN  -repeat echo reviewed: normal EF, G1DD, PAP 72  -ABG with pH 7.5, chronic hypercapnia; therefore weaned from bipap  -strict I/O's, daily weights, fluid/sodium restriction  -received IV diuresis; now euvolemic; resume home torsemide  -scheduled duonebs and prednisone  (completed 5 day course) for COPD flare  -concern for PNA given low-grade temps, leukocytosis, elevated procal, and opacities on CXR - started broad spectrum empiric abx for hospital organisms coverage - now transitioned to PO Levaquin for total of 7 days.   -leukocytosis resolved, afebrile  -will need Pulmonology follow up for COPD after discharge due to frequent exacerbations. Will start maintenance inhaler and spiriva as it does not appear she is on this at home  -will need Cardiology follow up due to frequent exacerbations    Chronic anemia  -apparently received 2 units of blood for Hb 8 at OSH ED, unclear why  -Hb stable today without bleeding  -during admission in July, plan for GI and Heme follow up  -caution with any future transfusions because she easily goes into overload with these    Hypomagnesemia  -replete prn    AFib  -resume home toprol as BP's have improved  -not on chronic AC presumably due to chronic anemia and concern for occult GI bleed    CKD3  -renal function stable  -monitor    DM2  -resume home insulin, LDSSI  -A1C 6.6    HTN  -resume home meds    Diet- diabetic, cardiac, 1.5 L  PPX- scd's  Dispo- home  Discharge needs- Cardiology, Pulmonology f/u      Bina Razo MD  Hospital Medicine Staff  [facility]

## 2020-08-26 NOTE — NURSING
Tele monitor reading patient O2 sats in 70s-80s. Patient assessed and in no acute distress, pt denied S.O.B. Finger probe/pulse ox reading 92-95%. Pt has developed a strong cough that is mildly productive. She does desat. when going to the restroom or when she is coughing excessively. Recommended BSCC but none present on floor at this time. Will continue to monitor..

## 2020-08-26 NOTE — PLAN OF CARE
Discharge Recommendation: HHPT.    Evaluation completed today. PT goals appropriate.    Patient is safe to perform bed <>chair transfer with stand by assist with nursing staff.    Emilee Naidu, PT, DPT  2020  Pager: [phone]        Problem: Physical Therapy Goal  Goal: Physical Therapy Goal  Description: Goals to be met by: 2020     Patient will increase functional independence with mobility by performin. Sit to stand transfer with Modified [place]  2. Bed to chair transfer with Supervision using LRAD  3. Gait  x 50 feet with Supervision using LRAD.   4. Stand for 3 minutes with Supervision using LRAD with SPO2 > 88% throughout to demo cardiopulmonary endurance to perform daily activities in the ome  5. Lower extremity exercise program x20 reps per handout, with independence    Outcome: Ongoing, Progressing

## 2020-08-26 NOTE — TELEPHONE ENCOUNTER
I spoke to patient. I was calling to offer an appointment for patient from referral placed for pulmonary. Patient declined and stated she has a pulmonologist closer to home Dr Emmanuel Hickman. Patient declined appointment and verbalized understanding.

## 2020-08-26 NOTE — PLAN OF CARE
08/26/20 0826   Discharge Reassessment   Assessment Type Discharge Planning Reassessment   Do you have any problems affording any of your prescribed medications? No   Discharge Plan A Home with family   Discharge Plan B Home with family;Home Health   DME Needed Upon Discharge  none

## 2020-08-26 NOTE — PT/OT/SLP EVAL
Physical Therapy Evaluation    Patient Name:  Chantell Herrera   MRN:  [id number]  Admit Date: 8/22/2020  Admitting Diagnosis:  Acute on chronic respiratory failure with hypoxia   Length of Stay: 4 days  Recent Surgery: * No surgery found *      Recommendations:     Discharge Recommendations:  home with home health   Discharge Equipment Recommendations: none   Barriers to discharge: None    Assessment:     Chantell Herrera is a 84 y.o. female admitted with a medical diagnosis of Acute on chronic respiratory failure with hypoxia.  She presents with the following impairments/functional limitations: weakness, impaired endurance, impaired self care skills, impaired functional mobilty, gait instability, impaired cardiopulmonary response to activity. Pt tolerated session well today. Pt is primarily limited by poor cardiopulmonary endurance as well as cardiopulmonary response to activity at this time. Pt was only able to ambulate extremely short household distances (10 ft) on this date before desaturation to 82% occurred. Pt reports increased SOB as compared to baseline. Pt will continue to benefit from skilled PT services during this hospital admit to continue to improve transfer ability and efficiency as well as continue to progress pt's ambulation distance and cardiopulmonary endurance to maximize pt's functional independence and return to PLOF. After discharge pt will benefit from HHPT to further progress functional mobility and cardiopulmonary endurance as well as for home safety and energy conservation techniques.    Rehab Prognosis: Good; patient would benefit from acute skilled PT services to address these deficits and reach maximum level of function.      Plan:     During this hospitalization, patient to be seen 3 x/week to address the identified rehab impairments via gait training, therapeutic activities, therapeutic exercises, neuromuscular re-education and progress towards the established goals.    · Plan of Care  "Expires:  09/25/20    Subjective     RN notified prior to session. No family/friends present upon PT entrance into room.    Chief Complaint: "I always end up back here"  Patient/Family Comments/goals: go home  Pain/Comfort:  · Pain Rating 1: 8/10  · Location - Side 1: Bilateral  · Location - Orientation 1: generalized  · Location 1: chest  · Pain Addressed 1: Distraction, Reposition    Living Environment:  Patient lives with  in a single family home with ramp entrance.    Prior Level of Function: Patient reports being Mod I with mobility using rollator in the home and power chair in the community & with ADLs. Patient uses DME as follows: shower chair, grab bar, power chair, cane, straight, rollator, oxygen. DME owned (not currently used): none.  Roles/Repsonsibilities: Hand Dominance: right Work: no. Drive: no. Managing Medicines/Managing Home: yes.    Patient reports they will have assistance from  upon discharge.    Objective:     Additional staff present: none    Patient found HOB elevated with: telemetry, pulse ox (continuous), oxygen     General Precautions: Standard, Cardiac fall   Orthopedic Precautions:N/A   Braces: N/A   Body mass index is 21.17 kg/m².  Oxygen Device: Nasal Cannula 5L    Exams:  · Mental Status: Patient is AxOx4 and follows all multi-step verbal commands. Pt is Alert and Cooperative during session.  · Skin Integrity: Visible skin intact  · Edema: None noted   · Sensation: Intact  · Hearing: Intact  · Vision:  Intact  · Postural Assessment: no deviations noted  · Range of Motion:  · RUE: WFL  · LUE: WFL  · RLE: WFL  · LLE: WFL  · Strength Exam:  · Upper Extremity Strength: WFL  · Lower Extremity Strength: WFL    Outcome Measures:  AM-PAC 6 CLICK MOBILITY  Turning over in bed (including adjusting bedclothes, sheets and blankets)?: 4  Sitting down on and standing up from a chair with arms (e.g., wheelchair, bedside commode, etc.): 3  Moving from lying on back to sitting on the " side of the bed?: 4  Moving to and from a bed to a chair (including a wheelchair)?: 3  Need to walk in hospital room?: 3  Climbing 3-5 steps with a railing?: 2  Basic Mobility Total Score: 19     Functional Mobility:    Bed Mobility:   · Supine to Sit: modified independence and with HOB elevated; from Lt side of bed  · Scooting anteriorly to EOB to have both feet planted on floor: supervision  · Sit to Supine: modified independence; to Lt side of bed    Sitting Balance at Edge of Bed:   Assistance Level Required: Supervision      Transfers:   · Sit <> Stand Transfer: supervision with no assistive device   · Stand <> Sit Transfer: supervision with no assistive device   · [id number] from EOB and [id number] from toliet    Standing Balance:   Assistance Level Required: Stand-by Assistance   Patient used: rolling walker    Time: ~3 minutes   Postural deviations noted: no deviations noted   Comments: Static standing balance with use of BUE on RW for balance to accept external perturbations from therapist while assisting in pericare.      Gait:   · Patient ambulated: 12 ft x 2 (toileting between)   · Patient required: standy by assistance  · Patient used:  rolling walker   · Gait Pattern observed: reciprocal gait  · Gait Deviation(s): shuffle gait and decreased jasmin  · Impairments due to: decreased strength and decreased endurance  · all lines remained intact throughout ambulation trial  · Comments: Pt with no overt LOB but cueing for technique when utilizing RW provided throughout. SPO2 decreased to 82% during ambulation from toilet > bed.    Education:   Time provided for education, counseling and discussion of health disposition in regards to patient's current status   All questions answered within PT scope of practice and to patient's satisfaction   PT role in POC to address current functional deficits   Pt educated on proper body mechanics, safety techniques, and energy conservation with PT facilitation and  cueing throughout session   Call nursing/pct to transfer to chair/use bathroom. Pt stated understanding.   Whiteboard updated with pt's current mobility status documented above   Safe to perform step transfer to/from chair/bedside commode SBA and no AD w/ nursing/PCT present   Importance of OOB tolerance prn hrs/day to improve lung ventilation and expansion as well as strengthen postural musculature    Patient left HOB elevated with all lines intact, call button in reach, RN notified and [employer] present.    GOALS:   Multidisciplinary Problems     Physical Therapy Goals        Problem: Physical Therapy Goal    Goal Priority Disciplines Outcome Goal Variances Interventions   Physical Therapy Goal     PT, PT/OT Ongoing, Progressing     Description: Goals to be met by: 2020     Patient will increase functional independence with mobility by performin. Sit to stand transfer with Modified [place]  2. Bed to chair transfer with Supervision using LRAD  3. Gait  x 50 feet with Supervision using LRAD.   4. Stand for 3 minutes with Supervision using LRAD with SPO2 > 88% throughout to demo cardiopulmonary endurance to perform daily activities in theh ome  5. Lower extremity exercise program x20 reps per handout, with independence                     History:     Past Medical History:   Diagnosis Date    A-fib     Acute on chronic congestive heart failure 2019    Anxiety     Arthritis     Asthma     Atrial fibrillation with rapid ventricular response     CHF (congestive heart failure) 2018    Chronic bronchitis     COPD (chronic obstructive pulmonary disease)     Depression     Diabetes mellitus, type 2     SANTIAGO (dyspnea on exertion)     Emphysema of lung     Fatigue     Hyperlipidemia     Hypertension     Symptomatic anemia 2019    Trouble in sleeping        Past Surgical History:   Procedure Laterality Date    APPENDECTOMY      BRONCHOSCOPY N/A 12/3/2019    Procedure:  BRONCHOSCOPY;  Surgeon: Emmanuel Hickman MD;  Location: [facility];  Service: Pulmonary;  Laterality: N/A;    EYE SURGERY      HYSTERECTOMY      INSERTION OF PACEMAKER  12/26/2019    OOPHORECTOMY      TONSILLECTOMY         Time Tracking:     PT Received On: 08/26/20  PT Start Time: 0839     PT Stop Time: 0859  PT Total Time (min): 20 min     Billable Minutes: Evaluation 12 and Therapeutic Activity 8    Emilee Naidu PT, DPT  8/26/2020  Pager: [phone]

## 2020-08-26 NOTE — PLAN OF CARE
Patient free from falls and injuries. CPAP at night. Pt with occasional desat. when walking to restroom, upon returning to bed O2 sat returns to 90s. Skin clean, dry, and intact. Reviewed plan of care with patient. Patient AAOX4, vital signs stable. In NAD. Incentive spirometry encouraged. 1500 mL fluid restriction enforced. No questions or concerns at this time. Will continue to monitor.

## 2020-08-26 NOTE — PLAN OF CARE
Plan of care discussed with patient. Patient is free of fall/trauma/injury. Denies CP, SOB, or pain/discomfort. Titrating oxygen, pt uses 3-4 L NC home O2. Prednisone and antibiotic therapy continued for pneumonia. All questions addressed. Will continue to monitor

## 2020-08-27 NOTE — PLAN OF CARE
Plan of care met; adequate for discharge. No falls or injury this admission. Skin intact. VSS on 4L HFNC. SR on tele. Aox4 with x1 assistance. 1500 FR maintained throughout stay. Home health care in place per . 2g IV Mg+ and 40mg K+ PO replaced. No c/o CV s/s such as CP SOB or dizziness. LDAs removed and tele removed. Will leave unit via wheelchair with mask on. Will continue to monitor until DC.

## 2020-08-27 NOTE — PROGRESS NOTES
Progress Note  Hospital Medicine    Provider team: [facility] [place] [facility] HOSP MED O  Admit Date: 2020  Encounter Date: 2020     SUBJECTIVE:     Follow-up Visit for: Acute on chronic respiratory failure with hypoxia    HPI (See H&P for complete P,F,SHx):   84F HFpEF, pAF, essential HTN, CKD3, DM2, COPD and pulmonary HTN on 3L at home, anxiety, depression, HLD, chronic anemia who presents as transfer from OSH for acute on chronic hypoxic respiratory failure secondary to decompensated heart failure after receiving blood transfusion for anemia at OSH ED. She presented to OSH ED with anemia. Hb had dipped down to 8.1 and was 7.6 on  from the 10s back in early July. She was transfused two units during her time in the ER. BNP was 1300 and she did receive 40 mg IV lasix between units however she still went into volume overload with concern for CHF exacerbation. CXR showed worsening vascular congestion. AB.48/56/56. She was given an additional dose of lasix 40 mg IV x 1 and diuresed 800 ml at time of transfer acceptance. Repeat CBC showed Hb of 12.4 but a leukocytosis of 42. She is afebrile and unclear if this is leukocytosis is due to her transfusion although is quite high; she has not received steroids. She was admitted at [facility] in early July for anemia and COPD exacerbation with a similar clinical picture to this presentation although her ABG was not quite hypoxic; PO2 was 28. Shehad not been able to wean off the BIPAP at time of transfer acceptance but has improved with diuresis. Due to ICU admit requirement with continuous BIPAP at the community facilities and lack of ICU beds, she transferred to [facility] to a stepdown bed.   Pt reports moderate wheezing a/w SOB and productive cough. Denies weight gain, abdominal swelling, edema, PND, orthopnea, fever, chills.       TTE 2020:   · Normal left ventricular systolic function.   · The estimated ejection fraction is 60-65%.  · No wall motion abnormalities.  · Grade I (mild)  LVDD.  · Concentric left ventricular remodeling.  · There is mitral annular calcification.   · The mean diastolic gradient across the mitral valve is 2 mmHg.  · Normal right ventricular systolic function.  · Mild to moderate tricuspid regurgitation.  · The estimated PA systolic pressure is 72 mmHg.  · Normal central venous pressure (3 mmHg).  · Pulmonary hypertension present.  · Mild left atrial enlargement.    Hospital Course:  Transferred from OSH ED for acute hypoxic respiratory failure on BIPAP, secondary to acute decompensated heart failure from blood transfusion, COPD exacerbation, and suspected pneumonia. S/p IV diuresis, now euvolemic. Improving on scheduled Duonebs and antibiotics. Completed 5 day prednisone course. Goal wean to home 3-4 L prior to discharge, which was completed on 8/27.     Interval History:  On 4L this morning with continued cough and pleuritic chest pain. Patient is improving and feels her breathing is at baseline. She is ready for discharge.    Review of Systems:  Review of Systems   Constitutional: Negative for chills and fever.   HENT: Negative for congestion and sore throat.    Eyes: Negative for photophobia, pain and discharge.   Respiratory: Positive for cough. Negative for hemoptysis, sputum production and shortness of breath.    Cardiovascular: Positive for chest pain. Negative for palpitations and leg swelling.   Gastrointestinal: Negative for abdominal pain, diarrhea, nausea and vomiting.   Genitourinary: Negative for dysuria and urgency.   Musculoskeletal: Negative for myalgias and neck pain.   Skin: Negative for itching and rash.   Neurological: Negative for sensory change, focal weakness and headaches.   Endo/Heme/Allergies: Negative for polydipsia. Does not bruise/bleed easily.   Psychiatric/Behavioral: Negative for depression and suicidal ideas.     OBJECTIVE:       Intake/Output Summary (Last 24 hours) at 8/27/2020 0934  Last data filed at 8/27/2020 0800  Gross per 24  "hour   Intake 1060 ml   Output 950 ml   Net 110 ml     Vital Signs Range (Last 24H):  Temp:  [97.5 °F (36.4 °C)-98.4 °F (36.9 °C)]   Pulse:  [59-80]   Resp:  [16-20]   BP: (116-138)/(57-67)   SpO2:  [89 %-98 %]   Body mass index is 21.17 kg/m².    Objective:  General Appearance:  Comfortable, well-appearing, in no acute distress and not in pain.    Vital signs: (most recent): Blood pressure 132/60, pulse 67, temperature 97.9 °F (36.6 °C), temperature source Oral, resp. rate 20, height 5' 3" (1.6 m), weight 54.2 kg (119 lb 7.8 oz), SpO2 95 %, not currently breastfeeding.  No fever.    Output: Producing urine and producing stool.    HEENT: Normal HEENT exam.    Lungs:  Normal effort.  Breath sounds clear to auscultation.  She is not in respiratory distress.  There are rales.  No wheezes.    Heart: Normal rate.  Regular rhythm.  S1 normal and S2 normal.  No murmur.   Chest: Symmetric chest wall expansion.   Abdomen: Abdomen is soft.  Bowel sounds are normal.   There is no abdominal tenderness.     Extremities: Normal range of motion.  There is no deformity, effusion, dependent edema or local swelling.    Pulses: Distal pulses are intact.    Neurological: Patient is alert and oriented to person, place and time.  Normal strength.    Pupils:  Pupils are equal, round, and reactive to light.    Skin:  Warm and dry.      Medications:  Medication list was reviewed in EPIC and changes noted under Assessment/Plan and MAR.    Laboratory:  Recent Labs     08/27/20  0535   WBC 7.47   RBC 2.75*   HGB 9.7*   HCT 31.2*      *   MCH 35.3*   MCHC 31.1*   GRAN 66.8  5.0   LYMPH 11.0*  0.8*   MONO 17.3*  1.3*   EOS 0.2      Recent Labs     08/27/20  0535   GLU 81      K 3.7   CL 98   CO2 36*   BUN 38*   CREATININE 1.0   CALCIUM 8.6*   ANIONGAP 6*   MG 1.7       ASSESSMENT/PLAN:     Active Hospital Problems    Diagnosis  POA    *Acute on chronic respiratory failure with hypoxia [J96.21]  Yes    COPD with acute " exacerbation [J44.1]  Yes    Diabetes mellitus type 2, uncontrolled [E11.65]  Yes    Anemia [D64.9]  Yes    Acute on chronic congestive heart failure [I50.9]  Yes    Chronic atrial fibrillation [I48.20]  Yes    CKD (chronic kidney disease), stage III [N18.3]  Yes    HTN (hypertension) [I10]  Yes      Resolved Hospital Problems    Diagnosis Date Resolved POA    Respiratory failure [J96.90] 08/22/2020 Yes      Acute on chronic diastolic HF  Acute on chronic hypoxic respiratory failure  Acute COPD exacerbation  Pulmonary HTN  Hospital-acquired pneumonia  -elevated BNP with clinical signs of overload and edema on CXR  -last echo with normal EF & pulm HTN  -repeat echo reviewed: normal EF, G1DD, PAP 72  -ABG with pH 7.5, chronic hypercapnia; therefore weaned from bipap  -strict I/O's, daily weights, fluid/sodium restriction  -received IV diuresis; now euvolemic; resume home torsemide  -scheduled duonebs and prednisone (completed 5 day course) for COPD flare  -concern for PNA given low-grade temps, leukocytosis, elevated procal, and opacities on CXR - started broad spectrum empiric abx for hospital organisms coverage - now transitioned to PO Levaquin for total of 7 days.   -leukocytosis resolved, afebrile  -will need Pulmonology follow up for COPD after discharge due to frequent exacerbations. Will start maintenance inhaler and spiriva as it does not appear she is on this at home  -will need Cardiology follow up due to frequent exacerbations     Chronic anemia  -apparently received 2 units of blood for Hb 8 at OSH ED, unclear why  -Hb stable today without bleeding  -during admission in July, plan for GI and Heme follow up  -caution with any future transfusions because she easily goes into overload with these     Hypomagnesemia  -replete prn     AFib  -resume home toprol as BP's have improved  -not on chronic AC presumably due to chronic anemia and concern for occult GI bleed     CKD3  -renal function  stable  -monitor     DM2  -resume home insulin, LDSSI  -A1C 6.6     HTN  -resume home meds     Diet- diabetic, cardiac, 1.5 L  PPX- scd's  Dispo- home  Discharge needs- Cardiology, Pulmonology f/u    Anticipated discharge date and disposition:   Discharge today; 35 minutes.  Nikolas Mcdonald MD  [facility] Medicine

## 2020-08-27 NOTE — NURSING
AVS confirmed. Upcoming appointments and medication regimen reviewed with no patient concerns. Meds to bed received from pharmacy. LDAs removed. Tele removed. Home health in place per . Leaving unit via wheelchair with mask in place. Patient has all belongings. Will continue to monitor. See care plan note for more info.

## 2020-08-27 NOTE — DISCHARGE SUMMARY
Discharge Summary  Hospital Medicine    Attending Provider on Discharge: Nikolas Mcdonald     Discharging Team: [facility] HOSP MED O     Date of Admission:  2020         Date of Discharge:        Diagnoses:     Principal Problem(s):   Acute on chronic respiratory failure with hypoxia     Secondary Problems:  Active Hospital Problems    Diagnosis    *Acute on chronic respiratory failure with hypoxia    COPD with acute exacerbation    Diabetes mellitus type 2, uncontrolled    Anemia    Acute on chronic congestive heart failure    Chronic atrial fibrillation    CKD (chronic kidney disease), stage III    HTN (hypertension)        Hospital Course:         HPI (See H&P for complete P,F,SHx):   84F HFpEF, pAF, essential HTN, CKD3, DM2, COPD and pulmonary HTN on 3L at home, anxiety, depression, HLD, chronic anemia who presents as transfer from OSH for acute on chronic hypoxic respiratory failure secondary to decompensated heart failure after receiving blood transfusion for anemia at OSH ED. She presented to OSH ED with anemia. Hb had dipped down to 8.1 and was 7.6 on  from the 10s back in early July. She was transfused two units during her time in the ER. BNP was 1300 and she did receive 40 mg IV lasix between units however she still went into volume overload with concern for CHF exacerbation. CXR showed worsening vascular congestion. AB.48/56/56. She was given an additional dose of lasix 40 mg IV x 1 and diuresed 800 ml at time of transfer acceptance. Repeat CBC showed Hb of 12.4 but a leukocytosis of 42. She is afebrile and unclear if this is leukocytosis is due to her transfusion although is quite high; she has not received steroids. She was admitted at [facility] in early July for anemia and COPD exacerbation with a similar clinical picture to this presentation although her ABG was not quite hypoxic; PO2 was 28. Shehad not been able to wean off the BIPAP at time of transfer acceptance but has  improved with diuresis. Due to ICU admit requirement with continuous BIPAP at the community facilities and lack of ICU beds, she transferred to [facility] to a stepdown bed.   Pt reports moderate wheezing a/w SOB and productive cough. Denies weight gain, abdominal swelling, edema, PND, orthopnea, fever, chills.       TTE 8/23/2020:   · Normal left ventricular systolic function.   · The estimated ejection fraction is 60-65%.  · No wall motion abnormalities.  · Grade I (mild) LVDD.  · Concentric left ventricular remodeling.  · There is mitral annular calcification.   · The mean diastolic gradient across the mitral valve is 2 mmHg.  · Normal right ventricular systolic function.  · Mild to moderate tricuspid regurgitation.  · The estimated PA systolic pressure is 72 mmHg.  · Normal central venous pressure (3 mmHg).  · Pulmonary hypertension present.  · Mild left atrial enlargement.     Hospital Course:  Transferred from OSH ED for acute hypoxic respiratory failure on BIPAP, secondary to acute decompensated heart failure from blood transfusion, COPD exacerbation, and suspected pneumonia. S/p IV diuresis, now euvolemic. Improving on scheduled Duonebs and antibiotics. Completed 5 day prednisone course. Goal wean to home 3-4 L prior to discharge, which was completed on 8/27.     Interval History:  On 4L this morning with continued cough and pleuritic chest pain. Patient is improving and feels her breathing is at baseline. She is ready for discharge.    Significant Diagnostic Studies:   Labs:   Recent Labs     08/27/20  0535   WBC 7.47   RBC 2.75*   HGB 9.7*   HCT 31.2*      *   MCH 35.3*   MCHC 31.1*   GRAN 66.8  5.0   LYMPH 11.0*  0.8*   MONO 17.3*  1.3*   EOS 0.2      Recent Labs     08/27/20  0535   GLU 81      K 3.7   CL 98   CO2 36*   BUN 38*   CREATININE 1.0   CALCIUM 8.6*   ANIONGAP 6*   MG 1.7      Microbiology:   Blood Culture, Routine   Date Value Ref Range Status   08/22/2020 No growth after 5  days.  Final   08/22/2020 No growth after 5 days.  Final     Radiology:   Results for orders placed during the hospital encounter of 08/21/20   X-Ray Chest 1 View    Narrative EXAMINATION:  XR CHEST 1 VIEW    CLINICAL HISTORY:  SOB;    COMPARISON:  08/21/2020    FINDINGS:  Heart size is upper limits of normal..  Pacemaker lead projects in expected position.  There is worsening mild vascular congestion, peribronchial cuffing, right perihilar ground-glass infiltrate and bibasilar interstitial infiltrates suggesting pulmonary edema.  There are small bilateral pleural effusions.  The remainder of the lungs appear clear of active disease..      Impression See above.      Electronically signed by: Dev Swanson MD  Date:    08/22/2020  Time:    00:04      Results for orders placed during the hospital encounter of 01/10/20   X-Ray Chest PA And Lateral    Narrative EXAMINATION:  XR CHEST PA AND LATERAL    CLINICAL HISTORY:  chf;    TECHNIQUE:  PA and lateral views of the chest were performed.    COMPARISON:  01/18/2020    FINDINGS:  The heart is mildly enlarged.  Calcified atheromatous disease affects the aorta.  There is pulmonary vascular congestion with mild interstitial edema, improved as compared to the previous examination.  Small bilateral pleural effusions are present.  Left-sided pacer device remains in place.  Age-appropriate degenerative changes affect the skeleton.  Chronic wedge compression deformity of a upper-midthoracic vertebra.      Impression As above.      Electronically signed by: Eileen Velasquez MD  Date:    01/20/2020  Time:    09:41      No results found for this or any previous visit.   Results for orders placed during the hospital encounter of 03/08/16   CT Head Without Contrast    Narrative CT brain without contrast.    Comparison: None    Technique: Multiple 5 mm axial images of the head were obtained without intravenous contrast.    Results: Generalized cerebral volume loss appropriate for age.  There is moderate to severe degree of patchy decreased attenuation throughout the supratentorial white matter suggestive for chronic microvascular ischemic change.  No evidence for acute intracranial hemorrhage or sulcal effacement.  Ventriclesare normal in size and configuration without evidence for hydrocephalus. No midline shift or mass effect.  Visualized paranasal sinuses and mastoid air cells are clear.    Impression  Age-appropriate generalized cerebral volume loss with moderate to severe degree of patchy decreased attenuation supratentorial white matter suggestive for chronic microvascular ischemic change .     No evidence for acute intracranial hemorrhage . Clinical correlation and further evaluation as warranted.  ______________________________________     Electronically signed by: Eileen Velasquez MD  Date:     03/09/16  Time:    09:48       Cardiac Studies: 2D ECHO    Significant Treatments/Procedures:   IV diuresis  Abx    Consults while in Hospital:   None    Discharge Medications:      Discharge Medication List as of 8/27/2020 12:26 PM      START taking these medications    Details   tiotropium (SPIRIVA) 18 mcg inhalation capsule Inhale 1 capsule (18 mcg total) into the lungs once daily. Controller, Starting Thu 8/27/2020, Until Wed 11/25/2020, Normal         CONTINUE these medications which have CHANGED    Details   levoFLOXacin (LEVAQUIN) 750 MG tablet Take 1 tablet (750 mg total) by mouth every 48 hours. for 2 doses, Starting Thu 8/27/2020, Until Mon 8/31/2020, Normal      torsemide (DEMADEX) 20 MG Tab Take 1 tablet (20 mg total) by mouth once daily., Starting Tue 8/25/2020, Until Wed 8/25/2021, Normal         CONTINUE these medications which have NOT CHANGED    Details   omeprazole (PRILOSEC) 40 MG capsule Starting Wed 4/22/2020, Historical Med      ACCU-CHEK SOFTCLIX LANCETS Misc 1 each by Misc.(Non-Drug; Combo Route) route once daily., Starting Tue 3/17/2020, Normal      albuterol (PROVENTIL) 2.5  "mg /3 mL (0.083 %) nebulizer solution Take 3 mLs (2.5 mg total) by nebulization every 6 (six) hours as needed for Wheezing., Starting Sun 12/1/2019, Normal      alcohol swabs (BD ALCOHOL SWABS) PadM Apply 1 each topically as needed., Starting Tue 3/17/2020, Normal      aspirin (ECOTRIN) 81 MG EC tablet Take 1 tablet (81 mg total) by mouth once daily., Starting Tue 1/21/2020, Until Wed 1/20/2021, Normal      baclofen (LIORESAL) 10 MG tablet Take 10 mg by mouth 3 (three) times daily., Historical Med      BD ULTRA-FINE SHORT PEN NEEDLE 31 gauge x 5/16" Ndle USE TO INJECT LEVEMIR ONCE DAILY, Normal      blood glucose control high,low (ACCU-CHEK KUMAR CONTROL SOLN) Soln 1 each by Misc.(Non-Drug; Combo Route) route as needed., Starting Tue 3/17/2020, Until Wed 3/17/2021, Normal      blood sugar diagnostic (ACCU-CHEK KUMAR PLUS TEST STRP) Strp 1 strip by Misc.(Non-Drug; Combo Route) route once daily., Starting Tue 3/17/2020, Normal      blood-glucose meter (ACCU-CHEK KUMAR PLUS METER) Misc 1 each by Misc.(Non-Drug; Combo Route) route once daily., Starting Tue 3/17/2020, Until Wed 3/17/2021, Normal      cholecalciferol, vitamin D3, (VITAMIN D3) 2,000 unit Tab Take 2,000 Units by mouth once daily., Until Discontinued, Historical Med      citalopram (CELEXA) 20 MG tablet Take 1 tablet (20 mg total) by mouth once daily., Starting Mon 12/2/2019, Until Tue 12/1/2020, Normal      insulin detemir U-100 (LEVEMIR FLEXTOUCH) 100 unit/mL (3 mL) SubQ InPn pen Inject 10 Units into the skin every evening., Starting Tue 7/14/2020, Until Wed 7/14/2021, Normal      ipratropium (ATROVENT) 0.02 % nebulizer solution Take 500 mcg by nebulization 4 (four) times daily. , Starting 3/18/2016, Until Discontinued, Historical Med      meclizine (ANTIVERT) 12.5 mg tablet Take 1 tablet (12.5 mg total) by mouth every evening. for 5 days, Starting Thu 7/9/2020, Until Tue 7/14/2020, Normal      metoprolol succinate (TOPROL-XL) 50 MG 24 hr tablet Take 50 mg " by mouth 2 (two) times daily. , Starting Tue 2/4/2020, Historical Med      mupirocin (BACTROBAN) 2 % ointment Apply topically 2 (two) times daily., Starting Wed 4/29/2020, Normal      pravastatin (PRAVACHOL) 40 MG tablet Take 40 mg by mouth once daily., Historical Med      rOPINIRole (REQUIP) 1 MG tablet TAKE 1 TABLET  EVERY EVENING., Normal      thiamine 100 MG tablet Take 1 tablet (100 mg total) by mouth once daily., Starting Mon 7/6/2020, Normal      traZODone (DESYREL) 50 MG tablet Take 1 tablet (50 mg total) by mouth every evening., Starting Wed 5/13/2020, Normal      weigh scale Misc Record daily weights, Normal         STOP taking these medications       pantoprazole (PROTONIX) 40 MG tablet Comments:   Reason for Stopping:         potassium chloride SA (K-DUR,KLOR-CON) 20 MEQ tablet Comments:   Reason for Stopping:         predniSONE (DELTASONE) 20 MG tablet Comments:   Reason for Stopping:              Discharge Diet:cardiac diet, diabetic diet: 2000 calorie and fluid restriction: 1500 cc    Activity: activity as tolerated and ambulate with assistance    Discharged Condition: Stable    Discharge Disposition: Home-Health Care [facility]    Follow-up:   1 week f/u with PCP.  Ambulatory referrals to cardiology and pulmonary medicine made.    Studies/Results pending on discharge:   None     35 minutes.    Nikolas Mcdonald MD  [facility] Medicine

## 2020-08-27 NOTE — PLAN OF CARE
08/27/20 1232   Final Note   Assessment Type Final Discharge Note   Anticipated Discharge Disposition Home-Health   Hospital Follow Up  Appt(s) scheduled?   (Ambulatory referrals placed)   Discharge plans and expectations educations in teach back method with documentation complete? Yes     Pt to d/c home with home health. Ambulatory referrals placed for Cards and Pulmonology.    Julie Haase RN  Case Management [phone]

## 2020-08-27 NOTE — LETTER
August 28, 2020           Dear Geoffrey Javier,     Welcome to [facility]s Complex Care Management Program.  It was a pleasure talking with you today.  My name is Kirsten Jacques, and I look forward to being your Care Manager.  My goal is to help you function at the healthiest and highest level possible.  You can contact me directly at [phone]    As an [facility] patient with [employer], some of the services we may be able to provide include:      Development of an individualized care plan with a Registered Nurse    Connection with a    Connection with available resources and services     Coordinate communication among your care team members    Provide coaching and education    Help you understand your doctors treatment plan   Help you obtain information about your insurance coverage.     All services provided by [facility]s Complex Care Managers and other care team members are coordinated with and communicated to your primary care team.    As part of your enrollment, you will be receiving education materials and more information about these services in your [facility] account, by phone or through the mail.  If you do not wish to participate or receive information, please contact our office at [phone].      Sincerely,        Kirsten Jacques RN  [facility]   Out-patient RN Complex Care Manager

## 2020-08-27 NOTE — PLAN OF CARE
Patient free from falls and injuries. CPAP at night. Pt with occasional desat. when walking to restroom, upon returning to bed O2 sat returns to 90s. Skin clean, dry, and intact. Reviewed plan of care with patient. Patient AAOX4, vital signs stable. In NAD. Incentive spirometry encouraged. 1500 mL fluid restriction enforced. Currently on 4LPM nasal cannula, O2 sat 95%. No questions or concerns at this time. Will continue to monitor.

## 2020-08-27 NOTE — PLAN OF CARE
[facility]    HOME HEALTH ORDERS  FACE TO FACE ENCOUNTER    Patient Name: Chantell Herrera  YOB: 1935    PCP: Kari Gaspar MD   PCP Address: [address] / [facility] [address]  PCP Phone Number: [phone]  PCP Fax: [phone]    Encounter Date: 08/27/2020    Admit to Home Health    Diagnoses:  Active Hospital Problems    Diagnosis  POA    *Acute on chronic respiratory failure with hypoxia [J96.21]  Yes    COPD with acute exacerbation [J44.1]  Yes    Diabetes mellitus type 2, uncontrolled [E11.65]  Yes    Anemia [D64.9]  Yes    Acute on chronic congestive heart failure [I50.9]  Yes    Chronic atrial fibrillation [I48.20]  Yes    CKD (chronic kidney disease), stage III [N18.3]  Yes    HTN (hypertension) [I10]  Yes      Resolved Hospital Problems    Diagnosis Date Resolved POA    Respiratory failure [J96.90] 08/22/2020 Yes     Follow-up Information     Schedule an appointment as soon as possible for a visit with Kari Gaspar MD.    Specialty: Family Medicine  Contact information:  [address]  [facility] [address]  [phone]             Schedule an appointment as soon as possible for a visit with Art Yen-Cardiology [facility] 3rd Floor.    Specialty: Cardiology  Why: heart follow up  Contact information:  [address]  [facility] [address] [phone]  Additional information:  Cardiology Services Clinics - 3rd floor           Schedule an appointment as soon as possible for a visit with Art Yen - Pulmonary [address].    Specialty: Pulmonology  Why: lung follow up  Contact information:  [address]  [facility] [address] [phone]  Additional information:  Main Building, 9th Floor   Please park in [facility] and use Clinic elevator               I have seen and examined this patient face to face today. My clinical findings that support the need for the home health skilled services and home bound status are the  following:  Weakness/numbness causing balance and gait disturbance due to Heart Failure and Weakness/Debility making it taxing to leave home.    Allergies:Review of patient's allergies indicates:  No Known Allergies    Diet: cardiac diet, diabetic diet: 2000 calorie and fluid restriction: 1500 cc    Activities: activity as tolerated and ambulate in house with assistance    Nursing:   SN to complete comprehensive assessment including routine vital signs. Instruct on disease process and s/s of complications to report to MD. Review/verify medication list sent home with the patient at time of discharge  and instruct patient/caregiver as needed. Frequency may be adjusted depending on start of care date.    Notify MD if SBP > 160 or < 90; DBP > 90 or < 50; HR > 120 or < 50; Temp > 101    CONSULTS:    Physical Therapy to evaluate and treat. Evaluate for home safety and equipment needs; Establish/upgrade home exercise program. Perform / instruct on therapeutic exercises, gait training, transfer training, and Range of Motion.  Occupational Therapy to evaluate and treat. Evaluate home environment for safety and equipment needs. Perform/Instruct on transfers, ADL training, ROM, and therapeutic exercises.    MISCELLANEOUS CARE:  Routine Skin for Bedridden Patients: Instruct patient/caregiver to apply moisture barrier cream to all skin folds and wet areas in perineal area daily and after baths and all bowel movements.  Diabetic Care:   Report CBG < 60 or > 350 to physician.  Heart Failure:      SN to instruct on the following:    Instruct on the definition of CHF.   Instruct on the signs/sympoms of CHF to be reported.   Instruct on and monitor daily weights.   Instruct on factors that cause exacerbation.   Instruct on action, dose, schedule, and side effects of medications.   Instruct on diet as prescribed.   Instruct on activity allowed.   Instruct on life-style modifications for life long management of CHF   SN to assess  "compliance with daily weights, diet, medications, fluid retention,    safety precautions, activities permitted and life-style modifications.   Additional 1-2 SN visits per week as needed for signs and symptoms     of CHF exacerbation.      For Weight Gain > 2-3 lbs in 1 day or 4-6 lbs over 1 week notify PCP:  Obtain BMP lab test in 3 days  Home Oxygen:  Oxygen at 4 L/min nasal canula to be used:  Continuously.    Medications: Review discharge medications with patient and family and provide education.      Current Discharge Medication List      START taking these medications    Details   levoFLOXacin (LEVAQUIN) 750 MG tablet Take 1 tablet (750 mg total) by mouth every 48 hours. for 2 doses  Qty: 2 tablet, Refills: 0      tiotropium (SPIRIVA) 18 mcg inhalation capsule Inhale 1 capsule (18 mcg total) into the lungs once daily. Controller  Qty: 30 capsule, Refills: 2         CONTINUE these medications which have CHANGED    Details   torsemide (DEMADEX) 20 MG Tab Take 1 tablet (20 mg total) by mouth once daily.  Qty: 30 tablet, Refills: 11    Comments: .         CONTINUE these medications which have NOT CHANGED    Details   omeprazole (PRILOSEC) 40 MG capsule       ACCU-CHEK SOFTCLIX LANCETS Misc 1 each by Misc.(Non-Drug; Combo Route) route once daily.  Qty: 100 each, Refills: 5      albuterol (PROVENTIL) 2.5 mg /3 mL (0.083 %) nebulizer solution Take 3 mLs (2.5 mg total) by nebulization every 6 (six) hours as needed for Wheezing.  Qty: 300 mL, Refills: 3    Associated Diagnoses: Simple chronic bronchitis      alcohol swabs (BD ALCOHOL SWABS) PadM Apply 1 each topically as needed.  Qty: 100 each, Refills: 5      aspirin (ECOTRIN) 81 MG EC tablet Take 1 tablet (81 mg total) by mouth once daily.  Qty: 30 tablet, Refills: 0      baclofen (LIORESAL) 10 MG tablet Take 10 mg by mouth 3 (three) times daily.      BD ULTRA-FINE SHORT PEN NEEDLE 31 gauge x 5/16" Ndle USE TO INJECT LEVEMIR ONCE DAILY  Qty: 100 each, Refills: 2    " Associated Diagnoses: Uncontrolled type 2 diabetes mellitus with hyperglycemia      blood glucose control high,low (ACCU-CHEK KUMAR CONTROL SOLN) Soln 1 each by Misc.(Non-Drug; Combo Route) route as needed.  Qty: 1 each, Refills: 1      blood sugar diagnostic (ACCU-CHEK KUMAR PLUS TEST STRP) Strp 1 strip by Misc.(Non-Drug; Combo Route) route once daily.  Qty: 100 strip, Refills: 5      blood-glucose meter (ACCU-CHEK KUMAR PLUS METER) Misc 1 each by Misc.(Non-Drug; Combo Route) route once daily.  Qty: 1 each, Refills: 0      cholecalciferol, vitamin D3, (VITAMIN D3) 2,000 unit Tab Take 2,000 Units by mouth once daily.      citalopram (CELEXA) 20 MG tablet Take 1 tablet (20 mg total) by mouth once daily.  Qty: 30 tablet, Refills: 11      insulin detemir U-100 (LEVEMIR FLEXTOUCH) 100 unit/mL (3 mL) SubQ InPn pen Inject 10 Units into the skin every evening.  Qty: 15 mL, Refills: 0    Associated Diagnoses: Uncontrolled type 2 diabetes mellitus with hyperglycemia      ipratropium (ATROVENT) 0.02 % nebulizer solution Take 500 mcg by nebulization 4 (four) times daily.       meclizine (ANTIVERT) 12.5 mg tablet Take 1 tablet (12.5 mg total) by mouth every evening. for 5 days  Qty: 5 tablet, Refills: 0      metoprolol succinate (TOPROL-XL) 50 MG 24 hr tablet Take 50 mg by mouth 2 (two) times daily.       mupirocin (BACTROBAN) 2 % ointment Apply topically 2 (two) times daily.  Qty: 22 g, Refills: 3      pravastatin (PRAVACHOL) 40 MG tablet Take 40 mg by mouth once daily.      rOPINIRole (REQUIP) 1 MG tablet TAKE 1 TABLET  EVERY EVENING.  Qty: 90 tablet, Refills: 5    Associated Diagnoses: RLS (restless legs syndrome)      thiamine 100 MG tablet Take 1 tablet (100 mg total) by mouth once daily.  Qty: 60 tablet, Refills: 3      traZODone (DESYREL) 50 MG tablet Take 1 tablet (50 mg total) by mouth every evening.  Qty: 90 tablet, Refills: 0      weigh scale Misc Record daily weights  Qty: 1 each, Refills: 0         STOP taking  these medications       pantoprazole (PROTONIX) 40 MG tablet Comments:   Reason for Stopping:         potassium chloride SA (K-DUR,KLOR-CON) 20 MEQ tablet Comments:   Reason for Stopping:         predniSONE (DELTASONE) 20 MG tablet Comments:   Reason for Stopping:             I certify that this patient is confined to her home and needs intermittent skilled nursing care, physical therapy and occupational therapy.

## 2020-08-27 NOTE — PLAN OF CARE
08/27/20 1100   Post-Acute Status   Post-Acute Authorization Home Health   Home Health Status Referrals Sent      sent HH referral to [facility] per pt's preference.    Letitia Aly LMSW  [facility]  [id number]

## 2020-08-27 NOTE — PLAN OF CARE
08/27/20 1109   Post-Acute Status   Post-Acute Authorization Home Health   Home Health Status Set-up Complete     Pt accepted to resume care with [facility] office beginning Sat 8/29 if not earlier.    Letitia Aly LMSW  [facility]  [id number]

## 2020-08-28 NOTE — PATIENT INSTRUCTIONS

## 2020-08-28 NOTE — PATIENT INSTRUCTIONS
Left- or Right- Side Congestive Heart Failure (CHF)    The heart is a large muscle. It is a pump that circulates blood throughout the body. Blood carries oxygen to all of the organs, including the brain, muscles, and skin. After your body takes the oxygen out of the blood, the blood returns to the heart. The right side of the heart collects the blood from the body and pumps it to the lungs. In the lungs, it gets fresh oxygen and gives up carbon dioxide. The oxygen-rich blood from the lungs then returns to the left side of the heart, where it is pumped back out to the rest of your body, starting the process all over.  Congestive heart failure (CHF) occurs when the heart muscle is weakened. This affects the pumping action of the heart. Heart failure can affect the right side of the heart or the left side. But heart failure may affect not only the right side of the heart or only the left side. Although it may have started on one side, it can and often eventually does affect both sides.  Right-side heart failure  When the right side of the heart is weakened, it cant handle the blood it is getting from the rest of the body. This blood returns to the heart through veins. When too much pressure builds up in the veins, fluid leaks out into the tissues. Gravity then causes that fluid to move to those parts of the body that are the lowest. So one of the first symptoms of right-side CHF can include swelling in the feet and ankles. If the condition gets worse, the swelling can even go up past the knees. Sometimes it gets so severe, the liver can get congested as well.  Left-side heart failure  When the left side of the heart is weakened, it cant handle the blood it gets from the lungs. Pressure then builds up in the veins of the lungs, causing fluid to leak into the lung tissues. This may cause CHF and pulmonary edema. This causes you to feel short of breath, weak, or dizzy. These symptoms are often worse with exertion,  such as when climbing stairs or walking up hills. Lying with your head flat is uncomfortable and can make your breathing worse. This may make sleeping difficult. You may need to use extra pillows to elevate your upper body to sleep well. The same is true when just resting during the daytime.  There are many causes of heart failure including:  · Coronary artery disease  · Past heart attack (also known as acute myocardial infarction, or AMI)  · High blood pressure  · Damaged heart valve  · Diabetes  · Obesity  · Cigarette smoking  · Alcohol abuse  Heart failure is a chronic condition. There is no cure. The purpose of medical treatment is to improve the pumping action of the heart. The main way to do this is to remove excess water from the body. A number of medicines can help reach this goal, improve symptoms, and prevent the heart from becoming weaker. Sometimes, heart failure can become so severe that a device is placed in the heart to help with pumping. Another major goal is to better treat the causes of heart failure, such as diabetes and high blood pressure, by making changes in your lifestyle and maximizing medical control when needed.  Home care  Follow these guidelines when caring for yourself at home:  · Check your weight every day. This is very important because a sudden increase in weight gain could mean worsening heart failure. Keep these things in mind:  ¨ Use the same scale every day.  ¨ Weigh yourself at the same time every day.  ¨ Make sure the scale is on a hard floor surface, not on a rug or carpet.  ¨ Keep a record of your weight every day so your healthcare provider can see it. If you are not given a log sheet for this, keep a separate journal for this purpose.   · Cut back on the amount of salt (sodium) you eat. Follow your healthcare provider's recommendation on how much salt or sodium you should have each day.  ¨ Avoid high-salt foods. These include olives, pickles, smoked meats, salted potato  chips, and most prepared foods.  ¨ Don't add salt to your food at the table. Use only small amounts of salt when cooking.  ¨ Read the labels carefully on food packages to learn how much salt or sodium is in each serving in the package. Remember, a can or package of food may contain more than 1 serving. So if you eat all the food in the package, you may be getting more salt than you think.  · Follow your healthcare provider's recommendations about how much fluid you should have. Be aware that some foods, such as soup, pudding, and juicy fruits like oranges or melons, contain liquid. You'll need to count the liquid in those foods as part of your daily fluid intake. Your provider can help you with this.  · Stop smoking.  · Cut back on how much alcohol you drink.  · Lose weight if you are overweight. The excess weight adds a lot of stress on the workload of the heart.  · Stay active. Talk with your provider about an exercise program that is safe for your heart.  · Keep your feet elevated to reduce swelling. Ask your provider about support hose as a preventive treatment for daytime leg swelling.  Besides taking your medicine as instructed, an important part of treatment is lifestyle changes. These include diet, physical activity, stopping smoking, and weight control.  Improve your diet by including more fresh foods, cutting back on how much sugar and saturated fat you eat, and eating fewer processed foods and less salt.  Follow-up care  Follow up with your healthcare provider, or as advised.  Make sure to keep any appointments that were made for you. These can help better control your congestive heart failure. You will need to follow up with your provider on a routine basis to make sure your heart failure is well managed.  If an X-ray, ECG, or other tests were done, you will be told of any new findings that may affect your care.  Call 911  Call 911 if you:  · Become severely short of breath  · Feel lightheaded, or feel  like you might pass out or faint  · Have chest pain or discomfort that is different than usual, the medicines your doctor told you to use for this don't help, or the pain lasts longer than 10 to 15 minutes  · You suddenly develop a rapid heart rate  When to seek medical advice  The following may be signs that your heart failure is getting worse. Call your healthcare provider right away if any of these happen:  · Sudden weight gain. This means 3 or more pounds in one day, or 5 or more pounds in 1 week.  · Trouble breathing not related to being active  · New or increased swelling of your legs or ankles  · Swelling or pain in your abdomen  · Breathing trouble at night. This means waking up short of breath or needing more pillows to breathe.  · Frequent coughing that doesnt go away  · Feeling much more tired than usual  Date Last Reviewed: 1/4/2016  © 2169-8313 [employer]. [address] [place] [address]. All rights reserved. This information is not intended as a substitute for professional medical care. Always follow your healthcare professional's instructions.        Coping with Heart Failure  Its normal to feel sad or down at times when youre living with heart failure. Some medicines can also affect your mood. Following your treatment plan may seem difficult at times. If you feel overwhelmed, just focus on one day at a time. Dont be afraid to ask others for help when you need it.    Ways to feel better  Try not to withdraw from family and friends, even if you are finding it hard to talk to them. They can still be a good source of support. To feel better, you can also:  · Spend time doing things you enjoy. This may include participating in a favorite hobby, meditating, praying, or spending time with people you care about. Find activities that make you happy and make those a priority.  · Share what you learn about heart failure with the people in your life. Invite family members along when  you visit your healthcare provider. This will help you feel supported as well as help you discuss the care plan you've agreed upon with your doctor.  · Think about joining a support group for people with heart failure. It may be easier to talk to people who know firsthand what youre going through. They can offer advice and share stories. You may want to ask loved ones to join you for a meeting.  Asking for help  Having heart failure doesnt mean that you have to feel bad all the time. Consider talking to your healthcare provider or a therapist if:  · You feel worthless or helpless, or are thinking about suicide. These are warning signs of depression. Treatment can help you feel better. When depression is under control, your overall health may also improve.  · You feel anxious about what will happen to your loved ones if your health gets worse. Taking care of legal arrangements, such as a living will and durable power of , can help you feel more secure about the future.  · Social support helps alleviate stress and helps your stick with your healthy lifestyle changes. Without social support, you may end up back in the hospital.  Date Last Reviewed: 3/20/2016  © 8031-5753 [employer]. [address] [place], [place] [id number]. All rights reserved. This information is not intended as a substitute for professional medical care. Always follow your healthcare professional's instructions.        COPD Flare    You have had a flare-up of your COPD.  COPD, or chronic obstructive pulmonary disease, is a common lung disease. It causes your airways to become irritated and narrower. This makes it harder for you to breathe. Emphysema and chronic bronchitis are both types of COPD. This is a chronic condition, which means you always have it. Sometimes it gets worse. When this happens, it is called a flare-up.  Symptoms of COPD  People with COPD may have symptoms most of the time. In a flare-up, your symptoms  get worse. These symptoms may mean you are having a flare-up:  · Shortness of breath, shallow or rapid breathing, or wheezing that gets worse  · Lung infection  · Cough that gets worse  · More mucus, thicker mucus or mucus of a different color  · Tiredness, decreased energy, or trouble doing your usual activities  · Fever  · Chest tightness  · Your symptoms dont get better even when you use your usual medicines, inhalers, and nebulizer  · Trouble talking  · You feel confused  Causes of flare-ups  Unfortunately, a flare-up can happen even though you did everything right, and you followed your doctors instructions. Some causes of flare-ups are:  · Smoking or secondhand smoke  · Colds, the flu, or respiratory infections  · Air pollution  · Sudden change in the weather  · Dust, irritating chemicals, or strong fumes  · Not taking your medicines as prescribed  Home care  Here are some things you can do at home to treat a flare-up:  · Try not to panic. This makes it harder to breathe, and keeps you from doing the right things.  · Dont smoke or be around others who are smoking.  · Try to drink more fluids than usual during a flare-up, unless your doctor has told you not to because of heart and kidney problems. More fluids can help loosen the mucus.  · Use your inhalers and nebulizer, if you have one, as you have been told to.  · If you were given antibiotics, take them until they are used up or your doctor tells you to stop. Its important to finish the antibiotics, even though you feel better. This will make sure the infection has cleared.  · If you were given prednisone or another steroid, finish it even if you feel better.  Preventing a flare-up  Even though flare-ups happen, the best way to treat one is to prevent it before it starts. Here are some pointers:  · Dont smoke or be around others who are smoking.  · Take your medicines as you have been told.  · Talk with your doctor about getting a flu shot every year.  Also find out if you need a pneumonia shot.  · If there is a weather advisory warning to stay indoors, try to stay inside when possible.  · Try to eat healthy and get plenty of sleep.  · Try to avoid things that usually set you off, like dust, chemical fumes, hairsprays, or strong perfumes.  Follow-up care  Follow up with your healthcare provider, or as advised.  If a culture was done, you will be told if your treatment needs to be changed. You can call as directed for the results.  If X-rays were done, you will be notified of any new findings that may affect your care.  Call 911  Call 911 if any of these occur:  · You have trouble breathing  · You feel confused or its difficult to wake you up  · You faint or lose consciousness  · You have a rapid heart rate  · You have new pain in your chest, arm, shoulder, neck or upper back  When to seek medical advice  Call your healthcare provider right away if any of these occur:  · Wheezing or shortness of breath gets worse  · You need to use your inhalers more often than usual without relief  · Fever of 100.4°F (38ºC) or higher, or as directed by your healthcare provider  · Coughing up lots of dark-colored or bloody mucus (sputum)  · Chest pain with each breath  · You do not start to get better within 24 hours  · Swelling of your ankles gets worse  · Dizziness or weakness  Date Last Reviewed: 9/1/2016  © 7649-9125 [employer]. [address] [place] [address], [place] [address]. All rights reserved. This information is not intended as a substitute for professional medical care. Always follow your healthcare professional's instructions.

## 2020-08-28 NOTE — PROGRESS NOTES
Outpatient Care Management  Initial Patient Assessment    Patient: Chantell Herrera  MRN: [id number]  Date of Service: 08/27/2020  Completed by: Kirsten Jacques RN  Referral Date: 08/26/2020  Program: Case Management (High Risk)    Reason for Visit   Patient presents with    OPCM Chart Review     8/27/20    OPCM Enrollment Call     8/28/20    Initial Assessment     8/28/20    Plan Of Care     8/28/20       Brief Summary: OPCM referral received on 8/26/20 from Dr. Razo for CHF, patient risk score is 76%. Spoke with patient in detail on benefits of enrolling in opcm program. Patient agreed to enroll in program and is aware she may opt out at any time and there is no cost to this program. Patient was admitted recently for chronic respiratory failure secondary to CHF Patient was diuresis and treated with antibiotics. Patient history includes HTN,CKD,CHF, DM. Patient has home health and PT in place 2-3 times a week. Patient reports she lives at home with her  and is able to complete he ADLs independently. Patient reports her  does most of the cooking and she has someone from [employer] to come in the home once a week to help clean and she only has to pat $2 for each visit. Patient reports she is on continuous oxygen at 3 liters has been for several years. Patient reports she is compliant with her medication regimen, she has a scale at home and has agreed to monitor her weight daily preferably  in the mornings.Patient is scheduled for a hospital follow up on 9/9/20 and is aware . Cardiology and pulmonology consults pending CM will continue to follow up. Patient has no food,transportation or housing issues at this time. Patient reports her spouse is her POA noted in epic. Patient agrees to f/u with CM in one week.    Assessment Documentation     OPCM Initial Assessment    Involvement of Care  Do I have permission to speak with other family members about your care?: Yes (Comment: Emmanuel Javier  spouse)  Assessment completed by: Patient  Identified Areas of Need  Advanced Care Planning: No  Housing: no  Medication Adherence: No  *Active medication list was reviewed and reconciled with patient and/or caregiver:   Nutrition: no  Lab Adherence: no  Depression: No  Cognitive/Behavioral Health: no  Communication: no  Health Literacy: no  Fall risk?: No  Equipment/Supplies/Services: no          Problem List and History         Reviewed medical and social history with patient and/or caregiver. A complex care plan was discussed and completed today, with input from patient and/or caregiver.    Patient Instructions     Instructions were provided via the [facility] patient resources and are available for the patient to view on the patient portal, if active.    Next steps: f/u welcome pack  CM will rech out to MOMs meals  F/u daily weights and medication compliance    No follow-ups on file.    Todays OPCM Self-Management Care Plan was developed with the patients/caregivers input and was based on identified barriers from todays assessment.  Goals were written today with the patient/caregiver and the patient has agreed to work towards these goals to improve his/her overall well-being. Patient verbalized understanding of the care plan, goals, and all of today's instructions. Encouraged patient/caregiver to communicate with his/her physician and health care team about health conditions and the treatment plan.  Provided my contact information today and encouraged patient/caregiver to call me with any questions as needed.

## 2020-09-01 NOTE — TELEPHONE ENCOUNTER
----- Message from David Kirkpatrick sent at 2020 12:23 PM CDT -----  Contact: Sara @ [facility]  Chantell Herrera  MRN: [id number]  : 1935  PCP: Kari Gaspar  Home Phone      [phone]  Work Phone      Not on file.  Mobile          [phone]      MESSAGE:  Sara @ [facility] -- needs ok to draw BMP tomorrow - states they did not make it to patient's home yesterday -- please advose    Call Sara @ [phone]    PCP: Chasity

## 2020-09-02 NOTE — TELEPHONE ENCOUNTER
Spoke to Ana with [facility], informed of approval to collect BMP and also add CBC.

## 2020-09-04 NOTE — PROGRESS NOTES
Outpatient Care Management  Plan of Care Follow Up Visit    Patient: Chantell Herrera  MRN: [id number]  Date of Service: 09/04/2020  Completed by: Kirsten Jacques RN  Referral Date: 08/26/2020  Program: Case Management (High Risk)    No chief complaint on file.      Brief Summary: OPCM follow up call. Patient reports she is weighing daily 121-122 lbs weights she reports. Patient reports compliance with diet and medication. Cm notified patient she would be receiving post discharge meals from [facility] on 9/10/20 via fedex delivery, patient was agreeable to receive. CM will continue to follow up    Patient Summary     Involvement of Care:  Do I have permission to speak with other family members about your care?   yes Emmanuel Herrera spouse    Patient Reported Labs & Vitals:  1.  Any Patient Reported Labs & Vitals?     2.  Patient Reported Blood Pressure:     3.  Patient Reported Pulse:     4.  Patient Reported Weight (Kg):     5.  Patient Reported Blood Glucose (mg/dl):       Medical and social history was reviewed with patient and/or caregiver.     Clinical Assessment     Reviewed and provided basic information on available community resources for mental health, transportation, wellness resources, and palliative care programs with patient and/or caregiver.     Complex Care Plan     Care plan was discussed and completed today with input from patient and/or caregiver.    Patient Instructions     Instructions were provided via the [employer] patient resources and are available for the patient to view on the patient portal.    Next Steps: Continued to follow up daily weights and medication compliance  F/u meal delivery  F/u clinic appt      Patient agreed to follow up on 9/11/20    Todays OPCM Self-Management Care Plan was developed with the patients/caregivers input and was based on identified barriers from todays assessment.  Goals were written today with the patient/caregiver and the patient has agreed to work towards these  goals to improve his/her overall well-being. Patient verbalized understanding of the care plan, goals, and all of today's instructions. Encouraged patient/caregiver to communicate with his/her physician and health care team about health conditions and the treatment plan.  Provided my contact information today and encouraged patient/caregiver to call me with any questions as needed.

## 2020-09-09 NOTE — PROGRESS NOTES
Transitional Care Note  Subjective:       Patient ID: Chantell Herrera is a 84 y.o. female.  Chief Complaint: Follow-up    Family and/or Caretaker present at visit?  Yes.  Diagnostic tests reviewed/disposition: No diagnosic tests pending after this hospitalization.  Disease/illness education: anemia and flash pulm edema  Home health/community services discussion/referrals: Patient has home health established at [facility].   Establishment or re-establishment of referral orders for community resources: No other necessary community resources.   Discussion with other health care providers: No discussion with other health care providers necessary.   84 year old female comes in after being transfused for symptomatic anemia. She has been seen by gi and hematology in the past. She was taking eliquis for a fib, but this was stopped. At her last hospital stay she was transfused and developed flash pulm edema and was kept in for diuresis. This is not the first time this has occurred. She has been seen by GI but no obvious GI source of bleeding has been found. She does not have issues with CRF and has never had a bm biopsy though all cell lines seem suppressed.    Review of Systems   Constitutional: Negative for chills and fever.   HENT: Negative for congestion, ear pain, postnasal drip, rhinorrhea, sore throat and trouble swallowing.    Eyes: Negative for redness and itching.   Respiratory: Positive for cough and shortness of breath. Negative for wheezing.    Cardiovascular: Negative for chest pain and palpitations.   Gastrointestinal: Negative for abdominal pain, diarrhea, nausea and vomiting.   Genitourinary: Negative for dysuria and frequency.   Skin: Negative for rash.   Neurological: Negative for weakness and headaches.   Hematological: Bruises/bleeds easily.       Objective:      Physical Exam  Vitals signs and nursing note reviewed.   Constitutional:       General: She is not in acute distress.     Appearance: She is  well-developed.   HENT:      Head: Normocephalic and atraumatic.   Eyes:      Conjunctiva/sclera: Conjunctivae normal.      Pupils: Pupils are equal, round, and reactive to light.   Neck:      Musculoskeletal: Normal range of motion and neck supple.      Thyroid: No thyromegaly.   Cardiovascular:      Rate and Rhythm: Normal rate and regular rhythm.      Heart sounds: Normal heart sounds.   Pulmonary:      Effort: Pulmonary effort is normal. No respiratory distress.      Breath sounds: Wheezing (left upper chest) present.   Abdominal:      General: Bowel sounds are normal.      Palpations: Abdomen is soft.      Tenderness: There is no abdominal tenderness.   Musculoskeletal: Normal range of motion.   Lymphadenopathy:      Cervical: No cervical adenopathy.   Skin:     General: Skin is warm and dry.      Findings: Bruising (arms and legs) present. No rash.   Neurological:      Mental Status: She is alert and oriented to person, place, and time.   Psychiatric:         Behavior: Behavior normal.         Assessment:       1. Anemia, unspecified type    2. Wheezing    3. Congestive heart failure, unspecified HF chronicity, unspecified heart failure type    4. Macrocytic anemia    5. Symptomatic anemia    6. Thrombocytopenia    7. Chronic atrial fibrillation        Plan:       Discussed again today risk of not being on anticoagulant. Her risk of anemia and transfusion is higher at this point.    Will go back to hematology and needs further work up.    Check cbc and iron panel today.    Start on venofer via infusion center.    Wheezing, but cxr clear - prednisone 40 x 3 days then drop to 20 again.    RTC if condition acutely worsens or any other concerns, otherwise RTC as scheduled

## 2020-09-11 NOTE — TELEPHONE ENCOUNTER
----- Message from Kari Gaspar MD sent at 9/10/2020  5:38 PM CDT -----  Blood works shows SHE CANNOT GET IRON.  She should go to see dr. Falcon.  Iron is high, doing infusions isn't going to help her counts.  mh

## 2020-09-11 NOTE — TELEPHONE ENCOUNTER
Contacted and informed pt verbalized understanding. Will contact Dr. Das office on Monday to schedule apt.

## 2020-09-23 NOTE — TELEPHONE ENCOUNTER
Spoke with patient - Updated her referral to Dr. Ortiz Falcon, hematology and oncology at [facility].      Faxed referral, most recent labs, last OV, and demographics.     Pt notified, verbalized understanding.

## 2020-10-05 PROBLEM — J96.90 RESPIRATORY FAILURE: Status: ACTIVE | Noted: 2020-01-01

## 2020-10-05 PROBLEM — J44.1 COPD EXACERBATION: Status: ACTIVE | Noted: 2020-01-01

## 2020-10-05 NOTE — ED TRIAGE NOTES
"84 y.o. female presents to ER ED 06/ED 06   Chief Complaint   Patient presents with    Shortness of Breath   pt c/o SOB for "few days" but worsening today, hx of COPD, on 3L home O2. No acute distress noted.    "

## 2020-10-05 NOTE — ED PROVIDER NOTES
[facility]                                                 Chief Complaint  84 y.o. female with Shortness of Breath      History of Present Illness  Chantell Herrera presents to the emergency room with shortness of breath today  Longstanding issues with shortness of breath and COPD and CHF for years now  Patient has no cough or sputum with mild active wheezing on presentation today  Patient wears oxygen at home, has had issues with hypoxia chronically for years  Patient states that she also has issues with CHF, crackles at the bases today    The history is provided by the patient   device was not used during this ER visit    Past Medical History   -- Anxiety     -- Arthritis     -- Asthma     -- Chronic bronchitis     -- COPD (chronic obstructive pulmonary disease)     -- Depression     -- SANTIAGO (dyspnea on exertion)     -- Emphysema of lung     -- Fatigue     -- Hyperlipidemia     -- Hypertension     -- Trouble in sleeping      Past Surgical History   -- APPENDECTOMY     -- BRONCHOSCOPY     -- EYE SURGERY     -- HYSTERECTOMY     -- INSERTION OF PACEMAKER     -- OOPHORECTOMY     -- TONSILLECTOMY        No Known Allergies     I have reviewed all of this patient's past medical, surgical, family, and social   histories as well as active allergies and medications documented in the  electronic medical record    Review of Systems and Physical Exam      Review of Systems  -- Constitution - no fever, denies fatigue, no weakness, no chills  -- Eyes - no tearing or redness, no visual disturbance  -- Ear, Nose - no tinnitus or earache, no nasal congestion or discharge  -- Mouth,Throat - no sore throat, no toothache, normal voice, normal swallowing  -- Respiratory - shortness of breath, SANTIAGO, no cough or congestion  -- Cardiovascular - denies chest pain, no palpitations, denies claudication  -- Gastrointestinal - denies abdominal pain, nausea, vomiting, or diarrhea  -- Genitourinary - no  dysuria, denies flank pain, no hematuria, no STD risk  -- Musculoskeletal - denies back pain, negative for trauma or injury  -- Neurological - no headache, denies weakness or seizure; no LOC  -- Skin - denies pallor, rash, or changes in skin. no hives or welts noted  -- Psychiatric - Denies SI or HI, no psychosis or fractured thought noted     Vital Signs  Blood pressure is 134/63 and her pulse is 62.   Her respiration is 18 and oxygen saturation is 95%.     Physical Exam  -- Nursing note and vitals reviewed  -- Constitutional: Appears well-developed and well-nourished  -- Head: Atraumatic. Normocephalic. No obvious abnormality  -- Eyes: Pupils are equal and reactive to light. Normal conjunctiva and lids  -- Cardiac: Normal rate, regular rhythm and normal heart sounds  -- Pulmonary: faint rhonchi at the bilateral bases with active wheezing    -- Abdominal: Soft, no tenderness. Normal bowel sounds. Normal liver edge  -- Musculoskeletal: Normal range of motion, no effusions. Joints stable   -- Neurological: No focal deficits. Showed good interaction with staff  -- Vascular: Posterior tibial, dorsalis pedis and radial pulses 2+ bilaterally      Emergency Room Course      Lab Results     K 3.8   CL 99   CO2 34 (H)   BUN 31 (H)   CREATININE 1.3    (H)   ALKPHOS 77   AST 11   ALT 20   BILITOT 0.5   ALBUMIN 3.5   PROT 6.0   WBC 8.80   HGB 7.2 (L)   HCT 23.2 (L)      CPK 10 (L)   CPK 10 (L)   CPKMB 0.9   TROPONINI 0.012   INR 1.2    (H)   DDIMER 1.76 (H)   LACTATE 1.8   MG 1.7   TSH 2.122     EKG  -- The EKG findings today were without concerning findings from baseline     Chest x-ray  Left-sided pacer device place. The heart is mildly enlarged. Calcified   atheromatous disease affects aorta.  Central pulmonary vascular   congestion.  Mild scarring along the right minor fissure. Chronic lung   changes are seen.  No consolidation or pleural effusions.    Additional Work up  -- rapid Coronavirus PCR  was negative    Medications Given  methylPREDNISolone sodium succinate injection 125 mg (125 mg Intravenous Given 10/5/20 1101)   furosemide injection 40 mg (40 mg Intravenous Given 10/5/20 1144)   levalbuterol nebulizer solution 1.25 mg (1.25 mg Nebulization Given 10/5/20 1022)     ED Physician Management  -- Diagnosis management comments: 84 y.o. female with shortness of breath  -- patient with shortness of breath and COPD CHF history noted in the ER now  -- patient has chest x-ray that shows no obvious fluid collection or infiltrate  -- patient has an elevated BNP, IV Lasix given in the emergency room today  -- patient with IV steroids and breathing treatments for COPD exacerbation  -- patient be admitted overnight, hemoglobin monitor, CHF COPD status also    Diagnosis  [R06.02] SOB (shortness of breath)  [J44.1] COPD exacerbation (Primary)  [I50.9] Congestive heart failure    Disposition and Plan  -- Disposition: observation  -- Condition: stable    This note is dictated on M*Modal word recognition program.  There are word recognition mistakes that are occasionally missed on review.         Festus Maurer MD  10/05/20 115

## 2020-10-05 NOTE — ASSESSMENT & PLAN NOTE
This seems to be symptomatic.  Transfuse 1 unit packed red blood cells  Watch for signs of heart failure closely.

## 2020-10-05 NOTE — ASSESSMENT & PLAN NOTE
Patient is well using trilogy at night and during the day she uses O2 per nasal cannula.  Will try to maintain O2 sats around 90%

## 2020-10-05 NOTE — PLAN OF CARE
10/05/20 1507   Discharge Assessment   Assessment Type Discharge Planning Assessment   Confirmed/corrected address and phone number on facesheet? Yes   Assessment information obtained from? Patient   Expected Length of Stay (days) 2   Communicated expected length of stay with patient/caregiver yes   Prior to hospitilization cognitive status: Alert/Oriented   Prior to hospitalization functional status: Assistive Equipment  (uses a rolling walker.)   Current cognitive status: Alert/Oriented   Current Functional Status: Assistive Equipment   Facility Arrived From: home   Lives With spouse   Able to Return to Prior Arrangements yes   Is patient able to care for self after discharge? Yes   Who are your caregiver(s) and their phone number(s)? Emmanuel [phone]   Patient's perception of discharge disposition home or selfcare   Readmission Within the Last 30 Days no previous admission in last 30 days   Patient currently being followed by outpatient case management? No   Patient currently receives any other outside agency services? No   Equipment Currently Used at Home other (see comments);nebulizer;oxygen;walker, rolling;bath bench;glucometer  (has a home trilogy machine)   Part D Coverage [employer]   Do you have any problems affording any of your prescribed medications? No   Is the patient taking medications as prescribed? yes   Does the patient have transportation home? Yes   Transportation Anticipated family or friend will provide  (her  will provide)   Does the patient receive services at the [facility]? No   Discharge Plan A Home with family   Discharge Plan B Home Health   DME Needed Upon Discharge  none   Patient/Family in Agreement with Plan yes     Ms eHrrera is here with COPD and heart failure. She plans to return home with her  when stable. CM to follow for discharge planning and post acute care needs.CM contact information given.

## 2020-10-05 NOTE — SUBJECTIVE & OBJECTIVE
Past Medical History:   Diagnosis Date    A-fib     Acute on chronic congestive heart failure 2/1/2019    Anxiety     Arthritis     Asthma     Atrial fibrillation with rapid ventricular response     CHF (congestive heart failure) 11/29/2018    Chronic bronchitis     COPD (chronic obstructive pulmonary disease)     Depression     Diabetes mellitus, type 2     SANTIAGO (dyspnea on exertion)     Emphysema of lung     Fatigue     Hyperlipidemia     Hypertension     Symptomatic anemia 1/22/2019    Trouble in sleeping        Past Surgical History:   Procedure Laterality Date    APPENDECTOMY      BRONCHOSCOPY N/A 12/3/2019    Procedure: BRONCHOSCOPY;  Surgeon: Emmanuel Hickman MD;  Location: [facility] OR;  Service: Pulmonary;  Laterality: N/A;    EYE SURGERY      HYSTERECTOMY      INSERTION OF PACEMAKER  12/26/2019    OOPHORECTOMY      TONSILLECTOMY         Review of patient's allergies indicates:  No Known Allergies    No current facility-administered medications on file prior to encounter.      Current Outpatient Medications on File Prior to Encounter   Medication Sig    aspirin (ECOTRIN) 81 MG EC tablet Take 1 tablet (81 mg total) by mouth once daily.    baclofen (LIORESAL) 10 MG tablet Take 10 mg by mouth 3 (three) times daily.    citalopram (CELEXA) 20 MG tablet Take 1 tablet (20 mg total) by mouth once daily.    insulin detemir U-100 (LEVEMIR FLEXTOUCH) 100 unit/mL (3 mL) SubQ InPn pen Inject 10 Units into the skin every evening.    ipratropium (ATROVENT) 0.02 % nebulizer solution Take 500 mcg by nebulization 4 (four) times daily.     losartan (COZAAR) 50 MG tablet     meclizine (ANTIVERT) 25 mg tablet 2 (two) times daily as needed for Dizziness.     metoprolol succinate (TOPROL-XL) 50 MG 24 hr tablet Take 50 mg by mouth 2 (two) times daily.     mupirocin (BACTROBAN) 2 % ointment Apply topically 2 (two) times daily.    omeprazole (PRILOSEC) 40 MG capsule     pravastatin (PRAVACHOL) 40 MG tablet  "Take 40 mg by mouth once daily.    rOPINIRole (REQUIP) 1 MG tablet TAKE 1 TABLET  EVERY EVENING.    tiotropium (SPIRIVA) 18 mcg inhalation capsule Inhale 1 capsule (18 mcg total) into the lungs once daily. Controller    torsemide (DEMADEX) 20 MG Tab Take 1 tablet (20 mg total) by mouth once daily.    traZODone (DESYREL) 50 MG tablet Take 1 tablet (50 mg total) by mouth every evening.    ACCU-CHEK SOFTCLIX LANCETS Misc 1 each by Misc.(Non-Drug; Combo Route) route once daily.    albuterol (PROVENTIL) 2.5 mg /3 mL (0.083 %) nebulizer solution Take 3 mLs (2.5 mg total) by nebulization every 6 (six) hours as needed for Wheezing.    alcohol swabs (BD ALCOHOL SWABS) PadM Apply 1 each topically as needed.    BD ULTRA-FINE SHORT PEN NEEDLE 31 gauge x 5/16" Ndle USE TO INJECT LEVEMIR ONCE DAILY    blood glucose control high,low (ACCU-CHEK KUMAR CONTROL SOLN) Soln 1 each by Misc.(Non-Drug; Combo Route) route as needed.    blood sugar diagnostic (ACCU-CHEK KUMAR PLUS TEST STRP) Strp 1 strip by Misc.(Non-Drug; Combo Route) route once daily.    blood-glucose meter (ACCU-CHEK KUMAR PLUS METER) Misc 1 each by Misc.(Non-Drug; Combo Route) route once daily.    cholecalciferol, vitamin D3, (VITAMIN D3) 2,000 unit Tab Take 2,000 Units by mouth once daily.    thiamine 100 MG tablet Take 1 tablet (100 mg total) by mouth once daily.    weigh scale Misc Record daily weights     Family History     Problem Relation (Age of Onset)    COPD Brother, Daughter    Cancer Sister    Diabetes Father    Heart disease Mother    Hypertension Mother, Father, Brother    Kidney disease Son    No Known Problems Daughter        Tobacco Use    Smoking status: Former Smoker     Quit date: 8/3/2000     Years since quittin.1    Smokeless tobacco: Never Used   Substance and Sexual Activity    Alcohol use: No    Drug use: No    Sexual activity: Not Currently     Review of Systems   Constitutional: Negative for activity change, chills, " fatigue, fever and unexpected weight change.   HENT: Negative for sore throat and trouble swallowing.    Respiratory: Positive for shortness of breath. Negative for cough, chest tightness and wheezing.    Cardiovascular: Negative for chest pain, palpitations and leg swelling.   Gastrointestinal: Negative for abdominal pain.   Endocrine: Negative for cold intolerance and heat intolerance.   Genitourinary: Negative for difficulty urinating.   Musculoskeletal: Negative for back pain and joint swelling.   Skin: Negative for rash.   Neurological: Negative for numbness.   Hematological: Negative for adenopathy.   Psychiatric/Behavioral: Negative for decreased concentration.     Objective:     Vital Signs (Most Recent):  Temp: 98.1 °F (36.7 °C) (10/05/20 1644)  Pulse: 66 (10/05/20 1800)  Resp: 17 (10/05/20 1644)  BP: 139/63 (10/05/20 1644)  SpO2: (!) 93 % (10/05/20 1644) Vital Signs (24h Range):  Temp:  [97.9 °F (36.6 °C)-98.4 °F (36.9 °C)] 98.1 °F (36.7 °C)  Pulse:  [58-73] 66  Resp:  [17-40] 17  SpO2:  [90 %-95 %] 93 %  BP: (113-145)/(53-69) 139/63     Weight: 58.1 kg (128 lb 1.4 oz)  Body mass index is 23.43 kg/m².    Physical Exam  Constitutional:       Appearance: Normal appearance. She is well-developed.   HENT:      Head: Normocephalic and atraumatic.      Nose: Nose normal.      Mouth/Throat:      Mouth: Mucous membranes are moist.   Eyes:      Extraocular Movements: Extraocular movements intact.      Pupils: Pupils are equal, round, and reactive to light.      Comments: Pale conjunctiva   Neck:      Musculoskeletal: Normal range of motion and neck supple.   Cardiovascular:      Rate and Rhythm: Normal rate and regular rhythm.      Pulses: Normal pulses.      Heart sounds: No murmur. No friction rub. No gallop.    Pulmonary:      Effort: Pulmonary effort is normal.      Breath sounds: Normal breath sounds.   Abdominal:      General: Abdomen is flat.      Palpations: Abdomen is soft.      Tenderness: There is no  abdominal tenderness.   Musculoskeletal:      Right lower leg: No edema.      Left lower leg: No edema.   Neurological:      General: No focal deficit present.      Mental Status: She is alert and oriented to person, place, and time.      Cranial Nerves: No cranial nerve deficit.   Psychiatric:         Mood and Affect: Mood normal.         Behavior: Behavior normal.         Thought Content: Thought content normal.         Judgment: Judgment normal.           CRANIAL NERVES     CN III, IV, VI   Pupils are equal, round, and reactive to light.       Significant Labs:   CBC:   Recent Labs   Lab 10/05/20  1029   WBC 8.80   HGB 7.2*   HCT 23.2*        CMP:   Recent Labs   Lab 10/05/20  1029      K 3.8   CL 99   CO2 34*   *   BUN 31*   CREATININE 1.3   CALCIUM 8.5*   PROT 6.0   ALBUMIN 3.5   BILITOT 0.5   ALKPHOS 77   AST 11   ALT 20   ANIONGAP 11   EGFRNONAA 38*     Coagulation:   Recent Labs   Lab 10/05/20  1029   INR 1.1   APTT <21.0     TSH:   Recent Labs   Lab 07/03/20  0613   TSH 2.122       Significant Imaging: I have reviewed all pertinent imaging results/findings within the past 24 hours.  I have reviewed and interpreted all pertinent imaging results/findings within the past 24 hours.

## 2020-10-05 NOTE — ED NOTES
The patient is awake, alert, airway is open and patent, respirations are spontaneous, normal respiratory effort and rate noted, skin warm and dry, full ROM in all extremities,  resting comfortably. VSS, no change from previous assessment, bed in low, locked position, SR x2, pt able to change position independently.  Will continue to monitor.

## 2020-10-05 NOTE — PLAN OF CARE
Patient admitted today with COPD exacerbation.  Significant history for both COPD and CHF.   on admit.  Patient on 3 liters nasal cannula at home, on 4 liters here for increased shortness of breath.    Patient on IV Lasix, 40mg, BID and solumedrol 125mg, every 6 hours.  Gait unsteady, up with assistance.  H&H low at 23.2 and 7.2, will continue to monitor labs.  SCD's for VTE prevention.  Glucose checks with meals; insulin ordered to cover based on glucose.  Heart monitor shows SR.  VSS, afebrile.

## 2020-10-05 NOTE — NURSING
Patient transferred via wheelchair from ER.  Report received from STACIA Moss at bedside.  Patient short of breath with exertion up to restroom, recovered on 4 liters nasal cannula.  Denies pain.  States she is typically short of breath on exertion but not to this degree.    Helped back in bed.  VSS up assessment.  Call light in place.

## 2020-10-05 NOTE — HPI
84-year-old white female presents to the emergency room with worsening dyspnea on exertion, orthopnea.  Emergency room physician felt patient was having some heart failure issues and was given some IV Lasix which she responded well to.  She has a history of recurrent symptomatic anemia.  Today her hemoglobin is 7.2.  One month ago it was 10.  She has been worked up for this numerous times and cannot find a source for it.  She feels like her anemia is worse.

## 2020-10-05 NOTE — ASSESSMENT & PLAN NOTE
She received 1 dose of IV Lasix.  She is no longer in heart failure.  Continue losartan 50 mg daily  Continue metoprolol at 50 mg p.o. daily  Discontinue furosemide and placed back on her Demadex

## 2020-10-05 NOTE — H&P
[facility] Medicine  History & Physical    Patient Name: Chantell Herrera  MRN: [id number]  Admission Date: 10/5/2020  Attending Physician: Chicho Fisher MD   Primary Care Provider: Kari Gaspar MD         Patient information was obtained from patient, past medical records and ER records.     Subjective:     Principal Problem:Anemia    Chief Complaint:   Chief Complaint   Patient presents with    Shortness of Breath        HPI: 84-year-old white female presents to the emergency room with worsening dyspnea on exertion, orthopnea.  Emergency room physician felt patient was having some heart failure issues and was given some IV Lasix which she responded well to.  She has a history of recurrent symptomatic anemia.  Today her hemoglobin is 7.2.  One month ago it was 10.  She has been worked up for this numerous times and cannot find a source for it.  She feels like her anemia is worse.    Past Medical History:   Diagnosis Date    A-fib     Acute on chronic congestive heart failure 2/1/2019    Anxiety     Arthritis     Asthma     Atrial fibrillation with rapid ventricular response     CHF (congestive heart failure) 11/29/2018    Chronic bronchitis     COPD (chronic obstructive pulmonary disease)     Depression     Diabetes mellitus, type 2     SANTIAGO (dyspnea on exertion)     Emphysema of lung     Fatigue     Hyperlipidemia     Hypertension     Symptomatic anemia 1/22/2019    Trouble in sleeping        Past Surgical History:   Procedure Laterality Date    APPENDECTOMY      BRONCHOSCOPY N/A 12/3/2019    Procedure: BRONCHOSCOPY;  Surgeon: Emmanuel Hickman MD;  Location: [facility] OR;  Service: Pulmonary;  Laterality: N/A;    EYE SURGERY      HYSTERECTOMY      INSERTION OF PACEMAKER  12/26/2019    OOPHORECTOMY      TONSILLECTOMY         Review of patient's allergies indicates:  No Known Allergies    No current facility-administered medications on file prior to encounter.   "    Current Outpatient Medications on File Prior to Encounter   Medication Sig    aspirin (ECOTRIN) 81 MG EC tablet Take 1 tablet (81 mg total) by mouth once daily.    baclofen (LIORESAL) 10 MG tablet Take 10 mg by mouth 3 (three) times daily.    citalopram (CELEXA) 20 MG tablet Take 1 tablet (20 mg total) by mouth once daily.    insulin detemir U-100 (LEVEMIR FLEXTOUCH) 100 unit/mL (3 mL) SubQ InPn pen Inject 10 Units into the skin every evening.    ipratropium (ATROVENT) 0.02 % nebulizer solution Take 500 mcg by nebulization 4 (four) times daily.     losartan (COZAAR) 50 MG tablet     meclizine (ANTIVERT) 25 mg tablet 2 (two) times daily as needed for Dizziness.     metoprolol succinate (TOPROL-XL) 50 MG 24 hr tablet Take 50 mg by mouth 2 (two) times daily.     mupirocin (BACTROBAN) 2 % ointment Apply topically 2 (two) times daily.    omeprazole (PRILOSEC) 40 MG capsule     pravastatin (PRAVACHOL) 40 MG tablet Take 40 mg by mouth once daily.    rOPINIRole (REQUIP) 1 MG tablet TAKE 1 TABLET  EVERY EVENING.    tiotropium (SPIRIVA) 18 mcg inhalation capsule Inhale 1 capsule (18 mcg total) into the lungs once daily. Controller    torsemide (DEMADEX) 20 MG Tab Take 1 tablet (20 mg total) by mouth once daily.    traZODone (DESYREL) 50 MG tablet Take 1 tablet (50 mg total) by mouth every evening.    ACCU-CHEK SOFTCLIX LANCETS Misc 1 each by Misc.(Non-Drug; Combo Route) route once daily.    albuterol (PROVENTIL) 2.5 mg /3 mL (0.083 %) nebulizer solution Take 3 mLs (2.5 mg total) by nebulization every 6 (six) hours as needed for Wheezing.    alcohol swabs (BD ALCOHOL SWABS) PadM Apply 1 each topically as needed.    BD ULTRA-FINE SHORT PEN NEEDLE 31 gauge x 5/16" Ndle USE TO INJECT LEVEMIR ONCE DAILY    blood glucose control high,low (ACCU-CHEK KUMAR CONTROL SOLN) Soln 1 each by Misc.(Non-Drug; Combo Route) route as needed.    blood sugar diagnostic (ACCU-CHEK KUMAR PLUS TEST STRP) Strp 1 strip by " Misc.(Non-Drug; Combo Route) route once daily.    blood-glucose meter (ACCU-CHEK KUMAR PLUS METER) Misc 1 each by Misc.(Non-Drug; Combo Route) route once daily.    cholecalciferol, vitamin D3, (VITAMIN D3) 2,000 unit Tab Take 2,000 Units by mouth once daily.    thiamine 100 MG tablet Take 1 tablet (100 mg total) by mouth once daily.    weigh scale Misc Record daily weights     Family History     Problem Relation (Age of Onset)    COPD Brother, Daughter    Cancer Sister    Diabetes Father    Heart disease Mother    Hypertension Mother, Father, Brother    Kidney disease Son    No Known Problems Daughter        Tobacco Use    Smoking status: Former Smoker     Quit date: 8/3/2000     Years since quittin.1    Smokeless tobacco: Never Used   Substance and Sexual Activity    Alcohol use: No    Drug use: No    Sexual activity: Not Currently     Review of Systems   Constitutional: Negative for activity change, chills, fatigue, fever and unexpected weight change.   HENT: Negative for sore throat and trouble swallowing.    Respiratory: Positive for shortness of breath. Negative for cough, chest tightness and wheezing.    Cardiovascular: Negative for chest pain, palpitations and leg swelling.   Gastrointestinal: Negative for abdominal pain.   Endocrine: Negative for cold intolerance and heat intolerance.   Genitourinary: Negative for difficulty urinating.   Musculoskeletal: Negative for back pain and joint swelling.   Skin: Negative for rash.   Neurological: Negative for numbness.   Hematological: Negative for adenopathy.   Psychiatric/Behavioral: Negative for decreased concentration.     Objective:     Vital Signs (Most Recent):  Temp: 98.1 °F (36.7 °C) (10/05/20 1644)  Pulse: 66 (10/05/20 1800)  Resp: 17 (10/05/20 1644)  BP: 139/63 (10/05/20 1644)  SpO2: (!) 93 % (10/05/20 164) Vital Signs (24h Range):  Temp:  [97.9 °F (36.6 °C)-98.4 °F (36.9 °C)] 98.1 °F (36.7 °C)  Pulse:  [58-73] 66  Resp:  [17-40] 17  SpO2:   [90 %-95 %] 93 %  BP: (113-145)/(53-69) 139/63     Weight: 58.1 kg (128 lb 1.4 oz)  Body mass index is 23.43 kg/m².    Physical Exam  Constitutional:       Appearance: Normal appearance. She is well-developed.   HENT:      Head: Normocephalic and atraumatic.      Nose: Nose normal.      Mouth/Throat:      Mouth: Mucous membranes are moist.   Eyes:      Extraocular Movements: Extraocular movements intact.      Pupils: Pupils are equal, round, and reactive to light.      Comments: Pale conjunctiva   Neck:      Musculoskeletal: Normal range of motion and neck supple.   Cardiovascular:      Rate and Rhythm: Normal rate and regular rhythm.      Pulses: Normal pulses.      Heart sounds: No murmur. No friction rub. No gallop.    Pulmonary:      Effort: Pulmonary effort is normal.      Breath sounds: Normal breath sounds.   Abdominal:      General: Abdomen is flat.      Palpations: Abdomen is soft.      Tenderness: There is no abdominal tenderness.   Musculoskeletal:      Right lower leg: No edema.      Left lower leg: No edema.   Neurological:      General: No focal deficit present.      Mental Status: She is alert and oriented to person, place, and time.      Cranial Nerves: No cranial nerve deficit.   Psychiatric:         Mood and Affect: Mood normal.         Behavior: Behavior normal.         Thought Content: Thought content normal.         Judgment: Judgment normal.           CRANIAL NERVES     CN III, IV, VI   Pupils are equal, round, and reactive to light.       Significant Labs:   CBC:   Recent Labs   Lab 10/05/20  1029   WBC 8.80   HGB 7.2*   HCT 23.2*        CMP:   Recent Labs   Lab 10/05/20  1029      K 3.8   CL 99   CO2 34*   *   BUN 31*   CREATININE 1.3   CALCIUM 8.5*   PROT 6.0   ALBUMIN 3.5   BILITOT 0.5   ALKPHOS 77   AST 11   ALT 20   ANIONGAP 11   EGFRNONAA 38*     Coagulation:   Recent Labs   Lab 10/05/20  1029   INR 1.1   APTT <21.0     TSH:   Recent Labs   Lab 07/03/20  0613   TSH  2.122       Significant Imaging: I have reviewed all pertinent imaging results/findings within the past 24 hours.  I have reviewed and interpreted all pertinent imaging results/findings within the past 24 hours.    Assessment/Plan:     * Anemia  This seems to be symptomatic.  Transfuse 1 unit packed red blood cells  Watch for signs of heart failure closely.      Respiratory failure  Patient is well using trilogy at night and during the day she uses O2 per nasal cannula.  Will try to maintain O2 sats around 90%      COPD exacerbation  Xopenex t.i.d. p.r.n. wheezing  Currently not wheezing      Acute on chronic congestive heart failure  She received 1 dose of IV Lasix.  She is no longer in heart failure.  Continue losartan 50 mg daily  Continue metoprolol at 50 mg p.o. daily  Discontinue furosemide and placed back on her Demadex        VTE Risk Mitigation (From admission, onward)         Ordered     IP VTE HIGH RISK PATIENT  Once      10/05/20 1349     Place sequential compression device  Until discontinued      10/05/20 1349                   Chicho Fisher MD  Department of Hospital Medicine   [facility]

## 2020-10-05 NOTE — PLAN OF CARE
10/05/20 1520   Advance Directives (For Healthcare)   Advance Directive  (If Adv Dir status is received, view document under Adv Dir in header or Chart Review Media tab) Advance Directive currently in Epic.     Patient has advanced directive in Epic.

## 2020-10-05 NOTE — ED NOTES
The patient is awake, alert, aware of environment. Airway is open and patent, respirations are spontaneous, normal respiratory effort and rate noted, full ROM in all extremities, no distress noted. Bed in low, locked position, SR x2, pt able to change position independently. Will continue to monitor.

## 2020-10-06 NOTE — HOSPITAL COURSE
10/6 Admitted for acute HF and Anemia;   Acute HF now resolved and back to oral demadex   H& H 8.5/26.9 after 1 UPRBCs; given split unit; tolerated well, creat 1.3>1.1   Feeling good this am; will D/C home with F/U with hematology; discussed need for Bone marrow Bx, pt understands  Still notes SANTIAGO; uses 3LNC at home

## 2020-10-06 NOTE — DISCHARGE SUMMARY
[facility] Medicine  Discharge Summary      Patient Name: Chantell Herrera  MRN: [id number]  Admission Date: 10/5/2020  Hospital Length of Stay: 1 days  Discharge Date and Time:  10/06/2020 12:58 PM  Attending Physician: Chicho Fisher MD   Discharging Provider: Dennise Virgen NP  Primary Care Provider: Kari Gaspar MD      HPI:   84-year-old white female presents to the emergency room with worsening dyspnea on exertion, orthopnea.  Emergency room physician felt patient was having some heart failure issues and was given some IV Lasix which she responded well to.  She has a history of recurrent symptomatic anemia.  Today her hemoglobin is 7.2.  One month ago it was 10.  She has been worked up for this numerous times and cannot find a source for it.  She feels like her anemia is worse.    * No surgery found *      Hospital Course:   10/6 Admitted for acute HF and Anemia;   Acute HF now resolved and back to oral demadex   H& H 8.5/26.9 after 1 UPRBCs; given split unit; tolerated well, creat 1.3>1.1   Feeling good this am; will D/C home with F/U with hematology; discussed need for Bone marrow Bx, pt understands  Still notes SANTIAGO; uses 3LNC at home      Consults:     * Anemia  This seems to be symptomatic.  Transfuse 1 unit packed red blood cells  Watch for signs of heart failure closely.  10/6 H&H up to 8.5/26.9 from 7.2/23.2 ; will schedule OP appnt with hematology; will likely need Bone Marrow Bx       Respiratory failure  Patient is well using trilogy at night and during the day she uses O2 per nasal cannula.  Will try to maintain O2 sats around 90%  10/6 stable with home oxygen      COPD exacerbation  Xopenex t.i.d. p.r.n. wheezing  Currently not wheezing  D/C home with prednisone daily 20mg      Acute on chronic congestive heart failure  She received 1 dose of IV Lasix.  She is no longer in heart failure.  Continue losartan 50 mg daily  Continue metoprolol at 50 mg p.o.  daily  Discontinue furosemide and placed back on her Demadex  10/6 no edema this am but decreased to Lower lung fields;will give lasix 10mg IV x this am;  then back to oral demadex for home         Final Active Diagnoses:    Diagnosis Date Noted POA    PRINCIPAL PROBLEM:  Anemia [D64.9] 09/01/2019 Yes    COPD exacerbation [J44.1] 10/05/2020 Yes    Respiratory failure [J96.90] 10/05/2020 Yes    Acute on chronic congestive heart failure [I50.9] 02/01/2019 Yes      Problems Resolved During this Admission:       Discharged Condition: stable    Disposition: Home or Self Care    Follow Up:  Follow-up Information     Schedule an appointment as soon as possible for a visit with Kari Gaspar MD.    Specialty: Family Medicine  Contact information:  [address]  [place] [address]  [phone]             Schedule an appointment as soon as possible for a visit with Ortiz Falcon MD.    Specialties: Oncology, Medical Oncology  Why: 1 week   Contact information:  [address] [facility] [id number] [phone]                 Patient Instructions:   No discharge procedures on file.    Significant Diagnostic Studies:   CBC:   Recent Labs   Lab 10/05/20  1029 10/06/20  0557   WBC 8.80 18.49*   HGB 7.2* 8.5*   HCT 23.2* 26.9*    163      CMP:       Recent Labs   Lab 10/05/20  1029      K 3.8   CL 99   CO2 34*   *   BUN 31*   CREATININE 1.3   CALCIUM 8.5*   PROT 6.0   ALBUMIN 3.5   BILITOT 0.5   ALKPHOS 77   AST 11   ALT 20   ANIONGAP 11   EGFRNONAA 38*      Coagulation:       Recent Labs   Lab 10/05/20  1029   INR 1.1   APTT <21.0      TSH:       Recent Labs   Lab 07/03/20  0613   TSH 2.122             Pending Diagnostic Studies:     None         Medications:  Reconciled Home Medications:      Medication List      START taking these medications    predniSONE 20 MG tablet  Commonly known as: DELTASONE  Take 1 tablet (20 mg total) by mouth once daily.        CONTINUE taking  these medications    ACCU-CHEK SOFTCLIX LANCETS Misc  Generic drug: lancets  1 each by Misc.(Non-Drug; Combo Route) route once daily.     albuterol 2.5 mg /3 mL (0.083 %) nebulizer solution  Commonly known as: PROVENTIL  Take 3 mLs (2.5 mg total) by nebulization every 6 (six) hours as needed for Wheezing.     alcohol swabs [facility]  Commonly known as: BD ALCOHOL SWABS  Apply 1 each topically as needed.     aspirin 81 MG EC tablet  Commonly known as: ECOTRIN  Take 1 tablet (81 mg total) by mouth once daily.     baclofen 10 MG tablet  Commonly known as: LIORESAL  Take 10 mg by mouth 3 (three) times daily.     blood glucose control high,low Soln  Commonly known as: ACCU-CHEK KUMAR CONTROL SOLN  1 each by Misc.(Non-Drug; Combo Route) route as needed.     blood sugar diagnostic Strp  Commonly known as: ACCU-CHEK KUMAR PLUS TEST STRP  1 strip by Misc.(Non-Drug; Combo Route) route once daily.     blood-glucose meter Misc  Commonly known as: ACCU-CHEK KUMAR PLUS METER  1 each by Misc.(Non-Drug; Combo Route) route once daily.     citalopram 20 MG tablet  Commonly known as: CELEXA  Take 1 tablet (20 mg total) by mouth once daily.     insulin detemir U-100 100 unit/mL (3 mL) Inpn pen  Commonly known as: LEVEMIR FLEXTOUCH  Inject 10 Units into the skin every evening.     ipratropium 0.02 % nebulizer solution  Commonly known as: ATROVENT  Take 500 mcg by nebulization 4 (four) times daily.     losartan 50 MG tablet  Commonly known as: COZAAR     meclizine 25 mg tablet  Commonly known as: ANTIVERT  2 (two) times daily as needed for Dizziness.     metoprolol succinate 50 MG 24 hr tablet  Commonly known as: TOPROL-XL  Take 50 mg by mouth 2 (two) times daily.     mupirocin 2 % ointment  Commonly known as: BACTROBAN  Apply topically 2 (two) times daily.     omeprazole 40 MG capsule  Commonly known as: PRILOSEC     pravastatin 40 MG tablet  Commonly known as: PRAVACHOL  Take 40 mg by mouth once daily.     rOPINIRole 1 MG tablet  Commonly  "known as: REQUIP  TAKE 1 TABLET  EVERY EVENING.     SPIRIVA WITH HANDIHALER 18 mcg inhalation capsule  Generic drug: tiotropium  Inhale 1 capsule (18 mcg total) into the lungs once daily. Controller     thiamine 100 MG tablet  Take 1 tablet (100 mg total) by mouth once daily.     torsemide 20 MG Tab  Commonly known as: DEMADEX  Take 1 tablet (20 mg total) by mouth once daily.     traZODone 50 MG tablet  Commonly known as: DESYREL  Take 1 tablet (50 mg total) by mouth every evening.     VITAMIN D3 50 mcg (2,000 unit) Tab  Generic drug: cholecalciferol (vitamin D3)  Take 2,000 Units by mouth once daily.     weigh scale Misc  Record daily weights        ASK your doctor about these medications    BD ULTRA-FINE SHORT PEN NEEDLE 31 gauge x 5/16" Ndle  Generic drug: pen needle, diabetic  USE TO INJECT LEVEMIR ONCE DAILY            Indwelling Lines/Drains at time of discharge:   Lines/Drains/Airways     None                 Time spent on the discharge of patient: 20 minutes  Patient was seen and examined on the date of discharge and determined to be suitable for discharge.         Dennise Virgen NP  Department of Hospital Medicine  [facility]  "

## 2020-10-06 NOTE — ASSESSMENT & PLAN NOTE
Patient is well using trilogy at night and during the day she uses O2 per nasal cannula.  Will try to maintain O2 sats around 90%  10/6 stable with home oxygen

## 2020-10-06 NOTE — PLAN OF CARE
Ms Herrera will discharge home today. She has no post acute care needs today. Her  will pick her up.

## 2020-10-06 NOTE — ASSESSMENT & PLAN NOTE
She received 1 dose of IV Lasix.  She is no longer in heart failure.  Continue losartan 50 mg daily  Continue metoprolol at 50 mg p.o. daily  Discontinue furosemide and placed back on her Demadex  10/6 no edema this am but decreased to Lower lung fields;will give lasix 10mg IV x this am;  then back to oral demadex for home

## 2020-10-06 NOTE — PLAN OF CARE
10/06/20 1308   Final Note   Assessment Type Final Discharge Note   Anticipated Discharge Disposition Home   What phone number can be called within the next 1-3 days to see how you are doing after discharge? [phone]   Hospital Follow Up  Appt(s) scheduled? Yes   Discharge plans and expectations educations in teach back method with documentation complete? Yes   Post-Acute Status   Post-Acute Authorization Other   Other Status No Post-Acute Service Needs   Discharge Delays None known at this time       No post-acute care needs identified during this hospital stay.     Daniela Castro LMSW

## 2020-10-06 NOTE — SUBJECTIVE & OBJECTIVE
Review of Systems   Constitutional: Negative for activity change, chills, fatigue, fever and unexpected weight change.   HENT: Negative for sore throat and trouble swallowing.    Respiratory: Positive for shortness of breath. Negative for cough, chest tightness and wheezing.    Cardiovascular: Negative for chest pain, palpitations and leg swelling.   Gastrointestinal: Negative for abdominal pain.   Endocrine: Negative for cold intolerance and heat intolerance.   Genitourinary: Negative for difficulty urinating.   Musculoskeletal: Negative for back pain and joint swelling.   Skin: Negative for rash.   Neurological: Negative for numbness.   Hematological: Negative for adenopathy.   Psychiatric/Behavioral: Negative for decreased concentration.     Objective:     Vital Signs (Most Recent):  Temp: 97.1 °F (36.2 °C) (10/06/20 0402)  Pulse: 67 (10/06/20 0600)  Resp: 18 (10/06/20 0402)  BP: 121/62 (10/06/20 0402)  SpO2: 96 % (10/06/20 0402) Vital Signs (24h Range):  Temp:  [97.1 °F (36.2 °C)-98.4 °F (36.9 °C)] 97.1 °F (36.2 °C)  Pulse:  [53-73] 67  Resp:  [16-40] 18  SpO2:  [90 %-96 %] 96 %  BP: (110-145)/(51-69) 121/62     Weight: 58.1 kg (128 lb 1.4 oz)  Body mass index is 23.43 kg/m².    Physical Exam  Constitutional:       Appearance: Normal appearance. She is well-developed.   HENT:      Head: Normocephalic and atraumatic.      Nose: Nose normal.      Mouth/Throat:      Mouth: Mucous membranes are moist.   Eyes:      Extraocular Movements: Extraocular movements intact.      Pupils: Pupils are equal, round, and reactive to light.      Comments: Pale conjunctiva   Neck:      Musculoskeletal: Normal range of motion and neck supple.   Cardiovascular:      Rate and Rhythm: Normal rate and regular rhythm.      Pulses: Normal pulses.      Heart sounds: No murmur. No friction rub. No gallop.    Pulmonary:      Effort: Pulmonary effort is normal.      Breath sounds: Wheezing and rhonchi present.      Comments: Decreased BS to  right side    Wearing trilogy  Abdominal:      General: Abdomen is flat.      Palpations: Abdomen is soft.      Tenderness: There is no abdominal tenderness.   Musculoskeletal:      Right lower leg: No edema.      Left lower leg: No edema.   Neurological:      General: No focal deficit present.      Mental Status: She is alert and oriented to person, place, and time.      Cranial Nerves: No cranial nerve deficit.   Psychiatric:         Mood and Affect: Mood normal.         Behavior: Behavior normal.         Thought Content: Thought content normal.         Judgment: Judgment normal.           CRANIAL NERVES     CN III, IV, VI   Pupils are equal, round, and reactive to light.       Significant Labs:   CBC:   Recent Labs   Lab 10/05/20  1029 10/06/20  0557   WBC 8.80 18.49*   HGB 7.2* 8.5*   HCT 23.2* 26.9*    163     CMP:   Recent Labs   Lab 10/05/20  1029      K 3.8   CL 99   CO2 34*   *   BUN 31*   CREATININE 1.3   CALCIUM 8.5*   PROT 6.0   ALBUMIN 3.5   BILITOT 0.5   ALKPHOS 77   AST 11   ALT 20   ANIONGAP 11   EGFRNONAA 38*     Coagulation:   Recent Labs   Lab 10/05/20  1029   INR 1.1   APTT <21.0     TSH:   Recent Labs   Lab 07/03/20  0613   TSH 2.122       Significant Imaging: I have reviewed all pertinent imaging results/findings within the past 24 hours.  I have reviewed and interpreted all pertinent imaging results/findings within the past 24 hours.

## 2020-10-06 NOTE — PROGRESS NOTES
[facility] Medicine  Progress Note    Patient Name: Chantell Herrera  MRN: [id number]  Patient Class: OP- Observation   Admission Date: 10/5/2020  Length of Stay: 1 days  Attending Physician: Chicho Fisher MD  Primary Care Provider: Kari Gaspar MD        Subjective:     Principal Problem:Anemia        HPI:  84-year-old white female presents to the emergency room with worsening dyspnea on exertion, orthopnea.  Emergency room physician felt patient was having some heart failure issues and was given some IV Lasix which she responded well to.  She has a history of recurrent symptomatic anemia.  Today her hemoglobin is 7.2.  One month ago it was 10.  She has been worked up for this numerous times and cannot find a source for it.  She feels like her anemia is worse.    Overview/Hospital Course:  10/6 Admitted for acute HF and Anemia;   Acute HF now resolved and back to oral demadex   H& H 8.5/26.9 after 1 UPRBCs; given split unit; tolerated well, creat 1.3>1.1   Feeling good this am; will D/C home with F/U with hematology; discussed need for Bone marrow Bx, pt understands  Still notes SANTIAGO; uses 3LNC at home     Review of Systems   Constitutional: Negative for activity change, chills, fatigue, fever and unexpected weight change.   HENT: Negative for sore throat and trouble swallowing.    Respiratory: Positive for shortness of breath. Negative for cough, chest tightness and wheezing.    Cardiovascular: Negative for chest pain, palpitations and leg swelling.   Gastrointestinal: Negative for abdominal pain.   Endocrine: Negative for cold intolerance and heat intolerance.   Genitourinary: Negative for difficulty urinating.   Musculoskeletal: Negative for back pain and joint swelling.   Skin: Negative for rash.   Neurological: Negative for numbness.   Hematological: Negative for adenopathy.   Psychiatric/Behavioral: Negative for decreased concentration.     Objective:     Vital Signs (Most  Recent):  Temp: 97.1 °F (36.2 °C) (10/06/20 0402)  Pulse: 67 (10/06/20 0600)  Resp: 18 (10/06/20 0402)  BP: 121/62 (10/06/20 0402)  SpO2: 96 % (10/06/20 0402) Vital Signs (24h Range):  Temp:  [97.1 °F (36.2 °C)-98.4 °F (36.9 °C)] 97.1 °F (36.2 °C)  Pulse:  [53-73] 67  Resp:  [16-40] 18  SpO2:  [90 %-96 %] 96 %  BP: (110-145)/(51-69) 121/62     Weight: 58.1 kg (128 lb 1.4 oz)  Body mass index is 23.43 kg/m².    Physical Exam  Constitutional:       Appearance: Normal appearance. She is well-developed.   HENT:      Head: Normocephalic and atraumatic.      Nose: Nose normal.      Mouth/Throat:      Mouth: Mucous membranes are moist.   Eyes:      Extraocular Movements: Extraocular movements intact.      Pupils: Pupils are equal, round, and reactive to light.      Comments: Pale conjunctiva   Neck:      Musculoskeletal: Normal range of motion and neck supple.   Cardiovascular:      Rate and Rhythm: Normal rate and regular rhythm.      Pulses: Normal pulses.      Heart sounds: No murmur. No friction rub. No gallop.    Pulmonary:      Effort: Pulmonary effort is normal.      Breath sounds: Wheezing and rhonchi present.      Comments: Decreased BS to right side    Wearing trilogy  Abdominal:      General: Abdomen is flat.      Palpations: Abdomen is soft.      Tenderness: There is no abdominal tenderness.   Musculoskeletal:      Right lower leg: No edema.      Left lower leg: No edema.   Neurological:      General: No focal deficit present.      Mental Status: She is alert and oriented to person, place, and time.      Cranial Nerves: No cranial nerve deficit.   Psychiatric:         Mood and Affect: Mood normal.         Behavior: Behavior normal.         Thought Content: Thought content normal.         Judgment: Judgment normal.           CRANIAL NERVES     CN III, IV, VI   Pupils are equal, round, and reactive to light.       Significant Labs:   CBC:   Recent Labs   Lab 10/05/20  1029 10/06/20  0557   WBC 8.80 18.49*   HGB  7.2* 8.5*   HCT 23.2* 26.9*    163     CMP:   Recent Labs   Lab 10/05/20  1029      K 3.8   CL 99   CO2 34*   *   BUN 31*   CREATININE 1.3   CALCIUM 8.5*   PROT 6.0   ALBUMIN 3.5   BILITOT 0.5   ALKPHOS 77   AST 11   ALT 20   ANIONGAP 11   EGFRNONAA 38*     Coagulation:   Recent Labs   Lab 10/05/20  1029   INR 1.1   APTT <21.0     TSH:   Recent Labs   Lab 07/03/20  0613   TSH 2.122       Significant Imaging: I have reviewed all pertinent imaging results/findings within the past 24 hours.  I have reviewed and interpreted all pertinent imaging results/findings within the past 24 hours.      Assessment/Plan:      * Anemia  This seems to be symptomatic.  Transfuse 1 unit packed red blood cells  Watch for signs of heart failure closely.  10/6 H&H up to 8.5/26.9 from 7.2/23.2 ; will schedule OP appnt with hematology; will likely need Bone Marrow Bx       Respiratory failure  Patient is well using trilogy at night and during the day she uses O2 per nasal cannula.  Will try to maintain O2 sats around 90%      COPD exacerbation  Xopenex t.i.d. p.r.n. wheezing  Currently not wheezing      Acute on chronic congestive heart failure  She received 1 dose of IV Lasix.  She is no longer in heart failure.  Continue losartan 50 mg daily  Continue metoprolol at 50 mg p.o. daily  Discontinue furosemide and placed back on her Demadex  10/6 no edema this am but decreased to Lower lung fields;will give lasix 10mg IV x this am;  then back to oral demadex for home         VTE Risk Mitigation (From admission, onward)         Ordered     IP VTE HIGH RISK PATIENT  Once      10/05/20 1349     Place sequential compression device  Until discontinued      10/05/20 1349                Discharge Planning   HARPER:      Code Status: Full Code   Is the patient medically ready for discharge?:     Reason for patient still in hospital (select all that apply): Pending disposition  Discharge Plan A: Home with family      D/c  today            Kari Gaspar MD  Department of Hospital Medicine   [facility]

## 2020-10-06 NOTE — ASSESSMENT & PLAN NOTE
This seems to be symptomatic.  Transfuse 1 unit packed red blood cells  Watch for signs of heart failure closely.  10/6 H&H up to 8.5/26.9 from 7.2/23.2 ; will schedule OP appnt with hematology; will likely need Bone Marrow Bx

## 2020-10-06 NOTE — PLAN OF CARE
10/06/20 1304   Medicare Message   Important Message from Medicare regarding Discharge Appeal Rights Given to patient/caregiver;Explained to patient/caregiver;Signed/date by patient/caregiver   Date IMM was signed 10/06/20   Time IMM was signed 0850   HOPKINS Message   Medicare Outpatient and Observation Notification regarding financial responsibility Given to patient/caregiver;Explained to patient/caregiver;Signed/date by patient/caregiver   Date HOPKINS was signed 10/06/20   Time HOPKINS was signed 0850

## 2020-10-14 NOTE — PROGRESS NOTES
Outpatient Care Management  Plan of Care Follow Up Visit    Patient: Chantell Herrera  MRN: [id number]  Date of Service: 09/28/2020  Completed by: Kirsten Jacques RN  Referral Date: 08/26/2020  Program: Case Management (High Risk)    Reason for Visit   Patient presents with    OPCM RN First Follow-Up Attempt     9/14/20    OPCM RN Second Follow-Up Attempt     9/28/20    Update Plan Of Care     10/14/20       Brief Summary: OPCM follow up call. Patient reports no increased sob since d/c hospital on 10/5/20. Patient reports she is currently 3 liters of oxygen. Patient has not been weigh daily, CM has encouraged daily weights  Patient has agreed. Patient reports her  monitors her salt intake as he prepares all her meals. CM reviewed upcoming hospital f/u appt on 10/16/20 patient is aware. CM will continue to follow up. CM advised if SOB worsens to call OOC patient has number or report to Ed for evaluation. Patient agrees with plan.    Patient Summary     Involvement of Care:  Do I have permission to speak with other family members about your care?  Yes    Patient Reported Labs & Vitals:  1.  Any Patient Reported Labs & Vitals?  No  2.  Patient Reported Blood Pressure:     3.  Patient Reported Pulse:     4.  Patient Reported Weight (Kg):     5.  Patient Reported Blood Glucose (mg/dl):       Medical and social history was reviewed with patient and/or caregiver.     Clinical Assessment     Reviewed and provided basic information on available community resources for mental health, transportation, wellness resources, and palliative care programs with patient and/or caregiver.     Complex Care Plan     Care plan was discussed and completed today with input from patient and/or caregiver.    Patient Instructions     Instructions were provided via the [employer] patient resources and are available for the patient to view on the patient portal.    Next Steps:CM continue to monitor daily weights and medication  compliance    Patient agrees to follow up on 10/23/20    Todays OPCM Self-Management Care Plan was developed with the patients/caregivers input and was based on identified barriers from todays assessment.  Goals were written today with the patient/caregiver and the patient has agreed to work towards these goals to improve his/her overall well-being. Patient verbalized understanding of the care plan, goals, and all of today's instructions. Encouraged patient/caregiver to communicate with his/her physician and health care team about health conditions and the treatment plan.  Provided my contact information today and encouraged patient/caregiver to call me with any questions as needed.

## 2020-10-16 NOTE — PROGRESS NOTES
Transitional Care Note  Subjective:       Patient ID: Chantell Herrera is a 84 y.o. female.  Chief Complaint: Hospital Follow Up (COPD)    Family and/or Caretaker present at visit?  No.  Diagnostic tests reviewed/disposition: No diagnosic tests pending after this hospitalization.  Disease/illness education: symptomatic anemia  Home health/community services discussion/referrals: Patient has home health established at [facility].   Establishment or re-establishment of referral orders for community resources: No other necessary community resources.   Discussion with other health care providers: No discussion with other health care providers necessary. Needs to be seen by hematology/oncology  84 year old female comes in for f/u of recent hospital stay for symptomatic anemia. She was transfused and tolerated this transfusion much better than she has the most recent. However, she says she is still short of breath. As a matter of fact, she had some dyspnea this morning and turned her o2 up to 4L from her normal 3L. She is 100% currently and is not dyspneic at all. She notes she was seen by dr. virk and he did not feel like a bone marrow biopsy is appropriate at this time. She says he didn't really change any of her treatment. She denies any coughing or wheezing and she has had no fevers.    Review of Systems   Constitutional: Negative for chills and fever.   HENT: Negative for congestion, ear pain, postnasal drip, rhinorrhea, sore throat and trouble swallowing.    Eyes: Negative for redness and itching.   Respiratory: Positive for shortness of breath. Negative for cough and wheezing.    Cardiovascular: Negative for chest pain and palpitations.   Gastrointestinal: Negative for abdominal pain, diarrhea, nausea and vomiting.   Genitourinary: Negative for dysuria and frequency.   Skin: Negative for rash.   Neurological: Negative for weakness and headaches.       Objective:      Physical Exam  Vitals signs and nursing note  reviewed.   Constitutional:       General: She is not in acute distress.     Appearance: She is well-developed.   HENT:      Head: Normocephalic and atraumatic.      Nose: No congestion.      Comments: 3L NC in place  Eyes:      Conjunctiva/sclera: Conjunctivae normal.      Pupils: Pupils are equal, round, and reactive to light.   Neck:      Musculoskeletal: Normal range of motion and neck supple.      Thyroid: No thyromegaly.   Cardiovascular:      Rate and Rhythm: Normal rate and regular rhythm.      Heart sounds: Normal heart sounds.   Pulmonary:      Effort: Pulmonary effort is normal. No respiratory distress.      Breath sounds: Normal breath sounds. No wheezing.   Abdominal:      General: Bowel sounds are normal.      Palpations: Abdomen is soft.      Tenderness: There is no abdominal tenderness.   Musculoskeletal: Normal range of motion.   Lymphadenopathy:      Cervical: No cervical adenopathy.   Skin:     General: Skin is warm and dry.      Findings: No rash.   Neurological:      Mental Status: She is alert and oriented to person, place, and time.   Psychiatric:         Behavior: Behavior normal.         Assessment:       1. Symptomatic anemia    2. Acute on chronic respiratory failure with hypoxia and hypercapnia    3. Chronic respiratory failure with hypoxia    4. Chronic bronchitis, unspecified chronic bronchitis type    5. Chronic atrial fibrillation    6. Chronic congestive heart failure, unspecified heart failure type        Plan:       Chantell was seen today for hospital follow up.    Diagnoses and all orders for this visit:    Symptomatic anemia  -     CBC auto differential; Future  -     CBC auto differential    Acute on chronic respiratory failure with hypoxia and hypercapnia    Chronic respiratory failure with hypoxia    Chronic bronchitis, unspecified chronic bronchitis type    Chronic atrial fibrillation    Chronic congestive heart failure, unspecified heart failure type    will discuss case  persoanllly with hematology.    For now seems at baseline for copd and chf.    RTC if condition acutely worsens or any other concerns, otherwise RTC as scheduled

## 2020-11-03 NOTE — PROGRESS NOTES
Outpatient Care Management  Plan of Care Follow Up Visit    Patient: Chantell Herrera  MRN: [id number]  Date of Service: 11/03/2020  Completed by: Kirsten Jacques RN  Referral Date: 08/26/2020  Program: Case Management (High Risk)        Brief Summary: OPCM follow up call. CM reviewed signs symptoms of CHF and when to report to her physician, see careplan.  MADELINE reviewed upcoming f/u appts with pulmonology on 11/16/20 and Dr. Metcalf with cardiology on 12/9/20 both outside of [facility].  CM will continue to f/u    Patient Summary     Involvement of Care:  Do I have permission to speak with other family members about your care?       Patient Reported Labs & Vitals:  1.  Any Patient Reported Labs & Vitals?     2.  Patient Reported Blood Pressure:     3.  Patient Reported Pulse:     4.  Patient Reported Weight (Kg):     5.  Patient Reported Blood Glucose (mg/dl):       Medical and social history was reviewed with patient and/or caregiver.     Clinical Assessment     Reviewed and provided basic information on available community resources for mental health, transportation, wellness resources, and palliative care programs with patient and/or caregiver.     Complex Care Plan     Care plan was discussed and completed today with input from patient and/or caregiver.    Patient Instructions     Instructions were provided via the [employer] patient resources and are available for the patient to view on the patient portal.    Next Steps: Continue to educate on CHF  F/u medication compliance and daily weights    Patient agrees to follow up on 11/17/20     [facility] OPCM Self-Management Care Plan was developed with the patients/caregivers input and was based on identified barriers from todays assessment.  Goals were written today with the patient/caregiver and the patient has agreed to work towards these goals to improve his/her overall well-being. Patient verbalized understanding of the care plan, goals, and all of today's instructions.  Encouraged patient/caregiver to communicate with his/her physician and health care team about health conditions and the treatment plan.  Provided my contact information today and encouraged patient/caregiver to call me with any questions as needed.

## 2020-11-06 NOTE — ED PROVIDER NOTES
[facility]                                                 Chief Complaint  85 y.o. female with Shortness of Breath     History of Present Illness  Chantell Herrera presents to the emergency room with shortness of breath today  Patient with shortness of breath today, dyspnea on exertion reported today also  Longstanding issue with COPD, longstanding issue with CHF issues noted now  This is compound by the fact the patient has chronic anemia, symptomatic now  Patient has a chronic slow GI bleed, gets transfused several times a year per HX  Patient states he feels or COPD is acting up, that she also needs blood this p.m.    The history is provided by the patient   device was not used during this ER visit    Past Medical History   -- Anxiety     -- Arthritis     -- Asthma     -- Chronic bronchitis     -- COPD (chronic obstructive pulmonary disease)     -- Depression     -- SANTIAGO (dyspnea on exertion)     -- Emphysema of lung     -- Fatigue     -- Hyperlipidemia     -- Hypertension     -- Trouble in sleeping      Past Surgical History   -- APPENDECTOMY     -- BRONCHOSCOPY     -- EYE SURGERY     -- HYSTERECTOMY     -- INSERTION OF PACEMAKER     -- OOPHORECTOMY     -- TONSILLECTOMY        No Known Allergies     I have reviewed all of this patient's past medical, surgical, family, and social   histories as well as active allergies and medications documented in the  electronic medical record    Review of Systems and Physical Exam      Review of Systems  -- Constitution - no fever, denies fatigue, no weakness, no chills  -- Eyes - no tearing or redness, no visual disturbance  -- Ear, Nose - no tinnitus or earache, no nasal congestion or discharge  -- Mouth,Throat - no sore throat, no toothache, normal voice, normal swallowing  -- Respiratory - cough and congestion, shortness of breath, SANTIAGO  -- Cardiovascular - denies chest pain, no palpitations, denies claudication  -- Gastrointestinal  - denies abdominal pain, nausea, vomiting, or diarrhea  -- Genitourinary - no dysuria, denies flank pain, no hematuria, no STD risk  -- Musculoskeletal - denies back pain, negative for trauma or injury  -- Neurological - no headache, denies weakness or seizure; no LOC  -- Skin - denies pallor, rash, or changes in skin. no hives or welts noted  -- Psychiatric - Denies SI or HI, no psychosis or fractured thought noted     Vital Signs  Her oral temperature is 98.5 °F (36.9 °C).   Her blood pressure is 93/41 and her pulse is 66.   Her respiration is 18 and oxygen saturation is 99%.     Physical Exam  -- Nursing note and vitals reviewed  -- Constitutional: Appears well-developed and well-nourished  -- Head: Atraumatic. Normocephalic. No obvious abnormality  -- Eyes: Pupils are equal and reactive to light. Normal conjunctiva and lids  -- Cardiac: Normal rate, regular rhythm and normal heart sounds  -- Pulmonary: faint rhonchi at the bilateral bases with active wheezing   -- Abdominal: Soft, no tenderness. Normal bowel sounds. Normal liver edge  -- Musculoskeletal: Normal range of motion, no effusions. Joints stable   -- Neurological: No focal deficits. Showed good interaction with staff  -- Vascular: Posterior tibial, dorsalis pedis and radial pulses 2+ bilaterally      Emergency Room Course      Lab Results     K 4.6   CL 96   CO2 32 (H)   BUN 26 (H)   CREATININE 1.3    (H)   ALKPHOS 91   AST 12   ALT 26   BILITOT 1.2 (H)   ALBUMIN 3.4 (L)   PROT 5.7 (L)   WBC 9.95   HGB 7.0 (L)   HCT 22.4 (L)      CPK 15 (L)   CPK 15 (L)   CPKMB 0.9   TROPONINI 0.022   INR 1.2    (H)   DDIMER 1.12 (H)   LACTATE 2.2     EKG  -- The EKG findings today were without concerning findings from baseline     Radiology  -- Chest x-ray showed COPD yesterday     Additional Work up  -- rapid Coronavirus PCR was negative  -- CRP, ESR, procalcitonin, LDH and ferritin pending  -- Blood cultures have also been drawn, results  are pending     Medications Given  methylPREDNISolone sodium succinate injection 125 mg (125 mg Intravenous Given 11/6/20 1527)   0.9%  NaCl infusion (for blood administration) (has no administration in time range)   furosemide injection 40 mg (40 mg Intravenous Given 11/6/20 1527)     ED Physician Management  -- Diagnosis management comments: 85 y.o. female with shortness of breath  -- the patient has longstanding issues with COPD and chronic anemia issues  -- admission with IV steroids and breathing treatment, chronic anemia noted  -- transfusion and placed in the ICU for careful transfusion, accepted by [facility]    Diagnosis  [R06.02] SOB (shortness of breath)  [J44.1] COPD exacerbation (Primary)  [D64.9] Symptomatic anemia    Disposition and Plan  -- Disposition: admit  -- Condition: stable    This note is dictated on M*Modal word recognition program.  There are word recognition mistakes that are occasionally missed on review.         Festus Maurer MD  11/06/20 3265

## 2020-11-06 NOTE — NURSING
An 85 yr old female admitted to ICU with diagnosis of COPD. CHF and anemia. Placed on cardiac monitor, sinus rhythm noted. O2 in use per NC at 2.5 lpm. Oriented to surroundings and plan of care. Will be receiving 1 unit PRBC's when available.

## 2020-11-06 NOTE — EICU
Rounding (Video Assessment):  Video rounding completed.  Pt awake & talking w/ bedside RN.  No distress noted.  B/P 114/70, HR 64, RR 18, Sat 100% on 3L/NC.

## 2020-11-07 NOTE — HOSPITAL COURSE
11/7/20  Her HCT did improve with one unit of PRBCs .  We were worried about CHF worsening with her transfusion .  Gave her extra lasix . Her fatigue is better . Shortness of breath improved .  I discussed with her her anemia in detail . Her baseline in august seems to be hemoglobin around 10 and HCT around 30 .  I feel like she should get one more unit before I discharge her ;otherwise she will be back again .    11/8/12  Doing well . Shortness of breath better . No issues with second unit of blood   No CHF exacerbation .  Feels better ; anxious to go home   Lab Results   Component Value Date    WBC 11.39 11/08/2020    HGB 9.0 (L) 11/08/2020    HCT 28.0 (L) 11/08/2020     (H) 11/08/2020     (L) 11/08/2020

## 2020-11-07 NOTE — HPI
Chantell Herrera is a 85 y.o. female with longstanding issues with anemia, CHF, copd  Well known to our service   Dyspnea: Patient complains of shortness of breath while getting dressed.  Symptoms include difficulty breathing, dyspnea on exertion, productive cough, shortness of breath and sputum production. Symptoms began 2 weeks ago, gradually worsening since that time.  Patient denies tightness in chest. Associated symptoms include dyspnea, productive cough, sneezing and wheezing. Patient has had recent travel.  Weight has been stable.  Appetite has been increased. Symptoms are exacerbated by any exercise. Symptoms are alleviated by rest and oxygen.     BNP  Recent Labs   Lab 11/06/20  1501   *         Work up in ER showed severe anemia , high BNP and COPD exacerbation .  In past she went into CHF with PRBCs . Admitted in ICU for closer monitoring

## 2020-11-07 NOTE — PLAN OF CARE
11/07/20 1206   Discharge Assessment   Assessment Type Discharge Planning Assessment   Confirmed/corrected address and phone number on facesheet? Yes   Assessment information obtained from? Patient   Prior to hospitilization cognitive status: Alert/Oriented   Prior to hospitalization functional status: Assistive Equipment   Current cognitive status: Alert/Oriented   Current Functional Status: Assistive Equipment   Facility Arrived From: Home   Lives With spouse   Able to Return to Prior Arrangements yes   Is patient able to care for self after discharge? Yes   Who are your caregiver(s) and their phone number(s)? Emmanuel Herrera (Spouse) [phone]   Patient's perception of discharge disposition home or selfcare   Readmission Within the Last 30 Days no previous admission in last 30 days   Patient currently being followed by outpatient case management? No   Patient currently receives any other outside agency services? No   Equipment Currently Used at Home rollator;shower chair;glucometer;nebulizer;oxygen;other (see comments)  (trilogy, power scooter)   Do you have any problems affording any of your prescribed medications? No   Does the patient have transportation home? Yes   Transportation Anticipated family or friend will provide   Discharge Plan A Home   Discharge Plan B Home with family   DME Needed Upon Discharge  none   Patient/Family in Agreement with Plan yes       Discharge planning assessment completed with patient. Denies any post-acute care needs at this time. SW inquired about home health as the patient has had it in the past. Patient states that at this time she does not feel as though she needs home health. SW to remain available as needed.     Daniela Castro, FRANCHESKA

## 2020-11-07 NOTE — PLAN OF CARE
No falls or near misses noted on shift, generalized bruising to body noted, SCDs for DVT prevention (off per request), no pain complaints, personal preferences provided as requested for food/drinks as diet orders allow, plan of care discussed with patient at bedside, verbalized understanding, evidence of learning noted, saline locked, plan is to maintain ICU status.

## 2020-11-07 NOTE — PLAN OF CARE
Patient admitted to ICU with COPD, CHF and anemia, requiring a blood transfusion. (Hbg/Hct 7/22.4) Bibasilar crackles noted. Dyspneic on exertion. On oxygen per N/C at 3 lpm. Uses oxygen at home continuously at 3 lpm and trilogy at night. Blood glucose monitored with insulin coverage given. Denies any pain. Reviewed plan of care and agrees with plan. Fall precautions maintained, no falls this shift. Call button within reach.

## 2020-11-07 NOTE — CONSULTS
Patient denies any post-acute care needs at this time. Please see discharge assessment for details on patient's current DME at home. Patient denies the need for home health.     Daniela Castro LMSW

## 2020-11-07 NOTE — PROGRESS NOTES
[facility] Hospital Medicine  Progress Note    Patient Name: Chantell Herrera  MRN: [id number]  Patient Class: IP- Inpatient   Admission Date: 11/6/2020  Length of Stay: 1 days  Attending Physician: Milo Estevez MD  Primary Care Provider: Kari Gaspar MD        Subjective:     Principal Problem:<principal problem not specified>        HPI:  Chantell Herrera is a 85 y.o. female with longstanding issues with anemia, CHF, copd  Well known to our service   Dyspnea: Patient complains of shortness of breath while getting dressed.  Symptoms include difficulty breathing, dyspnea on exertion, productive cough, shortness of breath and sputum production. Symptoms began 2 weeks ago, gradually worsening since that time.  Patient denies tightness in chest. Associated symptoms include dyspnea, productive cough, sneezing and wheezing. Patient has had recent travel.  Weight has been stable.  Appetite has been increased. Symptoms are exacerbated by any exercise. Symptoms are alleviated by rest and oxygen.     BNP  Recent Labs   Lab 11/06/20  1501   *         Work up in ER showed severe anemia , high BNP and COPD exacerbation .  In past she went into CHF with PRBCs . Admitted in ICU for closer monitoring       Overview/Hospital Course:  11/7/20  Her HCT did improve with one unit of PRBCs .  We were worried about CHF worsening with her transfusion .  Gave her extra lasix . Her fatigue is better . Shortness of breath improved .  I discussed with her her anemia in detail . Her baseline in august seems to be hemoglobin around 10 and HCT around 30 .  I feel like she should get one more unit before I discharge her ;otherwise she will be back again         Review of Systems   Constitutional: Negative for appetite change, chills and fever.   HENT: Negative for congestion, ear discharge, ear pain, rhinorrhea, sinus pressure and sore throat.    Respiratory: Positive for cough and shortness of breath. Negative  for choking and chest tightness.         With coughing and increased activity   Cardiovascular: Negative for chest pain and palpitations.   Gastrointestinal: Negative for constipation, diarrhea, nausea and vomiting.   Musculoskeletal: Negative for joint swelling and myalgias.   Skin: Negative for rash and wound.   Neurological: Negative for dizziness, syncope, weakness, light-headedness and numbness.     Objective:     Vital Signs (Most Recent):  Temp: 97.8 °F (36.6 °C) (11/07/20 0715)  Pulse: 65 (11/07/20 0630)  Resp: (!) 21 (11/07/20 0630)  BP: (!) 106/48 (11/07/20 0600)  SpO2: 95 % (11/07/20 0630) Vital Signs (24h Range):  Temp:  [96.4 °F (35.8 °C)-98.5 °F (36.9 °C)] 97.8 °F (36.6 °C)  Pulse:  [58-88] 65  Resp:  [13-25] 21  SpO2:  [88 %-100 %] 95 %  BP: ()/(37-70) 106/48     Weight: 56.5 kg (124 lb 8 oz)  Body mass index is 22.77 kg/m².    Intake/Output Summary (Last 24 hours) at 11/7/2020 0751  Last data filed at 11/7/2020 0500  Gross per 24 hour   Intake 414.17 ml   Output 1400 ml   Net -985.83 ml      Physical Exam  Vitals signs and nursing note reviewed.   Constitutional:       Appearance: She is well-developed.   HENT:      Head: Normocephalic and atraumatic.      Right Ear: External ear normal.      Left Ear: External ear normal.      Nose: Nose normal.   Eyes:      Conjunctiva/sclera: Conjunctivae normal.      Pupils: Pupils are equal, round, and reactive to light.   Neck:      Musculoskeletal: Normal range of motion and neck supple.      Thyroid: No thyromegaly.   Cardiovascular:      Rate and Rhythm: Normal rate and regular rhythm.      Heart sounds: Normal heart sounds.   Pulmonary:      Effort: Pulmonary effort is normal.      Breath sounds: Wheezing present.   Abdominal:      General: Bowel sounds are normal.      Palpations: Abdomen is soft.   Musculoskeletal: Normal range of motion.   Skin:     General: Skin is warm and dry.      Coloration: Skin is pale.   Neurological:      Mental Status:  She is alert and oriented to person, place, and time.      Deep Tendon Reflexes: Reflexes are normal and symmetric.   Psychiatric:         Behavior: Behavior normal.         Thought Content: Thought content normal.         Judgment: Judgment normal.         Significant Labs:   CBC:   Recent Labs   Lab 11/06/20  1501 11/07/20  0449   WBC 9.95 15.82*   HGB 7.0* 7.9*   HCT 22.4* 24.4*    137*     CMP:   Recent Labs   Lab 11/06/20  1501 11/07/20  0449    140   K 4.6 4.4   CL 96 95   CO2 32* 34*   * 232*   BUN 26* 32*   CREATININE 1.3 1.2   CALCIUM 8.3* 8.2*   PROT 5.7* 5.6*   ALBUMIN 3.4* 3.3*   BILITOT 1.2* 2.4*   ALKPHOS 91 84   AST 12 10   ALT 26 23   ANIONGAP 10 11   EGFRNONAA 38* 42*     Magnesium: No results for input(s): MG in the last 48 hours.  Troponin:   Recent Labs   Lab 11/06/20  1501 11/06/20  1729 11/06/20  2253   TROPONINI 0.022 0.024 0.033*     Urine Studies:   Recent Labs   Lab 11/06/20  1606   COLORU Yellow   APPEARANCEUA Clear   PHUR 6.0   SPECGRAV 1.020   PROTEINUA Negative   GLUCUA Negative   KETONESU Negative   BILIRUBINUA Negative   OCCULTUA Negative   NITRITE Negative   UROBILINOGEN Negative   LEUKOCYTESUR 1+*   RBCUA 3   WBCUA 6*   BACTERIA Occasional   SQUAMEPITHEL 15   HYALINECASTS 30*       Assessment/Plan:      COPD exacerbation        Symptomatic anemia  Transfuse 1 PRBcs  Slowly  Today .  I am hoping for HCT around 28-30 before discharge   And work up for anemia as outpatient       Long term current use of systemic steroids  She got IV steroid in ER   Resume prednisone 20 mg daily .      COPD with acute exacerbation  Supplemental O2 via nasal canula; titrate O2 saturation to >92%.   Start steroids   Pulmonary consultation.   Continue beta 2 agonist bronchodilator treatments.       Continue routine medications as before              Anemia  Symptomatic anemia   Transfuse 1 unit very slowly   Lasix in between unit .  11/7/20    Will transfuse slowly one more unit today            Acute on chronic respiratory failure with hypoxia  Dr nino started her on BIPAP       Chronic respiratory failure with hypoxia  Resume oxygen .        Insomnia  Trazodone resumed.      Hyperlipidemia  Resume her statins  Lab Results   Component Value Date    LDLCALC 53.6 (L) 04/13/2020           HTN (hypertension)  Resume her antihypertensives.        VTE Risk Mitigation (From admission, onward)         Ordered     IP VTE HIGH RISK PATIENT  Once      11/06/20 1708     Place sequential compression device  Until discontinued      11/06/20 1708                Discharge Planning   HARPER:      Code Status: Full Code   Is the patient medically ready for discharge?:     Reason for patient still in hospital (select all that apply): Treatment               Critical care time spent on the evaluation and treatment of severe organ dysfunction, review of pertinent labs and imaging studies, discussions with consulting providers and discussions with patient/family: 32 minutes.      Milo Estevez MD  Department of Hospital Medicine   [facility]

## 2020-11-07 NOTE — ASSESSMENT & PLAN NOTE
Transfuse 1 PRBcs  Slowly  Today .  I am hoping for HCT around 28-30 before discharge   And work up for anemia as outpatient

## 2020-11-07 NOTE — NURSING
"Dr Hickman at bedside, updated MD on vitals, status, condition, new orders noted. MD aware that Metoprolol was held, states "that is okay." Updated MD on elevated glucose, and previous insulin administered. Will continue to monitor and update MD as needed.  "

## 2020-11-07 NOTE — NURSING
Received report from STACIA Bonilla    Patient awake and lying in bed at this time. No family at bedside. Saline locked, per report 1 unit PRBC is ready to be transfused.    Physical to follow.

## 2020-11-07 NOTE — ASSESSMENT & PLAN NOTE
Supplemental O2 via nasal canula; titrate O2 saturation to >92%.   Start steroids   Pulmonary consultation.   Continue beta 2 agonist bronchodilator treatments.       Continue routine medications as before.

## 2020-11-07 NOTE — PLAN OF CARE
Patient received 2nd unit of PRBC's today. (Hgb/Hct 7.9/24.4) Tolerated well with lasix 20 mg given IV senior care during transfusion. Breath sounds with bibasilar crackles posteriorly, exp wheezes to upper lobes anteriorly. Patient's  brought in her own Trilogy machine for her use while she is in hospital. Assisted OOB to void on BSC, short of breath on exertion. O2 in use at 3 lpm per N/C, sats 95% at rest. Appetite good, afebrile, VS stable. Blood glucose monitored with SS and mealtime insulin coverage given. No falls or injuries. Aware and agrees with plan of care.

## 2020-11-07 NOTE — CONSULTS
[facility]  Pulmonology  Consult Note    Patient Name: Chantell Herrera  MRN: [id number]  Admission Date: 11/6/2020  Hospital Length of Stay: 0 days  Code Status: Full Code  Attending Physician: Milo Estevez MD  Primary Care Provider: Kari Gaspar MD   Principal Problem: <principal problem not specified>    Consults  Subjective:     HPI:  Chantell Herrera is a 85 y.o. year old female that's presents with a chief complaint of SOB and Wheezing for several days.States she was SOB walking across the room without a lot of wheezing BS was 387 and hb 7.0/22.4 hct. She wears trilogy at home and is steroid dependent .Consulted to evaluate Respiratory status.    Past Medical History:   Diagnosis Date    A-fib     Acute on chronic congestive heart failure 2/1/2019    Anxiety     Arthritis     Asthma     Atrial fibrillation with rapid ventricular response     CHF (congestive heart failure) 11/29/2018    Chronic bronchitis     COPD (chronic obstructive pulmonary disease)     Depression     Diabetes mellitus, type 2     SANTIAGO (dyspnea on exertion)     Emphysema of lung     Fatigue     Hyperlipidemia     Hypertension     Symptomatic anemia 1/22/2019    Trouble in sleeping         Past Surgical History:   Procedure Laterality Date    APPENDECTOMY      BRONCHOSCOPY N/A 12/3/2019    Procedure: BRONCHOSCOPY;  Surgeon: Emmanuel Hickman MD;  Location: [facility] OR;  Service: Pulmonary;  Laterality: N/A;    EYE SURGERY      HYSTERECTOMY      INSERTION OF PACEMAKER  12/26/2019    OOPHORECTOMY      TONSILLECTOMY         Prior to Admission medications    Medication Sig Start Date End Date Taking? Authorizing Provider   albuterol (PROVENTIL) 2.5 mg /3 mL (0.083 %) nebulizer solution Take 3 mLs (2.5 mg total) by nebulization every 6 (six) hours as needed for Wheezing. 12/1/19  Yes Chicho Fisher MD   aspirin (ECOTRIN) 81 MG EC tablet Take 1 tablet (81 mg total) by mouth once daily. 1/21/20  "1/20/21 Yes Milo Estevez MD   baclofen (LIORESAL) 10 MG tablet Take 10 mg by mouth 3 (three) times daily.   Yes Historical Provider   citalopram (CELEXA) 20 MG tablet Take 1 tablet (20 mg total) by mouth once daily. 12/2/19 12/1/20 Yes Chicho Fisher MD   insulin detemir U-100 (LEVEMIR FLEXTOUCH) 100 unit/mL (3 mL) SubQ InPn pen Inject 10 Units into the skin every evening. 9/29/20 9/29/21 Yes Kari Gaspar MD   ipratropium (ATROVENT) 0.02 % nebulizer solution Take 500 mcg by nebulization 4 (four) times daily.  3/18/16  Yes Historical Provider   losartan (COZAAR) 50 MG tablet 50 mg once daily.  7/9/20  Yes Historical Provider   meclizine (ANTIVERT) 25 mg tablet 2 (two) times daily as needed for Dizziness.  9/8/20  Yes Historical Provider   metoprolol succinate (TOPROL-XL) 50 MG 24 hr tablet Take 50 mg by mouth 2 (two) times daily.  2/4/20  Yes Historical Provider   pravastatin (PRAVACHOL) 40 MG tablet Take 40 mg by mouth once daily.   Yes Historical Provider   predniSONE (DELTASONE) 20 MG tablet Take 1 tablet (20 mg total) by mouth once daily. 10/6/20  Yes Dennise Virgen NP   rOPINIRole (REQUIP) 1 MG tablet TAKE 1 TABLET  EVERY EVENING. 4/7/20  Yes Kari Gaspar MD   torsemide (DEMADEX) 20 MG Tab Take 1 tablet (20 mg total) by mouth once daily. 8/25/20 8/25/21 Yes Bina Razo MD   traZODone (DESYREL) 50 MG tablet TAKE 1 TABLET EVERY EVENING 10/15/20  Yes Kari Gaspar MD   ACCU-CHEK SOFTCLIX LANCETS Misc 1 each by Misc.(Non-Drug; Combo Route) route once daily. 3/17/20   Kari Gaspar MD   alcohol swabs (BD ALCOHOL SWABS) PadM Apply 1 each topically as needed. 3/17/20   Kari Gaspar MD   BD ULTRA-FINE SHORT PEN NEEDLE 31 gauge x 5/16" Ndle USE TO INJECT LEVEMIR ONCE DAILY 5/12/20   Kari Gaspar MD   blood glucose control high,low (ACCU-CHEK KUMAR CONTROL SOLN) Soln 1 each by Misc.(Non-Drug; Combo Route) route as needed. 3/17/20 3/17/21  Kari Gaspar MD   blood sugar " diagnostic (ACCU-CHEK KUMAR PLUS TEST STRP) Strp 1 strip by Misc.(Non-Drug; Combo Route) route once daily. 3/17/20   Kari Gaspar MD   blood-glucose meter (ACCU-CHEK KUMAR PLUS METER) Misc 1 each by Misc.(Non-Drug; Combo Route) route once daily. 3/17/20 3/17/21  Kari Gaspar MD   cholecalciferol, vitamin D3, (VITAMIN D3) 2,000 unit Tab Take 2,000 Units by mouth once daily.    Historical Provider   mupirocin (BACTROBAN) 2 % ointment Apply topically 2 (two) times daily.  Patient not taking: Reported on 10/16/2020 4/29/20   Kari Gaspar MD   omeprazole (PRILOSEC) 40 MG capsule  20   Historical Provider   thiamine 100 MG tablet Take 1 tablet (100 mg total) by mouth once daily. 20   Lulu Jacinto MD   tiotropium (SPIRIVA) 18 mcg inhalation capsule Inhale 1 capsule (18 mcg total) into the lungs once daily. Controller 20  Nikolas Mcdonald MD   weigh scale [facility] Record daily weights 3/10/20   Kari Gaspar MD       Social History     Socioeconomic History    Marital status:      Spouse name: Not on file    Number of children: Not on file    Years of education: Not on file    Highest education level: Not on file   Occupational History    Not on file   Social Needs    Financial resource strain: Not on file    Food insecurity     Worry: Not on file     Inability: Not on file    Transportation needs     Medical: Not on file     Non-medical: Not on file   Tobacco Use    Smoking status: Former Smoker     Quit date: 8/3/2000     Years since quittin.2    Smokeless tobacco: Never Used   Substance and Sexual Activity    Alcohol use: No    Drug use: No    Sexual activity: Not Currently   Lifestyle    Physical activity     Days per week: Not on file     Minutes per session: Not on file    Stress: Not on file   Relationships    Social connections     Talks on phone: Not on file     Gets together: Not on file     Attends [employer] service: Not on file     Active member  of club or organization: Not on file     Attends meetings of clubs or organizations: Not on file     Relationship status: Not on file   Other Topics Concern    Not on file   Social History Narrative    Not on file       Family History   Problem Relation Age of Onset    Heart disease Mother     Hypertension Mother     Hypertension Father     Diabetes Father     Cancer Sister     COPD Brother     Hypertension Brother     No Known Problems Daughter     Kidney disease Son     COPD Daughter        Review of patient's allergies indicates:  No Known Allergies Allergies have been reviewed.     ROS: ROS    PE:   Vitals:    11/06/20 2100 11/06/20 2115 11/06/20 2130 11/06/20 2131   BP: (!) 106/56   123/62   BP Location: Left arm   Left arm   Patient Position: Lying   Lying   Pulse: 73 70 68 70   Resp: (!) 21 (!) 21 18 (!) 21   Temp: 98.4 °F (36.9 °C)   96.8 °F (36 °C)   TempSrc: Tympanic   Tympanic   SpO2: (!) 93% (!) 91% (!) 92% (!) 92%   Weight:       Height:        Physical Exam    Alert and orientated X 3   HEENT: Head: Normocephalic no trauma                Ears : Normal Pinna No Drainage no Battles sign                Eyes: Vision Unchanged, No conjunctivitis,No drainage                Neck: Supple, No JVD,No Abnormal Carotid Pulsations                Throat: No Erythema, No pus,No Swelling,Mallampati score= 3    Chest: Wheezing R>L mild rhonchi but fair air entry   Cardiac: RRR S1+ S2 with a -S3: +M = 2/6, No R/H/G  Abdomen: Bowel Sounds are Normal.Soft Abdomen. No organomegaly of Liver,Spleen,or Kidneys   CNS: Non focal and intact. Cranial nerves 2, 346,8,9,10 and 12 are normal.Norrmal gait.Normal posture.  Extremities: No Clubbing,No Cyanosis with oxygen,Positive mild edema of lower extremities Bilateral  Skin: No Rash, No Ulcerative sores,and No cellulitis of the IV site.    Lab Results   Component Value Date    WBC 9.95 11/06/2020    HGB 7.0 (L) 11/06/2020    HCT 22.4 (L) 11/06/2020     11/06/2020     CHOL 129 04/13/2020    TRIG 87 04/13/2020    HDL 58 04/13/2020    ALT 26 11/06/2020    AST 12 11/06/2020     11/06/2020    K 4.6 11/06/2020    CL 96 11/06/2020    CREATININE 1.3 11/06/2020    BUN 26 (H) 11/06/2020    CO2 32 (H) 11/06/2020    TSH 2.122 07/03/2020    INR 1.2 11/06/2020    HGBA1C 6.6 (H) 08/23/2020               Assessment/Plan:     Chronic respiratory failure with hypoxia  On Oxygen and trilogy     COPD exacerbation  severe    Long term current use of systemic steroids  Will need steroids in house     COPD with acute exacerbation  Mild wheezing and fair air entry with wheeze R>L    Anemia  ?? Where blood loss    Acute on chronic respiratory failure with hypoxia  On trilogy at night     HTN (hypertension)  Low bp metoprolol moe tonight     AFIB   Hx/o afib  She is  in NSR pacemaker is not firing .      Start bipap  Thank you for your consult. I will follow-up with patient. Please contact us if you have any additional questions.     Emmanuel Hickman MD  Pulmonology  [facility]

## 2020-11-07 NOTE — EICU
Rounding (Video Assessment):  Yes        Comments: A&O x's 4 very pleasant, NC 92% RR 23, no s/s of acute distress at this time, no critical drips at this time, VSS 70 111/51

## 2020-11-07 NOTE — NURSING
"Resting well, no complaints, PRBC transfusion at 90 ml/hr, on cell phone playing games and also watching TV, called family.    Offered to call Dr Hickman for BIPAP to use tonight as patient's home BIPAP is not here, states "I do not want to wear it tonight, it is okay, will have it brought here tomorrow."  "

## 2020-11-07 NOTE — SUBJECTIVE & OBJECTIVE
Review of Systems   Constitutional: Negative for appetite change, chills and fever.   HENT: Negative for congestion, ear discharge, ear pain, rhinorrhea, sinus pressure and sore throat.    Respiratory: Positive for cough and shortness of breath. Negative for choking and chest tightness.         With coughing and increased activity   Cardiovascular: Negative for chest pain and palpitations.   Gastrointestinal: Negative for constipation, diarrhea, nausea and vomiting.   Musculoskeletal: Negative for joint swelling and myalgias.   Skin: Negative for rash and wound.   Neurological: Negative for dizziness, syncope, weakness, light-headedness and numbness.     Objective:     Vital Signs (Most Recent):  Temp: 97.8 °F (36.6 °C) (11/07/20 0715)  Pulse: 65 (11/07/20 0630)  Resp: (!) 21 (11/07/20 0630)  BP: (!) 106/48 (11/07/20 0600)  SpO2: 95 % (11/07/20 0630) Vital Signs (24h Range):  Temp:  [96.4 °F (35.8 °C)-98.5 °F (36.9 °C)] 97.8 °F (36.6 °C)  Pulse:  [58-88] 65  Resp:  [13-25] 21  SpO2:  [88 %-100 %] 95 %  BP: ()/(37-70) 106/48     Weight: 56.5 kg (124 lb 8 oz)  Body mass index is 22.77 kg/m².    Intake/Output Summary (Last 24 hours) at 11/7/2020 0751  Last data filed at 11/7/2020 0500  Gross per 24 hour   Intake 414.17 ml   Output 1400 ml   Net -985.83 ml      Physical Exam  Vitals signs and nursing note reviewed.   Constitutional:       Appearance: She is well-developed.   HENT:      Head: Normocephalic and atraumatic.      Right Ear: External ear normal.      Left Ear: External ear normal.      Nose: Nose normal.   Eyes:      Conjunctiva/sclera: Conjunctivae normal.      Pupils: Pupils are equal, round, and reactive to light.   Neck:      Musculoskeletal: Normal range of motion and neck supple.      Thyroid: No thyromegaly.   Cardiovascular:      Rate and Rhythm: Normal rate and regular rhythm.      Heart sounds: Normal heart sounds.   Pulmonary:      Effort: Pulmonary effort is normal.      Breath sounds:  Wheezing present.   Abdominal:      General: Bowel sounds are normal.      Palpations: Abdomen is soft.   Musculoskeletal: Normal range of motion.   Skin:     General: Skin is warm and dry.      Coloration: Skin is pale.   Neurological:      Mental Status: She is alert and oriented to person, place, and time.      Deep Tendon Reflexes: Reflexes are normal and symmetric.   Psychiatric:         Behavior: Behavior normal.         Thought Content: Thought content normal.         Judgment: Judgment normal.         Significant Labs:   CBC:   Recent Labs   Lab 11/06/20  1501 11/07/20  0449   WBC 9.95 15.82*   HGB 7.0* 7.9*   HCT 22.4* 24.4*    137*     CMP:   Recent Labs   Lab 11/06/20  1501 11/07/20  0449    140   K 4.6 4.4   CL 96 95   CO2 32* 34*   * 232*   BUN 26* 32*   CREATININE 1.3 1.2   CALCIUM 8.3* 8.2*   PROT 5.7* 5.6*   ALBUMIN 3.4* 3.3*   BILITOT 1.2* 2.4*   ALKPHOS 91 84   AST 12 10   ALT 26 23   ANIONGAP 10 11   EGFRNONAA 38* 42*     Magnesium: No results for input(s): MG in the last 48 hours.  Troponin:   Recent Labs   Lab 11/06/20  1501 11/06/20  1729 11/06/20  2253   TROPONINI 0.022 0.024 0.033*     Urine Studies:   Recent Labs   Lab 11/06/20  1606   COLORU Yellow   APPEARANCEUA Clear   PHUR 6.0   SPECGRAV 1.020   PROTEINUA Negative   GLUCUA Negative   KETONESU Negative   BILIRUBINUA Negative   OCCULTUA Negative   NITRITE Negative   UROBILINOGEN Negative   LEUKOCYTESUR 1+*   RBCUA 3   WBCUA 6*   BACTERIA Occasional   SQUAMEPITHEL 15   HYALINECASTS 30*

## 2020-11-07 NOTE — SUBJECTIVE & OBJECTIVE
Past Medical History:   Diagnosis Date    A-fib     Acute on chronic congestive heart failure 2/1/2019    Anxiety     Arthritis     Asthma     Atrial fibrillation with rapid ventricular response     CHF (congestive heart failure) 11/29/2018    Chronic bronchitis     COPD (chronic obstructive pulmonary disease)     Depression     Diabetes mellitus, type 2     SANTIAGO (dyspnea on exertion)     Emphysema of lung     Fatigue     Hyperlipidemia     Hypertension     Symptomatic anemia 1/22/2019    Trouble in sleeping        Past Surgical History:   Procedure Laterality Date    APPENDECTOMY      BRONCHOSCOPY N/A 12/3/2019    Procedure: BRONCHOSCOPY;  Surgeon: Emmanuel Hickman MD;  [facility];  Service: Pulmonary;  Laterality: N/A;    EYE SURGERY      HYSTERECTOMY      INSERTION OF PACEMAKER  12/26/2019    OOPHORECTOMY      TONSILLECTOMY         Review of patient's allergies indicates:  No Known Allergies    No current facility-administered medications on file prior to encounter.      Current Outpatient Medications on File Prior to Encounter   Medication Sig    albuterol (PROVENTIL) 2.5 mg /3 mL (0.083 %) nebulizer solution Take 3 mLs (2.5 mg total) by nebulization every 6 (six) hours as needed for Wheezing.    aspirin (ECOTRIN) 81 MG EC tablet Take 1 tablet (81 mg total) by mouth once daily.    baclofen (LIORESAL) 10 MG tablet Take 10 mg by mouth 3 (three) times daily.    citalopram (CELEXA) 20 MG tablet Take 1 tablet (20 mg total) by mouth once daily.    insulin detemir U-100 (LEVEMIR FLEXTOUCH) 100 unit/mL (3 mL) SubQ InPn pen Inject 10 Units into the skin every evening.    ipratropium (ATROVENT) 0.02 % nebulizer solution Take 500 mcg by nebulization 4 (four) times daily.     losartan (COZAAR) 50 MG tablet 50 mg once daily.     meclizine (ANTIVERT) 25 mg tablet 2 (two) times daily as needed for Dizziness.     metoprolol succinate (TOPROL-XL) 50 MG 24 hr tablet Take 50 mg by mouth 2 (two)  "times daily.     pravastatin (PRAVACHOL) 40 MG tablet Take 40 mg by mouth once daily.    predniSONE (DELTASONE) 20 MG tablet Take 1 tablet (20 mg total) by mouth once daily.    rOPINIRole (REQUIP) 1 MG tablet TAKE 1 TABLET  EVERY EVENING.    torsemide (DEMADEX) 20 MG Tab Take 1 tablet (20 mg total) by mouth once daily.    traZODone (DESYREL) 50 MG tablet TAKE 1 TABLET EVERY EVENING    ACCU-CHEK SOFTCLIX LANCETS Misc 1 each by Misc.(Non-Drug; Combo Route) route once daily.    alcohol swabs (BD ALCOHOL SWABS) PadM Apply 1 each topically as needed.    BD ULTRA-FINE SHORT PEN NEEDLE 31 gauge x 5/16" Ndle USE TO INJECT LEVEMIR ONCE DAILY    blood glucose control high,low (ACCU-CHEK KUMAR CONTROL SOLN) Soln 1 each by Misc.(Non-Drug; Combo Route) route as needed.    blood sugar diagnostic (ACCU-CHEK KUMAR PLUS TEST STRP) Strp 1 strip by Misc.(Non-Drug; Combo Route) route once daily.    blood-glucose meter (ACCU-CHEK KUMAR PLUS METER) Misc 1 each by Misc.(Non-Drug; Combo Route) route once daily.    cholecalciferol, vitamin D3, (VITAMIN D3) 2,000 unit Tab Take 2,000 Units by mouth once daily.    mupirocin (BACTROBAN) 2 % ointment Apply topically 2 (two) times daily. (Patient not taking: Reported on 10/16/2020)    omeprazole (PRILOSEC) 40 MG capsule     thiamine 100 MG tablet Take 1 tablet (100 mg total) by mouth once daily.    tiotropium (SPIRIVA) 18 mcg inhalation capsule Inhale 1 capsule (18 mcg total) into the lungs once daily. Controller    weigh scale Misc Record daily weights     Family History     Problem Relation (Age of Onset)    COPD Brother, Daughter    Cancer Sister    Diabetes Father    Heart disease Mother    Hypertension Mother, Father, Brother    Kidney disease Son    No Known Problems Daughter        Tobacco Use    Smoking status: Former Smoker     Quit date: 8/3/2000     Years since quittin.2    Smokeless tobacco: Never Used   Substance and Sexual Activity    Alcohol use: No    " Drug use: No    Sexual activity: Not Currently     Review of Systems   Constitutional: Positive for fatigue. Negative for appetite change, chills and fever.   HENT: Negative for congestion, ear discharge, ear pain, rhinorrhea, sinus pressure and sore throat.    Respiratory: Positive for cough, shortness of breath and wheezing. Negative for choking and chest tightness.         With coughing and increased activity   Cardiovascular: Negative for chest pain and palpitations.   Gastrointestinal: Negative for constipation, diarrhea, nausea and vomiting.   Musculoskeletal: Negative for joint swelling and myalgias.   Skin: Negative for rash and wound.   Neurological: Negative for dizziness, syncope, weakness, light-headedness and numbness.     Objective:     Vital Signs (Most Recent):  Temp: 97.1 °F (36.2 °C) (11/06/20 1709)  Pulse: 71 (11/06/20 1800)  Resp: 17 (11/06/20 1800)  BP: (!) 98/44 (11/06/20 1800)  SpO2: (!) 92 % (11/06/20 1800) Vital Signs (24h Range):  Temp:  [97.1 °F (36.2 °C)-98.5 °F (36.9 °C)] 97.1 °F (36.2 °C)  Pulse:  [64-71] 71  Resp:  [17-18] 17  SpO2:  [92 %-99 %] 92 %  BP: ()/(41-70) 98/44     Weight: 56.5 kg (124 lb 8 oz)  Body mass index is 22.77 kg/m².    Physical Exam  Vitals signs and nursing note reviewed.   Constitutional:       Appearance: She is well-developed.   HENT:      Head: Normocephalic and atraumatic.      Right Ear: External ear normal.      Left Ear: External ear normal.      Nose: Nose normal.   Eyes:      Conjunctiva/sclera: Conjunctivae normal.      Pupils: Pupils are equal, round, and reactive to light.   Neck:      Musculoskeletal: Normal range of motion and neck supple.      Thyroid: No thyromegaly.   Cardiovascular:      Rate and Rhythm: Normal rate and regular rhythm.      Heart sounds: Normal heart sounds.   Pulmonary:      Effort: Pulmonary effort is normal.      Breath sounds: Wheezing and rales present.   Abdominal:      General: Bowel sounds are normal.       Palpations: Abdomen is soft.   Musculoskeletal: Normal range of motion.   Skin:     General: Skin is warm and dry.   Neurological:      Mental Status: She is alert and oriented to person, place, and time.      Deep Tendon Reflexes: Reflexes are normal and symmetric.   Psychiatric:         Behavior: Behavior normal.         Thought Content: Thought content normal.         Judgment: Judgment normal.           CRANIAL NERVES     CN III, IV, VI   Pupils are equal, round, and reactive to light.       Significant Labs:   CBC:   Recent Labs   Lab 11/06/20  1501   WBC 9.95   HGB 7.0*   HCT 22.4*        CMP:   Recent Labs   Lab 11/06/20  1501      K 4.6   CL 96   CO2 32*   *   BUN 26*   CREATININE 1.3   CALCIUM 8.3*   PROT 5.7*   ALBUMIN 3.4*   BILITOT 1.2*   ALKPHOS 91   AST 12   ALT 26   ANIONGAP 10   EGFRNONAA 38*     Lactic Acid:   Recent Labs   Lab 11/06/20  1501   LACTATE 2.2     Troponin:   Recent Labs   Lab 11/06/20  1501 11/06/20  1729   TROPONINI 0.022 0.024     TSH:   Recent Labs   Lab 07/03/20  0613   TSH 2.122     Urine Studies:   Recent Labs   Lab 11/06/20  1606   COLORU Yellow   APPEARANCEUA Clear   PHUR 6.0   SPECGRAV 1.020   PROTEINUA Negative   GLUCUA Negative   KETONESU Negative   BILIRUBINUA Negative   OCCULTUA Negative   NITRITE Negative   UROBILINOGEN Negative   LEUKOCYTESUR 1+*   RBCUA 3   WBCUA 6*   BACTERIA Occasional   SQUAMEPITHEL 15   HYALINECASTS 30*     BNP  Recent Labs   Lab 11/06/20  1501   *     EKG:  Pacemaker in place.  The cardiac silhouette is within normal limits.  Atherosclerotic changes of the aorta.  Scarring along the right minor fissure.  Mild stable chronic lung changes.  No focal infiltrate or effusion.  Degenerative changes of the spine.     Significant Imaging: CXR: I have reviewed all pertinent results/findings within the past 24 hours and my personal findings are:  see below    Pacemaker in place.  The cardiac silhouette is within normal limits.   Atherosclerotic changes of the aorta.  Scarring along the right minor fissure.  Mild stable chronic lung changes.  No focal infiltrate or effusion.  Degenerative changes of the spine.

## 2020-11-07 NOTE — PROGRESS NOTES
"O2 sat noted to be 86-88% on 3L NC, increased to 4L NC. Complaints of shortness of breath after going to bathroom previously "not bad," denies shortness of breath at this time, minimal assistance needed to ambulate to bathroom, able to clean self. Will continue to monitor. Beginning PRBC transfusion as ordered.  "

## 2020-11-07 NOTE — EICU
Rounding (Video Assessment):  Yes    Intervention Initiated From:  [facility] / EICU    Comments: Video rounds complete. Patient resting in bed with no complaints, or distress noted. Call light within reach, and patient denies needs at this time.

## 2020-11-07 NOTE — H&P
"[facility] Medicine  History & Physical    Patient Name: Chantell Herrera  MRN: [id number]  Admission Date: 11/6/2020  Attending Physician: Milo Estevez MD   Primary Care Provider: Kari Gaspar MD         Patient information was obtained from patient, past medical records and ER records.     Subjective:     Principal Problem:     SOB/CHF/COPD/symptomatic anemia    Chief Complaint:   Chief Complaint   Patient presents with    Shortness of Breath     85 yr old female to ED with c/o SOB worse than usual. Patient reports "it is worse with moving around." Patient reports "coughing more than usual."        HPI:   Chantell Herrera is a 85 y.o. female with longstanding issues with anemia, CHF, copd  Well known to our service   Dyspnea: Patient complains of shortness of breath while getting dressed.  Symptoms include difficulty breathing, dyspnea on exertion, productive cough, shortness of breath and sputum production. Symptoms began 2 weeks ago, gradually worsening since that time.  Patient denies tightness in chest. Associated symptoms include dyspnea, productive cough, sneezing and wheezing. Patient has had recent travel.  Weight has been stable.  Appetite has been increased. Symptoms are exacerbated by any exercise. Symptoms are alleviated by rest and oxygen.     BNP  Recent Labs   Lab 11/06/20  1501   *         Work up in ER showed severe anemia , high BNP and COPD exacerbation .  In past she went into CHF with PRBCs . Admitted in ICU for closer monitoring       Past Medical History:   Diagnosis Date    A-fib     Acute on chronic congestive heart failure 2/1/2019    Anxiety     Arthritis     Asthma     Atrial fibrillation with rapid ventricular response     CHF (congestive heart failure) 11/29/2018    Chronic bronchitis     COPD (chronic obstructive pulmonary disease)     Depression     Diabetes mellitus, type 2     SANTIAGO (dyspnea on exertion)     Emphysema of lung "     Fatigue     Hyperlipidemia     Hypertension     Symptomatic anemia 1/22/2019    Trouble in sleeping        Past Surgical History:   Procedure Laterality Date    APPENDECTOMY      BRONCHOSCOPY N/A 12/3/2019    Procedure: BRONCHOSCOPY;  Surgeon: Emmanuel Hickman MD;  Location: [facility];  Service: Pulmonary;  Laterality: N/A;    EYE SURGERY      HYSTERECTOMY      INSERTION OF PACEMAKER  12/26/2019    OOPHORECTOMY      TONSILLECTOMY         Review of patient's allergies indicates:  No Known Allergies    No current facility-administered medications on file prior to encounter.      Current Outpatient Medications on File Prior to Encounter   Medication Sig    albuterol (PROVENTIL) 2.5 mg /3 mL (0.083 %) nebulizer solution Take 3 mLs (2.5 mg total) by nebulization every 6 (six) hours as needed for Wheezing.    aspirin (ECOTRIN) 81 MG EC tablet Take 1 tablet (81 mg total) by mouth once daily.    baclofen (LIORESAL) 10 MG tablet Take 10 mg by mouth 3 (three) times daily.    citalopram (CELEXA) 20 MG tablet Take 1 tablet (20 mg total) by mouth once daily.    insulin detemir U-100 (LEVEMIR FLEXTOUCH) 100 unit/mL (3 mL) SubQ InPn pen Inject 10 Units into the skin every evening.    ipratropium (ATROVENT) 0.02 % nebulizer solution Take 500 mcg by nebulization 4 (four) times daily.     losartan (COZAAR) 50 MG tablet 50 mg once daily.     meclizine (ANTIVERT) 25 mg tablet 2 (two) times daily as needed for Dizziness.     metoprolol succinate (TOPROL-XL) 50 MG 24 hr tablet Take 50 mg by mouth 2 (two) times daily.     pravastatin (PRAVACHOL) 40 MG tablet Take 40 mg by mouth once daily.    predniSONE (DELTASONE) 20 MG tablet Take 1 tablet (20 mg total) by mouth once daily.    rOPINIRole (REQUIP) 1 MG tablet TAKE 1 TABLET  EVERY EVENING.    torsemide (DEMADEX) 20 MG Tab Take 1 tablet (20 mg total) by mouth once daily.    traZODone (DESYREL) 50 MG tablet TAKE 1 TABLET EVERY EVENING    ACCU-CHEK SOFTCLIX LANCETS  "Misc 1 each by Misc.(Non-Drug; Combo Route) route once daily.    alcohol swabs (BD ALCOHOL SWABS) PadM Apply 1 each topically as needed.    BD ULTRA-FINE SHORT PEN NEEDLE 31 gauge x 5/16" Ndle USE TO INJECT LEVEMIR ONCE DAILY    blood glucose control high,low (ACCU-CHEK KUMRA CONTROL SOLN) Soln 1 each by Misc.(Non-Drug; Combo Route) route as needed.    blood sugar diagnostic (ACCU-CHEK KUMAR PLUS TEST STRP) Strp 1 strip by Misc.(Non-Drug; Combo Route) route once daily.    blood-glucose meter (ACCU-CHEK KUMAR PLUS METER) Misc 1 each by Misc.(Non-Drug; Combo Route) route once daily.    cholecalciferol, vitamin D3, (VITAMIN D3) 2,000 unit Tab Take 2,000 Units by mouth once daily.    mupirocin (BACTROBAN) 2 % ointment Apply topically 2 (two) times daily. (Patient not taking: Reported on 10/16/2020)    omeprazole (PRILOSEC) 40 MG capsule     thiamine 100 MG tablet Take 1 tablet (100 mg total) by mouth once daily.    tiotropium (SPIRIVA) 18 mcg inhalation capsule Inhale 1 capsule (18 mcg total) into the lungs once daily. Controller    weigh scale Misc Record daily weights     Family History     Problem Relation (Age of Onset)    COPD Brother, Daughter    Cancer Sister    Diabetes Father    Heart disease Mother    Hypertension Mother, Father, Brother    Kidney disease Son    No Known Problems Daughter        Tobacco Use    Smoking status: Former Smoker     Quit date: 8/3/2000     Years since quittin.2    Smokeless tobacco: Never Used   Substance and Sexual Activity    Alcohol use: No    Drug use: No    Sexual activity: Not Currently     Review of Systems   Constitutional: Positive for fatigue. Negative for appetite change, chills and fever.   HENT: Negative for congestion, ear discharge, ear pain, rhinorrhea, sinus pressure and sore throat.    Respiratory: Positive for cough, shortness of breath and wheezing. Negative for choking and chest tightness.         With coughing and increased activity "   Cardiovascular: Negative for chest pain and palpitations.   Gastrointestinal: Negative for constipation, diarrhea, nausea and vomiting.   Musculoskeletal: Negative for joint swelling and myalgias.   Skin: Negative for rash and wound.   Neurological: Negative for dizziness, syncope, weakness, light-headedness and numbness.     Objective:     Vital Signs (Most Recent):  Temp: 97.1 °F (36.2 °C) (11/06/20 1709)  Pulse: 71 (11/06/20 1800)  Resp: 17 (11/06/20 1800)  BP: (!) 98/44 (11/06/20 1800)  SpO2: (!) 92 % (11/06/20 1800) Vital Signs (24h Range):  Temp:  [97.1 °F (36.2 °C)-98.5 °F (36.9 °C)] 97.1 °F (36.2 °C)  Pulse:  [64-71] 71  Resp:  [17-18] 17  SpO2:  [92 %-99 %] 92 %  BP: ()/(41-70) 98/44     Weight: 56.5 kg (124 lb 8 oz)  Body mass index is 22.77 kg/m².    Physical Exam  Vitals signs and nursing note reviewed.   Constitutional:       Appearance: She is well-developed.   HENT:      Head: Normocephalic and atraumatic.      Right Ear: External ear normal.      Left Ear: External ear normal.      Nose: Nose normal.   Eyes:      Conjunctiva/sclera: Conjunctivae normal.      Pupils: Pupils are equal, round, and reactive to light.   Neck:      Musculoskeletal: Normal range of motion and neck supple.      Thyroid: No thyromegaly.   Cardiovascular:      Rate and Rhythm: Normal rate and regular rhythm.      Heart sounds: Normal heart sounds.   Pulmonary:      Effort: Pulmonary effort is normal.      Breath sounds: Wheezing and rales present.   Abdominal:      General: Bowel sounds are normal.      Palpations: Abdomen is soft.   Musculoskeletal: Normal range of motion.   Skin:     General: Skin is warm and dry.   Neurological:      Mental Status: She is alert and oriented to person, place, and time.      Deep Tendon Reflexes: Reflexes are normal and symmetric.   Psychiatric:         Behavior: Behavior normal.         Thought Content: Thought content normal.         Judgment: Judgment normal.           CRANIAL  NERVES     CN III, IV, VI   Pupils are equal, round, and reactive to light.       Significant Labs:   CBC:   Recent Labs   Lab 11/06/20  1501   WBC 9.95   HGB 7.0*   HCT 22.4*        CMP:   Recent Labs   Lab 11/06/20  1501      K 4.6   CL 96   CO2 32*   *   BUN 26*   CREATININE 1.3   CALCIUM 8.3*   PROT 5.7*   ALBUMIN 3.4*   BILITOT 1.2*   ALKPHOS 91   AST 12   ALT 26   ANIONGAP 10   EGFRNONAA 38*     Lactic Acid:   Recent Labs   Lab 11/06/20  1501   LACTATE 2.2     Troponin:   Recent Labs   Lab 11/06/20  1501 11/06/20  1729   TROPONINI 0.022 0.024     TSH:   Recent Labs   Lab 07/03/20  0613   TSH 2.122     Urine Studies:   Recent Labs   Lab 11/06/20  1606   COLORU Yellow   APPEARANCEUA Clear   PHUR 6.0   SPECGRAV 1.020   PROTEINUA Negative   GLUCUA Negative   KETONESU Negative   BILIRUBINUA Negative   OCCULTUA Negative   NITRITE Negative   UROBILINOGEN Negative   LEUKOCYTESUR 1+*   RBCUA 3   WBCUA 6*   BACTERIA Occasional   SQUAMEPITHEL 15   HYALINECASTS 30*     BNP  Recent Labs   Lab 11/06/20  1501   *     EKG:  Pacemaker in place.  The cardiac silhouette is within normal limits.  Atherosclerotic changes of the aorta.  Scarring along the right minor fissure.  Mild stable chronic lung changes.  No focal infiltrate or effusion.  Degenerative changes of the spine.     Significant Imaging: CXR: I have reviewed all pertinent results/findings within the past 24 hours and my personal findings are:  see below    Pacemaker in place.  The cardiac silhouette is within normal limits.  Atherosclerotic changes of the aorta.  Scarring along the right minor fissure.  Mild stable chronic lung changes.  No focal infiltrate or effusion.  Degenerative changes of the spine.       Assessment/Plan:     Symptomatic anemia  Transfuse 1 PRBcs  Slowly       Long term current use of systemic steroids  She got IV steroid in ER   Resume prednisone 20 mg daily       COPD with acute exacerbation  Supplemental O2 via  nasal canula; titrate O2 saturation to >92%.   Start steroids   Pulmonary consultation.   Continue beta 2 agonist bronchodilator treatments.       Continue routine medications as before.             Anemia  Symptomatic anemia   Transfuse 1 unit very slowly   Lasix in between unit         Chronic respiratory failure with hypoxia  Resume oxygen         Insomnia  Trazodone resumed      Hyperlipidemia  Resume her statins  Lab Results   Component Value Date    LDLCALC 53.6 (L) 04/13/2020           HTN (hypertension)  Resume her antihypertensives        VTE Risk Mitigation (From admission, onward)         Ordered     IP VTE HIGH RISK PATIENT  Once      11/06/20 1708     Place sequential compression device  Until discontinued      11/06/20 1708              Critical care time spent on the evaluation and treatment of severe organ dysfunction, review of pertinent labs and imaging studies, discussions with consulting providers and discussions with patient/family: 35 minutes.     Milo Estevez MD  Department of Hospital Medicine   [facility]

## 2020-11-07 NOTE — ASSESSMENT & PLAN NOTE
Supplemental O2 via nasal canula; titrate O2 saturation to >92%.   Start steroids   Pulmonary consultation.   Continue beta 2 agonist bronchodilator treatments.       Continue routine medications as before

## 2020-11-08 NOTE — NURSING
Patient will be discharged home when ride arrives. Patient H/H today was 9.0 and 28. Patient instructed to call tomorrow for appointment with Dr. Gaspar in 1 week. No changes in patient medications. Patient instructed to continue home medications.

## 2020-11-08 NOTE — PROGRESS NOTES
[facility]  Pulmonology  Progress Note    Patient Name: Chantell Herrera  MRN: [id number]  Admission Date: 11/6/2020  Hospital Length of Stay: 1 days  Code Status: Full Code  Attending Provider: Milo Estevez MD  Primary Care Provider: Kari Gaspar MD   Principal Problem: <principal problem not specified>    Subjective:     Interval History: hb 7.9 after one unit Packed cells  Unit 2 in await hb will most likely need a 3rd unit  Still wheezing will increase Bronchodilators     Objective:     Vital Signs (Most Recent):  Temp: 96.7 °F (35.9 °C) (11/07/20 1603)  Pulse: 72 (11/07/20 1900)  Resp: 18 (11/07/20 1900)  BP: 105/64 (11/07/20 1900)  SpO2: 99 % (11/07/20 1900) Vital Signs (24h Range):  Temp:  [96.4 °F (35.8 °C)-98.7 °F (37.1 °C)] 96.7 °F (35.9 °C)  Pulse:  [58-88] 72  Resp:  [13-28] 18  SpO2:  [88 %-100 %] 99 %  BP: ()/(37-70) 105/64     Weight: 56.5 kg (124 lb 8 oz)  Body mass index is 22.77 kg/m².      Intake/Output Summary (Last 24 hours) at 11/7/2020 1942  Last data filed at 11/7/2020 1850  Gross per 24 hour   Intake 1251.43 ml   Output 2160 ml   Net -908.57 ml       Physical Exam  Vitals signs and nursing note reviewed.   Constitutional:       General: She is not in acute distress.     Appearance: Normal appearance. She is well-developed. She is not ill-appearing, toxic-appearing or diaphoretic.   HENT:      Head: Normocephalic and atraumatic.      Jaw: No trismus.      Right Ear: Hearing, tympanic membrane, ear canal and external ear normal. Tympanic membrane is not injected.      Left Ear: Hearing, tympanic membrane, ear canal and external ear normal. Tympanic membrane is not injected.      Nose: Nose normal. No nasal deformity, mucosal edema or rhinorrhea.      Right Sinus: No maxillary sinus tenderness or frontal sinus tenderness.      Left Sinus: No maxillary sinus tenderness or frontal sinus tenderness.      Mouth/Throat:      Dentition: Normal dentition.      Pharynx:  Uvula midline. No posterior oropharyngeal erythema or uvula swelling.   Eyes:      General: Lids are normal. No scleral icterus.     Conjunctiva/sclera: Conjunctivae normal.      Comments: Sclera clear bilat   Neck:      Musculoskeletal: Full passive range of motion without pain and neck supple.      Trachea: Trachea and phonation normal.   Cardiovascular:      Rate and Rhythm: Normal rate and regular rhythm.      Pulses: Normal pulses.      Heart sounds: Normal heart sounds, S1 normal and S2 normal.   Pulmonary:      Effort: Respiratory distress (mild to moderate) present. No accessory muscle usage or prolonged expiration.      Breath sounds: Decreased air movement present. No transmitted upper airway sounds. Examination of the right-upper field reveals decreased breath sounds and wheezing. Examination of the left-upper field reveals decreased breath sounds and wheezing. Examination of the right-middle field reveals decreased breath sounds, wheezing and rhonchi. Examination of the left-middle field reveals decreased breath sounds, wheezing and rhonchi. Examination of the right-lower field reveals decreased breath sounds, wheezing, rhonchi and rales. Examination of the left-lower field reveals decreased breath sounds, wheezing, rhonchi and rales. Decreased breath sounds, wheezing, rhonchi and rales present.   Abdominal:      General: Bowel sounds are normal. There is no distension.      Palpations: Abdomen is soft.      Tenderness: There is no abdominal tenderness.   Musculoskeletal: Normal range of motion.         General: No deformity.   Skin:     General: Skin is warm and dry.      Coloration: Skin is not pale.   Neurological:      Mental Status: She is alert and oriented to person, place, and time.      Motor: No abnormal muscle tone.      Coordination: Coordination normal.   Psychiatric:         Speech: Speech normal.         Behavior: Behavior normal. Behavior is cooperative.         Thought Content: Thought  content normal.         Judgment: Judgment normal.         Vents:  Oxygen Concentration (%): 40 (11/07/20 0500)    Lines/Drains/Airways     Peripheral Intravenous Line                 Peripheral IV - Single Lumen 11/07/20 0100 20 G Left;Posterior Hand less than 1 day                Significant Labs:    CBC/Anemia Profile:  Recent Labs   Lab 11/06/20  1501 11/07/20  0449   WBC 9.95 15.82*   HGB 7.0* 7.9*   HCT 22.4* 24.4*    137*   * 111*   RDW 21.8* SEE COMMENT   FERRITIN 1,448*  --         Chemistries:  Recent Labs   Lab 11/06/20  1501 11/07/20  0449    140   K 4.6 4.4   CL 96 95   CO2 32* 34*   BUN 26* 32*   CREATININE 1.3 1.2   CALCIUM 8.3* 8.2*   ALBUMIN 3.4* 3.3*   PROT 5.7* 5.6*   BILITOT 1.2* 2.4*   ALKPHOS 91 84   ALT 26 23   AST 12 10       All pertinent labs within the past 24 hours have been reviewed.    Significant Imaging:  I have reviewed all pertinent imaging results/findings within the past 24 hours.    Assessment/Plan:     Chronic respiratory failure with hypoxia  On Oxygen and trilogy     COPD exacerbation  severe    Symptomatic anemia  SOB  Oxygen delivery improving     Long term current use of systemic steroids  Will need steroids in house     COPD with acute exacerbation  Mild wheezing and fair air entry with wheeze R>L    Anemia  ?? Where blood loss    Acute on chronic respiratory failure with hypoxia  On trilogy at night     Insomnia  Stable     HTN (hypertension)  Low bp metoprolol moe tonight     add steroids        Emmanuel Hickman MD  Pulmonology  [facility]

## 2020-11-08 NOTE — SUBJECTIVE & OBJECTIVE
Interval History: hb 7.9 after one unit Packed cells  Unit 2 in await hb will most likely need a 3rd unit  Still wheezing will increase Bronchodilators     Objective:     Vital Signs (Most Recent):  Temp: 96.7 °F (35.9 °C) (11/07/20 1603)  Pulse: 72 (11/07/20 1900)  Resp: 18 (11/07/20 1900)  BP: 105/64 (11/07/20 1900)  SpO2: 99 % (11/07/20 1900) Vital Signs (24h Range):  Temp:  [96.4 °F (35.8 °C)-98.7 °F (37.1 °C)] 96.7 °F (35.9 °C)  Pulse:  [58-88] 72  Resp:  [13-28] 18  SpO2:  [88 %-100 %] 99 %  BP: ()/(37-70) 105/64     Weight: 56.5 kg (124 lb 8 oz)  Body mass index is 22.77 kg/m².      Intake/Output Summary (Last 24 hours) at 11/7/2020 1942  Last data filed at 11/7/2020 1850  Gross per 24 hour   Intake 1251.43 ml   Output 2160 ml   Net -908.57 ml       Physical Exam  Vitals signs and nursing note reviewed.   Constitutional:       General: She is not in acute distress.     Appearance: Normal appearance. She is well-developed. She is not ill-appearing, toxic-appearing or diaphoretic.   HENT:      Head: Normocephalic and atraumatic.      Jaw: No trismus.      Right Ear: Hearing, tympanic membrane, ear canal and external ear normal. Tympanic membrane is not injected.      Left Ear: Hearing, tympanic membrane, ear canal and external ear normal. Tympanic membrane is not injected.      Nose: Nose normal. No nasal deformity, mucosal edema or rhinorrhea.      Right Sinus: No maxillary sinus tenderness or frontal sinus tenderness.      Left Sinus: No maxillary sinus tenderness or frontal sinus tenderness.      Mouth/Throat:      Dentition: Normal dentition.      Pharynx: Uvula midline. No posterior oropharyngeal erythema or uvula swelling.   Eyes:      General: Lids are normal. No scleral icterus.     Conjunctiva/sclera: Conjunctivae normal.      Comments: Sclera clear bilat   Neck:      Musculoskeletal: Full passive range of motion without pain and neck supple.      Trachea: Trachea and phonation normal.    Cardiovascular:      Rate and Rhythm: Normal rate and regular rhythm.      Pulses: Normal pulses.      Heart sounds: Normal heart sounds, S1 normal and S2 normal.   Pulmonary:      Effort: Respiratory distress (mild to moderate) present. No accessory muscle usage or prolonged expiration.      Breath sounds: Decreased air movement present. No transmitted upper airway sounds. Examination of the right-upper field reveals decreased breath sounds and wheezing. Examination of the left-upper field reveals decreased breath sounds and wheezing. Examination of the right-middle field reveals decreased breath sounds, wheezing and rhonchi. Examination of the left-middle field reveals decreased breath sounds, wheezing and rhonchi. Examination of the right-lower field reveals decreased breath sounds, wheezing, rhonchi and rales. Examination of the left-lower field reveals decreased breath sounds, wheezing, rhonchi and rales. Decreased breath sounds, wheezing, rhonchi and rales present.   Abdominal:      General: Bowel sounds are normal. There is no distension.      Palpations: Abdomen is soft.      Tenderness: There is no abdominal tenderness.   Musculoskeletal: Normal range of motion.         General: No deformity.   Skin:     General: Skin is warm and dry.      Coloration: Skin is not pale.   Neurological:      Mental Status: She is alert and oriented to person, place, and time.      Motor: No abnormal muscle tone.      Coordination: Coordination normal.   Psychiatric:         Speech: Speech normal.         Behavior: Behavior normal. Behavior is cooperative.         Thought Content: Thought content normal.         Judgment: Judgment normal.         Vents:  Oxygen Concentration (%): 40 (11/07/20 0500)    Lines/Drains/Airways     Peripheral Intravenous Line                 Peripheral IV - Single Lumen 11/07/20 0100 20 G Left;Posterior Hand less than 1 day                Significant Labs:    CBC/Anemia Profile:  Recent Labs   Lab  11/06/20  1501 11/07/20  0449   WBC 9.95 15.82*   HGB 7.0* 7.9*   HCT 22.4* 24.4*    137*   * 111*   RDW 21.8* SEE COMMENT   FERRITIN 1,448*  --         Chemistries:  Recent Labs   Lab 11/06/20  1501 11/07/20  0449    140   K 4.6 4.4   CL 96 95   CO2 32* 34*   BUN 26* 32*   CREATININE 1.3 1.2   CALCIUM 8.3* 8.2*   ALBUMIN 3.4* 3.3*   PROT 5.7* 5.6*   BILITOT 1.2* 2.4*   ALKPHOS 91 84   ALT 26 23   AST 12 10       All pertinent labs within the past 24 hours have been reviewed.    Significant Imaging:  I have reviewed all pertinent imaging results/findings within the past 24 hours.

## 2020-11-08 NOTE — PROGRESS NOTES
[facility]  Pulmonology  Progress Note    Patient Name: Chantell Herrera  MRN: [id number]  Admission Date: 11/6/2020  Hospital Length of Stay: 2 days  Code Status: Full Code  Attending Provider: Milo Estevez MD  Primary Care Provider: Kari Gaspar MD   Principal Problem: Symptomatic anemia    Subjective:     Interval History: much better transfusion     Objective:     Vital Signs (Most Recent):  Temp: 98.9 °F (37.2 °C) (11/08/20 0731)  Pulse: 64 (11/08/20 0827)  Resp: (!) 23 (11/08/20 0731)  BP: (!) 136/51 (11/08/20 0827)  SpO2: (!) 91 % (11/08/20 0731) Vital Signs (24h Range):  Temp:  [96.7 °F (35.9 °C)-98.9 °F (37.2 °C)] 98.9 °F (37.2 °C)  Pulse:  [52-87] 64  Resp:  [14-28] 23  SpO2:  [86 %-100 %] 91 %  BP: ()/(37-70) 136/51     Weight: 56.5 kg (124 lb 8 oz)  Body mass index is 22.77 kg/m².      Intake/Output Summary (Last 24 hours) at 11/8/2020 0835  Last data filed at 11/8/2020 0600  Gross per 24 hour   Intake 717.26 ml   Output 1860 ml   Net -1142.74 ml       Physical Exam  Vitals signs and nursing note reviewed.   Constitutional:       General: She is not in acute distress.     Appearance: Normal appearance. She is well-developed. She is not ill-appearing, toxic-appearing or diaphoretic.   HENT:      Head: Normocephalic and atraumatic.      Jaw: No trismus.      Right Ear: Hearing, tympanic membrane, ear canal and external ear normal.      Left Ear: Hearing, tympanic membrane, ear canal and external ear normal.      Nose: Nose normal. No nasal deformity, mucosal edema or rhinorrhea.      Right Sinus: No maxillary sinus tenderness or frontal sinus tenderness.      Left Sinus: No maxillary sinus tenderness or frontal sinus tenderness.      Mouth/Throat:      Dentition: Normal dentition.      Pharynx: Uvula midline. No posterior oropharyngeal erythema or uvula swelling.   Eyes:      General: Lids are normal. No scleral icterus.     Conjunctiva/sclera: Conjunctivae normal.       Comments: Sclera clear bilat   Neck:      Musculoskeletal: Full passive range of motion without pain and neck supple.      Trachea: Trachea and phonation normal.   Cardiovascular:      Rate and Rhythm: Normal rate and regular rhythm.      Pulses: Normal pulses.      Heart sounds: Normal heart sounds, S1 normal and S2 normal. No murmur.   Pulmonary:      Effort: Respiratory distress (mild to moderate) present.      Breath sounds: Examination of the right-middle field reveals decreased breath sounds. Examination of the left-middle field reveals decreased breath sounds. Examination of the right-lower field reveals decreased breath sounds. Examination of the left-lower field reveals decreased breath sounds. Decreased breath sounds present. No wheezing, rhonchi or rales.   Abdominal:      General: Bowel sounds are normal. There is no distension.      Palpations: Abdomen is soft.      Tenderness: There is no abdominal tenderness.   Musculoskeletal: Normal range of motion.         General: No deformity.   Skin:     General: Skin is warm and dry.      Coloration: Skin is not pale.   Neurological:      Mental Status: She is alert and oriented to person, place, and time.      Motor: No abnormal muscle tone.      Coordination: Coordination normal.   Psychiatric:         Speech: Speech normal.         Behavior: Behavior normal. Behavior is cooperative.         Thought Content: Thought content normal.         Judgment: Judgment normal.         Vents:  Oxygen Concentration (%): 40 (11/07/20 0500)    Lines/Drains/Airways     Peripheral Intravenous Line                 Peripheral IV - Single Lumen 11/07/20 0100 20 G Left;Posterior Hand 1 day                Significant Labs:    CBC/Anemia Profile:  Recent Labs   Lab 11/06/20  1501 11/07/20  0449 11/08/20  0330   WBC 9.95 15.82* 11.39   HGB 7.0* 7.9* 9.0*   HCT 22.4* 24.4* 28.0*    137* 112*   * 111* 108*   RDW 21.8* SEE COMMENT 25.5*   FERRITIN 1,448*  --   --          Chemistries:  Recent Labs   Lab 11/06/20  1501 11/07/20  0449 11/08/20  0330    140 139   K 4.6 4.4 4.1   CL 96 95 94*   CO2 32* 34* 36*   BUN 26* 32* 36*   CREATININE 1.3 1.2 1.2   CALCIUM 8.3* 8.2* 8.1*   ALBUMIN 3.4* 3.3* 3.2*   PROT 5.7* 5.6* 5.3*   BILITOT 1.2* 2.4* 0.9   ALKPHOS 91 84 83   ALT 26 23 19   AST 12 10 12   MG  --   --  1.8       All pertinent labs within the past 24 hours have been reviewed.    Significant Imaging:  I have reviewed all pertinent imaging results/findings within the past 24 hours.    Assessment/Plan:     Chronic respiratory failure with hypoxia  On Oxygen and trilogy     COPD exacerbation  severe    HTN (hypertension)  Low bp metoprolol moe Hickman MD  Pulmonology  [facility]

## 2020-11-08 NOTE — ASSESSMENT & PLAN NOTE
Symptomatic anemia   Transfuse 1 unit very slowly   Lasix in between unit .  11/7/20    Will transfuse slowly one more unit today   11/8/20  Lab Results   Component Value Date    WBC 11.39 11/08/2020    HGB 9.0 (L) 11/08/2020    HCT 28.0 (L) 11/08/2020     (H) 11/08/2020     (L) 11/08/2020       Work up outpatient

## 2020-11-08 NOTE — EICU
Rounding (Video Assessment):  Yes        Comments: Lying in bed A&O x's 4, no critical drips at this time, no c/o pain/discomfort, no s/s of acute distress, VSS 56, 92/56, 91%, 17

## 2020-11-08 NOTE — EICU
Rounding (Video Assessment):  Yes    Intervention Initiated From:  [facility] / EICU        Comments: Pt awake. Denies pain and or discomfort. No request. Pt vss. NAD observed. No drips infusing at this time.

## 2020-11-08 NOTE — PLAN OF CARE
No falls or near misses noted on shift, No new skin issues or wounds, SCDs (refuses at this time) for DVT prevention, no pain complaints, personal preferences provided as requested for food/drinks as diet orders allow, plan of care discussed with patient at bedside, verbalizes understanding, evidence of learning noted, saline locked, plan is to maintain ICU status.

## 2020-11-08 NOTE — SUBJECTIVE & OBJECTIVE
Interval History: much better transfusion     Objective:     Vital Signs (Most Recent):  Temp: 98.9 °F (37.2 °C) (11/08/20 0731)  Pulse: 64 (11/08/20 0827)  Resp: (!) 23 (11/08/20 0731)  BP: (!) 136/51 (11/08/20 0827)  SpO2: (!) 91 % (11/08/20 0731) Vital Signs (24h Range):  Temp:  [96.7 °F (35.9 °C)-98.9 °F (37.2 °C)] 98.9 °F (37.2 °C)  Pulse:  [52-87] 64  Resp:  [14-28] 23  SpO2:  [86 %-100 %] 91 %  BP: ()/(37-70) 136/51     Weight: 56.5 kg (124 lb 8 oz)  Body mass index is 22.77 kg/m².      Intake/Output Summary (Last 24 hours) at 11/8/2020 0835  Last data filed at 11/8/2020 0600  Gross per 24 hour   Intake 717.26 ml   Output 1860 ml   Net -1142.74 ml       Physical Exam  Vitals signs and nursing note reviewed.   Constitutional:       General: She is not in acute distress.     Appearance: Normal appearance. She is well-developed. She is not ill-appearing, toxic-appearing or diaphoretic.   HENT:      Head: Normocephalic and atraumatic.      Jaw: No trismus.      Right Ear: Hearing, tympanic membrane, ear canal and external ear normal.      Left Ear: Hearing, tympanic membrane, ear canal and external ear normal.      Nose: Nose normal. No nasal deformity, mucosal edema or rhinorrhea.      Right Sinus: No maxillary sinus tenderness or frontal sinus tenderness.      Left Sinus: No maxillary sinus tenderness or frontal sinus tenderness.      Mouth/Throat:      Dentition: Normal dentition.      Pharynx: Uvula midline. No posterior oropharyngeal erythema or uvula swelling.   Eyes:      General: Lids are normal. No scleral icterus.     Conjunctiva/sclera: Conjunctivae normal.      Comments: Sclera clear bilat   Neck:      Musculoskeletal: Full passive range of motion without pain and neck supple.      Trachea: Trachea and phonation normal.   Cardiovascular:      Rate and Rhythm: Normal rate and regular rhythm.      Pulses: Normal pulses.      Heart sounds: Normal heart sounds, S1 normal and S2 normal. No murmur.    Pulmonary:      Effort: Respiratory distress (mild to moderate) present.      Breath sounds: Examination of the right-middle field reveals decreased breath sounds. Examination of the left-middle field reveals decreased breath sounds. Examination of the right-lower field reveals decreased breath sounds. Examination of the left-lower field reveals decreased breath sounds. Decreased breath sounds present. No wheezing, rhonchi or rales.   Abdominal:      General: Bowel sounds are normal. There is no distension.      Palpations: Abdomen is soft.      Tenderness: There is no abdominal tenderness.   Musculoskeletal: Normal range of motion.         General: No deformity.   Skin:     General: Skin is warm and dry.      Coloration: Skin is not pale.   Neurological:      Mental Status: She is alert and oriented to person, place, and time.      Motor: No abnormal muscle tone.      Coordination: Coordination normal.   Psychiatric:         Speech: Speech normal.         Behavior: Behavior normal. Behavior is cooperative.         Thought Content: Thought content normal.         Judgment: Judgment normal.         Vents:  Oxygen Concentration (%): 40 (11/07/20 0500)    Lines/Drains/Airways     Peripheral Intravenous Line                 Peripheral IV - Single Lumen 11/07/20 0100 20 G Left;Posterior Hand 1 day                Significant Labs:    CBC/Anemia Profile:  Recent Labs   Lab 11/06/20  1501 11/07/20  0449 11/08/20  0330   WBC 9.95 15.82* 11.39   HGB 7.0* 7.9* 9.0*   HCT 22.4* 24.4* 28.0*    137* 112*   * 111* 108*   RDW 21.8* SEE COMMENT 25.5*   FERRITIN 1,448*  --   --         Chemistries:  Recent Labs   Lab 11/06/20  1501 11/07/20  0449 11/08/20  0330    140 139   K 4.6 4.4 4.1   CL 96 95 94*   CO2 32* 34* 36*   BUN 26* 32* 36*   CREATININE 1.3 1.2 1.2   CALCIUM 8.3* 8.2* 8.1*   ALBUMIN 3.4* 3.3* 3.2*   PROT 5.7* 5.6* 5.3*   BILITOT 1.2* 2.4* 0.9   ALKPHOS 91 84 83   ALT 26 23 19   AST 12 10 12   MG   --   --  1.8       All pertinent labs within the past 24 hours have been reviewed.    Significant Imaging:  I have reviewed all pertinent imaging results/findings within the past 24 hours.

## 2020-11-08 NOTE — DISCHARGE SUMMARY
[facility] Hospital Medicine  Discharge Summary      Patient Name: Chantell Herrera  MRN: [id number]  Admission Date: 11/6/2020  Hospital Length of Stay: 2 days  Discharge Date and Time:  11/08/2020 7:47 AM  Attending Physician: Milo Estevez MD   Discharging Provider: Milo Estevez MD  Primary Care Provider: Kari Gaspar MD      HPI:   Chantell Herrera is a 85 y.o. female with longstanding issues with anemia, CHF, copd  Well known to our service   Dyspnea: Patient complains of shortness of breath while getting dressed.  Symptoms include difficulty breathing, dyspnea on exertion, productive cough, shortness of breath and sputum production. Symptoms began 2 weeks ago, gradually worsening since that time.  Patient denies tightness in chest. Associated symptoms include dyspnea, productive cough, sneezing and wheezing. Patient has had recent travel.  Weight has been stable.  Appetite has been increased. Symptoms are exacerbated by any exercise. Symptoms are alleviated by rest and oxygen.     BNP  Recent Labs   Lab 11/06/20  1501   *         Work up in ER showed severe anemia , high BNP and COPD exacerbation .  In past she went into CHF with PRBCs . Admitted in ICU for closer monitoring       * No surgery found *      Hospital Course:   11/7/20  Her HCT did improve with one unit of PRBCs .  We were worried about CHF worsening with her transfusion .  Gave her extra lasix . Her fatigue is better . Shortness of breath improved .  I discussed with her her anemia in detail . Her baseline in august seems to be hemoglobin around 10 and HCT around 30 .  I feel like she should get one more unit before I discharge her ;otherwise she will be back again .    11/8/12  Doing well . Shortness of breath better . No issues with second unit of blood   No CHF exacerbation .  Feels better ; anxious to go home   Lab Results   Component Value Date    WBC 11.39 11/08/2020    HGB 9.0 (L) 11/08/2020    HCT  28.0 (L) 11/08/2020     (H) 11/08/2020     (L) 11/08/2020              Consults:   Consults (From admission, onward)        Status Ordering Provider     Inpatient consult to Pulmonology  Once     Provider:  Emmanuel Hickman MD    Acknowledged LAN COYLE     IP consult to case management  Once     Provider:  (Not yet assigned)    OWEN Frost          No new Assessment & Plan notes have been filed under this hospital service since the last note was generated.  Service: Hospital Medicine    Final Active Diagnoses:    Diagnosis Date Noted POA    PRINCIPAL PROBLEM:  Symptomatic anemia [D64.9]  Yes    COPD exacerbation [J44.1] 10/05/2020 Yes    Long term current use of systemic steroids [Z79.52] 07/04/2020 Not Applicable     Chronic    COPD with acute exacerbation [J44.1] 11/28/2019 Yes    Anemia [D64.9] 09/01/2019 Yes    Acute on chronic respiratory failure with hypoxia [J96.21] 01/22/2019 Yes    Chronic respiratory failure with hypoxia [J96.11] 05/17/2017 Yes    Insomnia [G47.00] 08/10/2015 Yes    Hyperlipidemia [E78.5] 05/03/2015 Yes    HTN (hypertension) [I10] 06/19/2014 Yes      Problems Resolved During this Admission:       Discharged Condition: fair    Disposition: Home    Follow Up: PCP in 1 week     Patient Instructions:   No discharge procedures on file.    Significant Diagnostic Studies: Labs:   CMP   Recent Labs   Lab 11/06/20  1501 11/07/20  0449 11/08/20  0330    140 139   K 4.6 4.4 4.1   CL 96 95 94*   CO2 32* 34* 36*   * 232* 132*   BUN 26* 32* 36*   CREATININE 1.3 1.2 1.2   CALCIUM 8.3* 8.2* 8.1*   PROT 5.7* 5.6* 5.3*   ALBUMIN 3.4* 3.3* 3.2*   BILITOT 1.2* 2.4* 0.9   ALKPHOS 91 84 83   AST 12 10 12   ALT 26 23 19   ANIONGAP 10 11 9   ESTGFRAFRICA 44* 48* 48*   EGFRNONAA 38* 42* 42*    and CBC   Recent Labs   Lab 11/06/20  1501 11/07/20  0449 11/08/20  0330   WBC 9.95 15.82* 11.39   HGB 7.0* 7.9* 9.0*   HCT 22.4* 24.4* 28.0*    137* 112*        Pending Diagnostic Studies:     None         Medications:  Reconciled Home Medications:      Medication List      CONTINUE taking these medications    albuterol 2.5 mg /3 mL (0.083 %) nebulizer solution  Commonly known as: PROVENTIL  Take 3 mLs (2.5 mg total) by nebulization every 6 (six) hours as needed for Wheezing.     aspirin 81 MG EC tablet  Commonly known as: ECOTRIN  Take 1 tablet (81 mg total) by mouth once daily.     baclofen 10 MG tablet  Commonly known as: LIORESAL  Take 10 mg by mouth 3 (three) times daily.     citalopram 20 MG tablet  Commonly known as: CELEXA  Take 1 tablet (20 mg total) by mouth once daily.     insulin detemir U-100 100 unit/mL (3 mL) Inpn pen  Commonly known as: LEVEMIR FLEXTOUCH  Inject 10 Units into the skin every evening.     ipratropium 0.02 % nebulizer solution  Commonly known as: ATROVENT  Take 500 mcg by nebulization 4 (four) times daily.     losartan 50 MG tablet  Commonly known as: COZAAR  50 mg once daily.     meclizine 25 mg tablet  Commonly known as: ANTIVERT  2 (two) times daily as needed for Dizziness.     metoprolol succinate 50 MG 24 hr tablet  Commonly known as: TOPROL-XL  Take 50 mg by mouth 2 (two) times daily.     omeprazole 40 MG capsule  Commonly known as: PRILOSEC     pravastatin 40 MG tablet  Commonly known as: PRAVACHOL  Take 40 mg by mouth once daily.     predniSONE 20 MG tablet  Commonly known as: DELTASONE  Take 1 tablet (20 mg total) by mouth once daily.     rOPINIRole 1 MG tablet  Commonly known as: REQUIP  TAKE 1 TABLET  EVERY EVENING.     SPIRIVA WITH HANDIHALER 18 mcg inhalation capsule  Generic drug: tiotropium  Inhale 1 capsule (18 mcg total) into the lungs once daily. Controller     thiamine 100 MG tablet  Take 1 tablet (100 mg total) by mouth once daily.     torsemide 20 MG Tab  Commonly known as: DEMADEX  Take 1 tablet (20 mg total) by mouth once daily.     traZODone 50 MG tablet  Commonly known as: DESYREL  TAKE 1 TABLET EVERY EVENING    "  VITAMIN D3 50 mcg (2,000 unit) Tab  Generic drug: cholecalciferol (vitamin D3)  Take 2,000 Units by mouth once daily.        STOP taking these medications    ACCU-CHEK SOFTCLIX LANCETS Misc  Generic drug: lancets     alcohol swabs Padm  Commonly known as: BD ALCOHOL SWABS     BD ULTRA-FINE SHORT PEN NEEDLE 31 gauge x 5/16" Ndle  Generic drug: pen needle, diabetic     blood glucose control high,low Soln  Commonly known as: ACCU-CHEK KUMAR CONTROL SOLN     blood sugar diagnostic Strp  Commonly known as: ACCU-CHEK KUMAR PLUS TEST STRP     blood-glucose meter Misc  Commonly known as: ACCU-CHEK KUMAR PLUS METER     mupirocin 2 % ointment  Commonly known as: BACTROBAN     weigh scale Misc            Indwelling Lines/Drains at time of discharge:   Lines/Drains/Airways     None                 Time spent on the discharge of patient: 34  minutes  Patient was seen and examined on the date of discharge and determined to be suitable for discharge.    Critical care time spent on the evaluation and treatment of severe organ dysfunction, review of pertinent labs and imaging studies, discussions with consulting providers and discussions with patient/family:34 minutes.     Milo Estevez MD  Department of Hospital Medicine  [facility]  "

## 2020-11-08 NOTE — ASSESSMENT & PLAN NOTE
Transfused 2  PRBcs  Slowly      I am hoping for HCT around 28-30 before discharge   And work up for anemia as outpatient .    Lab Results   Component Value Date    WBC 11.39 11/08/2020    HGB 9.0 (L) 11/08/2020    HCT 28.0 (L) 11/08/2020     (H) 11/08/2020     (L) 11/08/2020

## 2020-11-08 NOTE — SUBJECTIVE & OBJECTIVE
Review of Systems   Constitutional: Negative for appetite change, chills and fever.   HENT: Negative for congestion, ear discharge, ear pain, rhinorrhea, sinus pressure and sore throat.    Respiratory: Positive for shortness of breath. Negative for choking and chest tightness.         With coughing and increased activity   Cardiovascular: Negative for chest pain and palpitations.   Gastrointestinal: Negative for constipation, diarrhea, nausea and vomiting.   Musculoskeletal: Negative for joint swelling and myalgias.   Skin: Negative for rash and wound.   Neurological: Negative for dizziness, syncope, weakness, light-headedness and numbness.     Objective:     Vital Signs (Most Recent):  Temp: 98.9 °F (37.2 °C) (11/08/20 0731)  Pulse: (!) 58 (11/08/20 0731)  Resp: (!) 23 (11/08/20 0731)  BP: (!) 92/56 (11/08/20 0731)  SpO2: (!) 91 % (11/08/20 0731) Vital Signs (24h Range):  Temp:  [96.7 °F (35.9 °C)-98.9 °F (37.2 °C)] 98.9 °F (37.2 °C)  Pulse:  [52-87] 58  Resp:  [14-28] 23  SpO2:  [86 %-100 %] 91 %  BP: ()/(37-70) 92/56     Weight: 56.5 kg (124 lb 8 oz)  Body mass index is 22.77 kg/m².    Intake/Output Summary (Last 24 hours) at 11/8/2020 0741  Last data filed at 11/8/2020 0600  Gross per 24 hour   Intake 717.26 ml   Output 1860 ml   Net -1142.74 ml      Physical Exam  Vitals signs and nursing note reviewed.   Constitutional:       Appearance: She is well-developed.   HENT:      Head: Normocephalic and atraumatic.      Right Ear: External ear normal.      Left Ear: External ear normal.      Nose: Nose normal.   Eyes:      Conjunctiva/sclera: Conjunctivae normal.      Pupils: Pupils are equal, round, and reactive to light.   Neck:      Musculoskeletal: Normal range of motion and neck supple.      Thyroid: No thyromegaly.   Cardiovascular:      Rate and Rhythm: Normal rate and regular rhythm.      Heart sounds: Normal heart sounds.   Pulmonary:      Effort: Pulmonary effort is normal.      Breath sounds:  Wheezing present.   Abdominal:      General: Bowel sounds are normal.      Palpations: Abdomen is soft.   Musculoskeletal: Normal range of motion.   Skin:     General: Skin is warm and dry.      Coloration: Skin is pale.   Neurological:      Mental Status: She is alert and oriented to person, place, and time.      Deep Tendon Reflexes: Reflexes are normal and symmetric.   Psychiatric:         Behavior: Behavior normal.         Thought Content: Thought content normal.         Judgment: Judgment normal.         Significant Labs:   CBC:   Recent Labs   Lab 11/06/20  1501 11/07/20  0449 11/08/20  0330   WBC 9.95 15.82* 11.39   HGB 7.0* 7.9* 9.0*   HCT 22.4* 24.4* 28.0*    137* 112*     CMP:   Recent Labs   Lab 11/06/20  1501 11/07/20  0449 11/08/20  0330    140 139   K 4.6 4.4 4.1   CL 96 95 94*   CO2 32* 34* 36*   * 232* 132*   BUN 26* 32* 36*   CREATININE 1.3 1.2 1.2   CALCIUM 8.3* 8.2* 8.1*   PROT 5.7* 5.6* 5.3*   ALBUMIN 3.4* 3.3* 3.2*   BILITOT 1.2* 2.4* 0.9   ALKPHOS 91 84 83   AST 12 10 12   ALT 26 23 19   ANIONGAP 10 11 9   EGFRNONAA 38* 42* 42*     Troponin:   Recent Labs   Lab 11/06/20  1501 11/06/20  1729 11/06/20  2253   TROPONINI 0.022 0.024 0.033*

## 2020-11-08 NOTE — PROGRESS NOTES
[facility] Hospital Medicine  Progress Note    Patient Name: Chantell Herrera  MRN: [id number]  Patient Class: IP- Inpatient   Admission Date: 11/6/2020  Length of Stay: 2 days  Attending Physician: Milo Estevez MD  Primary Care Provider: Kari Gaspar MD        Subjective:     Principal Problem:Symptomatic anemia        HPI:  Chantell Herrera is a 85 y.o. female with longstanding issues with anemia, CHF, copd  Well known to our service   Dyspnea: Patient complains of shortness of breath while getting dressed.  Symptoms include difficulty breathing, dyspnea on exertion, productive cough, shortness of breath and sputum production. Symptoms began 2 weeks ago, gradually worsening since that time.  Patient denies tightness in chest. Associated symptoms include dyspnea, productive cough, sneezing and wheezing. Patient has had recent travel.  Weight has been stable.  Appetite has been increased. Symptoms are exacerbated by any exercise. Symptoms are alleviated by rest and oxygen.     BNP  Recent Labs   Lab 11/06/20  1501   *         Work up in ER showed severe anemia , high BNP and COPD exacerbation .  In past she went into CHF with PRBCs . Admitted in ICU for closer monitoring       Overview/Hospital Course:  11/7/20  Her HCT did improve with one unit of PRBCs .  We were worried about CHF worsening with her transfusion .  Gave her extra lasix . Her fatigue is better . Shortness of breath improved .  I discussed with her her anemia in detail . Her baseline in august seems to be hemoglobin around 10 and HCT around 30 .  I feel like she should get one more unit before I discharge her ;otherwise she will be back again .    11/8/12  Doing well . Shortness of breath better . No issues with second unit of blood   No CHF exacerbation .  Feels better ; anxious to go home   Lab Results   Component Value Date    WBC 11.39 11/08/2020    HGB 9.0 (L) 11/08/2020    HCT 28.0 (L) 11/08/2020      (H) 11/08/2020     (L) 11/08/2020               Review of Systems   Constitutional: Negative for appetite change, chills and fever.   HENT: Negative for congestion, ear discharge, ear pain, rhinorrhea, sinus pressure and sore throat.    Respiratory: Positive for shortness of breath. Negative for choking and chest tightness.         With coughing and increased activity   Cardiovascular: Negative for chest pain and palpitations.   Gastrointestinal: Negative for constipation, diarrhea, nausea and vomiting.   Musculoskeletal: Negative for joint swelling and myalgias.   Skin: Negative for rash and wound.   Neurological: Negative for dizziness, syncope, weakness, light-headedness and numbness.     Objective:     Vital Signs (Most Recent):  Temp: 98.9 °F (37.2 °C) (11/08/20 0731)  Pulse: (!) 58 (11/08/20 0731)  Resp: (!) 23 (11/08/20 0731)  BP: (!) 92/56 (11/08/20 0731)  SpO2: (!) 91 % (11/08/20 0731) Vital Signs (24h Range):  Temp:  [96.7 °F (35.9 °C)-98.9 °F (37.2 °C)] 98.9 °F (37.2 °C)  Pulse:  [52-87] 58  Resp:  [14-28] 23  SpO2:  [86 %-100 %] 91 %  BP: ()/(37-70) 92/56     Weight: 56.5 kg (124 lb 8 oz)  Body mass index is 22.77 kg/m².    Intake/Output Summary (Last 24 hours) at 11/8/2020 0741  Last data filed at 11/8/2020 0600  Gross per 24 hour   Intake 717.26 ml   Output 1860 ml   Net -1142.74 ml      Physical Exam  Vitals signs and nursing note reviewed.   Constitutional:       Appearance: She is well-developed.   HENT:      Head: Normocephalic and atraumatic.      Right Ear: External ear normal.      Left Ear: External ear normal.      Nose: Nose normal.   Eyes:      Conjunctiva/sclera: Conjunctivae normal.      Pupils: Pupils are equal, round, and reactive to light.   Neck:      Musculoskeletal: Normal range of motion and neck supple.      Thyroid: No thyromegaly.   Cardiovascular:      Rate and Rhythm: Normal rate and regular rhythm.      Heart sounds: Normal heart sounds.   Pulmonary:      Effort:  Pulmonary effort is normal.      Breath sounds: Wheezing present.   Abdominal:      General: Bowel sounds are normal.      Palpations: Abdomen is soft.   Musculoskeletal: Normal range of motion.   Skin:     General: Skin is warm and dry.      Coloration: Skin is pale.   Neurological:      Mental Status: She is alert and oriented to person, place, and time.      Deep Tendon Reflexes: Reflexes are normal and symmetric.   Psychiatric:         Behavior: Behavior normal.         Thought Content: Thought content normal.         Judgment: Judgment normal.         Significant Labs:   CBC:   Recent Labs   Lab 11/06/20  1501 11/07/20  0449 11/08/20  0330   WBC 9.95 15.82* 11.39   HGB 7.0* 7.9* 9.0*   HCT 22.4* 24.4* 28.0*    137* 112*     CMP:   Recent Labs   Lab 11/06/20  1501 11/07/20  0449 11/08/20  0330    140 139   K 4.6 4.4 4.1   CL 96 95 94*   CO2 32* 34* 36*   * 232* 132*   BUN 26* 32* 36*   CREATININE 1.3 1.2 1.2   CALCIUM 8.3* 8.2* 8.1*   PROT 5.7* 5.6* 5.3*   ALBUMIN 3.4* 3.3* 3.2*   BILITOT 1.2* 2.4* 0.9   ALKPHOS 91 84 83   AST 12 10 12   ALT 26 23 19   ANIONGAP 10 11 9   EGFRNONAA 38* 42* 42*     Troponin:   Recent Labs   Lab 11/06/20  1501 11/06/20  1729 11/06/20  2253   TROPONINI 0.022 0.024 0.033*       Assessment/Plan:      * Symptomatic anemia  Transfused 2  PRBcs  Slowly      I am hoping for HCT around 28-30 before discharge   And work up for anemia as outpatient .    Lab Results   Component Value Date    WBC 11.39 11/08/2020    HGB 9.0 (L) 11/08/2020    HCT 28.0 (L) 11/08/2020     (H) 11/08/2020     (L) 11/08/2020             COPD exacerbation        Long term current use of systemic steroids  She got IV steroid in ER   Resume prednisone 20 mg daily .        COPD with acute exacerbation  Supplemental O2 via nasal canula; titrate O2 saturation to >92%.   Start steroids   Pulmonary consultation.   Continue beta 2 agonist bronchodilator treatments.       Continue routine  medications as before.              Anemia  Symptomatic anemia   Transfuse 1 unit very slowly   Lasix in between unit .  11/7/20    Will transfuse slowly one more unit today   11/8/20  Lab Results   Component Value Date    WBC 11.39 11/08/2020    HGB 9.0 (L) 11/08/2020    HCT 28.0 (L) 11/08/2020     (H) 11/08/2020     (L) 11/08/2020       Work up outpatient         Acute on chronic respiratory failure with hypoxia  Dr nino started her on BIPAP .      Chronic respiratory failure with hypoxia  Resume oxygen ..        Insomnia  Trazodone resumed..      Hyperlipidemia  Resume her statins.  Lab Results   Component Value Date    LDLCALC 53.6 (L) 04/13/2020           HTN (hypertension)  Resume her antihypertensives        VTE Risk Mitigation (From admission, onward)         Ordered     IP VTE HIGH RISK PATIENT  Once      11/06/20 1708     Place sequential compression device  Until discontinued      11/06/20 1708                Discharge Planning   HARPER:      Code Status: Full Code   Is the patient medically ready for discharge?:     Reason for patient still in hospital (select all that apply): Treatment  Discharge Plan A: Home            Critical care time spent on the evaluation and treatment of severe organ dysfunction, review of pertinent labs and imaging studies, discussions with consulting providers and discussions with patient/family: 34 minutes.      Milo Estevez MD  Department of Hospital Medicine   [facility]

## 2020-11-08 NOTE — NURSING
Received report from STACIA Bonilla    Patient awake, watching TV in bed at this time. No family at bedside. Saline locked    Physical to follow.

## 2020-11-09 NOTE — PLAN OF CARE
11/09/20 0814   Final Note   Assessment Type Final Discharge Note   Anticipated Discharge Disposition Home   What phone number can be called within the next 1-3 days to see how you are doing after discharge? [phone]   Hospital Follow Up  Appt(s) scheduled? Yes   Discharge plans and expectations educations in teach back method with documentation complete? Yes   Post-Acute Status   Post-Acute Authorization Other   Other Status No Post-Acute Service Needs   Discharge Delays None known at this time       No post-acute care needs identified during this hospital stay.     Daniela Castro LMSW

## 2020-11-10 NOTE — PROGRESS NOTES
C3 nurse attempted to contact patient. No answer. The following message was left for the patient to return the call:  Good morning  I am a nurse calling on behalf of [facility] from the [facility].  This is a Transitional Care Call for Chantell Herrera. When you have a moment please contact us at [phone] or [phone] Monday through Friday, between the hours of 8 am to 4 pm. We look forward to speaking with you. On behalf of [facility] have a nice day.    The patient has a scheduled HOSFU appointment with Dr Gaspar on 11/12/2020 @ 7620. Message sent to Physician staff.

## 2020-11-10 NOTE — PATIENT INSTRUCTIONS

## 2020-11-11 NOTE — PHYSICIAN QUERY
PT Name: Chantell Herrera  MR #: [id number]     RESPIRATORY CONDITION CLARIFICATION     CDS: Mellisa Isbell RN      Contact information:[email or web address] or (cell) [phone]    This form is a permanent document in the medical record.     Query Date: November 11, 2020    By submitting this query, we are merely seeking further clarification of documentation.  Please utilize your independent clinical judgment when addressing the question(s) below.  The Medical Record contains the following   Indicators   Supporting Clinical Findings Location in Medical Record   x SOB, SANTIAGO, Wheezing, Productive Cough, Use of Accessory Muscles, etc. Pulmonary:    Effort: Respiratory distress (mild to moderate) present. No accessory muscle usage or prolonged expiration. Breath sounds: Decreased air movement present. No transmitted upper airway sounds. Examination of the right-upper field reveals decreased breath sounds and wheezing. Examination of the left-upper field reveals decreased breath sounds and wheezing. Examination of the right-middle field reveals decreased breath sounds, wheezing and rhonchi. Examination of the left-middle field reveals decreased breath sounds, wheezing and rhonchi. Examination of the right-lower field reveals decreased breath sounds, wheezing, rhonchi and rales. Examination of the left-lower field reveals decreased breath sounds, wheezing, rhonchi and rales. Decreased breath sounds, wheezing, rhonchi and rales present.    Dyspnea: Patient complains of shortness of breath while getting dressed.  Symptoms include difficulty breathing, dyspnea on exertion, productive cough, shortness of breath and sputum production   Pulm PN 11/7                            H&P 11/6   x RR         ABGs         O2 sat 11/6 @ 1709: RR 17, SpO2 92%  11/7 @ 1603: RR 18, SpO2 99%  11/8 @ 0731: RR 23, SpO2 91%   Vital Signs    Hypoxia/Hypercapnia      BiPAP/Intubation/Mechanical Ventilation     x Supplemental O2 On oxygen per N/C  at 3 lpm   RN POC 11/6   x Home O2, Oxygen Dependence Uses oxygen at home continuously at 3 lpm and trilogy at night   RN POC 11/6   x Respiratory Distress or Failure Chronic respiratory failure with hypoxia  On Oxygen and trilogy     Acute on chronic respiratory failure with hypoxia  On trilogy at night    Pulm consult 11/6   x Radiology Findings FINDINGS:  Pacemaker in place.  The cardiac silhouette is within normal limits.  Atherosclerotic changes of the aorta.  Scarring along the right minor fissure.  Mild stable chronic lung changes.  No focal infiltrate or effusion.  Degenerative changes of the spine   Chest Xray 11/6   x Acute/Chronic Illness COPD exacerbation  severe     Long term current use of systemic steroids  Will need steroids in house      COPD with acute exacerbation  Mild wheezing and fair air entry with wheeze R>L   Pulm consult 11/6    Treatment      Other       Respiratory failure can be acute, chronic or both. It is generally further specified as hypoxic, hypercapnic or both. Lastly, it is important to identify an etiology, if known or suspected.   References:: https://www.acphospitalist.org/archives/2013/10/coding.htm    http://[employer]/acute-respiratory-failure-know    The noted clinical guidelines are only system guidelines and do not replace the providers clinical judgment.    Provider, please clarify the respiratory diagnosis.    [    ] Acute and (on) Chronic Respiratory Failure with Hypoxia - pO2 >10 mmHg below baseline OR SpO2 < 91% on usual home O2 or O2 ? 2L/min over baseline home O2      [   x ] Chronic Respiratory Failure with Hypoxia - Continuous home oxygen use     [    ] Other Respiratory Diagnosis (please specify): _________________     [   ] Clinically Undetermined

## 2020-11-12 NOTE — PROGRESS NOTES
Transitional Care Note  Subjective:       Patient ID: Chantell Herrera is a 85 y.o. female.  Chief Complaint: Hospital Follow Up and Dizziness    Family and/or Caretaker present at visit?  No.  Diagnostic tests reviewed/disposition: I have reviewed all completed as well as pending diagnostic tests at the time of discharge.  Disease/illness education: Anemia  Home health/community services discussion/referrals: Patient has home health established at [facility].   Establishment or re-establishment of referral orders for community resources: No other necessary community resources.   Discussion with other health care providers: No discussion with other health care providers necessary.   85 year old female with chronic anemia and recent admission for recurrence without acute bleed. She has had transfusion every 2-3 months with complications the majority of the time - flash pulm edema. However, this time, she tolerated her transfusion well. She as treated for copd while in the hospital but her routine meds were resolved on d/c.    Review of Systems   Constitutional: Positive for fatigue. Negative for chills and fever.   HENT: Negative for congestion, ear pain, postnasal drip, rhinorrhea, sore throat and trouble swallowing.    Eyes: Negative for redness and itching.   Respiratory: Positive for cough, shortness of breath and wheezing.    Cardiovascular: Negative for chest pain and palpitations.   Gastrointestinal: Negative for abdominal pain, diarrhea, nausea and vomiting.   Genitourinary: Negative for dysuria and frequency.   Skin: Negative for rash.   Neurological: Negative for weakness and headaches.       Objective:      Physical Exam  Vitals signs and nursing note reviewed.   Constitutional:       General: She is not in acute distress.     Appearance: She is well-developed.   HENT:      Head: Normocephalic and atraumatic.   Eyes:      Conjunctiva/sclera: Conjunctivae normal.      Pupils: Pupils are equal, round, and  reactive to light.   Neck:      Musculoskeletal: Normal range of motion and neck supple.      Thyroid: No thyromegaly.   Cardiovascular:      Rate and Rhythm: Normal rate and regular rhythm.      Heart sounds: Normal heart sounds.   Pulmonary:      Effort: Pulmonary effort is normal. No respiratory distress.      Breath sounds: Rhonchi present. No wheezing.      Comments: 4L nc in place  Abdominal:      General: Bowel sounds are normal.      Palpations: Abdomen is soft.      Tenderness: There is no abdominal tenderness.   Musculoskeletal: Normal range of motion.   Lymphadenopathy:      Cervical: No cervical adenopathy.   Skin:     General: Skin is warm and dry.      Findings: No rash.   Neurological:      Mental Status: She is alert and oriented to person, place, and time.   Psychiatric:         Behavior: Behavior normal.         Assessment:       1. Osteoporosis, unspecified osteoporosis type, unspecified pathological fracture presence    2. Dorsalgia, unspecified    3. Dizziness and giddiness    4. Cough    5. Acute on chronic respiratory failure with hypoxia and hypercapnia    6. Chronic respiratory failure with hypoxia    7. COPD with acute exacerbation    8. Oxygen dependent    9. Chronic congestive heart failure, unspecified heart failure type    10. Pancytopenia        Plan:       Chantell was seen today for hospital follow up and dizziness.    Diagnoses and all orders for this visit:    Osteoporosis, unspecified osteoporosis type, unspecified pathological fracture presence  -     DXA Bone Density Appendicular Skeleton; Future    Dorsalgia, unspecified  -     X-Ray Lumbar Spine AP And Lateral; Future    Dizziness and giddiness  -     MRI Brain With Contrast; Future    Cough  -     X-Ray Chest PA And Lateral; Future    Acute on chronic respiratory failure with hypoxia and hypercapnia    Chronic respiratory failure with hypoxia    COPD with acute exacerbation    Oxygen dependent    Chronic congestive heart  failure, unspecified heart failure type    Pancytopenia    repeat cbc.  cxr looks okay.    With dizziness s/p hospital stay and numbness to right leg, will r/o cva. She is not on anticoagulation aside from aspirin.     Discussed with heme/onc bone marrow biopsy - will go there for this. All cell lines seem depleted. Iron, b12, folate okay. Did not get retic (last time 4.1). fobt neg    RTC if condition acutely worsens or any other concerns, otherwise RTC as scheduled

## 2020-11-13 NOTE — TELEPHONE ENCOUNTER
----- Message from Cathy Burnett sent at 2020  8:41 AM CST -----  Contact: self  Chantell Herrera  MRN: [id number]  : 1935  PCP: Kari Gaspar  Home Phone      [phone]  Work Phone      Not on file.  Mobile          [phone]      MESSAGE:   Patient was told to call everyday with her BP    /68 pulse 69, also missed her MRI because she has a pacemaker    [phone]

## 2020-11-19 PROBLEM — J44.1 COPD EXACERBATION: Status: RESOLVED | Noted: 2020-01-01 | Resolved: 2020-01-01

## 2020-11-19 PROBLEM — R04.2 COUGH WITH HEMOPTYSIS: Status: RESOLVED | Noted: 2019-12-03 | Resolved: 2020-01-01

## 2020-11-19 PROBLEM — J96.90 RESPIRATORY FAILURE: Status: RESOLVED | Noted: 2020-01-01 | Resolved: 2020-01-01

## 2020-11-20 NOTE — PROGRESS NOTES
Outpatient Care Management  Plan of Care Follow Up Visit    Patient: Chantell Herrera  MRN: [id number]  Date of Service: 11/20/2020  Completed by: Kirsten Jacques RN  Referral Date: 08/26/2020  Program: Case Management (High Risk)    Reason for Visit   Patient presents with    Update Plan Of Care     11/20/20       Brief Summary: OPCM follow up call. Patient reports she has been having a bit of wheezing today but has used her nebulizer and feels it has gottnen better  Patient also reports she will use her inhaler as needed. CM encouraged medication compliance and to continue low sodium diet. Patient reports she continue to weigh daily. CM advised if symptoms get worse call OOC or report to ED for evaluation. CM will continue to monitor.    Patient Summary     Involvement of Care:  Do I have permission to speak with other family members about your care?   yes/spouse    Patient Reported Labs & Vitals:  1.  Any Patient Reported Labs & Vitals?     2.  Patient Reported Blood Pressure:     3.  Patient Reported Pulse:     4.  Patient Reported Weight (Kg):     5.  Patient Reported Blood Glucose (mg/dl):       Medical and social history was reviewed with patient and/or caregiver.     Clinical Assessment     Reviewed and provided basic information on available community resources for mental health, transportation, wellness resources, and palliative care programs with patient and/or caregiver.     Complex Care Plan     Care plan was discussed and completed today with input from patient and/or caregiver.    Patient Instructions     Instructions were provided via the [employer] patient resources and are available for the patient to view on the patient portal.    Next Steps: f/u medication compliance and daily weights    Patient agrees to f/u on 12/4/20    Todays OPCM Self-Management Care Plan was developed with the patients/caregivers input and was based on identified barriers from todays assessment.  Goals were written today  with the patient/caregiver and the patient has agreed to work towards these goals to improve his/her overall well-being. Patient verbalized understanding of the care plan, goals, and all of today's instructions. Encouraged patient/caregiver to communicate with his/her physician and health care team about health conditions and the treatment plan.  Provided my contact information today and encouraged patient/caregiver to call me with any questions as needed.

## 2020-11-23 NOTE — TELEPHONE ENCOUNTER
Patient called to report her 2 week BP log.     11/13: 149/68 hr: 59  11/14: 129/67 66  11/15: 112/56 66  11/16: 129/64 59  11/17: skipped  11/18: 109/76 103  11/19: 133/63 66  11/20: 130/61 63  11/21: skipped  11/22: 127/57 69  11/23: 126/60 63    Documented today's BP in the remote BP reading.

## 2020-12-02 PROBLEM — R09.02 HYPOXEMIA: Status: ACTIVE | Noted: 2020-01-01

## 2020-12-02 PROBLEM — J44.1 COPD EXACERBATION: Status: ACTIVE | Noted: 2020-01-01

## 2020-12-02 PROBLEM — I50.43 ACUTE ON CHRONIC COMBINED SYSTOLIC AND DIASTOLIC CHF, NYHA CLASS 3: Status: ACTIVE | Noted: 2020-01-01

## 2020-12-02 NOTE — HPI
Patient presents to the emergency room with significant hypoxemia today.  She is on home oxygen but her sats been in the low 80 percentile range.  This started over the past month.  Just so happens 1 month ago her prednisone dose was decreased from 20 mg and is now 15 mg daily.  She can only ambulate 20 ft with a walker and she becomes very dyspnea.  She has at least 1 pill orthopnea.  She is given Lasix IV in the emergency room now feels better.  She also has severe COPD.  She also has a history of diastolic heart failure.  Her last EF was 60%.  She has a history of a pacemaker.  She has history of AFib.  She only takes aspirin.  She denies productive cough, fever, chills.  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

## 2020-12-02 NOTE — NURSING
"Dr Chicho CORBETT At bedside, updated MD on vitals, status, condition, WBG finger stick 300 mg/dl at this time, states "I only take 10 units of Levemir at night after I eat at home." Refuses SCDs. Awaiting orders  "

## 2020-12-02 NOTE — ASSESSMENT & PLAN NOTE
Continue prednisone 15 mg p.o. daily  Consult Dr. Hickman, pulmonology who knows this patient well.

## 2020-12-02 NOTE — PLAN OF CARE
12/02/20 1449   ED Admissions Case Approval   ED Admissions Case Approval CM Approved       INPATIENT  level of care criteria MET.    Secure chat sent to Dr. Pereyra (ED) and Dr. Valentino (attending) for inpatient order to be written.     Chronic Obstructive Pulmonary Disease - Clinical Indications for Admission to Inpatient Care by Argelia Sweeney RN       Met: Reviewed on 12/2/2020 by Argelia Sweeney RN       Created Using Review Status Review Entered   Indicia® Completed 12/2/2020 14:48       Criteria Set Name - Subset   Chronic Obstructive Pulmonary Disease - Clinical Indications for Admission to Inpatient Care      Criteria Review      Clinical Indications for Admission to Inpatient Care    Most Recent : Argelia Sweeney Most Recent Date: 12/02/2020 2:48 PM    (X) Admission is indicated for  1 or more  of the following  (1) (2) (3):       (X) High-risk active acute comorbidity (eg, dysrhythmia, heart failure, pleural effusion, pneumothorax,       myopathy, rib fracture) with new or worsened (eg, from baseline) signs or symptoms of COPD       (eg, dyspnea, Tachypnea, cough, sputum production)

## 2020-12-02 NOTE — ASSESSMENT & PLAN NOTE
Continue oxygen supplementation  Monitor O2 overnight  Admit to ICU  Hopefully this will improve once we for fine tune her cardiac and COPD meds

## 2020-12-02 NOTE — NURSING
Arrived to CCU bed 1 via wheelchair, on 4L NC, AAO x 4, pleasant mood, denies shortness of breath. Physical to follow.

## 2020-12-02 NOTE — H&P
[facility] Medicine  History & Physical    Patient Name: Chantell Herrera  MRN: [id number]  Admission Date: 12/2/2020  Attending Physician: Chicho Fisher MD   Primary Care Provider: Kari Gaspar MD         Patient information was obtained from patient, past medical records and ER records.     Subjective:     Principal Problem:Acute on chronic combined systolic and diastolic CHF, NYHA class 3    Chief Complaint:   Chief Complaint   Patient presents with    Shortness of Breath     pt reports history of COPD/anemia and she is short of breath.         HPI: Patient presents to the emergency room with significant hypoxemia today.  She is on home oxygen but her sats been in the low 80 percentile range.  This started over the past month.  Just so happens 1 month ago her prednisone dose was decreased from 20 mg and is now 15 mg daily.  She can only ambulate 20 ft with a walker and she becomes very dyspnea.  She has at least 1 pill orthopnea.  She is given Lasix IV in the emergency room now feels better.  She also has severe COPD.  She also has a history of diastolic heart failure.  Her last EF was 60%.  She has a history of a pacemaker.  She has history of AFib.  She only takes aspirin.  She denies productive cough, fever, chills.  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    Past Medical History:   Diagnosis Date    A-fib     Acute on chronic congestive heart failure 2/1/2019    Anxiety     Arthritis     Asthma     Atrial fibrillation with rapid ventricular response     CHF (congestive heart failure) 11/29/2018    Chronic bronchitis     COPD (chronic obstructive pulmonary disease)     Depression     Diabetes mellitus, type 2     SANTIAGO (dyspnea on exertion)     Emphysema of lung     Fatigue     Hyperlipidemia     Hypertension     Symptomatic anemia 1/22/2019    Trouble in sleeping        Past Surgical History:   Procedure Laterality Date    APPENDECTOMY      BRONCHOSCOPY N/A  12/3/2019    Procedure: BRONCHOSCOPY;  Surgeon: Emmanuel Hickman MD;  Location: [facility];  Service: Pulmonary;  Laterality: N/A;    EYE SURGERY      HYSTERECTOMY      INSERTION OF PACEMAKER  12/26/2019    OOPHORECTOMY      TONSILLECTOMY         Review of patient's allergies indicates:  No Known Allergies    No current facility-administered medications on file prior to encounter.      Current Outpatient Medications on File Prior to Encounter   Medication Sig    albuterol (PROVENTIL) 2.5 mg /3 mL (0.083 %) nebulizer solution Take 3 mLs (2.5 mg total) by nebulization every 6 (six) hours as needed for Wheezing.    aspirin (ECOTRIN) 81 MG EC tablet Take 1 tablet (81 mg total) by mouth once daily.    baclofen (LIORESAL) 10 MG tablet Take 10 mg by mouth 3 (three) times daily.    benzonatate (TESSALON) 100 MG capsule Take 100 mg by mouth 3 (three) times daily as needed for Cough.    cholecalciferol, vitamin D3, (VITAMIN D3) 2,000 unit Tab Take 2,000 Units by mouth once daily.    citalopram (CELEXA) 20 MG tablet Take 1 tablet (20 mg total) by mouth once daily.    folic acid (FOLVITE) 1 MG tablet Take 2,000 mcg by mouth once daily.    insulin detemir U-100 (LEVEMIR FLEXTOUCH) 100 unit/mL (3 mL) SubQ InPn pen Inject 10 Units into the skin every evening.    ipratropium (ATROVENT) 0.02 % nebulizer solution Take 500 mcg by nebulization 4 (four) times daily.     losartan (COZAAR) 50 MG tablet 50 mg once daily.     meclizine (ANTIVERT) 25 mg tablet 2 (two) times daily as needed for Dizziness.     metoprolol succinate (TOPROL-XL) 50 MG 24 hr tablet Take 50 mg by mouth 2 (two) times daily.     pravastatin (PRAVACHOL) 40 MG tablet Take 40 mg by mouth once daily.    predniSONE (DELTASONE) 20 MG tablet Take 1 tablet (20 mg total) by mouth once daily.    rOPINIRole (REQUIP) 1 MG tablet TAKE 1 TABLET  EVERY EVENING.    tiotropium (SPIRIVA) 18 mcg inhalation capsule Inhale 1 capsule (18 mcg total) into the lungs once  daily. Controller    torsemide (DEMADEX) 20 MG Tab Take 1 tablet (20 mg total) by mouth once daily.    traZODone (DESYREL) 50 MG tablet TAKE 1 TABLET EVERY EVENING     Family History     Problem Relation (Age of Onset)    COPD Brother, Daughter    Cancer Sister    Diabetes Father    Heart disease Mother    Hypertension Mother, Father, Brother    Kidney disease Son    No Known Problems Daughter        Tobacco Use    Smoking status: Former Smoker     Quit date: 8/3/2000     Years since quittin.3    Smokeless tobacco: Never Used   Substance and Sexual Activity    Alcohol use: No    Drug use: No    Sexual activity: Not Currently     Review of Systems   Constitutional: Negative for appetite change, fever and unexpected weight change.   HENT: Positive for rhinorrhea. Negative for congestion.    Eyes: Negative for visual disturbance.   Respiratory: Positive for shortness of breath and wheezing. Negative for cough.    Cardiovascular: Negative for chest pain, palpitations and leg swelling.   Gastrointestinal: Negative for abdominal distention, abdominal pain, anal bleeding and blood in stool.   Genitourinary: Negative for difficulty urinating and dysuria.   Musculoskeletal: Positive for arthralgias, back pain and gait problem. Negative for joint swelling.   Skin: Negative for rash.   Neurological: Negative for dizziness, seizures, weakness, numbness and headaches.   Hematological: Negative for adenopathy.   Psychiatric/Behavioral: Negative for sleep disturbance. The patient is not nervous/anxious.      Objective:     Vital Signs (Most Recent):  Temp: 98.5 °F (36.9 °C) (20 1602)  Pulse: 72 (20 1645)  Resp: 15 (20 1645)  BP: (!) 122/46 (20 1553)  SpO2: 95 % (20 1645) Vital Signs (24h Range):  Temp:  [98.2 °F (36.8 °C)-98.5 °F (36.9 °C)] 98.5 °F (36.9 °C)  Pulse:  [66-81] 72  Resp:  [15-26] 15  SpO2:  [89 %-96 %] 95 %  BP: (115-126)/(46-56) 122/46        There is no height or weight on  file to calculate BMI.    Physical Exam  Vitals signs reviewed.   Constitutional:       General: She is not in acute distress.     Appearance: Normal appearance.      Comments: mildly tachypneic   HENT:      Head: Normocephalic.      Nose: Nose normal.      Mouth/Throat:      Mouth: Mucous membranes are moist.   Eyes:      General:         Right eye: No discharge.      Pupils: Pupils are equal, round, and reactive to light.   Neck:      Thyroid: No thyromegaly.      Vascular: No JVD.   Cardiovascular:      Rate and Rhythm: Normal rate and regular rhythm.      Pulses: Normal pulses.      Heart sounds: Normal heart sounds. No murmur. No friction rub. No gallop.       Comments: tachycardic  Pulmonary:      Breath sounds: Wheezing and rales present.   Abdominal:      General: Abdomen is flat.      Palpations: Abdomen is soft.   Musculoskeletal: Normal range of motion.         General: Tenderness present.      Right lower leg: No edema.      Left lower leg: No edema.   Lymphadenopathy:      Cervical: No cervical adenopathy.   Skin:     General: Skin is dry.   Neurological:      General: No focal deficit present.      Mental Status: She is alert and oriented to person, place, and time.      Cranial Nerves: No cranial nerve deficit.      Gait: Gait abnormal.   Psychiatric:         Mood and Affect: Mood normal.         Behavior: Behavior normal.         Thought Content: Thought content normal.         Judgment: Judgment normal.           CRANIAL NERVES     CN III, IV, VI   Pupils are equal, round, and reactive to light.       Significant Labs:   A1C:   Recent Labs   Lab 08/22/20  2149 08/23/20  0347   HGBA1C 6.5* 6.6*     CBC:   Recent Labs   Lab 12/02/20  1200   WBC 13.79*   HGB 8.3*   HCT 26.2*        CMP:   Recent Labs   Lab 12/02/20  1200      K 3.6   CL 94*   CO2 37*   *   BUN 23   CREATININE 1.2   CALCIUM 8.8   PROT 6.0   ALBUMIN 3.7   BILITOT 0.7   ALKPHOS 72   AST 12   ALT 24   ANIONGAP 11    EGFRNONAA 42*     Lactic Acid: No results for input(s): LACTATE in the last 48 hours.  POCT Glucose:   Recent Labs   Lab 12/02/20  1631   POCTGLUCOSE 300*     Troponin:   Recent Labs   Lab 12/02/20  1200   TROPONINI 0.027*     Urine Studies: No results for input(s): COLORU, APPEARANCEUA, PHUR, SPECGRAV, PROTEINUA, GLUCUA, KETONESU, BILIRUBINUA, OCCULTUA, NITRITE, UROBILINOGEN, LEUKOCYTESUR, RBCUA, WBCUA, BACTERIA, SQUAMEPITHEL, HYALINECASTS in the last 48 hours.    Invalid input(s): WRIGHTSUR  BNP  Recent Labs   Lab 12/02/20  1200   *     Lab Results   Component Value Date    DDIMER 1.12 (H) 11/06/2020         Significant Imaging: CXR: I have reviewed all pertinent results/findings within the past 24 hours and my personal findings are:  Cardiomegaly, cephalization of blood flow, fluid in the right fissure,  Left cardiac pacer.    Assessment/Plan:     * Acute on chronic combined systolic and diastolic CHF, NYHA class 3  Lasix 40 mg IV push every day.  Consult cardiology      Hypoxemia  Continue oxygen supplementation  Monitor O2 overnight  Admit to ICU  Hopefully this will improve once we for fine tune her cardiac and COPD meds      COPD exacerbation  DuoNeb treatments 4 times daily  Continue trilogy  Continue prednisone 15 mg p.o. daily  Consult pulmonology      Long term current use of systemic steroids  Continue prednisone 15 mg p.o. daily  Consult Dr. Hickman, pulmonology who knows this patient well.      Diabetes mellitus type 2, uncontrolled  Continue Levemir 10 units at night  Placed on sliding scale      Macrocytic anemia  This has been worked up in the past.  Monitor CBC daily      Chronic atrial fibrillation  Continue aspirin  Consult Cardiology  Subcu Lovenox 30 mg daily      Oxygen dependent  Continue oxygen at 4 L nasal cannula  Continue home trilogy  Consult pulmonology      CKD (chronic kidney disease), stage III  Monitor renal function closely      Major depressive disorder with single episode,  in full remission  Continue citalopram 20 mg p.o. daily      Hyperlipidemia  Continue pravastatin 40 mg daily      HTN (hypertension)  Continue losartan 50 mg daily  Continue metoprolol 50 mg 2 times daily  Consult Cardiology        VTE Risk Mitigation (From admission, onward)         Ordered     IP VTE HIGH RISK PATIENT  Once      12/02/20 1548     Place sequential compression device  Until discontinued      12/02/20 1548              Critical care time spent on the evaluation and treatment of severe organ dysfunction, review of pertinent labs and imaging studies, discussions with consulting providers and discussions with patient/family: 40 minutes.     Chicho Fisher MD  Department of Hospital Medicine   [facility]

## 2020-12-02 NOTE — ED PROVIDER NOTES
Encounter Date: 2020       History     Chief Complaint   Patient presents with    Shortness of Breath     pt reports history of COPD/anemia and she is short of breath.      Patient is 85-year-old white female with history of COPD and CHF, presents with increasing shortness of breath over the last day or so.  She is on home O2 at 3 L a minute, not able to maintain saturation with that level of oxygen.  She denies cough but does report wheezing.  No increased peripheral edema is noted. She denies chest pain.        Review of patient's allergies indicates:  No Known Allergies  Past Medical History:   Diagnosis Date    A-fib     Acute on chronic congestive heart failure 2019    Anxiety     Arthritis     Asthma     Atrial fibrillation with rapid ventricular response     CHF (congestive heart failure) 2018    Chronic bronchitis     COPD (chronic obstructive pulmonary disease)     Depression     Diabetes mellitus, type 2     SANTIAGO (dyspnea on exertion)     Emphysema of lung     Fatigue     Hyperlipidemia     Hypertension     Symptomatic anemia 2019    Trouble in sleeping      Past Surgical History:   Procedure Laterality Date    APPENDECTOMY      BRONCHOSCOPY N/A 12/3/2019    Procedure: BRONCHOSCOPY;  Surgeon: Emmanuel Hickman MD;  Location: [facility];  Service: Pulmonary;  Laterality: N/A;    EYE SURGERY      HYSTERECTOMY      INSERTION OF PACEMAKER  2019    OOPHORECTOMY      TONSILLECTOMY       Family History   Problem Relation Age of Onset    Heart disease Mother     Hypertension Mother     Hypertension Father     Diabetes Father     Cancer Sister     COPD Brother     Hypertension Brother     No Known Problems Daughter     Kidney disease Son     COPD Daughter      Social History     Tobacco Use    Smoking status: Former Smoker     Quit date: 8/3/2000     Years since quittin.3    Smokeless tobacco: Never Used   Substance Use Topics    Alcohol use: No    Drug  use: No     Review of Systems   Constitutional: Negative for fever.   HENT: Negative for congestion, ear pain, rhinorrhea, sore throat and trouble swallowing.    Eyes: Negative for pain.   Respiratory: Positive for shortness of breath. Negative for cough and wheezing.    Cardiovascular: Negative for chest pain, palpitations and leg swelling.   Gastrointestinal: Negative for abdominal pain, constipation, diarrhea and nausea.   Genitourinary: Negative for difficulty urinating, dysuria, flank pain, frequency, hematuria and urgency.   Musculoskeletal: Negative for arthralgias, back pain, myalgias and neck pain.   Skin: Negative for rash and wound.   Neurological: Negative for speech difficulty, weakness and headaches.   Hematological: Does not bruise/bleed easily.       Physical Exam     Initial Vitals [12/02/20 1054]   BP Pulse Resp Temp SpO2   (!) 115/52 75 20 98.2 °F (36.8 °C) (!) 91 %      MAP       --         Physical Exam    Nursing note and vitals reviewed.  Constitutional: She appears well-developed and well-nourished. No distress.   HENT:   Head: Normocephalic and atraumatic.   Nose: Nose normal.   Mouth/Throat: Oropharynx is clear and moist.   Eyes: Conjunctivae and EOM are normal. Pupils are equal, round, and reactive to light.   Neck: Neck supple.   Cardiovascular: Normal rate, normal heart sounds and intact distal pulses.   Pulmonary/Chest: She is in respiratory distress. She has wheezes.   Abdominal: Soft. Bowel sounds are normal. She exhibits no distension. There is no abdominal tenderness.   Musculoskeletal: Normal range of motion.   Neurological: She is alert and oriented to person, place, and time. She has normal strength. GCS score is 15. GCS eye subscore is 4. GCS verbal subscore is 5. GCS motor subscore is 6.   Skin: Skin is warm and dry.   Psychiatric: She has a normal mood and affect. Thought content normal.         ED Course   Procedures  Labs Reviewed   CBC W/ AUTO DIFFERENTIAL - Abnormal;  Notable for the following components:       Result Value    WBC 13.79 (*)     RBC 2.29 (*)     Hemoglobin 8.3 (*)     Hematocrit 26.2 (*)      (*)     MCH 36.2 (*)     MCHC 31.7 (*)     MPV 13.6 (*)     Gran % 83.0 (*)     Lymph % 3.0 (*)     All other components within normal limits   COMPREHENSIVE METABOLIC PANEL - Abnormal; Notable for the following components:    Chloride 94 (*)     CO2 37 (*)     Glucose 231 (*)     eGFR if  48 (*)     eGFR if non  42 (*)     All other components within normal limits   TROPONIN I - Abnormal; Notable for the following components:    Troponin I 0.027 (*)     All other components within normal limits   B-TYPE NATRIURETIC PEPTIDE - Abnormal; Notable for the following components:     (*)     All other components within normal limits   PROTIME-INR   SARS-COV-2 RNA AMPLIFICATION, QUAL        ECG Results          EKG 12-lead (In process)  Result time 12/02/20 12:41:55    In process by Interface, Lab In [facility] (12/02/20 12:41:55)                 Narrative:    Test Reason : R06.02    Vent. Rate : 077 BPM     Atrial Rate : 077 BPM     P-R Int : 138 ms          QRS Dur : 104 ms      QT Int : 390 ms       P-R-T Axes : 090 -81 087 degrees     QTc Int : 441 ms    Normal sinus rhythm  Left axis deviation  Pulmonary disease pattern  Incomplete right bundle branch block  Septal infarct ,age undetermined  Abnormal ECG  When compared with ECG of 07-NOV-2020 04:56,  Septal infarct is now Present  Inverted T waves have replaced nonspecific T wave abnormality in Lateral  leads    Referred By: [email or web address]   SELF           Confirmed By:                             Imaging Results          X-Ray Chest AP Portable (Final result)  Result time 12/02/20 12:31:01    Final result by Ulysses Arzola MD (12/02/20 12:31:01)                 Impression:      No acute process.  Left cardiac pacer.      Electronically signed by: Ulysses Arzola  MD  Date:    12/02/2020  Time:    12:31             Narrative:    EXAMINATION:  XR CHEST AP PORTABLE    CLINICAL HISTORY:  CHF;    TECHNIQUE:  Single frontal view of the chest was performed.    COMPARISON:  11/12/2020    FINDINGS:  The heart size is normal.  There is calcification of the aorta.  There is a single lead left-sided cardiac pacer.  Linear scarring is present along the minor fissure.  There is no consolidation, edema, or pleural effusion.                                PATIENT NO LONGER WHEEZING BUT BARELY ABLE TO MAINTAIN MARGINAL O2 SATURATION OF 92%, 93% ON NASAL CANNULA 3 LITERS/MINUTE.  I HAVE DISCUSSED ADMISSION FOR OBSERVATION WITH HOSPITAL MEDICINE, THEY CONCUR.                              Clinical Impression:     ICD-10-CM ICD-9-CM   1. COPD exacerbation  J44.1 491.21   2. Shortness of breath  R06.02 786.05   3. Congestive heart failure, unspecified HF chronicity, unspecified heart failure type  I50.9 428.0                      Disposition:   Disposition: Placed in Observation  Condition: Stable                          Marco Antonio Pereyra Jr., MD  12/02/20 3434

## 2020-12-02 NOTE — ASSESSMENT & PLAN NOTE
DuoNeb treatments 4 times daily  Continue trilogy  Continue prednisone 15 mg p.o. daily  Consult pulmonology

## 2020-12-02 NOTE — SUBJECTIVE & OBJECTIVE
Past Medical History:   Diagnosis Date    A-fib     Acute on chronic congestive heart failure 2/1/2019    Anxiety     Arthritis     Asthma     Atrial fibrillation with rapid ventricular response     CHF (congestive heart failure) 11/29/2018    Chronic bronchitis     COPD (chronic obstructive pulmonary disease)     Depression     Diabetes mellitus, type 2     SANTIAGO (dyspnea on exertion)     Emphysema of lung     Fatigue     Hyperlipidemia     Hypertension     Symptomatic anemia 1/22/2019    Trouble in sleeping        Past Surgical History:   Procedure Laterality Date    APPENDECTOMY      BRONCHOSCOPY N/A 12/3/2019    Procedure: BRONCHOSCOPY;  Surgeon: Emmanuel Hickman MD;  Location: [facility];  Service: Pulmonary;  Laterality: N/A;    EYE SURGERY      HYSTERECTOMY      INSERTION OF PACEMAKER  12/26/2019    OOPHORECTOMY      TONSILLECTOMY         Review of patient's allergies indicates:  No Known Allergies    No current facility-administered medications on file prior to encounter.      Current Outpatient Medications on File Prior to Encounter   Medication Sig    albuterol (PROVENTIL) 2.5 mg /3 mL (0.083 %) nebulizer solution Take 3 mLs (2.5 mg total) by nebulization every 6 (six) hours as needed for Wheezing.    aspirin (ECOTRIN) 81 MG EC tablet Take 1 tablet (81 mg total) by mouth once daily.    baclofen (LIORESAL) 10 MG tablet Take 10 mg by mouth 3 (three) times daily.    benzonatate (TESSALON) 100 MG capsule Take 100 mg by mouth 3 (three) times daily as needed for Cough.    cholecalciferol, vitamin D3, (VITAMIN D3) 2,000 unit Tab Take 2,000 Units by mouth once daily.    citalopram (CELEXA) 20 MG tablet Take 1 tablet (20 mg total) by mouth once daily.    folic acid (FOLVITE) 1 MG tablet Take 2,000 mcg by mouth once daily.    insulin detemir U-100 (LEVEMIR FLEXTOUCH) 100 unit/mL (3 mL) SubQ InPn pen Inject 10 Units into the skin every evening.    ipratropium (ATROVENT) 0.02 % nebulizer  solution Take 500 mcg by nebulization 4 (four) times daily.     losartan (COZAAR) 50 MG tablet 50 mg once daily.     meclizine (ANTIVERT) 25 mg tablet 2 (two) times daily as needed for Dizziness.     metoprolol succinate (TOPROL-XL) 50 MG 24 hr tablet Take 50 mg by mouth 2 (two) times daily.     pravastatin (PRAVACHOL) 40 MG tablet Take 40 mg by mouth once daily.    predniSONE (DELTASONE) 20 MG tablet Take 1 tablet (20 mg total) by mouth once daily.    rOPINIRole (REQUIP) 1 MG tablet TAKE 1 TABLET  EVERY EVENING.    tiotropium (SPIRIVA) 18 mcg inhalation capsule Inhale 1 capsule (18 mcg total) into the lungs once daily. Controller    torsemide (DEMADEX) 20 MG Tab Take 1 tablet (20 mg total) by mouth once daily.    traZODone (DESYREL) 50 MG tablet TAKE 1 TABLET EVERY EVENING     Family History     Problem Relation (Age of Onset)    COPD Brother, Daughter    Cancer Sister    Diabetes Father    Heart disease Mother    Hypertension Mother, Father, Brother    Kidney disease Son    No Known Problems Daughter        Tobacco Use    Smoking status: Former Smoker     Quit date: 8/3/2000     Years since quittin.3    Smokeless tobacco: Never Used   Substance and Sexual Activity    Alcohol use: No    Drug use: No    Sexual activity: Not Currently     Review of Systems   Constitutional: Negative for appetite change, fever and unexpected weight change.   HENT: Positive for rhinorrhea. Negative for congestion.    Eyes: Negative for visual disturbance.   Respiratory: Positive for shortness of breath and wheezing. Negative for cough.    Cardiovascular: Negative for chest pain, palpitations and leg swelling.   Gastrointestinal: Negative for abdominal distention, abdominal pain, anal bleeding and blood in stool.   Genitourinary: Negative for difficulty urinating and dysuria.   Musculoskeletal: Positive for arthralgias, back pain and gait problem. Negative for joint swelling.   Skin: Negative for rash.   Neurological:  Negative for dizziness, seizures, weakness, numbness and headaches.   Hematological: Negative for adenopathy.   Psychiatric/Behavioral: Negative for sleep disturbance. The patient is not nervous/anxious.      Objective:     Vital Signs (Most Recent):  Temp: 98.5 °F (36.9 °C) (12/02/20 1602)  Pulse: 72 (12/02/20 1645)  Resp: 15 (12/02/20 1645)  BP: (!) 122/46 (12/02/20 1553)  SpO2: 95 % (12/02/20 1645) Vital Signs (24h Range):  Temp:  [98.2 °F (36.8 °C)-98.5 °F (36.9 °C)] 98.5 °F (36.9 °C)  Pulse:  [66-81] 72  Resp:  [15-26] 15  SpO2:  [89 %-96 %] 95 %  BP: (115-126)/(46-56) 122/46        There is no height or weight on file to calculate BMI.    Physical Exam  Vitals signs reviewed.   Constitutional:       General: She is not in acute distress.     Appearance: Normal appearance.      Comments: mildly tachypneic   HENT:      Head: Normocephalic.      Nose: Nose normal.      Mouth/Throat:      Mouth: Mucous membranes are moist.   Eyes:      General:         Right eye: No discharge.      Pupils: Pupils are equal, round, and reactive to light.   Neck:      Thyroid: No thyromegaly.      Vascular: No JVD.   Cardiovascular:      Rate and Rhythm: Normal rate and regular rhythm.      Pulses: Normal pulses.      Heart sounds: Normal heart sounds. No murmur. No friction rub. No gallop.       Comments: tachycardic  Pulmonary:      Breath sounds: Wheezing and rales present.   Abdominal:      General: Abdomen is flat.      Palpations: Abdomen is soft.   Musculoskeletal: Normal range of motion.         General: Tenderness present.      Right lower leg: No edema.      Left lower leg: No edema.   Lymphadenopathy:      Cervical: No cervical adenopathy.   Skin:     General: Skin is dry.   Neurological:      General: No focal deficit present.      Mental Status: She is alert and oriented to person, place, and time.      Cranial Nerves: No cranial nerve deficit.      Gait: Gait abnormal.   Psychiatric:         Mood and Affect: Mood  normal.         Behavior: Behavior normal.         Thought Content: Thought content normal.         Judgment: Judgment normal.           CRANIAL NERVES     CN III, IV, VI   Pupils are equal, round, and reactive to light.       Significant Labs:   A1C:   Recent Labs   Lab 08/22/20  2149 08/23/20  0347   HGBA1C 6.5* 6.6*     CBC:   Recent Labs   Lab 12/02/20  1200   WBC 13.79*   HGB 8.3*   HCT 26.2*        CMP:   Recent Labs   Lab 12/02/20  1200      K 3.6   CL 94*   CO2 37*   *   BUN 23   CREATININE 1.2   CALCIUM 8.8   PROT 6.0   ALBUMIN 3.7   BILITOT 0.7   ALKPHOS 72   AST 12   ALT 24   ANIONGAP 11   EGFRNONAA 42*     Lactic Acid: No results for input(s): LACTATE in the last 48 hours.  POCT Glucose:   Recent Labs   Lab 12/02/20  1631   POCTGLUCOSE 300*     Troponin:   Recent Labs   Lab 12/02/20  1200   TROPONINI 0.027*     Urine Studies: No results for input(s): COLORU, APPEARANCEUA, PHUR, SPECGRAV, PROTEINUA, GLUCUA, KETONESU, BILIRUBINUA, OCCULTUA, NITRITE, UROBILINOGEN, LEUKOCYTESUR, RBCUA, WBCUA, BACTERIA, SQUAMEPITHEL, HYALINECASTS in the last 48 hours.    Invalid input(s): WRIGHTSUR  BNP  Recent Labs   Lab 12/02/20  1200   *     Lab Results   Component Value Date    DDIMER 1.12 (H) 11/06/2020         Significant Imaging: CXR: I have reviewed all pertinent results/findings within the past 24 hours and my personal findings are:  Cardiomegaly, cephalization of blood flow, fluid in the right fissure,  Left cardiac pacer.

## 2020-12-02 NOTE — NURSING
"Called and updated Dr Hickman on consult, vitals, status, condition, O2 sat 94-96% at this time on 4L NC, patient stated "her  is bringing her home Trilogy machine." Currently sitting up in bed, resting well, on cell phone. New orders noted.  "

## 2020-12-03 NOTE — HOSPITAL COURSE
Patient had an uneventful night last night.  She is feeling much better.  She received a total of 40 mg of Lasix IV and had decent output.  Today she is no longer having severe shortness of breath.  She feels like she is back to her baseline.

## 2020-12-03 NOTE — PROGRESS NOTES
[facility] Hospital Medicine  Progress Note    Patient Name: Chantell Herrera  MRN: [id number]  Patient Class: IP- Inpatient   Admission Date: 12/2/2020  Length of Stay: 0 days  Attending Physician: Chicho Fisher MD  Primary Care Provider: Kari Gaspar MD        Subjective:     Principal Problem:Acute on chronic combined systolic and diastolic CHF, NYHA class 3        HPI:  Patient presents to the emergency room with significant hypoxemia today.  She is on home oxygen but her sats been in the low 80 percentile range.  This started over the past month.  Just so happens 1 month ago her prednisone dose was decreased from 20 mg and is now 15 mg daily.  She can only ambulate 20 ft with a walker and she becomes very dyspnea.  She has at least 1 pill orthopnea.  She is given Lasix IV in the emergency room now feels better.  She also has severe COPD.  She also has a history of diastolic heart failure.  Her last EF was 60%.  She has a history of a pacemaker.  She has history of AFib.  She only takes aspirin.  She denies productive cough, fever, chills.  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    Overview/Hospital Course:  Patient had an uneventful night last night.  She is feeling much better.  She received a total of 40 mg of Lasix IV and had decent output.  Today she is no longer having severe shortness of breath.  She feels like she is back to her baseline.    Past Medical History:   Diagnosis Date    A-fib     Acute on chronic congestive heart failure 2/1/2019    Anxiety     Arthritis     Asthma     Atrial fibrillation with rapid ventricular response     CHF (congestive heart failure) 11/29/2018    Chronic bronchitis     COPD (chronic obstructive pulmonary disease)     Depression     Diabetes mellitus, type 2     SANTIAGO (dyspnea on exertion)     Emphysema of lung     Fatigue     Hyperlipidemia     Hypertension     Symptomatic anemia 1/22/2019    Trouble in sleeping        Past  Surgical History:   Procedure Laterality Date    APPENDECTOMY      BRONCHOSCOPY N/A 12/3/2019    Procedure: BRONCHOSCOPY;  Surgeon: Emmanuel Hickman MD;  Location: [facility];  Service: Pulmonary;  Laterality: N/A;    EYE SURGERY      HYSTERECTOMY      INSERTION OF PACEMAKER  12/26/2019    OOPHORECTOMY      TONSILLECTOMY         Review of patient's allergies indicates:  No Known Allergies    No current facility-administered medications on file prior to encounter.      Current Outpatient Medications on File Prior to Encounter   Medication Sig    traZODone (DESYREL) 50 MG tablet TAKE 1 TABLET EVERY EVENING    albuterol (PROVENTIL) 2.5 mg /3 mL (0.083 %) nebulizer solution Take 3 mLs (2.5 mg total) by nebulization every 6 (six) hours as needed for Wheezing.    aspirin (ECOTRIN) 81 MG EC tablet Take 1 tablet (81 mg total) by mouth once daily.    baclofen (LIORESAL) 10 MG tablet Take 10 mg by mouth 3 (three) times daily.    benzonatate (TESSALON) 100 MG capsule Take 100 mg by mouth 3 (three) times daily as needed for Cough.    cholecalciferol, vitamin D3, (VITAMIN D3) 2,000 unit Tab Take 2,000 Units by mouth once daily.    citalopram (CELEXA) 20 MG tablet Take 1 tablet (20 mg total) by mouth once daily.    folic acid (FOLVITE) 1 MG tablet Take 2,000 mcg by mouth once daily.    insulin detemir U-100 (LEVEMIR FLEXTOUCH) 100 unit/mL (3 mL) SubQ InPn pen Inject 10 Units into the skin every evening.    ipratropium (ATROVENT) 0.02 % nebulizer solution Take 500 mcg by nebulization 4 (four) times daily.     losartan (COZAAR) 50 MG tablet 50 mg once daily.     meclizine (ANTIVERT) 25 mg tablet 2 (two) times daily as needed for Dizziness.     metoprolol succinate (TOPROL-XL) 50 MG 24 hr tablet Take 50 mg by mouth 2 (two) times daily.     pravastatin (PRAVACHOL) 40 MG tablet Take 40 mg by mouth once daily.    predniSONE (DELTASONE) 20 MG tablet Take 1 tablet (20 mg total) by mouth once daily.    rOPINIRole (REQUIP)  1 MG tablet TAKE 1 TABLET  EVERY EVENING.    tiotropium (SPIRIVA) 18 mcg inhalation capsule Inhale 1 capsule (18 mcg total) into the lungs once daily. Controller    torsemide (DEMADEX) 20 MG Tab Take 1 tablet (20 mg total) by mouth once daily.     Family History     Problem Relation (Age of Onset)    COPD Brother, Daughter    Cancer Sister    Diabetes Father    Heart disease Mother    Hypertension Mother, Father, Brother    Kidney disease Son    No Known Problems Daughter        Tobacco Use    Smoking status: Former Smoker     Quit date: 2002     Years since quittin.2    Smokeless tobacco: Never Used   Substance and Sexual Activity    Alcohol use: No    Drug use: No    Sexual activity: Not Currently     Review of Systems   Constitutional: Negative for appetite change, fever and unexpected weight change.   HENT: Negative for congestion and rhinorrhea.    Eyes: Negative for visual disturbance.   Respiratory: Negative for cough, shortness of breath and wheezing.    Cardiovascular: Negative for chest pain, palpitations and leg swelling.   Gastrointestinal: Negative for abdominal distention, abdominal pain, anal bleeding and blood in stool.   Genitourinary: Negative for difficulty urinating and dysuria.   Musculoskeletal: Positive for arthralgias, back pain and gait problem. Negative for joint swelling.   Skin: Negative for rash.   Neurological: Negative for dizziness, seizures, weakness, numbness and headaches.   Hematological: Negative for adenopathy.   Psychiatric/Behavioral: Negative for sleep disturbance. The patient is not nervous/anxious.      Objective:     Vital Signs (Most Recent):  Temp: 96.8 °F (36 °C) (20 0715)  Pulse: 84 (20 0830)  Resp: 18 (20 0830)  BP: (!) 97/57 (20 0800)  SpO2: (!) 93 % (20 0830) Vital Signs (24h Range):  Temp:  [96.3 °F (35.7 °C)-98.5 °F (36.9 °C)] 96.8 °F (36 °C)  Pulse:  [56-84] 84  Resp:  [13-26] 18  SpO2:  [83 %-99 %] 93 %  BP:  ()/(39-74) 97/57     Weight: 58 kg (127 lb 13.9 oz)  Body mass index is 23.39 kg/m².    Physical Exam  Vitals signs reviewed.   Constitutional:       General: She is not in acute distress.     Appearance: Normal appearance.      Comments: No longer tachypneic   HENT:      Head: Normocephalic.      Nose: Nose normal.      Mouth/Throat:      Mouth: Mucous membranes are moist.   Eyes:      General:         Right eye: No discharge.      Pupils: Pupils are equal, round, and reactive to light.   Neck:      Thyroid: No thyromegaly.      Vascular: No JVD.   Cardiovascular:      Rate and Rhythm: Normal rate and regular rhythm.      Pulses: Normal pulses.      Heart sounds: Normal heart sounds. No murmur. No friction rub. No gallop.       Comments: tachycardic  Pulmonary:      Effort: Pulmonary effort is normal.      Breath sounds: Normal breath sounds. No wheezing or rales.   Abdominal:      General: Abdomen is flat.      Palpations: Abdomen is soft.   Musculoskeletal: Normal range of motion.         General: Tenderness present.      Right lower leg: No edema.      Left lower leg: No edema.   Lymphadenopathy:      Cervical: No cervical adenopathy.   Skin:     General: Skin is dry.   Neurological:      General: No focal deficit present.      Mental Status: She is alert and oriented to person, place, and time.      Cranial Nerves: No cranial nerve deficit.      Gait: Gait abnormal.   Psychiatric:         Mood and Affect: Mood normal.         Behavior: Behavior normal.         Thought Content: Thought content normal.         Judgment: Judgment normal.           CRANIAL NERVES     CN III, IV, VI   Pupils are equal, round, and reactive to light.       Significant Labs:   A1C:   Recent Labs   Lab 08/22/20  2149 08/23/20  0347 12/02/20  1200   HGBA1C 6.5* 6.6* 7.6*     CBC:   Recent Labs   Lab 12/02/20  1200   WBC 13.79*   HGB 8.3*   HCT 26.2*        CMP:   Recent Labs   Lab 12/02/20  1200 12/03/20  0358    141    K 3.6 4.5   CL 94* 91*   CO2 37* 38*   * 253*   BUN 23 32*   CREATININE 1.2 1.8*   CALCIUM 8.8 8.5*   PROT 6.0 5.4*   ALBUMIN 3.7 3.3*   BILITOT 0.7 0.5   ALKPHOS 72 58   AST 12 8*   ALT 24 20   ANIONGAP 11 12   EGFRNONAA 42* 26*     Lactic Acid: No results for input(s): LACTATE in the last 48 hours.  POCT Glucose:   Recent Labs   Lab 12/02/20  1631 12/02/20 2029 12/03/20  0728   POCTGLUCOSE 300* 383* 185*     Troponin:   Recent Labs   Lab 12/02/20  1200   TROPONINI 0.027*     Urine Studies: No results for input(s): COLORU, APPEARANCEUA, PHUR, SPECGRAV, PROTEINUA, GLUCUA, KETONESU, BILIRUBINUA, OCCULTUA, NITRITE, UROBILINOGEN, LEUKOCYTESUR, RBCUA, WBCUA, BACTERIA, SQUAMEPITHEL, HYALINECASTS in the last 48 hours.    Invalid input(s): WRIGHTSUR  BNP  Recent Labs   Lab 12/02/20  1200   *     Lab Results   Component Value Date    DDIMER 1.12 (H) 11/06/2020         Significant Imaging: CXR: I have reviewed all pertinent results/findings within the past 24 hours and my personal findings are:  Cardiomegaly, cephalization of blood flow, fluid in the right fissure,  Left cardiac pacer.      Assessment/Plan:      * Acute on chronic combined systolic and diastolic CHF, NYHA class 3  Cardiology evaluated patient.  She is now back to her baseline.  He feels that she can go home.  Since creatinine is 1.8, recommends holding diuretics until tomorrow.  Recommends restarting her Demadex tomorrow.      Hypoxemia  Patient much better after diuresis with Lasix.  Cardiology feels patient can go back home with close follow-up.      COPD exacerbation  DuoNeb treatments 4 times daily  Continue trilogy  Continue home Trelegy  Continue prednisone 15 mg p.o. daily  Follow-up with pulmonology      Long term current use of systemic steroids  Continue prednisone 15 mg p.o. daily  Consult Dr. Hickman, pulmonology who knows this patient well.      Diabetes mellitus type 2, uncontrolled  Discharged home on home meds.  See discharge  summary      Macrocytic anemia  This has been worked up in the past.  Monitor CBC daily  Follow-up with hematology      Chronic atrial fibrillation  Continue aspirin        Oxygen dependent  Continue oxygen at 3 L nasal cannula  Continue home trilogy  Consult pulmonology      CKD (chronic kidney disease), stage III  Monitor renal function closely      Major depressive disorder with single episode, in full remission  Continue citalopram 20 mg p.o. daily      Hyperlipidemia  Continue pravastatin 40 mg daily      Cardiovascular disease  Lower the dose of losartan from 50 mg to 25 mg daily  Continue metoprolol 50 mg 2 times daily  Restart Demadex tomorrow  Follow-up with PCP on Tuesday for repeat blood work        VTE Risk Mitigation (From admission, onward)         Ordered     IP VTE HIGH RISK PATIENT  Once      12/02/20 1548     Place sequential compression device  Until discontinued      12/02/20 1548                Discharge Planning   HARPER:      Code Status: Full Code   Is the patient medically ready for discharge?:     Reason for patient still in hospital (select all that apply): Treatment               Critical care time spent on the evaluation and treatment of severe organ dysfunction, review of pertinent labs and imaging studies, discussions with consulting providers and discussions with patient/family: 30 minutes.      Chicho Fisher MD  Department of Hospital Medicine   [facility]

## 2020-12-03 NOTE — PLAN OF CARE
Pt dx. COPD exacerbation, CHF, chronic diastolic heart failure. Pt presetns awake, alert and oriented. Appropriate responses for condition. CM in progress alarms set and on. NAD. Trilogy in process. VS stable. Pox 90-95%. BBS coarse to upper lobes, diminished to bibasilar lung fields, LLL with fine rales auscultated. Ecchymotic areas to skin surface on chest and upper ext. Left CW pacemaker palpated, site without complications. PIV to left forearm intact secured, flushed and patent.  Discussed plan of care with pt, pt voiced understanding of information, questions answered. Callbell within reach. Understands use. Room near nurse's station. Monitoring pt closely.

## 2020-12-03 NOTE — ASSESSMENT & PLAN NOTE
Patient much better after diuresis with Lasix.  Cardiology feels patient can go back home with close follow-up.

## 2020-12-03 NOTE — NURSING
"Pt in good spirits with mild SOB on exertion.   Cardiac & SpO2 monitoring continued. VSS on 4 L NC.   Home Trilogy being setup per Pt's  & respiratory therapy. Unit intact and functional, Pt tolerating well.     Pt denies distress. Ecchymotic areas on chest wall and extremities present prior to admission. Pt states she stopped blood thinners and aspirin therapy and is consulting with hematology soon reqarding recent "abnormal blood results".     Will monitor closely. Safety maintained. POC reviewed and agreed upon with Pt and her spouse. Instructed to call for assistance.      22:13 Pt hypotensive, SBP 85-97, MAP 54-64. Pt sleeping soundly but easily aroused. Pt asymptomatic and denies SOB, CP or dizziness. No distress noted. Dr. Leiva updated on pt status, no new orders. Will monitor closely.    "

## 2020-12-03 NOTE — PLAN OF CARE
Dr Hickman assessed pt and discussed plan of care and discharge pulmonary instructions. Voiced understanding.

## 2020-12-03 NOTE — PLAN OF CARE
Transported out of ICU for discharge per Mark via w/c with portable O2 @ 3LNC, tolerating well.  here to transport pt home. Stable upon discharge.

## 2020-12-03 NOTE — PLAN OF CARE
Shift uneventful. Pt denies acute distress. Pt sleeping soundly with asymptomatic hypotension. MAP 60-96. Easily aroused with BP returning to baseline. Occasional episodes of sleep apnea, titrated FiO2 4-6L throughout shift PRN sats >90%.     Optimal comfort achieved, no C/O pain. BG monitored & insulin given.   No falls or injury noted.     06:30 Dr. Metcalf notified of consult. Pt tolerated and wore home Trilogy / AVAPS throughout shift. Pt ambulated to BSC with minimal assistance & mild/moderate SOB.

## 2020-12-03 NOTE — DISCHARGE SUMMARY
[facility] Medicine  Discharge Summary      Patient Name: Chantell Herrera  MRN: [id number]  Admission Date: 12/2/2020  Hospital Length of Stay: 0 days  Discharge Date and Time:  12/03/2020 8:58 AM  Attending Physician: Ambrose Hamilton MD   Discharging Provider: Ambrose Hamilton MD  Primary Care Provider: Kari Gaspar MD      HPI:   Patient presents to the emergency room with significant hypoxemia today.  She is on home oxygen but her sats been in the low 80 percentile range.  This started over the past month.  Just so happens 1 month ago her prednisone dose was decreased from 20 mg and is now 15 mg daily.  She can only ambulate 20 ft with a walker and she becomes very dyspnea.  She has at least 1 pill orthopnea.  She is given Lasix IV in the emergency room now feels better.  She also has severe COPD.  She also has a history of diastolic heart failure.  Her last EF was 60%.  She has a history of a pacemaker.  She has history of AFib.  She only takes aspirin.  She denies productive cough, fever, chills.  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    * No surgery found *      Hospital Course:   Patient had an uneventful night last night.  She is feeling much better.  She received a total of 40 mg of Lasix IV and had decent output.  Today she is no longer having severe shortness of breath.  She feels like she is back to her baseline.     Consults:   Consults (From admission, onward)        Status Ordering Provider     Inpatient consult to Cardiology-[facility]  Once     Provider:  (Not yet assigned)    Completed AMBROSE HAMILTON     Inpatient consult to Pulmonology  Once     Provider:  Emmanuel Hickman MD    Acknowledged AMBROSE HAMILTON     IP consult to case management  Once     Provider:  (Not yet assigned)    Acknowledged AMBROSE HAMILTON          * Acute on chronic combined systolic and diastolic CHF, NYHA class 3  Cardiology evaluated patient.  She is now back to her baseline.  He  feels that she can go home.  Since creatinine is 1.8, recommends holding diuretics until tomorrow.  Recommends restarting her Demadex tomorrow.      Hypoxemia  Patient much better after diuresis with Lasix.  Cardiology feels patient can go back home with close follow-up.      COPD exacerbation  DuoNeb treatments 4 times daily  Continue trilogy  Continue home Trelegy  Continue prednisone 15 mg p.o. daily  Follow-up with pulmonology      Long term current use of systemic steroids  Continue prednisone 15 mg p.o. daily  Consult Dr. Hickman, pulmonology who knows this patient well.      Diabetes mellitus type 2, uncontrolled  Discharged home on home meds.  See discharge summary      Macrocytic anemia  This has been worked up in the past.  Monitor CBC daily  Follow-up with hematology      Chronic atrial fibrillation  Continue aspirin        Oxygen dependent  Continue oxygen at 3 L nasal cannula  Continue home trilogy  Consult pulmonology      CKD (chronic kidney disease), stage III  Monitor renal function closely      Major depressive disorder with single episode, in full remission  Continue citalopram 20 mg p.o. daily      Hyperlipidemia  Continue pravastatin 40 mg daily      Cardiovascular disease  Lower the dose of losartan from 50 mg to 25 mg daily  Continue metoprolol 50 mg 2 times daily  Restart Demadex tomorrow  Follow-up with PCP on Tuesday for repeat blood work        Final Active Diagnoses:    Diagnosis Date Noted POA    PRINCIPAL PROBLEM:  Acute on chronic combined systolic and diastolic CHF, NYHA class 3 [I50.43] 12/02/2020 Yes    COPD exacerbation [J44.1] 12/02/2020 Yes    Hypoxemia [R09.02] 12/02/2020 Yes    Long term current use of systemic steroids [Z79.52] 07/04/2020 Not Applicable     Chronic    Diabetes mellitus type 2, uncontrolled [E11.65] 09/02/2019 Yes    Macrocytic anemia [D53.9] 01/22/2019 Yes    Chronic atrial fibrillation [I48.20] 11/16/2018 Yes    Oxygen dependent [Z99.81] 01/03/2017 Not  Applicable    Major depressive disorder with single episode, in full remission [F32.5] 05/10/2016 Yes    CKD (chronic kidney disease), stage III [N18.30] 05/10/2016 Yes    Hyperlipidemia [E78.5] 05/03/2015 Yes    Cardiovascular disease [I25.10] 06/19/2014 Yes      Problems Resolved During this Admission:       Discharged Condition: good    Disposition:     Follow Up:  Follow-up Information     Kari Gaspar MD In 5 days.    Specialty: Family Medicine  Contact information:  [address]  [facility] [address] [phone]                 Patient Instructions:   No discharge procedures on file.    Significant Diagnostic Studies: see progress note    Pending Diagnostic Studies:     None         Medications:  Reconciled Home Medications:      Medication List      CHANGE how you take these medications    losartan 50 MG tablet  Commonly known as: COZAAR  Take 0.5 tablets (25 mg total) by mouth once daily.  What changed:   · how much to take  · how to take this     predniSONE 5 MG tablet  Commonly known as: DELTASONE  Take 3 tablets (15 mg total) by mouth once daily.  Start taking on: December 4, 2020  What changed:   · medication strength  · how much to take        CONTINUE taking these medications    albuterol 2.5 mg /3 mL (0.083 %) nebulizer solution  Commonly known as: PROVENTIL  Take 3 mLs (2.5 mg total) by nebulization every 6 (six) hours as needed for Wheezing.     aspirin 81 MG EC tablet  Commonly known as: ECOTRIN  Take 1 tablet (81 mg total) by mouth once daily.     baclofen 10 MG tablet  Commonly known as: LIORESAL  Take 10 mg by mouth 3 (three) times daily.     benzonatate 100 MG capsule  Commonly known as: TESSALON  Take 100 mg by mouth 3 (three) times daily as needed for Cough.     citalopram 20 MG tablet  Commonly known as: CELEXA  Take 1 tablet (20 mg total) by mouth once daily.     folic acid 1 MG tablet  Commonly known as: FOLVITE  Take 2,000 mcg by mouth once daily.     insulin detemir U-100  100 unit/mL (3 mL) Inpn pen  Commonly known as: LEVEMIR FLEXTOUCH  Inject 10 Units into the skin every evening.     ipratropium 0.02 % nebulizer solution  Commonly known as: ATROVENT  Take 500 mcg by nebulization 4 (four) times daily.     meclizine 25 mg tablet  Commonly known as: ANTIVERT  2 (two) times daily as needed for Dizziness.     metoprolol succinate 50 MG 24 hr tablet  Commonly known as: TOPROL-XL  Take 50 mg by mouth 2 (two) times daily.     pravastatin 40 MG tablet  Commonly known as: PRAVACHOL  Take 40 mg by mouth once daily.     rOPINIRole 1 MG tablet  Commonly known as: REQUIP  TAKE 1 TABLET  EVERY EVENING.     SPIRIVA WITH HANDIHALER 18 mcg inhalation capsule  Generic drug: tiotropium  Inhale 1 capsule (18 mcg total) into the lungs once daily. Controller     torsemide 20 MG Tab  Commonly known as: DEMADEX  Take 1 tablet (20 mg total) by mouth once daily.     traZODone 50 MG tablet  Commonly known as: DESYREL  TAKE 1 TABLET EVERY EVENING     VITAMIN D3 50 mcg (2,000 unit) Tab  Generic drug: cholecalciferol (vitamin D3)  Take 2,000 Units by mouth once daily.            Indwelling Lines/Drains at time of discharge:   Lines/Drains/Airways     None                 Time spent on the discharge of patient: 30 minutes  Patient was seen and examined on the date of discharge and determined to be suitable for discharge.    Critical care time spent on the evaluation and treatment of severe organ dysfunction, review of pertinent labs and imaging studies, discussions with consulting providers and discussions with patient/family: 30 minutes.     Chicho Fisher MD  Department of Hospital Medicine  [facility]

## 2020-12-03 NOTE — PLAN OF CARE
12/03/20 1138   Final Note   Assessment Type Final Discharge Note   Anticipated Discharge Disposition Home   What phone number can be called within the next 1-3 days to see how you are doing after discharge? [phone]   Hospital Follow Up  Appt(s) scheduled? Yes   Discharge plans and expectations educations in teach back method with documentation complete? Yes   Post-Acute Status   Post-Acute Authorization Other   Other Status No Post-Acute Service Needs   Discharge Delays None known at this time       No post-acute care needs identified during this hospital stay.     Daniela Castro LMSW

## 2020-12-03 NOTE — PLAN OF CARE
12/03/20 1143   Medicare Message   Important Message from Medicare regarding Discharge Appeal Rights Given to patient/caregiver;Explained to patient/caregiver;Signed/date by patient/caregiver   Date IMM was signed 12/02/20   Time IMM was signed [id number]

## 2020-12-03 NOTE — ASSESSMENT & PLAN NOTE
Lower the dose of losartan from 50 mg to 25 mg daily  Continue metoprolol 50 mg 2 times daily  Restart Demadex tomorrow  Follow-up with PCP on Tuesday for repeat blood work

## 2020-12-03 NOTE — PLAN OF CARE
Dr Metcalf updated on pt current status, he assessed pt and discussed plan of care and mentioned discharging pt. New orders noted.

## 2020-12-03 NOTE — PLAN OF CARE
Dr Whitaker in ICU, reviewed pts chart updated on current status and findings. He assessed pt and discussed plan for discharge. Pt voiced understanding and questions answered.

## 2020-12-03 NOTE — PLAN OF CARE
12/03/20 1134   Discharge Assessment   Assessment Type Discharge Planning Assessment   Confirmed/corrected address and phone number on facesheet? Yes   Assessment information obtained from? Patient   Prior to hospitilization cognitive status: Alert/Oriented   Prior to hospitalization functional status: Assistive Equipment   Current cognitive status: Alert/Oriented   Current Functional Status: Assistive Equipment   Facility Arrived From: Home   Lives With spouse   Able to Return to Prior Arrangements yes   Is patient able to care for self after discharge? Yes   Who are your caregiver(s) and their phone number(s)? Emmanuel Herrera (Spouse) [phone]   Patient's perception of discharge disposition home or selfcare   Readmission Within the Last 30 Days no previous admission in last 30 days   Patient currently being followed by outpatient case management? No   Patient currently receives any other outside agency services? No   Equipment Currently Used at Home oxygen;other (see comments)  (trilogy)   Do you have any problems affording any of your prescribed medications? No   Does the patient have transportation home? Yes   Transportation Anticipated family or friend will provide   Discharge Plan A Home   Discharge Plan B Home with family   DME Needed Upon Discharge  none   Patient/Family in Agreement with Plan yes       Discharge planning assessment completed with patient. Denies any post-acute care needs at this time. SW to remain available as needed.     Daniela Castro, FRANCHESKA

## 2020-12-03 NOTE — CONSULTS
[facility]  Cardiology  Consult Note    Patient Name: Chantell Herrera  MRN: [id number]  Admission Date: 12/2/2020  Hospital Length of Stay: 0 days  Code Status: Full Code   Attending Provider: Chicho Fisher MD   Consulting Provider: CHAYO Myers  Primary Care Physician: Kari Gaspar MD  Principal Problem:Acute on chronic combined systolic and diastolic CHF, NYHA class 3    Patient information was obtained from patient, past medical records and ER records.     Inpatient consult to Cardiology-[facility]  Consult performed by: Mao Metcalf MD  Consult ordered by: Chicho Fisher MD        Subjective:     Chief Complaint:  SOB     HPI: 85 year old w/f with Hx of COPD, PAF, HHD, Bradycardia S/P PPM, CKD 3, dyslpidemia HTN, presents to ED with SOB and low 02 sat. She is on home oxygen with 02 sats in the low 80s yesterday. She reports some symptomatic improvement after breathing treatments and IV lasix.     Past Medical History:   Diagnosis Date    A-fib     Acute on chronic congestive heart failure 2/1/2019    Anxiety     Arthritis     Asthma     Atrial fibrillation with rapid ventricular response     CHF (congestive heart failure) 11/29/2018    Chronic bronchitis     COPD (chronic obstructive pulmonary disease)     Depression     Diabetes mellitus, type 2     SANTIAGO (dyspnea on exertion)     Emphysema of lung     Fatigue     Hyperlipidemia     Hypertension     Symptomatic anemia 1/22/2019    Trouble in sleeping        Past Surgical History:   Procedure Laterality Date    APPENDECTOMY      BRONCHOSCOPY N/A 12/3/2019    Procedure: BRONCHOSCOPY;  Surgeon: Emmanuel Hickman MD;  Location: [facility];  Service: Pulmonary;  Laterality: N/A;    EYE SURGERY      HYSTERECTOMY      INSERTION OF PACEMAKER  12/26/2019    OOPHORECTOMY      TONSILLECTOMY         Review of patient's allergies indicates:  No Known Allergies    No current facility-administered medications on file  prior to encounter.      Current Outpatient Medications on File Prior to Encounter   Medication Sig    traZODone (DESYREL) 50 MG tablet TAKE 1 TABLET EVERY EVENING    albuterol (PROVENTIL) 2.5 mg /3 mL (0.083 %) nebulizer solution Take 3 mLs (2.5 mg total) by nebulization every 6 (six) hours as needed for Wheezing.    aspirin (ECOTRIN) 81 MG EC tablet Take 1 tablet (81 mg total) by mouth once daily.    baclofen (LIORESAL) 10 MG tablet Take 10 mg by mouth 3 (three) times daily.    benzonatate (TESSALON) 100 MG capsule Take 100 mg by mouth 3 (three) times daily as needed for Cough.    cholecalciferol, vitamin D3, (VITAMIN D3) 2,000 unit Tab Take 2,000 Units by mouth once daily.    citalopram (CELEXA) 20 MG tablet Take 1 tablet (20 mg total) by mouth once daily.    folic acid (FOLVITE) 1 MG tablet Take 2,000 mcg by mouth once daily.    insulin detemir U-100 (LEVEMIR FLEXTOUCH) 100 unit/mL (3 mL) SubQ InPn pen Inject 10 Units into the skin every evening.    ipratropium (ATROVENT) 0.02 % nebulizer solution Take 500 mcg by nebulization 4 (four) times daily.     losartan (COZAAR) 50 MG tablet 50 mg once daily.     meclizine (ANTIVERT) 25 mg tablet 2 (two) times daily as needed for Dizziness.     metoprolol succinate (TOPROL-XL) 50 MG 24 hr tablet Take 50 mg by mouth 2 (two) times daily.     pravastatin (PRAVACHOL) 40 MG tablet Take 40 mg by mouth once daily.    predniSONE (DELTASONE) 20 MG tablet Take 1 tablet (20 mg total) by mouth once daily.    rOPINIRole (REQUIP) 1 MG tablet TAKE 1 TABLET  EVERY EVENING.    tiotropium (SPIRIVA) 18 mcg inhalation capsule Inhale 1 capsule (18 mcg total) into the lungs once daily. Controller    torsemide (DEMADEX) 20 MG Tab Take 1 tablet (20 mg total) by mouth once daily.     Family History     Problem Relation (Age of Onset)    COPD Brother, Daughter    Cancer Sister    Diabetes Father    Heart disease Mother    Hypertension Mother, Father, Brother    Kidney disease Son     No Known Problems Daughter        Tobacco Use    Smoking status: Former Smoker     Quit date: 2002     Years since quittin.2    Smokeless tobacco: Never Used   Substance and Sexual Activity    Alcohol use: No    Drug use: No    Sexual activity: Not Currently     ROS   Constitutional: Negative   Eyes: Negative    Respiratory: SOB    Cardiovascular: Negative   Gastrointestinal: Negative   Genitourinary: Negative   Musculoskeletal: Negative   Skin: Negative .   Neurological: Negative   Objective:     Vital Signs (Most Recent):  Temp: 96.3 °F (35.7 °C) (20 0318)  Pulse: 70 (20 07)  Resp: (!) 24 (20 07)  BP: (!) 120/48 (20 0600)  SpO2: 95 % (20 07) Vital Signs (24h Range):  Temp:  [96.3 °F (35.7 °C)-98.5 °F (36.9 °C)] 96.3 °F (35.7 °C)  Pulse:  [56-81] 70  Resp:  [13-26] 24  SpO2:  [88 %-98 %] 95 %  BP: ()/(39-74) 120/48     Weight: 58 kg (127 lb 13.9 oz)  Body mass index is 23.39 kg/m².    SpO2: 95 %  O2 Device (Oxygen Therapy): nasal cannula      Intake/Output Summary (Last 24 hours) at 12/3/2020 0809  Last data filed at 12/3/2020 0600  Gross per 24 hour   Intake 460 ml   Output 650 ml   Net -190 ml       Lines/Drains/Airways     Peripheral Intravenous Line                 Peripheral IV - Single Lumen 20 1228 20 G Left Forearm less than 1 day                Physical Exam  General appearance: alert, appears stated age and cooperative  Head: Normocephalic, without obvious abnormality, atraumatic  Eyes: conjunctivae/corneas clear. PERRL  Neck: no carotid bruit, no JVD and supple, symmetrical, trachea midline  Lungs: coarse wheezing   Chest Wall: no tenderness  Heart: regular rate and rhythm, S1, S2 normal, systolic 2/6, click, rub or gallop  Abdomen: soft, non-tender; bowel sounds normal; no masses,  no organomegaly  Extremities: Extremities normal, atraumatic, no cyanosis, clubbing, or edema  Pulses: Dorsalis Pedis R: 2+ (normal)/L: 2+ (normal)  Skin: Skin  color, texture, turgor normal. No rashes or lesions  Neurologic: Normal mood and affect  Alert and oriented X 3  Significant Labs:   ABG: No results for input(s): PH, PCO2, HCO3, POCSATURATED, BE in the last 48 hours., Blood Culture: No results for input(s): LABBLOO in the last 48 hours., BMP:   Recent Labs   Lab 12/02/20  1200 12/03/20  0358   * 253*    141   K 3.6 4.5   CL 94* 91*   CO2 37* 38*   BUN 23 32*   CREATININE 1.2 1.8*   CALCIUM 8.8 8.5*   , CMP   Recent Labs   Lab 12/02/20  1200 12/03/20  0358    141   K 3.6 4.5   CL 94* 91*   CO2 37* 38*   * 253*   BUN 23 32*   CREATININE 1.2 1.8*   CALCIUM 8.8 8.5*   PROT 6.0 5.4*   ALBUMIN 3.7 3.3*   BILITOT 0.7 0.5   ALKPHOS 72 58   AST 12 8*   ALT 24 20   ANIONGAP 11 12   ESTGFRAFRICA 48* 30*   EGFRNONAA 42* 26*   , CBC   Recent Labs   Lab 12/02/20  1200   WBC 13.79*   HGB 8.3*   HCT 26.2*      , INR   Recent Labs   Lab 12/02/20  1200   INR 1.1   , Lipid Panel No results for input(s): CHOL, HDL, LDLCALC, TRIG, CHOLHDL in the last 48 hours.,   Pathology Results  (Last 10 years)    None       and Troponin   Recent Labs   Lab 12/02/20  1200   TROPONINI 0.027*       Significant Imaging: Cardiac Cath: , CT scan: CT ABDOMEN PELVIS WITH CONTRAST: No results found for this visit on 12/02/20., CT ABDOMEN PELVIS WITHOUT CONTRAST: No results found for this visit on 12/02/20. and , Echocardiogram:   2D echo with color flow doppler: No results found for this or any previous visit., Transthoracic echo (TTE) complete (Cupid Only):   Results for orders placed or performed during the hospital encounter of 08/22/20   Echo Color Flow Doppler? Yes   Result Value Ref Range    Ascending aorta 2.92 cm    STJ 2.41 cm    AV mean gradient 12 mmHg    Ao peak sudhir 2.50 m/s    Ao VTI 44.23 cm    IVRT 102.76 msec    IVS 1.10 (A) 0.6 - 1.1 cm    LA size 3.10 cm    Left Atrium Major Axis 5.88 cm    Left Atrium Minor Axis 5.90 cm    LVIDd 3.48 (A) 3.5 - 6.0 cm     LVIDs 2.37 2.1 - 4.0 cm    LVOT diameter 1.90 cm    LVOT peak VTI 40.53 cm    Posterior Wall 1.07 0.6 - 1.1 cm    MV Peak A Shimon 1.07 m/s    E wave decelartion time 271.61 msec    MV Peak E Shimon 0.79 m/s    RA Major Axis 6.00 cm    RA Width 3.00 cm    RVDD 3.18 cm    Sinus 2.94 cm    TAPSE 1.94 cm    TR Max Shimon 4.14 m/s    TDI LATERAL 0.09 m/s    TDI SEPTAL 0.06 m/s    LA WIDTH 3.71 cm    MV stenosis pressure 1/2 time 78.77 ms    LV Diastolic Volume 50.16 mL    LV Systolic Volume 19.45 mL    RV S' 10.01 cm/s    LVOT peak shimon 1.88 m/s    LV LATERAL E/E' RATIO 8.78 m/s    LV SEPTAL E/E' RATIO 13.17 m/s    FS 32 %    LA volume 57.58 cm3    LV mass 115.60 g    Left Ventricle Relative Wall Thickness 0.61 cm    AV valve area 2.60 cm2    AV Velocity Ratio 0.75     AV index (prosthetic) 0.92     MV valve area p 1/2 method 2.79 cm2    E/A ratio 0.74     Mean e' 0.08 m/s    LVOT area 2.8 cm2    LVOT stroke volume 114.86 cm3    AV peak gradient 25 mmHg    E/E' ratio 10.53 m/s    LV Systolic Volume Index 12.3 mL/m2    LV Diastolic Volume Index 31.78 mL/m2    LA Volume Index 36.5 mL/m2    LV Mass Index 73 g/m2    Triscuspid Valve Regurgitation Peak Gradient 69 mmHg    BSA 1.58 m2    Right Atrial Pressure (from IVC) 3 mmHg    MV mean gradient 2 mmHg    TV rest pulmonary artery pressure 72 mmHg    HR echo 75 bpm    Narrative    · Normal left ventricular systolic function. The estimated ejection   fraction is 60-65%.  · No wall motion abnormalities.  · Grade I (mild) left ventricular diastolic dysfunction consistent with   impaired relaxation.  · Concentric left ventricular remodeling.  · There is mitral annular calcification. The mean diastolic gradient   across the mitral valve is 2 mmHg at a heart rate of 75 bpm.  · Normal right ventricular systolic function.  · Mild to moderate tricuspid regurgitation.  · The estimated PA systolic pressure is 72 mmHg.  · Normal central venous pressure (3 mmHg).  · Pulmonary hypertension  present.  · Mild left atrial enlargement.       and , EKG: , Stress Test: , X-Ray: CXR: X-Ray Chest 1 View (CXR): No results found for this visit on 12/02/20., X-Ray Chest PA and Lateral (CXR): No results found for this visit on 12/02/20. and , KUB: X-Ray Abdomen AP 1 View (KUB): No results found for this visit on 12/02/20. and  and  and   Assessment and Plan:     Active Diagnoses:    Diagnosis Date Noted POA    PRINCIPAL PROBLEM:  Acute on chronic combined systolic and diastolic CHF, NYHA class 3 [I50.43] 12/02/2020 Yes    COPD exacerbation [J44.1] 12/02/2020 Yes    Hypoxemia [R09.02] 12/02/2020 Yes    Long term current use of systemic steroids [Z79.52] 07/04/2020 Not Applicable     Chronic    Diabetes mellitus type 2, uncontrolled [E11.65] 09/02/2019 Yes    Macrocytic anemia [D53.9] 01/22/2019 Yes    Chronic atrial fibrillation [I48.20] 11/16/2018 Yes    Oxygen dependent [Z99.81] 01/03/2017 Not Applicable    Major depressive disorder with single episode, in full remission [F32.5] 05/10/2016 Yes    CKD (chronic kidney disease), stage III [N18.30] 05/10/2016 Yes    Hyperlipidemia [E78.5] 05/03/2015 Yes    Cardiovascular disease [I25.10] 06/19/2014 Yes      Problems Resolved During this Admission:       VTE Risk Mitigation (From admission, onward)         Ordered     IP VTE HIGH RISK PATIENT  Once      12/02/20 1548     Place sequential compression device  Until discontinued      12/02/20 1548              Current Facility-Administered Medications   Medication    acetaminophen tablet 650 mg    albuterol-ipratropium 2.5 mg-0.5 mg/3 mL nebulizer solution 3 mL    aspirin EC tablet 81 mg    citalopram tablet 20 mg    dextrose 50% injection 12.5 g    dextrose 50% injection 25 g    folic acid tablet 1,000 mcg    glucagon (human recombinant) injection 1 mg    glucose chewable tablet 16 g    glucose chewable tablet 24 g    HYDROcodone-acetaminophen 5-325 mg per tablet 1 tablet    insulin aspart U-100  pen 0-5 Units    insulin detemir U-100 pen 10 Units    losartan tablet 50 mg    melatonin tablet 6 mg    metoprolol succinate (TOPROL-XL) 24 hr tablet 50 mg    mupirocin 2 % ointment    ondansetron injection 4 mg    pravastatin tablet 40 mg    predniSONE tablet 15 mg    promethazine (PHENERGAN) 12.5 mg in dextrose 5 % 50 mL IVPB    rOPINIRole tablet 1 mg    sodium chloride 0.9% flush 10 mL    [START ON 12/4/2020] torsemide tablet 20 mg    traZODone tablet 50 mg     Frail DNR with chronic dyspnea severe COPD.   History of recurrent anemia requiring blood transfusion.    No PE by CT January 2019.  Hypertension controlled.   Chronic kidney disease May 2020.  Paroxysmal A. fib status post pacemaker December 2019    Echo 8/2020 Left ventricular ejection fraction 65%, PA pressure 72 severe pulmonary hypertension.,     stage III chronic diastolic heart failure.   Mild anterior attenuation on stress perfusion December 2018  Mild bilateral carotid disease December 2015    PLAN:   Pt euvolemic at this point and near baseline. Would avoid further iv diuresis given increased creatinine and severe PH  Continue 4 l NC  Pulmonologist to follow  Continue asa, losartaN,metoprolol, pravastatin,   demadex 20 mg; resume tomorrow  Pt is full code    CHAYO Myers for Dr. Metcalf  Cardiology   [facility]  I attest that I have personally seen and examined this patient. I have reviewed and discussed the management in detail as outlined above.

## 2020-12-03 NOTE — EICU
Rounding (Video Assessment):  Yes    Video rounding complete. Pt resting in bed, bipap in place. No needs at this time. NAD noted.

## 2020-12-03 NOTE — ASSESSMENT & PLAN NOTE
Cardiology evaluated patient.  She is now back to her baseline.  He feels that she can go home.  Since creatinine is 1.8, recommends holding diuretics until tomorrow.  Recommends restarting her Demadex tomorrow.

## 2020-12-03 NOTE — CONSULTS
[facility]  Pulmonology  Consult Note    Patient Name: Chantell Herrera  MRN: [id number]  Admission Date: 12/2/2020  Hospital Length of Stay: 0 days  Code Status: Full Code  Attending Physician: Chicho Fisher MD  Primary Care Provider: Kari Gaspar MD   Principal Problem: Acute on chronic combined systolic and diastolic CHF, NYHA class 3    Consults  Subjective:     HPI:  Chantell Herrera is a 85 y.o. year old female that's presents with a chief complaint of SOB and Wheezing for several  Days.Pt well known to me had lowered prednisone to 15 . Consulted to evaluate Respiratory status.    Past Medical History:   Diagnosis Date    A-fib     Acute on chronic congestive heart failure 2/1/2019    Anxiety     Arthritis     Asthma     Atrial fibrillation with rapid ventricular response     CHF (congestive heart failure) 11/29/2018    Chronic bronchitis     COPD (chronic obstructive pulmonary disease)     Depression     Diabetes mellitus, type 2     SANTIAGO (dyspnea on exertion)     Emphysema of lung     Fatigue     Hyperlipidemia     Hypertension     Symptomatic anemia 1/22/2019    Trouble in sleeping         Past Surgical History:   Procedure Laterality Date    APPENDECTOMY      BRONCHOSCOPY N/A 12/3/2019    Procedure: BRONCHOSCOPY;  Surgeon: Emmanuel Hickman MD;  Location: [facility];  Service: Pulmonary;  Laterality: N/A;    EYE SURGERY      HYSTERECTOMY      INSERTION OF PACEMAKER  12/26/2019    OOPHORECTOMY      TONSILLECTOMY         Prior to Admission medications    Medication Sig Start Date End Date Taking? Authorizing Provider   traZODone (DESYREL) 50 MG tablet TAKE 1 TABLET EVERY EVENING 10/15/20  Yes Kari Gaspar MD   albuterol (PROVENTIL) 2.5 mg /3 mL (0.083 %) nebulizer solution Take 3 mLs (2.5 mg total) by nebulization every 6 (six) hours as needed for Wheezing. 12/1/19   Cihcho Fisher MD   aspirin (ECOTRIN) 81 MG EC tablet Take 1 tablet (81 mg total) by  mouth once daily. 1/21/20 1/20/21  Milo Estevez MD   baclofen (LIORESAL) 10 MG tablet Take 10 mg by mouth 3 (three) times daily.    Historical Provider   benzonatate (TESSALON) 100 MG capsule Take 100 mg by mouth 3 (three) times daily as needed for Cough.    Historical Provider   cholecalciferol, vitamin D3, (VITAMIN D3) 2,000 unit Tab Take 2,000 Units by mouth once daily.    Historical Provider   citalopram (CELEXA) 20 MG tablet Take 1 tablet (20 mg total) by mouth once daily. 12/2/19 12/1/20  Chicho Fisher MD   folic acid (FOLVITE) 1 MG tablet Take 2,000 mcg by mouth once daily. 10/13/20   Historical Provider   insulin detemir U-100 (LEVEMIR FLEXTOUCH) 100 unit/mL (3 mL) SubQ InPn pen Inject 10 Units into the skin every evening. 9/29/20 9/29/21  Kari Gaspar MD   ipratropium (ATROVENT) 0.02 % nebulizer solution Take 500 mcg by nebulization 4 (four) times daily.  3/18/16   Historical Provider   losartan (COZAAR) 50 MG tablet Take 0.5 tablets (25 mg total) by mouth once daily. 12/3/20   Chicho Fisher MD   meclizine (ANTIVERT) 25 mg tablet 2 (two) times daily as needed for Dizziness.  9/8/20   Historical Provider   metoprolol succinate (TOPROL-XL) 50 MG 24 hr tablet Take 50 mg by mouth 2 (two) times daily.  2/4/20   Historical Provider   pravastatin (PRAVACHOL) 40 MG tablet Take 40 mg by mouth once daily.    Historical Provider   predniSONE (DELTASONE) 5 MG tablet Take 3 tablets (15 mg total) by mouth once daily. 12/4/20   Chicho Fisher MD   rOPINIRole (REQUIP) 1 MG tablet TAKE 1 TABLET  EVERY EVENING. 4/7/20   Kari Gaspar MD   tiotropium (SPIRIVA) 18 mcg inhalation capsule Inhale 1 capsule (18 mcg total) into the lungs once daily. Controller 8/27/20 11/25/20  Nikolas Mcdonald MD   torsemide (DEMADEX) 20 MG Tab Take 1 tablet (20 mg total) by mouth once daily. 8/25/20 8/25/21  Bina Razo MD   losartan (COZAAR) 50 MG tablet 50 mg once daily.  7/9/20 12/3/20  Historical  Provider   predniSONE (DELTASONE) 20 MG tablet Take 1 tablet (20 mg total) by mouth once daily. 10/6/20 12/3/20  Dennise Virgen NP       Social History     Socioeconomic History    Marital status:      Spouse name: Not on file    Number of children: Not on file    Years of education: Not on file    Highest education level: Not on file   Occupational History    Not on file   Social Needs    Financial resource strain: Not on file    Food insecurity     Worry: Not on file     Inability: Not on file    Transportation needs     Medical: Not on file     Non-medical: Not on file   Tobacco Use    Smoking status: Former Smoker     Quit date: 2002     Years since quittin.2    Smokeless tobacco: Never Used   Substance and Sexual Activity    Alcohol use: No    Drug use: No    Sexual activity: Not Currently   Lifestyle    Physical activity     Days per week: Not on file     Minutes per session: Not on file    Stress: Not on file   Relationships    Social connections     Talks on phone: Not on file     Gets together: Not on file     Attends Latter day service: Not on file     Active member of club or organization: Not on file     Attends meetings of clubs or organizations: Not on file     Relationship status: Not on file   Other Topics Concern    Not on file   Social History Narrative    Not on file       Family History   Problem Relation Age of Onset    Heart disease Mother     Hypertension Mother     Hypertension Father     Diabetes Father     Cancer Sister     COPD Brother     Hypertension Brother     No Known Problems Daughter     Kidney disease Son     COPD Daughter        Review of patient's allergies indicates:  No Known Allergies Allergies have been reviewed.     ROS: ROS    PE:   Vitals:    20 0745 20 0800 20 0815 20 0830   BP:  (!) 97/57     BP Location:       Patient Position:       Pulse: 82 76 81 84   Resp: 19 (!) 21 18 18   Temp:       TempSrc:        SpO2: (!) 88% (!) 84% (!) 92% (!) 93%   Weight:       Height:        Physical Exam    Alert and orientated X 3   HEENT: Head: Normocephalic no trauma                Ears : Normal Pinna No Drainage no Battles sign                Eyes: Vision Unchanged, No conjunctivitis,No drainage                Neck: Supple, No JVD,No Abnormal Carotid Pulsations                Throat: No Erythema, No pus,No Swelling,Mallampati score= 3    Chest: course BS bilaterally blowing sound on right   Cardiac: RRR S1+ S2 with a -S3: +M = 2/6, No R/H/G  Abdomen: Bowel Sounds are Normal.Soft Abdomen. No organomegaly of Liver,Spleen,or Kidneys   CNS: Non focal and intact. Cranial nerves 2, 346,8,9,10 and 12 are normal.Norrmal gait.Normal posture.  Extremities: No Clubbing,No Cyanosis with oxygen,Positive mild edema of lower extremities Bilateral  Skin: No Rash, No Ulcerative sores,and No cellulitis of the IV site.    Lab Results   Component Value Date    WBC 13.79 (H) 12/02/2020    HGB 8.3 (L) 12/02/2020    HCT 26.2 (L) 12/02/2020     12/02/2020    CHOL 129 04/13/2020    TRIG 87 04/13/2020    HDL 58 04/13/2020    ALT 20 12/03/2020    AST 8 (L) 12/03/2020     12/03/2020    K 4.5 12/03/2020    CL 91 (L) 12/03/2020    CREATININE 1.8 (H) 12/03/2020    BUN 32 (H) 12/03/2020    CO2 38 (H) 12/03/2020    TSH 2.122 07/03/2020    INR 1.1 12/02/2020    HGBA1C 7.6 (H) 12/02/2020               Assessment/Plan:     * Acute on chronic combined systolic and diastolic CHF, NYHA class 3  Stable     Hypoxemia  improved    COPD exacerbation  End stage lung disease     Long term current use of systemic steroids  Attempted to remove steroids pt can not tolerate reduction     Acute on chronic respiratory failure with hypoxia and hypercapnia  stable    Diabetes mellitus type 2, uncontrolled  Bs controllled    Macrocytic anemia  stable    Chronic atrial fibrillation  In and out of a fib    Oxygen dependent  At home     CKD (chronic kidney disease),  stage III  Stable     Cardiovascular disease  Lower ef    follow up Monday       Thank you for your consult. I will sign off. Please contact us if you have any additional questions.     Emmanuel Hickman MD  Pulmonology  [facility]

## 2020-12-03 NOTE — EICU
Rounding (Video Assessment):  Yes    Pt awake and alert-talking with  at BS, No distress observed-VSS

## 2020-12-03 NOTE — ASSESSMENT & PLAN NOTE
DuoNeb treatments 4 times daily  Continue trilogy  Continue home Trelegy  Continue prednisone 15 mg p.o. daily  Follow-up with pulmonology

## 2020-12-03 NOTE — NURSING
Report given to STACIA Domínguez    Patient resting well at this time.  at bedside. Saline locked, Yohannes,RT at bedside, home Trilogy at bedside.    RN assuming care of patient.

## 2020-12-03 NOTE — SUBJECTIVE & OBJECTIVE
Past Medical History:   Diagnosis Date    A-fib     Acute on chronic congestive heart failure 2/1/2019    Anxiety     Arthritis     Asthma     Atrial fibrillation with rapid ventricular response     CHF (congestive heart failure) 11/29/2018    Chronic bronchitis     COPD (chronic obstructive pulmonary disease)     Depression     Diabetes mellitus, type 2     SANTIAGO (dyspnea on exertion)     Emphysema of lung     Fatigue     Hyperlipidemia     Hypertension     Symptomatic anemia 1/22/2019    Trouble in sleeping        Past Surgical History:   Procedure Laterality Date    APPENDECTOMY      BRONCHOSCOPY N/A 12/3/2019    Procedure: BRONCHOSCOPY;  Surgeon: Emmanuel Hickman MD;  Location: [facility];  Service: Pulmonary;  Laterality: N/A;    EYE SURGERY      HYSTERECTOMY      INSERTION OF PACEMAKER  12/26/2019    OOPHORECTOMY      TONSILLECTOMY         Review of patient's allergies indicates:  No Known Allergies    No current facility-administered medications on file prior to encounter.      Current Outpatient Medications on File Prior to Encounter   Medication Sig    traZODone (DESYREL) 50 MG tablet TAKE 1 TABLET EVERY EVENING    albuterol (PROVENTIL) 2.5 mg /3 mL (0.083 %) nebulizer solution Take 3 mLs (2.5 mg total) by nebulization every 6 (six) hours as needed for Wheezing.    aspirin (ECOTRIN) 81 MG EC tablet Take 1 tablet (81 mg total) by mouth once daily.    baclofen (LIORESAL) 10 MG tablet Take 10 mg by mouth 3 (three) times daily.    benzonatate (TESSALON) 100 MG capsule Take 100 mg by mouth 3 (three) times daily as needed for Cough.    cholecalciferol, vitamin D3, (VITAMIN D3) 2,000 unit Tab Take 2,000 Units by mouth once daily.    citalopram (CELEXA) 20 MG tablet Take 1 tablet (20 mg total) by mouth once daily.    folic acid (FOLVITE) 1 MG tablet Take 2,000 mcg by mouth once daily.    insulin detemir U-100 (LEVEMIR FLEXTOUCH) 100 unit/mL (3 mL) SubQ InPn pen Inject 10 Units into the  skin every evening.    ipratropium (ATROVENT) 0.02 % nebulizer solution Take 500 mcg by nebulization 4 (four) times daily.     losartan (COZAAR) 50 MG tablet 50 mg once daily.     meclizine (ANTIVERT) 25 mg tablet 2 (two) times daily as needed for Dizziness.     metoprolol succinate (TOPROL-XL) 50 MG 24 hr tablet Take 50 mg by mouth 2 (two) times daily.     pravastatin (PRAVACHOL) 40 MG tablet Take 40 mg by mouth once daily.    predniSONE (DELTASONE) 20 MG tablet Take 1 tablet (20 mg total) by mouth once daily.    rOPINIRole (REQUIP) 1 MG tablet TAKE 1 TABLET  EVERY EVENING.    tiotropium (SPIRIVA) 18 mcg inhalation capsule Inhale 1 capsule (18 mcg total) into the lungs once daily. Controller    torsemide (DEMADEX) 20 MG Tab Take 1 tablet (20 mg total) by mouth once daily.     Family History     Problem Relation (Age of Onset)    COPD Brother, Daughter    Cancer Sister    Diabetes Father    Heart disease Mother    Hypertension Mother, Father, Brother    Kidney disease Son    No Known Problems Daughter        Tobacco Use    Smoking status: Former Smoker     Quit date: 2002     Years since quittin.2    Smokeless tobacco: Never Used   Substance and Sexual Activity    Alcohol use: No    Drug use: No    Sexual activity: Not Currently     Review of Systems   Constitutional: Negative for appetite change, fever and unexpected weight change.   HENT: Negative for congestion and rhinorrhea.    Eyes: Negative for visual disturbance.   Respiratory: Negative for cough, shortness of breath and wheezing.    Cardiovascular: Negative for chest pain, palpitations and leg swelling.   Gastrointestinal: Negative for abdominal distention, abdominal pain, anal bleeding and blood in stool.   Genitourinary: Negative for difficulty urinating and dysuria.   Musculoskeletal: Positive for arthralgias, back pain and gait problem. Negative for joint swelling.   Skin: Negative for rash.   Neurological: Negative for dizziness,  seizures, weakness, numbness and headaches.   Hematological: Negative for adenopathy.   Psychiatric/Behavioral: Negative for sleep disturbance. The patient is not nervous/anxious.      Objective:     Vital Signs (Most Recent):  Temp: 96.8 °F (36 °C) (12/03/20 0715)  Pulse: 84 (12/03/20 0830)  Resp: 18 (12/03/20 0830)  BP: (!) 97/57 (12/03/20 0800)  SpO2: (!) 93 % (12/03/20 0830) Vital Signs (24h Range):  Temp:  [96.3 °F (35.7 °C)-98.5 °F (36.9 °C)] 96.8 °F (36 °C)  Pulse:  [56-84] 84  Resp:  [13-26] 18  SpO2:  [83 %-99 %] 93 %  BP: ()/(39-74) 97/57     Weight: 58 kg (127 lb 13.9 oz)  Body mass index is 23.39 kg/m².    Physical Exam  Vitals signs reviewed.   Constitutional:       General: She is not in acute distress.     Appearance: Normal appearance.      Comments: No longer tachypneic   HENT:      Head: Normocephalic.      Nose: Nose normal.      Mouth/Throat:      Mouth: Mucous membranes are moist.   Eyes:      General:         Right eye: No discharge.      Pupils: Pupils are equal, round, and reactive to light.   Neck:      Thyroid: No thyromegaly.      Vascular: No JVD.   Cardiovascular:      Rate and Rhythm: Normal rate and regular rhythm.      Pulses: Normal pulses.      Heart sounds: Normal heart sounds. No murmur. No friction rub. No gallop.       Comments: tachycardic  Pulmonary:      Effort: Pulmonary effort is normal.      Breath sounds: Normal breath sounds. No wheezing or rales.   Abdominal:      General: Abdomen is flat.      Palpations: Abdomen is soft.   Musculoskeletal: Normal range of motion.         General: Tenderness present.      Right lower leg: No edema.      Left lower leg: No edema.   Lymphadenopathy:      Cervical: No cervical adenopathy.   Skin:     General: Skin is dry.   Neurological:      General: No focal deficit present.      Mental Status: She is alert and oriented to person, place, and time.      Cranial Nerves: No cranial nerve deficit.      Gait: Gait abnormal.    Psychiatric:         Mood and Affect: Mood normal.         Behavior: Behavior normal.         Thought Content: Thought content normal.         Judgment: Judgment normal.           CRANIAL NERVES     CN III, IV, VI   Pupils are equal, round, and reactive to light.       Significant Labs:   A1C:   Recent Labs   Lab 08/22/20  2149 08/23/20  0347 12/02/20  1200   HGBA1C 6.5* 6.6* 7.6*     CBC:   Recent Labs   Lab 12/02/20  1200   WBC 13.79*   HGB 8.3*   HCT 26.2*        CMP:   Recent Labs   Lab 12/02/20  1200 12/03/20  0358    141   K 3.6 4.5   CL 94* 91*   CO2 37* 38*   * 253*   BUN 23 32*   CREATININE 1.2 1.8*   CALCIUM 8.8 8.5*   PROT 6.0 5.4*   ALBUMIN 3.7 3.3*   BILITOT 0.7 0.5   ALKPHOS 72 58   AST 12 8*   ALT 24 20   ANIONGAP 11 12   EGFRNONAA 42* 26*     Lactic Acid: No results for input(s): LACTATE in the last 48 hours.  POCT Glucose:   Recent Labs   Lab 12/02/20  1631 12/02/20 2029 12/03/20  0728   POCTGLUCOSE 300* 383* 185*     Troponin:   Recent Labs   Lab 12/02/20  1200   TROPONINI 0.027*     Urine Studies: No results for input(s): COLORU, APPEARANCEUA, PHUR, SPECGRAV, PROTEINUA, GLUCUA, KETONESU, BILIRUBINUA, OCCULTUA, NITRITE, UROBILINOGEN, LEUKOCYTESUR, RBCUA, WBCUA, BACTERIA, SQUAMEPITHEL, HYALINECASTS in the last 48 hours.    Invalid input(s): WRIGHTSUR  BNP  Recent Labs   Lab 12/02/20  1200   *     Lab Results   Component Value Date    DDIMER 1.12 (H) 11/06/2020         Significant Imaging: CXR: I have reviewed all pertinent results/findings within the past 24 hours and my personal findings are:  Cardiomegaly, cephalization of blood flow, fluid in the right fissure,  Left cardiac pacer.

## 2020-12-03 NOTE — PLAN OF CARE
"No falls or near misses noted on shift, Noted generalized bruising to skin on admit, no wounds, SCDs (refuses) for DVT prevention, No pain complaints, personal preferences provided as requested for food/drinks as diet orders allow, plan of care discussed with patient at bedside, verbalized understanding, evidence of learning noted, saline locked, plan is to maintain ICU status, awaiting arrival of home Trilogy machine, states "my  is bringing it." Mild to Moderate shortness of breath noted post ambulation to toilet, educated on slow deep breathing.   "

## 2020-12-03 NOTE — CONSULTS
SW met with patient to discuss discharge planning. Patient denies any post-acute care needs at this time.     Discussed Healthcare POA with patient. SW could not locate a POA document in patient's medical record. The document found under Healthcare POA in Media is not a Healthcare POA. Patient expressed that she would like her spouse to make any healthcare decisions for her should she be unable to communicate her wishes. Her eldest daughter would be her second choice. Because the patient requests her legal next of kin to be the decision maker, a Healthcare POA is not needed at this time. This was explained to the patient. SW also explained that at any time her  is unable to make those decisions for her, she can designate her daughter as her Healthcare POA. Stated understanding.     Daniela Castro LMSW

## 2020-12-04 NOTE — PROGRESS NOTES
C3 nurse attempted to contact patient. The following occurred:   C3 nurse attempted to contact Chantell for a TCC post hospital discharge follow up call. The patient is unable to conduct the call @ this time. The patient requested a callback.    The patient has a scheduled HOSFU appointment with Kari Gaspar MD on 12/9/20 @ 1400. Message sent to Physician staff.

## 2020-12-09 NOTE — PHYSICIAN QUERY
PT Name: Chantell Herrera  MR #: [id number]     RESPIRATORY CONDITION CLARIFICATION     CDS/: Mercy Fermin RN              Contact information:[email or web address]  This form is a permanent document in the medical record.     Query Date: December 9, 2020    By submitting this query, we are merely seeking further clarification of documentation.  Please utilize your independent clinical judgment when addressing the question(s) below.  The Medical Record contains the following   Indicators   Supporting Clinical Findings Location in Medical Record   x SOB, SANTIAGO, Wheezing, Productive Cough, Use of Accessory Muscles, etc. Patient presents to the emergency room with significant hypoxemia today. She is on home oxygen but her sats been in the low 80 percentile range. This started over the past month.      Pulmonary:   Breath sounds: Wheezing and rales present.      H&P 12/2       Hospital Medicine           H&P 12/2       Hospital Medicine      x RR           ABGs           O2 sat Vital Signs (24h Range):   Resp: [15-26] 15   SpO2: [89 %-96 %] 95 %       Vital Signs (24h Range):   Resp: [13-26] 18   SpO2: [83 %-99 %] 93 %        H&P 12/2       Hospital Medicine         Progress Note 12/3       Hospital Medicine            x Hypoxia/Hypercapnia Hypoxemia   Continue oxygen supplementation   Monitor O2 overnight   Admit to ICU   Hopefully this will improve once we for fine tune her cardiac and COPD meds    H&P 12/2       Hospital Medicine     BiPAP/Intubation/  Mechanical Ventilation     x Supplemental O2 Continue oxygen at 4 L nasal cannula    H&P 12/2       Hospital Medicine     x Home O2, Oxygen Dependence She is on home O2 at 3 L a minute, not able to maintain saturation with that level of oxygen        Oxygen dependent   Continue oxygen at 4 L nasal cannula   Continue home trilogy   Consult pulmonology    ED Provider note 12/2          H&P 12/2       Hospital Medicine   x Respiratory Distress or Failure Acute on  chronic respiratory failure with hypoxia and hypercapnia   stable    Consult 12/3       Pulmonology             x Radiology Findings There are subtle increased markings in the right lung base in addition a blunting of the costophrenic sulcus which could represent a small pleural effusion with basilar interstitial edema or infiltrate without focal consolidation or evidence of gross congestive failure.   CXR 12/2   x Acute/Chronic Illness Acute on chronic combined systolic and diastolic CHF  Hypoxemia   COPD exacerbation    H&P 12/2       Hospital Medicine   x Treatment Patient had an uneventful night last night. She is feeling much better. She received a total of 40 mg of Lasix IV and had decent output. Today she is no longer having severe shortness of breath.   Progress Note 12/3       Hospital Medicine             x Other She is not in acute distress.   H&P 12/2       Hospital Medicine            Respiratory failure can be acute, chronic or both. It is generally further specified as hypoxic, hypercapnic or both. Lastly, it is important to identify an etiology, if known or suspected.   References:: https://www.acphospitalist.org/archives/[email or web address]/10/coding.htm    [email or web address]-respiratory-failure-know    The noted clinical guidelines are only system guidelines and do not replace the providers clinical judgment.    Provider, please specify diagnosis or diagnoses associated with above clinical findings.     Doctor, please clarify the above conflicting documentation regarding the respiratory diagnosis associated with the above clinical indicators.    [    ] Acute and (on) Chronic Respiratory Failure with Hypoxia and Hypercapnia ruled in.  - Hypoxic: pO2 >10 mmHg below baseline or SpO2 < 91% on usual home O2 or O2 ? 2L/min over baseline home O2 and Hypercapnic: pCO2 >10 mmHg over baseline and pH < 7.35      [    ] Chronic Respiratory Failure with Hypoxia and Hypercapnia ruled in and Acute Respiratory  Failure ruled out.     [  x  ] Hypoxia Only       [  x  ] Other Respiratory Diagnosis (please specify): _____CHF exacerbation____________     [   ] Clinically Undetermined            Please document in your progress notes daily for the duration of treatment until resolved and include in your discharge summary.

## 2020-12-10 NOTE — LETTER
December 30, 2020    Chantell Herrera  [address] [address]             [facility] [address] [address] Dear Mrs Herrera      My name is Kirsten and I work with [facility] Outpatient Case Management Department. I have been unsuccessful at reaching you to follow-up to see how you have been doing. Please call me back at your earliest convenience to discuss your health care needs.      I can be reached at [phone] from 8:00AM to 4:30 PM on Monday thru Friday. [facility] also has a program where a nurse is available 24/7 to answer questions or provide medical advice, their number is [phone].    Thank You,        Kirsten Jacques RN  Outpatient Case Management/Disease Management  [phone]

## 2020-12-16 NOTE — PROGRESS NOTES
Subjective:       Patient ID: Chantell Herrera is a 85 y.o. female.    Chief Complaint: Hospital Follow Up    HPI  85 year old female comes in for f/u of her 23rd hospital stay this year. She says she is feeling really bad. Since being discharged, she had bumped her noraml o2 up to 3.5 L and has had worsening of her congestion. She was given a single dose of lasix, diuresed and felt better int he hospital, but she has continue dto take her demedex every other day. She notes that she has had decrease in her urine with increased hesitancy.    She is well known to our practice and has had progressive decline. With known heart failure, renal failure, respiratory failure and recurrent anemia - etiology still unknown, she has very poor prognosis. She understands this and is exhausted. She and dr. Hickman and I have recently reviewed her living will and her wishes to be dnr/dni. However, she says that dr. Hickman asked her to give digoxin a try prior to going to hospice. She says she would rather comfort care at this point.    PMH, PSH, ALLERGIES, SH, FH reviewed in nurse's notes above  Medications reconciled in the nurse's notes    Review of Systems   Constitutional: Negative for chills and fever.   HENT: Negative for congestion, ear pain, postnasal drip, rhinorrhea, sore throat and trouble swallowing.    Eyes: Negative for redness and itching.   Respiratory: Positive for cough and shortness of breath. Negative for wheezing.    Cardiovascular: Negative for chest pain and palpitations.   Gastrointestinal: Negative for abdominal pain, diarrhea, nausea and vomiting.   Genitourinary: Positive for decreased urine volume. Negative for dysuria and frequency.   Skin: Negative for rash.   Neurological: Negative for weakness and headaches.       Objective:      Physical Exam  Vitals signs and nursing note reviewed.   Constitutional:       General: She is not in acute distress.     Appearance: She is well-developed. She is ill-appearing  and diaphoretic.   HENT:      Head: Normocephalic and atraumatic.      Nose:      Comments: 3L NC in place  Eyes:      Conjunctiva/sclera: Conjunctivae normal.      Pupils: Pupils are equal, round, and reactive to light.   Neck:      Musculoskeletal: Normal range of motion and neck supple.      Thyroid: No thyromegaly.   Cardiovascular:      Rate and Rhythm: Normal rate and regular rhythm.      Heart sounds: Normal heart sounds.      Comments: NOT in afib  Pulmonary:      Effort: Respiratory distress present.      Breath sounds: Normal breath sounds. No wheezing or rhonchi.      Comments: tachypneic with very poor tidal volume  Abdominal:      General: Bowel sounds are normal.      Palpations: Abdomen is soft.      Tenderness: There is no abdominal tenderness.   Musculoskeletal: Normal range of motion.   Lymphadenopathy:      Cervical: No cervical adenopathy.   Skin:     General: Skin is warm.      Findings: No rash.   Neurological:      Mental Status: She is alert and oriented to person, place, and time.   Psychiatric:         Behavior: Behavior normal.          Assessment/Plan:       Problem List Items Addressed This Visit        Pulmonary    Chronic respiratory failure with hypoxia    Relevant Medications    doxycycline (VIBRA-TABS) 100 MG tablet    predniSONE (DELTASONE) 20 MG tablet    Other Relevant Orders    Ambulatory referral/consult to Hospice       Cardiac/Vascular    Acute on chronic congestive heart failure       Oncology    Symptomatic anemia    Relevant Orders    CBC Auto Differential (Completed)      Other Visit Diagnoses     Acute renal failure superimposed on stage 3b chronic kidney disease, unspecified acute renal failure type    -  Primary    Relevant Orders    Comprehensive Metabolic Panel (Completed)    Bronchitis        Relevant Medications    doxycycline (VIBRA-TABS) 100 MG tablet    predniSONE (DELTASONE) 20 MG tablet      RTC if condition acutely worsens or any other concerns, otherwise RTC  as scheduled    Will send to aim. Likely will give another transfusion and have encouraged to go to hematology to determine whether bone marrow bx would help to determine palliative treatment with stim. If this is not an option will transition from aim to hospice. We disucssed this over an hour and she seems ready to make this transition.

## 2020-12-17 NOTE — TELEPHONE ENCOUNTER
Blood counts are once again at 7 and she is very symptomatic.    She will cont with daily demedex as we dsicussed - her kidneys allow for it and she seems fluid overloaded.  Would like for her to get a single unit transfusion tomrorow in infusion center if possible.    She can come in to get type and screened today.    Please call her and msGeoffrey Jennifer to set up.    AIM referral signed.    donald

## 2020-12-22 PROBLEM — R06.02 SHORTNESS OF BREATH: Status: ACTIVE | Noted: 2020-01-01

## 2020-12-22 NOTE — CONSULTS
[facility]  Cardiology  Consult Note    Patient Name: Chantell Herrera  MRN: [id number]  Admission Date: 12/22/2020  Hospital Length of Stay: 0 days  Code Status: Prior   Attending Provider: Marco Antonio Pereyra Jr., MD   Consulting Provider: Yaima Owens NP  Primary Care Physician: Kari Gaspar MD  Principal Problem:<principal problem not specified>    Patient information was obtained from patient and ER records.     Consults  Subjective:     Chief Complaint:  SOB    HPI: Pt is an 86 y/o with a PMH of chronic diastolic and systolic CHF, PAF, hypertensive heart disease, pulmonary hypertension, COPD, CKD, dyslipidemia, anemia who present to ER with complaints of SOB following blood transfusion. She was recently evaluated in ER with similar complaints and presentation. Troponin mildly elevated at .04 though has a chronic troponin elevation, BNP elevated though is typically elevated. ABG's at baseline. CIS asked to evaluate.     Past Medical History:   Diagnosis Date    A-fib     Acute on chronic congestive heart failure 2/1/2019    Anxiety     Arthritis     Asthma     Atrial fibrillation with rapid ventricular response     CHF (congestive heart failure) 11/29/2018    Chronic bronchitis     COPD (chronic obstructive pulmonary disease)     Depression     Diabetes mellitus, type 2     SANTIAGO (dyspnea on exertion)     Emphysema of lung     Fatigue     Hyperlipidemia     Hypertension     Symptomatic anemia 1/22/2019    Trouble in sleeping        Past Surgical History:   Procedure Laterality Date    APPENDECTOMY      BRONCHOSCOPY N/A 12/3/2019    Procedure: BRONCHOSCOPY;  Surgeon: Emmanuel Hickman MD;  [facility];  Service: Pulmonary;  Laterality: N/A;    EYE SURGERY      HYSTERECTOMY      INSERTION OF PACEMAKER  12/26/2019    OOPHORECTOMY      TONSILLECTOMY         Review of patient's allergies indicates:  No Known Allergies    Current Facility-Administered Medications on File  Prior to Encounter   Medication    [COMPLETED] 0.9%  NaCl infusion (for blood administration)    [COMPLETED] furosemide injection 40 mg     Current Outpatient Medications on File Prior to Encounter   Medication Sig    albuterol (PROVENTIL) 2.5 mg /3 mL (0.083 %) nebulizer solution Take 3 mLs (2.5 mg total) by nebulization every 6 (six) hours as needed for Wheezing.    aspirin (ECOTRIN) 81 MG EC tablet Take 1 tablet (81 mg total) by mouth once daily.    baclofen (LIORESAL) 10 MG tablet Take 10 mg by mouth 3 (three) times daily.    benzonatate (TESSALON) 100 MG capsule Take 100 mg by mouth 3 (three) times daily as needed for Cough.    blood sugar diagnostic Strp Test blood sugars three times a day DX E11.9  DM 2    cholecalciferol, vitamin D3, (VITAMIN D3) 2,000 unit Tab Take 2,000 Units by mouth once daily.    citalopram (CELEXA) 20 MG tablet TAKE 1 TABLET EVERY DAY    doxycycline (VIBRA-TABS) 100 MG tablet Take 1 tablet (100 mg total) by mouth 2 (two) times daily. for 10 days    folic acid (FOLVITE) 1 MG tablet Take 2,000 mcg by mouth once daily.    insulin detemir U-100 (LEVEMIR FLEXTOUCH) 100 unit/mL (3 mL) SubQ InPn pen Inject 10 Units into the skin every evening.    ipratropium (ATROVENT) 0.02 % nebulizer solution Take 500 mcg by nebulization 4 (four) times daily.     losartan (COZAAR) 50 MG tablet Take 0.5 tablets (25 mg total) by mouth once daily.    meclizine (ANTIVERT) 25 mg tablet 2 (two) times daily as needed for Dizziness.     metoprolol succinate (TOPROL-XL) 50 MG 24 hr tablet Take 50 mg by mouth 2 (two) times daily.     pravastatin (PRAVACHOL) 40 MG tablet Take 40 mg by mouth once daily.    predniSONE (DELTASONE) 20 MG tablet Take 20mg x 10 days then go back to 10mg daily    rOPINIRole (REQUIP) 1 MG tablet TAKE 1 TABLET  EVERY EVENING.    tiotropium (SPIRIVA) 18 mcg inhalation capsule Inhale 1 capsule (18 mcg total) into the lungs once daily. Controller    torsemide (DEMADEX) 20 MG  Tab Take 1 tablet (20 mg total) by mouth once daily.    traZODone (DESYREL) 50 MG tablet TAKE 1 TABLET EVERY EVENING     Family History     Problem Relation (Age of Onset)    COPD Brother, Daughter    Cancer Sister    Diabetes Father    Heart disease Mother    Hypertension Mother, Father, Brother    Kidney disease Son    No Known Problems Daughter        Tobacco Use    Smoking status: Former Smoker     Quit date: 2002     Years since quittin.3    Smokeless tobacco: Never Used   Substance and Sexual Activity    Alcohol use: No    Drug use: No    Sexual activity: Not Currently     ROS     Constitutional : Negative  EENT : Negative  CV : Negative  Respiratory : SOB  Gastrointestinal: Negative   Genitourinary: Negative  Musculoskeletal: Negative  Skin : Negative  Neurological : AAO x 3     Objective:     Vital Signs (Most Recent):  Temp: 99.2 °F (37.3 °C) (20 1439)  Pulse: 99 (20 1633)  Resp: (!) 41 (20 1512)  BP: (!) 145/73 (20 1635)  SpO2: 100 % (20 1512) Vital Signs (24h Range):  Temp:  [96.5 °F (35.8 °C)-99.2 °F (37.3 °C)] 99.2 °F (37.3 °C)  Pulse:  [] 99  Resp:  [20-41] 41  SpO2:  [97 %-100 %] 100 %  BP: (107-178)/(50-99) 145/73        There is no height or weight on file to calculate BMI.    SpO2: 100 %  O2 Device (Oxygen Therapy): BiPAP    No intake or output data in the 24 hours ending 20 1650    Lines/Drains/Airways     Peripheral Intravenous Line                 Peripheral IV - Single Lumen 20 1448 20 G Right Hand less than 1 day                Physical Exam    General appearance: alert, appears stated age and cooperative  Head: Normocephalic, without obvious abnormality, atraumatic  Eyes: conjunctivae/corneas clear. PERRL  Neck: no carotid bruit, no JVD and supple, symmetrical, trachea midline  Lungs:diminished throughout  Chest Wall: no tenderness  Heart: regular rate and rhythm, S1, S2 normal, no murmur, click, rub or gallop  Abdomen: soft,  non-tender; bowel sounds normal; no masses,  no organomegaly  Extremities: Extremities normal, atraumatic, no cyanosis, clubbing, or edema  Pulses: Dorsalis Pedis R: 2+ (normal)/L: 2+ (normal)  Skin: Skin color, texture, turgor normal. No rashes or lesions  Neurologic: Normal mood and affect  Alert and oriented X 3    Significant Labs:   BMP:   Recent Labs   Lab 12/22/20  1505   *   *   K 3.7   CL 95   CO2 35*   BUN 34*   CREATININE 1.2   CALCIUM 8.9   , CMP   Recent Labs   Lab 12/22/20  1505   *   K 3.7   CL 95   CO2 35*   *   BUN 34*   CREATININE 1.2   CALCIUM 8.9   PROT 7.0   ALBUMIN 4.2   BILITOT 1.2*   ALKPHOS 89   AST 15   ALT 16   ANIONGAP 17*   ESTGFRAFRICA 48*   EGFRNONAA 42*   , CBC   Recent Labs   Lab 12/22/20  1505   WBC 19.84*   HGB 9.8*   HCT 31.4*       and Troponin   Recent Labs   Lab 12/22/20  1505   TROPONINI 0.041*       Significant Imaging: X-Ray: CXR: X-Ray Chest 1 View (CXR): No results found for this visit on 12/22/20. and X-Ray Chest PA and Lateral (CXR): No results found for this visit on 12/22/20.  Assessment and Plan:     There are no hospital problems to display for this patient.      VTE Risk Mitigation (From admission, onward)    None        No current facility-administered medications for this encounter.      Current Outpatient Medications   Medication Sig    albuterol (PROVENTIL) 2.5 mg /3 mL (0.083 %) nebulizer solution Take 3 mLs (2.5 mg total) by nebulization every 6 (six) hours as needed for Wheezing.    aspirin (ECOTRIN) 81 MG EC tablet Take 1 tablet (81 mg total) by mouth once daily.    baclofen (LIORESAL) 10 MG tablet Take 10 mg by mouth 3 (three) times daily.    benzonatate (TESSALON) 100 MG capsule Take 100 mg by mouth 3 (three) times daily as needed for Cough.    blood sugar diagnostic Strp Test blood sugars three times a day DX E11.9  DM 2    cholecalciferol, vitamin D3, (VITAMIN D3) 2,000 unit Tab Take 2,000 Units by mouth once daily.     citalopram (CELEXA) 20 MG tablet TAKE 1 TABLET EVERY DAY    doxycycline (VIBRA-TABS) 100 MG tablet Take 1 tablet (100 mg total) by mouth 2 (two) times daily. for 10 days    folic acid (FOLVITE) 1 MG tablet Take 2,000 mcg by mouth once daily.    insulin detemir U-100 (LEVEMIR FLEXTOUCH) 100 unit/mL (3 mL) SubQ InPn pen Inject 10 Units into the skin every evening.    ipratropium (ATROVENT) 0.02 % nebulizer solution Take 500 mcg by nebulization 4 (four) times daily.     losartan (COZAAR) 50 MG tablet Take 0.5 tablets (25 mg total) by mouth once daily.    meclizine (ANTIVERT) 25 mg tablet 2 (two) times daily as needed for Dizziness.     metoprolol succinate (TOPROL-XL) 50 MG 24 hr tablet Take 50 mg by mouth 2 (two) times daily.     pravastatin (PRAVACHOL) 40 MG tablet Take 40 mg by mouth once daily.    predniSONE (DELTASONE) 20 MG tablet Take 20mg x 10 days then go back to 10mg daily    rOPINIRole (REQUIP) 1 MG tablet TAKE 1 TABLET  EVERY EVENING.    tiotropium (SPIRIVA) 18 mcg inhalation capsule Inhale 1 capsule (18 mcg total) into the lungs once daily. Controller    torsemide (DEMADEX) 20 MG Tab Take 1 tablet (20 mg total) by mouth once daily.    traZODone (DESYREL) 50 MG tablet TAKE 1 TABLET EVERY EVENING        Frail DNR with chronic dyspnea severe COPD.   History of recurrent anemia requiring blood transfusion.    No PE by CT January 2019.  Hypertension controlled.   Chronic kidney disease May 2020.  Paroxysmal A. fib status post pacemaker December 2019     Echo 8/2020 Left ventricular ejection fraction 65%, PA pressure 72 severe pulmonary hypertension.,      stage III chronic diastolic heart failure.   Mild anterior attenuation on stress perfusion December 2018  Mild bilateral carotid disease December 2015  Troponin chronically elevated, no chest pain, pt is a poor candidate for [facility]    PLAN: Pt does not appear to be grossly volume overloaded currently, has been given one dose of IV lasix and is  diuresing, O2 sats stable.   Appears near baseline at this point.   Continue ASA, losartan, metoprolol, pravastatin, demadex 20 mg qd, home O2  Will f/u in clinic    Yaima Owens NP  Cardiology   [facility]    I attest that I have personally seen and examined this patient. I have reviewed and discussed the management in detail as outlined above.

## 2020-12-22 NOTE — PROGRESS NOTES
Rales noted to the bases of the lungs pre transfusion.  Spoke with Dr Marin who is on call and he ordered laxis 40 mg.  Dose readback and confirmed.

## 2020-12-22 NOTE — PATIENT INSTRUCTIONS
OUTPATIENT BLOOD TRANSFUSION         The blood you have received has been matched for you as carefully as possible, yet there remains the possibility of a delayed reaction to the blood or components.  Please notify your physician immediately if you experience any of the following symptoms after your transfusion (even days later):    1.   Lower back pain    2.   Urine appears reddish or orange    3.   Swelling of hands or feet    4.   Skin rash or hives (itching of skin)    5.   Shortness of breath    6.   Chills    7.   Rise in temperature of 2 degrees F or greater    Please remember that these symptoms may occur in your body normally (for example, if you already have shortness of breath) and are not NEW symptoms associated with the transfusion.    Please report ONLY NEW OCCURENCES.    Follow up with your doctor as scheduled.    Thank You

## 2020-12-22 NOTE — ED TRIAGE NOTES
Pt was brought to ER by infusion center nurse Claudette B. Pt had 1 unit of blood transfused and became short of breath shortly after. Pt is currently shivering and complains of being short of breath. Pt on 3L home o2 at this time.

## 2020-12-22 NOTE — PROGRESS NOTES
140   arrives.  Pt still refusing to go to ER.  After  talks with pt he feels she needs to be seen in ER and tells her she needs to go.  Pt then brought to ER.  Report given to ER nurse.

## 2020-12-22 NOTE — ED PROVIDER NOTES
Encounter Date: 2020       History     Chief Complaint   Patient presents with    Shortness of Breath     Patient is 85-year-old white female with a history of CHF and COPD.  She was receiving a blood transfusion in the infusion center, at the completion of the infusion she became acutely short of breath.  Family member states that he has seen this before and it is usually an exacerbation of her COPD that responds well to BiPAP treatment, and her trelogy machine, which unfortunately is not located here.        Review of patient's allergies indicates:  No Known Allergies  Past Medical History:   Diagnosis Date    A-fib     Acute on chronic congestive heart failure 2019    Anxiety     Arthritis     Asthma     Atrial fibrillation with rapid ventricular response     CHF (congestive heart failure) 2018    Chronic bronchitis     COPD (chronic obstructive pulmonary disease)     Depression     Diabetes mellitus, type 2     SANTIAGO (dyspnea on exertion)     Emphysema of lung     Fatigue     Hyperlipidemia     Hypertension     Symptomatic anemia 2019    Trouble in sleeping      Past Surgical History:   Procedure Laterality Date    APPENDECTOMY      BRONCHOSCOPY N/A 12/3/2019    Procedure: BRONCHOSCOPY;  Surgeon: Emmanuel Hickman MD;  Location: [facility] OR;  Service: Pulmonary;  Laterality: N/A;    EYE SURGERY      HYSTERECTOMY      INSERTION OF PACEMAKER  2019    OOPHORECTOMY      TONSILLECTOMY       Family History   Problem Relation Age of Onset    Heart disease Mother     Hypertension Mother     Hypertension Father     Diabetes Father     Cancer Sister     COPD Brother     Hypertension Brother     No Known Problems Daughter     Kidney disease Son     COPD Daughter      Social History     Tobacco Use    Smoking status: Former Smoker     Quit date: 2002     Years since quittin.3    Smokeless tobacco: Never Used   Substance Use Topics    Alcohol use: No    Drug  use: No     Review of Systems   Constitutional: Negative for fever.   HENT: Negative for congestion, ear pain, rhinorrhea, sore throat and trouble swallowing.    Eyes: Negative for pain.   Respiratory: Positive for shortness of breath. Negative for cough and wheezing.    Cardiovascular: Negative for chest pain, palpitations and leg swelling.   Gastrointestinal: Negative for abdominal pain, constipation, diarrhea and nausea.   Genitourinary: Negative for difficulty urinating, dysuria, flank pain, frequency, hematuria and urgency.   Musculoskeletal: Negative for arthralgias, back pain, myalgias and neck pain.   Skin: Negative for rash and wound.   Neurological: Negative for speech difficulty, weakness and headaches.   Hematological: Does not bruise/bleed easily.       Physical Exam     Initial Vitals [12/22/20 1439]   BP Pulse Resp Temp SpO2   -- -- -- 99.2 °F (37.3 °C) --      MAP       --         Physical Exam    Constitutional: No distress.   HENT:   Head: Normocephalic and atraumatic.   Nose: Nose normal.   Mouth/Throat: Oropharynx is clear and moist.   Eyes: Conjunctivae and EOM are normal. Pupils are equal, round, and reactive to light.   Neck: Normal range of motion. Neck supple.   Cardiovascular: Normal rate, regular rhythm, normal heart sounds and intact distal pulses.   Pulmonary/Chest: She is in respiratory distress. She has wheezes. She has no rales.   Abdominal: Soft. Bowel sounds are normal. There is no abdominal tenderness.   Musculoskeletal: Normal range of motion.   Neurological: She is alert and oriented to person, place, and time. She has normal strength. GCS score is 15. GCS eye subscore is 4. GCS verbal subscore is 5. GCS motor subscore is 6.   Skin: Skin is warm and dry.   Psychiatric: She has a normal mood and affect.         ED Course   Procedures  Labs Reviewed   CBC W/ AUTO DIFFERENTIAL   COMPREHENSIVE METABOLIC PANEL   TROPONIN I   B-TYPE NATRIURETIC PEPTIDE   PROTIME-INR          Imaging  Results    None         we attempted to remove the patient from BiPAP so that she could be transported home to her regular trilogy machine, but she was unable to maintain oxygen saturation when not on the BiPAP.  I discussed with Hospital Medicine the possibility of an observation, think that would be the safest course of action at this time.  Patient is a candidate for hospice, as already been agreeable to DNI in terms of living will.                              Clinical Impression:       ICD-10-CM ICD-9-CM   1. Shortness of breath  R06.02 786.05                      Disposition:   Disposition: Placed in Observation  Condition: Fair                          Marco Antonio Pereyra Jr., MD  12/22/20 4622

## 2020-12-22 NOTE — PROGRESS NOTES
1400  Pt waiting in lobby for .  Pt respirations increasing and labored  Color looking gray.  Pt refusing to go to ER.  Calls  on cell phone to see how much longer he will be.

## 2020-12-22 NOTE — ED NOTES
Pt arrived c/o SOB following 1 unit of blood transfusion in infusion center. Pt states hx of anemia. Unable to obtain pulse ox initially, pt with cold extremities but AAOX4, arrived on 3L O2 which she currently wears at home. Pt requesting BiPap. ER MD at bedside, IV intitiated and pt placed on monitor. Respiratory at bedside and placed pt on BiPap. Pt reports breathing better now currently.

## 2020-12-23 NOTE — PROGRESS NOTES
"eICU Brief Note    Issue: Admitted with SOB post transfusion for anemia  No obvious fluid overload but known diastolic heart failure  Got lasix  BNP 1018  Trilogy 600 Vt  AVAPS mode  Skin bruised per RN   BP (!) 109/53 (BP Location: Left arm, Patient Position: Lying)   Pulse (!) 111   Temp 98.1 °F (36.7 °C) (Axillary)   Resp (!) 24   Ht 5' 2" (1.575 m)   Wt 58.4 kg (128 lb 12 oz)   LMP  (LMP Unknown)   SpO2 (!) 92%   BMI 23.55 kg/m²   Looks comfortable on video   CXR  No congestion  Plan :  Monitor fluid balance  COPD?   Hypercapnia ? chronic  On Trilogy  Replete K   Check Mg in AM   SCD  Got steroids    D/w RN    "

## 2020-12-23 NOTE — HPI
84yo F with severe COPD, atrial fib (off anticoag.), CHF, DM II,  and symptomatic anemia who presented to ED following an outpatient blood transfusion for dyspnea. The patient is chronically on home oxygen, but was requiring BiPAP to maintain her O2 sats in ED. She reports she was in her usual state of health prior, although she has had multiple admissions this year related to her COPD and CHF. Her only other complaint is frequent sneezing. No fever/chills.     Per chart review, last clinic visit with PCP, pt discussed and agreed to DNR/DNI status and AIM was initiated. I discussed this with patient and she agrees she is DNR/DNI.

## 2020-12-23 NOTE — PLAN OF CARE
12/23/20 1527   HOPKINS Message   Medicare Outpatient and Observation Notification regarding financial responsibility Given to patient/caregiver;Explained to patient/caregiver;Signed/date by patient/caregiver   Date HOPKINS was signed 12/23/20   Time HOPKINS was signed 1200

## 2020-12-23 NOTE — ED NOTES
Patient transfer to ICU by stretcher. AAOX4. Venti mask 40% with 8 L oxygen. Two bags personal belongings. Jessica wick in place total 400 mL urine in suction canister. Side rails up X 2.

## 2020-12-23 NOTE — PLAN OF CARE
Goals to be met by: 5 visits    Patient will increase functional independence with mobility by performin. Supine to sit with Independent  2. Sit to supine with Independent  3. Bed to chair transfer with Supervision or Set-up Assistancewith or without rolling walker using Step Transfer TECHNIQUE  4. Gait  x ~100  feet with Supervision or Set-up Assistance with or without rolling walker  5. Lower extremity exercise program x10 reps per handout, with assistance as needed

## 2020-12-23 NOTE — HOSPITAL COURSE
12/23 Per nursing no acute events overnight. Pt used trelegy overnight. This morning she states she is essentially back to baseline, although she is feeling week. Afebrile. Mild hypotension. DC'd lasix. Repleting magnesium.     12/24 pt did well overnight on home dose O2 3-4L NC and bipap. Has trilogy at home. bipap here. WBC significantlty improved [id number]    Chronic anemia. Was getting blood prior to admission when she got SOB. H/h 7.8/24

## 2020-12-23 NOTE — SUBJECTIVE & OBJECTIVE
Past Medical History:   Diagnosis Date    A-fib     Acute on chronic congestive heart failure 2/1/2019    Anxiety     Arthritis     Asthma     Atrial fibrillation with rapid ventricular response     CHF (congestive heart failure) 11/29/2018    Chronic bronchitis     COPD (chronic obstructive pulmonary disease)     Depression     Diabetes mellitus, type 2     SANTIAGO (dyspnea on exertion)     Emphysema of lung     Fatigue     Hyperlipidemia     Hypertension     Symptomatic anemia 1/22/2019    Trouble in sleeping        Past Surgical History:   Procedure Laterality Date    APPENDECTOMY      BRONCHOSCOPY N/A 12/3/2019    Procedure: BRONCHOSCOPY;  Surgeon: Emmanuel Hickman MD;  Location: [facility];  Service: Pulmonary;  Laterality: N/A;    EYE SURGERY      HYSTERECTOMY      INSERTION OF PACEMAKER  12/26/2019    OOPHORECTOMY      TONSILLECTOMY         Review of patient's allergies indicates:  No Known Allergies    No current facility-administered medications on file prior to encounter.      Current Outpatient Medications on File Prior to Encounter   Medication Sig    albuterol (PROVENTIL) 2.5 mg /3 mL (0.083 %) nebulizer solution Take 3 mLs (2.5 mg total) by nebulization every 6 (six) hours as needed for Wheezing.    aspirin (ECOTRIN) 81 MG EC tablet Take 1 tablet (81 mg total) by mouth once daily.    baclofen (LIORESAL) 10 MG tablet Take 10 mg by mouth 3 (three) times daily.    benzonatate (TESSALON) 100 MG capsule Take 100 mg by mouth 3 (three) times daily as needed for Cough.    blood sugar diagnostic Strp Test blood sugars three times a day DX E11.9  DM 2    cholecalciferol, vitamin D3, (VITAMIN D3) 2,000 unit Tab Take 2,000 Units by mouth once daily.    citalopram (CELEXA) 20 MG tablet TAKE 1 TABLET EVERY DAY    doxycycline (VIBRA-TABS) 100 MG tablet Take 1 tablet (100 mg total) by mouth 2 (two) times daily. for 10 days    folic acid (FOLVITE) 1 MG tablet Take 2,000 mcg by mouth once daily.     insulin detemir U-100 (LEVEMIR FLEXTOUCH) 100 unit/mL (3 mL) SubQ InPn pen Inject 10 Units into the skin every evening.    ipratropium (ATROVENT) 0.02 % nebulizer solution Take 500 mcg by nebulization 4 (four) times daily.     losartan (COZAAR) 50 MG tablet Take 0.5 tablets (25 mg total) by mouth once daily.    meclizine (ANTIVERT) 25 mg tablet 2 (two) times daily as needed for Dizziness.     metoprolol succinate (TOPROL-XL) 50 MG 24 hr tablet Take 50 mg by mouth 2 (two) times daily.     pravastatin (PRAVACHOL) 40 MG tablet Take 40 mg by mouth once daily.    predniSONE (DELTASONE) 20 MG tablet Take 20mg x 10 days then go back to 10mg daily    rOPINIRole (REQUIP) 1 MG tablet TAKE 1 TABLET  EVERY EVENING.    tiotropium (SPIRIVA) 18 mcg inhalation capsule Inhale 1 capsule (18 mcg total) into the lungs once daily. Controller    torsemide (DEMADEX) 20 MG Tab Take 1 tablet (20 mg total) by mouth once daily.    traZODone (DESYREL) 50 MG tablet TAKE 1 TABLET EVERY EVENING     Family History     Problem Relation (Age of Onset)    COPD Brother, Daughter    Cancer Sister    Diabetes Father    Heart disease Mother    Hypertension Mother, Father, Brother    Kidney disease Son    No Known Problems Daughter        Tobacco Use    Smoking status: Former Smoker     Quit date: 2002     Years since quittin.3    Smokeless tobacco: Never Used   Substance and Sexual Activity    Alcohol use: No    Drug use: No    Sexual activity: Not Currently     Review of Systems   Constitutional: Negative for appetite change, chills and fever.   HENT: Negative for congestion, rhinorrhea and sore throat.    Eyes: Negative for discharge and itching.   Respiratory: Positive for cough and shortness of breath.    Cardiovascular: Positive for chest pain (mild, chronic).   Gastrointestinal: Negative for abdominal pain, diarrhea, nausea and vomiting.   Genitourinary: Negative for dysuria, hematuria and urgency.   Musculoskeletal:  Negative for arthralgias and myalgias.   Neurological: Negative for dizziness, syncope and light-headedness.   Psychiatric/Behavioral: The patient is nervous/anxious.      Objective:     Vital Signs (Most Recent):  Temp: 97.2 °F (36.2 °C) (12/23/20 0713)  Pulse: 74 (12/23/20 0802)  Resp: 17 (12/23/20 0802)  BP: (!) 94/40 (12/23/20 0802)  SpO2: (!) 92 % (12/23/20 0802) Vital Signs (24h Range):  Temp:  [96.5 °F (35.8 °C)-99.2 °F (37.3 °C)] 97.2 °F (36.2 °C)  Pulse:  [] 74  Resp:  [14-68] 17  SpO2:  [86 %-100 %] 92 %  BP: ()/(38-99) 94/40     Weight: 58.4 kg (128 lb 12 oz)  Body mass index is 23.55 kg/m².    Physical Exam  Vitals signs and nursing note reviewed.   Constitutional:       General: She is not in acute distress.  HENT:      Head: Normocephalic and atraumatic.      Right Ear: External ear normal.      Left Ear: External ear normal.      Nose: Nose normal. No congestion.      Mouth/Throat:      Mouth: Mucous membranes are moist.      Pharynx: Oropharynx is clear.   Eyes:      Conjunctiva/sclera: Conjunctivae normal.      Pupils: Pupils are equal, round, and reactive to light.   Neck:      Musculoskeletal: Neck supple.   Cardiovascular:      Rate and Rhythm: Normal rate and regular rhythm.      Heart sounds: Normal heart sounds. No murmur.   Pulmonary:      Effort: Pulmonary effort is normal. No respiratory distress.      Breath sounds: No wheezing or rales.      Comments: Scattered, coarse rhonchi  Abdominal:      General: Bowel sounds are normal. There is no distension.      Palpations: Abdomen is soft.      Tenderness: There is no abdominal tenderness.   Lymphadenopathy:      Cervical: No cervical adenopathy.   Skin:     General: Skin is warm and dry.      Findings: Bruising (scattered ) present.   Neurological:      General: No focal deficit present.      Mental Status: She is alert and oriented to person, place, and time.   Psychiatric:         Behavior: Behavior normal.      Comments:  Depressed mood and affect           CRANIAL NERVES     CN III, IV, VI   Pupils are equal, round, and reactive to light.       Significant Labs:   ABGs:   Recent Labs   Lab 20  1533   PH 7.480*   PCO2 59*   HCO3 43.90*   POCSATURATED 89.9*   BE 18.00*   TOTALHB 9.6*   COHB 2.4   METHB 1.8*     BMP:   Recent Labs   Lab 20  0345   *      K 4.1   CL 95   CO2 35*   BUN 35*   CREATININE 1.2   CALCIUM 8.3*   MG 1.4*     CBC:   Recent Labs   Lab 20  1505 20  0345   WBC 19.84* 12.76*   HGB 9.8* 7.8*   HCT 31.4* 23.9*    111*     CMP:   Recent Labs   Lab 20  1505 20  0345   * 142   K 3.7 4.1   CL 95 95   CO2 35* 35*   * 362*   BUN 34* 35*   CREATININE 1.2 1.2   CALCIUM 8.9 8.3*   PROT 7.0 5.5*   ALBUMIN 4.2 3.4*   BILITOT 1.2* 0.6   ALKPHOS 89 63   AST 15 9*   ALT 16 13   ANIONGAP 17* 12   EGFRNONAA 42* 42*     Cardiac Markers:   Recent Labs   Lab 20  0345   BNP 1,213*     Magnesium:   Recent Labs   Lab 20  0345   MG 1.4*     Troponin:   Recent Labs   Lab 20  1505 20  1838   TROPONINI 0.041* 0.090*     Urine Studies: No results for input(s): COLORU, APPEARANCEUA, PHUR, SPECGRAV, PROTEINUA, GLUCUA, KETONESU, BILIRUBINUA, OCCULTUA, NITRITE, UROBILINOGEN, LEUKOCYTESUR, RBCUA, WBCUA, BACTERIA, SQUAMEPITHEL, HYALINECASTS in the last 48 hours.    Invalid input(s): WRIGHTSUR    Significant Imagin20 CXR: The cardiac silhouette is within normal limits considering portable technique.  Pacemaker in place.  Atherosclerotic changes of the aorta.  Thickening along the right minor fissure is less prominent.  Minimal left lower lobe discoid atelectasis.  Degenerative changes of the spine.

## 2020-12-23 NOTE — PLAN OF CARE
Patient down to room 312 via bed. Report per STACIA Pollock. Patient awake, alert, oriented. 4 L nasal cannula. Home trilogy in room. Patient denies pain. Tele normal sinus, sinus tach at times. Blood glucose monitoring continued. CIS and PT consulted. Free from fall or injury. Plan of care reviewed with patient.

## 2020-12-23 NOTE — PLAN OF CARE
Ms Herrera is here with COPD exacerbation. She has decided to be a DNR and living will was completed. She is currently under the care of [facility] program and wishes to continue with them at discharge.

## 2020-12-23 NOTE — PROGRESS NOTES
Patient will be transfer to the floor today.  added meds for HR. Dr. Marin replaced K. Patient will have am labs in the morning. H/h a little more elevated with repeat lab work.

## 2020-12-23 NOTE — ED NOTES
Patient placed on continuous cardiac monitor, automatic blood pressure cuff and continuous pulse oximeter. Patient on BIPAP. Two IV 20 G bilateral hands. Saline locked. Multiple contusion bilateral arms. Suction container 250 mL. Bed in lowest position with call bell in reach.

## 2020-12-23 NOTE — PLAN OF CARE
12/23/20 1302   Discharge Assessment   Assessment Type Discharge Planning Assessment   Confirmed/corrected address and phone number on facesheet? Yes   Assessment information obtained from? Medical Record   Prior to hospitilization cognitive status: Alert/Oriented   Prior to hospitalization functional status: Assistive Equipment   Current cognitive status: Alert/Oriented   Current Functional Status: Assistive Equipment   Facility Arrived From: [facility]   Lives With spouse   Able to Return to Prior Arrangements yes   Is patient able to care for self after discharge? Yes   Patient's perception of discharge disposition other (comments)  ([facility] Program with [facility])   Patient currently being followed by outpatient case management? No   Patient currently receives any other outside agency services? Yes   Name and contact number of agency or person providing outside services [facility]   Is it the patient/care giver preference to resume care with the current outside agency? Yes   Equipment Currently Used at Home other (see comments);oxygen  (trilogy)   Do you have any problems affording any of your prescribed medications? No   Does the patient have transportation home? Yes   Transportation Anticipated family or friend will provide   Discharge Plan A Other  (AIM Program)   DME Needed Upon Discharge  none   Patient/Family in Agreement with Plan yes       Discharge assessment completed. Patient currently receiving AIM services through [facility] with the plan to soon transition to hospice care. Patient wishes to resume at discharge. Code status discussed between patient and MD. Patient now a DNR. SW to remain available should any other discharge needs arise.     Daniela Castro, FRANCH[place]KA

## 2020-12-23 NOTE — CONSULTS
SW consulted to clarify patient's code status. Code status clarified by Dr. Marin according to H&P noted today. Patient wishes to be a DNR.  Luciana Castro RN assisting with proper paperwork to reflect patient's wishes. DNR to be scanned into patient's medical record. DNR ordered.     Daniela Castro LMSW

## 2020-12-23 NOTE — PROGRESS NOTES
Patient sitting in bed watching her phone on her home trilogy. Vitals are stable. Should be transfer down to the floor later this afternoon.

## 2020-12-23 NOTE — EICU
"Rounding (Video Assessment):  Yes    Comments: Video assessment completed.  Pt sitting up in bed. States she is feeling "much better". NAD noted at this time.  "

## 2020-12-23 NOTE — PT/OT/SLP EVAL
"Physical Therapy Evaluation    Patient Name:  Chantell Herrera   MRN:  [id number]    Recommendations:     Discharge Recommendations:  home with home health, home health PT   Discharge Equipment Recommendations: none   Barriers to discharge: None    Assessment:     Chantell Herrera is a 85 y.o. female admitted with a medical diagnosis of Acute on chronic respiratory failure with hypoxia.  She presents with the following impairments/functional limitations:  weakness, gait instability, impaired endurance, impaired self care skills, impaired functional mobilty, decreased safety awareness, impaired cardiopulmonary response to activity. Closely monitor SAT at rest with canula oxygen = 93%; Functional mobility= 83%; After 1 minute rest with proper  deep breathing ex = 91%. Noted patient gets panic and hyperventilated upon exertion of activity.  Patient will benefit from skilled PT tx to inc safety, inc endurance and inc independence with mobility, gait functions. And to return to prior level of functions.    Rehab Prognosis: Fair; patient would benefit from acute skilled PT services to address these deficits and reach maximum level of function.    Recent Surgery: * No surgery found *      Plan:     During this hospitalization, patient to be seen 5 x/week to address the identified rehab impairments via gait training, therapeutic activities, therapeutic exercises and progress toward the following goals:    · Plan of Care Expires:  12/29/20    Subjective     Chief Complaint: SOB upon exertion  Patient/Family Comments/goals: "To get better and to return home".  Pain/Comfort:  · Pain Rating 1: 0/10  · Pain Rating Post-Intervention 1: 0/10    Patients cultural, spiritual, [employer]aritan conflicts given the current situation: no    Living Environment:  Patient lives with  in a [facility] with ramp access to enter home.  Prior to admission, patients level of function was Modified Independent with mobility, self care tasks and gait " functions using Rollator at home environment. Uses scooter for long distance ambulation.  Equipment used at home: rollator, power chair, shower chair, oxygen(trilogy).  DME owned (not currently used): none.  Upon discharge, patient will have assistance from .    Objective:     Communicated with patient  prior to session.  Patient found HOB elevated with telemetry, pulse ox (continuous), PureWick, peripheral IV, blood pressure cuff, oxygen(trilogy)  upon PT entry to room.    General Precautions: Standard, fall, respiratory   Orthopedic Precautions:N/A   Braces: N/A     Exams:  · Cognitive Exam:  Patient is oriented to Person, Place, Time and Situation  · Gross Motor Coordination:  WFL  · Skin Integrity/Edema:      · -       Skin integrity: Visible skin intact  · RLE ROM: WFL  · RLE Strength: WFL  · LLE ROM: WFL  · LLE Strength: WFL    Functional Mobility:  · Bed Mobility:     · Rolling Left:  supervision  · Rolling Right: supervision  · Scooting: supervision  · Supine to Sit: supervision  · Sit to Supine: supervision  · Transfers:     · Sit to Stand:  stand by assistance with rolling walker  · Bed to Chair: stand by assistance with  rolling walker  using  Stand Pivot and Step Transfer  · Gait: Handheld Contact Guard assistance x 4 - 5 steps to bedside chair and back to bed.  Noted SOB upon exertion.  · Balance: Standing Static: Fair; Dynamic: Fair-    Therapeutic Activities and Exercises:   Completed PT eval. Initiated out of bed activity with safety measures. Educated patient with proper deep breathing ex with relaxation tech for SOB mgt.    AM-PAC 6 CLICK MOBILITY  Total Score:18     Patient left HOB elevated with all lines intact, call button in reach, nursing  notified and nursing present.    GOALS:   Multidisciplinary Problems     Physical Therapy Goals        Problem: Physical Therapy Goal    Goal Priority Disciplines Outcome Goal Variances Interventions   Physical Therapy Goal     PT, PT/OT       Description: Goals to be met by: 5 visits    Patient will increase functional independence with mobility by performin. Supine to sit with Independent  2. Sit to supine with Independent  3. Bed to chair transfer with Supervision or Set-up Assistancewith or without rolling walker using Step Transfer TECHNIQUE  4. Gait  x ~100  feet with Supervision or Set-up Assistance with or without rolling walker  5. Lower extremity exercise program x10 reps per handout, with assistance as needed                      History:     Past Medical History:   Diagnosis Date    A-fib     Acute on chronic congestive heart failure 2019    Anxiety     Arthritis     Asthma     Atrial fibrillation with rapid ventricular response     CHF (congestive heart failure) 2018    Chronic bronchitis     COPD (chronic obstructive pulmonary disease)     Depression     Diabetes mellitus, type 2     SANTIAGO (dyspnea on exertion)     Emphysema of lung     Fatigue     Hyperlipidemia     Hypertension     Symptomatic anemia 2019    Trouble in sleeping        Past Surgical History:   Procedure Laterality Date    APPENDECTOMY      BRONCHOSCOPY N/A 12/3/2019    Procedure: BRONCHOSCOPY;  Surgeon: Emmanuel Hickman MD;  Location: [facility];  Service: Pulmonary;  Laterality: N/A;    EYE SURGERY      HYSTERECTOMY      INSERTION OF PACEMAKER  2019    OOPHORECTOMY      TONSILLECTOMY         Time Tracking:     PT Received On: 20  PT Start Time: 1435     PT Stop Time: 1500  PT Total Time (min): 25 min     Billable Minutes: Evaluation 20      Theodore Sellers, PT  2020

## 2020-12-23 NOTE — PLAN OF CARE
Problem: Adult Inpatient Plan of Care  Goal: Readiness for Transition of Care  Outcome: Ongoing, Progressing     Problem: Diabetes Comorbidity  Goal: Blood Glucose Level Within Desired Range  Outcome: Ongoing, Progressing     Problem: Skin Injury Risk Increased  Goal: Skin Health and Integrity  Outcome: Ongoing, Progressing       Pt doing well. No question/concerns at this time. Agrees with poc. No pain/falls.  Vital signs pulse ox and temp are stable. Bp was low this am and last night. Since then she has been stable.  Pusle ox ranges from 92-96% on 3l. She normally wears 3.5l at home. When ambulating she does drop. Pt worked with her today and she did well. She does panic when she becomes SOB. Her Appetite is good. Kdur and mg replaced today and bp meds added back on. Accu checks have been 362 and 191. She is sinus arrhythmia on the monitor.

## 2020-12-23 NOTE — PROGRESS NOTES
OPCM encounter  Possible hospice admit  CM will f/u upon d/c    Kirsten Jacques RN  Outpatient Care Management

## 2020-12-23 NOTE — NURSING TRANSFER
Nursing Transfer Note      12/23/2020     Transfer From: icu    Transfer via bed    Transfer with 3l to O2    Transported by Camelia brown    Medicines sent: in folder    Chart send with patient: Yes    Notified: spouse by patient      Upon arrival to floor: cardiac monitor applied, patient oriented to room, call bell in reach and bed in lowest position

## 2020-12-23 NOTE — PROGRESS NOTES
[facility]  Cardiology  Progress Note    Patient Name: Chantell Herrera  MRN: [id number]  Admission Date: 12/22/2020  Hospital Length of Stay: 0 days  Code Status: DNR   Attending Physician: Noe Marin MD   Primary Care Physician: Kari Gaspar MD  Expected Discharge Date:   Principal Problem:Acute on chronic respiratory failure with hypoxia    Subjective:     Hospital Course: Pt is an 84 y/o with a PMH of chronic diastolic and systolic CHF, PAF, hypertensive heart disease, pulmonary hypertension, COPD, CKD, dyslipidemia, anemia who present to ER with complaints of SOB following blood transfusion. She was recently evaluated in ER with similar complaints and presentation. Troponin mildly elevated at .04 though has a chronic troponin elevation, BNP elevated though is typically elevated. ABG's at baseline. CIS asked to evaluate.     Interval History: Pt lying in bed in no acute distress. Currently on 2L NC with stable O2 sats.     ROS     Constitutional : Negative  EENT : Negative  CV : Negative  Respiratory : SOB  Gastrointestinal: Negative   Genitourinary: Negative  Musculoskeletal: Negative  Skin : Negative  Neurological : AAO x 3     Objective:     Vital Signs (Most Recent):  Temp: 97.2 °F (36.2 °C) (12/23/20 0713)  Pulse: 97 (12/23/20 0835)  Resp: 17 (12/23/20 0835)  BP: (!) 115/57 (12/23/20 0835)  SpO2: 96 % (12/23/20 0835) Vital Signs (24h Range):  Temp:  [96.5 °F (35.8 °C)-99.2 °F (37.3 °C)] 97.2 °F (36.2 °C)  Pulse:  [] 97  Resp:  [14-68] 17  SpO2:  [86 %-100 %] 96 %  BP: ()/(38-99) 115/57     Weight: 58.4 kg (128 lb 12 oz)  Body mass index is 23.55 kg/m².    SpO2: 96 %  O2 Device (Oxygen Therapy): nasal cannula      Intake/Output Summary (Last 24 hours) at 12/23/2020 0939  Last data filed at 12/23/2020 0600  Gross per 24 hour   Intake 360 ml   Output 900 ml   Net -540 ml       Lines/Drains/Airways     Peripheral Intravenous Line                 Peripheral IV - Single  Lumen 12/22/20 1825 20 G Left Hand less than 1 day         Peripheral IV - Single Lumen 12/22/20 1825 20 G Right Hand less than 1 day                Physical Exam        General appearance: alert, appears stated age and cooperative  Head: Normocephalic, without obvious abnormality, atraumatic  Eyes: conjunctivae/corneas clear. PERRL  Neck: no carotid bruit, no JVD and supple, symmetrical, trachea midline  Lungs:diminished throughout  Chest Wall: no tenderness  Heart: regular rate and rhythm, S1, S2 normal, no murmur, click, rub or gallop  Abdomen: soft, non-tender; bowel sounds normal; no masses,  no organomegaly  Extremities: Extremities normal, atraumatic, no cyanosis, clubbing, or edema  Pulses: Dorsalis Pedis R: 2+ (normal)/L: 2+ (normal)  Skin: Skin color, texture, turgor normal. No rashes or lesions  Neurologic: Normal mood and affect  Alert and oriented X 3    Significant Labs:   BMP:   Recent Labs   Lab 12/22/20  1505 12/23/20  0345   * 362*   * 142   K 3.7 4.1   CL 95 95   CO2 35* 35*   BUN 34* 35*   CREATININE 1.2 1.2   CALCIUM 8.9 8.3*   MG  --  1.4*   , CMP   Recent Labs   Lab 12/22/20  1505 12/23/20  0345   * 142   K 3.7 4.1   CL 95 95   CO2 35* 35*   * 362*   BUN 34* 35*   CREATININE 1.2 1.2   CALCIUM 8.9 8.3*   PROT 7.0 5.5*   ALBUMIN 4.2 3.4*   BILITOT 1.2* 0.6   ALKPHOS 89 63   AST 15 9*   ALT 16 13   ANIONGAP 17* 12   ESTGFRAFRICA 48* 48*   EGFRNONAA 42* 42*   , CBC   Recent Labs   Lab 12/22/20  1505 12/23/20  0345   WBC 19.84* 12.76*   HGB 9.8* 7.8*   HCT 31.4* 23.9*    111*    and Troponin   Recent Labs   Lab 12/22/20  1505 12/22/20  1838   TROPONINI 0.041* 0.090*       Significant Imaging: X-Ray: CXR: X-Ray Chest 1 View (CXR): No results found for this visit on 12/22/20. and X-Ray Chest PA and Lateral (CXR): No results found for this visit on 12/22/20.  Assessment and Plan:       Active Diagnoses:    Diagnosis Date Noted POA    PRINCIPAL PROBLEM:  Acute on  chronic respiratory failure with hypoxia [J96.21] 01/22/2019 Yes    Shortness of breath [R06.02] 12/22/2020 Yes    Long term current use of systemic steroids [Z79.52] 07/04/2020 Not Applicable     Chronic    Anemia [D64.9] 09/01/2019 Yes    Acute on chronic congestive heart failure [I50.9] 02/01/2019 Yes    Chronic atrial fibrillation [I48.20] 11/16/2018 Yes    Oxygen dependent [Z99.81] 01/03/2017 Not Applicable    Major depressive disorder with single episode, in full remission [F32.5] 05/10/2016 Yes    CKD (chronic kidney disease), stage III [N18.30] 05/10/2016 Yes    Anxiety state [F41.1] 08/10/2015 Yes    COPD (chronic obstructive pulmonary disease) [J44.9] 05/03/2015 Yes    Hyperlipidemia [E78.5] 05/03/2015 Yes    Cardiovascular disease [I25.10] 06/19/2014 Yes      Problems Resolved During this Admission:       VTE Risk Mitigation (From admission, onward)         Ordered     IP VTE HIGH RISK PATIENT  Once      12/22/20 2007     Place sequential compression device  Until discontinued      12/22/20 2007              Current Facility-Administered Medications   Medication    acetaminophen tablet 650 mg    albuterol-ipratropium 2.5 mg-0.5 mg/3 mL nebulizer solution 3 mL    aspirin EC tablet 81 mg    citalopram tablet 20 mg    dextrose 50% injection 12.5 g    dextrose 50% injection 25 g    doxycycline tablet 100 mg    folic acid tablet 2,000 mcg    glucagon (human recombinant) injection 1 mg    glucose chewable tablet 16 g    glucose chewable tablet 24 g    HYDROcodone-acetaminophen 5-325 mg per tablet 1 tablet    insulin aspart U-100 pen 0-5 Units    insulin detemir U-100 pen 10 Units    melatonin tablet 6 mg    ondansetron injection 4 mg    pravastatin tablet 40 mg    predniSONE tablet 20 mg    promethazine (PHENERGAN) 12.5 mg in dextrose 5 % 50 mL IVPB    rOPINIRole tablet 1 mg    sodium chloride 0.9% flush 10 mL    traZODone tablet 50 mg        Frail DNR with chronic dyspnea  severe COPD.   History of recurrent anemia requiring blood transfusion.    No PE by CT January 2019.  Hypertension controlled.   Chronic kidney disease May 2020.  Paroxysmal A. fib status post pacemaker December 2019     Echo 8/2020 Left ventricular ejection fraction 65%, PA pressure 72 severe pulmonary hypertension.,      stage III chronic diastolic heart failure.   Mild anterior attenuation on stress perfusion December 2018  Mild bilateral carotid disease December 2015  Troponin chronically elevated, no chest pain, pt is a poor candidate for [facility]     PLAN: Pt resting comfortable on 2L NC with stable O2 sats. She is euvolemic on exam, no further diuresis needed as BP and Mg low overnight. Pt will likely be d/c'd with AIM    Yaima Owens NP  Cardiology  [facility]    I attest that I have personally seen and examined this patient. I have reviewed and discussed the management in detail as outlined above.

## 2020-12-23 NOTE — H&P
[facility] Hospital Medicine  History & Physical    Patient Name: Chantell Herrera  MRN: [id number]  Patient Class: OP- Observation  Admission Date: 12/22/2020  Attending Physician: Noe Marin MD   Primary Care Provider: Kari Gaspar MD         Patient information was obtained from patient and ER records.     Subjective:     Principal Problem:Acute on chronic respiratory failure with hypoxia    Chief Complaint:   Chief Complaint   Patient presents with    Shortness of Breath        HPI: 84yo F with severe COPD, atrial fib (off anticoag.), CHF, DM II,  and symptomatic anemia who presented to ED following an outpatient blood transfusion for dyspnea. The patient is chronically on home oxygen, but was requiring BiPAP to maintain her O2 sats in ED. She reports she was in her usual state of health prior, although she has had multiple admissions this year related to her COPD and CHF. Her only other complaint is frequent sneezing. No fever/chills.     Per chart review, last clinic visit with PCP, pt discussed and agreed to DNR/DNI status and AIM was initiated. I discussed this with patient and she agrees she is DNR/DNI.    Past Medical History:   Diagnosis Date    A-fib     Acute on chronic congestive heart failure 2/1/2019    Anxiety     Arthritis     Asthma     Atrial fibrillation with rapid ventricular response     CHF (congestive heart failure) 11/29/2018    Chronic bronchitis     COPD (chronic obstructive pulmonary disease)     Depression     Diabetes mellitus, type 2     SANTIAGO (dyspnea on exertion)     Emphysema of lung     Fatigue     Hyperlipidemia     Hypertension     Symptomatic anemia 1/22/2019    Trouble in sleeping        Past Surgical History:   Procedure Laterality Date    APPENDECTOMY      BRONCHOSCOPY N/A 12/3/2019    Procedure: BRONCHOSCOPY;  Surgeon: Emmanuel Hickman MD;  Location: [facility] OR;  Service: Pulmonary;  Laterality: N/A;    EYE SURGERY       HYSTERECTOMY      INSERTION OF PACEMAKER  12/26/2019    OOPHORECTOMY      TONSILLECTOMY         Review of patient's allergies indicates:  No Known Allergies    No current facility-administered medications on file prior to encounter.      Current Outpatient Medications on File Prior to Encounter   Medication Sig    albuterol (PROVENTIL) 2.5 mg /3 mL (0.083 %) nebulizer solution Take 3 mLs (2.5 mg total) by nebulization every 6 (six) hours as needed for Wheezing.    aspirin (ECOTRIN) 81 MG EC tablet Take 1 tablet (81 mg total) by mouth once daily.    baclofen (LIORESAL) 10 MG tablet Take 10 mg by mouth 3 (three) times daily.    benzonatate (TESSALON) 100 MG capsule Take 100 mg by mouth 3 (three) times daily as needed for Cough.    blood sugar diagnostic Strp Test blood sugars three times a day DX E11.9  DM 2    cholecalciferol, vitamin D3, (VITAMIN D3) 2,000 unit Tab Take 2,000 Units by mouth once daily.    citalopram (CELEXA) 20 MG tablet TAKE 1 TABLET EVERY DAY    doxycycline (VIBRA-TABS) 100 MG tablet Take 1 tablet (100 mg total) by mouth 2 (two) times daily. for 10 days    folic acid (FOLVITE) 1 MG tablet Take 2,000 mcg by mouth once daily.    insulin detemir U-100 (LEVEMIR FLEXTOUCH) 100 unit/mL (3 mL) SubQ InPn pen Inject 10 Units into the skin every evening.    ipratropium (ATROVENT) 0.02 % nebulizer solution Take 500 mcg by nebulization 4 (four) times daily.     losartan (COZAAR) 50 MG tablet Take 0.5 tablets (25 mg total) by mouth once daily.    meclizine (ANTIVERT) 25 mg tablet 2 (two) times daily as needed for Dizziness.     metoprolol succinate (TOPROL-XL) 50 MG 24 hr tablet Take 50 mg by mouth 2 (two) times daily.     pravastatin (PRAVACHOL) 40 MG tablet Take 40 mg by mouth once daily.    predniSONE (DELTASONE) 20 MG tablet Take 20mg x 10 days then go back to 10mg daily    rOPINIRole (REQUIP) 1 MG tablet TAKE 1 TABLET  EVERY EVENING.    tiotropium (SPIRIVA) 18 mcg inhalation capsule  Inhale 1 capsule (18 mcg total) into the lungs once daily. Controller    torsemide (DEMADEX) 20 MG Tab Take 1 tablet (20 mg total) by mouth once daily.    traZODone (DESYREL) 50 MG tablet TAKE 1 TABLET EVERY EVENING     Family History     Problem Relation (Age of Onset)    COPD Brother, Daughter    Cancer Sister    Diabetes Father    Heart disease Mother    Hypertension Mother, Father, Brother    Kidney disease Son    No Known Problems Daughter        Tobacco Use    Smoking status: Former Smoker     Quit date: 2002     Years since quittin.3    Smokeless tobacco: Never Used   Substance and Sexual Activity    Alcohol use: No    Drug use: No    Sexual activity: Not Currently     Review of Systems   Constitutional: Negative for appetite change, chills and fever.   HENT: Negative for congestion, rhinorrhea and sore throat.    Eyes: Negative for discharge and itching.   Respiratory: Positive for cough and shortness of breath.    Cardiovascular: Positive for chest pain (mild, chronic).   Gastrointestinal: Negative for abdominal pain, diarrhea, nausea and vomiting.   Genitourinary: Negative for dysuria, hematuria and urgency.   Musculoskeletal: Negative for arthralgias and myalgias.   Neurological: Negative for dizziness, syncope and light-headedness.   Psychiatric/Behavioral: The patient is nervous/anxious.      Objective:     Vital Signs (Most Recent):  Temp: 97.2 °F (36.2 °C) (20 0713)  Pulse: 74 (20 0802)  Resp: 17 (20 08)  BP: (!) 94/40 (20 0802)  SpO2: (!) 92 % (20 08) Vital Signs (24h Range):  Temp:  [96.5 °F (35.8 °C)-99.2 °F (37.3 °C)] 97.2 °F (36.2 °C)  Pulse:  [] 74  Resp:  [14-68] 17  SpO2:  [86 %-100 %] 92 %  BP: ()/(38-99) 94/40     Weight: 58.4 kg (128 lb 12 oz)  Body mass index is 23.55 kg/m².    Physical Exam  Vitals signs and nursing note reviewed.   Constitutional:       General: She is not in acute distress.  HENT:      Head: Normocephalic and  atraumatic.      Right Ear: External ear normal.      Left Ear: External ear normal.      Nose: Nose normal. No congestion.      Mouth/Throat:      Mouth: Mucous membranes are moist.      Pharynx: Oropharynx is clear.   Eyes:      Conjunctiva/sclera: Conjunctivae normal.      Pupils: Pupils are equal, round, and reactive to light.   Neck:      Musculoskeletal: Neck supple.   Cardiovascular:      Rate and Rhythm: Normal rate and regular rhythm.      Heart sounds: Normal heart sounds. No murmur.   Pulmonary:      Effort: Pulmonary effort is normal. No respiratory distress.      Breath sounds: No wheezing or rales.      Comments: Scattered, coarse rhonchi  Abdominal:      General: Bowel sounds are normal. There is no distension.      Palpations: Abdomen is soft.      Tenderness: There is no abdominal tenderness.   Lymphadenopathy:      Cervical: No cervical adenopathy.   Skin:     General: Skin is warm and dry.      Findings: Bruising (scattered ) present.   Neurological:      General: No focal deficit present.      Mental Status: She is alert and oriented to person, place, and time.   Psychiatric:         Behavior: Behavior normal.      Comments: Depressed mood and affect           CRANIAL NERVES     CN III, IV, VI   Pupils are equal, round, and reactive to light.       Significant Labs:   ABGs:   Recent Labs   Lab 12/22/20  1533   PH 7.480*   PCO2 59*   HCO3 43.90*   POCSATURATED 89.9*   BE 18.00*   TOTALHB 9.6*   COHB 2.4   METHB 1.8*     BMP:   Recent Labs   Lab 12/23/20  0345   *      K 4.1   CL 95   CO2 35*   BUN 35*   CREATININE 1.2   CALCIUM 8.3*   MG 1.4*     CBC:   Recent Labs   Lab 12/22/20  1505 12/23/20  0345   WBC 19.84* 12.76*   HGB 9.8* 7.8*   HCT 31.4* 23.9*    111*     CMP:   Recent Labs   Lab 12/22/20  1505 12/23/20  0345   * 142   K 3.7 4.1   CL 95 95   CO2 35* 35*   * 362*   BUN 34* 35*   CREATININE 1.2 1.2   CALCIUM 8.9 8.3*   PROT 7.0 5.5*   ALBUMIN 4.2 3.4*    BILITOT 1.2* 0.6   ALKPHOS 89 63   AST 15 9*   ALT 16 13   ANIONGAP 17* 12   EGFRNONAA 42* 42*     Cardiac Markers:   Recent Labs   Lab 20  0345   BNP 1,213*     Magnesium:   Recent Labs   Lab 20  0345   MG 1.4*     Troponin:   Recent Labs   Lab 20  1505 20  1838   TROPONINI 0.041* 0.090*     Urine Studies: No results for input(s): COLORU, APPEARANCEUA, PHUR, SPECGRAV, PROTEINUA, GLUCUA, KETONESU, BILIRUBINUA, OCCULTUA, NITRITE, UROBILINOGEN, LEUKOCYTESUR, RBCUA, WBCUA, BACTERIA, SQUAMEPITHEL, HYALINECASTS in the last 48 hours.    Invalid input(s): WRIGHTSUR    Significant Imagin20 CXR: The cardiac silhouette is within normal limits considering portable technique.  Pacemaker in place.  Atherosclerotic changes of the aorta.  Thickening along the right minor fissure is less prominent.  Minimal left lower lobe discoid atelectasis.  Degenerative changes of the spine.    Assessment/Plan:     * Acute on chronic respiratory failure with hypoxia    Pt requiring BiPAP/Trelegy overnight. Currently she is on 3L NC  Anticipate transfer to floor vs DC home later today    Shortness of breath        Long term current use of systemic steroids  Cont home prednisone dose of 20mg daily      Anemia  S/p 1u pRBCs 20      Acute on chronic congestive heart failure  S/p Lasix 40mg IV x2   Will resume home bumex once hypotension resolves      Chronic atrial fibrillation    On aspirin, no anticoagulation    Oxygen dependent    On 3.5L at home    Major depressive disorder with single episode, in full remission  Cont citalopram      Anxiety state  Cont citalopram      Hyperlipidemia  Cont statin      COPD (chronic obstructive pulmonary disease)    Cont prednisone  Duonebs    VTE Risk Mitigation (From admission, onward)         Ordered     IP VTE HIGH RISK PATIENT  Once      20     Place sequential compression device  Until discontinued      20              Critical care time  spent on the evaluation and treatment of severe organ dysfunction, review of pertinent labs and imaging studies, discussions with consulting providers and discussions with patient/family: 40 minutes.     Noe Marin MD  Department of Hospital Medicine   [facility]

## 2020-12-23 NOTE — ASSESSMENT & PLAN NOTE
Pt requiring BiPAP/Trelegy overnight. Currently she is on 3L NC  Anticipate transfer to floor vs DC home later today

## 2020-12-23 NOTE — PLAN OF CARE
No falls or near misses noted on shift, No new skin issues or wounds, SCDs for DVT prevention, No pain complaints, personal preferences provided as requested for food/drinks as diet orders allow, plan of care discussed with patient at bedside, verbalized understanding, evidence of learning noted, saline locked, plan is to maintain ICU status.

## 2020-12-24 NOTE — ASSESSMENT & PLAN NOTE
Pt requiring BiPAP/Trelegy overnight. Currently she is on 3L NC  Anticipate transfer to floor vs DC home later today  D/c today cont trilogy at home

## 2020-12-24 NOTE — PLAN OF CARE
12/24/20 1206   Post-Acute Status   Post-Acute Authorization Home Health   Home Health Status Set-up Complete   Discharge Delays None known at this time       Patient accepted by [facility] to return to their services.     Daniela Castro LMSW

## 2020-12-24 NOTE — PT/OT/SLP PROGRESS
"Physical Therapy Treatment    Patient Name:  Chantell Herrera   MRN:  [id number]    Recommendations:     Discharge Recommendations:  home with home health, home health PT   Discharge Equipment Recommendations: none   Barriers to discharge: None    Assessment:     Chantell Herrera is a 85 y.o. female admitted with a medical diagnosis of Acute on chronic respiratory failure with hypoxia.  She presents with the following impairments/functional limitations:  weakness, impaired endurance, impaired functional mobilty, gait instability, impaired self care skills, impaired cardiopulmonary response to activity, decreased safety awareness. Pt doing well this morning. Denies any pain or discomfort. Pt eager to complete bed mobility, transfers and ambulation with pt able to complete task without difficulty. Pt on 3 L of oxygen via NC throughout mobility task with patient able to ambulate x 100 ft x 2 rounds with sitting rest breaks in between rounds. Pt was steady and without difficulty or SOB during activity. Pt rated exertion to task as "just right" with pt presenting with No SOB after gait activity.  MD present at end of PT session with MD discussing pt's possible discharge home today. .    Rehab Prognosis: Fair; patient would benefit from acute skilled PT services to address these deficits and reach maximum level of function.    Recent Surgery: * No surgery found *      Plan:     During this hospitalization, patient to be seen 5 x/week to address the identified rehab impairments via gait training, therapeutic activities, therapeutic exercises and progress toward the following goals:    · Plan of Care Expires:  12/29/20    Subjective     Chief Complaint: no complaints  Patient/Family Comments/goals: to go home  Pain/Comfort:  · Pain Rating 1: 0/10  · Pain Rating Post-Intervention 1: 0/10      Objective:     Communicated with patient and nursing prior to session.  Patient found supine with oxygen, PureWick upon PT entry to room. "     General Precautions: Standard, fall   Orthopedic Precautions:N/A   Braces: N/A     Functional Mobility:  · Bed Mobility:     · Rolling Left:  independence  · Rolling Right: independence  · Supine to Sit: independence  · Sit to Supine: independence  · Transfers:     · Sit to Stand:  independence with rolling walker  · Gait: Pt able to ambulate x 100 ft with supplemental oxygen x 2 rounds with modified independence.       AM-PAC 6 CLICK MOBILITY  Turning over in bed (including adjusting bedclothes, sheets and blankets)?: 4  Sitting down on and standing up from a chair with arms (e.g., wheelchair, bedside commode, etc.): 4  Moving from lying on back to sitting on the side of the bed?: 4  Moving to and from a bed to a chair (including a wheelchair)?: 4  Need to walk in hospital room?: 4  Climbing 3-5 steps with a railing?: 3  Basic Mobility Total Score: 23       Therapeutic Activities and Exercises:   Pt able to tolerate OOB activity, sitting at EOB and ambulation with RW without difficulty.     Patient left supine with all lines intact, call button in reach and nursing notified..    GOALS:   Multidisciplinary Problems     Physical Therapy Goals     Not on file          Multidisciplinary Problems (Resolved)        Problem: Physical Therapy Goal    Goal Priority Disciplines Outcome Goal Variances Interventions   Physical Therapy Goal   (Resolved)     PT, PT/OT Met     Description: Goals to be met by: 5 visits    Patient will increase functional independence with mobility by performin. Supine to sit with Independent  2. Sit to supine with Independent  3. Bed to chair transfer with Supervision or Set-up Assistancewith or without rolling walker using Step Transfer TECHNIQUE  4. Gait  x ~100  feet with Supervision or Set-up Assistance with or without rolling walker  5. Lower extremity exercise program x10 reps per handout, with assistance as needed                      Time Tracking:     PT Received On:  12/24/20  PT Start Time: 0935     PT Stop Time: 0955  PT Total Time (min): 20 min     Billable Minutes: Therapeutic Activity 15    Treatment Type: Treatment  PT/PTA: PT           Emmanuel Mejía, PT  12/24/2020

## 2020-12-24 NOTE — PT/OT/SLP DISCHARGE
Physical Therapy Discharge Summary    Name: Chantell Herrera  MRN: [id number]   Principal Problem: Acute on chronic respiratory failure with hypoxia     Patient Discharged from acute Physical Therapy on 20.  Please refer to prior PT noted date on 20 for functional status.     Assessment:     Patient appropriate for care in another setting.    Objective:     GOALS:   Multidisciplinary Problems     Physical Therapy Goals     Not on file          Multidisciplinary Problems (Resolved)        Problem: Physical Therapy Goal    Goal Priority Disciplines Outcome Goal Variances Interventions   Physical Therapy Goal   (Resolved)     PT, PT/OT Met     Description: Goals to be met by: 5 visits    Patient will increase functional independence with mobility by performin. Supine to sit with Independent  2. Sit to supine with Independent  3. Bed to chair transfer with Supervision or Set-up Assistancewith or without rolling walker using Step Transfer TECHNIQUE  4. Gait  x ~100  feet with Supervision or Set-up Assistance with or without rolling walker  5. Lower extremity exercise program x10 reps per handout, with assistance as needed                      Reasons for Discontinuation of Therapy Services  Satisfactory goal achievement.      Plan:     Patient Discharged to: Home with Home Health Service.    Emmanuel Mejía, PT  2020

## 2020-12-24 NOTE — PLAN OF CARE
12/24/20 1029   Post-Acute Status   Post-Acute Authorization Home Health   Home Health Status Referrals Sent   Discharge Delays None known at this time       Patient current with [facility] prior to this admission. Wishes to resume at discharge. Orders received. RUBEN faxed patient referral to [facility] via [employer] ([employer] Awaiting their response.     Daniela Castro LMSW

## 2020-12-24 NOTE — DISCHARGE SUMMARY
[facility] Hospital Medicine  Discharge Summary      Patient Name: Chantell Herrera  MRN: [id number]  Patient Class: OP- Observation  Admission Date: 12/22/2020  Hospital Length of Stay: 0 days  Discharge Date and Time:  12/24/2020 10:09 AM  Attending Physician: Noe Marin MD   Discharging Provider: Carrol Funes NP  Primary Care Provider: Kari Gaspar MD      HPI:   86yo F with severe COPD, atrial fib (off anticoag.), CHF, DM II,  and symptomatic anemia who presented to ED following an outpatient blood transfusion for dyspnea. The patient is chronically on home oxygen, but was requiring BiPAP to maintain her O2 sats in ED. She reports she was in her usual state of health prior, although she has had multiple admissions this year related to her COPD and CHF. Her only other complaint is frequent sneezing. No fever/chills.     Per chart review, last clinic visit with PCP, pt discussed and agreed to DNR/DNI status and AIM was initiated. I discussed this with patient and she agrees she is DNR/DNI.    * No surgery found *      Hospital Course:   12/23 Per nursing no acute events overnight. Pt used trelegy overnight. This morning she states she is essentially back to baseline, although she is feeling week. Afebrile. Mild hypotension. DC'd lasix. Repleting magnesium.     12/24 pt did well overnight on home dose O2 3-4L NC and bipap. Has trilogy at home. bipap here. WBC significantlty improved [id number]    Chronic anemia. Was getting blood prior to admission when she got SOB. H/h 7.8/24     Consults:   Consults (From admission, onward)        Status Ordering Provider     Inpatient consult to Cardiology-CIS  Once     Provider:  (Not yet assigned)    Acknowledged NOE MARIN     Inpatient consult to Social Work/Case Management  Once     Provider:  (Not yet assigned)    Completed NOE MARIN          * Acute on chronic respiratory failure with hypoxia    Pt requiring BiPAP/Trelegy  overnight. Currently she is on 3L NC  Anticipate transfer to floor vs DC home later today  D/c today cont trilogy at home    Shortness of breath  Back to baseline       Long term current use of systemic steroids  Cont home prednisone dose of 20mg daily      Anemia  S/p 1u pRBCs 12/22/20  Stable     Acute on chronic congestive heart failure  S/p Lasix 40mg IV x2 12/22  Will resume home bumex once hypotension resolves  Resume on d/c     Chronic atrial fibrillation    On aspirin, no anticoagulation    Oxygen dependent    On 3.5L at home, stable on home dose    CKD (chronic kidney disease), stage III    Stable creat 1.3    Major depressive disorder with single episode, in full remission  Cont citalopram      Anxiety state  Cont citalopram      Hyperlipidemia  Cont statin      COPD (chronic obstructive pulmonary disease)  Cont prednisone  Duonebs    Cardiovascular disease          Final Active Diagnoses:    Diagnosis Date Noted POA    PRINCIPAL PROBLEM:  Acute on chronic respiratory failure with hypoxia [J96.21] 01/22/2019 Yes    Shortness of breath [R06.02] 12/22/2020 Yes    Long term current use of systemic steroids [Z79.52] 07/04/2020 Not Applicable     Chronic    Anemia [D64.9] 09/01/2019 Yes    Acute on chronic congestive heart failure [I50.9] 02/01/2019 Yes    Chronic atrial fibrillation [I48.20] 11/16/2018 Yes    Oxygen dependent [Z99.81] 01/03/2017 Not Applicable    Major depressive disorder with single episode, in full remission [F32.5] 05/10/2016 Yes    CKD (chronic kidney disease), stage III [N18.30] 05/10/2016 Yes    Anxiety state [F41.1] 08/10/2015 Yes    COPD (chronic obstructive pulmonary disease) [J44.9] 05/03/2015 Yes    Hyperlipidemia [E78.5] 05/03/2015 Yes    Cardiovascular disease [I25.10] 06/19/2014 Yes      Problems Resolved During this Admission:       Discharged Condition: good    Disposition: Home or Self Care    Follow Up:  Follow-up Information     Kari Gaspar MD In 1 week.     Specialty: Family Medicine  Contact information:  [address]  [place] [address]  [phone]             Mao Metcalf MD In 1 week.    Specialty: Cardiology  Contact information:  [address] [place] [address]  [phone]                 Patient Instructions:      Ambulatory referral/consult to Home Health   Referral Priority: Routine Referral Type: Home Health   Referral Reason: Specialty Services Required   Requested Specialty: Home Health Services   Number of Visits Requested: 1       Significant Diagnostic Studies: =       Significant Labs:   ABGs:       Recent Labs   Lab 20  1533   PH 7.480*   PCO2 59*   HCO3 43.90*   POCSATURATED 89.9*   BE 18.00*   TOTALHB 9.6*   COHB 2.4   METHB 1.8*      BMP:       Recent Labs   Lab 20  0551   *      K 4.3   CL 98   CO2 34*   BUN 41*   CREATININE 1.3   CALCIUM 8.4*   MG 1.9  1.9      CBC:         Recent Labs   Lab 20  0345 20  1210 20  0551   WBC 12.76* 13.75* 10.78   HGB 7.8* 8.1* 7.8*   HCT 23.9* 25.9* 24.3*   * 115* 108*      CMP:          Recent Labs   Lab 20  1505 20  0345 20  1210 20  0551   * 142 143 142   K 3.7 4.1 2.9* 4.3   CL 95 95 94* 98   CO2 35* 35* 34* 34*   * 362* 194* 114*   BUN 34* 35* 38* 41*   CREATININE 1.2 1.2 1.1 1.3   CALCIUM 8.9 8.3* 8.7 8.4*   PROT 7.0 5.5*  --  5.4*   ALBUMIN 4.2 3.4*  --  3.3*   BILITOT 1.2* 0.6  --  0.5   ALKPHOS 89 63  --  61   AST 15 9*  --  7*   ALT 16 13  --  12   ANIONGAP 17* 12 15 10   EGFRNONAA 42* 42* 47* 38*      Cardiac Markers:       Recent Labs   Lab 20  0345   BNP 1,213*      Magnesium:         Recent Labs   Lab 20  0345 20  1210 20  0551   MG 1.4* 1.9 1.9  1.9      Troponin:        Recent Labs   Lab 20  1505 20  1838   TROPONINI 0.041* 0.090*         Significant Imagin/23/20 CXR: The cardiac silhouette is within normal limits considering portable technique.   Pacemaker in place.  Atherosclerotic changes of the aorta.  Thickening along the right minor fissure is less prominent.  Minimal left lower lobe discoid atelectasis.  Degenerative changes of the spine.     12/23 EKG Normal sinus rhythm with sinus arrhythmia   Left axis deviation     Incomplete right bundle branch block   Possible Septal infarct (cited on or before 03-DEC-2020)   Abnormal ECG   When compared with ECG of 22-DEC-2020 14:50,   Vent. rate has decreased BY  43 BPM   Incomplete right bundle branch block is now Present   Confirmed by DAXA WATKINS MD ([id number]) on 12/23/2020 5:24:49 PM             Pending Diagnostic Studies:     None         Medications:  Reconciled Home Medications:      Medication List      START taking these medications    digoxin 125 mcg tablet  Commonly known as: LANOXIN  Take 1 tablet (0.125 mg total) by mouth once daily.  Start taking on: December 25, 2020        CHANGE how you take these medications    losartan 50 MG tablet  Commonly known as: COZAAR  Take 1 tablet (50 mg total) by mouth once daily.  What changed: how much to take        CONTINUE taking these medications    albuterol 2.5 mg /3 mL (0.083 %) nebulizer solution  Commonly known as: PROVENTIL  Take 3 mLs (2.5 mg total) by nebulization every 6 (six) hours as needed for Wheezing.     aspirin 81 MG EC tablet  Commonly known as: ECOTRIN  Take 1 tablet (81 mg total) by mouth once daily.     baclofen 10 MG tablet  Commonly known as: LIORESAL  Take 10 mg by mouth 3 (three) times daily.     benzonatate 100 MG capsule  Commonly known as: TESSALON  Take 100 mg by mouth 3 (three) times daily as needed for Cough.     blood sugar diagnostic Strp  Test blood sugars three times a day DX E11.9  DM 2     citalopram 20 MG tablet  Commonly known as: CELEXA  TAKE 1 TABLET EVERY DAY     doxycycline 100 MG tablet  Commonly known as: VIBRA-TABS  Take 1 tablet (100 mg total) by mouth 2 (two) times daily. for 10 days     folic acid 1 MG tablet  Commonly  known as: FOLVITE  Take 2,000 mcg by mouth once daily.     insulin detemir U-100 100 unit/mL (3 mL) Inpn pen  Commonly known as: LEVEMIR FLEXTOUCH  Inject 10 Units into the skin every evening.     ipratropium 0.02 % nebulizer solution  Commonly known as: ATROVENT  Take 500 mcg by nebulization 4 (four) times daily.     meclizine 25 mg tablet  Commonly known as: ANTIVERT  2 (two) times daily as needed for Dizziness.     metoprolol succinate 50 MG 24 hr tablet  Commonly known as: TOPROL-XL  Take 50 mg by mouth 2 (two) times daily.     pravastatin 40 MG tablet  Commonly known as: PRAVACHOL  Take 40 mg by mouth once daily.     predniSONE 20 MG tablet  Commonly known as: DELTASONE  Take 20mg x 10 days then go back to 10mg daily     rOPINIRole 1 MG tablet  Commonly known as: REQUIP  TAKE 1 TABLET  EVERY EVENING.     SPIRIVA WITH HANDIHALER 18 mcg inhalation capsule  Generic drug: tiotropium  Inhale 1 capsule (18 mcg total) into the lungs once daily. Controller     torsemide 20 MG Tab  Commonly known as: DEMADEX  Take 1 tablet (20 mg total) by mouth once daily.     traZODone 50 MG tablet  Commonly known as: DESYREL  TAKE 1 TABLET EVERY EVENING     VITAMIN D3 50 mcg (2,000 unit) Tab  Generic drug: cholecalciferol (vitamin D3)  Take 2,000 Units by mouth once daily.            Indwelling Lines/Drains at time of discharge:   Lines/Drains/Airways     Drain            Female External Urinary Catheter 12/23/20 0000 1 day                Time spent on the discharge of patient: 20 minutes  Patient was seen and examined on the date of discharge and determined to be suitable for discharge.         Carrol Funes NP  Department of Hospital Medicine  [facility]

## 2020-12-24 NOTE — SUBJECTIVE & OBJECTIVE
Review of Systems   Constitutional: Negative for appetite change, chills and fever.   HENT: Negative for congestion, rhinorrhea and sore throat.    Eyes: Negative for discharge and itching.   Respiratory: Positive for cough and shortness of breath.    Cardiovascular: Negative for chest pain (mild, chronic).   Gastrointestinal: Negative for abdominal pain, diarrhea, nausea and vomiting.   Genitourinary: Negative for dysuria, hematuria and urgency.   Musculoskeletal: Negative for arthralgias and myalgias.   Neurological: Negative for dizziness, syncope and light-headedness.   Psychiatric/Behavioral: The patient is not nervous/anxious.      Objective:     Vital Signs (Most Recent):  Temp: 96.4 °F (35.8 °C) (12/24/20 0725)  Pulse: 65 (12/24/20 0800)  Resp: 18 (12/24/20 0730)  BP: 128/64 (12/24/20 0725)  SpO2: 97 % (12/24/20 0730) Vital Signs (24h Range):  Temp:  [96.4 °F (35.8 °C)-99.2 °F (37.3 °C)] 96.4 °F (35.8 °C)  Pulse:  [] 65  Resp:  [16-22] 18  SpO2:  [90 %-100 %] 97 %  BP: ()/(44-64) 128/64     Weight: 58.4 kg (128 lb 12 oz)  Body mass index is 23.55 kg/m².    Physical Exam  Vitals signs and nursing note reviewed.   Constitutional:       General: She is not in acute distress.  HENT:      Head: Normocephalic and atraumatic.      Right Ear: External ear normal.      Left Ear: External ear normal.      Nose: Nose normal. No congestion.      Mouth/Throat:      Mouth: Mucous membranes are moist.      Pharynx: Oropharynx is clear.   Eyes:      Conjunctiva/sclera: Conjunctivae normal.      Pupils: Pupils are equal, round, and reactive to light.   Neck:      Musculoskeletal: Neck supple.   Cardiovascular:      Rate and Rhythm: Normal rate and regular rhythm.      Heart sounds: Normal heart sounds. No murmur.   Pulmonary:      Effort: Pulmonary effort is normal. No respiratory distress.      Breath sounds: No wheezing or rales.      Comments: Scattered, coarse rhonchi  Abdominal:      General: Bowel sounds  are normal. There is no distension.      Palpations: Abdomen is soft.      Tenderness: There is no abdominal tenderness.   Lymphadenopathy:      Cervical: No cervical adenopathy.   Skin:     General: Skin is warm and dry.      Findings: Bruising (scattered ) present.   Neurological:      General: No focal deficit present.      Mental Status: She is alert and oriented to person, place, and time.   Psychiatric:         Behavior: Behavior normal.      Comments: Depressed mood and affect           CRANIAL NERVES     CN III, IV, VI   Pupils are equal, round, and reactive to light.       Significant Labs:   ABGs:   Recent Labs   Lab 20  1533   PH 7.480*   PCO2 59*   HCO3 43.90*   POCSATURATED 89.9*   BE 18.00*   TOTALHB 9.6*   COHB 2.4   METHB 1.8*     BMP:   Recent Labs   Lab 20  0551   *      K 4.3   CL 98   CO2 34*   BUN 41*   CREATININE 1.3   CALCIUM 8.4*   MG 1.9  1.9     CBC:   Recent Labs   Lab 20  0345 20  1210 20  0551   WBC 12.76* 13.75* 10.78   HGB 7.8* 8.1* 7.8*   HCT 23.9* 25.9* 24.3*   * 115* 108*     CMP:   Recent Labs   Lab 20  1505 20  0345 20  1210 20  0551   * 142 143 142   K 3.7 4.1 2.9* 4.3   CL 95 95 94* 98   CO2 35* 35* 34* 34*   * 362* 194* 114*   BUN 34* 35* 38* 41*   CREATININE 1.2 1.2 1.1 1.3   CALCIUM 8.9 8.3* 8.7 8.4*   PROT 7.0 5.5*  --  5.4*   ALBUMIN 4.2 3.4*  --  3.3*   BILITOT 1.2* 0.6  --  0.5   ALKPHOS 89 63  --  61   AST 15 9*  --  7*   ALT 16 13  --  12   ANIONGAP 17* 12 15 10   EGFRNONAA 42* 42* 47* 38*     Cardiac Markers:   Recent Labs   Lab 20  0345   BNP 1,213*     Magnesium:   Recent Labs   Lab 20  0345 20  1210 20  0551   MG 1.4* 1.9 1.9  1.9     Troponin:   Recent Labs   Lab 20  1505 20  1838   TROPONINI 0.041* 0.090*       Significant Imagin/23/20 CXR: The cardiac silhouette is within normal limits considering portable technique.  Pacemaker in  place.  Atherosclerotic changes of the aorta.  Thickening along the right minor fissure is less prominent.  Minimal left lower lobe discoid atelectasis.  Degenerative changes of the spine.    12/23 EKG Normal sinus rhythm with sinus arrhythmia   Left axis deviation     Incomplete right bundle branch block   Possible Septal infarct (cited on or before 03-DEC-2020)   Abnormal ECG   When compared with ECG of 22-DEC-2020 14:50,   Vent. rate has decreased BY  43 BPM   Incomplete right bundle branch block is now Present   Confirmed by DAXA WATKINS MD ([id number]) on 12/23/2020 5:24:49 PM

## 2020-12-24 NOTE — NURSING
Arrived to ICU bed 1 from ER    Received report from STACIA Strong. 40% VM O2 on arrival, RT staff at bedside, labored breathing, shortness of breath.    Physical to follow  
Called and updated Casper Harper MD with EICU on decreased hgb/hct, elevated glucose, decreased Mag, updated Potassium (see labs), SBP 70-80's when sleeping, awakes easily to voice, non-symptomatic, SBP 's when awake.MD will place new orders.  
Called and updated Dr Marin on vitals, status, condition, new orders noted, anxious, requesting home Triology, RT staff at bedside with home Triology setting up at this time. Will continue to monitor and update MD as needed. Anxious mood noted, reviewed home medications with MD.  
Patient awake, alert, oriented. 3 L nasal cannula. Telemetry monitor removed. Peripheral IV removed. Catheter tip intact. AVS given and explained. Verbalized understanding.   
Report given to STACIA Barclay    Patient resting well at this time. No family at bedside. Mag infusion noted at this time at a rate of 25 ml/hr.    RN assuming care of patient.  
UBALDO VILLAFANA called, Casper Harper MD, updated MD on vitals, status, condition, new orders noted.  
none

## 2020-12-24 NOTE — PLAN OF CARE
12/24/20 1206   Final Note   Assessment Type Final Discharge Note   Anticipated Discharge Disposition Home-Health  (AIM Program)   What phone number can be called within the next 1-3 days to see how you are doing after discharge? [phone]   Hospital Follow Up  Appt(s) scheduled? Yes   Discharge plans and expectations educations in teach back method with documentation complete? Yes       Patient discharging home to resume STAT AIM Program.     Daniela Castro LMSW

## 2020-12-24 NOTE — PROGRESS NOTES
[facility]  Cardiology  Progress Note    Patient Name: Chantell Herrera  MRN: [id number]  Admission Date: 12/22/2020  Hospital Length of Stay: 0 days  Code Status: DNR   Attending Physician: Noe Marin MD   Primary Care Physician: Kari Gaspar MD  Expected Discharge Date:   Principal Problem:Acute on chronic respiratory failure with hypoxia    Subjective:     Hospital Course: Pt is an 86 y/o with a PMH of chronic diastolic and systolic CHF, PAF, hypertensive heart disease, pulmonary hypertension, COPD, CKD, dyslipidemia, anemia who present to ER with complaints of SOB following blood transfusion. She was recently evaluated in ER with similar complaints and presentation. Troponin mildly elevated at .04 though has a chronic troponin elevation, BNP elevated though is typically elevated. ABG's at baseline. CIS asked to evaluate.        Interval History: O2 stable, plans for d/c home today, resume all home meds    ROS     Constitutional : Negative  EENT : Negative  CV : Negative  Respiratory : SOB  Gastrointestinal: Negative   Genitourinary: Negative  Musculoskeletal: Negative  Skin : Negative  Neurological : AAO x 3        Objective:     Vital Signs (Most Recent):  Temp: 96.4 °F (35.8 °C) (12/24/20 0725)  Pulse: 65 (12/24/20 0800)  Resp: 18 (12/24/20 0730)  BP: 128/64 (12/24/20 0725)  SpO2: 97 % (12/24/20 0730) Vital Signs (24h Range):  Temp:  [96.4 °F (35.8 °C)-99.2 °F (37.3 °C)] 96.4 °F (35.8 °C)  Pulse:  [] 65  Resp:  [16-22] 18  SpO2:  [90 %-100 %] 97 %  BP: ()/(44-64) 128/64     Weight: 58.4 kg (128 lb 12 oz)  Body mass index is 23.55 kg/m².    SpO2: 97 %  O2 Device (Oxygen Therapy): nasal cannula      Intake/Output Summary (Last 24 hours) at 12/24/2020 0857  Last data filed at 12/24/2020 0800  Gross per 24 hour   Intake 831 ml   Output 800 ml   Net 31 ml       Lines/Drains/Airways     Drain            Female External Urinary Catheter 12/23/20 0000 1 day          Peripheral  Intravenous Line                 Peripheral IV - Single Lumen 12/22/20 1825 20 G Left Hand 1 day                Physical Exam        General appearance: alert, appears stated age and cooperative  Head: Normocephalic, without obvious abnormality, atraumatic  Eyes: conjunctivae/corneas clear. PERRL  Neck: no carotid bruit, no JVD and supple, symmetrical, trachea midline  Lungs:diminished throughout  Chest Wall: no tenderness  Heart: regular rate and rhythm, S1, S2 normal, no murmur, click, rub or gallop  Abdomen: soft, non-tender; bowel sounds normal; no masses,  no organomegaly  Extremities: Extremities normal, atraumatic, no cyanosis, clubbing, or edema  Pulses: Dorsalis Pedis R: 2+ (normal)/L: 2+ (normal)  Skin: Skin color, texture, turgor normal. No rashes or lesions  Neurologic: Normal mood and affect  Alert and oriented X 3       Significant Labs:   BMP:   Recent Labs   Lab 12/23/20  0345 12/23/20  1210 12/24/20  0551   * 194* 114*    143 142   K 4.1 2.9* 4.3   CL 95 94* 98   CO2 35* 34* 34*   BUN 35* 38* 41*   CREATININE 1.2 1.1 1.3   CALCIUM 8.3* 8.7 8.4*   MG 1.4* 1.9 1.9  1.9   , CMP   Recent Labs   Lab 12/22/20  1505 12/23/20  0345 12/23/20  1210 12/24/20  0551   * 142 143 142   K 3.7 4.1 2.9* 4.3   CL 95 95 94* 98   CO2 35* 35* 34* 34*   * 362* 194* 114*   BUN 34* 35* 38* 41*   CREATININE 1.2 1.2 1.1 1.3   CALCIUM 8.9 8.3* 8.7 8.4*   PROT 7.0 5.5*  --  5.4*   ALBUMIN 4.2 3.4*  --  3.3*   BILITOT 1.2* 0.6  --  0.5   ALKPHOS 89 63  --  61   AST 15 9*  --  7*   ALT 16 13  --  12   ANIONGAP 17* 12 15 10   ESTGFRAFRICA 48* 48* 54* 44*   EGFRNONAA 42* 42* 47* 38*   , CBC   Recent Labs   Lab 12/23/20  0345 12/23/20  1210 12/24/20  0551   WBC 12.76* 13.75* 10.78   HGB 7.8* 8.1* 7.8*   HCT 23.9* 25.9* 24.3*   * 115* 108*    and Troponin   Recent Labs   Lab 12/22/20  1505 12/22/20  1838   TROPONINI 0.041* 0.090*       Significant Imaging: X-Ray: CXR: X-Ray Chest 1 View (CXR): No  results found for this visit on 12/22/20.  Assessment and Plan:       Active Diagnoses:    Diagnosis Date Noted POA    PRINCIPAL PROBLEM:  Acute on chronic respiratory failure with hypoxia [J96.21] 01/22/2019 Yes    Shortness of breath [R06.02] 12/22/2020 Yes    Long term current use of systemic steroids [Z79.52] 07/04/2020 Not Applicable     Chronic    Anemia [D64.9] 09/01/2019 Yes    Acute on chronic congestive heart failure [I50.9] 02/01/2019 Yes    Chronic atrial fibrillation [I48.20] 11/16/2018 Yes    Oxygen dependent [Z99.81] 01/03/2017 Not Applicable    Major depressive disorder with single episode, in full remission [F32.5] 05/10/2016 Yes    CKD (chronic kidney disease), stage III [N18.30] 05/10/2016 Yes    Anxiety state [F41.1] 08/10/2015 Yes    COPD (chronic obstructive pulmonary disease) [J44.9] 05/03/2015 Yes    Hyperlipidemia [E78.5] 05/03/2015 Yes    Cardiovascular disease [I25.10] 06/19/2014 Yes      Problems Resolved During this Admission:       VTE Risk Mitigation (From admission, onward)         Ordered     IP VTE HIGH RISK PATIENT  Once      12/22/20 2007     Place sequential compression device  Until discontinued      12/22/20 2007                Current Facility-Administered Medications   Medication    acetaminophen tablet 650 mg    albuterol-ipratropium 2.5 mg-0.5 mg/3 mL nebulizer solution 3 mL    aspirin EC tablet 81 mg    citalopram tablet 20 mg    dextrose 50% injection 12.5 g    dextrose 50% injection 25 g    digoxin tablet 0.125 mg    doxycycline tablet 100 mg    folic acid tablet 2,000 mcg    glucagon (human recombinant) injection 1 mg    glucose chewable tablet 16 g    glucose chewable tablet 24 g    HYDROcodone-acetaminophen 5-325 mg per tablet 1 tablet    insulin aspart U-100 pen 0-5 Units    insulin detemir U-100 pen 10 Units    melatonin tablet 6 mg    metoprolol succinate (TOPROL-XL) 24 hr tablet 50 mg    ondansetron injection 4 mg    pravastatin  tablet 40 mg    predniSONE tablet 20 mg    promethazine (PHENERGAN) 12.5 mg in dextrose 5 % 50 mL IVPB    rOPINIRole tablet 1 mg    sodium chloride 0.9% flush 10 mL    traZODone tablet 50 mg          Frail DNR with chronic dyspnea severe COPD.   History of recurrent anemia requiring blood transfusion.    No PE by CT January 2019.  Hypertension controlled.   Chronic kidney disease May 2020.  Paroxysmal A. fib status post pacemaker December 2019     Echo 8/2020 Left ventricular ejection fraction 65%, PA pressure 72 severe pulmonary hypertension.,      stage III chronic diastolic heart failure.   Mild anterior attenuation on stress perfusion December 2018  Mild bilateral carotid disease December 2015  Troponin chronically elevated, no chest pain, pt is a poor candidate for [facility]     PLAN: Pt resting comfortable on 2L NC with stable O2 sats. She is euvolemic on exam, no further diuresis needed as BP and Mg low overnight. Pt will likely be d/c'd today; resume Home Meds    Yaima Owens NP for Dr. Metcalf  Cardiology  [facility]    I attest that I have personally seen and examined this patient. I have reviewed and discussed the management in detail as outlined above.

## 2020-12-24 NOTE — PLAN OF CARE
Problem: Fall Injury Risk  Goal: Absence of Fall and Fall-Related Injury  Outcome: Ongoing, Progressing     Problem: Adult Inpatient Plan of Care  Goal: Plan of Care Review  Outcome: Ongoing, Progressing  Goal: Patient-Specific Goal (Individualization)  Outcome: Ongoing, Progressing  Goal: Absence of Hospital-Acquired Illness or Injury  Outcome: Ongoing, Progressing  Goal: Optimal Comfort and Wellbeing  Outcome: Ongoing, Progressing  Goal: Readiness for Transition of Care  Outcome: Ongoing, Progressing  Goal: Rounds/Family Conference  Outcome: Ongoing, Progressing     Problem: Diabetes Comorbidity  Goal: Blood Glucose Level Within Desired Range  Outcome: Ongoing, Progressing     Problem: Skin Injury Risk Increased  Goal: Skin Health and Integrity  12/24/2020 0309 by Mirela Juan RN  Outcome: Ongoing, Progressing  12/24/2020 0308 by Mirela Juan RN  Outcome: Ongoing, Progressing     Problem: Gas Exchange Impaired  Goal: Optimal Gas Exchange  Outcome: Ongoing, Progressing     Problem: Adjustment to Illness COPD (Chronic Obstructive Pulmonary Disease)  Goal: Optimal Chronic Illness Coping  Outcome: Ongoing, Progressing     Problem: Functional Ability Impaired COPD (Chronic Obstructive Pulmonary Disease)  Goal: Optimal Level of Functional [place]  Outcome: Ongoing, Progressing     Problem: Oral Intake Inadequate COPD (Chronic Obstructive Pulmonary Disease)  Goal: Improved Nutrition Intake  Outcome: Ongoing, Progressing     Problem: Infection COPD (Chronic Obstructive Pulmonary Disease)  Goal: Absence of Infection Signs/Symptoms  Outcome: Ongoing, Progressing     Problem: Respiratory Compromise COPD (Chronic Obstructive Pulmonary Disease)  Goal: Effective Oxygenation and Ventilation  Outcome: Ongoing, Progressing     Patient denies respiratory distress. Receives 4L of oxygen per NC. Denies pain. PRN melatonin was given. Free from falls and injury. Purwick in place. Free from redness, pain, or breakdown.

## 2020-12-24 NOTE — PLAN OF CARE
Problem: Physical Therapy Goal  Goal: Physical Therapy Goal  Description: Goals to be met by: 5 visits    Patient will increase functional independence with mobility by performin. Supine to sit with Independent  2. Sit to supine with Independent  3. Bed to chair transfer with Supervision or Set-up Assistancewith or without rolling walker using Step Transfer TECHNIQUE  4. Gait  x ~100  feet with Supervision or Set-up Assistance with or without rolling walker  5. Lower extremity exercise program x10 reps per handout, with assistance as needed     Outcome: Met

## 2020-12-24 NOTE — PROGRESS NOTES
[facility] Hospital Medicine  Progress Note    Patient Name: Chantell Herrera  MRN: [id number]  Patient Class: OP- Observation   Admission Date: 12/22/2020  Length of Stay: 0 days  Attending Physician: Noe Marin MD  Primary Care Provider: Kari Gaspar MD        Subjective:     Principal Problem:Acute on chronic respiratory failure with hypoxia        HPI:  86yo F with severe COPD, atrial fib (off anticoag.), CHF, DM II,  and symptomatic anemia who presented to ED following an outpatient blood transfusion for dyspnea. The patient is chronically on home oxygen, but was requiring BiPAP to maintain her O2 sats in ED. She reports she was in her usual state of health prior, although she has had multiple admissions this year related to her COPD and CHF. Her only other complaint is frequent sneezing. No fever/chills.     Per chart review, last clinic visit with PCP, pt discussed and agreed to DNR/DNI status and AIM was initiated. I discussed this with patient and she agrees she is DNR/DNI.    Overview/Hospital Course:  12/23 Per nursing no acute events overnight. Pt used trelegy overnight. This morning she states she is essentially back to baseline, although she is feeling week. Afebrile. Mild hypotension. DC'd lasix. Repleting magnesium.     12/24 pt did well overnight on home dose O2 3-4L NC and bipap. Has trilogy at home. bipap here. WBC significantlty improved [id number]    Chronic anemia. Was getting blood prior to admission when she got SOB. H/h 7.8/24      Review of Systems   Constitutional: Negative for appetite change, chills and fever.   HENT: Negative for congestion, rhinorrhea and sore throat.    Eyes: Negative for discharge and itching.   Respiratory: Positive for cough and shortness of breath.    Cardiovascular: Negative for chest pain (mild, chronic).   Gastrointestinal: Negative for abdominal pain, diarrhea, nausea and vomiting.   Genitourinary: Negative for dysuria, hematuria  and urgency.   Musculoskeletal: Negative for arthralgias and myalgias.   Neurological: Negative for dizziness, syncope and light-headedness.   Psychiatric/Behavioral: The patient is not nervous/anxious.      Objective:     Vital Signs (Most Recent):  Temp: 96.4 °F (35.8 °C) (12/24/20 0725)  Pulse: 65 (12/24/20 0800)  Resp: 18 (12/24/20 0730)  BP: 128/64 (12/24/20 0725)  SpO2: 97 % (12/24/20 0730) Vital Signs (24h Range):  Temp:  [96.4 °F (35.8 °C)-99.2 °F (37.3 °C)] 96.4 °F (35.8 °C)  Pulse:  [] 65  Resp:  [16-22] 18  SpO2:  [90 %-100 %] 97 %  BP: ()/(44-64) 128/64     Weight: 58.4 kg (128 lb 12 oz)  Body mass index is 23.55 kg/m².    Physical Exam  Vitals signs and nursing note reviewed.   Constitutional:       General: She is not in acute distress.  HENT:      Head: Normocephalic and atraumatic.      Right Ear: External ear normal.      Left Ear: External ear normal.      Nose: Nose normal. No congestion.      Mouth/Throat:      Mouth: Mucous membranes are moist.      Pharynx: Oropharynx is clear.   Eyes:      Conjunctiva/sclera: Conjunctivae normal.      Pupils: Pupils are equal, round, and reactive to light.   Neck:      Musculoskeletal: Neck supple.   Cardiovascular:      Rate and Rhythm: Normal rate and regular rhythm.      Heart sounds: Normal heart sounds. No murmur.   Pulmonary:      Effort: Pulmonary effort is normal. No respiratory distress.      Breath sounds: No wheezing or rales.      Comments: Scattered, coarse rhonchi  Abdominal:      General: Bowel sounds are normal. There is no distension.      Palpations: Abdomen is soft.      Tenderness: There is no abdominal tenderness.   Lymphadenopathy:      Cervical: No cervical adenopathy.   Skin:     General: Skin is warm and dry.      Findings: Bruising (scattered ) present.   Neurological:      General: No focal deficit present.      Mental Status: She is alert and oriented to person, place, and time.   Psychiatric:         Behavior: Behavior  normal.      Comments: Depressed mood and affect           CRANIAL NERVES     CN III, IV, VI   Pupils are equal, round, and reactive to light.       Significant Labs:   ABGs:   Recent Labs   Lab 20  1533   PH 7.480*   PCO2 59*   HCO3 43.90*   POCSATURATED 89.9*   BE 18.00*   TOTALHB 9.6*   COHB 2.4   METHB 1.8*     BMP:   Recent Labs   Lab 20  0551   *      K 4.3   CL 98   CO2 34*   BUN 41*   CREATININE 1.3   CALCIUM 8.4*   MG 1.9  1.9     CBC:   Recent Labs   Lab 20  0345 20  1210 20  0551   WBC 12.76* 13.75* 10.78   HGB 7.8* 8.1* 7.8*   HCT 23.9* 25.9* 24.3*   * 115* 108*     CMP:   Recent Labs   Lab 20  1505 20  0345 20  1210 20  0551   * 142 143 142   K 3.7 4.1 2.9* 4.3   CL 95 95 94* 98   CO2 35* 35* 34* 34*   * 362* 194* 114*   BUN 34* 35* 38* 41*   CREATININE 1.2 1.2 1.1 1.3   CALCIUM 8.9 8.3* 8.7 8.4*   PROT 7.0 5.5*  --  5.4*   ALBUMIN 4.2 3.4*  --  3.3*   BILITOT 1.2* 0.6  --  0.5   ALKPHOS 89 63  --  61   AST 15 9*  --  7*   ALT 16 13  --  12   ANIONGAP 17* 12 15 10   EGFRNONAA 42* 42* 47* 38*     Cardiac Markers:   Recent Labs   Lab 20  0345   BNP 1,213*     Magnesium:   Recent Labs   Lab 20  0345 20  1210 20  0551   MG 1.4* 1.9 1.9  1.9     Troponin:   Recent Labs   Lab 20  1505 20  1838   TROPONINI 0.041* 0.090*       Significant Imagin/23/20 CXR: The cardiac silhouette is within normal limits considering portable technique.  Pacemaker in place.  Atherosclerotic changes of the aorta.  Thickening along the right minor fissure is less prominent.  Minimal left lower lobe discoid atelectasis.  Degenerative changes of the spine.     EKG Normal sinus rhythm with sinus arrhythmia   Left axis deviation     Incomplete right bundle branch block   Possible Septal infarct (cited on or before 03-DEC-2020)   Abnormal ECG   When compared with ECG of 22-DEC-2020 14:50,   Vent. rate  has decreased BY  43 BPM   Incomplete right bundle branch block is now Present   Confirmed by DAXA WATKINS MD ([id number]) on 12/23/2020 5:24:49 PM       Assessment/Plan:      * Acute on chronic respiratory failure with hypoxia    Pt requiring BiPAP/Trelegy overnight. Currently she is on 3L NC  Anticipate transfer to floor vs DC home later today  D/c today cont trilogy at home    Shortness of breath  Back to baseline       Long term current use of systemic steroids  Cont home prednisone dose of 20mg daily      Anemia  S/p 1u pRBCs 12/22/20  Stable     Acute on chronic congestive heart failure  S/p Lasix 40mg IV x2 12/22  Will resume home bumex once hypotension resolves  Resume on d/c     Chronic atrial fibrillation    On aspirin, no anticoagulation    Oxygen dependent    On 3.5L at home, stable on home dose    CKD (chronic kidney disease), stage III    Stable creat 1.3    Major depressive disorder with single episode, in full remission  Cont citalopram      Anxiety state  Cont citalopram      Hyperlipidemia  Cont statin      COPD (chronic obstructive pulmonary disease)  Cont prednisone  Duonebs    Cardiovascular disease          VTE Risk Mitigation (From admission, onward)         Ordered     IP VTE HIGH RISK PATIENT  Once      12/22/20 2007     Place sequential compression device  Until discontinued      12/22/20 2007                Discharge Planning   HARPER: 12/24/2020     Code Status: DNR   Is the patient medically ready for discharge?:     Yes  Discharge Plan A: Other(AIM Program)                  Noe Marin MD  Department of Hospital Medicine   [facility]

## 2020-12-24 NOTE — PROGRESS NOTES
Nursing Notes  Bedside report received from STACIA Lyles. Patient AAOx4. Denies Pain. Without distress. POC discussed. Asked to call for assistance.       Huddle Comments

## 2020-12-24 NOTE — PLAN OF CARE
Patient will discharge home today.   SW in contact with [facility] to resume services. There are no other post acute needs.

## 2020-12-28 NOTE — TELEPHONE ENCOUNTER
----- Message from Renae Avina MA sent at 12/28/2020 10:07 AM CST -----  Pt requests appt access with Dr. Gaspar for hospital f/u. Please assist.      [phone]

## 2020-12-30 NOTE — PROGRESS NOTES
OPCM Encounter  Unable to reach patient after 3rd attempt  OPCM letter has been mailed   CM will close case at this time.    Kirsten Jacques RN  Outpatient Care Management

## 2021-01-01 ENCOUNTER — DOCUMENT SCAN (OUTPATIENT)
Dept: HOME HEALTH SERVICES | Facility: HOSPITAL | Age: 86
End: 2021-01-01
Payer: MEDICARE

## 2021-01-01 ENCOUNTER — OFFICE VISIT (OUTPATIENT)
Dept: FAMILY MEDICINE | Facility: CLINIC | Age: 86
End: 2021-01-01
Payer: MEDICARE

## 2021-01-01 ENCOUNTER — HOSPITAL ENCOUNTER (EMERGENCY)
Facility: HOSPITAL | Age: 86
Discharge: HOME | End: 2021-01-06
Attending: SURGERY
Payer: MEDICARE

## 2021-01-01 VITALS
RESPIRATION RATE: 22 BRPM | HEIGHT: 62 IN | DIASTOLIC BLOOD PRESSURE: 62 MMHG | BODY MASS INDEX: 23.55 KG/M2 | OXYGEN SATURATION: 87 % | TEMPERATURE: 97 F | HEART RATE: 33 BPM | SYSTOLIC BLOOD PRESSURE: 106 MMHG

## 2021-01-01 VITALS
WEIGHT: 128.75 LBS | SYSTOLIC BLOOD PRESSURE: 128 MMHG | HEIGHT: 62 IN | HEART RATE: 74 BPM | RESPIRATION RATE: 18 BRPM | OXYGEN SATURATION: 97 % | BODY MASS INDEX: 23.69 KG/M2 | TEMPERATURE: 96 F | DIASTOLIC BLOOD PRESSURE: 64 MMHG

## 2021-01-01 VITALS
TEMPERATURE: 97 F | SYSTOLIC BLOOD PRESSURE: 117 MMHG | DIASTOLIC BLOOD PRESSURE: 61 MMHG | RESPIRATION RATE: 24 BRPM | HEART RATE: 60 BPM | OXYGEN SATURATION: 100 %

## 2021-01-01 DIAGNOSIS — J96.12 CHRONIC RESPIRATORY FAILURE WITH HYPERCAPNIA: Primary | ICD-10-CM

## 2021-01-01 DIAGNOSIS — I77.1 TORTUOUS AORTA: ICD-10-CM

## 2021-01-01 DIAGNOSIS — J96.21 ACUTE ON CHRONIC RESPIRATORY FAILURE WITH HYPOXIA AND HYPERCAPNIA: Primary | ICD-10-CM

## 2021-01-01 DIAGNOSIS — R53.1 WEAKNESS: ICD-10-CM

## 2021-01-01 DIAGNOSIS — D69.6 THROMBOCYTOPENIA: ICD-10-CM

## 2021-01-01 DIAGNOSIS — R06.02 SHORTNESS OF BREATH: ICD-10-CM

## 2021-01-01 DIAGNOSIS — J42 CHRONIC BRONCHITIS, UNSPECIFIED CHRONIC BRONCHITIS TYPE: ICD-10-CM

## 2021-01-01 DIAGNOSIS — Z99.81 OXYGEN DEPENDENT: ICD-10-CM

## 2021-01-01 DIAGNOSIS — I50.43 ACUTE ON CHRONIC COMBINED SYSTOLIC AND DIASTOLIC CHF, NYHA CLASS 3: ICD-10-CM

## 2021-01-01 DIAGNOSIS — D64.9 SYMPTOMATIC ANEMIA: ICD-10-CM

## 2021-01-01 DIAGNOSIS — J44.1 COPD WITH ACUTE EXACERBATION: ICD-10-CM

## 2021-01-01 DIAGNOSIS — I48.20 CHRONIC ATRIAL FIBRILLATION: ICD-10-CM

## 2021-01-01 DIAGNOSIS — N18.32 STAGE 3B CHRONIC KIDNEY DISEASE: ICD-10-CM

## 2021-01-01 DIAGNOSIS — J96.22 ACUTE ON CHRONIC RESPIRATORY FAILURE WITH HYPOXIA AND HYPERCAPNIA: Primary | ICD-10-CM

## 2021-01-01 LAB
ALBUMIN SERPL BCP-MCNC: 3.6 G/DL (ref 3.5–5.2)
ALLENS TEST: ABNORMAL
ALP SERPL-CCNC: 80 U/L (ref 55–135)
ALT SERPL W/O P-5'-P-CCNC: 36 U/L (ref 10–44)
ANION GAP SERPL CALC-SCNC: 9 MMOL/L (ref 8–16)
ANISOCYTOSIS BLD QL SMEAR: ABNORMAL
AST SERPL-CCNC: 12 U/L (ref 10–40)
BASOPHILS # BLD AUTO: ABNORMAL K/UL (ref 0–0.2)
BASOPHILS NFR BLD: 2 % (ref 0–1.9)
BILIRUB SERPL-MCNC: 0.6 MG/DL (ref 0.1–1)
BILIRUB UR QL STRIP: NEGATIVE
BUN SERPL-MCNC: 27 MG/DL (ref 8–23)
CALCIUM SERPL-MCNC: 8.5 MG/DL (ref 8.7–10.5)
CHLORIDE SERPL-SCNC: 101 MMOL/L (ref 95–110)
CLARITY UR: CLEAR
CO2 SERPL-SCNC: 32 MMOL/L (ref 23–29)
COLOR UR: YELLOW
CREAT SERPL-MCNC: 1.2 MG/DL (ref 0.5–1.4)
DACRYOCYTES BLD QL SMEAR: ABNORMAL
DELSYS: ABNORMAL
DIFFERENTIAL METHOD: ABNORMAL
EOSINOPHIL # BLD AUTO: ABNORMAL K/UL (ref 0–0.5)
EOSINOPHIL NFR BLD: 5 % (ref 0–8)
ERYTHROCYTE [DISTWIDTH] IN BLOOD BY AUTOMATED COUNT: 24.8 % (ref 11.5–14.5)
EST. GFR  (AFRICAN AMERICAN): 48 ML/MIN/1.73 M^2
EST. GFR  (NON AFRICAN AMERICAN): 42 ML/MIN/1.73 M^2
GLUCOSE SERPL-MCNC: 228 MG/DL (ref 70–110)
GLUCOSE UR QL STRIP: NEGATIVE
HCO3 UR-SCNC: 37.8 MMOL/L (ref 22–26)
HCT VFR BLD AUTO: 25.9 % (ref 37–48.5)
HGB BLD-MCNC: 7.8 G/DL (ref 12–16)
HGB UR QL STRIP: NEGATIVE
IMM GRANULOCYTES # BLD AUTO: ABNORMAL K/UL (ref 0–0.04)
IMM GRANULOCYTES NFR BLD AUTO: ABNORMAL % (ref 0–0.5)
KETONES UR QL STRIP: NEGATIVE
LACTATE SERPL-SCNC: 1.1 MMOL/L (ref 0.5–2.2)
LEUKOCYTE ESTERASE UR QL STRIP: NEGATIVE
LIPASE SERPL-CCNC: 16 U/L (ref 4–60)
LYMPHOCYTES # BLD AUTO: ABNORMAL K/UL (ref 1–4.8)
LYMPHOCYTES NFR BLD: 22 % (ref 18–48)
MCH RBC QN AUTO: 35.9 PG (ref 27–31)
MCHC RBC AUTO-ENTMCNC: 30.1 G/DL (ref 32–36)
MCV RBC AUTO: 119 FL (ref 82–98)
MONOCYTES # BLD AUTO: ABNORMAL K/UL (ref 0.3–1)
MONOCYTES NFR BLD: 3 % (ref 4–15)
NEUTROPHILS NFR BLD: 65 % (ref 38–73)
NEUTS BAND NFR BLD MANUAL: 3 %
NITRITE UR QL STRIP: NEGATIVE
NRBC BLD-RTO: 0 /100 WBC
PCO2 BLDA: 70 MMHG (ref 35–45)
PH SMN: 7.34 [PH] (ref 7.35–7.45)
PH UR STRIP: 6 [PH] (ref 5–8)
PLATELET # BLD AUTO: 180 K/UL (ref 150–350)
PLATELET BLD QL SMEAR: ABNORMAL
PMV BLD AUTO: 14.1 FL (ref 9.2–12.9)
PO2 BLDA: 91 MMHG (ref 75–100)
POC BE: 10.6 MMOL/L (ref -2–2)
POC COHB: 1.6 % (ref 0–3)
POC METHB: 1.9 % (ref 0–1.5)
POC O2HB ARTERIAL: 93.8 % (ref 94–100)
POC SATURATED O2: 97.3 % (ref 90–100)
POC TCO2: 39.9 MMOL/L
POC THB: 7.8 G/DL (ref 12–18)
POIKILOCYTOSIS BLD QL SMEAR: SLIGHT
POTASSIUM SERPL-SCNC: 4.1 MMOL/L (ref 3.5–5.1)
PROT SERPL-MCNC: 5.8 G/DL (ref 6–8.4)
PROT UR QL STRIP: NEGATIVE
RBC # BLD AUTO: 2.17 M/UL (ref 4–5.4)
SITE: ABNORMAL
SODIUM SERPL-SCNC: 142 MMOL/L (ref 136–145)
SP GR UR STRIP: 1.01 (ref 1–1.03)
URN SPEC COLLECT METH UR: NORMAL
UROBILINOGEN UR STRIP-ACNC: NEGATIVE EU/DL
WBC # BLD AUTO: 10.02 K/UL (ref 3.9–12.7)

## 2021-01-01 PROCEDURE — 83605 ASSAY OF LACTIC ACID: CPT | Mod: HCNC

## 2021-01-01 PROCEDURE — 3074F SYST BP LT 130 MM HG: CPT | Mod: HCNC,CPTII,S$GLB, | Performed by: FAMILY MEDICINE

## 2021-01-01 PROCEDURE — 1159F PR MEDICATION LIST DOCUMENTED IN MEDICAL RECORD: ICD-10-PCS | Mod: HCNC,S$GLB,, | Performed by: FAMILY MEDICINE

## 2021-01-01 PROCEDURE — 1157F ADVNC CARE PLAN IN RCRD: CPT | Mod: HCNC,S$GLB,, | Performed by: FAMILY MEDICINE

## 2021-01-01 PROCEDURE — 99499 RISK ADDL DX/OHS AUDIT: ICD-10-PCS | Mod: S$GLB,,, | Performed by: FAMILY MEDICINE

## 2021-01-01 PROCEDURE — 99215 OFFICE O/P EST HI 40 MIN: CPT | Mod: HCNC,S$GLB,, | Performed by: FAMILY MEDICINE

## 2021-01-01 PROCEDURE — 1101F PR PT FALLS ASSESS DOC 0-1 FALLS W/OUT INJ PAST YR: ICD-10-PCS | Mod: HCNC,CPTII,S$GLB, | Performed by: FAMILY MEDICINE

## 2021-01-01 PROCEDURE — 83690 ASSAY OF LIPASE: CPT | Mod: HCNC

## 2021-01-01 PROCEDURE — 93010 ELECTROCARDIOGRAM REPORT: CPT | Mod: HCNC,,, | Performed by: INTERNAL MEDICINE

## 2021-01-01 PROCEDURE — 36600 WITHDRAWAL OF ARTERIAL BLOOD: CPT | Mod: HCNC

## 2021-01-01 PROCEDURE — 99215 PR OFFICE/OUTPT VISIT, EST, LEVL V, 40-54 MIN: ICD-10-PCS | Mod: HCNC,S$GLB,, | Performed by: FAMILY MEDICINE

## 2021-01-01 PROCEDURE — 27000190 HC CPAP FULL FACE MASK W/VALVE: Mod: HCNC

## 2021-01-01 PROCEDURE — 3288F FALL RISK ASSESSMENT DOCD: CPT | Mod: HCNC,CPTII,S$GLB, | Performed by: FAMILY MEDICINE

## 2021-01-01 PROCEDURE — 1159F MED LIST DOCD IN RCRD: CPT | Mod: HCNC,S$GLB,, | Performed by: FAMILY MEDICINE

## 2021-01-01 PROCEDURE — 1126F PR PAIN SEVERITY QUANTIFIED, NO PAIN PRESENT: ICD-10-PCS | Mod: HCNC,S$GLB,, | Performed by: FAMILY MEDICINE

## 2021-01-01 PROCEDURE — 99900035 HC TECH TIME PER 15 MIN (STAT): Mod: HCNC

## 2021-01-01 PROCEDURE — 99285 EMERGENCY DEPT VISIT HI MDM: CPT | Mod: 25,HCNC

## 2021-01-01 PROCEDURE — 1157F PR ADVANCE CARE PLAN OR EQUIV PRESENT IN MEDICAL RECORD: ICD-10-PCS | Mod: HCNC,S$GLB,, | Performed by: FAMILY MEDICINE

## 2021-01-01 PROCEDURE — 3074F PR MOST RECENT SYSTOLIC BLOOD PRESSURE < 130 MM HG: ICD-10-PCS | Mod: HCNC,CPTII,S$GLB, | Performed by: FAMILY MEDICINE

## 2021-01-01 PROCEDURE — 3288F PR FALLS RISK ASSESSMENT DOCUMENTED: ICD-10-PCS | Mod: HCNC,CPTII,S$GLB, | Performed by: FAMILY MEDICINE

## 2021-01-01 PROCEDURE — 1101F PT FALLS ASSESS-DOCD LE1/YR: CPT | Mod: HCNC,CPTII,S$GLB, | Performed by: FAMILY MEDICINE

## 2021-01-01 PROCEDURE — 99999 PR PBB SHADOW E&M-EST. PATIENT-LVL V: ICD-10-PCS | Mod: PBBFAC,HCNC,, | Performed by: FAMILY MEDICINE

## 2021-01-01 PROCEDURE — 80053 COMPREHEN METABOLIC PANEL: CPT | Mod: HCNC

## 2021-01-01 PROCEDURE — 85007 BL SMEAR W/DIFF WBC COUNT: CPT | Mod: HCNC

## 2021-01-01 PROCEDURE — 3078F DIAST BP <80 MM HG: CPT | Mod: HCNC,CPTII,S$GLB, | Performed by: FAMILY MEDICINE

## 2021-01-01 PROCEDURE — 94660 CPAP INITIATION&MGMT: CPT | Mod: HCNC

## 2021-01-01 PROCEDURE — 82803 BLOOD GASES ANY COMBINATION: CPT | Mod: HCNC | Performed by: SURGERY

## 2021-01-01 PROCEDURE — 93005 ELECTROCARDIOGRAM TRACING: CPT | Mod: HCNC

## 2021-01-01 PROCEDURE — 93010 EKG 12-LEAD: ICD-10-PCS | Mod: HCNC,,, | Performed by: INTERNAL MEDICINE

## 2021-01-01 PROCEDURE — 99999 PR PBB SHADOW E&M-EST. PATIENT-LVL V: CPT | Mod: PBBFAC,HCNC,, | Performed by: FAMILY MEDICINE

## 2021-01-01 PROCEDURE — 99499 UNLISTED E&M SERVICE: CPT | Mod: S$GLB,,, | Performed by: FAMILY MEDICINE

## 2021-01-01 PROCEDURE — 36000 PLACE NEEDLE IN VEIN: CPT | Mod: HCNC

## 2021-01-01 PROCEDURE — 81003 URINALYSIS AUTO W/O SCOPE: CPT | Mod: HCNC

## 2021-01-01 PROCEDURE — 1126F AMNT PAIN NOTED NONE PRSNT: CPT | Mod: HCNC,S$GLB,, | Performed by: FAMILY MEDICINE

## 2021-01-01 PROCEDURE — 85027 COMPLETE CBC AUTOMATED: CPT | Mod: HCNC

## 2021-01-01 PROCEDURE — 3078F PR MOST RECENT DIASTOLIC BLOOD PRESSURE < 80 MM HG: ICD-10-PCS | Mod: HCNC,CPTII,S$GLB, | Performed by: FAMILY MEDICINE

## 2021-02-25 ENCOUNTER — PES CALL (OUTPATIENT)
Dept: ADMINISTRATIVE | Facility: CLINIC | Age: 86
End: 2021-02-25

## 2021-03-03 ENCOUNTER — PES CALL (OUTPATIENT)
Dept: ADMINISTRATIVE | Facility: CLINIC | Age: 86
End: 2021-03-03

## 2021-08-25 NOTE — ASSESSMENT & PLAN NOTE
Cont home pravastatin     ADDISON ROBERTO ; 08/30/2021 ; [facility] [address] [place] [address]

## 2021-10-01 NOTE — NURSING
Patient awake, alert, oriented. Peripheral IV removed. Catheter tip intact. Telemetry monitor removed. AVS given and explained. Verbalized understanding.    Hx of TIA 10 years ago, has been on eliquis. 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  With hx of mitral stenosis/rheumatic valve disease.    - On eliquis at home. Will start heparin gtt (for NSTEMI)  - ASA 81mg daily  - Atorvastatin 80mg QHS Episode of shaking prior to ED presentation. No alteration in mental status, LOC, confusion per patient.  Possibly chills vs. seizure.    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  - monitor for fevers

## 2021-12-06 NOTE — PLAN OF CARE
09/04/19 1158   Discharge Assessment   Assessment Type Discharge Planning Assessment   Confirmed/corrected address and phone number on facesheet? Yes   Assessment information obtained from? Patient   Prior to hospitilization cognitive status: Alert/Oriented   Prior to hospitalization functional status: Assistive Equipment   Current cognitive status: Alert/Oriented   Current Functional Status: Assistive Equipment   Facility Arrived From: Home   Lives With spouse   Able to Return to Prior Arrangements yes   Is patient able to care for self after discharge? Yes   Who are your caregiver(s) and their phone number(s)? Jordan Herrera (Spouse) [phone]   Patient's perception of discharge disposition home or selfcare   Readmission Within the Last 30 Days no previous admission in last 30 days   Patient currently receives any other outside agency services? No   Equipment Currently Used at Home rollator;oxygen;other (see comments);shower chair  (trilogy, electric scooter)   Do you have any problems affording any of your prescribed medications? No   Does the patient have transportation home? Yes   Transportation Anticipated family or friend will provide   Discharge Plan A Home   Discharge Plan B Home with family   DME Needed Upon Discharge  none   Patient/Family in Agreement with Plan yes     No post-acute care needs identified at this time. SW to continue to monitor needs throughout hospital stay.     Daniela Castro, FRANCHESKA       no history of blood product transfusion

## 2022-02-04 NOTE — PROGRESS NOTES
Spoke with Ms. Abdul about her abx purpose and side effects. Patient denies n/v/d. States she does not have uncontrolled pain and that her medications have been explained prior to administrations. C/o right arm irritation and the temperature in her room.    No

## 2022-04-11 NOTE — TELEPHONE ENCOUNTER
OUTPATIENT PROGRESS NOTE  TRANSITIONAL CARE MANAGEMENT - HOSPITAL DISCHARGE FOLLOW-UP      CHIEF COMPLAINT  Transitional Care Management (stroke), Office Visit (dry mouth), and Transitional Care Management (Hospital Discharge Follow-Up)      Mr. Alvarez is accompanied today by his daughter.    SUBJECTIVE   The patient was discharged from the hospital on 3/25/2022. The Discharge Summary was reviewed. It documents that the patient was hospitalized for left hand weakness and stroke.    Pertinent un-finalized hospital performed diagnostic tests - were reviewed..    Pertinent un-finalized hospital lab tests - were reviewed.    Advance Directive:   The patient has the following documents:  Power of  for Health Care    Durable Medical Equipment/Assistive devices prescribed: None     MEDICATIONS  The discharge medication list was reviewed. Outpatient medications were updated today. He is fully compliant with the medication regimen prescribed at the time of discharge.    HISTORIES  I have personally reviewed and updated the following electronic medical record sections: Allergies, Problem List, Past Medical History, Past Surgical History, Social History and Family History.    REVIEW OF SYSTEMS  Review of Systems:   As above per the History of Present Illness, otherwise essentially negative.  Constitutional: Negative for fever and chills.   Respiratory: Negative for cough and shortness of breath.    Cardiovascular: Negative for chest pain or chest pressure.     PHYSICAL EXAM  Visit Vitals  /63   Pulse 76   Ht 5' 9\" (1.753 m)   Wt 81.6 kg (179 lb 14.4 oz)   SpO2 96%   BMI 26.57 kg/m²     Heart:  No click, no gallop, no heaves, no murmur, no rub, no thrills; regular rate and rhythm, S1 normal and S2 normal.  Lungs:  Clear to auscultation anteriorly and posteriorly bilaterally and normal percussion posteriorly bilaterally.  Abdomen:  Bowel sounds normal; no hernias, no masses, no hepatomegaly or spenomegaly, not  Ordered CT of the Brain could not do MRI has a pacemaker    tender and soft.  Extremities:  Normal range of motion of all joints; no clubbing, no cyanosis, no deformity noted and no edema.    ASSESSMENT  1. TIA (transient ischemic attack)    2. Trigger index finger of left hand    3. Left hand weakness        PLAN  Continue with current meds  Can refer to hand surgeon  Okay for tylenol bid.    Patient adherence to his treatment plan was assessed. He is   fully compliant with the entire discharge treatment plan. Patient was seen for a detailed history, detailed examination, medical decision making of moderate complexity .

## 2022-07-09 NOTE — ASSESSMENT & PLAN NOTE
Ambulance stretcher requested with [facility] (ext [phone]) for 5 PM pickup time for transport home.    UPDATE (5:33 PM):  ETA for ambulance stretcher is 18:50.   Attempted to remove steroids pt can not tolerate reduction

## 2023-06-12 NOTE — ASSESSMENT & PLAN NOTE
"Subjective   History was provided by the  self .  Génesis Valladares is a 22 y.o. female who is here for this well-child visit.    Current Issues:  Current concerns include recent ? concussion. Elevated creatinine 1.18 in ED  Currently menstruating? yes; current menstrual pattern: with severe dysmenorrhea sees gyn  Sexually active? yes - uses condoms    Does patient snore? no   Sleep: all night    Review of Nutrition:  Current diet: regular  Balanced diet? yes  Constipation? No    Social Screening:   Parental relations: ok  Discipline concerns? no  Concerns regarding behavior with peers? yes - recently diagnosed BPD, working on strategies to indtroduce less drama, will get counselor in the fall  School performance: doing well; no concerns  [facility]  Screening Questions:  Risk factors for dyslipidemia: no  Risk factors for sexually-transmitted infections: no  Risk factors for alcohol/drug use:  no  Smoking?     Objective   /69 (BP Location: Left arm)   Pulse 88   Ht 1.581 m (5' 2.25\")   Wt 53.7 kg (118 lb 6.4 oz)   BMI 21.48 kg/m²   Growth parameters are noted and are appropriate for age.  General:   alert and oriented, in no acute distress   Gait:   normal   Skin:   normal   Oral cavity:   lips, mucosa, and tongue normal; teeth and gums normal   Eyes:   sclerae white, pupils equal and reactive   Ears:   normal bilaterally   Neck:   no adenopathy and thyroid not enlarged, symmetric, no tenderness/mass/nodules   Lungs:  clear to auscultation bilaterally   Heart:   regular rate and rhythm, S1, S2 normal, no murmur, click, rub or gallop   Abdomen:  soft, non-tender; bowel sounds normal; no masses, no organomegaly   :  exam deferred   Raymundo Stage:      Extremities:  extremities normal, warm and well-perfused; no cyanosis, clubbing, or edema, negative forward bend   Neuro:  normal without focal findings and muscle tone and strength normal and symmetric     Assessment/Plan   Well adolescent.  1. " Contraction/prerenal azotemia.  Hold off on diuresis and allow for homeostasis.  Also cont rocephin for UTI and f/u culture    1/16 creat 1.0  1/17  BMP  Lab Results   Component Value Date     01/17/2020    K 3.9 01/17/2020    CL 97 01/17/2020    CO2 36 (H) 01/17/2020    BUN 30 (H) 01/17/2020    CREATININE 1.0 01/17/2020    CALCIUM 8.2 (L) 01/17/2020    ANIONGAP 11 01/17/2020    ESTGFRAFRICA >60 01/17/2020    EGFRNONAA 53 (A) 01/17/2020        Anticipatory guidance discussed. Gave handout on well-child issues at this age.  2.  Growth and weight gain appropriate. The patient was counseled regarding nutrition and physical activity.  3. Development: appropriate for age  4. Vaccines per orders  5. Follow up in 1 year for next well child exam or sooner with concerns.    6. Check screening lipid profile per orders.

## 2023-07-22 NOTE — PLAN OF CARE
Problem: Patient Care Overview  Goal: Plan of Care Review  Outcome: Ongoing (interventions implemented as appropriate)  Pt agrees with plan of care.  VS stable.  No complaints of pain noted.  Pt rested well this shift.  IV antibiotics given as ordered.  Pt remains on 3L nasal canula.  Continue respiratory treatments as ordered.  Call bell in reach.  Will continue to monitor.        Specialty Care (Routine)...

## 2024-03-04 NOTE — ASSESSMENT & PLAN NOTE
Cont on remeron     Plan of Care    Continue current medications with the following change:    Consider Xarelto 20 mg daily with breakfast. Check with insurance coverage and call office if affordable to change from warfarin to Xarelto.     If you have any cardiac questions or concerns, call the office at [phone].

## 2024-04-18 NOTE — TELEPHONE ENCOUNTER
----- Message from Cathy Burnett sent at 2020 12:35 PM CDT -----  Contact: self  Chantell Herrera  MRN: [id number]  : 1935  PCP: Kari Gaspar  Home Phone      [phone]  Work Phone      Not on file.  Mobile          [phone]      MESSAGE:   Patient would like to discuss doing a audio visit with doctor gaspar for her hospital f/u that was suppose to be today.  [facility]    [phone]   ----- Message from Raya Chen sent at 4/18/2024 10:12 AM CDT -----  Regarding: Patient call back  .Type: Patient Call Back    Who called:self     What is the request in detail:would like a call back from office; trying to schedule an appointment for Upper GI    Can the clinic reply by [facility]?no     Would the patient rather a call back or a response via [facility]?call      Best call back number:.[phone]      Additional Information:

## 2025-04-07 NOTE — PLAN OF CARE
Problem: Adult Inpatient Plan of Care  Goal: Plan of Care Review  Outcome: Ongoing (interventions implemented as appropriate)  Educate patient/family/cargiver about administration of aerosolized medications via the inhalation route to aid in bronchial hygiene & deliver medications.  Educate patient/family/cargiver about indications, benefits, &  safety of use   Benefits:  Increased FRC & PaO2  Decreased work of breathing  Reduce intrapulmonary shunt  Avoid tracheal intubation & mechanical intubationPatient educated on use, benefits, & safety of oxygen use.    Pt on BIPAP all night and tolerated well. Added mepilex to the bridge of the nose since pt. Complained it was hurting.        Report given to Deena LINDSAY

## 2025-04-10 NOTE — ASSESSMENT & PLAN NOTE
Continue SSRI       3-calculated by average/Not able to assess (calculate score using [facility] averaging method)

## (undated) DEVICE — SYR ONLY LEUR TIP 30CC

## (undated) DEVICE — SYR 10CC LUER LOCK

## (undated) DEVICE — VALVE OLYMPUS SCOPE SUCTION

## (undated) DEVICE — TUBE LUKI ASP COLL 6 1/4

## (undated) DEVICE — SEE MEDLINE ITEM 146313